# Patient Record
Sex: FEMALE | Race: WHITE | Employment: FULL TIME | URBAN - METROPOLITAN AREA
[De-identification: names, ages, dates, MRNs, and addresses within clinical notes are randomized per-mention and may not be internally consistent; named-entity substitution may affect disease eponyms.]

---

## 2018-10-03 ENCOUNTER — OP HISTORICAL/CONVERTED ENCOUNTER (OUTPATIENT)
Dept: OTHER | Age: 73
End: 2018-10-03

## 2019-06-29 ENCOUNTER — IP HISTORICAL/CONVERTED ENCOUNTER (OUTPATIENT)
Dept: OTHER | Age: 74
End: 2019-06-29

## 2019-10-29 ENCOUNTER — OP HISTORICAL/CONVERTED ENCOUNTER (OUTPATIENT)
Dept: OTHER | Age: 74
End: 2019-10-29

## 2019-10-30 ENCOUNTER — OP HISTORICAL/CONVERTED ENCOUNTER (OUTPATIENT)
Dept: OTHER | Age: 74
End: 2019-10-30

## 2020-07-21 ENCOUNTER — OP HISTORICAL/CONVERTED ENCOUNTER (OUTPATIENT)
Dept: OTHER | Age: 75
End: 2020-07-21

## 2020-10-19 ENCOUNTER — TRANSCRIBE ORDER (OUTPATIENT)
Dept: SCHEDULING | Age: 75
End: 2020-10-19

## 2020-10-19 DIAGNOSIS — E04.9 ENLARGEMENT OF THYROID: Primary | ICD-10-CM

## 2020-11-10 ENCOUNTER — HOSPITAL ENCOUNTER (OUTPATIENT)
Dept: ULTRASOUND IMAGING | Age: 75
Discharge: HOME | End: 2020-11-10
Payer: COMMERCIAL

## 2020-11-10 ENCOUNTER — HOSPITAL ENCOUNTER (EMERGENCY)
Age: 75
Discharge: STILL ADMITTED OR ADMINISTRATIVE | End: 2020-11-10

## 2020-11-10 DIAGNOSIS — E04.9 ENLARGEMENT OF THYROID: ICD-10-CM

## 2020-11-10 PROCEDURE — 76536 US EXAM OF HEAD AND NECK: CPT

## 2020-11-13 ENCOUNTER — APPOINTMENT (OUTPATIENT)
Dept: GENERAL RADIOLOGY | Age: 75
DRG: 813 | End: 2020-11-13
Attending: EMERGENCY MEDICINE
Payer: COMMERCIAL

## 2020-11-13 ENCOUNTER — APPOINTMENT (OUTPATIENT)
Dept: CT IMAGING | Age: 75
DRG: 813 | End: 2020-11-13
Attending: NURSE PRACTITIONER
Payer: COMMERCIAL

## 2020-11-13 ENCOUNTER — HOSPITAL ENCOUNTER (INPATIENT)
Age: 75
LOS: 7 days | Discharge: HOME | DRG: 813 | End: 2020-11-20
Attending: FAMILY MEDICINE | Admitting: HOSPITALIST
Payer: COMMERCIAL

## 2020-11-13 DIAGNOSIS — R06.02 SOB (SHORTNESS OF BREATH): Primary | ICD-10-CM

## 2020-11-13 DIAGNOSIS — R09.02 HYPOXIA: ICD-10-CM

## 2020-11-13 DIAGNOSIS — R91.8 RIGHT LOWER LOBE LUNG MASS: ICD-10-CM

## 2020-11-13 PROBLEM — J96.91 RESPIRATORY FAILURE WITH HYPOXIA (HCC): Status: ACTIVE | Noted: 2020-11-13

## 2020-11-13 LAB
ALBUMIN SERPL-MCNC: 3.3 G/DL (ref 3.5–5)
ALBUMIN/GLOB SERPL: 1 {RATIO} (ref 1.1–2.2)
ALP SERPL-CCNC: 106 U/L (ref 45–117)
ALT SERPL-CCNC: 19 U/L (ref 12–78)
ANION GAP SERPL CALC-SCNC: 2 MMOL/L (ref 5–15)
AST SERPL W P-5'-P-CCNC: 11 U/L (ref 15–37)
ATRIAL RATE: 65 BPM
BASOPHILS # BLD: 0.1 K/UL (ref 0–0.1)
BASOPHILS NFR BLD: 1 % (ref 0–1)
BILIRUB SERPL-MCNC: 0.3 MG/DL (ref 0.2–1)
BNP SERPL-MCNC: 444 PG/ML
BUN SERPL-MCNC: 21 MG/DL (ref 6–20)
BUN/CREAT SERPL: 15 (ref 12–20)
CA-I BLD-MCNC: 9 MG/DL (ref 8.5–10.1)
CALCULATED R AXIS, ECG10: 60 DEGREES
CALCULATED T AXIS, ECG11: 54 DEGREES
CHLORIDE SERPL-SCNC: 108 MMOL/L (ref 97–108)
CO2 SERPL-SCNC: 33 MMOL/L (ref 21–32)
CREAT SERPL-MCNC: 1.42 MG/DL (ref 0.55–1.02)
DIAGNOSIS, 93000: NORMAL
DIFFERENTIAL METHOD BLD: ABNORMAL
EOSINOPHIL # BLD: 0.1 K/UL (ref 0–0.4)
EOSINOPHIL NFR BLD: 1 % (ref 0–7)
ERYTHROCYTE [DISTWIDTH] IN BLOOD BY AUTOMATED COUNT: 18.5 % (ref 11.5–14.5)
GLOBULIN SER CALC-MCNC: 3.4 G/DL (ref 2–4)
GLUCOSE SERPL-MCNC: 120 MG/DL (ref 65–100)
HCT VFR BLD AUTO: 28.7 % (ref 35–47)
HGB BLD-MCNC: 7.6 G/DL (ref 11.5–16)
IMM GRANULOCYTES # BLD AUTO: 0 K/UL (ref 0–0.04)
IMM GRANULOCYTES NFR BLD AUTO: 0 % (ref 0–0.5)
LACTATE SERPL-SCNC: 0.5 MMOL/L (ref 0.4–2)
LYMPHOCYTES # BLD: 1.4 K/UL (ref 0.8–3.5)
LYMPHOCYTES NFR BLD: 17 % (ref 12–49)
MCH RBC QN AUTO: 20.1 PG (ref 26–34)
MCHC RBC AUTO-ENTMCNC: 26.5 G/DL (ref 30–36.5)
MCV RBC AUTO: 75.7 FL (ref 80–99)
MONOCYTES # BLD: 0.6 K/UL (ref 0–1)
MONOCYTES NFR BLD: 7 % (ref 5–13)
NEUTS SEG # BLD: 6.1 K/UL (ref 1.8–8)
NEUTS SEG NFR BLD: 74 % (ref 32–75)
PLATELET # BLD AUTO: 222 K/UL (ref 150–400)
PMV BLD AUTO: 10.5 FL (ref 8.9–12.9)
POTASSIUM SERPL-SCNC: 3.7 MMOL/L (ref 3.5–5.1)
PROCALCITONIN SERPL-MCNC: <0.05 NG/ML
PROT SERPL-MCNC: 6.7 G/DL (ref 6.4–8.2)
Q-T INTERVAL, ECG07: 414 MS
QRS DURATION, ECG06: 78 MS
QTC CALCULATION (BEZET), ECG08: 449 MS
RBC # BLD AUTO: 3.79 M/UL (ref 3.8–5.2)
SODIUM SERPL-SCNC: 143 MMOL/L (ref 136–145)
TROPONIN I SERPL-MCNC: <0.05 NG/ML
VENTRICULAR RATE, ECG03: 71 BPM
WBC # BLD AUTO: 8.1 K/UL (ref 3.6–11)

## 2020-11-13 PROCEDURE — 71045 X-RAY EXAM CHEST 1 VIEW: CPT

## 2020-11-13 PROCEDURE — 85025 COMPLETE CBC W/AUTO DIFF WBC: CPT

## 2020-11-13 PROCEDURE — 80053 COMPREHEN METABOLIC PANEL: CPT

## 2020-11-13 PROCEDURE — 93005 ELECTROCARDIOGRAM TRACING: CPT

## 2020-11-13 PROCEDURE — 36415 COLL VENOUS BLD VENIPUNCTURE: CPT

## 2020-11-13 PROCEDURE — 83605 ASSAY OF LACTIC ACID: CPT

## 2020-11-13 PROCEDURE — 96374 THER/PROPH/DIAG INJ IV PUSH: CPT

## 2020-11-13 PROCEDURE — 99285 EMERGENCY DEPT VISIT HI MDM: CPT

## 2020-11-13 PROCEDURE — 65270000029 HC RM PRIVATE

## 2020-11-13 PROCEDURE — 74011250636 HC RX REV CODE- 250/636: Performed by: NURSE PRACTITIONER

## 2020-11-13 PROCEDURE — 87040 BLOOD CULTURE FOR BACTERIA: CPT

## 2020-11-13 PROCEDURE — 87635 SARS-COV-2 COVID-19 AMP PRB: CPT

## 2020-11-13 PROCEDURE — 83880 ASSAY OF NATRIURETIC PEPTIDE: CPT

## 2020-11-13 PROCEDURE — 94761 N-INVAS EAR/PLS OXIMETRY MLT: CPT

## 2020-11-13 PROCEDURE — 84484 ASSAY OF TROPONIN QUANT: CPT

## 2020-11-13 PROCEDURE — 84145 PROCALCITONIN (PCT): CPT

## 2020-11-13 RX ORDER — PROMETHAZINE HYDROCHLORIDE 25 MG/1
12.5 TABLET ORAL
Status: DISCONTINUED | OUTPATIENT
Start: 2020-11-13 | End: 2020-11-20 | Stop reason: HOSPADM

## 2020-11-13 RX ORDER — DILTIAZEM HYDROCHLORIDE 30 MG/1
60 TABLET, FILM COATED ORAL 2 TIMES DAILY
Status: DISCONTINUED | OUTPATIENT
Start: 2020-11-13 | End: 2020-11-20 | Stop reason: HOSPADM

## 2020-11-13 RX ORDER — ONDANSETRON 2 MG/ML
4 INJECTION INTRAMUSCULAR; INTRAVENOUS
Status: DISCONTINUED | OUTPATIENT
Start: 2020-11-13 | End: 2020-11-14

## 2020-11-13 RX ORDER — FUROSEMIDE 10 MG/ML
20 INJECTION INTRAMUSCULAR; INTRAVENOUS
Status: COMPLETED | OUTPATIENT
Start: 2020-11-13 | End: 2020-11-13

## 2020-11-13 RX ORDER — FUROSEMIDE 10 MG/ML
40 INJECTION INTRAMUSCULAR; INTRAVENOUS ONCE
Status: COMPLETED | OUTPATIENT
Start: 2020-11-14 | End: 2020-11-14

## 2020-11-13 RX ORDER — ACETAMINOPHEN 650 MG/1
650 SUPPOSITORY RECTAL
Status: DISCONTINUED | OUTPATIENT
Start: 2020-11-13 | End: 2020-11-14

## 2020-11-13 RX ORDER — IBUPROFEN 200 MG
1 TABLET ORAL DAILY PRN
Status: DISCONTINUED | OUTPATIENT
Start: 2020-11-13 | End: 2020-11-20 | Stop reason: HOSPADM

## 2020-11-13 RX ORDER — ACETAMINOPHEN 325 MG/1
650 TABLET ORAL
Status: DISCONTINUED | OUTPATIENT
Start: 2020-11-13 | End: 2020-11-20 | Stop reason: HOSPADM

## 2020-11-13 RX ORDER — ALBUTEROL SULFATE 90 UG/1
2 AEROSOL, METERED RESPIRATORY (INHALATION)
Status: DISCONTINUED | OUTPATIENT
Start: 2020-11-13 | End: 2020-11-20 | Stop reason: HOSPADM

## 2020-11-13 RX ORDER — SODIUM CHLORIDE 0.9 % (FLUSH) 0.9 %
5-40 SYRINGE (ML) INJECTION EVERY 8 HOURS
Status: DISCONTINUED | OUTPATIENT
Start: 2020-11-13 | End: 2020-11-20 | Stop reason: HOSPADM

## 2020-11-13 RX ORDER — SODIUM CHLORIDE 0.9 % (FLUSH) 0.9 %
5-40 SYRINGE (ML) INJECTION AS NEEDED
Status: DISCONTINUED | OUTPATIENT
Start: 2020-11-13 | End: 2020-11-20 | Stop reason: HOSPADM

## 2020-11-13 RX ORDER — POLYETHYLENE GLYCOL 3350 17 G/17G
17 POWDER, FOR SOLUTION ORAL DAILY PRN
Status: DISCONTINUED | OUTPATIENT
Start: 2020-11-13 | End: 2020-11-20 | Stop reason: HOSPADM

## 2020-11-13 RX ADMIN — Medication 10 ML: at 22:00

## 2020-11-13 RX ADMIN — FUROSEMIDE 20 MG: 10 INJECTION, SOLUTION INTRAMUSCULAR; INTRAVENOUS at 17:40

## 2020-11-13 RX ADMIN — Medication 10 ML: at 17:48

## 2020-11-13 NOTE — H&P
History and Physical    Patient: Jose Fong MRN: [id number]  SSN: [id number]    YOB: 1945  Age: 70 y.o. Sex: female      Subjective:      Chief Complaint: Shortness of breath    HPI: Jose Fong is a 70 y.o. female with past medical history of COPD (continued tobacco abuse), CHF (unknown ejection fraction), atrial fibrillation and nephrectomy (clear-cell renal cell carcinoma) presenting to the ER with complaints of shortness of breath. Of note Ms. Sarah Nick is a poor historian. Ms. Sarah Nick offered little information concerning recent illness and could not elaborate on details. Patient tells me she is usually followed by [facility] and her cardiologist is Dr. Lorena Webb. Ms. Sarah Nick tells me she has been short of breath for several days. Shortness of breath is worse with dyspnea on exertion. She denies chest pain, palpitations, dizziness, fever, chills, nasal congestion, rhinorrhea, nausea, vomiting, abdominal pain, melena, hematochezia. She has had lower extremity edema, orthopnea, nonproductive cough. Tells me she is on Eliquis and diltiazem for atrial fibrillation cannot give me a full medication list at the time of admission. Patient presented to the ER this evening for further treatment and evaluation of shortness of breath. Ms. Sarah Nick denies any recent sick contacts. On arrival to the ER, temperature was 99.2 °F, pulse 75, respirations 16, blood pressure 156/49 and oxygen saturation 95% on oxygen supplementation via nasal cannula. Initial EKG has atrial fibrillation with a rate of 71 bpm.  White blood cell count is 8.1, hemoglobin 7.6, creatinine 1.42. Chest x-ray has patchy basilar reticular markings in each lung base with a small right pleural effusion. Blood cultures were ordered and obtained in the ER. CT of the chest is currently pending. Patient has required oxygen supplementation via nasal cannula to maintain oxygen saturations greater than 93%.   Hospital service has been asked to admit Ms. Matheus Ramirez for further treatment and evaluation of acute hypoxic respiratory failure. I reviewed medical history, past surgical history, social history, family history at the time of admission. I attempted to reconcile medications however Ms. Matheus Ramirez is unsure of medicines or home dosing. Ms. Matheus Ramirez is a full code. She smokes 1/2 to 1 pack of cigarettes per day. She lives with her son Yany Falk but cannot recall his telephone number at the time of admission. Past Medical History:   Diagnosis Date    A-fib [facility])     CHF (congestive heart failure) (HCC)     Clear cell renal cell carcinoma (HCC)     COPD (chronic obstructive pulmonary disease) (HCC)     Fibromyalgia     Lymphedema     Sjogren's syndrome ([facility] [address]     Thyroid nodule      Past Surgical History:   Procedure Laterality Date    HX NEPHRECTOMY        Family History   Problem Relation Age of Onset    Hypertension Mother      Social History     Tobacco Use    Smoking status: Current Every Day Smoker     Packs/day: 0.50     Types: Cigarettes    Smokeless tobacco: Never Used   Substance Use Topics    Alcohol use: Not Currently      Prior to Admission medications    Not on File        No Known Allergies    Review of Systems:  Constitutional: Positive for generalized weakness. Denies fevers, chills, fatigue, unexplained weight loss, night sweats. Head, Eyes, Ears, Nose, Mouth, Throat: Denies nasal congestion, sore throat, rhinorrhea, earache, ringing of the ears, difficulty hearing, facial pain, facial swelling. Respiratory: Positive for shortness of breath, dyspnea on exertion, nonproductive cough. Denies wheezing,sputum production, hemoptysis. Denies use of oxygen at home. Cardiovascular: Positive for lower extremity edema, dyspnea on exertion, orthopnea. No chest pain, irregular heart beat, racing pulse, dizziness.   Gastrointestinal: Denies nausea, vomiting, diarrhea, constipation, abdominal pain, loss of appetite, acid reflux, melena, hematochezia, change in bowel habits. Endocrine: Denies intolerance to heat or cold. Denies polyuria, polydipsia, polyphagia. Denies recent weigh changes. Genitourinary: No increased urinary frequency, dysuria, hematuria, urinary incontinence, increased urinary frequency. Integument/Breast: No rash, itching or new skin lesions. Musculoskeletal: No joint swelling, joint pain, myalgias, neck pain, back pain. Neurological: No headaches, dizziness, confusion, tremors, numbness/tingling, paresthesias, weakness, problems with balance, loss of consciousness. Hematologic: Denies easy bleeding, easy bruising, lymphadenopathy. Behavioral/Psychiatric: Denies anxiety, depression, increased irritability, mood swings, delusions, hallucination, SI/HI. Objective:     Vitals:    11/13/20 1340 11/13/20 1607 11/13/20 1700   BP: (!) 156/49 (!) 152/112 (!) 163/62   Pulse: 75 75 80   Resp: 16 16 16   Temp: 99.2 °F (37.3 °C)     SpO2: 95% 96% 97%   Weight: 101.2 kg (223 lb)     Height: 5' 6\" (1.676 m)          Physical Exam:  General: Appears chronically ill. Alert and Oriented x 3. No acute distress. Nourished and well developed. Poor historian. On oxygen via nasal cannula. Obese. HEAD, EYES: Normocephalic, atraumatic, EOMI, PERRLA. Nose: Nodes are within normal limits without drainage. .   Throat and Neck: Posterior pharynx without erythema or exudate. No masses, JVD or lymphadenopathy appreciated. Cervical spine has good range of motion without pain. Lungs: Decreased breath sounds in lower lung fields with crackles and faint wheezing. Symmetric chest rise with respirations. Heart: Irregular irregular rhythm. Normal S1/S2. No appreciated murmurs, rubs or gallops. +2 lower extremity edema   Abdomen: Soft, non-tender, non-distended. Bowel sounds present in all four quadrants. No masses appreciated. Extremities:  Atraumatic. Able to move all extremities symmetrically.  No abnormal bony protuberances appreciated. Back: No pain with palpation over spinous processes or paraspinal musculature. No CVA tenderness. Skin: Clean, dry and intact without appreciated lesions. Neurologic: A&Ox3. Cranial nerves 2-12 are grossly intact. No focal deficits. Psychiatric: Normal affect, normal thought process, good eye contact. Recent Results (from the past 24 hour(s))   CBC WITH AUTOMATED DIFF    Collection Time: 11/13/20  1:45 PM   Result Value Ref Range    WBC 8.1 3.6 - 11.0 K/uL    RBC 3.79 (L) 3.80 - 5.20 M/uL    HGB 7.6 (L) 11.5 - 16.0 g/dL    HCT 28.7 (L) 35.0 - 47.0 %    MCV 75.7 (L) 80.0 - 99.0 FL    MCH 20.1 (L) 26.0 - 34.0 PG    MCHC 26.5 (L) 30.0 - 36.5 g/dL    RDW 18.5 (H) 11.5 - 14.5 %    PLATELET 327 043 - 246 K/uL    MPV 10.5 8.9 - 12.9 FL    NEUTROPHILS 74 32 - 75 %    LYMPHOCYTES 17 12 - 49 %    MONOCYTES 7 5 - 13 %    EOSINOPHILS 1 0 - 7 %    BASOPHILS 1 0 - 1 %    IMMATURE GRANULOCYTES 0 0.0 - 0.5 %    ABS. NEUTROPHILS 6.1 1.8 - 8.0 K/UL    ABS. LYMPHOCYTES 1.4 0.8 - 3.5 K/UL    ABS. MONOCYTES 0.6 0.0 - 1.0 K/UL    ABS. EOSINOPHILS 0.1 0.0 - 0.4 K/UL    ABS. BASOPHILS 0.1 0.0 - 0.1 K/UL    ABS. IMM. GRANS. 0.0 0.00 - 0.04 K/UL    DF AUTOMATED     METABOLIC PANEL, COMPREHENSIVE    Collection Time: 11/13/20  1:45 PM   Result Value Ref Range    Sodium 143 136 - 145 mmol/L    Potassium 3.7 3.5 - 5.1 mmol/L    Chloride 108 97 - 108 mmol/L    CO2 33 (H) 21 - 32 mmol/L    Anion gap 2 (L) 5 - 15 mmol/L    Glucose 120 (H) 65 - 100 mg/dL    BUN 21 (H) 6 - 20 mg/dL    Creatinine 1.42 (H) 0.55 - 1.02 mg/dL    BUN/Creatinine ratio 15 12 - 20      GFR est AA 44 (L) >60 ml/min/1.73m2    GFR est non-AA 36 (L) >60 ml/min/1.73m2    Calcium 9.0 8.5 - 10.1 mg/dL    Bilirubin, total 0.3 0.2 - 1.0 mg/dL    AST (SGOT) 11 (L) 15 - 37 U/L    ALT (SGPT) 19 12 - 78 U/L    Alk.  phosphatase 106 45 - 117 U/L    Protein, total 6.7 6.4 - 8.2 g/dL    Albumin 3.3 (L) 3.5 - 5.0 g/dL    Globulin 3.4 2.0 - 4.0 g/dL    A-G Ratio 1.0 (L) 1.1 - 2.2     BNP    Collection Time: 11/13/20  1:45 PM   Result Value Ref Range    NT pro- (H) <125 pg/mL   TROPONIN I    Collection Time: 11/13/20  1:45 PM   Result Value Ref Range    Troponin-I, Qt. <0.05 <0.05 ng/mL   EKG, 12 LEAD, INITIAL    Collection Time: 11/13/20  1:47 PM   Result Value Ref Range    Ventricular Rate 71 BPM    Atrial Rate 65 BPM    QRS Duration 78 ms    Q-T Interval 414 ms    QTC Calculation (Bezet) 449 ms    Calculated R Axis 60 degrees    Calculated T Axis 54 degrees    Diagnosis       Atrial fibrillation  Low voltage QRS  Abnormal ECG  When compared with ECG of 13-NOV-2020 13:46, (Unconfirmed)  Previous ECG has undetermined rhythm, needs review  Questionable change in QRS duration  Borderline criteria for Anterior infarct are no longer Present  Borderline criteria for Anterolateral infarct are no longer Present  Confirmed by Lily Dougherty ([id number]) on 11/13/2020 2:46:27 PM         XR Results (maximum last 3): Results from [place] encounter on 11/13/20   XR CHEST PORT    Narrative Chest single view. Comparison single view chest June 20, 2019. New RLL opacity. Patchy basilar reticular markings each lung base. Suspect small  dependent right pleural effusion. Cardiac and mediastinal structures unchanged  noting thoracic aorta atherosclerosis. No pneumothorax. Continued follow-up recommended. Obstructive cause for the above not excluded. Assessment:     Ab James is a 70 y.o. female who presents to the ER for evaluation of shortness of breath. Chest x-ray has patchy reticular findings in each base with a small right pleural effusion. I suspect acute on chronic systolic heart failure exacerbation. BNP is elevated and troponin is within normal limits. Ms. Matheus Ramirez has a hemoglobin of 7.6 and is currently on Eliquis for atrial fibrillation. Acute anemia may be causing shortness of breath. Patient denies denies hematochezia or melena.   Additionally creatinine is 1.4 which may be secondary to acute on chronic systolic heart failure. There are no recent medical records to review. Admit Ms. Chacha Thompson for acute hypoxic respiratory failure. Plan:     1. Admit to telemetry bed. 2. Covid-19 and Influenza results are pending (has complaints of SOB, rule out viral etiology). Order isolation precautions. Monitor blood cultures. Order sputum culture. CT of the chest without contrast has been ordered with pending results. 3. Provide oxygen via nasal cannula PRN oxygen saturations <93%. Order Q4H Albuterol PRN SOB/Wheezing. 4. Cardiology consult. Check TSH level. Monitor ins and outs and daily weights. IV Lasix was given in the emergency room. Order additional dose of Lasix for the morning. 5. Elevated creatinine level without previous values to compare. Monitor creatinine closely with administration of IV diuretics. 6. Patient's hemoglobin is 7.6 and she is on Eliquis for atrial fibrillation. She denies melena or hematochezia. Check iron panel and stools for blood. Hold home dose of Eliquis this evening. 7. History of atrial fibrillation, I have ordered diltiazem, 60 mg twice daily. Ms. Chacha Thompson could not recall her home dose of diltiazem. Hold for systolic blood pressure less than 120 or heart rate less than 60.  8. Request records from [facility] including past medical history, past surgical history and home medication list.  9. Fall precautions. 10. Order nicotine patch for nicotine dependence. Order smoking cessation counseling per hospital protocol to be administered by nursing staff. GI PPX: Diet ordered fluid restriction. DVT PPX: SCDs.     Signed By: Michael Hou MD     November 13, 2020

## 2020-11-13 NOTE — ED PROVIDER NOTES
EMERGENCY DEPARTMENT HISTORY AND PHYSICAL EXAM      Date: 11/13/2020  Patient Name: John Geronimo      History of Presenting Illness     Chief Complaint   Patient presents with    Shortness of Breath       History Provided By: Patient    HPI: John Geronimo, 70 y.o. female with a past medical history significant COPD and Of heart failure, atrial fibrillation, arrhythmia, kidney cancer a year and a half ago, Sjogren's syndrome nephrectomy presents to the ED with cc of increased shortness of breath, bilateral leg swelling, weakness, and productive cough. She reports symptoms been present for the past 3 months however been worsening. She reports having difficulty ambulating from her room to the bathroom. Was advised by her primary care doctor that she needed use of continuous oxygen. She reports being managed by cardiologist Dr. Rhea Garcia, pulmonary Dr. Obed Moritz and PCP .  She reports speaking with Dr. Edwardo Yo yesterday advised her to come to emergency room for further evaluation. Denies any fever, chills, nausea, vomiting, abdominal pain, chest pain for. Denies ever being tested for Covid. She is on 3 inhalers and taking diuretics with no improvement. Current smoker of half a pack per day. Pt reports use of eliquis    There are no other complaints, changes, or physical findings at this time. PCP: Veto Granados MD         Past History     Past Medical History:  Past Medical History:   Diagnosis Date    A-fib ([facility] [address]     Cancer ([facility] [address]     CHF (congestive heart failure) ([facility] [address]     COPD (chronic obstructive pulmonary disease) ([facility] [address]     Fibromyalgia     Lymphedema     Sjogren's syndrome ([facility] [address]        Past Surgical History:  Past Surgical History:   Procedure Laterality Date    HX NEPHRECTOMY         Family History:  No family history on file.     Social History:  Social History     Tobacco Use    Smoking status: Current Every Day Smoker     Packs/day: 0.50   Substance Use Topics    Alcohol use: Not Currently    Drug use: Never       Allergies:  No Known Allergies      Review of Systems     Review of Systems   Constitutional: Positive for fatigue. Negative for chills and fever. Respiratory: Positive for cough and shortness of breath. Cardiovascular: Positive for leg swelling. Negative for chest pain. Gastrointestinal: Negative. Genitourinary: Negative. Skin: Negative. Neurological: Positive for weakness. Psychiatric/Behavioral: Negative. Physical Exam     Physical Exam  Constitutional:       General: She is not in acute distress. Appearance: She is well-developed. She is obese. She is not ill-appearing. HENT:      Head: Normocephalic and atraumatic. Mouth/Throat:      Mouth: Mucous membranes are moist.   Eyes:      Extraocular Movements: Extraocular movements intact. Pupils: Pupils are equal, round, and reactive to light. Neck:      Musculoskeletal: Normal range of motion and neck supple. Cardiovascular:      Rate and Rhythm: Normal rate. Rhythm irregular. Pulses:           Radial pulses are 2+ on the right side and 2+ on the left side. Posterior tibial pulses are 2+ on the right side and 2+ on the left side. Heart sounds: Normal heart sounds. Pulmonary:      Effort: Pulmonary effort is normal.      Breath sounds: Normal breath sounds. Abdominal:      General: Bowel sounds are normal.   Musculoskeletal:      Right lower le+ Pitting Edema present. Left lower le+ Pitting Edema present. Skin:     General: Skin is warm and dry. Capillary Refill: Capillary refill takes less than 2 seconds. Neurological:      Mental Status: She is alert and oriented to person, place, and time.          Lab and Diagnostic Study Results     Labs -     Recent Results (from the past 12 hour(s))   CBC WITH AUTOMATED DIFF    Collection Time: 20  1:45 PM   Result Value Ref Range    WBC 8.1 3.6 - 11.0 K/uL    RBC 3.79 (L) 3.80 - 5.20 M/uL    HGB 7.6 (L) 11.5 - 16.0 g/dL    HCT 28.7 (L) 35.0 - 47.0 %    MCV 75.7 (L) 80.0 - 99.0 FL    MCH 20.1 (L) 26.0 - 34.0 PG    MCHC 26.5 (L) 30.0 - 36.5 g/dL    RDW 18.5 (H) 11.5 - 14.5 %    PLATELET 596 964 - 946 K/uL    MPV 10.5 8.9 - 12.9 FL    NEUTROPHILS 74 32 - 75 %    LYMPHOCYTES 17 12 - 49 %    MONOCYTES 7 5 - 13 %    EOSINOPHILS 1 0 - 7 %    BASOPHILS 1 0 - 1 %    IMMATURE GRANULOCYTES 0 0.0 - 0.5 %    ABS. NEUTROPHILS 6.1 1.8 - 8.0 K/UL    ABS. LYMPHOCYTES 1.4 0.8 - 3.5 K/UL    ABS. MONOCYTES 0.6 0.0 - 1.0 K/UL    ABS. EOSINOPHILS 0.1 0.0 - 0.4 K/UL    ABS. BASOPHILS 0.1 0.0 - 0.1 K/UL    ABS. IMM. GRANS. 0.0 0.00 - 0.04 K/UL    DF AUTOMATED     METABOLIC PANEL, COMPREHENSIVE    Collection Time: 11/13/20  1:45 PM   Result Value Ref Range    Sodium 143 136 - 145 mmol/L    Potassium 3.7 3.5 - 5.1 mmol/L    Chloride 108 97 - 108 mmol/L    CO2 33 (H) 21 - 32 mmol/L    Anion gap 2 (L) 5 - 15 mmol/L    Glucose 120 (H) 65 - 100 mg/dL    BUN 21 (H) 6 - 20 mg/dL    Creatinine 1.42 (H) 0.55 - 1.02 mg/dL    BUN/Creatinine ratio 15 12 - 20      GFR est AA 44 (L) >60 ml/min/1.73m2    GFR est non-AA 36 (L) >60 ml/min/1.73m2    Calcium 9.0 8.5 - 10.1 mg/dL    Bilirubin, total 0.3 0.2 - 1.0 mg/dL    AST (SGOT) 11 (L) 15 - 37 U/L    ALT (SGPT) 19 12 - 78 U/L    Alk.  phosphatase 106 45 - 117 U/L    Protein, total 6.7 6.4 - 8.2 g/dL    Albumin 3.3 (L) 3.5 - 5.0 g/dL    Globulin 3.4 2.0 - 4.0 g/dL    A-G Ratio 1.0 (L) 1.1 - 2.2     BNP    Collection Time: 11/13/20  1:45 PM   Result Value Ref Range    NT pro- (H) <125 pg/mL   TROPONIN I    Collection Time: 11/13/20  1:45 PM   Result Value Ref Range    Troponin-I, Qt. <0.05 <0.05 ng/mL   EKG, 12 LEAD, INITIAL    Collection Time: 11/13/20  1:47 PM   Result Value Ref Range    Ventricular Rate 71 BPM    Atrial Rate 65 BPM    QRS Duration 78 ms    Q-T Interval 414 ms    QTC Calculation (Bezet) 449 ms    Calculated R Axis 60 degrees    Calculated T Axis 54 degrees    Diagnosis Atrial fibrillation  Low voltage QRS  Abnormal ECG  When compared with ECG of 13-NOV-2020 13:46, (Unconfirmed)  Previous ECG has undetermined rhythm, needs review  Questionable change in QRS duration  Borderline criteria for Anterior infarct are no longer Present  Borderline criteria for Anterolateral infarct are no longer Present  Confirmed by Lily Dougherty ([id number]) on 11/13/2020 2:46:27 PM         Radiologic Studies -   [unfilled]  CT Results  (Last 48 hours)    None        CXR Results  (Last 48 hours)               11/13/20 1422  XR CHEST PORT Final result    Narrative:  Chest single view. Comparison single view chest June 20, 2019. New RLL opacity. Patchy basilar reticular markings each lung base. Suspect small   dependent right pleural effusion. Cardiac and mediastinal structures unchanged   noting thoracic aorta atherosclerosis. No pneumothorax. Continued follow-up recommended. Obstructive cause for the above not excluded. Medical Decision Making and ED Course   - I am the first and primary provider for this patient. - I reviewed the vital signs, available nursing notes, past medical history, past surgical history, family history and social history. - Initial assessment performed. The patients presenting problems have been discussed, and the staff are in agreement with the care plan formulated and outlined with them. I have encouraged them to ask questions as they arise throughout their visit. Vital Signs-Reviewed the patient's vital signs. Patient Vitals for the past 12 hrs:   Temp Pulse Resp BP SpO2   11/13/20 1607 -- (!) 0 16 (!) 152/112 96 %   11/13/20 1340 99.2 °F (37.3 °C) 75 16 (!) 156/49 95 %       EKG interpretation:   Rhythm: atrial fib; and irregular. Rate (approx.): 71; Axis: normal; NH interval: normal; QRS interval: normal ; ST/T wave: normal; Other findings: abnormal ekg.       The patient presents with shortness of breath with a differential diagnosis of COVID-19, dyspnea, COPD, pneumonia, bronchitis, congestive heart failure. ED Course:              Provider Notes (Medical Decision Making):     Satting in the mid 80s on room air with ambulation and at rest.  Placed on 3 L of O2. BNP slightly elevated with edema noted. Possible early  CHF exacerbation vs COPD exacerpation. Pt is current smoker with hx of COPD. Pt does not have fever and denies temp at home. however unable to r/o covid at this time. Chest xray with opacity noted. Pt updated on findings. She reports already being aware and has had a CT for further evaluation about 2 weeks ago at [facility]. Pt admitted to hospitalist service for hypoxia. Pt updated on plan of care stable at this time        Consultations:       Consultations: Hospitalist        Procedures and Critical Care       Performed by: CONRADO Beltran NP        Disposition     Disposition: admitted      DISCHARGE PLAN:  1. There are no discharge medications for this patient. 2.   Follow-up Information    None       3. Return to ED if worse   4. There are no discharge medications for this patient. Diagnosis     Clinical Impression:   1. SOB (shortness of breath)    2. Right lower lobe lung mass    3. Hypoxia        Attestations:    Genesis Concepcion NP    Please note that this dictation was completed with [employer], the computer voice recognition software. Quite often unanticipated grammatical, syntax, homophones, and other interpretive errors are inadvertently transcribed by the computer software. Please disregard these errors. Please excuse any errors that have escaped final proofreading. Thank you.

## 2020-11-14 LAB
ALBUMIN SERPL-MCNC: 3.2 G/DL (ref 3.5–5)
ALBUMIN/GLOB SERPL: 0.9 {RATIO} (ref 1.1–2.2)
ALP SERPL-CCNC: 104 U/L (ref 45–117)
ALT SERPL-CCNC: 19 U/L (ref 12–78)
ANION GAP SERPL CALC-SCNC: 0 MMOL/L (ref 5–15)
AST SERPL W P-5'-P-CCNC: 9 U/L (ref 15–37)
ATRIAL RATE: 66 BPM
BILIRUB SERPL-MCNC: 0.4 MG/DL (ref 0.2–1)
BUN SERPL-MCNC: 21 MG/DL (ref 6–20)
BUN/CREAT SERPL: 15 (ref 12–20)
CA-I BLD-MCNC: 8.5 MG/DL (ref 8.5–10.1)
CALCULATED R AXIS, ECG10: 74 DEGREES
CALCULATED T AXIS, ECG11: 88 DEGREES
CHLORIDE SERPL-SCNC: 107 MMOL/L (ref 97–108)
CO2 SERPL-SCNC: 35 MMOL/L (ref 21–32)
CREAT SERPL-MCNC: 1.36 MG/DL (ref 0.55–1.02)
DIAGNOSIS, 93000: NORMAL
ERYTHROCYTE [DISTWIDTH] IN BLOOD BY AUTOMATED COUNT: 18.6 % (ref 11.5–14.5)
GLOBULIN SER CALC-MCNC: 3.4 G/DL (ref 2–4)
GLUCOSE BLD STRIP.AUTO-MCNC: 159 MG/DL (ref 65–100)
GLUCOSE SERPL-MCNC: 93 MG/DL (ref 65–100)
HCT VFR BLD AUTO: 28.5 % (ref 35–47)
HGB BLD-MCNC: 7.3 G/DL (ref 11.5–16)
MAGNESIUM SERPL-MCNC: 2.6 MG/DL (ref 1.6–2.4)
MCH RBC QN AUTO: 20.1 PG (ref 26–34)
MCHC RBC AUTO-ENTMCNC: 25.6 G/DL (ref 30–36.5)
MCV RBC AUTO: 78.3 FL (ref 80–99)
P-R INTERVAL, ECG05: 160 MS
PERFORMED BY, TECHID: ABNORMAL
PLATELET # BLD AUTO: 222 K/UL (ref 150–400)
PMV BLD AUTO: 10.7 FL (ref 8.9–12.9)
POTASSIUM SERPL-SCNC: 4 MMOL/L (ref 3.5–5.1)
PROT SERPL-MCNC: 6.6 G/DL (ref 6.4–8.2)
Q-T INTERVAL, ECG07: 430 MS
QRS DURATION, ECG06: 58 MS
QTC CALCULATION (BEZET), ECG08: 450 MS
RBC # BLD AUTO: 3.64 M/UL (ref 3.8–5.2)
SARS-COV-2, COV2: NORMAL
SODIUM SERPL-SCNC: 142 MMOL/L (ref 136–145)
TSH SERPL DL<=0.05 MIU/L-ACNC: 1.01 UIU/ML (ref 0.36–3.74)
VENTRICULAR RATE, ECG03: 66 BPM
WBC # BLD AUTO: 7.7 K/UL (ref 3.6–11)

## 2020-11-14 PROCEDURE — 80053 COMPREHEN METABOLIC PANEL: CPT

## 2020-11-14 PROCEDURE — 74011250636 HC RX REV CODE- 250/636: Performed by: FAMILY MEDICINE

## 2020-11-14 PROCEDURE — 77010033678 HC OXYGEN DAILY

## 2020-11-14 PROCEDURE — 85027 COMPLETE CBC AUTOMATED: CPT

## 2020-11-14 PROCEDURE — 36415 COLL VENOUS BLD VENIPUNCTURE: CPT

## 2020-11-14 PROCEDURE — 82962 GLUCOSE BLOOD TEST: CPT

## 2020-11-14 PROCEDURE — 84443 ASSAY THYROID STIM HORMONE: CPT

## 2020-11-14 PROCEDURE — 74011250637 HC RX REV CODE- 250/637: Performed by: FAMILY MEDICINE

## 2020-11-14 PROCEDURE — 83735 ASSAY OF MAGNESIUM: CPT

## 2020-11-14 PROCEDURE — 94762 N-INVAS EAR/PLS OXIMTRY CONT: CPT

## 2020-11-14 PROCEDURE — 93005 ELECTROCARDIOGRAM TRACING: CPT

## 2020-11-14 PROCEDURE — 65270000029 HC RM PRIVATE

## 2020-11-14 RX ORDER — DOCUSATE SODIUM 100 MG/1
100 CAPSULE, LIQUID FILLED ORAL DAILY
Status: DISCONTINUED | OUTPATIENT
Start: 2020-11-14 | End: 2020-11-20 | Stop reason: HOSPADM

## 2020-11-14 RX ORDER — ATORVASTATIN CALCIUM 40 MG/1
40 TABLET, FILM COATED ORAL DAILY
COMMUNITY

## 2020-11-14 RX ORDER — DIAZEPAM 5 MG/1
5 TABLET ORAL
COMMUNITY

## 2020-11-14 RX ORDER — SODIUM BICARBONATE 650 MG/1
650 TABLET ORAL 2 TIMES DAILY
Status: DISCONTINUED | OUTPATIENT
Start: 2020-11-14 | End: 2020-11-16

## 2020-11-14 RX ORDER — BUMETANIDE 1 MG/1
1 TABLET ORAL DAILY
COMMUNITY

## 2020-11-14 RX ORDER — DILTIAZEM HYDROCHLORIDE 120 MG/1
120 CAPSULE, EXTENDED RELEASE ORAL DAILY
COMMUNITY

## 2020-11-14 RX ORDER — FUROSEMIDE 10 MG/ML
40 INJECTION INTRAMUSCULAR; INTRAVENOUS DAILY
Status: DISCONTINUED | OUTPATIENT
Start: 2020-11-15 | End: 2020-11-15

## 2020-11-14 RX ORDER — DILTIAZEM HCL/D5W 100MG/0.1L
PLASTIC BAG, INJECTION (ML) INTRAVENOUS
COMMUNITY
End: 2020-11-19

## 2020-11-14 RX ORDER — MAGNESIUM CHLORIDE 70 MG
64 TABLET, DELAYED RELEASE (ENTERIC COATED) ORAL DAILY
Status: DISCONTINUED | OUTPATIENT
Start: 2020-11-14 | End: 2020-11-20 | Stop reason: HOSPADM

## 2020-11-14 RX ORDER — ASCORBIC ACID 500 MG
500 TABLET ORAL DAILY
COMMUNITY

## 2020-11-14 RX ORDER — FAMOTIDINE 40 MG/1
40 TABLET, FILM COATED ORAL DAILY
COMMUNITY

## 2020-11-14 RX ORDER — FUROSEMIDE 10 MG/ML
40 INJECTION INTRAMUSCULAR; INTRAVENOUS 2 TIMES DAILY
Status: DISCONTINUED | OUTPATIENT
Start: 2020-11-14 | End: 2020-11-14

## 2020-11-14 RX ORDER — POTASSIUM CHLORIDE 750 MG/1
20 TABLET, FILM COATED, EXTENDED RELEASE ORAL DAILY
COMMUNITY

## 2020-11-14 RX ORDER — LANOLIN ALCOHOL/MO/W.PET/CERES
1 CREAM (GRAM) TOPICAL
Status: DISCONTINUED | OUTPATIENT
Start: 2020-11-15 | End: 2020-11-20 | Stop reason: HOSPADM

## 2020-11-14 RX ORDER — SERTRALINE HYDROCHLORIDE 25 MG/1
25 TABLET, FILM COATED ORAL DAILY
COMMUNITY
End: 2021-01-01 | Stop reason: ALTCHOICE

## 2020-11-14 RX ORDER — FAMOTIDINE 20 MG/1
40 TABLET, FILM COATED ORAL DAILY
Status: DISCONTINUED | OUTPATIENT
Start: 2020-11-14 | End: 2020-11-15

## 2020-11-14 RX ORDER — MELATONIN
1000 DAILY
COMMUNITY

## 2020-11-14 RX ADMIN — Medication 10 ML: at 15:37

## 2020-11-14 RX ADMIN — Medication 10 ML: at 05:29

## 2020-11-14 RX ADMIN — FAMOTIDINE 40 MG: 20 TABLET ORAL at 18:12

## 2020-11-14 RX ADMIN — SODIUM BICARBONATE 650 MG TABLET 650 MG: at 21:35

## 2020-11-14 RX ADMIN — FUROSEMIDE 40 MG: 10 INJECTION, SOLUTION INTRAMUSCULAR; INTRAVENOUS at 09:06

## 2020-11-14 RX ADMIN — DOCUSATE SODIUM 100 MG: 100 CAPSULE, LIQUID FILLED ORAL at 18:12

## 2020-11-14 RX ADMIN — MAGNESIUM 64 MG (MAGNESIUM CHLORIDE) TABLET,DELAYED RELEASE 64 MG: at 16:00

## 2020-11-14 RX ADMIN — DILTIAZEM HYDROCHLORIDE 60 MG: 30 TABLET, FILM COATED ORAL at 09:06

## 2020-11-14 NOTE — ROUTINE PROCESS
TRANSFER - OUT REPORT:    Verbal report given to lindsay stephens(name) on Itzel Kirby  being transferred to [address] [place] [address](unit) for routine progression of care       Report consisted of patients Situation, Background, Assessment and   Recommendations(SBAR). Information from the following report(s) SBAR, ED Summary, [facility] and Recent Results was reviewed with the receiving nurse. Lines:   Peripheral IV 11/13/20 Left Antecubital (Active)   Site Assessment Clean, dry, & intact 11/13/20 2207   Phlebitis Assessment 0 11/13/20 2207   Infiltration Assessment 0 11/13/20 2207   Dressing Status Clean, dry, & intact 11/13/20 2207   Dressing Type Tape;Transparent 11/13/20 2207   Hub Color/Line Status Pink;Flushed 11/13/20 2207        Opportunity for questions and clarification was provided.       Patient transported with:   Monitor  Tech   O2 at 4L NC

## 2020-11-14 NOTE — PROGRESS NOTES
Hospitalist Progress Note           Daily Progress Note: 11/14/2020      Subjective: The patient is seen for follow  Up for COPD exacerbation. Patient on Eliquis and hemoglobin 7.3, she also complains of shortness of breath now but denies chest pain    Problem List:  Problem List as of 11/14/2020 Date Reviewed: 11/13/2020          Codes Class Noted - Resolved    Respiratory failure with hypoxia [facility]) ICD-10-CM: J96.91  ICD-9-CM: 518.81  11/13/2020 - Present              Medications reviewed  Current Facility-Administered Medications   Medication Dose Route Frequency    influenza vaccine 2020-21 (4 yrs+)(PF) (FLUCELVAX QUAD) injection 0.5 mL  0.5 mL IntraMUSCular PRIOR TO DISCHARGE    furosemide (LASIX) injection 40 mg  40 mg IntraVENous BID    sodium chloride (NS) flush 5-40 mL  5-40 mL IntraVENous Q8H    sodium chloride (NS) flush 5-40 mL  5-40 mL IntraVENous PRN    acetaminophen (TYLENOL) tablet 650 mg  650 mg Oral Q6H PRN    Or    acetaminophen (TYLENOL) suppository 650 mg  650 mg Rectal Q6H PRN    polyethylene glycol (MIRALAX) packet 17 g  17 g Oral DAILY PRN    promethazine (PHENERGAN) tablet 12.5 mg  12.5 mg Oral Q6H PRN    Or    ondansetron (ZOFRAN) injection 4 mg  4 mg IntraVENous Q6H PRN    dilTIAZem IR (CARDIZEM) tablet 60 mg  60 mg Oral BID    nicotine (NICODERM CQ) 14 mg/24 hr patch 1 Patch  1 Patch TransDERmal DAILY PRN    albuterol (PROVENTIL HFA, VENTOLIN HFA, PROAIR HFA) inhaler 2 Puff  2 Puff Inhalation Q4H PRN       Review of Systems:   Constitutional: Positive for generalized weakness. Denies fevers, chills, +fatigue,   Head, Eyes, Ears, Nose, Mouth, Throat: Denies nasal congestion, sore throat, rhinorrhea, earache, ringing of the ears, difficulty hearing, facial pain, facial swelling. Respiratory: Positive for shortness of breath, dyspnea on exertion, nonproductive cough. Denies wheezing,sputum production, hemoptysis.  Denies use of oxygen at home.  Cardiovascular: Positive for lower extremity edema, dyspnea on exertion, orthopnea. No chest pain, irregular heart beat, racing pulse, dizziness. Gastrointestinal: Denies nausea, vomiting, diarrhea, constipation, abdominal pain  Genitourinary: No increased urinary frequency, dysuria, hematuria, urinary incontinence, increased urinary frequency. Integument/Breast: No rash, itching or new skin lesions. Musculoskeletal: No joint swelling, joint pain, myalgias, neck pain, back pain. Neurological: No headaches, dizziness, confusion, tremors, numbness/tingling, paresthesias, weakness, problems with balance, loss of consciousness. Hematologic: Denies easy bleeding, easy bruising, lymphadenopathy. Behavioral/Psychiatric: Denies anxiety, depression, increased irritability, mood swings, delusions, hallucination, SI/HI. Objective:   Physical Exam:     Visit Vitals  BP (!) 126/43 (BP 1 Location: Right arm, BP Patient Position: At rest;Supine)   Pulse 69   Temp 98 °F (36.7 °C)   Resp 15   Ht 5' 6\" (1.676 m)   Wt 117.4 kg (258 lb 12.8 oz)   SpO2 96%   BMI 41.77 kg/m²    O2 Flow Rate (L/min): 4 l/min O2 Device: Nasal cannula    Temp (24hrs), Av °F (36.7 °C), Min:97.6 °F (36.4 °C), Max:98.5 °F (36.9 °C)    No intake/output data recorded. No intake/output data recorded. General:  Alert, cooperative, no distress, appears stated age. Nasal cannula obesity oxygen in place morbid obesity   Lungs:   Clear to auscultation bilaterally. Chest wall:  No tenderness or deformity. Heart:  Irregular rate and rhythm, S1, S2 normal, no murmur, click, rub or gallop. Abdomen:   Soft, non-tender. Bowel sounds normal. No masses,  No organomegaly. Extremities: Extremities normal, atraumatic, no cyanosis or edema. Pulses: 2+ and symmetric all extremities. Skin: Skin color, texture, turgor normal. No rashes or lesions   Neurologic: CNII-XII intact.  No gross sensory or motor deficits     Data Review:       Recent Days:  Recent Labs     11/14/20  0700 11/13/20  1345   WBC 7.7 8.1   HGB 7.3* 7.6*   HCT 28.5* 28.7*    222     Recent Labs     11/14/20  0700 11/13/20  1345    143   K 4.0 3.7    108   CO2 35* 33*   GLU 93 120*   BUN 21* 21*   CREA 1.36* 1.42*   CA 8.5 9.0   MG 2.6*  --    ALB 3.2* 3.3*   TBILI 0.4 0.3   ALT 19 19     No results for input(s): PH, PCO2, PO2, HCO3, FIO2 in the last 72 hours. 24 Hour Results:  Recent Results (from the past 24 hour(s))   LACTIC ACID    Collection Time: 11/13/20  5:00 PM   Result Value Ref Range    Lactic acid 0.5 0.4 - 2.0 mmol/L   PROCALCITONIN    Collection Time: 11/13/20  5:00 PM   Result Value Ref Range    Procalcitonin <0.05 (H) 0 ng/mL   SARS-COV-2    Collection Time: 11/13/20  6:00 PM   Result Value Ref Range    SARS-CoV-2 Please find results under separate order     METABOLIC PANEL, COMPREHENSIVE    Collection Time: 11/14/20  7:00 AM   Result Value Ref Range    Sodium 142 136 - 145 mmol/L    Potassium 4.0 3.5 - 5.1 mmol/L    Chloride 107 97 - 108 mmol/L    CO2 35 (H) 21 - 32 mmol/L    Anion gap 0 (L) 5 - 15 mmol/L    Glucose 93 65 - 100 mg/dL    BUN 21 (H) 6 - 20 mg/dL    Creatinine 1.36 (H) 0.55 - 1.02 mg/dL    BUN/Creatinine ratio 15 12 - 20      GFR est AA 46 (L) >60 ml/min/1.73m2    GFR est non-AA 38 (L) >60 ml/min/1.73m2    Calcium 8.5 8.5 - 10.1 mg/dL    Bilirubin, total 0.4 0.2 - 1.0 mg/dL    AST (SGOT) 9 (L) 15 - 37 U/L    ALT (SGPT) 19 12 - 78 U/L    Alk.  phosphatase 104 45 - 117 U/L    Protein, total 6.6 6.4 - 8.2 g/dL    Albumin 3.2 (L) 3.5 - 5.0 g/dL    Globulin 3.4 2.0 - 4.0 g/dL    A-G Ratio 0.9 (L) 1.1 - 2.2     MAGNESIUM    Collection Time: 11/14/20  7:00 AM   Result Value Ref Range    Magnesium 2.6 (H) 1.6 - 2.4 mg/dL   CBC W/O DIFF    Collection Time: 11/14/20  7:00 AM   Result Value Ref Range    WBC 7.7 3.6 - 11.0 K/uL    RBC 3.64 (L) 3.80 - 5.20 M/uL    HGB 7.3 (L) 11.5 - 16.0 g/dL    HCT 28.5 (L) 35.0 - 47.0 %    MCV 78.3 (L) 80.0 - 99.0 FL    MCH 20.1 (L) 26.0 - 34.0 PG    MCHC 25.6 (L) 30.0 - 36.5 g/dL    RDW 18.6 (H) 11.5 - 14.5 %    PLATELET 721 173 - 081 K/uL    MPV 10.7 8.9 - 12.9 FL   TSH 3RD GENERATION    Collection Time: 11/14/20  7:00 AM   Result Value Ref Range    TSH 1.01 0.36 - 3.74 uIU/mL           Assessment/     COPD exacerbation  A. fib low Eliquis rate control  Anemia  Hypertension with hypotension  episode  Systolic dysfunction 2D echo pending, previous EF normal  Generalized weakness  Electrolytes Imbalance       Plan:  Added iron for anemia 325 daily  Continue hold Eliquis SCD for DVT prophylaxis  Adjust  BP meds sodium of bicarb for acute renal injure  Monitor CBC transfusion as needed continue droplet isolation    Care Plan discussed with: Patient/Family and Nurse    Total time spent with patient: 35 minutes.     Manisha Michelle MD

## 2020-11-14 NOTE — CONSULTS
Cardiology Consult    NAME: Gale Stanley   :  1945   MRN:  [id number]     Date/Time:  2020 2:07 PM    Patient PCP: Nakul Bazan MD  ________________________________________________________________________     Assessment/Plan:   Shortness of breath, progressive, multifactorial etiology including anemia, COPD, continued tobacco use, Obesity, and failure with preserved LV function versus systolic dysfunction, will continue diuresis, await repeat echo. Heart failure with preserved LV function versus left ventricular dysfunction,  echo with ejection fraction of 71% and left atrial enlargement. Edema, will diurese. Has elevated BNP. Atrial fibrillation, anticoagulated with Eliquis, on hold with anemia, controlled ventricular response    Anemia, on Eliquis for atrial fibrillation, on hold, will check stool guaiac    Pneumonia/patchy reticular findings bases with small right pleural effusion? Would overload    Hypertension well controlled    Chest pain and shortness of breath, relieved with oxygen,   Normal coronaries by cardiac catheterization 10/19 with elevated LVEDP, will continue to repeat cardiac enzymes and EKG       COPD, continued tobacco use, symptoms improve on oxygen    Renal cell cancer          []        High complexity decision making was performed        Subjective:   CHIEF COMPLAINT:   Shortness of breath and chest tightness    REASON FOR CONSULT:    Chest tightness, A. fib, shortness of breath  HISTORY OF PRESENT ILLNESS:     Gale Stanley is a 70 y.o. WHITE OR  female who with increasing shortness of breath. This has been progressive. Patient also does get a tightness in the chest.  Symptoms are relieved on wearing oxygen. Pain is persistent. She does suffer from anxiety. Says this is like an anxiety spell. Feels like a tightness. No radiation. No sweating. Compliant with medications. Recently was told she was anemic. Denies any bleeding. 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  Since she retired she has been working for [employer]. Has continued to smoke. Has increasing leg edema. Atrial fibrillation, was diagnosed in 10/19, has been anticoagulated with Eliquis till admission and it has been controlled with Cardizem. Past Medical History:   Diagnosis Date    A-fib [facility])     CHF (congestive heart failure) (HCC)     Clear cell renal cell carcinoma (HCC)     COPD (chronic obstructive pulmonary disease) (HCC)     Fibromyalgia     Lymphedema     Sjogren's syndrome ([facility] [address]     Thyroid nodule       Past Surgical History:   Procedure Laterality Date    HX NEPHRECTOMY       No Known Allergies   Meds:  See below  Social History     Tobacco Use    Smoking status: Current Every Day Smoker     Packs/day: 0.50     Types: Cigarettes    Smokeless tobacco: Never Used   Substance Use Topics    Alcohol use: Not Currently      Family History   Problem Relation Age of Onset    Hypertension Mother        REVIEW OF SYSTEMS:     []         Unable to obtain  ROS due to ---   [x]         Total of 12 systems reviewed as follows:    Constitutional: negative fever, negative chills, negative weight loss  Eyes:   negative visual changes  ENT:   negative sore throat, tongue or lip swelling  Respiratory:  negative cough, negative dyspnea  Cards:  See HPI  GI:   negative for nausea, vomiting, diarrhea, and abdominal pain  Genitourinary: negative for frequency, dysuria  Integument:  negative for rash   Hematologic:  negative for easy bruising and gum/nose bleeding  Musculoskel: negative for myalgias,  back pain  Neurological:  negative for headaches, dizziness, vertigo, weakness  Behavl/Psych: Positive for feelings of anxiety, depression     Pertinent Positives include :    Objective:      Physical Exam:    Last 24hrs VS reviewed since prior progress note.  Most recent are:    Visit Vitals  BP (!) 126/43 (BP 1 Location: Right arm, BP Patient Position: At rest;Supine)   Pulse 69   Temp 98 °F (36.7 °C)   Resp 15   Ht 5' 6\" (1.676 m)   Wt 117.4 kg (258 lb 12.8 oz)   SpO2 96%   BMI 41.77 kg/m²     No intake or output data in the 24 hours ending 11/14/20 1407     General Appearance: Well developed, alert, no acute distress. Ears/Nose/Mouth/Throat: Pupils equal and round, Hearing grossly normal.  Neck: Supple. JVP within normal limits. Carotids good upstrokes, with no bruit. Chest: Lungs clear to auscultation bilaterally. Cardiovascular: irregular rate and rhythm, S1S2 normal, no murmur, rubs, gallops. Abdomen: Soft, non-tender, bowel sounds are active. No organomegaly. Extremities:  edema bilaterally. Skin: Warm and dry. Neuro: Alert and oriented x3, normal speech; follows simple commands  Psychiatric: Cooperative, normal affect and judgment    []         Post-cath site without hematoma, bruit, tenderness, or thrill. Distal pulses intact. Data:      Telemetry:    EKG:  []  No new EKG for review. With atrial fibrillation  Chest x-ray with patchy reticular findings bases with small right pleural effusion    Prior to Admission medications    Medication Sig Start Date End Date Taking? Authorizing Provider   potassium chloride SR (KLOR-CON 10) 10 mEq tablet Take 20 mEq by mouth daily. Yes Provider, Historical   bumetanide (BUMEX) 1 mg tablet Take 1 mg by mouth daily. Yes Provider, Historical   apixaban (ELIQUIS) 5 mg tablet Take 5 mg by mouth two (2) times a day. Yes Provider, Historical   diltiazem HCl/D5W (diltiazem in d5w) 100 mg/100 mL (1 mg/mL) soln by IntraVENous route. Yes Provider, Historical   dilTIAZem ER (DILACOR XR) 120 mg capsule Take 120 mg by mouth daily. Yes Provider, Historical   atorvastatin (LIPITOR) 40 mg tablet Take 40 mg by mouth daily. Yes Provider, Historical   diazePAM (VALIUM) 5 mg tablet Take 5 mg by mouth every six (6) hours as needed for Anxiety.    Yes Provider, Historical famotidine (PEPCID) 40 mg tablet Take 40 mg by mouth daily. Yes Provider, Historical   ascorbic acid, vitamin C, (Vitamin C) 500 mg tablet Take 500 mg by mouth daily. Yes Provider, Historical   cholecalciferol (Vitamin D3) (1000 Units /25 mcg) tablet Take 1,000 Units by mouth daily. Yes Provider, Historical   sertraline (Zoloft) 25 mg tablet Take 25 mg by mouth daily. Yes Provider, Historical       Recent Results (from the past 24 hour(s))   LACTIC ACID    Collection Time: 11/13/20  5:00 PM   Result Value Ref Range    Lactic acid 0.5 0.4 - 2.0 mmol/L   PROCALCITONIN    Collection Time: 11/13/20  5:00 PM   Result Value Ref Range    Procalcitonin <0.05 (H) 0 ng/mL   SARS-COV-2    Collection Time: 11/13/20  6:00 PM   Result Value Ref Range    SARS-CoV-2 Please find results under separate order     METABOLIC PANEL, COMPREHENSIVE    Collection Time: 11/14/20  7:00 AM   Result Value Ref Range    Sodium 142 136 - 145 mmol/L    Potassium 4.0 3.5 - 5.1 mmol/L    Chloride 107 97 - 108 mmol/L    CO2 35 (H) 21 - 32 mmol/L    Anion gap 0 (L) 5 - 15 mmol/L    Glucose 93 65 - 100 mg/dL    BUN 21 (H) 6 - 20 mg/dL    Creatinine 1.36 (H) 0.55 - 1.02 mg/dL    BUN/Creatinine ratio 15 12 - 20      GFR est AA 46 (L) >60 ml/min/1.73m2    GFR est non-AA 38 (L) >60 ml/min/1.73m2    Calcium 8.5 8.5 - 10.1 mg/dL    Bilirubin, total 0.4 0.2 - 1.0 mg/dL    AST (SGOT) 9 (L) 15 - 37 U/L    ALT (SGPT) 19 12 - 78 U/L    Alk.  phosphatase 104 45 - 117 U/L    Protein, total 6.6 6.4 - 8.2 g/dL    Albumin 3.2 (L) 3.5 - 5.0 g/dL    Globulin 3.4 2.0 - 4.0 g/dL    A-G Ratio 0.9 (L) 1.1 - 2.2     MAGNESIUM    Collection Time: 11/14/20  7:00 AM   Result Value Ref Range    Magnesium 2.6 (H) 1.6 - 2.4 mg/dL   CBC W/O DIFF    Collection Time: 11/14/20  7:00 AM   Result Value Ref Range    WBC 7.7 3.6 - 11.0 K/uL    RBC 3.64 (L) 3.80 - 5.20 M/uL    HGB 7.3 (L) 11.5 - 16.0 g/dL    HCT 28.5 (L) 35.0 - 47.0 %    MCV 78.3 (L) 80.0 - 99.0 FL    MCH 20.1 (L) 26.0 - 34.0 PG    MCHC 25.6 (L) 30.0 - 36.5 g/dL    RDW 18.6 (H) 11.5 - 14.5 %    PLATELET 723 823 - 007 K/uL    MPV 10.7 8.9 - 12.9 FL   TSH 3RD GENERATION    Collection Time: 11/14/20  7:00 AM   Result Value Ref Range    TSH 1.01 0.36 - 3.74 uIU/mL        Lennie Rubio MD

## 2020-11-14 NOTE — PROGRESS NOTES
Skin Assessment    Dual skin assessment completed by Joselyn Felty, MEDARDO and Jose Hahn RN. Patient skin intact with no open areas noted.

## 2020-11-14 NOTE — PROGRESS NOTES
Problem: Patient Education: Go to Patient Education Activity  Goal: Patient/Family Education  Outcome: Progressing Towards Goal     Problem: Heart Failure: Day 1  Goal: Off Pathway (Use only if patient is Off Pathway)  Outcome: Progressing Towards Goal  Goal: Activity/Safety  Outcome: Progressing Towards Goal  Goal: Consults, if ordered  Outcome: Progressing Towards Goal  Goal: Diagnostic Test/Procedures  Outcome: Progressing Towards Goal  Goal: Nutrition/Diet  Outcome: Progressing Towards Goal  Goal: Discharge Planning  Outcome: Progressing Towards Goal  Goal: Medications  Outcome: Progressing Towards Goal  Goal: Respiratory  Outcome: Progressing Towards Goal  Goal: Treatments/Interventions/Procedures  Outcome: Progressing Towards Goal  Goal: Psychosocial  Outcome: Progressing Towards Goal  Goal: *Oxygen saturation within defined limits  Outcome: Progressing Towards Goal  Goal: *Hemodynamically stable  Outcome: Progressing Towards Goal  Goal: *Optimal pain control at patient's stated goal  Outcome: Progressing Towards Goal  Goal: *Anxiety reduced or absent  Outcome: Progressing Towards Goal     Problem: Heart Failure: Day 2  Goal: Off Pathway (Use only if patient is Off Pathway)  Outcome: Progressing Towards Goal  Goal: Activity/Safety  Outcome: Progressing Towards Goal  Goal: Consults, if ordered  Outcome: Progressing Towards Goal  Goal: Diagnostic Test/Procedures  Outcome: Progressing Towards Goal  Goal: Nutrition/Diet  Outcome: Progressing Towards Goal  Goal: Discharge Planning  Outcome: Progressing Towards Goal  Goal: Medications  Outcome: Progressing Towards Goal  Goal: Respiratory  Outcome: Progressing Towards Goal  Goal: Treatments/Interventions/Procedures  Outcome: Progressing Towards Goal  Goal: Psychosocial  Outcome: Progressing Towards Goal  Goal: *Oxygen saturation within defined limits  Outcome: Progressing Towards Goal  Goal: *Hemodynamically stable  Outcome: Progressing Towards Goal  Goal: *Optimal pain control at patient's stated goal  Outcome: Progressing Towards Goal  Goal: *Anxiety reduced or absent  Outcome: Progressing Towards Goal  Goal: *Demonstrates progressive activity  Outcome: Progressing Towards Goal     Problem: Heart Failure: Day 3  Goal: Off Pathway (Use only if patient is Off Pathway)  Outcome: Progressing Towards Goal  Goal: Activity/Safety  Outcome: Progressing Towards Goal  Goal: Diagnostic Test/Procedures  Outcome: Progressing Towards Goal  Goal: Nutrition/Diet  Outcome: Progressing Towards Goal  Goal: Discharge Planning  Outcome: Progressing Towards Goal  Goal: Medications  Outcome: Progressing Towards Goal  Goal: Respiratory  Outcome: Progressing Towards Goal  Goal: Treatments/Interventions/Procedures  Outcome: Progressing Towards Goal  Goal: Psychosocial  Outcome: Progressing Towards Goal  Goal: *Oxygen saturation within defined limits  Outcome: Progressing Towards Goal  Goal: *Hemodynamically stable  Outcome: Progressing Towards Goal  Goal: *Optimal pain control at patient's stated goal  Outcome: Progressing Towards Goal  Goal: *Anxiety reduced or absent  Outcome: Progressing Towards Goal  Goal: *Demonstrates progressive activity  Outcome: Progressing Towards Goal     Problem: Heart Failure: Day 4  Goal: Off Pathway (Use only if patient is Off Pathway)  Outcome: Progressing Towards Goal  Goal: Activity/Safety  Outcome: Progressing Towards Goal  Goal: Diagnostic Test/Procedures  Outcome: Progressing Towards Goal  Goal: Nutrition/Diet  Outcome: Progressing Towards Goal  Goal: Discharge Planning  Outcome: Progressing Towards Goal  Goal: Medications  Outcome: Progressing Towards Goal  Goal: Respiratory  Outcome: Progressing Towards Goal  Goal: Treatments/Interventions/Procedures  Outcome: Progressing Towards Goal  Goal: Psychosocial  Outcome: Progressing Towards Goal  Goal: *Oxygen saturation within defined limits  Outcome: Progressing Towards Goal  Goal: *Hemodynamically stable  Outcome: Progressing Towards Goal  Goal: *Optimal pain control at patient's stated goal  Outcome: Progressing Towards Goal  Goal: *Anxiety reduced or absent  Outcome: Progressing Towards Goal  Goal: *Demonstrates progressive activity  Outcome: Progressing Towards Goal     Problem: Heart Failure: Day 5  Goal: Off Pathway (Use only if patient is Off Pathway)  Outcome: Progressing Towards Goal  Goal: Activity/Safety  Outcome: Progressing Towards Goal  Goal: Diagnostic Test/Procedures  Outcome: Progressing Towards Goal  Goal: Nutrition/Diet  Outcome: Progressing Towards Goal  Goal: Discharge Planning  Outcome: Progressing Towards Goal  Goal: Medications  Outcome: Progressing Towards Goal  Goal: Respiratory  Outcome: Progressing Towards Goal  Goal: Treatments/Interventions/Procedures  Outcome: Progressing Towards Goal  Goal: Psychosocial  Outcome: Progressing Towards Goal     Problem: Heart Failure: Discharge Outcomes  Goal: *Demonstrates ability to perform prescribed activity without shortness of breath or discomfort  Outcome: Progressing Towards Goal  Goal: *Left ventricular function assessment completed prior to or during stay, or planned for post-discharge  Outcome: Progressing Towards Goal  Goal: *ACEI prescribed if LVEF less than 40% and no contraindications or ARB prescribed  Outcome: Progressing Towards Goal  Goal: *Verbalizes understanding and describes prescribed diet  Outcome: Progressing Towards Goal  Goal: *Verbalizes understanding/describes prescribed medications  Outcome: Progressing Towards Goal  Goal: *Describes available resources and support systems  Description: (eg: Home Health, Palliative Care, Advanced Medical Directive)  Outcome: Progressing Towards Goal  Goal: *Describes smoking cessation resources  Outcome: Progressing Towards Goal  Goal: *Understands and describes signs and symptoms to report to providers(Stroke Metric)  Outcome: Progressing Towards Goal  Goal: *Describes/verbalizes understanding of follow-up/return appt  Description: (eg: to physicians, diabetes treatment coordinator, and other resources  Outcome: Progressing Towards Goal  Goal: *Describes importance of continuing daily weights and changes to report to physician  Outcome: Progressing Towards Goal

## 2020-11-15 LAB
ALBUMIN SERPL-MCNC: 3.2 G/DL (ref 3.5–5)
ALBUMIN/GLOB SERPL: 1 {RATIO} (ref 1.1–2.2)
ALP SERPL-CCNC: 103 U/L (ref 45–117)
ALT SERPL-CCNC: 19 U/L (ref 12–78)
ANION GAP SERPL CALC-SCNC: 1 MMOL/L (ref 5–15)
AST SERPL W P-5'-P-CCNC: 13 U/L (ref 15–37)
ATRIAL RATE: 67 BPM
BASOPHILS # BLD: 0 K/UL (ref 0–0.1)
BASOPHILS NFR BLD: 0 % (ref 0–1)
BILIRUB SERPL-MCNC: 0.4 MG/DL (ref 0.2–1)
BNP SERPL-MCNC: 363 PG/ML
BUN SERPL-MCNC: 24 MG/DL (ref 6–20)
BUN/CREAT SERPL: 17 (ref 12–20)
CA-I BLD-MCNC: 8.6 MG/DL (ref 8.5–10.1)
CALCULATED P AXIS, ECG09: -98 DEGREES
CALCULATED R AXIS, ECG10: 79 DEGREES
CALCULATED T AXIS, ECG11: 80 DEGREES
CHLORIDE SERPL-SCNC: 105 MMOL/L (ref 97–108)
CO2 SERPL-SCNC: 35 MMOL/L (ref 21–32)
CREAT SERPL-MCNC: 1.45 MG/DL (ref 0.55–1.02)
DIAGNOSIS, 93000: NORMAL
DIFFERENTIAL METHOD BLD: ABNORMAL
EOSINOPHIL # BLD: 0 K/UL (ref 0–0.4)
EOSINOPHIL NFR BLD: 1 % (ref 0–7)
ERYTHROCYTE [DISTWIDTH] IN BLOOD BY AUTOMATED COUNT: 18.7 % (ref 11.5–14.5)
GLOBULIN SER CALC-MCNC: 3.3 G/DL (ref 2–4)
GLUCOSE BLD STRIP.AUTO-MCNC: 107 MG/DL (ref 65–100)
GLUCOSE BLD STRIP.AUTO-MCNC: 145 MG/DL (ref 65–100)
GLUCOSE BLD STRIP.AUTO-MCNC: 90 MG/DL (ref 65–100)
GLUCOSE BLD STRIP.AUTO-MCNC: 96 MG/DL (ref 65–100)
GLUCOSE SERPL-MCNC: 85 MG/DL (ref 65–100)
HCT VFR BLD AUTO: 28.1 % (ref 35–47)
HGB BLD-MCNC: 7.2 G/DL (ref 11.5–16)
IMM GRANULOCYTES # BLD AUTO: 0 K/UL (ref 0–0.04)
IMM GRANULOCYTES NFR BLD AUTO: 0 % (ref 0–0.5)
LYMPHOCYTES # BLD: 0.8 K/UL (ref 0.8–3.5)
LYMPHOCYTES NFR BLD: 10 % (ref 12–49)
MCH RBC QN AUTO: 20.1 PG (ref 26–34)
MCHC RBC AUTO-ENTMCNC: 25.6 G/DL (ref 30–36.5)
MCV RBC AUTO: 78.3 FL (ref 80–99)
MONOCYTES # BLD: 0.4 K/UL (ref 0–1)
MONOCYTES NFR BLD: 5 % (ref 5–13)
NEUTS SEG # BLD: 6.1 K/UL (ref 1.8–8)
NEUTS SEG NFR BLD: 84 % (ref 32–75)
P-R INTERVAL, ECG05: 156 MS
PERFORMED BY, TECHID: ABNORMAL
PERFORMED BY, TECHID: ABNORMAL
PERFORMED BY, TECHID: NORMAL
PERFORMED BY, TECHID: NORMAL
PLATELET # BLD AUTO: 205 K/UL (ref 150–400)
PMV BLD AUTO: 11 FL (ref 8.9–12.9)
POTASSIUM SERPL-SCNC: 4.1 MMOL/L (ref 3.5–5.1)
PROT SERPL-MCNC: 6.5 G/DL (ref 6.4–8.2)
Q-T INTERVAL, ECG07: 396 MS
QRS DURATION, ECG06: 76 MS
QTC CALCULATION (BEZET), ECG08: 418 MS
RBC # BLD AUTO: 3.59 M/UL (ref 3.8–5.2)
SODIUM SERPL-SCNC: 141 MMOL/L (ref 136–145)
VENTRICULAR RATE, ECG03: 67 BPM
WBC # BLD AUTO: 7.3 K/UL (ref 3.6–11)

## 2020-11-15 PROCEDURE — 83880 ASSAY OF NATRIURETIC PEPTIDE: CPT

## 2020-11-15 PROCEDURE — 93005 ELECTROCARDIOGRAM TRACING: CPT

## 2020-11-15 PROCEDURE — 74011250637 HC RX REV CODE- 250/637: Performed by: FAMILY MEDICINE

## 2020-11-15 PROCEDURE — 65270000029 HC RM PRIVATE

## 2020-11-15 PROCEDURE — 36415 COLL VENOUS BLD VENIPUNCTURE: CPT

## 2020-11-15 PROCEDURE — 82962 GLUCOSE BLOOD TEST: CPT

## 2020-11-15 PROCEDURE — 80053 COMPREHEN METABOLIC PANEL: CPT

## 2020-11-15 PROCEDURE — 86923 COMPATIBILITY TEST ELECTRIC: CPT

## 2020-11-15 PROCEDURE — 94762 N-INVAS EAR/PLS OXIMTRY CONT: CPT

## 2020-11-15 PROCEDURE — 97530 THERAPEUTIC ACTIVITIES: CPT

## 2020-11-15 PROCEDURE — 85025 COMPLETE CBC W/AUTO DIFF WBC: CPT

## 2020-11-15 PROCEDURE — 97161 PT EVAL LOW COMPLEX 20 MIN: CPT

## 2020-11-15 PROCEDURE — 86900 BLOOD TYPING SEROLOGIC ABO: CPT

## 2020-11-15 PROCEDURE — 77010033678 HC OXYGEN DAILY

## 2020-11-15 PROCEDURE — 74011250636 HC RX REV CODE- 250/636: Performed by: FAMILY MEDICINE

## 2020-11-15 RX ORDER — SODIUM CHLORIDE 9 MG/ML
250 INJECTION, SOLUTION INTRAVENOUS AS NEEDED
Status: DISCONTINUED | OUTPATIENT
Start: 2020-11-15 | End: 2020-11-20 | Stop reason: HOSPADM

## 2020-11-15 RX ORDER — CHOLECALCIFEROL TAB 125 MCG (5000 UNIT) 125 MCG
5000 TAB ORAL DAILY
Status: DISCONTINUED | OUTPATIENT
Start: 2020-11-16 | End: 2020-11-20 | Stop reason: HOSPADM

## 2020-11-15 RX ORDER — FUROSEMIDE 40 MG/1
40 TABLET ORAL DAILY
Status: DISCONTINUED | OUTPATIENT
Start: 2020-11-16 | End: 2020-11-16

## 2020-11-15 RX ORDER — FAMOTIDINE 20 MG/1
40 TABLET, FILM COATED ORAL 2 TIMES DAILY
Status: DISCONTINUED | OUTPATIENT
Start: 2020-11-15 | End: 2020-11-20

## 2020-11-15 RX ORDER — DIAZEPAM 5 MG/1
5 TABLET ORAL 2 TIMES DAILY
Status: DISCONTINUED | OUTPATIENT
Start: 2020-11-15 | End: 2020-11-20 | Stop reason: HOSPADM

## 2020-11-15 RX ADMIN — FERROUS SULFATE TAB 325 MG (65 MG ELEMENTAL FE) 325 MG: 325 (65 FE) TAB at 08:55

## 2020-11-15 RX ADMIN — Medication 10 ML: at 14:41

## 2020-11-15 RX ADMIN — FAMOTIDINE 40 MG: 20 TABLET, FILM COATED ORAL at 22:00

## 2020-11-15 RX ADMIN — SODIUM BICARBONATE 650 MG TABLET 650 MG: at 08:55

## 2020-11-15 RX ADMIN — ACETAMINOPHEN 650 MG: 325 TABLET, FILM COATED ORAL at 22:00

## 2020-11-15 RX ADMIN — DILTIAZEM HYDROCHLORIDE 60 MG: 30 TABLET, FILM COATED ORAL at 08:56

## 2020-11-15 RX ADMIN — MAGNESIUM 64 MG (MAGNESIUM CHLORIDE) TABLET,DELAYED RELEASE 64 MG: at 09:00

## 2020-11-15 RX ADMIN — FUROSEMIDE 40 MG: 10 INJECTION, SOLUTION INTRAMUSCULAR; INTRAVENOUS at 08:55

## 2020-11-15 RX ADMIN — DOCUSATE SODIUM 100 MG: 100 CAPSULE, LIQUID FILLED ORAL at 08:56

## 2020-11-15 RX ADMIN — DIAZEPAM 5 MG: 5 TABLET ORAL at 22:00

## 2020-11-15 RX ADMIN — FAMOTIDINE 40 MG: 20 TABLET ORAL at 08:56

## 2020-11-15 RX ADMIN — DILTIAZEM HYDROCHLORIDE 60 MG: 30 TABLET, FILM COATED ORAL at 22:00

## 2020-11-15 RX ADMIN — SODIUM BICARBONATE 650 MG TABLET 650 MG: at 22:00

## 2020-11-15 NOTE — ROUTINE PROCESS
Bedside and Verbal shift change report given to Jeanette Moore LPN (oncoming nurse) by Domonique Almaguer RN  (offgoing nurse). Report included the following information SBAR, MAR and Recent Results.

## 2020-11-15 NOTE — PROGRESS NOTES
Hospitalist Progress Note           Daily Progress Note: 11/15/2020      Subjective: The patient is seen for follow  Up for COPD exacerbation.   Patient on Eliquis and hemoglobin 7.2 ,eliquis is  on hold,  she also complains of shortness of breath now but denies chest pain, patient is she is followed by endocrinology for thyroid nodule had ultrasound done recently, she is scheduled for outpatient sleep study not done yet    Problem List:  Problem List as of 11/15/2020 Date Reviewed: 11/13/2020          Codes Class Noted - Resolved    Respiratory failure with hypoxia [facility]) ICD-10-CM: J96.91  ICD-9-CM: 518.81  11/13/2020 - Present              Medications reviewed  Current Facility-Administered Medications   Medication Dose Route Frequency    0.9% sodium chloride infusion 250 mL  250 mL IntraVENous PRN    peg 3350-electrolytes (COLYTE) 4000 mL  2,000 mL Oral ONCE    influenza vaccine 2020-21 (4 yrs+)(PF) (FLUCELVAX QUAD) injection 0.5 mL  0.5 mL IntraMUSCular PRIOR TO DISCHARGE    furosemide (LASIX) injection 40 mg  40 mg IntraVENous DAILY    ferrous sulfate tablet 325 mg  1 Tab Oral DAILY WITH BREAKFAST    sodium bicarbonate tablet 650 mg  650 mg Oral BID    famotidine (PEPCID) tablet 40 mg  40 mg Oral DAILY    docusate sodium (COLACE) capsule 100 mg  100 mg Oral DAILY    magnesium chloride (MAG DELAY) delayed release tablet 64 mg  64 mg Oral DAILY    sodium chloride (NS) flush 5-40 mL  5-40 mL IntraVENous Q8H    sodium chloride (NS) flush 5-40 mL  5-40 mL IntraVENous PRN    acetaminophen (TYLENOL) tablet 650 mg  650 mg Oral Q6H PRN    polyethylene glycol (MIRALAX) packet 17 g  17 g Oral DAILY PRN    promethazine (PHENERGAN) tablet 12.5 mg  12.5 mg Oral Q6H PRN    dilTIAZem IR (CARDIZEM) tablet 60 mg  60 mg Oral BID    nicotine (NICODERM CQ) 14 mg/24 hr patch 1 Patch  1 Patch TransDERmal DAILY PRN    albuterol (PROVENTIL HFA, VENTOLIN HFA, PROAIR HFA) inhaler 2 Puff  2 Puff Inhalation Q4H PRN       Review of Systems:   Constitutional: Positive for generalized weakness. Denies fevers, chills, +fatigue,   Head, Eyes, Ears, Nose, Mouth, Throat: Denies nasal congestion, sore throat, rhinorrhea, earache, ringing of the ears, difficulty hearing, facial pain, facial swelling. Respiratory: Positive for shortness of breath, dyspnea on exertion, nonproductive cough. Denies wheezing,sputum production, hemoptysis. Denies use of oxygen at home. Cardiovascular: Positive for lower extremity edema, dyspnea on exertion, orthopnea. No chest pain, irregular heart beat, racing pulse, dizziness. Gastrointestinal: Denies nausea, vomiting, diarrhea, constipation, abdominal pain  Genitourinary: No increased urinary frequency, dysuria, hematuria, urinary incontinence, increased urinary frequency. Integument/Breast: No rash, itching or new skin lesions. Musculoskeletal: No joint swelling, joint pain, myalgias, neck pain, back pain. Neurological: No headaches, dizziness, confusion, tremors, numbness/tingling, paresthesias, weakness, problems with balance, loss of consciousness. Hematologic: Denies easy bleeding, easy bruising, lymphadenopathy. Behavioral/Psychiatric: Denies anxiety, depression, increased irritability, mood swings, delusions, hallucination, SI/HI. Objective:   Physical Exam:     Visit Vitals  BP (!) 108/53   Pulse 85   Temp 98.3 °F (36.8 °C)   Resp 20   Ht 5' 6\" (1.676 m)   Wt 117.4 kg (258 lb 12.8 oz)   SpO2 100%   BMI 41.77 kg/m²    O2 Flow Rate (L/min): 4 l/min O2 Device: Nasal cannula    Temp (24hrs), Av.1 °F (36.7 °C), Min:98 °F (36.7 °C), Max:98.3 °F (36.8 °C)    No intake/output data recorded.  1901 - 11/15 0700  In: -   Out: 800 [Urine:800]    General:  Alert, cooperative, no distress, appears stated age. Nasal cannula obesity oxygen in place morbid obesity   Lungs:   Clear to auscultation bilaterally. Chest wall:  No tenderness or deformity.    Heart: Irregular rate and rhythm, S1, S2 normal, no murmur, click, rub or gallop. Abdomen:   Soft, non-tender. Bowel sounds normal. No masses,  No organomegaly. Extremities: Extremities normal, atraumatic, no cyanosis or edema. Pulses: 2+ and symmetric all extremities. Skin: Skin color, texture, turgor normal. No rashes or lesions   Neurologic: CNII-XII intact. No gross sensory or motor deficits     Data Review:       Recent Days:  Recent Labs     11/15/20  0739 11/14/20  0700 11/13/20  1345   WBC 7.3 7.7 8.1   HGB 7.2* 7.3* 7.6*   HCT 28.1* 28.5* 28.7*    222 222     Recent Labs     11/15/20  0739 11/14/20  0700 11/13/20  1345    142 143   K 4.1 4.0 3.7    107 108   CO2 35* 35* 33*   GLU 85 93 120*   BUN 24* 21* 21*   CREA 1.45* 1.36* 1.42*   CA 8.6 8.5 9.0   MG  --  2.6*  --    ALB 3.2* 3.2* 3.3*   TBILI 0.4 0.4 0.3   ALT 19 19 19     No results for input(s): PH, PCO2, PO2, HCO3, FIO2 in the last 72 hours. 24 Hour Results:  Recent Results (from the past 24 hour(s))   METABOLIC PANEL, COMPREHENSIVE    Collection Time: 11/15/20  7:39 AM   Result Value Ref Range    Sodium 141 136 - 145 mmol/L    Potassium 4.1 3.5 - 5.1 mmol/L    Chloride 105 97 - 108 mmol/L    CO2 35 (H) 21 - 32 mmol/L    Anion gap 1 (L) 5 - 15 mmol/L    Glucose 85 65 - 100 mg/dL    BUN 24 (H) 6 - 20 mg/dL    Creatinine 1.45 (H) 0.55 - 1.02 mg/dL    BUN/Creatinine ratio 17 12 - 20      GFR est AA 43 (L) >60 ml/min/1.73m2    GFR est non-AA 35 (L) >60 ml/min/1.73m2    Calcium 8.6 8.5 - 10.1 mg/dL    Bilirubin, total 0.4 0.2 - 1.0 mg/dL    AST (SGOT) 13 (L) 15 - 37 U/L    ALT (SGPT) 19 12 - 78 U/L    Alk.  phosphatase 103 45 - 117 U/L    Protein, total 6.5 6.4 - 8.2 g/dL    Albumin 3.2 (L) 3.5 - 5.0 g/dL    Globulin 3.3 2.0 - 4.0 g/dL    A-G Ratio 1.0 (L) 1.1 - 2.2     CBC WITH AUTOMATED DIFF    Collection Time: 11/15/20  7:39 AM   Result Value Ref Range    WBC 7.3 3.6 - 11.0 K/uL    RBC 3.59 (L) 3.80 - 5.20 M/uL    HGB 7.2 (L) 11.5 - 16.0 g/dL    HCT 28.1 (L) 35.0 - 47.0 %    MCV 78.3 (L) 80.0 - 99.0 FL    MCH 20.1 (L) 26.0 - 34.0 PG    MCHC 25.6 (L) 30.0 - 36.5 g/dL    RDW 18.7 (H) 11.5 - 14.5 %    PLATELET 166 624 - 451 K/uL    MPV 11.0 8.9 - 12.9 FL    NEUTROPHILS 84 (H) 32 - 75 %    LYMPHOCYTES 10 (L) 12 - 49 %    MONOCYTES 5 5 - 13 %    EOSINOPHILS 1 0 - 7 %    BASOPHILS 0 0 - 1 %    IMMATURE GRANULOCYTES 0 0.0 - 0.5 %    ABS. NEUTROPHILS 6.1 1.8 - 8.0 K/UL    ABS. LYMPHOCYTES 0.8 0.8 - 3.5 K/UL    ABS. MONOCYTES 0.4 0.0 - 1.0 K/UL    ABS. EOSINOPHILS 0.0 0.0 - 0.4 K/UL    ABS. BASOPHILS 0.0 0.0 - 0.1 K/UL    ABS. IMM.  GRANS. 0.0 0.00 - 0.04 K/UL    DF AUTOMATED     BNP    Collection Time: 11/15/20  7:39 AM   Result Value Ref Range    NT pro- (H) <125 pg/mL   GLUCOSE, POC    Collection Time: 11/15/20  8:33 AM   Result Value Ref Range    Glucose (POC) 96 65 - 100 mg/dL    Performed by Jona Ordaz    EKG, 12 LEAD, SUBSEQUENT    Collection Time: 11/15/20  9:35 AM   Result Value Ref Range    Ventricular Rate 67 BPM    Atrial Rate 67 BPM    P-R Interval 156 ms    QRS Duration 76 ms    Q-T Interval 396 ms    QTC Calculation (Bezet) 418 ms    Calculated P Axis -98 degrees    Calculated R Axis 79 degrees    Calculated T Axis 80 degrees    Diagnosis       Unusual P axis, possible ectopic atrial rhythm with undetermined rhythm   irregularity  Low voltage QRS  Abnormal ECG  When compared with ECG of 14-NOV-2020 14:40,  Ectopic atrial rhythm has replaced Sinus rhythm  ST no longer depressed in Anterolateral leads  Confirmed by Polly Brewer MD, Lul Suárez ([id number]) on 11/15/2020 12:49:33 PM     GLUCOSE, POC    Collection Time: 11/15/20 12:44 PM   Result Value Ref Range    Glucose (POC) 145 (H) 65 - 100 mg/dL    Performed by Jona Ordaz    GLUCOSE, POC    Collection Time: 11/15/20  4:16 PM   Result Value Ref Range    Glucose (POC) 90 65 - 100 mg/dL    Performed by Nahtan Kraft            Assessment/     COPD exacerbation  A. fib low was on Eliquis rate control, on hold due to Anemia  Anemia: Hemoglobin dropped, we will transfuse her 1 unit of blood follow-up occluded blood test  Hypertension with hypotension  Episode, BP meds adjusted  Systolic dysfunction 2D echo pending, previous EF normal  Generalized weakness  Electrolytes Imbalance       Plan:  Added iron for anemia 325 daily  Continue hold Eliquis, SCD for DVT prophylaxis  change Lasix to p.o. consult GI and nephrology due to renal insufficiency  Adjust  BP meds sodium of bicarb for acute renal injure  Monitor CBC transfusion 1 unit of blood , follow-up Covid testing    Care Plan discussed with: Patient/Family and Nurse    Total time spent with patient: 35 minutes.     Jessy Ayala MD

## 2020-11-15 NOTE — PROGRESS NOTES
PHYSICAL THERAPY EVALUATION  Patient: John Alicia (32 y.o. female)  Date: 11/15/2020  Primary Diagnosis: Respiratory failure with hypoxia ([facility] [J96.91]        Precautions: cardiac monitoring, O2 Sat       ASSESSMENT  Based on the objective data described below, the patient presents with Decr ROM, strength, bed mobility, transfers, balance, gait, activity tolerance, safety awareness, and functional mobility. Pt agreeable to PT evaluation, A&O x 4 and received sitting on bedside commode eating breakfast, on 0.5 L O2 via NC. Pt admitted 11/13/20 following continued chest tightness onset 3 mo ago, and SOB. . Pmx: COPD, CHF, afib, fibromyalgia, clear cell renal cell carcinoma. Pt denies falls. Pt states they live alone in son's 3 lvl house, rents out the bottom lvl with 12 JUAN and B railing in the back, the steps are wide and Pt tends to side step when negotiating steps. Pt reports Ind PLOF with dressing/bathing at sink, if tub transfer she asks son for assist and Ind amb with rollator, also has a cane. Pt does not wear O2 at home. Pt completed transfers with CGA, and amb approx 15 ft with RW and CGA. Pt has fair activity tolerance, requesting to return to sitting after short amb distance. Pt O2 Sat 79-94% throughout session. Pt would benefit from acute skilled PT services to address above deficits and progress towards goals. PT d/c recc IRF vs HHPT when medically appropriate pending medical status. Other factors to consider for discharge: acute medical state, decr activity tolerance     Patient will benefit from skilled therapy intervention to address the above noted impairments.        PLAN :  Recommendations and Planned Interventions: bed mobility training, transfer training, gait training, therapeutic exercises, neuromuscular re-education, patient and family training/education, and therapeutic activities      Frequency/Duration: Patient will be followed by physical therapy:  5 times a week to address goals. Recommendation for discharge: (in order for the patient to meet his/her long term goals)  PT d/c recc IRF vs HHPT when medically appropriate pending medical status. This discharge recommendation:  Has been made in collaboration with the attending provider and/or case management    IF patient discharges home will need the following DME: shower chair         SUBJECTIVE:   Patient stated I told my son I think I had a stroke, my pinky and ring finger on right were completely numb.     OBJECTIVE DATA SUMMARY:   HISTORY:    Past Medical History:   Diagnosis Date    A-fib ([facility] [address]     CHF (congestive heart failure) (HCC)     Clear cell renal cell carcinoma (HCC)     COPD (chronic obstructive pulmonary disease) (HCC)     Fibromyalgia     Lymphedema     Sjogren's syndrome (HCC)     Thyroid nodule      Past Surgical History:   Procedure Laterality Date    HX NEPHRECTOMY         Personal factors and/or comorbidities impacting plan of care: complicated medical history    Home Situation  Home Environment: Private residence  # Steps to Enter: 12  Rails to Enter: Yes  Hand Rails : Bilateral  Wheelchair Ramp: No  One/Two Story Residence: One story  Living Alone: No  Support Systems: Child(tevin), Family member(s)  Patient Expects to be Discharged to[de-identified] Private residence  Current DME Used/Available at Home: Oneil Delfina, rollator, Cane, straight  Tub or Shower Type: Tub/Shower combination    PLOF: Pt Ind for ADLS/IADLS, Ind with rollator with mobility prior to admission. EXAMINATION/PRESENTATION/DECISION MAKING:   Critical Behavior:  Neurologic State: Appropriate for age, Alert  Orientation Level: Oriented X4        Hearing:   Auditory  Auditory Impairment: None  Skin:  intact  Edema: unremarkable  Range Of Motion:  AROM: Generally decreased, functional(decrR knee ext)      Strength:    Strength: Generally decreased, functional(Gross RLE 3/5, LLE 4/5)      Tone & Sensation:     Sensation: Impaired(decr on R  distally )      Functional Mobility:  Bed Mobility:         Transfers:  Sit to Stand: Contact guard assistance  Stand to Sit: Contact guard assistance  Stand Pivot Transfers: Contact guard assistance            Pt received sitting in bedside commode. Pt completed transfers with CGA, req min cues for hand placement, sequencing, and safety during dynamic activities to prevent LOB. Pt reports constant dizziness that does not change with position. Pt completed self pericare following toileting. Balance:   Sitting: Intact  Standing: Impaired; With support  Standing - Static: Fair  Standing - Dynamic : Fair  Ambulation/Gait Training:  Distance (ft): 15 Feet (ft)  Assistive Device: Gait belt;Walker, rolling  Ambulation - Level of Assistance: Contact guard assistance   Gait Description (WDL): Exceptions to WDL  Gait Abnormalities: Decreased step clearance; Step to gait   Base of Support: Narrowed; Shift to right  Stance: Right decreased  Speed/Nichole: Pace decreased (<100 feet/min); Shuffled; Slow  Step Length: Left shortened  Swing Pattern: Right asymmetrical;Left asymmetrical     Pt amb approx 15 ft with RW and CGA, req cues for line management, gait sequencing, walker management, and safety to prevent falls during turns. Pt presents with unsteady, slow gait with WS twisted to the Right and difficulty with step through on R. Pt has fair activity tolerance, requesting to return to sitting after short amb distance. Pt O2 Sat 79-94% throughout session. Pt reported SOB and mod fatigue at end of session, recovered quickly in sitting. Stairs: Therapeutic Exercises:   Pt educated on LE mobility throughout the day to prevent decline. Functional Measure:    [facility] [address] [id number] AM-PAC 6 Clicks         Basic Mobility Inpatient Short Form  How much difficulty does the patient currently have. .. Unable A Lot A Little None   1. Turning over in bed (including adjusting bedclothes, sheets and blankets)?    [] 1   [] 2   [] 3 [x] 4   2. Sitting down on and standing up from a chair with arms ( e.g., wheelchair, bedside commode, etc.)   [] 1   [] 2   [x] 3   [] 4   3. Moving from lying on back to sitting on the side of the bed? [] 1   [] 2   [x] 3   [] 4          How much help from another person does the patient currently need. .. Total A Lot A Little None   4. Moving to and from a bed to a chair (including a wheelchair)? [] 1   [] 2   [x] 3   [] 4   5. Need to walk in hospital room? [] 1   [] 2   [x] 3   [] 4   6. Climbing 3-5 steps with a railing? [] 1   [x] 2   [] 3   [] 4   © , Trustees of [address] [place] [address] [address] [address], under license to [employer]. All rights reserved     Score:  Initial: 18 Most Recent: X (Date: -- )   Interpretation of Tool:  Represents activities that are increasingly more difficult (i.e. Bed mobility, Transfers, Gait). Score 24 23 22-20 19-15 14-10 9-7 6   Modifier CH CI CJ CK CL CM CN          Physical Therapy Evaluation Charge Determination   History Examination Presentation Decision-Making   HIGH Complexity :3+ comorbidities / personal factors will impact the outcome/ POC  MEDIUM Complexity : 3 Standardized tests and measures addressing body structure, function, activity limitation and / or participation in recreation  LOW Complexity : Stable, uncomplicated  Other Functional Measure [facility] 6 18      Based on the above components, the patient evaluation is determined to be of the following complexity level: LOW     Pain Ratin/10 pain all over \"from fibromyalgia\"    Activity Tolerance:   Fair, desaturates with exertion and requires oxygen, requires frequent rest breaks, and observed SOB with activity  Please refer to the flowsheet for vital signs taken during this treatment. After treatment patient left in no apparent distress:   Sitting in chair and Call bell within reach    COMMUNICATION/EDUCATION:   The patients plan of care was discussed with: Registered nurse.      Fall prevention education was provided and the patient/caregiver indicated understanding. and Patient/family have participated as able in goal setting and plan of care.     Thank you for this referral.  Josefina Crystal, PT   Time Calculation: 35 mins

## 2020-11-16 ENCOUNTER — APPOINTMENT (OUTPATIENT)
Dept: ULTRASOUND IMAGING | Age: 75
DRG: 813 | End: 2020-11-16
Attending: INTERNAL MEDICINE
Payer: COMMERCIAL

## 2020-11-16 PROBLEM — Z12.11 SCREENING FOR COLON CANCER: Status: ACTIVE | Noted: 2020-11-16

## 2020-11-16 PROBLEM — J44.1 COPD EXACERBATION (HCC): Status: ACTIVE | Noted: 2020-11-16

## 2020-11-16 PROBLEM — I50.9 CHF (CONGESTIVE HEART FAILURE) (HCC): Status: ACTIVE | Noted: 2020-11-16

## 2020-11-16 PROBLEM — I48.91 A-FIB (HCC): Status: ACTIVE | Noted: 2020-11-16

## 2020-11-16 PROBLEM — D64.9 ANEMIA: Status: ACTIVE | Noted: 2020-11-16

## 2020-11-16 PROBLEM — E66.9 OBESITY: Status: ACTIVE | Noted: 2020-11-16

## 2020-11-16 LAB
ANION GAP SERPL CALC-SCNC: 3 MMOL/L (ref 5–15)
BASOPHILS # BLD: 0 K/UL (ref 0–0.1)
BASOPHILS NFR BLD: 1 % (ref 0–1)
BUN SERPL-MCNC: 30 MG/DL (ref 6–20)
BUN/CREAT SERPL: 20 (ref 12–20)
CA-I BLD-MCNC: 8.6 MG/DL (ref 8.5–10.1)
CHLORIDE SERPL-SCNC: 106 MMOL/L (ref 97–108)
CO2 SERPL-SCNC: 32 MMOL/L (ref 21–32)
CREAT SERPL-MCNC: 1.48 MG/DL (ref 0.55–1.02)
DIFFERENTIAL METHOD BLD: ABNORMAL
EOSINOPHIL # BLD: 0.1 K/UL (ref 0–0.4)
EOSINOPHIL NFR BLD: 1 % (ref 0–7)
ERYTHROCYTE [DISTWIDTH] IN BLOOD BY AUTOMATED COUNT: 18.8 % (ref 11.5–14.5)
GLUCOSE BLD STRIP.AUTO-MCNC: 124 MG/DL (ref 65–100)
GLUCOSE BLD STRIP.AUTO-MCNC: 158 MG/DL (ref 65–100)
GLUCOSE BLD STRIP.AUTO-MCNC: 90 MG/DL (ref 65–100)
GLUCOSE BLD STRIP.AUTO-MCNC: 93 MG/DL (ref 65–100)
GLUCOSE SERPL-MCNC: 88 MG/DL (ref 65–100)
HCT VFR BLD AUTO: 30.9 % (ref 35–47)
HGB BLD-MCNC: 8.3 G/DL (ref 11.5–16)
IMM GRANULOCYTES # BLD AUTO: 0 K/UL (ref 0–0.04)
IMM GRANULOCYTES NFR BLD AUTO: 0 % (ref 0–0.5)
LYMPHOCYTES # BLD: 0.8 K/UL (ref 0.8–3.5)
LYMPHOCYTES NFR BLD: 11 % (ref 12–49)
MCH RBC QN AUTO: 21.1 PG (ref 26–34)
MCHC RBC AUTO-ENTMCNC: 26.9 G/DL (ref 30–36.5)
MCV RBC AUTO: 78.4 FL (ref 80–99)
MONOCYTES # BLD: 0.5 K/UL (ref 0–1)
MONOCYTES NFR BLD: 7 % (ref 5–13)
NEUTS SEG # BLD: 5.7 K/UL (ref 1.8–8)
NEUTS SEG NFR BLD: 80 % (ref 32–75)
PERFORMED BY, TECHID: ABNORMAL
PERFORMED BY, TECHID: ABNORMAL
PERFORMED BY, TECHID: NORMAL
PERFORMED BY, TECHID: NORMAL
PHOSPHATE SERPL-MCNC: 3.6 MG/DL (ref 2.6–4.7)
PLATELET # BLD AUTO: 175 K/UL (ref 150–400)
PMV BLD AUTO: 11.1 FL (ref 8.9–12.9)
POTASSIUM SERPL-SCNC: 3.9 MMOL/L (ref 3.5–5.1)
RBC # BLD AUTO: 3.94 M/UL (ref 3.8–5.2)
SARS-COV-2, COV2NT: NOT DETECTED
SODIUM SERPL-SCNC: 141 MMOL/L (ref 136–145)
WBC # BLD AUTO: 7.1 K/UL (ref 3.6–11)

## 2020-11-16 PROCEDURE — 74011250637 HC RX REV CODE- 250/637: Performed by: FAMILY MEDICINE

## 2020-11-16 PROCEDURE — 36415 COLL VENOUS BLD VENIPUNCTURE: CPT

## 2020-11-16 PROCEDURE — 36430 TRANSFUSION BLD/BLD COMPNT: CPT

## 2020-11-16 PROCEDURE — 83540 ASSAY OF IRON: CPT

## 2020-11-16 PROCEDURE — 85025 COMPLETE CBC W/AUTO DIFF WBC: CPT

## 2020-11-16 PROCEDURE — 97530 THERAPEUTIC ACTIVITIES: CPT

## 2020-11-16 PROCEDURE — 77010033678 HC OXYGEN DAILY

## 2020-11-16 PROCEDURE — 84100 ASSAY OF PHOSPHORUS: CPT

## 2020-11-16 PROCEDURE — 65270000029 HC RM PRIVATE

## 2020-11-16 PROCEDURE — 74011250636 HC RX REV CODE- 250/636: Performed by: FAMILY MEDICINE

## 2020-11-16 PROCEDURE — 76770 US EXAM ABDO BACK WALL COMP: CPT

## 2020-11-16 PROCEDURE — 97165 OT EVAL LOW COMPLEX 30 MIN: CPT

## 2020-11-16 PROCEDURE — 82652 VIT D 1 25-DIHYDROXY: CPT

## 2020-11-16 PROCEDURE — P9016 RBC LEUKOCYTES REDUCED: HCPCS

## 2020-11-16 PROCEDURE — 80048 BASIC METABOLIC PNL TOTAL CA: CPT

## 2020-11-16 PROCEDURE — 94762 N-INVAS EAR/PLS OXIMTRY CONT: CPT

## 2020-11-16 PROCEDURE — 82962 GLUCOSE BLOOD TEST: CPT

## 2020-11-16 RX ORDER — SODIUM CHLORIDE 0.9 % (FLUSH) 0.9 %
5-40 SYRINGE (ML) INJECTION AS NEEDED
Status: DISCONTINUED | OUTPATIENT
Start: 2020-11-16 | End: 2020-11-17 | Stop reason: HOSPADM

## 2020-11-16 RX ORDER — SODIUM BICARBONATE 650 MG/1
650 TABLET ORAL 3 TIMES DAILY
Status: DISCONTINUED | OUTPATIENT
Start: 2020-11-16 | End: 2020-11-16

## 2020-11-16 RX ORDER — FUROSEMIDE 40 MG/1
60 TABLET ORAL DAILY
Status: DISCONTINUED | OUTPATIENT
Start: 2020-11-17 | End: 2020-11-19

## 2020-11-16 RX ORDER — SODIUM CHLORIDE 0.9 % (FLUSH) 0.9 %
5-40 SYRINGE (ML) INJECTION EVERY 8 HOURS
Status: DISCONTINUED | OUTPATIENT
Start: 2020-11-16 | End: 2020-11-16 | Stop reason: SDUPTHER

## 2020-11-16 RX ADMIN — FAMOTIDINE 40 MG: 20 TABLET, FILM COATED ORAL at 23:00

## 2020-11-16 RX ADMIN — DILTIAZEM HYDROCHLORIDE 60 MG: 30 TABLET, FILM COATED ORAL at 09:59

## 2020-11-16 RX ADMIN — DIAZEPAM 5 MG: 5 TABLET ORAL at 09:59

## 2020-11-16 RX ADMIN — DILTIAZEM HYDROCHLORIDE 60 MG: 30 TABLET, FILM COATED ORAL at 23:00

## 2020-11-16 RX ADMIN — DIAZEPAM 5 MG: 5 TABLET ORAL at 23:00

## 2020-11-16 RX ADMIN — CHOLECALCIFEROL TAB 125 MCG (5000 UNIT) 5000 UNITS: 125 TAB at 10:00

## 2020-11-16 RX ADMIN — FAMOTIDINE 40 MG: 20 TABLET, FILM COATED ORAL at 09:58

## 2020-11-16 RX ADMIN — EZETIMIBE: 10 TABLET ORAL at 10:00

## 2020-11-16 RX ADMIN — DOCUSATE SODIUM 100 MG: 100 CAPSULE, LIQUID FILLED ORAL at 10:00

## 2020-11-16 RX ADMIN — Medication 10 ML: at 23:00

## 2020-11-16 RX ADMIN — SODIUM BICARBONATE 650 MG TABLET 650 MG: at 09:58

## 2020-11-16 RX ADMIN — FERROUS SULFATE TAB 325 MG (65 MG ELEMENTAL FE) 325 MG: 325 (65 FE) TAB at 10:00

## 2020-11-16 RX ADMIN — SODIUM CHLORIDE 250 ML: 9 INJECTION, SOLUTION INTRAVENOUS at 01:00

## 2020-11-16 RX ADMIN — Medication 10 ML: at 15:33

## 2020-11-16 NOTE — PROGRESS NOTES
Progress Note      11/16/2020 4:22 PM  NAME: Karl Hester   MRN:  [id number]   Admit Diagnosis: [facility] [J96.91]      Problem List:     1. Paroxysmal atrial fibrillation  2. Essential hypertension  3. Acute upper GI bleeding, anemia  4. CA kidney chronic renal failure  5. Obesity  6. COPD     Assessment/Plan:     1. She is in sinus rhythm now. Agree with continuing diltiazem. He has a stable cardiac status. Agree with holding Eliquis due to acute GI bleeding. I discussed the option of watchman procedure. if she is a high risk for anticoagulation. []       High complexity decision making was performed in this patient at high risk for decompensation with multiple organ involvement. Subjective:     Karl Hester denies chest pain, dyspnea. Discussed with RN events overnight. Has dark stools    Review of Systems:    Symptom Y/N Comments  Symptom Y/N Comments   Fever/Chills N   Chest Pain N    Poor Appetite N   Edema N    Cough N   Abdominal Pain N    Sputum N   Joint Pain N    SOB/ROBISON N   Pruritis/Rash N    Nausea/vomit N   Tolerating PT/OT Y    Diarrhea N   Tolerating Diet Y    Constipation N   Other       Could NOT obtain due to:      Objective:      Physical Exam:    Last 24hrs VS reviewed since prior progress note. Most recent are:    Visit Vitals  /72   Pulse 62   Temp 98.3 °F (36.8 °C)   Resp 18   Ht 5' 6\" (1.676 m)   Wt 117.6 kg (259 lb 3.2 oz)   SpO2 100%   BMI 41.84 kg/m²       Intake/Output Summary (Last 24 hours) at 11/16/2020 1622  Last data filed at 11/16/2020 0404  Gross per 24 hour   Intake 310 ml   Output --   Net 310 ml        General Appearance: Well developed, well nourished, alert & oriented x 3,    no acute distress. Ears/Nose/Mouth/Throat: Hearing grossly normal.  Neck: Supple. Chest: Lungs clear to auscultation bilaterally. Cardiovascular: Regular rate and rhythm, S1S2 normal, no murmur.   Abdomen: Soft, non-tender, bowel sounds are active. Extremities: No edema bilaterally. Skin: Warm and dry. []         Post-cath site without hematoma, bruit, tenderness, or thrill. Distal pulses intact. PMH/SH reviewed - no change compared to H&P    Data Review    Telemetry: normal sinus rhythm     EKG:   []  No new EKG for review    Lab Data Personally Reviewed:    Recent Labs     11/16/20  0540 11/15/20  0739   WBC 7.1 7.3   HGB 8.3* 7.2*   HCT 30.9* 28.1*    205     No results for input(s): INR, PTP, APTT, INREXT in the last 72 hours. Recent Labs     11/16/20 0540 11/15/20  0739 11/14/20  0700    141 142   K 3.9 4.1 4.0    105 107   CO2 32 35* 35*   BUN 30* 24* 21*   CREA 1.48* 1.45* 1.36*   GLU 88 85 93   CA 8.6 8.6 8.5   MG  --   --  2.6*     No results for input(s): CPK, CKNDX, TROIQ in the last 72 hours. No lab exists for component: CPKMB  No results found for: CHOL, CHOLX, CHLST, CHOLV, HDL, HDLP, LDL, LDLC, DLDLP, TGLX, TRIGL, TRIGP, CHHD, CHHDX    Recent Labs     11/15/20  0739 11/14/20  0700    104   TP 6.5 6.6   ALB 3.2* 3.2*   GLOB 3.3 3.4     No results for input(s): PH, PCO2, PO2 in the last 72 hours.     Medications Personally Reviewed:    Current Facility-Administered Medications   Medication Dose Route Frequency    [START ON 11/17/2020] furosemide (LASIX) tablet 60 mg  60 mg Oral DAILY    0.9% sodium chloride infusion 250 mL  250 mL IntraVENous PRN    diazePAM (VALIUM) tablet 5 mg  5 mg Oral BID    famotidine (PEPCID) tablet 40 mg  40 mg Oral BID    cholecalciferol (VITAMIN D3) tablet 5,000 Units  5,000 Units Oral DAILY    ezetimibe/atorvastatin 10/40 mg   Oral DAILY    influenza vaccine 2020-21 (4 yrs+)(PF) (FLUCELVAX QUAD) injection 0.5 mL  0.5 mL IntraMUSCular PRIOR TO DISCHARGE    ferrous sulfate tablet 325 mg  1 Tab Oral DAILY WITH BREAKFAST    docusate sodium (COLACE) capsule 100 mg  100 mg Oral DAILY    magnesium chloride (MAG DELAY) delayed release tablet 64 mg  64 mg Oral DAILY    sodium chloride (NS) flush 5-40 mL  5-40 mL IntraVENous Q8H    sodium chloride (NS) flush 5-40 mL  5-40 mL IntraVENous PRN    acetaminophen (TYLENOL) tablet 650 mg  650 mg Oral Q6H PRN    polyethylene glycol (MIRALAX) packet 17 g  17 g Oral DAILY PRN    promethazine (PHENERGAN) tablet 12.5 mg  12.5 mg Oral Q6H PRN    dilTIAZem IR (CARDIZEM) tablet 60 mg  60 mg Oral BID    nicotine (NICODERM CQ) 14 mg/24 hr patch 1 Patch  1 Patch TransDERmal DAILY PRN    albuterol (PROVENTIL HFA, VENTOLIN HFA, PROAIR HFA) inhaler 2 Puff  2 Puff Inhalation Q4H PRN   Is discussed with gastroenterology Ciro Aguilar MD

## 2020-11-16 NOTE — ROUTINE PROCESS
Dr. Danuta Gillespie called informed patient refused to the Colyte prep , he stated to have  the patient be  NPO.

## 2020-11-16 NOTE — PROGRESS NOTES
Hospitalist Progress Note           Daily Progress Note: 11/16/2020      Subjective:   68-year-old female with past medical history of COPD, tobacco abuse, congestive heart failure, atrial fibrillation, s/p nephrectomy for renal cell carcinoma was admitted to the hospital with a complaint of shortness of breath. Patient was noticed to have hemoglobin of 7.6 on admission. Her creatinine was also 1.42. Patient has chronic edema in both her lower extremities. Patient's Covid test is negative  Patient did not prepare for colonoscopy as she states she cannot drink GoLYTELY. Patient has a thyroid nodule for which she follows with endocrinologist    Patient is anxious, she is still on oxygen by nasal cannula. She denies any chest pain nausea or vomiting.     Problem List:  Problem List as of 11/16/2020 Date Reviewed: 11/13/2020          Codes Class Noted - Resolved    Respiratory failure with hypoxia [facility]) ICD-10-CM: J96.91  ICD-9-CM: 518.81  11/13/2020 - Present              Medications reviewed  Current Facility-Administered Medications   Medication Dose Route Frequency    [START ON 11/17/2020] furosemide (LASIX) tablet 60 mg  60 mg Oral DAILY    0.9% sodium chloride infusion 250 mL  250 mL IntraVENous PRN    diazePAM (VALIUM) tablet 5 mg  5 mg Oral BID    famotidine (PEPCID) tablet 40 mg  40 mg Oral BID    cholecalciferol (VITAMIN D3) tablet 5,000 Units  5,000 Units Oral DAILY    ezetimibe/atorvastatin 10/40 mg   Oral DAILY    influenza vaccine 2020-21 (4 yrs+)(PF) (FLUCELVAX QUAD) injection 0.5 mL  0.5 mL IntraMUSCular PRIOR TO DISCHARGE    ferrous sulfate tablet 325 mg  1 Tab Oral DAILY WITH BREAKFAST    docusate sodium (COLACE) capsule 100 mg  100 mg Oral DAILY    magnesium chloride (MAG DELAY) delayed release tablet 64 mg  64 mg Oral DAILY    sodium chloride (NS) flush 5-40 mL  5-40 mL IntraVENous Q8H    sodium chloride (NS) flush 5-40 mL  5-40 mL IntraVENous PRN    acetaminophen (TYLENOL) tablet 650 mg  650 mg Oral Q6H PRN    polyethylene glycol (MIRALAX) packet 17 g  17 g Oral DAILY PRN    promethazine (PHENERGAN) tablet 12.5 mg  12.5 mg Oral Q6H PRN    dilTIAZem IR (CARDIZEM) tablet 60 mg  60 mg Oral BID    nicotine (NICODERM CQ) 14 mg/24 hr patch 1 Patch  1 Patch TransDERmal DAILY PRN    albuterol (PROVENTIL HFA, VENTOLIN HFA, PROAIR HFA) inhaler 2 Puff  2 Puff Inhalation Q4H PRN       Review of Systems:   Review of Systems   Constitutional: Negative for chills, fever and weight loss. HENT: Negative for ear pain, hearing loss and nosebleeds. Eyes: Negative for blurred vision and photophobia. Respiratory: Positive for shortness of breath. Negative for cough, hemoptysis and sputum production. Cardiovascular: Positive for leg swelling. Negative for chest pain and palpitations. Gastrointestinal: Negative for abdominal pain, diarrhea, heartburn, nausea and vomiting. Genitourinary: Negative for dysuria and urgency. Musculoskeletal: Negative for myalgias. Skin: Negative for itching and rash. Neurological: Negative for dizziness and headaches. Psychiatric/Behavioral: Positive for substance abuse. Negative for depression. Objective:   Physical Exam:     Visit Vitals  /72   Pulse 62   Temp 98.3 °F (36.8 °C)   Resp 18   Ht 5' 6\" (1.676 m)   Wt 117.6 kg (259 lb 3.2 oz)   SpO2 100%   BMI 41.84 kg/m²    O2 Flow Rate (L/min): 3 l/min O2 Device: Nasal cannula    Temp (24hrs), Av.9 °F (36.6 °C), Min:97.6 °F (36.4 °C), Max:98.4 °F (36.9 °C)    No intake/output data recorded.  1901 -  0700  In: 310   Out: 800 [Urine:800]    General:  Alert, cooperative, no distress, appears stated age. Nasal cannula obesity oxygen in place morbid obesity   Lungs:   Clear to auscultation bilaterally. Chest wall:  No tenderness or deformity. Heart:  Irregular rate and rhythm, S1, S2 normal, no murmur, click, rub or gallop.    Abdomen:   Soft, non-tender. Bowel sounds normal. No masses,  No organomegaly. Extremities: Extremities normal, atraumatic, no cyanosis or edema. Pulses: 2+ and symmetric all extremities. Skin: Skin color, texture, turgor normal. No rashes or lesions   Neurologic: CNII-XII intact. No gross sensory or motor deficits     Data Review:       Recent Days:  Recent Labs     11/16/20  0540 11/15/20  0739 11/14/20  0700   WBC 7.1 7.3 7.7   HGB 8.3* 7.2* 7.3*   HCT 30.9* 28.1* 28.5*    205 222     Recent Labs     11/16/20  0540 11/15/20  0739 11/14/20  0700 11/13/20  1345    141 142 143   K 3.9 4.1 4.0 3.7    105 107 108   CO2 32 35* 35* 33*   GLU 88 85 93 120*   BUN 30* 24* 21* 21*   CREA 1.48* 1.45* 1.36* 1.42*   CA 8.6 8.6 8.5 9.0   MG  --   --  2.6*  --    ALB  --  3.2* 3.2* 3.3*   TBILI  --  0.4 0.4 0.3   ALT  --  19 19 19     No results for input(s): PH, PCO2, PO2, HCO3, FIO2 in the last 72 hours.     24 Hour Results:  Recent Results (from the past 24 hour(s))   GLUCOSE, POC    Collection Time: 11/15/20  4:16 PM   Result Value Ref Range    Glucose (POC) 90 65 - 100 mg/dL    Performed by Clara Mcintosh    TYPE & SCREEN    Collection Time: 11/15/20  6:57 PM   Result Value Ref Range    Crossmatch Expiration 11/18/2020,2359     ABO/Rh(D) B Positive     Antibody screen Negative     Unit number [id number]     Blood component type [facility]     Unit division 00     Status of unit [address] to transfuse     Crossmatch result Compatible    GLUCOSE, POC    Collection Time: 11/15/20  9:38 PM   Result Value Ref Range    Glucose (POC) 107 (H) 65 - 100 mg/dL    Performed by Clara Mcintosh    CBC WITH AUTOMATED DIFF    Collection Time: 11/16/20  5:40 AM   Result Value Ref Range    WBC 7.1 3.6 - 11.0 K/uL    RBC 3.94 3.80 - 5.20 M/uL    HGB 8.3 (L) 11.5 - 16.0 g/dL    HCT 30.9 (L) 35.0 - 47.0 %    MCV 78.4 (L) 80.0 - 99.0 FL    MCH 21.1 (L) 26.0 - 34.0 PG    MCHC 26.9 (L) 30.0 - 36.5 g/dL    RDW 18.8 (H) 11.5 - 14.5 %    PLATELET 600 003 - 945 K/uL    MPV 11.1 8.9 - 12.9 FL    NEUTROPHILS 80 (H) 32 - 75 %    LYMPHOCYTES 11 (L) 12 - 49 %    MONOCYTES 7 5 - 13 %    EOSINOPHILS 1 0 - 7 %    BASOPHILS 1 0 - 1 %    IMMATURE GRANULOCYTES 0 0.0 - 0.5 %    ABS. NEUTROPHILS 5.7 1.8 - 8.0 K/UL    ABS. LYMPHOCYTES 0.8 0.8 - 3.5 K/UL    ABS. MONOCYTES 0.5 0.0 - 1.0 K/UL    ABS. EOSINOPHILS 0.1 0.0 - 0.4 K/UL    ABS. BASOPHILS 0.0 0.0 - 0.1 K/UL    ABS. IMM.  GRANS. 0.0 0.00 - 0.04 K/UL    DF AUTOMATED     METABOLIC PANEL, BASIC    Collection Time: 11/16/20  5:40 AM   Result Value Ref Range    Sodium 141 136 - 145 mmol/L    Potassium 3.9 3.5 - 5.1 mmol/L    Chloride 106 97 - 108 mmol/L    CO2 32 21 - 32 mmol/L    Anion gap 3 (L) 5 - 15 mmol/L    Glucose 88 65 - 100 mg/dL    BUN 30 (H) 6 - 20 mg/dL    Creatinine 1.48 (H) 0.55 - 1.02 mg/dL    BUN/Creatinine ratio 20 12 - 20      GFR est AA 42 (L) >60 ml/min/1.73m2    GFR est non-AA 35 (L) >60 ml/min/1.73m2    Calcium 8.6 8.5 - 10.1 mg/dL   GLUCOSE, POC    Collection Time: 11/16/20  8:10 AM   Result Value Ref Range    Glucose (POC) 90 65 - 100 mg/dL    Performed by [address] [place] [address], POC    Collection Time: 11/16/20 11:24 AM   Result Value Ref Range    Glucose (POC) 93 65 - 100 mg/dL    Performed by [facility]        Echocardiogram: awaitied        Assessment/     Active Problems:    Respiratory failure with hypoxia (HCC) (11/13/2020)      COPD exacerbation (HCC) (11/16/2020)      Anemia (11/16/2020)      Obesity (11/16/2020)      A-fib ([address] (11/16/2020)      CHF (congestive heart failure) (HCC) (11/16/2020)        Plan:  Added iron for anemia 325 daily  Continue hold Eliquis,   SCD for DVT prophylaxis  change Lasix to p.o.   consult GI for anemia  nephrology due to renal insufficiency  Adjust  BP meds sodium of bicarb for acute renal injure  S/p transfusion 1 unit of blood ,  COVID -ve    Care Plan discussed with: Patient/Family and Nurse    Total time spent with patient: 35 minutes.     Peter Sparks MD

## 2020-11-16 NOTE — PROGRESS NOTES
OCCUPATIONAL THERAPY EVALUATION  Patient: Wayne Kirk (27 y.o. female)  Date: 11/16/2020  Primary Diagnosis: Respiratory failure with hypoxia [facility]) [J96.91]        Precautions: Fall       ASSESSMENT  Pt is a 69 y/o F admitted to [facility] on 11/13/20 following continued chest tightness onset 3 mo ago, and SOB. Pmx: COPD, CHF, afib, fibromyalgia, clear cell renal cell carcinoma. Pt received seated in recliner chair upon arrival, on 0.5 L O2 via NC, AXO x4, and agreeable to OT evaluation at this time. Per pt, she lives with her son in a one-story home (alone most of the time) with 10 JUAN B HR. Pt reports she was Mod I with ADLs at sink, required assist from son to transfer in/out of the shower, and Mod I using a rollator for mobility at PLOF. Pt states she did not wear O2 at PLOF. Based on current observations, pt currently presents with deficits in generalized strength/AROM, dynamic sitting balance, static/dynamic standing balance, functional activity tolerance, sensation and increased pain impacting overall performance of ADLs and functional transfers/mobility. Pt currently requires setup assist for basic grooming (face washing) while seated in chair and min A to don clean hospital gown in sitting. Pt with limited anterior reach requiring total A from OT to don/readjust socks at feet in sitting. Sit><stand completed to/from chair with CGA today, pt reports feeling SOB and dizzy upon standing that resolves during seated rest break. (O2 100% at rest, 87-94% with activity, >95% recovery.) Pt would benefit from continued skilled OT services during hospital stay and at next level of care to address current impairments and improve overall IND and safety with self cares and functional mobility upon d/c. OT recommending IRF vs HHOT at d/c pending progress once medically appropriate.     Other factors to consider for discharge: Available family support, DME, time since onset, not on O2 at home     Patient will benefit from skilled therapy intervention to address the above noted impairments. PLAN :  Recommendations and Planned Interventions: self care training, functional mobility training, therapeutic exercise, balance training, therapeutic activities, endurance activities, patient education, and home safety training    Frequency/Duration: Patient will be followed by occupational therapy 5 times a week to address goals. Recommendation for discharge: (in order for the patient to meet his/her long term goals)  IRF vs HHOT pending progress    This discharge recommendation:  Has been made in collaboration with the attending provider and/or case management       SUBJECTIVE:   Patient stated \"I feel uncoordinated and I get dizzy and short of breath when I stand    OBJECTIVE DATA SUMMARY:   HISTORY:   Past Medical History:   Diagnosis Date    [facility]     CHF (congestive heart failure) ([facility]     Clear cell renal cell carcinoma ([facility]     COPD (chronic obstructive pulmonary disease) ([facility]     Fibromyalgia     Lymphedema     Sjogren's syndrome ([facility]     Thyroid nodule      Past Surgical History:   Procedure Laterality Date    HX NEPHRECTOMY         Expanded or extensive additional review of patient history:     Home Situation  Home Environment: Private residence  # Steps to Enter: 10  Rails to Enter: Yes  Hand Rails : Bilateral  Wheelchair Ramp: No  One/Two Story Residence: One story  Living Alone: No  Support Systems: Family member(s)(Son)  Patient Expects to be Discharged to[de-identified] Unknown  Current DME Used/Available at Home: [place], straight, Walker, rollator, Walker, rolling  Tub or Shower Type: Tub/Shower combination    Hand dominance: Right    EXAMINATION OF PERFORMANCE DEFICITS:  Cognitive/Behavioral Status:  Neurologic State: Alert  Orientation Level: Oriented X4  Cognition: Follows commands; Appropriate decision making;Decreased attention/concentration    Hearing:   Auditory  Auditory Impairment: None    Vision/Perceptual: WFL       Range of Motion:  AROM: Generally decreased, functional    Strength:  Strength: Generally decreased, functional   B  strength grossly 3+/5    Coordination:  Coordination: Generally decreased, functional  Fine Motor Skills-Upper: Left Intact; Right Intact(Thumb opposition intact)    Gross Motor Skills-Upper: Left Intact; Right Intact    Tone & Sensation:  Sensation: Impaired(Per pt, numbness/tingling R 4th and 5 fingers/forearm) RN aware     Balance:  Sitting: Intact  Standing: Impaired; With support  Standing - Static: Fair  Standing - Dynamic : Fair    Functional Mobility and Transfers for ADLs:    Transfers:  Sit to Stand: Contact guard assistance  Stand to Sit: Contact guard assistance    ADL Intervention and task modifications:  Grooming  Grooming Assistance: Set-up; Stand-by assistance  Position Performed: Seated in chair  Washing Face: Stand-by assistance    Upper Body [address]: Minimum  assistance    Lower Body Dressing Assistance  Socks: Total assistance (dependent)  Position Performed: Seated in chair    Therapeutic Exercise:  Pt would benefit from UE HEP to improve overall UE AROM/strength and can be further educated in next treatment session. Functional Measure:    [address] [facility] [address] [address] AM-PACTM \"6 Clicks\"                                                       Daily Activity Inpatient Short Form  How much help from another person does the patient currently need. .. Total; A Lot A Little None   1. Putting on and taking off regular lower body clothing? []  1 [x]  2 []  3 []  4   2. Bathing (including washing, rinsing, drying)? []  1 [x]  2 []  3 []  4   3. Toileting, which includes using toilet, bedpan or urinal? [] 1 [x]  2 []  3 []  4   4. Putting on and taking off regular upper body clothing? []  1 []  2 [x]  3 []  4   5. Taking care of personal grooming such as brushing teeth? []  1 []  2 [x]  3 []  4   6. Eating meals?  []  1 []  2 [x]  3 []  4   © 2007, Trustees of [facility], under license to [employer]. All rights reserved     Score: 15/24     Interpretation of Tool:  Represents clinically-significant functional categories (i.e. Activities of daily living). Percentage of Impairment CH    0%   CI    1-19% CJ    20-39% CK    40-59% CL    60-79% CM    80-99% CN     100%   AMPA  Score 6-24 24 23 20-22 15-19 10-14 7-9 6        Occupational Therapy Evaluation Charge Determination   History Examination Decision-Making   LOW Complexity : Brief history review  LOW Complexity : 1-3 performance deficits relating to physical, cognitive , or psychosocial skils that result in activity limitations and / or participation restrictions  MEDIUM Complexity : Patient may present with comorbidities that affect occupational performnce. Miniml to moderate modification of tasks or assistance (eg, physical or verbal ) with assesment(s) is necessary to enable patient to complete evaluation       Based on the above components, the patient evaluation is determined to be of the following complexity level: LOW   Pain Ratin/10 generalized pain reported; pt reports hx of fibromyalgia     Activity Tolerance:   Fair, requires rest breaks, and observed SOB with activity    After treatment patient left in no apparent distress:    Sitting in chair and Call bell within reach    COMMUNICATION/EDUCATION:   The patients plan of care was discussed with: Registered nurse. Patient/family have participated as able in goal setting and plan of care. and Patient/family agree to work toward stated goals and plan of care. This patients plan of care is appropriate for delegation to [facility].     Thank you for this referral.  Cecelia Wallace  Time Calculation: 50 mins    Problem: Self Care Deficits Care Plan (Adult)  Goal: *Acute Goals and Plan of Care (Insert Text)  Description: Pt will be IND sup <> sit in prep for EOB ADLs  Pt will be Mod I grooming standing sinkside LRAD  Pt will be Mod I LE dressing sitting EOB/long sit  Pt will be Mod I sit <>  prep for toileting LRAD  Pt will be Mod I toileting/toilet transfer/cloth mgmt LRAD  Pt will be IND following UE HEP in prep for self care tasks      Outcome: Not Met

## 2020-11-16 NOTE — CONSULTS
Consult Date: 11/16/2020    Consults  78y F w/ pmhx remarkable for hx of CHF, COPD, A-fib and hx of Clear cell RCC s/p nephrectomy who was BIBEMS due to progressive SOB w/o additional symptoms such as CP, N/V/D, dizziness, lightheadiness, dysuria, hematuria, fever, chills, hematuria or LOC. Hospital course uneventful. Limited information obtained regarding hx of RCC or baseline CKD. At the time of evaluation VSS w/o evidence of NAD, alert, and awake w/o gross evidence of volume overload. Routine labs showed Scr at 1.42 w/ unknown baseline.  Nephrology consulted for CANDELARIO        Subjective     Past Medical History:   Diagnosis Date    A-fib [facility])     CHF (congestive heart failure) ([facility] [place] [address]     Clear cell renal cell carcinoma ([facility] [place] [address]     COPD (chronic obstructive pulmonary disease) ([facility] [place] [address]     Fibromyalgia     Lymphedema     [facility]     Thyroid nodule       Past Surgical History:   Procedure Laterality Date    HX NEPHRECTOMY       Family History   Problem Relation Age of Onset    Hypertension Mother       Social History     Tobacco Use    Smoking status: Current Every Day Smoker     Packs/day: 0.50     Types: Cigarettes    Smokeless tobacco: Never Used   Substance Use Topics    Alcohol use: Not Currently       Current Facility-Administered Medications   Medication Dose Route Frequency Provider Last Rate Last Dose    0.9% sodium chloride infusion 250 mL  250 mL IntraVENous PRN Carlotta Do MD 15 mL/hr at 11/16/20 0100 250 mL at 11/16/20 0100    furosemide (LASIX) tablet 40 mg  40 mg Oral DAILY Carlotta Do MD        diazePAM (VALIUM) tablet 5 mg  5 mg Oral BID Carlotta Do MD   5 mg at 11/16/20 0959    famotidine (PEPCID) tablet 40 mg  40 mg Oral BID Carlotta Do MD   40 mg at 11/16/20 6156    cholecalciferol (VITAMIN D3) tablet 5,000 Units  5,000 Units Oral DAILY Carlotta Do MD   5,000 Units at 11/16/20 1000    ezetimibe/atorvastatin 10/40 mg   Oral DAILY Carlotta Do MD        influenza vaccine 2020-21 (4 yrs+)(PF) (FLUCELVAX QUAD) injection 0.5 mL  0.5 mL IntraMUSCular PRIOR TO DISCHARGE Adalgisa Olson MD        ferrous sulfate tablet 325 mg  1 Tab Oral DAILY WITH BREAKFAST Jack Masters MD   325 mg at 11/16/20 1000    sodium bicarbonate tablet 650 mg  650 mg Oral BID Jack Masters MD   650 mg at 11/16/20 9983    docusate sodium (COLACE) capsule 100 mg  100 mg Oral DAILY Jack Masters MD   100 mg at 11/16/20 1000    magnesium chloride (MAG DELAY) delayed release tablet 64 mg  64 mg Oral DAILY Jack Masters MD   64 mg at 11/15/20 0900    sodium chloride (NS) flush 5-40 mL  5-40 mL IntraVENous Q8H Deni SOLIS MD   10 mL at 11/15/20 1441    sodium chloride (NS) flush 5-40 mL  5-40 mL IntraVENous PRN Adalgisa Olson MD   10 mL at 11/15/20 1441    acetaminophen (TYLENOL) tablet 650 mg  650 mg Oral Q6H PRN Adalgisa Olson MD   650 mg at 11/15/20 2200    polyethylene glycol (MIRALAX) packet 17 g  17 g Oral DAILY PRN Adalgisa Olson MD        promethazine (PHENERGAN) tablet 12.5 mg  12.5 mg Oral Q6H PRN Adalgisa Olson MD        dilTIAZem IR (CARDIZEM) tablet 60 mg  60 mg Oral BID Adalgisa Olson MD   60 mg at 11/16/20 0959    nicotine (NICODERM CQ) 14 mg/24 hr patch 1 Patch  1 Patch TransDERmal DAILY PRN Adalgisa Olson MD        albuterol (PROVENTIL HFA, VENTOLIN HFA, PROAIR HFA) inhaler 2 Puff  2 Puff Inhalation Q4H PRN Adalgisa Olson MD            Review of Systems   Constitutional: Negative. HENT: Negative. Respiratory: Positive for shortness of breath. Cardiovascular: Negative. Gastrointestinal: Negative. Genitourinary: Negative. Skin: Negative. Neurological: Negative.         Objective     Vital signs for last 24 hours:  Visit Vitals  /72   Pulse 62   Temp 98.3 °F (36.8 °C)   Resp 18   Ht 5' 6\" (1.676 m)   Wt 117.6 kg (259 lb 3.2 oz)   SpO2 99%   BMI 41.84 kg/m²       Intake/Output this shift:  Current Shift: No intake/output data recorded. Last 3 Shifts: 11/14 1901 - 11/16 0700  In: 310   Out: 800 [Urine:800]    Data Review:   Recent Results (from the past 24 hour(s))   GLUCOSE, POC    Collection Time: 11/15/20 12:44 PM   Result Value Ref Range    Glucose (POC) 145 (H) 65 - 100 mg/dL    Performed by Dru Huang    GLUCOSE, POC    Collection Time: 11/15/20  4:16 PM   Result Value Ref Range    Glucose (POC) 90 65 - 100 mg/dL    Performed by Jackelin Arechiga    TYPE & SCREEN    Collection Time: 11/15/20  6:57 PM   Result Value Ref Range    Crossmatch Expiration 11/18/2020,2359     ABO/Rh(D) B Positive     Antibody screen Negative     Unit number [id number]     Blood component type RC LR     Unit division 00     Status of unit [address] to transfuse     Crossmatch result Compatible    GLUCOSE, POC    Collection Time: 11/15/20  9:38 PM   Result Value Ref Range    Glucose (POC) 107 (H) 65 - 100 mg/dL    Performed by Jackelin Arechiga    CBC WITH AUTOMATED DIFF    Collection Time: 11/16/20  5:40 AM   Result Value Ref Range    WBC 7.1 3.6 - 11.0 K/uL    RBC 3.94 3.80 - 5.20 M/uL    HGB 8.3 (L) 11.5 - 16.0 g/dL    HCT 30.9 (L) 35.0 - 47.0 %    MCV 78.4 (L) 80.0 - 99.0 FL    MCH 21.1 (L) 26.0 - 34.0 PG    MCHC 26.9 (L) 30.0 - 36.5 g/dL    RDW 18.8 (H) 11.5 - 14.5 %    PLATELET 050 408 - 435 K/uL    MPV 11.1 8.9 - 12.9 FL    NEUTROPHILS 80 (H) 32 - 75 %    LYMPHOCYTES 11 (L) 12 - 49 %    MONOCYTES 7 5 - 13 %    EOSINOPHILS 1 0 - 7 %    BASOPHILS 1 0 - 1 %    IMMATURE GRANULOCYTES 0 0.0 - 0.5 %    ABS. NEUTROPHILS 5.7 1.8 - 8.0 K/UL    ABS. LYMPHOCYTES 0.8 0.8 - 3.5 K/UL    ABS. MONOCYTES 0.5 0.0 - 1.0 K/UL    ABS. EOSINOPHILS 0.1 0.0 - 0.4 K/UL    ABS. BASOPHILS 0.0 0.0 - 0.1 K/UL    ABS. IMM.  GRANS. 0.0 0.00 - 0.04 K/UL    DF AUTOMATED     METABOLIC PANEL, BASIC    Collection Time: 11/16/20  5:40 AM   Result Value Ref Range    Sodium 141 136 - 145 mmol/L    Potassium 3.9 3.5 - 5.1 mmol/L    Chloride 106 97 - 108 mmol/L CO2 32 21 - 32 mmol/L    Anion gap 3 (L) 5 - 15 mmol/L    Glucose 88 65 - 100 mg/dL    BUN 30 (H) 6 - 20 mg/dL    Creatinine 1.48 (H) 0.55 - 1.02 mg/dL    BUN/Creatinine ratio 20 12 - 20      GFR est AA 42 (L) >60 ml/min/1.73m2    GFR est non-AA 35 (L) >60 ml/min/1.73m2    Calcium 8.6 8.5 - 10.1 mg/dL   GLUCOSE, POC    Collection Time: 11/16/20  8:10 AM   Result Value Ref Range    Glucose (POC) 90 65 - 100 mg/dL    Performed by Marco Toscano        Physical Exam  Vitals signs reviewed. HENT:      Head: Normocephalic. Eyes:      Pupils: Pupils are equal, round, and reactive to light. Neck:      Musculoskeletal: Normal range of motion. Cardiovascular:      Rate and Rhythm: Normal rate. Heart sounds: No murmur. No gallop. Pulmonary:      Breath sounds: No stridor. No wheezing. Abdominal:      General: Abdomen is flat. Skin:     General: Skin is warm. Neurological:      Mental Status: She is alert. Mental status is at baseline.          Recent Results (from the past 24 hour(s))   GLUCOSE, POC    Collection Time: 11/15/20 12:44 PM   Result Value Ref Range    Glucose (POC) 145 (H) 65 - 100 mg/dL    Performed by Severiano Endo    GLUCOSE, POC    Collection Time: 11/15/20  4:16 PM   Result Value Ref Range    Glucose (POC) 90 65 - 100 mg/dL    Performed by Mimi Dandy    TYPE & SCREEN    Collection Time: 11/15/20  6:57 PM   Result Value Ref Range    Crossmatch Expiration 11/18/2020,2359     ABO/Rh(D) B Positive     Antibody screen Negative     Unit number [id number]     Blood component type RC LR     Unit division 00     Status of unit [address] to transfuse     Crossmatch result Compatible    GLUCOSE, POC    Collection Time: 11/15/20  9:38 PM   Result Value Ref Range    Glucose (POC) 107 (H) 65 - 100 mg/dL    Performed by Mimi Dandy    CBC WITH AUTOMATED DIFF    Collection Time: 11/16/20  5:40 AM   Result Value Ref Range    WBC 7.1 3.6 - 11.0 K/uL    RBC 3.94 3.80 - 5.20 M/uL    HGB 8.3 (L) 11.5 - 16.0 g/dL    HCT 30.9 (L) 35.0 - 47.0 %    MCV 78.4 (L) 80.0 - 99.0 FL    MCH 21.1 (L) 26.0 - 34.0 PG    MCHC 26.9 (L) 30.0 - 36.5 g/dL    RDW 18.8 (H) 11.5 - 14.5 %    PLATELET 240 516 - 189 K/uL    MPV 11.1 8.9 - 12.9 FL    NEUTROPHILS 80 (H) 32 - 75 %    LYMPHOCYTES 11 (L) 12 - 49 %    MONOCYTES 7 5 - 13 %    EOSINOPHILS 1 0 - 7 %    BASOPHILS 1 0 - 1 %    IMMATURE GRANULOCYTES 0 0.0 - 0.5 %    ABS. NEUTROPHILS 5.7 1.8 - 8.0 K/UL    ABS. LYMPHOCYTES 0.8 0.8 - 3.5 K/UL    ABS. MONOCYTES 0.5 0.0 - 1.0 K/UL    ABS. EOSINOPHILS 0.1 0.0 - 0.4 K/UL    ABS. BASOPHILS 0.0 0.0 - 0.1 K/UL    ABS. IMM.  GRANS. 0.0 0.00 - 0.04 K/UL    DF AUTOMATED     METABOLIC PANEL, BASIC    Collection Time: 11/16/20  5:40 AM   Result Value Ref Range    Sodium 141 136 - 145 mmol/L    Potassium 3.9 3.5 - 5.1 mmol/L    Chloride 106 97 - 108 mmol/L    CO2 32 21 - 32 mmol/L    Anion gap 3 (L) 5 - 15 mmol/L    Glucose 88 65 - 100 mg/dL    BUN 30 (H) 6 - 20 mg/dL    Creatinine 1.48 (H) 0.55 - 1.02 mg/dL    BUN/Creatinine ratio 20 12 - 20      GFR est AA 42 (L) >60 ml/min/1.73m2    GFR est non-AA 35 (L) >60 ml/min/1.73m2    Calcium 8.6 8.5 - 10.1 mg/dL   GLUCOSE, POC    Collection Time: 11/16/20  8:10 AM   Result Value Ref Range    Glucose (POC) 90 65 - 100 mg/dL    Performed by Saurabh Walters          Current Facility-Administered Medications:     0.9% sodium chloride infusion 250 mL, 250 mL, IntraVENous, PRN, Maria L Nagy MD, Last Rate: 15 mL/hr at 11/16/20 0100, 250 mL at 11/16/20 0100    furosemide (LASIX) tablet 40 mg, 40 mg, Oral, DAILY, Maria L Nagy MD    diazePAM (VALIUM) tablet 5 mg, 5 mg, Oral, BID, Maria L Nagy MD, 5 mg at 11/16/20 0959    famotidine (PEPCID) tablet 40 mg, 40 mg, Oral, BID, Maria L Nagy MD, 40 mg at 11/16/20 8070    cholecalciferol (VITAMIN D3) tablet 5,000 Units, 5,000 Units, Oral, DAILY, Maria L Nagy MD, 5,000 Units at 11/16/20 1000    ezetimibe/atorvastatin 10/40 mg, , Oral, DAILY, Joanne Gomez MD    influenza vaccine 2020-21 (4 yrs+)(PF) (FLUCELVAX QUAD) injection 0.5 mL, 0.5 mL, IntraMUSCular, PRIOR TO DISCHARGE, Daljit Tariq MD    ferrous sulfate tablet 325 mg, 1 Tab, Oral, DAILY WITH BREAKFAST, Joanne Gomez MD, 325 mg at 11/16/20 1000    sodium bicarbonate tablet 650 mg, 650 mg, Oral, BID, Joanne Gomez MD, 650 mg at 11/16/20 6027    docusate sodium (COLACE) capsule 100 mg, 100 mg, Oral, DAILY, Joanne Gomez MD, 100 mg at 11/16/20 1000    magnesium chloride (MAG DELAY) delayed release tablet 64 mg, 64 mg, Oral, DAILY, Joanne Gomez MD, 64 mg at 11/15/20 0900    sodium chloride (NS) flush 5-40 mL, 5-40 mL, IntraVENous, Q8H, Daljit Tariq MD, 10 mL at 11/15/20 1441    sodium chloride (NS) flush 5-40 mL, 5-40 mL, IntraVENous, PRN, Daljit Tariq MD, 10 mL at 11/15/20 1441    acetaminophen (TYLENOL) tablet 650 mg, 650 mg, Oral, Q6H PRN, 650 mg at 11/15/20 2200 **OR** [DISCONTINUED] acetaminophen (TYLENOL) suppository 650 mg, 650 mg, Rectal, Q6H PRN, Daljit Tariq MD    polyethylene glycol (MIRALAX) packet 17 g, 17 g, Oral, DAILY PRN, Daljit Tariq MD    promethazine (PHENERGAN) tablet 12.5 mg, 12.5 mg, Oral, Q6H PRN **OR** [DISCONTINUED] ondansetron (ZOFRAN) injection 4 mg, 4 mg, IntraVENous, Q6H PRN, Daljit Tariq MD    dilTIAZem IR (CARDIZEM) tablet 60 mg, 60 mg, Oral, BID, Daljit Tariq MD, 60 mg at 11/16/20 0959    nicotine (NICODERM CQ) 14 mg/24 hr patch 1 Patch, 1 Patch, TransDERmal, DAILY PRN, Daljit Tariq MD    albuterol (PROVENTIL HFA, VENTOLIN HFA, PROAIR HFA) inhaler 2 Puff, 2 Puff, Inhalation, Q4H PRN, Daljit Tariq MD    Assessment       Plan          1. Non oliguric CANDELARIO multifactorial sec to CRS -unchanged   Scr at present ~  1.48  C/w strict I/O  New Renal US and Urine studies requested   Daily BMP for renal function and electrolyte monitoring  Renal and cardiac diet as tolerated      2. HTN - stable hemodynamics.  C/w current management  3. Hx of CHF  - as per cardiology recommendations. C/w fluid restriction <1L/d    4. COVID-1 PUI - as per hospital protocol   5. Possible baseline CKD - recommend to avoid nephrotoxins as much as possible until renal function stabilized  6. Anemia fo chronic disease- new iron studies requested. Pending GI evaluation   7.  Hx of A-fib as per cardiology and primary team

## 2020-11-16 NOTE — PROGRESS NOTES
CM attempted to meet with pt at the bedside to complete discharge assessment and to discuss her plan of care. PT is rec. IRF vs HH. Pt was being seen at the bedside by a care provider. Cm to f/up at a later time.

## 2020-11-16 NOTE — CONSULTS
Gastroenterology Consult     Referring Physician: Jayy Kirk MD     Consult Date: 11/16/2020     Subjective:     Chief Complaint: Shortness of breath    History of Present Illness: Kelsi Davis is a 70 y.o. female who is seen in consultation for anemia. Patient was admitted to the hospital with shortness of breath for several days. Shortness of breath is worse with dyspnea on exertion. She is on Eliquis for afib. She had a BM when I saw her, black in color. She never had a colonoscopy. EGD was done may years ago.     A narrative of his history is as follows     Past Medical History:   Diagnosis Date    A-fib ([facility] [address]     CHF (congestive heart failure) (HCC)     Clear cell renal cell carcinoma (HCC)     COPD (chronic obstructive pulmonary disease) (HCC)     Fibromyalgia     Lymphedema     [facility]ogren's [facility] ([facility]     Thyroid nodule      Past Surgical History:   Procedure Laterality Date    HX NEPHRECTOMY        Family History   Problem Relation Age of Onset    Hypertension Mother      Social History     Tobacco Use    Smoking status: Current Every Day Smoker     Packs/day: 0.50     Types: Cigarettes    Smokeless tobacco: Never Used   Substance Use Topics    Alcohol use: Not Currently      No Known Allergies  Current Facility-Administered Medications   Medication Dose Route Frequency    [START ON 11/17/2020] furosemide (LASIX) tablet 60 mg  60 mg Oral DAILY    0.9% sodium chloride infusion 250 mL  250 mL IntraVENous PRN    diazePAM (VALIUM) tablet 5 mg  5 mg Oral BID    famotidine (PEPCID) tablet 40 mg  40 mg Oral BID    cholecalciferol (VITAMIN D3) tablet 5,000 Units  5,000 Units Oral DAILY    ezetimibe/atorvastatin 10/40 mg   Oral DAILY    influenza vaccine 2020-21 (4 yrs+)(PF) (FLUCELVAX QUAD) injection 0.5 mL  0.5 mL IntraMUSCular PRIOR TO DISCHARGE    ferrous sulfate tablet 325 mg  1 Tab Oral DAILY WITH BREAKFAST    docusate sodium (COLACE) capsule 100 mg  100 mg Oral DAILY    magnesium chloride (MAG DELAY) delayed release tablet 64 mg  64 mg Oral DAILY    sodium chloride (NS) flush 5-40 mL  5-40 mL IntraVENous Q8H    sodium chloride (NS) flush 5-40 mL  5-40 mL IntraVENous PRN    acetaminophen (TYLENOL) tablet 650 mg  650 mg Oral Q6H PRN    polyethylene glycol (MIRALAX) packet 17 g  17 g Oral DAILY PRN    promethazine (PHENERGAN) tablet 12.5 mg  12.5 mg Oral Q6H PRN    dilTIAZem IR (CARDIZEM) tablet 60 mg  60 mg Oral BID    nicotine (NICODERM CQ) 14 mg/24 hr patch 1 Patch  1 Patch TransDERmal DAILY PRN    albuterol (PROVENTIL HFA, VENTOLIN HFA, PROAIR HFA) inhaler 2 Puff  2 Puff Inhalation Q4H PRN        Review of Systems:  A detailed 10 organ review of systems is obtained with pertinent positives as listed in the History of Present Illness and Past Medical History. All others are negative. Objective:     Physical Exam:  Visit Vitals  /72   Pulse 62   Temp 98.3 °F (36.8 °C)   Resp 18   Ht 5' 6\" (1.676 m)   Wt 117.6 kg (259 lb 3.2 oz)   SpO2 100%   BMI 41.84 kg/m²        Skin:  Extremities and face reveal no rashes. No chapin erythema. No telangiectasias on the chest wall. HEENT: Sclerae anicteric. Extra-occular muscles are intact. No oral ulcers. No abnormal pigmentation of the lips. The neck is supple. Cardiovascular: Regular rate and rhythm. No murmurs, gallops, or rubs. PMI nondisplaced. Carotids without bruits. Respiratory:  Comfortable breathing with no accessory muscle use. Clear breath sounds with no wheezes, rales, or rhonchi. GI:  Abdomen nondistended, soft, and nontender. Normal active bowel sounds. No enlargement of the liver or spleen. No masses palpable. Rectal:  Deferred  Musculoskeletal:  No pitting edema of the lower legs. Extremities have good range of motion. No costovertebral tenderness. Neurological:  Gross memory appears intact. Patient is alert and oriented. Psychiatric:  Mood appears appropriate with judgement intact.   Lymphatic:  No cervical or supraclavicular adenopathy. Lab/Data Review:  Recent Results (from the past 24 hour(s))   TYPE & SCREEN    Collection Time: 11/15/20  6:57 PM   Result Value Ref Range    Crossmatch Expiration 11/18/2020,2359     ABO/Rh(D) B Positive     Antibody screen Negative     Unit number [id number]     Blood component type RC LR     Unit division 00     Status of unit [address] to transfuse     Crossmatch result Compatible    GLUCOSE, POC    Collection Time: 11/15/20  9:38 PM   Result Value Ref Range    Glucose (POC) 107 (H) 65 - 100 mg/dL    Performed by Mihai Paul    CBC WITH AUTOMATED DIFF    Collection Time: 11/16/20  5:40 AM   Result Value Ref Range    WBC 7.1 3.6 - 11.0 K/uL    RBC 3.94 3.80 - 5.20 M/uL    HGB 8.3 (L) 11.5 - 16.0 g/dL    HCT 30.9 (L) 35.0 - 47.0 %    MCV 78.4 (L) 80.0 - 99.0 FL    MCH 21.1 (L) 26.0 - 34.0 PG    MCHC 26.9 (L) 30.0 - 36.5 g/dL    RDW 18.8 (H) 11.5 - 14.5 %    PLATELET 409 165 - 027 K/uL    MPV 11.1 8.9 - 12.9 FL    NEUTROPHILS 80 (H) 32 - 75 %    LYMPHOCYTES 11 (L) 12 - 49 %    MONOCYTES 7 5 - 13 %    EOSINOPHILS 1 0 - 7 %    BASOPHILS 1 0 - 1 %    IMMATURE GRANULOCYTES 0 0.0 - 0.5 %    ABS. NEUTROPHILS 5.7 1.8 - 8.0 K/UL    ABS. LYMPHOCYTES 0.8 0.8 - 3.5 K/UL    ABS. MONOCYTES 0.5 0.0 - 1.0 K/UL    ABS. EOSINOPHILS 0.1 0.0 - 0.4 K/UL    ABS. BASOPHILS 0.0 0.0 - 0.1 K/UL    ABS. IMM.  GRANS. 0.0 0.00 - 0.04 K/UL    DF AUTOMATED     METABOLIC PANEL, BASIC    Collection Time: 11/16/20  5:40 AM   Result Value Ref Range    Sodium 141 136 - 145 mmol/L    Potassium 3.9 3.5 - 5.1 mmol/L    Chloride 106 97 - 108 mmol/L    CO2 32 21 - 32 mmol/L    Anion gap 3 (L) 5 - 15 mmol/L    Glucose 88 65 - 100 mg/dL    BUN 30 (H) 6 - 20 mg/dL    Creatinine 1.48 (H) 0.55 - 1.02 mg/dL    BUN/Creatinine ratio 20 12 - 20      GFR est AA 42 (L) >60 ml/min/1.73m2    GFR est non-AA 35 (L) >60 ml/min/1.73m2    Calcium 8.6 8.5 - 10.1 mg/dL   GLUCOSE, POC    Collection Time: 11/16/20  8:10 AM   Result Value Ref Range    Glucose (POC) 90 65 - 100 mg/dL    Performed by [address] [place] [address], POC    Collection Time: 11/16/20 11:24 AM   Result Value Ref Range    Glucose (POC) 93 65 - 100 mg/dL    Performed by [facility] FARIHA         XR CHEST PORT   Final Result      US RETROPERITONEUM COMP    (Results Pending)          Assessment/Plan:     Active Problems:    Respiratory failure with hypoxia ([address] (11/13/2020)      COPD exacerbation ([address] (11/16/2020)      Anemia (11/16/2020)      Obesity (11/16/2020)      A-fib ([address] (11/16/2020)      CHF (congestive heart failure) ([address] (11/16/2020)         IP CONSULT TO HOSPITALIST  IP CONSULT TO CARDIOLOGY  IP CONSULT TO NEPHROLOGY  IP CONSULT TO GASTROENTEROLOGY     Schedule for colonoscopy and upper EGD to evaluate anemia. Monitor hgb and transfuse as necessary. Patient discussed with Dr Eudora Primrose and agrees to above impression and plan    Thank you for allowing me to participate in this patients care  Patient seen and examined agree with impression and plan.   Cheryl Arias FNP-C  Cc Referring Physician   Jair Renner MD

## 2020-11-17 ENCOUNTER — APPOINTMENT (OUTPATIENT)
Dept: ENDOSCOPY | Age: 75
DRG: 813 | End: 2020-11-17
Attending: INTERNAL MEDICINE
Payer: COMMERCIAL

## 2020-11-17 ENCOUNTER — ANESTHESIA EVENT (OUTPATIENT)
Dept: ENDOSCOPY | Age: 75
DRG: 813 | End: 2020-11-17
Payer: COMMERCIAL

## 2020-11-17 ENCOUNTER — ANESTHESIA (OUTPATIENT)
Dept: ENDOSCOPY | Age: 75
DRG: 813 | End: 2020-11-17
Payer: COMMERCIAL

## 2020-11-17 ENCOUNTER — APPOINTMENT (OUTPATIENT)
Dept: NON INVASIVE DIAGNOSTICS | Age: 75
DRG: 813 | End: 2020-11-17
Attending: INTERNAL MEDICINE
Payer: COMMERCIAL

## 2020-11-17 PROBLEM — J96.91 RESPIRATORY FAILURE, UNSPECIFIED WITH HYPOXIA (HCC): Status: ACTIVE | Noted: 2020-11-17

## 2020-11-17 LAB
1,25(OH)2D3 SERPL-MCNC: 44.5 PG/ML
ABO + RH BLD: NORMAL
BLD PROD TYP BPU: NORMAL
BLOOD GROUP ANTIBODIES SERPL: NEGATIVE
BPU ID: NORMAL
CROSSMATCH RESULT,%XM: NORMAL
ECHO AO ROOT DIAM: 3.2 CM
ECHO AV PEAK GRADIENT: 14 MMHG
ECHO EST RA PRESSURE: 15 MMHG
ECHO LA TO AORTIC ROOT RATIO: 1.31
ECHO LV E' SEPTAL VELOCITY: 15.4 CM/S
ECHO LV EDV A2C: 159 CM3
ECHO LV EJECTION FRACTION BIPLANE: 74.1 % (ref 55–100)
ECHO LV ESV A2C: 28.9 CM3
ECHO LV INTERNAL DIMENSION DIASTOLIC: 5.42 CM (ref 3.9–5.3)
ECHO LV INTERNAL DIMENSION SYSTOLIC: 3.07 CM
ECHO LV IVSD: 1.64 CM (ref 0.6–0.9)
ECHO LV MASS 2D: 370.1 G (ref 67–162)
ECHO LV MASS INDEX 2D: 165.5 G/M2 (ref 43–95)
ECHO LV POSTERIOR WALL DIASTOLIC: 1.39 CM (ref 0.6–0.9)
ECHO LVOT PEAK GRADIENT: 8 MMHG
ECHO MV A VELOCITY: 108 CM/S
ECHO MV AREA PHT: 2.97 CM2
ECHO MV E DECELERATION TIME (DT): 187 MS
ECHO MV E VELOCITY: 159 CM/S
ECHO MV E/A RATIO: 1.47
ECHO MV E/E' SEPTAL: 10.32
ECHO MV MAX VELOCITY: 214 CM/S
ECHO MV MEAN GRADIENT: 5 MMHG
ECHO MV PEAK GRADIENT: 18 MMHG
ECHO MV PRESSURE HALF TIME (PHT): 74 MS
ECHO MV VTI: 67.3 CM
ECHO PV PEAK INSTANTANEOUS GRADIENT SYSTOLIC: 8 MMHG
ECHO PV REGURGITANT MAX VELOCITY: 138 CM/S
ECHO PV REGURGITANT MAX VELOCITY: 145 CM/S
ECHO PV REGURGITANT MAX VELOCITY: 188 CM/S
ECHO RIGHT VENTRICULAR SYSTOLIC PRESSURE (RVSP): 62 MMHG
ECHO RV INTERNAL DIMENSION: 3.03 CM
ECHO TV MAX VELOCITY: 343 CM/S
ECHO TV REGURGITANT PEAK GRADIENT: 47 MMHG
GLUCOSE BLD STRIP.AUTO-MCNC: 89 MG/DL (ref 65–100)
GLUCOSE BLD STRIP.AUTO-MCNC: 90 MG/DL (ref 65–100)
GLUCOSE BLD STRIP.AUTO-MCNC: 92 MG/DL (ref 65–100)
GLUCOSE BLD STRIP.AUTO-MCNC: 95 MG/DL (ref 65–100)
IRON SATN MFR SERPL: 5 % (ref 20–50)
IRON SERPL-MCNC: 23 UG/DL (ref 35–150)
MV DEC SLOPE: 7620 MM/S2
MV DEC SLOPE: 7620 MM/S2
PERFORMED BY, TECHID: NORMAL
SPECIMEN EXP DATE BLD: NORMAL
STATUS OF UNIT,%ST: NORMAL
TIBC SERPL-MCNC: 484 UG/DL (ref 250–450)
TRANSFUSION STATUS PATIENT QL: NORMAL
UNIT DIVISION, %UDIV: 0

## 2020-11-17 PROCEDURE — 65270000029 HC RM PRIVATE

## 2020-11-17 PROCEDURE — 74011250637 HC RX REV CODE- 250/637: Performed by: FAMILY MEDICINE

## 2020-11-17 PROCEDURE — 94762 N-INVAS EAR/PLS OXIMTRY CONT: CPT

## 2020-11-17 PROCEDURE — 0DJ08ZZ INSPECTION OF UPPER INTESTINAL TRACT, VIA NATURAL OR ARTIFICIAL OPENING ENDOSCOPIC: ICD-10-PCS | Performed by: INTERNAL MEDICINE

## 2020-11-17 PROCEDURE — 74011250636 HC RX REV CODE- 250/636: Performed by: NURSE ANESTHETIST, CERTIFIED REGISTERED

## 2020-11-17 PROCEDURE — 93306 TTE W/DOPPLER COMPLETE: CPT

## 2020-11-17 PROCEDURE — 77010033678 HC OXYGEN DAILY

## 2020-11-17 PROCEDURE — 76040000008: Performed by: INTERNAL MEDICINE

## 2020-11-17 PROCEDURE — 76060000033 HC ANESTHESIA 1 TO 1.5 HR: Performed by: INTERNAL MEDICINE

## 2020-11-17 PROCEDURE — 82962 GLUCOSE BLOOD TEST: CPT

## 2020-11-17 PROCEDURE — 74011250637 HC RX REV CODE- 250/637: Performed by: INTERNAL MEDICINE

## 2020-11-17 PROCEDURE — 74011000250 HC RX REV CODE- 250: Performed by: NURSE ANESTHETIST, CERTIFIED REGISTERED

## 2020-11-17 PROCEDURE — 94760 N-INVAS EAR/PLS OXIMETRY 1: CPT

## 2020-11-17 RX ORDER — LIDOCAINE HYDROCHLORIDE 20 MG/ML
INJECTION, SOLUTION EPIDURAL; INFILTRATION; INTRACAUDAL; PERINEURAL AS NEEDED
Status: DISCONTINUED | OUTPATIENT
Start: 2020-11-17 | End: 2020-11-17 | Stop reason: HOSPADM

## 2020-11-17 RX ORDER — MAGNESIUM CITRATE
296 SOLUTION, ORAL ORAL ONCE
Status: COMPLETED | OUTPATIENT
Start: 2020-11-17 | End: 2020-11-17

## 2020-11-17 RX ORDER — SODIUM CHLORIDE, SODIUM LACTATE, POTASSIUM CHLORIDE, CALCIUM CHLORIDE 600; 310; 30; 20 MG/100ML; MG/100ML; MG/100ML; MG/100ML
INJECTION, SOLUTION INTRAVENOUS
Status: DISCONTINUED | OUTPATIENT
Start: 2020-11-17 | End: 2020-11-17 | Stop reason: HOSPADM

## 2020-11-17 RX ORDER — MAGNESIUM CITRATE
296 SOLUTION, ORAL ORAL
Status: COMPLETED | OUTPATIENT
Start: 2020-11-17 | End: 2020-11-17

## 2020-11-17 RX ORDER — SODIUM CHLORIDE 0.9 % (FLUSH) 0.9 %
5-40 SYRINGE (ML) INJECTION EVERY 8 HOURS
Status: CANCELLED | OUTPATIENT
Start: 2020-11-17

## 2020-11-17 RX ORDER — SODIUM CHLORIDE 0.9 % (FLUSH) 0.9 %
5-40 SYRINGE (ML) INJECTION AS NEEDED
Status: CANCELLED | OUTPATIENT
Start: 2020-11-17

## 2020-11-17 RX ORDER — PROPOFOL 10 MG/ML
INJECTION, EMULSION INTRAVENOUS AS NEEDED
Status: DISCONTINUED | OUTPATIENT
Start: 2020-11-17 | End: 2020-11-17 | Stop reason: HOSPADM

## 2020-11-17 RX ADMIN — FAMOTIDINE 40 MG: 20 TABLET, FILM COATED ORAL at 21:44

## 2020-11-17 RX ADMIN — CHOLECALCIFEROL TAB 125 MCG (5000 UNIT) 5000 UNITS: 125 TAB at 09:56

## 2020-11-17 RX ADMIN — Medication 10 ML: at 21:45

## 2020-11-17 RX ADMIN — MAGNESIUM CITRATE 296 ML: 1.75 LIQUID ORAL at 21:44

## 2020-11-17 RX ADMIN — DIAZEPAM 5 MG: 5 TABLET ORAL at 09:56

## 2020-11-17 RX ADMIN — DILTIAZEM HYDROCHLORIDE 60 MG: 30 TABLET, FILM COATED ORAL at 21:44

## 2020-11-17 RX ADMIN — DIAZEPAM 5 MG: 5 TABLET ORAL at 21:44

## 2020-11-17 RX ADMIN — PROPOFOL 40 MG: 10 INJECTION, EMULSION INTRAVENOUS at 13:43

## 2020-11-17 RX ADMIN — EZETIMIBE: 10 TABLET ORAL at 09:54

## 2020-11-17 RX ADMIN — FAMOTIDINE 40 MG: 20 TABLET, FILM COATED ORAL at 09:58

## 2020-11-17 RX ADMIN — SODIUM CHLORIDE, POTASSIUM CHLORIDE, SODIUM LACTATE AND CALCIUM CHLORIDE: 600; 310; 30; 20 INJECTION, SOLUTION INTRAVENOUS at 13:30

## 2020-11-17 RX ADMIN — MAGNESIUM CITRATE 296 ML: 1.75 LIQUID ORAL at 19:42

## 2020-11-17 RX ADMIN — PROPOFOL 10 MG: 10 INJECTION, EMULSION INTRAVENOUS at 13:45

## 2020-11-17 RX ADMIN — ACETAMINOPHEN 650 MG: 325 TABLET, FILM COATED ORAL at 01:59

## 2020-11-17 RX ADMIN — LIDOCAINE HYDROCHLORIDE 100 MG: 20 INJECTION, SOLUTION EPIDURAL; INFILTRATION; INTRACAUDAL; PERINEURAL at 13:43

## 2020-11-17 RX ADMIN — Medication 10 ML: at 05:44

## 2020-11-17 NOTE — PROGRESS NOTES
Hospitalist Progress Note           Daily Progress Note: 11/17/2020      Subjective:   72-year-old female with past medical history of COPD, tobacco abuse, congestive heart failure, atrial fibrillation, s/p nephrectomy for renal cell carcinoma was admitted to the hospital with a complaint of shortness of breath. Patient was noticed to have hemoglobin of 7.6 on admission. Her creatinine was also 1.42. Patient has chronic edema in both her lower extremities. Patient's Covid test is negative  Patient did not prepare for colonoscopy as she states she cannot drink GoLYTELY. Patient has a thyroid nodule for which she follows with endocrinologist    Patient is anxious, she is still on oxygen by nasal cannula. She denies any chest pain nausea or vomiting.  Called [facility] for EGD today    Problem List:  Problem List as of 11/17/2020 Date Reviewed: 11/13/2020          Codes Class Noted - Resolved    Respiratory failure with hypoxia [facility]) ICD-10-CM: J96.91  ICD-9-CM: 518.81  11/13/2020 - Present        Respiratory failure, unspecified with hypoxia ([facility] ICD-10-CM: J96.91  ICD-9-CM: 518.81  11/17/2020 - Present        COPD exacerbation ([facility] ICD-10-CM: J44.1  ICD-9-CM: 491.21  11/16/2020 - Present        Anemia ICD-10-CM: D64.9  ICD-9-CM: 285.9  11/16/2020 - Present        Obesity ICD-10-CM: E66.9  ICD-9-CM: 278.00  11/16/2020 - Present        A-fib ([facility] ICD-10-CM: I48.91  ICD-9-CM: 427.31  11/16/2020 - Present        CHF (congestive heart failure) ([facility] ICD-10-CM: I50.9  ICD-9-CM: 428.0  11/16/2020 - Present        Screening for colon cancer ICD-10-CM: Z12.11  ICD-9-CM: V76.51  11/16/2020 - Present              Medications reviewed  Current Facility-Administered Medications   Medication Dose Route Frequency    furosemide (LASIX) tablet 60 mg  60 mg Oral DAILY    sodium chloride (NS) flush 5-40 mL  5-40 mL IntraVENous PRN    sodium chloride (NS) flush 5-40 mL  5-40 mL IntraVENous PRN    0.9% sodium chloride infusion 250 mL  250 mL IntraVENous PRN    diazePAM (VALIUM) tablet 5 mg  5 mg Oral BID    famotidine (PEPCID) tablet 40 mg  40 mg Oral BID    cholecalciferol (VITAMIN D3) tablet 5,000 Units  5,000 Units Oral DAILY    ezetimibe/atorvastatin 10/40 mg   Oral DAILY    influenza vaccine 2020-21 (4 yrs+)(PF) (FLUCELVAX QUAD) injection 0.5 mL  0.5 mL IntraMUSCular PRIOR TO DISCHARGE    ferrous sulfate tablet 325 mg  1 Tab Oral DAILY WITH BREAKFAST    docusate sodium (COLACE) capsule 100 mg  100 mg Oral DAILY    magnesium chloride (MAG DELAY) delayed release tablet 64 mg  64 mg Oral DAILY    sodium chloride (NS) flush 5-40 mL  5-40 mL IntraVENous Q8H    sodium chloride (NS) flush 5-40 mL  5-40 mL IntraVENous PRN    acetaminophen (TYLENOL) tablet 650 mg  650 mg Oral Q6H PRN    polyethylene glycol (MIRALAX) packet 17 g  17 g Oral DAILY PRN    promethazine (PHENERGAN) tablet 12.5 mg  12.5 mg Oral Q6H PRN    dilTIAZem IR (CARDIZEM) tablet 60 mg  60 mg Oral BID    nicotine (NICODERM CQ) 14 mg/24 hr patch 1 Patch  1 Patch TransDERmal DAILY PRN    albuterol (PROVENTIL HFA, VENTOLIN HFA, PROAIR HFA) inhaler 2 Puff  2 Puff Inhalation Q4H PRN       Review of Systems:   Review of Systems   Constitutional: Negative for chills, fever and weight loss. HENT: Negative for ear pain, hearing loss and nosebleeds. Eyes: Negative for photophobia and pain. Respiratory: Positive for shortness of breath. Negative for cough, hemoptysis and sputum production. Cardiovascular: Positive for leg swelling. Negative for chest pain and palpitations. Gastrointestinal: Negative for abdominal pain, diarrhea, heartburn, nausea and vomiting. Genitourinary: Negative for dysuria and urgency. Musculoskeletal: Negative for myalgias. Skin: Negative for itching and rash. Neurological: Negative for dizziness and headaches. Psychiatric/Behavioral: Positive for substance abuse. Negative for depression. Objective:   Physical Exam:     Visit Vitals  BP (!) 168/58 (BP 1 Location: Right arm, BP Patient Position: At rest)   Pulse (!) 56   Temp 97.6 °F (36.4 °C)   Resp 16   Ht 5' 6\" (1.676 m)   Wt 117.6 kg (259 lb 3.2 oz)   SpO2 98%   BMI 41.84 kg/m²    O2 Flow Rate (L/min): 3 l/min O2 Device: Nasal cannula    Temp (24hrs), Av.9 °F (36.6 °C), Min:97.6 °F (36.4 °C), Max:98.2 °F (36.8 °C)    No intake/output data recorded. 11/15 1901 -  0700  In: 310   Out: -     General:  Alert, cooperative, no distress, appears stated age. Nasal cannula obesity oxygen in place morbid obesity   Lungs:   Clear to auscultation bilaterally. Chest wall:  No tenderness or deformity. Heart:  Irregular rate and rhythm, S1, S2 normal, no murmur, click, rub or gallop. Abdomen:   Soft, non-tender. Bowel sounds normal. No masses,  No organomegaly. Extremities: Extremities normal, atraumatic, no cyanosis or edema. Pulses: 2+ and symmetric all extremities. Skin: Skin color, texture, turgor normal. No rashes or lesions   Neurologic: CNII-XII intact. No gross sensory or motor deficits     Data Review:       Recent Days:  Recent Labs     20  0540 11/15/20  0739   WBC 7.1 7.3   HGB 8.3* 7.2*   HCT 30.9* 28.1*    205     Recent Labs     20  1720 20  0540 11/15/20  0739   NA  --  141 141   K  --  3.9 4.1   CL  --  106 105   CO2  --  32 35*   GLU  --  88 85   BUN  --  30* 24*   CREA  --  1.48* 1.45*   CA  --  8.6 8.6   PHOS 3.6  --   --    ALB  --   --  3.2*   TBILI  --   --  0.4   ALT  --   --  19     No results for input(s): PH, PCO2, PO2, HCO3, FIO2 in the last 72 hours.     24 Hour Results:  Recent Results (from the past 24 hour(s))   GLUCOSE, POC    Collection Time: 20  3:23 PM   Result Value Ref Range    Glucose (POC) 158 (H) 65 - 100 mg/dL    Performed by Michelle Aguilar    PHOSPHORUS    Collection Time: 20  5:20 PM   Result Value Ref Range    Phosphorus 3.6 2.6 - 4.7 mg/dL   GLUCOSE, POC    Collection Time: 11/16/20  8:37 PM   Result Value Ref Range    Glucose (POC) 124 (H) 65 - 100 mg/dL    Performed by [id number], POC    Collection Time: 11/17/20  9:35 AM   Result Value Ref Range    Glucose (POC) 90 65 - 100 mg/dL    Performed by Contreras Barragan    GLUCOSE, POC    Collection Time: 11/17/20 11:11 AM   Result Value Ref Range    Glucose (POC) 92 65 - 100 mg/dL    Performed by Contreras Barragan        Echocardiogram: awaitied        Assessment/     Active Problems:    Respiratory failure with hypoxia ([address] (11/13/2020)      COPD exacerbation ([address] (11/16/2020)      Anemia (11/16/2020)      Obesity (11/16/2020)      A-fib ([address] (11/16/2020)      CHF (congestive heart failure) ([address] (11/16/2020)      Screening for colon cancer (11/16/2020)      Respiratory failure, unspecified with hypoxia ([address] (11/17/2020)        Plan:  Added iron for anemia 325 daily  Continue hold Eliquis,   SCD for DVT prophylaxis  change Lasix to p.o.   consult GI for anemia- Plan for EGD today, will likely need colonoscopy outpatient as she has refused prep.  If she agrees to drink golyedly for prep   nephrology due to renal insufficiency  Adjust  BP meds sodium of bicarb for acute renal injure  S/p transfusion 1 unit of blood ,  COVID -ve    Care Plan discussed with: Patient/Family, Nurse and Consultant Gastroenterology    Ramya Reid MD

## 2020-11-17 NOTE — PROGRESS NOTES
Called Dr Naomi Quintanilla about pt refusing to do echo. Explained to md that pt wanted food before echo. Dr Naomi Quintanilla requested to notify Dr Leandro Doan. Called Dr Leandro Doan but was unable to connect.   Called office and left message to return call

## 2020-11-17 NOTE — PROGRESS NOTES
Dr Puma Kerr called Dr Michelle Boone to discuss plan of care. Dr Puma Kerr in pt room to dicuss plan of care with pt. Pt continue to be npo. Plan to have egd done this evening per Dr Puma Kerr.  Dr Michelle Boone to do egd

## 2020-11-17 NOTE — PROGRESS NOTES
Spoke with Dr Merary Alfaro about pt plan of care. Give magnesium citrate now and repeat at 2200.   Npo after midnight

## 2020-11-17 NOTE — PROGRESS NOTES
Progress Note    Patient: Naomi Arboleda MRN: [id number]  SSN: [id number]    YOB: 1945  Age: 70 y.o. Sex: female      Admit Date: 11/13/2020    LOS: 3 days     Subjective:     Patient was not available when I made rounds. She is getting her echo done according to the nurse. She also had and EGD done today. Colonoscopy was not done because she did not drink Golytly. No report of bleeding per nurse. Objective:     Vitals:    11/17/20 1349 11/17/20 1400 11/17/20 1408 11/17/20 1415   BP: (!) 138/42 (!) 152/50 (!) 149/52 (!) 161/52   Pulse: (!) 55 (!) 58 (!) 56 (!) 58   Resp: 21 14 19 14   Temp:  97 °F (36.1 °C)     SpO2: 93% 93% 93% 91%   Weight:       Height:            Intake and Output:  Current Shift: No intake/output data recorded. Last three shifts: 11/15 1901 - 11/17 0700  In: 310   Out: -     Physical Exam:   Patient was not available for the physical exam.    Lan Hammond Review:  Recent Results (from the past 24 hour(s))   GLUCOSE, POC    Collection Time: 11/16/20  3:23 PM   Result Value Ref Range    Glucose (POC) 158 (H) 65 - 100 mg/dL    Performed by Gena Katz    PHOSPHORUS    Collection Time: 11/16/20  5:20 PM   Result Value Ref Range    Phosphorus 3.6 2.6 - 4.7 mg/dL   GLUCOSE, POC    Collection Time: 11/16/20  8:37 PM   Result Value Ref Range    Glucose (POC) 124 (H) 65 - 100 mg/dL    Performed by [id number], POC    Collection Time: 11/17/20  9:35 AM   Result Value Ref Range    Glucose (POC) 90 65 - 100 mg/dL    Performed by Georgianna Hammans    GLUCOSE, POC    Collection Time: 11/17/20 11:11 AM   Result Value Ref Range    Glucose (POC) 92 65 - 100 mg/dL    Performed by Georgianna Hammans                RETROPERITONEUM COMP   Final Result   IMPRESSION:   1. The patient status post previous right nephrectomy. 2.  There is a small simple cyst projecting from the lateral cortex of the   midportion to the left kidney.  This examination is negative for left-sided hydronephrosis. The left kidney is otherwise normal.   3.  There is milder splenomegaly. XR CHEST PORT   Final Result          Assessment:     Active Problems:    Respiratory failure with hypoxia ([address] (11/13/2020)      COPD exacerbation (HCC) (11/16/2020)      Anemia (11/16/2020)      Obesity (11/16/2020)      A-fib ([address] (11/16/2020)      CHF (congestive heart failure) ([address] (11/16/2020)      Screening for colon cancer (11/16/2020)      Respiratory failure, unspecified with hypoxia ([address] (11/17/2020)        Plan:     EGD showed esophagitis and gastritis with no active bleeding. Plan to do colonoscopy on this Thursday. Keep patient on clear liquid diet. Continue PPI. Patient discussed with Dr Neftali Hall and agrees to above impression and plan    Thank you for allowing me to participate in this patients care    Signed By: Gee Arechiga.  CONRADO Hillman     November 17, 2020

## 2020-11-17 NOTE — PROGRESS NOTES
Pt refused to go get echo done. Asked pt why she refused echo. Pt stated \"because I haven't had breakfast or my coffee\".

## 2020-11-17 NOTE — ANESTHESIA PREPROCEDURE EVALUATION
Relevant Problems   No relevant active problems       Anesthetic History          Comments: \"HARD TO WAKE UP\"     Review of Systems / Medical History  Patient summary reviewed, nursing notes reviewed and pertinent labs reviewed    Pulmonary    COPD      Smoker      Comments: ON O2 AT 3 L/M NC   Neuro/Psych             Comments: FIBROMYALGIA  RIGHT FINGER NUMBNESS Cardiovascular          CHF  Dysrhythmias : atrial fibrillation           GI/Hepatic/Renal     GERD           Endo/Other      Hypothyroidism  Morbid obesity     Other Findings   Comments: S/p nephrectomy  Hb/Hct 8.3/30.9  ELIQUIS:T DOSE ON YESTERDAY.    CH. BACK PAIN           Physical Exam    Airway  Mallampati: III  TM Distance: 4 - 6 cm  Neck ROM: normal range of motion   Mouth opening: Normal     Cardiovascular    Rhythm: regular  Rate: normal         Dental    Dentition: Poor dentition     Pulmonary      Decreased breath sounds           Abdominal         Other Findings            Anesthetic Plan    ASA: 3, emergent  Anesthesia type: total IV anesthesia and general - backup          Induction: Intravenous  Anesthetic plan and risks discussed with: Patient

## 2020-11-17 NOTE — PROGRESS NOTES
Spoke with Dr Annamarie Herbert about  Pt diet. Clear liquid diet for 2 more days and colonoscopy on Thursday.

## 2020-11-17 NOTE — PROGRESS NOTES
Spoke with pt about echo and edg and she agree to do echo. Spoke with Maisha in CV department.   Pt will be schedule for echo at 1pm.  Pt notified

## 2020-11-17 NOTE — PROGRESS NOTES
PT treatment attempted at [id number] however, nursing requesting to hold therapy at this time due to patient being agitated. Will continue to follow pt and will attempt treatment at a later time.  Thank you

## 2020-11-17 NOTE — ANESTHESIA POSTPROCEDURE EVALUATION
Procedure(s):  ESOPHAGOGASTRODUODENOSCOPY (EGD).     total IV anesthesia, general    Anesthesia Post Evaluation      Multimodal analgesia: multimodal analgesia not used between 6 hours prior to anesthesia start to PACU discharge  Patient location during evaluation: bedside  Patient participation: complete - patient participated  Level of consciousness: sleepy but conscious  Airway patency: patent  Anesthetic complications: no  Cardiovascular status: acceptable  Respiratory status: acceptable  Hydration status: acceptable        INITIAL Post-op Vital signs:   Vitals Value Taken Time   /42 11/17/2020  1:49 PM   Temp     Pulse 55 11/17/2020  1:49 PM   Resp 21 11/17/2020  1:49 PM   SpO2 93 % 11/17/2020  1:49 PM

## 2020-11-18 ENCOUNTER — ANESTHESIA (OUTPATIENT)
Dept: ENDOSCOPY | Age: 75
DRG: 813 | End: 2020-11-18
Payer: COMMERCIAL

## 2020-11-18 ENCOUNTER — ANESTHESIA EVENT (OUTPATIENT)
Dept: ENDOSCOPY | Age: 75
DRG: 813 | End: 2020-11-18
Payer: COMMERCIAL

## 2020-11-18 ENCOUNTER — APPOINTMENT (OUTPATIENT)
Dept: ENDOSCOPY | Age: 75
DRG: 813 | End: 2020-11-18
Attending: INTERNAL MEDICINE
Payer: COMMERCIAL

## 2020-11-18 LAB
ANION GAP SERPL CALC-SCNC: 2 MMOL/L (ref 5–15)
BUN SERPL-MCNC: 21 MG/DL (ref 6–20)
BUN/CREAT SERPL: 18 (ref 12–20)
CA-I BLD-MCNC: 9 MG/DL (ref 8.5–10.1)
CHLORIDE SERPL-SCNC: 104 MMOL/L (ref 97–108)
CO2 SERPL-SCNC: 35 MMOL/L (ref 21–32)
CREAT SERPL-MCNC: 1.2 MG/DL (ref 0.55–1.02)
GLUCOSE SERPL-MCNC: 93 MG/DL (ref 65–100)
HCT VFR BLD AUTO: 33.6 % (ref 35–47)
HGB BLD-MCNC: 11.5 G/DL (ref 11.5–16)
POTASSIUM SERPL-SCNC: 3.8 MMOL/L (ref 3.5–5.1)
SODIUM SERPL-SCNC: 141 MMOL/L (ref 136–145)

## 2020-11-18 PROCEDURE — 36415 COLL VENOUS BLD VENIPUNCTURE: CPT

## 2020-11-18 PROCEDURE — 74011250637 HC RX REV CODE- 250/637: Performed by: FAMILY MEDICINE

## 2020-11-18 PROCEDURE — 80048 BASIC METABOLIC PNL TOTAL CA: CPT

## 2020-11-18 PROCEDURE — 94640 AIRWAY INHALATION TREATMENT: CPT

## 2020-11-18 PROCEDURE — 76060000032 HC ANESTHESIA 0.5 TO 1 HR: Performed by: INTERNAL MEDICINE

## 2020-11-18 PROCEDURE — 74011250637 HC RX REV CODE- 250/637: Performed by: INTERNAL MEDICINE

## 2020-11-18 PROCEDURE — 74011000258 HC RX REV CODE- 258: Performed by: NURSE ANESTHETIST, CERTIFIED REGISTERED

## 2020-11-18 PROCEDURE — 65270000029 HC RM PRIVATE

## 2020-11-18 PROCEDURE — 2709999900 HC NON-CHARGEABLE SUPPLY: Performed by: INTERNAL MEDICINE

## 2020-11-18 PROCEDURE — 85018 HEMOGLOBIN: CPT

## 2020-11-18 PROCEDURE — 74011250636 HC RX REV CODE- 250/636: Performed by: NURSE ANESTHETIST, CERTIFIED REGISTERED

## 2020-11-18 PROCEDURE — 76040000007: Performed by: INTERNAL MEDICINE

## 2020-11-18 PROCEDURE — 88305 TISSUE EXAM BY PATHOLOGIST: CPT

## 2020-11-18 PROCEDURE — 0DBL8ZX EXCISION OF TRANSVERSE COLON, VIA NATURAL OR ARTIFICIAL OPENING ENDOSCOPIC, DIAGNOSTIC: ICD-10-PCS | Performed by: INTERNAL MEDICINE

## 2020-11-18 RX ORDER — ALBUTEROL SULFATE 90 UG/1
AEROSOL, METERED RESPIRATORY (INHALATION)
COMMUNITY
End: 2021-01-01 | Stop reason: ALTCHOICE

## 2020-11-18 RX ORDER — PROPOFOL 10 MG/ML
INJECTION, EMULSION INTRAVENOUS AS NEEDED
Status: DISCONTINUED | OUTPATIENT
Start: 2020-11-18 | End: 2020-11-18 | Stop reason: HOSPADM

## 2020-11-18 RX ORDER — SODIUM CHLORIDE 9 MG/ML
INJECTION, SOLUTION INTRAVENOUS
Status: DISCONTINUED | OUTPATIENT
Start: 2020-11-18 | End: 2020-11-18 | Stop reason: HOSPADM

## 2020-11-18 RX ORDER — BUDESONIDE AND FORMOTEROL FUMARATE DIHYDRATE 80; 4.5 UG/1; UG/1
2 AEROSOL RESPIRATORY (INHALATION)
COMMUNITY

## 2020-11-18 RX ADMIN — EZETIMIBE: 10 TABLET ORAL at 08:57

## 2020-11-18 RX ADMIN — FAMOTIDINE 40 MG: 20 TABLET, FILM COATED ORAL at 20:55

## 2020-11-18 RX ADMIN — PROPOFOL 30 MG: 10 INJECTION, EMULSION INTRAVENOUS at 14:41

## 2020-11-18 RX ADMIN — ALBUTEROL SULFATE 2 PUFF: 108 AEROSOL, METERED RESPIRATORY (INHALATION) at 21:19

## 2020-11-18 RX ADMIN — DILTIAZEM HYDROCHLORIDE 60 MG: 30 TABLET, FILM COATED ORAL at 12:12

## 2020-11-18 RX ADMIN — FUROSEMIDE 60 MG: 40 TABLET ORAL at 08:56

## 2020-11-18 RX ADMIN — DIAZEPAM 5 MG: 5 TABLET ORAL at 20:55

## 2020-11-18 RX ADMIN — FERROUS SULFATE TAB 325 MG (65 MG ELEMENTAL FE) 325 MG: 325 (65 FE) TAB at 08:57

## 2020-11-18 RX ADMIN — MAGNESIUM 64 MG (MAGNESIUM CHLORIDE) TABLET,DELAYED RELEASE 64 MG: at 09:00

## 2020-11-18 RX ADMIN — DOCUSATE SODIUM 100 MG: 100 CAPSULE, LIQUID FILLED ORAL at 08:56

## 2020-11-18 RX ADMIN — DIAZEPAM 5 MG: 5 TABLET ORAL at 08:57

## 2020-11-18 RX ADMIN — FAMOTIDINE 40 MG: 20 TABLET, FILM COATED ORAL at 09:00

## 2020-11-18 RX ADMIN — Medication 10 ML: at 06:50

## 2020-11-18 RX ADMIN — ALBUTEROL SULFATE 2 PUFF: 108 AEROSOL, METERED RESPIRATORY (INHALATION) at 02:42

## 2020-11-18 RX ADMIN — PROPOFOL 30 MG: 10 INJECTION, EMULSION INTRAVENOUS at 14:45

## 2020-11-18 RX ADMIN — CHOLECALCIFEROL TAB 125 MCG (5000 UNIT) 5000 UNITS: 125 TAB at 08:56

## 2020-11-18 RX ADMIN — Medication 10 ML: at 16:47

## 2020-11-18 RX ADMIN — PROPOFOL 10 MG: 10 INJECTION, EMULSION INTRAVENOUS at 14:48

## 2020-11-18 RX ADMIN — Medication 10 ML: at 20:57

## 2020-11-18 RX ADMIN — SODIUM CHLORIDE: 9 INJECTION, SOLUTION INTRAVENOUS at 14:29

## 2020-11-18 RX ADMIN — DILTIAZEM HYDROCHLORIDE 60 MG: 30 TABLET, FILM COATED ORAL at 20:55

## 2020-11-18 NOTE — PROGRESS NOTES
Progress Note    Patient: Bernice Morin MRN: [id number]  SSN: [id number]    YOB: 1945  Age: 70 y.o. Sex: female      Admit Date: 11/13/2020    LOS: 4 days     Subjective:     Patient seen and examined. No new complaints. Denies bleeding. EGD done yesterday that showed esophagitis and gastritis with no active bleeding. Colonoscopy done today. Objective:     Vitals:    11/18/20 1538 11/18/20 1553 11/18/20 1600 11/18/20 1649   BP: (!) 149/60 (!) 149/60 103/60 (!) 144/54   Pulse: (!) 54 (!) 58 60 62   Resp: 19 19 18 18   Temp:    98.2 °F (36.8 °C)   SpO2: 98% 98% 96% 96%   Weight:       Height:            Intake and Output:  Current Shift: 11/18 0701 - 11/18 1900  In: 100 [I.V.:100]  Out: -   Last three shifts: No intake/output data recorded. Physical Exam:   Skin:  Extremities and face reveal no rashes. No chapin erythema. HEENT: Sclerae anicteric. Extra-occular muscles are intact. No abnormal pigmentation of the lips. The neck is supple. Cardiovascular: Regular rate and rhythm. Respiratory:  Comfortable breathing with no accessory muscle use. GI:  Abdomen nondistended, soft, and nontender. No enlargement of the liver or spleen. No masses palpable. Rectal:  Deferred  Musculoskeletal: Extremities have good range of motion. Neurological:  Gross memory appears intact. Patient is alert and oriented. Psychiatric:  Mood appears appropriate with judgement intact. Lymphatic:  No visible adenopathy      Lab/Data Review:  Recent Results (from the past 24 hour(s))   GLUCOSE, POC    Collection Time: 11/17/20 10:08 PM   Result Value Ref Range    Glucose (POC) 95 65 - 100 mg/dL    Performed by Carol Lopez    HGB & HCT    Collection Time: 11/18/20 12:45 PM   Result Value Ref Range    HGB 11.5 11.5 - 16.0 g/dL    HCT 33.6 (L) 35.0 - 47.0 %              US RETROPERITONEUM COMP   Final Result   IMPRESSION:   1. The patient status post previous right nephrectomy.    2.  There is a small simple cyst projecting from the lateral cortex of the   midportion to the left kidney. This examination is negative for left-sided   hydronephrosis. The left kidney is otherwise normal.   3.  There is milder splenomegaly. XR CHEST PORT   Final Result          Assessment:     Active Problems:    Respiratory failure with hypoxia ([address] (11/13/2020)      COPD exacerbation (HCC) (11/16/2020)      Anemia (11/16/2020)      Obesity (11/16/2020)      A-fib ([address] (11/16/2020)      CHF (congestive heart failure) ([address] (11/16/2020)      Screening for colon cancer (11/16/2020)      Respiratory failure, unspecified with hypoxia ([address] (11/17/2020)        Plan:     EGD - Esophagitis and gastritis, no bleeding. Colonoscopy - Benign neoplasm of transverse colon. No active bleeding noted. Multiple polyps were  Removed. No blood thinners for 5 days  Colonoscopy in 1 year  Capsule study as outpatient. Patient discussed with Dr Norman Carrizales and agrees to above impression and plan. Thank you for allowing me to participate in this patients care    Signed By: Blaine Huff.  CONRADO Hillman     November 18, 2020

## 2020-11-18 NOTE — ROUTINE PROCESS
Telephone report called to [facility] and given report about procedure. Also told nurse to notify primary care physician about ordering a diet and to have humidified oxygen for the patient. She is complaining of a dry nose.

## 2020-11-18 NOTE — PROGRESS NOTES
Nephrology Progress Note  Date:2020       Room:[facility]  Patient Name:Nevin Zuniga     YOB: 1945     Age:75 y.o. Subjective    Subjective   Seen and evaluated at bedside, no overnight events reported by primary team. No new complaints reported by the pt. UOP:  Not documented    Review of Systems   Constitutional: Negative. HENT: Negative. Eyes: Negative. Respiratory: Negative. Cardiovascular: Negative. Gastrointestinal: Negative. Genitourinary: Negative. Skin: Negative. Neurological: Negative. All other systems reviewed and are negative. Objective         Vitals Last 24 Hours:  TEMPERATURE:  Temp  Av.6 °F (36.4 °C)  Min: 97 °F (36.1 °C)  Max: 98 °F (36.7 °C)  RESPIRATIONS RANGE: Resp  Av.6  Min: 14  Max: 21  PULSE OXIMETRY RANGE: SpO2  Av.4 %  Min: 91 %  Max: 100 %  PULSE RANGE: Pulse  Av.4  Min: 55  Max: 67  BLOOD PRESSURE RANGE: Systolic (74PII), LXK:390 , Min:124 , EJL:208   ; Diastolic (97CCT), XAQ:05, Min:42, Max:58    I/O (24Hr): No intake or output data in the 24 hours ending 20 1051  Objective:  General Appearance:  Well-appearing. Vital signs: (most recent): Blood pressure (!) 124/51, pulse 67, temperature 98 °F (36.7 °C), resp. rate 18, height 5' 6.14\" (1.68 m), weight 117.6 kg (259 lb 4.2 oz), SpO2 99 %. Vital signs are normal.    HEENT: Normal HEENT exam.    Lungs:  Normal effort. Heart: Normal rate. Abdomen: Abdomen is soft. Bowel sounds are normal.   There is no abdominal tenderness. Neurological: Patient is alert and oriented to person, place and time. Pupils:  Pupils are equal, round, and reactive to light. Skin:  Warm.       Labs/Imaging/Diagnostics    Labs:  CBC:  Recent Labs     20  0540   WBC 7.1   RBC 3.94   HGB 8.3*   HCT 30.9*   MCV 78.4*   RDW 18.8*        CHEMISTRIES:  Recent Labs     20  1720 20  0540   NA  --  141   K  --  3.9 CL  --  106   CO2  --  32   BUN  --  30*   CA  --  8.6   PHOS 3.6  --    PT/INR:No results for input(s): INR, INREXT in the last 72 hours. No lab exists for component: PROTIME  APTT:No results for input(s): APTT in the last 72 hours. LIVER PROFILE:No results for input(s): AST, ALT in the last 72 hours. No lab exists for component: Marck Mcneil ALKPHOS  Lab Results   Component Value Date/Time    ALT (SGPT) 19 11/15/2020 07:39 AM    AST (SGOT) 13 (L) 11/15/2020 07:39 AM    Alk. phosphatase 103 11/15/2020 07:39 AM    Bilirubin, total 0.4 11/15/2020 07:39 AM       Imaging Last 24 Hours:  No results found.      Recent Results (from the past 24 hour(s))   GLUCOSE, POC    Collection Time: 11/17/20 11:11 AM   Result Value Ref Range    Glucose (POC) 92 65 - 100 mg/dL    Performed by Neal Amaya    ECHO ADULT COMPLETE    Collection Time: 11/17/20  4:08 PM   Result Value Ref Range    Pulmonic Regurgitant End Max Velocity 188.00 cm/s    AoV PG 14.00 mmHg    Ao Root D 3.20 cm    IVSd 1.64 (A) 0.6 - 0.9 cm    LVIDd 5.42 (A) 3.9 - 5.3 cm    LVIDs 3.07 cm    Pulmonic Regurgitant End Max Velocity 138.00 cm/s    LVOT Peak Gradient 8.00 mmHg    LVPWd 1.39 (A) 0.6 - 0.9 cm    LV E' Septal Velocity 15.40 cm/s    LV ED Vol A2C 159.00 cm3    BP EF 74.1 55 - 100 %    LV ES Vol A2C 28.90 cm3    E/E' septal 10.32     Left Atrium to Aortic Root Ratio 1.31     Mitral Valve Deceleration [place] 7,620.00 mm/s2    Mitral Valve Deceleration [place] 7,620.00 mm/s2    Mitral Valve E Wave Deceleration Time 187.00 ms    Mitral Valve Pressure Half-time 74.00 ms    MV A Gi[place]any 108.00 cm/s    MV E Gi[place]any 159.00 cm/s    MV Mean Gradient 5.00 mmHg    Mitral Valve Annulus Velocity Time Integral 67.30 cm    Mitral Valve Max Velocity 214.00 cm/s    MV Peak Gradient 18.00 mmHg    MVA (PHT) 2.97 cm2    MV E/A 1.47     Pulmonic Regurgitant End Max Velocity 145.00 cm/s    Pulmonic Valve Systolic Peak Instantaneous Gradient 8.00 mmHg    Est. RA Pressure 15.00 mmHg    RVIDd 3.03 cm    RVSP 62.00 mmHg    Tricuspid Valve Max Velocity 343.00 cm/s    Triscuspid Valve Regurgitation Peak Gradient 47.00 mmHg    LV Mass .1 67 - 162 g    LV Mass AL Index 165.5 43 - 95 g/m2   GLUCOSE, POC    Collection Time: 11/17/20  4:19 PM   Result Value Ref Range    Glucose (POC) 89 65 - 100 mg/dL    Performed by Fabiana Jones    GLUCOSE, POC    Collection Time: 11/17/20 10:08 PM   Result Value Ref Range    Glucose (POC) 95 65 - 100 mg/dL    Performed by Sadie Stanton          Current Facility-Administered Medications:     furosemide (LASIX) tablet 60 mg, 60 mg, Oral, DAILY, Nathaniel KAM MD, 60 mg at 11/18/20 0856    0.9% sodium chloride infusion 250 mL, 250 mL, IntraVENous, PRN, Alfreda Verdin MD, Last Rate: 15 mL/hr at 11/16/20 0100, 250 mL at 11/16/20 0100    diazePAM (VALIUM) tablet 5 mg, 5 mg, Oral, BID, Alfreda Verdin MD, 5 mg at 11/18/20 0857    famotidine (PEPCID) tablet 40 mg, 40 mg, Oral, BID, Alfreda Verdin MD, 40 mg at 11/17/20 2144    cholecalciferol (VITAMIN D3) tablet 5,000 Units, 5,000 Units, Oral, DAILY, Alfreda Verdin MD, 5,000 Units at 11/18/20 0856    ezetimibe/atorvastatin 10/40 mg, , Oral, DAILY, Alfreda Verdin MD    influenza vaccine 2020-21 (4 yrs+)(PF) (FLUCELVAX QUAD) injection 0.5 mL, 0.5 mL, IntraMUSCular, PRIOR TO DISCHARGE, Pete Long MD    ferrous sulfate tablet 325 mg, 1 Tab, Oral, DAILY WITH BREAKFAST, Alfreda Verdin MD, 325 mg at 11/18/20 0857    docusate sodium (COLACE) capsule 100 mg, 100 mg, Oral, DAILY, Alfreda Verdin MD, 100 mg at 11/18/20 0856    magnesium chloride (MAG DELAY) delayed release tablet 64 mg, 64 mg, Oral, DAILY, Alfreda Verdin MD, 64 mg at 11/15/20 0900    sodium chloride (NS) flush 5-40 mL, 5-40 mL, IntraVENous, Q8H, Pete Long MD, 10 mL at 11/18/20 0650    sodium chloride (NS) flush 5-40 mL, 5-40 mL, IntraVENous, PRN, Pete Long MD, 10 mL at 11/15/20 1441    acetaminophen (TYLENOL) tablet 650 mg, 650 mg, Oral, Q6H PRN, 650 mg at 11/17/20 0159 **OR** [DISCONTINUED] acetaminophen (TYLENOL) suppository 650 mg, 650 mg, Rectal, Q6H PRN, Adalgisa Olson MD    polyethylene glycol (MIRALAX) packet 17 g, 17 g, Oral, DAILY PRN, Adalgisa Olson MD    promethazine (PHENERGAN) tablet 12.5 mg, 12.5 mg, Oral, Q6H PRN **OR** [DISCONTINUED] ondansetron (ZOFRAN) injection 4 mg, 4 mg, IntraVENous, Q6H PRN, Adalgisa Olson MD    dilTIAZem IR (CARDIZEM) tablet 60 mg, 60 mg, Oral, BID, Adalgisa Olson MD, 60 mg at 11/17/20 5137    nicotine (NICODERM CQ) 14 mg/24 hr patch 1 Patch, 1 Patch, TransDERmal, DAILY PRN, Adalgisa Olson MD    albuterol (PROVENTIL HFA, VENTOLIN HFA, PROAIR HFA) inhaler 2 Puff, 2 Puff, Inhalation, Q4H PRN, Adalgisa Olson MD, 2 Puff at 11/18/20 0242    Assessment//Plan   Active Problems:    Respiratory failure with hypoxia ([address] (11/13/2020)      COPD exacerbation ([address] (11/16/2020)      Anemia (11/16/2020)      Obesity (11/16/2020)      A-fib ([address] (11/16/2020)      CHF (congestive heart failure) ([address] (11/16/2020)      Screening for colon cancer (11/16/2020)      Respiratory failure, unspecified with hypoxia ([address] (11/17/2020)      Renal US 2020: 1. The patient status post previous right nephrectomy. 2.  There is a small simple cyst projecting from the lateral cortex of the  midportion to the left kidney. This examination is negative for left-sided  hydronephrosis. The left kidney is otherwise normal.  3.  There is milder splenomegaly. Assessment & Plan     1. Non oliguric CANDELARIO multifactorial sec to CRS -unchanged   Scr on 11/16 at 1.48- new labs pending  C/w strict I/O  Continue to avoid nephrotoxins as much as possible during CANDELARIO   Daily BMP for renal function and electrolyte monitoring  Renal and cardiac diet as tolerated      2. HTN - stable hemodynamics. C/w current management  3. Hx of CHF  - as per cardiology recommendations.  C/w fluid restriction <1L/d    4. Baseline CKD III s/p R nephrectomy - Renal US done during admission showed no evidence of L renal hydronephrosis  Continue w/ Renal and cardiac diet as tolerated   5. Anemia fo chronic disease- h/h stable. S/p EGD on 11/17 pending colonoscopy on 11/19  6.  Hx of A-fib as per cardiology and primary team     Electronically signed by Chacha Tolbert MD on 11/18/2020 at 10:51 AM

## 2020-11-18 NOTE — PROGRESS NOTES
Pt. Not wanting to do PT today. Has been getting enemas and is agitated. Demonstrated HEP to patient AP, LAQ, MARCH, HIP ABD/ADD.  Will follow up as time allows

## 2020-11-18 NOTE — PROGRESS NOTES
Skin assessment completed this shift; no skin issues noted at this time. Witnessed by CHACORTA Sánchez RN.

## 2020-11-18 NOTE — PROGRESS NOTES
Hospitalist Progress Note           Daily Progress Note: 11/18/2020      Subjective:       75F, H/o right RCC s/p nephrectomy, pAFIB on eliquis, COPD not on oxygen, CHFpEF with shortness of breath and Hb of 7.4    During the course of her stay: eliquis was stopped. S/p EGD results pending, colonoscopy tomorrow. Therein decided whether to resume eliquis or go for watchman procedure. Reports GI disturbances s/t golytely use. Will monitor her electrolytes and makes sure she remains euvolemic.       Problem List:  Problem List as of 11/18/2020 Date Reviewed: 11/13/2020          Codes Class Noted - Resolved    Respiratory failure, unspecified with hypoxia [facility]) ICD-10-CM: J96.91  ICD-9-CM: 518.81  11/17/2020 - Present        COPD exacerbation ([facility] ICD-10-CM: J44.1  ICD-9-CM: 491.21  11/16/2020 - Present        Anemia ICD-10-CM: D64.9  ICD-9-CM: 285.9  11/16/2020 - Present        Obesity ICD-10-CM: E66.9  ICD-9-CM: 278.00  11/16/2020 - Present        A-fib ([facility] ICD-10-CM: I48.91  ICD-9-CM: 427.31  11/16/2020 - Present        CHF (congestive heart failure) ([facility] ICD-10-CM: I50.9  ICD-9-CM: 428.0  11/16/2020 - Present        Screening for colon cancer ICD-10-CM: Z12.11  ICD-9-CM: V76.51  11/16/2020 - Present        Respiratory failure with hypoxia [facility]) ICD-10-CM: J96.91  ICD-9-CM: 518.81  11/13/2020 - Present              Medications reviewed  Current Facility-Administered Medications   Medication Dose Route Frequency    furosemide (LASIX) tablet 60 mg  60 mg Oral DAILY    0.9% sodium chloride infusion 250 mL  250 mL IntraVENous PRN    diazePAM (VALIUM) tablet 5 mg  5 mg Oral BID    famotidine (PEPCID) tablet 40 mg  40 mg Oral BID    cholecalciferol (VITAMIN D3) tablet 5,000 Units  5,000 Units Oral DAILY    ezetimibe/atorvastatin 10/40 mg   Oral DAILY    influenza vaccine 2020-21 (4 yrs+)(PF) (FLUCELVAX QUAD) injection 0.5 mL  0.5 mL IntraMUSCular PRIOR TO DISCHARGE    ferrous sulfate tablet 325 mg  1 Tab Oral DAILY WITH BREAKFAST    docusate sodium (COLACE) capsule 100 mg  100 mg Oral DAILY    magnesium chloride (MAG DELAY) delayed release tablet 64 mg  64 mg Oral DAILY    sodium chloride (NS) flush 5-40 mL  5-40 mL IntraVENous Q8H    sodium chloride (NS) flush 5-40 mL  5-40 mL IntraVENous PRN    acetaminophen (TYLENOL) tablet 650 mg  650 mg Oral Q6H PRN    polyethylene glycol (MIRALAX) packet 17 g  17 g Oral DAILY PRN    promethazine (PHENERGAN) tablet 12.5 mg  12.5 mg Oral Q6H PRN    dilTIAZem IR (CARDIZEM) tablet 60 mg  60 mg Oral BID    nicotine (NICODERM CQ) 14 mg/24 hr patch 1 Patch  1 Patch TransDERmal DAILY PRN    albuterol (PROVENTIL HFA, VENTOLIN HFA, PROAIR HFA) inhaler 2 Puff  2 Puff Inhalation Q4H PRN       Review of Systems:   Review of Systems   Constitutional: Negative for chills, fever and weight loss. HENT: Negative for ear pain, hearing loss and nosebleeds. Eyes: Negative for photophobia and pain. Respiratory: Positive for shortness of breath. Negative for cough, hemoptysis and sputum production. Cardiovascular: Positive for leg swelling. Negative for chest pain and palpitations. Gastrointestinal: Negative for abdominal pain, diarrhea, heartburn, nausea and vomiting. Genitourinary: Negative for dysuria and urgency. Musculoskeletal: Negative for myalgias. Skin: Negative for itching and rash. Neurological: Negative for dizziness and headaches. Psychiatric/Behavioral: Positive for substance abuse. Negative for depression. Objective:   Physical Exam:     Visit Vitals  BP (!) 130/59   Pulse (!) 56   Temp 98.4 °F (36.9 °C)   Resp 16   Ht 5' 6.14\" (1.68 m)   Wt 117.6 kg (259 lb 4.2 oz)   SpO2 100%   BMI 41.67 kg/m²    O2 Flow Rate (L/min): 1 l/min O2 Device: Nasal cannula    Temp (24hrs), Av.8 °F (36.6 °C), Min:97 °F (36.1 °C), Max:98.4 °F (36.9 °C)    No intake/output data recorded. No intake/output data recorded.     General: Alert, cooperative, no distress, appears stated age. Nasal cannula obesity oxygen in place morbid obesity   Lungs:   Clear to auscultation bilaterally. Chest wall:  No tenderness or deformity. Heart:  Irregular rate and rhythm, S1, S2 normal, no murmur, click, rub or gallop. Abdomen:   Soft, non-tender. Bowel sounds normal. No masses,  No organomegaly. Extremities: Extremities normal, atraumatic, no cyanosis or edema. Pulses: 2+ and symmetric all extremities. Skin: Skin color, texture, turgor normal. No rashes or lesions   Neurologic: CNII-XII intact. No gross sensory or motor deficits     Data Review:       Recent Days:  Recent Labs     11/16/20  0540   WBC 7.1   HGB 8.3*   HCT 30.9*        Recent Labs     11/16/20  1720 11/16/20  0540   NA  --  141   K  --  3.9   CL  --  106   CO2  --  32   GLU  --  88   BUN  --  30*   CREA  --  1.48*   CA  --  8.6   PHOS 3.6  --      No results for input(s): PH, PCO2, PO2, HCO3, FIO2 in the last 72 hours.     24 Hour Results:  Recent Results (from the past 24 hour(s))   ECHO ADULT COMPLETE    Collection Time: 11/17/20  4:08 PM   Result Value Ref Range    Pulmonic Regurgitant End Max Velocity 188.00 cm/s    AoV PG 14.00 mmHg    Ao Root D 3.20 cm    IVSd 1.64 (A) 0.6 - 0.9 cm    LVIDd 5.42 (A) 3.9 - 5.3 cm    LVIDs 3.07 cm    Pulmonic Regurgitant End Max Velocity 138.00 cm/s    LVOT Peak Gradient 8.00 mmHg    LVPWd 1.39 (A) 0.6 - 0.9 cm    LV E' Septal Velocity 15.40 cm/s    LV ED Vol A2C 159.00 cm3    BP EF 74.1 55 - 100 %    LV ES Vol A2C 28.90 cm3    E/E' septal 10.32     Left Atrium to Aortic Root Ratio 1.31     Mitral Valve Deceleration [place] 7,620.00 mm/s2    Mitral Valve Deceleration [place] 7,620.00 mm/s2    Mitral Valve E Wave Deceleration Time 187.00 ms    Mitral Valve Pressure Half-time 74.00 ms    MV A Gi[place] 108.00 cm/s    MV E [place] 159.00 cm/s    MV Mean Gradient 5.00 mmHg    Mitral Valve Annulus Velocity Time Integral 67.30 cm    Mitral Valve Max Velocity 214.00 cm/s    MV Peak Gradient 18.00 mmHg    MVA (PHT) 2.97 cm2    MV E/A 1.47     Pulmonic Regurgitant End Max Velocity 145.00 cm/s    Pulmonic Valve Systolic Peak Instantaneous Gradient 8.00 mmHg    Est. RA Pressure 15.00 mmHg    RVIDd 3.03 cm    RVSP 62.00 mmHg    Tricuspid Valve Max Velocity 343.00 cm/s    Triscuspid Valve Regurgitation Peak Gradient 47.00 mmHg    LV Mass .1 67 - 162 g    LV Mass AL Index 165.5 43 - 95 g/m2   GLUCOSE, POC    Collection Time: 11/17/20  4:19 PM   Result Value Ref Range    Glucose (POC) 89 65 - 100 mg/dL    Performed by Jaspal Knowles    GLUCOSE, POC    Collection Time: 11/17/20 10:08 PM   Result Value Ref Range    Glucose (POC) 95 65 - 100 mg/dL    Performed by Halima Shirley        Echocardiogram: awaitied        Assessment/     Active Problems:    Respiratory failure with hypoxia ([address] (11/13/2020)      COPD exacerbation ([address] (11/16/2020)      Anemia (11/16/2020)      Obesity (11/16/2020)      A-fib ([address] (11/16/2020)      CHF (congestive heart failure) ([address] (11/16/2020)      Screening for colon cancer (11/16/2020)      Respiratory failure, unspecified with hypoxia ([address] (11/17/2020)        (1) possible GI bleed with ABLA: s/p 1 PRBC. plan for colonoscopy tomorrow- will decide if need to resume AC or watchman before DC/     (2) anemia with ABLA/ and AOCI: s.t #1. (3) pAFIB:  holding eliquis. Continue diltiazem    (4) COPD: stable    (5) CHFpEF: lasix 60/day    (6) HTN : cardizem    (7) CANDELARIO on CKDIII: s/t CRS. Continue lasix but hold nephrotoxins.      Plan for dc on 11/20,  Likely HH, but will decide after colonoscopy      Care Plan discussed with: Patient/Family, Nurse and Consultant Gastroenterology    Mercedes Calvin MD

## 2020-11-18 NOTE — PROGRESS NOTES
Patient transferred to room 575. Report called to Odilon Hernandez RN. Patient transferred in stable condition and with all belongings.

## 2020-11-18 NOTE — ANESTHESIA PREPROCEDURE EVALUATION
Relevant Problems   No relevant active problems       Anesthetic History   No history of anesthetic complications            Review of Systems / Medical History  Patient summary reviewed, nursing notes reviewed and pertinent labs reviewed    Pulmonary    COPD      Smoker      Comments: ON O2 AT 3 L/M VIA NC   Neuro/Psych             Comments: RIGHT FINGER NUMBNESS  FIBROMYALGIA Cardiovascular            Dysrhythmias : atrial fibrillation           GI/Hepatic/Renal     GERD           Endo/Other      Hypothyroidism  Morbid obesity     Other Findings   Comments: CH BACK PAIN   ELIQUIS: LAST DOSE ON 11-16-20.    Hb/Hct 11.5/33.6  BUN/Cr 30/1.48         Physical Exam    Airway  Mallampati: III  TM Distance: 4 - 6 cm         Cardiovascular      Rate: normal         Dental    Dentition: Poor dentition     Pulmonary      Decreased breath sounds           Abdominal         Other Findings            Anesthetic Plan    ASA: 3, emergent  Anesthesia type: total IV anesthesia and general - backup          Induction: Intravenous  Anesthetic plan and risks discussed with: Patient

## 2020-11-18 NOTE — ANESTHESIA POSTPROCEDURE EVALUATION
Procedure(s):  COLONOSCOPY.    total IV anesthesia, general - backup    Anesthesia Post Evaluation      Multimodal analgesia: multimodal analgesia not used between 6 hours prior to anesthesia start to PACU discharge  Patient location during evaluation: bedside  Patient participation: complete - patient participated  Level of consciousness: awake  Pain management: adequate  Airway patency: patent  Anesthetic complications: no  Cardiovascular status: stable  Respiratory status: spontaneous ventilation  Hydration status: stable  Post anesthesia nausea and vomiting:  none  Final Post Anesthesia Temperature Assessment:  Normothermia (36.0-37.5 degrees C)      INITIAL Post-op Vital signs:   Vitals Value Taken Time   /44 11/18/2020  2:55 PM   Temp 37 °C (98.6 °F) 11/18/2020  2:55 PM   Pulse 60 11/18/2020  2:55 PM   Resp 20 11/18/2020  2:55 PM   SpO2 98 % 11/18/2020  2:55 PM

## 2020-11-18 NOTE — PROGRESS NOTES
Progress Note      11/18/2020 2:39 PM  NAME: Itzel Kirby   MRN:  [id number]   Admit Diagnosis: Respiratory failure with hypoxia [facility]) [J96.91]  Screening for colon cancer [Z12.11]  Respiratory failure, unspecified with hypoxia ([facility]) [J96.91]      Problem List:     1. Assessment atrial fibrillation. 2. Acute GI bleeding  3. Hypertension, essential  4. Obesity  5. Renal cell carcinoma status post nephrectomy     Assessment/Plan:     1. It was reluctant to take diltiazem today. In view of atrial fibrillation she needs a diltiazem to control the heart rate. She is waiting for endoscopy. Depending upon the findings will decide about restarting anticoagulation. []       High complexity decision making was performed in this patient at high risk for decompensation with multiple organ involvement. Subjective:     Itzel Kirby denies chest pain, dyspnea. Discussed with RN events overnight. Review of Systems:    Symptom Y/N Comments  Symptom Y/N Comments   Fever/Chills N   Chest Pain N    Poor Appetite N   Edema N    Cough N   Abdominal Pain N    Sputum N   Joint Pain N    SOB/ROBISON N   Pruritis/Rash N    Nausea/vomit N   Tolerating PT/OT Y    Diarrhea N   Tolerating Diet Y    Constipation N   Other       Could NOT obtain due to:      Objective:      Physical Exam:    Last 24hrs VS reviewed since prior progress note. Most recent are:    Visit Vitals  BP (!) 151/48   Pulse (!) 55   Temp 98.4 °F (36.9 °C)   Resp 16   Ht 5' 6.14\" (1.68 m)   Wt 117.6 kg (259 lb 4.2 oz)   SpO2 100%   BMI 41.67 kg/m²     No intake or output data in the 24 hours ending 11/18/20 1439     General Appearance: Well developed, well nourished, alert & oriented x 3,    no acute distress. Ears/Nose/Mouth/Throat: Hearing grossly normal.  Neck: Supple. Chest: Lungs clear to auscultation bilaterally. Cardiovascular: Regular rate and rhythm, S1S2 normal, no murmur. Abdomen: Soft, non-tender, bowel sounds are active.   Extremities: No edema bilaterally. Skin: Warm and dry. []         Post-cath site without hematoma, bruit, tenderness, or thrill. Distal pulses intact. PMH/ reviewed - no change compared to H&P    Data Review    Telemetry: normal sinus rhythm     EKG:   []  No new EKG for review    Lab Data Personally Reviewed:    Recent Labs     11/18/20  1245 11/16/20  0540   WBC  --  7.1   HGB 11.5 8.3*   HCT 33.6* 30.9*   PLT  --  175     No results for input(s): INR, PTP, APTT, INREXT in the last 72 hours. Recent Labs     11/16/20  0540      K 3.9      CO2 32   BUN 30*   CREA 1.48*   GLU 88   CA 8.6     No results for input(s): CPK, CKNDX, TROIQ in the last 72 hours. No lab exists for component: CPKMB  No results found for: CHOL, CHOLX, CHLST, CHOLV, HDL, HDLP, LDL, LDLC, DLDLP, TGLX, TRIGL, TRIGP, CHHD, CHHDX    No results for input(s): AP, TBIL, TP, ALB, GLOB, GGT, AML, LPSE in the last 72 hours. No lab exists for component: SGOT, GPT, AMYP, HLPSE  No results for input(s): PH, PCO2, PO2 in the last 72 hours.     Medications Personally Reviewed:    Current Facility-Administered Medications   Medication Dose Route Frequency    furosemide (LASIX) tablet 60 mg  60 mg Oral DAILY    0.9% sodium chloride infusion 250 mL  250 mL IntraVENous PRN    diazePAM (VALIUM) tablet 5 mg  5 mg Oral BID    famotidine (PEPCID) tablet 40 mg  40 mg Oral BID    cholecalciferol (VITAMIN D3) tablet 5,000 Units  5,000 Units Oral DAILY    ezetimibe/atorvastatin 10/40 mg   Oral DAILY    influenza vaccine 2020-21 (4 yrs+)(PF) (FLUCELVAX QUAD) injection 0.5 mL  0.5 mL IntraMUSCular PRIOR TO DISCHARGE    ferrous sulfate tablet 325 mg  1 Tab Oral DAILY WITH BREAKFAST    docusate sodium (COLACE) capsule 100 mg  100 mg Oral DAILY    magnesium chloride (MAG DELAY) delayed release tablet 64 mg  64 mg Oral DAILY    sodium chloride (NS) flush 5-40 mL  5-40 mL IntraVENous Q8H    sodium chloride (NS) flush 5-40 mL  5-40 mL IntraVENous PRN  acetaminophen (TYLENOL) tablet 650 mg  650 mg Oral Q6H PRN    polyethylene glycol (MIRALAX) packet 17 g  17 g Oral DAILY PRN    promethazine (PHENERGAN) tablet 12.5 mg  12.5 mg Oral Q6H PRN    dilTIAZem IR (CARDIZEM) tablet 60 mg  60 mg Oral BID    nicotine (NICODERM CQ) 14 mg/24 hr patch 1 Patch  1 Patch TransDERmal DAILY PRN    albuterol (PROVENTIL HFA, VENTOLIN HFA, PROAIR HFA) inhaler 2 Puff  2 Puff Inhalation Q4H PRN         Ottis Saint, MD

## 2020-11-19 ENCOUNTER — APPOINTMENT (OUTPATIENT)
Dept: ULTRASOUND IMAGING | Age: 75
DRG: 813 | End: 2020-11-19
Attending: HOSPITALIST
Payer: COMMERCIAL

## 2020-11-19 LAB
ANION GAP SERPL CALC-SCNC: 3 MMOL/L (ref 5–15)
BASOPHILS # BLD: 0.1 K/UL (ref 0–0.1)
BASOPHILS NFR BLD: 1 % (ref 0–1)
BUN SERPL-MCNC: 24 MG/DL (ref 6–20)
BUN/CREAT SERPL: 18 (ref 12–20)
CA-I BLD-MCNC: 8.7 MG/DL (ref 8.5–10.1)
CHLORIDE SERPL-SCNC: 103 MMOL/L (ref 97–108)
CO2 SERPL-SCNC: 36 MMOL/L (ref 21–32)
CREAT SERPL-MCNC: 1.34 MG/DL (ref 0.55–1.02)
DIFFERENTIAL METHOD BLD: ABNORMAL
EOSINOPHIL # BLD: 0.1 K/UL (ref 0–0.4)
EOSINOPHIL NFR BLD: 1 % (ref 0–7)
ERYTHROCYTE [DISTWIDTH] IN BLOOD BY AUTOMATED COUNT: 19.6 % (ref 11.5–14.5)
GLUCOSE SERPL-MCNC: 117 MG/DL (ref 65–100)
HCT VFR BLD AUTO: 27.9 % (ref 35–47)
HCT VFR BLD AUTO: 29.7 % (ref 35–47)
HGB BLD-MCNC: 7.5 G/DL (ref 11.5–16)
HGB BLD-MCNC: 7.8 G/DL (ref 11.5–16)
IMM GRANULOCYTES # BLD AUTO: 0 K/UL (ref 0–0.04)
IMM GRANULOCYTES NFR BLD AUTO: 0 % (ref 0–0.5)
LYMPHOCYTES # BLD: 1 K/UL (ref 0.8–3.5)
LYMPHOCYTES NFR BLD: 14 % (ref 12–49)
MAGNESIUM SERPL-MCNC: 3.1 MG/DL (ref 1.6–2.4)
MCH RBC QN AUTO: 20.8 PG (ref 26–34)
MCHC RBC AUTO-ENTMCNC: 26.3 G/DL (ref 30–36.5)
MCV RBC AUTO: 79.2 FL (ref 80–99)
MONOCYTES # BLD: 0.5 K/UL (ref 0–1)
MONOCYTES NFR BLD: 8 % (ref 5–13)
NEUTS SEG # BLD: 5.3 K/UL (ref 1.8–8)
NEUTS SEG NFR BLD: 76 % (ref 32–75)
PLATELET # BLD AUTO: 172 K/UL (ref 150–400)
PMV BLD AUTO: 11.3 FL (ref 8.9–12.9)
POTASSIUM SERPL-SCNC: 3.8 MMOL/L (ref 3.5–5.1)
RBC # BLD AUTO: 3.75 M/UL (ref 3.8–5.2)
SODIUM SERPL-SCNC: 142 MMOL/L (ref 136–145)
WBC # BLD AUTO: 7 K/UL (ref 3.6–11)

## 2020-11-19 PROCEDURE — 74011250637 HC RX REV CODE- 250/637: Performed by: FAMILY MEDICINE

## 2020-11-19 PROCEDURE — 85018 HEMOGLOBIN: CPT

## 2020-11-19 PROCEDURE — 94761 N-INVAS EAR/PLS OXIMETRY MLT: CPT

## 2020-11-19 PROCEDURE — 83735 ASSAY OF MAGNESIUM: CPT

## 2020-11-19 PROCEDURE — 36415 COLL VENOUS BLD VENIPUNCTURE: CPT

## 2020-11-19 PROCEDURE — 65270000029 HC RM PRIVATE

## 2020-11-19 PROCEDURE — 74011250637 HC RX REV CODE- 250/637: Performed by: INTERNAL MEDICINE

## 2020-11-19 PROCEDURE — 94640 AIRWAY INHALATION TREATMENT: CPT

## 2020-11-19 PROCEDURE — 85025 COMPLETE CBC W/AUTO DIFF WBC: CPT

## 2020-11-19 PROCEDURE — 97530 THERAPEUTIC ACTIVITIES: CPT

## 2020-11-19 PROCEDURE — 77010033678 HC OXYGEN DAILY

## 2020-11-19 PROCEDURE — 94760 N-INVAS EAR/PLS OXIMETRY 1: CPT

## 2020-11-19 PROCEDURE — 80048 BASIC METABOLIC PNL TOTAL CA: CPT

## 2020-11-19 PROCEDURE — 76705 ECHO EXAM OF ABDOMEN: CPT

## 2020-11-19 RX ORDER — BUMETANIDE 1 MG/1
1 TABLET ORAL DAILY
Status: DISCONTINUED | OUTPATIENT
Start: 2020-11-20 | End: 2020-11-20 | Stop reason: HOSPADM

## 2020-11-19 RX ORDER — NYSTATIN 100000 U/G
OINTMENT TOPICAL 2 TIMES DAILY
Status: DISCONTINUED | OUTPATIENT
Start: 2020-11-19 | End: 2020-11-20 | Stop reason: HOSPADM

## 2020-11-19 RX ADMIN — CHOLECALCIFEROL TAB 125 MCG (5000 UNIT) 5000 UNITS: 125 TAB at 08:56

## 2020-11-19 RX ADMIN — EZETIMIBE: 10 TABLET ORAL at 08:54

## 2020-11-19 RX ADMIN — Medication 10 ML: at 05:45

## 2020-11-19 RX ADMIN — ALBUTEROL SULFATE 2 PUFF: 108 AEROSOL, METERED RESPIRATORY (INHALATION) at 21:23

## 2020-11-19 RX ADMIN — DILTIAZEM HYDROCHLORIDE 60 MG: 30 TABLET, FILM COATED ORAL at 21:09

## 2020-11-19 RX ADMIN — FERROUS SULFATE TAB 325 MG (65 MG ELEMENTAL FE) 325 MG: 325 (65 FE) TAB at 08:56

## 2020-11-19 RX ADMIN — FUROSEMIDE 60 MG: 40 TABLET ORAL at 08:55

## 2020-11-19 RX ADMIN — FAMOTIDINE 40 MG: 20 TABLET, FILM COATED ORAL at 08:54

## 2020-11-19 RX ADMIN — DIAZEPAM 5 MG: 5 TABLET ORAL at 21:09

## 2020-11-19 RX ADMIN — FAMOTIDINE 40 MG: 20 TABLET, FILM COATED ORAL at 21:08

## 2020-11-19 RX ADMIN — Medication 10 ML: at 14:13

## 2020-11-19 RX ADMIN — ACETAMINOPHEN 650 MG: 325 TABLET, FILM COATED ORAL at 21:21

## 2020-11-19 RX ADMIN — PROMETHAZINE HYDROCHLORIDE 12.5 MG: 25 TABLET ORAL at 21:21

## 2020-11-19 RX ADMIN — DIAZEPAM 5 MG: 5 TABLET ORAL at 08:56

## 2020-11-19 RX ADMIN — DILTIAZEM HYDROCHLORIDE 60 MG: 30 TABLET, FILM COATED ORAL at 08:54

## 2020-11-19 RX ADMIN — DOCUSATE SODIUM 100 MG: 100 CAPSULE, LIQUID FILLED ORAL at 08:54

## 2020-11-19 NOTE — DISCHARGE INSTRUCTIONS
INSTRUCTIONS ON DISCHARGE     (1) please hold ELIQUIS for 5 days, start on 11/24. (2) oxygen has been set up, please f.u with pulmonology in 1 week for sleep study and COPD workup. (3) continue to take diltiazem 120mg daily.

## 2020-11-19 NOTE — DISCHARGE SUMMARY
Physician Discharge Summary     Patient ID:    Karissa Layton  [id number]  9 y.o.  1945    Admit date: 11/13/2020    Discharge date : 11/19/2020    Chronic Diagnoses:    Problem List as of 11/19/2020 Date Reviewed: 11/13/2020          Codes Class Noted - Resolved    Respiratory failure, unspecified with hypoxia ([facility] ICD-10-CM: J96.91  ICD-9-CM: 518.81  11/17/2020 - Present        COPD exacerbation ([facility] ICD-10-CM: J44.1  ICD-9-CM: 491.21  11/16/2020 - Present        Anemia ICD-10-CM: D64.9  ICD-9-CM: 285.9  11/16/2020 - Present        Obesity ICD-10-CM: E66.9  ICD-9-CM: 278.00  11/16/2020 - Present        A-fib ([facility] ICD-10-CM: I48.91  ICD-9-CM: 427.31  11/16/2020 - Present        CHF (congestive heart failure) ([facility] ICD-10-CM: I50.9  ICD-9-CM: 428.0  11/16/2020 - Present        Screening for colon cancer ICD-10-CM: Z12.11  ICD-9-CM: V76.51  11/16/2020 - Present        Respiratory failure with hypoxia [facility]) ICD-10-CM: J96.91  ICD-9-CM: 518.81  11/13/2020 - Present          22    Final Diagnoses:   Respiratory failure with hypoxia ([facility] [J96.91]  Screening for colon cancer [Z12.11]  Respiratory failure, unspecified with hypoxia ([facility] [J96.91]    Reason for Hospitalization:    Shortness of breath   Anemia with ABLA, GI bleed    Hospital Course:       75F, H/o right RCC s/p nephrectomy, pAFIB on eliquis, COPD not on oxygen, CHFpEF with shortness of breath and Hb of 7.4     During the course of her stay: eliquis was stopped. S/p EGD no acitve bleeding,  Colonoscopy showed 3 polyps that were removed. Plan for DC to Home with [address]      (1) please hold ELIQUIS for 5 days, start on 11/24.      (2) oxygen has been set up, please f.u with pulmonology in 1 week for sleep study and COPD workup.     (3) continue to take diltiazem 120mg daily.       Discharge Medications:   Current Discharge Medication List      START taking these medications    Details   tiotropium bromide (Spiriva Respimat) 1.25 mcg/actuation inhaler Take 2 Puffs by inhalation daily. Qty: 1 Inhaler, Refills: 0         CONTINUE these medications which have NOT CHANGED    Details   tiotropium (Spiriva with HandiHaler) 18 mcg inhalation capsule Take 1 Cap by inhalation two (2) times a day. budesonide-formoteroL (Symbicort) 80-4.5 mcg/actuation HFAA Take 2 Puffs by inhalation. albuterol (PROVENTIL HFA, VENTOLIN HFA, PROAIR HFA) 90 mcg/actuation inhaler Take  by inhalation. potassium chloride SR (KLOR-CON 10) 10 mEq tablet Take 20 mEq by mouth daily. bumetanide (BUMEX) 1 mg tablet Take 1 mg by mouth daily. dilTIAZem ER (DILACOR XR) 120 mg capsule Take 120 mg by mouth daily. atorvastatin (LIPITOR) 40 mg tablet Take 40 mg by mouth daily. diazePAM (VALIUM) 5 mg tablet Take 5 mg by mouth every six (6) hours as needed for Anxiety. famotidine (PEPCID) 40 mg tablet Take 40 mg by mouth daily. ascorbic acid, vitamin C, (Vitamin C) 500 mg tablet Take 500 mg by mouth daily. cholecalciferol (Vitamin D3) (1000 Units /25 mcg) tablet Take 1,000 Units by mouth daily. sertraline (Zoloft) 25 mg tablet Take 25 mg by mouth daily. STOP taking these medications       apixaban (ELIQUIS) 5 mg tablet Comments:   Reason for Stopping:         diltiazem HCl/D5W (diltiazem in d5w) 100 mg/100 mL (1 mg/mL) soln Comments:   Reason for Stopping: Follow up Care:    1. Zaheer Iniguez MD in 1-2 weeks. Please call to set up an appointment shortly after discharge. Diet:  cardiac    Disposition:  Home with [facility]    Advanced Directive:   FULL x   DNR      Discharge Exam:  See today's note. CONSULTATIONS:cardiology     Significant Diagnostic Studies:     Radiologic Studies -   Results from Hospital Encounter encounter on 11/13/20   XR CHEST PORT    Narrative Chest single view. Comparison single view chest June 20, 2019. New RLL opacity.  Patchy basilar reticular markings each lung base. Suspect small  dependent right pleural effusion. Cardiac and mediastinal structures unchanged  noting thoracic aorta atherosclerosis. No pneumothorax. Continued follow-up recommended. Obstructive cause for the above not excluded.       CT Results  (Last 48 hours)    None              Discharge time spent 35 minutes    Signed:  Sara Almazan MD  11/19/2020  10:49 AM

## 2020-11-19 NOTE — PROGRESS NOTES
Reason for Admission:  Respiratory  failure                   RUR Score: 15%                    Plan for utilizing home health: Agreeable to home health        PCP: First and Last name: Dr. Alfredo Mendoza    Name of Practice:    Are you a current patient: Yes/No: yes   Approximate date of last visit: unk   Can you participate in a virtual visit with your PCP: yes                    Current Advanced Directive/Advance Care Plan: Full code. Son secondary decision maker: Piotr Light [phone]                         Transition of Care Plan:   CM met with the patient at the bedside to complete discharge planning assessment. Patient reported she rents a floor in a tri-level house with her son. She has a walker, rollator (primarily uses), cane, rolling walker, and a wheelchair. She reported her son helps her get meals at home but she still works for [employer] remotely at home. Cm explained concerns for her safety at home as she stated she will be home alone sometimes. She declined rehab and feels she will be okay with home health. Patient does qualify for home oxygen. CM working to order this. Patient indicated no preference for DME supplier or home health agency. Referrals have been sent. Also, contacted patient's son and reviewed the above information with him and he had no concerns or questions.

## 2020-11-19 NOTE — PROGRESS NOTES
CM unable to locate home health agency at this time in network with patient's insurance, [employer]. Also, per patient's nurse there is stat order for a repeat hemoglobin. Patient will remain in the hospital one more night.

## 2020-11-19 NOTE — PROGRESS NOTES
Placed patient on Room Air at rest and Spo2 down to 85%  Placed 2 lpm nc back on patient and Spo2 came up to 94%

## 2020-11-19 NOTE — PROGRESS NOTES
PHYSICAL THERAPY TREATMENT  Patient: César Granda (18 y.o. female)  Date: 11/19/2020  Diagnosis: Respiratory failure with hypoxia ([facility] [J96.91]  Screening for colon cancer [Z12.11]  Respiratory failure, unspecified with hypoxia ([facility] [J96.91]   <principal problem not specified>  Procedure(s) (LRB):  COLONOSCOPY (N/A) 1 Day Post-Op  Precautions:    Chart, physical therapy assessment, plan of care and goals were reviewed. ASSESSMENT  Patient continues with skilled PT services and is progressing towards goals. Pt. Sitting EOB upon arrival and frustrated about dietary service. Also complains of general leg weakness. Has difficulty maintaining focus and redirection to therapy session. Able to perform seated LE TE, Standing LE TE. Pt. Has poor activity tolerance with endurance, has good strength in legs. Not really motivated to be here. Recommend DC to IRF VS. HHPT. Left patient sitting EOB with callbell and all needs met with PCT coming in. .     Current Level of Function Impacting Discharge (mobility/balance): endurance    Other factors to consider for discharge: lack of motivation         PLAN :  Patient continues to benefit from skilled intervention to address the above impairments. Continue treatment per established plan of care. to address goals. Recommendation for discharge: (in order for the patient to meet his/her long term goals)  Therapy 3 hours per day 5-7 days per week    This discharge recommendation:  Has been made in collaboration with the attending provider and/or case management    IF patient discharges home will need the following DME: rollator       SUBJECTIVE:   Patient stated im okay .     OBJECTIVE DATA SUMMARY:   Critical Behavior:  Neurologic State: Alert  Orientation Level: Oriented X4  Cognition: Appropriate decision making     Functional Mobility Training:  Bed Mobility:  Rolling: Independent  Supine to Sit: Independent  Sit to Supine: Independent  Scooting: Independent Transfers:  Sit to Stand: Supervision  Stand to Sit: Supervision                             Balance:  Sitting: Intact  Standing: Impaired; With support;Pull to stand  Standing - Static: Fair  Standing - Dynamic : Fair  Ambulation/Gait Training:                                                        Stairs: Therapeutic Exercises:   Therapeutic Exercises:       EXERCISE   Sets   Reps   Active Active Assist   Passive Self ROM   Comments   Ankle Pumps  30 [x] [] [] []    Quad Sets/Glut Sets   [] [] [] []    Hamstring Sets   [] [] [] []    Short Arc Quads   [] [] [] []    Heel Slides   [] [] [] []    Straight Leg Raises   [] [] [] []    Hip abd/add  30 [x] [] [] []    Long Arc Quads  30 [x] [] [] []    Marching  30 [x] [] [] []       [] [] [] []    Standing heel raise, march    Pain Ratin    Activity Tolerance:   Fair  Please refer to the flowsheet for vital signs taken during this treatment. After treatment patient left in no apparent distress:   Supine in bed    COMMUNICATION/COLLABORATION:   The patients plan of care was discussed with: Physical therapy assistantMel Torres   Time Calculation: 37 mins

## 2020-11-19 NOTE — PROGRESS NOTES
Progress Note    Patient: John Alicia MRN: [id number]  SSN: [id number]    YOB: 1945  Age: 70 y.o. Sex: female      Admit Date: 11/13/2020    LOS: 5 days     Subjective:     Patient in bed, appears comfortable. She is on 2 liters 02. Chart noted patient's 02 dropped to 85 in room air. She's complaining of pain and swelling on the right side of abdomen. Skin fold appears erythematous and swollen (cellulitis?). Skin is intact. GI consulted for anemia. She had EGD and colonoscopy. Objective:     Vitals:    11/19/20 1121 11/19/20 1159 11/19/20 1215 11/19/20 1550   BP:  (!) 130/52 (!) 124/55 (!) 143/54   Pulse:  (!) 50 (!) 50 (!) 58   Resp:  21 22 20   Temp:  98.6 °F (37 °C) 98.6 °F (37 °C) 99.9 °F (37.7 °C)   SpO2: 97%   97%   Weight:       Height:            Intake and Output:  Current Shift: No intake/output data recorded. Last three shifts: 11/17 1901 - 11/19 0700  In: 100 [I.V.:100]  Out: -     Physical Exam:   Skin:  Right side of abdomen erythematous, swollen, tender. HEENT: Sclerae anicteric. Extra-occular muscles are intact. No abnormal pigmentation of the lips. The neck is supple. Cardiovascular: Regular rate and rhythm. Respiratory:  Comfortable breathing with no accessory muscle use. GI:  Abdomen nondistended, soft, and nontender. No enlargement of the liver or spleen. No masses palpable. Rectal:  Deferred  Musculoskeletal: Extremities have good range of motion. Neurological:  Gross memory appears intact. Patient is alert and oriented. Psychiatric:  Mood appears appropriate with judgement intact.   Lymphatic:  No visible adenopathy      Lab/Data Review:  Recent Results (from the past 24 hour(s))   METABOLIC PANEL, BASIC    Collection Time: 11/18/20  5:00 PM   Result Value Ref Range    Sodium 141 136 - 145 mmol/L    Potassium 3.8 3.5 - 5.1 mmol/L    Chloride 104 97 - 108 mmol/L    CO2 35 (H) 21 - 32 mmol/L    Anion gap 2 (L) 5 - 15 mmol/L    Glucose 93 65 - 100 mg/dL BUN 21 (H) 6 - 20 mg/dL    Creatinine 1.20 (H) 0.55 - 1.02 mg/dL    BUN/Creatinine ratio 18 12 - 20      GFR est AA 53 (L) >60 ml/min/1.73m2    GFR est non-AA 44 (L) >60 ml/min/1.73m2    Calcium 9.0 8.5 - 10.1 mg/dL   CBC WITH AUTOMATED DIFF    Collection Time: 11/19/20  1:00 PM   Result Value Ref Range    WBC 7.0 3.6 - 11.0 K/uL    RBC 3.75 (L) 3.80 - 5.20 M/uL    HGB 7.8 (L) 11.5 - 16.0 g/dL    HCT 29.7 (L) 35.0 - 47.0 %    MCV 79.2 (L) 80.0 - 99.0 FL    MCH 20.8 (L) 26.0 - 34.0 PG    MCHC 26.3 (L) 30.0 - 36.5 g/dL    RDW 19.6 (H) 11.5 - 14.5 %    PLATELET 272 598 - 835 K/uL    MPV 11.3 8.9 - 12.9 FL    NEUTROPHILS 76 (H) 32 - 75 %    LYMPHOCYTES 14 12 - 49 %    MONOCYTES 8 5 - 13 %    EOSINOPHILS 1 0 - 7 %    BASOPHILS 1 0 - 1 %    IMMATURE GRANULOCYTES 0 0.0 - 0.5 %    ABS. NEUTROPHILS 5.3 1.8 - 8.0 K/UL    ABS. LYMPHOCYTES 1.0 0.8 - 3.5 K/UL    ABS. MONOCYTES 0.5 0.0 - 1.0 K/UL    ABS. EOSINOPHILS 0.1 0.0 - 0.4 K/UL    ABS. BASOPHILS 0.1 0.0 - 0.1 K/UL    ABS. IMM. GRANS. 0.0 0.00 - 0.04 K/UL    DF AUTOMATED     METABOLIC PANEL, BASIC    Collection Time: 11/19/20  1:00 PM   Result Value Ref Range    Sodium 142 136 - 145 mmol/L    Potassium 3.8 3.5 - 5.1 mmol/L    Chloride 103 97 - 108 mmol/L    CO2 36 (H) 21 - 32 mmol/L    Anion gap 3 (L) 5 - 15 mmol/L    Glucose 117 (H) 65 - 100 mg/dL    BUN 24 (H) 6 - 20 mg/dL    Creatinine 1.34 (H) 0.55 - 1.02 mg/dL    BUN/Creatinine ratio 18 12 - 20      GFR est AA 47 (L) >60 ml/min/1.73m2    GFR est non-AA 39 (L) >60 ml/min/1.73m2    Calcium 8.7 8.5 - 10.1 mg/dL   MAGNESIUM    Collection Time: 11/19/20  1:00 PM   Result Value Ref Range    Magnesium 3.1 (H) 1.6 - 2.4 mg/dL              US RETROPERITONEUM COMP   Final Result   IMPRESSION:   1. The patient status post previous right nephrectomy. 2.  There is a small simple cyst projecting from the lateral cortex of the   midportion to the left kidney. This examination is negative for left-sided   hydronephrosis.  The left kidney is otherwise normal.   3.  There is milder splenomegaly. XR CHEST PORT   Final Result          Assessment:     Active Problems:    Respiratory failure with hypoxia ([address] (11/13/2020)      COPD exacerbation ([facility]) (11/16/2020)      Anemia (11/16/2020)      Obesity (11/16/2020)      A-fib ([address] (11/16/2020)      CHF (congestive heart failure) ([address] (11/16/2020)      Screening for colon cancer (11/16/2020)      Respiratory failure, unspecified with hypoxia ([address] (11/17/2020)        Plan:     EGD - Esophagitis and gastritis, no bleeding. Colonoscopy - Benign neoplasm of transverse colon. No active bleeding noted. Multiple polyps were removed. No blood thinners for 5 days.     No report of bleeding. Follow up as outpatient 2 weeks after discharge. Patient discussed with Dr Annamarie Herbert and agrees to above impression and plan. Thank you for allowing me to participate in this patients care  Patient seen and examined agree with impression and plan. Ashley Khan MD      Signed By: Abraham Hillman NP     November 19, 2020

## 2020-11-20 VITALS
DIASTOLIC BLOOD PRESSURE: 58 MMHG | OXYGEN SATURATION: 96 % | BODY MASS INDEX: 41.67 KG/M2 | TEMPERATURE: 98.9 F | RESPIRATION RATE: 20 BRPM | SYSTOLIC BLOOD PRESSURE: 172 MMHG | WEIGHT: 259.26 LBS | HEART RATE: 62 BPM | HEIGHT: 66 IN

## 2020-11-20 PROCEDURE — 74011250637 HC RX REV CODE- 250/637: Performed by: INTERNAL MEDICINE

## 2020-11-20 PROCEDURE — 97110 THERAPEUTIC EXERCISES: CPT

## 2020-11-20 PROCEDURE — 97116 GAIT TRAINING THERAPY: CPT

## 2020-11-20 PROCEDURE — 74011250637 HC RX REV CODE- 250/637: Performed by: FAMILY MEDICINE

## 2020-11-20 PROCEDURE — 74011250637 HC RX REV CODE- 250/637: Performed by: HOSPITALIST

## 2020-11-20 RX ORDER — PANTOPRAZOLE SODIUM 40 MG/1
40 TABLET, DELAYED RELEASE ORAL
Status: DISCONTINUED | OUTPATIENT
Start: 2020-11-21 | End: 2020-11-20 | Stop reason: HOSPADM

## 2020-11-20 RX ORDER — NYSTATIN 100000 U/G
OINTMENT TOPICAL 2 TIMES DAILY
Status: DISCONTINUED | OUTPATIENT
Start: 2020-11-20 | End: 2020-11-20 | Stop reason: HOSPADM

## 2020-11-20 RX ORDER — CEPHALEXIN 500 MG/1
500 CAPSULE ORAL 2 TIMES DAILY
Status: DISCONTINUED | OUTPATIENT
Start: 2020-11-20 | End: 2020-11-20 | Stop reason: HOSPADM

## 2020-11-20 RX ADMIN — FAMOTIDINE 40 MG: 20 TABLET, FILM COATED ORAL at 08:59

## 2020-11-20 RX ADMIN — Medication 10 ML: at 01:32

## 2020-11-20 RX ADMIN — DOCUSATE SODIUM 100 MG: 100 CAPSULE, LIQUID FILLED ORAL at 08:59

## 2020-11-20 RX ADMIN — DIAZEPAM 5 MG: 5 TABLET ORAL at 08:59

## 2020-11-20 RX ADMIN — CEPHALEXIN 500 MG: 500 CAPSULE ORAL at 14:10

## 2020-11-20 RX ADMIN — CHOLECALCIFEROL TAB 125 MCG (5000 UNIT) 5000 UNITS: 125 TAB at 08:59

## 2020-11-20 RX ADMIN — MAGNESIUM 64 MG (MAGNESIUM CHLORIDE) TABLET,DELAYED RELEASE 64 MG: at 09:01

## 2020-11-20 RX ADMIN — NYSTATIN OINTMENT: 100000 OINTMENT TOPICAL at 05:37

## 2020-11-20 RX ADMIN — FERROUS SULFATE TAB 325 MG (65 MG ELEMENTAL FE) 325 MG: 325 (65 FE) TAB at 08:59

## 2020-11-20 RX ADMIN — EZETIMIBE: 10 TABLET ORAL at 08:59

## 2020-11-20 RX ADMIN — NYSTATIN OINTMENT: 100000 OINTMENT TOPICAL at 09:01

## 2020-11-20 RX ADMIN — BUMETANIDE 1 MG: 1 TABLET ORAL at 08:59

## 2020-11-20 RX ADMIN — Medication 10 ML: at 05:37

## 2020-11-20 NOTE — PROGRESS NOTES
Physician Progress Note Ann-Marie Gonzalez 
CSN #:                  [id number] :                       1945 ADMIT DATE:       2020 1:46 PM 
[address] [place] DATE: 
RESPONDING 
PROVIDER #:        Sylvan Prader MD 
 
 
 
 
QUERY TEXT: 
 
Dr. Tatiana Almendarez, Pt admitted with anemia. Pt noted to have GI bleed on Eliquis. If possible, please document in progress notes and discharge summary the relationship, if any, between GI bleeding and Eliquis. The medical record reflects the following: 
Risk Factors: long term RX Eliquis Clinical Indicators: EGD showed no active bleeding. Colonoscopy showed 3 polyps that were removed Treatment: Hold Eliquis for 5 days, restart on  Thank you, [employer] Man RN Options provided: 
-- GI bleed due to Eliquis -- GI bleed unrelated to Eliquis -- Other - I will add my own diagnosis -- Disagree - Not applicable / Not valid -- Disagree - Clinically unable to determine / Unknown 
-- Refer to Clinical Documentation Reviewer PROVIDER RESPONSE TEXT: 
 
This patient has GI bleed due to Eliquis. Query created by:  Pablito Ojeda on 2020 2:38 PM 
 
 
Electronically signed by:  Sylvan Prader MD 2020 4:07 PM

## 2020-11-20 NOTE — PROGRESS NOTES
CM reviewed chart this morning. Currently there is not an accepting [address] [facility]. CM sent more referrals this morning. Current discharge plan: Pending acceptance with [address] [facility].

## 2020-11-20 NOTE — PROGRESS NOTES
Comprehensive Nutrition Assessment    Type and Reason for Visit: RD nutrition re-screen/LOS    Nutrition Recommendations/Plan:     Continue Cardiac diet  Encourage adequate intake of meal trays    RD to f/u for diet edu as pt agreeable    Nursing to document %meal intakes  in I/Os      Nutrition Assessment:  Admitted for afib, acute GIB 2/2 colon polyps. S/p EGD w/ colonoscopy for - MD noted findings of benign neoplasm of colon, esophagitis, gastritis, negative for bleed, multiple polyps removed. Ordered Cardiac diet, appetite and intakes not documented in EMR. Spoke to pt briefly at bedside, limited interview d/t difficulty staying awake. Pt reports eating 50% of breakfast. Dislikes Cardiac diet and foods offered inpatient. Attempted to obtain preferences and provide alternative suggestions for seasoning foods without salt however pt stated \"if it's still the Cardiac diet you can forget it. \" Pt only verbalized desire for reg coke and foods with salt. RN reported pt eats depending on mood, typically 50% of trays at tis time with intake of snacks and sodas. Pt likely to benefit from diet edu as medical condition improves. Labs and meds reviewed. Malnutrition Assessment:  Malnutrition Status:  No malnutrition      Nutrition Related Findings:  Pt appears nourished, obese. Denied c/s difficulty, n/v or c/d. BM documented today, soft. Bilat LE 1+pitting edema recorded. Wounds:    None       Current Nutrition Therapies:  DIET CARDIAC Regular    Anthropometric Measures:  · Height:  5' 6.14\" (168 cm)  · Current Body Wt:  117.6 kg (259 lb 4.2 oz)   · Ideal Body Wt:  131 lbs:  197.9 %   · BMI Category:  Obese class 3 (BMI 40.0 or greater)     No wt hx per EMR.     Nutrition Diagnosis:   No nutrition diagnosis at this time     Nutrition Interventions:   Food and/or Nutrient Delivery: Continue current diet  Nutrition Education and Counseling: Education needed, Counseling initiated  Coordination of Nutrition Care: Continue to monitor while inpatient    Nutrition Monitoring and Evaluation:   Behavioral-Environmental Outcomes: Readiness for change  Food/Nutrient Intake Outcomes: Food and nutrient intake  Physical Signs/Symptoms Outcomes: Weight    Discharge Planning:    Recommend pursue outpatient nutrition counseling     Electronically signed by Umm Balderrama on 11/20/2020 at 3:49 PM    Contact: [phone]

## 2020-11-20 NOTE — PROGRESS NOTES
Progress Note      11/19/2020 10:11 PM  NAME: Marcelo Saba   MRN:  [id number]   Admit Diagnosis: Respiratory failure with hypoxia [facility]) [J96.91]  Screening for colon cancer [Z12.11]  Respiratory failure, unspecified with hypoxia ([facility] [J96.91]      Problem List:     1. Paroxysmal atrial fibrillation  2. Acute GI bleeding secondary to colonic polyp  3. Essential hypertension  4. States status post nephrectomy for a renal cell carcinoma     Assessment/Plan:     1.  patient its heart rate is getting controlled on diltiazem. She is off Eliquis due to acute GI bleeding. There is erythematous edema it was changes in the a right lower abdomen. And is to restart Eliquis if there is no further bleeding in 5-7 days. She has been on Bumex as outpatient and will repeat started. Case was discussed with primary attending Dr. Thomsa Corona         []       High complexity decision making was performed in this patient at high risk for decompensation with multiple organ involvement. Subjective:     Marcelo Saba denies chest pain, dyspnea. Discussed with RN events overnight. Review of Systems:    Symptom Y/N Comments  Symptom Y/N Comments   Fever/Chills N   Chest Pain N    Poor Appetite N   Edema N    Cough N   Abdominal Pain N    Sputum N   Joint Pain N    SOB/ROBISON N   Pruritis/Rash N    Nausea/vomit N   Tolerating PT/OT Y    Diarrhea N   Tolerating Diet Y    Constipation N   Other       Could NOT obtain due to:      Objective:      Physical Exam:    Last 24hrs VS reviewed since prior progress note. Most recent are:    Visit Vitals  BP (!) 151/59 (BP 1 Location: Left arm, BP Patient Position: At rest)   Pulse 60   Temp 98.7 °F (37.1 °C)   Resp 20   Ht 5' 6.14\" (1.68 m)   Wt 117.6 kg (259 lb 4.2 oz)   SpO2 95%   BMI 41.67 kg/m²     No intake or output data in the 24 hours ending 11/19/20 2211     General Appearance: Well developed, well nourished, alert & oriented x 3,    no acute distress.   Ears/Nose/Mouth/Throat: Hearing grossly normal.  Neck: Supple. Chest: Lungs clear to auscultation bilaterally. Cardiovascular: Regular rate and rhythm, S1S2 normal, no murmur. Abdomen: Soft, non-tender, bowel sounds are active. Extremities: No edema bilaterally. Skin: Warm and dry. []         Post-cath site without hematoma, bruit, tenderness, or thrill. Distal pulses intact. PMH/ reviewed - no change compared to H&P    Data Review    Telemetry: normal sinus rhythm     EKG:   []  No new EKG for review    Lab Data Personally Reviewed:    Recent Labs     11/19/20  1615 11/19/20  1300   WBC  --  7.0   HGB 7.5* 7.8*   HCT 27.9* 29.7*   PLT  --  172     No results for input(s): INR, PTP, APTT, INREXT in the last 72 hours. Recent Labs     11/19/20  1300 11/18/20  1700    141   K 3.8 3.8    104   CO2 36* 35*   BUN 24* 21*   CREA 1.34* 1.20*   * 93   CA 8.7 9.0   MG 3.1*  --      No results for input(s): CPK, CKNDX, TROIQ in the last 72 hours. No lab exists for component: CPKMB  No results found for: CHOL, CHOLX, CHLST, CHOLV, HDL, HDLP, LDL, LDLC, DLDLP, TGLX, TRIGL, TRIGP, CHHD, CHHDX    No results for input(s): AP, TBIL, TP, ALB, GLOB, GGT, AML, LPSE in the last 72 hours. No lab exists for component: SGOT, GPT, AMYP, HLPSE  No results for input(s): PH, PCO2, PO2 in the last 72 hours.     Medications Personally Reviewed:    Current Facility-Administered Medications   Medication Dose Route Frequency    nystatin (MYCOSTATIN) 100,000 unit/gram ointment   Topical BID    [START ON 11/20/2020] bumetanide (BUMEX) tablet 1 mg  1 mg Oral DAILY    0.9% sodium chloride infusion 250 mL  250 mL IntraVENous PRN    diazePAM (VALIUM) tablet 5 mg  5 mg Oral BID    famotidine (PEPCID) tablet 40 mg  40 mg Oral BID    cholecalciferol (VITAMIN D3) tablet 5,000 Units  5,000 Units Oral DAILY    ezetimibe/atorvastatin 10/40 mg   Oral DAILY    influenza vaccine 2020-21 (4 yrs+)(PF) (FLUCELVAX QUAD) injection 0.5 mL  0.5 mL IntraMUSCular PRIOR TO DISCHARGE    ferrous sulfate tablet 325 mg  1 Tab Oral DAILY WITH BREAKFAST    docusate sodium (COLACE) capsule 100 mg  100 mg Oral DAILY    magnesium chloride (MAG DELAY) delayed release tablet 64 mg  64 mg Oral DAILY    sodium chloride (NS) flush 5-40 mL  5-40 mL IntraVENous Q8H    sodium chloride (NS) flush 5-40 mL  5-40 mL IntraVENous PRN    acetaminophen (TYLENOL) tablet 650 mg  650 mg Oral Q6H PRN    polyethylene glycol (MIRALAX) packet 17 g  17 g Oral DAILY PRN    promethazine (PHENERGAN) tablet 12.5 mg  12.5 mg Oral Q6H PRN    dilTIAZem IR (CARDIZEM) tablet 60 mg  60 mg Oral BID    nicotine (NICODERM CQ) 14 mg/24 hr patch 1 Patch  1 Patch TransDERmal DAILY PRN    albuterol (PROVENTIL HFA, VENTOLIN HFA, PROAIR HFA) inhaler 2 Puff  2 Puff Inhalation Q4H PRN         Ottis Saint, MD

## 2020-11-20 NOTE — PROGRESS NOTES
Discharge plan of care/case management plan validated with provider discharge order. Patient, showing no signs of acute distress, was wheeled off unit to private vehicle. Patient refused home health. Patient was refusing to wear her home O2, but I educated patient that she had to wear it out of the hospital.  Also educated her on the importance of wearing her O2 full time, even at home. Patient verbalized that I needed to \"stop picking on her and leave her alone\".

## 2020-11-20 NOTE — PROGRESS NOTES
Hospitalist Progress Note           Daily Progress Note: 11/20/2020      Subjective:       75F, H/o right RCC s/p nephrectomy, pAFIB on eliquis, COPD not on oxygen, CHFpEF with shortness of breath and Hb of 7.4    During the course of her stay: eliquis was stopped. S/p EGD results pending, colonoscopy tomorrow. Therein decided whether to resume eliquis or go for watchman procedure. Currently:   Developed possible cellulitis. PO keflex. Continue to monitor HH. D/C HH once arranged.      Problem List:  Problem List as of 11/20/2020 Date Reviewed: 11/13/2020          Codes Class Noted - Resolved    Respiratory failure, unspecified with hypoxia [facility]) ICD-10-CM: J96.91  ICD-9-CM: 518.81  11/17/2020 - Present        COPD exacerbation ([facility] ICD-10-CM: J44.1  ICD-9-CM: 491.21  11/16/2020 - Present        Anemia ICD-10-CM: D64.9  ICD-9-CM: 285.9  11/16/2020 - Present        Obesity ICD-10-CM: E66.9  ICD-9-CM: 278.00  11/16/2020 - Present        A-fib ([facility] ICD-10-CM: I48.91  ICD-9-CM: 427.31  11/16/2020 - Present        CHF (congestive heart failure) ([facility] ICD-10-CM: I50.9  ICD-9-CM: 428.0  11/16/2020 - Present        Screening for colon cancer ICD-10-CM: Z12.11  ICD-9-CM: V76.51  11/16/2020 - Present        Respiratory failure with hypoxia [facility]) ICD-10-CM: J96.91  ICD-9-CM: 518.81  11/13/2020 - Present              Medications reviewed  Current Facility-Administered Medications   Medication Dose Route Frequency    cephALEXin (KEFLEX) capsule 500 mg  500 mg Oral BID    [START ON 11/21/2020] pantoprazole (PROTONIX) tablet 40 mg  40 mg Oral ACB    nystatin (MYCOSTATIN) 100,000 unit/gram ointment   Topical BID    bumetanide (BUMEX) tablet 1 mg  1 mg Oral DAILY    0.9% sodium chloride infusion 250 mL  250 mL IntraVENous PRN    diazePAM (VALIUM) tablet 5 mg  5 mg Oral BID    cholecalciferol (VITAMIN D3) tablet 5,000 Units  5,000 Units Oral DAILY    ezetimibe/atorvastatin 10/40 mg   Oral DAILY    influenza vaccine - (4 yrs+)(PF) (FLUCELVAX QUAD) injection 0.5 mL  0.5 mL IntraMUSCular PRIOR TO DISCHARGE    ferrous sulfate tablet 325 mg  1 Tab Oral DAILY WITH BREAKFAST    docusate sodium (COLACE) capsule 100 mg  100 mg Oral DAILY    magnesium chloride (MAG DELAY) delayed release tablet 64 mg  64 mg Oral DAILY    sodium chloride (NS) flush 5-40 mL  5-40 mL IntraVENous Q8H    sodium chloride (NS) flush 5-40 mL  5-40 mL IntraVENous PRN    acetaminophen (TYLENOL) tablet 650 mg  650 mg Oral Q6H PRN    polyethylene glycol (MIRALAX) packet 17 g  17 g Oral DAILY PRN    promethazine (PHENERGAN) tablet 12.5 mg  12.5 mg Oral Q6H PRN    dilTIAZem IR (CARDIZEM) tablet 60 mg  60 mg Oral BID    nicotine (NICODERM CQ) 14 mg/24 hr patch 1 Patch  1 Patch TransDERmal DAILY PRN    albuterol (PROVENTIL HFA, VENTOLIN HFA, PROAIR HFA) inhaler 2 Puff  2 Puff Inhalation Q4H PRN       Review of Systems:   Review of Systems   Constitutional: Negative for chills, fever and weight loss. HENT: Negative for ear pain, hearing loss and nosebleeds. Eyes: Negative for photophobia and pain. Respiratory: Positive for shortness of breath. Negative for cough, hemoptysis and sputum production. Cardiovascular: Positive for leg swelling. Negative for chest pain and palpitations. Gastrointestinal: Negative for abdominal pain, diarrhea, heartburn, nausea and vomiting. Genitourinary: Negative for dysuria and urgency. Musculoskeletal: Negative for myalgias. Skin: Negative for itching and rash. Neurological: Negative for dizziness and headaches. Psychiatric/Behavioral: Positive for substance abuse. Negative for depression.        Objective:   Physical Exam:     Visit Vitals  BP (!) 172/58   Pulse 62   Temp 98.9 °F (37.2 °C)   Resp 20   Ht 5' 6.14\" (1.68 m)   Wt 117.6 kg (259 lb 4.2 oz)   SpO2 96%   BMI 41.67 kg/m²    O2 Flow Rate (L/min): 2 l/min O2 Device: Nasal cannula    Temp (24hrs), Av.3 °F (37.4 °C), Min:98.7 °F (37.1 °C), Max:99.9 °F (37.7 °C)    No intake/output data recorded. No intake/output data recorded. General:  Alert, cooperative, no distress, appears stated age. Nasal cannula obesity oxygen in place morbid obesity   Lungs:   Clear to auscultation bilaterally. Chest wall:  No tenderness or deformity. Heart:  Irregular rate and rhythm, S1, S2 normal, no murmur, click, rub or gallop. Abdomen:   Soft, non-tender. Bowel sounds normal. No masses,  No organomegaly. Extremities: Extremities normal, atraumatic, no cyanosis or edema. Pulses: 2+ and symmetric all extremities. Skin: Skin color, texture, turgor normal. No rashes or lesions   Neurologic: CNII-XII intact. No gross sensory or motor deficits     Data Review:       Recent Days:  Recent Labs     11/19/20  1615 11/19/20  1300 11/18/20  1245   WBC  --  7.0  --    HGB 7.5* 7.8* 11.5   HCT 27.9* 29.7* 33.6*   PLT  --  172  --      Recent Labs     11/19/20  1300 11/18/20  1700    141   K 3.8 3.8    104   CO2 36* 35*   * 93   BUN 24* 21*   CREA 1.34* 1.20*   CA 8.7 9.0   MG 3.1*  --      No results for input(s): PH, PCO2, PO2, HCO3, FIO2 in the last 72 hours. 24 Hour Results:  Recent Results (from the past 24 hour(s))   HGB & HCT    Collection Time: 11/19/20  4:15 PM   Result Value Ref Range    HGB 7.5 (L) 11.5 - 16.0 g/dL    HCT 27.9 (L) 35.0 - 47.0 %       Echocardiogram: awaitied        Assessment/     Active Problems:    Respiratory failure with hypoxia ([address] (11/13/2020)      COPD exacerbation ([address] (11/16/2020)      Anemia (11/16/2020)      Obesity (11/16/2020)      A-fib ([address] (11/16/2020)      CHF (congestive heart failure) ([address] (11/16/2020)      Screening for colon cancer (11/16/2020)      Respiratory failure, unspecified with hypoxia ([facility] (11/17/2020)        (1) possible GI bleed with ABLA: resume eliquis in  days    (2) anemia with ABLA/ and AOCI: s.t #1. (3) pAFIB:  holding eliquis.  Continue diltiazem    (4) COPD: stable    (5) CHFpEF: lasix 60/day    (6) HTN : cardizem    (7) CANDELARIO on CKDIII: s/t CRS. Continue lasix but hold nephrotoxins. (8) cellulitis on abdomen: keflex, topical nystatin    So, after discussion with patient, she is refusing HH as she doesn't want to be dependent on anyone. She had grndson, and 2 sons at ay time of the day and will take care of her.      Care Plan discussed with: Patient/Family, Nurse and Consultant Gastroenterology    Kaleigh Nolan MD

## 2020-11-20 NOTE — PROGRESS NOTES
CM met with patient at bedside. Patient states she wanted to talk to the doctor before she was discharged, CM notified Dr. Gisel Santiago. CM also informed patient we are still awaiting an accepting [address], the patient kindly refused and stated that she will follow up with her PCP if she thinks she needs it. CM cancelled search for [facility] at this time.

## 2020-11-20 NOTE — PROGRESS NOTES
PHYSICAL THERAPY TREATMENT  Patient: Vinh Simpson (17 y.o. female)  Date: 11/20/2020  Diagnosis: Respiratory failure with hypoxia ([facility] [J96.91]  Screening for colon cancer [Z12.11]  Respiratory failure, unspecified with hypoxia ([facility] [J96.91]   <principal problem not specified>  Procedure(s) (LRB):  COLONOSCOPY (N/A) 2 Days Post-Op  Precautions:    Chart, physical therapy assessment, plan of care and goals were reviewed. ASSESSMENT  Patient continues with skilled PT services and is progressing towards goals. Pt semi-supine in bed upon arrival and agreeable to PT. Pt transferred to EOB independently TE preformed on EOB see flow sheet below for exercises. Pt amb ~10 ft x2 with RW, gt belt, and CGA. Pt with SOB during amb requiring rest break and instructed on PLB. Pt amb back to bed and transferred semi-supine independently. Current Level of Function Impacting Discharge (mobility/balance): Decreased mobility and endurance     Other factors to consider for discharge: new O2. PLAN :  Patient continues to benefit from skilled intervention to address the above impairments. Continue treatment per established plan of care. to address goals. Recommendation for discharge: (in order for the patient to meet his/her long term goals)  HHPT vs IRF    This discharge recommendation:  Has been made in collaboration with the attending provider and/or case management    IF patient discharges home will need the following DME: to be determined (TBD)       SUBJECTIVE:   Patient stated Danni Cancino will get up for a little bit.     OBJECTIVE DATA SUMMARY:   Critical Behavior:  Neurologic State: Alert  Orientation Level: Oriented X4  Cognition: Appropriate decision making, Appropriate for age attention/concentration, Appropriate safety awareness, Follows commands     Functional Mobility Training:  Bed Mobility:  Rolling: Independent  Supine to Sit: Independent  Sit to Supine: Independent  Scooting: Independent Transfers:  Sit to Stand: Contact guard assistance  Stand to Sit: Contact guard assistance                             Balance:  Sitting: Intact  Standing: Impaired; With support;Pull to stand  Standing - Static: Fair  Standing - Dynamic : Fair  Ambulation/Gait Training:  Distance (ft): (10x2)  Assistive Device: Walker, rolling;Gait belt  Ambulation - Level of Assistance: Contact guard assistance                 Base of Support: Narrowed     Speed/Nichole: Slow  Step Length: Left shortened;Right shortened                    Stairs: Therapeutic Exercises:   Therapeutic Exercises:       EXERCISE   Sets   Reps   Active Active Assist   Passive Self ROM   Comments   Ankle Pumps   [x] [] [] [] x10 B LE   Quad Sets/Glut Sets   [] [] [] []    Hamstring Sets   [] [] [] []    Short Arc Quads   [] [] [] []    Heel Slides   [] [] [] []    Straight Leg Raises   [] [] [] []    Hip abd/add   [x] [] [] [] x10 B LE   Long Arc Quads   [x] [] [] [] x10 B LE   Marching   [x] [] [] [] x10 B LE      [] [] [] []      Pain Ratin/10    Activity Tolerance:   Fair and requires rest breaks  Please refer to the flowsheet for vital signs taken during this treatment. After treatment patient left in no apparent distress:   Supine in bed, Call bell within reach and Bed / chair alarm activated    COMMUNICATION/COLLABORATION:   The patients plan of care was discussed with: Registered nurse.      Dariusz Yoon PTA   Time Calculation: 35 mins

## 2020-11-20 NOTE — PROGRESS NOTES
Problem: Patient Education: Go to Patient Education Activity  Goal: Patient/Family Education  Outcome: Progressing Towards Goal     Problem: Heart Failure: Day 1  Goal: Off Pathway (Use only if patient is Off Pathway)  Outcome: Progressing Towards Goal  Goal: Activity/Safety  Outcome: Progressing Towards Goal  Goal: Consults, if ordered  Outcome: Progressing Towards Goal  Goal: Diagnostic Test/Procedures  Outcome: Progressing Towards Goal  Goal: Nutrition/Diet  Outcome: Progressing Towards Goal  Goal: Discharge Planning  Outcome: Progressing Towards Goal  Goal: Medications  Outcome: Progressing Towards Goal  Goal: Respiratory  Outcome: Progressing Towards Goal  Goal: Treatments/Interventions/Procedures  Outcome: Progressing Towards Goal  Goal: Psychosocial  Outcome: Progressing Towards Goal  Goal: *Oxygen saturation within defined limits  Outcome: Progressing Towards Goal  Goal: *Hemodynamically stable  Outcome: Progressing Towards Goal  Goal: *Optimal pain control at patient's stated goal  Outcome: Progressing Towards Goal  Goal: *Anxiety reduced or absent  Outcome: Progressing Towards Goal     Problem: Heart Failure: Day 2  Goal: Off Pathway (Use only if patient is Off Pathway)  Outcome: Progressing Towards Goal  Goal: Activity/Safety  Outcome: Progressing Towards Goal  Goal: Consults, if ordered  Outcome: Progressing Towards Goal  Goal: Diagnostic Test/Procedures  Outcome: Progressing Towards Goal  Goal: Nutrition/Diet  Outcome: Progressing Towards Goal  Goal: Discharge Planning  Outcome: Progressing Towards Goal  Goal: Medications  Outcome: Progressing Towards Goal  Goal: Respiratory  Outcome: Progressing Towards Goal  Goal: Treatments/Interventions/Procedures  Outcome: Progressing Towards Goal  Goal: Psychosocial  Outcome: Progressing Towards Goal  Goal: *Oxygen saturation within defined limits  Outcome: Progressing Towards Goal  Goal: *Hemodynamically stable  Outcome: Progressing Towards Goal  Goal: *Optimal pain control at patient's stated goal  Outcome: Progressing Towards Goal  Goal: *Anxiety reduced or absent  Outcome: Progressing Towards Goal  Goal: *Demonstrates progressive activity  Outcome: Progressing Towards Goal     Problem: Heart Failure: Day 3  Goal: Off Pathway (Use only if patient is Off Pathway)  Outcome: Progressing Towards Goal  Goal: Activity/Safety  Outcome: Progressing Towards Goal  Goal: Diagnostic Test/Procedures  Outcome: Progressing Towards Goal  Goal: Nutrition/Diet  Outcome: Progressing Towards Goal  Goal: Discharge Planning  Outcome: Progressing Towards Goal  Goal: Medications  Outcome: Progressing Towards Goal  Goal: Respiratory  Outcome: Progressing Towards Goal  Goal: Treatments/Interventions/Procedures  Outcome: Progressing Towards Goal  Goal: Psychosocial  Outcome: Progressing Towards Goal  Goal: *Oxygen saturation within defined limits  Outcome: Progressing Towards Goal  Goal: *Hemodynamically stable  Outcome: Progressing Towards Goal  Goal: *Optimal pain control at patient's stated goal  Outcome: Progressing Towards Goal  Goal: *Anxiety reduced or absent  Outcome: Progressing Towards Goal  Goal: *Demonstrates progressive activity  Outcome: Progressing Towards Goal     Problem: Heart Failure: Day 4  Goal: Off Pathway (Use only if patient is Off Pathway)  Outcome: Progressing Towards Goal  Goal: Activity/Safety  Outcome: Progressing Towards Goal  Goal: Diagnostic Test/Procedures  Outcome: Progressing Towards Goal  Goal: Nutrition/Diet  Outcome: Progressing Towards Goal  Goal: Discharge Planning  Outcome: Progressing Towards Goal  Goal: Medications  Outcome: Progressing Towards Goal  Goal: Respiratory  Outcome: Progressing Towards Goal  Goal: Treatments/Interventions/Procedures  Outcome: Progressing Towards Goal  Goal: Psychosocial  Outcome: Progressing Towards Goal  Goal: *Oxygen saturation within defined limits  Outcome: Progressing Towards Goal  Goal: *Hemodynamically stable  Outcome: Progressing Towards Goal  Goal: *Optimal pain control at patient's stated goal  Outcome: Progressing Towards Goal  Goal: *Anxiety reduced or absent  Outcome: Progressing Towards Goal  Goal: *Demonstrates progressive activity  Outcome: Progressing Towards Goal     Problem: Heart Failure: Day 5  Goal: Off Pathway (Use only if patient is Off Pathway)  Outcome: Progressing Towards Goal  Goal: Activity/Safety  Outcome: Progressing Towards Goal  Goal: Diagnostic Test/Procedures  Outcome: Progressing Towards Goal  Goal: Nutrition/Diet  Outcome: Progressing Towards Goal  Goal: Discharge Planning  Outcome: Progressing Towards Goal  Goal: Medications  Outcome: Progressing Towards Goal  Goal: Respiratory  Outcome: Progressing Towards Goal  Goal: Treatments/Interventions/Procedures  Outcome: Progressing Towards Goal  Goal: Psychosocial  Outcome: Progressing Towards Goal     Problem: Heart Failure: Discharge Outcomes  Goal: *Demonstrates ability to perform prescribed activity without shortness of breath or discomfort  Outcome: Progressing Towards Goal  Goal: *Left ventricular function assessment completed prior to or during stay, or planned for post-discharge  Outcome: Progressing Towards Goal  Goal: *ACEI prescribed if LVEF less than 40% and no contraindications or ARB prescribed  Outcome: Progressing Towards Goal  Goal: *Verbalizes understanding and describes prescribed diet  Outcome: Progressing Towards Goal  Goal: *Verbalizes understanding/describes prescribed medications  Outcome: Progressing Towards Goal  Goal: *Describes available resources and support systems  Description: (eg: Home Health, Palliative Care, Advanced Medical Directive)  Outcome: Progressing Towards Goal  Goal: *Describes smoking cessation resources  Outcome: Progressing Towards Goal  Goal: *Understands and describes signs and symptoms to report to providers(Stroke Metric)  Outcome: Progressing Towards Goal  Goal: *Describes/verbalizes understanding of follow-up/return appt  Description: (eg: to physicians, diabetes treatment coordinator, and other resources  Outcome: Progressing Towards Goal  Goal: *Describes importance of continuing daily weights and changes to report to physician  Outcome: Progressing Towards Goal     Problem: Falls - Risk of  Goal: *Absence of Falls  Description: Document Cyndie Riveraccasin Fall Risk and appropriate interventions in the flowsheet. Outcome: Progressing Towards Goal  Note: Fall Risk Interventions:  Mobility Interventions: Bed/chair exit alarm    Mentation Interventions: Bed/chair exit alarm    Medication Interventions: Bed/chair exit alarm                   Problem: Pressure Injury - Risk of  Goal: *Prevention of pressure injury  Description: Document Asim Scale and appropriate interventions in the flowsheet.   Outcome: Progressing Towards Goal  Note: Pressure Injury Interventions:  Sensory Interventions: Assess changes in LOC    Moisture Interventions: Maintain skin hydration (lotion/cream)    Activity Interventions: PT/OT evaluation    Mobility Interventions: PT/OT evaluation    Nutrition Interventions: Document food/fluid/supplement intake    Friction and Shear Interventions: Apply protective barrier, creams and emollients                Problem: Patient Education: Go to Patient Education Activity  Goal: Patient/Family Education  Outcome: Progressing Towards Goal     Problem: Patient Education: Go to Patient Education Activity  Goal: Patient/Family Education  Outcome: Progressing Towards Goal     Problem: Patient Education: Go to Patient Education Activity  Goal: Patient/Family Education  Outcome: Progressing Towards Goal

## 2020-11-21 LAB
BACTERIA SPEC CULT: NORMAL
SPECIAL REQUESTS,SREQ: NORMAL

## 2020-12-16 PROBLEM — Z12.11 SCREENING FOR COLON CANCER: Status: RESOLVED | Noted: 2020-11-16 | Resolved: 2020-12-16

## 2021-01-01 ENCOUNTER — HOSPITAL ENCOUNTER (EMERGENCY)
Age: 76
Discharge: HOME | End: 2021-07-04
Attending: STUDENT IN AN ORGANIZED HEALTH CARE EDUCATION/TRAINING PROGRAM
Payer: MEDICARE

## 2021-01-01 ENCOUNTER — APPOINTMENT (OUTPATIENT)
Dept: CT IMAGING | Age: 76
DRG: 438 | End: 2021-01-01
Attending: PHYSICIAN ASSISTANT
Payer: COMMERCIAL

## 2021-01-01 ENCOUNTER — HOSPITAL ENCOUNTER (OUTPATIENT)
Dept: ULTRASOUND IMAGING | Age: 76
Discharge: HOME | End: 2021-11-22
Attending: INTERNAL MEDICINE
Payer: COMMERCIAL

## 2021-01-01 ENCOUNTER — APPOINTMENT (OUTPATIENT)
Dept: NON INVASIVE DIAGNOSTICS | Age: 76
DRG: 438 | End: 2021-01-01
Attending: STUDENT IN AN ORGANIZED HEALTH CARE EDUCATION/TRAINING PROGRAM
Payer: COMMERCIAL

## 2021-01-01 ENCOUNTER — APPOINTMENT (OUTPATIENT)
Dept: GENERAL RADIOLOGY | Age: 76
DRG: 445 | End: 2021-01-01
Attending: INTERNAL MEDICINE
Payer: COMMERCIAL

## 2021-01-01 ENCOUNTER — ANESTHESIA (OUTPATIENT)
Dept: ENDOSCOPY | Age: 76
DRG: 445 | End: 2021-01-01
Payer: COMMERCIAL

## 2021-01-01 ENCOUNTER — APPOINTMENT (OUTPATIENT)
Dept: NON INVASIVE DIAGNOSTICS | Age: 76
DRG: 438 | End: 2021-01-01
Attending: NURSE PRACTITIONER
Payer: COMMERCIAL

## 2021-01-01 ENCOUNTER — OFFICE VISIT (OUTPATIENT)
Dept: INFECTIOUS DISEASES | Age: 76
End: 2021-01-01
Payer: COMMERCIAL

## 2021-01-01 ENCOUNTER — APPOINTMENT (OUTPATIENT)
Dept: INTERVENTIONAL RADIOLOGY/VASCULAR | Age: 76
DRG: 438 | End: 2021-01-01
Attending: INTERNAL MEDICINE
Payer: COMMERCIAL

## 2021-01-01 ENCOUNTER — HOSPITAL ENCOUNTER (INPATIENT)
Age: 76
LOS: 3 days | Discharge: HOME HEALTH | DRG: 445 | End: 2021-11-05
Attending: EMERGENCY MEDICINE | Admitting: HOSPITALIST
Payer: COMMERCIAL

## 2021-01-01 ENCOUNTER — HOSPITAL ENCOUNTER (OUTPATIENT)
Dept: PET IMAGING | Age: 76
Discharge: HOME | End: 2021-11-22
Attending: INTERNAL MEDICINE
Payer: COMMERCIAL

## 2021-01-01 ENCOUNTER — ANESTHESIA EVENT (OUTPATIENT)
Dept: ENDOSCOPY | Age: 76
DRG: 438 | End: 2021-01-01
Payer: COMMERCIAL

## 2021-01-01 ENCOUNTER — APPOINTMENT (OUTPATIENT)
Dept: GENERAL RADIOLOGY | Age: 76
DRG: 438 | End: 2021-01-01
Attending: STUDENT IN AN ORGANIZED HEALTH CARE EDUCATION/TRAINING PROGRAM
Payer: COMMERCIAL

## 2021-01-01 ENCOUNTER — APPOINTMENT (OUTPATIENT)
Dept: CT IMAGING | Age: 76
DRG: 438 | End: 2021-01-01
Attending: NURSE PRACTITIONER
Payer: COMMERCIAL

## 2021-01-01 ENCOUNTER — HOSPITAL ENCOUNTER (OUTPATIENT)
Age: 76
Setting detail: OUTPATIENT SURGERY
Discharge: HOME | DRG: 438 | End: 2021-12-06
Attending: INTERNAL MEDICINE | Admitting: INTERNAL MEDICINE
Payer: COMMERCIAL

## 2021-01-01 ENCOUNTER — APPOINTMENT (OUTPATIENT)
Dept: ULTRASOUND IMAGING | Age: 76
DRG: 438 | End: 2021-01-01
Attending: STUDENT IN AN ORGANIZED HEALTH CARE EDUCATION/TRAINING PROGRAM
Payer: COMMERCIAL

## 2021-01-01 ENCOUNTER — APPOINTMENT (OUTPATIENT)
Dept: GENERAL RADIOLOGY | Age: 76
DRG: 438 | End: 2021-01-01
Attending: EMERGENCY MEDICINE
Payer: COMMERCIAL

## 2021-01-01 ENCOUNTER — ANESTHESIA EVENT (OUTPATIENT)
Dept: ENDOSCOPY | Age: 76
DRG: 445 | End: 2021-01-01
Payer: COMMERCIAL

## 2021-01-01 ENCOUNTER — TRANSCRIBE ORDER (OUTPATIENT)
Dept: SCHEDULING | Age: 76
End: 2021-01-01

## 2021-01-01 ENCOUNTER — APPOINTMENT (OUTPATIENT)
Dept: GENERAL RADIOLOGY | Age: 76
End: 2021-01-01
Attending: EMERGENCY MEDICINE
Payer: MEDICARE

## 2021-01-01 ENCOUNTER — APPOINTMENT (OUTPATIENT)
Dept: ENDOSCOPY | Age: 76
DRG: 438 | End: 2021-01-01
Attending: INTERNAL MEDICINE
Payer: COMMERCIAL

## 2021-01-01 ENCOUNTER — APPOINTMENT (OUTPATIENT)
Dept: ULTRASOUND IMAGING | Age: 76
DRG: 445 | End: 2021-01-01
Attending: INTERNAL MEDICINE
Payer: COMMERCIAL

## 2021-01-01 ENCOUNTER — APPOINTMENT (OUTPATIENT)
Dept: ENDOSCOPY | Age: 76
DRG: 445 | End: 2021-01-01
Attending: INTERNAL MEDICINE
Payer: COMMERCIAL

## 2021-01-01 ENCOUNTER — HOSPITAL ENCOUNTER (INPATIENT)
Age: 76
LOS: 46 days | Discharge: HOSPICE | DRG: 438 | End: 2022-01-24
Attending: EMERGENCY MEDICINE | Admitting: INTERNAL MEDICINE
Payer: COMMERCIAL

## 2021-01-01 ENCOUNTER — APPOINTMENT (OUTPATIENT)
Dept: GENERAL RADIOLOGY | Age: 76
DRG: 445 | End: 2021-01-01
Attending: EMERGENCY MEDICINE
Payer: COMMERCIAL

## 2021-01-01 ENCOUNTER — ANESTHESIA (OUTPATIENT)
Dept: ENDOSCOPY | Age: 76
DRG: 438 | End: 2021-01-01
Payer: COMMERCIAL

## 2021-01-01 ENCOUNTER — APPOINTMENT (OUTPATIENT)
Dept: CT IMAGING | Age: 76
DRG: 445 | End: 2021-01-01
Attending: EMERGENCY MEDICINE
Payer: COMMERCIAL

## 2021-01-01 ENCOUNTER — APPOINTMENT (OUTPATIENT)
Dept: GENERAL RADIOLOGY | Age: 76
DRG: 438 | End: 2021-01-01
Attending: NURSE PRACTITIONER
Payer: COMMERCIAL

## 2021-01-01 ENCOUNTER — TELEPHONE (OUTPATIENT)
Dept: INFECTIOUS DISEASES | Age: 76
End: 2021-01-01

## 2021-01-01 ENCOUNTER — APPOINTMENT (OUTPATIENT)
Dept: CT IMAGING | Age: 76
DRG: 438 | End: 2021-01-01
Attending: EMERGENCY MEDICINE
Payer: COMMERCIAL

## 2021-01-01 VITALS
RESPIRATION RATE: 15 BRPM | OXYGEN SATURATION: 98 % | HEART RATE: 89 BPM | SYSTOLIC BLOOD PRESSURE: 142 MMHG | HEIGHT: 67 IN | TEMPERATURE: 98.9 F | DIASTOLIC BLOOD PRESSURE: 86 MMHG | WEIGHT: 211 LBS | BODY MASS INDEX: 33.12 KG/M2

## 2021-01-01 VITALS
WEIGHT: 186 LBS | HEIGHT: 67 IN | BODY MASS INDEX: 29.19 KG/M2 | TEMPERATURE: 98.2 F | RESPIRATION RATE: 16 BRPM | HEART RATE: 65 BPM | SYSTOLIC BLOOD PRESSURE: 120 MMHG | DIASTOLIC BLOOD PRESSURE: 50 MMHG | OXYGEN SATURATION: 95 %

## 2021-01-01 VITALS
OXYGEN SATURATION: 97 % | BODY MASS INDEX: 29.98 KG/M2 | DIASTOLIC BLOOD PRESSURE: 54 MMHG | SYSTOLIC BLOOD PRESSURE: 152 MMHG | TEMPERATURE: 98.4 F | HEIGHT: 67 IN | WEIGHT: 191 LBS | HEART RATE: 50 BPM | RESPIRATION RATE: 18 BRPM

## 2021-01-01 VITALS
BODY MASS INDEX: 32.96 KG/M2 | HEIGHT: 67 IN | SYSTOLIC BLOOD PRESSURE: 128 MMHG | HEART RATE: 81 BPM | WEIGHT: 210 LBS | DIASTOLIC BLOOD PRESSURE: 97 MMHG | TEMPERATURE: 98.9 F | OXYGEN SATURATION: 95 % | RESPIRATION RATE: 18 BRPM

## 2021-01-01 DIAGNOSIS — C64.1 MALIGNANT NEOPLASM OF RIGHT KIDNEY, EXCEPT RENAL PELVIS (HCC): ICD-10-CM

## 2021-01-01 DIAGNOSIS — R91.8 LUNG MASS: ICD-10-CM

## 2021-01-01 DIAGNOSIS — F32.A DEPRESSION, UNSPECIFIED DEPRESSION TYPE: ICD-10-CM

## 2021-01-01 DIAGNOSIS — E04.1 THYROID NODULE: ICD-10-CM

## 2021-01-01 DIAGNOSIS — I48.91 ATRIAL FIBRILLATION WITH RAPID VENTRICULAR RESPONSE (HCC): ICD-10-CM

## 2021-01-01 DIAGNOSIS — R19.7 DIARRHEA, UNSPECIFIED TYPE: ICD-10-CM

## 2021-01-01 DIAGNOSIS — E86.0 DEHYDRATION: ICD-10-CM

## 2021-01-01 DIAGNOSIS — A41.9 SEPSIS WITHOUT ACUTE ORGAN DYSFUNCTION, DUE TO UNSPECIFIED ORGANISM (HCC): ICD-10-CM

## 2021-01-01 DIAGNOSIS — E04.1 THYROID NODULE: Primary | ICD-10-CM

## 2021-01-01 DIAGNOSIS — Z12.89 ENCOUNTER FOR PELVIC SCREENING FOR MALIGNANT NEOPLASM: ICD-10-CM

## 2021-01-01 DIAGNOSIS — R91.1 COIN LESION: ICD-10-CM

## 2021-01-01 DIAGNOSIS — R21 RASH: ICD-10-CM

## 2021-01-01 DIAGNOSIS — K86.89 PANCREATIC MASS: ICD-10-CM

## 2021-01-01 DIAGNOSIS — K85.80 OTHER ACUTE PANCREATITIS, UNSPECIFIED COMPLICATION STATUS: Primary | ICD-10-CM

## 2021-01-01 DIAGNOSIS — R19.7 DIARRHEA, UNSPECIFIED TYPE: Primary | ICD-10-CM

## 2021-01-01 DIAGNOSIS — B85.1 BODY LICE: Primary | ICD-10-CM

## 2021-01-01 DIAGNOSIS — C64.1 RENAL CELL CANCER, RIGHT (HCC): ICD-10-CM

## 2021-01-01 DIAGNOSIS — E80.6 HYPERBILIRUBINEMIA: Primary | ICD-10-CM

## 2021-01-01 DIAGNOSIS — B88.9: Primary | ICD-10-CM

## 2021-01-01 DIAGNOSIS — K85.90 ACUTE PANCREATITIS, UNSPECIFIED COMPLICATION STATUS, UNSPECIFIED PANCREATITIS TYPE: ICD-10-CM

## 2021-01-01 DIAGNOSIS — C64.1 MALIGNANT NEOPLASM OF RIGHT KIDNEY, EXCEPT RENAL PELVIS (HCC): Primary | ICD-10-CM

## 2021-01-01 DIAGNOSIS — E87.6 HYPOKALEMIA: ICD-10-CM

## 2021-01-01 LAB
1,3 BETA GLUCAN SER-MCNC: <31 PG/ML
ABO + RH BLD: NORMAL
ALBUMIN SERPL-MCNC: 1.2 G/DL (ref 3.5–5)
ALBUMIN SERPL-MCNC: 1.2 G/DL (ref 3.5–5)
ALBUMIN SERPL-MCNC: 1.3 G/DL (ref 3.5–5)
ALBUMIN SERPL-MCNC: 1.4 G/DL (ref 3.5–5)
ALBUMIN SERPL-MCNC: 1.4 G/DL (ref 3.5–5)
ALBUMIN SERPL-MCNC: 1.5 G/DL (ref 3.5–5)
ALBUMIN SERPL-MCNC: 1.6 G/DL (ref 3.5–5)
ALBUMIN SERPL-MCNC: 1.6 G/DL (ref 3.5–5)
ALBUMIN SERPL-MCNC: 1.7 G/DL (ref 3.5–5)
ALBUMIN SERPL-MCNC: 1.8 G/DL (ref 3.5–5)
ALBUMIN SERPL-MCNC: 1.9 G/DL (ref 3.5–5)
ALBUMIN SERPL-MCNC: 2.2 G/DL (ref 3.5–5)
ALBUMIN SERPL-MCNC: 2.2 G/DL (ref 3.5–5)
ALBUMIN SERPL-MCNC: 2.4 G/DL (ref 3.5–5)
ALBUMIN SERPL-MCNC: 2.5 G/DL (ref 3.5–5)
ALBUMIN SERPL-MCNC: 2.9 G/DL (ref 3.5–5)
ALBUMIN SERPL-MCNC: 2.9 G/DL (ref 3.5–5)
ALBUMIN SERPL-MCNC: 3.2 G/DL (ref 3.5–5)
ALBUMIN/GLOB SERPL: 0.3 {RATIO} (ref 1.1–2.2)
ALBUMIN/GLOB SERPL: 0.4 {RATIO} (ref 1.1–2.2)
ALBUMIN/GLOB SERPL: 0.5 {RATIO} (ref 1.1–2.2)
ALBUMIN/GLOB SERPL: 0.6 {RATIO} (ref 1.1–2.2)
ALBUMIN/GLOB SERPL: 0.7 {RATIO} (ref 1.1–2.2)
ALBUMIN/GLOB SERPL: 0.7 {RATIO} (ref 1.1–2.2)
ALBUMIN/GLOB SERPL: 0.8 {RATIO} (ref 1.1–2.2)
ALBUMIN/GLOB SERPL: 0.9 {RATIO} (ref 1.1–2.2)
ALBUMIN/GLOB SERPL: 0.9 {RATIO} (ref 1.1–2.2)
ALP SERPL-CCNC: 101 U/L (ref 45–117)
ALP SERPL-CCNC: 172 U/L (ref 45–117)
ALP SERPL-CCNC: 186 U/L (ref 45–117)
ALP SERPL-CCNC: 191 U/L (ref 45–117)
ALP SERPL-CCNC: 193 U/L (ref 45–117)
ALP SERPL-CCNC: 213 U/L (ref 45–117)
ALP SERPL-CCNC: 214 U/L (ref 45–117)
ALP SERPL-CCNC: 217 U/L (ref 45–117)
ALP SERPL-CCNC: 218 U/L (ref 45–117)
ALP SERPL-CCNC: 235 U/L (ref 45–117)
ALP SERPL-CCNC: 253 U/L (ref 45–117)
ALP SERPL-CCNC: 282 U/L (ref 45–117)
ALP SERPL-CCNC: 294 U/L (ref 45–117)
ALP SERPL-CCNC: 328 U/L (ref 45–117)
ALP SERPL-CCNC: 331 U/L (ref 45–117)
ALP SERPL-CCNC: 332 U/L (ref 45–117)
ALP SERPL-CCNC: 342 U/L (ref 45–117)
ALP SERPL-CCNC: 343 U/L (ref 45–117)
ALP SERPL-CCNC: 344 U/L (ref 45–117)
ALP SERPL-CCNC: 345 U/L (ref 45–117)
ALP SERPL-CCNC: 353 U/L (ref 45–117)
ALP SERPL-CCNC: 358 U/L (ref 45–117)
ALP SERPL-CCNC: 366 U/L (ref 45–117)
ALP SERPL-CCNC: 381 U/L (ref 45–117)
ALP SERPL-CCNC: 749 U/L (ref 45–117)
ALP SERPL-CCNC: 782 U/L (ref 45–117)
ALP SERPL-CCNC: 793 U/L (ref 45–117)
ALP SERPL-CCNC: 868 U/L (ref 45–117)
ALP SERPL-CCNC: 903 U/L (ref 45–117)
ALP SERPL-CCNC: 955 U/L (ref 45–117)
ALT SERPL-CCNC: 18 U/L (ref 12–78)
ALT SERPL-CCNC: 19 U/L (ref 12–78)
ALT SERPL-CCNC: 20 U/L (ref 12–78)
ALT SERPL-CCNC: 21 U/L (ref 12–78)
ALT SERPL-CCNC: 22 U/L (ref 12–78)
ALT SERPL-CCNC: 23 U/L (ref 12–78)
ALT SERPL-CCNC: 24 U/L (ref 12–78)
ALT SERPL-CCNC: 24 U/L (ref 12–78)
ALT SERPL-CCNC: 242 U/L (ref 12–78)
ALT SERPL-CCNC: 245 U/L (ref 12–78)
ALT SERPL-CCNC: 25 U/L (ref 12–78)
ALT SERPL-CCNC: 256 U/L (ref 12–78)
ALT SERPL-CCNC: 26 U/L (ref 12–78)
ALT SERPL-CCNC: 26 U/L (ref 12–78)
ALT SERPL-CCNC: 262 U/L (ref 12–78)
ALT SERPL-CCNC: 263 U/L (ref 12–78)
ALT SERPL-CCNC: 28 U/L (ref 12–78)
ALT SERPL-CCNC: 29 U/L (ref 12–78)
ALT SERPL-CCNC: 30 U/L (ref 12–78)
ALT SERPL-CCNC: 312 U/L (ref 12–78)
ALT SERPL-CCNC: 36 U/L (ref 12–78)
ANION GAP SERPL CALC-SCNC: 10 MMOL/L (ref 5–15)
ANION GAP SERPL CALC-SCNC: 2 MMOL/L (ref 5–15)
ANION GAP SERPL CALC-SCNC: 3 MMOL/L (ref 5–15)
ANION GAP SERPL CALC-SCNC: 4 MMOL/L (ref 5–15)
ANION GAP SERPL CALC-SCNC: 5 MMOL/L (ref 5–15)
ANION GAP SERPL CALC-SCNC: 6 MMOL/L (ref 5–15)
ANION GAP SERPL CALC-SCNC: 7 MMOL/L (ref 5–15)
ANION GAP SERPL CALC-SCNC: 8 MMOL/L (ref 5–15)
APPEARANCE FLD: ABNORMAL
APPEARANCE UR: ABNORMAL
APPEARANCE UR: CLEAR
APPEARANCE UR: CLEAR
AST SERPL W P-5'-P-CCNC: 115 U/L (ref 15–37)
AST SERPL W P-5'-P-CCNC: 12 U/L (ref 15–37)
AST SERPL W P-5'-P-CCNC: 122 U/L (ref 15–37)
AST SERPL W P-5'-P-CCNC: 125 U/L (ref 15–37)
AST SERPL W P-5'-P-CCNC: 126 U/L (ref 15–37)
AST SERPL W P-5'-P-CCNC: 126 U/L (ref 15–37)
AST SERPL W P-5'-P-CCNC: 13 U/L (ref 15–37)
AST SERPL W P-5'-P-CCNC: 154 U/L (ref 15–37)
AST SERPL W P-5'-P-CCNC: 16 U/L (ref 15–37)
AST SERPL W P-5'-P-CCNC: 17 U/L (ref 15–37)
AST SERPL W P-5'-P-CCNC: 17 U/L (ref 15–37)
AST SERPL W P-5'-P-CCNC: 19 U/L (ref 15–37)
AST SERPL W P-5'-P-CCNC: 19 U/L (ref 15–37)
AST SERPL W P-5'-P-CCNC: 21 U/L (ref 15–37)
AST SERPL W P-5'-P-CCNC: 22 U/L (ref 15–37)
AST SERPL W P-5'-P-CCNC: 23 U/L (ref 15–37)
AST SERPL W P-5'-P-CCNC: 26 U/L (ref 15–37)
AST SERPL W P-5'-P-CCNC: 27 U/L (ref 15–37)
AST SERPL W P-5'-P-CCNC: 28 U/L (ref 15–37)
AST SERPL W P-5'-P-CCNC: 29 U/L (ref 15–37)
AST SERPL W P-5'-P-CCNC: 30 U/L (ref 15–37)
AST SERPL W P-5'-P-CCNC: 30 U/L (ref 15–37)
AST SERPL W P-5'-P-CCNC: 31 U/L (ref 15–37)
AST SERPL W P-5'-P-CCNC: 33 U/L (ref 15–37)
AST SERPL W P-5'-P-CCNC: 34 U/L (ref 15–37)
AST SERPL W P-5'-P-CCNC: 35 U/L (ref 15–37)
ATRIAL RATE: 250 BPM
ATRIAL RATE: 71 BPM
ATRIAL RATE: 77 BPM
ATRIAL RATE: 95 BPM
BACTERIA SPEC CULT: NORMAL
BACTERIA URNS QL MICRO: NEGATIVE /HPF
BASOPHILS # BLD: 0 K/UL (ref 0–0.1)
BASOPHILS # BLD: 0.1 K/UL (ref 0–0.1)
BASOPHILS NFR BLD: 0 % (ref 0–1)
BASOPHILS NFR BLD: 1 % (ref 0–1)
BILIRUB DIRECT SERPL-MCNC: 13.1 MG/DL (ref 0–0.2)
BILIRUB DIRECT SERPL-MCNC: 6 MG/DL (ref 0–0.2)
BILIRUB SERPL-MCNC: 0.3 MG/DL (ref 0.2–1)
BILIRUB SERPL-MCNC: 0.5 MG/DL (ref 0.2–1)
BILIRUB SERPL-MCNC: 0.6 MG/DL (ref 0.2–1)
BILIRUB SERPL-MCNC: 0.7 MG/DL (ref 0.2–1)
BILIRUB SERPL-MCNC: 0.8 MG/DL (ref 0.2–1)
BILIRUB SERPL-MCNC: 0.9 MG/DL (ref 0.2–1)
BILIRUB SERPL-MCNC: 0.9 MG/DL (ref 0.2–1)
BILIRUB SERPL-MCNC: 1 MG/DL (ref 0.2–1)
BILIRUB SERPL-MCNC: 1.1 MG/DL (ref 0.2–1)
BILIRUB SERPL-MCNC: 1.4 MG/DL (ref 0.2–1)
BILIRUB SERPL-MCNC: 14.8 MG/DL (ref 0.2–1)
BILIRUB SERPL-MCNC: 15.2 MG/DL (ref 0.2–1)
BILIRUB SERPL-MCNC: 15.2 MG/DL (ref 0.2–1)
BILIRUB SERPL-MCNC: 18.8 MG/DL (ref 0.2–1)
BILIRUB SERPL-MCNC: 6.7 MG/DL (ref 0.2–1)
BILIRUB SERPL-MCNC: 6.8 MG/DL (ref 0.2–1)
BILIRUB UR QL: ABNORMAL
BILIRUB UR QL: NEGATIVE
BILIRUB UR QL: NEGATIVE
BLOOD GROUP ANTIBODIES SERPL: NEGATIVE
BNP SERPL-MCNC: 1513 PG/ML
BNP SERPL-MCNC: 433 PG/ML
BUN SERPL-MCNC: 10 MG/DL (ref 6–20)
BUN SERPL-MCNC: 12 MG/DL (ref 6–20)
BUN SERPL-MCNC: 13 MG/DL (ref 6–20)
BUN SERPL-MCNC: 13 MG/DL (ref 6–20)
BUN SERPL-MCNC: 14 MG/DL (ref 6–20)
BUN SERPL-MCNC: 14 MG/DL (ref 6–20)
BUN SERPL-MCNC: 15 MG/DL (ref 6–20)
BUN SERPL-MCNC: 15 MG/DL (ref 6–20)
BUN SERPL-MCNC: 16 MG/DL (ref 6–20)
BUN SERPL-MCNC: 17 MG/DL (ref 6–20)
BUN SERPL-MCNC: 18 MG/DL (ref 6–20)
BUN SERPL-MCNC: 18 MG/DL (ref 6–20)
BUN SERPL-MCNC: 19 MG/DL (ref 6–20)
BUN SERPL-MCNC: 20 MG/DL (ref 6–20)
BUN SERPL-MCNC: 20 MG/DL (ref 6–20)
BUN SERPL-MCNC: 22 MG/DL (ref 6–20)
BUN SERPL-MCNC: 23 MG/DL (ref 6–20)
BUN SERPL-MCNC: 24 MG/DL (ref 6–20)
BUN SERPL-MCNC: 27 MG/DL (ref 6–20)
BUN SERPL-MCNC: 29 MG/DL (ref 6–20)
BUN SERPL-MCNC: 32 MG/DL (ref 6–20)
BUN/CREAT SERPL: 11 (ref 12–20)
BUN/CREAT SERPL: 12 (ref 12–20)
BUN/CREAT SERPL: 14 (ref 12–20)
BUN/CREAT SERPL: 16 (ref 12–20)
BUN/CREAT SERPL: 17 (ref 12–20)
BUN/CREAT SERPL: 18 (ref 12–20)
BUN/CREAT SERPL: 19 (ref 12–20)
BUN/CREAT SERPL: 20 (ref 12–20)
BUN/CREAT SERPL: 21 (ref 12–20)
BUN/CREAT SERPL: 22 (ref 12–20)
BUN/CREAT SERPL: 22 (ref 12–20)
BUN/CREAT SERPL: 23 (ref 12–20)
BUN/CREAT SERPL: 24 (ref 12–20)
BUN/CREAT SERPL: 25 (ref 12–20)
BUN/CREAT SERPL: 26 (ref 12–20)
BUN/CREAT SERPL: 26 (ref 12–20)
BUN/CREAT SERPL: 27 (ref 12–20)
BUN/CREAT SERPL: 27 (ref 12–20)
BUN/CREAT SERPL: 28 (ref 12–20)
BUN/CREAT SERPL: 29 (ref 12–20)
BUN/CREAT SERPL: 30 (ref 12–20)
BUN/CREAT SERPL: 33 (ref 12–20)
BUN/CREAT SERPL: 33 (ref 12–20)
BUN/CREAT SERPL: 34 (ref 12–20)
CA-I BLD-MCNC: 7.8 MG/DL (ref 8.5–10.1)
CA-I BLD-MCNC: 8.2 MG/DL (ref 8.5–10.1)
CA-I BLD-MCNC: 8.2 MG/DL (ref 8.5–10.1)
CA-I BLD-MCNC: 8.4 MG/DL (ref 8.5–10.1)
CA-I BLD-MCNC: 8.5 MG/DL (ref 8.5–10.1)
CA-I BLD-MCNC: 8.5 MG/DL (ref 8.5–10.1)
CA-I BLD-MCNC: 8.6 MG/DL (ref 8.5–10.1)
CA-I BLD-MCNC: 8.7 MG/DL (ref 8.5–10.1)
CA-I BLD-MCNC: 8.8 MG/DL (ref 8.5–10.1)
CA-I BLD-MCNC: 8.9 MG/DL (ref 8.5–10.1)
CA-I BLD-MCNC: 9 MG/DL (ref 8.5–10.1)
CA-I BLD-MCNC: 9.1 MG/DL (ref 8.5–10.1)
CA-I BLD-MCNC: 9.1 MG/DL (ref 8.5–10.1)
CA-I BLD-MCNC: 9.2 MG/DL (ref 8.5–10.1)
CA-I BLD-MCNC: 9.3 MG/DL (ref 8.5–10.1)
CA-I BLD-MCNC: 9.3 MG/DL (ref 8.5–10.1)
CA-I BLD-MCNC: 9.4 MG/DL (ref 8.5–10.1)
CA-I BLD-MCNC: 9.6 MG/DL (ref 8.5–10.1)
CA-I BLD-MCNC: 9.6 MG/DL (ref 8.5–10.1)
CALCULATED P AXIS, ECG09: 82 DEGREES
CALCULATED R AXIS, ECG10: 27 DEGREES
CALCULATED R AXIS, ECG10: 49 DEGREES
CALCULATED R AXIS, ECG10: 63 DEGREES
CALCULATED R AXIS, ECG10: 64 DEGREES
CALCULATED T AXIS, ECG11: 21 DEGREES
CALCULATED T AXIS, ECG11: 46 DEGREES
CALCULATED T AXIS, ECG11: 48 DEGREES
CALCULATED T AXIS, ECG11: 49 DEGREES
CAMPYLOBACTER STL CULT: NORMAL
CANCER AG19-9 SERPL-ACNC: ABNORMAL U/ML (ref 0–35)
CHLORIDE SERPL-SCNC: 100 MMOL/L (ref 97–108)
CHLORIDE SERPL-SCNC: 101 MMOL/L (ref 97–108)
CHLORIDE SERPL-SCNC: 102 MMOL/L (ref 97–108)
CHLORIDE SERPL-SCNC: 103 MMOL/L (ref 97–108)
CHLORIDE SERPL-SCNC: 104 MMOL/L (ref 97–108)
CHLORIDE SERPL-SCNC: 105 MMOL/L (ref 97–108)
CHLORIDE SERPL-SCNC: 105 MMOL/L (ref 97–108)
CHLORIDE SERPL-SCNC: 106 MMOL/L (ref 97–108)
CHLORIDE SERPL-SCNC: 106 MMOL/L (ref 97–108)
CHLORIDE SERPL-SCNC: 107 MMOL/L (ref 97–108)
CHLORIDE SERPL-SCNC: 108 MMOL/L (ref 97–108)
CHLORIDE SERPL-SCNC: 109 MMOL/L (ref 97–108)
CHLORIDE SERPL-SCNC: 110 MMOL/L (ref 97–108)
CHLORIDE SERPL-SCNC: 111 MMOL/L (ref 97–108)
CHLORIDE SERPL-SCNC: 111 MMOL/L (ref 97–108)
CHLORIDE SERPL-SCNC: 113 MMOL/L (ref 97–108)
CHLORIDE SERPL-SCNC: 89 MMOL/L (ref 97–108)
CHLORIDE SERPL-SCNC: 89 MMOL/L (ref 97–108)
CHLORIDE SERPL-SCNC: 90 MMOL/L (ref 97–108)
CHLORIDE SERPL-SCNC: 91 MMOL/L (ref 97–108)
CHLORIDE SERPL-SCNC: 91 MMOL/L (ref 97–108)
CHLORIDE SERPL-SCNC: 93 MMOL/L (ref 97–108)
CHLORIDE SERPL-SCNC: 96 MMOL/L (ref 97–108)
CHLORIDE SERPL-SCNC: 96 MMOL/L (ref 97–108)
CK SERPL-CCNC: 66 NG/ML (ref 26–192)
CO2 SERPL-SCNC: 23 MMOL/L (ref 21–32)
CO2 SERPL-SCNC: 24 MMOL/L (ref 21–32)
CO2 SERPL-SCNC: 25 MMOL/L (ref 21–32)
CO2 SERPL-SCNC: 26 MMOL/L (ref 21–32)
CO2 SERPL-SCNC: 27 MMOL/L (ref 21–32)
CO2 SERPL-SCNC: 27 MMOL/L (ref 21–32)
CO2 SERPL-SCNC: 28 MMOL/L (ref 21–32)
CO2 SERPL-SCNC: 29 MMOL/L (ref 21–32)
CO2 SERPL-SCNC: 29 MMOL/L (ref 21–32)
CO2 SERPL-SCNC: 30 MMOL/L (ref 21–32)
CO2 SERPL-SCNC: 31 MMOL/L (ref 21–32)
CO2 SERPL-SCNC: 31 MMOL/L (ref 21–32)
CO2 SERPL-SCNC: 33 MMOL/L (ref 21–32)
CO2 SERPL-SCNC: 33 MMOL/L (ref 21–32)
CO2 SERPL-SCNC: 34 MMOL/L (ref 21–32)
CO2 SERPL-SCNC: 35 MMOL/L (ref 21–32)
CO2 SERPL-SCNC: 38 MMOL/L (ref 21–32)
CO2 SERPL-SCNC: 40 MMOL/L (ref 21–32)
CO2 SERPL-SCNC: 41 MMOL/L (ref 21–32)
COLONY COUNT,CNT: NORMAL
COLONY COUNT,CNT: NORMAL
COLOR FLD: YELLOW
COLOR UR: ABNORMAL
CREAT SERPL-MCNC: 0.53 MG/DL (ref 0.55–1.02)
CREAT SERPL-MCNC: 0.56 MG/DL (ref 0.55–1.02)
CREAT SERPL-MCNC: 0.57 MG/DL (ref 0.55–1.02)
CREAT SERPL-MCNC: 0.58 MG/DL (ref 0.55–1.02)
CREAT SERPL-MCNC: 0.59 MG/DL (ref 0.55–1.02)
CREAT SERPL-MCNC: 0.62 MG/DL (ref 0.55–1.02)
CREAT SERPL-MCNC: 0.63 MG/DL (ref 0.55–1.02)
CREAT SERPL-MCNC: 0.67 MG/DL (ref 0.55–1.02)
CREAT SERPL-MCNC: 0.68 MG/DL (ref 0.55–1.02)
CREAT SERPL-MCNC: 0.72 MG/DL (ref 0.55–1.02)
CREAT SERPL-MCNC: 0.77 MG/DL (ref 0.55–1.02)
CREAT SERPL-MCNC: 0.78 MG/DL (ref 0.55–1.02)
CREAT SERPL-MCNC: 0.78 MG/DL (ref 0.55–1.02)
CREAT SERPL-MCNC: 0.8 MG/DL (ref 0.55–1.02)
CREAT SERPL-MCNC: 0.82 MG/DL (ref 0.55–1.02)
CREAT SERPL-MCNC: 0.83 MG/DL (ref 0.55–1.02)
CREAT SERPL-MCNC: 0.87 MG/DL (ref 0.55–1.02)
CREAT SERPL-MCNC: 0.87 MG/DL (ref 0.55–1.02)
CREAT SERPL-MCNC: 0.88 MG/DL (ref 0.55–1.02)
CREAT SERPL-MCNC: 0.88 MG/DL (ref 0.55–1.02)
CREAT SERPL-MCNC: 0.89 MG/DL (ref 0.55–1.02)
CREAT SERPL-MCNC: 0.93 MG/DL (ref 0.55–1.02)
CREAT SERPL-MCNC: 1.01 MG/DL (ref 0.55–1.02)
CREAT SERPL-MCNC: 1.05 MG/DL (ref 0.55–1.02)
CREAT SERPL-MCNC: 1.07 MG/DL (ref 0.55–1.02)
CREAT SERPL-MCNC: 1.23 MG/DL (ref 0.55–1.02)
CREAT SERPL-MCNC: 1.25 MG/DL (ref 0.55–1.02)
CREAT SERPL-MCNC: 1.3 MG/DL (ref 0.55–1.02)
CREAT SERPL-MCNC: 1.34 MG/DL (ref 0.55–1.02)
CREAT SERPL-MCNC: 1.47 MG/DL (ref 0.55–1.02)
CRP SERPL HS-MCNC: 5.23 MG/L (ref 0–3)
CRP SERPL-MCNC: 11.1 MG/DL (ref 0–0.6)
CRP SERPL-MCNC: 11.1 MG/DL (ref 0–0.6)
CRP SERPL-MCNC: 12.1 MG/DL (ref 0–0.6)
CRP SERPL-MCNC: 12.7 MG/DL (ref 0–0.6)
CRP SERPL-MCNC: 14.2 MG/DL (ref 0–0.6)
CRP SERPL-MCNC: 14.8 MG/DL (ref 0–0.6)
CRP SERPL-MCNC: 15 MG/DL (ref 0–0.6)
CRP SERPL-MCNC: 18.3 MG/DL (ref 0–0.6)
CRP SERPL-MCNC: 23.4 MG/DL (ref 0–0.6)
CRP SERPL-MCNC: 23.8 MG/DL (ref 0–0.6)
CRP SERPL-MCNC: 8.98 MG/DL (ref 0–0.6)
D DIMER PPP FEU-MCNC: 11.52 UG/ML(FEU)
D DIMER PPP FEU-MCNC: 14.24 UG/ML(FEU)
D DIMER PPP FEU-MCNC: 6.72 UG/ML(FEU)
D DIMER PPP FEU-MCNC: 7.22 UG/ML(FEU)
D DIMER PPP FEU-MCNC: 8.18 UG/ML(FEU)
D DIMER PPP FEU-MCNC: 8.67 UG/ML(FEU)
DIAGNOSIS, 93000: NORMAL
DIFFERENTIAL METHOD BLD: ABNORMAL
E COLI SXT STL QL IA: NEGATIVE
EOSINOPHIL # BLD: 0 K/UL (ref 0–0.4)
EOSINOPHIL # BLD: 0.1 K/UL (ref 0–0.4)
EOSINOPHIL # BLD: 0.3 K/UL (ref 0–0.4)
EOSINOPHIL NFR BLD: 0 % (ref 0–7)
EOSINOPHIL NFR BLD: 1 % (ref 0–7)
EOSINOPHIL NFR BLD: 3 % (ref 0–7)
ERYTHROCYTE [DISTWIDTH] IN BLOOD BY AUTOMATED COUNT: 13.2 % (ref 11.5–14.5)
ERYTHROCYTE [DISTWIDTH] IN BLOOD BY AUTOMATED COUNT: 13.2 % (ref 11.5–14.5)
ERYTHROCYTE [DISTWIDTH] IN BLOOD BY AUTOMATED COUNT: 13.3 % (ref 11.5–14.5)
ERYTHROCYTE [DISTWIDTH] IN BLOOD BY AUTOMATED COUNT: 13.4 % (ref 11.5–14.5)
ERYTHROCYTE [DISTWIDTH] IN BLOOD BY AUTOMATED COUNT: 13.5 % (ref 11.5–14.5)
ERYTHROCYTE [DISTWIDTH] IN BLOOD BY AUTOMATED COUNT: 13.6 % (ref 11.5–14.5)
ERYTHROCYTE [DISTWIDTH] IN BLOOD BY AUTOMATED COUNT: 13.7 % (ref 11.5–14.5)
ERYTHROCYTE [DISTWIDTH] IN BLOOD BY AUTOMATED COUNT: 13.7 % (ref 11.5–14.5)
ERYTHROCYTE [DISTWIDTH] IN BLOOD BY AUTOMATED COUNT: 14 % (ref 11.5–14.5)
ERYTHROCYTE [DISTWIDTH] IN BLOOD BY AUTOMATED COUNT: 14.4 % (ref 11.5–14.5)
ERYTHROCYTE [DISTWIDTH] IN BLOOD BY AUTOMATED COUNT: 15 % (ref 11.5–14.5)
ERYTHROCYTE [DISTWIDTH] IN BLOOD BY AUTOMATED COUNT: 15.2 % (ref 11.5–14.5)
ERYTHROCYTE [DISTWIDTH] IN BLOOD BY AUTOMATED COUNT: 15.9 % (ref 11.5–14.5)
ERYTHROCYTE [DISTWIDTH] IN BLOOD BY AUTOMATED COUNT: 15.9 % (ref 11.5–14.5)
ERYTHROCYTE [SEDIMENTATION RATE] IN BLOOD BY WESTERGREN METHOD: 32 MM/HR (ref 0–40)
GLOBULIN SER CALC-MCNC: 2.7 G/DL (ref 2–4)
GLOBULIN SER CALC-MCNC: 2.8 G/DL (ref 2–4)
GLOBULIN SER CALC-MCNC: 2.9 G/DL (ref 2–4)
GLOBULIN SER CALC-MCNC: 3 G/DL (ref 2–4)
GLOBULIN SER CALC-MCNC: 3.1 G/DL (ref 2–4)
GLOBULIN SER CALC-MCNC: 3.2 G/DL (ref 2–4)
GLOBULIN SER CALC-MCNC: 3.3 G/DL (ref 2–4)
GLOBULIN SER CALC-MCNC: 3.4 G/DL (ref 2–4)
GLOBULIN SER CALC-MCNC: 3.5 G/DL (ref 2–4)
GLOBULIN SER CALC-MCNC: 3.6 G/DL (ref 2–4)
GLOBULIN SER CALC-MCNC: 3.9 G/DL (ref 2–4)
GLOBULIN SER CALC-MCNC: 4.1 G/DL (ref 2–4)
GLOBULIN SER CALC-MCNC: 4.6 G/DL (ref 2–4)
GLOBULIN SER CALC-MCNC: 4.6 G/DL (ref 2–4)
GLUCOSE BLD STRIP.AUTO-MCNC: 125 MG/DL (ref 65–117)
GLUCOSE BLD STRIP.AUTO-MCNC: 133 MG/DL (ref 65–117)
GLUCOSE BLD STRIP.AUTO-MCNC: 149 MG/DL (ref 65–117)
GLUCOSE BLD STRIP.AUTO-MCNC: 82 MG/DL (ref 65–117)
GLUCOSE BLD STRIP.AUTO-MCNC: 82 MG/DL (ref 65–117)
GLUCOSE BLD STRIP.AUTO-MCNC: 83 MG/DL (ref 65–117)
GLUCOSE BLD STRIP.AUTO-MCNC: 94 MG/DL (ref 65–117)
GLUCOSE BLD STRIP.AUTO-MCNC: 98 MG/DL (ref 65–117)
GLUCOSE SERPL-MCNC: 100 MG/DL (ref 65–100)
GLUCOSE SERPL-MCNC: 101 MG/DL (ref 65–100)
GLUCOSE SERPL-MCNC: 107 MG/DL (ref 65–100)
GLUCOSE SERPL-MCNC: 112 MG/DL (ref 65–100)
GLUCOSE SERPL-MCNC: 115 MG/DL (ref 65–100)
GLUCOSE SERPL-MCNC: 119 MG/DL (ref 65–100)
GLUCOSE SERPL-MCNC: 126 MG/DL (ref 65–100)
GLUCOSE SERPL-MCNC: 136 MG/DL (ref 65–100)
GLUCOSE SERPL-MCNC: 140 MG/DL (ref 65–100)
GLUCOSE SERPL-MCNC: 147 MG/DL (ref 65–100)
GLUCOSE SERPL-MCNC: 162 MG/DL (ref 65–100)
GLUCOSE SERPL-MCNC: 166 MG/DL (ref 65–100)
GLUCOSE SERPL-MCNC: 183 MG/DL (ref 65–100)
GLUCOSE SERPL-MCNC: 72 MG/DL (ref 65–100)
GLUCOSE SERPL-MCNC: 72 MG/DL (ref 65–100)
GLUCOSE SERPL-MCNC: 76 MG/DL (ref 65–100)
GLUCOSE SERPL-MCNC: 76 MG/DL (ref 65–100)
GLUCOSE SERPL-MCNC: 78 MG/DL (ref 65–100)
GLUCOSE SERPL-MCNC: 79 MG/DL (ref 65–100)
GLUCOSE SERPL-MCNC: 82 MG/DL (ref 65–100)
GLUCOSE SERPL-MCNC: 85 MG/DL (ref 65–100)
GLUCOSE SERPL-MCNC: 87 MG/DL (ref 65–100)
GLUCOSE SERPL-MCNC: 88 MG/DL (ref 65–100)
GLUCOSE SERPL-MCNC: 90 MG/DL (ref 65–100)
GLUCOSE SERPL-MCNC: 92 MG/DL (ref 65–100)
GLUCOSE SERPL-MCNC: 92 MG/DL (ref 65–100)
GLUCOSE SERPL-MCNC: 93 MG/DL (ref 65–100)
GLUCOSE SERPL-MCNC: 95 MG/DL (ref 65–100)
GLUCOSE UR STRIP.AUTO-MCNC: NEGATIVE MG/DL
HCT VFR BLD AUTO: 25 % (ref 35–47)
HCT VFR BLD AUTO: 28.3 % (ref 35–47)
HCT VFR BLD AUTO: 28.7 % (ref 35–47)
HCT VFR BLD AUTO: 32.6 % (ref 35–47)
HCT VFR BLD AUTO: 32.8 % (ref 35–47)
HCT VFR BLD AUTO: 33.4 % (ref 35–47)
HCT VFR BLD AUTO: 33.8 % (ref 35–47)
HCT VFR BLD AUTO: 34.4 % (ref 35–47)
HCT VFR BLD AUTO: 34.7 % (ref 35–47)
HCT VFR BLD AUTO: 34.8 % (ref 35–47)
HCT VFR BLD AUTO: 37.5 % (ref 35–47)
HCT VFR BLD AUTO: 37.6 % (ref 35–47)
HCT VFR BLD AUTO: 38.5 % (ref 35–47)
HCT VFR BLD AUTO: 38.9 % (ref 35–47)
HCT VFR BLD AUTO: 39 % (ref 35–47)
HCT VFR BLD AUTO: 39 % (ref 35–47)
HCT VFR BLD AUTO: 39.2 % (ref 35–47)
HCT VFR BLD AUTO: 39.7 % (ref 35–47)
HCT VFR BLD AUTO: 40 % (ref 35–47)
HCT VFR BLD AUTO: 40.2 % (ref 35–47)
HCT VFR BLD AUTO: 41.5 % (ref 35–47)
HCT VFR BLD AUTO: 44.3 % (ref 35–47)
HCT VFR BLD AUTO: 44.6 % (ref 35–47)
HCT VFR BLD AUTO: 45.5 % (ref 35–47)
HGB BLD-MCNC: 10.2 G/DL (ref 11.5–16)
HGB BLD-MCNC: 10.5 G/DL (ref 11.5–16)
HGB BLD-MCNC: 10.7 G/DL (ref 11.5–16)
HGB BLD-MCNC: 10.8 G/DL (ref 11.5–16)
HGB BLD-MCNC: 10.8 G/DL (ref 11.5–16)
HGB BLD-MCNC: 10.9 G/DL (ref 11.5–16)
HGB BLD-MCNC: 11.2 G/DL (ref 11.5–16)
HGB BLD-MCNC: 12 G/DL (ref 11.5–16)
HGB BLD-MCNC: 12.2 G/DL (ref 11.5–16)
HGB BLD-MCNC: 12.3 G/DL (ref 11.5–16)
HGB BLD-MCNC: 12.5 G/DL (ref 11.5–16)
HGB BLD-MCNC: 12.6 G/DL (ref 11.5–16)
HGB BLD-MCNC: 13.1 G/DL (ref 11.5–16)
HGB BLD-MCNC: 14.4 G/DL (ref 11.5–16)
HGB BLD-MCNC: 15.1 G/DL (ref 11.5–16)
HGB BLD-MCNC: 15.2 G/DL (ref 11.5–16)
HGB BLD-MCNC: 7.8 G/DL (ref 11.5–16)
HGB BLD-MCNC: 9.1 G/DL (ref 11.5–16)
HGB BLD-MCNC: 9.3 G/DL (ref 11.5–16)
HGB UR QL STRIP: ABNORMAL
HGB UR QL STRIP: NEGATIVE
HGB UR QL STRIP: NEGATIVE
IGE SERPL-ACNC: 189 IU/ML (ref 6–495)
IMM GRANULOCYTES # BLD AUTO: 0 K/UL (ref 0–0.04)
IMM GRANULOCYTES # BLD AUTO: 0.1 K/UL (ref 0–0.04)
IMM GRANULOCYTES # BLD AUTO: 0.2 K/UL (ref 0–0.04)
IMM GRANULOCYTES # BLD AUTO: 0.3 K/UL (ref 0–0.04)
IMM GRANULOCYTES # BLD AUTO: 0.4 K/UL (ref 0–0.04)
IMM GRANULOCYTES NFR BLD AUTO: 0 % (ref 0–0.5)
IMM GRANULOCYTES NFR BLD AUTO: 1 % (ref 0–0.5)
IMM GRANULOCYTES NFR BLD AUTO: 2 % (ref 0–0.5)
INR PPP: 1 (ref 0.9–1.1)
INR PPP: 1.1 (ref 0.9–1.1)
INR PPP: 1.2 (ref 0.9–1.1)
INR PPP: 1.6 (ref 0.9–1.1)
INR PPP: 1.6 (ref 0.9–1.1)
INR PPP: 1.7 (ref 0.9–1.1)
INR PPP: 6.9 (ref 0.9–1.1)
INR PPP: 8.5 (ref 0.9–1.1)
KETONES UR QL STRIP.AUTO: NEGATIVE MG/DL
LEUKOCYTE ESTERASE UR QL STRIP.AUTO: ABNORMAL
LEUKOCYTE ESTERASE UR QL STRIP.AUTO: ABNORMAL
LEUKOCYTE ESTERASE UR QL STRIP.AUTO: NEGATIVE
LIPASE SERPL-CCNC: 1397 U/L (ref 73–393)
LIPASE SERPL-CCNC: 1499 U/L (ref 73–393)
LIPASE SERPL-CCNC: 1719 U/L (ref 73–393)
LIPASE SERPL-CCNC: 1810 U/L (ref 73–393)
LIPASE SERPL-CCNC: 1941 U/L (ref 73–393)
LIPASE SERPL-CCNC: 2066 U/L (ref 73–393)
LIPASE SERPL-CCNC: 2484 U/L (ref 73–393)
LIPASE SERPL-CCNC: 2511 U/L (ref 73–393)
LIPASE SERPL-CCNC: 2559 U/L (ref 73–393)
LIPASE SERPL-CCNC: 2578 U/L (ref 73–393)
LIPASE SERPL-CCNC: 2684 U/L (ref 73–393)
LIPASE SERPL-CCNC: 2730 U/L (ref 73–393)
LIPASE SERPL-CCNC: 2802 U/L (ref 73–393)
LIPASE SERPL-CCNC: 2810 U/L (ref 73–393)
LIPASE SERPL-CCNC: 2813 U/L (ref 73–393)
LIPASE SERPL-CCNC: 765 U/L (ref 73–393)
LIPASE SERPL-CCNC: 782 U/L (ref 73–393)
LIPASE SERPL-CCNC: >3000 U/L (ref 73–393)
LYMPHOCYTES # BLD: 0.4 K/UL (ref 0.8–3.5)
LYMPHOCYTES # BLD: 0.5 K/UL (ref 0.8–3.5)
LYMPHOCYTES # BLD: 0.6 K/UL (ref 0.8–3.5)
LYMPHOCYTES # BLD: 0.7 K/UL (ref 0.8–3.5)
LYMPHOCYTES # BLD: 0.8 K/UL (ref 0.8–3.5)
LYMPHOCYTES # BLD: 0.8 K/UL (ref 0.8–3.5)
LYMPHOCYTES # BLD: 1 K/UL (ref 0.8–3.5)
LYMPHOCYTES # BLD: 1.5 K/UL (ref 0.8–3.5)
LYMPHOCYTES # BLD: 1.7 K/UL (ref 0.8–3.5)
LYMPHOCYTES NFR BLD: 10 % (ref 12–49)
LYMPHOCYTES NFR BLD: 13 % (ref 12–49)
LYMPHOCYTES NFR BLD: 20 % (ref 12–49)
LYMPHOCYTES NFR BLD: 3 % (ref 12–49)
LYMPHOCYTES NFR BLD: 4 % (ref 12–49)
LYMPHOCYTES NFR BLD: 4 % (ref 12–49)
LYMPHOCYTES NFR BLD: 5 % (ref 12–49)
LYMPHOCYTES NFR BLD: 6 % (ref 12–49)
LYMPHOCYTES NFR BLD: 7 % (ref 12–49)
LYMPHOCYTES NFR BLD: 7 % (ref 12–49)
LYMPHOCYTES NFR BLD: 8 % (ref 12–49)
LYMPHOCYTES NFR BLD: 8 % (ref 12–49)
LYMPHOCYTES NFR BLD: 9 % (ref 12–49)
LYMPHOCYTES NFR FLD: 38 %
MCH RBC QN AUTO: 27.6 PG (ref 26–34)
MCH RBC QN AUTO: 29.8 PG (ref 26–34)
MCH RBC QN AUTO: 30 PG (ref 26–34)
MCH RBC QN AUTO: 30 PG (ref 26–34)
MCH RBC QN AUTO: 30.1 PG (ref 26–34)
MCH RBC QN AUTO: 30.3 PG (ref 26–34)
MCH RBC QN AUTO: 30.3 PG (ref 26–34)
MCH RBC QN AUTO: 30.4 PG (ref 26–34)
MCH RBC QN AUTO: 30.5 PG (ref 26–34)
MCH RBC QN AUTO: 30.6 PG (ref 26–34)
MCH RBC QN AUTO: 30.7 PG (ref 26–34)
MCH RBC QN AUTO: 30.9 PG (ref 26–34)
MCH RBC QN AUTO: 31 PG (ref 26–34)
MCH RBC QN AUTO: 31.1 PG (ref 26–34)
MCH RBC QN AUTO: 31.2 PG (ref 26–34)
MCH RBC QN AUTO: 31.3 PG (ref 26–34)
MCH RBC QN AUTO: 31.5 PG (ref 26–34)
MCHC RBC AUTO-ENTMCNC: 30.7 G/DL (ref 30–36.5)
MCHC RBC AUTO-ENTMCNC: 31.1 G/DL (ref 30–36.5)
MCHC RBC AUTO-ENTMCNC: 31.2 G/DL (ref 30–36.5)
MCHC RBC AUTO-ENTMCNC: 31.3 G/DL (ref 30–36.5)
MCHC RBC AUTO-ENTMCNC: 31.4 G/DL (ref 30–36.5)
MCHC RBC AUTO-ENTMCNC: 31.4 G/DL (ref 30–36.5)
MCHC RBC AUTO-ENTMCNC: 31.5 G/DL (ref 30–36.5)
MCHC RBC AUTO-ENTMCNC: 31.6 G/DL (ref 30–36.5)
MCHC RBC AUTO-ENTMCNC: 31.9 G/DL (ref 30–36.5)
MCHC RBC AUTO-ENTMCNC: 31.9 G/DL (ref 30–36.5)
MCHC RBC AUTO-ENTMCNC: 32.1 G/DL (ref 30–36.5)
MCHC RBC AUTO-ENTMCNC: 32.2 G/DL (ref 30–36.5)
MCHC RBC AUTO-ENTMCNC: 32.2 G/DL (ref 30–36.5)
MCHC RBC AUTO-ENTMCNC: 32.3 G/DL (ref 30–36.5)
MCHC RBC AUTO-ENTMCNC: 32.4 G/DL (ref 30–36.5)
MCHC RBC AUTO-ENTMCNC: 32.4 G/DL (ref 30–36.5)
MCHC RBC AUTO-ENTMCNC: 32.5 G/DL (ref 30–36.5)
MCHC RBC AUTO-ENTMCNC: 32.5 G/DL (ref 30–36.5)
MCHC RBC AUTO-ENTMCNC: 32.7 G/DL (ref 30–36.5)
MCHC RBC AUTO-ENTMCNC: 32.9 G/DL (ref 30–36.5)
MCHC RBC AUTO-ENTMCNC: 33.2 G/DL (ref 30–36.5)
MCHC RBC AUTO-ENTMCNC: 34.1 G/DL (ref 30–36.5)
MCV RBC AUTO: 84.9 FL (ref 80–99)
MCV RBC AUTO: 91.2 FL (ref 80–99)
MCV RBC AUTO: 91.6 FL (ref 80–99)
MCV RBC AUTO: 93.7 FL (ref 80–99)
MCV RBC AUTO: 93.8 FL (ref 80–99)
MCV RBC AUTO: 94.6 FL (ref 80–99)
MCV RBC AUTO: 94.6 FL (ref 80–99)
MCV RBC AUTO: 94.8 FL (ref 80–99)
MCV RBC AUTO: 95.4 FL (ref 80–99)
MCV RBC AUTO: 95.4 FL (ref 80–99)
MCV RBC AUTO: 95.6 FL (ref 80–99)
MCV RBC AUTO: 95.7 FL (ref 80–99)
MCV RBC AUTO: 95.8 FL (ref 80–99)
MCV RBC AUTO: 95.9 FL (ref 80–99)
MCV RBC AUTO: 95.9 FL (ref 80–99)
MCV RBC AUTO: 96.2 FL (ref 80–99)
MCV RBC AUTO: 96.2 FL (ref 80–99)
MCV RBC AUTO: 96.6 FL (ref 80–99)
MCV RBC AUTO: 96.9 FL (ref 80–99)
MCV RBC AUTO: 97.3 FL (ref 80–99)
MCV RBC AUTO: 97.8 FL (ref 80–99)
MCV RBC AUTO: 97.9 FL (ref 80–99)
MESOTHL CELL NFR FLD: 20 %
MONOCYTES # BLD: 0.3 K/UL (ref 0–1)
MONOCYTES # BLD: 0.4 K/UL (ref 0–1)
MONOCYTES # BLD: 0.5 K/UL (ref 0–1)
MONOCYTES # BLD: 0.6 K/UL (ref 0–1)
MONOCYTES # BLD: 0.7 K/UL (ref 0–1)
MONOCYTES # BLD: 0.8 K/UL (ref 0–1)
MONOCYTES # BLD: 0.8 K/UL (ref 0–1)
MONOCYTES # BLD: 0.9 K/UL (ref 0–1)
MONOCYTES NFR BLD: 3 % (ref 5–13)
MONOCYTES NFR BLD: 4 % (ref 5–13)
MONOCYTES NFR BLD: 5 % (ref 5–13)
MONOCYTES NFR BLD: 6 % (ref 5–13)
MONOCYTES NFR BLD: 6 % (ref 5–13)
MONOCYTES NFR BLD: 8 % (ref 5–13)
MONOCYTES NFR FLD: 1 %
MUCOUS THREADS URNS QL MICRO: ABNORMAL /LPF
NEUTROPHILS NFR FLD: 41 %
NEUTS SEG # BLD: 10 K/UL (ref 1.8–8)
NEUTS SEG # BLD: 10 K/UL (ref 1.8–8)
NEUTS SEG # BLD: 10.2 K/UL (ref 1.8–8)
NEUTS SEG # BLD: 10.3 K/UL (ref 1.8–8)
NEUTS SEG # BLD: 10.8 K/UL (ref 1.8–8)
NEUTS SEG # BLD: 11.1 K/UL (ref 1.8–8)
NEUTS SEG # BLD: 11.4 K/UL (ref 1.8–8)
NEUTS SEG # BLD: 12.7 K/UL (ref 1.8–8)
NEUTS SEG # BLD: 12.8 K/UL (ref 1.8–8)
NEUTS SEG # BLD: 15.5 K/UL (ref 1.8–8)
NEUTS SEG # BLD: 16.2 K/UL (ref 1.8–8)
NEUTS SEG # BLD: 16.5 K/UL (ref 1.8–8)
NEUTS SEG # BLD: 19.8 K/UL (ref 1.8–8)
NEUTS SEG # BLD: 5.7 K/UL (ref 1.8–8)
NEUTS SEG # BLD: 5.8 K/UL (ref 1.8–8)
NEUTS SEG # BLD: 6.2 K/UL (ref 1.8–8)
NEUTS SEG # BLD: 6.5 K/UL (ref 1.8–8)
NEUTS SEG # BLD: 7 K/UL (ref 1.8–8)
NEUTS SEG # BLD: 7 K/UL (ref 1.8–8)
NEUTS SEG # BLD: 8.1 K/UL (ref 1.8–8)
NEUTS SEG # BLD: 8.8 K/UL (ref 1.8–8)
NEUTS SEG # BLD: 8.9 K/UL (ref 1.8–8)
NEUTS SEG # BLD: 9.8 K/UL (ref 1.8–8)
NEUTS SEG NFR BLD: 73 % (ref 32–75)
NEUTS SEG NFR BLD: 77 % (ref 32–75)
NEUTS SEG NFR BLD: 80 % (ref 32–75)
NEUTS SEG NFR BLD: 84 % (ref 32–75)
NEUTS SEG NFR BLD: 85 % (ref 32–75)
NEUTS SEG NFR BLD: 86 % (ref 32–75)
NEUTS SEG NFR BLD: 87 % (ref 32–75)
NEUTS SEG NFR BLD: 87 % (ref 32–75)
NEUTS SEG NFR BLD: 88 % (ref 32–75)
NEUTS SEG NFR BLD: 88 % (ref 32–75)
NEUTS SEG NFR BLD: 89 % (ref 32–75)
NEUTS SEG NFR BLD: 90 % (ref 32–75)
NEUTS SEG NFR BLD: 91 % (ref 32–75)
NEUTS SEG NFR BLD: 92 % (ref 32–75)
NEUTS SEG NFR BLD: 92 % (ref 32–75)
NITRITE UR QL STRIP.AUTO: NEGATIVE
NRBC # BLD: 0 K/UL (ref 0–0.01)
NRBC BLD-RTO: 0 PER 100 WBC
NUC CELL # FLD: 795 /CU MM
NUC CELL # FLD: 795 /CU MM (ref 0–5)
O+P SPEC MICRO: NORMAL
P-R INTERVAL, ECG05: 154 MS
P-R INTERVAL, ECG05: 210 MS
P-R INTERVAL, ECG05: 280 MS
PERFORMED BY, TECHID: ABNORMAL
PERFORMED BY, TECHID: NORMAL
PH UR STRIP: 5 [PH] (ref 5–8)
PLATELET # BLD AUTO: 179 K/UL (ref 150–400)
PLATELET # BLD AUTO: 184 K/UL (ref 150–400)
PLATELET # BLD AUTO: 192 K/UL (ref 150–400)
PLATELET # BLD AUTO: 196 K/UL (ref 150–400)
PLATELET # BLD AUTO: 212 K/UL (ref 150–400)
PLATELET # BLD AUTO: 220 K/UL (ref 150–400)
PLATELET # BLD AUTO: 220 K/UL (ref 150–400)
PLATELET # BLD AUTO: 223 K/UL (ref 150–400)
PLATELET # BLD AUTO: 230 K/UL (ref 150–400)
PLATELET # BLD AUTO: 245 K/UL (ref 150–400)
PLATELET # BLD AUTO: 249 K/UL (ref 150–400)
PLATELET # BLD AUTO: 257 K/UL (ref 150–400)
PLATELET # BLD AUTO: 285 K/UL (ref 150–400)
PLATELET # BLD AUTO: 288 K/UL (ref 150–400)
PLATELET # BLD AUTO: 292 K/UL (ref 150–400)
PLATELET # BLD AUTO: 298 K/UL (ref 150–400)
PLATELET # BLD AUTO: 305 K/UL (ref 150–400)
PLATELET # BLD AUTO: 307 K/UL (ref 150–400)
PLATELET # BLD AUTO: 309 K/UL (ref 150–400)
PLATELET # BLD AUTO: 311 K/UL (ref 150–400)
PLATELET # BLD AUTO: 313 K/UL (ref 150–400)
PLATELET # BLD AUTO: 319 K/UL (ref 150–400)
PLATELET # BLD AUTO: 374 K/UL (ref 150–400)
PLATELET # BLD AUTO: 404 K/UL (ref 150–400)
PMV BLD AUTO: 10 FL (ref 8.9–12.9)
PMV BLD AUTO: 10.2 FL (ref 8.9–12.9)
PMV BLD AUTO: 10.4 FL (ref 8.9–12.9)
PMV BLD AUTO: 10.5 FL (ref 8.9–12.9)
PMV BLD AUTO: 10.6 FL (ref 8.9–12.9)
PMV BLD AUTO: 10.7 FL (ref 8.9–12.9)
PMV BLD AUTO: 10.8 FL (ref 8.9–12.9)
PMV BLD AUTO: 10.9 FL (ref 8.9–12.9)
PMV BLD AUTO: 11.2 FL (ref 8.9–12.9)
PMV BLD AUTO: 11.6 FL (ref 8.9–12.9)
PMV BLD AUTO: 12.3 FL (ref 8.9–12.9)
PMV BLD AUTO: 12.5 FL (ref 8.9–12.9)
POTASSIUM SERPL-SCNC: 2.7 MMOL/L (ref 3.5–5.1)
POTASSIUM SERPL-SCNC: 2.9 MMOL/L (ref 3.5–5.1)
POTASSIUM SERPL-SCNC: 3 MMOL/L (ref 3.5–5.1)
POTASSIUM SERPL-SCNC: 3.3 MMOL/L (ref 3.5–5.1)
POTASSIUM SERPL-SCNC: 3.4 MMOL/L (ref 3.5–5.1)
POTASSIUM SERPL-SCNC: 3.5 MMOL/L (ref 3.5–5.1)
POTASSIUM SERPL-SCNC: 3.6 MMOL/L (ref 3.5–5.1)
POTASSIUM SERPL-SCNC: 3.7 MMOL/L (ref 3.5–5.1)
POTASSIUM SERPL-SCNC: 3.8 MMOL/L (ref 3.5–5.1)
POTASSIUM SERPL-SCNC: 3.8 MMOL/L (ref 3.5–5.1)
POTASSIUM SERPL-SCNC: 3.9 MMOL/L (ref 3.5–5.1)
POTASSIUM SERPL-SCNC: 4 MMOL/L (ref 3.5–5.1)
PROCALCITONIN SERPL-MCNC: 0.14 NG/ML
PROCALCITONIN SERPL-MCNC: 0.19 NG/ML
PROCALCITONIN SERPL-MCNC: 0.21 NG/ML
PROCALCITONIN SERPL-MCNC: 0.25 NG/ML
PROCALCITONIN SERPL-MCNC: 0.3 NG/ML
PROCALCITONIN SERPL-MCNC: 0.32 NG/ML
PROCALCITONIN SERPL-MCNC: 0.32 NG/ML
PROCALCITONIN SERPL-MCNC: 0.33 NG/ML
PROCALCITONIN SERPL-MCNC: 0.34 NG/ML
PROCALCITONIN SERPL-MCNC: 0.34 NG/ML
PROCALCITONIN SERPL-MCNC: 0.35 NG/ML
PROT SERPL-MCNC: 4 G/DL (ref 6.4–8.2)
PROT SERPL-MCNC: 4.3 G/DL (ref 6.4–8.2)
PROT SERPL-MCNC: 4.5 G/DL (ref 6.4–8.2)
PROT SERPL-MCNC: 4.5 G/DL (ref 6.4–8.2)
PROT SERPL-MCNC: 4.6 G/DL (ref 6.4–8.2)
PROT SERPL-MCNC: 4.6 G/DL (ref 6.4–8.2)
PROT SERPL-MCNC: 4.7 G/DL (ref 6.4–8.2)
PROT SERPL-MCNC: 4.8 G/DL (ref 6.4–8.2)
PROT SERPL-MCNC: 5 G/DL (ref 6.4–8.2)
PROT SERPL-MCNC: 5.1 G/DL (ref 6.4–8.2)
PROT SERPL-MCNC: 5.2 G/DL (ref 6.4–8.2)
PROT SERPL-MCNC: 5.2 G/DL (ref 6.4–8.2)
PROT SERPL-MCNC: 5.3 G/DL (ref 6.4–8.2)
PROT SERPL-MCNC: 5.3 G/DL (ref 6.4–8.2)
PROT SERPL-MCNC: 5.4 G/DL (ref 6.4–8.2)
PROT SERPL-MCNC: 5.4 G/DL (ref 6.4–8.2)
PROT SERPL-MCNC: 5.5 G/DL (ref 6.4–8.2)
PROT SERPL-MCNC: 5.6 G/DL (ref 6.4–8.2)
PROT SERPL-MCNC: 5.7 G/DL (ref 6.4–8.2)
PROT SERPL-MCNC: 5.9 G/DL (ref 6.4–8.2)
PROT SERPL-MCNC: 6.1 G/DL (ref 6.4–8.2)
PROT SERPL-MCNC: 6.1 G/DL (ref 6.4–8.2)
PROT SERPL-MCNC: 6.3 G/DL (ref 6.4–8.2)
PROT SERPL-MCNC: 6.3 G/DL (ref 6.4–8.2)
PROT SERPL-MCNC: 6.4 G/DL (ref 6.4–8.2)
PROT SERPL-MCNC: 6.4 G/DL (ref 6.4–8.2)
PROT SERPL-MCNC: 6.7 G/DL (ref 6.4–8.2)
PROT UR STRIP-MCNC: 100 MG/DL
PROT UR STRIP-MCNC: 100 MG/DL
PROT UR STRIP-MCNC: NEGATIVE MG/DL
PROTHROMBIN TIME: 12.9 SEC (ref 11.9–14.7)
PROTHROMBIN TIME: 13.7 SEC (ref 11.9–14.7)
PROTHROMBIN TIME: 14.2 SEC (ref 11.9–14.7)
PROTHROMBIN TIME: 14.4 SEC (ref 11.9–14.7)
PROTHROMBIN TIME: 15.3 SEC (ref 11.9–14.7)
PROTHROMBIN TIME: 18.5 SEC (ref 11.9–14.7)
PROTHROMBIN TIME: 18.9 SEC (ref 11.9–14.7)
PROTHROMBIN TIME: 20.2 SEC (ref 11.9–14.7)
PROTHROMBIN TIME: 58.2 SEC (ref 11.9–14.7)
PROTHROMBIN TIME: 68.7 SEC (ref 11.9–14.7)
Q-T INTERVAL, ECG07: 342 MS
Q-T INTERVAL, ECG07: 380 MS
Q-T INTERVAL, ECG07: 404 MS
Q-T INTERVAL, ECG07: 428 MS
QRS DURATION, ECG06: 102 MS
QRS DURATION, ECG06: 80 MS
QRS DURATION, ECG06: 90 MS
QRS DURATION, ECG06: 92 MS
QTC CALCULATION (BEZET), ECG08: 429 MS
QTC CALCULATION (BEZET), ECG08: 457 MS
QTC CALCULATION (BEZET), ECG08: 465 MS
QTC CALCULATION (BEZET), ECG08: 477 MS
RBC # BLD AUTO: 2.6 M/UL (ref 3.8–5.2)
RBC # BLD AUTO: 3.02 M/UL (ref 3.8–5.2)
RBC # BLD AUTO: 3.06 M/UL (ref 3.8–5.2)
RBC # BLD AUTO: 3.4 M/UL (ref 3.8–5.2)
RBC # BLD AUTO: 3.43 M/UL (ref 3.8–5.2)
RBC # BLD AUTO: 3.49 M/UL (ref 3.8–5.2)
RBC # BLD AUTO: 3.5 M/UL (ref 3.8–5.2)
RBC # BLD AUTO: 3.56 M/UL (ref 3.8–5.2)
RBC # BLD AUTO: 3.59 M/UL (ref 3.8–5.2)
RBC # BLD AUTO: 3.67 M/UL (ref 3.8–5.2)
RBC # BLD AUTO: 3.9 M/UL (ref 3.8–5.2)
RBC # BLD AUTO: 3.92 M/UL (ref 3.8–5.2)
RBC # BLD AUTO: 4.06 M/UL (ref 3.8–5.2)
RBC # BLD AUTO: 4.08 M/UL (ref 3.8–5.2)
RBC # BLD AUTO: 4.09 M/UL (ref 3.8–5.2)
RBC # BLD AUTO: 4.11 M/UL (ref 3.8–5.2)
RBC # BLD AUTO: 4.11 M/UL (ref 3.8–5.2)
RBC # BLD AUTO: 4.14 M/UL (ref 3.8–5.2)
RBC # BLD AUTO: 4.24 M/UL (ref 3.8–5.2)
RBC # BLD AUTO: 4.87 M/UL (ref 3.8–5.2)
RBC # BLD AUTO: 4.99 M/UL (ref 3.8–5.2)
RBC # BLD AUTO: 5.22 M/UL (ref 3.8–5.2)
RBC # FLD: >100 /CU MM
RBC #/AREA URNS HPF: 3 /HPF (ref 0–5)
RBC #/AREA URNS HPF: ABNORMAL /HPF (ref 0–5)
SALM + SHIG STL CULT: NORMAL
SODIUM SERPL-SCNC: 132 MMOL/L (ref 136–145)
SODIUM SERPL-SCNC: 133 MMOL/L (ref 136–145)
SODIUM SERPL-SCNC: 134 MMOL/L (ref 136–145)
SODIUM SERPL-SCNC: 135 MMOL/L (ref 136–145)
SODIUM SERPL-SCNC: 136 MMOL/L (ref 136–145)
SODIUM SERPL-SCNC: 137 MMOL/L (ref 136–145)
SODIUM SERPL-SCNC: 137 MMOL/L (ref 136–145)
SODIUM SERPL-SCNC: 138 MMOL/L (ref 136–145)
SODIUM SERPL-SCNC: 139 MMOL/L (ref 136–145)
SODIUM SERPL-SCNC: 140 MMOL/L (ref 136–145)
SODIUM SERPL-SCNC: 141 MMOL/L (ref 136–145)
SODIUM SERPL-SCNC: 142 MMOL/L (ref 136–145)
SODIUM SERPL-SCNC: 143 MMOL/L (ref 136–145)
SODIUM SERPL-SCNC: 144 MMOL/L (ref 136–145)
SODIUM SERPL-SCNC: 145 MMOL/L (ref 136–145)
SODIUM SERPL-SCNC: 145 MMOL/L (ref 136–145)
SP GR UR REFRACTOMETRY: 1.02 (ref 1–1.03)
SP GR UR REFRACTOMETRY: 1.02 (ref 1–1.03)
SP GR UR REFRACTOMETRY: >1.03 (ref 1–1.03)
SPECIAL REQUESTS,SREQ: NORMAL
SPECIMEN EXP DATE BLD: NORMAL
SPECIMEN SOURCE FLD: ABNORMAL
TROPONIN I SERPL-MCNC: <0.05 NG/ML
TROPONIN-HIGH SENSITIVITY: 12 NG/L (ref 0–51)
TROPONIN-HIGH SENSITIVITY: 17 NG/L (ref 0–51)
UA: UC IF INDICATED,UAUC: ABNORMAL
UA: UC IF INDICATED,UAUC: ABNORMAL
UROBILINOGEN UR QL STRIP.AUTO: 0.1 EU/DL (ref 0.1–1)
UROBILINOGEN UR QL STRIP.AUTO: 4 EU/DL (ref 0.1–1)
UROBILINOGEN UR QL STRIP.AUTO: 4 EU/DL (ref 0.1–1)
VENTRICULAR RATE, ECG03: 71 BPM
VENTRICULAR RATE, ECG03: 77 BPM
VENTRICULAR RATE, ECG03: 95 BPM
VENTRICULAR RATE, ECG03: 95 BPM
WBC # BLD AUTO: 11.1 K/UL (ref 3.6–11)
WBC # BLD AUTO: 11.2 K/UL (ref 3.6–11)
WBC # BLD AUTO: 11.2 K/UL (ref 3.6–11)
WBC # BLD AUTO: 11.5 K/UL (ref 3.6–11)
WBC # BLD AUTO: 11.6 K/UL (ref 3.6–11)
WBC # BLD AUTO: 11.7 K/UL (ref 3.6–11)
WBC # BLD AUTO: 12.2 K/UL (ref 3.6–11)
WBC # BLD AUTO: 12.8 K/UL (ref 3.6–11)
WBC # BLD AUTO: 13.1 K/UL (ref 3.6–11)
WBC # BLD AUTO: 14.1 K/UL (ref 3.6–11)
WBC # BLD AUTO: 14.3 K/UL (ref 3.6–11)
WBC # BLD AUTO: 16.9 K/UL (ref 3.6–11)
WBC # BLD AUTO: 17.9 K/UL (ref 3.6–11)
WBC # BLD AUTO: 18 K/UL (ref 3.6–11)
WBC # BLD AUTO: 19.6 K/UL (ref 3.6–11)
WBC # BLD AUTO: 21.8 K/UL (ref 3.6–11)
WBC # BLD AUTO: 6.8 K/UL (ref 3.6–11)
WBC # BLD AUTO: 7.4 K/UL (ref 3.6–11)
WBC # BLD AUTO: 7.4 K/UL (ref 3.6–11)
WBC # BLD AUTO: 7.9 K/UL (ref 3.6–11)
WBC # BLD AUTO: 8.1 K/UL (ref 3.6–11)
WBC # BLD AUTO: 8.6 K/UL (ref 3.6–11)
WBC # BLD AUTO: 8.9 K/UL (ref 3.6–11)
WBC # BLD AUTO: 9.8 K/UL (ref 3.6–11)
WBC URNS QL MICRO: 3 /HPF (ref 0–4)
WBC URNS QL MICRO: ABNORMAL /HPF (ref 0–4)

## 2021-01-01 PROCEDURE — 74011250636 HC RX REV CODE- 250/636: Performed by: STUDENT IN AN ORGANIZED HEALTH CARE EDUCATION/TRAINING PROGRAM

## 2021-01-01 PROCEDURE — 87449 NOS EACH ORGANISM AG IA: CPT

## 2021-01-01 PROCEDURE — 74011250636 HC RX REV CODE- 250/636: Performed by: SURGERY

## 2021-01-01 PROCEDURE — 77010033678 HC OXYGEN DAILY

## 2021-01-01 PROCEDURE — 74011250636 HC RX REV CODE- 250/636: Performed by: INTERNAL MEDICINE

## 2021-01-01 PROCEDURE — 74011250637 HC RX REV CODE- 250/637: Performed by: NURSE PRACTITIONER

## 2021-01-01 PROCEDURE — 76060000034 HC ANESTHESIA 1.5 TO 2 HR: Performed by: INTERNAL MEDICINE

## 2021-01-01 PROCEDURE — 94760 N-INVAS EAR/PLS OXIMETRY 1: CPT

## 2021-01-01 PROCEDURE — G8536 NO DOC ELDER MAL SCRN: HCPCS | Performed by: INTERNAL MEDICINE

## 2021-01-01 PROCEDURE — 96374 THER/PROPH/DIAG INJ IV PUSH: CPT

## 2021-01-01 PROCEDURE — 74011250636 HC RX REV CODE- 250/636: Performed by: NURSE PRACTITIONER

## 2021-01-01 PROCEDURE — 65270000029 HC RM PRIVATE

## 2021-01-01 PROCEDURE — 74011250637 HC RX REV CODE- 250/637: Performed by: STUDENT IN AN ORGANIZED HEALTH CARE EDUCATION/TRAINING PROGRAM

## 2021-01-01 PROCEDURE — 76060000033 HC ANESTHESIA 1 TO 1.5 HR: Performed by: INTERNAL MEDICINE

## 2021-01-01 PROCEDURE — 99232 SBSQ HOSP IP/OBS MODERATE 35: CPT | Performed by: INTERNAL MEDICINE

## 2021-01-01 PROCEDURE — P9016 RBC LEUKOCYTES REDUCED: HCPCS

## 2021-01-01 PROCEDURE — 93005 ELECTROCARDIOGRAM TRACING: CPT

## 2021-01-01 PROCEDURE — 74011000258 HC RX REV CODE- 258: Performed by: INTERNAL MEDICINE

## 2021-01-01 PROCEDURE — 74011000250 HC RX REV CODE- 250: Performed by: INTERNAL MEDICINE

## 2021-01-01 PROCEDURE — 83690 ASSAY OF LIPASE: CPT

## 2021-01-01 PROCEDURE — 85025 COMPLETE CBC W/AUTO DIFF WBC: CPT

## 2021-01-01 PROCEDURE — 36415 COLL VENOUS BLD VENIPUNCTURE: CPT

## 2021-01-01 PROCEDURE — 94640 AIRWAY INHALATION TREATMENT: CPT

## 2021-01-01 PROCEDURE — 99232 SBSQ HOSP IP/OBS MODERATE 35: CPT | Performed by: COLON & RECTAL SURGERY

## 2021-01-01 PROCEDURE — 82962 GLUCOSE BLOOD TEST: CPT

## 2021-01-01 PROCEDURE — 74011000250 HC RX REV CODE- 250: Performed by: STUDENT IN AN ORGANIZED HEALTH CARE EDUCATION/TRAINING PROGRAM

## 2021-01-01 PROCEDURE — 86923 COMPATIBILITY TEST ELECTRIC: CPT

## 2021-01-01 PROCEDURE — 84484 ASSAY OF TROPONIN QUANT: CPT

## 2021-01-01 PROCEDURE — 36430 TRANSFUSION BLD/BLD COMPNT: CPT

## 2021-01-01 PROCEDURE — 86140 C-REACTIVE PROTEIN: CPT

## 2021-01-01 PROCEDURE — 85610 PROTHROMBIN TIME: CPT

## 2021-01-01 PROCEDURE — C9113 INJ PANTOPRAZOLE SODIUM, VIA: HCPCS | Performed by: STUDENT IN AN ORGANIZED HEALTH CARE EDUCATION/TRAINING PROGRAM

## 2021-01-01 PROCEDURE — 82550 ASSAY OF CK (CPK): CPT

## 2021-01-01 PROCEDURE — 85379 FIBRIN DEGRADATION QUANT: CPT

## 2021-01-01 PROCEDURE — 77030031670 HC DEV INFL ENDOTEK BIG60 MRTM -B: Performed by: INTERNAL MEDICINE

## 2021-01-01 PROCEDURE — 88172 CYTP DX EVAL FNA 1ST EA SITE: CPT

## 2021-01-01 PROCEDURE — 74011250637 HC RX REV CODE- 250/637: Performed by: COLON & RECTAL SURGERY

## 2021-01-01 PROCEDURE — 74011250637 HC RX REV CODE- 250/637: Performed by: HOSPITALIST

## 2021-01-01 PROCEDURE — 76040000009: Performed by: INTERNAL MEDICINE

## 2021-01-01 PROCEDURE — 74011000258 HC RX REV CODE- 258: Performed by: SURGERY

## 2021-01-01 PROCEDURE — 97530 THERAPEUTIC ACTIVITIES: CPT

## 2021-01-01 PROCEDURE — 74011250636 HC RX REV CODE- 250/636: Performed by: EMERGENCY MEDICINE

## 2021-01-01 PROCEDURE — 74011000250 HC RX REV CODE- 250: Performed by: NURSE ANESTHETIST, CERTIFIED REGISTERED

## 2021-01-01 PROCEDURE — 97110 THERAPEUTIC EXERCISES: CPT

## 2021-01-01 PROCEDURE — 84145 PROCALCITONIN (PCT): CPT

## 2021-01-01 PROCEDURE — 76705 ECHO EXAM OF ABDOMEN: CPT

## 2021-01-01 PROCEDURE — 74011250636 HC RX REV CODE- 250/636: Performed by: ANESTHESIOLOGY

## 2021-01-01 PROCEDURE — 71045 X-RAY EXAM CHEST 1 VIEW: CPT

## 2021-01-01 PROCEDURE — 97116 GAIT TRAINING THERAPY: CPT

## 2021-01-01 PROCEDURE — 76536 US EXAM OF HEAD AND NECK: CPT

## 2021-01-01 PROCEDURE — 80053 COMPREHEN METABOLIC PANEL: CPT

## 2021-01-01 PROCEDURE — 86900 BLOOD TYPING SEROLOGIC ABO: CPT

## 2021-01-01 PROCEDURE — 96375 TX/PRO/DX INJ NEW DRUG ADDON: CPT

## 2021-01-01 PROCEDURE — 74011000258 HC RX REV CODE- 258: Performed by: NURSE PRACTITIONER

## 2021-01-01 PROCEDURE — 74011000636 HC RX REV CODE- 636: Performed by: HOSPITALIST

## 2021-01-01 PROCEDURE — 99232 SBSQ HOSP IP/OBS MODERATE 35: CPT | Performed by: SURGERY

## 2021-01-01 PROCEDURE — G8427 DOCREV CUR MEDS BY ELIG CLIN: HCPCS | Performed by: INTERNAL MEDICINE

## 2021-01-01 PROCEDURE — 74011250636 HC RX REV CODE- 250/636: Performed by: NURSE ANESTHETIST, CERTIFIED REGISTERED

## 2021-01-01 PROCEDURE — 74176 CT ABD & PELVIS W/O CONTRAST: CPT

## 2021-01-01 PROCEDURE — 74011000250 HC RX REV CODE- 250: Performed by: HOSPITALIST

## 2021-01-01 PROCEDURE — 87086 URINE CULTURE/COLONY COUNT: CPT

## 2021-01-01 PROCEDURE — 78815 PET IMAGE W/CT SKULL-THIGH: CPT

## 2021-01-01 PROCEDURE — 74011000636 HC RX REV CODE- 636: Performed by: EMERGENCY MEDICINE

## 2021-01-01 PROCEDURE — 74011000636 HC RX REV CODE- 636: Performed by: INTERNAL MEDICINE

## 2021-01-01 PROCEDURE — 74011250637 HC RX REV CODE- 250/637: Performed by: INTERNAL MEDICINE

## 2021-01-01 PROCEDURE — 0F798DZ DILATION OF COMMON BILE DUCT WITH INTRALUMINAL DEVICE, VIA NATURAL OR ARTIFICIAL OPENING ENDOSCOPIC: ICD-10-PCS | Performed by: INTERNAL MEDICINE

## 2021-01-01 PROCEDURE — 87205 SMEAR GRAM STAIN: CPT

## 2021-01-01 PROCEDURE — 99223 1ST HOSP IP/OBS HIGH 75: CPT | Performed by: INTERNAL MEDICINE

## 2021-01-01 PROCEDURE — 3017F COLORECTAL CA SCREEN DOC REV: CPT | Performed by: INTERNAL MEDICINE

## 2021-01-01 PROCEDURE — 99221 1ST HOSP IP/OBS SF/LOW 40: CPT | Performed by: SURGERY

## 2021-01-01 PROCEDURE — 86301 IMMUNOASSAY TUMOR CA 19-9: CPT

## 2021-01-01 PROCEDURE — 77030003420 HC NDL ASPIR MUCSL COOK -C: Performed by: INTERNAL MEDICINE

## 2021-01-01 PROCEDURE — 93970 EXTREMITY STUDY: CPT

## 2021-01-01 PROCEDURE — 76040000008: Performed by: INTERNAL MEDICINE

## 2021-01-01 PROCEDURE — 85027 COMPLETE CBC AUTOMATED: CPT

## 2021-01-01 PROCEDURE — 0DH68UZ INSERTION OF FEEDING DEVICE INTO STOMACH, VIA NATURAL OR ARTIFICIAL OPENING ENDOSCOPIC: ICD-10-PCS | Performed by: INTERNAL MEDICINE

## 2021-01-01 PROCEDURE — 36569 INSJ PICC 5 YR+ W/O IMAGING: CPT

## 2021-01-01 PROCEDURE — 74011250637 HC RX REV CODE- 250/637: Performed by: PHYSICIAN ASSISTANT

## 2021-01-01 PROCEDURE — 81001 URINALYSIS AUTO W/SCOPE: CPT

## 2021-01-01 PROCEDURE — 36573 INSJ PICC RS&I 5 YR+: CPT | Performed by: NURSE PRACTITIONER

## 2021-01-01 PROCEDURE — G8431 POS CLIN DEPRES SCRN F/U DOC: HCPCS | Performed by: INTERNAL MEDICINE

## 2021-01-01 PROCEDURE — 1101F PT FALLS ASSESS-DOCD LE1/YR: CPT | Performed by: INTERNAL MEDICINE

## 2021-01-01 PROCEDURE — G8400 PT W/DXA NO RESULTS DOC: HCPCS | Performed by: INTERNAL MEDICINE

## 2021-01-01 PROCEDURE — 87040 BLOOD CULTURE FOR BACTERIA: CPT

## 2021-01-01 PROCEDURE — 74011250636 HC RX REV CODE- 250/636: Performed by: HOSPITALIST

## 2021-01-01 PROCEDURE — 74011000250 HC RX REV CODE- 250: Performed by: NURSE PRACTITIONER

## 2021-01-01 PROCEDURE — 88173 CYTOPATH EVAL FNA REPORT: CPT

## 2021-01-01 PROCEDURE — 77030012596 HC SPHNTOM BILI BSC -E: Performed by: INTERNAL MEDICINE

## 2021-01-01 PROCEDURE — 74177 CT ABD & PELVIS W/CONTRAST: CPT

## 2021-01-01 PROCEDURE — 99203 OFFICE O/P NEW LOW 30 MIN: CPT | Performed by: INTERNAL MEDICINE

## 2021-01-01 PROCEDURE — 83880 ASSAY OF NATRIURETIC PEPTIDE: CPT

## 2021-01-01 PROCEDURE — 88108 CYTOPATH CONCENTRATE TECH: CPT

## 2021-01-01 PROCEDURE — 71275 CT ANGIOGRAPHY CHEST: CPT

## 2021-01-01 PROCEDURE — 2709999900 HC NON-CHARGEABLE SUPPLY: Performed by: INTERNAL MEDICINE

## 2021-01-01 PROCEDURE — 74011250637 HC RX REV CODE- 250/637: Performed by: ANESTHESIOLOGY

## 2021-01-01 PROCEDURE — 99285 EMERGENCY DEPT VISIT HI MDM: CPT

## 2021-01-01 PROCEDURE — 97165 OT EVAL LOW COMPLEX 30 MIN: CPT

## 2021-01-01 PROCEDURE — 99284 EMERGENCY DEPT VISIT MOD MDM: CPT

## 2021-01-01 PROCEDURE — 89050 BODY FLUID CELL COUNT: CPT

## 2021-01-01 PROCEDURE — G8417 CALC BMI ABV UP PARAM F/U: HCPCS | Performed by: INTERNAL MEDICINE

## 2021-01-01 PROCEDURE — 74174 CTA ABD&PLVS W/CONTRAST: CPT

## 2021-01-01 PROCEDURE — 88305 TISSUE EXAM BY PATHOLOGIST: CPT

## 2021-01-01 PROCEDURE — 0FDG3ZX EXTRACTION OF PANCREAS, PERCUTANEOUS APPROACH, DIAGNOSTIC: ICD-10-PCS | Performed by: INTERNAL MEDICINE

## 2021-01-01 PROCEDURE — 80048 BASIC METABOLIC PNL TOTAL CA: CPT

## 2021-01-01 PROCEDURE — 1090F PRES/ABSN URINE INCON ASSESS: CPT | Performed by: INTERNAL MEDICINE

## 2021-01-01 PROCEDURE — 97161 PT EVAL LOW COMPLEX 20 MIN: CPT

## 2021-01-01 PROCEDURE — 80076 HEPATIC FUNCTION PANEL: CPT

## 2021-01-01 PROCEDURE — 76000 FLUOROSCOPY <1 HR PHYS/QHP: CPT

## 2021-01-01 PROCEDURE — C2625 STENT, NON-COR, TEM W/DEL SY: HCPCS | Performed by: INTERNAL MEDICINE

## 2021-01-01 PROCEDURE — C1726 CATH, BAL DIL, NON-VASCULAR: HCPCS | Performed by: INTERNAL MEDICINE

## 2021-01-01 DEVICE — BILIARY STENT WITH NAVIFLEXTM RX DELIVERY SYSTEM
Type: IMPLANTABLE DEVICE | Site: BILE DUCT | Status: FUNCTIONAL
Brand: ADVANIX™ BILIARY

## 2021-01-01 RX ORDER — HYDRALAZINE HYDROCHLORIDE 20 MG/ML
20 INJECTION INTRAMUSCULAR; INTRAVENOUS
Status: DISCONTINUED | OUTPATIENT
Start: 2021-01-01 | End: 2022-01-01 | Stop reason: HOSPADM

## 2021-01-01 RX ORDER — FAMOTIDINE 20 MG/1
40 TABLET, FILM COATED ORAL DAILY
Status: DISCONTINUED | OUTPATIENT
Start: 2021-01-01 | End: 2021-01-01

## 2021-01-01 RX ORDER — FACIAL-BODY WIPES
10 EACH TOPICAL DAILY PRN
Status: DISCONTINUED | OUTPATIENT
Start: 2021-01-01 | End: 2022-01-01 | Stop reason: HOSPADM

## 2021-01-01 RX ORDER — SODIUM CHLORIDE 450 MG/100ML
50 INJECTION, SOLUTION INTRAVENOUS CONTINUOUS
Status: DISCONTINUED | OUTPATIENT
Start: 2021-01-01 | End: 2021-01-01

## 2021-01-01 RX ORDER — SODIUM CHLORIDE 0.9 % (FLUSH) 0.9 %
5-40 SYRINGE (ML) INJECTION AS NEEDED
Status: DISCONTINUED | OUTPATIENT
Start: 2021-01-01 | End: 2021-01-01 | Stop reason: HOSPADM

## 2021-01-01 RX ORDER — ASCORBIC ACID 500 MG
500 TABLET ORAL DAILY
Status: DISCONTINUED | OUTPATIENT
Start: 2021-01-01 | End: 2021-01-01 | Stop reason: HOSPADM

## 2021-01-01 RX ORDER — SODIUM CHLORIDE 0.9 % (FLUSH) 0.9 %
5-40 SYRINGE (ML) INJECTION AS NEEDED
Status: DISCONTINUED | OUTPATIENT
Start: 2021-01-01 | End: 2022-01-01 | Stop reason: HOSPADM

## 2021-01-01 RX ORDER — FENTANYL CITRATE 50 UG/ML
INJECTION, SOLUTION INTRAMUSCULAR; INTRAVENOUS
Status: COMPLETED
Start: 2021-01-01 | End: 2021-01-01

## 2021-01-01 RX ORDER — SODIUM CHLORIDE 0.9 % (FLUSH) 0.9 %
5-40 SYRINGE (ML) INJECTION EVERY 8 HOURS
Status: CANCELLED | OUTPATIENT
Start: 2021-01-01

## 2021-01-01 RX ORDER — LEVOFLOXACIN 5 MG/ML
INJECTION, SOLUTION INTRAVENOUS AS NEEDED
Status: DISCONTINUED | OUTPATIENT
Start: 2021-01-01 | End: 2021-01-01 | Stop reason: HOSPADM

## 2021-01-01 RX ORDER — POTASSIUM CHLORIDE 1.5 G/1.77G
40 POWDER, FOR SOLUTION ORAL
Status: DISCONTINUED | OUTPATIENT
Start: 2021-01-01 | End: 2021-01-01

## 2021-01-01 RX ORDER — ATORVASTATIN CALCIUM 40 MG/1
40 TABLET, FILM COATED ORAL DAILY
Status: DISCONTINUED | OUTPATIENT
Start: 2021-01-01 | End: 2021-01-01 | Stop reason: HOSPADM

## 2021-01-01 RX ORDER — HYDROMORPHONE HYDROCHLORIDE 1 MG/ML
1 INJECTION, SOLUTION INTRAMUSCULAR; INTRAVENOUS; SUBCUTANEOUS ONCE
Status: COMPLETED | OUTPATIENT
Start: 2021-01-01 | End: 2021-01-01

## 2021-01-01 RX ORDER — POTASSIUM CHLORIDE 7.45 MG/ML
10 INJECTION INTRAVENOUS
Status: DISCONTINUED | OUTPATIENT
Start: 2021-01-01 | End: 2021-01-01 | Stop reason: HOSPADM

## 2021-01-01 RX ORDER — IPRATROPIUM BROMIDE AND ALBUTEROL SULFATE 2.5; .5 MG/3ML; MG/3ML
3 SOLUTION RESPIRATORY (INHALATION)
Status: DISCONTINUED | OUTPATIENT
Start: 2021-01-01 | End: 2022-01-01 | Stop reason: SDUPTHER

## 2021-01-01 RX ORDER — HYDROMORPHONE HYDROCHLORIDE 2 MG/ML
2 INJECTION, SOLUTION INTRAMUSCULAR; INTRAVENOUS; SUBCUTANEOUS ONCE
Status: DISCONTINUED | OUTPATIENT
Start: 2021-01-01 | End: 2021-01-01 | Stop reason: DRUGHIGH

## 2021-01-01 RX ORDER — DEXAMETHASONE SODIUM PHOSPHATE 4 MG/ML
INJECTION, SOLUTION INTRA-ARTICULAR; INTRALESIONAL; INTRAMUSCULAR; INTRAVENOUS; SOFT TISSUE AS NEEDED
Status: DISCONTINUED | OUTPATIENT
Start: 2021-01-01 | End: 2021-01-01 | Stop reason: HOSPADM

## 2021-01-01 RX ORDER — LIDOCAINE HYDROCHLORIDE 20 MG/ML
INJECTION, SOLUTION EPIDURAL; INFILTRATION; INTRACAUDAL; PERINEURAL AS NEEDED
Status: DISCONTINUED | OUTPATIENT
Start: 2021-01-01 | End: 2021-01-01 | Stop reason: HOSPADM

## 2021-01-01 RX ORDER — PROPOFOL 10 MG/ML
INJECTION, EMULSION INTRAVENOUS
Status: COMPLETED
Start: 2021-01-01 | End: 2021-01-01

## 2021-01-01 RX ORDER — SODIUM CHLORIDE, SODIUM LACTATE, POTASSIUM CHLORIDE, CALCIUM CHLORIDE 600; 310; 30; 20 MG/100ML; MG/100ML; MG/100ML; MG/100ML
50 INJECTION, SOLUTION INTRAVENOUS CONTINUOUS
Status: DISCONTINUED | OUTPATIENT
Start: 2021-01-01 | End: 2021-01-01 | Stop reason: HOSPADM

## 2021-01-01 RX ORDER — ONDANSETRON 2 MG/ML
INJECTION INTRAMUSCULAR; INTRAVENOUS AS NEEDED
Status: DISCONTINUED | OUTPATIENT
Start: 2021-01-01 | End: 2021-01-01 | Stop reason: HOSPADM

## 2021-01-01 RX ORDER — ACETAMINOPHEN 325 MG/1
650 TABLET ORAL
Status: DISCONTINUED | OUTPATIENT
Start: 2021-01-01 | End: 2022-01-01 | Stop reason: HOSPADM

## 2021-01-01 RX ORDER — HYDROMORPHONE HYDROCHLORIDE 1 MG/ML
1 INJECTION, SOLUTION INTRAMUSCULAR; INTRAVENOUS; SUBCUTANEOUS
Status: DISCONTINUED | OUTPATIENT
Start: 2021-01-01 | End: 2022-01-01

## 2021-01-01 RX ORDER — ASCORBIC ACID 250 MG
500 TABLET ORAL DAILY
Status: DISCONTINUED | OUTPATIENT
Start: 2021-01-01 | End: 2022-01-01

## 2021-01-01 RX ORDER — LIDOCAINE HYDROCHLORIDE 10 MG/ML
0.1 INJECTION, SOLUTION EPIDURAL; INFILTRATION; INTRACAUDAL; PERINEURAL AS NEEDED
Status: CANCELLED | OUTPATIENT
Start: 2021-01-01

## 2021-01-01 RX ORDER — POTASSIUM CHLORIDE 750 MG/1
40 TABLET, FILM COATED, EXTENDED RELEASE ORAL
Status: COMPLETED | OUTPATIENT
Start: 2021-01-01 | End: 2021-01-01

## 2021-01-01 RX ORDER — SORBITOL SOLUTION 70 %
30 SOLUTION, ORAL MISCELLANEOUS DAILY PRN
Status: DISCONTINUED | OUTPATIENT
Start: 2021-01-01 | End: 2022-01-01 | Stop reason: HOSPADM

## 2021-01-01 RX ORDER — HYDROCODONE BITARTRATE AND ACETAMINOPHEN 5; 325 MG/1; MG/1
1 TABLET ORAL AS NEEDED
Status: DISCONTINUED | OUTPATIENT
Start: 2021-01-01 | End: 2021-01-01 | Stop reason: HOSPADM

## 2021-01-01 RX ORDER — MORPHINE SULFATE 4 MG/ML
4 INJECTION INTRAVENOUS ONCE
Status: COMPLETED | OUTPATIENT
Start: 2021-01-01 | End: 2021-01-01

## 2021-01-01 RX ORDER — POTASSIUM CHLORIDE 1.5 G/1.77G
40 POWDER, FOR SOLUTION ORAL
Status: ACTIVE | OUTPATIENT
Start: 2021-01-01 | End: 2021-01-01

## 2021-01-01 RX ORDER — SODIUM CHLORIDE 9 MG/ML
125 INJECTION, SOLUTION INTRAVENOUS CONTINUOUS
Status: DISCONTINUED | OUTPATIENT
Start: 2021-01-01 | End: 2021-01-01

## 2021-01-01 RX ORDER — FLUCONAZOLE 100 MG/1
200 TABLET ORAL ONCE
Status: COMPLETED | OUTPATIENT
Start: 2021-01-01 | End: 2021-01-01

## 2021-01-01 RX ORDER — SODIUM CHLORIDE, SODIUM LACTATE, POTASSIUM CHLORIDE, CALCIUM CHLORIDE 600; 310; 30; 20 MG/100ML; MG/100ML; MG/100ML; MG/100ML
INJECTION, SOLUTION INTRAVENOUS
Status: DISCONTINUED | OUTPATIENT
Start: 2021-01-01 | End: 2021-01-01 | Stop reason: HOSPADM

## 2021-01-01 RX ORDER — BUDESONIDE AND FORMOTEROL FUMARATE DIHYDRATE 80; 4.5 UG/1; UG/1
2 AEROSOL RESPIRATORY (INHALATION)
Status: DISCONTINUED | OUTPATIENT
Start: 2021-01-01 | End: 2021-01-01

## 2021-01-01 RX ORDER — GUAIFENESIN 600 MG/1
600 TABLET, EXTENDED RELEASE ORAL EVERY 12 HOURS
Status: DISCONTINUED | OUTPATIENT
Start: 2021-01-01 | End: 2022-01-01

## 2021-01-01 RX ORDER — POTASSIUM CHLORIDE 750 MG/1
20 TABLET, FILM COATED, EXTENDED RELEASE ORAL DAILY
Status: DISCONTINUED | OUTPATIENT
Start: 2021-01-01 | End: 2022-01-01 | Stop reason: HOSPADM

## 2021-01-01 RX ORDER — ALBUTEROL SULFATE 90 UG/1
2 AEROSOL, METERED RESPIRATORY (INHALATION)
Status: DISCONTINUED | OUTPATIENT
Start: 2021-01-01 | End: 2021-01-01 | Stop reason: HOSPADM

## 2021-01-01 RX ORDER — PROPOFOL 10 MG/ML
INJECTION, EMULSION INTRAVENOUS AS NEEDED
Status: DISCONTINUED | OUTPATIENT
Start: 2021-01-01 | End: 2021-01-01 | Stop reason: HOSPADM

## 2021-01-01 RX ORDER — BUMETANIDE 1 MG/1
1 TABLET ORAL DAILY
Status: DISCONTINUED | OUTPATIENT
Start: 2021-01-01 | End: 2021-01-01 | Stop reason: HOSPADM

## 2021-01-01 RX ORDER — HYDROMORPHONE HYDROCHLORIDE 1 MG/ML
1 INJECTION, SOLUTION INTRAMUSCULAR; INTRAVENOUS; SUBCUTANEOUS
Status: DISPENSED | OUTPATIENT
Start: 2021-01-01 | End: 2021-01-01

## 2021-01-01 RX ORDER — POTASSIUM CHLORIDE 750 MG/1
20 TABLET, FILM COATED, EXTENDED RELEASE ORAL DAILY
Status: DISCONTINUED | OUTPATIENT
Start: 2021-01-01 | End: 2021-01-01 | Stop reason: HOSPADM

## 2021-01-01 RX ORDER — DILTIAZEM HYDROCHLORIDE 120 MG/1
120 CAPSULE, COATED, EXTENDED RELEASE ORAL DAILY
Status: DISCONTINUED | OUTPATIENT
Start: 2021-01-01 | End: 2022-01-01 | Stop reason: HOSPADM

## 2021-01-01 RX ORDER — TRAZODONE HYDROCHLORIDE 50 MG/1
50 TABLET ORAL
Status: DISCONTINUED | OUTPATIENT
Start: 2021-01-01 | End: 2021-01-01 | Stop reason: HOSPADM

## 2021-01-01 RX ORDER — MIDODRINE HYDROCHLORIDE 5 MG/1
10 TABLET ORAL
Status: DISCONTINUED | OUTPATIENT
Start: 2022-01-01 | End: 2022-01-01 | Stop reason: HOSPADM

## 2021-01-01 RX ORDER — HYDRALAZINE HYDROCHLORIDE 20 MG/ML
10 INJECTION INTRAMUSCULAR; INTRAVENOUS
Status: DISCONTINUED | OUTPATIENT
Start: 2021-01-01 | End: 2021-01-01 | Stop reason: HOSPADM

## 2021-01-01 RX ORDER — SODIUM CHLORIDE 9 MG/ML
50 INJECTION, SOLUTION INTRAVENOUS CONTINUOUS
Status: DISCONTINUED | OUTPATIENT
Start: 2021-01-01 | End: 2021-01-01

## 2021-01-01 RX ORDER — DIAZEPAM 5 MG/1
5 TABLET ORAL
Status: DISCONTINUED | OUTPATIENT
Start: 2021-01-01 | End: 2022-01-01 | Stop reason: HOSPADM

## 2021-01-01 RX ORDER — LABETALOL HCL 20 MG/4 ML
10 SYRINGE (ML) INTRAVENOUS
Status: DISCONTINUED | OUTPATIENT
Start: 2021-01-01 | End: 2021-01-01 | Stop reason: HOSPADM

## 2021-01-01 RX ORDER — DEXAMETHASONE SODIUM PHOSPHATE 4 MG/ML
INJECTION, SOLUTION INTRA-ARTICULAR; INTRALESIONAL; INTRAMUSCULAR; INTRAVENOUS; SOFT TISSUE
Status: COMPLETED
Start: 2021-01-01 | End: 2021-01-01

## 2021-01-01 RX ORDER — SODIUM CHLORIDE 0.9 % (FLUSH) 0.9 %
5-40 SYRINGE (ML) INJECTION EVERY 8 HOURS
Status: DISCONTINUED | OUTPATIENT
Start: 2021-01-01 | End: 2021-01-01 | Stop reason: HOSPADM

## 2021-01-01 RX ORDER — FENTANYL CITRATE 50 UG/ML
50 INJECTION, SOLUTION INTRAMUSCULAR; INTRAVENOUS ONCE
Status: COMPLETED | OUTPATIENT
Start: 2021-01-01 | End: 2021-01-01

## 2021-01-01 RX ORDER — PERMETHRIN 50 MG/G
CREAM TOPICAL
Qty: 60 G | Refills: 0 | Status: SHIPPED | OUTPATIENT
Start: 2021-01-01 | End: 2021-01-01

## 2021-01-01 RX ORDER — SERTRALINE HYDROCHLORIDE 25 MG/1
25 TABLET, FILM COATED ORAL DAILY
COMMUNITY

## 2021-01-01 RX ORDER — CHLORTHALIDONE 25 MG/1
25 TABLET ORAL DAILY
Status: DISCONTINUED | OUTPATIENT
Start: 2021-01-01 | End: 2022-01-01 | Stop reason: HOSPADM

## 2021-01-01 RX ORDER — ATORVASTATIN CALCIUM 40 MG/1
40 TABLET, FILM COATED ORAL DAILY
Status: DISCONTINUED | OUTPATIENT
Start: 2021-01-01 | End: 2022-01-01

## 2021-01-01 RX ORDER — ONDANSETRON 2 MG/ML
INJECTION INTRAMUSCULAR; INTRAVENOUS
Status: COMPLETED
Start: 2021-01-01 | End: 2021-01-01

## 2021-01-01 RX ORDER — DEXTROSE MONOHYDRATE AND SODIUM CHLORIDE 5; .45 G/100ML; G/100ML
50 INJECTION, SOLUTION INTRAVENOUS CONTINUOUS
Status: DISCONTINUED | OUTPATIENT
Start: 2021-01-01 | End: 2021-01-01 | Stop reason: HOSPADM

## 2021-01-01 RX ORDER — BUDESONIDE AND FORMOTEROL FUMARATE DIHYDRATE 80; 4.5 UG/1; UG/1
2 AEROSOL RESPIRATORY (INHALATION)
Status: DISCONTINUED | OUTPATIENT
Start: 2021-01-01 | End: 2021-01-01 | Stop reason: HOSPADM

## 2021-01-01 RX ORDER — DIPHENHYDRAMINE HYDROCHLORIDE 50 MG/ML
12.5 INJECTION, SOLUTION INTRAMUSCULAR; INTRAVENOUS AS NEEDED
Status: ACTIVE | OUTPATIENT
Start: 2021-01-01 | End: 2021-01-01

## 2021-01-01 RX ORDER — MULTIVITAMIN/MINERAL 162; 115.2; 200; 10; 6; 5; 15; 1; 30; 10; 18.2; 1.3; .8 MG/1; MG/1; MG/1; MG/1; MG/1; MG/1; UG/1; MG/1; UG/1; MG/1; MG/1; MG/1; MG/1
1 CAPSULE, GELATIN COATED ORAL 2 TIMES DAILY
COMMUNITY
Start: 2021-01-01

## 2021-01-01 RX ORDER — METOPROLOL TARTRATE 5 MG/5ML
2.5 INJECTION INTRAVENOUS
Status: DISCONTINUED | OUTPATIENT
Start: 2021-01-01 | End: 2021-01-01 | Stop reason: HOSPADM

## 2021-01-01 RX ORDER — DEXAMETHASONE SODIUM PHOSPHATE 4 MG/ML
INJECTION, SOLUTION INTRA-ARTICULAR; INTRALESIONAL; INTRAMUSCULAR; INTRAVENOUS; SOFT TISSUE
Status: DISCONTINUED
Start: 2021-01-01 | End: 2021-01-01 | Stop reason: HOSPADM

## 2021-01-01 RX ORDER — FENTANYL CITRATE 50 UG/ML
INJECTION, SOLUTION INTRAMUSCULAR; INTRAVENOUS
Status: DISPENSED
Start: 2021-01-01 | End: 2021-01-01

## 2021-01-01 RX ORDER — SODIUM CHLORIDE 0.9 % (FLUSH) 0.9 %
5-40 SYRINGE (ML) INJECTION AS NEEDED
Status: CANCELLED | OUTPATIENT
Start: 2021-01-01

## 2021-01-01 RX ORDER — DIAZEPAM 5 MG/1
5 TABLET ORAL
Status: DISCONTINUED | OUTPATIENT
Start: 2021-01-01 | End: 2021-01-01 | Stop reason: HOSPADM

## 2021-01-01 RX ORDER — HYDROMORPHONE HYDROCHLORIDE 1 MG/ML
1 INJECTION, SOLUTION INTRAMUSCULAR; INTRAVENOUS; SUBCUTANEOUS
Status: DISCONTINUED | OUTPATIENT
Start: 2021-01-01 | End: 2021-01-01

## 2021-01-01 RX ORDER — ACETAMINOPHEN 325 MG/1
650 TABLET ORAL
Status: DISCONTINUED | OUTPATIENT
Start: 2021-01-01 | End: 2021-01-01 | Stop reason: HOSPADM

## 2021-01-01 RX ORDER — CHLORTHALIDONE 25 MG/1
25 TABLET ORAL DAILY
Status: DISCONTINUED | OUTPATIENT
Start: 2021-01-01 | End: 2021-01-01 | Stop reason: HOSPADM

## 2021-01-01 RX ORDER — SODIUM CHLORIDE 9 MG/ML
75 INJECTION, SOLUTION INTRAVENOUS CONTINUOUS
Status: CANCELLED | OUTPATIENT
Start: 2021-01-01

## 2021-01-01 RX ORDER — MELATONIN
1000 DAILY
Status: DISCONTINUED | OUTPATIENT
Start: 2021-01-01 | End: 2021-01-01 | Stop reason: HOSPADM

## 2021-01-01 RX ORDER — EPHEDRINE SULFATE/0.9% NACL/PF 50 MG/5 ML
5 SYRINGE (ML) INTRAVENOUS AS NEEDED
Status: DISCONTINUED | OUTPATIENT
Start: 2021-01-01 | End: 2021-01-01 | Stop reason: HOSPADM

## 2021-01-01 RX ORDER — FENTANYL CITRATE 50 UG/ML
INJECTION, SOLUTION INTRAMUSCULAR; INTRAVENOUS AS NEEDED
Status: DISCONTINUED | OUTPATIENT
Start: 2021-01-01 | End: 2021-01-01 | Stop reason: HOSPADM

## 2021-01-01 RX ORDER — SODIUM CHLORIDE 0.9 % (FLUSH) 0.9 %
5-40 SYRINGE (ML) INJECTION EVERY 8 HOURS
Status: DISCONTINUED | OUTPATIENT
Start: 2021-01-01 | End: 2021-01-01

## 2021-01-01 RX ORDER — OXYCODONE HYDROCHLORIDE 5 MG/1
5 TABLET ORAL
Status: DISCONTINUED | OUTPATIENT
Start: 2021-01-01 | End: 2022-01-01 | Stop reason: HOSPADM

## 2021-01-01 RX ORDER — NYSTATIN 100000 U/G
CREAM TOPICAL 2 TIMES DAILY
Status: DISCONTINUED | OUTPATIENT
Start: 2021-01-01 | End: 2022-01-01

## 2021-01-01 RX ORDER — SUCCINYLCHOLINE CHLORIDE 20 MG/ML INJECTION SOLUTION
SOLUTION
Status: COMPLETED
Start: 2021-01-01 | End: 2021-01-01

## 2021-01-01 RX ORDER — POLYETHYLENE GLYCOL 3350 17 G/17G
17 POWDER, FOR SOLUTION ORAL DAILY
Status: DISCONTINUED | OUTPATIENT
Start: 2021-01-01 | End: 2022-01-01

## 2021-01-01 RX ORDER — BUMETANIDE 1 MG/1
1 TABLET ORAL DAILY
Status: DISCONTINUED | OUTPATIENT
Start: 2021-01-01 | End: 2021-01-01

## 2021-01-01 RX ORDER — SODIUM CHLORIDE 9 MG/ML
75 INJECTION, SOLUTION INTRAVENOUS CONTINUOUS
Status: DISCONTINUED | OUTPATIENT
Start: 2021-01-01 | End: 2022-01-01

## 2021-01-01 RX ORDER — TRAZODONE HYDROCHLORIDE 50 MG/1
TABLET ORAL
COMMUNITY
Start: 2021-01-01

## 2021-01-01 RX ORDER — MIDAZOLAM HYDROCHLORIDE 1 MG/ML
1 INJECTION, SOLUTION INTRAMUSCULAR; INTRAVENOUS AS NEEDED
Status: CANCELLED | OUTPATIENT
Start: 2021-01-01

## 2021-01-01 RX ORDER — ONDANSETRON 2 MG/ML
4 INJECTION INTRAMUSCULAR; INTRAVENOUS ONCE
Status: COMPLETED | OUTPATIENT
Start: 2021-01-01 | End: 2021-01-01

## 2021-01-01 RX ORDER — DILTIAZEM HYDROCHLORIDE 120 MG/1
120 CAPSULE, COATED, EXTENDED RELEASE ORAL DAILY
Status: DISCONTINUED | OUTPATIENT
Start: 2021-01-01 | End: 2021-01-01 | Stop reason: HOSPADM

## 2021-01-01 RX ORDER — SODIUM CHLORIDE 9 MG/ML
20 INJECTION, SOLUTION INTRAVENOUS CONTINUOUS
Status: DISCONTINUED | OUTPATIENT
Start: 2021-01-01 | End: 2021-01-01 | Stop reason: HOSPADM

## 2021-01-01 RX ORDER — ONDANSETRON 2 MG/ML
4 INJECTION INTRAMUSCULAR; INTRAVENOUS
Status: COMPLETED | OUTPATIENT
Start: 2021-01-01 | End: 2021-01-01

## 2021-01-01 RX ORDER — ELECTROLYTES/DEXTROSE
SOLUTION, ORAL ORAL 2 TIMES DAILY
Status: DISCONTINUED | OUTPATIENT
Start: 2021-01-01 | End: 2021-01-01 | Stop reason: HOSPADM

## 2021-01-01 RX ORDER — DOCUSATE SODIUM 100 MG/1
100 CAPSULE, LIQUID FILLED ORAL 2 TIMES DAILY
Status: DISCONTINUED | OUTPATIENT
Start: 2021-01-01 | End: 2022-01-01

## 2021-01-01 RX ORDER — SODIUM CHLORIDE 9 MG/ML
250 INJECTION, SOLUTION INTRAVENOUS AS NEEDED
Status: DISCONTINUED | OUTPATIENT
Start: 2021-01-01 | End: 2022-01-01

## 2021-01-01 RX ORDER — PANTOPRAZOLE SODIUM 40 MG/1
40 TABLET, DELAYED RELEASE ORAL
Status: DISCONTINUED | OUTPATIENT
Start: 2021-01-01 | End: 2021-01-01

## 2021-01-01 RX ORDER — FENTANYL CITRATE 50 UG/ML
50 INJECTION, SOLUTION INTRAMUSCULAR; INTRAVENOUS
Status: DISCONTINUED | OUTPATIENT
Start: 2021-01-01 | End: 2021-01-01 | Stop reason: HOSPADM

## 2021-01-01 RX ORDER — FENTANYL CITRATE 50 UG/ML
50 INJECTION, SOLUTION INTRAMUSCULAR; INTRAVENOUS AS NEEDED
Status: CANCELLED | OUTPATIENT
Start: 2021-01-01

## 2021-01-01 RX ORDER — FLUDEOXYGLUCOSE F-18 200 MCI/ML
10.52 INJECTION INTRAVENOUS ONCE
Status: COMPLETED | OUTPATIENT
Start: 2021-01-01 | End: 2021-01-01

## 2021-01-01 RX ORDER — SODIUM CHLORIDE 9 MG/ML
INJECTION, SOLUTION INTRAVENOUS
Status: DISCONTINUED | OUTPATIENT
Start: 2021-01-01 | End: 2021-01-01 | Stop reason: HOSPADM

## 2021-01-01 RX ORDER — SERTRALINE HYDROCHLORIDE 25 MG/1
25 TABLET, FILM COATED ORAL DAILY
Status: DISCONTINUED | OUTPATIENT
Start: 2021-01-01 | End: 2022-01-01

## 2021-01-01 RX ORDER — ONDANSETRON 2 MG/ML
4 INJECTION INTRAMUSCULAR; INTRAVENOUS AS NEEDED
Status: DISCONTINUED | OUTPATIENT
Start: 2021-01-01 | End: 2021-01-01 | Stop reason: HOSPADM

## 2021-01-01 RX ORDER — POTASSIUM CHLORIDE 7.45 MG/ML
10 INJECTION INTRAVENOUS
Status: COMPLETED | OUTPATIENT
Start: 2021-01-01 | End: 2021-01-01

## 2021-01-01 RX ORDER — CYCLOBENZAPRINE HCL 10 MG
10 TABLET ORAL
Status: DISCONTINUED | OUTPATIENT
Start: 2021-01-01 | End: 2022-01-01 | Stop reason: HOSPADM

## 2021-01-01 RX ORDER — POTASSIUM CHLORIDE 750 MG/1
40 TABLET, FILM COATED, EXTENDED RELEASE ORAL
Status: DISCONTINUED | OUTPATIENT
Start: 2021-01-01 | End: 2021-01-01 | Stop reason: HOSPADM

## 2021-01-01 RX ORDER — DEXAMETHASONE SODIUM PHOSPHATE 4 MG/ML
4 INJECTION, SOLUTION INTRA-ARTICULAR; INTRALESIONAL; INTRAMUSCULAR; INTRAVENOUS; SOFT TISSUE ONCE
Status: COMPLETED | OUTPATIENT
Start: 2021-01-01 | End: 2021-01-01

## 2021-01-01 RX ORDER — TRAZODONE HYDROCHLORIDE 50 MG/1
50 TABLET ORAL
Status: DISCONTINUED | OUTPATIENT
Start: 2021-01-01 | End: 2022-01-01 | Stop reason: HOSPADM

## 2021-01-01 RX ORDER — ACETAMINOPHEN 650 MG/1
650 SUPPOSITORY RECTAL
Status: DISCONTINUED | OUTPATIENT
Start: 2021-01-01 | End: 2021-01-01 | Stop reason: HOSPADM

## 2021-01-01 RX ORDER — POLYETHYLENE GLYCOL 3350 17 G/17G
17 POWDER, FOR SOLUTION ORAL DAILY PRN
Status: DISCONTINUED | OUTPATIENT
Start: 2021-01-01 | End: 2021-01-01 | Stop reason: HOSPADM

## 2021-01-01 RX ORDER — ALBUTEROL SULFATE 90 UG/1
2 AEROSOL, METERED RESPIRATORY (INHALATION)
COMMUNITY

## 2021-01-01 RX ORDER — PANTOPRAZOLE SODIUM 40 MG/1
40 TABLET, DELAYED RELEASE ORAL DAILY
COMMUNITY

## 2021-01-01 RX ORDER — MAG HYDROX/ALUMINUM HYD/SIMETH 200-200-20
SUSPENSION, ORAL (FINAL DOSE FORM) ORAL 2 TIMES DAILY
Status: DISCONTINUED | OUTPATIENT
Start: 2021-01-01 | End: 2021-01-01

## 2021-01-01 RX ORDER — APIXABAN 5 MG/1
5 TABLET, FILM COATED ORAL 2 TIMES DAILY
COMMUNITY
Start: 2021-01-01 | End: 2022-01-01

## 2021-01-01 RX ORDER — ONDANSETRON 2 MG/ML
4 INJECTION INTRAMUSCULAR; INTRAVENOUS
Status: DISCONTINUED | OUTPATIENT
Start: 2021-01-01 | End: 2022-01-01

## 2021-01-01 RX ORDER — LIDOCAINE HYDROCHLORIDE 10 MG/ML
2 INJECTION INFILTRATION; PERINEURAL ONCE
Status: CANCELLED | OUTPATIENT
Start: 2021-01-01 | End: 2021-01-01

## 2021-01-01 RX ORDER — MIDAZOLAM HYDROCHLORIDE 1 MG/ML
0.5 INJECTION, SOLUTION INTRAMUSCULAR; INTRAVENOUS
Status: DISCONTINUED | OUTPATIENT
Start: 2021-01-01 | End: 2021-01-01 | Stop reason: HOSPADM

## 2021-01-01 RX ORDER — ONDANSETRON 2 MG/ML
INJECTION INTRAMUSCULAR; INTRAVENOUS
Status: DISCONTINUED
Start: 2021-01-01 | End: 2021-01-01 | Stop reason: HOSPADM

## 2021-01-01 RX ORDER — OXYCODONE HYDROCHLORIDE 5 MG/1
5 TABLET ORAL
Status: DISCONTINUED | OUTPATIENT
Start: 2021-01-01 | End: 2021-01-01

## 2021-01-01 RX ORDER — LIDOCAINE HYDROCHLORIDE 20 MG/ML
INJECTION, SOLUTION EPIDURAL; INFILTRATION; INTRACAUDAL; PERINEURAL
Status: COMPLETED
Start: 2021-01-01 | End: 2021-01-01

## 2021-01-01 RX ORDER — PANTOPRAZOLE SODIUM 40 MG/1
40 TABLET, DELAYED RELEASE ORAL
Status: DISCONTINUED | OUTPATIENT
Start: 2021-01-01 | End: 2021-01-01 | Stop reason: HOSPADM

## 2021-01-01 RX ORDER — ONDANSETRON 4 MG/1
4 TABLET, ORALLY DISINTEGRATING ORAL
Status: DISCONTINUED | OUTPATIENT
Start: 2021-01-01 | End: 2021-01-01 | Stop reason: HOSPADM

## 2021-01-01 RX ORDER — SUCCINYLCHOLINE CHLORIDE 20 MG/ML
INJECTION INTRAMUSCULAR; INTRAVENOUS AS NEEDED
Status: DISCONTINUED | OUTPATIENT
Start: 2021-01-01 | End: 2021-01-01 | Stop reason: HOSPADM

## 2021-01-01 RX ORDER — HYDROMORPHONE HYDROCHLORIDE 1 MG/ML
0.4 INJECTION, SOLUTION INTRAMUSCULAR; INTRAVENOUS; SUBCUTANEOUS
Status: DISCONTINUED | OUTPATIENT
Start: 2021-01-01 | End: 2021-01-01 | Stop reason: HOSPADM

## 2021-01-01 RX ORDER — MELATONIN
1000 DAILY
Status: DISCONTINUED | OUTPATIENT
Start: 2021-01-01 | End: 2022-01-01

## 2021-01-01 RX ORDER — LABETALOL HCL 20 MG/4 ML
5 SYRINGE (ML) INTRAVENOUS
Status: DISCONTINUED | OUTPATIENT
Start: 2021-01-01 | End: 2021-01-01 | Stop reason: HOSPADM

## 2021-01-01 RX ORDER — FUROSEMIDE 10 MG/ML
40 INJECTION INTRAMUSCULAR; INTRAVENOUS DAILY
Status: DISCONTINUED | OUTPATIENT
Start: 2021-01-01 | End: 2022-01-01

## 2021-01-01 RX ORDER — CHLORTHALIDONE 25 MG/1
25 TABLET ORAL DAILY
COMMUNITY
Start: 2021-01-01 | End: 2022-01-01

## 2021-01-01 RX ORDER — MIDODRINE HYDROCHLORIDE 5 MG/1
10 TABLET ORAL
Status: DISCONTINUED | OUTPATIENT
Start: 2021-01-01 | End: 2021-01-01

## 2021-01-01 RX ORDER — ONDANSETRON 2 MG/ML
4 INJECTION INTRAMUSCULAR; INTRAVENOUS
Status: DISCONTINUED | OUTPATIENT
Start: 2021-01-01 | End: 2021-01-01 | Stop reason: HOSPADM

## 2021-01-01 RX ORDER — SODIUM CHLORIDE 9 MG/ML
100 INJECTION, SOLUTION INTRAVENOUS CONTINUOUS
Status: DISCONTINUED | OUTPATIENT
Start: 2021-01-01 | End: 2021-01-01

## 2021-01-01 RX ORDER — ALBUTEROL SULFATE 90 UG/1
2 AEROSOL, METERED RESPIRATORY (INHALATION)
Status: DISCONTINUED | OUTPATIENT
Start: 2021-01-01 | End: 2022-01-01 | Stop reason: HOSPADM

## 2021-01-01 RX ADMIN — HYDROMORPHONE HYDROCHLORIDE 1 MG: 1 INJECTION, SOLUTION INTRAMUSCULAR; INTRAVENOUS; SUBCUTANEOUS at 10:09

## 2021-01-01 RX ADMIN — HYDROMORPHONE HYDROCHLORIDE 1 MG: 1 INJECTION, SOLUTION INTRAMUSCULAR; INTRAVENOUS; SUBCUTANEOUS at 21:52

## 2021-01-01 RX ADMIN — ONDANSETRON 4 MG: 2 INJECTION INTRAMUSCULAR; INTRAVENOUS at 21:31

## 2021-01-01 RX ADMIN — NYSTATIN: 100000 CREAM TOPICAL at 21:23

## 2021-01-01 RX ADMIN — SERTRALINE HYDROCHLORIDE 25 MG: 25 TABLET ORAL at 09:45

## 2021-01-01 RX ADMIN — SODIUM CHLORIDE 125 ML/HR: 9 INJECTION, SOLUTION INTRAVENOUS at 02:25

## 2021-01-01 RX ADMIN — ONDANSETRON 4 MG: 2 INJECTION INTRAMUSCULAR; INTRAVENOUS at 01:16

## 2021-01-01 RX ADMIN — APIXABAN 5 MG: 5 TABLET, FILM COATED ORAL at 09:39

## 2021-01-01 RX ADMIN — SODIUM CHLORIDE 50 ML/HR: 4.5 INJECTION, SOLUTION INTRAVENOUS at 05:16

## 2021-01-01 RX ADMIN — WHITE PETROLATUM,ZINC OXIDE: 57; 17 PASTE TOPICAL at 16:47

## 2021-01-01 RX ADMIN — ALBUTEROL SULFATE 2 PUFF: 108 INHALANT RESPIRATORY (INHALATION) at 21:33

## 2021-01-01 RX ADMIN — PROPOFOL 120 MG: 10 INJECTION, EMULSION INTRAVENOUS at 08:39

## 2021-01-01 RX ADMIN — DILTIAZEM HYDROCHLORIDE 120 MG: 120 CAPSULE, COATED, EXTENDED RELEASE ORAL at 09:26

## 2021-01-01 RX ADMIN — CHLORTHALIDONE 25 MG: 25 TABLET ORAL at 09:01

## 2021-01-01 RX ADMIN — WHITE PETROLATUM,ZINC OXIDE: 57; 17 PASTE TOPICAL at 09:00

## 2021-01-01 RX ADMIN — CYCLOBENZAPRINE 10 MG: 10 TABLET, FILM COATED ORAL at 23:20

## 2021-01-01 RX ADMIN — TRAZODONE HYDROCHLORIDE 50 MG: 50 TABLET ORAL at 22:00

## 2021-01-01 RX ADMIN — GUAIFENESIN 600 MG: 600 TABLET, EXTENDED RELEASE ORAL at 09:00

## 2021-01-01 RX ADMIN — TRAZODONE HYDROCHLORIDE 50 MG: 50 TABLET ORAL at 22:51

## 2021-01-01 RX ADMIN — HYDROMORPHONE HYDROCHLORIDE 1 MG: 1 INJECTION, SOLUTION INTRAMUSCULAR; INTRAVENOUS; SUBCUTANEOUS at 10:12

## 2021-01-01 RX ADMIN — SODIUM CHLORIDE, PRESERVATIVE FREE 1 G: 5 INJECTION INTRAVENOUS at 01:30

## 2021-01-01 RX ADMIN — PIPERACILLIN AND TAZOBACTAM 3.38 G: 3; .375 INJECTION, POWDER, LYOPHILIZED, FOR SOLUTION INTRAVENOUS at 13:48

## 2021-01-01 RX ADMIN — HYDROMORPHONE HYDROCHLORIDE 1 MG: 1 INJECTION, SOLUTION INTRAMUSCULAR; INTRAVENOUS; SUBCUTANEOUS at 21:19

## 2021-01-01 RX ADMIN — TRAZODONE HYDROCHLORIDE 50 MG: 50 TABLET ORAL at 21:16

## 2021-01-01 RX ADMIN — HYDROMORPHONE HYDROCHLORIDE 1 MG: 1 INJECTION, SOLUTION INTRAMUSCULAR; INTRAVENOUS; SUBCUTANEOUS at 07:58

## 2021-01-01 RX ADMIN — TRAZODONE HYDROCHLORIDE 50 MG: 50 TABLET ORAL at 22:15

## 2021-01-01 RX ADMIN — PROPOFOL 50 MG: 10 INJECTION, EMULSION INTRAVENOUS at 09:06

## 2021-01-01 RX ADMIN — HYDROMORPHONE HYDROCHLORIDE 1 MG: 1 INJECTION, SOLUTION INTRAMUSCULAR; INTRAVENOUS; SUBCUTANEOUS at 08:29

## 2021-01-01 RX ADMIN — CYCLOBENZAPRINE 10 MG: 10 TABLET, FILM COATED ORAL at 00:55

## 2021-01-01 RX ADMIN — WHITE PETROLATUM,ZINC OXIDE: 57; 17 PASTE TOPICAL at 16:02

## 2021-01-01 RX ADMIN — MORPHINE SULFATE 4 MG: 4 INJECTION INTRAVENOUS at 11:33

## 2021-01-01 RX ADMIN — CHLORTHALIDONE 25 MG: 25 TABLET ORAL at 09:22

## 2021-01-01 RX ADMIN — HYDROMORPHONE HYDROCHLORIDE 1 MG: 1 INJECTION, SOLUTION INTRAMUSCULAR; INTRAVENOUS; SUBCUTANEOUS at 03:58

## 2021-01-01 RX ADMIN — PROCHLORPERAZINE EDISYLATE 10 MG: 5 INJECTION INTRAMUSCULAR; INTRAVENOUS at 21:39

## 2021-01-01 RX ADMIN — CHLORTHALIDONE 25 MG: 25 TABLET ORAL at 10:15

## 2021-01-01 RX ADMIN — POTASSIUM CHLORIDE 10 MEQ: 7.46 INJECTION, SOLUTION INTRAVENOUS at 11:17

## 2021-01-01 RX ADMIN — HYDROMORPHONE HYDROCHLORIDE 1 MG: 1 INJECTION, SOLUTION INTRAMUSCULAR; INTRAVENOUS; SUBCUTANEOUS at 06:03

## 2021-01-01 RX ADMIN — TRAZODONE HYDROCHLORIDE 50 MG: 50 TABLET ORAL at 21:21

## 2021-01-01 RX ADMIN — TRAZODONE HYDROCHLORIDE 50 MG: 50 TABLET ORAL at 21:15

## 2021-01-01 RX ADMIN — SERTRALINE HYDROCHLORIDE 25 MG: 25 TABLET ORAL at 09:51

## 2021-01-01 RX ADMIN — NYSTATIN: 100000 CREAM TOPICAL at 21:25

## 2021-01-01 RX ADMIN — SODIUM CHLORIDE, PRESERVATIVE FREE 40 MG: 5 INJECTION INTRAVENOUS at 09:23

## 2021-01-01 RX ADMIN — Medication 500 MG: at 09:45

## 2021-01-01 RX ADMIN — SODIUM CHLORIDE 100 ML/HR: 9 INJECTION, SOLUTION INTRAVENOUS at 10:42

## 2021-01-01 RX ADMIN — ATORVASTATIN CALCIUM 40 MG: 40 TABLET, FILM COATED ORAL at 11:30

## 2021-01-01 RX ADMIN — DILTIAZEM HYDROCHLORIDE 120 MG: 120 CAPSULE, COATED, EXTENDED RELEASE ORAL at 10:05

## 2021-01-01 RX ADMIN — OXYCODONE HYDROCHLORIDE AND ACETAMINOPHEN 500 MG: 500 TABLET ORAL at 09:04

## 2021-01-01 RX ADMIN — HYDROMORPHONE HYDROCHLORIDE 1 MG: 1 INJECTION, SOLUTION INTRAMUSCULAR; INTRAVENOUS; SUBCUTANEOUS at 05:02

## 2021-01-01 RX ADMIN — WHITE PETROLATUM,ZINC OXIDE: 57; 17 PASTE TOPICAL at 09:23

## 2021-01-01 RX ADMIN — SODIUM CHLORIDE 100 ML/HR: 9 INJECTION, SOLUTION INTRAVENOUS at 19:36

## 2021-01-01 RX ADMIN — ONDANSETRON 4 MG: 2 INJECTION INTRAMUSCULAR; INTRAVENOUS at 04:04

## 2021-01-01 RX ADMIN — DILTIAZEM HYDROCHLORIDE 120 MG: 120 CAPSULE, COATED, EXTENDED RELEASE ORAL at 10:35

## 2021-01-01 RX ADMIN — SODIUM CHLORIDE, PRESERVATIVE FREE 1 G: 5 INJECTION INTRAVENOUS at 16:46

## 2021-01-01 RX ADMIN — NYSTATIN: 100000 CREAM TOPICAL at 21:00

## 2021-01-01 RX ADMIN — TRAZODONE HYDROCHLORIDE 50 MG: 50 TABLET ORAL at 22:30

## 2021-01-01 RX ADMIN — DILTIAZEM HYDROCHLORIDE 120 MG: 120 CAPSULE, COATED, EXTENDED RELEASE ORAL at 09:20

## 2021-01-01 RX ADMIN — SODIUM CHLORIDE, PRESERVATIVE FREE 40 MG: 5 INJECTION INTRAVENOUS at 09:51

## 2021-01-01 RX ADMIN — PHYTONADIONE 5 MG: 10 INJECTION, EMULSION INTRAMUSCULAR; INTRAVENOUS; SUBCUTANEOUS at 09:29

## 2021-01-01 RX ADMIN — DEXAMETHASONE SODIUM PHOSPHATE 4 MG: 4 INJECTION, SOLUTION INTRAMUSCULAR; INTRAVENOUS at 10:42

## 2021-01-01 RX ADMIN — NYSTATIN: 100000 CREAM TOPICAL at 21:01

## 2021-01-01 RX ADMIN — ALBUTEROL SULFATE 2 PUFF: 108 AEROSOL, METERED RESPIRATORY (INHALATION) at 07:31

## 2021-01-01 RX ADMIN — DILTIAZEM HYDROCHLORIDE 120 MG: 120 CAPSULE, COATED, EXTENDED RELEASE ORAL at 10:20

## 2021-01-01 RX ADMIN — MIDODRINE HYDROCHLORIDE 10 MG: 5 TABLET ORAL at 15:05

## 2021-01-01 RX ADMIN — SODIUM CHLORIDE 500 ML: 9 INJECTION, SOLUTION INTRAVENOUS at 11:28

## 2021-01-01 RX ADMIN — OXYCODONE 5 MG: 5 TABLET ORAL at 01:16

## 2021-01-01 RX ADMIN — HYDROMORPHONE HYDROCHLORIDE 1 MG: 1 INJECTION, SOLUTION INTRAMUSCULAR; INTRAVENOUS; SUBCUTANEOUS at 15:19

## 2021-01-01 RX ADMIN — ONDANSETRON 4 MG: 2 INJECTION INTRAMUSCULAR; INTRAVENOUS at 09:25

## 2021-01-01 RX ADMIN — Medication 1000 UNITS: at 09:04

## 2021-01-01 RX ADMIN — GUAIFENESIN 600 MG: 600 TABLET, EXTENDED RELEASE ORAL at 21:32

## 2021-01-01 RX ADMIN — FUROSEMIDE 40 MG: 10 INJECTION, SOLUTION INTRAMUSCULAR; INTRAVENOUS at 09:45

## 2021-01-01 RX ADMIN — NYSTATIN: 100000 CREAM TOPICAL at 10:16

## 2021-01-01 RX ADMIN — APIXABAN 5 MG: 5 TABLET, FILM COATED ORAL at 21:53

## 2021-01-01 RX ADMIN — Medication 1000 UNITS: at 10:32

## 2021-01-01 RX ADMIN — HYDROMORPHONE HYDROCHLORIDE 1 MG: 1 INJECTION, SOLUTION INTRAMUSCULAR; INTRAVENOUS; SUBCUTANEOUS at 20:58

## 2021-01-01 RX ADMIN — PIPERACILLIN AND TAZOBACTAM 3.38 G: 3; .375 INJECTION, POWDER, LYOPHILIZED, FOR SOLUTION INTRAVENOUS at 21:15

## 2021-01-01 RX ADMIN — HYDROMORPHONE HYDROCHLORIDE 1 MG: 1 INJECTION, SOLUTION INTRAMUSCULAR; INTRAVENOUS; SUBCUTANEOUS at 16:03

## 2021-01-01 RX ADMIN — Medication 1000 UNITS: at 09:20

## 2021-01-01 RX ADMIN — ONDANSETRON 4 MG: 2 INJECTION INTRAMUSCULAR; INTRAVENOUS at 22:25

## 2021-01-01 RX ADMIN — HYDROMORPHONE HYDROCHLORIDE 1 MG: 1 INJECTION, SOLUTION INTRAMUSCULAR; INTRAVENOUS; SUBCUTANEOUS at 02:31

## 2021-01-01 RX ADMIN — HYDROMORPHONE HYDROCHLORIDE 1 MG: 1 INJECTION, SOLUTION INTRAMUSCULAR; INTRAVENOUS; SUBCUTANEOUS at 01:53

## 2021-01-01 RX ADMIN — ONDANSETRON 4 MG: 2 INJECTION INTRAMUSCULAR; INTRAVENOUS at 12:50

## 2021-01-01 RX ADMIN — GUAIFENESIN 600 MG: 600 TABLET, EXTENDED RELEASE ORAL at 20:40

## 2021-01-01 RX ADMIN — SODIUM CHLORIDE, PRESERVATIVE FREE 40 MG: 5 INJECTION INTRAVENOUS at 10:13

## 2021-01-01 RX ADMIN — ATORVASTATIN CALCIUM 40 MG: 40 TABLET, FILM COATED ORAL at 09:50

## 2021-01-01 RX ADMIN — SODIUM CHLORIDE, PRESERVATIVE FREE 1 G: 5 INJECTION INTRAVENOUS at 00:05

## 2021-01-01 RX ADMIN — TRAZODONE HYDROCHLORIDE 50 MG: 50 TABLET ORAL at 21:24

## 2021-01-01 RX ADMIN — PIPERACILLIN AND TAZOBACTAM 3.38 G: 3; .375 INJECTION, POWDER, LYOPHILIZED, FOR SOLUTION INTRAVENOUS at 14:29

## 2021-01-01 RX ADMIN — PROPOFOL 50 MG: 10 INJECTION, EMULSION INTRAVENOUS at 09:21

## 2021-01-01 RX ADMIN — HYDROMORPHONE HYDROCHLORIDE 1 MG: 1 INJECTION, SOLUTION INTRAMUSCULAR; INTRAVENOUS; SUBCUTANEOUS at 17:16

## 2021-01-01 RX ADMIN — ALBUTEROL SULFATE 2 PUFF: 108 AEROSOL, METERED RESPIRATORY (INHALATION) at 23:35

## 2021-01-01 RX ADMIN — DILTIAZEM HYDROCHLORIDE 120 MG: 120 CAPSULE, COATED, EXTENDED RELEASE ORAL at 09:00

## 2021-01-01 RX ADMIN — HYDROMORPHONE HYDROCHLORIDE 1 MG: 1 INJECTION, SOLUTION INTRAMUSCULAR; INTRAVENOUS; SUBCUTANEOUS at 14:27

## 2021-01-01 RX ADMIN — OXYCODONE 5 MG: 5 TABLET ORAL at 11:21

## 2021-01-01 RX ADMIN — APIXABAN 5 MG: 5 TABLET, FILM COATED ORAL at 21:27

## 2021-01-01 RX ADMIN — HYDROMORPHONE HYDROCHLORIDE 1 MG: 1 INJECTION, SOLUTION INTRAMUSCULAR; INTRAVENOUS; SUBCUTANEOUS at 22:25

## 2021-01-01 RX ADMIN — SODIUM CHLORIDE, PRESERVATIVE FREE 40 MG: 5 INJECTION INTRAVENOUS at 09:28

## 2021-01-01 RX ADMIN — ALBUTEROL SULFATE 2 PUFF: 108 INHALANT RESPIRATORY (INHALATION) at 14:04

## 2021-01-01 RX ADMIN — SODIUM CHLORIDE, PRESERVATIVE FREE 1 G: 5 INJECTION INTRAVENOUS at 10:54

## 2021-01-01 RX ADMIN — Medication 1000 UNITS: at 09:00

## 2021-01-01 RX ADMIN — TRAZODONE HYDROCHLORIDE 50 MG: 50 TABLET ORAL at 20:50

## 2021-01-01 RX ADMIN — SODIUM CHLORIDE, PRESERVATIVE FREE 40 MG: 5 INJECTION INTRAVENOUS at 10:34

## 2021-01-01 RX ADMIN — LIDOCAINE HYDROCHLORIDE 100 MG: 20 INJECTION, SOLUTION EPIDURAL; INFILTRATION; INTRACAUDAL; PERINEURAL at 08:39

## 2021-01-01 RX ADMIN — OXYCODONE 5 MG: 5 TABLET ORAL at 14:41

## 2021-01-01 RX ADMIN — DIAZEPAM 5 MG: 5 TABLET ORAL at 00:20

## 2021-01-01 RX ADMIN — POTASSIUM CHLORIDE 20 MEQ: 750 TABLET, FILM COATED, EXTENDED RELEASE ORAL at 09:38

## 2021-01-01 RX ADMIN — SODIUM CHLORIDE, PRESERVATIVE FREE 1 G: 5 INJECTION INTRAVENOUS at 16:03

## 2021-01-01 RX ADMIN — CHLORTHALIDONE 25 MG: 25 TABLET ORAL at 09:00

## 2021-01-01 RX ADMIN — DILTIAZEM HYDROCHLORIDE 120 MG: 120 CAPSULE, COATED, EXTENDED RELEASE ORAL at 09:28

## 2021-01-01 RX ADMIN — DILTIAZEM HYDROCHLORIDE 120 MG: 120 CAPSULE, COATED, EXTENDED RELEASE ORAL at 10:12

## 2021-01-01 RX ADMIN — POTASSIUM CHLORIDE 40 MEQ: 750 TABLET, FILM COATED, EXTENDED RELEASE ORAL at 22:51

## 2021-01-01 RX ADMIN — SERTRALINE HYDROCHLORIDE 25 MG: 25 TABLET ORAL at 09:34

## 2021-01-01 RX ADMIN — DIAZEPAM 5 MG: 5 TABLET ORAL at 20:17

## 2021-01-01 RX ADMIN — POTASSIUM CHLORIDE 20 MEQ: 750 TABLET, FILM COATED, EXTENDED RELEASE ORAL at 09:21

## 2021-01-01 RX ADMIN — HYDROMORPHONE HYDROCHLORIDE 1 MG: 1 INJECTION, SOLUTION INTRAMUSCULAR; INTRAVENOUS; SUBCUTANEOUS at 15:10

## 2021-01-01 RX ADMIN — BUDESONIDE AND FORMOTEROL FUMARATE DIHYDRATE 2 PUFF: 80; 4.5 AEROSOL RESPIRATORY (INHALATION) at 09:42

## 2021-01-01 RX ADMIN — TRAZODONE HYDROCHLORIDE 50 MG: 50 TABLET ORAL at 20:18

## 2021-01-01 RX ADMIN — NYSTATIN: 100000 CREAM TOPICAL at 21:32

## 2021-01-01 RX ADMIN — OXYCODONE 5 MG: 5 TABLET ORAL at 01:44

## 2021-01-01 RX ADMIN — TRAZODONE HYDROCHLORIDE 50 MG: 50 TABLET ORAL at 21:00

## 2021-01-01 RX ADMIN — APIXABAN 5 MG: 5 TABLET, FILM COATED ORAL at 21:45

## 2021-01-01 RX ADMIN — OXYCODONE HYDROCHLORIDE AND ACETAMINOPHEN 500 MG: 500 TABLET ORAL at 11:30

## 2021-01-01 RX ADMIN — PROPOFOL 40 MG: 10 INJECTION, EMULSION INTRAVENOUS at 10:05

## 2021-01-01 RX ADMIN — SODIUM CHLORIDE 125 ML/HR: 9 INJECTION, SOLUTION INTRAVENOUS at 02:27

## 2021-01-01 RX ADMIN — GUAIFENESIN 600 MG: 600 TABLET, EXTENDED RELEASE ORAL at 21:08

## 2021-01-01 RX ADMIN — PIPERACILLIN AND TAZOBACTAM 3.38 G: 3; .375 INJECTION, POWDER, LYOPHILIZED, FOR SOLUTION INTRAVENOUS at 05:29

## 2021-01-01 RX ADMIN — SODIUM CHLORIDE, POTASSIUM CHLORIDE, SODIUM LACTATE AND CALCIUM CHLORIDE: 600; 310; 30; 20 INJECTION, SOLUTION INTRAVENOUS at 10:01

## 2021-01-01 RX ADMIN — GUAIFENESIN 600 MG: 600 TABLET, EXTENDED RELEASE ORAL at 21:24

## 2021-01-01 RX ADMIN — DEXTROSE AND SODIUM CHLORIDE 50 ML/HR: 5; 450 INJECTION, SOLUTION INTRAVENOUS at 21:19

## 2021-01-01 RX ADMIN — DILTIAZEM HYDROCHLORIDE 120 MG: 120 CAPSULE, COATED, EXTENDED RELEASE ORAL at 09:50

## 2021-01-01 RX ADMIN — ONDANSETRON 4 MG: 2 INJECTION INTRAMUSCULAR; INTRAVENOUS at 15:56

## 2021-01-01 RX ADMIN — HYDROMORPHONE HYDROCHLORIDE 1 MG: 1 INJECTION, SOLUTION INTRAMUSCULAR; INTRAVENOUS; SUBCUTANEOUS at 20:41

## 2021-01-01 RX ADMIN — CHLORTHALIDONE 25 MG: 25 TABLET ORAL at 09:38

## 2021-01-01 RX ADMIN — HYDROMORPHONE HYDROCHLORIDE 1 MG: 1 INJECTION, SOLUTION INTRAMUSCULAR; INTRAVENOUS; SUBCUTANEOUS at 12:24

## 2021-01-01 RX ADMIN — Medication 500 MG: at 10:05

## 2021-01-01 RX ADMIN — HYDROMORPHONE HYDROCHLORIDE 1 MG: 1 INJECTION, SOLUTION INTRAMUSCULAR; INTRAVENOUS; SUBCUTANEOUS at 12:05

## 2021-01-01 RX ADMIN — HYDROMORPHONE HYDROCHLORIDE 1 MG: 1 INJECTION, SOLUTION INTRAMUSCULAR; INTRAVENOUS; SUBCUTANEOUS at 11:17

## 2021-01-01 RX ADMIN — HYDROMORPHONE HYDROCHLORIDE 1 MG: 1 INJECTION, SOLUTION INTRAMUSCULAR; INTRAVENOUS; SUBCUTANEOUS at 20:49

## 2021-01-01 RX ADMIN — NYSTATIN: 100000 CREAM TOPICAL at 21:47

## 2021-01-01 RX ADMIN — ATORVASTATIN CALCIUM 40 MG: 40 TABLET, FILM COATED ORAL at 09:26

## 2021-01-01 RX ADMIN — HYDROMORPHONE HYDROCHLORIDE 1 MG: 1 INJECTION, SOLUTION INTRAMUSCULAR; INTRAVENOUS; SUBCUTANEOUS at 13:51

## 2021-01-01 RX ADMIN — SODIUM CHLORIDE, PRESERVATIVE FREE 1 G: 5 INJECTION INTRAVENOUS at 09:51

## 2021-01-01 RX ADMIN — HYDROMORPHONE HYDROCHLORIDE 1 MG: 1 INJECTION, SOLUTION INTRAMUSCULAR; INTRAVENOUS; SUBCUTANEOUS at 15:59

## 2021-01-01 RX ADMIN — GUAIFENESIN 600 MG: 600 TABLET, EXTENDED RELEASE ORAL at 10:20

## 2021-01-01 RX ADMIN — SODIUM CHLORIDE, PRESERVATIVE FREE 1 G: 5 INJECTION INTRAVENOUS at 22:50

## 2021-01-01 RX ADMIN — SODIUM CHLORIDE, PRESERVATIVE FREE 40 MG: 5 INJECTION INTRAVENOUS at 10:41

## 2021-01-01 RX ADMIN — POTASSIUM CHLORIDE 20 MEQ: 750 TABLET, FILM COATED, EXTENDED RELEASE ORAL at 09:22

## 2021-01-01 RX ADMIN — SODIUM CHLORIDE, PRESERVATIVE FREE 40 MG: 5 INJECTION INTRAVENOUS at 09:00

## 2021-01-01 RX ADMIN — ONDANSETRON 4 MG: 2 INJECTION INTRAMUSCULAR; INTRAVENOUS at 20:58

## 2021-01-01 RX ADMIN — SODIUM CHLORIDE, PRESERVATIVE FREE 1 G: 5 INJECTION INTRAVENOUS at 09:31

## 2021-01-01 RX ADMIN — SERTRALINE HYDROCHLORIDE 25 MG: 25 TABLET ORAL at 09:11

## 2021-01-01 RX ADMIN — Medication 100 MG: at 21:27

## 2021-01-01 RX ADMIN — PIPERACILLIN AND TAZOBACTAM 3.38 G: 3; .375 INJECTION, POWDER, LYOPHILIZED, FOR SOLUTION INTRAVENOUS at 05:53

## 2021-01-01 RX ADMIN — ONDANSETRON 4 MG: 2 INJECTION INTRAMUSCULAR; INTRAVENOUS at 07:31

## 2021-01-01 RX ADMIN — PROPOFOL 20 MG: 10 INJECTION, EMULSION INTRAVENOUS at 10:23

## 2021-01-01 RX ADMIN — PROCHLORPERAZINE EDISYLATE 10 MG: 5 INJECTION INTRAMUSCULAR; INTRAVENOUS at 05:12

## 2021-01-01 RX ADMIN — IOPAMIDOL 100 ML: 755 INJECTION, SOLUTION INTRAVENOUS at 09:27

## 2021-01-01 RX ADMIN — PANTOPRAZOLE SODIUM 40 MG: 40 TABLET, DELAYED RELEASE ORAL at 20:10

## 2021-01-01 RX ADMIN — PIPERACILLIN AND TAZOBACTAM 3.38 G: 3; .375 INJECTION, POWDER, LYOPHILIZED, FOR SOLUTION INTRAVENOUS at 22:28

## 2021-01-01 RX ADMIN — GUAIFENESIN 600 MG: 600 TABLET, EXTENDED RELEASE ORAL at 21:00

## 2021-01-01 RX ADMIN — Medication 1000 UNITS: at 10:13

## 2021-01-01 RX ADMIN — Medication 100 MG: at 21:20

## 2021-01-01 RX ADMIN — ATORVASTATIN CALCIUM 40 MG: 40 TABLET, FILM COATED ORAL at 10:05

## 2021-01-01 RX ADMIN — OXYCODONE 5 MG: 5 TABLET ORAL at 13:08

## 2021-01-01 RX ADMIN — HYDROMORPHONE HYDROCHLORIDE 1 MG: 1 INJECTION, SOLUTION INTRAMUSCULAR; INTRAVENOUS; SUBCUTANEOUS at 05:47

## 2021-01-01 RX ADMIN — DIAZEPAM 5 MG: 5 TABLET ORAL at 17:58

## 2021-01-01 RX ADMIN — DILTIAZEM HYDROCHLORIDE 120 MG: 120 CAPSULE, COATED, EXTENDED RELEASE ORAL at 10:15

## 2021-01-01 RX ADMIN — NYSTATIN: 100000 CREAM TOPICAL at 20:41

## 2021-01-01 RX ADMIN — OXYCODONE 5 MG: 5 TABLET ORAL at 21:37

## 2021-01-01 RX ADMIN — Medication 1000 UNITS: at 09:45

## 2021-01-01 RX ADMIN — TRAZODONE HYDROCHLORIDE 50 MG: 50 TABLET ORAL at 21:27

## 2021-01-01 RX ADMIN — SODIUM CHLORIDE, PRESERVATIVE FREE 1 G: 5 INJECTION INTRAVENOUS at 05:18

## 2021-01-01 RX ADMIN — Medication 100 MG: at 09:45

## 2021-01-01 RX ADMIN — SODIUM CHLORIDE, PRESERVATIVE FREE 40 MG: 5 INJECTION INTRAVENOUS at 10:36

## 2021-01-01 RX ADMIN — SODIUM CHLORIDE: 9 INJECTION, SOLUTION INTRAVENOUS at 08:34

## 2021-01-01 RX ADMIN — OXYCODONE 5 MG: 5 TABLET ORAL at 18:59

## 2021-01-01 RX ADMIN — ONDANSETRON 4 MG: 2 INJECTION INTRAMUSCULAR; INTRAVENOUS at 10:13

## 2021-01-01 RX ADMIN — GUAIFENESIN 600 MG: 600 TABLET, EXTENDED RELEASE ORAL at 09:21

## 2021-01-01 RX ADMIN — PROCHLORPERAZINE EDISYLATE 10 MG: 5 INJECTION INTRAMUSCULAR; INTRAVENOUS at 09:28

## 2021-01-01 RX ADMIN — HYDROMORPHONE HYDROCHLORIDE 1 MG: 1 INJECTION, SOLUTION INTRAMUSCULAR; INTRAVENOUS; SUBCUTANEOUS at 16:17

## 2021-01-01 RX ADMIN — PROPOFOL 50 MG: 10 INJECTION, EMULSION INTRAVENOUS at 09:25

## 2021-01-01 RX ADMIN — SODIUM CHLORIDE, PRESERVATIVE FREE 1 G: 5 INJECTION INTRAVENOUS at 16:37

## 2021-01-01 RX ADMIN — HYDROMORPHONE HYDROCHLORIDE 1 MG: 1 INJECTION, SOLUTION INTRAMUSCULAR; INTRAVENOUS; SUBCUTANEOUS at 15:34

## 2021-01-01 RX ADMIN — Medication 100 MG: at 20:40

## 2021-01-01 RX ADMIN — PROCHLORPERAZINE EDISYLATE 10 MG: 5 INJECTION INTRAMUSCULAR; INTRAVENOUS at 02:59

## 2021-01-01 RX ADMIN — DIAZEPAM 5 MG: 5 TABLET ORAL at 22:30

## 2021-01-01 RX ADMIN — ONDANSETRON 4 MG: 2 INJECTION INTRAMUSCULAR; INTRAVENOUS at 16:17

## 2021-01-01 RX ADMIN — ONDANSETRON 4 MG: 4 TABLET, ORALLY DISINTEGRATING ORAL at 15:28

## 2021-01-01 RX ADMIN — HYDROMORPHONE HYDROCHLORIDE 1 MG: 1 INJECTION, SOLUTION INTRAMUSCULAR; INTRAVENOUS; SUBCUTANEOUS at 18:50

## 2021-01-01 RX ADMIN — SERTRALINE HYDROCHLORIDE 25 MG: 25 TABLET ORAL at 09:21

## 2021-01-01 RX ADMIN — PROPOFOL 50 MG: 10 INJECTION, EMULSION INTRAVENOUS at 09:01

## 2021-01-01 RX ADMIN — SODIUM CHLORIDE, PRESERVATIVE FREE 1 G: 5 INJECTION INTRAVENOUS at 16:19

## 2021-01-01 RX ADMIN — Medication 10 ML: at 20:20

## 2021-01-01 RX ADMIN — SODIUM CHLORIDE 75 ML/HR: 9 INJECTION, SOLUTION INTRAVENOUS at 21:43

## 2021-01-01 RX ADMIN — POTASSIUM CHLORIDE 20 MEQ: 750 TABLET, FILM COATED, EXTENDED RELEASE ORAL at 10:05

## 2021-01-01 RX ADMIN — HYDROMORPHONE HYDROCHLORIDE 1 MG: 1 INJECTION, SOLUTION INTRAMUSCULAR; INTRAVENOUS; SUBCUTANEOUS at 02:25

## 2021-01-01 RX ADMIN — HYDROMORPHONE HYDROCHLORIDE 1 MG: 1 INJECTION, SOLUTION INTRAMUSCULAR; INTRAVENOUS; SUBCUTANEOUS at 09:25

## 2021-01-01 RX ADMIN — ONDANSETRON 4 MG: 2 INJECTION INTRAMUSCULAR; INTRAVENOUS at 14:42

## 2021-01-01 RX ADMIN — SODIUM CHLORIDE, PRESERVATIVE FREE 1 G: 5 INJECTION INTRAVENOUS at 08:15

## 2021-01-01 RX ADMIN — DILTIAZEM HYDROCHLORIDE 120 MG: 120 CAPSULE, COATED, EXTENDED RELEASE ORAL at 09:11

## 2021-01-01 RX ADMIN — APIXABAN 5 MG: 5 TABLET, FILM COATED ORAL at 21:15

## 2021-01-01 RX ADMIN — GUAIFENESIN 600 MG: 600 TABLET, EXTENDED RELEASE ORAL at 09:45

## 2021-01-01 RX ADMIN — DIAZEPAM 5 MG: 5 TABLET ORAL at 18:10

## 2021-01-01 RX ADMIN — HYDROMORPHONE HYDROCHLORIDE 1 MG: 1 INJECTION, SOLUTION INTRAMUSCULAR; INTRAVENOUS; SUBCUTANEOUS at 05:46

## 2021-01-01 RX ADMIN — PROMETHAZINE HYDROCHLORIDE 25 MG: 25 INJECTION INTRAMUSCULAR; INTRAVENOUS at 12:43

## 2021-01-01 RX ADMIN — APIXABAN 5 MG: 5 TABLET, FILM COATED ORAL at 21:49

## 2021-01-01 RX ADMIN — TRAZODONE HYDROCHLORIDE 50 MG: 50 TABLET ORAL at 21:20

## 2021-01-01 RX ADMIN — SODIUM CHLORIDE, PRESERVATIVE FREE 1 G: 5 INJECTION INTRAVENOUS at 17:23

## 2021-01-01 RX ADMIN — WHITE PETROLATUM,ZINC OXIDE: 57; 17 PASTE TOPICAL at 10:00

## 2021-01-01 RX ADMIN — APIXABAN 5 MG: 5 TABLET, FILM COATED ORAL at 10:12

## 2021-01-01 RX ADMIN — GUAIFENESIN 600 MG: 600 TABLET, EXTENDED RELEASE ORAL at 21:21

## 2021-01-01 RX ADMIN — FENTANYL CITRATE 50 MCG: 50 INJECTION, SOLUTION INTRAMUSCULAR; INTRAVENOUS at 08:38

## 2021-01-01 RX ADMIN — OXYCODONE 5 MG: 5 TABLET ORAL at 17:53

## 2021-01-01 RX ADMIN — MIDODRINE HYDROCHLORIDE 10 MG: 5 TABLET ORAL at 11:17

## 2021-01-01 RX ADMIN — LACTULOSE 15 ML: 20 SOLUTION ORAL at 09:00

## 2021-01-01 RX ADMIN — GUAIFENESIN 600 MG: 600 TABLET, EXTENDED RELEASE ORAL at 21:27

## 2021-01-01 RX ADMIN — SODIUM CHLORIDE, PRESERVATIVE FREE 1 G: 5 INJECTION INTRAVENOUS at 09:22

## 2021-01-01 RX ADMIN — WHITE PETROLATUM,ZINC OXIDE: 57; 17 PASTE TOPICAL at 21:25

## 2021-01-01 RX ADMIN — SERTRALINE HYDROCHLORIDE 25 MG: 25 TABLET ORAL at 11:16

## 2021-01-01 RX ADMIN — SODIUM CHLORIDE 100 MG: 0.9 INJECTION INTRAVENOUS at 16:46

## 2021-01-01 RX ADMIN — Medication 10 ML: at 21:00

## 2021-01-01 RX ADMIN — PIPERACILLIN AND TAZOBACTAM 3.38 G: 3; .375 INJECTION, POWDER, LYOPHILIZED, FOR SOLUTION INTRAVENOUS at 13:32

## 2021-01-01 RX ADMIN — SERTRALINE HYDROCHLORIDE 25 MG: 25 TABLET ORAL at 10:24

## 2021-01-01 RX ADMIN — HYDROMORPHONE HYDROCHLORIDE 1 MG: 1 INJECTION, SOLUTION INTRAMUSCULAR; INTRAVENOUS; SUBCUTANEOUS at 15:42

## 2021-01-01 RX ADMIN — DIAZEPAM 5 MG: 5 TABLET ORAL at 09:28

## 2021-01-01 RX ADMIN — OXYCODONE 5 MG: 5 TABLET ORAL at 15:42

## 2021-01-01 RX ADMIN — PANTOPRAZOLE SODIUM 40 MG: 40 TABLET, DELAYED RELEASE ORAL at 09:00

## 2021-01-01 RX ADMIN — GUAIFENESIN 600 MG: 600 TABLET, EXTENDED RELEASE ORAL at 20:47

## 2021-01-01 RX ADMIN — Medication 10 ML: at 06:01

## 2021-01-01 RX ADMIN — SERTRALINE HYDROCHLORIDE 25 MG: 25 TABLET ORAL at 09:00

## 2021-01-01 RX ADMIN — HYDROMORPHONE HYDROCHLORIDE 1 MG: 1 INJECTION, SOLUTION INTRAMUSCULAR; INTRAVENOUS; SUBCUTANEOUS at 06:57

## 2021-01-01 RX ADMIN — GUAIFENESIN 600 MG: 600 TABLET, EXTENDED RELEASE ORAL at 10:12

## 2021-01-01 RX ADMIN — HYDROMORPHONE HYDROCHLORIDE 1 MG: 1 INJECTION, SOLUTION INTRAMUSCULAR; INTRAVENOUS; SUBCUTANEOUS at 18:37

## 2021-01-01 RX ADMIN — GUAIFENESIN 600 MG: 600 TABLET, EXTENDED RELEASE ORAL at 09:20

## 2021-01-01 RX ADMIN — APIXABAN 5 MG: 5 TABLET, FILM COATED ORAL at 21:20

## 2021-01-01 RX ADMIN — CHLORTHALIDONE 25 MG: 25 TABLET ORAL at 09:11

## 2021-01-01 RX ADMIN — SODIUM CHLORIDE 100 MG: 0.9 INJECTION INTRAVENOUS at 16:40

## 2021-01-01 RX ADMIN — TRAZODONE HYDROCHLORIDE 50 MG: 50 TABLET ORAL at 21:17

## 2021-01-01 RX ADMIN — HYDROMORPHONE HYDROCHLORIDE 1 MG: 1 INJECTION, SOLUTION INTRAMUSCULAR; INTRAVENOUS; SUBCUTANEOUS at 21:45

## 2021-01-01 RX ADMIN — SODIUM CHLORIDE, PRESERVATIVE FREE 1 G: 5 INJECTION INTRAVENOUS at 09:45

## 2021-01-01 RX ADMIN — NYSTATIN: 100000 CREAM TOPICAL at 21:24

## 2021-01-01 RX ADMIN — DIAZEPAM 5 MG: 5 TABLET ORAL at 03:32

## 2021-01-01 RX ADMIN — OXYCODONE 5 MG: 5 TABLET ORAL at 12:32

## 2021-01-01 RX ADMIN — DILTIAZEM HYDROCHLORIDE 120 MG: 120 CAPSULE, COATED, EXTENDED RELEASE ORAL at 10:55

## 2021-01-01 RX ADMIN — PIPERACILLIN AND TAZOBACTAM 3.38 G: 3; .375 INJECTION, POWDER, LYOPHILIZED, FOR SOLUTION INTRAVENOUS at 21:09

## 2021-01-01 RX ADMIN — HYDROCORTISONE: 1 CREAM TOPICAL at 10:32

## 2021-01-01 RX ADMIN — BUMETANIDE 1 MG: 1 TABLET ORAL at 10:31

## 2021-01-01 RX ADMIN — APIXABAN 5 MG: 5 TABLET, FILM COATED ORAL at 10:35

## 2021-01-01 RX ADMIN — APIXABAN 5 MG: 5 TABLET, FILM COATED ORAL at 21:17

## 2021-01-01 RX ADMIN — HYDROMORPHONE HYDROCHLORIDE 1 MG: 1 INJECTION, SOLUTION INTRAMUSCULAR; INTRAVENOUS; SUBCUTANEOUS at 09:49

## 2021-01-01 RX ADMIN — HYDROMORPHONE HYDROCHLORIDE 1 MG: 1 INJECTION, SOLUTION INTRAMUSCULAR; INTRAVENOUS; SUBCUTANEOUS at 19:33

## 2021-01-01 RX ADMIN — HYDROMORPHONE HYDROCHLORIDE 1 MG: 1 INJECTION, SOLUTION INTRAMUSCULAR; INTRAVENOUS; SUBCUTANEOUS at 02:27

## 2021-01-01 RX ADMIN — TRACE ELEMENTS INJECTION 4: 7.4; .75; 98; 151 INJECTION, SOLUTION INTRAVENOUS at 21:28

## 2021-01-01 RX ADMIN — HYDROMORPHONE HYDROCHLORIDE 1 MG: 1 INJECTION, SOLUTION INTRAMUSCULAR; INTRAVENOUS; SUBCUTANEOUS at 19:36

## 2021-01-01 RX ADMIN — SODIUM CHLORIDE 50 ML/HR: 4.5 INJECTION, SOLUTION INTRAVENOUS at 07:29

## 2021-01-01 RX ADMIN — APIXABAN 5 MG: 5 TABLET, FILM COATED ORAL at 09:27

## 2021-01-01 RX ADMIN — POTASSIUM CHLORIDE 20 MEQ: 750 TABLET, FILM COATED, EXTENDED RELEASE ORAL at 09:11

## 2021-01-01 RX ADMIN — WHITE PETROLATUM,ZINC OXIDE: 57; 17 PASTE TOPICAL at 17:12

## 2021-01-01 RX ADMIN — SODIUM CHLORIDE, PRESERVATIVE FREE 40 MG: 5 INJECTION INTRAVENOUS at 11:16

## 2021-01-01 RX ADMIN — HYDROMORPHONE HYDROCHLORIDE 1 MG: 1 INJECTION, SOLUTION INTRAMUSCULAR; INTRAVENOUS; SUBCUTANEOUS at 21:21

## 2021-01-01 RX ADMIN — SERTRALINE HYDROCHLORIDE 25 MG: 25 TABLET ORAL at 10:18

## 2021-01-01 RX ADMIN — ONDANSETRON 4 MG: 2 INJECTION INTRAMUSCULAR; INTRAVENOUS at 10:08

## 2021-01-01 RX ADMIN — NYSTATIN: 100000 CREAM TOPICAL at 09:00

## 2021-01-01 RX ADMIN — PIPERACILLIN AND TAZOBACTAM 3.38 G: 3; .375 INJECTION, POWDER, LYOPHILIZED, FOR SOLUTION INTRAVENOUS at 06:03

## 2021-01-01 RX ADMIN — WHITE PETROLATUM,ZINC OXIDE: 57; 17 PASTE TOPICAL at 21:24

## 2021-01-01 RX ADMIN — FUROSEMIDE 40 MG: 10 INJECTION, SOLUTION INTRAMUSCULAR; INTRAVENOUS at 09:54

## 2021-01-01 RX ADMIN — Medication 10 ML: at 21:32

## 2021-01-01 RX ADMIN — HYDROMORPHONE HYDROCHLORIDE 1 MG: 1 INJECTION, SOLUTION INTRAMUSCULAR; INTRAVENOUS; SUBCUTANEOUS at 04:02

## 2021-01-01 RX ADMIN — ALBUTEROL SULFATE 2 PUFF: 108 AEROSOL, METERED RESPIRATORY (INHALATION) at 21:03

## 2021-01-01 RX ADMIN — CHLORTHALIDONE 25 MG: 25 TABLET ORAL at 09:21

## 2021-01-01 RX ADMIN — SODIUM CHLORIDE, PRESERVATIVE FREE 1 G: 5 INJECTION INTRAVENOUS at 15:21

## 2021-01-01 RX ADMIN — DIAZEPAM 5 MG: 5 TABLET ORAL at 17:59

## 2021-01-01 RX ADMIN — HYDROMORPHONE HYDROCHLORIDE 1 MG: 1 INJECTION, SOLUTION INTRAMUSCULAR; INTRAVENOUS; SUBCUTANEOUS at 04:05

## 2021-01-01 RX ADMIN — HYDROMORPHONE HYDROCHLORIDE 1 MG: 1 INJECTION, SOLUTION INTRAMUSCULAR; INTRAVENOUS; SUBCUTANEOUS at 23:51

## 2021-01-01 RX ADMIN — HYDROMORPHONE HYDROCHLORIDE 1 MG: 1 INJECTION, SOLUTION INTRAMUSCULAR; INTRAVENOUS; SUBCUTANEOUS at 09:37

## 2021-01-01 RX ADMIN — FENTANYL CITRATE 50 MCG: 50 INJECTION, SOLUTION INTRAMUSCULAR; INTRAVENOUS at 10:03

## 2021-01-01 RX ADMIN — GUAIFENESIN 600 MG: 600 TABLET, EXTENDED RELEASE ORAL at 09:39

## 2021-01-01 RX ADMIN — ALBUTEROL SULFATE 2 PUFF: 108 INHALANT RESPIRATORY (INHALATION) at 09:26

## 2021-01-01 RX ADMIN — FENTANYL CITRATE 50 MCG: 50 INJECTION, SOLUTION INTRAMUSCULAR; INTRAVENOUS at 11:38

## 2021-01-01 RX ADMIN — TRACE ELEMENTS INJECTION 4: 7.4; .75; 98; 151 INJECTION, SOLUTION INTRAVENOUS at 00:33

## 2021-01-01 RX ADMIN — TRAZODONE HYDROCHLORIDE 50 MG: 50 TABLET ORAL at 22:25

## 2021-01-01 RX ADMIN — Medication 100 MG: at 09:19

## 2021-01-01 RX ADMIN — APIXABAN 5 MG: 5 TABLET, FILM COATED ORAL at 21:42

## 2021-01-01 RX ADMIN — SODIUM CHLORIDE, PRESERVATIVE FREE 1 G: 5 INJECTION INTRAVENOUS at 01:37

## 2021-01-01 RX ADMIN — Medication 10 ML: at 21:25

## 2021-01-01 RX ADMIN — CHLORTHALIDONE 25 MG: 25 TABLET ORAL at 09:34

## 2021-01-01 RX ADMIN — APIXABAN 5 MG: 5 TABLET, FILM COATED ORAL at 20:34

## 2021-01-01 RX ADMIN — POTASSIUM CHLORIDE 10 MEQ: 7.46 INJECTION, SOLUTION INTRAVENOUS at 12:55

## 2021-01-01 RX ADMIN — SODIUM CHLORIDE, PRESERVATIVE FREE 1 G: 5 INJECTION INTRAVENOUS at 16:40

## 2021-01-01 RX ADMIN — Medication 500 MG: at 09:25

## 2021-01-01 RX ADMIN — APIXABAN 5 MG: 5 TABLET, FILM COATED ORAL at 09:26

## 2021-01-01 RX ADMIN — WHITE PETROLATUM,ZINC OXIDE: 57; 17 PASTE TOPICAL at 20:19

## 2021-01-01 RX ADMIN — FENTANYL CITRATE 50 MCG: 50 INJECTION, SOLUTION INTRAMUSCULAR; INTRAVENOUS at 10:08

## 2021-01-01 RX ADMIN — GUAIFENESIN 600 MG: 600 TABLET, EXTENDED RELEASE ORAL at 22:25

## 2021-01-01 RX ADMIN — DIAZEPAM 5 MG: 5 TABLET ORAL at 22:02

## 2021-01-01 RX ADMIN — SODIUM CHLORIDE, PRESERVATIVE FREE 1 G: 5 INJECTION INTRAVENOUS at 01:49

## 2021-01-01 RX ADMIN — OXYCODONE 5 MG: 5 TABLET ORAL at 09:54

## 2021-01-01 RX ADMIN — HYDROMORPHONE HYDROCHLORIDE 1 MG: 1 INJECTION, SOLUTION INTRAMUSCULAR; INTRAVENOUS; SUBCUTANEOUS at 00:39

## 2021-01-01 RX ADMIN — OXYCODONE 5 MG: 5 TABLET ORAL at 07:31

## 2021-01-01 RX ADMIN — ALBUTEROL SULFATE 2 PUFF: 108 INHALANT RESPIRATORY (INHALATION) at 07:38

## 2021-01-01 RX ADMIN — HYDROMORPHONE HYDROCHLORIDE 1 MG: 1 INJECTION, SOLUTION INTRAMUSCULAR; INTRAVENOUS; SUBCUTANEOUS at 00:05

## 2021-01-01 RX ADMIN — PIPERACILLIN AND TAZOBACTAM 3.38 G: 3; .375 INJECTION, POWDER, LYOPHILIZED, FOR SOLUTION INTRAVENOUS at 21:50

## 2021-01-01 RX ADMIN — HYDROMORPHONE HYDROCHLORIDE 1 MG: 1 INJECTION, SOLUTION INTRAMUSCULAR; INTRAVENOUS; SUBCUTANEOUS at 22:50

## 2021-01-01 RX ADMIN — SODIUM CHLORIDE, PRESERVATIVE FREE 40 MG: 5 INJECTION INTRAVENOUS at 09:11

## 2021-01-01 RX ADMIN — DIAZEPAM 5 MG: 5 TABLET ORAL at 01:44

## 2021-01-01 RX ADMIN — HYDROMORPHONE HYDROCHLORIDE 1 MG: 1 INJECTION, SOLUTION INTRAMUSCULAR; INTRAVENOUS; SUBCUTANEOUS at 02:18

## 2021-01-01 RX ADMIN — DEXTROSE AND SODIUM CHLORIDE 50 ML/HR: 5; 450 INJECTION, SOLUTION INTRAVENOUS at 23:36

## 2021-01-01 RX ADMIN — WHITE PETROLATUM,ZINC OXIDE: 57; 17 PASTE TOPICAL at 08:30

## 2021-01-01 RX ADMIN — PIPERACILLIN AND TAZOBACTAM 3.38 G: 3; .375 INJECTION, POWDER, LYOPHILIZED, FOR SOLUTION INTRAVENOUS at 20:50

## 2021-01-01 RX ADMIN — DEXTROSE AND SODIUM CHLORIDE 50 ML/HR: 5; 450 INJECTION, SOLUTION INTRAVENOUS at 23:49

## 2021-01-01 RX ADMIN — CHLORTHALIDONE 25 MG: 25 TABLET ORAL at 10:12

## 2021-01-01 RX ADMIN — POTASSIUM CHLORIDE 20 MEQ: 750 TABLET, FILM COATED, EXTENDED RELEASE ORAL at 09:26

## 2021-01-01 RX ADMIN — ALBUTEROL SULFATE 2 PUFF: 108 INHALANT RESPIRATORY (INHALATION) at 22:19

## 2021-01-01 RX ADMIN — DIATRIZOATE MEGLUMINE AND DIATRIZOATE SODIUM 30 ML: 660; 100 LIQUID ORAL; RECTAL at 06:57

## 2021-01-01 RX ADMIN — MORPHINE SULFATE 4 MG: 4 INJECTION INTRAVENOUS at 14:43

## 2021-01-01 RX ADMIN — PANTOPRAZOLE SODIUM 40 MG: 40 TABLET, DELAYED RELEASE ORAL at 06:44

## 2021-01-01 RX ADMIN — LIDOCAINE HYDROCHLORIDE 40 MG: 20 INJECTION, SOLUTION EPIDURAL; INFILTRATION; INTRACAUDAL; PERINEURAL at 08:49

## 2021-01-01 RX ADMIN — SODIUM CHLORIDE, PRESERVATIVE FREE 1 G: 5 INJECTION INTRAVENOUS at 00:55

## 2021-01-01 RX ADMIN — POTASSIUM CHLORIDE 20 MEQ: 750 TABLET, FILM COATED, EXTENDED RELEASE ORAL at 09:00

## 2021-01-01 RX ADMIN — OXYCODONE HYDROCHLORIDE AND ACETAMINOPHEN 500 MG: 500 TABLET ORAL at 10:32

## 2021-01-01 RX ADMIN — SODIUM CHLORIDE 100 ML/HR: 9 INJECTION, SOLUTION INTRAVENOUS at 23:00

## 2021-01-01 RX ADMIN — SODIUM CHLORIDE 100 ML/HR: 9 INJECTION, SOLUTION INTRAVENOUS at 20:57

## 2021-01-01 RX ADMIN — IOPAMIDOL 100 ML: 755 INJECTION, SOLUTION INTRAVENOUS at 15:21

## 2021-01-01 RX ADMIN — SODIUM CHLORIDE 250 ML: 9 INJECTION, SOLUTION INTRAVENOUS at 11:19

## 2021-01-01 RX ADMIN — PIPERACILLIN AND TAZOBACTAM 3.38 G: 3; .375 INJECTION, POWDER, LYOPHILIZED, FOR SOLUTION INTRAVENOUS at 13:18

## 2021-01-01 RX ADMIN — GUAIFENESIN 600 MG: 600 TABLET, EXTENDED RELEASE ORAL at 12:28

## 2021-01-01 RX ADMIN — SERTRALINE HYDROCHLORIDE 25 MG: 25 TABLET ORAL at 09:48

## 2021-01-01 RX ADMIN — Medication 10 ML: at 02:32

## 2021-01-01 RX ADMIN — BUDESONIDE AND FORMOTEROL FUMARATE DIHYDRATE 2 PUFF: 80; 4.5 AEROSOL RESPIRATORY (INHALATION) at 20:00

## 2021-01-01 RX ADMIN — HYDROMORPHONE HYDROCHLORIDE 1 MG: 1 INJECTION, SOLUTION INTRAMUSCULAR; INTRAVENOUS; SUBCUTANEOUS at 20:34

## 2021-01-01 RX ADMIN — CHLORTHALIDONE 25 MG: 25 TABLET ORAL at 09:26

## 2021-01-01 RX ADMIN — WHITE PETROLATUM,ZINC OXIDE: 57; 17 PASTE TOPICAL at 16:21

## 2021-01-01 RX ADMIN — GUAIFENESIN 600 MG: 600 TABLET, EXTENDED RELEASE ORAL at 21:42

## 2021-01-01 RX ADMIN — POTASSIUM CHLORIDE 20 MEQ: 750 TABLET, FILM COATED, EXTENDED RELEASE ORAL at 10:32

## 2021-01-01 RX ADMIN — DIAZEPAM 5 MG: 5 TABLET ORAL at 21:48

## 2021-01-01 RX ADMIN — HYDROMORPHONE HYDROCHLORIDE 1 MG: 1 INJECTION, SOLUTION INTRAMUSCULAR; INTRAVENOUS; SUBCUTANEOUS at 10:57

## 2021-01-01 RX ADMIN — DILTIAZEM HYDROCHLORIDE 120 MG: 120 CAPSULE, COATED, EXTENDED RELEASE ORAL at 09:45

## 2021-01-01 RX ADMIN — ONDANSETRON 4 MG: 2 INJECTION INTRAMUSCULAR; INTRAVENOUS at 07:34

## 2021-01-01 RX ADMIN — DILTIAZEM HYDROCHLORIDE 120 MG: 120 CAPSULE, COATED, EXTENDED RELEASE ORAL at 09:34

## 2021-01-01 RX ADMIN — OXYCODONE 5 MG: 5 TABLET ORAL at 04:38

## 2021-01-01 RX ADMIN — HYDROMORPHONE HYDROCHLORIDE 1 MG: 1 INJECTION, SOLUTION INTRAMUSCULAR; INTRAVENOUS; SUBCUTANEOUS at 05:06

## 2021-01-01 RX ADMIN — NYSTATIN: 100000 CREAM TOPICAL at 20:17

## 2021-01-01 RX ADMIN — HYDROMORPHONE HYDROCHLORIDE 1 MG: 1 INJECTION, SOLUTION INTRAMUSCULAR; INTRAVENOUS; SUBCUTANEOUS at 13:59

## 2021-01-01 RX ADMIN — PANTOPRAZOLE SODIUM 40 MG: 40 TABLET, DELAYED RELEASE ORAL at 06:02

## 2021-01-01 RX ADMIN — ONDANSETRON 4 MG: 2 INJECTION INTRAMUSCULAR; INTRAVENOUS at 00:47

## 2021-01-01 RX ADMIN — SODIUM CHLORIDE 100 MG: 0.9 INJECTION INTRAVENOUS at 17:10

## 2021-01-01 RX ADMIN — POTASSIUM CHLORIDE 20 MEQ: 750 TABLET, FILM COATED, EXTENDED RELEASE ORAL at 09:27

## 2021-01-01 RX ADMIN — SODIUM CHLORIDE, PRESERVATIVE FREE 1 G: 5 INJECTION INTRAVENOUS at 09:12

## 2021-01-01 RX ADMIN — OXYCODONE 5 MG: 5 TABLET ORAL at 13:48

## 2021-01-01 RX ADMIN — TRAZODONE HYDROCHLORIDE 50 MG: 50 TABLET ORAL at 21:49

## 2021-01-01 RX ADMIN — LACTULOSE 15 ML: 20 SOLUTION ORAL at 21:16

## 2021-01-01 RX ADMIN — OXYCODONE 5 MG: 5 TABLET ORAL at 05:24

## 2021-01-01 RX ADMIN — DIAZEPAM 5 MG: 5 TABLET ORAL at 09:21

## 2021-01-01 RX ADMIN — HYDROMORPHONE HYDROCHLORIDE 1 MG: 1 INJECTION, SOLUTION INTRAMUSCULAR; INTRAVENOUS; SUBCUTANEOUS at 07:13

## 2021-01-01 RX ADMIN — PIPERACILLIN AND TAZOBACTAM 3.38 G: 3; .375 INJECTION, POWDER, LYOPHILIZED, FOR SOLUTION INTRAVENOUS at 13:41

## 2021-01-01 RX ADMIN — GUAIFENESIN 600 MG: 600 TABLET, EXTENDED RELEASE ORAL at 10:24

## 2021-01-01 RX ADMIN — GUAIFENESIN 600 MG: 600 TABLET, EXTENDED RELEASE ORAL at 20:46

## 2021-01-01 RX ADMIN — WHITE PETROLATUM,ZINC OXIDE: 57; 17 PASTE TOPICAL at 16:51

## 2021-01-01 RX ADMIN — SODIUM CHLORIDE, PRESERVATIVE FREE 1 G: 5 INJECTION INTRAVENOUS at 01:00

## 2021-01-01 RX ADMIN — Medication 100 MG: at 09:21

## 2021-01-01 RX ADMIN — HYDROMORPHONE HYDROCHLORIDE 1 MG: 1 INJECTION, SOLUTION INTRAMUSCULAR; INTRAVENOUS; SUBCUTANEOUS at 09:40

## 2021-01-01 RX ADMIN — HYDROMORPHONE HYDROCHLORIDE 1 MG: 1 INJECTION, SOLUTION INTRAMUSCULAR; INTRAVENOUS; SUBCUTANEOUS at 19:07

## 2021-01-01 RX ADMIN — ACETAMINOPHEN 650 MG: 325 TABLET ORAL at 23:42

## 2021-01-01 RX ADMIN — ATORVASTATIN CALCIUM 40 MG: 40 TABLET, FILM COATED ORAL at 09:48

## 2021-01-01 RX ADMIN — LACTULOSE 15 ML: 20 SOLUTION ORAL at 11:36

## 2021-01-01 RX ADMIN — HYDROMORPHONE HYDROCHLORIDE 1 MG: 1 INJECTION, SOLUTION INTRAMUSCULAR; INTRAVENOUS; SUBCUTANEOUS at 03:31

## 2021-01-01 RX ADMIN — TRAZODONE HYDROCHLORIDE 50 MG: 50 TABLET ORAL at 20:17

## 2021-01-01 RX ADMIN — PIPERACILLIN AND TAZOBACTAM 3.38 G: 3; .375 INJECTION, POWDER, LYOPHILIZED, FOR SOLUTION INTRAVENOUS at 05:54

## 2021-01-01 RX ADMIN — SODIUM CHLORIDE 200 MG: 9 INJECTION, SOLUTION INTRAVENOUS at 17:06

## 2021-01-01 RX ADMIN — PIPERACILLIN AND TAZOBACTAM 3.38 G: 3; .375 INJECTION, POWDER, LYOPHILIZED, FOR SOLUTION INTRAVENOUS at 21:00

## 2021-01-01 RX ADMIN — SODIUM CHLORIDE 50 ML/HR: 4.5 INJECTION, SOLUTION INTRAVENOUS at 09:54

## 2021-01-01 RX ADMIN — CHLORTHALIDONE 25 MG: 25 TABLET ORAL at 09:48

## 2021-01-01 RX ADMIN — SERTRALINE HYDROCHLORIDE 25 MG: 25 TABLET ORAL at 09:20

## 2021-01-01 RX ADMIN — GUAIFENESIN 600 MG: 600 TABLET, EXTENDED RELEASE ORAL at 09:50

## 2021-01-01 RX ADMIN — Medication 1000 UNITS: at 09:48

## 2021-01-01 RX ADMIN — LIDOCAINE HYDROCHLORIDE 30 ML: 20 SOLUTION OROPHARYNGEAL at 20:11

## 2021-01-01 RX ADMIN — ONDANSETRON 4 MG: 2 INJECTION INTRAMUSCULAR; INTRAVENOUS at 12:32

## 2021-01-01 RX ADMIN — POTASSIUM CHLORIDE 20 MEQ: 750 TABLET, FILM COATED, EXTENDED RELEASE ORAL at 10:18

## 2021-01-01 RX ADMIN — GUAIFENESIN 600 MG: 600 TABLET, EXTENDED RELEASE ORAL at 21:15

## 2021-01-01 RX ADMIN — POLYETHYLENE GLYCOL 3350 17 G: 17 POWDER, FOR SOLUTION ORAL at 09:24

## 2021-01-01 RX ADMIN — Medication 500 MG: at 09:28

## 2021-01-01 RX ADMIN — HYDROMORPHONE HYDROCHLORIDE 1 MG: 1 INJECTION, SOLUTION INTRAMUSCULAR; INTRAVENOUS; SUBCUTANEOUS at 01:25

## 2021-01-01 RX ADMIN — PROPOFOL 50 MG: 10 INJECTION, EMULSION INTRAVENOUS at 09:13

## 2021-01-01 RX ADMIN — ONDANSETRON 4 MG: 2 INJECTION INTRAMUSCULAR; INTRAVENOUS at 21:33

## 2021-01-01 RX ADMIN — Medication 10 ML: at 06:58

## 2021-01-01 RX ADMIN — Medication 1000 UNITS: at 09:11

## 2021-01-01 RX ADMIN — ONDANSETRON 4 MG: 2 INJECTION INTRAMUSCULAR; INTRAVENOUS at 11:32

## 2021-01-01 RX ADMIN — Medication 1000 UNITS: at 11:30

## 2021-01-01 RX ADMIN — HYDROMORPHONE HYDROCHLORIDE 1 MG: 1 INJECTION, SOLUTION INTRAMUSCULAR; INTRAVENOUS; SUBCUTANEOUS at 20:00

## 2021-01-01 RX ADMIN — SODIUM CHLORIDE 50 ML/HR: 4.5 INJECTION, SOLUTION INTRAVENOUS at 03:58

## 2021-01-01 RX ADMIN — OXYCODONE 5 MG: 5 TABLET ORAL at 03:37

## 2021-01-01 RX ADMIN — WHITE PETROLATUM,ZINC OXIDE: 57; 17 PASTE TOPICAL at 16:06

## 2021-01-01 RX ADMIN — SODIUM CHLORIDE, PRESERVATIVE FREE 1 G: 5 INJECTION INTRAVENOUS at 00:46

## 2021-01-01 RX ADMIN — CHLORTHALIDONE 25 MG: 25 TABLET ORAL at 10:18

## 2021-01-01 RX ADMIN — NYSTATIN: 100000 CREAM TOPICAL at 09:23

## 2021-01-01 RX ADMIN — ATORVASTATIN CALCIUM 40 MG: 40 TABLET, FILM COATED ORAL at 09:01

## 2021-01-01 RX ADMIN — ALBUTEROL SULFATE 2 PUFF: 108 INHALANT RESPIRATORY (INHALATION) at 08:28

## 2021-01-01 RX ADMIN — LACTULOSE 15 ML: 20 SOLUTION ORAL at 20:18

## 2021-01-01 RX ADMIN — DEXAMETHASONE SODIUM PHOSPHATE 4 MG: 4 INJECTION, SOLUTION INTRA-ARTICULAR; INTRALESIONAL; INTRAMUSCULAR; INTRAVENOUS; SOFT TISSUE at 08:58

## 2021-01-01 RX ADMIN — ONDANSETRON 4 MG: 2 INJECTION INTRAMUSCULAR; INTRAVENOUS at 16:50

## 2021-01-01 RX ADMIN — IOPAMIDOL 100 ML: 755 INJECTION, SOLUTION INTRAVENOUS at 12:26

## 2021-01-01 RX ADMIN — ONDANSETRON 4 MG: 2 INJECTION INTRAMUSCULAR; INTRAVENOUS at 02:15

## 2021-01-01 RX ADMIN — APIXABAN 5 MG: 5 TABLET, FILM COATED ORAL at 09:45

## 2021-01-01 RX ADMIN — SERTRALINE HYDROCHLORIDE 25 MG: 25 TABLET ORAL at 10:35

## 2021-01-01 RX ADMIN — SODIUM CHLORIDE, PRESERVATIVE FREE 1 G: 5 INJECTION INTRAVENOUS at 18:06

## 2021-01-01 RX ADMIN — HYDROMORPHONE HYDROCHLORIDE 1 MG: 1 INJECTION, SOLUTION INTRAMUSCULAR; INTRAVENOUS; SUBCUTANEOUS at 15:54

## 2021-01-01 RX ADMIN — ONDANSETRON 4 MG: 2 INJECTION INTRAMUSCULAR; INTRAVENOUS at 08:58

## 2021-01-01 RX ADMIN — HYDROMORPHONE HYDROCHLORIDE 1 MG: 1 INJECTION, SOLUTION INTRAMUSCULAR; INTRAVENOUS; SUBCUTANEOUS at 05:59

## 2021-01-01 RX ADMIN — SERTRALINE HYDROCHLORIDE 25 MG: 25 TABLET ORAL at 10:33

## 2021-01-01 RX ADMIN — HYDROMORPHONE HYDROCHLORIDE 1 MG: 1 INJECTION, SOLUTION INTRAMUSCULAR; INTRAVENOUS; SUBCUTANEOUS at 05:37

## 2021-01-01 RX ADMIN — TRAZODONE HYDROCHLORIDE 50 MG: 50 TABLET ORAL at 21:53

## 2021-01-01 RX ADMIN — SODIUM CHLORIDE 50 ML/HR: 9 INJECTION, SOLUTION INTRAVENOUS at 13:35

## 2021-01-01 RX ADMIN — SODIUM CHLORIDE, PRESERVATIVE FREE 40 MG: 5 INJECTION INTRAVENOUS at 08:14

## 2021-01-01 RX ADMIN — SODIUM CHLORIDE, PRESERVATIVE FREE 1 G: 5 INJECTION INTRAVENOUS at 16:11

## 2021-01-01 RX ADMIN — APIXABAN 5 MG: 5 TABLET, FILM COATED ORAL at 10:06

## 2021-01-01 RX ADMIN — LEVOFLOXACIN 500 MG: 5 INJECTION, SOLUTION INTRAVENOUS at 09:06

## 2021-01-01 RX ADMIN — WHITE PETROLATUM,ZINC OXIDE: 57; 17 PASTE TOPICAL at 16:11

## 2021-01-01 RX ADMIN — Medication 100 MG: at 21:25

## 2021-01-01 RX ADMIN — SODIUM CHLORIDE, PRESERVATIVE FREE 40 MG: 5 INJECTION INTRAVENOUS at 10:25

## 2021-01-01 RX ADMIN — CHLORTHALIDONE 25 MG: 25 TABLET ORAL at 09:20

## 2021-01-01 RX ADMIN — HYDROMORPHONE HYDROCHLORIDE 1 MG: 1 INJECTION, SOLUTION INTRAMUSCULAR; INTRAVENOUS; SUBCUTANEOUS at 19:09

## 2021-01-01 RX ADMIN — NYSTATIN: 100000 CREAM TOPICAL at 15:36

## 2021-01-01 RX ADMIN — WHITE PETROLATUM,ZINC OXIDE: 57; 17 PASTE TOPICAL at 22:00

## 2021-01-01 RX ADMIN — LEVOFLOXACIN 500 MG: 5 INJECTION, SOLUTION INTRAVENOUS at 09:23

## 2021-01-01 RX ADMIN — ONDANSETRON 4 MG: 2 INJECTION INTRAMUSCULAR; INTRAVENOUS at 10:39

## 2021-01-01 RX ADMIN — PROCHLORPERAZINE EDISYLATE 10 MG: 5 INJECTION INTRAMUSCULAR; INTRAVENOUS at 18:24

## 2021-01-01 RX ADMIN — FLUCONAZOLE 200 MG: 100 TABLET ORAL at 12:32

## 2021-01-01 RX ADMIN — LACTULOSE 15 ML: 20 SOLUTION ORAL at 20:09

## 2021-01-01 RX ADMIN — HYDROMORPHONE HYDROCHLORIDE 1 MG: 1 INJECTION, SOLUTION INTRAMUSCULAR; INTRAVENOUS; SUBCUTANEOUS at 10:20

## 2021-01-01 RX ADMIN — IOPAMIDOL 100 ML: 755 INJECTION, SOLUTION INTRAVENOUS at 17:42

## 2021-01-01 RX ADMIN — PROPOFOL 20 MG: 10 INJECTION, EMULSION INTRAVENOUS at 10:20

## 2021-01-01 RX ADMIN — PANTOPRAZOLE SODIUM 40 MG: 40 TABLET, DELAYED RELEASE ORAL at 11:30

## 2021-01-01 RX ADMIN — POTASSIUM CHLORIDE 20 MEQ: 750 TABLET, FILM COATED, EXTENDED RELEASE ORAL at 09:04

## 2021-01-01 RX ADMIN — HYDROMORPHONE HYDROCHLORIDE 1 MG: 1 INJECTION, SOLUTION INTRAMUSCULAR; INTRAVENOUS; SUBCUTANEOUS at 21:26

## 2021-01-01 RX ADMIN — WHITE PETROLATUM,ZINC OXIDE: 57; 17 PASTE TOPICAL at 20:45

## 2021-01-01 RX ADMIN — OXYCODONE 5 MG: 5 TABLET ORAL at 23:16

## 2021-01-01 RX ADMIN — CHLORTHALIDONE 25 MG: 25 TABLET ORAL at 10:20

## 2021-01-01 RX ADMIN — APIXABAN 5 MG: 5 TABLET, FILM COATED ORAL at 20:40

## 2021-01-01 RX ADMIN — POTASSIUM CHLORIDE 20 MEQ: 750 TABLET, FILM COATED, EXTENDED RELEASE ORAL at 11:16

## 2021-01-01 RX ADMIN — POTASSIUM CHLORIDE 20 MEQ: 750 TABLET, FILM COATED, EXTENDED RELEASE ORAL at 09:50

## 2021-01-01 RX ADMIN — Medication 100 MG: at 21:21

## 2021-01-01 RX ADMIN — SODIUM CHLORIDE 125 ML/HR: 9 INJECTION, SOLUTION INTRAVENOUS at 23:54

## 2021-01-01 RX ADMIN — HYDROMORPHONE HYDROCHLORIDE 1 MG: 1 INJECTION, SOLUTION INTRAMUSCULAR; INTRAVENOUS; SUBCUTANEOUS at 02:48

## 2021-01-01 RX ADMIN — PIPERACILLIN AND TAZOBACTAM 3.38 G: 3; .375 INJECTION, POWDER, LYOPHILIZED, FOR SOLUTION INTRAVENOUS at 13:50

## 2021-01-01 RX ADMIN — HYDROMORPHONE HYDROCHLORIDE 1 MG: 1 INJECTION, SOLUTION INTRAMUSCULAR; INTRAVENOUS; SUBCUTANEOUS at 09:16

## 2021-01-01 RX ADMIN — PHYTONADIONE 5 MG: 10 INJECTION, EMULSION INTRAMUSCULAR; INTRAVENOUS; SUBCUTANEOUS at 10:25

## 2021-01-01 RX ADMIN — FUROSEMIDE 40 MG: 10 INJECTION, SOLUTION INTRAMUSCULAR; INTRAVENOUS at 09:26

## 2021-01-01 RX ADMIN — BUDESONIDE AND FORMOTEROL FUMARATE DIHYDRATE 2 PUFF: 80; 4.5 AEROSOL RESPIRATORY (INHALATION) at 07:25

## 2021-01-01 RX ADMIN — Medication 10 ML: at 14:29

## 2021-01-01 RX ADMIN — SODIUM CHLORIDE, PRESERVATIVE FREE 40 MG: 5 INJECTION INTRAVENOUS at 09:45

## 2021-01-01 RX ADMIN — PROPOFOL 50 MG: 10 INJECTION, EMULSION INTRAVENOUS at 09:11

## 2021-01-01 RX ADMIN — Medication 100 MG: at 09:22

## 2021-01-01 RX ADMIN — CYCLOBENZAPRINE 10 MG: 10 TABLET, FILM COATED ORAL at 12:28

## 2021-01-01 RX ADMIN — SERTRALINE HYDROCHLORIDE 25 MG: 25 TABLET ORAL at 09:38

## 2021-01-01 RX ADMIN — WHITE PETROLATUM,ZINC OXIDE: 57; 17 PASTE TOPICAL at 11:08

## 2021-01-01 RX ADMIN — HYDROMORPHONE HYDROCHLORIDE 1 MG: 1 INJECTION, SOLUTION INTRAMUSCULAR; INTRAVENOUS; SUBCUTANEOUS at 06:47

## 2021-01-01 RX ADMIN — TRAZODONE HYDROCHLORIDE 50 MG: 50 TABLET ORAL at 21:08

## 2021-01-01 RX ADMIN — Medication 100 MG: at 21:00

## 2021-01-01 RX ADMIN — HYDROMORPHONE HYDROCHLORIDE 1 MG: 1 INJECTION, SOLUTION INTRAMUSCULAR; INTRAVENOUS; SUBCUTANEOUS at 16:19

## 2021-01-01 RX ADMIN — SODIUM CHLORIDE, PRESERVATIVE FREE 1 G: 5 INJECTION INTRAVENOUS at 01:17

## 2021-01-01 RX ADMIN — PROCHLORPERAZINE EDISYLATE 10 MG: 5 INJECTION INTRAMUSCULAR; INTRAVENOUS at 04:00

## 2021-01-01 RX ADMIN — HYDROMORPHONE HYDROCHLORIDE 1 MG: 1 INJECTION, SOLUTION INTRAMUSCULAR; INTRAVENOUS; SUBCUTANEOUS at 18:24

## 2021-01-01 RX ADMIN — OXYCODONE 5 MG: 5 TABLET ORAL at 05:17

## 2021-01-01 RX ADMIN — ONDANSETRON 4 MG: 2 INJECTION INTRAMUSCULAR; INTRAVENOUS at 13:33

## 2021-01-01 RX ADMIN — Medication 1000 UNITS: at 09:26

## 2021-01-01 RX ADMIN — NYSTATIN: 100000 CREAM TOPICAL at 09:33

## 2021-01-01 RX ADMIN — HYDROMORPHONE HYDROCHLORIDE 1 MG: 1 INJECTION, SOLUTION INTRAMUSCULAR; INTRAVENOUS; SUBCUTANEOUS at 15:20

## 2021-01-01 RX ADMIN — TRAZODONE HYDROCHLORIDE 50 MG: 50 TABLET ORAL at 20:45

## 2021-01-01 RX ADMIN — HYDROMORPHONE HYDROCHLORIDE 1 MG: 1 INJECTION, SOLUTION INTRAMUSCULAR; INTRAVENOUS; SUBCUTANEOUS at 23:05

## 2021-01-01 RX ADMIN — SODIUM CHLORIDE 100 MG: 0.9 INJECTION INTRAVENOUS at 17:34

## 2021-01-01 RX ADMIN — SERTRALINE HYDROCHLORIDE 25 MG: 25 TABLET ORAL at 10:06

## 2021-01-01 RX ADMIN — SODIUM CHLORIDE 100 MG: 0.9 INJECTION INTRAVENOUS at 15:59

## 2021-01-01 RX ADMIN — SODIUM CHLORIDE 100 ML/HR: 9 INJECTION, SOLUTION INTRAVENOUS at 09:30

## 2021-01-01 RX ADMIN — POTASSIUM CHLORIDE 20 MEQ: 750 TABLET, FILM COATED, EXTENDED RELEASE ORAL at 10:35

## 2021-01-01 RX ADMIN — HYDROMORPHONE HYDROCHLORIDE 1 MG: 1 INJECTION, SOLUTION INTRAMUSCULAR; INTRAVENOUS; SUBCUTANEOUS at 11:24

## 2021-01-01 RX ADMIN — SODIUM CHLORIDE 100 ML/HR: 9 INJECTION, SOLUTION INTRAVENOUS at 21:41

## 2021-01-01 RX ADMIN — HYDROMORPHONE HYDROCHLORIDE 1 MG: 1 INJECTION, SOLUTION INTRAMUSCULAR; INTRAVENOUS; SUBCUTANEOUS at 06:26

## 2021-01-01 RX ADMIN — GUAIFENESIN 600 MG: 600 TABLET, EXTENDED RELEASE ORAL at 22:51

## 2021-01-01 RX ADMIN — SODIUM CHLORIDE 20 ML/HR: 9 INJECTION, SOLUTION INTRAVENOUS at 07:59

## 2021-01-01 RX ADMIN — DIAZEPAM 5 MG: 5 TABLET ORAL at 07:59

## 2021-01-01 RX ADMIN — HYDROMORPHONE HYDROCHLORIDE 1 MG: 1 INJECTION, SOLUTION INTRAMUSCULAR; INTRAVENOUS; SUBCUTANEOUS at 10:13

## 2021-01-01 RX ADMIN — APIXABAN 5 MG: 5 TABLET, FILM COATED ORAL at 09:01

## 2021-01-01 RX ADMIN — SUCCINYLCHOLINE CHLORIDE 120 MG: 20 INJECTION, SOLUTION INTRAMUSCULAR; INTRAVENOUS at 08:41

## 2021-01-01 RX ADMIN — SODIUM CHLORIDE, PRESERVATIVE FREE 1 G: 5 INJECTION INTRAVENOUS at 17:06

## 2021-01-01 RX ADMIN — GUAIFENESIN 600 MG: 600 TABLET, EXTENDED RELEASE ORAL at 21:17

## 2021-01-01 RX ADMIN — DILTIAZEM HYDROCHLORIDE 120 MG: 120 CAPSULE, COATED, EXTENDED RELEASE ORAL at 09:48

## 2021-01-01 RX ADMIN — PIPERACILLIN AND TAZOBACTAM 3.38 G: 3; .375 INJECTION, POWDER, LYOPHILIZED, FOR SOLUTION INTRAVENOUS at 13:59

## 2021-01-01 RX ADMIN — LIDOCAINE HYDROCHLORIDE 80 MG: 20 INJECTION, SOLUTION EPIDURAL; INFILTRATION; INTRACAUDAL; PERINEURAL at 10:05

## 2021-01-01 RX ADMIN — HYDROMORPHONE HYDROCHLORIDE 1 MG: 1 INJECTION, SOLUTION INTRAMUSCULAR; INTRAVENOUS; SUBCUTANEOUS at 23:26

## 2021-01-01 RX ADMIN — GUAIFENESIN 600 MG: 600 TABLET, EXTENDED RELEASE ORAL at 20:16

## 2021-01-01 RX ADMIN — CHLORTHALIDONE 25 MG: 25 TABLET ORAL at 10:35

## 2021-01-01 RX ADMIN — HYDROMORPHONE HYDROCHLORIDE 1 MG: 1 INJECTION, SOLUTION INTRAMUSCULAR; INTRAVENOUS; SUBCUTANEOUS at 12:11

## 2021-01-01 RX ADMIN — POTASSIUM CHLORIDE 20 MEQ: 750 TABLET, FILM COATED, EXTENDED RELEASE ORAL at 09:45

## 2021-01-01 RX ADMIN — HYDROMORPHONE HYDROCHLORIDE 1 MG: 1 INJECTION, SOLUTION INTRAMUSCULAR; INTRAVENOUS; SUBCUTANEOUS at 21:35

## 2021-01-01 RX ADMIN — ALBUTEROL SULFATE 2 PUFF: 108 INHALANT RESPIRATORY (INHALATION) at 18:26

## 2021-01-01 RX ADMIN — HYDROMORPHONE HYDROCHLORIDE 1 MG: 1 INJECTION, SOLUTION INTRAMUSCULAR; INTRAVENOUS; SUBCUTANEOUS at 06:41

## 2021-01-01 RX ADMIN — OXYCODONE 5 MG: 5 TABLET ORAL at 21:55

## 2021-01-01 RX ADMIN — OXYCODONE 5 MG: 5 TABLET ORAL at 21:21

## 2021-01-01 RX ADMIN — APIXABAN 5 MG: 5 TABLET, FILM COATED ORAL at 21:24

## 2021-01-01 RX ADMIN — SODIUM CHLORIDE 125 ML/HR: 9 INJECTION, SOLUTION INTRAVENOUS at 13:33

## 2021-01-01 RX ADMIN — SODIUM CHLORIDE, PRESERVATIVE FREE 1 G: 5 INJECTION INTRAVENOUS at 17:34

## 2021-01-01 RX ADMIN — SODIUM CHLORIDE, PRESERVATIVE FREE 40 MG: 5 INJECTION INTRAVENOUS at 09:26

## 2021-01-01 RX ADMIN — POTASSIUM CHLORIDE 20 MEQ: 750 TABLET, FILM COATED, EXTENDED RELEASE ORAL at 10:19

## 2021-01-01 RX ADMIN — SODIUM CHLORIDE, PRESERVATIVE FREE 40 MG: 5 INJECTION INTRAVENOUS at 10:37

## 2021-01-01 RX ADMIN — LACTULOSE 15 ML: 20 SOLUTION ORAL at 09:04

## 2021-01-01 RX ADMIN — Medication 500 MG: at 09:50

## 2021-01-01 RX ADMIN — POTASSIUM CHLORIDE 20 MEQ: 750 TABLET, FILM COATED, EXTENDED RELEASE ORAL at 10:11

## 2021-01-01 RX ADMIN — SERTRALINE HYDROCHLORIDE 25 MG: 25 TABLET ORAL at 09:01

## 2021-01-01 RX ADMIN — HYDROMORPHONE HYDROCHLORIDE 1 MG: 1 INJECTION, SOLUTION INTRAMUSCULAR; INTRAVENOUS; SUBCUTANEOUS at 17:23

## 2021-01-01 RX ADMIN — NYSTATIN: 100000 CREAM TOPICAL at 11:21

## 2021-01-01 RX ADMIN — PIPERACILLIN AND TAZOBACTAM 3.38 G: 3; .375 INJECTION, POWDER, LYOPHILIZED, FOR SOLUTION INTRAVENOUS at 05:58

## 2021-01-01 RX ADMIN — HYDROMORPHONE HYDROCHLORIDE 1 MG: 1 INJECTION, SOLUTION INTRAMUSCULAR; INTRAVENOUS; SUBCUTANEOUS at 17:28

## 2021-01-01 RX ADMIN — HYDROMORPHONE HYDROCHLORIDE 1 MG: 1 INJECTION, SOLUTION INTRAMUSCULAR; INTRAVENOUS; SUBCUTANEOUS at 22:20

## 2021-01-01 RX ADMIN — GUAIFENESIN 600 MG: 600 TABLET, EXTENDED RELEASE ORAL at 09:11

## 2021-01-01 RX ADMIN — SODIUM CHLORIDE, PRESERVATIVE FREE 1 G: 5 INJECTION INTRAVENOUS at 17:00

## 2021-01-01 RX ADMIN — PROPOFOL 100 MG: 10 INJECTION, EMULSION INTRAVENOUS at 08:49

## 2021-01-01 RX ADMIN — GUAIFENESIN 600 MG: 600 TABLET, EXTENDED RELEASE ORAL at 20:34

## 2021-01-01 RX ADMIN — OXYCODONE 5 MG: 5 TABLET ORAL at 11:25

## 2021-01-01 RX ADMIN — PROPOFOL 20 MG: 10 INJECTION, EMULSION INTRAVENOUS at 10:10

## 2021-01-01 RX ADMIN — Medication 100 MG: at 20:16

## 2021-01-01 RX ADMIN — POTASSIUM CHLORIDE 20 MEQ: 750 TABLET, FILM COATED, EXTENDED RELEASE ORAL at 10:14

## 2021-01-01 RX ADMIN — DILTIAZEM HYDROCHLORIDE 120 MG: 120 CAPSULE, COATED, EXTENDED RELEASE ORAL at 09:21

## 2021-01-01 RX ADMIN — SODIUM CHLORIDE 100 ML/HR: 9 INJECTION, SOLUTION INTRAVENOUS at 21:28

## 2021-01-01 RX ADMIN — HYDROMORPHONE HYDROCHLORIDE 1 MG: 1 INJECTION, SOLUTION INTRAMUSCULAR; INTRAVENOUS; SUBCUTANEOUS at 13:32

## 2021-01-01 RX ADMIN — PIPERACILLIN AND TAZOBACTAM 3.38 G: 3; .375 INJECTION, POWDER, LYOPHILIZED, FOR SOLUTION INTRAVENOUS at 05:08

## 2021-01-01 RX ADMIN — Medication 1000 UNITS: at 09:39

## 2021-01-01 RX ADMIN — PANTOPRAZOLE SODIUM 40 MG: 40 TABLET, DELAYED RELEASE ORAL at 09:06

## 2021-01-01 RX ADMIN — HYDROMORPHONE HYDROCHLORIDE 1 MG: 1 INJECTION, SOLUTION INTRAMUSCULAR; INTRAVENOUS; SUBCUTANEOUS at 09:09

## 2021-01-01 RX ADMIN — SODIUM CHLORIDE 100 ML/HR: 9 INJECTION, SOLUTION INTRAVENOUS at 22:03

## 2021-01-01 RX ADMIN — POTASSIUM CHLORIDE 20 MEQ: 750 TABLET, FILM COATED, EXTENDED RELEASE ORAL at 11:30

## 2021-01-01 RX ADMIN — ALBUTEROL SULFATE 2 PUFF: 108 INHALANT RESPIRATORY (INHALATION) at 13:20

## 2021-01-01 RX ADMIN — Medication 100 MG: at 22:51

## 2021-01-01 RX ADMIN — PROPOFOL 50 MG: 10 INJECTION, EMULSION INTRAVENOUS at 09:17

## 2021-01-01 RX ADMIN — HYDROMORPHONE HYDROCHLORIDE 1 MG: 1 INJECTION, SOLUTION INTRAMUSCULAR; INTRAVENOUS; SUBCUTANEOUS at 01:12

## 2021-01-01 RX ADMIN — HYDROMORPHONE HYDROCHLORIDE 1 MG: 1 INJECTION, SOLUTION INTRAMUSCULAR; INTRAVENOUS; SUBCUTANEOUS at 00:42

## 2021-01-01 RX ADMIN — PROPOFOL 100 MG: 10 INJECTION, EMULSION INTRAVENOUS at 08:56

## 2021-01-01 RX ADMIN — ATORVASTATIN CALCIUM 40 MG: 40 TABLET, FILM COATED ORAL at 09:04

## 2021-01-01 RX ADMIN — FLUDEOXYGLUCOSE F-18 10.52 MILLICURIE: 200 INJECTION INTRAVENOUS at 12:26

## 2021-01-01 RX ADMIN — SERTRALINE HYDROCHLORIDE 25 MG: 25 TABLET ORAL at 09:26

## 2021-01-01 RX ADMIN — SODIUM CHLORIDE 100 ML/HR: 9 INJECTION, SOLUTION INTRAVENOUS at 01:12

## 2021-01-01 RX ADMIN — GUAIFENESIN 600 MG: 600 TABLET, EXTENDED RELEASE ORAL at 21:20

## 2021-01-01 RX ADMIN — TRAZODONE HYDROCHLORIDE 50 MG: 50 TABLET ORAL at 21:45

## 2021-01-01 RX ADMIN — CHLORTHALIDONE 25 MG: 25 TABLET ORAL at 10:55

## 2021-01-01 RX ADMIN — OXYCODONE 5 MG: 5 TABLET ORAL at 08:10

## 2021-01-01 RX ADMIN — Medication 1000 UNITS: at 09:51

## 2021-01-01 RX ADMIN — ALBUTEROL SULFATE 2 PUFF: 108 INHALANT RESPIRATORY (INHALATION) at 00:21

## 2021-01-01 RX ADMIN — HYDROMORPHONE HYDROCHLORIDE 1 MG: 1 INJECTION, SOLUTION INTRAMUSCULAR; INTRAVENOUS; SUBCUTANEOUS at 01:38

## 2021-01-01 RX ADMIN — SODIUM CHLORIDE, PRESERVATIVE FREE 1 G: 5 INJECTION INTRAVENOUS at 09:25

## 2021-01-01 RX ADMIN — FUROSEMIDE 40 MG: 10 INJECTION, SOLUTION INTRAMUSCULAR; INTRAVENOUS at 10:54

## 2021-01-01 RX ADMIN — NYSTATIN: 100000 CREAM TOPICAL at 20:49

## 2021-01-01 RX ADMIN — CHLORTHALIDONE 25 MG: 25 TABLET ORAL at 09:51

## 2021-01-01 RX ADMIN — SODIUM CHLORIDE 100 ML/HR: 9 INJECTION, SOLUTION INTRAVENOUS at 14:41

## 2021-01-01 RX ADMIN — ONDANSETRON 4 MG: 2 INJECTION INTRAMUSCULAR; INTRAVENOUS at 15:59

## 2021-01-01 RX ADMIN — Medication 500 MG: at 09:00

## 2021-01-01 RX ADMIN — TRAZODONE HYDROCHLORIDE 50 MG: 50 TABLET ORAL at 21:42

## 2021-01-01 RX ADMIN — OXYCODONE 5 MG: 5 TABLET ORAL at 00:45

## 2021-01-01 RX ADMIN — SERTRALINE HYDROCHLORIDE 25 MG: 25 TABLET ORAL at 10:12

## 2021-01-01 RX ADMIN — GUAIFENESIN 600 MG: 600 TABLET, EXTENDED RELEASE ORAL at 09:01

## 2021-01-01 RX ADMIN — Medication 1000 UNITS: at 10:12

## 2021-01-01 RX ADMIN — TRAZODONE HYDROCHLORIDE 50 MG: 50 TABLET ORAL at 20:47

## 2021-01-01 RX ADMIN — PROCHLORPERAZINE EDISYLATE 10 MG: 5 INJECTION INTRAMUSCULAR; INTRAVENOUS at 03:20

## 2021-01-01 RX ADMIN — WHITE PETROLATUM,ZINC OXIDE: 57; 17 PASTE TOPICAL at 16:00

## 2021-01-01 RX ADMIN — ATORVASTATIN CALCIUM 40 MG: 40 TABLET, FILM COATED ORAL at 10:31

## 2021-01-01 RX ADMIN — HYDROMORPHONE HYDROCHLORIDE 1 MG: 1 INJECTION, SOLUTION INTRAMUSCULAR; INTRAVENOUS; SUBCUTANEOUS at 20:47

## 2021-01-01 RX ADMIN — WHITE PETROLATUM,ZINC OXIDE: 57; 17 PASTE TOPICAL at 09:33

## 2021-01-01 RX ADMIN — POTASSIUM CHLORIDE 10 MEQ: 7.46 INJECTION, SOLUTION INTRAVENOUS at 09:40

## 2021-01-01 RX ADMIN — SODIUM PHOSPHATE 1 ENEMA: 7; 19 ENEMA RECTAL at 11:10

## 2021-01-01 RX ADMIN — HYDROMORPHONE HYDROCHLORIDE 1 MG: 1 INJECTION, SOLUTION INTRAMUSCULAR; INTRAVENOUS; SUBCUTANEOUS at 15:56

## 2021-01-01 RX ADMIN — SODIUM CHLORIDE, PRESERVATIVE FREE 1 G: 5 INJECTION INTRAVENOUS at 10:25

## 2021-01-01 RX ADMIN — SODIUM CHLORIDE, PRESERVATIVE FREE 40 MG: 5 INJECTION INTRAVENOUS at 10:19

## 2021-01-01 RX ADMIN — DILTIAZEM HYDROCHLORIDE 120 MG: 120 CAPSULE, COATED, EXTENDED RELEASE ORAL at 11:30

## 2021-01-01 RX ADMIN — ATORVASTATIN CALCIUM 40 MG: 40 TABLET, FILM COATED ORAL at 09:45

## 2021-01-01 RX ADMIN — OXYCODONE 5 MG: 5 TABLET ORAL at 10:35

## 2021-01-01 RX ADMIN — SODIUM CHLORIDE 125 ML/HR: 9 INJECTION, SOLUTION INTRAVENOUS at 06:01

## 2021-01-01 RX ADMIN — WHITE PETROLATUM,ZINC OXIDE: 57; 17 PASTE TOPICAL at 21:34

## 2021-01-01 RX ADMIN — TRAZODONE HYDROCHLORIDE 50 MG: 50 TABLET ORAL at 21:32

## 2021-01-01 RX ADMIN — APIXABAN 5 MG: 5 TABLET, FILM COATED ORAL at 12:00

## 2021-01-01 RX ADMIN — POTASSIUM CHLORIDE 20 MEQ: 750 TABLET, FILM COATED, EXTENDED RELEASE ORAL at 09:48

## 2021-01-01 RX ADMIN — PIPERACILLIN AND TAZOBACTAM 3.38 G: 3; .375 INJECTION, POWDER, LYOPHILIZED, FOR SOLUTION INTRAVENOUS at 14:15

## 2021-01-01 RX ADMIN — DILTIAZEM HYDROCHLORIDE 120 MG: 120 CAPSULE, COATED, EXTENDED RELEASE ORAL at 09:38

## 2021-01-01 RX ADMIN — SODIUM CHLORIDE, PRESERVATIVE FREE 1 G: 5 INJECTION INTRAVENOUS at 09:27

## 2021-01-01 RX ADMIN — SERTRALINE HYDROCHLORIDE 25 MG: 25 TABLET ORAL at 09:27

## 2021-01-01 RX ADMIN — SODIUM CHLORIDE 100 ML/HR: 9 INJECTION, SOLUTION INTRAVENOUS at 01:50

## 2021-01-01 RX ADMIN — CYCLOBENZAPRINE 10 MG: 10 TABLET, FILM COATED ORAL at 09:21

## 2021-01-01 RX ADMIN — SODIUM CHLORIDE, PRESERVATIVE FREE 1 G: 5 INJECTION INTRAVENOUS at 23:17

## 2021-01-01 RX ADMIN — DIATRIZOATE MEGLUMINE AND DIATRIZOATE SODIUM 30 ML: 660; 100 LIQUID ORAL; RECTAL at 15:41

## 2021-01-01 RX ADMIN — Medication 1000 UNITS: at 09:27

## 2021-01-01 RX ADMIN — DIAZEPAM 5 MG: 5 TABLET ORAL at 14:33

## 2021-01-01 RX ADMIN — HYDROCODONE BITARTRATE AND ACETAMINOPHEN 1 TABLET: 5; 325 TABLET ORAL at 06:01

## 2021-01-01 RX ADMIN — ONDANSETRON 4 MG: 2 INJECTION INTRAMUSCULAR; INTRAVENOUS at 15:50

## 2021-01-01 RX ADMIN — DIAZEPAM 5 MG: 5 TABLET ORAL at 22:51

## 2021-01-01 RX ADMIN — WHITE PETROLATUM,ZINC OXIDE: 57; 17 PASTE TOPICAL at 21:42

## 2021-01-01 RX ADMIN — HYDROMORPHONE HYDROCHLORIDE 1 MG: 1 INJECTION, SOLUTION INTRAMUSCULAR; INTRAVENOUS; SUBCUTANEOUS at 14:24

## 2021-01-01 RX ADMIN — HYDROMORPHONE HYDROCHLORIDE 1 MG: 1 INJECTION, SOLUTION INTRAMUSCULAR; INTRAVENOUS; SUBCUTANEOUS at 03:07

## 2021-01-01 RX ADMIN — SODIUM CHLORIDE 100 ML/HR: 9 INJECTION, SOLUTION INTRAVENOUS at 10:14

## 2021-01-01 RX ADMIN — ONDANSETRON 4 MG: 2 INJECTION INTRAMUSCULAR; INTRAVENOUS at 21:17

## 2021-01-01 RX ADMIN — PIPERACILLIN AND TAZOBACTAM 3.38 G: 3; .375 INJECTION, POWDER, LYOPHILIZED, FOR SOLUTION INTRAVENOUS at 05:43

## 2021-01-01 RX ADMIN — Medication 1000 UNITS: at 10:55

## 2021-01-01 RX ADMIN — NYSTATIN: 100000 CREAM TOPICAL at 09:27

## 2021-01-01 RX ADMIN — Medication 1000 UNITS: at 09:19

## 2021-01-01 RX ADMIN — PIPERACILLIN AND TAZOBACTAM 3.38 G: 3; .375 INJECTION, POWDER, LYOPHILIZED, FOR SOLUTION INTRAVENOUS at 22:16

## 2021-01-01 RX ADMIN — GUAIFENESIN 600 MG: 600 TABLET, EXTENDED RELEASE ORAL at 21:49

## 2021-01-01 RX ADMIN — OXYCODONE 5 MG: 5 TABLET ORAL at 05:43

## 2021-01-01 RX ADMIN — DILTIAZEM HYDROCHLORIDE 120 MG: 120 CAPSULE, COATED, EXTENDED RELEASE ORAL at 10:17

## 2021-07-04 NOTE — ED TRIAGE NOTES
Pt complains of HTN that has been an ongoing problem, also complains of racing heartrate, also complains of head lice/mites for 2 months

## 2021-07-04 NOTE — ED PROVIDER NOTES
EMERGENCY DEPARTMENT HISTORY AND PHYSICAL EXAM      Date: 7/4/2021  Patient Name: Nasir Rivers    History of Presenting Illness     Chief Complaint   Patient presents with    Hypertension    Head Lice       History Provided By: Patient    HPI: Nasir Rivers, 70 y.o. female with a past medical history significant diabetes and hypertension presents to the ED with cc of concern for head lice, scabies. Patient states that she has taken multiple medications over-the-counter with occasional relief however symptoms continue to resume. Complains of itching to base of scalp hands and feet. Additionally patient states that her blood pressure has been going up and down the last several days and has noticed her pulse rate has been fluctuating. Patient denies any chest pain, denies any shortness of breath. There are no other complaints, changes, or physical findings at this time. PCP: Irene Giron MD    No current facility-administered medications on file prior to encounter. Current Outpatient Medications on File Prior to Encounter   Medication Sig Dispense Refill    tiotropium bromide (Spiriva Respimat) 1.25 mcg/actuation inhaler Take 2 Puffs by inhalation daily. 1 Inhaler 0    tiotropium (Spiriva with HandiHaler) 18 mcg inhalation capsule Take 1 Cap by inhalation two (2) times a day.  budesonide-formoteroL (Symbicort) 80-4.5 mcg/actuation HFAA Take 2 Puffs by inhalation.  albuterol (PROVENTIL HFA, VENTOLIN HFA, PROAIR HFA) 90 mcg/actuation inhaler Take  by inhalation.  potassium chloride SR (KLOR-CON 10) 10 mEq tablet Take 20 mEq by mouth daily.  bumetanide (BUMEX) 1 mg tablet Take 1 mg by mouth daily.  dilTIAZem ER (DILACOR XR) 120 mg capsule Take 120 mg by mouth daily.  atorvastatin (LIPITOR) 40 mg tablet Take 40 mg by mouth daily.  diazePAM (VALIUM) 5 mg tablet Take 5 mg by mouth every six (6) hours as needed for Anxiety.       famotidine (PEPCID) 40 mg tablet Take 40 mg by mouth daily.  ascorbic acid, vitamin C, (Vitamin C) 500 mg tablet Take 500 mg by mouth daily.  cholecalciferol (Vitamin D3) (1000 Units /25 mcg) tablet Take 1,000 Units by mouth daily.  sertraline (Zoloft) 25 mg tablet Take 25 mg by mouth daily. Past History     Past Medical History:  Past Medical History:   Diagnosis Date    A-fib [facility])     CHF (congestive heart failure) ([facility] [address]     Clear cell renal cell carcinoma (HCC)     COPD (chronic obstructive pulmonary disease) (HCC)     Fibromyalgia     GERD (gastroesophageal reflux disease)     Lymphedema     Sjogren's syndrome ([facility] [address]     Thyroid nodule        Past Surgical History:  Past Surgical History:   Procedure Laterality Date    COLONOSCOPY N/A 11/18/2020    COLONOSCOPY performed by Mei Palomo MD at [address] [facility] HX GI      EGD    HX HYSTERECTOMY      HX NEPHRECTOMY Right 2019    HX ORTHOPAEDIC      ankle       Family History:  Family History   Problem Relation Age of Onset    Hypertension Mother        Social History:  Social History     Tobacco Use    Smoking status: Current Every Day Smoker     Packs/day: 0.50     Types: Cigarettes    Smokeless tobacco: Never Used   Substance Use Topics    Alcohol use: Not Currently    Drug use: Never       Allergies:  No Known Allergies      Review of Systems     Review of Systems   Constitutional: Negative for activity change, chills, fatigue and fever. HENT: Negative for congestion and trouble swallowing. Eyes: Negative for photophobia and visual disturbance. Respiratory: Negative for cough, chest tightness and shortness of breath. Cardiovascular: Negative for chest pain and palpitations. Gastrointestinal: Negative for abdominal distention, abdominal pain, diarrhea, nausea and vomiting. Genitourinary: Negative for dysuria, flank pain and frequency. Musculoskeletal: Negative for arthralgias, back pain and myalgias.    Skin: Negative for color change, pallor and rash. Neurological: Negative for dizziness, tremors, weakness and headaches. Psychiatric/Behavioral: Negative for confusion. Physical Exam     Physical Exam  Vitals and nursing note reviewed. Constitutional:       General: She is not in acute distress. Appearance: Normal appearance. She is normal weight. She is not ill-appearing. HENT:      Head: Normocephalic and atraumatic. Nose: Nose normal.      Mouth/Throat:      Mouth: Mucous membranes are moist.   Eyes:      Extraocular Movements: Extraocular movements intact. Pupils: Pupils are equal, round, and reactive to light. Cardiovascular:      Rate and Rhythm: Normal rate and regular rhythm. Pulses: Normal pulses. Pulmonary:      Effort: Pulmonary effort is normal.   Abdominal:      General: Abdomen is flat. Bowel sounds are normal.      Palpations: Abdomen is soft. Tenderness: There is no abdominal tenderness. There is no guarding. Musculoskeletal:         General: No tenderness. Normal range of motion. Cervical back: Normal range of motion and neck supple. No muscular tenderness. Skin:     General: Skin is warm and dry. Neurological:      General: No focal deficit present. Mental Status: She is alert and oriented to person, place, and time. Sensory: No sensory deficit. Motor: No weakness.          Diagnostic Study Results     Labs -     Recent Results (from the past 12 hour(s))   CBC WITH AUTOMATED DIFF    Collection Time: 07/04/21  1:25 PM   Result Value Ref Range    WBC 8.6 3.6 - 11.0 K/uL    RBC 5.22 (H) 3.80 - 5.20 M/uL    HGB 14.4 11.5 - 16.0 g/dL    HCT 44.3 35.0 - 47.0 %    MCV 84.9 80.0 - 99.0 FL    MCH 27.6 26.0 - 34.0 PG    MCHC 32.5 30.0 - 36.5 g/dL    RDW 15.0 (H) 11.5 - 14.5 %    PLATELET 573 142 - 449 K/uL    MPV 10.8 8.9 - 12.9 FL    NRBC 0.0 0.0  WBC    ABSOLUTE NRBC 0.00 0.00 - 0.01 K/uL    NEUTROPHILS 73 32 - 75 %    LYMPHOCYTES 20 12 - 49 %    MONOCYTES 5 5 - 13 %    EOSINOPHILS 1 0 - 7 %    BASOPHILS 1 0 - 1 %    IMMATURE GRANULOCYTES 0 0 - 0.5 %    ABS. NEUTROPHILS 6.2 1.8 - 8.0 K/UL    ABS. LYMPHOCYTES 1.7 0.8 - 3.5 K/UL    ABS. MONOCYTES 0.4 0.0 - 1.0 K/UL    ABS. EOSINOPHILS 0.1 0.0 - 0.4 K/UL    ABS. BASOPHILS 0.1 0.0 - 0.1 K/UL    ABS. IMM. GRANS. 0.0 0.00 - 0.04 K/UL    DF AUTOMATED     METABOLIC PANEL, COMPREHENSIVE    Collection Time: 07/04/21  1:25 PM   Result Value Ref Range    Sodium 137 136 - 145 mmol/L    Potassium 2.7 (LL) 3.5 - 5.1 mmol/L    Chloride 102 97 - 108 mmol/L    CO2 30 21 - 32 mmol/L    Anion gap 5 5 - 15 mmol/L    Glucose 147 (H) 65 - 100 mg/dL    BUN 15 6 - 20 mg/dL    Creatinine 1.34 (H) 0.55 - 1.02 mg/dL    BUN/Creatinine ratio 11 (L) 12 - 20      GFR est AA 47 (L) >60 ml/min/1.73m2    GFR est non-AA 39 (L) >60 ml/min/1.73m2    Calcium 8.9 8.5 - 10.1 mg/dL    Bilirubin, total 0.3 0.2 - 1.0 mg/dL    AST (SGOT) 12 (L) 15 - 37 U/L    ALT (SGPT) 20 12 - 78 U/L    Alk. phosphatase 101 45 - 117 U/L    Protein, total 6.7 6.4 - 8.2 g/dL    Albumin 3.2 (L) 3.5 - 5.0 g/dL    Globulin 3.5 2.0 - 4.0 g/dL    A-G Ratio 0.9 (L) 1.1 - 2.2     TROPONIN I    Collection Time: 07/04/21  1:25 PM   Result Value Ref Range    Troponin-I, Qt. <0.05 <0.05 ng/mL   BNP    Collection Time: 07/04/21  1:25 PM   Result Value Ref Range    NT pro- (H) <125 pg/mL       Radiologic Studies -   @lastxrresult@  CT Results  (Last 48 hours)    None        CXR Results  (Last 48 hours)               07/04/21 1339  XR CHEST PORT Final result    Impression:  No acute findings. Narrative:  Portable upright radiograph chest 1:27 p.m. compared to November 13, 2020. INDICATION: Hypertension. Patient has taken a better depth of inspiration. Heart size is enlarged with an   atherosclerotic aorta. No gross infiltrate or effusion. No pneumothorax. Bones   appear intact. Medical Decision Making   I am the first provider for this patient.     I reviewed the vital signs, available nursing notes, past medical history, past surgical history, family history and social history. Vital Signs-Reviewed the patient's vital signs. Patient Vitals for the past 12 hrs:   Temp Pulse Resp BP SpO2   07/04/21 1527 -- 81 18 (!) 128/97 95 %   07/04/21 1405 -- 86 19 132/69 95 %   07/04/21 1328 -- 78 18 121/71 96 %   07/04/21 1259 98.9 °F (37.2 °C) 94 18 (!) 162/86 94 %       Records Reviewed: Nursing Notes    The patient presents with lice, scabies with a differential diagnosis of insect infestation, atopic dermatitis allergic reaction      Provider Notes (Medical Decision Making):     MDM     53-year-old female, past medical history of hypertension, diabetes, presents to emergency department for reported lice infestation, patient also concerned about body lice, states she thinks she has mites in her hands and feet. Physical exam shows well-appearing female, no distress, on my scalp assessment I did not note any life lice, however patient does have excoriated erythematous lesions to posterior neck at hairline, consistent with possible mite infestation. Patient additionally has occasional erythematous spot to hands and feet bilateral.    Basic lab works drawn in triage, blood pressures have been stable since arrival, lab work reviewed. Only abnormality in metabolic panel shows hypokalemia 2.7, patient is on diuretic states she is supposed to take potassium has not been taking recently. Will order potassium IV and p.o. supplements. Discussed with pharmacist regarding body lice and head lice, per reason can use for both, will discharge patient home with prescription for permethrin cream instructed to apply at night to go to sleep and wash the next morning. ED Course:   Initial assessment performed. The patients presenting problems have been discussed, and they are in agreement with the care plan formulated and outlined with them.   I have encouraged them to ask questions as they arise throughout their visit. ED Course as of Jul 04 1532   Ronna Bridges Jul 04, 2021   6616 Patient refusing IV or p.o. potassium, states she has potassium at home, is noncompliant, states she will just take it when she gets home and cannot need to take as prescribed. Would like to go home with prescription for lice. [PZ]      ED Course User Index  [PZ] Shai Paul MD         PROCEDURES  Procedures         PLAN:  1. Discharge Medication List as of 7/4/2021  3:01 PM      START taking these medications    Details   permethrin (ACTICIN) 5 % topical cream Apply to body for 8-14 hours, then wash off, Normal, Disp-60 g, R-0         CONTINUE these medications which have NOT CHANGED    Details   tiotropium bromide (Spiriva Respimat) 1.25 mcg/actuation inhaler Take 2 Puffs by inhalation daily. , Normal, Disp-1 Inhaler,R-0      tiotropium (Spiriva with HandiHaler) 18 mcg inhalation capsule Take 1 Cap by inhalation two (2) times a day., Historical Med      budesonide-formoteroL (Symbicort) 80-4.5 mcg/actuation HFAA Take 2 Puffs by inhalation. , Historical Med      albuterol (PROVENTIL HFA, VENTOLIN HFA, PROAIR HFA) 90 mcg/actuation inhaler Take  by inhalation. , Historical Med      potassium chloride SR (KLOR-CON 10) 10 mEq tablet Take 20 mEq by mouth daily. , Historical Med      bumetanide (BUMEX) 1 mg tablet Take 1 mg by mouth daily. , Historical Med      dilTIAZem ER (DILACOR XR) 120 mg capsule Take 120 mg by mouth daily. , Historical Med      atorvastatin (LIPITOR) 40 mg tablet Take 40 mg by mouth daily. , Historical Med      diazePAM (VALIUM) 5 mg tablet Take 5 mg by mouth every six (6) hours as needed for Anxiety. , Historical Med      famotidine (PEPCID) 40 mg tablet Take 40 mg by mouth daily. , Historical Med      ascorbic acid, vitamin C, (Vitamin C) 500 mg tablet Take 500 mg by mouth daily. , Historical Med      cholecalciferol (Vitamin D3) (1000 Units /25 mcg) tablet Take 1,000 Units by mouth daily., Historical Med      sertraline (Zoloft) 25 mg tablet Take 25 mg by mouth daily. , Historical Med           2. Follow-up Information     Follow up With Specialties Details Why Contact Info    Brittney Hendricks MD Internal Medicine In 3 days  [address]  [phone]          Return to ED if worse     Diagnosis     Clinical Impression:   1. Body lice    2.  Hypokalemia

## 2021-07-04 NOTE — ED NOTES
2:51 PM    This nurse went in to administer patient's IV and PO potassium. Patient states, \"I take PO potassium at home - not faithfully as I should, but I do take it. I would just rather go home and take my own potassium at home versus taking the IV and PO potassium here. \"  Dr. Tamir Fowler made aware. 3:26 PM    Reviewed discharge instructions with patient. Patient verbalized understanding of d/c instruction, follow up, and RX. No complaints verbalized. No acute distress noted. Self ambulated to waiting room with ride to be d/c home.

## 2021-08-03 NOTE — PROGRESS NOTES
Subjective  Veryl Nikolas    HPI  70 y.o WF presenting as a referral by his PCP, Dr Sarthak Monge, for eval of skin rash/infection. Pt reports noticing a pruritic rash on the extensor surface of her right forearm a couple of months ago, subsequently noted similar lesions on other areas of her body primarily forearms and nape of her neck. She denies acute events preceding the onset of her symptoms including changes in detergent, lotion, animal/insect bites, new meds or foods. She reports a sensation of bites and organisms crawling underneath her skin, scalp, eyes, nostrils, ears and mouth. She mentions seeing these organisms/particles and some of them have \"wings\". She has been told her symptoms were related to lice or scabies infestration but has been prescribed Nix and Permethrin w/o noticeable improvement. Her PCP prescribed a dose of Ivermectin which didn't help. She has been seen by dermatology but was told nothing was wrong w her, she was \"delutional\". She resides w her son at home who does not have similar symptoms. She has been very depressed since losing her son unexpectedly a couple of years ago, but hasnt been been evaluated by a psychiatrist. She appears very frustrated by her ongoing symptoms. Pt also reports loose stools for a couple of months. She denies acute complaints on assessment today. Review of Systems   Constitutional: Negative. Respiratory: Negative. Cardiovascular: Negative. Gastrointestinal: Negative. Genitourinary: Negative. Musculoskeletal: Negative. Skin: Positive for itching and rash. Neurological: Negative. Psychiatric/Behavioral: Positive for depression.        Past Medical History:   Diagnosis Date    A-fib [facility])     CHF (congestive heart failure) (HCC)     Clear cell renal cell carcinoma (HCC)     COPD (chronic obstructive pulmonary disease) (HCC)     Fibromyalgia     GERD (gastroesophageal reflux disease)     Lymphedema     Sjogren's syndrome ([facility] [address]  Thyroid nodule         Past Surgical History:   Procedure Laterality Date    COLONOSCOPY N/A 11/18/2020    COLONOSCOPY performed by Nakita Saunders MD at [address] [facility] HX GI      EGD    HX HYSTERECTOMY      HX NEPHRECTOMY Right 2019    HX ORTHOPAEDIC      ankle        Social History     Tobacco Use    Smoking status: Current Every Day Smoker     Packs/day: 0.50     Types: Cigarettes    Smokeless tobacco: Never Used   Vaping Use    Vaping Use: Never used   Substance Use Topics    Alcohol use: Not Currently    Drug use: Never        Family History   Problem Relation Age of Onset    Hypertension Mother     Stroke Mother     Stroke Father     Hypertension Father         No Known Allergies     Objective  Physical Exam  Constitutional:       Appearance: Normal appearance. Cardiovascular:      Rate and Rhythm: Normal rate and regular rhythm. Pulmonary:      Effort: Pulmonary effort is normal.      Breath sounds: Normal breath sounds. Abdominal:      General: Abdomen is flat. Palpations: Abdomen is soft. Musculoskeletal:      Comments: Trace edema    Skin:     Comments: Scars on b/l forearm and legs    Neurological:      General: No focal deficit present. Mental Status: She is alert and oriented to person, place, and time. Psychiatric:         Mood and Affect: Mood normal.       Assessment & Plan  Diagnoses and all orders for this visit:    1.  Parasitic infestation of skin  -     IMMUNOGLOBULIN E, QT  -     CRP, HIGH SENSITIVITY  -     SED RATE (ESR)  -     OVA & PARASITES, STOOL  - Pt reports a sensations of organisms biting her and crawling under her skin  - She does not have risk factors for parasitic infection including travel to endemic areas, sick contacts or animal or insect bites   - I am not convinced her symptoms are related to a parasitic infection or an infectious disease process  - Will check stool for O&P given reports of loose stools   - Biopsy of skin lesion a reasonable option but will defer to her PCP   - I currently dont see an indication for antiparasitic medication     2. Rash: Hyperpigmented rash on nape of neck and b/l forearms, do not appear infected       No evidence of acute infection       H/o Sjogrens unclear if related # 1, advised to follow up w dermatology, was not satisfied w last visit , trying to schedule a f/u w [facility]     3. Depression, unspecified depression type: May need to be started on an anti-depressant         4.  Renal cell cancer, right ([facility] [address]

## 2021-11-02 PROBLEM — K80.50 CHOLEDOCHOLITHIASIS: Status: ACTIVE | Noted: 2021-01-01

## 2021-11-02 NOTE — ED PROVIDER NOTES
EMERGENCY DEPARTMENT HISTORY AND PHYSICAL EXAM      Date: 11/2/2021  Patient Name: Kiran Garcia    History of Presenting Illness     Chief Complaint   Patient presents with    Shortness of Breath    Jaundice    Constipation       History Provided By: Patient    HPI: Kiran Garcia, 78 y.o. female with a past medical history significant malignancy presents to the ED with cc of 3 days history of increasing yellow skin. Patient reports intermittent left upper quadrant abdominal pain, without radiation, without noted aggravating alleviating factors. Patient denies fevers or chills, denies nausea or vomiting. Patient does report some shortness of breath, but states she has renal cell carcinoma with lung metastases and is still a smoker. There are no other complaints, changes, or physical findings at this time. PCP: Evangelista Muñiz MD    No current facility-administered medications on file prior to encounter. Current Outpatient Medications on File Prior to Encounter   Medication Sig Dispense Refill    chlorthalidone (HYGROTON) 25 mg tablet       Eliquis 5 mg tablet       Se-Tan Plus 162-115. 2-1 mg cap       traZODone (DESYREL) 50 mg tablet TAKE ONE TABLET BY MOUTH AT BEDTIME      budesonide-formoteroL (Symbicort) 80-4.5 mcg/actuation HFAA Take 2 Puffs by inhalation.  potassium chloride SR (KLOR-CON 10) 10 mEq tablet Take 20 mEq by mouth daily.  bumetanide (BUMEX) 1 mg tablet Take 1 mg by mouth daily.  dilTIAZem ER (DILACOR XR) 120 mg capsule Take 120 mg by mouth daily.  atorvastatin (LIPITOR) 40 mg tablet Take 40 mg by mouth daily.  diazePAM (VALIUM) 5 mg tablet Take 5 mg by mouth every six (6) hours as needed for Anxiety.  famotidine (PEPCID) 40 mg tablet Take 40 mg by mouth daily.  ascorbic acid, vitamin C, (Vitamin C) 500 mg tablet Take 500 mg by mouth daily.  cholecalciferol (Vitamin D3) (1000 Units /25 mcg) tablet Take 1,000 Units by mouth daily. Past History     Past Medical History:  Past Medical History:   Diagnosis Date    A-fib [facility])     [facility] [address]     Clear cell renal cell carcinoma (HCC)     COPD (chronic obstructive pulmonary disease) (HCC)     Fibromyalgia     GERD (gastroesophageal reflux disease)     Lymphedema     Sjogren's syndrome ([facility] [address]     Thyroid nodule        Past Surgical History:  Past Surgical History:   Procedure Laterality Date    COLONOSCOPY N/A 11/18/2020    COLONOSCOPY performed by Sara Alanis MD at [address] HX GI      EGD    HX HYSTERECTOMY      HX NEPHRECTOMY Right 2019    HX ORTHOPAEDIC      ankle       Family History:  Family History   Problem Relation Age of Onset    Hypertension Mother     Stroke Mother     Stroke Father     Hypertension Father        Social History:  Social History     Tobacco Use    Smoking status: Current Every Day Smoker     Packs/day: 0.50     Types: Cigarettes    Smokeless tobacco: Never Used   Vaping Use    Vaping Use: Never used   Substance Use Topics    Alcohol use: Not Currently    Drug use: Never       Allergies: Allergies   Allergen Reactions    Adhesive Tape-Silicones Atopic Dermatitis         Review of Systems   Review of Systems   Constitutional: Negative for chills and fever. HENT: Negative for sinus pressure and sinus pain. Eyes: Negative for photophobia and redness. Respiratory: Negative for wheezing. Cardiovascular: Negative for chest pain and palpitations. Gastrointestinal: Positive for abdominal pain. Negative for nausea. Genitourinary: Negative for flank pain and hematuria. Musculoskeletal: Negative for arthralgias and gait problem. Skin: Negative for color change and pallor. Neurological: Negative for dizziness and weakness. Review of Systems    Physical Exam   Physical Exam  Constitutional:       General: She is not in acute distress. Appearance: Normal appearance. She is not toxic-appearing. HENT:      Head: Normocephalic and atraumatic. Nose: Nose normal.      Mouth/Throat:      Mouth: Mucous membranes are moist.   Eyes:      Extraocular Movements: Extraocular movements intact. Pupils: Pupils are equal, round, and reactive to light. Cardiovascular:      Rate and Rhythm: Normal rate. Pulses: Normal pulses. Pulmonary:      Effort: Pulmonary effort is normal.      Breath sounds: No stridor, clear to auscultation bilaterally  Abdominal:      General: Abdomen is flat. There is no distension. Musculoskeletal:         General: Normal range of motion. Cervical back: Normal range of motion and neck supple. Skin:     General: Skin is warm and dry. Capillary Refill: Capillary refill takes less than 2 seconds. Neurological:      General: No focal deficit present. Mental Status: She is alert and oriented to person, place, and time. Psychiatric:         Mood and Affect: Mood normal.         Behavior: Behavior normal.     Physical Exam    Lab and Diagnostic Study Results     Labs -     Recent Results (from the past 12 hour(s))   CBC WITH AUTOMATED DIFF    Collection Time: 11/02/21 11:15 AM   Result Value Ref Range    WBC 13.1 (H) 3.6 - 11.0 K/uL    RBC 4.99 3.80 - 5.20 M/uL    HGB 15.1 11.5 - 16.0 g/dL    HCT 45.5 35.0 - 47.0 %    MCV 91.2 80.0 - 99.0 FL    MCH 30.3 26.0 - 34.0 PG    MCHC 33.2 30.0 - 36.5 g/dL    RDW 15.2 (H) 11.5 - 14.5 %    PLATELET 894 155 - 484 K/uL    MPV 11.6 8.9 - 12.9 FL    NRBC 0.0 0.0  WBC    ABSOLUTE NRBC 0.00 0.00 - 0.01 K/uL    NEUTROPHILS 84 (H) 32 - 75 %    LYMPHOCYTES 8 (L) 12 - 49 %    MONOCYTES 5 5 - 13 %    EOSINOPHILS 1 0 - 7 %    BASOPHILS 1 0 - 1 %    IMMATURE GRANULOCYTES 1 (H) 0 - 0.5 %    ABS. NEUTROPHILS 11.1 (H) 1.8 - 8.0 K/UL    ABS. LYMPHOCYTES 1.0 0.8 - 3.5 K/UL    ABS. MONOCYTES 0.6 0.0 - 1.0 K/UL    ABS. EOSINOPHILS 0.1 0.0 - 0.4 K/UL    ABS. BASOPHILS 0.1 0.0 - 0.1 K/UL    ABS. IMM.  GRANS. 0.1 (H) 0.00 - 0.04 K/UL DF AUTOMATED     METABOLIC PANEL, COMPREHENSIVE    Collection Time: 11/02/21 11:15 AM   Result Value Ref Range    Sodium 132 (L) 136 - 145 mmol/L    Potassium 3.7 3.5 - 5.1 mmol/L    Chloride 96 (L) 97 - 108 mmol/L    CO2 29 21 - 32 mmol/L    Anion gap 7 5 - 15 mmol/L    Glucose 166 (H) 65 - 100 mg/dL    BUN 17 6 - 20 mg/dL    Creatinine 1.47 (H) 0.55 - 1.02 mg/dL    BUN/Creatinine ratio 12 12 - 20      GFR est AA 42 (L) >60 ml/min/1.73m2    GFR est non-AA 35 (L) >60 ml/min/1.73m2    Calcium 9.3 8.5 - 10.1 mg/dL    Bilirubin, total 18.8 (H) 0.2 - 1.0 mg/dL    AST (SGOT) 154 (H) 15 - 37 U/L    ALT (SGPT) 312 (H) 12 - 78 U/L    Alk. phosphatase 955 (H) 45 - 117 U/L    Protein, total 6.4 6.4 - 8.2 g/dL    Albumin 2.9 (L) 3.5 - 5.0 g/dL    Globulin 3.5 2.0 - 4.0 g/dL    A-G Ratio 0.8 (L) 1.1 - 2.2     URINALYSIS W/ REFLEX CULTURE    Collection Time: 11/02/21 12:45 PM    Specimen: Urine   Result Value Ref Range    Color Dark Yellow      Appearance Turbid (A) Clear      Specific gravity 1.025 1.003 - 1.030      pH (UA) 5.0 5.0 - 8.0      Protein 100 (A) Negative mg/dL    Glucose Negative Negative mg/dL    Ketone Negative Negative mg/dL    Bilirubin Moderate (A) Negative      Blood Negative Negative      Urobilinogen 4.0 (H) 0.1 - 1.0 EU/dL    Nitrites Negative Negative      Leukocyte Esterase Trace (A) Negative      UA:UC IF INDICATED Urine Culture Ordered (A) Culture not indicated by UA result      WBC 20-50 0 - 4 /hpf    RBC 0-5 0 - 5 /hpf    Bacteria Negative Negative /hpf    Mucus Trace /lpf       Radiologic Studies -   @lastxrresult@  CT Results  (Last 48 hours)               11/02/21 1409  CT ABD PELV WO CONT Final result    Impression:  Findings suggesting distal biliary obstruction, although cause not   visible. Consider gallbladder ultrasound and MRCP.  Nodules at the lung bases       CT dose reduction was achieved through use of a standardized protocol tailored   for this examination and automatic exposure control for dose modulation. Narrative:  Noncontrast study shows small nodules in the lung bases       Liver, spleen, pancreas are normal. Gallbladder is distended. Mild intrahepatic   and extrahepatic biliary dilatation. Dilated common bile duct is followed to the   pancreatic head where there is no suggestion of mass or calcified stone. 15 mm left adrenal adenoma with fat content. Right adrenal is normal. Right   kidney is out. Left kidney has a tiny isodense cyst along the surface. Advanced   arterial calcification. No small bowel abnormality, lymphadenopathy or free   fluid       Uterus is out. No mass or free fluid. Bladder is empty. Normal colon with mild   constipation. Normal appendix. No mesenteric fat edema       Advanced degenerative changes in the spine               CXR Results  (Last 48 hours)               11/02/21 1143  XR CHEST PORT Final result    Impression:  The cardiomediastinal silhouette is appropriate for age, technique,   and lung expansion. Pulmonary vasculature is not congested. The lungs are   essentially clear. No effusion or pneumothorax is seen. Narrative:  1 view                   Medical Decision Making   - I am the first provider for this patient. - I reviewed the vital signs, available nursing notes, past medical history, past surgical history, family history and social history. - Initial assessment performed. The patients presenting problems have been discussed, and they are in agreement with the care plan formulated and outlined with them. I have encouraged them to ask questions as they arise throughout their visit. Vital Signs-Reviewed the patient's vital signs.   Patient Vitals for the past 12 hrs:   Temp Pulse Resp BP SpO2   11/02/21 1515 98.2 °F (36.8 °C) 64 20 134/63 100 %   11/02/21 1425 -- 68 20 (!) 136/115 96 %   11/02/21 1311 -- 71 16 (!) 122/51 96 %   11/02/21 1254 -- 71 15 (!) 152/66 99 %   11/02/21 1158 -- -- -- -- 98 %   11/02/21 1153 -- 73 20 134/87 100 %   11/02/21 1042 98.2 °F (36.8 °C) 75 18 131/62 98 %       Records Reviewed: The patient presents with       ED Course:     ED Course as of Nov 02 1611 Tue Nov 02, 2021   1551 Discussed findings with patient including elevated bilirubin and concern for obstruction due to dilated biliary tract. Discussed the plan for admission for continued work-up and treatment. Patient states understanding and is in agreement of the plan. [CS]   [phone] with the hospitalist at this time, plan for admission.    [CS]      ED Course User Index  [CS] Paradise Atkinson MD       Provider Notes (Medical Decision Making):   Given findings concerning for obstructive process, will admit for continued treatment and care. MDM       Procedures   Medical Decision Makingedical Decision Making  Performed by: Buzz Young MD  PROCEDURES:  Procedures       Disposition   Disposition: Admitted to Floor Medical Floor the case was discussed with the admitting physician     Admitted        Clinical Impression:   1. Hyperbilirubinemia        Attestations:    Buzz Young MD    Please note that this dictation was completed with [employer], the [employer] voice recognition software. Quite often unanticipated grammatical, syntax, homophones, and other interpretive errors are inadvertently transcribed by the computer software. Please disregard these errors. Please excuse any errors that have escaped final proofreading. Thank you.

## 2021-11-02 NOTE — ED TRIAGE NOTES
GCS 15 pt stated that she has been having light colored stool and dark urine; pt stated that she has kidney CA that has METS to lung and she feels it may possibly be affecting her liver; pt has been having increasing SOB, yellowing of skin, constipation, left lower ABD pain

## 2021-11-02 NOTE — ROUTINE PROCESS
TRANSFER - OUT REPORT:    Verbal report given to [facility] nurse on [address]  being transferred to [facility] for routine progression of care       Report consisted of patients Situation, Background, Assessment and   Recommendations(SBAR). Information from the following report(s) SBAR was reviewed with the receiving nurse. Lines:   Peripheral IV 11/02/21 Right Antecubital (Active)        Opportunity for questions and clarification was provided.       Patient transported with:   [employer]

## 2021-11-02 NOTE — H&P
History and Physical    Subjective:   Chief Complaint : yellow discoloration since 3 days  Source of information : patient     History of present illness:     74F, h/o RCC s/p nephrectomy, COPD not on oxygen, CHFpEF , pAFIB on eliquis with yellow discoloration since 3 days    She went to her cataract surgery today and was found to have yellow discoloration and presented here. Surgery was cancelled however. Her condition is associated with mild nausea and constipation. ER: bilirubin 18, alp 995. CT abdomen showed dilated distal CBD concerning to stone, gall bladdar is distended too. She denies any fever, chills, vomiting, weight loss    Past Medical History:   Diagnosis Date    [facility] [address]     CHF (congestive heart failure) (HCC)     Clear cell renal cell carcinoma (HCC)     COPD (chronic obstructive pulmonary disease) (HCC)     Fibromyalgia     GERD (gastroesophageal reflux disease)     Lymphedema     Sjogren's syndrome ([facility] [address]     Thyroid nodule      Past Surgical History:   Procedure Laterality Date    COLONOSCOPY N/A 11/18/2020    [facility]ly [facility]ancino [facility] HX GI      EGD    HX HYSTERECTOMY      HX NEPHRECTOMY Right 2019    HX ORTHOPAEDIC      ankle     Family History   Problem Relation Age of Onset    Hypertension Mother     Stroke Mother     Stroke Father     Hypertension Father       Social History     Tobacco Use    Smoking status: Current Every Day Smoker     Packs/day: 0.50     Types: Cigarettes    Smokeless tobacco: Never Used   Substance Use Topics    Alcohol use: Not Currently       Prior to Admission medications    Medication Sig Start Date End Date Taking? Authorizing Provider   chlorthalidone (HYGROTON) 25 mg tablet  6/2/21   Provider, Historical   Eliquis 5 mg tablet  7/8/21   Provider, Historical   Se-Tan Plus 162-115. 2-1 mg cap  6/27/21   Provider, Historical   traZODone (DESYREL) 50 mg tablet TAKE ONE TABLET BY MOUTH AT BEDTIME 7/31/21   Provider, Historical   budesonide-formoteroL (Symbicort) 80-4.5 mcg/actuation HFAA Take 2 Puffs by inhalation. Provider, Historical   potassium chloride SR (KLOR-CON 10) 10 mEq tablet Take 20 mEq by mouth daily. Provider, Historical   bumetanide (BUMEX) 1 mg tablet Take 1 mg by mouth daily. Provider, Historical   dilTIAZem ER (DILACOR XR) 120 mg capsule Take 120 mg by mouth daily. Provider, Historical   atorvastatin (LIPITOR) 40 mg tablet Take 40 mg by mouth daily. Provider, Historical   diazePAM (VALIUM) 5 mg tablet Take 5 mg by mouth every six (6) hours as needed for Anxiety. Provider, Historical   famotidine (PEPCID) 40 mg tablet Take 40 mg by mouth daily. Provider, Historical   ascorbic acid, vitamin C, (Vitamin C) 500 mg tablet Take 500 mg by mouth daily. Provider, Historical   cholecalciferol (Vitamin D3) (1000 Units /25 mcg) tablet Take 1,000 Units by mouth daily. Provider, Historical     Allergies   Allergen Reactions    Adhesive Tape-Silicones Atopic Dermatitis             Review of Systems:  Review of Systems   Constitutional: Negative for chills and fever. HENT: Negative for sinus pressure and sinus pain. Eyes: Negative for photophobia and redness. Respiratory: Negative for wheezing. Cardiovascular: Negative for chest pain and palpitations. Gastrointestinal: Positive for abdominal pain. Negative for nausea. Genitourinary: Negative for flank pain and hematuria. Musculoskeletal: Negative for arthralgias and gait problem. Skin: Negative for color change and pallor. Neurological: Negative for dizziness and weakness. Review of Systems      Vitals:     Visit Vitals  BP (!) 160/121   Pulse 68   Temp 98.3 °F (36.8 °C)   Resp 20   Ht 5' 7\" (1.702 m)   Wt 86.6 kg (191 lb)   SpO2 100%   BMI 29.91 kg/m²       Physical Exam:   Physical Exam  Constitutional:       General: She is not in acute distress. Appearance: Normal appearance.  She is not toxic-appearing. HENT:      Head: Normocephalic and atraumatic. Nose: Nose normal.      Mouth/Throat:      Mouth: Mucous membranes are moist.   Eyes:      Extraocular Movements: Extraocular movements intact. Pupils: Pupils are equal, round, and reactive to light. Cardiovascular:      Rate and Rhythm: Normal rate. Pulses: Normal pulses. Pulmonary:      Effort: Pulmonary effort is normal.      Breath sounds: No stridor, clear to auscultation bilaterally  Abdominal:      General: Abdomen is flat. There is no distension. Musculoskeletal:         General: Normal range of motion. Cervical back: Normal range of motion and neck supple. Skin:     General: Skin is warm and dry. Capillary Refill: Capillary refill takes less than 2 seconds. Neurological:      General: No focal deficit present. Mental Status: She is alert and oriented to person, place, and time. Psychiatric:         Mood and Affect: Mood normal.         Behavior: Behavior normal.         Data Review:   Recent Results (from the past 24 hour(s))   CBC WITH AUTOMATED DIFF    Collection Time: 11/02/21 11:15 AM   Result Value Ref Range    WBC 13.1 (H) 3.6 - 11.0 K/uL    RBC 4.99 3.80 - 5.20 M/uL    HGB 15.1 11.5 - 16.0 g/dL    HCT 45.5 35.0 - 47.0 %    MCV 91.2 80.0 - 99.0 FL    MCH 30.3 26.0 - 34.0 PG    MCHC 33.2 30.0 - 36.5 g/dL    RDW 15.2 (H) 11.5 - 14.5 %    PLATELET 012 798 - 024 K/uL    MPV 11.6 8.9 - 12.9 FL    NRBC 0.0 0.0  WBC    ABSOLUTE NRBC 0.00 0.00 - 0.01 K/uL    NEUTROPHILS 84 (H) 32 - 75 %    LYMPHOCYTES 8 (L) 12 - 49 %    MONOCYTES 5 5 - 13 %    EOSINOPHILS 1 0 - 7 %    BASOPHILS 1 0 - 1 %    IMMATURE GRANULOCYTES 1 (H) 0 - 0.5 %    ABS. NEUTROPHILS 11.1 (H) 1.8 - 8.0 K/UL    ABS. LYMPHOCYTES 1.0 0.8 - 3.5 K/UL    ABS. MONOCYTES 0.6 0.0 - 1.0 K/UL    ABS. EOSINOPHILS 0.1 0.0 - 0.4 K/UL    ABS. BASOPHILS 0.1 0.0 - 0.1 K/UL    ABS. IMM.  GRANS. 0.1 (H) 0.00 - 0.04 K/UL    DF AUTOMATED     METABOLIC PANEL, COMPREHENSIVE    Collection Time: 11/02/21 11:15 AM   Result Value Ref Range    Sodium 132 (L) 136 - 145 mmol/L    Potassium 3.7 3.5 - 5.1 mmol/L    Chloride 96 (L) 97 - 108 mmol/L    CO2 29 21 - 32 mmol/L    Anion gap 7 5 - 15 mmol/L    Glucose 166 (H) 65 - 100 mg/dL    BUN 17 6 - 20 mg/dL    Creatinine 1.47 (H) 0.55 - 1.02 mg/dL    BUN/Creatinine ratio 12 12 - 20      GFR est AA 42 (L) >60 ml/min/1.73m2    GFR est non-AA 35 (L) >60 ml/min/1.73m2    Calcium 9.3 8.5 - 10.1 mg/dL    Bilirubin, total 18.8 (H) 0.2 - 1.0 mg/dL    AST (SGOT) 154 (H) 15 - 37 U/L    ALT (SGPT) 312 (H) 12 - 78 U/L    Alk. phosphatase 955 (H) 45 - 117 U/L    Protein, total 6.4 6.4 - 8.2 g/dL    Albumin 2.9 (L) 3.5 - 5.0 g/dL    Globulin 3.5 2.0 - 4.0 g/dL    A-G Ratio 0.8 (L) 1.1 - 2.2     URINALYSIS W/ REFLEX CULTURE    Collection Time: 11/02/21 12:45 PM    Specimen: Urine   Result Value Ref Range    Color Dark Yellow      Appearance Turbid (A) Clear      Specific gravity 1.025 1.003 - 1.030      pH (UA) 5.0 5.0 - 8.0      Protein 100 (A) Negative mg/dL    Glucose Negative Negative mg/dL    Ketone Negative Negative mg/dL    Bilirubin Moderate (A) Negative      Blood Negative Negative      Urobilinogen 4.0 (H) 0.1 - 1.0 EU/dL    Nitrites Negative Negative      Leukocyte Esterase Trace (A) Negative      UA:UC IF INDICATED Urine Culture Ordered (A) Culture not indicated by UA result      WBC 20-50 0 - 4 /hpf    RBC 0-5 0 - 5 /hpf    Bacteria Negative Negative /hpf    Mucus Trace /lpf             Assessment and Plan :     (1) Choledocholithiasis: hold eliquis, bumex, and chlorthalidone. IVF at 50cc/hr past midnight, stop if getting fluid overloaded. NPO mid night, GI to see her in AM, and decide to ERCP. If not then we will allow diet. Patient has been told about the plan, she does not want MRCP    (2) mixed transaminates : s/t #1    (3) CANDELARIO: hold bumex and chlorthalidone    (4) CHFpEF: euvolemic.  Hold IVF past midnight    (5) pAFIB: hold eliquis. Continue cardizem     (6) COPD: stable.  Duo nebs    (7) HTN: PRN labetalol and HCTZ, cardizem     (8) depression: trazodone    DVT pppx: SCD    DISPO: home after procedure, in 2-3 days    Active Problems:    Choledocholithiasis (11/2/2021)          Signed By: Ronny Sánchez MD     November 2, 2021

## 2021-11-03 NOTE — PROGRESS NOTES
Hospitalist Progress Note               Daily Progress Note: 11/3/2021      Subjective:   Patient is a 68yo female with history of renal cell carcinoma s/p nephrectomy, COPD not on O2, chronic CHFpEF, Afib treated with eliquis, and fibromylagia complaining of yellow discoloration that started 3 days ago after arrival to ED on 11/2/21. Yellow discoloration of sclera noted when arriving for elective cataract surgery. Patient complains of mild nausea and constipation. She denies fever, chills, vomiting, and weight loss. In the ER bilirubin was 18 and alkaline phophatase was 995. Non-contrast CT of the abdomen showed gallbladder and common bile duct distention along with mild intrahepatic and extrahepatic biliary dilatation suggesting obstruction. No visible obstruction was noted on CT. Urinalysis showed turbid dark yellow urine with a urobilinogen of 4.0.  ----    On follow-up, 11/3/21, patient reports no nausea, vomiting, diarrhea or fever. Patient did mention that she normally has loose stools and since her jaundice began her stools have been hard and she needs to strain to have bowel movement. Patient reports tingling sensation at the level of umbilicus on right side. Patient wanted to stop NPO after being NPO all night and requested food. Therefore, ERCP is going to be pushed back to tomorrow, per GI. Patient reports continued straining to have bowel movement even after lactulose prep. Pearson's test negative. Total bilirubin 14.8, , , alk phosphatase 749. Told patient that we are going to order U/S of the upper GI to look for possible obstruction and that she needs to be NPO 6 hours prior to the procedure. Patient does report a 50 pound weight loss over the past year. She attributes this to her depression.   She also indicates she has had stage IV renal cancer with lung metastases which have been followed since 2019    Problem List:  Problem List as of 11/3/2021 Date Reviewed: 11/13/2020 Codes Class Noted - Resolved    Choledocholithiasis ICD-10-CM: K80.50  ICD-9-CM: 574.50  11/2/2021 - Present        Respiratory failure, unspecified with hypoxia [facility]) ICD-10-CM: J96.91  ICD-9-CM: 518.81  11/17/2020 - Present        COPD exacerbation ([facility] ICD-10-CM: J44.1  ICD-9-CM: 491.21  11/16/2020 - Present        Anemia ICD-10-CM: D64.9  ICD-9-CM: 285.9  11/16/2020 - Present        Obesity ICD-10-CM: E66.9  ICD-9-CM: 278.00  11/16/2020 - Present        A-fib ([place] [id number].) ICD-10-CM: I48.91  ICD-9-CM: 427.31  11/16/2020 - Present        CHF (congestive heart failure) ([place] [address].) ICD-10-CM: I50.9  ICD-9-CM: 428.0  11/16/2020 - Present        Respiratory failure with hypoxia (HCC) ICD-10-CM: J96.91  ICD-9-CM: 518.81  11/13/2020 - Present        RESOLVED: Screening for colon cancer ICD-10-CM: Z12.11  ICD-9-CM: V76.51  11/16/2020 - 12/16/2020              Medications reviewed  Current Facility-Administered Medications   Medication Dose Route Frequency    ascorbic acid (vitamin C) (VITAMIN C) tablet 500 mg  500 mg Oral DAILY    atorvastatin (LIPITOR) tablet 40 mg  40 mg Oral DAILY    budesonide-formoterol (SYMBICORT) 80-4.5 mcg inhaler  2 Puff Inhalation BID RT    [Held by provider] bumetanide (BUMEX) tablet 1 mg  1 mg Oral DAILY    [Held by provider] chlorthalidone (HYGROTON) tablet 25 mg  25 mg Oral DAILY    cholecalciferol (VITAMIN D3) (1000 Units /25 mcg) tablet 1,000 Units  1,000 Units Oral DAILY    diazePAM (VALIUM) tablet 5 mg  5 mg Oral Q6H PRN    dilTIAZem ER (CARDIZEM CD) capsule 120 mg  120 mg Oral DAILY    potassium chloride SR (KLOR-CON 10) tablet 20 mEq  20 mEq Oral DAILY    traZODone (DESYREL) tablet 50 mg  50 mg Oral QHS    sodium chloride (NS) flush 5-40 mL  5-40 mL IntraVENous Q8H    sodium chloride (NS) flush 5-40 mL  5-40 mL IntraVENous PRN    acetaminophen (TYLENOL) tablet 650 mg  650 mg Oral Q6H PRN    Or    acetaminophen (TYLENOL) suppository 650 mg  650 mg Rectal Q6H PRN    polyethylene glycol (MIRALAX) packet 17 g  17 g Oral DAILY PRN    ondansetron (ZOFRAN ODT) tablet 4 mg  4 mg Oral Q8H PRN    Or    ondansetron (ZOFRAN) injection 4 mg  4 mg IntraVENous Q6H PRN    dextrose 5 % - 0.45% NaCl infusion  50 mL/hr IntraVENous CONTINUOUS    pantoprazole (PROTONIX) tablet 40 mg  40 mg Oral ACB    lactulose (CHRONULAC) 10 gram/15 mL solution 15 mL  10 g Oral BID    labetaloL (NORMODYNE;TRANDATE) 20 mg/4 mL (5 mg/mL) injection 10 mg  10 mg IntraVENous Q4H PRN    hydrALAZINE (APRESOLINE) 20 mg/mL injection 10 mg  10 mg IntraVENous Q6H PRN    albuterol (PROVENTIL HFA, VENTOLIN HFA, PROAIR HFA) inhaler 2 Puff  2 Puff Inhalation Q6H PRN       Review of Systems:   A comprehensive review of systems was negative except for that written in the HPI. Objective:   Physical Exam:     Visit Vitals  BP (!) 108/50 (BP 1 Location: Left lower arm, BP Patient Position: At rest)   Pulse 68   Temp 98 °F (36.7 °C)   Resp 18   Ht 5' 7\" (1.702 m)   Wt 86.6 kg (191 lb)   SpO2 99%   BMI 29.91 kg/m²    O2 Flow Rate (L/min): 3 l/min O2 Device: Nasal cannula    Temp (24hrs), Av.2 °F (36.8 °C), Min:98 °F (36.7 °C), Max:98.5 °F (36.9 °C)    No intake/output data recorded. No intake/output data recorded. General:   Awake and alert. Eyes had scleral icterus. Lungs:   Clear to auscultation bilaterally. Chest wall:  No tenderness or deformity. Heart:  Regular rate and rhythm, S1, S2 normal, no murmur, click, rub or gallop. Abdomen:   Soft, mildly tender to palpation in RUQ. Bowel sounds normal. No masses,  No organomegaly. Extremities: Extremities normal, atraumatic, no cyanosis or edema. Pulses: 2+ and symmetric all extremities. Skin: Skin is jaundice. No rashes or lesions   Neurologic: CNII-XII intact.   No gross focal deficits         Data Review:       Recent Days:  Recent Labs     21  1115   WBC 13.1*   HGB 15.1   HCT 45.5        Recent Labs     216 21 11/02/21  1115   *  --  132*   K 2.9*  --  3.7     --  96*   CO2 26  --  29   *  --  166*   BUN 20  --  17   CREA 1.23*  --  1.47*   CA 8.5  --  9.3   ALB 2.4* 2.4* 2.9*   TBILI 14.8* 15.2* 18.8*   * 256* 312*   INR  --  1.1  --      No results for input(s): PH, PCO2, PO2, HCO3, FIO2 in the last 72 hours. 24 Hour Results:  Recent Results (from the past 24 hour(s))   CBC WITH AUTOMATED DIFF    Collection Time: 11/02/21 11:15 AM   Result Value Ref Range    WBC 13.1 (H) 3.6 - 11.0 K/uL    RBC 4.99 3.80 - 5.20 M/uL    HGB 15.1 11.5 - 16.0 g/dL    HCT 45.5 35.0 - 47.0 %    MCV 91.2 80.0 - 99.0 FL    MCH 30.3 26.0 - 34.0 PG    MCHC 33.2 30.0 - 36.5 g/dL    RDW 15.2 (H) 11.5 - 14.5 %    PLATELET 905 101 - 924 K/uL    MPV 11.6 8.9 - 12.9 FL    NRBC 0.0 0.0  WBC    ABSOLUTE NRBC 0.00 0.00 - 0.01 K/uL    NEUTROPHILS 84 (H) 32 - 75 %    LYMPHOCYTES 8 (L) 12 - 49 %    MONOCYTES 5 5 - 13 %    EOSINOPHILS 1 0 - 7 %    BASOPHILS 1 0 - 1 %    IMMATURE GRANULOCYTES 1 (H) 0 - 0.5 %    ABS. NEUTROPHILS 11.1 (H) 1.8 - 8.0 K/UL    ABS. LYMPHOCYTES 1.0 0.8 - 3.5 K/UL    ABS. MONOCYTES 0.6 0.0 - 1.0 K/UL    ABS. EOSINOPHILS 0.1 0.0 - 0.4 K/UL    ABS. BASOPHILS 0.1 0.0 - 0.1 K/UL    ABS. IMM. GRANS. 0.1 (H) 0.00 - 0.04 K/UL    DF AUTOMATED     METABOLIC PANEL, COMPREHENSIVE    Collection Time: 11/02/21 11:15 AM   Result Value Ref Range    Sodium 132 (L) 136 - 145 mmol/L    Potassium 3.7 3.5 - 5.1 mmol/L    Chloride 96 (L) 97 - 108 mmol/L    CO2 29 21 - 32 mmol/L    Anion gap 7 5 - 15 mmol/L    Glucose 166 (H) 65 - 100 mg/dL    BUN 17 6 - 20 mg/dL    Creatinine 1.47 (H) 0.55 - 1.02 mg/dL    BUN/Creatinine ratio 12 12 - 20      GFR est AA 42 (L) >60 ml/min/1.73m2    GFR est non-AA 35 (L) >60 ml/min/1.73m2    Calcium 9.3 8.5 - 10.1 mg/dL    Bilirubin, total 18.8 (H) 0.2 - 1.0 mg/dL    AST (SGOT) 154 (H) 15 - 37 U/L    ALT (SGPT) 312 (H) 12 - 78 U/L    Alk.  phosphatase 955 (H) 45 - 117 U/L    Protein, total 6.4 6.4 - 8.2 g/dL    Albumin 2.9 (L) 3.5 - 5.0 g/dL    Globulin 3.5 2.0 - 4.0 g/dL    A-G Ratio 0.8 (L) 1.1 - 2.2     URINALYSIS W/ REFLEX CULTURE    Collection Time: 11/02/21 12:45 PM    Specimen: Urine   Result Value Ref Range    Color Dark Yellow      Appearance Turbid (A) Clear      Specific gravity 1.025 1.003 - 1.030      pH (UA) 5.0 5.0 - 8.0      Protein 100 (A) Negative mg/dL    Glucose Negative Negative mg/dL    Ketone Negative Negative mg/dL    Bilirubin Moderate (A) Negative      Blood Negative Negative      Urobilinogen 4.0 (H) 0.1 - 1.0 EU/dL    Nitrites Negative Negative      Leukocyte Esterase Trace (A) Negative      UA:UC IF INDICATED Urine Culture Ordered (A) Culture not indicated by UA result      WBC 20-50 0 - 4 /hpf    RBC 0-5 0 - 5 /hpf    Bacteria Negative Negative /hpf    Mucus Trace /lpf   HEPATIC FUNCTION PANEL    Collection Time: 11/02/21  8:50 PM   Result Value Ref Range    Protein, total 5.1 (L) 6.4 - 8.2 g/dL    Albumin 2.4 (L) 3.5 - 5.0 g/dL    Globulin 2.7 2.0 - 4.0 g/dL    A-G Ratio 0.9 (L) 1.1 - 2.2      Bilirubin, total 15.2 (H) 0.2 - 1.0 mg/dL    Bilirubin, direct 13.1 (H) 0.0 - 0.2 mg/dL    Alk.  phosphatase 782 (H) 45 - 117 U/L    AST (SGOT) 115 (H) 15 - 37 U/L    ALT (SGPT) 256 (H) 12 - 78 U/L   PROTHROMBIN TIME + INR    Collection Time: 11/02/21  8:50 PM   Result Value Ref Range    Prothrombin time 14.4 11.9 - 14.7 sec    INR 1.1 0.9 - 1.1     METABOLIC PANEL, COMPREHENSIVE    Collection Time: 11/03/21  2:16 AM   Result Value Ref Range    Sodium 135 (L) 136 - 145 mmol/L    Potassium 2.9 (L) 3.5 - 5.1 mmol/L    Chloride 101 97 - 108 mmol/L    CO2 26 21 - 32 mmol/L    Anion gap 8 5 - 15 mmol/L    Glucose 107 (H) 65 - 100 mg/dL    BUN 20 6 - 20 mg/dL    Creatinine 1.23 (H) 0.55 - 1.02 mg/dL    BUN/Creatinine ratio 16 12 - 20      GFR est AA 52 (L) >60 ml/min/1.73m2    GFR est non-AA 43 (L) >60 ml/min/1.73m2    Calcium 8.5 8.5 - 10.1 mg/dL    Bilirubin, total 14.8 (H) 0.2 - 1.0 mg/dL    AST (SGOT) 126 (H) 15 - 37 U/L    ALT (SGPT) 245 (H) 12 - 78 U/L    Alk. phosphatase 749 (H) 45 - 117 U/L    Protein, total 5.9 (L) 6.4 - 8.2 g/dL    Albumin 2.4 (L) 3.5 - 5.0 g/dL    Globulin 3.5 2.0 - 4.0 g/dL    A-G Ratio 0.7 (L) 1.1 - 2.2         CT ABD PELV WO CONT   Final Result   Findings suggesting distal biliary obstruction, although cause not   visible. Consider gallbladder ultrasound and MRCP. Nodules at the lung bases      CT dose reduction was achieved through use of a standardized protocol tailored   for this examination and automatic exposure control for dose modulation. XR CHEST PORT   Final Result   The cardiomediastinal silhouette is appropriate for age, technique,   and lung expansion. Pulmonary vasculature is not congested. The lungs are   essentially clear. No effusion or pneumothorax is seen. US ABD LTD    (Results Pending)        Assessment:  Obstructive jaundice, etiology unclear at this point. Concerning for malignancy with her weight loss    Mixed transaminitis    CANDELARIO- suspect prerenal    Stage IV renal carcinoma with lung metastases since 2019    Chronic CHFpEF    Paroxysmal Atrial fibrillation- on eliquis    Chronic COPD- without exacerbation    Hypotension, transient    History of hypertension    Chronic depression      Plan:  Obtain U/S of liver and gallbladder  Await ERCP which is scheduled for tomorrow  Hold eliquis, bumex, and chlorthalidone  Continue O2 treatment  PRN labetalol and HCTZ, cardizem  Continue trazodone for depression  Continue IV fluids      Care Plan discussed with: Patient/Family    Disposition: Continued inpatient care pending ERCP. Total time spent with patient: 30 minutes.     Luna Collado MD

## 2021-11-03 NOTE — PROGRESS NOTES
Pt reports feeling SOB, o2 sat 96% on RA, pt reports wearing o2 at 3l NC at home.  Pt placed on o2 per request.

## 2021-11-03 NOTE — PROGRESS NOTES
Hospitalist Progress Note               Daily Progress Note: 11/3/2021      Subjective:   Patient is a 70yo female with history of renal cell carcinoma s/p nephrectomy, COPD not on O2, chronic CHFpEF, Afib treated with eliquis, and fibromylagia complaining of yellow discoloration that started 3 days ago after arrival to ED on 11/2/21. Yellow discoloration of sclera noted when arriving for elective cataract surgery. Patient complains of mild nausea and constipation. She denies fever, chills, vomiting, and weight loss. In the ER bilirubin was 18 and alkaline phophatase was 995. Non-contrast CT of the abdomen showed gallbladder and common bile duct distention along with mild intrahepatic and extrahepatic biliary dilatation suggesting obstruction. No visible obstruction was noted on CT. Urinalysis showed turbid dark yellow urine with a urobilinogen of 4.0.  ----    On follow-up, 11/3/21, patient reports no nausea, vomiting, diarrhea or fever. Patient did mention that she normally has loose stools and since her jaundice began her stools have been hard and she needs to strain to have bowel movement. Patient reports tingling sensation at the level of umbilicus on right side. Patient wanted to stop NPO after being NPO all night and requested food. Therefore, ERCP is going to be pushed back to tomorrow, per GI. Patient reports continued straining to have bowel movement even after lactulose prep. Pearson's test negative. Total bilirubin 14.8, , , alk phosphatase 749. Told patient that we are going to order U/S of the upper GI to look for possible obstruction and that she needs to be NPO 6 hours prior to the procedure.     Problem List:  Problem List as of 11/3/2021 Date Reviewed: 11/13/2020          Codes Class Noted - Resolved    Choledocholithiasis ICD-10-CM: K80.50  ICD-9-CM: 574.50  11/2/2021 - Present        Respiratory failure, unspecified with hypoxia ([facility] ICD-10-CM: J96.91  ICD-9-CM: 965.34 11/17/2020 - Present        COPD exacerbation ([facility] ICD-10-CM: J44.1  ICD-9-CM: 491.21  11/16/2020 - Present        Anemia ICD-10-CM: D64.9  ICD-9-CM: 285.9  11/16/2020 - Present        Obesity ICD-10-CM: E66.9  ICD-9-CM: 278.00  11/16/2020 - Present        A-fib ([facility] ICD-10-CM: I48.91  ICD-9-CM: 427.31  11/16/2020 - Present        CHF (congestive heart failure) (HCC) ICD-10-CM: I50.9  ICD-9-CM: 428.0  11/16/2020 - Present        Respiratory failure with hypoxia (HCC) ICD-10-CM: J96.91  ICD-9-CM: 518.81  11/13/2020 - Present        RESOLVED: Screening for colon cancer ICD-10-CM: Z12.11  ICD-9-CM: V76.51  11/16/2020 - 12/16/2020              Medications reviewed  Current Facility-Administered Medications   Medication Dose Route Frequency    ascorbic acid (vitamin C) (VITAMIN C) tablet 500 mg  500 mg Oral DAILY    atorvastatin (LIPITOR) tablet 40 mg  40 mg Oral DAILY    budesonide-formoterol (SYMBICORT) 80-4.5 mcg inhaler  2 Puff Inhalation BID RT    [Held by provider] bumetanide (BUMEX) tablet 1 mg  1 mg Oral DAILY    [Held by provider] chlorthalidone (HYGROTON) tablet 25 mg  25 mg Oral DAILY    cholecalciferol (VITAMIN D3) (1000 Units /25 mcg) tablet 1,000 Units  1,000 Units Oral DAILY    diazePAM (VALIUM) tablet 5 mg  5 mg Oral Q6H PRN    dilTIAZem ER (CARDIZEM CD) capsule 120 mg  120 mg Oral DAILY    potassium chloride SR (KLOR-CON 10) tablet 20 mEq  20 mEq Oral DAILY    traZODone (DESYREL) tablet 50 mg  50 mg Oral QHS    sodium chloride (NS) flush 5-40 mL  5-40 mL IntraVENous Q8H    sodium chloride (NS) flush 5-40 mL  5-40 mL IntraVENous PRN    acetaminophen (TYLENOL) tablet 650 mg  650 mg Oral Q6H PRN    Or    acetaminophen (TYLENOL) suppository 650 mg  650 mg Rectal Q6H PRN    polyethylene glycol (MIRALAX) packet 17 g  17 g Oral DAILY PRN    ondansetron (ZOFRAN ODT) tablet 4 mg  4 mg Oral Q8H PRN    Or    ondansetron (ZOFRAN) injection 4 mg  4 mg IntraVENous Q6H PRN    dextrose 5 % - 0.45% NaCl infusion  50 mL/hr IntraVENous CONTINUOUS    pantoprazole (PROTONIX) tablet 40 mg  40 mg Oral ACB    lactulose (CHRONULAC) 10 gram/15 mL solution 15 mL  10 g Oral BID    labetaloL (NORMODYNE;TRANDATE) 20 mg/4 mL (5 mg/mL) injection 10 mg  10 mg IntraVENous Q4H PRN    hydrALAZINE (APRESOLINE) 20 mg/mL injection 10 mg  10 mg IntraVENous Q6H PRN    albuterol (PROVENTIL HFA, VENTOLIN HFA, PROAIR HFA) inhaler 2 Puff  2 Puff Inhalation Q6H PRN       Review of Systems:   A comprehensive review of systems was negative except for that written in the HPI. Objective:   Physical Exam:     Visit Vitals  BP (!) 108/50 (BP 1 Location: Left lower arm, BP Patient Position: At rest)   Pulse 68   Temp 98 °F (36.7 °C)   Resp 18   Ht 5' 7\" (1.702 m)   Wt 191 lb (86.6 kg)   SpO2 99%   BMI 29.91 kg/m²    O2 Flow Rate (L/min): 3 l/min O2 Device: Nasal cannula    Temp (24hrs), Av.2 °F (36.8 °C), Min:98 °F (36.7 °C), Max:98.5 °F (36.9 °C)    No intake/output data recorded. No intake/output data recorded. General:   Awake and alert. Eyes had scleral icterus. Lungs:   Clear to auscultation bilaterally. Chest wall:  No tenderness or deformity. Heart:  Regular rate and rhythm, S1, S2 normal, no murmur, click, rub or gallop. Abdomen:   Soft, mildly tender to palpation in RUQ. Bowel sounds normal. No masses,  No organomegaly. Extremities: Extremities normal, atraumatic, no cyanosis or edema. Pulses: 2+ and symmetric all extremities. Skin: Skin is jaundice. No rashes or lesions   Neurologic: CNII-XII intact.   No gross focal deficits         Data Review:       Recent Days:  Recent Labs     21  1115   WBC 13.1*   HGB 15.1   HCT 45.5        Recent Labs     21  0216 21  1115   *  --  132*   K 2.9*  --  3.7     --  96*   CO2 26  --  29   *  --  166*   BUN 20  --  17   CREA 1.23*  --  1.47*   CA 8.5  --  9.3   ALB 2.4* 2.4* 2.9*   TBILI 14.8* 15.2* 18.8*   ALT 245* 256* 312*   INR  --  1.1  --      No results for input(s): PH, PCO2, PO2, HCO3, FIO2 in the last 72 hours. 24 Hour Results:  Recent Results (from the past 24 hour(s))   CBC WITH AUTOMATED DIFF    Collection Time: 11/02/21 11:15 AM   Result Value Ref Range    WBC 13.1 (H) 3.6 - 11.0 K/uL    RBC 4.99 3.80 - 5.20 M/uL    HGB 15.1 11.5 - 16.0 g/dL    HCT 45.5 35.0 - 47.0 %    MCV 91.2 80.0 - 99.0 FL    MCH 30.3 26.0 - 34.0 PG    MCHC 33.2 30.0 - 36.5 g/dL    RDW 15.2 (H) 11.5 - 14.5 %    PLATELET 048 206 - 107 K/uL    MPV 11.6 8.9 - 12.9 FL    NRBC 0.0 0.0  WBC    ABSOLUTE NRBC 0.00 0.00 - 0.01 K/uL    NEUTROPHILS 84 (H) 32 - 75 %    LYMPHOCYTES 8 (L) 12 - 49 %    MONOCYTES 5 5 - 13 %    EOSINOPHILS 1 0 - 7 %    BASOPHILS 1 0 - 1 %    IMMATURE GRANULOCYTES 1 (H) 0 - 0.5 %    ABS. NEUTROPHILS 11.1 (H) 1.8 - 8.0 K/UL    ABS. LYMPHOCYTES 1.0 0.8 - 3.5 K/UL    ABS. MONOCYTES 0.6 0.0 - 1.0 K/UL    ABS. EOSINOPHILS 0.1 0.0 - 0.4 K/UL    ABS. BASOPHILS 0.1 0.0 - 0.1 K/UL    ABS. IMM. GRANS. 0.1 (H) 0.00 - 0.04 K/UL    DF AUTOMATED     METABOLIC PANEL, COMPREHENSIVE    Collection Time: 11/02/21 11:15 AM   Result Value Ref Range    Sodium 132 (L) 136 - 145 mmol/L    Potassium 3.7 3.5 - 5.1 mmol/L    Chloride 96 (L) 97 - 108 mmol/L    CO2 29 21 - 32 mmol/L    Anion gap 7 5 - 15 mmol/L    Glucose 166 (H) 65 - 100 mg/dL    BUN 17 6 - 20 mg/dL    Creatinine 1.47 (H) 0.55 - 1.02 mg/dL    BUN/Creatinine ratio 12 12 - 20      GFR est AA 42 (L) >60 ml/min/1.73m2    GFR est non-AA 35 (L) >60 ml/min/1.73m2    Calcium 9.3 8.5 - 10.1 mg/dL    Bilirubin, total 18.8 (H) 0.2 - 1.0 mg/dL    AST (SGOT) 154 (H) 15 - 37 U/L    ALT (SGPT) 312 (H) 12 - 78 U/L    Alk.  phosphatase 955 (H) 45 - 117 U/L    Protein, total 6.4 6.4 - 8.2 g/dL    Albumin 2.9 (L) 3.5 - 5.0 g/dL    Globulin 3.5 2.0 - 4.0 g/dL    A-G Ratio 0.8 (L) 1.1 - 2.2     URINALYSIS W/ REFLEX CULTURE    Collection Time: 11/02/21 12:45 PM    Specimen: Urine   Result Value Ref Range    Color Dark Yellow      Appearance Turbid (A) Clear      Specific gravity 1.025 1.003 - 1.030      pH (UA) 5.0 5.0 - 8.0      Protein 100 (A) Negative mg/dL    Glucose Negative Negative mg/dL    Ketone Negative Negative mg/dL    Bilirubin Moderate (A) Negative      Blood Negative Negative      Urobilinogen 4.0 (H) 0.1 - 1.0 EU/dL    Nitrites Negative Negative      Leukocyte Esterase Trace (A) Negative      UA:UC IF INDICATED Urine Culture Ordered (A) Culture not indicated by UA result      WBC 20-50 0 - 4 /hpf    RBC 0-5 0 - 5 /hpf    Bacteria Negative Negative /hpf    Mucus Trace /lpf   HEPATIC FUNCTION PANEL    Collection Time: 11/02/21  8:50 PM   Result Value Ref Range    Protein, total 5.1 (L) 6.4 - 8.2 g/dL    Albumin 2.4 (L) 3.5 - 5.0 g/dL    Globulin 2.7 2.0 - 4.0 g/dL    A-G Ratio 0.9 (L) 1.1 - 2.2      Bilirubin, total 15.2 (H) 0.2 - 1.0 mg/dL    Bilirubin, direct 13.1 (H) 0.0 - 0.2 mg/dL    Alk. phosphatase 782 (H) 45 - 117 U/L    AST (SGOT) 115 (H) 15 - 37 U/L    ALT (SGPT) 256 (H) 12 - 78 U/L   PROTHROMBIN TIME + INR    Collection Time: 11/02/21  8:50 PM   Result Value Ref Range    Prothrombin time 14.4 11.9 - 14.7 sec    INR 1.1 0.9 - 1.1     METABOLIC PANEL, COMPREHENSIVE    Collection Time: 11/03/21  2:16 AM   Result Value Ref Range    Sodium 135 (L) 136 - 145 mmol/L    Potassium 2.9 (L) 3.5 - 5.1 mmol/L    Chloride 101 97 - 108 mmol/L    CO2 26 21 - 32 mmol/L    Anion gap 8 5 - 15 mmol/L    Glucose 107 (H) 65 - 100 mg/dL    BUN 20 6 - 20 mg/dL    Creatinine 1.23 (H) 0.55 - 1.02 mg/dL    BUN/Creatinine ratio 16 12 - 20      GFR est AA 52 (L) >60 ml/min/1.73m2    GFR est non-AA 43 (L) >60 ml/min/1.73m2    Calcium 8.5 8.5 - 10.1 mg/dL    Bilirubin, total 14.8 (H) 0.2 - 1.0 mg/dL    AST (SGOT) 126 (H) 15 - 37 U/L    ALT (SGPT) 245 (H) 12 - 78 U/L    Alk.  phosphatase 749 (H) 45 - 117 U/L    Protein, total 5.9 (L) 6.4 - 8.2 g/dL    Albumin 2.4 (L) 3.5 - 5.0 g/dL    Globulin 3.5 2.0 - 4.0 g/dL A-G Ratio 0.7 (L) 1.1 - 2.2         CT ABD PELV WO CONT   Final Result   Findings suggesting distal biliary obstruction, although cause not   visible. Consider gallbladder ultrasound and MRCP. Nodules at the lung bases      CT dose reduction was achieved through use of a standardized protocol tailored   for this examination and automatic exposure control for dose modulation. XR CHEST PORT   Final Result   The cardiomediastinal silhouette is appropriate for age, technique,   and lung expansion. Pulmonary vasculature is not congested. The lungs are   essentially clear. No effusion or pneumothorax is seen. Assessment:  Obstructive jaundice, etiology unclear at this point    Mixed transaminates    CANDELARIO- suspect prerenal    Chronic CHFpEF    Paroxysmal Atrial fibrillation- on eliquis    Chronic COPD- without exacerbation    Hypotension    History of hypertension    Chronic depression      Plan:  Order U/S of upper GI  Follow-up with GI and results of ERCP  Hold eliquis, bumex, and chlorthalidone  Continue O2 treatment  PRN labetalol and HCTZ, cardizem  Continue trazodone for depression  Continue DVT pppx      Care Plan discussed with: Patient/Family    Disposition: Continued inpatient care pending ERCP. Total time spent with patient: 30 minutes. Maurilio Ip  *ATTENTION:  This note has been created by a medical student for educational purposes only. Please do not refer to the content of this note for clinical decision-making, billing, or other purposes. Please see attending physicians note to obtain clinical information on this patient. *

## 2021-11-03 NOTE — PROGRESS NOTES
Reason for Admission:  Choledocholithiasis                     RUR Score:    11%                Plan for utilizing home health:    Requesting [facility]      PCP: First and Last name:  Laureen Aquino MD     Name of Practice:    Are you a current patient: Yes/No:    Approximate date of last visit: 2-3 months ago   Can you participate in a virtual visit with your PCP:                     Current Advanced Directive/Advance Care Plan: Full Code      Healthcare Decision Maker:   Click here to complete [address] including selection of the Healthcare Decision Maker Relationship (ie \"Primary\")           Carson Guerrero, [phone]                  Transition of Care Plan:                    Patient currently lives at home with her son. The patient has 13 steps to enter from of home and 10 to enter back of home. Patient has a rollator and 3L [place] oxygen at home. Patient's son is her transport at discharge and to follow-up appointments. Patient has had [facility] in the past and requesting [facility] again at discharge. Choice letter signed and placed on chart. Referral sent. Patient has her medications filled at [facility] in [place], [place]. Current Dispo: Home with .

## 2021-11-03 NOTE — PROGRESS NOTES
Patient has been accepted with [address] with an anticipated start of care date of 11/9/21. CM made patient aware.

## 2021-11-03 NOTE — CONSULTS
Gastroenterology Consult     Referring Physician: Samuel Noland MD     Consult Date: 11/3/2021     Subjective:     Chief Complaint: Shortness of Breath, Jaundice, Constipation    History of Present Illness: Joseph Danielson is a 78 y.o. female who is seen in consultation for choledocholithiasis. Ms. Marcus Wolfe has a past medical history significant for Renal Cell Carcinoma, s/p nephrectomy, she denies chemo or radiation, COPD, Sjogrens syndrome, chronic CHFpEF, Afib treated with Eliquis, fibromyalgia. Patient reports she was to have a cataract removed when the nurse noticed her sclera were yellow. She was sent to [facility] for further evaluation and treatment. She does complain of nausea and constipation. She denies recent fevers, vomiting, or diarrhea. She does complain of left upper abdominal discomfort more like \"gas\". In the ED her bilirubin was 18.8, this morning it is 14.8. , , Alk Phosphatase 749. She had and EGD and colonoscopy in 2020. She reports the EGD shows esophagitis and gastritis. Colonoscopy shows 3 sessile  Polyps removed. She was to repeat her colonoscopy in a year. She is scheduled for ultrasound of the gallbladder today. Plan for ERCP in the am.    CT abdomen/pelvis 11/2/2021:   IMPRESSION  Findings suggesting distal biliary obstruction, although cause not  visible. Consider gallbladder ultrasound and MRCP.  Nodules at the lung bases    Past Medical History:   Diagnosis Date    A-fib [facility])     CHF (congestive heart failure) (HCC)     Clear cell renal cell carcinoma (HCC)     COPD (chronic obstructive pulmonary disease) (HCC)     Fibromyalgia     GERD (gastroesophageal reflux disease)     Lymphedema     Sjogren's syndrome ([facility] [address]     Thyroid nodule      Past Surgical History:   Procedure Laterality Date    COLONOSCOPY N/A 11/18/2020    COLONOSCOPY performed by Bay Anaya MD at [facility]      EGD    HX HYSTERECTOMY      HX NEPHRECTOMY Right 2019    HX ORTHOPAEDIC ankle      Family History   Problem Relation Age of Onset    Hypertension Mother     Stroke Mother     Stroke Father     Hypertension Father      Social History     Tobacco Use    Smoking status: Current Every Day Smoker     Packs/day: 0.50     Types: Cigarettes    Smokeless tobacco: Never Used   Substance Use Topics    Alcohol use: Not Currently      Allergies   Allergen Reactions    Adhesive Tape-Silicones Atopic Dermatitis     Current Facility-Administered Medications   Medication Dose Route Frequency    ascorbic acid (vitamin C) (VITAMIN C) tablet 500 mg  500 mg Oral DAILY    atorvastatin (LIPITOR) tablet 40 mg  40 mg Oral DAILY    budesonide-formoterol (SYMBICORT) 80-4.5 mcg inhaler  2 Puff Inhalation BID RT    [Held by provider] bumetanide (BUMEX) tablet 1 mg  1 mg Oral DAILY    [Held by provider] chlorthalidone (HYGROTON) tablet 25 mg  25 mg Oral DAILY    cholecalciferol (VITAMIN D3) (1000 Units /25 mcg) tablet 1,000 Units  1,000 Units Oral DAILY    diazePAM (VALIUM) tablet 5 mg  5 mg Oral Q6H PRN    dilTIAZem ER (CARDIZEM CD) capsule 120 mg  120 mg Oral DAILY    potassium chloride SR (KLOR-CON 10) tablet 20 mEq  20 mEq Oral DAILY    traZODone (DESYREL) tablet 50 mg  50 mg Oral QHS    sodium chloride (NS) flush 5-40 mL  5-40 mL IntraVENous Q8H    sodium chloride (NS) flush 5-40 mL  5-40 mL IntraVENous PRN    acetaminophen (TYLENOL) tablet 650 mg  650 mg Oral Q6H PRN    Or    acetaminophen (TYLENOL) suppository 650 mg  650 mg Rectal Q6H PRN    polyethylene glycol (MIRALAX) packet 17 g  17 g Oral DAILY PRN    ondansetron (ZOFRAN ODT) tablet 4 mg  4 mg Oral Q8H PRN    Or    ondansetron (ZOFRAN) injection 4 mg  4 mg IntraVENous Q6H PRN    dextrose 5 % - 0.45% NaCl infusion  50 mL/hr IntraVENous CONTINUOUS    pantoprazole (PROTONIX) tablet 40 mg  40 mg Oral ACB    lactulose (CHRONULAC) 10 gram/15 mL solution 15 mL  10 g Oral BID    labetaloL (NORMODYNE;TRANDATE) 20 mg/4 mL (5 mg/mL) injection 10 mg  10 mg IntraVENous Q4H PRN    hydrALAZINE (APRESOLINE) 20 mg/mL injection 10 mg  10 mg IntraVENous Q6H PRN    albuterol (PROVENTIL HFA, VENTOLIN HFA, PROAIR HFA) inhaler 2 Puff  2 Puff Inhalation Q6H PRN        Review of Systems:  A detailed 10 organ review of systems is obtained with pertinent positives as listed in the History of Present Illness and Past Medical History. All others are negative. Objective:     Physical Exam:  Visit Vitals  /61 (BP 1 Location: Left lower arm, BP Patient Position: At rest)   Pulse 61   Temp 98.4 °F (36.9 °C)   Resp 18   Ht 5' 7\" (1.702 m)   Wt 86.6 kg (191 lb)   SpO2 100%   BMI 29.91 kg/m²        Skin:  Extremities and face reveal no rashes. No chapin erythema. No telangiectasias on the chest wall. Jaundice  HEENT: Sclerae + icteric. Extra-occular muscles are intact. No oral ulcers. No abnormal pigmentation of the lips. The neck is supple. Cardiovascular: Regular rate and rhythm. Respiratory:  Comfortable breathing with no accessory muscle use. GI:  Abdomen nondistended, soft, and nontender. Normal active bowel sounds. No enlargement of the liver or spleen. No masses palpable. Rectal:  Deferred  Musculoskeletal: Extremities have good range of motion. Neurological:  Gross memory appears intact. Patient is alert and oriented. Psychiatric:  Mood appears appropriate with judgement intact. Lymphatic:  No cervical or supraclavicular adenopathy.     Lab/Data Review:  CMP:   Lab Results   Component Value Date/Time     (L) 11/03/2021 02:16 AM    K 2.9 (L) 11/03/2021 02:16 AM     11/03/2021 02:16 AM    CO2 26 11/03/2021 02:16 AM    AGAP 8 11/03/2021 02:16 AM     (H) 11/03/2021 02:16 AM    BUN 20 11/03/2021 02:16 AM    CREA 1.23 (H) 11/03/2021 02:16 AM    GFRAA 52 (L) 11/03/2021 02:16 AM    GFRNA 43 (L) 11/03/2021 02:16 AM    CA 8.5 11/03/2021 02:16 AM    ALB 2.4 (L) 11/03/2021 02:16 AM    TP 5.9 (L) 11/03/2021 02:16 AM    GLOB 3.5 11/03/2021 02:16 AM    AGRAT 0.7 (L) 11/03/2021 02:16 AM     (H) 11/03/2021 02:16 AM     CBC: No results found for: WBC, HGB, HGBEXT, HCT, HCTEXT, PLT, PLTEXT, HGBEXT, HCTEXT, PLTEXT      Assessment/Plan:     1. Choledocholithiasis     ERCP in the am     NPO after midnight     Gall bladder ultrasound pending     S/P CT abdomen: Findings suggesting distal biliary obstruction  2. GERD     Continue Protonix 40 mg Daily  3. Hypokalemia     Potassium chloride 20 meq daily  4. Hypertension      Continue Cardizem daily      Monitor  5. COPD     Continue symbicort BID    Thank you for allowing me to participate in this patients care   Plan discussed with Dr. Wes Naranjo and he approves.

## 2021-11-03 NOTE — PROGRESS NOTES
Problem: Falls - Risk of  Goal: *Absence of Falls  Description: Document Cory Ballard Fall Risk and appropriate interventions in the flowsheet.   11/3/2021 1050 by Terrie Cowden, RN  Outcome: Progressing Towards Goal  Note: Fall Risk Interventions:  Mobility Interventions: Assess mobility with egress test, OT consult for ADLs, PT Consult for mobility concerns         Medication Interventions: Bed/chair exit alarm         History of Falls Interventions: Bed/chair exit alarm      11/3/2021 1050 by Terrie Cowden, RN  Outcome: Progressing Towards Goal  Note: Fall Risk Interventions:  Mobility Interventions: Assess mobility with egress test, OT consult for ADLs, PT Consult for mobility concerns         Medication Interventions: Bed/chair exit alarm         History of Falls Interventions: Bed/chair exit alarm         Problem: Patient Education: Go to Patient Education Activity  Goal: Patient/Family Education  11/3/2021 1050 by Terrie Cowden, RN  Outcome: Progressing Towards Goal  11/3/2021 1050 by Terrie Cowden, RN  Outcome: Progressing Towards Goal

## 2021-11-04 NOTE — PROGRESS NOTES
Pt complaining from the time this nurse got on shift. Walked into patients room and patient stated her arm is itching where the IV is placed and fluid was leaking around the site. This nurse told her I would get the dressing cleaned up and changed. Went back into room and redressed the transparent tape and IV flushed fine. Pt then asked for something to eat and her medicines. This nurse then went and got the patient a lunch box and her medications. When going back into the room the patient immediately started yelling at this nurse again about how nobody knows what is happening around this hospital and no one answers her light when she presses it. This nurse asked for further explaination on the situation. The patient stated that the doctors and nurses told her that she had to go downstairs to have an U/S done but the U/S tech came to her room and Jose Hernandez does nobody ever know what is going on in this place\". This nurse explained her that I was unsure of how things were done on day shift but I know on night shift we have to take patients downstairs for tests. The patient then started saying how just nobody knows what is going on in this hospital.  This nurse apologized for the inconvenience. The patient then hit the call bell again and this nurse entered the room and the patient immediately started yelling at this nurse that \"You cannot give a patient a laxative and not answer their call light. \" and the patient had pulled her IV out. This nurse again apologized and stated that I had just left a patients room but stated I would help her get to the bathroom and get her IV cleaned up when she was finished. The patient then got back into bed and started complaining about the blood on her arm. This nurse told her \"yes ma'am, it is dried blood, I'll clean it up\" then walked out the room to get some wipes to clean the blood.   The patient then started yelling out the door \"no one listens to me, they don't answer my call lights, and have an attitude. \"  This nurse then turned around and walked back into the room and stated Bobbe Files is wrong? I am going to get something to clean you up. I have done everything I can to help you. We can have a good night or a bad night, but you have had an attitude since the first time I walked into your door. \"  The patient then started waving her hand in my face with her finger pointed at me yelling \"you have a problem, you have an attitude, you don't answer my call light, you aren't coming to me fast enough. \"  At this point this nurse walked out the room and told the patient \"I need to leave for a minute because this is not a safe environment and we are not getting anywhere. \"  This nurse then walked out the room and the patient hit the call light again. Another nurse entered the room and the patient started yelling at her and saying no one was doing anything for her and that she's had issues with her nurses all day. Yris Adkins, Nursing Supervisor notified and patient being given to another nurse to diffuse the situation. Nursing supervisor stated she would come speak to the patient.

## 2021-11-04 NOTE — PROGRESS NOTES
Problem: Falls - Risk of  Goal: *Absence of Falls  Description: Document Starleen Freeze Fall Risk and appropriate interventions in the flowsheet. Outcome: Progressing Towards Goal  Note: Fall Risk Interventions:  Mobility Interventions: Bed/chair exit alarm, Utilize walker, cane, or other assistive device         Medication Interventions: Bed/chair exit alarm         History of Falls Interventions: Bed/chair exit alarm         Problem: Patient Education: Go to Patient Education Activity  Goal: Patient/Family Education  Outcome: Progressing Towards Goal     Problem: Pressure Injury - Risk of  Goal: *Prevention of pressure injury  Description: Document Asim Scale and appropriate interventions in the flowsheet. Outcome: Progressing Towards Goal  Note: Pressure Injury Interventions:  Sensory Interventions: Monitor skin under medical devices    Moisture Interventions: Absorbent underpads, Minimize layers, Maintain skin hydration (lotion/cream), Apply protective barrier, creams and emollients    Activity Interventions: Increase time out of bed    Mobility Interventions: Assess need for specialty bed, HOB 30 degrees or less, Turn and reposition approx.  every two hours(pillow and wedges)    Nutrition Interventions: Document food/fluid/supplement intake                     Problem: Patient Education: Go to Patient Education Activity  Goal: Patient/Family Education  Outcome: Progressing Towards Goal

## 2021-11-04 NOTE — PROGRESS NOTES
Hospitalist Progress Note               Daily Progress Note: 11/4/2021      Subjective:   Patient is a 68yo female with history of renal cell carcinoma s/p nephrectomy, COPD not on O2, chronic CHFpEF, Afib treated with eliquis, and fibromylagia complaining of yellow discoloration that started 3 days ago after arrival to ED on 11/2/21. Yellow discoloration of sclera noted when arriving for elective cataract surgery. Patient complains of mild nausea and constipation. She denies fever, chills, vomiting, and weight loss. In the ER bilirubin was 18 and alkaline phophatase was 995. Non-contrast CT of the abdomen showed gallbladder and common bile duct distention along with mild intrahepatic and extrahepatic biliary dilatation suggesting obstruction. No visible obstruction was noted on CT. Urinalysis showed turbid dark yellow urine with a urobilinogen of 4.0.    11/3/21: patient reports no nausea, vomiting, diarrhea or fever. Patient did mention that she normally has loose stools and since her jaundice began her stools have been hard and she needs to strain to have bowel movement. Patient reports tingling sensation at the level of umbilicus on right side. Patient wanted to stop NPO after being NPO all night and requested food. Therefore, ERCP is going to be pushed back to tomorrow, per GI. Patient reports continued straining to have bowel movement even after lactulose prep. Pearson's test negative. Total bilirubin 14.8, , , alk phosphatase 749. Told patient that we are going to order U/S of the upper GI to look for possible obstruction and that she needs to be NPO 6 hours prior to the procedure. Patient does report a 50 pound weight loss over the past year. She attributes this to her depression. She also indicates she has had stage IV renal cancer with lung metastases which have been followed since 2019    -----    On follow-up, patient had just come from ERCP.  Patient seen yesterday, 11/3/21, by GI consult, ultrasound of gallbladder conducted last night, ERCP completed today. Ultrasound showed gallbladder sludge and suspected gallbladder obstruction with no notable cause. Intrahepatic and extra hepatic biliary dilatation. No change in labs from yesterday. ERCP done on 11/4 showed stricture of bile duct. Patient's bile duct was dilated and stented. Patient feels ok and reports no further complaints. If bilirubin and other associated labs begin to decrease overnight, patient can most likely be discharged.       Problem List:  Problem List as of 11/4/2021 Date Reviewed: 11/13/2020          Codes Class Noted - Resolved    Choledocholithiasis ICD-10-CM: K80.50  ICD-9-CM: 574.50  11/2/2021 - Present        Respiratory failure, unspecified with hypoxia [facility]) ICD-10-CM: J96.91  ICD-9-CM: 518.81  11/17/2020 - Present        COPD exacerbation ([facility] ICD-10-CM: J44.1  ICD-9-CM: 491.21  11/16/2020 - Present        Anemia ICD-10-CM: D64.9  ICD-9-CM: 285.9  11/16/2020 - Present        Obesity ICD-10-CM: E66.9  ICD-9-CM: 278.00  11/16/2020 - Present        A-fib ([facility] ICD-10-CM: I48.91  ICD-9-CM: 427.31  11/16/2020 - Present        CHF (congestive heart failure) ([facility] ICD-10-CM: I50.9  ICD-9-CM: 428.0  11/16/2020 - Present        Respiratory failure with hypoxia (HCC) ICD-10-CM: J96.91  ICD-9-CM: 518.81  11/13/2020 - Present        RESOLVED: Screening for colon cancer ICD-10-CM: Z12.11  ICD-9-CM: V76.51  11/16/2020 - 12/16/2020              Medications reviewed  Current Facility-Administered Medications   Medication Dose Route Frequency    iopamidoL (ISOVUE-370) 76 % injection        labetaloL (NORMODYNE;TRANDATE) 20 mg/4 mL (5 mg/mL) injection 5 mg  5 mg IntraVENous Q5MIN PRN    metoprolol (LOPRESSOR) injection 2.5 mg  2.5 mg IntraVENous Q5MIN PRN    hydrALAZINE (APRESOLINE) 20 mg/mL injection 10 mg  10 mg IntraVENous Q10MIN PRN    sodium chloride (NS) flush 5-40 mL  5-40 mL IntraVENous Q8H    sodium chloride (NS) flush 5-40 mL  5-40 mL IntraVENous PRN    HYDROcodone-acetaminophen (NORCO) 5-325 mg per tablet 1 Tablet  1 Tablet Oral PRN    fentaNYL citrate (PF) injection 50 mcg  50 mcg IntraVENous Multiple    HYDROmorphone (DILAUDID) injection 0.4 mg  0.4 mg IntraVENous Multiple    ondansetron (ZOFRAN) injection 4 mg  4 mg IntraVENous PRN    midazolam (VERSED) injection 0.5 mg  0.5 mg IntraVENous Q5MIN PRN    diphenhydrAMINE (BENADRYL) injection 12.5 mg  12.5 mg IntraVENous PRN    meperidine (DEMEROL) injection 12.5 mg  12.5 mg IntraVENous ONCE    ePHEDrine in NS (PF) (MISTOLE) 10 mg/mL in NS syringe 5 mg  5 mg IntraVENous PRN    iopamidoL (ISOVUE-370) 76 % injection    PRN    ascorbic acid (vitamin C) (VITAMIN C) tablet 500 mg  500 mg Oral DAILY    atorvastatin (LIPITOR) tablet 40 mg  40 mg Oral DAILY    budesonide-formoterol (SYMBICORT) 80-4.5 mcg inhaler  2 Puff Inhalation BID RT    [Held by provider] bumetanide (BUMEX) tablet 1 mg  1 mg Oral DAILY    [Held by provider] chlorthalidone (HYGROTON) tablet 25 mg  25 mg Oral DAILY    cholecalciferol (VITAMIN D3) (1000 Units /25 mcg) tablet 1,000 Units  1,000 Units Oral DAILY    diazePAM (VALIUM) tablet 5 mg  5 mg Oral Q6H PRN    dilTIAZem ER (CARDIZEM CD) capsule 120 mg  120 mg Oral DAILY    potassium chloride SR (KLOR-CON 10) tablet 20 mEq  20 mEq Oral DAILY    traZODone (DESYREL) tablet 50 mg  50 mg Oral QHS    sodium chloride (NS) flush 5-40 mL  5-40 mL IntraVENous Q8H    sodium chloride (NS) flush 5-40 mL  5-40 mL IntraVENous PRN    acetaminophen (TYLENOL) tablet 650 mg  650 mg Oral Q6H PRN    Or    acetaminophen (TYLENOL) suppository 650 mg  650 mg Rectal Q6H PRN    polyethylene glycol (MIRALAX) packet 17 g  17 g Oral DAILY PRN    ondansetron (ZOFRAN ODT) tablet 4 mg  4 mg Oral Q8H PRN    Or    ondansetron (ZOFRAN) injection 4 mg  4 mg IntraVENous Q6H PRN    dextrose 5 % - 0.45% NaCl infusion  50 mL/hr IntraVENous CONTINUOUS    pantoprazole (PROTONIX) tablet 40 mg  40 mg Oral ACB    lactulose (CHRONULAC) 10 gram/15 mL solution 15 mL  10 g Oral BID    labetaloL (NORMODYNE;TRANDATE) 20 mg/4 mL (5 mg/mL) injection 10 mg  10 mg IntraVENous Q4H PRN    hydrALAZINE (APRESOLINE) 20 mg/mL injection 10 mg  10 mg IntraVENous Q6H PRN    albuterol (PROVENTIL HFA, VENTOLIN HFA, PROAIR HFA) inhaler 2 Puff  2 Puff Inhalation Q6H PRN       Review of Systems:   A comprehensive review of systems was negative except for that written in the HPI. Objective:   Physical Exam:     Visit Vitals  /61 (BP Patient Position: At rest)   Pulse 70   Temp 98.3 °F (36.8 °C)   Resp 18   Ht 5' 7\" (1.702 m)   Wt 86.6 kg (191 lb)   SpO2 95%   BMI 29.91 kg/m²    O2 Flow Rate (L/min): 3 l/min O2 Device: None (Room air)    Temp (24hrs), Av.3 °F (36.8 °C), Min:97.5 °F (36.4 °C), Max:98.6 °F (37 °C)    701 -  1900  In: 200 [I.V.:200]  Out: -    No intake/output data recorded. General:   Awake and alert. Eyes had scleral icterus. Lungs:   Clear to auscultation bilaterally. Chest wall:  No tenderness or deformity. Heart:  Regular rate and rhythm, S1, S2 normal, no murmur, click, rub or gallop. Abdomen:   Soft, mildly tender to palpation in RUQ. Bowel sounds normal. No masses,  No organomegaly. Extremities: Extremities normal, atraumatic, no cyanosis or edema. Pulses: 2+ and symmetric all extremities. Skin: Skin is jaundice. No rashes or lesions   Neurologic: CNII-XII intact.   No gross focal deficits         Data Review:       Recent Days:  Recent Labs     21  0459 21  1115   WBC 7.4 13.1*   HGB 12.5 15.1   HCT 39.2 45.5    292     Recent Labs     21  0459 21  0216 21   * 135*  --   --  132*   K 3.6 2.9*  --   --  3.7    101  --   --  96*   CO2 26 26  --   --  29   * 107*  --   --  166*   BUN 18 20  --   --  17   CREA 1.30* 1.23*  --   --  1.47*   CA 8.6 8.5  -- --  9.3   ALB 2.2* 2.4* 2.4*   < > 2.9*   TBILI 15.2* 14.8* 15.2*   < > 18.8*   * 245* 256*   < > 312*   INR  --   --  1.1  --   --     < > = values in this interval not displayed. No results for input(s): PH, PCO2, PO2, HCO3, FIO2 in the last 72 hours. 24 Hour Results:  Recent Results (from the past 24 hour(s))   METABOLIC PANEL, COMPREHENSIVE    Collection Time: 11/04/21  4:59 AM   Result Value Ref Range    Sodium 135 (L) 136 - 145 mmol/L    Potassium 3.6 3.5 - 5.1 mmol/L    Chloride 105 97 - 108 mmol/L    CO2 26 21 - 32 mmol/L    Anion gap 4 (L) 5 - 15 mmol/L    Glucose 112 (H) 65 - 100 mg/dL    BUN 18 6 - 20 mg/dL    Creatinine 1.30 (H) 0.55 - 1.02 mg/dL    BUN/Creatinine ratio 14 12 - 20      GFR est AA 49 (L) >60 ml/min/1.73m2    GFR est non-AA 40 (L) >60 ml/min/1.73m2    Calcium 8.6 8.5 - 10.1 mg/dL    Bilirubin, total 15.2 (H) 0.2 - 1.0 mg/dL    AST (SGOT) 125 (H) 15 - 37 U/L    ALT (SGPT) 242 (H) 12 - 78 U/L    Alk. phosphatase 793 (H) 45 - 117 U/L    Protein, total 5.6 (L) 6.4 - 8.2 g/dL    Albumin 2.2 (L) 3.5 - 5.0 g/dL    Globulin 3.4 2.0 - 4.0 g/dL    A-G Ratio 0.6 (L) 1.1 - 2.2     CBC WITH AUTOMATED DIFF    Collection Time: 11/04/21  4:59 AM   Result Value Ref Range    WBC 7.4 3.6 - 11.0 K/uL    RBC 4.09 3.80 - 5.20 M/uL    HGB 12.5 11.5 - 16.0 g/dL    HCT 39.2 35.0 - 47.0 %    MCV 95.8 80.0 - 99.0 FL    MCH 30.6 26.0 - 34.0 PG    MCHC 31.9 30.0 - 36.5 g/dL    RDW 15.9 (H) 11.5 - 14.5 %    PLATELET 467 896 - 573 K/uL    MPV 12.3 8.9 - 12.9 FL    NRBC 0.0 0.0  WBC    ABSOLUTE NRBC 0.00 0.00 - 0.01 K/uL    NEUTROPHILS 77 (H) 32 - 75 %    LYMPHOCYTES 10 (L) 12 - 49 %    MONOCYTES 8 5 - 13 %    EOSINOPHILS 3 0 - 7 %    BASOPHILS 1 0 - 1 %    IMMATURE GRANULOCYTES 1 (H) 0 - 0.5 %    ABS. NEUTROPHILS 5.7 1.8 - 8.0 K/UL    ABS. LYMPHOCYTES 0.7 (L) 0.8 - 3.5 K/UL    ABS. MONOCYTES 0.6 0.0 - 1.0 K/UL    ABS. EOSINOPHILS 0.3 0.0 - 0.4 K/UL    ABS. BASOPHILS 0.1 0.0 - 0.1 K/UL    ABS. IMM. GRANS. 0.1 (H) 0.00 - 0.04 K/UL    DF AUTOMATED         US ABD LTD   Final Result   Sludge-filled gallbladder. Biliary obstruction without demonstrated   cause      CT ABD PELV WO CONT   Final Result   Findings suggesting distal biliary obstruction, although cause not   visible. Consider gallbladder ultrasound and MRCP. Nodules at the lung bases      CT dose reduction was achieved through use of a standardized protocol tailored   for this examination and automatic exposure control for dose modulation. XR CHEST PORT   Final Result   The cardiomediastinal silhouette is appropriate for age, technique,   and lung expansion. Pulmonary vasculature is not congested. The lungs are   essentially clear. No effusion or pneumothorax is seen. XR FLUOROSCOPY UNDER 60 MINUTES    (Results Pending)        Assessment:  Obstructive jaundice, etiology unclear at this point. Concerning for malignancy with her weight loss    Biliary distention due to biliary sludge    Mixed transaminates    CANDELARIO- suspect prerenal    Stage IV renal carcinoma with lung metastases since 2019    Chronic CHFpEF    Paroxysmal Atrial fibrillation- on eliquis    Chronic COPD- without exacerbation    Hypotension, transient    History of hypertension    Chronic depression      Plan:  Follow-up labs s/p ERCP/stent of biliary ducts- if labs improve patient can be set for discharge  Hold eliquis, bumex, and chlorthalidone  Continue O2 treatment  PRN labetalol and HCTZ, cardizem  Continue trazodone for depression  Continue IV fluids      Care Plan discussed with: Patient/Family    Disposition: Discharge pending labs    Total time spent with patient: 30 minutes.     Robert Pérez MD

## 2021-11-04 NOTE — PROGRESS NOTES
Two person admission skin assessment performed by Dalia Romero RN and Cherylene Baron RN. Patient's skin is jaundiced. No skin breakdown noted.

## 2021-11-04 NOTE — PROGRESS NOTES
Hospitalist Progress Note               Daily Progress Note: 11/4/2021      Subjective:   Patient is a 70yo female with history of renal cell carcinoma s/p nephrectomy, COPD not on O2, chronic CHFpEF, Afib treated with eliquis, and fibromylagia complaining of yellow discoloration that started 3 days ago after arrival to ED on 11/2/21. Yellow discoloration of sclera noted when arriving for elective cataract surgery. Patient complains of mild nausea and constipation. She denies fever, chills, vomiting, and weight loss. In the ER bilirubin was 18 and alkaline phophatase was 995. Non-contrast CT of the abdomen showed gallbladder and common bile duct distention along with mild intrahepatic and extrahepatic biliary dilatation suggesting obstruction. No visible obstruction was noted on CT. Urinalysis showed turbid dark yellow urine with a urobilinogen of 4.0.    11/3/21: patient reports no nausea, vomiting, diarrhea or fever. Patient did mention that she normally has loose stools and since her jaundice began her stools have been hard and she needs to strain to have bowel movement. Patient reports tingling sensation at the level of umbilicus on right side. Patient wanted to stop NPO after being NPO all night and requested food. Therefore, ERCP is going to be pushed back to tomorrow, per GI. Patient reports continued straining to have bowel movement even after lactulose prep. Pearson's test negative. Total bilirubin 14.8, , , alk phosphatase 749. Told patient that we are going to order U/S of the upper GI to look for possible obstruction and that she needs to be NPO 6 hours prior to the procedure. Patient does report a 50 pound weight loss over the past year. She attributes this to her depression. She also indicates she has had stage IV renal cancer with lung metastases which have been followed since 2019    -----    On follow-up, patient had just come from ERCP.  Patient seen yesterday, 11/3/21, by GI consult, ultrasound of gallbladder conducted last night, ERCP completed today. Ultrasound showed gallbladder sludge and suspected gallbladder obstruction with no notable cause. Intrahepatic and extra hepatic biliary dilatation. No change in labs from yesterday. ERCP done on 11/4 showed stricture of bile duct. Patient's bile duct was dilated and stented. Patient feels ok and reports no further complaints. If bilirubin and other associated labs begin to decrease overnight, patient can most likely be discharged.       Problem List:  Problem List as of 11/4/2021 Date Reviewed: 11/13/2020          Codes Class Noted - Resolved    Choledocholithiasis ICD-10-CM: K80.50  ICD-9-CM: 574.50  11/2/2021 - Present        Respiratory failure, unspecified with hypoxia [facility]) ICD-10-CM: J96.91  ICD-9-CM: 518.81  11/17/2020 - Present        COPD exacerbation ([facility] ICD-10-CM: J44.1  ICD-9-CM: 491.21  11/16/2020 - Present        Anemia ICD-10-CM: D64.9  ICD-9-CM: 285.9  11/16/2020 - Present        Obesity ICD-10-CM: E66.9  ICD-9-CM: 278.00  11/16/2020 - Present        A-fib ([facility] ICD-10-CM: I48.91  ICD-9-CM: 427.31  11/16/2020 - Present        CHF (congestive heart failure) ([facility] ICD-10-CM: I50.9  ICD-9-CM: 428.0  11/16/2020 - Present        Respiratory failure with hypoxia (H[facility]) ICD-10-CM: J96.91  ICD-9-CM: 518.81  11/13/2020 - Present        RESOLVED: Screening for colon cancer ICD-10-CM: Z12.11  ICD-9-CM: V76.51  11/16/2020 - 12/16/2020              Medications reviewed  Current Facility-Administered Medications   Medication Dose Route Frequency    ascorbic acid (vitamin C) (VITAMIN C) tablet 500 mg  500 mg Oral DAILY    atorvastatin (LIPITOR) tablet 40 mg  40 mg Oral DAILY    budesonide-formoterol (SYMBICORT) 80-4.5 mcg inhaler  2 Puff Inhalation BID RT    [Held by provider] bumetanide (BUMEX) tablet 1 mg  1 mg Oral DAILY    [Held by provider] chlorthalidone (HYGROTON) tablet 25 mg  25 mg Oral DAILY    cholecalciferol (VITAMIN D3) (1000 Units /25 mcg) tablet 1,000 Units  1,000 Units Oral DAILY    diazePAM (VALIUM) tablet 5 mg  5 mg Oral Q6H PRN    dilTIAZem ER (CARDIZEM CD) capsule 120 mg  120 mg Oral DAILY    potassium chloride SR (KLOR-CON 10) tablet 20 mEq  20 mEq Oral DAILY    traZODone (DESYREL) tablet 50 mg  50 mg Oral QHS    sodium chloride (NS) flush 5-40 mL  5-40 mL IntraVENous Q8H    sodium chloride (NS) flush 5-40 mL  5-40 mL IntraVENous PRN    acetaminophen (TYLENOL) tablet 650 mg  650 mg Oral Q6H PRN    Or    acetaminophen (TYLENOL) suppository 650 mg  650 mg Rectal Q6H PRN    polyethylene glycol (MIRALAX) packet 17 g  17 g Oral DAILY PRN    ondansetron (ZOFRAN ODT) tablet 4 mg  4 mg Oral Q8H PRN    Or    ondansetron (ZOFRAN) injection 4 mg  4 mg IntraVENous Q6H PRN    dextrose 5 % - 0.45% NaCl infusion  50 mL/hr IntraVENous CONTINUOUS    pantoprazole (PROTONIX) tablet 40 mg  40 mg Oral ACB    lactulose (CHRONULAC) 10 gram/15 mL solution 15 mL  10 g Oral BID    labetaloL (NORMODYNE;TRANDATE) 20 mg/4 mL (5 mg/mL) injection 10 mg  10 mg IntraVENous Q4H PRN    hydrALAZINE (APRESOLINE) 20 mg/mL injection 10 mg  10 mg IntraVENous Q6H PRN    albuterol (PROVENTIL HFA, VENTOLIN HFA, PROAIR HFA) inhaler 2 Puff  2 Puff Inhalation Q6H PRN       Review of Systems:   A comprehensive review of systems was negative except for that written in the HPI. Objective:   Physical Exam:     Visit Vitals  BP (!) 141/59   Pulse 67 Comment: per tele   Temp 98.3 °F (36.8 °C)   Resp 18   Ht 5' 7\" (1.702 m)   Wt 191 lb (86.6 kg)   SpO2 97%   BMI 29.91 kg/m²    O2 Flow Rate (L/min): 3 l/min O2 Device: Nasal cannula    Temp (24hrs), Av.3 °F (36.8 °C), Min:98 °F (36.7 °C), Max:98.4 °F (36.9 °C)    No intake/output data recorded. No intake/output data recorded. General:   Awake and alert. Eyes had scleral icterus. Lungs:   Clear to auscultation bilaterally. Chest wall:  No tenderness or deformity.    Heart:  Regular rate and rhythm, S1, S2 normal, no murmur, click, rub or gallop. Abdomen:   Soft, mildly tender to palpation in RUQ. Bowel sounds normal. No masses,  No organomegaly. Extremities: Extremities normal, atraumatic, no cyanosis or edema. Pulses: 2+ and symmetric all extremities. Skin: Skin is jaundice. No rashes or lesions   Neurologic: CNII-XII intact. No gross focal deficits         Data Review:       Recent Days:  Recent Labs     11/04/21 0459 11/02/21  1115   WBC 7.4 13.1*   HGB 12.5 15.1   HCT 39.2 45.5    292     Recent Labs     11/04/21 0459 11/03/21  0216 11/02/21 2050 11/02/21  1115 11/02/21  1115   * 135*  --   --  132*   K 3.6 2.9*  --   --  3.7    101  --   --  96*   CO2 26 26  --   --  29   * 107*  --   --  166*   BUN 18 20  --   --  17   CREA 1.30* 1.23*  --   --  1.47*   CA 8.6 8.5  --   --  9.3   ALB 2.2* 2.4* 2.4*   < > 2.9*   TBILI 15.2* 14.8* 15.2*   < > 18.8*   * 245* 256*   < > 312*   INR  --   --  1.1  --   --     < > = values in this interval not displayed. No results for input(s): PH, PCO2, PO2, HCO3, FIO2 in the last 72 hours. 24 Hour Results:  Recent Results (from the past 24 hour(s))   METABOLIC PANEL, COMPREHENSIVE    Collection Time: 11/04/21  4:59 AM   Result Value Ref Range    Sodium 135 (L) 136 - 145 mmol/L    Potassium 3.6 3.5 - 5.1 mmol/L    Chloride 105 97 - 108 mmol/L    CO2 26 21 - 32 mmol/L    Anion gap 4 (L) 5 - 15 mmol/L    Glucose 112 (H) 65 - 100 mg/dL    BUN 18 6 - 20 mg/dL    Creatinine 1.30 (H) 0.55 - 1.02 mg/dL    BUN/Creatinine ratio 14 12 - 20      GFR est AA 49 (L) >60 ml/min/1.73m2    GFR est non-AA 40 (L) >60 ml/min/1.73m2    Calcium 8.6 8.5 - 10.1 mg/dL    Bilirubin, total 15.2 (H) 0.2 - 1.0 mg/dL    AST (SGOT) 125 (H) 15 - 37 U/L    ALT (SGPT) 242 (H) 12 - 78 U/L    Alk.  phosphatase 793 (H) 45 - 117 U/L    Protein, total 5.6 (L) 6.4 - 8.2 g/dL    Albumin 2.2 (L) 3.5 - 5.0 g/dL    Globulin 3.4 2.0 - 4.0 g/dL    A-G Ratio 0.6 (L) 1.1 - 2.2     CBC WITH AUTOMATED DIFF    Collection Time: 11/04/21  4:59 AM   Result Value Ref Range    WBC 7.4 3.6 - 11.0 K/uL    RBC 4.09 3.80 - 5.20 M/uL    HGB 12.5 11.5 - 16.0 g/dL    HCT 39.2 35.0 - 47.0 %    MCV 95.8 80.0 - 99.0 FL    MCH 30.6 26.0 - 34.0 PG    MCHC 31.9 30.0 - 36.5 g/dL    RDW 15.9 (H) 11.5 - 14.5 %    PLATELET 048 941 - 265 K/uL    MPV 12.3 8.9 - 12.9 FL    NRBC 0.0 0.0  WBC    ABSOLUTE NRBC 0.00 0.00 - 0.01 K/uL    NEUTROPHILS 77 (H) 32 - 75 %    LYMPHOCYTES 10 (L) 12 - 49 %    MONOCYTES 8 5 - 13 %    EOSINOPHILS 3 0 - 7 %    BASOPHILS 1 0 - 1 %    IMMATURE GRANULOCYTES 1 (H) 0 - 0.5 %    ABS. NEUTROPHILS 5.7 1.8 - 8.0 K/UL    ABS. LYMPHOCYTES 0.7 (L) 0.8 - 3.5 K/UL    ABS. MONOCYTES 0.6 0.0 - 1.0 K/UL    ABS. EOSINOPHILS 0.3 0.0 - 0.4 K/UL    ABS. BASOPHILS 0.1 0.0 - 0.1 K/UL    ABS. IMM. GRANS. 0.1 (H) 0.00 - 0.04 K/UL    DF AUTOMATED         US ABD LTD   Final Result   Sludge-filled gallbladder. Biliary obstruction without demonstrated   cause      CT ABD PELV WO CONT   Final Result   Findings suggesting distal biliary obstruction, although cause not   visible. Consider gallbladder ultrasound and MRCP. Nodules at the lung bases      CT dose reduction was achieved through use of a standardized protocol tailored   for this examination and automatic exposure control for dose modulation. XR CHEST PORT   Final Result   The cardiomediastinal silhouette is appropriate for age, technique,   and lung expansion. Pulmonary vasculature is not congested. The lungs are   essentially clear. No effusion or pneumothorax is seen. Assessment:  Obstructive jaundice, etiology unclear at this point.   Concerning for malignancy with her weight loss    Biliary distention due to biliary sludge    Mixed transaminates    CANDELARIO- suspect prerenal    Stage IV renal carcinoma with lung metastases since 2019    Chronic CHFpEF    Paroxysmal Atrial fibrillation- on eliquis    Chronic COPD- without exacerbation    Hypotension, transient    History of hypertension    Chronic depression      Plan:  Follow-up labs s/p ERCP/stent of biliary ducts- if labs improve patient can be set for discharge  Hold eliquis, bumex, and chlorthalidone  Continue O2 treatment  PRN labetalol and HCTZ, cardizem  Continue trazodone for depression  Continue IV fluids      Care Plan discussed with: Patient/Family    Disposition: Discharge pending labs    Total time spent with patient: 30 minutes.     Delmar Benavidez

## 2021-11-04 NOTE — ANESTHESIA PREPROCEDURE EVALUATION
Relevant Problems   RESPIRATORY SYSTEM   (+) COPD exacerbation (HCC)      CARDIOVASCULAR   (+) A-fib (HCC)   (+) CHF (congestive heart failure) (HCC)      ENDOCRINE   (+) Obesity      HEMATOLOGY   (+) Anemia       Anesthetic History   No history of anesthetic complications            Review of Systems / Medical History  Patient summary reviewed, nursing notes reviewed and pertinent labs reviewed    Pulmonary    COPD      Smoker      Comments: ON O2 AT 3 L/M VIA NC   Neuro/Psych             Comments: RIGHT FINGER NUMBNESS  FIBROMYALGIA Cardiovascular            Dysrhythmias : atrial fibrillation  Hyperlipidemia    Exercise tolerance: <4 METS  Comments: Echo:  · LV: Calculated LVEF is 74%. Normal cavity size, systolic function (ejection fraction normal) and diastolic function. Moderate concentric hypertrophy. Wall motion: normal.  · LA: Mildly dilated left atrium. · RA: Mildly dilated right atrium. · MV: Moderate mitral annular calcification. Mild mitral valve regurgitation is present. · TV: Mild tricuspid valve regurgitation is present. · PA: Pulmonary arterial systolic pressure is 62 mmHg. · IVC: Severely elevated central venous pressure (15+ mmHg); IVC diameter is larger than 21 mm and collapses less than 50% with respiration. · Pericardium: Small pericardial effusion.        GI/Hepatic/Renal     GERD    Renal disease: CRI       Endo/Other      Hypothyroidism  Morbid obesity     Other Findings   Comments: jaundice         Past Medical History:   Diagnosis Date    A-fib ([address]     CHF (congestive heart failure) ([address]     Clear cell renal cell carcinoma (HCC)     COPD (chronic obstructive pulmonary disease) (HCC)     Fibromyalgia     GERD (gastroesophageal reflux disease)     Lymphedema     Sjogren's syndrome ([address]     Thyroid nodule        Past Surgical History:   Procedure Laterality Date    COLONOSCOPY N/A 11/18/2020    COLONOSCOPY performed by La Nena Clifton MD at [address] [place] [address] GI      EGD  HX HYSTERECTOMY      HX NEPHRECTOMY Right 2019    HX ORTHOPAEDIC      ankle       Current Outpatient Medications   Medication Instructions    ascorbic acid (vitamin C) (VITAMIN C) 500 mg, Oral, DAILY    atorvastatin (LIPITOR) 40 mg, Oral, DAILY    budesonide-formoteroL (Symbicort) 80-4.5 mcg/actuation HFAA 2 Puffs, Inhalation    bumetanide (BUMEX) 1 mg, Oral, DAILY    chlorthalidone (HYGROTON) 25 mg, Oral, DAILY    cholecalciferol (VITAMIN D3) 1,000 Units, Oral, DAILY    diazePAM (VALIUM) 5 mg, Oral, EVERY 6 HOURS AS NEEDED    dilTIAZem ER (DILACOR XR) 120 mg, Oral, DAILY    Eliquis 5 mg, Oral, 2 TIMES DAILY    famotidine (PEPCID) 40 mg, Oral, DAILY    potassium chloride SR (KLOR-CON 10) 10 mEq tablet 20 mEq, Oral, DAILY    Se-Tan Plus 162-115. 2-1 mg cap 1 Capsule, Oral, DAILY WITH BREAKFAST    traZODone (DESYREL) 50 mg tablet TAKE ONE TABLET BY MOUTH AT BEDTIME       Current Facility-Administered Medications   Medication Dose Route Frequency    iopamidoL (ISOVUE-370) 76 % injection        ascorbic acid (vitamin C) (VITAMIN C) tablet 500 mg  500 mg Oral DAILY    atorvastatin (LIPITOR) tablet 40 mg  40 mg Oral DAILY    budesonide-formoterol (SYMBICORT) 80-4.5 mcg inhaler  2 Puff Inhalation BID RT    [Held by provider] bumetanide (BUMEX) tablet 1 mg  1 mg Oral DAILY    [Held by provider] chlorthalidone (HYGROTON) tablet 25 mg  25 mg Oral DAILY    cholecalciferol (VITAMIN D3) (1000 Units /25 mcg) tablet 1,000 Units  1,000 Units Oral DAILY    diazePAM (VALIUM) tablet 5 mg  5 mg Oral Q6H PRN    dilTIAZem ER (CARDIZEM CD) capsule 120 mg  120 mg Oral DAILY    potassium chloride SR (KLOR-CON 10) tablet 20 mEq  20 mEq Oral DAILY    traZODone (DESYREL) tablet 50 mg  50 mg Oral QHS    sodium chloride (NS) flush 5-40 mL  5-40 mL IntraVENous Q8H    sodium chloride (NS) flush 5-40 mL  5-40 mL IntraVENous PRN    acetaminophen (TYLENOL) tablet 650 mg  650 mg Oral Q6H PRN    Or    acetaminophen (TYLENOL) suppository 650 mg  650 mg Rectal Q6H PRN    polyethylene glycol (MIRALAX) packet 17 g  17 g Oral DAILY PRN    ondansetron (ZOFRAN ODT) tablet 4 mg  4 mg Oral Q8H PRN    Or    ondansetron (ZOFRAN) injection 4 mg  4 mg IntraVENous Q6H PRN    dextrose 5 % - 0.45% NaCl infusion  50 mL/hr IntraVENous CONTINUOUS    pantoprazole (PROTONIX) tablet 40 mg  40 mg Oral ACB    lactulose (CHRONULAC) 10 gram/15 mL solution 15 mL  10 g Oral BID    labetaloL (NORMODYNE;TRANDATE) 20 mg/4 mL (5 mg/mL) injection 10 mg  10 mg IntraVENous Q4H PRN    hydrALAZINE (APRESOLINE) 20 mg/mL injection 10 mg  10 mg IntraVENous Q6H PRN    albuterol (PROVENTIL HFA, VENTOLIN HFA, PROAIR HFA) inhaler 2 Puff  2 Puff Inhalation Q6H PRN       Patient Vitals for the past 24 hrs:   Temp Pulse Resp BP SpO2   11/04/21 0737 37 °C (98.6 °F) 66 16 (!) 140/61 95 %   11/04/21 0730 -- -- -- -- 99 %   11/04/21 0307 -- 67 -- -- --   11/04/21 0034 36.8 °C (98.3 °F) 75 18 (!) 141/59 97 %   11/03/21 1859 36.9 °C (98.4 °F) 65 18 129/61 99 %   11/03/21 1413 36.9 °C (98.4 °F) 61 18 130/61 100 %       Lab Results   Component Value Date/Time    WBC 7.4 11/04/2021 04:59 AM    HGB 12.5 11/04/2021 04:59 AM    HCT 39.2 11/04/2021 04:59 AM    PLATELET 982 30/98/3892 04:59 AM    MCV 95.8 11/04/2021 04:59 AM     Lab Results   Component Value Date/Time    Sodium 135 (L) 11/04/2021 04:59 AM    Potassium 3.6 11/04/2021 04:59 AM    Chloride 105 11/04/2021 04:59 AM    CO2 26 11/04/2021 04:59 AM    Anion gap 4 (L) 11/04/2021 04:59 AM    Glucose 112 (H) 11/04/2021 04:59 AM    BUN 18 11/04/2021 04:59 AM    Creatinine 1.30 (H) 11/04/2021 04:59 AM    BUN/Creatinine ratio 14 11/04/2021 04:59 AM    GFR est AA 49 (L) 11/04/2021 04:59 AM    GFR est non-AA 40 (L) 11/04/2021 04:59 AM    Calcium 8.6 11/04/2021 04:59 AM     No results found for: APTT, PTP, INR, INREXT  Lab Results   Component Value Date/Time    Glucose 112 (H) 11/04/2021 04:59 AM    Glucose (POC) 95 11/17/2020 10:08 PM     Physical Exam    Airway  Mallampati: III  TM Distance: 4 - 6 cm         Cardiovascular      Rate: normal         Dental    Dentition: Poor dentition     Pulmonary      Decreased breath sounds           Abdominal         Other Findings            Anesthetic Plan    ASA: 3, emergent  Anesthesia type: general          Induction: Intravenous  Anesthetic plan and risks discussed with: Patient and Family

## 2021-11-04 NOTE — PROGRESS NOTES
CM has updated [address] with updated clinical notes. CM will continue to follow for when patient is medically ready for discharge.

## 2021-11-04 NOTE — PROGRESS NOTES
Writer received a call from telemetry that patient's HR dropped to the 30s, going down as low as 31. Writer went into the patient's room. Patient was sleeping. Writer asked patient if she was symptomatic, patient denied any symptoms. Patient states that she \"had to wear an even monitor but because of Covid\" she could not get assessed. Will pass along to oncoming nurse and provider.      Jim Peterson RN

## 2021-11-04 NOTE — PROGRESS NOTES
Progress Note    Patient: Fran Isabel MRN: [id number]  SSN: [id number]    YOB: 1945  Age: 78 y.o. Sex: female      Admit Date: 11/2/2021    LOS: 2 days     Subjective:   Gi in consultation for choledocholithiasis. Patient seen s/p ERCP this morning. Stricture of the lower third of the bile duct this was cut dilated, brushed and stented with 10Fr and 7 cm plastic stent stricture was 2 cm in length. Cytology pending. Recommend  Hold blood thinners for 5 days. Follow up with GI in 2 weeks post discharge. Skin remains jaundice. LFT's elevated Total Biliruibin 15.2, , , Alk Phosphatase 793. Abdominal Ultrasound 11/3/21: Liver is of normal echotexture, size and shape. Gallbladder is borderline  distended, filled with sludge but no stones. Intrahepatic and extra hepatic  biliary dilatation. No stone or sludge seen within the duct. Pancreas right  kidney normals visualized  IMPRESSION  Sludge-filled gallbladder. Biliary obstruction without demonstrated  cause    ERCP 11/4/21: Stricture of the lower third of the bile duct this was cut dilated, brushed and stented with 10Fr and 7 cm plastic stent stricture was 2 cm in length    History of Present Illness: Fran Isabel is a 78 y.o. female who is seen in consultation for choledocholithiasis. Ms. Dakota Santizo has a past medical history significant for Renal Cell Carcinoma, s/p nephrectomy, she denies chemo or radiation, COPD, Sjogrens syndrome, chronic CHFpEF, Afib treated with Eliquis, fibromyalgia. Patient reports she was to have a cataract removed when the nurse noticed her sclera were yellow. She was sent to [facility] for further evaluation and treatment. She does complain of nausea and constipation. She denies recent fevers, vomiting, or diarrhea. She does complain of left upper abdominal discomfort more like \"gas\". In the ED her bilirubin was 18.8, this morning it is 14.8. , , Alk Phosphatase 749.  She had and EGD and colonoscopy in 2020. She reports the EGD shows esophagitis and gastritis. Colonoscopy shows 3 sessile  Polyps removed. She was to repeat her colonoscopy in a year. She is scheduled for ultrasound of the gallbladder today. Plan for ERCP in the am.     CT abdomen/pelvis 11/2/2021:   IMPRESSION  Findings suggesting distal biliary obstruction, although cause not  visible. Consider gallbladder ultrasound and MRCP. Nodules at the lung bases         Objective:     Vitals:    11/04/21 0935 11/04/21 0940 11/04/21 1025 11/04/21 1437   BP: 96/62 (!) 104/92 129/61 (!) 130/51   Pulse: 76 73 70 (!) 54   Resp: 17 17 18 16   Temp:   98.3 °F (36.8 °C) 98.6 °F (37 °C)   SpO2: 93% 94% 95% 97%   Weight:       Height:            Intake and Output:  Current Shift: 11/04 0701 - 11/04 1900  In: 200 [I.V.:200]  Out: -   Last three shifts: No intake/output data recorded. Physical Exam:   Skin:  Extremities and face reveal no rashes. No chapin erythema. HEENT: Sclerae anicteric. Extra-occular muscles are intact. No abnormal pigmentation of the lips. The neck is supple. Cardiovascular: Regular rate and rhythm. Respiratory:  Comfortable breathing with no accessory muscle use. GI:  Abdomen nondistended, soft, and nontender. No enlargement of the liver or spleen. No masses palpable. Rectal:  Deferred  Musculoskeletal: Extremities have good range of motion. Neurological:  Gross memory appears intact. Patient is alert and oriented. Psychiatric:  Mood appears appropriate with judgement intact.   Lymphatic:  No visible adenopathy      Lab/Data Review:  Recent Results (from the past 24 hour(s))   METABOLIC PANEL, COMPREHENSIVE    Collection Time: 11/04/21  4:59 AM   Result Value Ref Range    Sodium 135 (L) 136 - 145 mmol/L    Potassium 3.6 3.5 - 5.1 mmol/L    Chloride 105 97 - 108 mmol/L    CO2 26 21 - 32 mmol/L    Anion gap 4 (L) 5 - 15 mmol/L    Glucose 112 (H) 65 - 100 mg/dL    BUN 18 6 - 20 mg/dL    Creatinine 1.30 (H) 0.55 - 1.02 mg/dL    BUN/Creatinine ratio 14 12 - 20      GFR est AA 49 (L) >60 ml/min/1.73m2    GFR est non-AA 40 (L) >60 ml/min/1.73m2    Calcium 8.6 8.5 - 10.1 mg/dL    Bilirubin, total 15.2 (H) 0.2 - 1.0 mg/dL    AST (SGOT) 125 (H) 15 - 37 U/L    ALT (SGPT) 242 (H) 12 - 78 U/L    Alk. phosphatase 793 (H) 45 - 117 U/L    Protein, total 5.6 (L) 6.4 - 8.2 g/dL    Albumin 2.2 (L) 3.5 - 5.0 g/dL    Globulin 3.4 2.0 - 4.0 g/dL    A-G Ratio 0.6 (L) 1.1 - 2.2     CBC WITH AUTOMATED DIFF    Collection Time: 11/04/21  4:59 AM   Result Value Ref Range    WBC 7.4 3.6 - 11.0 K/uL    RBC 4.09 3.80 - 5.20 M/uL    HGB 12.5 11.5 - 16.0 g/dL    HCT 39.2 35.0 - 47.0 %    MCV 95.8 80.0 - 99.0 FL    MCH 30.6 26.0 - 34.0 PG    MCHC 31.9 30.0 - 36.5 g/dL    RDW 15.9 (H) 11.5 - 14.5 %    PLATELET 449 661 - 567 K/uL    MPV 12.3 8.9 - 12.9 FL    NRBC 0.0 0.0  WBC    ABSOLUTE NRBC 0.00 0.00 - 0.01 K/uL    NEUTROPHILS 77 (H) 32 - 75 %    LYMPHOCYTES 10 (L) 12 - 49 %    MONOCYTES 8 5 - 13 %    EOSINOPHILS 3 0 - 7 %    BASOPHILS 1 0 - 1 %    IMMATURE GRANULOCYTES 1 (H) 0 - 0.5 %    ABS. NEUTROPHILS 5.7 1.8 - 8.0 K/UL    ABS. LYMPHOCYTES 0.7 (L) 0.8 - 3.5 K/UL    ABS. MONOCYTES 0.6 0.0 - 1.0 K/UL    ABS. EOSINOPHILS 0.3 0.0 - 0.4 K/UL    ABS. BASOPHILS 0.1 0.0 - 0.1 K/UL    ABS. IMM. GRANS. 0.1 (H) 0.00 - 0.04 K/UL    DF AUTOMATED                XR FLUOROSCOPY UNDER 60 MINUTES   Final Result      US ABD LTD   Final Result   Sludge-filled gallbladder. Biliary obstruction without demonstrated   cause      CT ABD PELV WO CONT   Final Result   Findings suggesting distal biliary obstruction, although cause not   visible. Consider gallbladder ultrasound and MRCP. Nodules at the lung bases      CT dose reduction was achieved through use of a standardized protocol tailored   for this examination and automatic exposure control for dose modulation.       XR CHEST PORT   Final Result   The cardiomediastinal silhouette is appropriate for age, technique,   and lung expansion. Pulmonary vasculature is not congested. The lungs are   essentially clear. No effusion or pneumothorax is seen. Assessment:     Active Problems:    Choledocholithiasis (11/2/2021)        Plan:   1. Choledocholithiasis      S/P ERCP  11/4/21     Gall bladder ultrasound 11/3/21: Sludge-filled gallbladder. Biliary obstruction without demonstrated cause  S/P CT abdomen: Findings suggesting distal biliary obstruction  Oncology consult  2. GERD     Continue Protonix 40 mg Daily  3. Hypokalemia     Potassium chloride 20 meq daily  4. Hypertension      Continue Cardizem daily      Monitor  5. COPD     Continue symbicort BID    Thank you for allowing me to participate in this patients care. Plan discussed with Dr. Magdalena Soni and he approves.     Signed By: Austen De Los Santos NP     November 4, 2021

## 2021-11-04 NOTE — ROUTINE PROCESS
TRANSFER - OUT REPORT:    Verbal report given to Corinne(name) on Sherice Sour  being transferred to [facility](unit) for routine post - op       Report consisted of patients Situation, Background, Assessment and   Recommendations(SBAR). Information from the following report(s) SBAR was reviewed with the receiving nurse. Opportunity for questions and clarification was provided.       Patient transported with:   Monitor  Registered Nurse

## 2021-11-05 NOTE — PROGRESS NOTES
CM acknowledged discharge order. Patient has home health set up with [address] [place] [address] date 11/9/21. [facility] orders sent to [facility] for initiation of services. Patient is pending ONC consult prior to discharge at this time. Discharge plan of care/case management plan validated with provider discharge order.

## 2021-11-05 NOTE — CONSULTS
Consult    Subjective:     Kiran Garcia is a 78 y. o.female who is being seen for renal cell carcinoma with possible lung metastasis. Patient admitted with jaundice. Patient seen by GI for biliary obstruction. Status post ERCP. pathology pending. Patient diagnosed with pT3 NX MX renal cell carcinoma in 2019 . status post nephrectomy. Patient found to have lung nodules which is slowly increasing in size. Patient follows with Dr. Jerry Dunne at [facility]. He is monitoring lung nodules. Patient wants to follow with [facility] here. Patient denies any shortness of breath or chest pain. Nausea better. Patient constipation getting better.     Past Medical History:   Diagnosis Date    A-fib [facility])     CHF (congestive heart failure) (HCC)     Clear cell renal cell carcinoma (HCC)     COPD (chronic obstructive pulmonary disease) (HCC)     Fibromyalgia     GERD (gastroesophageal reflux disease)     Lymphedema     Sjogren's syndrome (HCC)     Thyroid nodule       Past Surgical History:   Procedure Laterality Date    COLONOSCOPY N/A 11/18/2020    COLONOSCOPY performed by Catarina Kilgore MD at [facility] 2    HX GI      EGD    HX HYSTERECTOMY      HX NEPHRECTOMY Right 2019    HX ORTHOPAEDIC      ankle     Family History   Problem Relation Age of Onset    Hypertension Mother     Stroke Mother     Stroke Father     Hypertension Father       Social History     Tobacco Use    Smoking status: Current Every Day Smoker     Packs/day: 0.50     Types: Cigarettes    Smokeless tobacco: Never Used   Substance Use Topics    Alcohol use: Not Currently       Current Facility-Administered Medications   Medication Dose Route Frequency Provider Last Rate Last Admin    labetaloL (NORMODYNE;TRANDATE) 20 mg/4 mL (5 mg/mL) injection 5 mg  5 mg IntraVENous Q5MIN PRN Camilla Jordan MD        metoprolol (LOPRESSOR) injection 2.5 mg  2.5 mg IntraVENous Q5MIN PRN Camilla Jordan MD        hydrALAZINE (APRESOLINE) 20 mg/mL injection 10 mg  10 mg IntraVENous Q10MIN PRN Guillermo Jara MD        sodium chloride (NS) flush 5-40 mL  5-40 mL IntraVENous Q8H Guillermo Jara MD   10 mL at 11/05/21 0601    sodium chloride (NS) flush 5-40 mL  5-40 mL IntraVENous PRN Guillermo Jara MD        HYDROcodone-acetaminophen (NORCO) 5-325 mg per tablet 1 Tablet  1 Tablet Oral PRN Guillermo Jara MD   1 Tablet at 11/05/21 0601    fentaNYL citrate (PF) injection 50 mcg  50 mcg IntraVENous Multiple Guillermo Jara MD        HYDROmorphone (DILAUDID) injection 0.4 mg  0.4 mg IntraVENous Multiple Guillermo Jara MD        ondansetron [facility]) injection 4 mg  4 mg IntraVENous PRN Guillermo Jara MD        midazolam (VERSED) injection 0.5 mg  0.5 mg IntraVENous Q5MIN PRN Guillermo Jara MD        ePHEDrine in NS (PF) (MISTOLE) 10 mg/mL in NS syringe 5 mg  5 mg IntraVENous PRN Guillermo Jara MD        iopamidoL (ISOVUE-370) 76 % injection    PRN Della Bolivar MD   40 mL at 11/04/21 0910    hydrocortisone-aloe vera 1 % topical cream   Topical BID Morgan Vargas MD   Given at 11/05/21 1032    ascorbic acid (vitamin C) (VITAMIN C) tablet 500 mg  500 mg Oral DAILY Rosi Peacock MD   500 mg at 11/05/21 1032    atorvastatin (LIPITOR) tablet 40 mg  40 mg Oral DAILY Rosi Peacock MD   40 mg at 11/05/21 1031    budesonide-formoterol (SYMBICORT) 80-4.5 mcg inhaler  2 Puff Inhalation BID RT Rosi Peacock MD   2 Puff at 11/05/21 0942    bumetanide (BUMEX) tablet 1 mg  1 mg Oral DAILY Rosi Peacock MD   1 mg at 11/05/21 1031    chlorthalidone (HYGROTON) tablet 25 mg  25 mg Oral DAILY Rosi Peacock MD        cholecalciferol (VITAMIN D3) (1000 Units /25 mcg) tablet 1,000 Units  1,000 Units Oral DAILY Rosi Peacock MD   1,000 Units at 11/05/21 1032    diazePAM (VALIUM) tablet 5 mg  5 mg Oral Q6H PRN Rosi Peacock MD   5 mg at 11/03/21 2017    [Held by provider] dilTIAZem ER (CARDIZEM CD) capsule 120 mg  120 mg Oral DAILY Rosi Peacock MD   120 mg at 11/04/21 1132 potassium chloride SR (KLOR-CON 10) tablet 20 mEq  20 mEq Oral DAILY Keiko Salas MD   20 mEq at 11/05/21 1032    traZODone (DESYREL) tablet 50 mg  50 mg Oral QHS Keiko Salas MD   50 mg at 11/04/21 2116    sodium chloride (NS) flush 5-40 mL  5-40 mL IntraVENous Q8H Keiko Salas MD   10 mL at 11/05/21 0601    sodium chloride (NS) flush 5-40 mL  5-40 mL IntraVENous PRN Keiko Salas MD        acetaminophen (TYLENOL) tablet 650 mg  650 mg Oral Q6H PRN Keiko Salas MD   650 mg at 11/03/21 2342    Or    acetaminophen (TYLENOL) suppository 650 mg  650 mg Rectal Q6H PRN Keiko Salas MD        polyethylene glycol (MIRALAX) packet 17 g  17 g Oral DAILY PRN Keiko Salas MD        ondansetron (ZOFRAN ODT) tablet 4 mg  4 mg Oral Q8H PRN Keiko Salas MD   4 mg at 11/03/21 1528    Or    ondansetron (ZOFRAN) injection 4 mg  4 mg IntraVENous Q6H PRN Keiko Salas MD        dextrose 5 % - 0.45% NaCl infusion  50 mL/hr IntraVENous CONTINUOUS Keiko Salas MD 50 mL/hr at 11/04/21 2119 50 mL/hr at 11/04/21 2119    pantoprazole (PROTONIX) tablet 40 mg  40 mg Oral ACB Keiko Salas MD   40 mg at 11/05/21 0602    lactulose (CHRONULAC) 10 gram/15 mL solution 15 mL  10 g Oral BID Keiko Salas MD   15 mL at 11/05/21 0900    labetaloL (NORMODYNE;TRANDATE) 20 mg/4 mL (5 mg/mL) injection 10 mg  10 mg IntraVENous Q4H PRN Keiko Salas MD        hydrALAZINE (APRESOLINE) 20 mg/mL injection 10 mg  10 mg IntraVENous Q6H PRN Al Gordon MD        albuterol (PROVENTIL HFA, VENTOLIN HFA, PROAIR HFA) inhaler 2 Puff  2 Puff Inhalation Q6H PRN Keiko Salas MD   2 Puff at 11/03/21 2335        Allergies   Allergen Reactions    Adhesive Tape-Silicones Atopic Dermatitis        Review of Systems:    Other than mentioned in HPI 12 point review of systems negative. Objective: Intake and Output:    No intake/output data recorded.   11/03 1901 - 11/05 0700  In: 672 [P.O.:120; I.V.:750]  Out: -   Blood pressure (!) 152/54, pulse (!) 50, temperature 98.4 °F (36.9 °C), resp. rate 18, height 5' 7\" (1.702 m), weight 86.6 kg (191 lb), SpO2 97 %. Physical Exam:   General:  Alert, cooperative, no distress, appears stated age. Lungs:   Clear to auscultation bilaterally. Chest wall:  No tenderness or deformity. Heart:  Regular rate and rhythm, S1, S2 normal, no murmur, click, rub or gallop. Abdomen:   Soft, non-tender. Bowel sounds normal. No masses,  No organomegaly. Extremities: Extremities normal, atraumatic, no cyanosis or edema. Pulses: 2+ and symmetric all extremities. Skin: Skin color, texture, turgor normal. No rashes or lesions   Neurologic: CNII-XII intact. No gross sensory or motor deficits       Images:   XR FLUOROSCOPY UNDER 60 MINUTES   Final Result      US ABD LTD   Final Result   Sludge-filled gallbladder. Biliary obstruction without demonstrated   cause      CT ABD PELV WO CONT   Final Result   Findings suggesting distal biliary obstruction, although cause not   visible. Consider gallbladder ultrasound and MRCP. Nodules at the lung bases      CT dose reduction was achieved through use of a standardized protocol tailored   for this examination and automatic exposure control for dose modulation. XR CHEST PORT   Final Result   The cardiomediastinal silhouette is appropriate for age, technique,   and lung expansion. Pulmonary vasculature is not congested. The lungs are   essentially clear. No effusion or pneumothorax is seen. Data Review:   Recent Results (from the past 24 hour(s))   HEPATIC FUNCTION PANEL    Collection Time: 11/05/21  4:44 AM   Result Value Ref Range    Protein, total 6.3 (L) 6.4 - 8.2 g/dL    Albumin 2.4 (L) 3.5 - 5.0 g/dL    Globulin 3.9 2.0 - 4.0 g/dL    A-G Ratio 0.6 (L) 1.1 - 2.2      Bilirubin, total 6.8 (H) 0.2 - 1.0 mg/dL    Bilirubin, direct 6.0 (H) 0.0 - 0.2 mg/dL    Alk.  phosphatase 903 (H) 45 - 117 U/L AST (SGOT) 122 (H) 15 - 37 U/L    ALT (SGPT) 262 (H) 12 - 78 U/L   CBC WITH AUTOMATED DIFF    Collection Time: 11/05/21  4:44 AM   Result Value Ref Range    WBC 11.1 (H) 3.6 - 11.0 K/uL    RBC 4.08 3.80 - 5.20 M/uL    HGB 12.5 11.5 - 16.0 g/dL    HCT 39.7 35.0 - 47.0 %    MCV 97.3 80.0 - 99.0 FL    MCH 30.6 26.0 - 34.0 PG    MCHC 31.5 30.0 - 36.5 g/dL    RDW 15.9 (H) 11.5 - 14.5 %    PLATELET 115 559 - 765 K/uL    MPV 12.5 8.9 - 12.9 FL    NRBC 0.0 0.0  WBC    ABSOLUTE NRBC 0.00 0.00 - 0.01 K/uL    NEUTROPHILS 80 (H) 32 - 75 %    LYMPHOCYTES 13 12 - 49 %    MONOCYTES 6 5 - 13 %    EOSINOPHILS 0 0 - 7 %    BASOPHILS 0 0 - 1 %    IMMATURE GRANULOCYTES 1 (H) 0 - 0.5 %    ABS. NEUTROPHILS 8.9 (H) 1.8 - 8.0 K/UL    ABS. LYMPHOCYTES 1.5 0.8 - 3.5 K/UL    ABS. MONOCYTES 0.7 0.0 - 1.0 K/UL    ABS. EOSINOPHILS 0.0 0.0 - 0.4 K/UL    ABS. BASOPHILS 0.0 0.0 - 0.1 K/UL    ABS. IMM. GRANS. 0.1 (H) 0.00 - 0.04 K/UL    DF AUTOMATED     METABOLIC PANEL, COMPREHENSIVE    Collection Time: 11/05/21  4:44 AM   Result Value Ref Range    Sodium 135 (L) 136 - 145 mmol/L    Potassium 3.9 3.5 - 5.1 mmol/L    Chloride 104 97 - 108 mmol/L    CO2 23 21 - 32 mmol/L    Anion gap 8 5 - 15 mmol/L    Glucose 183 (H) 65 - 100 mg/dL    BUN 22 (H) 6 - 20 mg/dL    Creatinine 1.25 (H) 0.55 - 1.02 mg/dL    BUN/Creatinine ratio 18 12 - 20      GFR est AA 51 (L) >60 ml/min/1.73m2    GFR est non-AA 42 (L) >60 ml/min/1.73m2    Calcium 9.0 8.5 - 10.1 mg/dL    Bilirubin, total 6.7 (H) 0.2 - 1.0 mg/dL    AST (SGOT) 126 (H) 15 - 37 U/L    ALT (SGPT) 263 (H) 12 - 78 U/L    Alk. phosphatase 868 (H) 45 - 117 U/L    Protein, total 6.3 (L) 6.4 - 8.2 g/dL    Albumin 2.4 (L) 3.5 - 5.0 g/dL    Globulin 3.9 2.0 - 4.0 g/dL    A-G Ratio 0.6 (L) 1.1 - 2.2          Assessment:   1. Biliary obstruction and obstructive jaundice. Status post ERCP. patient found to have biliary stricture. Path pending. 2.  Renal cell carcinoma with possible lung metastasis.     3. Leukocytosis: Possible reactive. Mild. 5.  Atrial fibrillation. 6.  Sjogren's syndrome        Plan:   Follow-up with me next week in office. Consider MRI of abdomen as outpatient and CT chest.  Other option is a PET scan. Follow-up with GI. Recent advances in treatment of renal cell carcinoma including oral TKI, immunotherapy discussed with the patient if any progression. Patient sjogrens syndrome may limit use of  immunotherapy. Thank you for the consult.

## 2021-11-05 NOTE — PROGRESS NOTES
Progress Note    Patient: Fran Isabel MRN: [id number]  SSN: [id number]    YOB: 1945  Age: 78 y.o. Sex: female      Admit Date: 11/2/2021    LOS: 3 days     Subjective:     GI in consultation for choledocholithiasis    Patient states she is scheduled for discharge today. Waiting for discharge instruction. She is less jaundice appearing today. She under went ERCP on 11/4/21 showing stricture of the lower third of the bile ducts which was cut dilated, brushed and stented with 10 Fr and 7 cm plastic stent stricture was 2 cm in length. Cytology is pending. Recommend holding blood thinners for 5 days. Follow up with GI in 2 weeks after discharge. She does have a follow-up appointment with Oncology already scheduled. Her LFT's are trending down. Total Bilirubin 6.7, , , Alk Phosphatase 868.  pending. Abdominal Ultrasound 11/3/21: Liver is of normal echotexture, size and shape. Gallbladder is borderline distended, filled with sludge but no stones. Intrahepatic and extra hepatic biliary dilatation. No stone or sludge seen within the duct. Pancreas right kidney normals visualized  IMPRESSION:  Sludge-filled gallbladder. Biliary obstruction without demonstrated  cause     ERCP 11/4/21: Stricture of the lower third of the bile duct this was cut dilated, brushed and stented with 10Fr and 7 cm plastic stent stricture was 2 cm in length     History of Present Illness: Nevin Cole is a 78 y. o. female who is seen in consultation for choledocholithiasis. Ms. Dakota Santizo has a past medical history significant for Renal Cell Carcinoma, s/p nephrectomy, she denies chemo or radiation, COPD, Sjogrens syndrome, chronic CHFpEF, Afib treated with Eliquis, fibromyalgia. Patient reports she was to have a cataract removed when the nurse noticed her sclera were yellow. She was sent to [facility] for further evaluation and treatment.  She does complain of nausea and constipation.  She denies recent fevers, vomiting, or diarrhea. She does complain of left upper abdominal discomfort more like \"gas\". In the ED her bilirubin was 18.8, this morning it is 14.8. , , Alk Phosphatase 749. She had and EGD and colonoscopy in 2020. She reports the EGD shows esophagitis and gastritis. Colonoscopy shows 3 sessile  Polyps removed. She was to repeat her colonoscopy in a year. She is scheduled for ultrasound of the gallbladder today. Plan for ERCP in the am.     CT abdomen/pelvis 11/2/2021:   IMPRESSION: Findings suggesting distal biliary obstruction, although cause notvisible. Consider gallbladder ultrasound and MRCP. Nodules at the lung bases          Objective:     Vitals:    11/05/21 0756 11/05/21 0945 11/05/21 1030 11/05/21 1109   BP: (!) 143/57  (!) 152/57 (!) 152/54   Pulse: (!) 51   (!) 50   Resp:    18   Temp: 98.8 °F (37.1 °C)   98.4 °F (36.9 °C)   SpO2: 100% 99% 95% 97%   Weight:       Height:            Intake and Output:  Current Shift: No intake/output data recorded. Last three shifts: 11/03 1901 - 11/05 0700  In: 107 [P.O.:120; I.V.:750]  Out: -     Physical Exam:   Skin:  Extremities and face reveal no rashes. No chapin erythema. Jaundice. HEENT: Sclerae +icteric. Extra-occular muscles are intact. No abnormal pigmentation of the lips. The neck is supple. Cardiovascular: Regular rate and rhythm. Respiratory:  Comfortable breathing with no accessory muscle use. GI:  Abdomen nondistended, soft, and nontender. No enlargement of the liver or spleen. No masses palpable. Rectal:  Deferred  Musculoskeletal: Weak  Neurological:  Gross memory appears intact. Patient is alert and oriented. Psychiatric:  Mood appears appropriate with judgement intact.   Lymphatic:  No visible adenopathy      Lab/Data Review:  Recent Results (from the past 24 hour(s))   HEPATIC FUNCTION PANEL    Collection Time: 11/05/21  4:44 AM   Result Value Ref Range    Protein, total 6.3 (L) 6.4 - 8.2 g/dL    Albumin 2.4 (L) 3.5 - 5.0 g/dL    Globulin 3.9 2.0 - 4.0 g/dL    A-G Ratio 0.6 (L) 1.1 - 2.2      Bilirubin, total 6.8 (H) 0.2 - 1.0 mg/dL    Bilirubin, direct 6.0 (H) 0.0 - 0.2 mg/dL    Alk. phosphatase 903 (H) 45 - 117 U/L    AST (SGOT) 122 (H) 15 - 37 U/L    ALT (SGPT) 262 (H) 12 - 78 U/L   CBC WITH AUTOMATED DIFF    Collection Time: 11/05/21  4:44 AM   Result Value Ref Range    WBC 11.1 (H) 3.6 - 11.0 K/uL    RBC 4.08 3.80 - 5.20 M/uL    HGB 12.5 11.5 - 16.0 g/dL    HCT 39.7 35.0 - 47.0 %    MCV 97.3 80.0 - 99.0 FL    MCH 30.6 26.0 - 34.0 PG    MCHC 31.5 30.0 - 36.5 g/dL    RDW 15.9 (H) 11.5 - 14.5 %    PLATELET 910 757 - 116 K/uL    MPV 12.5 8.9 - 12.9 FL    NRBC 0.0 0.0  WBC    ABSOLUTE NRBC 0.00 0.00 - 0.01 K/uL    NEUTROPHILS 80 (H) 32 - 75 %    LYMPHOCYTES 13 12 - 49 %    MONOCYTES 6 5 - 13 %    EOSINOPHILS 0 0 - 7 %    BASOPHILS 0 0 - 1 %    IMMATURE GRANULOCYTES 1 (H) 0 - 0.5 %    ABS. NEUTROPHILS 8.9 (H) 1.8 - 8.0 K/UL    ABS. LYMPHOCYTES 1.5 0.8 - 3.5 K/UL    ABS. MONOCYTES 0.7 0.0 - 1.0 K/UL    ABS. EOSINOPHILS 0.0 0.0 - 0.4 K/UL    ABS. BASOPHILS 0.0 0.0 - 0.1 K/UL    ABS. IMM. GRANS. 0.1 (H) 0.00 - 0.04 K/UL    DF AUTOMATED     METABOLIC PANEL, COMPREHENSIVE    Collection Time: 11/05/21  4:44 AM   Result Value Ref Range    Sodium 135 (L) 136 - 145 mmol/L    Potassium 3.9 3.5 - 5.1 mmol/L    Chloride 104 97 - 108 mmol/L    CO2 23 21 - 32 mmol/L    Anion gap 8 5 - 15 mmol/L    Glucose 183 (H) 65 - 100 mg/dL    BUN 22 (H) 6 - 20 mg/dL    Creatinine 1.25 (H) 0.55 - 1.02 mg/dL    BUN/Creatinine ratio 18 12 - 20      GFR est AA 51 (L) >60 ml/min/1.73m2    GFR est non-AA 42 (L) >60 ml/min/1.73m2    Calcium 9.0 8.5 - 10.1 mg/dL    Bilirubin, total 6.7 (H) 0.2 - 1.0 mg/dL    AST (SGOT) 126 (H) 15 - 37 U/L    ALT (SGPT) 263 (H) 12 - 78 U/L    Alk.  phosphatase 868 (H) 45 - 117 U/L    Protein, total 6.3 (L) 6.4 - 8.2 g/dL    Albumin 2.4 (L) 3.5 - 5.0 g/dL    Globulin 3.9 2.0 - 4.0 g/dL    A-G Ratio 0.6 (L) 1.1 - 2.2 XR FLUOROSCOPY UNDER 60 MINUTES   Final Result      US ABD LTD   Final Result   Sludge-filled gallbladder. Biliary obstruction without demonstrated   cause      CT ABD PELV WO CONT   Final Result   Findings suggesting distal biliary obstruction, although cause not   visible. Consider gallbladder ultrasound and MRCP. Nodules at the lung bases      CT dose reduction was achieved through use of a standardized protocol tailored   for this examination and automatic exposure control for dose modulation. XR CHEST PORT   Final Result   The cardiomediastinal silhouette is appropriate for age, technique,   and lung expansion. Pulmonary vasculature is not congested. The lungs are   essentially clear. No effusion or pneumothorax is seen. Assessment:     Active Problems:    Choledocholithiasis (11/2/2021)        Plan:   1. Choledocholithiasis      S/P ERCP  11/4/21     Gall bladder ultrasound 11/3/21: Sludge-filled gallbladder. Biliary obstruction without demonstrated cause  S/P CT abdomen: Findings suggesting distal biliary obstruction  Follow up with Oncology as out patient  Follow up with GI in 2 weeks post discharge   Cytology pending   Ca19-9 pending  2. GERD     Continue Protonix 40 mg Daily  3. Hypokalemia     Potassium chloride 20 meq daily  4. Hypertension      Continue Cardizem daily      Monitor  5. COPD     Continue symbicort BID    Thank you for allowing me to participate in this patients care. Plan discussed with Dr. Layo Forbes and he approves.     Signed By: Estefanía Laurent NP     November 5, 2021

## 2021-11-05 NOTE — PROGRESS NOTES
Patient seen by Oncology she has an appointment and will follow up with them. Education on medications and follow up appointments given patient verbalized understanding. Discharge plan of care/case management plan validated with provider discharge order.

## 2021-11-05 NOTE — DISCHARGE SUMMARY
Physician Discharge Summary     Patient ID:    Mt Sawyer  [id number]  61 y.o.  1945    Admit date: 11/2/2021    Discharge date : 11/5/2021    Chronic Diagnoses:    Problem List as of 11/5/2021 Date Reviewed: 11/13/2020          Codes Class Noted - Resolved    Choledocholithiasis ICD-10-CM: K80.50  ICD-9-CM: 574.50  11/2/2021 - Present        Respiratory failure, unspecified with hypoxia ([facility] ICD-10-CM: J96.91  ICD-9-CM: 518.81  11/17/2020 - Present        COPD exacerbation ([facility] ICD-10-CM: J44.1  ICD-9-CM: 491.21  11/16/2020 - Present        Anemia ICD-10-CM: D64.9  ICD-9-CM: 285.9  11/16/2020 - Present        Obesity ICD-10-CM: E66.9  ICD-9-CM: 278.00  11/16/2020 - Present        A-fib ([facility] ICD-10-CM: I48.91  ICD-9-CM: 427.31  11/16/2020 - Present        CHF (congestive heart failure) ([facility] ICD-10-CM: I50.9  ICD-9-CM: 428.0  11/16/2020 - Present        Respiratory failure with hypoxia (HCC) ICD-10-CM: J96.91  ICD-9-CM: 518.81  11/13/2020 - Present        RESOLVED: Screening for colon cancer ICD-10-CM: Z12.11  ICD-9-CM: V76.51  11/16/2020 - 12/16/2020          22    Final Diagnoses:   Obstructive jaundice due to biliary stricture, possibly malignant     CANDELARIO-   prerenal     Stage IV renal carcinoma with lung metastases since 2019     Chronic CHFpEF     Paroxysmal Atrial fibrillation- on eliquis     Chronic COPD- without exacerbation     Hypotension, transient     History of hypertension     Chronic depression       Reason for Hospitalization:  Patient is a 70yo female with history of renal cell carcinoma s/p nephrectomy, COPD not on O2, chronic CHFpEF, Afib treated with eliquis, and fibromylagia complaining of yellow discoloration that started 3 days ago after arrival to ED on 11/2/21. Yellow discoloration of sclera noted when arriving for elective cataract surgery. Patient complains of mild nausea and constipation. She denies fever, chills, vomiting, and weight loss.  In the ER bilirubin was 18 and alkaline phophatase was 995. Non-contrast CT of the abdomen showed gallbladder and common bile duct distention along with mild intrahepatic and extrahepatic biliary dilatation suggesting obstruction. No visible obstruction was noted on CT. Urinalysis showed turbid dark yellow urine with a urobilinogen of 4.0.     11/3/21: patient reports no nausea, vomiting, diarrhea or fever. Patient did mention that she normally has loose stools and since her jaundice began her stools have been hard and she needs to strain to have bowel movement. Patient reports tingling sensation at the level of umbilicus on right side. Patient wanted to stop NPO after being NPO all night and requested food. Therefore, ERCP is going to be pushed back to tomorrow, per GI. Patient reports continued straining to have bowel movement even after lactulose prep. Pearson's test negative. Total bilirubin 14.8, , , alk phosphatase 749. Told patient that we are going to order U/S of the upper GI to look for possible obstruction and that she needs to be NPO 6 hours prior to the procedure.     Patient does report a 50 pound weight loss over the past year. She attributes this to her depression. She also indicates she has had stage IV renal cancer with lung metastases which have been followed since 2019    Hospital Course:   Patient was admitted to the medical floor. She was seen in consultation by GI.  ERCP was recommended. Ultrasound of the abdomen showed no evidence for gallstones or gallbladder disease    ERCP showed a distal biliary stricture which was dilated. May represent a malignant stricture.   Common bile duct stent was placed biopsies were taken and are pending    After 24 hours her bilirubin had dropped significantly down to 6.8    Patient was felt stable for discharge home on 11/5    We are awaiting oncology evaluation prior to discharge          Discharge Medications:   Current Discharge Medication List CONTINUE these medications which have NOT CHANGED    Details   chlorthalidone (HYGROTON) 25 mg tablet Take 25 mg by mouth daily. Eliquis 5 mg tablet Take 5 mg by mouth two (2) times a day. Se-Tan Plus 162-115. 2-1 mg cap Take 1 Capsule by mouth daily (with breakfast). traZODone (DESYREL) 50 mg tablet TAKE ONE TABLET BY MOUTH AT BEDTIME      budesonide-formoteroL (Symbicort) 80-4.5 mcg/actuation HFAA Take 2 Puffs by inhalation. potassium chloride SR (KLOR-CON 10) 10 mEq tablet Take 20 mEq by mouth daily. bumetanide (BUMEX) 1 mg tablet Take 1 mg by mouth daily. dilTIAZem ER (DILACOR XR) 120 mg capsule Take 120 mg by mouth daily. atorvastatin (LIPITOR) 40 mg tablet Take 40 mg by mouth daily. diazePAM (VALIUM) 5 mg tablet Take 5 mg by mouth every six (6) hours as needed for Anxiety. famotidine (PEPCID) 40 mg tablet Take 40 mg by mouth daily. ascorbic acid, vitamin C, (Vitamin C) 500 mg tablet Take 500 mg by mouth daily. cholecalciferol (Vitamin D3) (1000 Units /25 mcg) tablet Take 1,000 Units by mouth daily. Follow up Care:    1. Francisco Joe MD in 1-2 weeks. Please call to set up an appointment shortly after discharge. Diet:  Cardiac Diet    Disposition:  Home. Advanced Directive:   FULL    DNR      Discharge Exam:  General:  Alert, cooperative, no distress, appears stated age. Lungs:   Clear to auscultation bilaterally. Chest wall:  No tenderness or deformity. Heart:  Regular rate and rhythm, S1, S2 normal, no murmur, click, rub or gallop. Abdomen:   Soft, non-tender. Bowel sounds normal. No masses,  No organomegaly. Extremities: Extremities normal, atraumatic, no cyanosis or edema. Pulses: 2+ and symmetric all extremities. Skin: Skin color, texture, turgor normal. No rashes or lesions   Neurologic: CNII-XII intact.  No gross sensory or motor deficits        CONSULTATIONS: GI and oncology    Significant Diagnostic Studies:   11/2/2021: BUN 17 mg/dL (Ref range: 6 - 20 mg/dL); Calcium 9.3 mg/dL (Ref range: 8.5 - 10.1 mg/dL); CO2 29 mmol/L (Ref range: 21 - 32 mmol/L); Creatinine 1.47 mg/dL (H; Ref range: 0.55 - 1.02 mg/dL); Glucose 166 mg/dL (H; Ref range: 65 - 100 mg/dL); HCT 45.5 % (Ref range: 35.0 - 47.0 %); HGB 15.1 g/dL (Ref range: 11.5 - 16.0 g/dL); Potassium 3.7 mmol/L (Ref range: 3.5 - 5.1 mmol/L); Sodium 132 mmol/L (L; Ref range: 136 - 145 mmol/L)  11/3/2021: BUN 20 mg/dL (Ref range: 6 - 20 mg/dL); Calcium 8.5 mg/dL (Ref range: 8.5 - 10.1 mg/dL); CO2 26 mmol/L (Ref range: 21 - 32 mmol/L); Creatinine 1.23 mg/dL (H; Ref range: 0.55 - 1.02 mg/dL); Glucose 107 mg/dL (H; Ref range: 65 - 100 mg/dL); Potassium 2.9 mmol/L (L; Ref range: 3.5 - 5.1 mmol/L); Sodium 135 mmol/L (L; Ref range: 136 - 145 mmol/L)  Recent Labs     11/05/21 0444 11/04/21 0459   WBC 11.1* 7.4   HGB 12.5 12.5   HCT 39.7 39.2    220     Recent Labs     11/05/21 0444 11/04/21 0459 11/03/21 0216   * 135* 135*   K 3.9 3.6 2.9*    105 101   CO2 23 26 26   BUN 22* 18 20   CREA 1.25* 1.30* 1.23*   * 112* 107*   CA 9.0 8.6 8.5     Recent Labs     11/05/21 0444 11/04/21 0459 11/03/21 0216   *  262* 242* 245*   *  903* 793* 749*   TBILI 6.7*  6.8* 15.2* 14.8*   TP 6.3*  6.3* 5.6* 5.9*   ALB 2.4*  2.4* 2.2* 2.4*   GLOB 3.9  3.9 3.4 3.5     Recent Labs     11/02/21 2050   INR 1.1   PTP 14.4      No results for input(s): FE, TIBC, PSAT, FERR in the last 72 hours. No results for input(s): PH, PCO2, PO2 in the last 72 hours. No results for input(s): CPK, CKMB in the last 72 hours.     No lab exists for component: TROPONINI  Lab Results   Component Value Date/Time    Glucose (POC) 95 11/17/2020 10:08 PM    Glucose (POC) 89 11/17/2020 04:19 PM    Glucose (POC) 92 11/17/2020 11:11 AM    Glucose (POC) 90 11/17/2020 09:35 AM    Glucose (POC) 124 (H) 11/16/2020 08:37 PM       Discharge time spent 35 minutes    Signed:  Francisco Adrian MD  11/5/2021  9:37 AM

## 2021-12-06 NOTE — PROGRESS NOTES
Patient alert and oriented x4. Vital signs stable on room air. Patient discharged per physicians order. Patient verbalizes understanding on discharge instructions. Patient transported via wheelchair to front entrance of hospital with patient's son present to transport patient home via private vehicle.

## 2021-12-06 NOTE — ANESTHESIA POSTPROCEDURE EVALUATION
Procedure(s):  ENDOSCOPIC ULTRASOUND (EUS) WITH FNA (URGENT).     total IV anesthesia, MAC, general - backup    Anesthesia Post Evaluation      Multimodal analgesia: multimodal analgesia not used between 6 hours prior to anesthesia start to PACU discharge  Patient location during evaluation: bedside  Patient participation: complete - patient participated  Level of consciousness: awake and alert  Pain score: 0  Pain management: adequate  Airway patency: patent  Anesthetic complications: no  Cardiovascular status: acceptable, blood pressure returned to baseline and hemodynamically stable  Respiratory status: acceptable, nonlabored ventilation, spontaneous ventilation, unassisted and nasal cannula  Hydration status: acceptable  Post anesthesia nausea and vomiting:  none  Final Post Anesthesia Temperature Assessment:  Normothermia (36.0-37.5 degrees C)      INITIAL Post-op Vital signs:   Vitals Value Taken Time   /84 12/06/21 1006   Temp 36.8 °C (98.2 °F) 12/06/21 0945   Pulse 94 12/06/21 1006   Resp 18 12/06/21 1006   SpO2 98 % 12/06/21 1006

## 2021-12-06 NOTE — ANESTHESIA PREPROCEDURE EVALUATION
Relevant Problems   RESPIRATORY SYSTEM   (+) COPD exacerbation (HCC)      CARDIOVASCULAR   (+) A-fib (HCC)   (+) CHF (congestive heart failure) (HCC)      ENDOCRINE   (+) Obesity      HEMATOLOGY   (+) Anemia       Anesthetic History   No history of anesthetic complications            Review of Systems / Medical History  Patient summary reviewed, nursing notes reviewed and pertinent labs reviewed    Pulmonary    COPD      Smoker         Neuro/Psych             Comments: RIGHT FINGER NUMBNESS  FIBROMYALGIA Cardiovascular            Dysrhythmias : atrial fibrillation  Hyperlipidemia    Exercise tolerance: <4 METS  Comments: Echo:  · LV: Calculated LVEF is 74%. Normal cavity size, systolic function (ejection fraction normal) and diastolic function. Moderate concentric hypertrophy. Wall motion: normal.  · LA: Mildly dilated left atrium. · RA: Mildly dilated right atrium. · MV: Moderate mitral annular calcification. Mild mitral valve regurgitation is present. · TV: Mild tricuspid valve regurgitation is present. · PA: Pulmonary arterial systolic pressure is 62 mmHg. · IVC: Severely elevated central venous pressure (15+ mmHg); IVC diameter is larger than 21 mm and collapses less than 50% with respiration. · Pericardium: Small pericardial effusion.        GI/Hepatic/Renal     GERD    Renal disease (Last Creatinine 1.25): CRI      Comments: Pancreatic Mass Endo/Other      Hypothyroidism  Morbid obesity and cancer     Other Findings   Comments: Medical History  CHF (congestive heart failure) (HCC)  A-fib (HCC)  COPD (chronic obstructive pulmonary disease) (HCC)  Fibromyalgia  Sjogren's syndrome (HCC)  Lymphedema  Thyroid nodule  Clear cell renal cell carcinoma (HCC)  GERD (gastroesophageal reflux disease)       Past Medical History:   Diagnosis Date    [facility]     CHF (congestive heart failure) (HCC)     Clear cell renal cell carcinoma (HCC)     COPD (chronic obstructive pulmonary disease) (HCC)     Fibromyalgia  GERD (gastroesophageal reflux disease)     Lymphedema     Sjogren's syndrome (HCC)     Thyroid nodule        Past Surgical History:   Procedure Laterality Date    COLONOSCOPY N/A 11/18/2020    COLONOSCOPY performed by Yuli Nava MD at [facility] HX GI      EGD    HX HYSTERECTOMY      HX NEPHRECTOMY Right 2019    HX ORTHOPAEDIC      ankle       Current Outpatient Medications   Medication Instructions    albuterol (PROVENTIL HFA, VENTOLIN HFA, PROAIR HFA) 90 mcg/actuation inhaler 2 Puffs, Inhalation, EVERY 6 HOURS AS NEEDED    ascorbic acid (vitamin C) (VITAMIN C) 500 mg, DAILY    atorvastatin (LIPITOR) 40 mg, Oral, DAILY    budesonide-formoteroL (Symbicort) 80-4.5 mcg/actuation HFAA 2 Puffs    bumetanide (BUMEX) 1 mg, Oral, DAILY    chlorthalidone (HYGROTON) 25 mg, Oral, DAILY    cholecalciferol (VITAMIN D3) 1,000 Units, Oral, DAILY    diazePAM (VALIUM) 5 mg, Oral, EVERY 6 HOURS AS NEEDED    dilTIAZem ER (DILACOR XR) 120 mg, Oral, DAILY    Eliquis 5 mg, Oral, 2 TIMES DAILY    famotidine (PEPCID) 40 mg, DAILY    pantoprazole (PROTONIX) 40 mg, Oral, DAILY    potassium chloride SR (KLOR-CON 10) 10 mEq tablet 20 mEq, Oral, DAILY    Se-Tan Plus 162-115. 2-1 mg cap 1 Capsule, Oral, 2 TIMES DAILY    sertraline (ZOLOFT) 25 mg, Oral, DAILY    traZODone (DESYREL) 50 mg tablet TAKE ONE TABLET BY MOUTH AT BEDTIME       No current facility-administered medications for this visit. No current outpatient medications on file. Facility-Administered Medications Ordered in Other Visits   Medication Dose Route Frequency    lactated Ringers infusion  50 mL/hr IntraVENous CONTINUOUS    0.9% sodium chloride infusion  20 mL/hr IntraVENous CONTINUOUS    dexamethasone (DECADRON) 4 mg/mL injection        ondansetron (ZOFRAN) 4 mg/2 mL injection        propofoL (DIPRIVAN) 10 mg/mL injection           No data found.     Lab Results   Component Value Date/Time    WBC 11.1 (H) 11/05/2021 04:44 AM HGB 12.5 11/05/2021 04:44 AM    HCT 39.7 11/05/2021 04:44 AM    PLATELET 985 23/29/2513 04:44 AM    MCV 97.3 11/05/2021 04:44 AM     Lab Results   Component Value Date/Time    Sodium 135 (L) 11/05/2021 04:44 AM    Potassium 3.9 11/05/2021 04:44 AM    Chloride 104 11/05/2021 04:44 AM    CO2 23 11/05/2021 04:44 AM    Anion gap 8 11/05/2021 04:44 AM    Glucose 183 (H) 11/05/2021 04:44 AM    BUN 22 (H) 11/05/2021 04:44 AM    Creatinine 1.25 (H) 11/05/2021 04:44 AM    BUN/Creatinine ratio 18 11/05/2021 04:44 AM    GFR est AA 51 (L) 11/05/2021 04:44 AM    GFR est non-AA 42 (L) 11/05/2021 04:44 AM    Calcium 9.0 11/05/2021 04:44 AM     No results found for: APTT, PTP, INR, INREXT, INREXT  Lab Results   Component Value Date/Time    Glucose 183 (H) 11/05/2021 04:44 AM    Glucose (POC) 95 11/17/2020 10:08 PM     Physical Exam    Airway  Mallampati: III  TM Distance: 4 - 6 cm         Cardiovascular      Rate: normal         Dental    Dentition: Poor dentition     Pulmonary      Decreased breath sounds           Abdominal  GI exam deferred       Other Findings            Anesthetic Plan    ASA: 4  Anesthesia type: total IV anesthesia, MAC and general - backup          Induction: Intravenous  Anesthetic plan and risks discussed with: Patient

## 2021-12-07 NOTE — BRIEF OP NOTE
Brief Postoperative Note    Patient: Gabi Hebert  YOB: 1945  MRN: [id number]    Date of Procedure: 12/6/2021     Pre-Op Diagnosis: PACREATIC MASS    Post-Op Diagnosis: Pancreatic mass      Procedure(s):  ENDOSCOPIC ULTRASOUND (EUS) WITH FNA (URGENT)    Surgeon(s):  [place] MD    Surgical Assistant: None    Anesthesia: MAC     Estimated Blood Loss (mL): Minimal    Complications: None    Specimens:   ID Type Source Tests Collected by Time Destination   1 : head of pancreas mass pass 1  Pancreas  [place] MD 12/6/2021 0932 Cytology   2 : head of pancreas mass pass 2  Pancreas  [place] MD 12/6/2021 0933 Cytology   3 : head of pancreas mass pass 3  Pancreas  [place] MD 12/6/2021 5236 Cytology        Implants: None     Drains: none     Findings: Pancreatic mass was found three passes were made with 22G needle to obtain specimens for cytology   Pathology was on site.       Electronically Signed by Marylen Robinsons, MD on 12/7/2021 at 2:05 PM

## 2021-12-09 PROBLEM — K85.90 PANCREATITIS: Status: ACTIVE | Noted: 2021-01-01

## 2021-12-09 NOTE — ED TRIAGE NOTES
Patient is a alert and oriented female, started with abdominal pain in left upper quadrant that started Monday after pancreatic biopsy. Hx of pancreatic caner. States the pain now she is having abdominal pain all over. Nausea, vomiting and poor intake.

## 2021-12-09 NOTE — H&P
History and Physical    Patient: Olvin Jean Baptiste MRN: [id number]  SSN: [id number]    YOB: 1945  Age: 78 y.o. Sex: female      Subjective:      Olvin Jean Baptiste is a 78 y.o. female who came to the hospital with complaints of abdominal pain, associated nausea/vomiting, poor PO intake. Pt reported having a EUS on 12/6 as outpatient by Dr. Mayito Chang, biopsy was taken, rare cells present suspicious for malignancy. Presents with elevated lipase at 3,000. CT abdomen/pelvis showing ill defined hypodense mass in the pancreas with fluid adjacent to the pancreas suggestive of focal pancreatitis. Adrenal adenomas are seen. No pseudocyst formation or active bleeding seen. PMH includes atrial fibrillation, CHF, COPD, renal cell carcinoma stage IV, s/p nephrectomy,  GERD, COPD, sjoren's syndrome, hypertension, and depression. Pt will be admitted for further evaluation and treatment. Past Medical History:   Diagnosis Date    A-fib [facility])     CHF (congestive heart failure) (HCC)     Clear cell renal cell carcinoma (HCC)     COPD (chronic obstructive pulmonary disease) (HCC)     Fibromyalgia     GERD (gastroesophageal reflux disease)     Lymphedema     Sjogren's syndrome ([facility] [address]     Thyroid nodule      Past Surgical History:   Procedure Laterality Date    COLONOSCOPY N/A 11/18/2020    COLONOSCOPY performed by Hans Etienne MD at [address] [facility]      EGD    HX HYSTERECTOMY      HX NEPHRECTOMY Right 2019    HX ORTHOPAEDIC      ankle      Family History   Problem Relation Age of Onset    Hypertension Mother     Stroke Mother     Stroke Father     Hypertension Father      Social History     Tobacco Use    Smoking status: Current Every Day Smoker     Packs/day: 0.25     Types: Cigarettes    Smokeless tobacco: Never Used   Substance Use Topics    Alcohol use: Not Currently      Prior to Admission medications    Medication Sig Start Date End Date Taking?  Authorizing Provider   albuterol (PROVENTIL HFA, VENTOLIN HFA, PROAIR HFA) 90 mcg/actuation inhaler Take 2 Puffs by inhalation every six (6) hours as needed for Wheezing. Yes Provider, Historical   pantoprazole (PROTONIX) 40 mg tablet Take 40 mg by mouth daily. Yes Provider, Historical   sertraline (Zoloft) 25 mg tablet Take 25 mg by mouth daily. Yes Provider, Historical   Eliquis 5 mg tablet Take 5 mg by mouth two (2) times a day. 7/8/21  Yes Provider, Historical   traZODone (DESYREL) 50 mg tablet TAKE ONE TABLET BY MOUTH AT BEDTIME 7/31/21  Yes Provider, Historical   budesonide-formoteroL (Symbicort) 80-4.5 mcg/actuation HFAA Take 2 Puffs by inhalation. Yes Provider, Historical   potassium chloride SR (KLOR-CON 10) 10 mEq tablet Take 20 mEq by mouth daily. Yes Provider, Historical   bumetanide (BUMEX) 1 mg tablet Take 1 mg by mouth daily. Yes Provider, Historical   dilTIAZem ER (DILACOR XR) 120 mg capsule Take 120 mg by mouth daily. Yes Provider, Historical   atorvastatin (LIPITOR) 40 mg tablet Take 40 mg by mouth daily. Yes Provider, Historical   ascorbic acid, vitamin C, (Vitamin C) 500 mg tablet Take 500 mg by mouth daily. Yes Provider, Historical   cholecalciferol (Vitamin D3) (1000 Units /25 mcg) tablet Take 1,000 Units by mouth daily. Yes Provider, Historical   chlorthalidone (HYGROTON) 25 mg tablet Take 25 mg by mouth daily. 6/2/21   Provider, Historical   Se-Tan Plus 162-115. 2-1 mg cap Take 1 Capsule by mouth two (2) times a day. 6/27/21   Provider, Historical   diazePAM (VALIUM) 5 mg tablet Take 5 mg by mouth every six (6) hours as needed for Anxiety. Provider, Historical   famotidine (PEPCID) 40 mg tablet Take 40 mg by mouth daily. Patient not taking: Reported on 12/6/2021    Provider, Historical        Allergies   Allergen Reactions    Adhesive Tape-Silicones Atopic Dermatitis       Review of Systems:  Review of Systems   Constitutional: Positive for fever. HENT: Negative for congestion and sore throat. Respiratory: Negative for cough and shortness of breath. Cardiovascular: Negative for chest pain and palpitations. Gastrointestinal: Positive for abdominal pain, heartburn, nausea and vomiting. Genitourinary: Negative for dysuria and urgency. Neurological: Negative for dizziness and headaches. Objective:     Vitals:    12/09/21 1041 12/09/21 1044 12/09/21 1515   BP:  125/77 123/65   Pulse:  (!) 107 91   Resp:  20 20   Temp:  98 °F (36.7 °C)    SpO2:  96% 95%   Weight: 86.2 kg (190 lb)     Height: 5' 7\" (1.702 m)          Physical Exam:  Physical Exam  Constitutional:       General: She is not in acute distress. Cardiovascular:      Rate and Rhythm: Tachycardia present. Rhythm irregular. Pulses: Normal pulses. Heart sounds: Normal heart sounds. Pulmonary:      Effort: Pulmonary effort is normal.      Breath sounds: Normal breath sounds. Abdominal:      General: There is distension. Tenderness: There is abdominal tenderness. There is guarding. Comments: Firm, hypobowel sounds   Musculoskeletal:         General: Normal range of motion. Skin:     General: Skin is warm and dry. Neurological:      Mental Status: She is oriented to person, place, and time.    Psychiatric:         Mood and Affect: Mood normal.         Behavior: Behavior normal.          Recent Results (from the past 24 hour(s))   CBC W/O DIFF    Collection Time: 12/09/21 11:00 AM   Result Value Ref Range    WBC 19.6 (H) 3.6 - 11.0 K/uL    RBC 4.87 3.80 - 5.20 M/uL    HGB 15.2 11.5 - 16.0 g/dL    HCT 44.6 35.0 - 47.0 %    MCV 91.6 80.0 - 99.0 FL    MCH 31.2 26.0 - 34.0 PG    MCHC 34.1 30.0 - 36.5 g/dL    RDW 14.0 11.5 - 14.5 %    PLATELET 481 768 - 837 K/uL    MPV 10.6 8.9 - 12.9 FL    NRBC 0.0 0.0  WBC    ABSOLUTE NRBC 0.00 0.00 - 0.05 K/uL   METABOLIC PANEL, COMPREHENSIVE    Collection Time: 12/09/21 11:00 AM   Result Value Ref Range    Sodium 136 136 - 145 mmol/L    Potassium 3.7 3.5 - 5.1 mmol/L Chloride 100 97 - 108 mmol/L    CO2 30 21 - 32 mmol/L    Anion gap 6 5 - 15 mmol/L    Glucose 162 (H) 65 - 100 mg/dL    BUN 17 6 - 20 mg/dL    Creatinine 1.01 0.55 - 1.02 mg/dL    BUN/Creatinine ratio 17 12 - 20      GFR est AA >60 >60 ml/min/1.73m2    GFR est non-AA 54 (L) >60 ml/min/1.73m2    Calcium 9.6 8.5 - 10.1 mg/dL    Bilirubin, total 1.4 (H) 0.2 - 1.0 mg/dL    AST (SGOT) 31 15 - 37 U/L    ALT (SGPT) 36 12 - 78 U/L    Alk. phosphatase 217 (H) 45 - 117 U/L    Protein, total 6.4 6.4 - 8.2 g/dL    Albumin 2.9 (L) 3.5 - 5.0 g/dL    Globulin 3.5 2.0 - 4.0 g/dL    A-G Ratio 0.8 (L) 1.1 - 2.2     TROPONIN-HIGH SENSITIVITY    Collection Time: 12/09/21 11:00 AM   Result Value Ref Range    Troponin-High Sensitivity 12 0 - 51 ng/L   CK W/ REFLX CKMB    Collection Time: 12/09/21 11:00 AM   Result Value Ref Range    CK 66.0 26 - 192 ng/mL   LIPASE    Collection Time: 12/09/21 11:00 AM   Result Value Ref Range    Lipase >3,000 (H) 73 - 393 U/L       XR Results (maximum last 3): Results from [place] encounter on 12/09/21    XR CHEST PORT    Narrative  Portable upright radiograph chest 10:57 a.m. compared to November 2, 2021. INDICATION: Abdominal pain. Heart size is stable with an atherosclerotic aorta. No acute infiltrate or  effusion. Bones appear intact. No pneumothorax. Impression  No acute findings. Results from [place] encounter on 11/02/21    XR FLUOROSCOPY UNDER 60 MINUTES    Narrative  Intraoperative fluoroscopy. Intraoperative fluoroscopy provided. This report is for fluoroscopy time only. Fluoroscopy time- 2:38 min      XR CHEST PORT    Narrative  1 view    Impression  The cardiomediastinal silhouette is appropriate for age, technique,  and lung expansion. Pulmonary vasculature is not congested. The lungs are  essentially clear. No effusion or pneumothorax is seen. CT Results (maximum last 3):   Results from [place] encounter on 12/09/21    CTA ABDOMEN PELV W CONT    Narrative  Axial images from the lung bases to the pubic symphysis were obtained prior to  and following intravenous administration of 100 mL Isovue-370. Sagittal and  coronal reformatted images were reviewed. Maximum intensity projected images  were generated at the workstation All CT scans at this facility are performed  using dose reduction optimization techniques as appropriate to a performed exam  including the following:   automated exposure control, adjustments of the mA  and/or kV according to patient size, or use of iterative reconstruction  technique. Comparison with PET/CT November 22, 2021. INDICATION: Pain following pancreatic biopsy. Noncontrasted images show small pericardial effusion. There is a stable 6 mm  nodule in the right lower lobe laterally. There is a stable 3 mm nodule left  lower lobe laterally. Stable 4 mm nodule in the medial aspect of the right lower  lobe. 14 mm lymph node in the right epicardial fat. Bilateral adrenal  enlargement, left larger than right with densities less than 10 Hounsfield units  consistent with benign adrenal adenomas. Subtle probable cystic lesion in the  right hepatic lobe. There is a biliary stent with the proximal portion anterior  to the main portal vein and the distal tip in the duodenum. No gross biliary  dilatation. . No calcified gallstones. Several lymph nodes are seen in the upper  abdomen. Spleen is unremarkable. There are inflammatory changes adjacent to the  pancreatic head. The hypermetabolic pancreatic head mass seen on PET/CT is  poorly evaluated. Right kidney is absent. Following contrast administration, the  area of low density in the right hepatic lobe remains low density but is  nonspecific. The gastric antrum is thick-walled posteriorly. There is fluid  between the stomach and the pancreas along with inflammatory change.  There is a  hypodense mass in the pancreatic head medial to the biliary stent measuring  approximately 2.5 x 2.1 cm. There are no enhancing blood products adjacent to  the pancreas to suggest active bleeding. No enhanced blood products are seen in  the GI tract. Small amount of free fluid is seen in the posterior pelvis. Uterus  is absent. Left kidney enhances normally except for a few small cortical cysts. No hydronephrosis. No retroperitoneal adenopathy. No GI tract obstruction. Aorta  is markedly atherosclerotic with no focal aneurysm or dissection. Bone windows  and reconstructed images show degenerative changes of the spine. Impression  1. Ill-defined hypodense mass in the pancreas. There are inflammatory changes  and fluid adjacent to the pancreas or duodenum and stomach most likely related  to biopsy or focal pancreatitis. No pseudocyst formation, active bleeding or  other acute findings. 2. Enlarged adrenal glands left greater than right with noncontrast densities  consistent with adrenal adenomas. 3. Right nephrectomy. 4. Other findings as described. Results from [place] encounter on 11/02/21    CT ABD PELV WO CONT    Narrative  Noncontrast study shows small nodules in the lung bases    Liver, spleen, pancreas are normal. Gallbladder is distended. Mild intrahepatic  and extrahepatic biliary dilatation. Dilated common bile duct is followed to the  pancreatic head where there is no suggestion of mass or calcified stone. 15 mm left adrenal adenoma with fat content. Right adrenal is normal. Right  kidney is out. Left kidney has a tiny isodense cyst along the surface. Advanced  arterial calcification. No small bowel abnormality, lymphadenopathy or free  fluid    Uterus is out. No mass or free fluid. Bladder is empty. Normal colon with mild  constipation. Normal appendix. No mesenteric fat edema    Advanced degenerative changes in the spine    Impression  Findings suggesting distal biliary obstruction, although cause not  visible. Consider gallbladder ultrasound and MRCP.  Nodules at the lung bases    CT dose reduction was achieved through use of a standardized protocol tailored  for this examination and automatic exposure control for dose modulation. MRI Results (maximum last 3): No results found for this or any previous visit. Nuclear Medicine Results (maximum last 3): No results found for this or any previous visit. US Results (maximum last 3): Results from [place] encounter on 11/22/21    US THYROID/PARATHYROID/SOFT TISS    Narrative  Findings: Grayscale and color Doppler thyroid sonography performed, compared  with 11/10/2020. Thyroid isthmus measures 0.4 cm. Right thyroid lobe 5.1 x 2.4 x  1.8 cm, 11.4 mL. Left thyroid lobe 5.1 x 1.9 x 1.8 cm, 9.0 mL. Multiple bilateral thyroid nodules. Principally cystic nodule of right thyroid  lobe measures 1.5 x 0.7 x 1.2 cm. Solid to spongiform right thyroid nodule  measures 2.8 x 2.3 x 2.3 cm, previously 2.7 x 1.8 x 1.7 cm. Left thyroid solid  nodule measures 0.7 x 0.9 x 0.9 cm, previously 0.8 x 0.7 x 0.8 cm. Additional  hypoechoic nodule of inferior left thyroid lobe measures 1.5 x 1.2 x 1.2 cm, not  seen/measured previously. Impression  Bilateral thyroid nodules. Hypoechoic nodule of left thyroid lobe  may be new or newly imaged from previous. PET scan report from same day  describes no focal abnormal thyroid uptake of FDG. Results from [place] encounter on 11/02/21    US ABD LTD    Narrative  Liver is of normal echotexture, size and shape. Gallbladder is borderline  distended, filled with sludge but no stones. Intrahepatic and extra hepatic  biliary dilatation. No stone or sludge seen within the duct. Pancreas right  kidney normals visualized    Impression  Sludge-filled gallbladder.  Biliary obstruction without demonstrated  cause      Results from Hospital Encounter encounter on 11/13/20    US ABD LTD    Narrative  The study is a limited sonographic evaluation of the abdomen dated 11/19/2020. HISTORY: Swelling. TECHNIQUE: This examination was interpreted from the static hard copy images  obtained by the sonographer and correlated with the sonographers notes. COMPARISON: Sonographic evaluation of the retroperitoneum dated 11/16/2020. FINDINGS: There is moderate edema within the subcutaneous fat along the right  abdominal wall. This examination is negative for discrete abscess collection. This examination is negative for ascites. Impression  IMPRESSION: There is edema within the right abdominal wall. These findings could  be related to cellulitis versus the third spacing of fluids. This edema is  accounting for the patient's swelling. A discrete abscess collection is not  identified at this site. Active Problems:    Pancreatitis (12/9/2021)        Assessment/Plan:   1. Pancreatitis- ? Secondary to EUS procedure, lipase at 3,000, continue aggressive hydration, NPO , prn pain and N/V medications,  Consult to GI, oncology, repeat labs in am.     2.  Atrial fibrillation- on cardizem, eliquis    3. COPD- symbicort, prn albuterol    4. Depression- on zoloft.       DVT Prophylaxis eliquis  Code Status  Full  POA son Mary Valadez   Total Time 35  minutes      Signed By: Lucero Rodriguez NP     December 9, 2021

## 2021-12-09 NOTE — PROGRESS NOTES
Advance Care Planning     Advance Care Planning (ACP) Physician/NP/PA Conversation      Date of Conversation: 12/9/2021  Conducted with: Patient with Decision Making Capacity    Healthcare Decision Maker:     Primary Decision Maker: Shelly Apodaca - [phone]  Click here to complete [address] including selection of the Healthcare Decision Maker Relationship (ie \"Primary\")      Today we documented Decision Maker(s) consistent with Legal Next of Kin hierarchy. Care Preferences:    Hospitalization: \"If your health worsens and it becomes clear that your chance of recovery is unlikely, what would be your preference regarding hospitalization? \"  The patient would prefer hospitalization. Ventilation: \"If you were unable to breathe on your own and your chance of recovery was unlikely, what would be your preference about the use of a ventilator (breathing machine) if it was available to you? \"   The patient would desire the use of a ventilator. Resuscitation: \"In the event your heart stopped as a result of an underlying serious health condition, would you want attempts to be made to restart your heart, or would you prefer a natural death? \"   Yes, attempt to resuscitate.     Additional topics discussed: treatment goals    Conversation Outcomes / Follow-Up Plan:   ACP complete - no further action today  Reviewed DNR/DNI and patient elects Full Code (Attempt Resuscitation)     Length of Voluntary ACP Conversation in minutes:  <16 minutes (Non-Billable)    Magdiel Urban NP

## 2021-12-09 NOTE — ED PROVIDER NOTES
EMERGENCY DEPARTMENT HISTORY AND PHYSICAL EXAM      Date: 12/9/2021  Patient Name: Catarina Robledo    History of Presenting Illness     Chief Complaint   Patient presents with    Abdominal Pain       History Provided By: Patient    HPI: Catarina Robledo, 78 y.o. female with a past medical history significant pancreatitis presents to the ED with chief complaint of Abdominal Pain  . 79-year-old female recently underwent a pancreas biopsy with /cindy. Unclear why she was getting the biopsy per the patient but she has been having a lot of pain. After having the biopsy she now has increased pain in the left upper quadrant epigastric area. Severe 10 out of 10 pain. Any movement causes her to feel more nauseated with vomiting and has not kept anything down in the last couple days feels dehydrated now. There are no other complaints, changes, or physical findings at this time. PCP: Ariel Dan MD    Current Facility-Administered Medications   Medication Dose Route Frequency Provider Last Rate Last Admin    0.9% sodium chloride infusion  100 mL/hr IntraVENous CONTINUOUS Chacha Salomon MD        morphine injection 4 mg  4 mg IntraVENous ONCE Chacha Salomon MD        ondansetron [facility]) injection 4 mg  4 mg IntraVENous NOW Hiwot ALVAREZ MD         Current Outpatient Medications   Medication Sig Dispense Refill    albuterol (PROVENTIL HFA, VENTOLIN HFA, PROAIR HFA) 90 mcg/actuation inhaler Take 2 Puffs by inhalation every six (6) hours as needed for Wheezing.  pantoprazole (PROTONIX) 40 mg tablet Take 40 mg by mouth daily.  sertraline (Zoloft) 25 mg tablet Take 25 mg by mouth daily.  chlorthalidone (HYGROTON) 25 mg tablet Take 25 mg by mouth daily.  Eliquis 5 mg tablet Take 5 mg by mouth two (2) times a day.  Se-Tan Plus 162-115. 2-1 mg cap Take 1 Capsule by mouth two (2) times a day.       traZODone (DESYREL) 50 mg tablet TAKE ONE TABLET BY MOUTH AT BEDTIME      budesonide-formoteroL (Symbicort) 80-4.5 mcg/actuation HFAA Take 2 Puffs by inhalation. (Patient not taking: Reported on 12/6/2021)      potassium chloride SR (KLOR-CON 10) 10 mEq tablet Take 20 mEq by mouth daily.  bumetanide (BUMEX) 1 mg tablet Take 1 mg by mouth daily.  dilTIAZem ER (DILACOR XR) 120 mg capsule Take 120 mg by mouth daily.  atorvastatin (LIPITOR) 40 mg tablet Take 40 mg by mouth daily.  diazePAM (VALIUM) 5 mg tablet Take 5 mg by mouth every six (6) hours as needed for Anxiety.  famotidine (PEPCID) 40 mg tablet Take 40 mg by mouth daily. (Patient not taking: Reported on 12/6/2021)      ascorbic acid, vitamin C, (Vitamin C) 500 mg tablet Take 500 mg by mouth daily. (Patient not taking: Reported on 12/6/2021)      cholecalciferol (Vitamin D3) (1000 Units /25 mcg) tablet Take 1,000 Units by mouth daily.          Past History     Past Medical History:  Past Medical History:   Diagnosis Date    A-fib [facility])     CHF [facility]congestive heart failure) ([facility] [address]     Clear cell renal cell carcinoma (HCC)     COPD (chronic obstructive pulmonary disease) (HCC)     Fibromyalgia     GERD (gastroesophageal reflux disease)     Lymphedema     Sjogren's syndrome ([facility] [address]     Thyroid nodule        Past Surgical History:  Past Surgical History:   Procedure Laterality Date    COLONOSCOPY N/A 11/18/2020    COLONOSCOPY performed by Katia Garay MD at [address] [place] [address] HX GI      EGD    HX HYSTERECTOMY      HX NEPHRECTOMY Right 2019    HX ORTHOPAEDIC      ankle       Family History:  Family History   Problem Relation Age of Onset    Hypertension Mother     Stroke Mother     Stroke Father     Hypertension Father        Social History:  Social History     Tobacco Use    Smoking status: Current Every Day Smoker     Packs/day: 0.25     Types: Cigarettes    Smokeless tobacco: Never Used   Vaping Use    Vaping Use: Never used   Substance Use Topics    Alcohol use: Not Currently  Drug use: Never       Allergies: Allergies   Allergen Reactions    Adhesive Tape-Silicones Atopic Dermatitis         Review of Systems   Review of Systems   Constitutional: Negative. Negative for chills, fatigue and fever. HENT: Negative. Negative for congestion, nosebleeds and sore throat. Eyes: Negative. Negative for pain, discharge and visual disturbance. Respiratory: Negative. Negative for cough, chest tightness and shortness of breath. Cardiovascular: Negative for chest pain, palpitations and leg swelling. Gastrointestinal: Positive for abdominal pain. Negative for blood in stool, constipation, diarrhea, nausea and vomiting. Endocrine: Negative. Genitourinary: Negative. Negative for difficulty urinating, dysuria, pelvic pain and vaginal bleeding. Musculoskeletal: Negative. Negative for arthralgias, back pain and myalgias. Skin: Negative. Negative for rash and wound. Allergic/Immunologic: Negative. Neurological: Negative. Negative for dizziness, syncope, weakness, numbness and headaches. Hematological: Negative. Psychiatric/Behavioral: Negative. Negative for agitation, confusion and suicidal ideas. All other systems reviewed and are negative. Physical Exam   Physical Exam  Vitals and nursing note reviewed. Exam conducted with a chaperone present. Constitutional:       Appearance: Normal appearance. She is normal weight. HENT:      Head: Normocephalic and atraumatic. Nose: Nose normal.      Mouth/Throat:      Mouth: Mucous membranes are moist.      Pharynx: Oropharynx is clear. Eyes:      Extraocular Movements: Extraocular movements intact. Conjunctiva/sclera: Conjunctivae normal.      Pupils: Pupils are equal, round, and reactive to light. Cardiovascular:      Rate and Rhythm: Normal rate and regular rhythm. Pulses: Normal pulses. Heart sounds: Normal heart sounds.    Pulmonary:      Effort: Pulmonary effort is normal. No respiratory distress. Breath sounds: Normal breath sounds. Abdominal:      General: Abdomen is flat. Bowel sounds are normal. There is no distension. Palpations: Abdomen is soft. Tenderness: There is abdominal tenderness in the epigastric area and left upper quadrant. There is no guarding. Musculoskeletal:         General: No swelling, tenderness, deformity or signs of injury. Normal range of motion. Cervical back: Normal range of motion and neck supple. Right lower leg: No edema. Left lower leg: No edema. Skin:     General: Skin is warm and dry. Capillary Refill: Capillary refill takes less than 2 seconds. Findings: No lesion or rash. Neurological:      General: No focal deficit present. Mental Status: She is alert and oriented to person, place, and time. Mental status is at baseline. Cranial Nerves: No cranial nerve deficit. Psychiatric:         Mood and Affect: Mood normal.         Behavior: Behavior normal.         Thought Content:  Thought content normal.         Judgment: Judgment normal.         Diagnostic Study Results     Labs -     Recent Results (from the past 12 hour(s))   CBC W/O DIFF    Collection Time: 12/09/21 11:00 AM   Result Value Ref Range    WBC 19.6 (H) 3.6 - 11.0 K/uL    RBC 4.87 3.80 - 5.20 M/uL    HGB 15.2 11.5 - 16.0 g/dL    HCT 44.6 35.0 - 47.0 %    MCV 91.6 80.0 - 99.0 FL    MCH 31.2 26.0 - 34.0 PG    MCHC 34.1 30.0 - 36.5 g/dL    RDW 14.0 11.5 - 14.5 %    PLATELET 611 404 - 782 K/uL    MPV 10.6 8.9 - 12.9 FL    NRBC 0.0 0.0  WBC    ABSOLUTE NRBC 0.00 0.00 - 7.96 K/uL   METABOLIC PANEL, COMPREHENSIVE    Collection Time: 12/09/21 11:00 AM   Result Value Ref Range    Sodium 136 136 - 145 mmol/L    Potassium 3.7 3.5 - 5.1 mmol/L    Chloride 100 97 - 108 mmol/L    CO2 30 21 - 32 mmol/L    Anion gap 6 5 - 15 mmol/L    Glucose 162 (H) 65 - 100 mg/dL    BUN 17 6 - 20 mg/dL    Creatinine 1.01 0.55 - 1.02 mg/dL    BUN/Creatinine ratio 17 12 - 20      GFR est AA >60 >60 ml/min/1.73m2    GFR est non-AA 54 (L) >60 ml/min/1.73m2    Calcium 9.6 8.5 - 10.1 mg/dL    Bilirubin, total 1.4 (H) 0.2 - 1.0 mg/dL    AST (SGOT) 31 15 - 37 U/L    ALT (SGPT) 36 12 - 78 U/L    Alk. phosphatase 217 (H) 45 - 117 U/L    Protein, total 6.4 6.4 - 8.2 g/dL    Albumin 2.9 (L) 3.5 - 5.0 g/dL    Globulin 3.5 2.0 - 4.0 g/dL    A-G Ratio 0.8 (L) 1.1 - 2.2     TROPONIN-HIGH SENSITIVITY    Collection Time: 12/09/21 11:00 AM   Result Value Ref Range    Troponin-High Sensitivity 12 0 - 51 ng/L   CK W/ REFLX CKMB    Collection Time: 12/09/21 11:00 AM   Result Value Ref Range    CK 66.0 26 - 192 ng/mL   LIPASE    Collection Time: 12/09/21 11:00 AM   Result Value Ref Range    Lipase >3,000 (H) 73 - 393 U/L         Radiologic Studies -   CTA ABDOMEN PELV W CONT   Final Result   1. Ill-defined hypodense mass in the pancreas. There are inflammatory changes   and fluid adjacent to the pancreas or duodenum and stomach most likely related   to biopsy or focal pancreatitis. No pseudocyst formation, active bleeding or   other acute findings. 2. Enlarged adrenal glands left greater than right with noncontrast densities   consistent with adrenal adenomas. 3. Right nephrectomy. 4. Other findings as described. XR CHEST PORT   Final Result   No acute findings. CT Results  (Last 48 hours)               12/09/21 1225  CTA ABDOMEN PELV W CONT Final result    Impression:  1. Ill-defined hypodense mass in the pancreas. There are inflammatory changes   and fluid adjacent to the pancreas or duodenum and stomach most likely related   to biopsy or focal pancreatitis. No pseudocyst formation, active bleeding or   other acute findings. 2. Enlarged adrenal glands left greater than right with noncontrast densities   consistent with adrenal adenomas. 3. Right nephrectomy. 4. Other findings as described.        Narrative:  Axial images from the lung bases to the pubic symphysis were obtained prior to   and following intravenous administration of 100 mL Isovue-370. Sagittal and   coronal reformatted images were reviewed. Maximum intensity projected images   were generated at the workstation All CT scans at this facility are performed   using dose reduction optimization techniques as appropriate to a performed exam   including the following:   automated exposure control, adjustments of the mA   and/or kV according to patient size, or use of iterative reconstruction   technique. Comparison with PET/CT November 22, 2021. INDICATION: Pain following pancreatic biopsy. Noncontrasted images show small pericardial effusion. There is a stable 6 mm   nodule in the right lower lobe laterally. There is a stable 3 mm nodule left   lower lobe laterally. Stable 4 mm nodule in the medial aspect of the right lower   lobe. 14 mm lymph node in the right epicardial fat. Bilateral adrenal   enlargement, left larger than right with densities less than 10 Hounsfield units   consistent with benign adrenal adenomas. Subtle probable cystic lesion in the   right hepatic lobe. There is a biliary stent with the proximal portion anterior   to the main portal vein and the distal tip in the duodenum. No gross biliary   dilatation. . No calcified gallstones. Several lymph nodes are seen in the upper   abdomen. Spleen is unremarkable. There are inflammatory changes adjacent to the   pancreatic head. The hypermetabolic pancreatic head mass seen on PET/CT is   poorly evaluated. Right kidney is absent. Following contrast administration, the   area of low density in the right hepatic lobe remains low density but is   nonspecific. The gastric antrum is thick-walled posteriorly. There is fluid   between the stomach and the pancreas along with inflammatory change. There is a   hypodense mass in the pancreatic head medial to the biliary stent measuring   approximately 2.5 x 2.1 cm.  There are no enhancing blood products adjacent to the pancreas to suggest active bleeding. No enhanced blood products are seen in   the GI tract. Small amount of free fluid is seen in the posterior pelvis. Uterus   is absent. Left kidney enhances normally except for a few small cortical cysts. No hydronephrosis. No retroperitoneal adenopathy. No GI tract obstruction. Aorta   is markedly atherosclerotic with no focal aneurysm or dissection. Bone windows   and reconstructed images show degenerative changes of the spine. CXR Results  (Last 48 hours)               12/09/21 1057  XR CHEST PORT Final result    Impression:  No acute findings. Narrative:  Portable upright radiograph chest 10:57 a.m. compared to November 2, 2021. INDICATION: Abdominal pain. Heart size is stable with an atherosclerotic aorta. No acute infiltrate or   effusion. Bones appear intact. No pneumothorax. Medical Decision Making and ED Course   I am the first provider for this patient. I reviewed the vital signs, available nursing notes, past medical history, past surgical history, family history and social history. Vital Signs-Reviewed the patient's vital signs. Patient Vitals for the past 12 hrs:   Temp Pulse Resp BP SpO2   12/09/21 1044 98 °F (36.7 °C) (!) 107 20 125/77 96 %       EKG interpretation:   EKG at 1118. Irregular irregular intervals. Tachycardia. A. fib with RVR rate of 108. No ST changes. No T wave inversions. Reason rule out dysrhythmia. Interpreted by ER physician. Records Reviewed: Previous Hospital chart. EMS run report      ED Course:   Initial assessment performed. The patients presenting problems have been discussed, and they are in agreement with the care plan formulated and outlined with them. I have encouraged them to ask questions as they arise throughout their visit.     Orders Placed This Encounter    XR CHEST PORT     Standing Status:   Standing     Number of Occurrences:   1     Order Specific Question:   Reason for Exam     Answer:   chest    CTA ABDOMEN PELV W CONT     Standing Status:   Standing     Number of Occurrences:   1     Order Specific Question:   Transport     Answer:   BED [2]     Order Specific Question:   Reason for Exam     Answer:   pain s/p pancreat BX     Order Specific Question:   Type of Contrast     Answer:   IV     Order Specific Question:   Does patient have history of Renal Disease? Answer:   No     Order Specific Question:   Oral Contrast     Answer:   Per Radiologist Protocol     Order Specific Question:   Decision Support Exception     Answer:   Emergency Medical Condition (MA) [1]    CBC W/O DIFF     Standing Status:   Standing     Number of Occurrences:   1    METABOLIC PANEL, COMPREHENSIVE     Standing Status:   Standing     Number of Occurrences:   1    URINALYSIS W/ RFLX MICROSCOPIC     Standing Status:   Standing     Number of Occurrences:   1    TROPONIN-HIGH SENSITIVITY     Standing Status:   Standing     Number of Occurrences:   1    CK W/REFLEX CKMB     Standing Status:   Standing     Number of Occurrences:   1    LIPASE     Standing Status:   Standing     Number of Occurrences:   1    DIET NPO     Standing Status:   Standing     Number of Occurrences:   1    TYPE & SCREEN     ENTER SURGERY DATE IF FOR PRE-OP TESTING. Standing Status:   Standing     Number of Occurrences:   1     Order Specific Question:   Has patient been transfused or pregnant in the last 3 mos. ? Answer:   Unknown    morphine injection 4 mg    ondansetron (ZOFRAN) injection 4 mg    iopamidoL (ISOVUE-370) 76 % injection 100 mL    0.9% sodium chloride infusion    morphine injection 4 mg    ondansetron (ZOFRAN) injection 4 mg                 Provider Notes (Medical Decision Making):   72-year-old female recent pancreatic biopsy with increased abdominal pain nausea vomiting unable to tolerate liquids.   Lab work CAT scan does show acute pancreatitis unclear if this was what was ongoing before versus related to the recent biopsy. She not able to tolerate p.o. however. Admission to the hospitalist service. Consults  krzysztof admit    Consult pranav             Admitted    Procedures                       Disposition       Emergency Department Disposition:  Admitted      Diagnosis     Clinical Impression:   1. Other acute pancreatitis, unspecified complication status    2. Atrial fibrillation with rapid ventricular response (HCC)    3. Dehydration        Attestations:    Gurwinder Monson MD    Please note that this dictation was completed with [employer], the computer voice recognition software. Quite often unanticipated grammatical, syntax, homophones, and other interpretive errors are inadvertently transcribed by the computer software. Please disregard these errors. Please excuse any errors that have escaped final proofreading. Thank you.

## 2021-12-09 NOTE — CONSULTS
Gastroenterology Consult     Referring Physician: Pepito Peralta MD     Consult Date: 12/9/2021     Subjective:     Chief Complaint: Abdominal Pain    History of Present Illness: Nikki Ley is a 78 y.o. female who is seen in consultation for pancreatitis. Patient has a past medical history of  Paroxysmal Atrial Fibrillation, CHF, COPD, renal cell carcinoma Stage IV s/p nephrectomy, GERD sjoren's syndrome, hypertension, and depression. Patient under went EUS on 12/6/2021 showing 2.7 X3 cm lesion in the area of head of pancreas with dilation of the Pancreatic duct abutting the portal vein but not involving the SMA or AMV ; Tumor T2 by endosonographic criteria ; one small lymph node in the area of head of pancreas. Pathology shows rare cells present suspicious for malignancy. She had an ERCP on 11/4 2021 ERCP; with sphincterotomy/papillotomy ERCP; with placement of endoscopic stent into biliary or pancreatic duct, including pre and postdilation and guide wire passage, when performed, including sphincterotomy, when performed, each stent. . Patient had a PET scan on 11/22/21 which shows a lesion in the pancreatic head consistent with malignancy. CTA of abdomen shows ill defined hypodense mass in the pancreas. There are inflammatory changes. Patient states she experienced nausea, vomiting and diarrhea since Monday. Her abdominal pain has progressively gotten worse. She reports a poor appetite. She does complain of increased \"burping\". Total bilirubin 1.4, AsT 36, ALT 31, Alk Phos. 217, Lipase > 3,000. Plan nothing by mouth except ice chips and sips of water with medication. IV hydration. Pain management    EUS on 12/6/2021 showing 2.7 X3 cm lesion in the area of head of pancreas with dilation of the Pancreatic duct abutting the portal vein but not involving the SMA or AMV ; Tumor T2 by endosonographic criteria ; one small lymph node in the area of head of pancreas. PET Scan 11/22/2021: IMPRESSION  1.   FDG avid lesion in the pancreatic head consistent with malignancy. 2.  Subcentimeter focus of FDG uptake in the liver, indeterminate. 3.  FDG uptake associated with density expanding the right facet at C5-C6,  possibly due to an ectatic artery. CTA abdomen 12/9/2021: IMPRESSION  1. Ill-defined hypodense mass in the pancreas. There are inflammatory changes and fluid adjacent to the pancreas or duodenum and stomach most likely related to biopsy or focal pancreatitis. No pseudocyst formation, active bleeding or other acute findings. 2. Enlarged adrenal glands left greater than right with noncontrast densities  consistent with adrenal adenomas. 3. Right nephrectomy. 4. Other findings as described.     Past Medical History:   Diagnosis Date    A-fib [facility])     CHF (congestive heart failure) (HCC)     Clear cell renal cell carcinoma (HCC)     COPD (chronic obstructive pulmonary disease) (HCC)     Fibromyalgia     GERD (gastroesophageal reflux disease)     Lymphedema     Sjogren's syndrome ([facility] [address]     Thyroid nodule      Past Surgical History:   Procedure Laterality Date    COLONOSCOPY N/A 11/18/2020    COLONOSCOPY performed by Eryn Fowler MD at [facility] HX GI      EGD    HX HYSTERECTOMY      HX NEPHRECTOMY Right 2019    HX ORTHOPAEDIC      ankle      Family History   Problem Relation Age of Onset    Hypertension Mother     Stroke Mother     Stroke Father     Hypertension Father      Social History     Tobacco Use    Smoking status: Current Every Day Smoker     Packs/day: 0.25     Types: Cigarettes    Smokeless tobacco: Never Used   Substance Use Topics    Alcohol use: Not Currently      Allergies   Allergen Reactions    Adhesive Tape-Silicones Atopic Dermatitis     Current Facility-Administered Medications   Medication Dose Route Frequency    0.9% sodium chloride infusion  100 mL/hr IntraVENous CONTINUOUS    albuterol (PROVENTIL HFA, VENTOLIN HFA, PROAIR HFA) inhaler 2 Puff  2 Puff Inhalation Q6H PRN    [START ON 12/10/2021] ascorbic acid (vitamin C) (VITAMIN C) tablet 500 mg  500 mg Oral DAILY    [START ON 12/10/2021] atorvastatin (LIPITOR) tablet 40 mg  40 mg Oral DAILY    budesonide-formoterol (SYMBICORT) 80-4.5 mcg inhaler  2 Puff Inhalation BID RT    [START ON 12/10/2021] bumetanide (BUMEX) tablet 1 mg  1 mg Oral DAILY    [START ON 12/10/2021] cholecalciferol (VITAMIN D3) (1000 Units /25 mcg) tablet 1,000 Units  1,000 Units Oral DAILY    diazePAM (VALIUM) tablet 5 mg  5 mg Oral Q6H PRN    [START ON 12/10/2021] dilTIAZem ER (DILACOR XR) capsule 120 mg  120 mg Oral DAILY    apixaban (ELIQUIS) tablet 5 mg  5 mg Oral BID    [START ON 12/10/2021] pantoprazole (PROTONIX) tablet 40 mg  40 mg Oral ACB    [START ON 12/10/2021] potassium chloride SR (KLOR-CON 10) tablet 20 mEq  20 mEq Oral DAILY    . PHARMACY TO SUBSTITUTE PER PROTOCOL ([facility] 2-1 mg cap)    Per Protocol    [START ON 12/10/2021] sertraline (ZOLOFT) tablet 25 mg  25 mg Oral DAILY    traZODone (DESYREL) tablet 50 mg  50 mg Oral QHS    promethazine (PHENERGAN) 25 mg in 0.9% sodium chloride 50 mL IVPB  25 mg IntraVENous Q6H PRN    HYDROmorphone (DILAUDID) injection 1 mg  1 mg IntraVENous Q4H PRN    ondansetron (ZOFRAN) injection 4 mg  4 mg IntraVENous Q6H PRN    acetaminophen (TYLENOL) tablet 650 mg  650 mg Oral Q4H PRN    0.9% sodium chloride infusion  125 mL/hr IntraVENous CONTINUOUS     Current Outpatient Medications   Medication Sig    albuterol (PROVENTIL HFA, VENTOLIN HFA, PROAIR HFA) 90 mcg/actuation inhaler Take 2 Puffs by inhalation every six (6) hours as needed for Wheezing.  pantoprazole (PROTONIX) 40 mg tablet Take 40 mg by mouth daily.  sertraline (Zoloft) 25 mg tablet Take 25 mg by mouth daily.  Eliquis 5 mg tablet Take 5 mg by mouth two (2) times a day.     traZODone (DESYREL) 50 mg tablet TAKE ONE TABLET BY MOUTH AT BEDTIME    budesonide-formoteroL (Symbicort) 80-4.5 mcg/actuation HFAA Take 2 Puffs by inhalation.  potassium chloride SR (KLOR-CON 10) 10 mEq tablet Take 20 mEq by mouth daily.  bumetanide (BUMEX) 1 mg tablet Take 1 mg by mouth daily.  dilTIAZem ER (DILACOR XR) 120 mg capsule Take 120 mg by mouth daily.  atorvastatin (LIPITOR) 40 mg tablet Take 40 mg by mouth daily.  ascorbic acid, vitamin C, (Vitamin C) 500 mg tablet Take 500 mg by mouth daily.  cholecalciferol (Vitamin D3) (1000 Units /25 mcg) tablet Take 1,000 Units by mouth daily.  chlorthalidone (HYGROTON) 25 mg tablet Take 25 mg by mouth daily.  Se-Tan Plus 162-115. 2-1 mg cap Take 1 Capsule by mouth two (2) times a day.  diazePAM (VALIUM) 5 mg tablet Take 5 mg by mouth every six (6) hours as needed for Anxiety.  famotidine (PEPCID) 40 mg tablet Take 40 mg by mouth daily. (Patient not taking: Reported on 12/6/2021)        Review of Systems:  A detailed 10 organ review of systems is obtained with pertinent positives as listed in the History of Present Illness and Past Medical History. All others are negative. Objective:     Physical Exam:  Visit Vitals  /63   Pulse 100   Temp 98 °F (36.7 °C)   Resp 16   Ht 5' 7\" (1.702 m)   Wt 86.2 kg (190 lb)   SpO2 95%   BMI 29.76 kg/m²        Skin:  Extremities and face reveal no rashes. No chapin erythema. No telangiectasias on the chest wall. HEENT: Sclerae anicteric. Extra-occular muscles are intact. No oral ulcers. No abnormal pigmentation of the lips. The neck is supple. Cardiovascular: Regular rate and rhythm. Respiratory:  Comfortable breathing with no accessory muscle use. GI:  Abdomen nondistended, soft, and  + tender. Normal active bowel sounds. No enlargement of the liver or spleen. No masses palpable. Rectal:  Deferred  Musculoskeletal: Extremities have good range of motion. Neurological:  Gross memory appears intact. Patient is alert and oriented.   Psychiatric:  Mood appears appropriate with judgement intact. Lymphatic:  No cervical or supraclavicular adenopathy. Lab/Data Review:  BMP:   Lab Results   Component Value Date/Time     12/09/2021 11:00 AM    K 3.7 12/09/2021 11:00 AM     12/09/2021 11:00 AM    CO2 30 12/09/2021 11:00 AM    AGAP 6 12/09/2021 11:00 AM     (H) 12/09/2021 11:00 AM    BUN 17 12/09/2021 11:00 AM    CREA 1.01 12/09/2021 11:00 AM    GFRAA >60 12/09/2021 11:00 AM    GFRNA 54 (L) 12/09/2021 11:00 AM     CMP:   Lab Results   Component Value Date/Time     12/09/2021 11:00 AM    K 3.7 12/09/2021 11:00 AM     12/09/2021 11:00 AM    CO2 30 12/09/2021 11:00 AM    AGAP 6 12/09/2021 11:00 AM     (H) 12/09/2021 11:00 AM    BUN 17 12/09/2021 11:00 AM    CREA 1.01 12/09/2021 11:00 AM    GFRAA >60 12/09/2021 11:00 AM    GFRNA 54 (L) 12/09/2021 11:00 AM    CA 9.6 12/09/2021 11:00 AM    ALB 2.9 (L) 12/09/2021 11:00 AM    TP 6.4 12/09/2021 11:00 AM    GLOB 3.5 12/09/2021 11:00 AM    AGRAT 0.8 (L) 12/09/2021 11:00 AM    ALT 36 12/09/2021 11:00 AM     Pancreatic Markers:   Lab Results   Component Value Date/Time    LPSE >3,000 (H) 12/09/2021 11:00 AM         Assessment/Plan:     1. Pancreatitis      NPO except ice chips and sips of water with medications      IV hydration @ 125 ml/hr      Lipase > 3,000      Lipase in the am      Pain management  2. CHF     Continue bumex    3. COPD       Continue symbicort       Albuterol as needed       POA abdias Rincon [phone]    Thank you for allowing me to participate in this patients care   Plan discussed with Dr. Arelis Gamboa and he approves.

## 2021-12-10 NOTE — PROGRESS NOTES
Reason for Admission:   Pancreatitis                    RUR Score:   19%               PCP: First and Last name:   Dania Coyne MD     Name of Practice:    Are you a current patient: Yes/No:  yes   Approximate date of last visit:  2 to 3 weeks ago   Can you participate in a virtual visit if needed:     Do you (patient/family) have any concerns for transition/discharge? No issues              Plan for utilizing home health:  Yes would like home health     Current Advanced Directive/Advance Care Plan:  Full Code      Healthcare Decision Maker:   Click here to complete [address] including selection of the Healthcare Decision Maker Relationship (ie \"Primary\")            Primary Decision Maker: Dolores Villar - Son - [phone]    Transition of Care Plan:        Writer met with patient at bedside to discuss discharge planning and perform an assessment. Patient currently lives in the 1st floor apartment with her son. There are 13 steps to enter the home. Patient fell within the last 3 months. Prior to admission, patient was independent with ADL's and her care. She was transported to all of her appt and errands by her son. Her son will be her transportation at discharge. Patient currently has a rollator, walker and a cane available to use when needed for ambulation. [facility] for [facility]. Patient is currently open to [facility], but she  does not wish to use them any longer. Patient given the phone number to call [facility] to cancel services. The patient does not have preferences. Choice letter signed and placed on the patient's record.      Discharge dispo: home with Georgette Gallardo RN

## 2021-12-10 NOTE — PROGRESS NOTES
Problem: Falls - Risk of  Goal: *Absence of Falls  Description: Document Cherylle Cockayne Fall Risk and appropriate interventions in the flowsheet.   Outcome: Progressing Towards Goal  Note: Fall Risk Interventions:            Medication Interventions: Bed/chair exit alarm    Elimination Interventions: Bed/chair exit alarm    History of Falls Interventions: Bed/chair exit alarm         Problem: Patient Education: Go to Patient Education Activity  Goal: Patient/Family Education  Outcome: Progressing Towards Goal

## 2021-12-10 NOTE — PROGRESS NOTES
Progress Note    Patient: Olvin Jean Baptiste MRN: [id number]  SSN: [id number]    YOB: 1945  Age: 78 y.o. Sex: female      Admit Date: 12/9/2021    LOS: 1 day     Subjective:   GI is following for consult for pancreatitis    Patient seen in room awake and alert. She still complain of diffuse abdominal pain. Nausea is much better. She has a pancreatic head lesion seen on PET scan. CTA abdomen also confirmed mass. EUS was done confirming the lesion on the head of pancreas. Pathology showed few atypical epithelial cells and abnormal epithelial cells are present.  pending. History of renal cell carcinoma Stage IV s/p nephrectomy.   -Labs today: lipase improved 782, creatinine 1.05, normal LFTs, total bilirubin, and kimani phos. WBC decreased to 11.2    12/9 GI consult note:  History of Present Illness: Olvin Jean Baptiste is a 78 y.o. female who is seen in consultation for pancreatitis. Patient has a past medical history of  Paroxysmal Atrial Fibrillation, CHF, COPD, renal cell carcinoma Stage IV s/p nephrectomy, GERD sjoren's syndrome, hypertension, and depression. Patient under went EUS on 12/6/2021 showing 2.7 X3 cm lesion in the area of head of pancreas with dilation of the Pancreatic duct abutting the portal vein but not involving the SMA or AMV ; Tumor T2 by endosonographic criteria ; one small lymph node in the area of head of pancreas. Pathology shows rare cells present suspicious for malignancy. She had an ERCP on 11/4 2021 ERCP; with sphincterotomy/papillotomy ERCP; with placement of endoscopic stent into biliary or pancreatic duct, including pre and postdilation and guide wire passage, when performed, including sphincterotomy, when performed, each stent. . Patient had a PET scan on 11/22/21 which shows a lesion in the pancreatic head consistent with malignancy. CTA of abdomen shows ill defined hypodense mass in the pancreas. There are inflammatory changes.  Patient states she experienced nausea, vomiting and diarrhea since Monday. Her abdominal pain has progressively gotten worse. She reports a poor appetite. She does complain of increased \"burping\". Total bilirubin 1.4, AsT 36, ALT 31, Alk Phos. 217, Lipase > 3,000. Plan nothing by mouth except ice chips and sips of water with medication. IV hydration. Pain management    PET Scan 11/22/2021: IMPRESSION  1.  FDG avid lesion in the pancreatic head consistent with malignancy. 2.  Subcentimeter focus of FDG uptake in the liver, indeterminate. 3.  FDG uptake associated with density expanding the right facet at C5-C6,  possibly due to an ectatic artery. EUS on 12/6/2021 showing 2.7 X3 cm lesion in the area of head of pancreas with dilation of the Pancreatic duct abutting the portal vein but not involving the SMA or AMV ; Tumor T2 by endosonographic criteria ; one small lymph node in the area of head of pancreas. CTA abdomen 12/9/2021: IMPRESSION  1. Ill-defined hypodense mass in the pancreas. There are inflammatory changes and fluid adjacent to the pancreas or duodenum and stomach most likely related to biopsy or focal pancreatitis. No pseudocyst formation, active bleeding or other acute findings. 2. Enlarged adrenal glands left greater than right with noncontrast densities  consistent with adrenal adenomas. 3. Right nephrectomy. 4. Other findings as described. Objective:     Vitals:    12/10/21 0022 12/10/21 0459 12/10/21 0725 12/10/21 0755   BP:  112/63  (!) 110/53   Pulse:  93  74   Resp:  18  16   Temp:    98.1 °F (36.7 °C)   SpO2: 94% 99% 99% 99%   Weight:       Height:            Intake and Output:  Current Shift: No intake/output data recorded. Last three shifts: No intake/output data recorded. Physical Exam:   Skin:  Extremities and face reveal no rashes. No chapin erythema. HEENT: Sclerae anicteric. Extra-occular muscles are intact. No abnormal pigmentation of the lips. The neck is supple.   Cardiovascular: Regular rate and rhythm. Respiratory:  Comfortable breathing with no accessory muscle use. GI:  Abdomen nondistended, soft, and tender. No enlargement of the liver or spleen. No masses palpable. Rectal:  Deferred  Musculoskeletal: Extremities have good range of motion. Neurological:  Gross memory appears intact. Patient is alert and oriented. Psychiatric:  Mood appears appropriate with judgement intact.   Lymphatic:  No visible adenopathy      Lab/Data Review:  Recent Results (from the past 24 hour(s))   URINALYSIS W/ RFLX MICROSCOPIC    Collection Time: 12/09/21 10:45 PM   Result Value Ref Range    Color Pratibha      Appearance Clear Clear      Specific gravity >1.030 (H) 1.003 - 1.030    pH (UA) 5.0 5.0 - 8.0      Protein 100 (A) Negative mg/dL    Glucose Negative Negative mg/dL    Ketone Negative Negative mg/dL    Bilirubin Negative Negative      Blood Negative Negative      Urobilinogen 4.0 (H) 0.1 - 1.0 EU/dL    Nitrites Negative Negative      Leukocyte Esterase Negative Negative      WBC 0-4 0 - 4 /hpf    RBC 0-5 0 - 5 /hpf    Bacteria Negative Negative /hpf    Mucus Trace /lpf   URINE MICROSCOPIC    Collection Time: 12/09/21 10:45 PM   Result Value Ref Range    WBC 3 0 - 4 /hpf    RBC 3 0 - 5 /hpf    Bacteria Negative Negative /hpf   LIPASE    Collection Time: 12/10/21  8:50 AM   Result Value Ref Range    Lipase 765 (H) 73 - 480 U/L   METABOLIC PANEL, COMPREHENSIVE    Collection Time: 12/10/21  8:50 AM   Result Value Ref Range    Sodium 141 136 - 145 mmol/L    Potassium 3.5 3.5 - 5.1 mmol/L    Chloride 107 97 - 108 mmol/L    CO2 28 21 - 32 mmol/L    Anion gap 6 5 - 15 mmol/L    Glucose 101 (H) 65 - 100 mg/dL    BUN 23 (H) 6 - 20 mg/dL    Creatinine 1.07 (H) 0.55 - 1.02 mg/dL    BUN/Creatinine ratio 21 (H) 12 - 20      GFR est AA >60 >60 ml/min/1.73m2    GFR est non-AA 50 (L) >60 ml/min/1.73m2    Calcium 9.0 8.5 - 10.1 mg/dL    Bilirubin, total 1.0 0.2 - 1.0 mg/dL    AST (SGOT) 16 15 - 37 U/L    ALT (SGPT) 28 12 - 78 U/L Alk. phosphatase 172 (H) 45 - 117 U/L    Protein, total 5.3 (L) 6.4 - 8.2 g/dL    Albumin 2.2 (L) 3.5 - 5.0 g/dL    Globulin 3.1 2.0 - 4.0 g/dL    A-G Ratio 0.7 (L) 1.1 - 2.2     CBC WITH AUTOMATED DIFF    Collection Time: 12/10/21  8:50 AM   Result Value Ref Range    WBC 11.2 (H) 3.6 - 11.0 K/uL    RBC 3.90 3.80 - 5.20 M/uL    HGB 12.2 11.5 - 16.0 g/dL    HCT 37.5 35.0 - 47.0 %    MCV 96.2 80.0 - 99.0 FL    MCH 31.3 26.0 - 34.0 PG    MCHC 32.5 30.0 - 36.5 g/dL    RDW 14.4 11.5 - 14.5 %    PLATELET 372 145 - 562 K/uL    MPV 10.6 8.9 - 12.9 FL    NRBC 0.0 0.0  WBC    ABSOLUTE NRBC 0.00 0.00 - 0.01 K/uL    NEUTROPHILS 89 (H) 32 - 75 %    LYMPHOCYTES 7 (L) 12 - 49 %    MONOCYTES 4 (L) 5 - 13 %    EOSINOPHILS 0 0 - 7 %    BASOPHILS 0 0 - 1 %    IMMATURE GRANULOCYTES 0 0 - 0.5 %    ABS. NEUTROPHILS 9.8 (H) 1.8 - 8.0 K/UL    ABS. LYMPHOCYTES 0.8 0.8 - 3.5 K/UL    ABS. MONOCYTES 0.5 0.0 - 1.0 K/UL    ABS. EOSINOPHILS 0.0 0.0 - 0.4 K/UL    ABS. BASOPHILS 0.0 0.0 - 0.1 K/UL    ABS. IMM. GRANS. 0.1 (H) 0.00 - 0.04 K/UL    DF AUTOMATED     METABOLIC PANEL, BASIC    Collection Time: 12/10/21  8:52 AM   Result Value Ref Range    Sodium 141 136 - 145 mmol/L    Potassium 3.5 3.5 - 5.1 mmol/L    Chloride 107 97 - 108 mmol/L    CO2 28 21 - 32 mmol/L    Anion gap 6 5 - 15 mmol/L    Glucose 100 65 - 100 mg/dL    BUN 23 (H) 6 - 20 mg/dL    Creatinine 1.05 (H) 0.55 - 1.02 mg/dL    BUN/Creatinine ratio 22 (H) 12 - 20      GFR est AA >60 >60 ml/min/1.73m2    GFR est non-AA 51 (L) >60 ml/min/1.73m2    Calcium 8.7 8.5 - 10.1 mg/dL   LIPASE    Collection Time: 12/10/21  8:52 AM   Result Value Ref Range    Lipase 782 (H) 73 - 393 U/L              CTA ABDOMEN PELV W CONT   Final Result   1. Ill-defined hypodense mass in the pancreas. There are inflammatory changes   and fluid adjacent to the pancreas or duodenum and stomach most likely related   to biopsy or focal pancreatitis.  No pseudocyst formation, active bleeding or   other acute findings. 2. Enlarged adrenal glands left greater than right with noncontrast densities   consistent with adrenal adenomas. 3. Right nephrectomy. 4. Other findings as described. XR CHEST PORT   Final Result   No acute findings. Assessment:     Active Problems:    Pancreatitis (12/9/2021)        Plan:   1. Pancreatitis      -NPO except ice chips and sips of water with medications      -IV hydration @ 125 ml/hr      -Lipase improved 782. Repeat in am      -Lipase in the am      -Pain management  2. CHF      -Started on Chlorthalidone  3. COPD       -Continue symbicort       -Albuterol as needed       Wood Villatoro [phone]    Patient discussed with Dr Mayito Chang and agrees to above impression and plan. Thank you for allowing me to participate in this patients care    Signed By: Marybeth Goltz.  CONRADO Hillman     December 10, 2021

## 2021-12-10 NOTE — PROGRESS NOTES
Assisted patient to [facility] x2 throughout shift. Patient tolerated well. No sign of distress. Patient gait mostly steady but would recommend standby assist due to generalized weakness. Patient voided x2.

## 2021-12-10 NOTE — ROUTINE PROCESS
TRANSFER - OUT REPORT:    Verbal report given to Alex Mejia RN  on Rip Demarco  being transferred to [address] (unit) for routine progression of care       Report consisted of patients Situation, Background, Assessment and   Recommendations(SBAR). Information from the following report(s) SBAR, Intake/Output, MAR and Recent Results was reviewed with the receiving nurse. Opportunity for questions and clarification was provided.       Patient transported with:   Monitor  O2 @ 2 liters  Tech

## 2021-12-10 NOTE — PROGRESS NOTES
Continuous remote cardiac monitoring applied to pt at this time. Called telemetry to obtain rhythm and monitor tech states unable to give rhythm at this time.

## 2021-12-10 NOTE — PROGRESS NOTES
Hospitalist Progress Note    Subjective:   Daily Progress Note: 12/10/2021 9:45 AM    This provider was informed that at [facility] general surgery does not perform pancreatic mass surgery and will have to follow-up outpatient with surgical-oncology. Patient is having diffuse abdominal pain that is a 5 out of 10. Patient has nausea but it is better with the nausea medications. Patient is also noticing that she has more phlegm without increased SOB. Patient was discontinued on the D3 and started on calcitrate by Dr. Josephine Donis and Dr. Dawit Mendoza discontinued her bumex and started her on chlorthalidone. All done prior to admission.      Current Facility-Administered Medications   Medication Dose Route Frequency    0.9% sodium chloride infusion  100 mL/hr IntraVENous CONTINUOUS    albuterol (PROVENTIL HFA, VENTOLIN HFA, PROAIR HFA) inhaler 2 Puff  2 Puff Inhalation Q6H PRN    ascorbic acid (vitamin C) (VITAMIN C) tablet 500 mg  500 mg Oral DAILY    atorvastatin (LIPITOR) tablet 40 mg  40 mg Oral DAILY    budesonide-formoterol (SYMBICORT) 80-4.5 mcg inhaler  2 Puff Inhalation BID RT    bumetanide (BUMEX) tablet 1 mg  1 mg Oral DAILY    cholecalciferol (VITAMIN D3) (1000 Units /25 mcg) tablet 1,000 Units  1,000 Units Oral DAILY    diazePAM (VALIUM) tablet 5 mg  5 mg Oral Q6H PRN    dilTIAZem ER (CARDIZEM CD) capsule 120 mg  120 mg Oral DAILY    apixaban (ELIQUIS) tablet 5 mg  5 mg Oral BID    pantoprazole (PROTONIX) tablet 40 mg  40 mg Oral ACB    potassium chloride SR (KLOR-CON 10) tablet 20 mEq  20 mEq Oral DAILY    sertraline (ZOLOFT) tablet 25 mg  25 mg Oral DAILY    traZODone (DESYREL) tablet 50 mg  50 mg Oral QHS    HYDROmorphone (DILAUDID) injection 1 mg  1 mg IntraVENous Q4H PRN    ondansetron (ZOFRAN) injection 4 mg  4 mg IntraVENous Q6H PRN    acetaminophen (TYLENOL) tablet 650 mg  650 mg Oral Q4H PRN    0.9% sodium chloride infusion  125 mL/hr IntraVENous CONTINUOUS    prochlorperazine (COMPAZINE) with saline injection 10 mg  10 mg IntraVENous Q6H PRN        Review of Systems  Review of Systems   Constitutional: Negative. Respiratory: Positive for cough. Negative for shortness of breath. (+) congestion   Cardiovascular: Negative. Gastrointestinal: Positive for abdominal pain. Negative for diarrhea, nausea and vomiting. Genitourinary: Negative. All other systems reviewed and are negative. Objective:     Visit Vitals  BP (!) 110/53 (BP 1 Location: Left arm, BP Patient Position: At rest)   Pulse 74   Temp 98.1 °F (36.7 °C)   Resp 16   Ht 5' 7\" (1.702 m)   Wt 86.2 kg (190 lb)   SpO2 99%   BMI 29.76 kg/m²    O2 Flow Rate (L/min): 3 l/min O2 Device: Nasal cannula    Temp (24hrs), Av.9 °F (36.6 °C), Min:97.6 °F (36.4 °C), Max:98.1 °F (36.7 °C)      No intake/output data recorded. No intake/output data recorded. Recent Results (from the past 24 hour(s))   CBC W/O DIFF    Collection Time: 21 11:00 AM   Result Value Ref Range    WBC 19.6 (H) 3.6 - 11.0 K/uL    RBC 4.87 3.80 - 5.20 M/uL    HGB 15.2 11.5 - 16.0 g/dL    HCT 44.6 35.0 - 47.0 %    MCV 91.6 80.0 - 99.0 FL    MCH 31.2 26.0 - 34.0 PG    MCHC 34.1 30.0 - 36.5 g/dL    RDW 14.0 11.5 - 14.5 %    PLATELET 409 024 - 109 K/uL    MPV 10.6 8.9 - 12.9 FL    NRBC 0.0 0.0  WBC    ABSOLUTE NRBC 0.00 0.00 - 0.99 K/uL   METABOLIC PANEL, COMPREHENSIVE    Collection Time: 21 11:00 AM   Result Value Ref Range    Sodium 136 136 - 145 mmol/L    Potassium 3.7 3.5 - 5.1 mmol/L    Chloride 100 97 - 108 mmol/L    CO2 30 21 - 32 mmol/L    Anion gap 6 5 - 15 mmol/L    Glucose 162 (H) 65 - 100 mg/dL    BUN 17 6 - 20 mg/dL    Creatinine 1.01 0.55 - 1.02 mg/dL    BUN/Creatinine ratio 17 12 - 20      GFR est AA >60 >60 ml/min/1.73m2    GFR est non-AA 54 (L) >60 ml/min/1.73m2    Calcium 9.6 8.5 - 10.1 mg/dL    Bilirubin, total 1.4 (H) 0.2 - 1.0 mg/dL    AST (SGOT) 31 15 - 37 U/L    ALT (SGPT) 36 12 - 78 U/L    Alk.  phosphatase 217 (H) 45 - 117 U/L    Protein, total 6.4 6.4 - 8.2 g/dL    Albumin 2.9 (L) 3.5 - 5.0 g/dL    Globulin 3.5 2.0 - 4.0 g/dL    A-G Ratio 0.8 (L) 1.1 - 2.2     TROPONIN-HIGH SENSITIVITY    Collection Time: 12/09/21 11:00 AM   Result Value Ref Range    Troponin-High Sensitivity 12 0 - 51 ng/L   CK W/ REFLX CKMB    Collection Time: 12/09/21 11:00 AM   Result Value Ref Range    CK 66.0 26 - 192 ng/mL   LIPASE    Collection Time: 12/09/21 11:00 AM   Result Value Ref Range    Lipase >3,000 (H) 73 - 393 U/L   URINALYSIS W/ RFLX MICROSCOPIC    Collection Time: 12/09/21 10:45 PM   Result Value Ref Range    Color Pratibha      Appearance Clear Clear      Specific gravity >1.030 (H) 1.003 - 1.030    pH (UA) 5.0 5.0 - 8.0      Protein 100 (A) Negative mg/dL    Glucose Negative Negative mg/dL    Ketone Negative Negative mg/dL    Bilirubin Negative Negative      Blood Negative Negative      Urobilinogen 4.0 (H) 0.1 - 1.0 EU/dL    Nitrites Negative Negative      Leukocyte Esterase Negative Negative      WBC 0-4 0 - 4 /hpf    RBC 0-5 0 - 5 /hpf    Bacteria Negative Negative /hpf    Mucus Trace /lpf   URINE MICROSCOPIC    Collection Time: 12/09/21 10:45 PM   Result Value Ref Range    WBC 3 0 - 4 /hpf    RBC 3 0 - 5 /hpf    Bacteria Negative Negative /hpf   LIPASE    Collection Time: 12/10/21  8:50 AM   Result Value Ref Range    Lipase 765 (H) 73 - 212 U/L   METABOLIC PANEL, COMPREHENSIVE    Collection Time: 12/10/21  8:50 AM   Result Value Ref Range    Sodium 141 136 - 145 mmol/L    Potassium 3.5 3.5 - 5.1 mmol/L    Chloride 107 97 - 108 mmol/L    CO2 28 21 - 32 mmol/L    Anion gap 6 5 - 15 mmol/L    Glucose 101 (H) 65 - 100 mg/dL    BUN 23 (H) 6 - 20 mg/dL    Creatinine 1.07 (H) 0.55 - 1.02 mg/dL    BUN/Creatinine ratio 21 (H) 12 - 20      GFR est AA >60 >60 ml/min/1.73m2    GFR est non-AA 50 (L) >60 ml/min/1.73m2    Calcium 9.0 8.5 - 10.1 mg/dL    Bilirubin, total 1.0 0.2 - 1.0 mg/dL    AST (SGOT) 16 15 - 37 U/L    ALT (SGPT) 28 12 - 78 U/L Alk. phosphatase 172 (H) 45 - 117 U/L    Protein, total 5.3 (L) 6.4 - 8.2 g/dL    Albumin 2.2 (L) 3.5 - 5.0 g/dL    Globulin 3.1 2.0 - 4.0 g/dL    A-G Ratio 0.7 (L) 1.1 - 2.2     CBC WITH AUTOMATED DIFF    Collection Time: 12/10/21  8:50 AM   Result Value Ref Range    WBC 11.2 (H) 3.6 - 11.0 K/uL    RBC 3.90 3.80 - 5.20 M/uL    HGB 12.2 11.5 - 16.0 g/dL    HCT 37.5 35.0 - 47.0 %    MCV 96.2 80.0 - 99.0 FL    MCH 31.3 26.0 - 34.0 PG    MCHC 32.5 30.0 - 36.5 g/dL    RDW 14.4 11.5 - 14.5 %    PLATELET 232 716 - 553 K/uL    MPV 10.6 8.9 - 12.9 FL    NRBC 0.0 0.0  WBC    ABSOLUTE NRBC 0.00 0.00 - 0.01 K/uL    NEUTROPHILS 89 (H) 32 - 75 %    LYMPHOCYTES 7 (L) 12 - 49 %    MONOCYTES 4 (L) 5 - 13 %    EOSINOPHILS 0 0 - 7 %    BASOPHILS 0 0 - 1 %    IMMATURE GRANULOCYTES 0 0 - 0.5 %    ABS. NEUTROPHILS 9.8 (H) 1.8 - 8.0 K/UL    ABS. LYMPHOCYTES 0.8 0.8 - 3.5 K/UL    ABS. MONOCYTES 0.5 0.0 - 1.0 K/UL    ABS. EOSINOPHILS 0.0 0.0 - 0.4 K/UL    ABS. BASOPHILS 0.0 0.0 - 0.1 K/UL    ABS. IMM. GRANS. 0.1 (H) 0.00 - 0.04 K/UL    DF AUTOMATED     METABOLIC PANEL, BASIC    Collection Time: 12/10/21  8:52 AM   Result Value Ref Range    Sodium 141 136 - 145 mmol/L    Potassium 3.5 3.5 - 5.1 mmol/L    Chloride 107 97 - 108 mmol/L    CO2 28 21 - 32 mmol/L    Anion gap 6 5 - 15 mmol/L    Glucose 100 65 - 100 mg/dL    BUN 23 (H) 6 - 20 mg/dL    Creatinine 1.05 (H) 0.55 - 1.02 mg/dL    BUN/Creatinine ratio 22 (H) 12 - 20      GFR est AA >60 >60 ml/min/1.73m2    GFR est non-AA 51 (L) >60 ml/min/1.73m2    Calcium 8.7 8.5 - 10.1 mg/dL   LIPASE    Collection Time: 12/10/21  8:52 AM   Result Value Ref Range    Lipase 782 (H) 73 - 393 U/L        CTA ABDOMEN PELV W CONT   Final Result   1. Ill-defined hypodense mass in the pancreas. There are inflammatory changes   and fluid adjacent to the pancreas or duodenum and stomach most likely related   to biopsy or focal pancreatitis.  No pseudocyst formation, active bleeding or   other acute findings. 2. Enlarged adrenal glands left greater than right with noncontrast densities   consistent with adrenal adenomas. 3. Right nephrectomy. 4. Other findings as described. XR CHEST PORT   Final Result   No acute findings. PHYSICAL EXAM:    Physical Exam  Vitals and nursing note reviewed. Constitutional:       Appearance: She is obese. She is ill-appearing. HENT:      Head: Normocephalic and atraumatic. Cardiovascular:      Rate and Rhythm: Normal rate and regular rhythm. Pulmonary:      Effort: No respiratory distress. Breath sounds: No wheezing. Comments: Diminished breath sounds throughout  Abdominal:      General: Bowel sounds are normal. There is no distension. Palpations: Abdomen is soft. Tenderness: There is abdominal tenderness. Comments: Tenderness to palpation of left lower quadrant   Skin:     Capillary Refill: Capillary refill takes less than 2 seconds. Coloration: Skin is not jaundiced. Neurological:      Mental Status: She is alert and oriented to person, place, and time.           Data Review    Recent Results (from the past 24 hour(s))   CBC W/O DIFF    Collection Time: 12/09/21 11:00 AM   Result Value Ref Range    WBC 19.6 (H) 3.6 - 11.0 K/uL    RBC 4.87 3.80 - 5.20 M/uL    HGB 15.2 11.5 - 16.0 g/dL    HCT 44.6 35.0 - 47.0 %    MCV 91.6 80.0 - 99.0 FL    MCH 31.2 26.0 - 34.0 PG    MCHC 34.1 30.0 - 36.5 g/dL    RDW 14.0 11.5 - 14.5 %    PLATELET 507 731 - 467 K/uL    MPV 10.6 8.9 - 12.9 FL    NRBC 0.0 0.0  WBC    ABSOLUTE NRBC 0.00 0.00 - 5.44 K/uL   METABOLIC PANEL, COMPREHENSIVE    Collection Time: 12/09/21 11:00 AM   Result Value Ref Range    Sodium 136 136 - 145 mmol/L    Potassium 3.7 3.5 - 5.1 mmol/L    Chloride 100 97 - 108 mmol/L    CO2 30 21 - 32 mmol/L    Anion gap 6 5 - 15 mmol/L    Glucose 162 (H) 65 - 100 mg/dL    BUN 17 6 - 20 mg/dL    Creatinine 1.01 0.55 - 1.02 mg/dL    BUN/Creatinine ratio 17 12 - 20      GFR est AA >60 >60 ml/min/1.73m2    GFR est non-AA 54 (L) >60 ml/min/1.73m2    Calcium 9.6 8.5 - 10.1 mg/dL    Bilirubin, total 1.4 (H) 0.2 - 1.0 mg/dL    AST (SGOT) 31 15 - 37 U/L    ALT (SGPT) 36 12 - 78 U/L    Alk.  phosphatase 217 (H) 45 - 117 U/L    Protein, total 6.4 6.4 - 8.2 g/dL    Albumin 2.9 (L) 3.5 - 5.0 g/dL    Globulin 3.5 2.0 - 4.0 g/dL    A-G Ratio 0.8 (L) 1.1 - 2.2     TROPONIN-HIGH SENSITIVITY    Collection Time: 12/09/21 11:00 AM   Result Value Ref Range    Troponin-High Sensitivity 12 0 - 51 ng/L   CK W/ REFLX CKMB    Collection Time: 12/09/21 11:00 AM   Result Value Ref Range    CK 66.0 26 - 192 ng/mL   LIPASE    Collection Time: 12/09/21 11:00 AM   Result Value Ref Range    Lipase >3,000 (H) 73 - 393 U/L   URINALYSIS W/ RFLX MICROSCOPIC    Collection Time: 12/09/21 10:45 PM   Result Value Ref Range    Color Pratibha      Appearance Clear Clear      Specific gravity >1.030 (H) 1.003 - 1.030    pH (UA) 5.0 5.0 - 8.0      Protein 100 (A) Negative mg/dL    Glucose Negative Negative mg/dL    Ketone Negative Negative mg/dL    Bilirubin Negative Negative      Blood Negative Negative      Urobilinogen 4.0 (H) 0.1 - 1.0 EU/dL    Nitrites Negative Negative      Leukocyte Esterase Negative Negative      WBC 0-4 0 - 4 /hpf    RBC 0-5 0 - 5 /hpf    Bacteria Negative Negative /hpf    Mucus Trace /lpf   URINE MICROSCOPIC    Collection Time: 12/09/21 10:45 PM   Result Value Ref Range    WBC 3 0 - 4 /hpf    RBC 3 0 - 5 /hpf    Bacteria Negative Negative /hpf   LIPASE    Collection Time: 12/10/21  8:50 AM   Result Value Ref Range    Lipase 765 (H) 73 - 292 U/L   METABOLIC PANEL, COMPREHENSIVE    Collection Time: 12/10/21  8:50 AM   Result Value Ref Range    Sodium 141 136 - 145 mmol/L    Potassium 3.5 3.5 - 5.1 mmol/L    Chloride 107 97 - 108 mmol/L    CO2 28 21 - 32 mmol/L    Anion gap 6 5 - 15 mmol/L    Glucose 101 (H) 65 - 100 mg/dL    BUN 23 (H) 6 - 20 mg/dL    Creatinine 1.07 (H) 0.55 - 1.02 mg/dL    BUN/Creatinine ratio 21 (H) 12 - 20      GFR est AA >60 >60 ml/min/1.73m2    GFR est non-AA 50 (L) >60 ml/min/1.73m2    Calcium 9.0 8.5 - 10.1 mg/dL    Bilirubin, total 1.0 0.2 - 1.0 mg/dL    AST (SGOT) 16 15 - 37 U/L    ALT (SGPT) 28 12 - 78 U/L    Alk. phosphatase 172 (H) 45 - 117 U/L    Protein, total 5.3 (L) 6.4 - 8.2 g/dL    Albumin 2.2 (L) 3.5 - 5.0 g/dL    Globulin 3.1 2.0 - 4.0 g/dL    A-G Ratio 0.7 (L) 1.1 - 2.2     CBC WITH AUTOMATED DIFF    Collection Time: 12/10/21  8:50 AM   Result Value Ref Range    WBC 11.2 (H) 3.6 - 11.0 K/uL    RBC 3.90 3.80 - 5.20 M/uL    HGB 12.2 11.5 - 16.0 g/dL    HCT 37.5 35.0 - 47.0 %    MCV 96.2 80.0 - 99.0 FL    MCH 31.3 26.0 - 34.0 PG    MCHC 32.5 30.0 - 36.5 g/dL    RDW 14.4 11.5 - 14.5 %    PLATELET 345 151 - 634 K/uL    MPV 10.6 8.9 - 12.9 FL    NRBC 0.0 0.0  WBC    ABSOLUTE NRBC 0.00 0.00 - 0.01 K/uL    NEUTROPHILS 89 (H) 32 - 75 %    LYMPHOCYTES 7 (L) 12 - 49 %    MONOCYTES 4 (L) 5 - 13 %    EOSINOPHILS 0 0 - 7 %    BASOPHILS 0 0 - 1 %    IMMATURE GRANULOCYTES 0 0 - 0.5 %    ABS. NEUTROPHILS 9.8 (H) 1.8 - 8.0 K/UL    ABS. LYMPHOCYTES 0.8 0.8 - 3.5 K/UL    ABS. MONOCYTES 0.5 0.0 - 1.0 K/UL    ABS. EOSINOPHILS 0.0 0.0 - 0.4 K/UL    ABS. BASOPHILS 0.0 0.0 - 0.1 K/UL    ABS. IMM.  GRANS. 0.1 (H) 0.00 - 0.04 K/UL    DF AUTOMATED     METABOLIC PANEL, BASIC    Collection Time: 12/10/21  8:52 AM   Result Value Ref Range    Sodium 141 136 - 145 mmol/L    Potassium 3.5 3.5 - 5.1 mmol/L    Chloride 107 97 - 108 mmol/L    CO2 28 21 - 32 mmol/L    Anion gap 6 5 - 15 mmol/L    Glucose 100 65 - 100 mg/dL    BUN 23 (H) 6 - 20 mg/dL    Creatinine 1.05 (H) 0.55 - 1.02 mg/dL    BUN/Creatinine ratio 22 (H) 12 - 20      GFR est AA >60 >60 ml/min/1.73m2    GFR est non-AA 51 (L) >60 ml/min/1.73m2    Calcium 8.7 8.5 - 10.1 mg/dL   LIPASE    Collection Time: 12/10/21  8:52 AM   Result Value Ref Range    Lipase 782 (H) 73 - 393 U/L        Assessment/Plan:     Active Problems:    Pancreatitis (12/9/2021)        Hospital Course:    Augie Gaytan is a 78 y.o. female with a PMH of  atrial fibrillation, CHF, COPD, renal cell carcinoma stage IV, s/p nephrectomy, GERD, COPD, sjoren's syndrome, hypertension, and depression who came to the hospital with complaints of abdominal pain, associated nausea/vomiting, poor PO intake. Pt reported having a EUS on 12/6 as outpatient by Dr. Na Medrano, biopsy was taken, rare cells present suspicious for malignancy. Initial lab work significant for elevated lipase at 3,000, WBC, urine culture pending. CT abdomen/pelvis showing ill defined hypodense mass in the pancreas with fluid adjacent to the pancreas suggestive of focal pancreatitis. Adrenal adenomas are seen. No pseudocyst formation or active bleeding seen. Pt will be admitted for further evaluation and treatment. GI and oncology consulted. Patient started on fluids, NPO, pain and nausea medication PRN. Pancreatitis, secondary to EUS procedure  NPO continue aggressive hydration   PRN pain and N/V medications  GI, surgery, and oncology following     Renal cell carcinoma stage IV  Pancreatic biopsy showing malignant cells  Followed by Dr. Yaa Bishop    Atrial fibrillation  On cardizem and eliquis    CHF  LV EF 74% 11/2020  Previously on bumex  On atorvastatin and chlorthalidone      COPD  Symbicort   PRN albuterol, duonebs, mucinex     Depression  on Zoloft    Hyperlipidemia  On atorvastatin    DVT Prophylaxis: eliquis  GI Prophylaxis: protoninx  POA [facility]   Discharge and disposition barriers: symptom resolution, oncology consult    Care Plan discussed with: Patient/Family, Nurse and     Total time spent with patient: 35 minutes.

## 2021-12-10 NOTE — CONSULTS
Surgery Consult    Patient: Anne Macias MRN: [id number]  SSN: [id number]    YOB: 1945  Age: 78 y.o. Sex: female      Subjective:      Anne Macias is a 78 y.o. female who is being seen for evaluation of a pancreatic mass. Patient has a significant past medical history for renal cell carcinoma, atrial fibrillation currently on Eliquis, COPD on home oxygen, Sjogren's syndrome, fibromyalgia, GERD, CHF. Patient underwent right nephrectomy in 2019 for renal cell carcinoma and has been followed at [facility] since that time. Patient was found to have obstructive jaundice and biliary stricture in early November of this year. She underwent an ERCP with stent placement with brushings of the bile duct where there was noted to be a stricture. These brushings were negative. Patient then underwent a PET CT which demonstrated uptake in the head of the pancreas concerning for pancreatic mass. She subsequently underwent endoscopic ultrasound with ultrasound and FNA biopsy on 12/6/2021. The procedure demonstrated abutment of the tumor with the portal vein without involvement of the SMA or SMV. The total tumor was measured to be 2.7 cm by 3 cm. This biopsy resulted as suspicion for malignancy. The day after the procedure patient developed epigastric abdominal pain which was severe and radiating to her back. She also complained of nausea and vomiting. CT of the abdomen was performed on admission which demonstrated peripancreatic inflammation consistent with likely post biopsy pancreatitis. Patient was admitted for medical management and evaluation for this pancreatic mass and pancreatitis. Patient states that her nausea has improved as well as her pain. She has had no further vomiting since she is in the emergency department.     Past Medical History:   Diagnosis Date    A-fib [facility])     CHF (congestive heart failure) (HCC)     Clear cell renal cell carcinoma (HCC)     COPD [facility]chronic obstructive pulmonary disease) ([facility]     Fibromyalgia     GERD (gastroesophageal reflux disease)     Lymphedema     Sjogren's syndrome ([facility]     Thyroid nodule      Past Surgical History:   Procedure Laterality Date    COLONOSCOPY N/A 11/18/2020    COLONOSCOPY performed by Katia Garay MD at [address] [facility] HX GI      EGD    HX HYSTERECTOMY      HX NEPHRECTOMY Right 2019    HX ORTHOPAEDIC      ankle      Family History   Problem Relation Age of Onset    Hypertension Mother     Stroke Mother     Stroke Father     Hypertension Father      Social History     Tobacco Use    Smoking status: Current Every Day Smoker     Packs/day: 0.25     Types: Cigarettes    Smokeless tobacco: Never Used   Vaping Use    Vaping Use: Never used   Substance Use Topics    Alcohol use: Not Currently    Drug use: Never      Current Facility-Administered Medications   Medication Dose Route Frequency Provider Last Rate Last Admin    guaiFENesin ER (MUCINEX) tablet 600 mg  600 mg Oral Q12H Huyen Crane PA   600 mg at 12/10/21 1228    albuterol-ipratropium (DUO-NEB) 2.5 MG-0.5 MG/3 ML  3 mL Nebulization Q6H PRN Huyen Crane PA        [START ON 12/11/2021] chlorthalidone (HYGROTON) tablet 25 mg  25 mg Oral DAILY Haley Holler, [place]        0.9% sodium chloride infusion  100 mL/hr IntraVENous CONTINUOUS Nolberto Farah,  mL/hr at 12/09/21 1441 100 mL/hr at 12/09/21 1441    albuterol (PROVENTIL HFA, VENTOLIN HFA, PROAIR HFA) inhaler 2 Puff  2 Puff Inhalation Q6H PRN Nolberto Farah, NP   2 Puff at 12/10/21 0021    ascorbic acid (vitamin C) (VITAMIN C) tablet 500 mg  500 mg Oral DAILY Nolberto Farah A, NP   500 mg at 12/10/21 1005    atorvastatin (LIPITOR) tablet 40 mg  40 mg Oral DAILY Nolberto Farah A, NP   40 mg at 12/10/21 1005    budesonide-formoterol (SYMBICORT) 80-4.5 mcg inhaler  2 Puff Inhalation BID RT ZaNolberto staples, NP   2 Puff at 12/10/21 0725    cholecalciferol (VITAMIN D3) (1000 Units /25 mcg) tablet 1,000 Units  1,000 Units Oral DAILY [facility] A, [facility]        diazePAM (VALIUM) tablet 5 mg  5 mg Oral Q6H PRN [facility] Nolberto NEGIN, NP        dilTIAZem ER (CARDIZEM CD) capsule 120 mg  120 mg Oral DAILY [facility] A, NP   120 mg at 12/10/21 1005    apixaban (ELIQUIS) tablet 5 mg  5 mg Oral BID [facility] A, NP   5 mg at 12/10/21 1006    pantoprazole (PROTONIX) tablet 40 mg  40 mg Oral ACB [facility], [facility]   40 mg at 12/10/21 0602    potassium chloride SR (KLOR-CON 10) tablet 20 mEq  20 mEq Oral DAILY [facility] Jan A, NP   20 mEq at 12/10/21 1005    sertraline (ZOLOFT) tablet 25 mg  25 mg Oral DAILY [facility] Jan , NP   25 mg at 12/10/21 1006    traZODone (DESYREL) tablet 50 mg  50 mg Oral QHS [facility] A, NP   50 mg at 12/09/21 2145    HYDROmorphone (DILAUDID) injection 1 mg  1 mg IntraVENous Q4H PRN [facility] Nolberto , NP   1 mg at 12/10/21 1012    ondansetron (ZOFRAN) injection 4 mg  4 mg IntraVENous Q6H PRN [facility] Jan A, NP   4 mg at 12/09/21 1559    acetaminophen (TYLENOL) tablet 650 mg  650 mg Oral Q4H PRN [facility] Jan A, NP        0.9% sodium chloride infusion  125 mL/hr IntraVENous CONTINUOUS Sridevi Nielson  mL/hr at 12/10/21 0601 125 mL/hr at 12/10/21 0601    prochlorperazine (COMPAZINE) with saline injection 10 mg  10 mg IntraVENous Q6H PRN [facility] Nolberto NEGIN, NP            Allergies   Allergen Reactions    Adhesive Tape-Silicones Atopic Dermatitis       Review of Systems:  Review of Systems   Constitutional: Negative for chills, fever and weight loss. HENT: Negative for congestion, ear pain and sore throat. Eyes: Negative for blurred vision and redness. Respiratory: Negative for cough, shortness of breath and wheezing. Cardiovascular: Negative for chest pain, palpitations and leg swelling. Gastrointestinal: Positive for abdominal pain, nausea and vomiting. Genitourinary: Negative for dysuria, frequency and hematuria.    Musculoskeletal: Negative for joint pain and myalgias. Skin: Negative for itching and rash. Neurological: Negative for dizziness, seizures and headaches. Endo/Heme/Allergies: Does not bruise/bleed easily. Objective:     Vitals:    12/10/21 0022 12/10/21 0459 12/10/21 0725 12/10/21 0755   BP:  112/63  (!) 110/53   Pulse:  93  74   Resp:  18  16   Temp:    98.1 °F (36.7 °C)   SpO2: 94% 99% 99% 99%   Weight:       Height:            Physical Exam:  General:  Alert, cooperative, no apparent distress. Head:  Normocephalic, without obvious abnormality, atraumatic. Eyes:  No scleral injection. Pupils equal, round, reactive to light. Extraocular movements intact. Lungs:   Equal chest rise bilaterally, no wheezes, rales, or rhonchi   Chest wall:  No tenderness or deformity. Heart:  Regular rate and rhythm   Abdomen:   Soft, TTP epigastrum without rebound or guarding, well healed right nephrectomy scar   Extremities: Extremities normal, atraumatic, no cyanosis or edema. Pulses: 2+ and symmetric all extremities. Skin: Skin color, texture, turgor normal. No rashes or lesions. Neurologic: CNII-XII intact. Normal strength, sensation, and reflexes throughout.        Recent Results (from the past 24 hour(s))   URINALYSIS W/ RFLX MICROSCOPIC    Collection Time: 12/09/21 10:45 PM   Result Value Ref Range    Color Pratibha      Appearance Clear Clear      Specific gravity >1.030 (H) 1.003 - 1.030    pH (UA) 5.0 5.0 - 8.0      Protein 100 (A) Negative mg/dL    Glucose Negative Negative mg/dL    Ketone Negative Negative mg/dL    Bilirubin Negative Negative      Blood Negative Negative      Urobilinogen 4.0 (H) 0.1 - 1.0 EU/dL    Nitrites Negative Negative      Leukocyte Esterase Negative Negative      WBC 0-4 0 - 4 /hpf    RBC 0-5 0 - 5 /hpf    Bacteria Negative Negative /hpf    Mucus Trace /lpf   URINE MICROSCOPIC    Collection Time: 12/09/21 10:45 PM   Result Value Ref Range    WBC 3 0 - 4 /hpf    RBC 3 0 - 5 /hpf    Bacteria Negative Negative /hpf   LIPASE    Collection Time: 12/10/21  8:50 AM   Result Value Ref Range    Lipase 765 (H) 73 - 721 U/L   METABOLIC PANEL, COMPREHENSIVE    Collection Time: 12/10/21  8:50 AM   Result Value Ref Range    Sodium 141 136 - 145 mmol/L    Potassium 3.5 3.5 - 5.1 mmol/L    Chloride 107 97 - 108 mmol/L    CO2 28 21 - 32 mmol/L    Anion gap 6 5 - 15 mmol/L    Glucose 101 (H) 65 - 100 mg/dL    BUN 23 (H) 6 - 20 mg/dL    Creatinine 1.07 (H) 0.55 - 1.02 mg/dL    BUN/Creatinine ratio 21 (H) 12 - 20      GFR est AA >60 >60 ml/min/1.73m2    GFR est non-AA 50 (L) >60 ml/min/1.73m2    Calcium 9.0 8.5 - 10.1 mg/dL    Bilirubin, total 1.0 0.2 - 1.0 mg/dL    AST (SGOT) 16 15 - 37 U/L    ALT (SGPT) 28 12 - 78 U/L    Alk. phosphatase 172 (H) 45 - 117 U/L    Protein, total 5.3 (L) 6.4 - 8.2 g/dL    Albumin 2.2 (L) 3.5 - 5.0 g/dL    Globulin 3.1 2.0 - 4.0 g/dL    A-G Ratio 0.7 (L) 1.1 - 2.2     CBC WITH AUTOMATED DIFF    Collection Time: 12/10/21  8:50 AM   Result Value Ref Range    WBC 11.2 (H) 3.6 - 11.0 K/uL    RBC 3.90 3.80 - 5.20 M/uL    HGB 12.2 11.5 - 16.0 g/dL    HCT 37.5 35.0 - 47.0 %    MCV 96.2 80.0 - 99.0 FL    MCH 31.3 26.0 - 34.0 PG    MCHC 32.5 30.0 - 36.5 g/dL    RDW 14.4 11.5 - 14.5 %    PLATELET 610 300 - 163 K/uL    MPV 10.6 8.9 - 12.9 FL    NRBC 0.0 0.0  WBC    ABSOLUTE NRBC 0.00 0.00 - 0.01 K/uL    NEUTROPHILS 89 (H) 32 - 75 %    LYMPHOCYTES 7 (L) 12 - 49 %    MONOCYTES 4 (L) 5 - 13 %    EOSINOPHILS 0 0 - 7 %    BASOPHILS 0 0 - 1 %    IMMATURE GRANULOCYTES 0 0 - 0.5 %    ABS. NEUTROPHILS 9.8 (H) 1.8 - 8.0 K/UL    ABS. LYMPHOCYTES 0.8 0.8 - 3.5 K/UL    ABS. MONOCYTES 0.5 0.0 - 1.0 K/UL    ABS. EOSINOPHILS 0.0 0.0 - 0.4 K/UL    ABS. BASOPHILS 0.0 0.0 - 0.1 K/UL    ABS. IMM.  GRANS. 0.1 (H) 0.00 - 0.04 K/UL    DF AUTOMATED     METABOLIC PANEL, BASIC    Collection Time: 12/10/21  8:52 AM   Result Value Ref Range    Sodium 141 136 - 145 mmol/L    Potassium 3.5 3.5 - 5.1 mmol/L    Chloride 107 97 - 108 mmol/L    CO2 28 21 - 32 mmol/L    Anion gap 6 5 - 15 mmol/L    Glucose 100 65 - 100 mg/dL    BUN 23 (H) 6 - 20 mg/dL    Creatinine 1.05 (H) 0.55 - 1.02 mg/dL    BUN/Creatinine ratio 22 (H) 12 - 20      GFR est AA >60 >60 ml/min/1.73m2    GFR est non-AA 51 (L) >60 ml/min/1.73m2    Calcium 8.7 8.5 - 10.1 mg/dL   LIPASE    Collection Time: 12/10/21  8:52 AM   Result Value Ref Range    Lipase 782 (H) 73 - 393 U/L        CTA ABDOMEN PELV W CONT   Final Result   1. Ill-defined hypodense mass in the pancreas. There are inflammatory changes   and fluid adjacent to the pancreas or duodenum and stomach most likely related   to biopsy or focal pancreatitis. No pseudocyst formation, active bleeding or   other acute findings. 2. Enlarged adrenal glands left greater than right with noncontrast densities   consistent with adrenal adenomas. 3. Right nephrectomy. 4. Other findings as described. XR CHEST PORT   Final Result   No acute findings. Assessment:     Hospital Problems  Date Reviewed: 12/9/2021          Codes Class Noted POA    Pancreatitis ICD-10-CM: K85.90  ICD-9-CM: 761.0  12/9/2021 Unknown            Pancreatic mass causing biliary obstruction s/p ERCP with stent placement, EUS with FNA, biopsy demonstrating suspicion for malignancy. Plan:     1. Continue conservative management for pancreatitis  2. NPO, ice chips  3. IVF  4. Oncology has been following this patient previously, she will need to be referred to surgical oncology for evaluation for resection   5.  Discussed with patient, primary medical team, and Dr. Denise Basurto By: Shakila Oshea,      December 10, 2021         Thank you for allowing me to participate in this patients care

## 2021-12-10 NOTE — CONSULTS
Hematology Oncology Consultation    Reason for consult: Pancreas cancer    Admitting Diagnosis: Pancreatitis [K85.90]    Impression:     1. Pancreatic mass  -likely malignant; CA 19-9 >4000  -s/p FNA via EUS which was suspicious for neoplasia but not diagnostic  -once stable then will need OP followup with advanced oncology surgeon like Dr Hieu Thomas ([facility]) or Vianca Parekh ([facility]) for resectability considerations; this can be arranged as OP    2. Prior history of RCC; has slow growing pulmonary nodules which may limit planning for resection as these could represent stage IV RCC    3. Afib on eliquis    4. HTN    5. CKD III    OK to release once medically stable    Discussion: Rita Guerrero is a  78y.o. year old seen in consultation at the request of Dr. Francy Anaya for possible pancreatic cancer. She has been following with Dr Airam Celis of oncology and Dr Magalys Jennings of GI as part of her workup for a pancreatic mass. She just completed an EUS fna which showed suspicous cells, but not diagnostic for malignancy. She developed pancreatitis with n/v and intolerance to oral intake. She presented to the ER for evaluation and was admitted for stabilization.   She has been following for her RCC; clinical concern from this disease based on its aggressive subtype (sarcomatoid differentiation) is for pulmonary metastatic disease, during her followup for this she was found to have biliary obstruction and referred toGI for correction of this    Imaging:    Imaging and OP office notes from oncology reviewed    Labs:    Recent Results (from the past 24 hour(s))   URINALYSIS W/ RFLX MICROSCOPIC    Collection Time: 12/09/21 10:45 PM   Result Value Ref Range    Color Pratibha      Appearance Clear Clear      Specific gravity >1.030 (H) 1.003 - 1.030    pH (UA) 5.0 5.0 - 8.0      Protein 100 (A) Negative mg/dL    Glucose Negative Negative mg/dL    Ketone Negative Negative mg/dL    Bilirubin Negative Negative      Blood Negative Negative      Urobilinogen 4.0 (H) 0.1 - 1.0 EU/dL    Nitrites Negative Negative      Leukocyte Esterase Negative Negative      WBC 0-4 0 - 4 /hpf    RBC 0-5 0 - 5 /hpf    Bacteria Negative Negative /hpf    Mucus Trace /lpf   URINE MICROSCOPIC    Collection Time: 12/09/21 10:45 PM   Result Value Ref Range    WBC 3 0 - 4 /hpf    RBC 3 0 - 5 /hpf    Bacteria Negative Negative /hpf   LIPASE    Collection Time: 12/10/21  8:50 AM   Result Value Ref Range    Lipase 765 (H) 73 - 188 U/L   METABOLIC PANEL, COMPREHENSIVE    Collection Time: 12/10/21  8:50 AM   Result Value Ref Range    Sodium 141 136 - 145 mmol/L    Potassium 3.5 3.5 - 5.1 mmol/L    Chloride 107 97 - 108 mmol/L    CO2 28 21 - 32 mmol/L    Anion gap 6 5 - 15 mmol/L    Glucose 101 (H) 65 - 100 mg/dL    BUN 23 (H) 6 - 20 mg/dL    Creatinine 1.07 (H) 0.55 - 1.02 mg/dL    BUN/Creatinine ratio 21 (H) 12 - 20      GFR est AA >60 >60 ml/min/1.73m2    GFR est non-AA 50 (L) >60 ml/min/1.73m2    Calcium 9.0 8.5 - 10.1 mg/dL    Bilirubin, total 1.0 0.2 - 1.0 mg/dL    AST (SGOT) 16 15 - 37 U/L    ALT (SGPT) 28 12 - 78 U/L    Alk. phosphatase 172 (H) 45 - 117 U/L    Protein, total 5.3 (L) 6.4 - 8.2 g/dL    Albumin 2.2 (L) 3.5 - 5.0 g/dL    Globulin 3.1 2.0 - 4.0 g/dL    A-G Ratio 0.7 (L) 1.1 - 2.2     CBC WITH AUTOMATED DIFF    Collection Time: 12/10/21  8:50 AM   Result Value Ref Range    WBC 11.2 (H) 3.6 - 11.0 K/uL    RBC 3.90 3.80 - 5.20 M/uL    HGB 12.2 11.5 - 16.0 g/dL    HCT 37.5 35.0 - 47.0 %    MCV 96.2 80.0 - 99.0 FL    MCH 31.3 26.0 - 34.0 PG    MCHC 32.5 30.0 - 36.5 g/dL    RDW 14.4 11.5 - 14.5 %    PLATELET 436 557 - 692 K/uL    MPV 10.6 8.9 - 12.9 FL    NRBC 0.0 0.0  WBC    ABSOLUTE NRBC 0.00 0.00 - 0.01 K/uL    NEUTROPHILS 89 (H) 32 - 75 %    LYMPHOCYTES 7 (L) 12 - 49 %    MONOCYTES 4 (L) 5 - 13 %    EOSINOPHILS 0 0 - 7 %    BASOPHILS 0 0 - 1 %    IMMATURE GRANULOCYTES 0 0 - 0.5 %    ABS. NEUTROPHILS 9.8 (H) 1.8 - 8.0 K/UL    ABS. LYMPHOCYTES 0.8 0.8 - 3.5 K/UL    ABS. MONOCYTES 0.5 0.0 - 1.0 K/UL    ABS. EOSINOPHILS 0.0 0.0 - 0.4 K/UL    ABS. BASOPHILS 0.0 0.0 - 0.1 K/UL    ABS. IMM. GRANS. 0.1 (H) 0.00 - 0.04 K/UL    DF AUTOMATED     METABOLIC PANEL, BASIC    Collection Time: 12/10/21  8:52 AM   Result Value Ref Range    Sodium 141 136 - 145 mmol/L    Potassium 3.5 3.5 - 5.1 mmol/L    Chloride 107 97 - 108 mmol/L    CO2 28 21 - 32 mmol/L    Anion gap 6 5 - 15 mmol/L    Glucose 100 65 - 100 mg/dL    BUN 23 (H) 6 - 20 mg/dL    Creatinine 1.05 (H) 0.55 - 1.02 mg/dL    BUN/Creatinine ratio 22 (H) 12 - 20      GFR est AA >60 >60 ml/min/1.73m2    GFR est non-AA 51 (L) >60 ml/min/1.73m2    Calcium 8.7 8.5 - 10.1 mg/dL   LIPASE    Collection Time: 12/10/21  8:52 AM   Result Value Ref Range    Lipase 782 (H) 73 - 393 U/L       History:  Past Medical History:   Diagnosis Date    [facility]     CHF (congestive heart failure) (HCC)     Clear cell renal cell carcinoma (HCC)     COPD (chronic obstructive pulmonary disease) (HCC)     Fibromyalgia     GERD (gastroesophageal reflux disease)     Lymphedema     Sjogren's syndrome (HCC)     Thyroid nodule       Past Surgical History:   Procedure Laterality Date    COLONOSCOPY N/A 11/18/2020    COLONOSCOPY performed by Gloria Mcrae MD at [address] [place] [address] HX GI      EGD    HX HYSTERECTOMY      HX NEPHRECTOMY Right 2019    HX ORTHOPAEDIC      ankle      Prior to Admission medications    Medication Sig Start Date End Date Taking? Authorizing Provider   albuterol (PROVENTIL HFA, VENTOLIN HFA, PROAIR HFA) 90 mcg/actuation inhaler Take 2 Puffs by inhalation every six (6) hours as needed for Wheezing. Yes Provider, Historical   pantoprazole (PROTONIX) 40 mg tablet Take 40 mg by mouth daily. Yes Provider, Historical   sertraline (Zoloft) 25 mg tablet Take 25 mg by mouth daily. Yes Provider, Historical   Eliquis 5 mg tablet Take 5 mg by mouth two (2) times a day.  7/8/21  Yes Provider, Historical   traZODone (DESYREL) 50 mg tablet TAKE ONE TABLET BY MOUTH AT BEDTIME 7/31/21  Yes Provider, Historical   budesonide-formoteroL (Symbicort) 80-4.5 mcg/actuation HFAA Take 2 Puffs by inhalation. Yes Provider, Historical   potassium chloride SR (KLOR-CON 10) 10 mEq tablet Take 20 mEq by mouth daily. Yes Provider, Historical   bumetanide (BUMEX) 1 mg tablet Take 1 mg by mouth daily. Yes Provider, Historical   dilTIAZem ER (DILACOR XR) 120 mg capsule Take 120 mg by mouth daily. Yes Provider, Historical   atorvastatin (LIPITOR) 40 mg tablet Take 40 mg by mouth daily. Yes Provider, Historical   ascorbic acid, vitamin C, (Vitamin C) 500 mg tablet Take 500 mg by mouth daily. Yes Provider, Historical   cholecalciferol (Vitamin D3) (1000 Units /25 mcg) tablet Take 1,000 Units by mouth daily. Yes Provider, Historical   chlorthalidone (HYGROTON) 25 mg tablet Take 25 mg by mouth daily. 6/2/21   Provider, Historical   Se-Tan Plus 162-115. 2-1 mg cap Take 1 Capsule by mouth two (2) times a day. 6/27/21   Provider, Historical   diazePAM (VALIUM) 5 mg tablet Take 5 mg by mouth every six (6) hours as needed for Anxiety. Provider, Historical   famotidine (PEPCID) 40 mg tablet Take 40 mg by mouth daily.   Patient not taking: Reported on 12/6/2021    Provider, Historical     Allergies   Allergen Reactions    Adhesive Tape-Silicones Atopic Dermatitis      Social History     Tobacco Use    Smoking status: Current Every Day Smoker     Packs/day: 0.25     Types: Cigarettes    Smokeless tobacco: Never Used   Substance Use Topics    Alcohol use: Not Currently      Family History   Problem Relation Age of Onset    Hypertension Mother     Stroke Mother     Stroke Father     Hypertension Father         Current Medications:  Current Facility-Administered Medications   Medication Dose Route Frequency    guaiFENesin ER (MUCINEX) tablet 600 mg  600 mg Oral Q12H    albuterol-ipratropium (DUO-NEB) 2.5 MG-0.5 MG/3 ML  3 mL Nebulization Q6H PRN    [START ON 12/11/2021] chlorthalidone (HYGROTON) tablet 25 mg  25 mg Oral DAILY    0.9% sodium chloride infusion  100 mL/hr IntraVENous CONTINUOUS    albuterol (PROVENTIL HFA, VENTOLIN HFA, PROAIR HFA) inhaler 2 Puff  2 Puff Inhalation Q6H PRN    ascorbic acid (vitamin C) (VITAMIN C) tablet 500 mg  500 mg Oral DAILY    atorvastatin (LIPITOR) tablet 40 mg  40 mg Oral DAILY    budesonide-formoterol (SYMBICORT) 80-4.5 mcg inhaler  2 Puff Inhalation BID RT    cholecalciferol (VITAMIN D3) (1000 Units /25 mcg) tablet 1,000 Units  1,000 Units Oral DAILY    diazePAM (VALIUM) tablet 5 mg  5 mg Oral Q6H PRN    dilTIAZem ER (CARDIZEM CD) capsule 120 mg  120 mg Oral DAILY    apixaban (ELIQUIS) tablet 5 mg  5 mg Oral BID    pantoprazole (PROTONIX) tablet 40 mg  40 mg Oral ACB    potassium chloride SR (KLOR-CON 10) tablet 20 mEq  20 mEq Oral DAILY    sertraline (ZOLOFT) tablet 25 mg  25 mg Oral DAILY    traZODone (DESYREL) tablet 50 mg  50 mg Oral QHS    HYDROmorphone (DILAUDID) injection 1 mg  1 mg IntraVENous Q4H PRN    ondansetron (ZOFRAN) injection 4 mg  4 mg IntraVENous Q6H PRN    acetaminophen (TYLENOL) tablet 650 mg  650 mg Oral Q4H PRN    0.9% sodium chloride infusion  125 mL/hr IntraVENous CONTINUOUS    prochlorperazine (COMPAZINE) with saline injection 10 mg  10 mg IntraVENous Q6H PRN         Review of Systems:  Constitutional No fevers, chills, night sweats, excessive fatigue or weight loss. Allergic/Immunologic No recent allergic reactions   Eyes No significant visual difficulties. No diplopia. ENMT No problems with hearing, no sore throat, no sinus drainage. Endocrine No hot flashes or night sweats. No cold intolerance, polyuria, or polydipsia   Hematologic/Lymphatic No easy bruising or bleeding. The patient denies any tender or palpable lymph nodes   Breasts No abnormal masses of breast, nipple discharge or pain. Respiratory No dyspnea on exertion, orthopnea, chest pain, cough or hemoptysis. Cardiovascular No anginal chest pain, irregular heart beat, tachycardia, palpitations or orthopnea. Gastrointestinal No nausea, vomiting, diarrhea, constipation, cramping, dysphagia, reflux, heartburn, GI bleeding, or early satiety. No change in bowel habits. Genitourinary (M) No hematuria, dysuria, increased frequency, urgency, hesitancy or incontinence. Musculoskeletal No joint pain, swelling or redness. No decreased range of motion. Integumentary No chronic rashes, inflammation, ulcerations, pruritus, petechiae, purpura, ecchymoses, or skin changes. Neurologic No headache, blurred vision, and no areas of focal weakness or numbness. Normal gait. No sensory problems. Psychiatric No insomnia, depression, delia or mood swings. No psychotropic drugs. Physical Exam:  Constitutional Alert, cooperative, oriented. Mood and affect appropriate. Appears close to chronological age. Well nourished. Well developed. Head Normocephalic; no scars   Eyes Conjunctivae and sclerae are clear and without icterus. Pupils are reactive and equal.   ENMT Sinuses are nontender. No oral exudates, ulcers, masses, thrush or mucositis. Oropharynx clear. Tongue normal.   Neck Supple without masses or thyromegaly. No jugular venous distension. Hematologic/Lymphatic No petechiae or purpura. No tender or palpable lymph nodes in the cervical, supraclavicular, axillary or inguinal area. Respiratory Lungs are clear to auscultation without rhonchi or wheezing. Cardiovascular Regular rate and rhythm of heart without murmurs, gallops or rubs. Chest / Line Site Chest is symmetric with no chest wall deformities. Abdomen Non-tender, non-distended, no masses, ascites or hepatosplenomegaly. Good bowel sounds. No guarding or rebound tenderness. No pulsatile masses. Musculoskeletal No tenderness or swelling, normal range of motion without obvious weakness.    Extremities No visible deformities, no cyanosis, clubbing or edema. Skin No rashes, scars, or lesions suggestive of malignancy. No petechiae, purpura, or ecchymoses. No excoriations. Neurologic No sensory or motor deficits, normal cerebellar function, normal gait, cranial nerves intact. Psychiatric Alert and oriented times three. Coherent speech. Verbalizes understanding of our discussions today.

## 2021-12-11 NOTE — ROUTINE PROCESS
Pt states that she no longer takes the symbicort at home and would like to review taking this with doctor prior to taking it here in the hospital.

## 2021-12-11 NOTE — PROGRESS NOTES
Hospitalist Progress Note    Subjective:   Daily Progress Note: 12/11/2021 9:45 AM    Patient upright in bed when examined. Patient having better nausea. Patient advancing diet to clear liquids. Asking about lipase levels. Current Facility-Administered Medications   Medication Dose Route Frequency    guaiFENesin ER (MUCINEX) tablet 600 mg  600 mg Oral Q12H    albuterol-ipratropium (DUO-NEB) 2.5 MG-0.5 MG/3 ML  3 mL Nebulization Q6H PRN    chlorthalidone (HYGROTON) tablet 25 mg  25 mg Oral DAILY    0.9% sodium chloride infusion  100 mL/hr IntraVENous CONTINUOUS    albuterol (PROVENTIL HFA, VENTOLIN HFA, PROAIR HFA) inhaler 2 Puff  2 Puff Inhalation Q6H PRN    ascorbic acid (vitamin C) (VITAMIN C) tablet 500 mg  500 mg Oral DAILY    atorvastatin (LIPITOR) tablet 40 mg  40 mg Oral DAILY    budesonide-formoterol (SYMBICORT) 80-4.5 mcg inhaler  2 Puff Inhalation BID RT    cholecalciferol (VITAMIN D3) (1000 Units /25 mcg) tablet 1,000 Units  1,000 Units Oral DAILY    diazePAM (VALIUM) tablet 5 mg  5 mg Oral Q6H PRN    dilTIAZem ER (CARDIZEM CD) capsule 120 mg  120 mg Oral DAILY    apixaban (ELIQUIS) tablet 5 mg  5 mg Oral BID    pantoprazole (PROTONIX) tablet 40 mg  40 mg Oral ACB    potassium chloride SR (KLOR-CON 10) tablet 20 mEq  20 mEq Oral DAILY    sertraline (ZOLOFT) tablet 25 mg  25 mg Oral DAILY    traZODone (DESYREL) tablet 50 mg  50 mg Oral QHS    HYDROmorphone (DILAUDID) injection 1 mg  1 mg IntraVENous Q4H PRN    ondansetron (ZOFRAN) injection 4 mg  4 mg IntraVENous Q6H PRN    acetaminophen (TYLENOL) tablet 650 mg  650 mg Oral Q4H PRN    0.9% sodium chloride infusion  125 mL/hr IntraVENous CONTINUOUS    prochlorperazine (COMPAZINE) with saline injection 10 mg  10 mg IntraVENous Q6H PRN        Review of Systems  Review of Systems   Constitutional: Negative. Respiratory: Positive for cough. Negative for shortness of breath. (+) congestion   Cardiovascular: Negative. Gastrointestinal: Positive for abdominal pain. Negative for diarrhea, nausea and vomiting. Genitourinary: Negative. All other systems reviewed and are negative. Objective:     Visit Vitals  BP (!) 141/52 (BP 1 Location: Left arm, BP Patient Position: At rest)   Pulse 63   Temp 98.9 °F (37.2 °C)   Resp 16   Ht 5' 7\" (1.702 m)   Wt 86.2 kg (190 lb)   SpO2 100%   BMI 29.76 kg/m²    O2 Flow Rate (L/min): 3 l/min O2 Device: Nasal cannula    Temp (24hrs), Av.6 °F (37 °C), Min:97.8 °F (36.6 °C), Max:99.1 °F (37.3 °C)      No intake/output data recorded. No intake/output data recorded. No results found for this or any previous visit (from the past 24 hour(s)). CTA ABDOMEN PELV W CONT   Final Result   1. Ill-defined hypodense mass in the pancreas. There are inflammatory changes   and fluid adjacent to the pancreas or duodenum and stomach most likely related   to biopsy or focal pancreatitis. No pseudocyst formation, active bleeding or   other acute findings. 2. Enlarged adrenal glands left greater than right with noncontrast densities   consistent with adrenal adenomas. 3. Right nephrectomy. 4. Other findings as described. XR CHEST PORT   Final Result   No acute findings. PHYSICAL EXAM:    Physical Exam  Vitals and nursing note reviewed. Constitutional:       Appearance: She is obese. She is ill-appearing. HENT:      Head: Normocephalic and atraumatic. Cardiovascular:      Rate and Rhythm: Normal rate and regular rhythm. Pulmonary:      Effort: No respiratory distress. Breath sounds: No wheezing. Comments: Diminished breath sounds throughout  Abdominal:      General: Bowel sounds are normal. There is no distension. Palpations: Abdomen is soft. Tenderness: There is abdominal tenderness. Comments: Tenderness to palpation of left lower quadrant   Skin:     Capillary Refill: Capillary refill takes less than 2 seconds.       Coloration: Skin is not jaundiced. Neurological:      Mental Status: She is alert and oriented to person, place, and time. Data Review    No results found for this or any previous visit (from the past 24 hour(s)). Assessment/Plan:     Active Problems:    Pancreatitis (12/9/2021)        Hospital Course:    Olvin Jean Baptiste is a 78 y.o. female with a PMH of  atrial fibrillation, CHF, COPD, renal cell carcinoma stage IV, s/p nephrectomy, GERD, COPD, sjoren's syndrome, hypertension, and depression who came to the hospital with complaints of abdominal pain, associated nausea/vomiting, poor PO intake. Pt reported having a EUS on 12/6 as outpatient by Dr. Mayito Chang, biopsy was taken, rare cells present suspicious for malignancy. Initial lab work significant for elevated lipase at 3,000, WBC, urine culture pending. CT abdomen/pelvis showing ill defined hypodense mass in the pancreas with fluid adjacent to the pancreas suggestive of focal pancreatitis. Adrenal adenomas are seen. No pseudocyst formation or active bleeding seen. Pt will be admitted for further evaluation and treatment. GI and oncology consulted. Patient started on fluids, NPO, pain and nausea medication PRN. Advance diet as tolerated.      Pancreatitis, secondary to EUS procedure  Advancing diet  Continue aggressive hydration   PRN pain and N/V medications  GI, surgery, and oncology following     Renal cell carcinoma stage IV  Pancreatic biopsy showing malignant cells  Followed by Dr. Blanca Kim    Atrial fibrillation  On cardizem and eliquis    CHF  LV EF 74% 11/2020  Previously on bumex  On atorvastatin and chlorthalidone      COPD  Symbicort   PRN albuterol, duonebs, mucinex     Depression  on Zoloft    Hyperlipidemia  On atorvastatin    Labs pending today    DVT Prophylaxis: eliquis  GI Prophylaxis: protoninx  POA abdias Lenug   Discharge and disposition barriers: symptom resolution, oncology consult    Care Plan discussed with: Patient/Family, Nurse and Case Manager    Total time spent with patient: 35 minutes.

## 2021-12-11 NOTE — PROGRESS NOTES
Hematology Oncology Progress Note           Follow up for: pancreatic mass  Chart notes reviewed since last visit. Pain and nausea improved  No bleeding    Patient Vitals for the past 24 hrs:   BP Temp Pulse Resp SpO2   12/11/21 0737 (!) 141/52 98.9 °F (37.2 °C) 63 16 100 %   12/11/21 0355 (!) 141/56 99.1 °F (37.3 °C) 64 16 98 %   12/11/21 0000 -- -- 65 -- --   12/10/21 2000 -- -- 66 -- --   12/10/21 1931 (!) 134/52 98.6 °F (37 °C) 66 16 98 %   12/10/21 1828 -- -- -- -- 98 %   12/10/21 1607 (!) 120/54 97.8 °F (36.6 °C) 63 16 99 %       Review of Systems:    Constitutional No fevers, chills, night sweats, excessive fatigue or weight loss. Allergic/Immunologic No recent allergic reactions   Eyes No significant visual difficulties. No diplopia. ENMT No problems with hearing, no sore throat, no sinus drainage. Endocrine No hot flashes or night sweats. No cold intolerance, polyuria, or polydipsia   Hematologic/Lymphatic No easy bruising or bleeding. The patient denies any tender or palpable lymph nodes   Breasts No abnormal masses of breast, nipple discharge or pain. Respiratory No dyspnea on exertion, orthopnea, chest pain, cough or hemoptysis. Cardiovascular No anginal chest pain, irregular heart beat, tachycardia, palpitations or orthopnea. Gastrointestinal No nausea, vomiting, diarrhea, constipation, cramping, dysphagia, reflux, heartburn, GI bleeding, or early satiety. No change in bowel habits. Genitourinary (M) No hematuria, dysuria, increased frequency, urgency, hesitancy or incontinence. Musculoskeletal No joint pain, swelling or redness. No decreased range of motion. Integumentary No chronic rashes, inflammation, ulcerations, pruritus, petechiae, purpura, ecchymoses, or skin changes. Neurologic No headache, blurred vision, and no areas of focal weakness or numbness. Normal gait. No sensory problems. Psychiatric No insomnia, depression, delia or mood swings.   No psychotropic drugs Physical Examination:  Constitutional Sedation to maintain respiratory support   Head Normocephalic; no scars   Eyes Conjunctivae and sclerae are clear and without icterus. Pupils are reactive and equal.   ENMT Sinuses are nontender. No oral exudates, ulcers, masses, thrush or mucositis. Oropharynx clear. Tongue normal.   Neck Supple without masses or thyromegaly. No jugular venous distension. Hematologic/Lymphatic No petechiae or purpura. No tender or palpable lymph nodes in the cervical, supraclavicular, axillary or inguinal area. Respiratory Lungs are clear to auscultation without rhonchi or wheezing. Cardiovascular Regular rate and rhythm of heart without murmurs, gallops or rubs. Chest / Line Site Chest is symmetric with no chest wall deformities. Abdomen Non-tender, non-distended, no masses, ascites or hepatosplenomegaly. Good bowel sounds. No guarding or rebound tenderness. No pulsatile masses. Musculoskeletal No tenderness or swelling, normal range of motion without obvious weakness. Extremities No visible deformities, no cyanosis, clubbing or edema. Skin No rashes, scars, or lesions suggestive of malignancy. No petechiae, purpura, or ecchymoses. No excoriations. Neurologic No sensory or motor deficits, normal cerebellar function, normal gait, cranial nerves intact. Psychiatric Alert and oriented times three. Coherent speech. Verbalizes understanding of our discussions today. Labs:  No results found for this or any previous visit (from the past 24 hour(s)). Imaging:      Assessment and Plan:   1. Pancreatic mass  -likely malignant; CA 19-9 >4000  -s/p FNA via EUS which was suspicious for neoplasia but not diagnostic  -once stable then will need OP followup with advanced oncology surgeon like [facility] Tamir Todd ([facility] [place] [place] considerations; this can be arranged as OP     2.  Prior history of RCC; has slow growing pulmonary nodules which may limit planning for resection as these could represent stage IV RCC; however have been stable on recent imaging       3. Afib on eliquis     4. HTN     5. CKD III    6.  Pancreatitis  -resolving     OK to release once medically stable; followup with Dr Singleton Call per routine

## 2021-12-11 NOTE — PROGRESS NOTES
Progress Note    Patient: Alayna Geller MRN: [id number]  SSN: [id number]    YOB: 1945  Age: 78 y.o. Sex: female      Admit Date: 12/9/2021    LOS: 2 days     Subjective:     Patient seen and examined, nausea and pain have improved slightly. Denies new complaints. Objective:     Vitals:    12/10/21 2000 12/11/21 0000 12/11/21 0355 12/11/21 0737   BP:   (!) 141/56 (!) 141/52   Pulse: 66 65 64 63   Resp:   16 16   Temp:   99.1 °F (37.3 °C) 98.9 °F (37.2 °C)   SpO2:   98% 100%   Weight:       Height:            Intake and Output:  Current Shift: No intake/output data recorded. Last three shifts: No intake/output data recorded. Physical Exam:   General: alert, oriented, in no acute distress  Cardiovascular: regular rate and rhythm  Pulmonary: unlabored breathing, equal chest rise bilaterally, on supplemental O2  Abdomen: soft, mild TTP epigastrum  Extremities: no swelling, pulses equal and palpable in all extremities    Lab/Data Review:  No results found for this or any previous visit (from the past 24 hour(s)).        Assessment:     Active Problems:    Pancreatitis (12/9/2021)        Plan:     Diet: clear liquids  DVT/GI prophylaxis  Ambulate  Pain and nausea control  Heme/onc evaluation noted  Patient will need to have follow up with surgical oncology as an outpatient    Signed By: Wiley Oshea DO     December 11, 2021

## 2021-12-12 PROBLEM — K92.0 HEMATEMESIS: Status: ACTIVE | Noted: 2021-01-01

## 2021-12-12 PROBLEM — Z87.11 HISTORY OF PEPTIC ULCER DISEASE: Status: ACTIVE | Noted: 2021-01-01

## 2021-12-12 NOTE — PROGRESS NOTES
Notified physician on call, Rosalee Lagos of patient continuing to have nausea and vomiting. Vomit looks like bloody water. Will continue to give zofran and compazine prn.

## 2021-12-12 NOTE — PROGRESS NOTES
Problem: Falls - Risk of  Goal: *Absence of Falls  Description: Document Cherylle Cockayne Fall Risk and appropriate interventions in the flowsheet.   Outcome: Progressing Towards Goal  Note: Fall Risk Interventions:            Medication Interventions: Patient to call before getting OOB    Elimination Interventions: Bed/chair exit alarm, Call light in reach    History of Falls Interventions: Bed/chair exit alarm         Problem: Patient Education: Go to Patient Education Activity  Goal: Patient/Family Education  Outcome: Progressing Towards Goal     Problem: Pain  Goal: *Control of Pain  Outcome: Progressing Towards Goal     Problem: Patient Education: Go to Patient Education Activity  Goal: Patient/Family Education  Outcome: Progressing Towards Goal     Problem: Nausea/Vomiting (Adult)  Goal: *Absence of nausea/vomiting  Outcome: Progressing Towards Goal     Problem: Patient Education: Go to Patient Education Activity  Goal: Patient/Family Education  Outcome: Progressing Towards Goal

## 2021-12-12 NOTE — PROGRESS NOTES
Progress Note    Patient: Oanh Childs MRN: [id number]  SSN: [id number]    YOB: 1945  Age: 78 y.o. Sex: female      Admit Date: 12/9/2021    LOS: 3 days     Subjective:   GI in consultation for Pancreatitis    Patient reports increased abdominal pain. She was started on clear liquid diet yesterday with increased lipase >3000. She is now NPO with ice chips. She reports hematemesis this morning. Per RN it was dark in color. She does have a history of Peptic ulcer. Her Eliquis is on hold at this time. Abdominal Ultrasound is pending. Todays labs are pending at present time. 12/9 GI consult note:  History of Present Illness: Nevin Cole is a 78 y. o. female who is seen in consultation for pancreatitis. Tamiko Eason has a past medical history of  Paroxysmal Atrial Fibrillation, CHF, COPD, renal cell carcinoma Stage IV s/p nephrectomy, GERD sjoren's syndrome, hypertension, and depression. Patient under went EUS on 12/6/2021 showing 2.7 X3 cm lesion in the area of head of pancreas with dilation of the Pancreatic duct abutting the portal vein but not involving the SMA or AMV ; Tumor T2 by endosonographic criteria ; one small lymph node in the area of head of pancreas. Pathology shows rare cells present suspicious for malignancy.   She had an ERCP on 11/4 2021 ERCP; with sphincterotomy/papillotomy ERCP; with placement of endoscopic stent into biliary or pancreatic duct, including pre and postdilation and guide wire passage, when performed, including sphincterotomy, when performed, each stent. . Patient had a PET scan on 11/22/21 which shows a lesion in the pancreatic head consistent with malignancy. CTA of abdomen shows ill defined hypodense mass in the pancreas. There are inflammatory changes. Patient states she experienced nausea, vomiting and diarrhea since Monday. Her abdominal pain has progressively gotten worse. She reports a poor appetite. She does complain of increased \"burping\".  Total bilirubin 1.4, AsT 36, ALT 31, Alk Phos. 217, Lipase > 3,000. Plan nothing by mouth except ice chips and sips of water with medication. IV hydration. Pain management     PET Scan 11/22/2021: IMPRESSION  1.  FDG avid lesion in the pancreatic head consistent with malignancy. 2.  Subcentimeter focus of FDG uptake in the liver, indeterminate. 3.  FDG uptake associated with density expanding the right facet at C5-C6,  possibly due to an ectatic artery. EUS on 12/6/2021 showing 2.7 X3 cm lesion in the area of head of pancreas with dilation of the Pancreatic duct abutting the portal vein but not involving the SMA or AMV ; Tumor T2 by endosonographic criteria ; one small lymph node in the area of head of pancreas. CTA abdomen 12/9/2021: IMPRESSION  1. Ill-defined hypodense mass in the pancreas. There are inflammatory changes and fluid adjacent to the pancreas or duodenum and stomach most likely related to biopsy or focal pancreatitis. No pseudocyst formation, active bleeding or other acute findings. 2. Enlarged adrenal glands left greater than right with noncontrast densities  consistent with adrenal adenomas. 3. Right nephrectomy. 4. Other findings as described.         Objective:     Vitals:    12/12/21 0228 12/12/21 0539 12/12/21 0759 12/12/21 0854   BP: (!) 165/78  (!) 156/75    Pulse: 89 87 72    Resp: 16  16    Temp: 98.6 °F (37 °C)  98.3 °F (36.8 °C)    SpO2: 98%  99% 100%   Weight:       Height:            Intake and Output:  Current Shift: No intake/output data recorded. Last three shifts: 12/10 1901 - 12/12 0700  In: 1600 [P.O.:100; I.V.:1500]  Out: -     Physical Exam:   Skin:  Extremities and face reveal no rashes. No chapin erythema. HEENT: Sclerae anicteric. Extra-occular muscles are intact. No abnormal pigmentation of the lips. The neck is supple. Cardiovascular: Regular rate and rhythm. Respiratory:  Comfortable breathing with no accessory muscle use.    GI:  Abdomen nondistended, soft, and + tender. No enlargement of the liver or spleen. No masses palpable. Rectal:  Deferred  Musculoskeletal: Extremities have good range of motion. Neurological:  Gross memory appears intact. Patient is alert and oriented. Psychiatric:  Mood appears appropriate with judgement intact. Lymphatic:  No visible adenopathy      Lab/Data Review:  No results found for this or any previous visit (from the past 24 hour(s)). CTA ABDOMEN PELV W CONT   Final Result   1. Ill-defined hypodense mass in the pancreas. There are inflammatory changes   and fluid adjacent to the pancreas or duodenum and stomach most likely related   to biopsy or focal pancreatitis. No pseudocyst formation, active bleeding or   other acute findings. 2. Enlarged adrenal glands left greater than right with noncontrast densities   consistent with adrenal adenomas. 3. Right nephrectomy. 4. Other findings as described. XR CHEST PORT   Final Result   No acute findings. US ABD LTD    (Results Pending)       Assessment:     Active Problems:    A-fib ([address] (11/16/2020)      CHF (congestive heart failure) ([address] (11/16/2020)      Pancreatitis (12/9/2021)      Hematemesis (12/12/2021)      History of peptic ulcer disease (12/12/2021)        Plan:   1. Pancreatitis      -clear liquid diet      -IV hydration @ 100 ml/hr      -Lipase improved > 3,000on 12/11/21       - AM labs pending      -Lipase in the am      -Pain management  2. CHF      -Started on Chlorthalidone  3. COPD       -Continue home medications       -Albuterol as needed  4. Pancreatic Mass       > 4000      S/p fine needle aspiration suspicious for neoplasia but not diagnostic      When stable Oncology plan for out patient followup with advanced oncology surgeon  At [facility] or [facility] [place] [place] considerations  5. Hematemesis     Monitor H&H     Transfuse as needed     Continue Protonix 40 mg daily         Thank you for allowing me to participate in this patients care.    Plan discussed with Dr. Na Medrano and he approves.     Signed By: Belkys Bingham NP     December 12, 2021

## 2021-12-12 NOTE — PROGRESS NOTES
Hematology Oncology Progress Note           Follow up for: pancreatic mass  Chart notes reviewed since last visit. uptick in pain and lipase yesterday  Back to NPO to calm down symptoms      Patient Vitals for the past 24 hrs:   BP Temp Pulse Resp SpO2   12/12/21 0854 -- -- -- -- 100 %   12/12/21 0759 (!) 156/75 98.3 °F (36.8 °C) 72 16 99 %   12/12/21 0539 -- -- 87 -- --   12/12/21 0228 (!) 165/78 98.6 °F (37 °C) 89 16 98 %   12/11/21 2000 (!) 201/84 99.1 °F (37.3 °C) 88 16 99 %   12/11/21 1548 (!) 159/70 98.1 °F (36.7 °C) 92 16 98 %       Review of Systems:    Constitutional No fevers, chills, night sweats, excessive fatigue or weight loss. Allergic/Immunologic No recent allergic reactions   Eyes No significant visual difficulties. No diplopia. ENMT No problems with hearing, no sore throat, no sinus drainage. Endocrine No hot flashes or night sweats. No cold intolerance, polyuria, or polydipsia   Hematologic/Lymphatic No easy bruising or bleeding. The patient denies any tender or palpable lymph nodes   Breasts No abnormal masses of breast, nipple discharge or pain. Respiratory No dyspnea on exertion, orthopnea, chest pain, cough or hemoptysis. Cardiovascular No anginal chest pain, irregular heart beat, tachycardia, palpitations or orthopnea. Gastrointestinal No nausea, vomiting, diarrhea, constipation, cramping, dysphagia, reflux, heartburn, GI bleeding, or early satiety. No change in bowel habits. Genitourinary (M) No hematuria, dysuria, increased frequency, urgency, hesitancy or incontinence. Musculoskeletal No joint pain, swelling or redness. No decreased range of motion. Integumentary No chronic rashes, inflammation, ulcerations, pruritus, petechiae, purpura, ecchymoses, or skin changes. Neurologic No headache, blurred vision, and no areas of focal weakness or numbness. Normal gait. No sensory problems. Psychiatric No insomnia, depression, delia or mood swings.   No psychotropic drugs Physical Examination:  Constitutional Sedation to maintain respiratory support   Head Normocephalic; no scars   Eyes Conjunctivae and sclerae are clear and without icterus. Pupils are reactive and equal.   ENMT Sinuses are nontender. No oral exudates, ulcers, masses, thrush or mucositis. Oropharynx clear. Tongue normal.   Neck Supple without masses or thyromegaly. No jugular venous distension. Hematologic/Lymphatic No petechiae or purpura. No tender or palpable lymph nodes in the cervical, supraclavicular, axillary or inguinal area. Respiratory Lungs are clear to auscultation without rhonchi or wheezing. Cardiovascular Regular rate and rhythm of heart without murmurs, gallops or rubs. Chest / Line Site Chest is symmetric with no chest wall deformities. Abdomen Non-tender, non-distended, no masses, ascites or hepatosplenomegaly. Good bowel sounds. No guarding or rebound tenderness. No pulsatile masses. Musculoskeletal No tenderness or swelling, normal range of motion without obvious weakness. Extremities No visible deformities, no cyanosis, clubbing or edema. Skin No rashes, scars, or lesions suggestive of malignancy. No petechiae, purpura, or ecchymoses. No excoriations. Neurologic No sensory or motor deficits, normal cerebellar function, normal gait, cranial nerves intact. Psychiatric Alert and oriented times three. Coherent speech. Verbalizes understanding of our discussions today.        Labs:  Recent Results (from the past 24 hour(s))   CBC WITH AUTOMATED DIFF    Collection Time: 12/11/21 12:57 PM   Result Value Ref Range    WBC 11.2 (H) 3.6 - 11.0 K/uL    RBC 3.92 3.80 - 5.20 M/uL    HGB 12.0 11.5 - 16.0 g/dL    HCT 37.6 35.0 - 47.0 %    MCV 95.9 80.0 - 99.0 FL    MCH 30.6 26.0 - 34.0 PG    MCHC 31.9 30.0 - 36.5 g/dL    RDW 13.7 11.5 - 14.5 %    PLATELET 156 608 - 529 K/uL    MPV 10.7 8.9 - 12.9 FL    NRBC 0.0 0.0  WBC    ABSOLUTE NRBC 0.00 0.00 - 0.01 K/uL    NEUTROPHILS 90 (H) 32 - 75 %    LYMPHOCYTES 6 (L) 12 - 49 %    MONOCYTES 4 (L) 5 - 13 %    EOSINOPHILS 0 0 - 7 %    BASOPHILS 0 0 - 1 %    IMMATURE GRANULOCYTES 0 0 - 0.5 %    ABS. NEUTROPHILS 10.0 (H) 1.8 - 8.0 K/UL    ABS. LYMPHOCYTES 0.6 (L) 0.8 - 3.5 K/UL    ABS. MONOCYTES 0.4 0.0 - 1.0 K/UL    ABS. EOSINOPHILS 0.0 0.0 - 0.4 K/UL    ABS. BASOPHILS 0.1 0.0 - 0.1 K/UL    ABS. IMM. GRANS. 0.0 0.00 - 0.04 K/UL    DF AUTOMATED     LIPASE    Collection Time: 12/11/21 12:57 PM   Result Value Ref Range    Lipase >3,000 (H) 73 - 858 U/L   METABOLIC PANEL, COMPREHENSIVE    Collection Time: 12/11/21 12:57 PM   Result Value Ref Range    Sodium 141 136 - 145 mmol/L    Potassium 3.8 3.5 - 5.1 mmol/L    Chloride 109 (H) 97 - 108 mmol/L    CO2 26 21 - 32 mmol/L    Anion gap 6 5 - 15 mmol/L    Glucose 87 65 - 100 mg/dL    BUN 22 (H) 6 - 20 mg/dL    Creatinine 0.80 0.55 - 1.02 mg/dL    BUN/Creatinine ratio 28 (H) 12 - 20      GFR est AA >60 >60 ml/min/1.73m2    GFR est non-AA >60 >60 ml/min/1.73m2    Calcium 9.1 8.5 - 10.1 mg/dL    Bilirubin, total 1.1 (H) 0.2 - 1.0 mg/dL    AST (SGOT) 22 15 - 37 U/L    ALT (SGPT) 30 12 - 78 U/L    Alk. phosphatase 218 (H) 45 - 117 U/L    Protein, total 5.7 (L) 6.4 - 8.2 g/dL    Albumin 2.5 (L) 3.5 - 5.0 g/dL    Globulin 3.2 2.0 - 4.0 g/dL    A-G Ratio 0.8 (L) 1.1 - 2.2         Imaging:      Assessment and Plan:   1. Pancreatic mass  -likely malignant; CA 19-9 >4000  -s/p FNA via EUS which was suspicious for neoplasia but not diagnostic  -once stable then will need OP followup with advanced oncology surgeon like Dr Mami Hernández ([facility]) or Elsa Dill ([facility] [place] for resectability considerations; this can be arranged as OP     2. Prior history of RCC; has slow growing pulmonary nodules which may limit planning for resection as these could represent stage IV RCC; however have been stable on recent imaging        3. Afib on eliquis     4. HTN     5. CKD III     6. Pancreatitis  -resolving     OK to release once medically stable; followup with Dr Marnell Aase per routine  Will sign off

## 2021-12-12 NOTE — PROGRESS NOTES
Hospitalist Progress Note    Subjective:   Daily Progress Note: 12/12/2021 9:45 AM    Patient has been having hematemesis overnight. Patient is having some reproducible nonradiating chest pain that is centralized in nature. Patient denies palpitations, shortness of breath, lightheadedness, headache, melena, or bowel movement. Patient informed that has a history of ulcers and has needed blood transfusions and NG tube in the past. Patient's last bowel movement was on Sunday.     Current Facility-Administered Medications   Medication Dose Route Frequency    hydrALAZINE (APRESOLINE) 20 mg/mL injection 20 mg  20 mg IntraVENous Q6H PRN    pantoprazole (PROTONIX) 43.12 mg in 0.9% sodium chloride 10.78 mL IV syringe  0.5 mg/kg IntraVENous Q24H    guaiFENesin ER (MUCINEX) tablet 600 mg  600 mg Oral Q12H    albuterol-ipratropium (DUO-NEB) 2.5 MG-0.5 MG/3 ML  3 mL Nebulization Q6H PRN    chlorthalidone (HYGROTON) tablet 25 mg  25 mg Oral DAILY    0.9% sodium chloride infusion  100 mL/hr IntraVENous CONTINUOUS    albuterol (PROVENTIL HFA, VENTOLIN HFA, PROAIR HFA) inhaler 2 Puff  2 Puff Inhalation Q6H PRN    ascorbic acid (vitamin C) (VITAMIN C) tablet 500 mg  500 mg Oral DAILY    atorvastatin (LIPITOR) tablet 40 mg  40 mg Oral DAILY    budesonide-formoterol (SYMBICORT) 80-4.5 mcg inhaler  2 Puff Inhalation BID RT    cholecalciferol (VITAMIN D3) (1000 Units /25 mcg) tablet 1,000 Units  1,000 Units Oral DAILY    diazePAM (VALIUM) tablet 5 mg  5 mg Oral Q6H PRN    dilTIAZem ER (CARDIZEM CD) capsule 120 mg  120 mg Oral DAILY    [Held by provider] apixaban (ELIQUIS) tablet 5 mg  5 mg Oral BID    [Held by provider] pantoprazole (PROTONIX) tablet 40 mg  40 mg Oral ACB    potassium chloride SR (KLOR-CON 10) tablet 20 mEq  20 mEq Oral DAILY    sertraline (ZOLOFT) tablet 25 mg  25 mg Oral DAILY    traZODone (DESYREL) tablet 50 mg  50 mg Oral QHS    HYDROmorphone (DILAUDID) injection 1 mg  1 mg IntraVENous Q4H PRN    ondansetron (ZOFRAN) injection 4 mg  4 mg IntraVENous Q6H PRN    acetaminophen (TYLENOL) tablet 650 mg  650 mg Oral Q4H PRN    0.9% sodium chloride infusion  125 mL/hr IntraVENous CONTINUOUS    prochlorperazine (COMPAZINE) with saline injection 10 mg  10 mg IntraVENous Q6H PRN        Review of Systems  Review of Systems   Constitutional: Negative. Respiratory: Positive for cough. Negative for shortness of breath. (+) congestion   Cardiovascular: Negative for palpitations and leg swelling.        (+) reproducible chest pain   Gastrointestinal: Positive for abdominal pain. Negative for diarrhea, nausea and vomiting.        + hemataemesis   Genitourinary: Negative. Neurological: Negative for dizziness and headaches. Psychiatric/Behavioral: The patient is nervous/anxious. All other systems reviewed and are negative. Objective:     Visit Vitals  BP (!) 156/75 (BP 1 Location: Right arm, BP Patient Position: At rest)   Pulse 72   Temp 98.3 °F (36.8 °C)   Resp 16   Ht 5' 7\" (1.702 m)   Wt 86.2 kg (190 lb)   SpO2 99%   BMI 29.76 kg/m²    O2 Flow Rate (L/min): 3 l/min O2 Device: Nasal cannula    Temp (24hrs), Av.5 °F (36.9 °C), Min:98.1 °F (36.7 °C), Max:99.1 °F (37.3 °C)      No intake/output data recorded. 12/10 1901 -  0700  In: 1600 [P.O.:100;  I.V.:1500]  Out: -     Recent Results (from the past 24 hour(s))   CBC WITH AUTOMATED DIFF    Collection Time: 21 12:57 PM   Result Value Ref Range    WBC 11.2 (H) 3.6 - 11.0 K/uL    RBC 3.92 3.80 - 5.20 M/uL    HGB 12.0 11.5 - 16.0 g/dL    HCT 37.6 35.0 - 47.0 %    MCV 95.9 80.0 - 99.0 FL    MCH 30.6 26.0 - 34.0 PG    MCHC 31.9 30.0 - 36.5 g/dL    RDW 13.7 11.5 - 14.5 %    PLATELET 920 711 - 210 K/uL    MPV 10.7 8.9 - 12.9 FL    NRBC 0.0 0.0  WBC    ABSOLUTE NRBC 0.00 0.00 - 0.01 K/uL    NEUTROPHILS 90 (H) 32 - 75 %    LYMPHOCYTES 6 (L) 12 - 49 %    MONOCYTES 4 (L) 5 - 13 %    EOSINOPHILS 0 0 - 7 %    BASOPHILS 0 0 - 1 % IMMATURE GRANULOCYTES 0 0 - 0.5 %    ABS. NEUTROPHILS 10.0 (H) 1.8 - 8.0 K/UL    ABS. LYMPHOCYTES 0.6 (L) 0.8 - 3.5 K/UL    ABS. MONOCYTES 0.4 0.0 - 1.0 K/UL    ABS. EOSINOPHILS 0.0 0.0 - 0.4 K/UL    ABS. BASOPHILS 0.1 0.0 - 0.1 K/UL    ABS. IMM. GRANS. 0.0 0.00 - 0.04 K/UL    DF AUTOMATED     LIPASE    Collection Time: 12/11/21 12:57 PM   Result Value Ref Range    Lipase >3,000 (H) 73 - 314 U/L   METABOLIC PANEL, COMPREHENSIVE    Collection Time: 12/11/21 12:57 PM   Result Value Ref Range    Sodium 141 136 - 145 mmol/L    Potassium 3.8 3.5 - 5.1 mmol/L    Chloride 109 (H) 97 - 108 mmol/L    CO2 26 21 - 32 mmol/L    Anion gap 6 5 - 15 mmol/L    Glucose 87 65 - 100 mg/dL    BUN 22 (H) 6 - 20 mg/dL    Creatinine 0.80 0.55 - 1.02 mg/dL    BUN/Creatinine ratio 28 (H) 12 - 20      GFR est AA >60 >60 ml/min/1.73m2    GFR est non-AA >60 >60 ml/min/1.73m2    Calcium 9.1 8.5 - 10.1 mg/dL    Bilirubin, total 1.1 (H) 0.2 - 1.0 mg/dL    AST (SGOT) 22 15 - 37 U/L    ALT (SGPT) 30 12 - 78 U/L    Alk. phosphatase 218 (H) 45 - 117 U/L    Protein, total 5.7 (L) 6.4 - 8.2 g/dL    Albumin 2.5 (L) 3.5 - 5.0 g/dL    Globulin 3.2 2.0 - 4.0 g/dL    A-G Ratio 0.8 (L) 1.1 - 2.2          CTA ABDOMEN PELV W CONT   Final Result   1. Ill-defined hypodense mass in the pancreas. There are inflammatory changes   and fluid adjacent to the pancreas or duodenum and stomach most likely related   to biopsy or focal pancreatitis. No pseudocyst formation, active bleeding or   other acute findings. 2. Enlarged adrenal glands left greater than right with noncontrast densities   consistent with adrenal adenomas. 3. Right nephrectomy. 4. Other findings as described. XR CHEST PORT   Final Result   No acute findings. US ABD LTD    (Results Pending)        PHYSICAL EXAM:    Physical Exam  Vitals and nursing note reviewed. Constitutional:       Appearance: She is obese. She is ill-appearing. HENT:      Head: Normocephalic and atraumatic. Cardiovascular:      Rate and Rhythm: Normal rate and regular rhythm. Pulmonary:      Effort: No respiratory distress. Breath sounds: No wheezing. Comments: Diminished breath sounds throughout  Abdominal:      General: Bowel sounds are normal. There is no distension. Palpations: Abdomen is soft. Tenderness: There is abdominal tenderness. Comments: Tenderness to palpation of left lower quadrant   Skin:     Capillary Refill: Capillary refill takes less than 2 seconds. Coloration: Skin is not jaundiced. Neurological:      Mental Status: She is alert and oriented to person, place, and time. Data Review    Recent Results (from the past 24 hour(s))   CBC WITH AUTOMATED DIFF    Collection Time: 12/11/21 12:57 PM   Result Value Ref Range    WBC 11.2 (H) 3.6 - 11.0 K/uL    RBC 3.92 3.80 - 5.20 M/uL    HGB 12.0 11.5 - 16.0 g/dL    HCT 37.6 35.0 - 47.0 %    MCV 95.9 80.0 - 99.0 FL    MCH 30.6 26.0 - 34.0 PG    MCHC 31.9 30.0 - 36.5 g/dL    RDW 13.7 11.5 - 14.5 %    PLATELET 534 914 - 549 K/uL    MPV 10.7 8.9 - 12.9 FL    NRBC 0.0 0.0  WBC    ABSOLUTE NRBC 0.00 0.00 - 0.01 K/uL    NEUTROPHILS 90 (H) 32 - 75 %    LYMPHOCYTES 6 (L) 12 - 49 %    MONOCYTES 4 (L) 5 - 13 %    EOSINOPHILS 0 0 - 7 %    BASOPHILS 0 0 - 1 %    IMMATURE GRANULOCYTES 0 0 - 0.5 %    ABS. NEUTROPHILS 10.0 (H) 1.8 - 8.0 K/UL    ABS. LYMPHOCYTES 0.6 (L) 0.8 - 3.5 K/UL    ABS. MONOCYTES 0.4 0.0 - 1.0 K/UL    ABS. EOSINOPHILS 0.0 0.0 - 0.4 K/UL    ABS. BASOPHILS 0.1 0.0 - 0.1 K/UL    ABS. IMM.  GRANS. 0.0 0.00 - 0.04 K/UL    DF AUTOMATED     LIPASE    Collection Time: 12/11/21 12:57 PM   Result Value Ref Range    Lipase >3,000 (H) 73 - 601 U/L   METABOLIC PANEL, COMPREHENSIVE    Collection Time: 12/11/21 12:57 PM   Result Value Ref Range    Sodium 141 136 - 145 mmol/L    Potassium 3.8 3.5 - 5.1 mmol/L    Chloride 109 (H) 97 - 108 mmol/L    CO2 26 21 - 32 mmol/L    Anion gap 6 5 - 15 mmol/L    Glucose 87 65 - 100 mg/dL    BUN 22 (H) 6 - 20 mg/dL    Creatinine 0.80 0.55 - 1.02 mg/dL    BUN/Creatinine ratio 28 (H) 12 - 20      GFR est AA >60 >60 ml/min/1.73m2    GFR est non-AA >60 >60 ml/min/1.73m2    Calcium 9.1 8.5 - 10.1 mg/dL    Bilirubin, total 1.1 (H) 0.2 - 1.0 mg/dL    AST (SGOT) 22 15 - 37 U/L    ALT (SGPT) 30 12 - 78 U/L    Alk. phosphatase 218 (H) 45 - 117 U/L    Protein, total 5.7 (L) 6.4 - 8.2 g/dL    Albumin 2.5 (L) 3.5 - 5.0 g/dL    Globulin 3.2 2.0 - 4.0 g/dL    A-G Ratio 0.8 (L) 1.1 - 2.2          Assessment/Plan:     Active Problems:    A-fib ([facility] [address] (11/16/2020)      CHF (congestive heart failure) ([facility] [address] (11/16/2020)      Pancreatitis (12/9/2021)      Hematemesis (12/12/2021)      History of peptic ulcer disease (12/12/2021)        Hospital Course:    Nikki Ley is a 78 y.o. female with a PMH of  atrial fibrillation, PUD, CHF, COPD, renal cell carcinoma stage IV, s/p nephrectomy, GERD, COPD, sjoren's syndrome, hypertension, and depression who came to the hospital with complaints of abdominal pain, associated nausea/vomiting, poor PO intake. Pt reported having a EUS on 12/6 as outpatient by Dr. Ricky Kelley, biopsy was taken, rare cells present suspicious for malignancy. Initial lab work significant for elevated lipase at 3,000, WBC, urine culture pending. CT abdomen/pelvis showing ill defined hypodense mass in the pancreas with fluid adjacent to the pancreas suggestive of focal pancreatitis. Adrenal adenomas are seen. No pseudocyst formation or active bleeding seen. Pt will be admitted for further evaluation and treatment. GI and oncology consulted. Patient started on fluids, NPO, pain and nausea medication PRN. Advance diet as tolerated. Hold Eliquis due to hematemesis will perform complete ultrasound patient is refusing CT of abdomen pelvis. Patient having reproducible chest pain will obtain EKG and troponin. Will start IV Protonix will perform EKG.     Hematemesis  History of PUD  Will hold eliquis  Will perform US complete due to patient refusing of CT abd/pelvis  IV protonix    Reproducible chest pain  does not appear to be cardiac related most likely from increased vomiting overnight  We will perform EKG   Will obtain troponin if elevated will do consult to Dr. Bijan Barahona patient's cardiologist    Pancreatitis, secondary to EUS procedure  Lipase increased yesterday will de-escalate diet back to n.p.o. with ice chips  Continue aggressive hydration   PRN pain and N/V medications  GI, surgery, and oncology following     Renal cell carcinoma stage IV  Pancreatic biopsy showing malignant cells  Followed by Dr. Blanca Kim    Atrial fibrillation  On cardizem   Hold eliquis    CHF  LV EF 74% 11/2020  Previously on bumex  On atorvastatin and chlorthalidone      COPD  Symbicort   PRN albuterol, duonebs, mucinex     Depression  on Zoloft    Hyperlipidemia  On atorvastatin    DVT Prophylaxis: held eliquis due to hemaetemesis  GI Prophylaxis: IV protoninx  POA abdias Leung   Discharge and disposition barriers: symptom resolution    Care Plan discussed with: Patient/Family, Nurse and     Total time spent with patient: 35 minutes.

## 2021-12-12 NOTE — ANESTHESIA POSTPROCEDURE EVALUATION
Procedure(s):  ENDOSCOPIC RETROGRADE CHOLANGIOPANCREATOGRAPHY (ERCP)  ENDOSCOPIC SPHINCTEROTOMY  ENDOSCOPIC BRUSHING  BILIARY STENT PLACEMENT.     general    Anesthesia Post Evaluation      Multimodal analgesia: multimodal analgesia used between 6 hours prior to anesthesia start to PACU discharge  Patient location during evaluation: PACU  Patient participation: complete - patient participated  Level of consciousness: awake  Pain score: 0  Pain management: adequate  Airway patency: patent  Anesthetic complications: no  Cardiovascular status: acceptable  Respiratory status: acceptable  Hydration status: acceptable  Post anesthesia nausea and vomiting:  controlled  Final Post Anesthesia Temperature Assessment:  Normothermia (36.0-37.5 degrees C)

## 2021-12-13 NOTE — PROGRESS NOTES
Progress Note    Patient: Kris Woody MRN: [id number]  SSN: [id number]    YOB: 1945  Age: 78 y.o. Sex: female      Admit Date: 12/9/2021    LOS: 3 days     Subjective:     Patient seen and examined, increase in pain overnight with increase in nausea and vomiting. Made NPO. Lipase trended back up. Objective:     Vitals:    12/12/21 0539 12/12/21 0759 12/12/21 0854 12/12/21 1529   BP:  (!) 156/75  (!) 169/77   Pulse: 87 72  (!) 101   Resp:  16  16   Temp:  98.3 °F (36.8 °C)  99.4 °F (37.4 °C)   SpO2:  99% 100% 97%   Weight:       Height:            Intake and Output:  Current Shift: No intake/output data recorded. Last three shifts: 12/11 0701 - 12/12 1900  In: 1600 [P.O.:100; I.V.:1500]  Out: -     Physical Exam:   General: alert, oriented, in no acute distress  Cardiovascular: regular rate and rhythm  Pulmonary: unlabored breathing, equal chest rise bilaterally, on supplemental O2  Abdomen: soft, TTP epigastrum and lower abdomen  Extremities: no swelling, pulses equal and palpable in all extremities    Lab/Data Review:  Recent Results (from the past 24 hour(s))   CBC WITH AUTOMATED DIFF    Collection Time: 12/12/21  1:59 PM   Result Value Ref Range    WBC 16.9 (H) 3.6 - 11.0 K/uL    RBC 4.06 3.80 - 5.20 M/uL    HGB 12.6 11.5 - 16.0 g/dL    HCT 38.5 35.0 - 47.0 %    MCV 94.8 80.0 - 99.0 FL    MCH 31.0 26.0 - 34.0 PG    MCHC 32.7 30.0 - 36.5 g/dL    RDW 13.6 11.5 - 14.5 %    PLATELET 351 797 - 391 K/uL    MPV 10.6 8.9 - 12.9 FL    NRBC 0.0 0.0  WBC    ABSOLUTE NRBC 0.00 0.00 - 0.01 K/uL    NEUTROPHILS 91 (H) 32 - 75 %    LYMPHOCYTES 3 (L) 12 - 49 %    MONOCYTES 5 5 - 13 %    EOSINOPHILS 0 0 - 7 %    BASOPHILS 0 0 - 1 %    IMMATURE GRANULOCYTES 1 (H) 0 - 0.5 %    ABS. NEUTROPHILS 15.5 (H) 1.8 - 8.0 K/UL    ABS. LYMPHOCYTES 0.4 (L) 0.8 - 3.5 K/UL    ABS. MONOCYTES 0.8 0.0 - 1.0 K/UL    ABS. EOSINOPHILS 0.0 0.0 - 0.4 K/UL    ABS. BASOPHILS 0.0 0.0 - 0.1 K/UL    ABS. IMM.  GRANS. 0.1 (H) 0.00 - 0.04 K/UL    DF AUTOMATED     METABOLIC PANEL, COMPREHENSIVE    Collection Time: 12/12/21  1:59 PM   Result Value Ref Range    Sodium 140 136 - 145 mmol/L    Potassium 3.3 (L) 3.5 - 5.1 mmol/L    Chloride 106 97 - 108 mmol/L    CO2 29 21 - 32 mmol/L    Anion gap 5 5 - 15 mmol/L    Glucose 126 (H) 65 - 100 mg/dL    BUN 19 6 - 20 mg/dL    Creatinine 0.78 0.55 - 1.02 mg/dL    BUN/Creatinine ratio 24 (H) 12 - 20      GFR est AA >60 >60 ml/min/1.73m2    GFR est non-AA >60 >60 ml/min/1.73m2    Calcium 8.6 8.5 - 10.1 mg/dL    Bilirubin, total 0.9 0.2 - 1.0 mg/dL    AST (SGOT) 19 15 - 37 U/L    ALT (SGPT) 29 12 - 78 U/L    Alk. phosphatase 191 (H) 45 - 117 U/L    Protein, total 5.4 (L) 6.4 - 8.2 g/dL    Albumin 2.4 (L) 3.5 - 5.0 g/dL    Globulin 3.0 2.0 - 4.0 g/dL    A-G Ratio 0.8 (L) 1.1 - 2.2     LIPASE    Collection Time: 12/12/21  1:59 PM   Result Value Ref Range    Lipase >3,000 (H) 73 - 393 U/L   TROPONIN-HIGH SENSITIVITY    Collection Time: 12/12/21  2:36 PM   Result Value Ref Range    Troponin-High Sensitivity 17 0 - 51 ng/L          Assessment:     Active Problems:    A-fib ([facility] [address] (11/16/2020)      CHF (congestive heart failure) ([facility] [address] (11/16/2020)      Pancreatitis (12/9/2021)      Hematemesis (12/12/2021)      History of peptic ulcer disease (12/12/2021)        Plan:     Diet: NPO, ice chips only  Continue conservative management of pancreatitis, discussed with patient that repeat imaging may be needed if she continues to clinically worsen, patient states she understands.   DVT/GI prophylaxis  Ambulate  Pain and nausea control  Heme/onc evaluation noted  Patient will need to have follow up with surgical oncology as an outpatient    Signed By: Saleem Oshea,      December 12, 2021

## 2021-12-13 NOTE — PROGRESS NOTES
Progress Note    Patient: Rita Guerrero MRN: [id number]  SSN: [id number]    YOB: 1945  Age: 78 y.o. Sex: female      Admit Date: 12/9/2021    LOS: 4 days     Subjective:   GI in consultation for Pancreatitis    Patient continues with abdominal pain and tenderness. Her lipase remains elevated at >3,000 despite NPO and IV hydration. She denies any further hematemesis today. Her eliquis is on hold. She does report nausea. Abdominal ultrasound: Small amount of free fluid. Finding suggesting hemangioma in the  liver. Gallbladder wall thickening, adenomyomatosis, sludge and gallstones. Cholecystitis possible. Finding in the region of the pancreatic head as above. Abdominal Ultrasound 12/13/2021: IMPRESSION  Small amount of free fluid. Finding suggesting hemangioma in the  liver. Gallbladder wall thickening, adenomyomatosis, sludge and gallstones. Cholecystitis possible. Finding in the region of the pancreatic head as above. Other findings as above. 12/9 GI consult note:  History of Present Illness: Nevin Cole is a 78 y. o. female who is seen in consultation for pancreatitis. Melida Allen has a past medical history of  Paroxysmal Atrial Fibrillation, CHF, COPD, renal cell carcinoma Stage IV s/p nephrectomy, GERD sjoren's syndrome, hypertension, and depression. Patient under went EUS on 12/6/2021 showing 2.7 X3 cm lesion in the area of head of pancreas with dilation of the Pancreatic duct abutting the portal vein but not involving the SMA or AMV ; Tumor T2 by endosonographic criteria ; one small lymph node in the area of head of pancreas. Pathology shows rare cells present suspicious for malignancy.   She had an ERCP on 11/4 2021 ERCP; with sphincterotomy/papillotomy ERCP; with placement of endoscopic stent into biliary or pancreatic duct, including pre and postdilation and guide wire passage, when performed, including sphincterotomy, when performed, each stent. . Patient had a PET scan on 11/22/21 which shows a lesion in the pancreatic head consistent with malignancy. CTA of abdomen shows ill defined hypodense mass in the pancreas. There are inflammatory changes. Patient states she experienced nausea, vomiting and diarrhea since Monday. Her abdominal pain has progressively gotten worse. She reports a poor appetite. She does complain of increased \"burping\". Total bilirubin 1.4, AsT 36, ALT 31, Alk Phos. 217, Lipase > 3,000. Plan nothing by mouth except ice chips and sips of water with medication. IV hydration. Pain management     PET Scan 11/22/2021: IMPRESSION  1.  FDG avid lesion in the pancreatic head consistent with malignancy. 2.  Subcentimeter focus of FDG uptake in the liver, indeterminate. 3.  FDG uptake associated with density expanding the right facet at C5-C6,  possibly due to an ectatic artery. EUS on 12/6/2021 showing 2.7 X3 cm lesion in the area of head of pancreas with dilation of the Pancreatic duct abutting the portal vein but not involving the SMA or AMV ; Tumor T2 by endosonographic criteria ; one small lymph node in the area of head of pancreas. CTA abdomen 12/9/2021: IMPRESSION  1. Ill-defined hypodense mass in the pancreas. There are inflammatory changes and fluid adjacent to the pancreas or duodenum and stomach most likely related to biopsy or focal pancreatitis. No pseudocyst formation, active bleeding or other acute findings. 2. Enlarged adrenal glands left greater than right with noncontrast densities  consistent with adrenal adenomas. 3. Right nephrectomy. 4. Other findings as described. Objective:     Vitals:    12/13/21 0410 12/13/21 0426 12/13/21 0722 12/13/21 1549   BP: (!) 142/70  137/62 127/65   Pulse: 100 60 85 100   Resp: 16  16 16   Temp: 98.6 °F (37 °C)  99 °F (37.2 °C) 100.1 °F (37.8 °C)   SpO2: 97%  97% 97%   Weight:       Height:            Intake and Output:  Current Shift: No intake/output data recorded.   Last three shifts: 12/11 1901 - 12/13 0700  In: 2550 [P.O.:300; I.V.:2250]  Out: -     Physical Exam:   Skin:  Extremities and face reveal no rashes. No chapin erythema. HEENT: Sclerae anicteric. Extra-occular muscles are intact. No abnormal pigmentation of the lips. The neck is supple. Cardiovascular: Regular rate and rhythm. Respiratory:  Comfortable breathing with no accessory muscle use. GI:  Abdomen nondistended, soft, and nontender. No enlargement of the liver or spleen. No masses palpable. Rectal:  Deferred  Musculoskeletal: Extremities have good range of motion. Neurological:  Gross memory appears intact. Patient is alert and oriented. Psychiatric:  Mood appears appropriate with judgement intact. Lymphatic:  No visible adenopathy      Lab/Data Review:  Recent Results (from the past 24 hour(s))   CBC WITH AUTOMATED DIFF    Collection Time: 12/13/21 12:29 PM   Result Value Ref Range    WBC 14.3 (H) 3.6 - 11.0 K/uL    RBC 4.11 3.80 - 5.20 M/uL    HGB 12.6 11.5 - 16.0 g/dL    HCT 38.9 35.0 - 47.0 %    MCV 94.6 80.0 - 99.0 FL    MCH 30.7 26.0 - 34.0 PG    MCHC 32.4 30.0 - 36.5 g/dL    RDW 13.5 11.5 - 14.5 %    PLATELET 074 285 - 818 K/uL    MPV 10.6 8.9 - 12.9 FL    NRBC 0.0 0.0  WBC    ABSOLUTE NRBC 0.00 0.00 - 0.01 K/uL    NEUTROPHILS 90 (H) 32 - 75 %    LYMPHOCYTES 5 (L) 12 - 49 %    MONOCYTES 5 5 - 13 %    EOSINOPHILS 0 0 - 7 %    BASOPHILS 0 0 - 1 %    IMMATURE GRANULOCYTES 0 0 - 0.5 %    ABS. NEUTROPHILS 12.7 (H) 1.8 - 8.0 K/UL    ABS. LYMPHOCYTES 0.7 (L) 0.8 - 3.5 K/UL    ABS. MONOCYTES 0.8 0.0 - 1.0 K/UL    ABS. EOSINOPHILS 0.0 0.0 - 0.4 K/UL    ABS. BASOPHILS 0.0 0.0 - 0.1 K/UL    ABS. IMM.  GRANS. 0.1 (H) 0.00 - 0.04 K/UL    DF AUTOMATED     LIPASE    Collection Time: 12/13/21 12:29 PM   Result Value Ref Range    Lipase >3,000 (H) 73 - 429 U/L   METABOLIC PANEL, COMPREHENSIVE    Collection Time: 12/13/21 12:29 PM   Result Value Ref Range    Sodium 143 136 - 145 mmol/L    Potassium 3.4 (L) 3.5 - 5.1 mmol/L    Chloride 108 97 - 108 mmol/L    CO2 28 21 - 32 mmol/L    Anion gap 7 5 - 15 mmol/L    Glucose 78 65 - 100 mg/dL    BUN 19 6 - 20 mg/dL    Creatinine 0.68 0.55 - 1.02 mg/dL    BUN/Creatinine ratio 28 (H) 12 - 20      GFR est AA >60 >60 ml/min/1.73m2    GFR est non-AA >60 >60 ml/min/1.73m2    Calcium 9.1 8.5 - 10.1 mg/dL    Bilirubin, total 0.9 0.2 - 1.0 mg/dL    AST (SGOT) 13 (L) 15 - 37 U/L    ALT (SGPT) 22 12 - 78 U/L    Alk. phosphatase 193 (H) 45 - 117 U/L    Protein, total 5.0 (L) 6.4 - 8.2 g/dL    Albumin 1.9 (L) 3.5 - 5.0 g/dL    Globulin 3.1 2.0 - 4.0 g/dL    A-G Ratio 0.6 (L) 1.1 - 2.2     EKG, 12 LEAD, INITIAL    Collection Time: 12/13/21  3:03 PM   Result Value Ref Range    Ventricular Rate 95 BPM    Atrial Rate 250 BPM    QRS Duration 90 ms    Q-T Interval 342 ms    QTC Calculation (Bezet) 429 ms    Calculated R Axis 49 degrees    Calculated T Axis 21 degrees    Diagnosis       Atrial fibrillation  Abnormal ECG  When compared with ECG of 12-DEC-2021 08:29,  Atrial fibrillation has replaced Sinus rhythm  Confirmed by Natividad Sparks ([id number]) on 12/13/2021 3:25:17 PM                US ABD LTD   Final Result   Small amount of free fluid. Finding suggesting hemangioma in the   liver. Gallbladder wall thickening, adenomyomatosis, sludge and gallstones. Cholecystitis possible. Finding in the region of the pancreatic head as above. Other findings as above. CTA ABDOMEN PELV W CONT   Final Result   1. Ill-defined hypodense mass in the pancreas. There are inflammatory changes   and fluid adjacent to the pancreas or duodenum and stomach most likely related   to biopsy or focal pancreatitis. No pseudocyst formation, active bleeding or   other acute findings. 2. Enlarged adrenal glands left greater than right with noncontrast densities   consistent with adrenal adenomas. 3. Right nephrectomy. 4. Other findings as described. XR CHEST PORT   Final Result   No acute findings.           Assessment:     Active Problems:    A-fib ([facility] [address] (11/16/2020)      CHF (congestive heart failure) ([facility] [address] (11/16/2020)      Pancreatitis (12/9/2021)      Hematemesis (12/12/2021)      History of peptic ulcer disease (12/12/2021)        Plan:   1. Pancreatitis      -NPO      -IV hydration @ 125 ml/hr      -Lipase > 3,000on 12/13/21       - AM labs pending      -Lipase in the am      -Pain management  2. CHF      -Started on Chlorthalidone  3. COPD       -Continue home medications       -Albuterol as needed  4. Pancreatic Mass       > 4000      S/p fine needle aspiration suspicious for neoplasia but not diagnostic      When stable Oncology plan for out patient followup with advanced oncology surgeon  At [address] [place] [address] or [facility] for resection considerations  5. Hematemesis (resolved)     Monitor H&H     Transfuse as needed     Continue Protonix 40 mg daily    Thank you for allowing me to participate in this patients care. plan discussed with Dr. Armando Benjamin and he approves.     Signed By: Heriberto Gustafson NP     December 13, 2021

## 2021-12-13 NOTE — NURSE NAVIGATOR
Visited patient; introduced myself and my role. Provided emotional support and provided copy of patient's next appointment with medical oncology (12/15); encouraged her to change the appointment if she is not discharged prior to Wednesday. Patient denied any needs other than a rollater; advised she should request this from CM but that I would pass on the request. Advised CM Nidia Chandler of request. Will continue to follow as needed.

## 2021-12-13 NOTE — PROGRESS NOTES
Progress Note    Patient: Rita Guerrero MRN: [id number]  SSN: [id number]    YOB: 1945  Age: 78 y.o. Sex: female      Admit Date: 12/9/2021    LOS: 4 days     Subjective:     Patient seen and examined, pain remains stable but severe. Nausea/vomiting seem to have improved slightly. Patient requesting transfer to [address] [place] [address]. Objective:     Vitals:    12/12/21 1907 12/13/21 0410 12/13/21 0426 12/13/21 0722   BP: (!) 144/70 (!) 142/70  137/62   Pulse: 97 100 60 85   Resp: 16 16  16   Temp: 98.4 °F (36.9 °C) 98.6 °F (37 °C)  99 °F (37.2 °C)   SpO2: 97% 97%  97%   Weight:       Height:            Intake and Output:  Current Shift: No intake/output data recorded. Last three shifts: 12/11 1901 - 12/13 0700  In: 2550 [P.O.:300; I.V.:2250]  Out: -     Physical Exam:   General: alert, oriented, in no acute distress  Cardiovascular: regular rate and rhythm  Pulmonary: unlabored breathing, equal chest rise bilaterally, on supplemental O2  Abdomen: soft, severe TTP epigastrum and lower abdomen with rebound  Extremities: no swelling, pulses equal and palpable in all extremities    Lab/Data Review:  Recent Results (from the past 24 hour(s))   CBC WITH AUTOMATED DIFF    Collection Time: 12/12/21  1:59 PM   Result Value Ref Range    WBC 16.9 (H) 3.6 - 11.0 K/uL    RBC 4.06 3.80 - 5.20 M/uL    HGB 12.6 11.5 - 16.0 g/dL    HCT 38.5 35.0 - 47.0 %    MCV 94.8 80.0 - 99.0 FL    MCH 31.0 26.0 - 34.0 PG    MCHC 32.7 30.0 - 36.5 g/dL    RDW 13.6 11.5 - 14.5 %    PLATELET 845 371 - 666 K/uL    MPV 10.6 8.9 - 12.9 FL    NRBC 0.0 0.0  WBC    ABSOLUTE NRBC 0.00 0.00 - 0.01 K/uL    NEUTROPHILS 91 (H) 32 - 75 %    LYMPHOCYTES 3 (L) 12 - 49 %    MONOCYTES 5 5 - 13 %    EOSINOPHILS 0 0 - 7 %    BASOPHILS 0 0 - 1 %    IMMATURE GRANULOCYTES 1 (H) 0 - 0.5 %    ABS. NEUTROPHILS 15.5 (H) 1.8 - 8.0 K/UL    ABS. LYMPHOCYTES 0.4 (L) 0.8 - 3.5 K/UL    ABS. MONOCYTES 0.8 0.0 - 1.0 K/UL    ABS. EOSINOPHILS 0.0 0.0 - 0.4 K/UL    ABS. BASOPHILS 0.0 0.0 - 0.1 K/UL    ABS. IMM. GRANS. 0.1 (H) 0.00 - 0.04 K/UL    DF AUTOMATED     METABOLIC PANEL, COMPREHENSIVE    Collection Time: 12/12/21  1:59 PM   Result Value Ref Range    Sodium 140 136 - 145 mmol/L    Potassium 3.3 (L) 3.5 - 5.1 mmol/L    Chloride 106 97 - 108 mmol/L    CO2 29 21 - 32 mmol/L    Anion gap 5 5 - 15 mmol/L    Glucose 126 (H) 65 - 100 mg/dL    BUN 19 6 - 20 mg/dL    Creatinine 0.78 0.55 - 1.02 mg/dL    BUN/Creatinine ratio 24 (H) 12 - 20      GFR est AA >60 >60 ml/min/1.73m2    GFR est non-AA >60 >60 ml/min/1.73m2    Calcium 8.6 8.5 - 10.1 mg/dL    Bilirubin, total 0.9 0.2 - 1.0 mg/dL    AST (SGOT) 19 15 - 37 U/L    ALT (SGPT) 29 12 - 78 U/L    Alk. phosphatase 191 (H) 45 - 117 U/L    Protein, total 5.4 (L) 6.4 - 8.2 g/dL    Albumin 2.4 (L) 3.5 - 5.0 g/dL    Globulin 3.0 2.0 - 4.0 g/dL    A-G Ratio 0.8 (L) 1.1 - 2.2     LIPASE    Collection Time: 12/12/21  1:59 PM   Result Value Ref Range    Lipase >3,000 (H) 73 - 393 U/L   TROPONIN-HIGH SENSITIVITY    Collection Time: 12/12/21  2:36 PM   Result Value Ref Range    Troponin-High Sensitivity 17 0 - 51 ng/L   CBC WITH AUTOMATED DIFF    Collection Time: 12/13/21 12:29 PM   Result Value Ref Range    WBC 14.3 (H) 3.6 - 11.0 K/uL    RBC 4.11 3.80 - 5.20 M/uL    HGB 12.6 11.5 - 16.0 g/dL    HCT 38.9 35.0 - 47.0 %    MCV 94.6 80.0 - 99.0 FL    MCH 30.7 26.0 - 34.0 PG    MCHC 32.4 30.0 - 36.5 g/dL    RDW 13.5 11.5 - 14.5 %    PLATELET 144 580 - 376 K/uL    MPV 10.6 8.9 - 12.9 FL    NRBC 0.0 0.0  WBC    ABSOLUTE NRBC 0.00 0.00 - 0.01 K/uL    NEUTROPHILS 90 (H) 32 - 75 %    LYMPHOCYTES 5 (L) 12 - 49 %    MONOCYTES 5 5 - 13 %    EOSINOPHILS 0 0 - 7 %    BASOPHILS 0 0 - 1 %    IMMATURE GRANULOCYTES 0 0 - 0.5 %    ABS. NEUTROPHILS 12.7 (H) 1.8 - 8.0 K/UL    ABS. LYMPHOCYTES 0.7 (L) 0.8 - 3.5 K/UL    ABS. MONOCYTES 0.8 0.0 - 1.0 K/UL    ABS. EOSINOPHILS 0.0 0.0 - 0.4 K/UL    ABS. BASOPHILS 0.0 0.0 - 0.1 K/UL    ABS. IMM.  GRANS. 0.1 (H) 0.00 - 0.04 K/UL    DF AUTOMATED     LIPASE    Collection Time: 12/13/21 12:29 PM   Result Value Ref Range    Lipase >3,000 (H) 73 - 720 U/L   METABOLIC PANEL, COMPREHENSIVE    Collection Time: 12/13/21 12:29 PM   Result Value Ref Range    Sodium 143 136 - 145 mmol/L    Potassium 3.4 (L) 3.5 - 5.1 mmol/L    Chloride 108 97 - 108 mmol/L    CO2 28 21 - 32 mmol/L    Anion gap 7 5 - 15 mmol/L    Glucose 78 65 - 100 mg/dL    BUN 19 6 - 20 mg/dL    Creatinine 0.68 0.55 - 1.02 mg/dL    BUN/Creatinine ratio 28 (H) 12 - 20      GFR est AA >60 >60 ml/min/1.73m2    GFR est non-AA >60 >60 ml/min/1.73m2    Calcium 9.1 8.5 - 10.1 mg/dL    Bilirubin, total 0.9 0.2 - 1.0 mg/dL    AST (SGOT) 13 (L) 15 - 37 U/L    ALT (SGPT) 22 12 - 78 U/L    Alk. phosphatase 193 (H) 45 - 117 U/L    Protein, total 5.0 (L) 6.4 - 8.2 g/dL    Albumin 1.9 (L) 3.5 - 5.0 g/dL    Globulin 3.1 2.0 - 4.0 g/dL    A-G Ratio 0.6 (L) 1.1 - 2.2            Assessment:     Active Problems:    A-fib ([facility]) (11/16/2020)      CHF (congestive heart failure) ([facility] [address] (11/16/2020)      Pancreatitis (12/9/2021)      Hematemesis (12/12/2021)      History of peptic ulcer disease (12/12/2021)        Plan:     Diet: NPO, ice chips only   Continue conservative management of pancreatitis  US reviewed- possible acute cholecystitis in the setting of acute pancreatitis, cholecystitis is likely a sequelae of pancreatic inflammation, not an operative candidate for cholecystectomy at this point. Zosyn started.   DVT/GI prophylaxis  Ambulate  Pain and nausea control  Heme/onc evaluation noted  Patient will need to have follow up with surgical oncology    Signed By: Nichole Oshea,      December 13, 2021

## 2021-12-13 NOTE — PROGRESS NOTES
Hematology and Oncology Progress Note    Patient: Clare Corral MRN: [id number]  SSN: [id number]    YOB: 1945  Age: 78 y.o. Sex: female      Admit Date: 12/9/2021    LOS: 4 days     Chief Complaint: Patient is complaining of abdominal pain    Subjective: And has abdominal pain and distention. She is having both upper quadrant pain. She has nausea but no vomiting. She has some baseline dyspnea from COPD. She is only on ice chips. She is not passing gas today  Objective:     Vitals:    12/12/21 1907 12/13/21 0410 12/13/21 0426 12/13/21 0722   BP: (!) 144/70 (!) 142/70  137/62   Pulse: 97 100 60 85   Resp: 16 16  16   Temp: 98.4 °F (36.9 °C) 98.6 °F (37 °C)  99 °F (37.2 °C)   SpO2: 97% 97%  97%   Weight:       Height:              Physical Exam:   Constitutional: Elderly white female looks chronically ill. She is not in any acute distress. She is in mild to moderate pain. Eyes: Sclerae anicteric. Conjunctivae no pallor. ENMT: Oral mucosa is moist, no thrush, mucositis, or petechiae. Neck: No adenopathy. Respiratory: Not in distress. Cardiovascular: Normal sinus rhythm. Abdomen: Mild abdominal distention and upper abdominal tenderness present. Extremities: No edema. Skin: No petechiae; no skin rash. Neurologic: Alert/oriented x 3.     Lab/Data Review:    Recent Results (from the past 24 hour(s))   CBC WITH AUTOMATED DIFF    Collection Time: 12/12/21  1:59 PM   Result Value Ref Range    WBC 16.9 (H) 3.6 - 11.0 K/uL    RBC 4.06 3.80 - 5.20 M/uL    HGB 12.6 11.5 - 16.0 g/dL    HCT 38.5 35.0 - 47.0 %    MCV 94.8 80.0 - 99.0 FL    MCH 31.0 26.0 - 34.0 PG    MCHC 32.7 30.0 - 36.5 g/dL    RDW 13.6 11.5 - 14.5 %    PLATELET 064 577 - 407 K/uL    MPV 10.6 8.9 - 12.9 FL    NRBC 0.0 0.0  WBC    ABSOLUTE NRBC 0.00 0.00 - 0.01 K/uL    NEUTROPHILS 91 (H) 32 - 75 %    LYMPHOCYTES 3 (L) 12 - 49 %    MONOCYTES 5 5 - 13 %    EOSINOPHILS 0 0 - 7 %    BASOPHILS 0 0 - 1 %    IMMATURE GRANULOCYTES 1 (H) 0 - 0.5 %    ABS. NEUTROPHILS 15.5 (H) 1.8 - 8.0 K/UL    ABS. LYMPHOCYTES 0.4 (L) 0.8 - 3.5 K/UL    ABS. MONOCYTES 0.8 0.0 - 1.0 K/UL    ABS. EOSINOPHILS 0.0 0.0 - 0.4 K/UL    ABS. BASOPHILS 0.0 0.0 - 0.1 K/UL    ABS. IMM. GRANS. 0.1 (H) 0.00 - 0.04 K/UL    DF AUTOMATED     METABOLIC PANEL, COMPREHENSIVE    Collection Time: 12/12/21  1:59 PM   Result Value Ref Range    Sodium 140 136 - 145 mmol/L    Potassium 3.3 (L) 3.5 - 5.1 mmol/L    Chloride 106 97 - 108 mmol/L    CO2 29 21 - 32 mmol/L    Anion gap 5 5 - 15 mmol/L    Glucose 126 (H) 65 - 100 mg/dL    BUN 19 6 - 20 mg/dL    Creatinine 0.78 0.55 - 1.02 mg/dL    BUN/Creatinine ratio 24 (H) 12 - 20      GFR est AA >60 >60 ml/min/1.73m2    GFR est non-AA >60 >60 ml/min/1.73m2    Calcium 8.6 8.5 - 10.1 mg/dL    Bilirubin, total 0.9 0.2 - 1.0 mg/dL    AST (SGOT) 19 15 - 37 U/L    ALT (SGPT) 29 12 - 78 U/L    Alk. phosphatase 191 (H) 45 - 117 U/L    Protein, total 5.4 (L) 6.4 - 8.2 g/dL    Albumin 2.4 (L) 3.5 - 5.0 g/dL    Globulin 3.0 2.0 - 4.0 g/dL    A-G Ratio 0.8 (L) 1.1 - 2.2     LIPASE    Collection Time: 12/12/21  1:59 PM   Result Value Ref Range    Lipase >3,000 (H) 73 - 393 U/L   TROPONIN-HIGH SENSITIVITY    Collection Time: 12/12/21  2:36 PM   Result Value Ref Range    Troponin-High Sensitivity 17 0 - 51 ng/L           Assessment and plan:     1. Pancreatic mass  -likely malignant; CA 19-9 >4000  -s/p FNA via EUS which was suspicious for neoplasia but not diagnostic  -once stable then will need OP followup with advanced oncology surgeon like Dr Jo Ann Rivers ([facility]) for resectability considerations; this can be arranged as OP     2. Prior history of RCC; has slow growing pulmonary nodules which may limit planning for resection as these could represent stage IV RCC; however have been stable on recent imaging        3. Afib on eliquis     4. HTN     5. CKD III     6. Pancreatitis  -resolving     OK to release once medically stable; followup with  Cyndie Koyanagi per routine.  -I will sign off the case. This dictation was done by [employer], [employer] voice recognition software. Often unanticipated grammatical, syntax, phones and other interpretive errors are inadvertently transcribed. Please excuse errors that have escaped final proofreading.        Signed By: Tomas Rebollar MD     December 13, 2021

## 2021-12-13 NOTE — PROGRESS NOTES
Hospitalist Progress Note         CHANELLE Lerner, FNP-C    Daily Progress Note: 12/13/2021      Subjective:   Subjective   Patient examined alert and oriented lying in bed  Reports abdominal pain, n/v  No acute distress noted on examination    Review of Systems:   Review of Systems   Constitutional: Negative. HENT: Negative. Eyes: Negative. Respiratory: Negative. Cardiovascular: Negative. Gastrointestinal: Positive for abdominal pain, nausea and vomiting. Genitourinary: Negative. Musculoskeletal: Negative. Skin: Negative. Neurological: Negative. Endo/Heme/Allergies: Negative. Psychiatric/Behavioral: Negative. Objective:   Objective      Vitals:  Patient Vitals for the past 12 hrs:   Temp Pulse Resp BP SpO2   12/13/21 1549 100.1 °F (37.8 °C) 100 16 127/65 97 %   12/13/21 0722 99 °F (37.2 °C) 85 16 137/62 97 %        Physical Exam:  Physical Exam  Vitals and nursing note reviewed. HENT:      Mouth/Throat:      Mouth: Mucous membranes are moist.   Eyes:      Conjunctiva/sclera: Conjunctivae normal.   Cardiovascular:      Rate and Rhythm: Normal rate. Pulses: Normal pulses. Pulmonary:      Comments: 2L NC  Abdominal:      General: Bowel sounds are normal.      Tenderness: There is abdominal tenderness. Comments: Left quadrant tenderness   Musculoskeletal:         General: Normal range of motion. Skin:     General: Skin is warm and dry. Neurological:      Mental Status: She is alert and oriented to person, place, and time.    Psychiatric:         Mood and Affect: Mood normal.          Lab Results:  Recent Results (from the past 24 hour(s))   CBC WITH AUTOMATED DIFF    Collection Time: 12/13/21 12:29 PM   Result Value Ref Range    WBC 14.3 (H) 3.6 - 11.0 K/uL    RBC 4.11 3.80 - 5.20 M/uL    HGB 12.6 11.5 - 16.0 g/dL    HCT 38.9 35.0 - 47.0 %    MCV 94.6 80.0 - 99.0 FL    MCH 30.7 26.0 - 34.0 PG    MCHC 32.4 30.0 - 36.5 g/dL    RDW 13.5 11.5 - 14.5 % PLATELET 974 624 - 509 K/uL    MPV 10.6 8.9 - 12.9 FL    NRBC 0.0 0.0  WBC    ABSOLUTE NRBC 0.00 0.00 - 0.01 K/uL    NEUTROPHILS 90 (H) 32 - 75 %    LYMPHOCYTES 5 (L) 12 - 49 %    MONOCYTES 5 5 - 13 %    EOSINOPHILS 0 0 - 7 %    BASOPHILS 0 0 - 1 %    IMMATURE GRANULOCYTES 0 0 - 0.5 %    ABS. NEUTROPHILS 12.7 (H) 1.8 - 8.0 K/UL    ABS. LYMPHOCYTES 0.7 (L) 0.8 - 3.5 K/UL    ABS. MONOCYTES 0.8 0.0 - 1.0 K/UL    ABS. EOSINOPHILS 0.0 0.0 - 0.4 K/UL    ABS. BASOPHILS 0.0 0.0 - 0.1 K/UL    ABS. IMM. GRANS. 0.1 (H) 0.00 - 0.04 K/UL    DF AUTOMATED     LIPASE    Collection Time: 12/13/21 12:29 PM   Result Value Ref Range    Lipase >3,000 (H) 73 - 141 U/L   METABOLIC PANEL, COMPREHENSIVE    Collection Time: 12/13/21 12:29 PM   Result Value Ref Range    Sodium 143 136 - 145 mmol/L    Potassium 3.4 (L) 3.5 - 5.1 mmol/L    Chloride 108 97 - 108 mmol/L    CO2 28 21 - 32 mmol/L    Anion gap 7 5 - 15 mmol/L    Glucose 78 65 - 100 mg/dL    BUN 19 6 - 20 mg/dL    Creatinine 0.68 0.55 - 1.02 mg/dL    BUN/Creatinine ratio 28 (H) 12 - 20      GFR est AA >60 >60 ml/min/1.73m2    GFR est non-AA >60 >60 ml/min/1.73m2    Calcium 9.1 8.5 - 10.1 mg/dL    Bilirubin, total 0.9 0.2 - 1.0 mg/dL    AST (SGOT) 13 (L) 15 - 37 U/L    ALT (SGPT) 22 12 - 78 U/L    Alk.  phosphatase 193 (H) 45 - 117 U/L    Protein, total 5.0 (L) 6.4 - 8.2 g/dL    Albumin 1.9 (L) 3.5 - 5.0 g/dL    Globulin 3.1 2.0 - 4.0 g/dL    A-G Ratio 0.6 (L) 1.1 - 2.2     EKG, 12 LEAD, INITIAL    Collection Time: 12/13/21  3:03 PM   Result Value Ref Range    Ventricular Rate 95 BPM    Atrial Rate 250 BPM    QRS Duration 90 ms    Q-T Interval 342 ms    QTC Calculation (Bezet) 429 ms    Calculated R Axis 49 degrees    Calculated T Axis 21 degrees    Diagnosis       Atrial fibrillation  Abnormal ECG  When compared with ECG of 12-DEC-2021 08:29,  Atrial fibrillation has replaced Sinus rhythm  Confirmed by Jacqueline Zurita ([id number]) on 12/13/2021 3:25:17 PM            Diagnostic Images:  CTA ABDOMEN PELV W CONT   Final Result   1. Ill-defined hypodense mass in the pancreas. There are inflammatory changes   and fluid adjacent to the pancreas or duodenum and stomach most likely related   to biopsy or focal pancreatitis. No pseudocyst formation, active bleeding or   other acute findings. 2. Enlarged adrenal glands left greater than right with noncontrast densities   consistent with adrenal adenomas. 3. Right nephrectomy. 4. Other findings as described.       XR CHEST PORT   Final Result   No acute findings. ABD US 12/13/21 : Small amount of free fluid. Finding suggesting hemangioma in the  liver. Gallbladder wall thickening, adenomyomatosis, sludge and gallstones. Cholecystitis possible. Finding in the region of the pancreatic head as above. Other findings as above.     Current Medications:    Current Facility-Administered Medications:     piperacillin-tazobactam (ZOSYN) 3.375 g in 0.9% sodium chloride (MBP/ADV) 100 mL MBP, 3.375 g, IntraVENous, Q8H, Huyen Oshea V, DO, Last Rate: 25 mL/hr at 12/13/21 1429, 3.375 g at 12/13/21 1429    HYDROmorphone (DILAUDID) injection 1 mg, 1 mg, IntraVENous, Q4H PRN, Kyle Villar NP, 1 mg at 12/13/21 1534    hydrALAZINE (APRESOLINE) 20 mg/mL injection 20 mg, 20 mg, IntraVENous, Q6H PRN, Huyen Crane PA    pantoprazole (PROTONIX) 40 mg in 0.9% sodium chloride 10 mL injection, 40 mg, IntraVENous, DAILY, Huyen Crane PA, 40 mg at 12/13/21 1034    guaiFENesin ER (MUCINEX) tablet 600 mg, 600 mg, Oral, Q12H, Huyen Crane PA, 600 mg at 12/13/21 1020    albuterol-ipratropium (DUO-NEB) 2.5 MG-0.5 MG/3 ML, 3 mL, Nebulization, Q6H PRN, Huyen Crane PA    chlorthalidone (HYGROTON) tablet 25 mg, 25 mg, Oral, DAILY, Huyen Crane PA, 25 mg at 12/13/21 1020    0.9% sodium chloride infusion, 100 mL/hr, IntraVENous, CONTINUOUS, Nolberto Farah NP, Last Rate: 100 mL/hr at 12/09/21 1441, 100 mL/hr at 12/09/21 1441    albuterol (PROVENTIL HFA, VENTOLIN HFA, PROAIR HFA) inhaler 2 Puff, 2 Puff, Inhalation, Q6H PRN, Sofi Nolberto NEGIN, NP, 2 Puff at 12/10/21 1826    ascorbic acid (vitamin C) (VITAMIN C) tablet 500 mg, 500 mg, Oral, DAILY, Zarefrojas, Jan A, NP, 500 mg at 12/11/21 0900    atorvastatin (LIPITOR) tablet 40 mg, 40 mg, Oral, DAILY, Zarefoss, Jan A, NP, 40 mg at 12/11/21 0901    cholecalciferol (VITAMIN D3) (1000 Units /25 mcg) tablet 1,000 Units, 1,000 Units, Oral, DAILY, Zarefrojas Jan NEGIN, NP, 1,000 Units at 12/11/21 0900    diazePAM (VALIUM) tablet 5 mg, 5 mg, Oral, Q6H PRN, Sofi Nolberto A, NP    dilTIAZem ER (CARDIZEM CD) capsule 120 mg, 120 mg, Oral, DAILY, Zarefoss, Jan A, NP, 120 mg at 12/13/21 1020    [Held by provider] apixaban (ELIQUIS) tablet 5 mg, 5 mg, Oral, BID, Zarefrojas Jan NEGIN, NP, 5 mg at 12/11/21 2153    potassium chloride SR (KLOR-CON 10) tablet 20 mEq, 20 mEq, Oral, DAILY, Zarefrojas Jan NEGIN, NP, 20 mEq at 12/13/21 1019    sertraline (ZOLOFT) tablet 25 mg, 25 mg, Oral, DAILY, Zarefrojas Jan NEGIN, NP, 25 mg at 12/13/21 1033    traZODone (DESYREL) tablet 50 mg, 50 mg, Oral, QHS, Zarefrojas Jan A, NP, 50 mg at 12/12/21 2200    ondansetron (ZOFRAN) injection 4 mg, 4 mg, IntraVENous, Q6H PRN, Zarefrojas Jan A, NP, 4 mg at 12/12/21 0215    acetaminophen (TYLENOL) tablet 650 mg, 650 mg, Oral, Q4H PRN, Zabel Jan NEGIN, NP    0.9% sodium chloride infusion, 125 mL/hr, IntraVENous, CONTINUOUS, Armando Norlander F, NP, Last Rate: 125 mL/hr at 12/12/21 0227, 125 mL/hr at 12/12/21 0227    prochlorperazine (COMPAZINE) with saline injection 10 mg, 10 mg, IntraVENous, Q6H PRN, Nolberto Farah NP, 10 mg at 12/13/21 0400       ASSESSMENT:    Hospital Course:     Bertha Salinas a 78 y. o. female with a PMH of  atrial fibrillation, PUD, CHF, COPD, renal cell carcinoma stage IV, s/p nephrectomy, GERD, COPD, sjoren's syndrome, hypertension, and depression who came to the hospital with complaints of abdominal pain, associated nausea/vomiting, poor PO intake. Pt reported having a EUS on 12/6 as outpatient by Dr. Cristela Milligan, biopsy was taken, rare cells present suspicious for malignancy. Initial lab work significant for elevated lipase at 3,000, WBC, urine culture pending. CT abdomen/pelvis showing ill defined hypodense mass in the pancreas with fluid adjacent to the pancreas suggestive of focal pancreatitis. Adrenal adenomas are seen. No pseudocyst formation or active bleeding seen. Pt will be admitted for further evaluation and treatment. GI and oncology consulted. Patient started on fluids, NPO, pain and nausea medication PRN. Advance diet as tolerated. Hold Eliquis due to hematemesis will perform complete ultrasound patient is refusing CT of abdomen pelvis. Patient having reproducible chest pain will obtain EKG and troponin. Continue IV Protonix. Abd us shows hemangioma in the liver. Gallbladder wall thickening, adenomyomatosis, sludge and gallstones. Cholecystitis possible. Continue gentle IV hydration, start zosyn. Lipase levels remains elevated. 1. Pancreatitis:  2. Hematemesis:  -us shows hemangioma in the liver. Gallbladder wall thickening, adenomyomatosis, sludge and gallstones. Cholecystitis possible  -general surgeon following  -maintain npo status, ice chips only  -gentle IV hydration  -IV zosyn  -prn antiemetics and pain management  -hem/onc following  -lipase remains >3000    3. Pancreatic mass:  -s/p FNA via EUS which was suspicious for neoplasia but not diagnostic  -outpatient oncology fu warranted  -no further workup inpatient    4. Atrial fibrillation:  -managed w/ eliquis, cardizem, hygroton    5. CHF:  -managed w/ eliquis, cardizem, hygroton    6. COPD:  -appears stable  -continue home O2 2L NC @ baseline  -continue mucinex, prn albuterol    7. Depression  -continue trazodone and zoloft    8.  Hyperlipidemia  -continue lipitor 40mg        Full Code  Dvt Prophylaxis eliquis held, scd's  GI Prophylaxis protonix  Disposition:  -pending improvement in LFTs  -pain management  -gentle IV hydration      Above treatment plan reviewed and discussed with patient in detail at bedside, all questions answered. Care Plan discussed with: Interdisciplinary team    Total time spent with patient: 35 minutes.     Pedro Howard NP

## 2021-12-13 NOTE — PROGRESS NOTES
Patient requested to be transferred to [facility]. Notified hospitalist on call DR. Roldan Cope, he informed me to contact the in house hospitalist. I left a message for  to call back but have not heard back yet. Will follow up in the morning.

## 2021-12-13 NOTE — PROGRESS NOTES
Problem: Falls - Risk of  Goal: *Absence of Falls  Description: Document Nicki Light Fall Risk and appropriate interventions in the flowsheet.   Outcome: Progressing Towards Goal  Note: Fall Risk Interventions:  Mobility Interventions: Bed/chair exit alarm, Communicate number of staff needed for ambulation/transfer, Patient to call before getting OOB, Utilize walker, cane, or other assistive device         Medication Interventions: Bed/chair exit alarm, Patient to call before getting OOB, Teach patient to arise slowly    Elimination Interventions: Bed/chair exit alarm, Call light in reach, Patient to call for help with toileting needs, Toilet paper/wipes in reach, Toileting schedule/hourly rounds    History of Falls Interventions: Bed/chair exit alarm, Door open when patient unattended         Problem: Patient Education: Go to Patient Education Activity  Goal: Patient/Family Education  Outcome: Progressing Towards Goal     Problem: Pain  Goal: *Control of Pain  Outcome: Progressing Towards Goal     Problem: Patient Education: Go to Patient Education Activity  Goal: Patient/Family Education  Outcome: Progressing Towards Goal     Problem: Nausea/Vomiting (Adult)  Goal: *Absence of nausea/vomiting  Outcome: Progressing Towards Goal     Problem: Patient Education: Go to Patient Education Activity  Goal: Patient/Family Education  Outcome: Progressing Towards Goal

## 2021-12-13 NOTE — PROGRESS NOTES
Problem: Falls - Risk of  Goal: *Absence of Falls  Description: Document Melvina Nap Fall Risk and appropriate interventions in the flowsheet.   Outcome: Progressing Towards Goal  Note: Fall Risk Interventions:  Mobility Interventions: Bed/chair exit alarm, Communicate number of staff needed for ambulation/transfer, Patient to call before getting OOB, Utilize walker, cane, or other assistive device         Medication Interventions: Bed/chair exit alarm, Patient to call before getting OOB, Teach patient to arise slowly    Elimination Interventions: Bed/chair exit alarm, Call light in reach, Patient to call for help with toileting needs, Toilet paper/wipes in reach, Toileting schedule/hourly rounds    History of Falls Interventions: Bed/chair exit alarm, Door open when patient unattended         Problem: Patient Education: Go to Patient Education Activity  Goal: Patient/Family Education  Outcome: Progressing Towards Goal     Problem: Pain  Goal: *Control of Pain  Outcome: Progressing Towards Goal     Problem: Patient Education: Go to Patient Education Activity  Goal: Patient/Family Education  Outcome: Progressing Towards Goal     Problem: Nausea/Vomiting (Adult)  Goal: *Absence of nausea/vomiting  Outcome: Progressing Towards Goal     Problem: Patient Education: Go to Patient Education Activity  Goal: Patient/Family Education  Outcome: Progressing Towards Goal

## 2021-12-14 NOTE — PROGRESS NOTES
Problem: Falls - Risk of  Goal: *Absence of Falls  Description: Document Lettie Duverney Fall Risk and appropriate interventions in the flowsheet. Outcome: Progressing Towards Goal  Note: Fall Risk Interventions:  Mobility Interventions: Bed/chair exit alarm, Communicate number of staff needed for ambulation/transfer, Patient to call before getting OOB, Utilize walker, cane, or other assistive device         Medication Interventions: Bed/chair exit alarm, Patient to call before getting OOB, Teach patient to arise slowly    Elimination Interventions: Bed/chair exit alarm, Call light in reach, Patient to call for help with toileting needs, Toilet paper/wipes in reach, Toileting schedule/hourly rounds    History of Falls Interventions: Bed/chair exit alarm, Door open when patient unattended         Problem: Patient Education: Go to Patient Education Activity  Goal: Patient/Family Education  Outcome: Progressing Towards Goal     Problem: Pain  Goal: *Control of Pain  Outcome: Progressing Towards Goal     Problem: Patient Education: Go to Patient Education Activity  Goal: Patient/Family Education  Outcome: Progressing Towards Goal     Problem: Nausea/Vomiting (Adult)  Goal: *Absence of nausea/vomiting  Outcome: Progressing Towards Goal     Problem: Patient Education: Go to Patient Education Activity  Goal: Patient/Family Education  Outcome: Progressing Towards Goal     Problem: Pressure Injury - Risk of  Goal: *Prevention of pressure injury  Description: Document Asim Scale and appropriate interventions in the flowsheet. Outcome: Progressing Towards Goal  Note: Pressure Injury Interventions: Activity Interventions: Increase time out of bed    Mobility Interventions: Float heels, HOB 30 degrees or less, Turn and reposition approx.  every two hours(pillow and wedges)    Nutrition Interventions: Document food/fluid/supplement intake                     Problem: Patient Education: Go to Patient Education Activity  Goal: Patient/Family Education  Outcome: Progressing Towards Goal

## 2021-12-14 NOTE — PROGRESS NOTES
Progress Note    Patient: Sarbjit Rebollar MRN: [id number]  SSN: [id number]    YOB: 1945  Age: 78 y.o. Sex: female      Admit Date: 12/9/2021    LOS: 5 days     Subjective:   GI in consultation for Pancreatitis    Patient reports increased abdominal pain and pain in her left shoulder. She states she does feel short of breath with minimal exertion. Abdomen is soft, tender on the left upper quadrant. Bowel sounds active. Patient reports no BM since Sunday. Nikia Bee Spoke with pharmacist, dilaudid changed to every 3 hours IV. She does report abdominal tenderness increased on the left. Lipase slight decline to 2.066 this am. She has been NPO with ice chips. She did complain of chest pain yesterday. Troponin 17, EKG shows Atrial Fibrillation change from Normal sinus rhythm on admission she does have a history of Afib. Her Eliquis is on hold at this time. May consider restarting due to transition to Afib on EKG. No further hematemesis reported. Her hgB is stable at 12.6 today. Abdominal Ultrasound 12/13/2021: IMPRESSION  Small amount of free fluid.  Finding suggesting hemangioma in the  liver.  Gallbladder wall thickening, adenomyomatosis, sludge and gallstones. Cholecystitis possible.  Finding in the region of the pancreatic head as above. Other findings as above.     12/9 GI consult note:  History of Present Illness: Nevin Cole is a 78 y. o. female who is seen in consultation for pancreatitis. Javier Combs has a past medical history of  Paroxysmal Atrial Fibrillation, CHF, COPD, renal cell carcinoma Stage IV s/p nephrectomy, GERD sjoren's syndrome, hypertension, and depression. Patient under went EUS on 12/6/2021 showing 2.7 X3 cm lesion in the area of head of pancreas with dilation of the Pancreatic duct abutting the portal vein but not involving the SMA or AMV ; Tumor T2 by endosonographic criteria ; one small lymph node in the area of head of pancreas.  Pathology shows rare cells present suspicious for malignancy.   She had an ERCP on 11/4 2021 ERCP; with sphincterotomy/papillotomy ERCP; with placement of endoscopic stent into biliary or pancreatic duct, including pre and postdilation and guide wire passage, when performed, including sphincterotomy, when performed, each stent. . Patient had a PET scan on 11/22/21 which shows a lesion in the pancreatic head consistent with malignancy. CTA of abdomen shows ill defined hypodense mass in the pancreas. There are inflammatory changes. Patient states she experienced nausea, vomiting and diarrhea since Monday. Her abdominal pain has progressively gotten worse. She reports a poor appetite. She does complain of increased \"burping\". Total bilirubin 1.4, AsT 36, ALT 31, Alk Phos. 217, Lipase > 3,000. Plan nothing by mouth except ice chips and sips of water with medication. IV hydration. Pain management     PET Scan 11/22/2021: IMPRESSION  1.  FDG avid lesion in the pancreatic head consistent with malignancy. 2.  Subcentimeter focus of FDG uptake in the liver, indeterminate. 3.  FDG uptake associated with density expanding the right facet at C5-C6,  possibly due to an ectatic artery. EUS on 12/6/2021 showing 2.7 X3 cm lesion in the area of head of pancreas with dilation of the Pancreatic duct abutting the portal vein but not involving the SMA or AMV ; Tumor T2 by endosonographic criteria ; one small lymph node in the area of head of pancreas. CTA abdomen 12/9/2021: IMPRESSION  1. Ill-defined hypodense mass in the pancreas. There are inflammatory changes and fluid adjacent to the pancreas or duodenum and stomach most likely related to biopsy or focal pancreatitis. No pseudocyst formation, active bleeding or other acute findings. 2. Enlarged adrenal glands left greater than right with noncontrast densities  consistent with adrenal adenomas. 3. Right nephrectomy. 4. Other findings as described.     Objective:     Vitals:    12/14/21 0410 12/14/21 0720 12/14/21 9355 12/14/21 1017   BP:  (!) 105/54  (!) 141/72   Pulse: 82 71  77   Resp:  16     Temp:  98 °F (36.7 °C)     SpO2:  97% 98%    Weight:       Height:            Intake and Output:  Current Shift: No intake/output data recorded. Last three shifts: 12/12 1901 - 12/14 0700  In: 2650 [P.O.:300; I.V.:2350]  Out: 850 [Urine:850]    Physical Exam:   Skin:  Extremities and face reveal no rashes. No chapin erythema. HEENT: Sclerae anicteric. Extra-occular muscles are intact. No abnormal pigmentation of the lips. The neck is supple. Cardiovascular: Regular rate and rhythm. Respiratory:  Comfortable breathing with no accessory muscle use. GI:  Abdomen distended, soft, and rebecca. No enlargement of the liver or spleen. No masses palpable. Bowel sounds active  Rectal:  Deferred  Musculoskeletal: Extremities have good range of motion. Neurological:  Gross memory appears intact. Patient is alert and oriented. Psychiatric:  Mood appears appropriate with judgement intact.   Lymphatic:  No visible adenopathy      Lab/Data Review:  Recent Results (from the past 24 hour(s))   EKG, 12 LEAD, INITIAL    Collection Time: 12/13/21  3:03 PM   Result Value Ref Range    Ventricular Rate 95 BPM    Atrial Rate 250 BPM    QRS Duration 90 ms    Q-T Interval 342 ms    QTC Calculation (Bezet) 429 ms    Calculated R Axis 49 degrees    Calculated T Axis 21 degrees    Diagnosis       Atrial fibrillation  Abnormal ECG  When compared with ECG of 12-DEC-2021 08:29,  Atrial fibrillation has replaced Sinus rhythm  Confirmed by Homero Duval ([id number]) on 12/13/2021 3:25:17 PM     LIPASE    Collection Time: 12/14/21 12:26 PM   Result Value Ref Range    Lipase 2,066 (H) 73 - 393 U/L   CBC WITH AUTOMATED DIFF    Collection Time: 12/14/21 12:26 PM   Result Value Ref Range    WBC 12.2 (H) 3.6 - 11.0 K/uL    RBC 4.14 3.80 - 5.20 M/uL    HGB 12.6 11.5 - 16.0 g/dL    HCT 40.0 35.0 - 47.0 %    MCV 96.6 80.0 - 99.0 FL    MCH 30.4 26.0 - 34.0 PG    MCHC 31.5 30.0 - 36.5 g/dL    RDW 13.6 11.5 - 14.5 %    PLATELET 892 146 - 579 K/uL    MPV 10.8 8.9 - 12.9 FL    NRBC 0.0 0.0  WBC    ABSOLUTE NRBC 0.00 0.00 - 0.01 K/uL    NEUTROPHILS 89 (H) 32 - 75 %    LYMPHOCYTES 5 (L) 12 - 49 %    MONOCYTES 5 5 - 13 %    EOSINOPHILS 0 0 - 7 %    BASOPHILS 0 0 - 1 %    IMMATURE GRANULOCYTES 1 (H) 0 - 0.5 %    ABS. NEUTROPHILS 10.8 (H) 1.8 - 8.0 K/UL    ABS. LYMPHOCYTES 0.6 (L) 0.8 - 3.5 K/UL    ABS. MONOCYTES 0.7 0.0 - 1.0 K/UL    ABS. EOSINOPHILS 0.0 0.0 - 0.4 K/UL    ABS. BASOPHILS 0.0 0.0 - 0.1 K/UL    ABS. IMM. GRANS. 0.1 (H) 0.00 - 0.04 K/UL    DF AUTOMATED     METABOLIC PANEL, COMPREHENSIVE    Collection Time: 12/14/21 12:26 PM   Result Value Ref Range    Sodium 142 136 - 145 mmol/L    Potassium 3.6 3.5 - 5.1 mmol/L    Chloride 107 97 - 108 mmol/L    CO2 27 21 - 32 mmol/L    Anion gap 8 5 - 15 mmol/L    Glucose 76 65 - 100 mg/dL    BUN 24 (H) 6 - 20 mg/dL    Creatinine 0.89 0.55 - 1.02 mg/dL    BUN/Creatinine ratio 27 (H) 12 - 20      GFR est AA >60 >60 ml/min/1.73m2    GFR est non-AA >60 >60 ml/min/1.73m2    Calcium 8.6 8.5 - 10.1 mg/dL    Bilirubin, total 0.8 0.2 - 1.0 mg/dL    AST (SGOT) 17 15 - 37 U/L    ALT (SGPT) 21 12 - 78 U/L    Alk. phosphatase 186 (H) 45 - 117 U/L    Protein, total 4.7 (L) 6.4 - 8.2 g/dL    Albumin 1.8 (L) 3.5 - 5.0 g/dL    Globulin 2.9 2.0 - 4.0 g/dL    A-G Ratio 0.6 (L) 1.1 - 2.2                 ABD LTD   Final Result   Small amount of free fluid. Finding suggesting hemangioma in the   liver. Gallbladder wall thickening, adenomyomatosis, sludge and gallstones. Cholecystitis possible. Finding in the region of the pancreatic head as above. Other findings as above. CTA ABDOMEN PELV W CONT   Final Result   1. Ill-defined hypodense mass in the pancreas. There are inflammatory changes   and fluid adjacent to the pancreas or duodenum and stomach most likely related   to biopsy or focal pancreatitis.  No pseudocyst formation, active bleeding or other acute findings. 2. Enlarged adrenal glands left greater than right with noncontrast densities   consistent with adrenal adenomas. 3. Right nephrectomy. 4. Other findings as described. XR CHEST PORT   Final Result   No acute findings. Assessment:     Active Problems:    A-fib ([address] (11/16/2020)      CHF (congestive heart failure) ([address] (11/16/2020)      Pancreatitis (12/9/2021)      Hematemesis (12/12/2021)      History of peptic ulcer disease (12/12/2021)        Plan:   1. Pancreatitis      -NPO      -IV hydration @ 125 ml/hr      -Lipase 2,066      -Lipase in the am       -Dilaudid 1 mg every 3 hours as needed for pain  2. CHF      -Started on Chlorthalidone  3. COPD       -Continue home medications       -Albuterol as needed  4. Pancreatic Mass       > 4000      S/p fine needle aspiration suspicious for neoplasia but not diagnostic      When stable Oncology plan for out patient followup with advanced oncology surgeon  At  or [facility] for resection considerations  5. Hematemesis (resolved)     Monitor H&H     Transfuse as needed     hgB 12.6      Continue Protonix 40 mg daily  6. Afib     EKD on 12/13/21 shows Afib has replaced Sinus Rhythm     Rate is controlled     May consider restarting eliquis, no further hematemesis reported     HgB stable at 12.6       Thank you for allowing me to participate in this patients care. Plan discussed with Dr. Oksana Ocampo and he approves.     Signed By: Gabbie Jacobsen NP     December 14, 2021

## 2021-12-14 NOTE — PROGRESS NOTES
Chart reviewed. CM met with patient at bedside to discuss [facility] recs upon discharge. Patient verbalized understanding stating she did not want [facility] back due to scheduling issues. CM asked if she had canceled services and patient relayed the number she was provided was out of service. CM provided a local number to [employer] and dialed the number per patient request. Patient canceled services with this company. Choice letter signed and placed on chart with no other preference other than not wanting [facility]. Referrals sent via [facility].  CM will continue to follow patient and recs of medical team.

## 2021-12-14 NOTE — PROGRESS NOTES
Spiritual Care Assessment/Progress Note  [facility]      NAME: Augie Gaytan      MRN: [id number]  AGE: 78 y.o.  SEX: female  Zoroastrianism Affiliation: Episcopalian   Language: English     12/14/2021     Total Time (in minutes): 45     Spiritual Assessment begun in [facility] SURG[facility] through conversation with:         [x]Patient        [] Family    [] Friend(s)        Reason for Consult: Request by staff     Spiritual beliefs: (Please include comment if needed)     [x] Identifies with a maribell tradition: [facility]        [] Supported by a maribell community:            [] Claims no spiritual orientation:           [] Seeking spiritual identity:                [] Adheres to an individual form of spirituality:           [] Not able to assess:                           Identified resources for coping:      [x] Prayer                               [] Music                  [] Guided Imagery     [x] Family/friends                 [] Pet visits     [] Devotional reading                         [] Unknown     [] Other:                                               Interventions offered during this visit: (See comments for more details)    Patient Interventions: Affirmation of emotions/emotional suffering, Affirmation of maribell, Initial/Spiritual assessment, patient floor, Catharsis/review of pertinent events in supportive environment, Coping skills reviewed/reinforced, Zoroastrianism beliefs/image of God discussed, Prayer (actual)           Plan of Care:     [] Support spiritual and/or cultural needs    [] Support AMD and/or advance care planning process      [] Support grieving process   [] Coordinate Rites and/or Rituals    [] Coordination with community clergy   [] No spiritual needs identified at this time   [] Detailed Plan of Care below (See Comments)  [] Make referral to Music Therapy  [] Make referral to Pet Therapy     [] Make referral to Addiction services  [] Make referral to [facility]  [] Make referral to Spiritual Care Partner  [] No future visits requested        [x] Contact Spiritual Care for further referrals     Comments:  visited patient in [address] in response to RN referral due to patient's CA dx.  and pt engaged in reflective and empathic conversation including patient's previous and current cancer. Patient also shared story of death of one of her sons months following the first experience of cancer (2019) in 2020.  and patient reflected on her coping skills of family and her [address]  provided words of caring and support, explored patient's hope in miracles, and provided prayer as requested. Patient expressed appreciation for visit of listening and support.  advised nurse to contact [facility] for any further referrals.      Visited by Jessica Jensen, [address] [place] [address]    can be contacted by calling  and requesting  on call

## 2021-12-14 NOTE — PROGRESS NOTES
Hospitalist Progress Note         CHANELLE Perdomo, FNP-C    Daily Progress Note: 12/14/2021      Subjective:   Subjective   Patient examined alert and oriented lying in bed  Reports abdominal pain, n/v  No acute distress noted on examination    Review of Systems:   Review of Systems   Constitutional: Negative. HENT: Negative. Eyes: Negative. Respiratory: Negative. Cardiovascular: Negative. Gastrointestinal: Positive for abdominal pain, nausea and vomiting. Genitourinary: Negative. Musculoskeletal: Negative. Skin: Negative. Neurological: Negative. Endo/Heme/Allergies: Negative. Psychiatric/Behavioral: Negative. Objective:   Objective      Vitals:  Patient Vitals for the past 12 hrs:   Temp Pulse Resp BP SpO2   12/14/21 1017 -- 77 -- (!) 141/72 --   12/14/21 0756 -- -- -- -- 98 %   12/14/21 0720 98 °F (36.7 °C) 71 16 (!) 105/54 97 %   12/14/21 0410 -- 82 -- -- --        Physical Exam:  Physical Exam  Vitals and nursing note reviewed. HENT:      Mouth/Throat:      Mouth: Mucous membranes are moist.   Eyes:      Conjunctiva/sclera: Conjunctivae normal.   Cardiovascular:      Rate and Rhythm: Normal rate. Pulses: Normal pulses. Pulmonary:      Comments: 2L NC  Abdominal:      General: Bowel sounds are normal.      Tenderness: There is abdominal tenderness. Comments: Left quadrant tenderness   Musculoskeletal:         General: Normal range of motion. Skin:     General: Skin is warm and dry. Neurological:      Mental Status: She is alert and oriented to person, place, and time.    Psychiatric:         Mood and Affect: Mood normal.          Lab Results:  Recent Results (from the past 24 hour(s))   CBC WITH AUTOMATED DIFF    Collection Time: 12/13/21 12:29 PM   Result Value Ref Range    WBC 14.3 (H) 3.6 - 11.0 K/uL    RBC 4.11 3.80 - 5.20 M/uL    HGB 12.6 11.5 - 16.0 g/dL    HCT 38.9 35.0 - 47.0 %    MCV 94.6 80.0 - 99.0 FL    MCH 30.7 26.0 - 34.0 PG    MCHC 32.4 30.0 - 36.5 g/dL    RDW 13.5 11.5 - 14.5 %    PLATELET 174 603 - 669 K/uL    MPV 10.6 8.9 - 12.9 FL    NRBC 0.0 0.0  WBC    ABSOLUTE NRBC 0.00 0.00 - 0.01 K/uL    NEUTROPHILS 90 (H) 32 - 75 %    LYMPHOCYTES 5 (L) 12 - 49 %    MONOCYTES 5 5 - 13 %    EOSINOPHILS 0 0 - 7 %    BASOPHILS 0 0 - 1 %    IMMATURE GRANULOCYTES 0 0 - 0.5 %    ABS. NEUTROPHILS 12.7 (H) 1.8 - 8.0 K/UL    ABS. LYMPHOCYTES 0.7 (L) 0.8 - 3.5 K/UL    ABS. MONOCYTES 0.8 0.0 - 1.0 K/UL    ABS. EOSINOPHILS 0.0 0.0 - 0.4 K/UL    ABS. BASOPHILS 0.0 0.0 - 0.1 K/UL    ABS. IMM. GRANS. 0.1 (H) 0.00 - 0.04 K/UL    DF AUTOMATED     LIPASE    Collection Time: 12/13/21 12:29 PM   Result Value Ref Range    Lipase >3,000 (H) 73 - 131 U/L   METABOLIC PANEL, COMPREHENSIVE    Collection Time: 12/13/21 12:29 PM   Result Value Ref Range    Sodium 143 136 - 145 mmol/L    Potassium 3.4 (L) 3.5 - 5.1 mmol/L    Chloride 108 97 - 108 mmol/L    CO2 28 21 - 32 mmol/L    Anion gap 7 5 - 15 mmol/L    Glucose 78 65 - 100 mg/dL    BUN 19 6 - 20 mg/dL    Creatinine 0.68 0.55 - 1.02 mg/dL    BUN/Creatinine ratio 28 (H) 12 - 20      GFR est AA >60 >60 ml/min/1.73m2    GFR est non-AA >60 >60 ml/min/1.73m2    Calcium 9.1 8.5 - 10.1 mg/dL    Bilirubin, total 0.9 0.2 - 1.0 mg/dL    AST (SGOT) 13 (L) 15 - 37 U/L    ALT (SGPT) 22 12 - 78 U/L    Alk.  phosphatase 193 (H) 45 - 117 U/L    Protein, total 5.0 (L) 6.4 - 8.2 g/dL    Albumin 1.9 (L) 3.5 - 5.0 g/dL    Globulin 3.1 2.0 - 4.0 g/dL    A-G Ratio 0.6 (L) 1.1 - 2.2     EKG, 12 LEAD, INITIAL    Collection Time: 12/13/21  3:03 PM   Result Value Ref Range    Ventricular Rate 95 BPM    Atrial Rate 250 BPM    QRS Duration 90 ms    Q-T Interval 342 ms    QTC Calculation (Bezet) 429 ms    Calculated R Axis 49 degrees    Calculated T Axis 21 degrees    Diagnosis       Atrial fibrillation  Abnormal ECG  When compared with ECG of 12-DEC-2021 08:29,  Atrial fibrillation has replaced Sinus rhythm  Confirmed by Mile Cuellar ([id number]) on 12/13/2021 3:25:17 PM            Diagnostic Images:  CTA ABDOMEN PELV W CONT   Final Result   1. Ill-defined hypodense mass in the pancreas. There are inflammatory changes   and fluid adjacent to the pancreas or duodenum and stomach most likely related   to biopsy or focal pancreatitis. No pseudocyst formation, active bleeding or   other acute findings. 2. Enlarged adrenal glands left greater than right with noncontrast densities   consistent with adrenal adenomas. 3. Right nephrectomy. 4. Other findings as described.       XR CHEST PORT   Final Result   No acute findings. ABD US 12/13/21 : Small amount of free fluid. Finding suggesting hemangioma in the  liver. Gallbladder wall thickening, adenomyomatosis, sludge and gallstones. Cholecystitis possible. Finding in the region of the pancreatic head as above. Other findings as above.     Current Medications:    Current Facility-Administered Medications:     piperacillin-tazobactam (ZOSYN) 3.375 g in 0.9% sodium chloride (MBP/ADV) 100 mL MBP, 3.375 g, IntraVENous, Q8H, Huyen Oshea, , Last Rate: 25 mL/hr at 12/14/21 0558, 3.375 g at 12/14/21 0558    HYDROmorphone (DILAUDID) injection 1 mg, 1 mg, IntraVENous, Q4H PRN, Jonelle Griffin NP, 1 mg at 12/14/21 1124    hydrALAZINE (APRESOLINE) 20 mg/mL injection 20 mg, 20 mg, IntraVENous, Q6H PRN, Huyen Crane PA    pantoprazole (PROTONIX) 40 mg in 0.9% sodium chloride 10 mL injection, 40 mg, IntraVENous, DAILY, Huyen Crane PA, 40 mg at 12/14/21 1019    guaiFENesin ER (MUCINEX) tablet 600 mg, 600 mg, Oral, Q12H, Huyen Crane PA, 600 mg at 12/13/21 2108    albuterol-ipratropium (DUO-NEB) 2.5 MG-0.5 MG/3 ML, 3 mL, Nebulization, Q6H PRN, Huyen Crane PA    chlorthalidone (HYGROTON) tablet 25 mg, 25 mg, Oral, DAILY, Huyen Crane PA, 25 mg at 12/14/21 1018    0.9% sodium chloride infusion, 100 mL/hr, IntraVENous, CONTINUOUS, Nolberto Farah, NP, Last Rate: 100 mL/hr at 12/09/21 1441, 100 mL/hr at 12/09/21 1441    albuterol (PROVENTIL HFA, VENTOLIN HFA, PROAIR HFA) inhaler 2 Puff, 2 Puff, Inhalation, Q6H PRN, Zarefoss, Jan A, NP, 2 Puff at 12/13/21 2219    ascorbic acid (vitamin C) (VITAMIN C) tablet 500 mg, 500 mg, Oral, DAILY, Zarefoss, Jan A, NP, 500 mg at 12/11/21 0900    atorvastatin (LIPITOR) tablet 40 mg, 40 mg, Oral, DAILY, Zarefoss, Jan A, NP, 40 mg at 12/11/21 0901    cholecalciferol (VITAMIN D3) (1000 Units /25 mcg) tablet 1,000 Units, 1,000 Units, Oral, DAILY, Zarefoss, Jan A, NP, 1,000 Units at 12/11/21 0900    diazePAM (VALIUM) tablet 5 mg, 5 mg, Oral, Q6H PRN, Zarefoss Jan A, NP    dilTIAZem ER (CARDIZEM CD) capsule 120 mg, 120 mg, Oral, DAILY, Zarefoss, Jan A, NP, 120 mg at 12/14/21 1017    [Held by provider] apixaban (ELIQUIS) tablet 5 mg, 5 mg, Oral, BID, Zarefoss, Jan A, NP, 5 mg at 12/11/21 2153    potassium chloride SR (KLOR-CON 10) tablet 20 mEq, 20 mEq, Oral, DAILY, Zarefoss, Jan A, NP, 20 mEq at 12/14/21 1018    sertraline (ZOLOFT) tablet 25 mg, 25 mg, Oral, DAILY, Zarefoss, Jan A, NP, 25 mg at 12/14/21 1018    traZODone (DESYREL) tablet 50 mg, 50 mg, Oral, QHS, Zarefoss, Jan A, NP, 50 mg at 12/13/21 2108    ondansetron [facility]) injection 4 mg, 4 mg, IntraVENous, Q6H PRN, Zarefoss, Jan A, NP, 4 mg at 12/12/21 0215    acetaminophen (TYLENOL) tablet 650 mg, 650 mg, Oral, Q4H PRN, Nolberto Farah NP    0.9% sodium chloride infusion, 125 mL/hr, IntraVENous, CONTINUOUS, [place] Gunjan F, NP, Last Rate: 125 mL/hr at 12/12/21 0227, 125 mL/hr at 12/12/21 0227    prochlorperazine (COMPAZINE) with saline injection 10 mg, 10 mg, IntraVENous, Q6H PRN, Nolberto Farah NP, 10 mg at 12/13/21 0400       ASSESSMENT:    Hospital Course:     Rubensteddy Burgess a 78 y. o. female with a PMH of  atrial fibrillation, PUD, CHF, COPD, renal cell carcinoma stage IV, s/p nephrectomy, GERD, COPD, sjoren's syndrome, hypertension, and depression who came to the hospital with complaints of abdominal pain, associated nausea/vomiting, poor PO intake. Pt reported having a EUS on 12/6 as outpatient by Dr. Atilano Bumpers, biopsy was taken, rare cells present suspicious for malignancy. Initial lab work significant for elevated lipase at 3,000, WBC, urine culture pending. CT abdomen/pelvis showing ill defined hypodense mass in the pancreas with fluid adjacent to the pancreas suggestive of focal pancreatitis. Adrenal adenomas are seen. No pseudocyst formation or active bleeding seen. Pt will be admitted for further evaluation and treatment. GI and oncology consulted. Patient started on fluids, NPO, pain and nausea medication PRN. Advance diet as tolerated. Hold Eliquis due to hematemesis will perform complete ultrasound patient is refusing CT of abdomen pelvis. Patient having reproducible chest pain will obtain EKG and troponin. Continue IV Protonix. Abd us shows hemangioma in the liver. Gallbladder wall thickening, adenomyomatosis, sludge and gallstones. Cholecystitis possible. Continue gentle IV hydration, start zosyn. Lipase levels remains elevated. 1. Pancreatitis:  2. Hematemesis:  -us shows hemangioma in the liver. Gallbladder wall thickening, adenomyomatosis, sludge and gallstones. Cholecystitis possible  -general surgeon following  -maintain npo status, ice chips only  -gentle IV hydration  -IV zosyn  -prn antiemetics and pain management  -hem/onc following  -lipase remains >3000  -AM labs pending    3. Pancreatic mass:  -s/p FNA via EUS which was suspicious for neoplasia but not diagnostic  -outpatient oncology fu warranted  -no further workup inpatient    4. Atrial fibrillation:  -managed w/ eliquis, cardizem, hygroton    5. CHF:  -managed w/ eliquis, cardizem, hygroton    6. COPD:  -appears stable  -continue home O2 2L NC @ baseline  -continue mucinex, prn albuterol    7. Depression  -continue trazodone and zoloft    8.  Hyperlipidemia  -continue lipitor 40mg        Full Code  Dvt Prophylaxis eliquis held, scd's  GI Prophylaxis protonix  Disposition:  -pending improvement in LFTs and pain  -pain management  -gentle IV hydration  -AM labs pending  -pending general clearance      Above treatment plan reviewed and discussed with patient in detail at bedside, all questions answered. Care Plan discussed with: Interdisciplinary team    Total time spent with patient: 35 minutes.     Star Jessica NP

## 2021-12-14 NOTE — PROGRESS NOTES
Progress Note    Patient: Richy Lutz MRN: [id number]  SSN: [id number]    YOB: 1945  Age: 78 y.o. Sex: female      Admit Date: 12/9/2021    LOS: 5 days     Subjective:     Patient seen and examined, pain remains stable but severe. Nausea/vomiting resolved. Objective:     Vitals:    12/14/21 0410 12/14/21 0720 12/14/21 0756 12/14/21 1017   BP:  (!) 105/54  (!) 141/72   Pulse: 82 71  77   Resp:  16     Temp:  98 °F (36.7 °C)     SpO2:  97% 98%    Weight:       Height:            Intake and Output:  Current Shift: No intake/output data recorded. Last three shifts: 12/12 1901 - 12/14 0700  In: 2650 [P.O.:300; I.V.:2350]  Out: 850 [Urine:850]    Physical Exam:   General: alert, oriented, in no acute distress  Cardiovascular: regular rate and rhythm  Pulmonary: unlabored breathing, equal chest rise bilaterally, on supplemental O2  Abdomen: soft, severe TTP epigastrum and lower abdomen with rebound  Extremities: no swelling, pulses equal and palpable in all extremities    Lab/Data Review:  Recent Results (from the past 24 hour(s))   CBC WITH AUTOMATED DIFF    Collection Time: 12/13/21 12:29 PM   Result Value Ref Range    WBC 14.3 (H) 3.6 - 11.0 K/uL    RBC 4.11 3.80 - 5.20 M/uL    HGB 12.6 11.5 - 16.0 g/dL    HCT 38.9 35.0 - 47.0 %    MCV 94.6 80.0 - 99.0 FL    MCH 30.7 26.0 - 34.0 PG    MCHC 32.4 30.0 - 36.5 g/dL    RDW 13.5 11.5 - 14.5 %    PLATELET 885 423 - 356 K/uL    MPV 10.6 8.9 - 12.9 FL    NRBC 0.0 0.0  WBC    ABSOLUTE NRBC 0.00 0.00 - 0.01 K/uL    NEUTROPHILS 90 (H) 32 - 75 %    LYMPHOCYTES 5 (L) 12 - 49 %    MONOCYTES 5 5 - 13 %    EOSINOPHILS 0 0 - 7 %    BASOPHILS 0 0 - 1 %    IMMATURE GRANULOCYTES 0 0 - 0.5 %    ABS. NEUTROPHILS 12.7 (H) 1.8 - 8.0 K/UL    ABS. LYMPHOCYTES 0.7 (L) 0.8 - 3.5 K/UL    ABS. MONOCYTES 0.8 0.0 - 1.0 K/UL    ABS. EOSINOPHILS 0.0 0.0 - 0.4 K/UL    ABS. BASOPHILS 0.0 0.0 - 0.1 K/UL    ABS. IMM.  GRANS. 0.1 (H) 0.00 - 0.04 K/UL    DF AUTOMATED     LIPASE Collection Time: 12/13/21 12:29 PM   Result Value Ref Range    Lipase >3,000 (H) 73 - 386 U/L   METABOLIC PANEL, COMPREHENSIVE    Collection Time: 12/13/21 12:29 PM   Result Value Ref Range    Sodium 143 136 - 145 mmol/L    Potassium 3.4 (L) 3.5 - 5.1 mmol/L    Chloride 108 97 - 108 mmol/L    CO2 28 21 - 32 mmol/L    Anion gap 7 5 - 15 mmol/L    Glucose 78 65 - 100 mg/dL    BUN 19 6 - 20 mg/dL    Creatinine 0.68 0.55 - 1.02 mg/dL    BUN/Creatinine ratio 28 (H) 12 - 20      GFR est AA >60 >60 ml/min/1.73m2    GFR est non-AA >60 >60 ml/min/1.73m2    Calcium 9.1 8.5 - 10.1 mg/dL    Bilirubin, total 0.9 0.2 - 1.0 mg/dL    AST (SGOT) 13 (L) 15 - 37 U/L    ALT (SGPT) 22 12 - 78 U/L    Alk. phosphatase 193 (H) 45 - 117 U/L    Protein, total 5.0 (L) 6.4 - 8.2 g/dL    Albumin 1.9 (L) 3.5 - 5.0 g/dL    Globulin 3.1 2.0 - 4.0 g/dL    A-G Ratio 0.6 (L) 1.1 - 2.2     EKG, 12 LEAD, INITIAL    Collection Time: 12/13/21  3:03 PM   Result Value Ref Range    Ventricular Rate 95 BPM    Atrial Rate 250 BPM    QRS Duration 90 ms    Q-T Interval 342 ms    QTC Calculation (Bezet) 429 ms    Calculated R Axis 49 degrees    Calculated T Axis 21 degrees    Diagnosis       Atrial fibrillation  Abnormal ECG  When compared with ECG of 12-DEC-2021 08:29,  Atrial fibrillation has replaced Sinus rhythm  Confirmed by Marta Cochran [phone] on 12/13/2021 3:25:17 PM            Assessment:     Active Problems:    A-fib ([facility] (11/16/2020)      CHF (congestive heart failure) ([facility] (11/16/2020)      Pancreatitis (12/9/2021)      Hematemesis (12/12/2021)      History of peptic ulcer disease (12/12/2021)        Plan:     Diet: NPO, ice chips only   Continue conservative management of pancreatitis  US reviewed- possible acute cholecystitis in the setting of acute pancreatitis, cholecystitis is likely a sequelae of pancreatic inflammation, not an operative candidate for cholecystectomy at this point. Zosyn started.   DVT/GI prophylaxis  Ambulate  Pain and nausea control  Heme/onc evaluation noted  Patient will need to have follow up with surgical oncology    Signed By: Alfonso Oshea DO     December 14, 2021

## 2021-12-14 NOTE — PROGRESS NOTES
Problem: Falls - Risk of  Goal: *Absence of Falls  Description: Document Kailey Calderon Fall Risk and appropriate interventions in the flowsheet. Outcome: Progressing Towards Goal  Note: Fall Risk Interventions:  Mobility Interventions: Bed/chair exit alarm, Communicate number of staff needed for ambulation/transfer, Patient to call before getting OOB, Utilize walker, cane, or other assistive device         Medication Interventions: Bed/chair exit alarm, Patient to call before getting OOB, Teach patient to arise slowly    Elimination Interventions: Bed/chair exit alarm, Call light in reach, Patient to call for help with toileting needs, Toilet paper/wipes in reach, Toileting schedule/hourly rounds    History of Falls Interventions: Bed/chair exit alarm, Door open when patient unattended         Problem: Patient Education: Go to Patient Education Activity  Goal: Patient/Family Education  Outcome: Progressing Towards Goal     Problem: Pain  Goal: *Control of Pain  Outcome: Progressing Towards Goal     Problem: Patient Education: Go to Patient Education Activity  Goal: Patient/Family Education  Outcome: Progressing Towards Goal     Problem: Nausea/Vomiting (Adult)  Goal: *Absence of nausea/vomiting  Outcome: Progressing Towards Goal     Problem: Patient Education: Go to Patient Education Activity  Goal: Patient/Family Education  Outcome: Progressing Towards Goal     Problem: Pressure Injury - Risk of  Goal: *Prevention of pressure injury  Description: Document Asim Scale and appropriate interventions in the flowsheet. Outcome: Progressing Towards Goal  Note: Pressure Injury Interventions:             Activity Interventions: Increase time out of bed    Mobility Interventions: Float heels    Nutrition Interventions: Document food/fluid/supplement intake                     Problem: Patient Education: Go to Patient Education Activity  Goal: Patient/Family Education  Outcome: Progressing Towards Goal

## 2021-12-15 NOTE — PROGRESS NOTES
Hospitalist Progress Note         CHANELLE Schultz, [facility]    Daily Progress Note: 12/15/2021      Subjective:   Subjective   Patient examined alert and oriented lying in bed  Reports continued abdominal pain, NO n/v  No acute distress noted on examination    Review of Systems:   Review of Systems   Constitutional: Negative. HENT: Negative. Eyes: Negative. Respiratory: Negative. Cardiovascular: Negative. Gastrointestinal: Positive for abdominal pain. Genitourinary: Negative. Musculoskeletal: Negative. Skin: Negative. Neurological: Negative. Endo/Heme/Allergies: Negative. Psychiatric/Behavioral: Negative. Objective:   Objective      Vitals:  Patient Vitals for the past 12 hrs:   Temp Pulse Resp BP SpO2   12/15/21 1322 -- -- -- -- 95 %   12/15/21 0855 -- -- -- -- 95 %   12/15/21 0853 -- -- -- -- 95 %   12/15/21 0702 98.8 °F (37.1 °C) 100 18 (!) 140/70 98 %   12/15/21 0230 98 °F (36.7 °C) 100 18 (!) 140/79 97 %        Physical Exam:  Physical Exam  Vitals and nursing note reviewed. HENT:      Mouth/Throat:      Mouth: Mucous membranes are moist.   Eyes:      Conjunctiva/sclera: Conjunctivae normal.   Cardiovascular:      Rate and Rhythm: Normal rate. Pulses: Normal pulses. Pulmonary:      Comments: 2L NC  Abdominal:      General: Bowel sounds are normal.      Tenderness: There is abdominal tenderness. Comments: Left quadrant tenderness   Musculoskeletal:         General: Normal range of motion. Skin:     General: Skin is warm and dry. Neurological:      Mental Status: She is alert and oriented to person, place, and time.    Psychiatric:         Mood and Affect: Mood normal.          Lab Results:  Recent Results (from the past 24 hour(s))   LIPASE    Collection Time: 12/15/21  8:31 AM   Result Value Ref Range    Lipase 2,578 (H) 73 - 393 U/L   CBC WITH AUTOMATED DIFF    Collection Time: 12/15/21  8:31 AM   Result Value Ref Range    WBC 12.8 (H) 3.6 - 11.0 K/uL    RBC 4.24 3.80 - 5.20 M/uL    HGB 13.1 11.5 - 16.0 g/dL    HCT 41.5 35.0 - 47.0 %    MCV 97.9 80.0 - 99.0 FL    MCH 30.9 26.0 - 34.0 PG    MCHC 31.6 30.0 - 36.5 g/dL    RDW 13.6 11.5 - 14.5 %    PLATELET 312 156 - 715 K/uL    MPV 10.9 8.9 - 12.9 FL    NRBC 0.0 0.0  WBC    ABSOLUTE NRBC 0.00 0.00 - 0.01 K/uL    NEUTROPHILS 89 (H) 32 - 75 %    LYMPHOCYTES 5 (L) 12 - 49 %    MONOCYTES 5 5 - 13 %    EOSINOPHILS 0 0 - 7 %    BASOPHILS 0 0 - 1 %    IMMATURE GRANULOCYTES 1 (H) 0 - 0.5 %    ABS. NEUTROPHILS 11.4 (H) 1.8 - 8.0 K/UL    ABS. LYMPHOCYTES 0.6 (L) 0.8 - 3.5 K/UL    ABS. MONOCYTES 0.6 0.0 - 1.0 K/UL    ABS. EOSINOPHILS 0.0 0.0 - 0.4 K/UL    ABS. BASOPHILS 0.0 0.0 - 0.1 K/UL    ABS. IMM. GRANS. 0.2 (H) 0.00 - 0.04 K/UL    DF AUTOMATED     METABOLIC PANEL, COMPREHENSIVE    Collection Time: 12/15/21  8:31 AM   Result Value Ref Range    Sodium 145 136 - 145 mmol/L    Potassium 3.7 3.5 - 5.1 mmol/L    Chloride 111 (H) 97 - 108 mmol/L    CO2 24 21 - 32 mmol/L    Anion gap 10 5 - 15 mmol/L    Glucose 76 65 - 100 mg/dL    BUN 29 (H) 6 - 20 mg/dL    Creatinine 0.87 0.55 - 1.02 mg/dL    BUN/Creatinine ratio 33 (H) 12 - 20      GFR est AA >60 >60 ml/min/1.73m2    GFR est non-AA >60 >60 ml/min/1.73m2    Calcium 9.2 8.5 - 10.1 mg/dL    Bilirubin, total 0.8 0.2 - 1.0 mg/dL    AST (SGOT) 19 15 - 37 U/L    ALT (SGPT) 19 12 - 78 U/L    Alk. phosphatase 214 (H) 45 - 117 U/L    Protein, total 4.8 (L) 6.4 - 8.2 g/dL    Albumin 1.6 (L) 3.5 - 5.0 g/dL    Globulin 3.2 2.0 - 4.0 g/dL    A-G Ratio 0.5 (L) 1.1 - 2.2            Diagnostic Images:  CTA ABDOMEN PELV W CONT   Final Result   1. Ill-defined hypodense mass in the pancreas. There are inflammatory changes   and fluid adjacent to the pancreas or duodenum and stomach most likely related   to biopsy or focal pancreatitis. No pseudocyst formation, active bleeding or   other acute findings.    2. Enlarged adrenal glands left greater than right with noncontrast densities consistent with adrenal adenomas. 3. Right nephrectomy. 4. Other findings as described.       XR CHEST PORT   Final Result   No acute findings. ABD US 12/13/21 : Small amount of free fluid. Finding suggesting hemangioma in the  liver. Gallbladder wall thickening, adenomyomatosis, sludge and gallstones. Cholecystitis possible. Finding in the region of the pancreatic head as above. Other findings as above.     Current Medications:    Current Facility-Administered Medications:     HYDROmorphone (DILAUDID) injection 1 mg, 1 mg, IntraVENous, Q3H PRN, Nikos Hummel NP, 1 mg at 12/15/21 1224    bisacodyL (DULCOLAX) suppository 10 mg, 10 mg, Rectal, DAILY PRN, Nikos Hummel NP    piperacillin-tazobactam (ZOSYN) 3.375 g in 0.9% sodium chloride (MBP/ADV) 100 mL MBP, 3.375 g, IntraVENous, Q8H, Huyen Oshea DO, Last Rate: 25 mL/hr at 12/15/21 1332, 3.375 g at 12/15/21 1332    hydrALAZINE (APRESOLINE) 20 mg/mL injection 20 mg, 20 mg, IntraVENous, Q6H PRN, Huyen Crane PA    pantoprazole (PROTONIX) 40 mg in 0.9% sodium chloride 10 mL injection, 40 mg, IntraVENous, DAILY, Huyen Crane PA, 40 mg at 12/14/21 1019    guaiFENesin ER (MUCINEX) tablet 600 mg, 600 mg, Oral, Q12H, Huyen Crane PA, 600 mg at 12/14/21 2046    albuterol-ipratropium (DUO-NEB) 2.5 MG-0.5 MG/3 ML, 3 mL, Nebulization, Q6H PRN, Huyen Crane PA    chlorthalidone (HYGROTON) tablet 25 mg, 25 mg, Oral, DAILY, Huyen Crane PA, 25 mg at 12/14/21 1018    0.9% sodium chloride infusion, 100 mL/hr, IntraVENous, CONTINUOUS, Nolberto Farah NP, Last Rate: 100 mL/hr at 12/09/21 1441, 100 mL/hr at 12/09/21 1441    albuterol (PROVENTIL HFA, VENTOLIN HFA, PROAIR HFA) inhaler 2 Puff, 2 Puff, Inhalation, Q6H PRN, Nolberto Farah NP, 2 Puff at 12/15/21 1320    ascorbic acid (vitamin C) (VITAMIN C) tablet 500 mg, 500 mg, Oral, DAILY, Nolberto Farah NP, 500 mg at 12/11/21 0900    atorvastatin (LIPITOR) tablet 40 mg, 40 mg, Oral, DAILY, Zarefoss, Nolberto A, NP, 40 mg at 12/11/21 0901    cholecalciferol (VITAMIN D3) (1000 Units /25 mcg) tablet 1,000 Units, 1,000 Units, Oral, DAILY, Zarefoss, Nolberto A, NP, 1,000 Units at 12/11/21 0900    diazePAM (VALIUM) tablet 5 mg, 5 mg, Oral, Q6H PRN, Zarefoss, Nolberto A, NP    dilTIAZem ER (CARDIZEM CD) capsule 120 mg, 120 mg, Oral, DAILY, Zarefoss, Nolberto A, NP, 120 mg at 12/15/21 5638    [Held by provider] apixaban (ELIQUIS) tablet 5 mg, 5 mg, Oral, BID, Zarefoss, Nolberto A, NP, 5 mg at 12/11/21 2153    potassium chloride SR (KLOR-CON 10) tablet 20 mEq, 20 mEq, Oral, DAILY, Zarefoss, Nolberto A, NP, 20 mEq at 12/15/21 0900    sertraline (ZOLOFT) tablet 25 mg, 25 mg, Oral, DAILY, Zarefoss, Nolberto A, NP, 25 mg at 12/15/21 0900    traZODone (DESYREL) tablet 50 mg, 50 mg, Oral, QHS, Zarefoss, Nolberto A, NP, 50 mg at 12/14/21 2100    ondansetron (ZOFRAN) injection 4 mg, 4 mg, IntraVENous, Q6H PRN, Zarefoss, Nolberto A, NP, 4 mg at 12/12/21 0215    acetaminophen (TYLENOL) tablet 650 mg, 650 mg, Oral, Q4H PRN, Zarefoss, Nolberto A, NP    0.9% sodium chloride infusion, 125 mL/hr, IntraVENous, CONTINUOUS, John Margot F, NP, Last Rate: 125 mL/hr at 12/15/21 0225, 125 mL/hr at 12/15/21 0225    prochlorperazine (COMPAZINE) with saline injection 10 mg, 10 mg, IntraVENous, Q6H PRN, Zarefoss, Nolberto A, NP, 10 mg at 12/15/21 0259       ASSESSMENT:    Hospital Course:     Jerry Estrada a 78 y. o. female with a PMH of  atrial fibrillation, PUD, CHF, COPD, renal cell carcinoma stage IV, s/p nephrectomy, GERD, COPD, sjoren's syndrome, hypertension, and depression who came to the hospital with complaints of abdominal pain, associated nausea/vomiting, poor PO intake. Pt reported having a EUS on 12/6 as outpatient by Dr. Roland Horner, biopsy was taken, rare cells present suspicious for malignancy. Initial lab work significant for elevated lipase at 3,000, WBC, urine culture pending.  CT abdomen/pelvis showing ill defined hypodense mass in the pancreas with fluid adjacent to the pancreas suggestive of focal pancreatitis. Adrenal adenomas are seen. No pseudocyst formation or active bleeding seen. Pt will be admitted for further evaluation and treatment. GI and oncology consulted. Patient started on fluids, NPO, pain and nausea medication PRN. Advance diet as tolerated. Hold Eliquis due to hematemesis will perform complete ultrasound patient is refusing CT of abdomen pelvis. Patient having reproducible chest pain will obtain EKG and troponin. Continue IV Protonix. Abd us shows hemangioma in the liver. Gallbladder wall thickening, adenomyomatosis, sludge and gallstones. Cholecystitis possible. Continue gentle IV hydration, start zosyn. Lipase levels remains elevated. US possible acute cholecystitis in the setting of acute pancreatitis, cholecystitis is likely a sequelae of pancreatic inflammation, not an operative candidate for cholecystectomy at this point. 1. Pancreatitis:  2. Hematemesis:  -us shows hemangioma in the liver. Gallbladder wall thickening, adenomyomatosis, sludge and gallstones. Cholecystitis possible  -general surgeon following  -maintain npo status, ice chips only  -gentle IV hydration, IV zosyn  -prn antiemetics and pain management (conservative management)  -hem/onc following  -lipase  2578 <-2066 <-3000  -Alk Phos 214 <-186 <-193    3. Pancreatic mass:  -s/p FNA via EUS which was suspicious for neoplasia but not diagnostic  -outpatient oncology fu warranted  -no further workup inpatient    4. Atrial fibrillation:  -managed w/ eliquis, cardizem, hygroton    5. CHF:  -managed w/ eliquis, cardizem, hygroton    6. COPD:  -appears stable  -continue home O2 2L NC @ baseline  -continue mucinex, prn albuterol    7. Depression  -continue trazodone and zoloft    8.  Hyperlipidemia  -continue lipitor 40mg        Full Code  Dvt Prophylaxis eliquis held, scd's  GI Prophylaxis protonix  Disposition:  -pending improvement in LFTs and pain  -pain management, gentle IV hydration  -pending general clearance  -conservative management      Above treatment plan reviewed and discussed with patient in detail at bedside, all questions answered. Care Plan discussed with: Interdisciplinary team    Total time spent with patient: 35 minutes.     Gustavo Childs NP

## 2021-12-15 NOTE — PROGRESS NOTES
Progress Note    Patient: Hill Underwood MRN: [id number]  SSN: [id number]    YOB: 1945  Age: 78 y.o. Sex: female      Admit Date: 12/9/2021    LOS: 6 days     Subjective:     Patient seen and examined, pain remains stable but severe. Nausea/vomiting resolved. Medications helping with nausea and pain. In better spirits today. No flatus or bowel movements. Objective:     Vitals:    12/15/21 0230 12/15/21 0702 12/15/21 0853 12/15/21 0855   BP: (!) 140/79 (!) 140/70     Pulse: 100 100     Resp: 18 18     Temp: 98 °F (36.7 °C) 98.8 °F (37.1 °C)     SpO2: 97% 98% 95% 95%   Weight:       Height:            Intake and Output:  Current Shift: No intake/output data recorded. Last three shifts: 12/13 1901 - 12/15 0700  In: 1700 [P.O.:100; I.V.:1600]  Out: 550 [Urine:550]    Physical Exam:   General: alert, oriented, in no acute distress  Cardiovascular: regular rate and rhythm  Pulmonary: unlabored breathing, equal chest rise bilaterally, on supplemental O2  Abdomen: distended, severe TTP epigastrum and lower abdomen with rebound  Extremities: no swelling, pulses equal and palpable in all extremities    Lab/Data Review:  Recent Results (from the past 24 hour(s))   LIPASE    Collection Time: 12/14/21 12:26 PM   Result Value Ref Range    Lipase 2,066 (H) 73 - 393 U/L   CBC WITH AUTOMATED DIFF    Collection Time: 12/14/21 12:26 PM   Result Value Ref Range    WBC 12.2 (H) 3.6 - 11.0 K/uL    RBC 4.14 3.80 - 5.20 M/uL    HGB 12.6 11.5 - 16.0 g/dL    HCT 40.0 35.0 - 47.0 %    MCV 96.6 80.0 - 99.0 FL    MCH 30.4 26.0 - 34.0 PG    MCHC 31.5 30.0 - 36.5 g/dL    RDW 13.6 11.5 - 14.5 %    PLATELET 064 299 - 710 K/uL    MPV 10.8 8.9 - 12.9 FL    NRBC 0.0 0.0  WBC    ABSOLUTE NRBC 0.00 0.00 - 0.01 K/uL    NEUTROPHILS 89 (H) 32 - 75 %    LYMPHOCYTES 5 (L) 12 - 49 %    MONOCYTES 5 5 - 13 %    EOSINOPHILS 0 0 - 7 %    BASOPHILS 0 0 - 1 %    IMMATURE GRANULOCYTES 1 (H) 0 - 0.5 %    ABS.  NEUTROPHILS 10.8 (H) 1.8 - 8.0 K/UL    ABS. LYMPHOCYTES 0.6 (L) 0.8 - 3.5 K/UL    ABS. MONOCYTES 0.7 0.0 - 1.0 K/UL    ABS. EOSINOPHILS 0.0 0.0 - 0.4 K/UL    ABS. BASOPHILS 0.0 0.0 - 0.1 K/UL    ABS. IMM. GRANS. 0.1 (H) 0.00 - 0.04 K/UL    DF AUTOMATED     METABOLIC PANEL, COMPREHENSIVE    Collection Time: 12/14/21 12:26 PM   Result Value Ref Range    Sodium 142 136 - 145 mmol/L    Potassium 3.6 3.5 - 5.1 mmol/L    Chloride 107 97 - 108 mmol/L    CO2 27 21 - 32 mmol/L    Anion gap 8 5 - 15 mmol/L    Glucose 76 65 - 100 mg/dL    BUN 24 (H) 6 - 20 mg/dL    Creatinine 0.89 0.55 - 1.02 mg/dL    BUN/Creatinine ratio 27 (H) 12 - 20      GFR est AA >60 >60 ml/min/1.73m2    GFR est non-AA >60 >60 ml/min/1.73m2    Calcium 8.6 8.5 - 10.1 mg/dL    Bilirubin, total 0.8 0.2 - 1.0 mg/dL    AST (SGOT) 17 15 - 37 U/L    ALT (SGPT) 21 12 - 78 U/L    Alk. phosphatase 186 (H) 45 - 117 U/L    Protein, total 4.7 (L) 6.4 - 8.2 g/dL    Albumin 1.8 (L) 3.5 - 5.0 g/dL    Globulin 2.9 2.0 - 4.0 g/dL    A-G Ratio 0.6 (L) 1.1 - 2.2     LIPASE    Collection Time: 12/15/21  8:31 AM   Result Value Ref Range    Lipase 2,578 (H) 73 - 393 U/L   CBC WITH AUTOMATED DIFF    Collection Time: 12/15/21  8:31 AM   Result Value Ref Range    WBC 12.8 (H) 3.6 - 11.0 K/uL    RBC 4.24 3.80 - 5.20 M/uL    HGB 13.1 11.5 - 16.0 g/dL    HCT 41.5 35.0 - 47.0 %    MCV 97.9 80.0 - 99.0 FL    MCH 30.9 26.0 - 34.0 PG    MCHC 31.6 30.0 - 36.5 g/dL    RDW 13.6 11.5 - 14.5 %    PLATELET 304 042 - 818 K/uL    MPV 10.9 8.9 - 12.9 FL    NRBC 0.0 0.0  WBC    ABSOLUTE NRBC 0.00 0.00 - 0.01 K/uL    NEUTROPHILS 89 (H) 32 - 75 %    LYMPHOCYTES 5 (L) 12 - 49 %    MONOCYTES 5 5 - 13 %    EOSINOPHILS 0 0 - 7 %    BASOPHILS 0 0 - 1 %    IMMATURE GRANULOCYTES 1 (H) 0 - 0.5 %    ABS. NEUTROPHILS 11.4 (H) 1.8 - 8.0 K/UL    ABS. LYMPHOCYTES 0.6 (L) 0.8 - 3.5 K/UL    ABS. MONOCYTES 0.6 0.0 - 1.0 K/UL    ABS. EOSINOPHILS 0.0 0.0 - 0.4 K/UL    ABS. BASOPHILS 0.0 0.0 - 0.1 K/UL    ABS. IMM.  GRANS. 0.2 (H) 0.00 - 0.04 K/UL    DF AUTOMATED     METABOLIC PANEL, COMPREHENSIVE    Collection Time: 12/15/21  8:31 AM   Result Value Ref Range    Sodium 145 136 - 145 mmol/L    Potassium 3.7 3.5 - 5.1 mmol/L    Chloride 111 (H) 97 - 108 mmol/L    CO2 24 21 - 32 mmol/L    Anion gap 10 5 - 15 mmol/L    Glucose 76 65 - 100 mg/dL    BUN 29 (H) 6 - 20 mg/dL    Creatinine 0.87 0.55 - 1.02 mg/dL    BUN/Creatinine ratio 33 (H) 12 - 20      GFR est AA >60 >60 ml/min/1.73m2    GFR est non-AA >60 >60 ml/min/1.73m2    Calcium 9.2 8.5 - 10.1 mg/dL    Bilirubin, total 0.8 0.2 - 1.0 mg/dL    AST (SGOT) 19 15 - 37 U/L    ALT (SGPT) 19 12 - 78 U/L    Alk. phosphatase 214 (H) 45 - 117 U/L    Protein, total 4.8 (L) 6.4 - 8.2 g/dL    Albumin 1.6 (L) 3.5 - 5.0 g/dL    Globulin 3.2 2.0 - 4.0 g/dL    A-G Ratio 0.5 (L) 1.1 - 2.2            Assessment:     Active Problems:    A-fib ([facility]) (11/16/2020)      [facility] (11[facility]16[facility]2020)      Pancreatitis (12/9/2021)      Hematemesis (12/12/2021)      History of peptic ulcer disease (12/12/2021)      Possible ileus given no true bowel function for several days    Plan:     Diet: NPO, ice chips only   Continue conservative management of pancreatitis  US reviewed- possible acute cholecystitis in the setting of acute pancreatitis, cholecystitis is likely a sequelae of pancreatic inflammation, not an operative candidate for cholecystectomy at this point. Zosyn started.   DVT/GI prophylaxis  Ambulate  Pain and nausea control  Heme/onc evaluation noted  Patient will need to have follow up with surgical oncology    Signed By: Royer Oshea,      December 15, 2021

## 2021-12-15 NOTE — MED STUDENT NOTES
Progress Note    Patient: Alison Hassan MRN: [id number]  SSN: [id number]    YOB: 1945  Age: 78 y.o. Sex: female      Admit Date: 12/9/2021    LOS: 6 days     Subjective:    Patient was seen resting in bed. Her pain is across the epigastric region and in RUQ. It diminishes with pain medication but pt states it is severe. She reports some nausea but no vomiting. Objective:     Vitals:    12/15/21 0230 12/15/21 0702 12/15/21 0853 12/15/21 0855   BP: (!) 140/79 (!) 140/70     Pulse: 100 100     Resp: 18 18     Temp: 98 °F (36.7 °C) 98.8 °F (37.1 °C)     SpO2: 97% 98% 95% 95%   Weight:       Height:            Intake and Output:  Current Shift: No intake/output data recorded. Last three shifts: 12/13 1901 - 12/15 0700  In: 1700 [P.O.:100; I.V.:1600]  Out: 550 [Urine:550]    Physical Exam:   General: alert, oriented, in no acute distress  Neck: supple, no masses, no JVD  Cardiovascular: regular rate and rhythm  Pulmonary: unlabored breathing, equal chest rise bilaterally, no wheezing or rhonchi  Abdomen: soft, severe TTP of epigastrium and RUQ   Extremities: no swelling, pulses equal and palpable in all extremities    Lab/Data Review:  Recent Results (from the past 24 hour(s))   LIPASE    Collection Time: 12/14/21 12:26 PM   Result Value Ref Range    Lipase 2,066 (H) 73 - 393 U/L   CBC WITH AUTOMATED DIFF    Collection Time: 12/14/21 12:26 PM   Result Value Ref Range    WBC 12.2 (H) 3.6 - 11.0 K/uL    RBC 4.14 3.80 - 5.20 M/uL    HGB 12.6 11.5 - 16.0 g/dL    HCT 40.0 35.0 - 47.0 %    MCV 96.6 80.0 - 99.0 FL    MCH 30.4 26.0 - 34.0 PG    MCHC 31.5 30.0 - 36.5 g/dL    RDW 13.6 11.5 - 14.5 %    PLATELET 923 365 - 962 K/uL    MPV 10.8 8.9 - 12.9 FL    NRBC 0.0 0.0  WBC    ABSOLUTE NRBC 0.00 0.00 - 0.01 K/uL    NEUTROPHILS 89 (H) 32 - 75 %    LYMPHOCYTES 5 (L) 12 - 49 %    MONOCYTES 5 5 - 13 %    EOSINOPHILS 0 0 - 7 %    BASOPHILS 0 0 - 1 %    IMMATURE GRANULOCYTES 1 (H) 0 - 0.5 %    ABS. NEUTROPHILS 10.8 (H) 1.8 - 8.0 K/UL    ABS. LYMPHOCYTES 0.6 (L) 0.8 - 3.5 K/UL    ABS. MONOCYTES 0.7 0.0 - 1.0 K/UL    ABS. EOSINOPHILS 0.0 0.0 - 0.4 K/UL    ABS. BASOPHILS 0.0 0.0 - 0.1 K/UL    ABS. IMM. GRANS. 0.1 (H) 0.00 - 0.04 K/UL    DF AUTOMATED     METABOLIC PANEL, COMPREHENSIVE    Collection Time: 12/14/21 12:26 PM   Result Value Ref Range    Sodium 142 136 - 145 mmol/L    Potassium 3.6 3.5 - 5.1 mmol/L    Chloride 107 97 - 108 mmol/L    CO2 27 21 - 32 mmol/L    Anion gap 8 5 - 15 mmol/L    Glucose 76 65 - 100 mg/dL    BUN 24 (H) 6 - 20 mg/dL    Creatinine 0.89 0.55 - 1.02 mg/dL    BUN/Creatinine ratio 27 (H) 12 - 20      GFR est AA >60 >60 ml/min/1.73m2    GFR est non-AA >60 >60 ml/min/1.73m2    Calcium 8.6 8.5 - 10.1 mg/dL    Bilirubin, total 0.8 0.2 - 1.0 mg/dL    AST (SGOT) 17 15 - 37 U/L    ALT (SGPT) 21 12 - 78 U/L    Alk. phosphatase 186 (H) 45 - 117 U/L    Protein, total 4.7 (L) 6.4 - 8.2 g/dL    Albumin 1.8 (L) 3.5 - 5.0 g/dL    Globulin 2.9 2.0 - 4.0 g/dL    A-G Ratio 0.6 (L) 1.1 - 2.2            Assessment:     Active Problems:    A-fib (HCC) (11/16/2020)      CHF (congestive heart failure) ([facility] [address] (11/16/2020)      Pancreatitis (12/9/2021)      Hematemesis (12/12/2021)      History of peptic ulcer disease (12/12/2021)            Plan:     Diet: NPO, ice chips only   Continue conservative management of pancreatitis - today's labs pending, yesterday lipase trending down at 2,066  US reviewed- possible acute cholecystitis in the setting of acute pancreatitis, cholecystitis is likely a sequelae of pancreatic inflammation, not an operative candidate for cholecystectomy at this point. Zosyn started.   EKG on 12/13 showed Afib change from normal sinus rhythm - possibly restart eliquis   DVT/GI prophylaxis  Ambulate  Pain and nausea control  Heme/onc evaluation noted  Patient will need to have follow up with surgical oncology    Signed By: Vijaya Conrad     December 15, 2021      *ATTENTION:  This note has been created by a medical student for educational purposes only. Please do not refer to the content of this note for clinical decision-making, billing, or other purposes. Please see attending physicians note to obtain clinical information on this patient. *

## 2021-12-15 NOTE — PROGRESS NOTES
Progress Note    Patient: Nikki Ley MRN: [id number]  SSN: [id number]    YOB: 1945  Age: 78 y.o. Sex: female      Admit Date: 12/9/2021    LOS: 6 days     Subjective:   GI in consultation for Pancreatitis. Patient resting at time of exam. She does continue with abdominal pain but pain medication is working. Denies flatus or bowel movements. Abdomen is soft. Lipase remains elevated but slightly improved. She is on IV hydration. She remains NPO except for ice chips. No reports of hematemesis. HgB stable at 13.1 this am.    Abdominal Ultrasound 12/13/2021: IMPRESSION  Small amount of free fluid.  Finding suggesting hemangioma in the  liver.  Gallbladder wall thickening, adenomyomatosis, sludge and gallstones. Cholecystitis possible.  Finding in the region of the pancreatic head as above. Other findings as above.     12/9 GI consult note:  History of Present Illness: Nevin Cole is a 78 y. o. female who is seen in consultation for pancreatitis. Liz Lamar has a past medical history of  Paroxysmal Atrial Fibrillation, CHF, COPD, renal cell carcinoma Stage IV s/p nephrectomy, GERD sjoren's syndrome, hypertension, and depression. Patient under went EUS on 12/6/2021 showing 2.7 X3 cm lesion in the area of head of pancreas with dilation of the Pancreatic duct abutting the portal vein but not involving the SMA or AMV ; Tumor T2 by endosonographic criteria ; one small lymph node in the area of head of pancreas. Pathology shows rare cells present suspicious for malignancy.   She had an ERCP on 11/4 2021 ERCP; with sphincterotomy/papillotomy ERCP; with placement of endoscopic stent into biliary or pancreatic duct, including pre and postdilation and guide wire passage, when performed, including sphincterotomy, when performed, each stent. . Patient had a PET scan on 11/22/21 which shows a lesion in the pancreatic head consistent with malignancy.  CTA of abdomen shows ill defined hypodense mass in the pancreas. There are inflammatory changes. Patient states she experienced nausea, vomiting and diarrhea since Monday. Her abdominal pain has progressively gotten worse. She reports a poor appetite. She does complain of increased \"burping\". Total bilirubin 1.4, AsT 36, ALT 31, Alk Phos. 217, Lipase > 3,000. Plan nothing by mouth except ice chips and sips of water with medication. IV hydration. Pain management     PET Scan 11/22/2021: IMPRESSION  1.  FDG avid lesion in the pancreatic head consistent with malignancy. 2.  Subcentimeter focus of FDG uptake in the liver, indeterminate. 3.  FDG uptake associated with density expanding the right facet at C5-C6,  possibly due to an ectatic artery. EUS on 12/6/2021 showing 2.7 X3 cm lesion in the area of head of pancreas with dilation of the Pancreatic duct abutting the portal vein but not involving the SMA or AMV ; Tumor T2 by endosonographic criteria ; one small lymph node in the area of head of pancreas. CTA abdomen 12/9/2021: IMPRESSION  1. Ill-defined hypodense mass in the pancreas. There are inflammatory changes and fluid adjacent to the pancreas or duodenum and stomach most likely related to biopsy or focal pancreatitis. No pseudocyst formation, active bleeding or other acute findings. 2. Enlarged adrenal glands left greater than right with noncontrast densities  consistent with adrenal adenomas. 3. Right nephrectomy. 4. Other findings as described. Objective:     Vitals:    12/15/21 0853 12/15/21 0855 12/15/21 1322 12/15/21 1453   BP:    138/72   Pulse:    75   Resp:    18   Temp:    98.7 °F (37.1 °C)   SpO2: 95% 95% 95% 97%   Weight:       Height:            Intake and Output:  Current Shift: No intake/output data recorded. Last three shifts: 12/13 1901 - 12/15 0700  In: 1700 [P.O.:100; I.V.:1600]  Out: 550 [Urine:550]    Physical Exam:   Skin:  Extremities and face reveal no rashes. No chapin erythema. HEENT: Sclerae anicteric.  Extra-occular muscles are intact. No abnormal pigmentation of the lips. The neck is supple. Cardiovascular: Regular rate and rhythm. Respiratory:  Comfortable breathing with no accessory muscle use. GI:  Abdomen nondistended, soft, and nontender. No enlargement of the liver or spleen. No masses palpable. Rectal:  Deferred  Musculoskeletal: generalized weakness  Neurological:  Gross memory appears intact. Patient is alert and oriented. Psychiatric:  sleepy  Lymphatic:  No visible adenopathy      Lab/Data Review:  Recent Results (from the past 24 hour(s))   LIPASE    Collection Time: 12/15/21  8:31 AM   Result Value Ref Range    Lipase 2,578 (H) 73 - 393 U/L   CBC WITH AUTOMATED DIFF    Collection Time: 12/15/21  8:31 AM   Result Value Ref Range    WBC 12.8 (H) 3.6 - 11.0 K/uL    RBC 4.24 3.80 - 5.20 M/uL    HGB 13.1 11.5 - 16.0 g/dL    HCT 41.5 35.0 - 47.0 %    MCV 97.9 80.0 - 99.0 FL    MCH 30.9 26.0 - 34.0 PG    MCHC 31.6 30.0 - 36.5 g/dL    RDW 13.6 11.5 - 14.5 %    PLATELET 740 522 - 297 K/uL    MPV 10.9 8.9 - 12.9 FL    NRBC 0.0 0.0  WBC    ABSOLUTE NRBC 0.00 0.00 - 0.01 K/uL    NEUTROPHILS 89 (H) 32 - 75 %    LYMPHOCYTES 5 (L) 12 - 49 %    MONOCYTES 5 5 - 13 %    EOSINOPHILS 0 0 - 7 %    BASOPHILS 0 0 - 1 %    IMMATURE GRANULOCYTES 1 (H) 0 - 0.5 %    ABS. NEUTROPHILS 11.4 (H) 1.8 - 8.0 K/UL    ABS. LYMPHOCYTES 0.6 (L) 0.8 - 3.5 K/UL    ABS. MONOCYTES 0.6 0.0 - 1.0 K/UL    ABS. EOSINOPHILS 0.0 0.0 - 0.4 K/UL    ABS. BASOPHILS 0.0 0.0 - 0.1 K/UL    ABS. IMM.  GRANS. 0.2 (H) 0.00 - 0.04 K/UL    DF AUTOMATED     METABOLIC PANEL, COMPREHENSIVE    Collection Time: 12/15/21  8:31 AM   Result Value Ref Range    Sodium 145 136 - 145 mmol/L    Potassium 3.7 3.5 - 5.1 mmol/L    Chloride 111 (H) 97 - 108 mmol/L    CO2 24 21 - 32 mmol/L    Anion gap 10 5 - 15 mmol/L    Glucose 76 65 - 100 mg/dL    BUN 29 (H) 6 - 20 mg/dL    Creatinine 0.87 0.55 - 1.02 mg/dL    BUN/Creatinine ratio 33 (H) 12 - 20      GFR est AA >60 >60 ml/min/1.73m2 GFR est non-AA >60 >60 ml/min/1.73m2    Calcium 9.2 8.5 - 10.1 mg/dL    Bilirubin, total 0.8 0.2 - 1.0 mg/dL    AST (SGOT) 19 15 - 37 U/L    ALT (SGPT) 19 12 - 78 U/L    Alk. phosphatase 214 (H) 45 - 117 U/L    Protein, total 4.8 (L) 6.4 - 8.2 g/dL    Albumin 1.6 (L) 3.5 - 5.0 g/dL    Globulin 3.2 2.0 - 4.0 g/dL    A-G Ratio 0.5 (L) 1.1 - 2.2                US ABD LTD   Final Result   Small amount of free fluid. Finding suggesting hemangioma in the   liver. Gallbladder wall thickening, adenomyomatosis, sludge and gallstones. Cholecystitis possible. Finding in the region of the pancreatic head as above. Other findings as above. CTA ABDOMEN PELV W CONT   Final Result   1. Ill-defined hypodense mass in the pancreas. There are inflammatory changes   and fluid adjacent to the pancreas or duodenum and stomach most likely related   to biopsy or focal pancreatitis. No pseudocyst formation, active bleeding or   other acute findings. 2. Enlarged adrenal glands left greater than right with noncontrast densities   consistent with adrenal adenomas. 3. Right nephrectomy. 4. Other findings as described. XR CHEST PORT   Final Result   No acute findings. Assessment:     Principal Problem:    Pancreatitis (12/9/2021)    Active Problems:    A-fib ([address] (11/16/2020)      CHF (congestive heart failure) ([address] (11/16/2020)      Hematemesis (12/12/2021)      History of peptic ulcer disease (12/12/2021)        Plan:   1. Pancreatitis      -NPO with ice chips      -IV hydration @ 125 ml/hr      -Lipase 2,578      -Lipase in the am       -Dilaudid 1 mg every 3 hours as needed for pain  2. CHF      -Started on Chlorthalidone  3. COPD       -Continue home medications       -Albuterol as needed  4.  Pancreatic Mass       > 4000      S/p fine needle aspiration suspicious for neoplasia but not diagnostic      When stable Oncology plan for out patient followup with advanced oncology surgeon  At  or [facility] for resection considerations  5. Hematemesis (resolved)     Monitor H&H     Transfuse as needed     hgB 12.6      Continue Protonix 40 mg daily  6. Afib     EKG on 12/13/21 shows Afib has replaced Sinus Rhythm     Rate is controlled     May consider restarting eliquis, no further hematemesis reported     HgB stable at 13.1    Thank you for allowing me to participate in this patients care. Plan discussed with Dr. Ovidio Travis and he approves.     Signed By: Sarah Graham NP     December 15, 2021

## 2021-12-15 NOTE — PROGRESS NOTES
PT tamar attempted this AM, patient politely  adamantly declined therapy and asked PT to leave the room as I was trying to talk to her about the importance of mobility and the MD has put in orders for therapy for her to start moving a little bit. She reasoned that \"I just got purwick yesterday  So that I don't have to get up\"unable to convince her w/o getting upset to work with therapy.

## 2021-12-16 NOTE — PROGRESS NOTES
Hospitalist Progress Note         CHANELLE Pop, FNP-C    Daily Progress Note: 12/16/2021      Subjective:   Subjective   Patient examined alert and oriented lying in bed  Reports continued abdominal pain, NO n/v  She is upset because she wants a coke, explained to her the importance of no caffeinated beverages and allow the gut to rest. Patient states that she's going to die if she can't get a coke and if she dies she will michell the hospital  No acute distress noted on examination    Review of Systems:   Review of Systems   Constitutional: Negative. HENT: Negative. Eyes: Negative. Respiratory: Negative. Cardiovascular: Negative. Gastrointestinal: Positive for abdominal pain. Genitourinary: Negative. Musculoskeletal: Negative. Skin: Negative. Neurological: Negative. Endo/Heme/Allergies: Negative. Psychiatric/Behavioral: Negative. Objective:   Objective      Vitals:  Patient Vitals for the past 12 hrs:   Temp Pulse Resp BP SpO2   12/16/21 0847 98.6 °F (37 °C) 97 18 124/62 96 %   12/16/21 0736 -- -- -- -- 94 %   12/16/21 0126 98 °F (36.7 °C) 94 18 115/71 94 %        Physical Exam:  Physical Exam  Vitals and nursing note reviewed. HENT:      Mouth/Throat:      Mouth: Mucous membranes are moist.   Eyes:      Conjunctiva/sclera: Conjunctivae normal.   Cardiovascular:      Rate and Rhythm: Normal rate. Pulses: Normal pulses. Pulmonary:      Comments: 2L NC  Abdominal:      General: Bowel sounds are normal.      Tenderness: There is abdominal tenderness. Comments: Left quadrant tenderness   Musculoskeletal:         General: Normal range of motion. Skin:     General: Skin is warm and dry. Neurological:      Mental Status: She is alert and oriented to person, place, and time.    Psychiatric:         Mood and Affect: Mood normal.          Lab Results:  Recent Results (from the past 24 hour(s))   LIPASE    Collection Time: 12/16/21  7:35 AM   Result Value Ref Range    Lipase >3,000 (H) 73 - 393 U/L   CBC WITH AUTOMATED DIFF    Collection Time: 12/16/21  7:35 AM   Result Value Ref Range    WBC 11.7 (H) 3.6 - 11.0 K/uL    RBC 4.11 3.80 - 5.20 M/uL    HGB 12.5 11.5 - 16.0 g/dL    HCT 40.2 35.0 - 47.0 %    MCV 97.8 80.0 - 99.0 FL    MCH 30.4 26.0 - 34.0 PG    MCHC 31.1 30.0 - 36.5 g/dL    RDW 13.7 11.5 - 14.5 %    PLATELET 667 153 - 422 K/uL    MPV 10.8 8.9 - 12.9 FL    NRBC 0.0 0.0  WBC    ABSOLUTE NRBC 0.00 0.00 - 0.01 K/uL    NEUTROPHILS 89 (H) 32 - 75 %    LYMPHOCYTES 5 (L) 12 - 49 %    MONOCYTES 5 5 - 13 %    EOSINOPHILS 0 0 - 7 %    BASOPHILS 0 0 - 1 %    IMMATURE GRANULOCYTES 1 (H) 0 - 0.5 %    ABS. NEUTROPHILS 10.3 (H) 1.8 - 8.0 K/UL    ABS. LYMPHOCYTES 0.6 (L) 0.8 - 3.5 K/UL    ABS. MONOCYTES 0.6 0.0 - 1.0 K/UL    ABS. EOSINOPHILS 0.0 0.0 - 0.4 K/UL    ABS. BASOPHILS 0.0 0.0 - 0.1 K/UL    ABS. IMM. GRANS. 0.2 (H) 0.00 - 0.04 K/UL    DF AUTOMATED     METABOLIC PANEL, COMPREHENSIVE    Collection Time: 12/16/21  7:35 AM   Result Value Ref Range    Sodium 145 136 - 145 mmol/L    Potassium 3.6 3.5 - 5.1 mmol/L    Chloride 113 (H) 97 - 108 mmol/L    CO2 25 21 - 32 mmol/L    Anion gap 7 5 - 15 mmol/L    Glucose 93 65 - 100 mg/dL    BUN 32 (H) 6 - 20 mg/dL    Creatinine 0.93 0.55 - 1.02 mg/dL    BUN/Creatinine ratio 34 (H) 12 - 20      GFR est AA >60 >60 ml/min/1.73m2    GFR est non-AA 59 (L) >60 ml/min/1.73m2    Calcium 9.2 8.5 - 10.1 mg/dL    Bilirubin, total 0.6 0.2 - 1.0 mg/dL    AST (SGOT) 17 15 - 37 U/L    ALT (SGPT) 18 12 - 78 U/L    Alk. phosphatase 213 (H) 45 - 117 U/L    Protein, total 4.6 (L) 6.4 - 8.2 g/dL    Albumin 1.6 (L) 3.5 - 5.0 g/dL    Globulin 3.0 2.0 - 4.0 g/dL    A-G Ratio 0.5 (L) 1.1 - 2.2            Diagnostic Images:  CTA ABDOMEN PELV W CONT   Final Result   1. Ill-defined hypodense mass in the pancreas.  There are inflammatory changes   and fluid adjacent to the pancreas or duodenum and stomach most likely related   to biopsy or focal pancreatitis. No pseudocyst formation, active bleeding or   other acute findings. 2. Enlarged adrenal glands left greater than right with noncontrast densities   consistent with adrenal adenomas. 3. Right nephrectomy. 4. Other findings as described.       XR CHEST PORT   Final Result   No acute findings. ABD US 12/13/21 : Small amount of free fluid. Finding suggesting hemangioma in the  liver. Gallbladder wall thickening, adenomyomatosis, sludge and gallstones. Cholecystitis possible. Finding in the region of the pancreatic head as above. Other findings as above.     Current Medications:    Current Facility-Administered Medications:     HYDROmorphone (DILAUDID) injection 1 mg, 1 mg, IntraVENous, Q3H PRN, Verneice Pain, NP, 1 mg at 12/16/21 0916    bisacodyL (DULCOLAX) suppository 10 mg, 10 mg, Rectal, DAILY PRN, Verneice Pain, NP    piperacillin-tazobactam (ZOSYN) 3.375 g in 0.9% sodium chloride (MBP/ADV) 100 mL MBP, 3.375 g, IntraVENous, Q8H, Huyen Oshea, DO, Last Rate: 25 mL/hr at 12/16/21 0553, 3.375 g at 12/16/21 0553    hydrALAZINE (APRESOLINE) 20 mg/mL injection 20 mg, 20 mg, IntraVENous, Q6H PRN, Huyen Crane PA    pantoprazole (PROTONIX) 40 mg in 0.9% sodium chloride 10 mL injection, 40 mg, IntraVENous, DAILY, Huyen Crane PA, 40 mg at 12/16/21 0911    guaiFENesin ER (MUCINEX) tablet 600 mg, 600 mg, Oral, Q12H, Huyen Crane PA, 600 mg at 12/16/21 0901    albuterol-ipratropium (DUO-NEB) 2.5 MG-0.5 MG/3 ML, 3 mL, Nebulization, Q6H PRN, Huyen Crane PA    chlorthalidone (HYGROTON) tablet 25 mg, 25 mg, Oral, DAILY, Huyen Crane PA, 25 mg at 12/16/21 0901    0.9% sodium chloride infusion, 100 mL/hr, IntraVENous, CONTINUOUS, Zarefoss, Nolberto A, NP, Last Rate: 100 mL/hr at 12/16/21 0112, 100 mL/hr at 12/16/21 0112    albuterol (PROVENTIL HFA, VENTOLIN HFA, PROAIR HFA) inhaler 2 Puff, 2 Puff, Inhalation, Q6H PRN, Nolberto Farah NP, 2 Puff at 12/16/21 0738    ascorbic acid (vitamin C) (VITAMIN C) tablet 500 mg, 500 mg, Oral, DAILY, Zarefoss Jan A, NP, 500 mg at 12/11/21 0900    atorvastatin (LIPITOR) tablet 40 mg, 40 mg, Oral, DAILY, Zarefoss, Jan A, NP, 40 mg at 12/11/21 0901    cholecalciferol (VITAMIN D3) (1000 Units /25 mcg) tablet 1,000 Units, 1,000 Units, Oral, DAILY, Zarefoss Jan A, NP, 1,000 Units at 12/16/21 0900    diazePAM (VALIUM) tablet 5 mg, 5 mg, Oral, Q6H PRN, Zarefoss Jan A, NP, 5 mg at 12/15/21 1758    dilTIAZem ER (CARDIZEM CD) capsule 120 mg, 120 mg, Oral, DAILY, Zarefoss, Jan A, NP, 120 mg at 12/16/21 0900    [Held by provider] apixaban (ELIQUIS) tablet 5 mg, 5 mg, Oral, BID, Zarefoss Jan A, NP, 5 mg at 12/11/21 2153    potassium chloride SR (KLOR-CON 10) tablet 20 mEq, 20 mEq, Oral, DAILY, Zarefrojas Nolberto A, NP, 20 mEq at 12/16/21 0900    sertraline (ZOLOFT) tablet 25 mg, 25 mg, Oral, DAILY, Zarefoss Jan A, NP, 25 mg at 12/16/21 0901    traZODone (DESYREL) tablet 50 mg, 50 mg, Oral, QHS, Zarefoss Jan A, NP, 50 mg at 12/15/21 2050    [facility]) injection 4 mg, 4 mg, IntraVENous, Q6H PRN, Zarefrojas Nolberto A, NP, 4 mg at 12/16/21 0116    acetaminophen (TYLENOL) tablet 650 mg, 650 mg, Oral, Q4H PRN, Zabel Nolberto A, NP    [Held by provider] 0.9% sodium chloride infusion, 125 mL/hr, IntraVENous, CONTINUOUS, Uma ALMEIDA NP, Last Rate: 125 mL/hr at 12/15/21 0225, 125 mL/hr at 12/15/21 0225    prochlorperazine (COMPAZINE) with saline injection 10 mg, 10 mg, IntraVENous, Q6H PRN, Nolberto Farah NP, 10 mg at 12/15/21 0259       ASSESSMENT:    Hospital Course:     Isa montoya 78 y. o. female with a PMH of  atrial fibrillation, PUD, CHF, COPD, renal cell carcinoma stage IV, s/p nephrectomy, GERD, COPD, sjoren's syndrome, hypertension, and depression who came to the hospital with complaints of abdominal pain, associated nausea/vomiting, poor PO intake.  Pt reported having a EUS on 12/6 as outpatient by Dr. Mayito Chang, biopsy was taken, rare cells present suspicious for malignancy. Initial lab work significant for elevated lipase at 3,000, WBC, urine culture pending. CT abdomen/pelvis showing ill defined hypodense mass in the pancreas with fluid adjacent to the pancreas suggestive of focal pancreatitis. Adrenal adenomas are seen. No pseudocyst formation or active bleeding seen. Pt will be admitted for further evaluation and treatment. GI and oncology consulted. Patient started on fluids, NPO, pain and nausea medication PRN. Advance diet as tolerated. Hold Eliquis due to hematemesis will perform complete ultrasound patient is refusing CT of abdomen pelvis. Patient having reproducible chest pain will obtain EKG and troponin. Continue IV Protonix. Abd us shows hemangioma in the liver. Gallbladder wall thickening, adenomyomatosis, sludge and gallstones. Cholecystitis possible. Continue gentle IV hydration, start zosyn. Lipase levels remains elevated. US possible acute cholecystitis in the setting of acute pancreatitis, cholecystitis is likely a sequelae of pancreatic inflammation, not an operative candidate for cholecystectomy at this point. 1. Pancreatitis:  2. Hematemesis (resolved):  -us shows hemangioma in the liver. Gallbladder wall thickening, adenomyomatosis, sludge and gallstones. Cholecystitis possible  -general surgeon following  -maintain npo status, ice chips only  -gentle IV hydration, IV zosyn  -prn antiemetics and pain management (conservative management)  -hem/onc following  -currently on dilaudid 1mg q 3hrs   -lipase  >3000<- 2578 <-2066 <-3000  -Alk Phos 213<- 214 <-186 <-193  -Obtain repeat CT abdomen/pelvis with contrast    3. Pancreatic mass:  -CA 19-9-->4000  -s/p FNA via EUS which was suspicious for neoplasia but not diagnostic  -outpatient oncology fu warranted  -no further workup inpatient    4.  Atrial fibrillation:  -managed w/ eliquis, cardizem, hygroton  -rate controled  -eliquis currently held due to hematemesis    5. CHF:  -managed w/ eliquis, cardizem, hygroton    6. COPD:  -appears stable  -continue home O2 2L NC @ baseline  -continue mucinex, prn albuterol    7. Depression  -continue trazodone and zoloft    8. Hyperlipidemia  -continue lipitor 40mg        Full Code  Dvt Prophylaxis eliquis held, scd's  GI Prophylaxis protonix  Disposition:  -pending improvement in LFTs and pain  -pain management, gentle IV hydration  -pending general clearance  -conservative management  -Obtain repeat CT abdomen/pelvis with contrast      Above treatment plan reviewed and discussed with patient in detail at bedside, all questions answered. Care Plan discussed with: Interdisciplinary team    Total time spent with patient: 35 minutes.     Leonor Duncan NP

## 2021-12-16 NOTE — PROGRESS NOTES
Problem: Falls - Risk of  Goal: *Absence of Falls  Description: Document Sofia Fothergill Fall Risk and appropriate interventions in the flowsheet.   Outcome: Progressing Towards Goal  Note: Fall Risk Interventions:  Mobility Interventions: OT consult for ADLs         Medication Interventions: Patient to call before getting OOB    Elimination Interventions: Call light in reach    History of Falls Interventions: Door open when patient unattended

## 2021-12-16 NOTE — PROGRESS NOTES
OCCUPATIONAL THERAPY EVALUATION  Patient: Socrates Irby (57 y.o. female)  Date: 12/16/2021  Primary Diagnosis: Pancreatitis [K85.90]        Precautions: fall risk       ASSESSMENT  Pt is a 77 y/o F with PMH of atrial fibrillation, CHF, clear cell renal cell carcinoma, COPD, fibromyalgia, GERD, lymphedema, sjogren's syndrome, and thyroid nodule presenting to [facility] with c/o abdominal pain, admitted 12/9/21  and being treated for acute pancreatitis, atrial fibrillation with RVR, and dehydration. Pt is on 3L O2 via NC at night when at home. Pt received semi-supine in bed upon arrival, AXO x4, and agreeable to OT evaluation at this time. Per pt report, pt lives with son in apartment witht 13 JUAN and no HR. Pt reports apartment is on the first floor of an old [facility] style home. PTA pt was IND with ADLs and Mod I using an old rollator for mobility at [facility]. All DME/AD previously used by pt was intended for son so she does not have her own. Pt currently takes sponge baths as she is not able to get in and out of her tub. Other DME owned includes: 2 SPCs, RW. Based on current observations, pt presents with deficits in generalized strength/AROM, balance, functional activity tolerance, sensation, and increased pain impacting overall performance of ADLs and functional transfers/mobility. Per pt report, pt has numbness and tingling in hands and BLE. Pt reports these sensation changes do not impact ADLs on a daily basis, however, pt did report increased numbness in LLE upon standing with OT/PT. Pt is unable to reach full AROM in BUE due to bursitis in shoulders. Pt currently requires SBA with additional time for bed mobility. Pt required min A for scooting to EOB. Pt able to doff/don socks while seated EOB with mod I, needing additional time. Pt able to stand from seated EOB > standing with RW with min A x2. Pt completed pivot transfer from bed to recliner chair with min A.  Pt completed standing with RW > sit in recliner chair with min A x2. Pt sat in recliner chair and washed face and brushed teeth with set up to hand pt warm wash cloth and toothpaste and toothbrush. Overall, pt tolerates session fair. Pt would benefit from continued skilled OT services to address current impairments and improve IND and safety with self cares and functional transfers/mobility. Recommend discharge to SNF once medically appropriate. Other factors to consider for discharge: PLOF, severity of deficits     Patient will benefit from skilled therapy intervention to address the above noted impairments. PLAN :  Recommendations and Planned Interventions: self care training, functional mobility training, therapeutic exercise, therapeutic activities, endurance activities and patient education    Frequency/Duration: Patient will be followed by occupational therapy:  3-5x/week to address goals. Recommendation for discharge: (in order for the patient to meet his/her long term goals)  Joseph Valentine    This discharge recommendation:  Has been made in collaboration with the attending provider and/or case management    IF patient discharges home will need the following DME: Pt may benefit from bedside commode, shower chair and walker: rolling       SUBJECTIVE:   Patient stated I feel better now that I am up in the chair.     OBJECTIVE DATA SUMMARY:   HISTORY:   Past Medical History:   Diagnosis Date    [facility]     [facility]estive [facility] failure) [facility]     Clear cell renal cell carcinoma (HCC)     COPD (chronic obstructive pulmonary disease) (HCC)     Fibromyalgia     GERD (gastroesophageal reflux disease)     Lymphedema     Sjogren's syndrome ([facility]     Thyroid nodule      Past Surgical History:   Procedure Laterality Date    COLONOSCOPY N/A 11/18/2020    COLONOSCOPY performed by Lieutenant Ramon MD at [facility] HX GI      EGD    HX HYSTERECTOMY      HX NEPHRECTOMY Right 2019    HX ORTHOPAEDIC      ankle       Expanded or extensive additional review of patient history:     Home Situation  Home Environment: Apartment  # Steps to Enter: 13  Rails to Enter: No  One/Two Story Residence: One story  Living Alone: No (lives with son)  Support Systems: Child(tevin)  Patient Expects to be Discharged to[de-identified] [employer]  Current DME Used/Available at Home: Walker, rollator,Cane, straight    Hand dominance: Right    EXAMINATION OF PERFORMANCE DEFICITS:  Cognitive/Behavioral Status:  Neurologic State: Alert  Orientation Level: Oriented X4  Cognition: Follows commands            Hearing: Auditory  Auditory Impairment: Hard of hearing, bilateral    Range of Motion:  AROM: Generally decreased, functional                         Strength:  Strength: Generally decreased, functional (3/5 grossly for b/l LE)                  Tone & Sensation:  Tone: Normal  Sensation: Intact                      Balance:  Sitting: Impaired; Without support  Sitting - Static: Good (unsupported)  Sitting - Dynamic: Fair (occasional)  Standing: Impaired; With support  Standing - Static: Fair;Constant support  Standing - Dynamic : Poor;Constant support    Functional Mobility and Transfers for ADLs:  Bed Mobility:  Rolling: Stand-by assistance  Supine to Sit: Stand-by assistance; Additional time  Scooting: Minimum assistance    Transfers:  Sit to Stand: Minimum assistance;Assist x2  Stand to Sit: Minimum assistance;Assist x2  Bed to Chair: Minimum assistance         ADL Intervention and task modifications:       Grooming  Position Performed: Seated in chair  Washing Face: Set-up  Brushing Teeth: Set-up                   Lower Body Dressing Assistance  Socks: Modified independent (Additional time needed)  Leg Crossed Method Used: Yes  Position Performed: Seated edge of bed              Therapeutic Exercise:  Pt would benefit from UE HEP initiated at next session.      Functional Measure:    [facility] AM-PACTM \"6 Clicks\"                                                       Daily Activity Inpatient Short Form  How much help from another person does the patient currently need. .. Total; A Lot A Little None   1. Putting on and taking off regular lower body clothing? []  1 []  2 [x]  3 []  4   2. Bathing (including washing, rinsing, drying)? []  1 []  2 [x]  3 []  4   3. Toileting, which includes using toilet, bedpan or urinal? [] 1 []  2 [x]  3 []  4   4. Putting on and taking off regular upper body clothing? []  1 [x]  2 []  3 []  4   5. Taking care of personal grooming such as brushing teeth? []  1 []  2 [x]  3 []  4   6. Eating meals? []  1 []  2 [x]  3 []  4   © , Trustees of [address] [place] [address] [address] [address], under license to [employer]. All rights reserved     Score: 1724     Interpretation of Tool:  Represents clinically-significant functional categories (i.e. Activities of daily living). Percentage of Impairment CH    0%   CI    1-19% CJ    20-39% CK    40-59% CL    60-79% CM    80-99% CN     100%   [facility]  Score 6-24 24 23 20-22 15-19 10-14 7-9 6         Occupational Therapy Evaluation Charge Determination   History Examination Decision-Making   LOW Complexity : Brief history review  LOW Complexity : 1-3 performance deficits relating to physical, cognitive , or psychosocial skils that result in activity limitations and / or participation restrictions  LOW Complexity : No comorbidities that affect functional and no verbal or physical assistance needed to complete eval tasks       Based on the above components, the patient evaluation is determined to be of the following complexity level: LOW   Pain Ratin/10 in abdomen    Activity Tolerance:   Fair  Please refer to the flowsheet for vital signs taken during this treatment.     After treatment patient left in no apparent distress:    Sitting in chair and Call bell within reach    COMMUNICATION/EDUCATION:   The patients plan of care was discussed with: Physical therapist.     Patient/family agree to work toward stated goals and plan of care.    This patients plan of care is appropriate for delegation to DEMARCUS. PT/OT sessions occurred together for increased safety of pt and clinician. Thank you for this referral.  Harleen Hidalgo OT  Time Calculation: 35 mins    Problem: Self Care Deficits Care Plan (Adult)  Goal: *Acute Goals and Plan of Care (Insert Text)  Description: 1. Pt will be mod I sup <> sit in prep for EOB ADLs  2. Pt will be mod I grooming sitting EOB  3. Pt will be mod I LE dressing sitting EOB/long sit  4. Pt will be mod I sit <>  prep for toileting LRAD  5. Pt will be mod I toileting/toilet transfer/cloth mgmt LRAD  6.  Pt will be IND following UE HEP in prep for self care tasks      Outcome: Not Met

## 2021-12-16 NOTE — MED STUDENT NOTES
Progress Note    Patient: Sharon Pringle MRN: [id number]  SSN: [id number]    YOB: 1945  Age: 78 y.o. Sex: female      Admit Date: 12/9/2021    LOS: 7 days     Subjective:     Patient seen and examined, pain remains stable but severe. Nausea/vomiting resolved. Medications helping with nausea and pain. In better spirits today. No flatus or bowel movements. Abdomen feels less distended. Objective:     Vitals:    12/15/21 2045 12/16/21 0126 12/16/21 0736 12/16/21 0847   BP: 121/66 115/71  124/62   Pulse: 90 94  97   Resp: 18 18  18   Temp: 98.4 °F (36.9 °C) 98 °F (36.7 °C)  98.6 °F (37 °C)   SpO2: 96% 94% 94% 96%   Weight:       Height:            Intake and Output:  Current Shift: No intake/output data recorded. Last three shifts: 12/14 1901 - 12/16 0700  In: 1150 [P.O.:150;  I.V.:1000]  Out: 800 [Urine:800]    Physical Exam:   General: alert, oriented, in no acute distress  Cardiovascular: regular rate and rhythm  Pulmonary: unlabored breathing, equal chest rise bilaterally, on supplemental O2  Abdomen: distended, severe TTP epigastrum and lower abdomen with rebound  Extremities: no swelling, pulses equal and palpable in all extremities    Lab/Data Review:  Recent Results (from the past 24 hour(s))   LIPASE    Collection Time: 12/16/21  7:35 AM   Result Value Ref Range    Lipase >3,000 (H) 73 - 393 U/L   CBC WITH AUTOMATED DIFF    Collection Time: 12/16/21  7:35 AM   Result Value Ref Range    WBC 11.7 (H) 3.6 - 11.0 K/uL    RBC 4.11 3.80 - 5.20 M/uL    HGB 12.5 11.5 - 16.0 g/dL    HCT 40.2 35.0 - 47.0 %    MCV 97.8 80.0 - 99.0 FL    MCH 30.4 26.0 - 34.0 PG    MCHC 31.1 30.0 - 36.5 g/dL    RDW 13.7 11.5 - 14.5 %    PLATELET 598 455 - 214 K/uL    MPV 10.8 8.9 - 12.9 FL    NRBC 0.0 0.0  WBC    ABSOLUTE NRBC 0.00 0.00 - 0.01 K/uL    NEUTROPHILS 89 (H) 32 - 75 %    LYMPHOCYTES 5 (L) 12 - 49 %    MONOCYTES 5 5 - 13 %    EOSINOPHILS 0 0 - 7 %    BASOPHILS 0 0 - 1 %    IMMATURE GRANULOCYTES 1 (H) 0 - 0.5 %    ABS. NEUTROPHILS 10.3 (H) 1.8 - 8.0 K/UL    ABS. LYMPHOCYTES 0.6 (L) 0.8 - 3.5 K/UL    ABS. MONOCYTES 0.6 0.0 - 1.0 K/UL    ABS. EOSINOPHILS 0.0 0.0 - 0.4 K/UL    ABS. BASOPHILS 0.0 0.0 - 0.1 K/UL    ABS. IMM. GRANS. 0.2 (H) 0.00 - 0.04 K/UL    DF AUTOMATED     METABOLIC PANEL, COMPREHENSIVE    Collection Time: 12/16/21  7:35 AM   Result Value Ref Range    Sodium 145 136 - 145 mmol/L    Potassium 3.6 3.5 - 5.1 mmol/L    Chloride 113 (H) 97 - 108 mmol/L    CO2 25 21 - 32 mmol/L    Anion gap 7 5 - 15 mmol/L    Glucose 93 65 - 100 mg/dL    BUN 32 (H) 6 - 20 mg/dL    Creatinine 0.93 0.55 - 1.02 mg/dL    BUN/Creatinine ratio 34 (H) 12 - 20      GFR est AA >60 >60 ml/min/1.73m2    GFR est non-AA 59 (L) >60 ml/min/1.73m2    Calcium 9.2 8.5 - 10.1 mg/dL    Bilirubin, total 0.6 0.2 - 1.0 mg/dL    AST (SGOT) 17 15 - 37 U/L    ALT (SGPT) 18 12 - 78 U/L    Alk. phosphatase 213 (H) 45 - 117 U/L    Protein, total 4.6 (L) 6.4 - 8.2 g/dL    Albumin 1.6 (L) 3.5 - 5.0 g/dL    Globulin 3.0 2.0 - 4.0 g/dL    A-G Ratio 0.5 (L) 1.1 - 2.2            Assessment:     Principal Problem:    Pancreatitis (12/9/2021)    Active Problems:    A-fib ([facility] (11/16/2020)      CHF (congestive heart failure) ([facility] (11/16/2020)      Hematemesis (12/12/2021)      History of peptic ulcer disease (12/12/2021)      Possible ileus given no true bowel function for several days    Plan:     Diet: NPO, ice chips only   Continue conservative management of pancreatitis  US reviewed- possible acute cholecystitis in the setting of acute pancreatitis, cholecystitis is likely a sequelae of pancreatic inflammation, not an operative candidate for cholecystectomy at this point. Zosyn started.   DVT/GI prophylaxis  Ambulate  Pain and nausea control  Heme/onc evaluation noted  Patient will need to have follow up with surgical oncology    Signed By: Anahy Living     December 16, 2021      *ATTENTION:  This note has been created by a medical student for educational purposes only. Please do not refer to the content of this note for clinical decision-making, billing, or other purposes. Please see attending physicians note to obtain clinical information on this patient. *

## 2021-12-16 NOTE — PROGRESS NOTES
Progress Note    Patient: Guillermo Lemus MRN: [id number]  SSN: [id number]    YOB: 1945  Age: 78 y.o. Sex: female      Admit Date: 12/9/2021    LOS: 7 days     Subjective:   GI in consultation for Pancreatitis.     Patient is awake, sitting in chair. States her first time to be in the chair. She still complain of diffuse abdominal pain, but this time it is more on the right lower quadrant. Denies nausea or vomiting. She coughs a lot. She reported coke helps her cough out sputum so she wanted to get a sip. She is NPO. Discussed the importance of NPO in relation to pancreatitis/increasing lipase. Lipase is up again at >3000. No bowel movement. Surgery ordered another CT abdomen with contrast.      Abdominal Ultrasound 12/13/2021: IMPRESSION  Small amount of free fluid.  Finding suggesting hemangioma in the  liver.  Gallbladder wall thickening, adenomyomatosis, sludge and gallstones. Cholecystitis possible.  Finding in the region of the pancreatic head as above. Other findings as above.     12/9 GI consult note:  History of Present Illness: Nevin Cole is a 78 y. o. female who is seen in consultation for pancreatitis. Lynn Salas has a past medical history of  Paroxysmal Atrial Fibrillation, CHF, COPD, renal cell carcinoma Stage IV s/p nephrectomy, GERD sjoren's syndrome, hypertension, and depression. Patient under went EUS on 12/6/2021 showing 2.7 X3 cm lesion in the area of head of pancreas with dilation of the Pancreatic duct abutting the portal vein but not involving the SMA or AMV ; Tumor T2 by endosonographic criteria ; one small lymph node in the area of head of pancreas.  Pathology shows rare cells present suspicious for malignancy.   She had an ERCP on 11/4 2021 ERCP; with sphincterotomy/papillotomy ERCP; with placement of endoscopic stent into biliary or pancreatic duct, including pre and postdilation and guide wire passage, when performed, including sphincterotomy, when performed, each stent.. Patient had a PET scan on 11/22/21 which shows a lesion in the pancreatic head consistent with malignancy. CTA of abdomen shows ill defined hypodense mass in the pancreas. There are inflammatory changes. Patient states she experienced nausea, vomiting and diarrhea since Monday. Her abdominal pain has progressively gotten worse. She reports a poor appetite. She does complain of increased \"burping\". Total bilirubin 1.4, AsT 36, ALT 31, Alk Phos. 217, Lipase > 3,000. Plan nothing by mouth except ice chips and sips of water with medication. IV hydration. Pain management     PET Scan 11/22/2021: IMPRESSION  1.  FDG avid lesion in the pancreatic head consistent with malignancy. 2.  Subcentimeter focus of FDG uptake in the liver, indeterminate. 3.  FDG uptake associated with density expanding the right facet at C5-C6,  possibly due to an ectatic artery. EUS on 12/6/2021 showing 2.7 X3 cm lesion in the area of head of pancreas with dilation of the Pancreatic duct abutting the portal vein but not involving the SMA or AMV ; Tumor T2 by endosonographic criteria ; one small lymph node in the area of head of pancreas. CTA abdomen 12/9/2021: IMPRESSION  1. Ill-defined hypodense mass in the pancreas. There are inflammatory changes and fluid adjacent to the pancreas or duodenum and stomach most likely related to biopsy or focal pancreatitis. No pseudocyst formation, active bleeding or other acute findings. 2. Enlarged adrenal glands left greater than right with noncontrast densities  consistent with adrenal adenomas. 3. Right nephrectomy. 4. Other findings as described. Objective:     Vitals:    12/16/21 0126 12/16/21 0736 12/16/21 0847 12/16/21 1545   BP: 115/71  124/62    Pulse: 94  97 (!) 115   Resp: 18  18 18   Temp: 98 °F (36.7 °C)  98.6 °F (37 °C) 98.7 °F (37.1 °C)   SpO2: 94% 94% 96% 97%   Weight:       Height:            Intake and Output:  Current Shift: No intake/output data recorded.   Last three shifts: 12/14 1901 - 12/16 0700  In: 1150 [P.O.:150; I.V.:1000]  Out: 800 [Urine:800]    Physical Exam:   Skin:  Extremities and face reveal no rashes. No chapin erythema. HEENT: Sclerae anicteric. Extra-occular muscles are intact. No abnormal pigmentation of the lips. The neck is supple. Cardiovascular: Regular rate and rhythm. Respiratory:  Comfortable breathing with no accessory muscle use. GI:  Abdomen nondistended, soft, and tender. No enlargement of the liver or spleen. No masses palpable. Rectal:  Deferred  Musculoskeletal: Generalized weakness  Neurological:  Gross memory appears intact. Patient is alert and oriented. Psychiatric:  Mood appears appropriate with judgement intact. Lymphatic:  No visible adenopathy      Lab/Data Review:  Recent Results (from the past 24 hour(s))   LIPASE    Collection Time: 12/16/21  7:35 AM   Result Value Ref Range    Lipase >3,000 (H) 73 - 393 U/L   CBC WITH AUTOMATED DIFF    Collection Time: 12/16/21  7:35 AM   Result Value Ref Range    WBC 11.7 (H) 3.6 - 11.0 K/uL    RBC 4.11 3.80 - 5.20 M/uL    HGB 12.5 11.5 - 16.0 g/dL    HCT 40.2 35.0 - 47.0 %    MCV 97.8 80.0 - 99.0 FL    MCH 30.4 26.0 - 34.0 PG    MCHC 31.1 30.0 - 36.5 g/dL    RDW 13.7 11.5 - 14.5 %    PLATELET 646 821 - 189 K/uL    MPV 10.8 8.9 - 12.9 FL    NRBC 0.0 0.0  WBC    ABSOLUTE NRBC 0.00 0.00 - 0.01 K/uL    NEUTROPHILS 89 (H) 32 - 75 %    LYMPHOCYTES 5 (L) 12 - 49 %    MONOCYTES 5 5 - 13 %    EOSINOPHILS 0 0 - 7 %    BASOPHILS 0 0 - 1 %    IMMATURE GRANULOCYTES 1 (H) 0 - 0.5 %    ABS. NEUTROPHILS 10.3 (H) 1.8 - 8.0 K/UL    ABS. LYMPHOCYTES 0.6 (L) 0.8 - 3.5 K/UL    ABS. MONOCYTES 0.6 0.0 - 1.0 K/UL    ABS. EOSINOPHILS 0.0 0.0 - 0.4 K/UL    ABS. BASOPHILS 0.0 0.0 - 0.1 K/UL    ABS. IMM.  GRANS. 0.2 (H) 0.00 - 0.04 K/UL    DF AUTOMATED     METABOLIC PANEL, COMPREHENSIVE    Collection Time: 12/16/21  7:35 AM   Result Value Ref Range    Sodium 145 136 - 145 mmol/L    Potassium 3.6 3.5 - 5.1 mmol/L Chloride 113 (H) 97 - 108 mmol/L    CO2 25 21 - 32 mmol/L    Anion gap 7 5 - 15 mmol/L    Glucose 93 65 - 100 mg/dL    BUN 32 (H) 6 - 20 mg/dL    Creatinine 0.93 0.55 - 1.02 mg/dL    BUN/Creatinine ratio 34 (H) 12 - 20      GFR est AA >60 >60 ml/min/1.73m2    GFR est non-AA 59 (L) >60 ml/min/1.73m2    Calcium 9.2 8.5 - 10.1 mg/dL    Bilirubin, total 0.6 0.2 - 1.0 mg/dL    AST (SGOT) 17 15 - 37 U/L    ALT (SGPT) 18 12 - 78 U/L    Alk. phosphatase 213 (H) 45 - 117 U/L    Protein, total 4.6 (L) 6.4 - 8.2 g/dL    Albumin 1.6 (L) 3.5 - 5.0 g/dL    Globulin 3.0 2.0 - 4.0 g/dL    A-G Ratio 0.5 (L) 1.1 - 2.2                US ABD LTD   Final Result   Small amount of free fluid. Finding suggesting hemangioma in the   liver. Gallbladder wall thickening, adenomyomatosis, sludge and gallstones. Cholecystitis possible. Finding in the region of the pancreatic head as above. Other findings as above. CTA ABDOMEN PELV W CONT   Final Result   1. Ill-defined hypodense mass in the pancreas. There are inflammatory changes   and fluid adjacent to the pancreas or duodenum and stomach most likely related   to biopsy or focal pancreatitis. No pseudocyst formation, active bleeding or   other acute findings. 2. Enlarged adrenal glands left greater than right with noncontrast densities   consistent with adrenal adenomas. 3. Right nephrectomy. 4. Other findings as described. XR CHEST PORT   Final Result   No acute findings. CT ABD PELV W CONT    (Results Pending)       Assessment:     Principal Problem:    Pancreatitis (12/9/2021)    Active Problems:    A-fib ([facility] (11/16/2020)      CHF (congestive heart failure) ([facility] (11/16/2020)      Hematemesis (12/12/2021)      History of peptic ulcer disease (12/12/2021)        Plan:   1.  Pancreatitis      -NPO with ice chips      -IV hydration @ 125 ml/hr      -Lipase >3000      -Lipase in the am       -Dilaudid 1 mg every 3 hours as needed for pain       -Recommend starting TPN/PPN  2. CHF      -Started on Chlorthalidone  3. COPD       -Continue home medications       -Albuterol as needed   4. Pancreatic Mass      - > 4000      -S/p fine needle aspiration suspicious for neoplasia but not diagnostic      When stable Oncology plan for out patient followup with advanced oncology surgeon  At  or [facility] for [place] considerations  5. Hematemesis (resolved)      -Monitor H&H      -Transfuse as needed      -hgB 12.5       -Continue Protonix 40 mg daily  6. Afib      -EKG on 12/13/21 shows Afib has replaced Sinus Rhythm      -Rate is controlled      -May consider restarting eliquis, no further hematemesis reported           Patient discussed with Dr Pete Davies and agrees to above impression and plan. Thank you for allowing me to participate in this patients care    Signed By: Neelam Guerrero.  CONRADO Hillman     December 16, 2021

## 2021-12-16 NOTE — PROGRESS NOTES
CM reviewed patient record and discussed dc dispo with Kiara Patricio NP and the disposition will be SNf or rehab. CM met with patient at bedside for choice for SNF. Patient would like referrals sent to [facility] [place]. Referrals sent via [facility]. Awaiting therapy notes to upload.      Gabi Benoit

## 2021-12-16 NOTE — PROGRESS NOTES
PHYSICAL THERAPY EVALUATION  Patient: Katlyn Pinto (45 y.o. female)  Date: 2021  Primary Diagnosis: Pancreatitis [K85.90]        Precautions:falls       ASSESSMENT  This is a 77 y/o female who came to  [facility]  ED with c/o  abdominal pain, associated nausea/vomiting, poor PO intake, admitted on 21 for pancreatitis. Pt reported having a EUS on  as outpatient by Dr. Oksana Ocampo, biopsy was taken, rare cells present suspicious for malignancy. CT abdomen/pelvis showing ill defined hypodense mass in the pancreas with fluid adjacent to the pancreas suggestive of focal pancreatitis. Adrenal adenomas are seen. Pt with PMH of atrial fibrillation, CHF, COPD, renal cell carcinoma stage IV, s/p nephrectomy,  GERD, COPD, sjoren's syndrome, hypertension, depression, and on 3L O2  during night at baseline. Pt received semi-supine in bed , agreeable for PT/OT eval/tx . Pt is A& O x 4 ,  per pt report , she lives with son in a apartment on first floor story home with 13 steps to enter , no HR ,  was IND with ADL's and mod I for functional transfers/mobility with rollator prior to admission. Pt reports using rollator that belonged to her  son and is currently broken. Based on the objective data described below, currently pt on 2 L O2, presents with anxiety,  c/o pain at upper abdomen - 10 ,  decreased strength , 3/5 grossly in  b/l LE , balance deficits , generalized weakness ,  decreased activity tolerance and increased need for assist with functional mobility ( Pt is mod I for rolling from side to side  , SBA for supine >sit transfers with additional time, good static sitting balance , Zayra x 2  for sit <>stand , Zayra for bed<>chair transfers , fair static  standing balance with constant support, is able to take few steps towards the chair-3' with RW, Zayra with narrow SHERON , decreased step length and decreased foot clearance b/l Le ) . Pt would benefit from continued skilled PT services to address current impairments and improve overall IND and safety with  functional transfers/mobility. Recommend d/c to SNF when medically appropriate . Current Level of Function Impacting Discharge (mobility/balance): Requires Zayra for transfers/mobility. Functional Outcome Measure: The patient scored 16 on the AM PAC basic mobility outcome measure which is indicative of medium complexity. Other factors to consider for discharge:severity of deficits , time since onset         PLAN :  Recommendations and Planned Interventions: bed mobility training, transfer training, gait training, therapeutic exercises, neuromuscular re-education, patient and family training/education and therapeutic activities      Frequency/Duration: Patient will be followed by physical therapy:  3-5x/week to address goals.     Recommendation for discharge: (in order for the patient to meet his/her long term goals)  Joseph Hameed discharge recommendation:  Has been made in collaboration with the attending provider and/or case management    IF patient discharges home will need the following DME: rolling walker         SUBJECTIVE:   Patient stated My pancreas hurt     OBJECTIVE DATA SUMMARY:   HISTORY:    Past Medical History:   Diagnosis Date    [facility]     CHF [facility]congestive heart failure) [facility] [facility]     Clear cell renal cell carcinoma ([facility] [address] [facility]     COPD (chronic obstructive pulmonary disease) ([facility] [address] [facility]     Fibromyalgia     GERD (gastroesophageal reflux disease)     Lymphedema     Sjogren's syndrome ([facility] [address] [facility]     Thyroid nodule      Past Surgical History:   Procedure Laterality Date    COLONOSCOPY N/A 11/18/2020    COLONOSCOPY performed by Josue Almaraz MD at [address] [facility] HX GI      EGD    HX HYSTERECTOMY      HX NEPHRECTOMY Right 2019    HX ORTHOPAEDIC      ankle       Personal factors and/or comorbidities impacting plan of care:     Home Situation  Home Environment: Apartment  # Steps to Enter: 13  Rails to Enter: No  One/Two Story Residence: One story  Living Alone: No (lives with son)  Support Systems: Child(tevin)  Patient Expects to be Discharged to[de-identified] [employer]  Current DME Used/Available at Home: Walker, rollator,Cane, straight    PLOF: Pt IND for ADLS/IADLS, mod I for mobility with rollator prior to admission. EXAMINATION/PRESENTATION/DECISION MAKING:   Critical Behavior:  Neurologic State: Alert  Orientation Level: Oriented X4  Cognition: Follows commands     Hearing: Auditory  Auditory Impairment: Hard of hearing, bilateral  Skin:  Intact where exposed    Range Of Motion:  AROM: Generally decreased, functional  Strength:    Strength: Generally decreased, functional (3/5 grossly for b/l LE)  Tone & Sensation:   Tone: Normal  Sensation: Intact  Functional Mobility:  Bed Mobility:  Rolling: Stand-by assistance  Supine to Sit: Stand-by assistance; Additional time  Scooting: Minimum assistance  Transfers:  Sit to Stand: Minimum assistance;Assist x2  Stand to Sit: Minimum assistance;Assist x2  Bed to Chair: Minimum assistance  Balance:   Sitting: Impaired; Without support  Sitting - Static: Good (unsupported)  Sitting - Dynamic: Fair (occasional)  Standing: Impaired; With support  Standing - Static: Fair;Constant support  Standing - Dynamic : Poor;Constant support  Ambulation/Gait Training:  Distance (ft): 3 Feet (ft)  Assistive Device: Walker, rolling;Gait belt  Ambulation - Level of Assistance: Minimal assistance  Base of Support: Narrowed  Speed/Nichole: Slow;Shuffled  Step Length: Right shortened;Left shortened  Interventions: Verbal cues; Tactile cues    Functional Measure:    [employer] AM-PAC 6 Clicks         Basic Mobility Inpatient Short Form  How much difficulty does the patient currently have. .. Unable A Lot A Little None   1. Turning over in bed (including adjusting bedclothes, sheets and blankets)? [] 1   [] 2   [] 3   [x] 4   2.   Sitting down on and standing up from a chair with arms ( e.g., wheelchair, bedside commode, etc.)   [] 1   [] 2   [x] 3   [] 4   3. Moving from lying on back to sitting on the side of the bed? [] 1   [] 2   [x] 3   [] 4          How much help from another person does the patient currently need. .. Total A Lot A Little None   4. Moving to and from a bed to a chair (including a wheelchair)? [] 1   [] 2   [x] 3   [] 4   5. Need to walk in hospital room? [] 1   [x] 2   [] 3   [] 4   6. Climbing 3-5 steps with a railing? [x] 1   [] 2   [] 3   [] 4   © 2007, Trustees of [address] [place] [address] [address] [id number], under license to [employer]. All rights reserved     Score:  Initial:16 Most Recent: X (Date: -12/16/21- )   Interpretation of Tool:  Represents activities that are increasingly more difficult (i.e. Bed mobility, Transfers, Gait). Score 24 23 22-20 19-15 14-10 9-7 6   Modifier CH CI CJ CK CL CM CN          Physical Therapy Evaluation Charge Determination   History Examination Presentation Decision-Making   MEDIUM  Complexity : 1-2 comorbidities / personal factors will impact the outcome/ POC  MEDIUM Complexity : 3 Standardized tests and measures addressing body structure, function, activity limitation and / or participation in recreation  LOW Complexity : Stable, uncomplicated  Other Functional Measure [facility] 6 medium complexity      Based on the above components, the patient evaluation is determined to be of the following complexity level: LOW     Pain Rating:  Pain at upper abdomen- 7/10    Activity Tolerance:   Fair and signs and symptoms of orthostatic hypotension  Please refer to the flowsheet for vital signs taken during this treatment. After treatment patient left in no apparent distress:   Sitting in chair and Call bell within reach    COMMUNICATION/EDUCATION:   The patients plan of care was discussed with: Occupational therapist and Registered nurse. Fall prevention education was provided and the patient/caregiver indicated understanding.  and Patient/family agree to work toward stated goals and plan of care. Problem: Mobility Impaired (Adult and Pediatric)  Goal: *Acute Goals and Plan of Care (Insert Text)  Description: Pt will be I with LE HEP in 7 days. Pt will perform bed mobility with mod I in 7 days. Pt will perform transfers with mod I in 7 days. Pt will amb- 100 feet with LRAD safely with mod I in 7 days. Outcome: Not Met   PT/OT sessions occurred together for increased pt' s safety and maximum functional outcome.     Thank you for this referral.  Veronica Porras   Time Calculation: 39 mins

## 2021-12-16 NOTE — PROGRESS NOTES
Progress Note    Patient: Jo Ann Duran MRN: [id number]  SSN: [id number]    YOB: 1945  Age: 78 y.o. Sex: female      Admit Date: 12/9/2021    LOS: 7 days     Subjective:     Patient seen and examined, pain remains stable but severe. Nausea/vomiting resolved. Medications helping with nausea and pain. No flatus or bowel movements. Abdomen feels less distended. Objective:     Vitals:    12/15/21 2045 12/16/21 0126 12/16/21 0736 12/16/21 0847   BP: 121/66 115/71  124/62   Pulse: 90 94  97   Resp: 18 18  18   Temp: 98.4 °F (36.9 °C) 98 °F (36.7 °C)  98.6 °F (37 °C)   SpO2: 96% 94% 94% 96%   Weight:       Height:            Intake and Output:  Current Shift: No intake/output data recorded. Last three shifts: 12/14 1901 - 12/16 0700  In: 1150 [P.O.:150; I.V.:1000]  Out: 800 [Urine:800]    Physical Exam:   General: alert, oriented, in no acute distress  Cardiovascular: regular rate and rhythm  Pulmonary: unlabored breathing, equal chest rise bilaterally, on supplemental O2  Abdomen: distended, tenderness more in lower abdomen  Extremities: no swelling, pulses equal and palpable in all extremities    Lab/Data Review:  Recent Results (from the past 24 hour(s))   LIPASE    Collection Time: 12/16/21  7:35 AM   Result Value Ref Range    Lipase >3,000 (H) 73 - 393 U/L   CBC WITH AUTOMATED DIFF    Collection Time: 12/16/21  7:35 AM   Result Value Ref Range    WBC 11.7 (H) 3.6 - 11.0 K/uL    RBC 4.11 3.80 - 5.20 M/uL    HGB 12.5 11.5 - 16.0 g/dL    HCT 40.2 35.0 - 47.0 %    MCV 97.8 80.0 - 99.0 FL    MCH 30.4 26.0 - 34.0 PG    MCHC 31.1 30.0 - 36.5 g/dL    RDW 13.7 11.5 - 14.5 %    PLATELET 044 648 - 969 K/uL    MPV 10.8 8.9 - 12.9 FL    NRBC 0.0 0.0  WBC    ABSOLUTE NRBC 0.00 0.00 - 0.01 K/uL    NEUTROPHILS 89 (H) 32 - 75 %    LYMPHOCYTES 5 (L) 12 - 49 %    MONOCYTES 5 5 - 13 %    EOSINOPHILS 0 0 - 7 %    BASOPHILS 0 0 - 1 %    IMMATURE GRANULOCYTES 1 (H) 0 - 0.5 %    ABS.  NEUTROPHILS 10.3 (H) 1.8 - 8.0 K/UL    ABS. LYMPHOCYTES 0.6 (L) 0.8 - 3.5 K/UL    ABS. MONOCYTES 0.6 0.0 - 1.0 K/UL    ABS. EOSINOPHILS 0.0 0.0 - 0.4 K/UL    ABS. BASOPHILS 0.0 0.0 - 0.1 K/UL    ABS. IMM. GRANS. 0.2 (H) 0.00 - 0.04 K/UL    DF AUTOMATED     METABOLIC PANEL, COMPREHENSIVE    Collection Time: 12/16/21  7:35 AM   Result Value Ref Range    Sodium 145 136 - 145 mmol/L    Potassium 3.6 3.5 - 5.1 mmol/L    Chloride 113 (H) 97 - 108 mmol/L    CO2 25 21 - 32 mmol/L    Anion gap 7 5 - 15 mmol/L    Glucose 93 65 - 100 mg/dL    BUN 32 (H) 6 - 20 mg/dL    Creatinine 0.93 0.55 - 1.02 mg/dL    BUN/Creatinine ratio 34 (H) 12 - 20      GFR est AA >60 >60 ml/min/1.73m2    GFR est non-AA 59 (L) >60 ml/min/1.73m2    Calcium 9.2 8.5 - 10.1 mg/dL    Bilirubin, total 0.6 0.2 - 1.0 mg/dL    AST (SGOT) 17 15 - 37 U/L    ALT (SGPT) 18 12 - 78 U/L    Alk. phosphatase 213 (H) 45 - 117 U/L    Protein, total 4.6 (L) 6.4 - 8.2 g/dL    Albumin 1.6 (L) 3.5 - 5.0 g/dL    Globulin 3.0 2.0 - 4.0 g/dL    A-G Ratio 0.5 (L) 1.1 - 2.2            Assessment:     Principal Problem:    Pancreatitis (12/9/2021)    Active Problems:    A-fib ([facility] [address] (11/16/2020)      CHF (congestive heart failure) ([facility] [address] (11/16/2020)      Hematemesis (12/12/2021)      History of peptic ulcer disease (12/12/2021)      Possible ileus given no true bowel function for several days    Plan:     Diet: NPO, ice chips only   Continue conservative management of pancreatitis  US reviewed- possible acute cholecystitis in the setting of acute pancreatitis, cholecystitis is likely a sequelae of pancreatic inflammation, not an operative candidate for cholecystectomy at this point. Lipase continues to be highly elevated, recommend repeat CT abdomen pelvis with IV contrast  Zosyn started.   DVT/GI prophylaxis  Ambulate  Pain and nausea control  Heme/onc evaluation noted  Patient will need to have follow up with surgical oncology    Signed By: Surya Oshea,      December 16, 2021

## 2021-12-16 NOTE — PROGRESS NOTES
Bedside and Verbal shift change report given to Jori (oncoming nurse) by Claudeen Sites (offgoing nurse). Report included the following information SBAR, Kardex, MAR and Recent Results.

## 2021-12-17 NOTE — PROGRESS NOTES
PHYSICAL THERAPY TREATMENT  Patient: Juan David Snyder (89 y.o. female)  Date: 12/17/2021  Diagnosis: Pancreatitis [K85.90] Pancreatitis       Precautions:    Chart, physical therapy assessment, plan of care and goals were reviewed. ASSESSMENT  Patient continues with skilled PT services and is progressing towards goals. Patient required some encouragement initially for mobility but then patient was able to participate well. Patient performed bed mobility with SBA-min A with additional time then req min A x2 for sit to stand and bed>BSC. Pt amb approx 3 ft with RW to the [facility] and to the recliner. Patient demos no LOB but amb with fatigue, shuffled slow gait pattern. Patient was educated on importance of PT and educated to perform LE therex while sitting up in chair. Patient very appreciate of DEMARCUS for getting shower cap and performed grooming in the chair. Will follow up and progress towards goals, rec d/c to SNF. Current Level of Function Impacting Discharge (mobility/balance): weakness, unsteady gait    Other factors to consider for discharge: assistance at home         PLAN :  Patient continues to benefit from skilled intervention to address the above impairments. Continue treatment per established plan of care. to address goals. Recommendation for discharge: (in order for the patient to meet his/her long term goals)  Joseph Valentine    This discharge recommendation:  Has been made in collaboration with the attending provider and/or case management    IF patient discharges home will need the following DME: to be determined (TBD)       SUBJECTIVE:   Patient stated I would love to wash my hair    OBJECTIVE DATA SUMMARY:   Critical Behavior:  Neurologic State: Alert  Orientation Level: Oriented X4  Cognition: Follows commands     Functional Mobility Training:  Bed Mobility:  Rolling: Stand-by assistance  Supine to Sit: Stand-by assistance; Additional time  Scooting: Minimum assistance    Transfers:  Sit to Stand: Minimum assistance;Assist x2  Stand to Sit: Minimum assistance;Assist x2      Balance:  Sitting: Impaired; Without support  Sitting - Static: Good (unsupported)  Sitting - Dynamic: Fair (occasional)  Standing: Impaired; With support  Standing - Static: Fair;Constant support  Standing - Dynamic : Fair;Constant support    Ambulation/Gait Training:  Distance (ft): 6 Feet (ft) (3ft to BSC, 3ft to recliner)  Assistive Device: Gait belt;Walker, rolling  Ambulation - Level of Assistance: Minimal assistance  Base of Support: Narrowed  Speed/Nichole: Slow;Shuffled  Step Length: Left shortened;Right shortened      Pain Ratin-6 abdomen    Activity Tolerance:   Fair  Please refer to the flowsheet for vital signs taken during this treatment. After treatment patient left in no apparent distress:   Sitting in chair and Call bell within reach    COMMUNICATION/COLLABORATION:   The patients plan of care was discussed with: Occupational therapy assistant and Registered nurse. cotx with DEMARCUS for pt safety and activity tolerance. Akua Hassan   Time Calculation: 38 mins         Problem: Mobility Impaired (Adult and Pediatric)  Goal: *Acute Goals and Plan of Care (Insert Text)  Description: Pt will be I with LE HEP in 7 days. Pt will perform bed mobility with mod I in 7 days. Pt will perform transfers with mod I in 7 days. Pt will amb- 100 feet with LRAD safely with mod I in 7 days.    Outcome: Progressing Towards Goal

## 2021-12-17 NOTE — PROGRESS NOTES
Bedside shift change report given to Mir Lewis (oncoming nurse) by Lizz Corona (offgoing nurse). Report included the following information SBAR.

## 2021-12-17 NOTE — PROGRESS NOTES
Hematology and Oncology Progress Note    Patient: Remi Hull MRN: [id number]  SSN: [id number]    YOB: 1945  Age: 78 y.o. Sex: female      Admit Date: 12/9/2021    LOS: 8 days     Chief Complaint: Patient is complaining of abdominal pain    Subjective:   Signed out this case but was asked to see her again in light of new CT scan done today. Patient is somewhat upset about her condition. She is complaining of abdominal pain and not feeling well. She is wondering why she is not allowed to drink Coke. She had CT scan. Accompanied by her friend/family member. Patient has nausea. Objective:     Vitals:    12/16/21 2045 12/17/21 0600 12/17/21 0846 12/17/21 1250   BP: (!) 150/59   (!) 150/59   Pulse: 84   84   Resp: 18   18   Temp: 98.4 °F (36.9 °C)   98.4 °F (36.9 °C)   SpO2: 98%  99%    Weight:  94.7 kg (208 lb 12.4 oz)     Height:              Physical Exam:   Constitutional: Elderly white female looks chronically ill. She is not in any acute distress. She is in mild to moderate pain. Eyes: Sclerae anicteric. Conjunctivae no pallor. ENMT: Oral mucosa is moist, no thrush, mucositis, or petechiae. Neck: No adenopathy. Respiratory: Not in distress. Cardiovascular: Normal sinus rhythm. Abdomen: Mild abdominal distention and upper abdominal tenderness present. Extremities: No edema. Skin: No petechiae; no skin rash. Neurologic: Alert/oriented x 3.     Lab/Data Review:    Recent Results (from the past 24 hour(s))   METABOLIC PANEL, COMPREHENSIVE    Collection Time: 12/17/21  2:50 PM   Result Value Ref Range    Sodium 144 136 - 145 mmol/L    Potassium 3.3 (L) 3.5 - 5.1 mmol/L    Chloride 111 (H) 97 - 108 mmol/L    CO2 26 21 - 32 mmol/L    Anion gap 7 5 - 15 mmol/L    Glucose 85 65 - 100 mg/dL    BUN 27 (H) 6 - 20 mg/dL    Creatinine 0.83 0.55 - 1.02 mg/dL    BUN/Creatinine ratio 33 (H) 12 - 20      GFR est AA >60 >60 ml/min/1.73m2    GFR est non-AA >60 >60 ml/min/1.73m2    Calcium 9.3 8.5 - 10.1 mg/dL    Bilirubin, total 0.7 0.2 - 1.0 mg/dL    AST (SGOT) 35 15 - 37 U/L    ALT (SGPT) 24 12 - 78 U/L    Alk. phosphatase 294 (H) 45 - 117 U/L    Protein, total 4.8 (L) 6.4 - 8.2 g/dL    Albumin 1.7 (L) 3.5 - 5.0 g/dL    Globulin 3.1 2.0 - 4.0 g/dL    A-G Ratio 0.5 (L) 1.1 - 2.2     LIPASE    Collection Time: 12/17/21  2:50 PM   Result Value Ref Range    Lipase 2,810 (H) 73 - 393 U/L     CT abdomen and pelvis with IV contrast.     Comparison CT abdomen and pelvis December 9, 2021.     Axial images are reviewed along with reformatted sagittal/coronal images. 100 mL  Isovue 370 administered. Dose reduction: All CT scans at this facility are performed using dose reduction  optimization techniques as appropriate to a performed exam including the  following-  automated exposure control, adjustments of mA and/or Kv according to patient  size, or use of iterative reconstructive technique.     New moderate volume dependent pleural effusions. Compressive lower lobe lung  atelectasis. CAD.     Scattered hypodense sites some with peripheral enhancement throughout the liver. Largest sites approaching 1.0-1.5 cm. High suspicion metastatic disease. Gallbladder decompressed. Silastic stent distal common bile duct. Ill-defined hypodense appearance for the  pancreatic head, this may represent pancreatic mass. Mild pancreatic duct  ectasia. Suspect low-density enlarged periportal/peripancreatic lymph nodes. No drainable  peripancreatic fluid collection/pseudocyst formation.     Normal volume spleen. Unchanged appearance bilateral kidneys. Right adrenal gland adreniform enlargement. Left adrenal gland with 2.3 cm round  mixed density/mixed enhancing nodule; possible metastatic disease.      Stomach and small bowel loops are decompressed.  Oral contrast, stool, and air  through colon.      Increasing ascites, now estimated moderate approaching large-volume.     Atherosclerotic change normal caliber abdominal aorta.     IMPRESSION  New moderate volume dependent pleural effusions with associated  lower lobe lung compressive atelectasis.     Increasing ascites, moderate approaching large-volume  (fluid could be sampled if there is any clinical concern for bile content).     High suspicion hepatic metastases. Similar appearance for the pancreas; Silastic stent in place. No pseudocyst formation.     Unchanged appearance bilateral adrenal glands.     No bowel obstruction.                           Assessment and plan:     1. Pancreatic mass  -likely malignant; CA 19-9 >4000  -s/p FNA via EUS which was suspicious for neoplasia but not diagnostic  -once stable then will need OP followup with advanced oncology surgeon like Dr Vicki Escobar ([facility]) for resectability considerations; this can be arranged as OP.  -I have reviewed CT scan report as well as CT scan images. There is some concern about possible liver metastasis/lesions. I have discussed this CT scan findings with the patient in detail and explained her that one of the option for getting diagnosis is possible liver biopsy. I will have this CT scan reviewed by interventional radiologist and asked their opinion about possible biopsy. Of course patient is still recovering from her acute pancreatitis and I will hold off any invasive procedure until she is somewhat better from acute pancreatitis. Patient is very upset about her acute pancreatitis and wondering why her amylase and lipase is not getting better. I have explained her that Dr. Waylon Conley is following her from GI and he is managing the treatment for acute pancreatitis. -It is already Friday late afternoon, so we will have the CT scan reviewed by her interventional radiologist on Monday.  -Case was discussed again with primary team.  -  2.  Prior history of RCC; has slow growing pulmonary nodules which may limit planning for resection as these could represent stage IV RCC; however have been stable on recent imaging        3. Afib on eliquis     4. HTN     5. CKD III     6. Pancreatitis  -resolving     OK to release once medically stable; followup with Dr Sherrie Serrano per routine.  -I will sign off the case. This dictation was done by dragon, computer voice recognition software. Often unanticipated grammatical, syntax, phones and other interpretive errors are inadvertently transcribed. Please excuse errors that have escaped final proofreading.        Signed By: Tim Escalera MD     December 17, 2021

## 2021-12-17 NOTE — PROGRESS NOTES
CM spoke with Clara Yepez from [facility] who was at bedside to eval patient for IRF. Clara Yepez will work on getting patient accepted to [facility]. CM will continue to follow for discharge dispo and needs.      Brigitte Vargas

## 2021-12-17 NOTE — PROGRESS NOTES
Problem: Falls - Risk of  Goal: *Absence of Falls  Description: Document Marga Whitfield Fall Risk and appropriate interventions in the flowsheet.   Outcome: Progressing Towards Goal  Note: Fall Risk Interventions:  Mobility Interventions: Bed/chair exit alarm         Medication Interventions: Bed/chair exit alarm    Elimination Interventions: Call light in reach    History of Falls Interventions: Bed/chair exit alarm         Problem: Patient Education: Go to Patient Education Activity  Goal: Patient/Family Education  Outcome: Progressing Towards Goal     Problem: Pain  Goal: *Control of Pain  Outcome: Progressing Towards Goal     Problem: Patient Education: Go to Patient Education Activity  Goal: Patient/Family Education  Outcome: Progressing Towards Goal

## 2021-12-17 NOTE — PROGRESS NOTES
OCCUPATIONAL THERAPY TREATMENT  Patient: Gabi Hebert (25 y.o. female)  Date: 12/17/2021  Diagnosis: Pancreatitis [K85.90] Pancreatitis       Precautions:    Chart, occupational therapy assessment, plan of care, and goals were reviewed. ASSESSMENT  Patient continues with skilled OT services and is progressing towards goals. Pt. Received semi-supine in bed and agreeable to therapy session. Pt. Performed bed mobility with SBA with additional time to performed, scooting Min A, additional time while seated at EOB for seated RB. Pt. Completed sit<> stand Min A x2 and use of RW upon standing for balance, pt able to perform a few side steps to [facility] with Min A x2, BSC transfer Min A,  sit-> stand Min A x2 from [facility], total A for isidoro-care while standing, few steps to chair with Min A with use of RW. Pt. Performed washing hair with shampoo cap with set-up assistance and combing hair with set-up assistance. Pt. Educated on UE therex to perform throughout the day. Pt. Left seated in chair with needs met and call bell within reach. REC. SNF when medically appropriate for discharge. Other factors to consider for discharge: PLOF, time since on set         PLAN :  Patient continues to benefit from skilled intervention to address the above impairments. Continue treatment per established plan of care. to address goals. Recommendation for discharge: (in order for the patient to meet his/her long term goals)  Therapy up to 5 days/week in SNF setting    This discharge recommendation:  Has been made in collaboration with the attending provider and/or case management    IF patient discharges home will need the following DME: TBD       SUBJECTIVE:   Patient stated  I just want to wash my hair.     OBJECTIVE DATA SUMMARY:   Cognitive/Behavioral Status:  Neurologic State: Alert  Orientation Level: Oriented X4    Functional Mobility and Transfers for ADLs:  Bed Mobility:  Rolling: Stand-by assistance  Supine to Sit: Stand-by assistance; Additional time  Scooting: Minimum assistance    Transfers:  Sit to Stand: Minimum assistance;Assist x2   Bed > BSC: min A x2    Balance:  Sitting: Impaired; Without support  Sitting - Static: Good (unsupported)  Sitting - Dynamic: Fair (occasional)  Standing: Impaired; With support  Standing - Static: Fair;Constant support  Standing - Dynamic : Fair;Constant support    ADL Intervention:  Grooming: set-up assistance  Washing hair with shampoo cap    Therapeutic Exercises:  Pt. Educated on UE therex. Pain:  5-6 / 10 pain in abdominal     Activity Tolerance:   Fair and Poor  Please refer to the flowsheet for vital signs taken during this treatment. After treatment patient left in no apparent distress:   Sitting in chair, Heels elevated for pressure relief and Call bell within reach    COMMUNICATION/COLLABORATION:   The patients plan of care was discussed with: Physical therapy assistant. Co-tx with PTA for increased assistance with transfers and OOB mobility. Yemi Pastures  Time Calculation: 38 mins    Problem: Self Care Deficits Care Plan (Adult)  Goal: *Acute Goals and Plan of Care (Insert Text)  Description: 1. Pt will be mod I sup <> sit in prep for EOB ADLs  2. Pt will be mod I grooming sitting EOB  3. Pt will be mod I LE dressing sitting EOB/long sit  4. Pt will be mod I sit <>  prep for toileting LRAD  5. Pt will be mod I toileting/toilet transfer/cloth mgmt LRAD  6.  Pt will be IND following UE HEP in prep for self care tasks      Outcome: Progressing Towards Goal

## 2021-12-17 NOTE — PROGRESS NOTES
Problem: Falls - Risk of  Goal: *Absence of Falls  Description: Document [place] Fall Risk and appropriate interventions in the flowsheet.   Outcome: Progressing Towards Goal  Note: Fall Risk Interventions:  Mobility Interventions: Bed/chair exit alarm         Medication Interventions: Bed/chair exit alarm    Elimination Interventions: Call light in reach    History of Falls Interventions: Bed/chair exit alarm

## 2021-12-17 NOTE — PROGRESS NOTES
Hospitalist Progress Note         CHANELLE Ribeiro, FNP-C    Daily Progress Note: 12/17/2021      Subjective:   Subjective   Patient examined alert and oriented lying in bed  Reports continued abdominal pain  Continue to request something to eat and drink  No acute distress noted on examination    Review of Systems:   Review of Systems   Constitutional: Negative. HENT: Negative. Eyes: Negative. Respiratory: Negative. Cardiovascular: Negative. Gastrointestinal: Positive for abdominal pain. Genitourinary: Negative. Musculoskeletal: Negative. Skin: Negative. Neurological: Negative. Endo/Heme/Allergies: Negative. Psychiatric/Behavioral: Negative. Objective:   Objective      Vitals:  Patient Vitals for the past 12 hrs:   SpO2   12/17/21 0846 99 %        Physical Exam:  Physical Exam  Vitals and nursing note reviewed. HENT:      Mouth/Throat:      Mouth: Mucous membranes are moist.   Eyes:      Conjunctiva/sclera: Conjunctivae normal.   Cardiovascular:      Rate and Rhythm: Normal rate. Pulses: Normal pulses. Pulmonary:      Comments: 2L NC  Abdominal:      General: Bowel sounds are normal.      Tenderness: There is abdominal tenderness. Comments: Left quadrant tenderness   Musculoskeletal:         General: Normal range of motion. Skin:     General: Skin is warm and dry. Neurological:      Mental Status: She is alert and oriented to person, place, and time. Psychiatric:         Mood and Affect: Mood normal.          Lab Results:  No results found for this or any previous visit (from the past 24 hour(s)). Diagnostic Images:  CTA ABDOMEN PELV W CONT   Final Result   1. Ill-defined hypodense mass in the pancreas. There are inflammatory changes   and fluid adjacent to the pancreas or duodenum and stomach most likely related   to biopsy or focal pancreatitis. No pseudocyst formation, active bleeding or   other acute findings.    2. Enlarged adrenal glands left greater than right with noncontrast densities   consistent with adrenal adenomas. 3. Right nephrectomy. 4. Other findings as described.       XR CHEST PORT   Final Result   No acute findings. ABD US 12/13/21 : Small amount of free fluid. Finding suggesting hemangioma in the  liver. Gallbladder wall thickening, adenomyomatosis, sludge and gallstones. Cholecystitis possible. Finding in the region of the pancreatic head as above. Other findings as above.     Current Medications:    Current Facility-Administered Medications:     HYDROmorphone (DILAUDID) injection 1 mg, 1 mg, IntraVENous, Q3H PRN, Belinda Alert, NP, 1 mg at 12/17/21 1009    bisacodyL (DULCOLAX) suppository 10 mg, 10 mg, Rectal, DAILY PRN, Belinda Alert, NP    piperacillin-tazobactam (ZOSYN) 3.375 g in 0.9% sodium chloride (MBP/ADV) 100 mL MBP, 3.375 g, IntraVENous, Q8H, Huyen Oshea DO, Last Rate: 25 mL/hr at 12/17/21 0603, 3.375 g at 12/17/21 0603    hydrALAZINE (APRESOLINE) 20 mg/mL injection 20 mg, 20 mg, IntraVENous, Q6H PRN, Huyen Crane PA    pantoprazole (PROTONIX) 40 mg in 0.9% sodium chloride 10 mL injection, 40 mg, IntraVENous, DAILY, Huyen Crane PA, 40 mg at 12/17/21 0900    guaiFENesin ER (MUCINEX) tablet 600 mg, 600 mg, Oral, Q12H, Huyen Crane PA, 600 mg at 12/16/21 0901    albuterol-ipratropium (DUO-NEB) 2.5 MG-0.5 MG/3 ML, 3 mL, Nebulization, Q6H PRN, Huyen Crane PA    chlorthalidone (HYGROTON) tablet 25 mg, 25 mg, Oral, DAILY, Huyen Crane PA, 25 mg at 12/17/21 0948    0.9% sodium chloride infusion, 100 mL/hr, IntraVENous, CONTINUOUS, Nolberto Farah NP, Last Rate: 100 mL/hr at 12/16/21 1936, 100 mL/hr at 12/16/21 1936    albuterol (PROVENTIL HFA, VENTOLIN HFA, PROAIR HFA) inhaler 2 Puff, 2 Puff, Inhalation, Q6H PRN, Nolberto Farah NP, 2 Puff at 12/16/21 4059    ascorbic acid (vitamin C) (VITAMIN C) tablet 500 mg, 500 mg, Oral, DAILY, Nolberto Farah A, NP, 500 mg at 12/11/21 0900    atorvastatin (LIPITOR) tablet 40 mg, 40 mg, Oral, DAILY, Nolberto Farah, NP, 40 mg at 12/17/21 0948    cholecalciferol (VITAMIN D3) (1000 Units /25 mcg) tablet 1,000 Units, 1,000 Units, Oral, DAILY, Nolberto Farah, NP, 1,000 Units at 12/17/21 4961    diazePAM (VALIUM) tablet 5 mg, 5 mg, Oral, Q6H PRN, Nolberto Farah, NP, 5 mg at 12/17/21 2901    dilTIAZem ER (CARDIZEM CD) capsule 120 mg, 120 mg, Oral, DAILY, Nolberto Farah, NP, 120 mg at 12/17/21 0948    [Held by provider] apixaban (ELIQUIS) tablet 5 mg, 5 mg, Oral, BID, Nolberto Farah, NP, 5 mg at 12/11/21 2153    potassium chloride SR (KLOR-CON 10) tablet 20 mEq, 20 mEq, Oral, DAILY, Nolberto Farah, NP, 20 mEq at 12/17/21 5038    sertraline (ZOLOFT) tablet 25 mg, 25 mg, Oral, DAILY, Nolberto Farah, NP, 25 mg at 12/17/21 0948    traZODone (DESYREL) tablet 50 mg, 50 mg, Oral, QHS, Nolberto Farah, NP, 50 mg at 12/16/21 2215    ondansetron [facility]) injection 4 mg, 4 mg, IntraVENous, Q6H PRN, Nolberto Farah, NP, 4 mg at 12/17/21 1008    acetaminophen (TYLENOL) tablet 650 mg, 650 mg, Oral, Q4H PRN, Nolberto Farah NP    [Held by provider] 0.9% sodium chloride infusion, 125 mL/hr, IntraVENous, CONTINUOUS, Sreedhar ALMEIDA, NP, Last Rate: 125 mL/hr at 12/15/21 0225, 125 mL/hr at 12/15/21 0225    prochlorperazine (COMPAZINE) with saline injection 10 mg, 10 mg, IntraVENous, Q6H PRN, Nolberto Farah CONRADO, 10 mg at 12/15/21 0259       ASSESSMENT:    Hospital Course:     Raghu Barbosa a 78 y. o. female with a PMH of  atrial fibrillation, PUD, CHF, COPD, renal cell carcinoma stage IV, s/p nephrectomy, GERD, COPD, sjoren's syndrome, hypertension, and depression who came to the hospital with complaints of abdominal pain, associated nausea/vomiting, poor PO intake. Pt reported having a EUS on 12/6 as outpatient by Dr. Waylon Conley, biopsy was taken, rare cells present suspicious for malignancy.  Initial lab work significant for elevated lipase at 3,000, WBC, urine culture pending. CT abdomen/pelvis showing ill defined hypodense mass in the pancreas with fluid adjacent to the pancreas suggestive of focal pancreatitis. Adrenal adenomas are seen. No pseudocyst formation or active bleeding seen. Pt will be admitted for further evaluation and treatment. GI and oncology consulted. Patient started on fluids, NPO, pain and nausea medication PRN. Advance diet as tolerated. Hold Eliquis due to hematemesis will perform complete ultrasound patient is refusing CT of abdomen pelvis. Patient having reproducible chest pain will obtain EKG and troponin. Continue IV Protonix. Abd us shows hemangioma in the liver. Gallbladder wall thickening, adenomyomatosis, sludge and gallstones. Cholecystitis possible. Continue gentle IV hydration, start zosyn. Lipase levels remains elevated. US possible acute cholecystitis in the setting of acute pancreatitis, cholecystitis is likely a sequelae of pancreatic inflammation, not an operative candidate for cholecystectomy at this point. Repeat CT abd/pelv shows new pleural effusions, increasing ascites, high suspicion for hepatic metastasis. Diet advanced to clear liquids. 1. Pancreatitis w/ metastasis suspicion:  2. Hematemesis (resolved):  -us shows hemangioma in the liver. Gallbladder wall thickening, adenomyomatosis, sludge and gallstones. Cholecystitis possible  -general surgeon following  -gentle IV hydration, IV zosyn  -prn antiemetics and pain management (conservative management)  -hem/onc following  -currently on dilaudid 1mg q 3hrs   -lipase  >3000<- 2578 <-2066 <-3000  -Alk Phos 213<- 214 <-186 <-193  -CT abd/pelv shows new pleural effusions, increasing ascites, high suspicion for hepatic metastasis  -general surgeon advanced diet to clear  -consult oncology    3.  Pancreatic mass:  -CA 19-9-->4000  -s/p FNA via EUS which was suspicious for neoplasia but not diagnostic  -outpatient oncology fu warranted  -no further workup inpatient    4. Atrial fibrillation:  -managed w/ eliquis, cardizem, hygroton  -rate controled  -eliquis currently held due to hematemesis    5. CHF:  -managed w/ eliquis, cardizem, hygroton    6. COPD:  -appears stable  -continue home O2 2L NC @ baseline  -continue mucinex, prn albuterol    7. Depression  -continue trazodone and zoloft    8. Hyperlipidemia  -continue lipitor 40mg        Full Code  Dvt Prophylaxis eliquis held, scd's  GI Prophylaxis protonix  Disposition:  -pending improvement in LFTs and pain  -pain management, gentle IV hydration  -advance diet as tolerated  -conservative management  -AM labs pending  -CM for dispo planning  oncology consult    Above treatment plan reviewed and discussed with patient in detail at bedside, all questions answered. Care Plan discussed with: Interdisciplinary team    Total time spent with patient: 35 minutes.     Yazmin Barros NP

## 2021-12-17 NOTE — PROGRESS NOTES
Progress Note    Patient: Hill Underwood MRN: [id number]  SSN: [id number]    YOB: 1945  Age: 78 y.o. Sex: female      Admit Date: 12/9/2021    LOS: 8 days     Subjective:   GI in consultation for Pancreatitis    Patient is awake and sitting up in the chair. She reports continued abdominal pain and tenderness on light palpation. It is on the right and left side of abdomen. She is wanting to have some coke. General surgery advanced diet to clear liquids. Her lipase yesterday increased to over 3,000 again, with today's labs pending at this time. She had a CT of the abdomen this morning shows no bowel obstruction, High suspicion hepatic metastases, similar appearance for the pancreas, silastic stent in place, no pseudocyst formation. CT abdomen 12/17/21: IMPRESSION  New moderate volume dependent pleural effusions with associated  lower lobe lung compressive atelectasis. Increasing ascites, moderate approaching large-volume (fluid could be sampled if there is any clinical concern for bile content). High suspicion hepatic metastases. Similar appearance for the pancreas; Silastic stent in place. No pseudocyst formation. Unchanged appearance bilateral adrenal glands. No bowel obstruction. Abdominal Ultrasound 12/13/2021: IMPRESSION  Small amount of free fluid.  Finding suggesting hemangioma in the  liver.  Gallbladder wall thickening, adenomyomatosis, sludge and gallstones. Cholecystitis possible.  Finding in the region of the pancreatic head as above. Other findings as above.     12/9 GI consult note:  History of Present Illness: Nevin Cole is a 78 y. o. female who is seen in consultation for pancreatitis. Nikki Sheree has a past medical history of  Paroxysmal Atrial Fibrillation, CHF, COPD, renal cell carcinoma Stage IV s/p nephrectomy, GERD sjoren's syndrome, hypertension, and depression.  Patient under went EUS on 12/6/2021 showing 2.7 X3 cm lesion in the area of head of pancreas with dilation of the Pancreatic duct abutting the portal vein but not involving the SMA or AMV ; Tumor T2 by endosonographic criteria ; one small lymph node in the area of head of pancreas. Pathology shows rare cells present suspicious for malignancy.   She had an ERCP on 11/4 2021 ERCP; with sphincterotomy/papillotomy ERCP; with placement of endoscopic stent into biliary or pancreatic duct, including pre and postdilation and guide wire passage, when performed, including sphincterotomy, when performed, each stent. . Patient had a PET scan on 11/22/21 which shows a lesion in the pancreatic head consistent with malignancy. CTA of abdomen shows ill defined hypodense mass in the pancreas. There are inflammatory changes. Patient states she experienced nausea, vomiting and diarrhea since Monday. Her abdominal pain has progressively gotten worse. She reports a poor appetite. She does complain of increased \"burping\". Total bilirubin 1.4, AsT 36, ALT 31, Alk Phos. 217, Lipase > 3,000. Plan nothing by mouth except ice chips and sips of water with medication. IV hydration. Pain management     PET Scan 11/22/2021: IMPRESSION  1.  FDG avid lesion in the pancreatic head consistent with malignancy. 2.  Subcentimeter focus of FDG uptake in the liver, indeterminate. 3.  FDG uptake associated with density expanding the right facet at C5-C6,  possibly due to an ectatic artery. EUS on 12/6/2021 showing 2.7 X3 cm lesion in the area of head of pancreas with dilation of the Pancreatic duct abutting the portal vein but not involving the SMA or AMV ; Tumor T2 by endosonographic criteria ; one small lymph node in the area of head of pancreas. CTA abdomen 12/9/2021: IMPRESSION  1. Ill-defined hypodense mass in the pancreas. There are inflammatory changes and fluid adjacent to the pancreas or duodenum and stomach most likely related to biopsy or focal pancreatitis. No pseudocyst formation, active bleeding or other acute findings.   2. Enlarged adrenal glands left greater than right with noncontrast densities  consistent with adrenal adenomas. 3. Right nephrectomy. 4. Other findings as described.       Objective:     Vitals:    12/16/21 2045 12/17/21 0600 12/17/21 0846 12/17/21 1250   BP: (!) 150/59   (!) 150/59   Pulse: 84   84   Resp: 18   18   Temp: 98.4 °F (36.9 °C)   98.4 °F (36.9 °C)   SpO2: 98%  99%    Weight:  94.7 kg (208 lb 12.4 oz)     Height:            Intake and Output:  Current Shift: No intake/output data recorded. Last three shifts: 12/15 1901 - 12/17 0700  In: 2350 [P.O.:450; I.V.:1900]  Out: 1550 [Urine:1550]    Physical Exam:   Skin:  Extremities and face reveal no rashes. No chapin erythema. HEENT: Sclerae anicteric. Extra-occular muscles are intact. No abnormal pigmentation of the lips. The neck is supple  Respiratory:  Comfortable breathing with no accessory muscle use. GI:  Abdomen + distended, soft, and  + tender. Rectal:  Deferred  Musculoskeletal: Generalized weakness  Neurological:  Gross memory appears intact. Patient is alert and oriented. Psychiatric:  sleepy  Lymphatic:  No visible adenopathy      Lab/Data Review:  No results found for this or any previous visit (from the past 24 hour(s)). CT ABD PELV W CONT   Final Result   New moderate volume dependent pleural effusions with associated   lower lobe lung compressive atelectasis. Increasing ascites, moderate approaching large-volume   (fluid could be sampled if there is any clinical concern for bile content). High suspicion hepatic metastases. Similar appearance for the pancreas; Silastic stent in place. No pseudocyst formation. Unchanged appearance bilateral adrenal glands. No bowel obstruction. US ABD LTD   Final Result   Small amount of free fluid. Finding suggesting hemangioma in the   liver. Gallbladder wall thickening, adenomyomatosis, sludge and gallstones. Cholecystitis possible.   Finding in the region of the pancreatic head as above. Other findings as above. CTA ABDOMEN PELV W CONT   Final Result   1. Ill-defined hypodense mass in the pancreas. There are inflammatory changes   and fluid adjacent to the pancreas or duodenum and stomach most likely related   to biopsy or focal pancreatitis. No pseudocyst formation, active bleeding or   other acute findings. 2. Enlarged adrenal glands left greater than right with noncontrast densities   consistent with adrenal adenomas. 3. Right nephrectomy. 4. Other findings as described. XR CHEST PORT   Final Result   No acute findings. Assessment:     Principal Problem:    Pancreatitis (12/9/2021)    Active Problems:    A-fib ([facility] [address] (11/16/2020)      CHF (congestive heart failure) ([facility] [address] (11/16/2020)      Hematemesis (12/12/2021)      History of peptic ulcer disease (12/12/2021)        Plan:   1. Pancreatitis      -clear Liquid diet      -IV hydration @ 100 ml/hr      -Lipase >3000 12/16/2021      -Lipase in the am       -Dilaudid 1 mg every 3 hours as needed for pain       -Recommend starting TPN/PPN  2. CHF      -Started on Chlorthalidone  3. COPD       -Continue home medications       -Albuterol as needed   4. Pancreatic Mass      - > 4000      -S/p fine needle aspiration suspicious for neoplasia but not diagnostic      When stable Oncology plan for out patient followup with advanced oncology surgeon  [place] [facility] [place]  5. Hematemesis (resolved)      -Monitor H&H      -Transfuse as needed      -hgB 12.5       -Continue Protonix 40 mg daily  6. Afib      -EKG on 12/13/21 shows Afib has replaced Sinus Rhythm      -Rate is controlled      -eliquis  BID/ HgB stable at 12.5        no further hematemesis reported    Thank you for allowing me to participate in this patients care. Plan discussed with Dr. Sherley Weir and he approves.     Signed By: Earnest Iraheta NP     December 17, 2021

## 2021-12-18 NOTE — PROGRESS NOTES
Progress Note    Patient: Olvin Jean Baptiste MRN: [id number]  SSN: [id number]    YOB: 1945  Age: 78 y.o. Sex: female      Admit Date: 12/9/2021    LOS: 9 days     Subjective:   Patient seen sitting in chair  Reports diarrhea overnight  Reports continued abdominal pain  No nausea or vomiting    Objective:     Vitals:    12/17/21 2027 12/18/21 0758 12/18/21 0829 12/18/21 0926   BP: (!) 140/60 134/65     Pulse: 82 95     Resp: 18 18     Temp: 98 °F (36.7 °C) 98.7 °F (37.1 °C)     SpO2: 98% 100% 98% 99%   Weight:       Height:            Intake and Output:  Current Shift: No intake/output data recorded. Last three shifts: 12/16 1901 - 12/18 0700  In: 2053.3 [I.V.:2053.3]  Out: 1100 [Urine:1100]    Review of Systems:  ROS     Physical Exam:   Abdomen is soft, mildly distended, tender palpation epigastrium and right upper quadrant, no peritoneal findings    Lab/Data Review:  Recent Results (from the past 24 hour(s))   METABOLIC PANEL, COMPREHENSIVE    Collection Time: 12/17/21  2:50 PM   Result Value Ref Range    Sodium 144 136 - 145 mmol/L    Potassium 3.3 (L) 3.5 - 5.1 mmol/L    Chloride 111 (H) 97 - 108 mmol/L    CO2 26 21 - 32 mmol/L    Anion gap 7 5 - 15 mmol/L    Glucose 85 65 - 100 mg/dL    BUN 27 (H) 6 - 20 mg/dL    Creatinine 0.83 0.55 - 1.02 mg/dL    BUN/Creatinine ratio 33 (H) 12 - 20      GFR est AA >60 >60 ml/min/1.73m2    GFR est non-AA >60 >60 ml/min/1.73m2    Calcium 9.3 8.5 - 10.1 mg/dL    Bilirubin, total 0.7 0.2 - 1.0 mg/dL    AST (SGOT) 35 15 - 37 U/L    ALT (SGPT) 24 12 - 78 U/L    Alk.  phosphatase 294 (H) 45 - 117 U/L    Protein, total 4.8 (L) 6.4 - 8.2 g/dL    Albumin 1.7 (L) 3.5 - 5.0 g/dL    Globulin 3.1 2.0 - 4.0 g/dL    A-G Ratio 0.5 (L) 1.1 - 2.2     LIPASE    Collection Time: 12/17/21  2:50 PM   Result Value Ref Range    Lipase 2,810 (H) 73 - 576 U/L   METABOLIC PANEL, COMPREHENSIVE    Collection Time: 12/18/21  7:14 AM   Result Value Ref Range    Sodium 141 136 - 145 mmol/L Potassium 3.0 (L) 3.5 - 5.1 mmol/L    Chloride 110 (H) 97 - 108 mmol/L    CO2 27 21 - 32 mmol/L    Anion gap 4 (L) 5 - 15 mmol/L    Glucose 92 65 - 100 mg/dL    BUN 22 (H) 6 - 20 mg/dL    Creatinine 0.82 0.55 - 1.02 mg/dL    BUN/Creatinine ratio 27 (H) 12 - 20      GFR est AA >60 >60 ml/min/1.73m2    GFR est non-AA >60 >60 ml/min/1.73m2    Calcium 9.4 8.5 - 10.1 mg/dL    Bilirubin, total 0.7 0.2 - 1.0 mg/dL    AST (SGOT) 30 15 - 37 U/L    ALT (SGPT) 26 12 - 78 U/L    Alk. phosphatase 282 (H) 45 - 117 U/L    Protein, total 5.4 (L) 6.4 - 8.2 g/dL    Albumin 1.5 (L) 3.5 - 5.0 g/dL    Globulin 3.9 2.0 - 4.0 g/dL    A-G Ratio 0.4 (L) 1.1 - 2.2     LIPASE    Collection Time: 12/18/21  7:14 AM   Result Value Ref Range    Lipase 2,559 (H) 73 - 393 U/L          Assessment:     Principal Problem:    Pancreatitis (12/9/2021)    Active Problems:    A-fib ([facility] [address] (11/16/2020)      CHF (congestive heart failure) ([facility] [address] (11/16/2020)      Hematemesis (12/12/2021)      History of peptic ulcer disease (12/12/2021)        Plan:   Patient has been started on clear liquids, lipase still is elevated, on examination she is tender in the epigastrium. CT scan results noted.   Patient is nutritionally depleted recommend starting PPN  Recommend following up with interventional radiology to see if any of the liver lesions could be percutaneously biopsied, my suspicion is that she has metastatic renal cell cancer and not pancreatic primary however she will need to have a biopsy for tissue  Continue clear liquids    Signed By: Yaron Pina MD     December 18, 2021

## 2021-12-18 NOTE — PROGRESS NOTES
Hospitalist Progress Note         AlnicoletteCHANELLE Cardenas, FNP-C    Daily Progress Note: 12/18/2021      Subjective:   Subjective   Patient examined alert and oriented sitting in recliner  Reports continued abdominal pain  No acute distress noted on examination    Review of Systems:   Review of Systems   Constitutional: Negative. HENT: Negative. Eyes: Negative. Respiratory: Negative. Cardiovascular: Negative. Gastrointestinal: Positive for abdominal pain. Genitourinary: Negative. Musculoskeletal: Negative. Skin: Negative. Neurological: Negative. Endo/Heme/Allergies: Negative. Psychiatric/Behavioral: Negative. Objective:   Objective      Vitals:  Patient Vitals for the past 12 hrs:   Temp Pulse Resp BP SpO2   12/18/21 0926 -- -- -- -- 99 %   12/18/21 0829 -- -- -- -- 98 %   12/18/21 0758 98.7 °F (37.1 °C) 95 18 134/65 100 %        Physical Exam:  Physical Exam  Vitals and nursing note reviewed. HENT:      Mouth/Throat:      Mouth: Mucous membranes are moist.   Eyes:      Conjunctiva/sclera: Conjunctivae normal.   Cardiovascular:      Rate and Rhythm: Normal rate. Pulses: Normal pulses. Pulmonary:      Comments: 2L NC  Abdominal:      General: Bowel sounds are normal.      Tenderness: There is abdominal tenderness. Comments: Left quadrant tenderness   Musculoskeletal:         General: Normal range of motion. Skin:     General: Skin is warm and dry. Neurological:      Mental Status: She is alert and oriented to person, place, and time.    Psychiatric:         Mood and Affect: Mood normal.          Lab Results:  Recent Results (from the past 24 hour(s))   METABOLIC PANEL, COMPREHENSIVE    Collection Time: 12/17/21  2:50 PM   Result Value Ref Range    Sodium 144 136 - 145 mmol/L    Potassium 3.3 (L) 3.5 - 5.1 mmol/L    Chloride 111 (H) 97 - 108 mmol/L    CO2 26 21 - 32 mmol/L    Anion gap 7 5 - 15 mmol/L    Glucose 85 65 - 100 mg/dL    BUN 27 (H) 6 - 20 mg/dL Creatinine 0.83 0.55 - 1.02 mg/dL    BUN/Creatinine ratio 33 (H) 12 - 20      GFR est AA >60 >60 ml/min/1.73m2    GFR est non-AA >60 >60 ml/min/1.73m2    Calcium 9.3 8.5 - 10.1 mg/dL    Bilirubin, total 0.7 0.2 - 1.0 mg/dL    AST (SGOT) 35 15 - 37 U/L    ALT (SGPT) 24 12 - 78 U/L    Alk. phosphatase 294 (H) 45 - 117 U/L    Protein, total 4.8 (L) 6.4 - 8.2 g/dL    Albumin 1.7 (L) 3.5 - 5.0 g/dL    Globulin 3.1 2.0 - 4.0 g/dL    A-G Ratio 0.5 (L) 1.1 - 2.2     LIPASE    Collection Time: 12/17/21  2:50 PM   Result Value Ref Range    Lipase 2,810 (H) 73 - 634 U/L   METABOLIC PANEL, COMPREHENSIVE    Collection Time: 12/18/21  7:14 AM   Result Value Ref Range    Sodium 141 136 - 145 mmol/L    Potassium 3.0 (L) 3.5 - 5.1 mmol/L    Chloride 110 (H) 97 - 108 mmol/L    CO2 27 21 - 32 mmol/L    Anion gap 4 (L) 5 - 15 mmol/L    Glucose 92 65 - 100 mg/dL    BUN 22 (H) 6 - 20 mg/dL    Creatinine 0.82 0.55 - 1.02 mg/dL    BUN/Creatinine ratio 27 (H) 12 - 20      GFR est AA >60 >60 ml/min/1.73m2    GFR est non-AA >60 >60 ml/min/1.73m2    Calcium 9.4 8.5 - 10.1 mg/dL    Bilirubin, total 0.7 0.2 - 1.0 mg/dL    AST (SGOT) 30 15 - 37 U/L    ALT (SGPT) 26 12 - 78 U/L    Alk. phosphatase 282 (H) 45 - 117 U/L    Protein, total 5.4 (L) 6.4 - 8.2 g/dL    Albumin 1.5 (L) 3.5 - 5.0 g/dL    Globulin 3.9 2.0 - 4.0 g/dL    A-G Ratio 0.4 (L) 1.1 - 2.2     LIPASE    Collection Time: 12/18/21  7:14 AM   Result Value Ref Range    Lipase 2,559 (H) 73 - 393 U/L          Diagnostic Images:  CTA ABDOMEN PELV W CONT   Final Result   1. Ill-defined hypodense mass in the pancreas. There are inflammatory changes   and fluid adjacent to the pancreas or duodenum and stomach most likely related   to biopsy or focal pancreatitis. No pseudocyst formation, active bleeding or   other acute findings. 2. Enlarged adrenal glands left greater than right with noncontrast densities   consistent with adrenal adenomas. 3. Right nephrectomy.    4. Other findings as described.       XR CHEST PORT   Final Result   No acute findings. ABD US 12/13/21 : Small amount of free fluid. Finding suggesting hemangioma in the  liver. Gallbladder wall thickening, adenomyomatosis, sludge and gallstones. Cholecystitis possible. Finding in the region of the pancreatic head as above. Other findings as above.     Current Medications:    Current Facility-Administered Medications:     HYDROmorphone (DILAUDID) injection 1 mg, 1 mg, IntraVENous, Q3H PRN, Marisol Butts, NP, 1 mg at 12/18/21 1013    bisacodyL (DULCOLAX) suppository 10 mg, 10 mg, Rectal, DAILY PRN, Verneice Pain, NP    piperacillin-tazobactam (ZOSYN) 3.375 g in 0.9% sodium chloride (MBP/ADV) 100 mL MBP, 3.375 g, IntraVENous, Q8H, Dorie, Huyen SY, DO, Last Rate: 25 mL/hr at 12/18/21 0554, 3.375 g at 12/18/21 0554    hydrALAZINE (APRESOLINE) 20 mg/mL injection 20 mg, 20 mg, IntraVENous, Q6H PRN, Huyen Crane PA    pantoprazole (PROTONIX) 40 mg in 0.9% sodium chloride 10 mL injection, 40 mg, IntraVENous, DAILY, Huyen Crane PA, 40 mg at 12/18/21 1013    guaiFENesin ER (MUCINEX) tablet 600 mg, 600 mg, Oral, Q12H, Huyen Crane PA, 600 mg at 12/18/21 1012    albuterol-ipratropium (DUO-NEB) 2.5 MG-0.5 MG/3 ML, 3 mL, Nebulization, Q6H PRN, Huyen Crane PA    chlorthalidone (HYGROTON) tablet 25 mg, 25 mg, Oral, DAILY, Huyen Crane PA, 25 mg at 12/18/21 1012    0.9% sodium chloride infusion, 100 mL/hr, IntraVENous, CONTINUOUS, Nolberto Farah, NP, Last Rate: 100 mL/hr at 12/18/21 1014, 100 mL/hr at 12/18/21 1014    albuterol (PROVENTIL HFA, VENTOLIN HFA, PROAIR HFA) inhaler 2 Puff, 2 Puff, Inhalation, Q6H PRN, Nolberto Farah NP, 2 Puff at 12/18/21 4865    ascorbic acid (vitamin C) (VITAMIN C) tablet 500 mg, 500 mg, Oral, DAILY, Nolberto Farah NP, 500 mg at 12/11/21 0900    atorvastatin (LIPITOR) tablet 40 mg, 40 mg, Oral, DAILY, Nolberto Farah NP, 40 mg at 12/17/21 6749    cholecalciferol (VITAMIN D3) (1000 Units /25 mcg) tablet 1,000 Units, 1,000 Units, Oral, DAILY, Nolberto Farah, NP, 1,000 Units at 12/18/21 1012    diazePAM (VALIUM) tablet 5 mg, 5 mg, Oral, Q6H PRN, Nolberto Farah, NP, 5 mg at 12/17/21 2202    dilTIAZem ER (CARDIZEM CD) capsule 120 mg, 120 mg, Oral, DAILY, Zabel Nolberto NEGIN, NP, 120 mg at 12/18/21 1012    apixaban (ELIQUIS) tablet 5 mg, 5 mg, Oral, BID, Nolberto Farah, NP, 5 mg at 12/18/21 1012    potassium chloride SR (KLOR-CON 10) tablet 20 mEq, 20 mEq, Oral, DAILY, Sofi Nolberto NEGIN, NP, 20 mEq at 12/18/21 1011    sertraline (ZOLOFT) tablet 25 mg, 25 mg, Oral, DAILY, Nolberto Farah, NP, 25 mg at 12/18/21 1012    traZODone (DESYREL) tablet 50 mg, 50 mg, Oral, QHS, Sofi Nolberto NEGIN, NP, 50 mg at 12/17/21 2149    ondansetron (ZOFRAN) injection 4 mg, 4 mg, IntraVENous, Q6H PRN, Nolberto Farah, NP, 4 mg at 12/18/21 1013    acetaminophen (TYLENOL) tablet 650 mg, 650 mg, Oral, Q4H PRN, Nolberto Farah NP    [Held by provider] 0.9% sodium chloride infusion, 125 mL/hr, IntraVENous, CONTINUOUS, Sebastian Garcia NP, Last Rate: 125 mL/hr at 12/15/21 0225, 125 mL/hr at 12/15/21 0225    prochlorperazine (COMPAZINE) with saline injection 10 mg, 10 mg, IntraVENous, Q6H PRN, Sofi Nolberto NEGIN, NP, 10 mg at 12/15/21 0259       ASSESSMENT:    Hospital Course:     Lulu Aviles a 78 y. o. female with a PMH of  atrial fibrillation, PUD, CHF, COPD, renal cell carcinoma stage IV, s/p nephrectomy, GERD, COPD, sjoren's syndrome, hypertension, and depression who came to the hospital with complaints of abdominal pain, associated nausea/vomiting, poor PO intake. Pt reported having a EUS on 12/6 as outpatient by Dr. Jones Henry, biopsy was taken, rare cells present suspicious for malignancy. Initial lab work significant for elevated lipase at 3,000, WBC, urine culture pending.  CT abdomen/pelvis showing ill defined hypodense mass in the pancreas with fluid adjacent to the pancreas suggestive of focal pancreatitis. Adrenal adenomas are seen. No pseudocyst formation or active bleeding seen. Pt will be admitted for further evaluation and treatment. GI and oncology consulted. Patient started on fluids, NPO, pain and nausea medication PRN. Advance diet as tolerated. Hold Eliquis due to hematemesis will perform complete ultrasound patient is refusing CT of abdomen pelvis. Patient having reproducible chest pain will obtain EKG and troponin. Continue IV Protonix. Abd us shows hemangioma in the liver. Gallbladder wall thickening, adenomyomatosis, sludge and gallstones. Cholecystitis possible. Continue gentle IV hydration, start zosyn. Lipase levels remains elevated. US possible acute cholecystitis in the setting of acute pancreatitis, cholecystitis is likely a sequelae of pancreatic inflammation, not an operative candidate for cholecystectomy at this point. Repeat CT abd/pelv shows new pleural effusions, increasing ascites, high suspicion for hepatic metastasis. Diet advanced to clear liquids. Start PPN nutrition, replenish K. IR biopsy of liver lesions next week. Concern for liver, pancreatic and adrenal gland carcinoma. 1. Pancreatitis w/ metastasis suspicion:  2. Hematemesis (resolved):  -us shows hemangioma in the liver. Gallbladder wall thickening, adenomyomatosis, sludge and gallstones. Cholecystitis possible  -general surgeon following  -gentle IV hydration, IV zosyn  -prn antiemetics and pain management (conservative management)  -hem/onc following  -currently on dilaudid 1mg q 3hrs   -lipase  2559 <-2810  -Alk Phos 282<-294  -CT abd/pelv shows new pleural effusions, increasing ascites, high suspicion for hepatic metastasis  -IR biopsy of liver lesions next week  -general surgeon advanced diet to clear  -oncology following  -start PPN    3.  Pancreatic mass:  -CA 19-9-->4000  -s/p FNA via EUS which was suspicious for neoplasia but not diagnostic  -outpatient oncology fu warranted  -no further workup inpatient    4. Hypokalemia:  -replenish w/ oral and Iv K    5. Atrial fibrillation:  -managed w/ eliquis, cardizem, hygroton  -rate controled  -eliquis currently held due to hematemesis and plan for IR biopsy    6. CHF:  -managed w/ eliquis, cardizem, hygroton    7. COPD:  -appears stable  -continue home O2 2L NC @ baseline  -continue mucinex, prn albuterol    8. Depression  -continue trazodone and zoloft    9. Hyperlipidemia  -continue lipitor 40mg        Full Code  Dvt Prophylaxis eliquis held, scd's  GI Prophylaxis protonix  Disposition:  -pending improvement in LFTs and pain  -pain management, gentle IV hydration  -advance diet as tolerated  -conservative management  -start PPN  -CM for dispo planning  -IR biopsy of liver lesions next week    Above treatment plan reviewed and discussed with patient in detail at bedside, all questions answered. Care Plan discussed with: Interdisciplinary team    Total time spent with patient: 35 minutes.     Jana Lockett NP

## 2021-12-18 NOTE — PROGRESS NOTES
Problem: Falls - Risk of  Goal: *Absence of Falls  Description: Document Mei Macias Fall Risk and appropriate interventions in the flowsheet.   Outcome: Progressing Towards Goal  Note: Fall Risk Interventions:  Mobility Interventions: Bed/chair exit alarm,Patient to call before getting OOB         Medication Interventions: Bed/chair exit alarm,Patient to call before getting OOB    Elimination Interventions: Bed/chair exit alarm,Call light in reach,Patient to call for help with toileting needs    History of Falls Interventions: Bed/chair exit alarm         Problem: Patient Education: Go to Patient Education Activity  Goal: Patient/Family Education  Outcome: Progressing Towards Goal     Problem: Pain  Goal: *Control of Pain  Outcome: Progressing Towards Goal     Problem: Patient Education: Go to Patient Education Activity  Goal: Patient/Family Education  Outcome: Progressing Towards Goal

## 2021-12-18 NOTE — PROGRESS NOTES
PHYSICAL THERAPY TREATMENT  Patient: Dilia aGrcia (61 y.o. female)  Date: 12/18/2021  Diagnosis: Pancreatitis [K85.90] Pancreatitis       Precautions:    Chart, physical therapy assessment, plan of care and goals were reviewed. ASSESSMENT  Patient continues with skilled PT services and is progressing towards goals. Pt. Sitting in recliner upon arrival and agreeable to therapy session. Patient reports abdomen pain at this time7/10. Patient requires redirection to therapy session and reports complaints about hospital services. Long sitting HEP, and seated HEP Performed for time. Patient refusing sit to stand but requesting SN to get back into bed. Recommend DC to SNF. Current Level of Function Impacting Discharge (mobility/balance): PAIN    Other factors to consider for discharge: PMH         PLAN :  Patient continues to benefit from skilled intervention to address the above impairments. Continue treatment per established plan of care. to address goals. Recommendation for discharge: (in order for the patient to meet his/her long term goals)  Joseph Valentine    This discharge recommendation:  Has been made in collaboration with the attending provider and/or case management    IF patient discharges home will need the following DME: rolling walker       SUBJECTIVE:   Patient stated IM SO SO.    OBJECTIVE DATA SUMMARY:   Critical Behavior:  Neurologic State: Alert  Orientation Level: Oriented X4  Cognition: Follows commands     Functional Mobility Training:  Bed Mobility:                    Transfers:                                   Balance:  Sitting: Intact  Sitting - Static: Good (unsupported)  Sitting - Dynamic: Good (unsupported)  Ambulation/Gait Training:                                                        Stairs:               Therapeutic Exercises:   Therapeutic Exercises:       EXERCISE   Sets   Reps   Active Active Assist   Passive Self ROM   Comments   Ankle Pumps  2 MIN [] [] [] [] Quad Sets/Glut Sets   [] [] [] []    Hamstring Sets   [] [] [] []    Short Arc Quads   [] [] [] []    Heel Slides   [] [] [] []    Straight Leg Raises   [] [] [] []    Hip abd/add  2MIN [] [] [] []    Long Arc Quads  2 MIN [] [] [] []    Marching  1MIN [] [] [] []       [] [] [] []        Pain Ratin    Activity Tolerance:   Fair  Please refer to the flowsheet for vital signs taken during this treatment. After treatment patient left in no apparent distress:   Sitting in chair    COMMUNICATION/COLLABORATION:   The patients plan of care was discussed with: Physical therapy assistant.      Heriberto Rock PTA   Time Calculation: 38 mins

## 2021-12-18 NOTE — PROGRESS NOTES
Hematology and Oncology Progress Note    Patient: Charlene Lovett MRN: [id number]  SSN: [id number]    YOB: 1945  Age: 78 y.o. Sex: female      Admit Date: 12/9/2021    LOS: 9 days     Chief Complaint: Patient is complaining of abdominal pain    Subjective:   Patient has abdominal pain and distention. She is also having left shoulder pain    Objective:     Vitals:    12/18/21 0758 12/18/21 0829 12/18/21 0926 12/18/21 1226   BP: 134/65   118/71   Pulse: 95   78   Resp: 18   16   Temp: 98.7 °F (37.1 °C)   98.8 °F (37.1 °C)   SpO2: 100% 98% 99% 98%   Weight:       Height:              Physical Exam:   Constitutional: Elderly white female looks chronically ill. She is not in any acute distress. She is in mild to moderate pain. Eyes: Sclerae anicteric. Conjunctivae no pallor. ENMT: Oral mucosa is moist, no thrush, mucositis, or petechiae. Neck: No adenopathy. Respiratory: Not in distress. Cardiovascular: Normal sinus rhythm. Abdomen: Has abdominal distention. Patient has left-sided tenderness in upper abdomen. Also has right-sided tenderness in upper abdomen  Extremities: No edema. Skin: No petechiae; no skin rash. Neurologic: Alert/oriented x 3. Lab/Data Review:    Recent Results (from the past 24 hour(s))   METABOLIC PANEL, COMPREHENSIVE    Collection Time: 12/18/21  7:14 AM   Result Value Ref Range    Sodium 141 136 - 145 mmol/L    Potassium 3.0 (L) 3.5 - 5.1 mmol/L    Chloride 110 (H) 97 - 108 mmol/L    CO2 27 21 - 32 mmol/L    Anion gap 4 (L) 5 - 15 mmol/L    Glucose 92 65 - 100 mg/dL    BUN 22 (H) 6 - 20 mg/dL    Creatinine 0.82 0.55 - 1.02 mg/dL    BUN/Creatinine ratio 27 (H) 12 - 20      GFR est AA >60 >60 ml/min/1.73m2    GFR est non-AA >60 >60 ml/min/1.73m2    Calcium 9.4 8.5 - 10.1 mg/dL    Bilirubin, total 0.7 0.2 - 1.0 mg/dL    AST (SGOT) 30 15 - 37 U/L    ALT (SGPT) 26 12 - 78 U/L    Alk.  phosphatase 282 (H) 45 - 117 U/L    Protein, total 5.4 (L) 6.4 - 8.2 g/dL    Albumin 1.5 (L) 3.5 - 5.0 g/dL    Globulin 3.9 2.0 - 4.0 g/dL    A-G Ratio 0.4 (L) 1.1 - 2.2     LIPASE    Collection Time: 12/18/21  7:14 AM   Result Value Ref Range    Lipase 2,559 (H) 73 - 393 U/L     CT abdomen and pelvis with IV contrast.     Comparison CT abdomen and pelvis December 9, 2021.     Axial images are reviewed along with reformatted sagittal/coronal images. 100 mL  Isovue 370 administered. Dose reduction: All CT scans at this facility are performed using dose reduction  optimization techniques as appropriate to a performed exam including the  following-  automated exposure control, adjustments of mA and/or Kv according to patient  size, or use of iterative reconstructive technique.     New moderate volume dependent pleural effusions. Compressive lower lobe lung  atelectasis. CAD.     Scattered hypodense sites some with peripheral enhancement throughout the liver. Largest sites approaching 1.0-1.5 cm. High suspicion metastatic disease. Gallbladder decompressed. Silastic stent distal common bile duct. Ill-defined hypodense appearance for the  pancreatic head, this may represent pancreatic mass. Mild pancreatic duct  ectasia. Suspect low-density enlarged periportal/peripancreatic lymph nodes. No drainable  peripancreatic fluid collection/pseudocyst formation.     Normal volume spleen. Unchanged appearance bilateral kidneys. Right adrenal gland adreniform enlargement. Left adrenal gland with 2.3 cm round  mixed density/mixed enhancing nodule; possible metastatic disease.      Stomach and small bowel loops are decompressed.  Oral contrast, stool, and air  through colon.      Increasing ascites, now estimated moderate approaching large-volume.     Atherosclerotic change normal caliber abdominal aorta.     IMPRESSION  New moderate volume dependent pleural effusions with associated  lower lobe lung compressive atelectasis.     Increasing ascites, moderate approaching large-volume  (fluid could be sampled if there is any clinical concern for bile content).     High suspicion hepatic metastases. Similar appearance for the pancreas; Silastic stent in place. No pseudocyst formation.     Unchanged appearance bilateral adrenal glands.     No bowel obstruction.                           Assessment and plan:     1. Pancreatic mass  -likely malignant; CA 19-9 >4000  -s/p FNA via EUS which was suspicious for neoplasia but not diagnostic  -once stable then will need OP followup with advanced oncology surgeon like Dr Stuart Osler (Sharla Morales) for resectability considerations; this can be arranged as OP.  -I have reviewed CT scan report as well as CT scan images. There is some concern about possible liver metastasis/lesions. I have discussed this CT scan findings with the patient in detail and explained her that one of the option for getting diagnosis is possible liver biopsy.    -Discussed with gastroenterologist Dr. Naty Santos and he is trying to get liver biopsy once her pancreatitis is stable. -We will see her on a as needed basis. We will need tissue diagnosis before we can give any definite oncologic recommendations.  -  2. Prior history of RCC; has slow growing pulmonary nodules which may limit planning for resection as these could represent stage IV RCC; however have been stable on recent imaging        3. Afib on eliquis     4. HTN     5. CKD III     6. Pancreatitis  -Lipase is still about 2500         This dictation was done by dragon, computer voice recognition software. Often unanticipated grammatical, syntax, phones and other interpretive errors are inadvertently transcribed. Please excuse errors that have escaped final proofreading.        Signed By: Yue Bañuelos MD     December 18, 2021

## 2021-12-19 NOTE — PROGRESS NOTES
PT attempt. Pt in significant pain at this time. Requesting to hold PT at this time. Pt apparently going to start new pain meds soon.  Will follow up next tx (12/20/2021.)

## 2021-12-19 NOTE — PROGRESS NOTES
Hospitalist Progress Note         CHANELLE Leblanc, FNP-C    Daily Progress Note: 12/19/2021      Subjective:   Subjective   Patient examined alert and oriented lying in bed  Reports continued abdominal pain  No acute distress noted on examination    Review of Systems:   Review of Systems   Constitutional: Negative. HENT: Negative. Eyes: Negative. Respiratory: Negative. Cardiovascular: Negative. Gastrointestinal: Positive for abdominal pain. Genitourinary: Negative. Musculoskeletal: Negative. Skin: Negative. Neurological: Negative. Endo/Heme/Allergies: Negative. Psychiatric/Behavioral: Negative. Objective:   Objective      Vitals:  Patient Vitals for the past 12 hrs:   Temp Pulse Resp BP SpO2   12/19/21 0852 -- -- -- -- 96 %   12/19/21 0707 97.8 °F (36.6 °C) 87 18 (!) 144/62 97 %   12/19/21 0457 97.9 °F (36.6 °C) 90 18 (!) 135/59 --        Physical Exam:  Physical Exam  Vitals and nursing note reviewed. HENT:      Mouth/Throat:      Mouth: Mucous membranes are moist.   Eyes:      Conjunctiva/sclera: Conjunctivae normal.   Cardiovascular:      Rate and Rhythm: Normal rate. Pulses: Normal pulses. Pulmonary:      Comments: 2L NC  Abdominal:      General: Bowel sounds are normal.      Tenderness: There is abdominal tenderness. Comments: Left quadrant tenderness   Musculoskeletal:         General: Normal range of motion. Skin:     General: Skin is warm and dry. Neurological:      Mental Status: She is alert and oriented to person, place, and time.    Psychiatric:         Mood and Affect: Mood normal.          Lab Results:  Recent Results (from the past 24 hour(s))   GLUCOSE, POC    Collection Time: 12/19/21  6:59 AM   Result Value Ref Range    Glucose (POC) 125 (H) 65 - 117 mg/dL    Performed by [id number]    Collection Time: 12/19/21  7:29 AM   Result Value Ref Range    Sodium 139 136 - 145 mmol/L    Potassium 2.9 (L) 3.5 - 5.1 mmol/L    Chloride 106 97 - 108 mmol/L    CO2 30 21 - 32 mmol/L    Anion gap 3 (L) 5 - 15 mmol/L    Glucose 119 (H) 65 - 100 mg/dL    BUN 18 6 - 20 mg/dL    Creatinine 0.88 0.55 - 1.02 mg/dL    BUN/Creatinine ratio 20 12 - 20      GFR est AA >60 >60 ml/min/1.73m2    GFR est non-AA >60 >60 ml/min/1.73m2    Calcium 9.0 8.5 - 10.1 mg/dL    Bilirubin, total 0.7 0.2 - 1.0 mg/dL    AST (SGOT) 23 15 - 37 U/L    ALT (SGPT) 24 12 - 78 U/L    Alk. phosphatase 253 (H) 45 - 117 U/L    Protein, total 5.3 (L) 6.4 - 8.2 g/dL    Albumin 1.4 (L) 3.5 - 5.0 g/dL    Globulin 3.9 2.0 - 4.0 g/dL    A-G Ratio 0.4 (L) 1.1 - 2.2     LIPASE    Collection Time: 12/19/21  7:29 AM   Result Value Ref Range    Lipase 1,810 (H) 73 - 393 U/L          Diagnostic Images:  CTA ABDOMEN PELV W CONT   Final Result   1. Ill-defined hypodense mass in the pancreas. There are inflammatory changes   and fluid adjacent to the pancreas or duodenum and stomach most likely related   to biopsy or focal pancreatitis. No pseudocyst formation, active bleeding or   other acute findings. 2. Enlarged adrenal glands left greater than right with noncontrast densities   consistent with adrenal adenomas. 3. Right nephrectomy. 4. Other findings as described.       XR CHEST PORT   Final Result   No acute findings. ABD US 12/13/21 : Small amount of free fluid. Finding suggesting hemangioma in the  liver. Gallbladder wall thickening, adenomyomatosis, sludge and gallstones. Cholecystitis possible. Finding in the region of the pancreatic head as above. Other findings as above.     Current Medications:    Current Facility-Administered Medications:     potassium chloride 10 mEq in 100 ml IVPB, 10 mEq, IntraVENous, Q1H, Holly Veronique, NP    TPN ADULT-AA 4.25% D 5% PERIPHERAL, , IntraVENous, CONTINUOUS, Holly Veronique, NP, Last Rate: 42 mL/hr at 12/19/21 0033, New Bag at 12/19/21 0033    HYDROmorphone (DILAUDID) injection 1 mg, 1 mg, IntraVENous, Q3H PRN, Pura Betancourt NP, 1 mg at 12/19/21 7129    bisacodyL (DULCOLAX) suppository 10 mg, 10 mg, Rectal, DAILY PRN, Avni Spear NP    piperacillin-tazobactam (ZOSYN) 3.375 g in 0.9% sodium chloride (MBP/ADV) 100 mL MBP, 3.375 g, IntraVENous, Q8H, Huyen Oshea DO, Last Rate: 25 mL/hr at 12/19/21 0508, 3.375 g at 12/19/21 0508    hydrALAZINE (APRESOLINE) 20 mg/mL injection 20 mg, 20 mg, IntraVENous, Q6H PRN, Huyen Crane PA    pantoprazole (PROTONIX) 40 mg in 0.9% sodium chloride 10 mL injection, 40 mg, IntraVENous, DAILY, Huyen Crane PA, 40 mg at 12/18/21 1013    guaiFENesin ER (MUCINEX) tablet 600 mg, 600 mg, Oral, Q12H, Huyen Crane PA, 600 mg at 12/18/21 2034    albuterol-ipratropium (DUO-NEB) 2.5 MG-0.5 MG/3 ML, 3 mL, Nebulization, Q6H PRN, Huyen Crane PA    chlorthalidone (HYGROTON) tablet 25 mg, 25 mg, Oral, DAILY, Huyen Crane PA, 25 mg at 12/18/21 1012    0.9% sodium chloride infusion, 100 mL/hr, IntraVENous, CONTINUOUS, Nolberto Farah NP, Last Rate: 100 mL/hr at 12/18/21 1014, 100 mL/hr at 12/18/21 1014    albuterol (PROVENTIL HFA, VENTOLIN HFA, PROAIR HFA) inhaler 2 Puff, 2 Puff, Inhalation, Q6H PRN, Nolberto Farah NP, 2 Puff at 12/18/21 6736    ascorbic acid (vitamin C) (VITAMIN C) tablet 500 mg, 500 mg, Oral, DAILY, Nolberto Farah NP, 500 mg at 12/11/21 0900    atorvastatin (LIPITOR) tablet 40 mg, 40 mg, Oral, DAILY, Zabel Nolberto A, NP, 40 mg at 12/17/21 0948    cholecalciferol (VITAMIN D3) (1000 Units /25 mcg) tablet 1,000 Units, 1,000 Units, Oral, DAILY, Zabel, Nolberto A, NP, 1,000 Units at 12/18/21 1012    diazePAM (VALIUM) tablet 5 mg, 5 mg, Oral, Q6H PRN, Sofi Nolberto A, NP, 5 mg at 12/18/21 2230    dilTIAZem ER (CARDIZEM CD) capsule 120 mg, 120 mg, Oral, DAILY, Zabel, Nolberto A, NP, 120 mg at 12/18/21 1012    apixaban (ELIQUIS) tablet 5 mg, 5 mg, Oral, BID, Zabel Nolberto NEGIN, NP, 5 mg at 12/18/21 2034    potassium chloride SR (KLOR-CON 10) tablet 20 mEq, 20 mEq, Oral, DAILY, Zarefoss, Nolberto A, NP, 20 mEq at 12/18/21 1011    sertraline (ZOLOFT) tablet 25 mg, 25 mg, Oral, DAILY, Zarefoss, Nolberto A, NP, 25 mg at 12/18/21 1012    traZODone (DESYREL) tablet 50 mg, 50 mg, Oral, QHS, Zarefoss, Nolberto A, NP, 50 mg at 12/18/21 2230    ondansetron (ZOFRAN) injection 4 mg, 4 mg, IntraVENous, Q6H PRN, Zarefoss, Nolberto A, NP, 4 mg at 12/19/21 0734    acetaminophen (TYLENOL) tablet 650 mg, 650 mg, Oral, Q4H PRN, Leslyrefoss, Nolberto A, NP    [Held by provider] 0.9% sodium chloride infusion, 125 mL/hr, IntraVENous, CONTINUOUS, John [place] F, NP, Last Rate: 125 mL/hr at 12/15/21 0225, 125 mL/hr at 12/15/21 0225    prochlorperazine (COMPAZINE) with saline injection 10 mg, 10 mg, IntraVENous, Q6H PRN, Leslyrefoss, Nolberto A, NP, 10 mg at 12/19/21 8762       ASSESSMENT:    Hospital Course:     Jerry Estrada a 78 y. o. female with a PMH of  atrial fibrillation, PUD, CHF, COPD, renal cell carcinoma stage IV, s/p nephrectomy, GERD, COPD, sjoren's syndrome, hypertension, and depression who came to the hospital with complaints of abdominal pain, associated nausea/vomiting, poor PO intake. Pt reported having a EUS on 12/6 as outpatient by Dr. Roland Horner, biopsy was taken, rare cells present suspicious for malignancy. Initial lab work significant for elevated lipase at 3,000, WBC, urine culture pending. CT abdomen/pelvis showing ill defined hypodense mass in the pancreas with fluid adjacent to the pancreas suggestive of focal pancreatitis. Adrenal adenomas are seen. No pseudocyst formation or active bleeding seen. Pt will be admitted for further evaluation and treatment. GI and oncology consulted. Patient started on fluids, NPO, pain and nausea medication PRN. Advance diet as tolerated. Hold Eliquis due to hematemesis will perform complete ultrasound patient is refusing CT of abdomen pelvis. Patient having reproducible chest pain will obtain EKG and troponin.  Continue IV Protonix. Abd us shows hemangioma in the liver. Gallbladder wall thickening, adenomyomatosis, sludge and gallstones. Cholecystitis possible. Continue gentle IV hydration, start zosyn. Lipase levels remains elevated. US possible acute cholecystitis in the setting of acute pancreatitis, cholecystitis is likely a sequelae of pancreatic inflammation, not an operative candidate for cholecystectomy at this point. Repeat CT abd/pelv shows new pleural effusions, increasing ascites, high suspicion for hepatic metastasis. Diet advanced to clear liquids. Start TPN nutrition, replenish K. IR biopsy of liver lesions next week. Concern for liver, pancreatic and adrenal gland carcinoma. 1. Pancreatitis w/ metastasis suspicion:  2. Hematemesis (resolved):  -us shows hemangioma in the liver. Gallbladder wall thickening, adenomyomatosis, sludge and gallstones. Cholecystitis possible  -general surgeon following  -gentle IV hydration  -prn antiemetics and pain management (conservative management)  -hem/onc following  -currently on dilaudid 1mg q 4hrs, will transition to oral meds in am   -lipase 1810<-- 2559   -Alk Phos 253<--282   -CT abd/pelv shows new pleural effusions, increasing ascites, high suspicion for hepatic metastasis  -IR biopsy of liver lesions next week  -clear liquid diet  -oncology following  -Continue TPN    3. Pancreatic mass:  -CA 19-9-->4000  -s/p FNA via EUS which was suspicious for neoplasia but not diagnostic  -outpatient oncology fu warranted  -no further workup inpatient    4. Hypokalemia:  -replenish w/ oral and Iv K    5. Atrial fibrillation:  -managed w/ eliquis, cardizem, hygroton  -rate controled  -eliquis currently held due to hematemesis and plan for IR biopsy    6. CHF:  -managed w/ eliquis, cardizem, hygroton    7. COPD:  -appears stable  -continue home O2 2L NC @ baseline  -continue mucinex, prn albuterol    8. Depression  -continue trazodone and zoloft    9.  Hyperlipidemia  -continue lipitor 40mg        Full Code  Dvt Prophylaxis eliquis held, scd's  GI Prophylaxis protonix  Disposition:  -pending improvement in LFTs and pain  -pain management, gentle IV hydration  -advance diet as tolerated  -conservative management  -continue PPN  -CM for dispo planning  -IR biopsy of liver lesions next week    Above treatment plan reviewed and discussed with patient in detail at bedside, all questions answered. Care Plan discussed with: Interdisciplinary team    Total time spent with patient: 35 minutes.     Rashad Collado NP

## 2021-12-19 NOTE — PROGRESS NOTES
Problem: Falls - Risk of  Goal: *Absence of Falls  Description: Document Duke Villarreal Fall Risk and appropriate interventions in the flowsheet.   Outcome: Progressing Towards Goal  Note: Fall Risk Interventions:  Mobility Interventions: Bed/chair exit alarm         Medication Interventions: Bed/chair exit alarm,Patient to call before getting OOB    Elimination Interventions: Call light in reach    History of Falls Interventions: Bed/chair exit alarm

## 2021-12-19 NOTE — PROGRESS NOTES
Problem: Falls - Risk of  Goal: *Absence of Falls  Description: Document Dennie Oak Fall Risk and appropriate interventions in the flowsheet. Outcome: Progressing Towards Goal  Note: Fall Risk Interventions:  Mobility Interventions: Patient to call before getting OOB,PT Consult for mobility concerns,OT consult for ADLs         Medication Interventions: Bed/chair exit alarm,Patient to call before getting OOB    Elimination Interventions: Call light in reach    History of Falls Interventions: Bed/chair exit alarm         Problem: Patient Education: Go to Patient Education Activity  Goal: Patient/Family Education  Outcome: Progressing Towards Goal     Problem: Pain  Goal: *Control of Pain  Outcome: Progressing Towards Goal     Problem: Patient Education: Go to Patient Education Activity  Goal: Patient/Family Education  Outcome: Progressing Towards Goal     Problem: Nausea/Vomiting (Adult)  Goal: *Absence of nausea/vomiting  Outcome: Progressing Towards Goal     Problem: Patient Education: Go to Patient Education Activity  Goal: Patient/Family Education  Outcome: Progressing Towards Goal     Problem: Pressure Injury - Risk of  Goal: *Prevention of pressure injury  Description: Document Asim Scale and appropriate interventions in the flowsheet.   Outcome: Progressing Towards Goal  Note: Pressure Injury Interventions:       Moisture Interventions: Absorbent underpads,Minimize layers    Activity Interventions: Increase time out of bed,PT/OT evaluation    Mobility Interventions: HOB 30 degrees or less,PT/OT evaluation    Nutrition Interventions: Document food/fluid/supplement intake    Friction and Shear Interventions: HOB 30 degrees or less,Minimize layers                Problem: Patient Education: Go to Patient Education Activity  Goal: Patient/Family Education  Outcome: Progressing Towards Goal     Problem: Patient Education: Go to Patient Education Activity  Goal: Patient/Family Education  Outcome: Progressing Towards Goal     Problem: Patient Education: Go to Patient Education Activity  Goal: Patient/Family Education  Outcome: Progressing Towards Goal

## 2021-12-19 NOTE — PROGRESS NOTES
PICC Placement Note    PRE-PROCEDURE VERIFICATION  Correct Procedure: yes  Correct Site:  yes  Temperature: Temp: 98.8 °F (37.1 °C), Temperature Source: Temp Source: Oral  Recent Labs     12/19/21  0729   BUN 18   CREA 0.88     Allergies: Adhesive tape-silicones  Education materials for PICC Care given to family: yes. See Patient Education activity for further details. PICC Booklet placed on chart: yes    PROCEDURE DETAIL  A double lumen PICC line was started for TPN. The following documentation is in addition to the PICC properties in the lines/airways flowsheet :  Lot #: [id number]  xylocaine used: yes  Mid-Arm Circumference: 37 (cm)  Internal Catheter Length: 40 (cm)  Internal Catheter Total Length: 40 (cm)  Vein Selection for PICC:right basilic  Central Line Bundle followed yes  Complication Related to Insertion: none    The placement was verified by X-ray: yes. The x-ray results state the tip location is on the right side and the tip overlies the lower superior vena cava. Line is okay to use: yes after cleared by radiology.  Chest xray orderd    Xray PICC tip SVC per radiologist    Carlo Obrien RN

## 2021-12-19 NOTE — ADT AUTH CERT NOTES
Pancreatitis - Care Day 8 (12/16/2021) by Marek Wiggins       Review Entered Review Status   12/16/2021 11:23 Completed      Criteria Review      Care Day: 8 Care Date: 12/16/2021 Level of Care: Telemetry    Guideline Day 2    Level Of Care    (X) ICU or floor    Clinical Status    (X) * Hemodynamic stability    12/16/2021 11:23:54 EST by Marek Wiggins      T 98, /62, P 97, R 18, 02 SAT  94% 2L NC    ( ) * Pain absent or reduced    12/16/2021 11:23:54 EST by Marek Wiggins      Reports continued abdominal pain    Activity    ( ) Activity as tolerated    12/16/2021 11:23:54 EST by Marek Wiggins      BEDREST  TURN Q 2 HR IF PT UNABLE TO TURN SELF  12/15 PT NOTE: patient politely  adamantly declined therapy and asked PT to leave the room . Cynthia Gregorio She reasoned that \"I just got purwick yesterday  So that I don't have to get up\"    Routes    ( ) Possible oral hydration    12/16/2021 11:23:54 EST by Marek Wiggins      NS 100ML/HR    (X) Possible oral medications    12/16/2021 11:23:54 EST by Marek Wiggins      ZOFRAN 4 MG IVQ 6 HR PRN X1, [id number] IV QD, ZOSYN 3.375G IV Q8 HR,KLOR CON 20MEQ PO QD,ZOLOFT 25MG PO QD, ALBUTEROL INH Q 6 HR PRN X1,HYGROTON 25MG PO QD, VIT D3  1000U PO QD, CARDIZEM LI755ZE PO QD,MUCINEX 600MG PO Q  12 HR, DESYREL 50MG PO QHS    ( ) Oral diet as tolerated    12/16/2021 11:23:54 EST by Marek Wiggins      NPO EXCEPT ICE CHIPS    Interventions    (X) * NG tube absent    (X) Laboratory tests    12/16/2021 11:23:54 EST by Marek Wiggins      WBC 11.7, NEUTS 89, , BUN 32, GFRNAA 59, TP 4.6, ALB 1.6, ALK PHOS  213, LIPASE >3000    Medications    (X) Parenteral analgesics    12/16/2021 11:23:54 EST by Marek Wiggins      DILAUDID 1MG IV Q 3 HR  PRN X 3 (9X/24 HR),    * Milestone   Additional Notes   Hospitalist Progress Note          Patient examined alert and oriented lying in bed   Reports continued abdominal pain, NO n/v   She is upset because she wants a coke, explained to her the importance of no caffeinated beverages and allow the gut to rest. Patient states that she's going to die if she can't get a coke and if she dies she will michell the hospital   No acute distress noted on examination      EXAM:  HENT:       Mouth/Throat:       Mouth: Mucous membranes are moist.    Eyes:       Conjunctiva/sclera: Conjunctivae normal.    Cardiovascular:       Rate and Rhythm: Normal rate.       Pulses: Normal pulses. Pulmonary:       Comments: 2L NC   Abdominal:       General: Bowel sounds are normal.       Tenderness: There is abdominal tenderness.       Comments: Left quadrant tenderness    Musculoskeletal:          General: Normal range of motion. Skin:      General: Skin is warm and dry. Neurological:       Mental Status: She is alert and oriented to person, place, and time. Psychiatric:          Mood and Affect: Mood normal.      ASSESSMENT:      1. Pancreatitis:   2. Hematemesis (resolved):   -us shows hemangioma in the liver.  Gallbladder wall thickening, adenomyomatosis, sludge and gallstones.  Cholecystitis possible   -general surgeon following   -maintain npo status, ice chips only   -gentle IV hydration, IV zosyn   -prn antiemetics and pain management (conservative management)   -hem/onc following   -currently on dilaudid 1mg q 3hrs    -lipase  >3000<- 2578 <-2066 <-3000   -Alk Phos 213<- 214 <-186 <-193   -Obtain repeat CT abdomen/pelvis with contrast       3. Pancreatic mass:   -CA 19-9-->4000   -s/p FNA via EUS which was suspicious for neoplasia but not diagnostic   -outpatient oncology fu warranted   -no further workup inpatient       4. Atrial fibrillation:   -managed w/ eliquis, cardizem, hygroton   -rate controled   -eliquis currently held due to hematemesis       5. CHF:   -managed w/ eliquis, cardizem, hygroton       6. COPD:   -appears stable   -continue home O2 2L NC @ baseline   -continue mucinex, prn albuterol       7. Depression   -continue trazodone and zoloft       8. Hyperlipidemia   -continue lipitor 40mg               Full Code   Dvt Prophylaxis eliquis held, scd's   GI Prophylaxis protonix   Disposition:   -pending improvement in LFTs and pain   -pain management, gentle IV hydration   -pending general clearance   -conservative management   -Obtain repeat CT abdomen/pelvis with contrast       CT ABD PELVIS ORDERED,  OT CONSULT,            Pancreatitis - Care Day 5 (12/13/2021) by Mikhail Del Real       Review Entered Review Status   12/14/2021 11:02 Completed      Criteria Review      Care Day: 5 Care Date: 12/13/2021 Level of Care: Telemetry    Guideline Day 2    Level Of Care    (X) ICU or floor    Clinical Status    (X) * Hemodynamic stability    12/14/2021 11:02:39 EST by Mikhail Del Real      T 100.1, /62, P 85, R 16, 02 SAT 96% 2L NC    ( ) * Pain absent or reduced    12/14/2021 11:02:39 EST by Mikhail Del Real      Per Surgery Note: pain remains stable but severe    Activity    (X) Activity as tolerated    12/14/2021 11:02:39 EST by Mikhail Del Real      AMB TO BR    Routes    (X) Possible oral hydration    (X) Possible oral medications    12/14/2021 11:02:39 EST by Mikhail Del Real      ALBUTEROL INH Q 6 HR PRN X1, HYGROTON 25MG PO QD, CARDIZE,M CD 120MG PO QD, MUCINEX 600MG PO Q 12 HR, KCL 20 MEQ PO QD,    ( ) Oral diet as tolerated    12/14/2021 11:02:39 EST by Mikhail Del Real      NPO EXCEPT ICE CHIPS    Interventions    (X) * NG tube absent    (X) Laboratory tests    12/14/2021 11:02:39 EST by Mikhail Del Real      WBC 14.3, NEUTS 90, K 3.4, TP 5.0, ALB 1.9, AST 13, ALK PHOS 193, LIPASE >3000    Medications    (X) Parenteral analgesics    12/14/2021 11:02:39 EST by Mikhail Del Real      DILAUDID 1MG IV  Q 4 HR PRN X  5(9X/24 HR), ,    * Milestone   Additional Notes   SURGERY PROGRESS NOTE:      pain remains stable but severe. Nausea/vomiting seem to have improved slightly.  Patient requesting transfer to [address] [place] [address].       EXAM:  General: alert, oriented, in no acute distress Cardiovascular: regular rate and rhythm   Pulmonary: unlabored breathing, equal chest rise bilaterally, on supplemental O2   Abdomen: soft, severe TTP epigastrum and lower abdomen with rebound   Extremities: no swelling, pulses equal and palpable in all extremities          EKG: Atrial fibrillation      Assessment:       Active Problems:     A-fib ([address] (11/16/2020)         CHF (congestive heart failure) ([facility] [address] (11/16/2020)         Pancreatitis (12/9/2021)         Hematemesis (12/12/2021)         History of peptic ulcer disease (12/12/2021)               Plan:       Diet: NPO, ice chips only    Continue conservative management of pancreatitis   US reviewed- possible acute cholecystitis in the setting of acute pancreatitis, cholecystitis is likely a sequelae of pancreatic inflammation, not an operative candidate for cholecystectomy at this point. Zosyn started. DVT/GI prophylaxis   Ambulate   Pain and nausea control   Heme/onc evaluation noted   Patient will need to have follow up with surgical oncology            Hematology and Oncology Progress Note         . ..has abdominal pain and distention.  She is having both upper quadrant pain.  She has nausea but no vomiting.  She has some baseline dyspnea from COPD.  She is only on ice chips.  She is not passing gas today      Assessment and plan:       1. Pancreatic mass   -likely malignant; CA 19-9 >4000   -s/p FNA via EUS which was suspicious for neoplasia but not diagnostic   -once stable then will need OP followup with advanced oncology surgeon like Dr Angel Vargas ([facility]) for resectability considerations; this can be arranged as OP       2. Prior history of RCC; has slow growing pulmonary nodules which may limit planning for resection as these could represent stage IV RCC; however have been stable on recent imaging           3. Afib on eliquis       4. HTN       5. CKD III       6. Pancreatitis   -resolving       OK to release once medically stable; followup with  Jon Moore per routine.   -I will sign off the case.             ZOSYN 3.375G IV Q 8 HR, COMPAZINE 10MG IV Q 6 HR PRN X1 (3X/24 HR), ZOLOFT 25MG PO QD,  DESYREL 50MG PO Q HS,

## 2021-12-20 NOTE — CONSULTS
Consult Date: 12/20/2021    Consults Chronic pancreatitis, worsening leukocytosis      Subjective   This is a 78year old female who recently underwent EUS approximately 2 weeks ago. Three days later she presented to the ED with abdominal pain, nausea and vomiting. Lipase was markedly elevated (>3,000) consistent with pancreatitis. CT Abdomen showed a hypodense mass in the pancreas with inflammatory changes suggest focal pancreatitis (also with biliary stent). Images reviewed by me. It appears that she has been largely afebrile but on admission her WBC was elevated at 19,600. She was subsequently started on Zosyn (12/13) and WBC decreased, however, it has now tripled while still on Zosysn. No blood cultures and urine culture was contaminated. Repeat CT Abdomen showed no drainable peripancreatic fluid collection or pseudocyst but hypodense mass in pancreatic head again seen as well CBC sialastic stent. Images reviewed by me. ID has been consulted because of increasing WBC. She has PICC line and external urinary catheter. Patient lying in bed just returning from ? CT Chest.  States that she is still having abdominal pain RUQ, epigastric, LUQ and RLQ. Also complaining of chest pain. States that she has COPD with chronic productive cough. Reports new bedsore. Has external urinary device. CT Chest showed moderate to large bilateral pleural effusions with associated RML, RLL, and LLL compressive atelectasis and abdominal ascites.      Abdominal ascites.   Past Medical History:   Diagnosis Date    A-fib [facility])     CHF (congestive heart failure) (HCC)     Clear cell renal cell carcinoma (HCC)     COPD (chronic obstructive pulmonary disease) (HCC)     Fibromyalgia     GERD (gastroesophageal reflux disease)     Lymphedema     Sjogren's syndrome ([facility] [address]     Thyroid nodule       Past Surgical History:   Procedure Laterality Date    COLONOSCOPY N/A 11/18/2020    COLONOSCOPY performed by Victor M Nair MD at [address] [facility] [place], [place] HX GI      EGD    HX HYSTERECTOMY      HX NEPHRECTOMY Right 2019    HX ORTHOPAEDIC      ankle     Family History   Problem Relation Age of Onset    Hypertension Mother     Stroke Mother     Stroke Father     Hypertension Father       Social History     Tobacco Use    Smoking status: Current Every Day Smoker     Packs/day: 0.25     Types: Cigarettes    Smokeless tobacco: Never Used   Substance Use Topics    Alcohol use: Not Currently       Current Facility-Administered Medications   Medication Dose Route Frequency Provider Last Rate Last Admin    docusate sodium (COLACE) capsule 100 mg  100 mg Oral BID Henery Counts, NP        polyethylene glycol (MIRALAX) packet 17 g  17 g Oral DAILY Henery Counts, NP        sorbitoL 70 % solution 30 mL  30 mL Oral DAILY PRN Henery Counts, NP        HYDROmorphone (DILAUDID) injection 1 mg  1 mg IntraVENous Q6H PRN Henery Counts, NP        potassium chloride (KLOR-CON) packet for solution 40 mEq  40 mEq Oral NOW Henery Counts, NP        oxyCODONE IR (ROXICODONE) tablet 5 mg  5 mg Oral Q4H PRN Jack Valente MD   5 mg at 12/20/21 1035    bisacodyL (DULCOLAX) suppository 10 mg  10 mg Rectal DAILY PRN Ever Calle NP        piperacillin-tazobactam (ZOSYN) 3.375 g in 0.9% sodium chloride (MBP/ADV) 100 mL MBP  3.375 g IntraVENous Q8H Huyen Oshea DO 25 mL/hr at 12/20/21 1341 3.375 g at 12/20/21 1341    hydrALAZINE (APRESOLINE) 20 mg/mL injection 20 mg  20 mg IntraVENous Q6H PRN Huyen Crane PA        pantoprazole (PROTONIX) 40 mg in 0.9% sodium chloride 10 mL injection  40 mg IntraVENous DAILY Huyen Crane PA   40 mg at 12/20/21 1037    guaiFENesin ER (MUCINEX) tablet 600 mg  600 mg Oral Q12H Huyen Crane PA   600 mg at 12/19/21 2115    albuterol-ipratropium (DUO-NEB) 2.5 MG-0.5 MG/3 ML  3 mL Nebulization Q6H PRN Stew Crane PA        chlorthalidone (HYGROTON) tablet 25 mg  25 mg Oral DAILY [facility] [place] [place]   25 mg at 12/20/21 1035    0.9% sodium chloride infusion  100 mL/hr IntraVENous CONTINUOUS Zarefoss Jan A,  mL/hr at 12/18/21 1014 100 mL/hr at 12/18/21 1014    albuterol (PROVENTIL HFA, VENTOLIN HFA, PROAIR HFA) inhaler 2 Puff  2 Puff Inhalation Q6H PRN [facility] A, NP   2 Puff at 12/18/21 7238    ascorbic acid (vitamin C) (VITAMIN C) tablet 500 mg  500 mg Oral DAILY Zarefoss, Jan A, NP   500 mg at 12/11/21 0900    atorvastatin (LIPITOR) tablet 40 mg  40 mg Oral DAILY Zarefoss, Jan A, NP   40 mg at 12/17/21 3047    cholecalciferol (VITAMIN D3) (1000 Units /25 mcg) tablet 1,000 Units  1,000 Units Oral DAILY Zarefoss, Jan A, NP   1,000 Units at 12/19/21 4683    diazePAM (VALIUM) tablet 5 mg  5 mg Oral Q6H PRN [facility] A, NP   5 mg at 12/20/21 0759    dilTIAZem ER (CARDIZEM CD) capsule 120 mg  120 mg Oral DAILY Zarefoss, Jan A, NP   120 mg at 12/20/21 1035    apixaban (ELIQUIS) tablet 5 mg  5 mg Oral BID Zarefoss, Jan A, NP   5 mg at 12/20/21 1035    potassium chloride SR (KLOR-CON 10) tablet 20 mEq  20 mEq Oral DAILY Zarefoss, Jan A, NP   20 mEq at 12/20/21 1035    sertraline (ZOLOFT) tablet 25 mg  25 mg Oral DAILY Zarefoss, Jan A, NP   25 mg at 12/20/21 1035    traZODone (DESYREL) tablet 50 mg  50 mg Oral QHS Zarefoss, Jan A, NP   50 mg at 12/19/21 2115    ondansetron (ZOFRAN) injection 4 mg  4 mg IntraVENous Q6H PRN Zarefoss Jan A, NP   4 mg at 12/19/21 0734    acetaminophen (TYLENOL) tablet 650 mg  650 mg Oral Q4H PRN [facility] Jan A, NP        [Held by provider] 0.9% sodium chloride infusion  125 mL/hr IntraVENous CONTINUOUS Emanuel Zhao  mL/hr at 12/15/21 0225 125 mL/hr at 12/15/21 0225    prochlorperazine (COMPAZINE) with saline injection 10 mg  10 mg IntraVENous Q6H PRN Nolberto Farah NP   10 mg at 12/20/21 0320        Review of Systems   Constitutional: Negative for chills and fever. HENT: Negative. Eyes: Negative.     Respiratory: Negative. Cardiovascular: Negative. Gastrointestinal: Positive for abdominal pain, nausea and vomiting. Endocrine: Negative. Genitourinary: Negative. Musculoskeletal: Negative. Skin: Negative. Allergic/Immunologic: Negative. Neurological: Negative. Hematological: Negative. Psychiatric/Behavioral: Negative. Objective     Vital signs for last 24 hours:  Visit Vitals  /65 (BP 1 Location: Left lower arm, BP Patient Position: At rest)   Pulse (!) 101   Temp 98 °F (36.7 °C)   Resp 18   Ht 5' 7\" (1.702 m)   Wt 216 lb 0.8 oz (98 kg)   SpO2 96%   BMI 33.84 kg/m²       Intake/Output this shift:  Current Shift: No intake/output data recorded. Last 3 Shifts: 12/18 1901 - 12/20 0700  In: 3675.3 [P.O.:600;  I.V.:3075.3]  Out: 3000 [Urine:3000]    Data Review:   Recent Results (from the past 24 hour(s))   METABOLIC PANEL, BASIC    Collection Time: 12/20/21  7:04 AM   Result Value Ref Range    Sodium 139 136 - 145 mmol/L    Potassium 3.4 (L) 3.5 - 5.1 mmol/L    Chloride 105 97 - 108 mmol/L    CO2 28 21 - 32 mmol/L    Anion gap 6 5 - 15 mmol/L    Glucose 140 (H) 65 - 100 mg/dL    BUN 20 6 - 20 mg/dL    Creatinine 0.77 0.55 - 1.02 mg/dL    BUN/Creatinine ratio 26 (H) 12 - 20      GFR est AA >60 >60 ml/min/1.73m2    GFR est non-AA >60 >60 ml/min/1.73m2    Calcium 8.5 8.5 - 10.1 mg/dL   LIPASE    Collection Time: 12/20/21  7:04 AM   Result Value Ref Range    Lipase 1,941 (H) 73 - 393 U/L   GLUCOSE, POC    Collection Time: 12/20/21 12:04 PM   Result Value Ref Range    Glucose (POC) 133 (H) 65 - 117 mg/dL    Performed by [facility] + INR    Collection Time: 12/20/21 12:11 PM   Result Value Ref Range    Prothrombin time 58.2 (H) 11.9 - 14.7 sec    INR 6.9 (HH) 0.9 - 1.1     CBC WITH AUTOMATED DIFF    Collection Time: 12/20/21 12:11 PM   Result Value Ref Range    WBC 21.8 (H) 3.6 - 11.0 K/uL    RBC 4.09 3.80 - 5.20 M/uL    HGB 12.3 11.5 - 16.0 g/dL    HCT 39.0 35.0 - 47.0 % MCV 95.4 80.0 - 99.0 FL    MCH 30.1 26.0 - 34.0 PG    MCHC 31.5 30.0 - 36.5 g/dL    RDW 13.5 11.5 - 14.5 %    PLATELET 680 991 - 777 K/uL    MPV 10.6 8.9 - 12.9 FL    NRBC 0.0 0.0  WBC    ABSOLUTE NRBC 0.00 0.00 - 0.01 K/uL    NEUTROPHILS 91 (H) 32 - 75 %    LYMPHOCYTES 3 (L) 12 - 49 %    MONOCYTES 4 (L) 5 - 13 %    EOSINOPHILS 0 0 - 7 %    BASOPHILS 0 0 - 1 %    IMMATURE GRANULOCYTES 2 (H) 0 - 0.5 %    ABS. NEUTROPHILS 19.8 (H) 1.8 - 8.0 K/UL    ABS. LYMPHOCYTES 0.6 (L) 0.8 - 3.5 K/UL    ABS. MONOCYTES 0.9 0.0 - 1.0 K/UL    ABS. EOSINOPHILS 0.0 0.0 - 0.4 K/UL    ABS. BASOPHILS 0.1 0.0 - 0.1 K/UL    ABS. IMM. GRANS. 0.4 (H) 0.00 - 0.04 K/UL    DF AUTOMATED       CT Chest (12/20)          Physical Exam  Vitals and nursing note reviewed. Constitutional:       General: She is not in acute distress. Appearance: She is obese. She is ill-appearing. HENT:      Head: Normocephalic and atraumatic. Right Ear: External ear normal.      Left Ear: External ear normal.      Nose: Nose normal.      Mouth/Throat:      Mouth: Mucous membranes are moist.      Pharynx: Oropharynx is clear. Eyes:      Pupils: Pupils are equal, round, and reactive to light. Cardiovascular:      Rate and Rhythm: Regular rhythm. Tachycardia present. Pulmonary:      Effort: Pulmonary effort is normal.      Breath sounds: Normal breath sounds. Abdominal:      General: Abdomen is flat. Bowel sounds are normal. There is distension. Palpations: Abdomen is soft. Tenderness: There is abdominal tenderness (RUQ, epigastrium, RLQ, LUQ). There is no guarding or rebound. Genitourinary:     Comments: External urinary device  Musculoskeletal:      Cervical back: Neck supple. Right lower leg: No edema. Left lower leg: No edema. Comments: PICC line right upper arm   Skin:     Findings: No rash. Comments: ?Stage 2 sacral wound   Neurological:      General: No focal deficit present.       Mental Status: She is alert and oriented to person, place, and time. Psychiatric:         Mood and Affect: Mood normal.         Behavior: Behavior normal.         Thought Content: Thought content normal.         Judgment: Judgment normal.       ASSESSMENT:    1. Severe pancreatitis with markedly elevated lipase  2. Pancreatic mass, possible neoplasm  3. Biliary stent  4. Worsening leukocytosis  5. TPN (just started)    Comment:  We have no clear evidence for pancreatic necrosis, however, in this setting, Meropenem seems to perform better than other antibiotics. She is at risk for Candida superinfection, both intra-abdominal and urinary, in addition to Candidemia if she continues TPN. Sacral wound appears to be minor at this juncture. 1. Discontinue Zosyn  2. Start IV Meropenem  3. In am, repeat CBC, check procal and CRP, serum fungitell  4. Urinalysis with reflex culture    Hira Ram MD

## 2021-12-20 NOTE — PROGRESS NOTES
Problem: Mobility Impaired (Adult and Pediatric)  Goal: *Acute Goals and Plan of Care (Insert Text)  Description: Pt will be I with LE HEP in 7 days. Pt will perform bed mobility with mod I in 7 days. Pt will perform transfers with mod I in 7 days. Pt will amb- 100 feet with LRAD safely with mod I in 7 days. Outcome: Progressing Towards Goal   PHYSICAL THERAPY TREATMENT  Patient: Richy Lutz (60 y.o. female)  Date: 12/20/2021  Diagnosis: Pancreatitis [K85.90] Pancreatitis       Precautions:    Chart, physical therapy assessment, plan of care and goals were reviewed. ASSESSMENT  Patient continues with skilled PT services and is slowly  progressing towards goals. Pt. Required mod assist X 2 for bed mobility. Pt. Required min assist X 2 for sit to stand. Pt. Was able to take a few steps to chair with RW and CGA X 2 for safety. Pt. Declined further ambulation due to pain in abdomen and pt. Requested to sit in chair. Pt. Was able to perform some LE exercises in chair. Pt. Left in recliner and notified RN of pt. In chair. 13:20-13:35 RN calling for PT to get pt. Back to bed. Tf pt. Back to bed with min assist X 2 and CGA X 1 with RW. Current Level of Function Impacting Discharge (mobility/balance):decreased mobility/ambulation    Other factors to consider for discharge: pain /needs assistance         PLAN :  Patient continues to benefit from skilled intervention to address the above impairments. Continue treatment per established plan of care. to address goals. Recommendation for discharge: (in order for the patient to meet his/her long term goals)  Joseph Valentine    This discharge recommendation:  Has been made in collaboration with the attending provider and/or case management    IF patient discharges home will need the following DME: SNF       SUBJECTIVE:   Patient stated \"I'm in so much pain. \" \"I just can't do it. \" Pt. Required lots of encouragement to participate and sit in chair. [facility] for increased mobility and safety. OBJECTIVE DATA SUMMARY:   Critical Behavior:  Neurologic State: Alert,Drowsy  Orientation Level: Oriented X4  Cognition: Follows commands     Functional Mobility Training:  Bed Mobility:  Rolling: Maximum assistance;Assist x2 (d/t increased pain in abdominal )  Supine to Sit: Moderate assistance;Assist x2;Bed Modified     Scooting: Contact guard assistance        Transfers:  Sit to Stand: Minimum assistance;Assist x2  Stand to Sit: Minimum assistance;Assist x2        Bed to Chair: Contact guard assistance;Assist x2         Pt. Sat in chair approx 1 hour and a half. Pt. Stated her bottom was red and now has a sore on bottom. Assessed pt. s bottom and applied zinc cream to buttocks and Notified RN. RN aware of skin breakdown and stated pt. Has had sore for sometime. Educated pt. On ways to relieve pressure and preventing future sores. Balance:  Sitting: Intact  Standing: Impaired; With support  Standing - Static: Constant support; Fair  Standing - Dynamic : Constant support; Fair  Ambulation/Gait Training:  Distance (ft): 3 Feet (ft)  Assistive Device: Walker, rolling  Ambulation - Level of Assistance: Contact guard assistance;Assist x2                 Base of Support: Narrowed     Speed/Nichole: Slow;Shuffled  Step Length: Left shortened;Right shortened            p.m. session--Sit to stand with min assist X 2 /CGA X 2 for taking steps back to bed with RW. Sit to supine mod assist X 2. Scoot to [facility] with mod assist X 2.                  Therapeutic Exercises: Therapeutic Exercises:       EXERCISE   Sets   Reps   Active Active Assist   Passive Self ROM   Comments   Ankle Pumps  12 [x] [] [] []    Quad Sets/Glut Sets   [] [] [] []    Hamstring Sets   [] [] [] []    Short Arc Quads   [] [] [] []    Heel Slides   [] [] [] []    Straight Leg Raises   [] [] [] []    Hip abd/add  12 [x] [] [] []    Long Arc Quads  12 [x] [] [] []    Marching   [] [] [] []       [] [] [] []        Pain Ratin/10 abdomen    Activity Tolerance:   Fair  Please refer to the flowsheet for vital signs taken during this treatment. After treatment patient left in no apparent distress:   Sitting in chair    COMMUNICATION/COLLABORATION:   The patients plan of care was discussed with: Occupational therapy assistant and Registered nurse.      Rae Pantoja   Time Calculation: 15 mins  A.m session 11:25-11:53 TA X 2

## 2021-12-20 NOTE — PROGRESS NOTES
Hospitalist Progress Note         CHANELLE Chavez, FNP-C    Daily Progress Note: 12/20/2021      Subjective:   Subjective   Patient examined alert and oriented lying in bed  Reports continued abdominal pain w/ sob  She reports pain is causing her sob  No acute distress noted on examination    Review of Systems:   Review of Systems   Constitutional: Negative. HENT: Negative. Eyes: Negative. Respiratory: Positive for shortness of breath. Cardiovascular: Negative. Gastrointestinal: Positive for abdominal pain. Genitourinary: Negative. Musculoskeletal: Negative. Skin: Negative. Neurological: Negative. Endo/Heme/Allergies: Negative. Psychiatric/Behavioral: Negative. Objective:   Objective      Vitals:  Patient Vitals for the past 12 hrs:   Temp Pulse Resp BP SpO2   12/20/21 0823 98 °F (36.7 °C) (!) 101 18 127/65 96 %   12/20/21 0737 -- -- -- -- 96 %   12/20/21 0245 98.2 °F (36.8 °C) 75 15 (!) 167/69 96 %        Physical Exam:  Physical Exam  Vitals and nursing note reviewed. HENT:      Mouth/Throat:      Mouth: Mucous membranes are moist.   Eyes:      Conjunctiva/sclera: Conjunctivae normal.   Cardiovascular:      Rate and Rhythm: Tachycardia present. Pulses: Normal pulses. Pulmonary:      Comments: 2L NC  Abdominal:      General: Bowel sounds are normal.      Tenderness: There is abdominal tenderness. Comments: Generalized abdominal tenderness   Musculoskeletal:         General: Normal range of motion. Skin:     General: Skin is warm and dry. Neurological:      Mental Status: She is alert and oriented to person, place, and time.    Psychiatric:         Mood and Affect: Mood normal.          Lab Results:  Recent Results (from the past 24 hour(s))   METABOLIC PANEL, BASIC    Collection Time: 12/20/21  7:04 AM   Result Value Ref Range    Sodium 139 136 - 145 mmol/L    Potassium 3.4 (L) 3.5 - 5.1 mmol/L    Chloride 105 97 - 108 mmol/L    CO2 28 21 - 32 mmol/L Anion gap 6 5 - 15 mmol/L    Glucose 140 (H) 65 - 100 mg/dL    BUN 20 6 - 20 mg/dL    Creatinine 0.77 0.55 - 1.02 mg/dL    BUN/Creatinine ratio 26 (H) 12 - 20      GFR est AA >60 >60 ml/min/1.73m2    GFR est non-AA >60 >60 ml/min/1.73m2    Calcium 8.5 8.5 - 10.1 mg/dL   LIPASE    Collection Time: 12/20/21  7:04 AM   Result Value Ref Range    Lipase 1,941 (H) 73 - 393 U/L   GLUCOSE, POC    Collection Time: 12/20/21 12:04 PM   Result Value Ref Range    Glucose (POC) 133 (H) 65 - 117 mg/dL    Performed by [facility] + INR    Collection Time: 12/20/21 12:11 PM   Result Value Ref Range    Prothrombin time 58.2 (H) 11.9 - 14.7 sec    INR 6.9 (HH) 0.9 - 1.1     CBC WITH AUTOMATED DIFF    Collection Time: 12/20/21 12:11 PM   Result Value Ref Range    WBC 21.8 (H) 3.6 - 11.0 K/uL    RBC 4.09 3.80 - 5.20 M/uL    HGB 12.3 11.5 - 16.0 g/dL    HCT 39.0 35.0 - 47.0 %    MCV 95.4 80.0 - 99.0 FL    MCH 30.1 26.0 - 34.0 PG    MCHC 31.5 30.0 - 36.5 g/dL    RDW 13.5 11.5 - 14.5 %    PLATELET 292 178 - 541 K/uL    MPV 10.6 8.9 - 12.9 FL    NRBC 0.0 0.0  WBC    ABSOLUTE NRBC 0.00 0.00 - 0.01 K/uL    NEUTROPHILS 91 (H) 32 - 75 %    LYMPHOCYTES 3 (L) 12 - 49 %    MONOCYTES 4 (L) 5 - 13 %    EOSINOPHILS 0 0 - 7 %    BASOPHILS 0 0 - 1 %    IMMATURE GRANULOCYTES 2 (H) 0 - 0.5 %    ABS. NEUTROPHILS 19.8 (H) 1.8 - 8.0 K/UL    ABS. LYMPHOCYTES 0.6 (L) 0.8 - 3.5 K/UL    ABS. MONOCYTES 0.9 0.0 - 1.0 K/UL    ABS. EOSINOPHILS 0.0 0.0 - 0.4 K/UL    ABS. BASOPHILS 0.1 0.0 - 0.1 K/UL    ABS. IMM. GRANS. 0.4 (H) 0.00 - 0.04 K/UL    DF AUTOMATED            Diagnostic Images:  CTA ABDOMEN PELV W CONT   Final Result   1. Ill-defined hypodense mass in the pancreas. There are inflammatory changes   and fluid adjacent to the pancreas or duodenum and stomach most likely related   to biopsy or focal pancreatitis. No pseudocyst formation, active bleeding or   other acute findings.    2. Enlarged adrenal glands left greater than right with noncontrast densities   consistent with adrenal adenomas. 3. Right nephrectomy. 4. Other findings as described.       XR CHEST PORT   Final Result   No acute findings. ABD US 12/13/21 : Small amount of free fluid. Finding suggesting hemangioma in the  liver. Gallbladder wall thickening, adenomyomatosis, sludge and gallstones. Cholecystitis possible. Finding in the region of the pancreatic head as above. Other findings as above.     Current Medications:    Current Facility-Administered Medications:     docusate sodium (COLACE) capsule 100 mg, 100 mg, Oral, BID, Omelia Canal, NP    polyethylene glycol (MIRALAX) packet 17 g, 17 g, Oral, DAILY, Yessi King, NP    sorbitoL 70 % solution 30 mL, 30 mL, Oral, DAILY PRN, Omelia Canal, NP    HYDROmorphone (DILAUDID) injection 1 mg, 1 mg, IntraVENous, Q6H PRN, Omelia Canal, NP    oxyCODONE IR (ROXICODONE) tablet 5 mg, 5 mg, Oral, Q4H PRN, Bell Soto MD, 5 mg at 12/20/21 1035    bisacodyL (DULCOLAX) suppository 10 mg, 10 mg, Rectal, DAILY PRN, [facility] , NP    piperacillin-tazobactam (ZOSYN) 3.375 g in 0.9% sodium chloride (MBP/ADV) 100 mL MBP, 3.375 g, IntraVENous, Q8H, Huyen Oshea V, DO, Last Rate: 25 mL/hr at 12/20/21 1341, 3.375 g at 12/20/21 1341    hydrALAZINE (APRESOLINE) 20 mg/mL injection 20 mg, 20 mg, IntraVENous, Q6H PRN, Huyen Crane PA    pantoprazole (PROTONIX) 40 mg in 0.9% sodium chloride 10 mL injection, 40 mg, IntraVENous, DAILY, Huyen Crane PA, 40 mg at 12/20/21 1037    guaiFENesin ER (MUCINEX) tablet 600 mg, 600 mg, Oral, Q12H, Huyen Crane PA, 600 mg at 12/19/21 2115    albuterol-ipratropium (DUO-NEB) 2.5 MG-0.5 MG/3 ML, 3 mL, Nebulization, Q6H PRN, Huyen Crane PA    chlorthalidone (HYGROTON) tablet 25 mg, 25 mg, Oral, DAILY, Huyen Crane PA, 25 mg at 12/20/21 1035    0.9% sodium chloride infusion, 100 mL/hr, IntraVENous, CONTINUOUS, Nolberto Farah, NP, Last Rate: 100 mL/hr at 12/18/21 1014, 100 mL/hr at 12/18/21 1014    albuterol (PROVENTIL HFA, VENTOLIN HFA, PROAIR HFA) inhaler 2 Puff, 2 Puff, Inhalation, Q6H PRN, Zarefoss, Jan A, NP, 2 Puff at 12/18/21 7337    ascorbic acid (vitamin C) (VITAMIN C) tablet 500 mg, 500 mg, Oral, DAILY, Zarefoss, Jan A, NP, 500 mg at 12/11/21 0900    atorvastatin (LIPITOR) tablet 40 mg, 40 mg, Oral, DAILY, Zarefoss, Jan A, NP, 40 mg at 12/17/21 7377    cholecalciferol (VITAMIN D3) (1000 Units /25 mcg) tablet 1,000 Units, 1,000 Units, Oral, DAILY, Zarefoss, Jan A, NP, 1,000 Units at 12/19/21 0135    diazePAM (VALIUM) tablet 5 mg, 5 mg, Oral, Q6H PRN, Zarefoss, Jan A, NP, 5 mg at 12/20/21 0759    dilTIAZem ER (CARDIZEM CD) capsule 120 mg, 120 mg, Oral, DAILY, Zarefoss, Jan A, NP, 120 mg at 12/20/21 1035    apixaban (ELIQUIS) tablet 5 mg, 5 mg, Oral, BID, Zarefoss, Jan A, NP, 5 mg at 12/20/21 1035    potassium chloride SR (KLOR-CON 10) tablet 20 mEq, 20 mEq, Oral, DAILY, Zarefoss, Jan A, NP, 20 mEq at 12/20/21 1035    sertraline (ZOLOFT) tablet 25 mg, 25 mg, Oral, DAILY, Zarefoss, Jan A, NP, 25 mg at 12/20/21 1035    traZODone (DESYREL) tablet 50 mg, 50 mg, Oral, QHS, Zarefoss, Jan A, NP, 50 mg at 12/19/21 2115    ondansetron (ZOFRAN) injection 4 mg, 4 mg, IntraVENous, Q6H PRN, Zarefoss, Jan A, NP, 4 mg at 12/19/21 0734    acetaminophen (TYLENOL) tablet 650 mg, 650 mg, Oral, Q4H PRN, Nolberto Farah NP    [Held by provider] 0.9% sodium chloride infusion, 125 mL/hr, IntraVENous, CONTINUOUS, Karen ALMEIDA NP, Last Rate: 125 mL/hr at 12/15/21 0225, 125 mL/hr at 12/15/21 0225    prochlorperazine (COMPAZINE) with saline injection 10 mg, 10 mg, IntraVENous, Q6H PRN, Nolberto Farah NP, 10 mg at 12/20/21 0320       ASSESSMENT:    Hospital Course:     Leo Pan a 78 y. o. female with a PMH of  atrial fibrillation, PUD, CHF, COPD, renal cell carcinoma stage IV, s/p nephrectomy, GERD, COPD, sjoren's syndrome, hypertension, and depression who came to the hospital with complaints of abdominal pain, associated nausea/vomiting, poor PO intake. Pt reported having a EUS on 12/6 as outpatient by Dr. Arelis Gamboa, biopsy was taken, rare cells present suspicious for malignancy. Initial lab work significant for elevated lipase at 3,000, WBC, urine culture pending. CT abdomen/pelvis showing ill defined hypodense mass in the pancreas with fluid adjacent to the pancreas suggestive of focal pancreatitis. Adrenal adenomas are seen. No pseudocyst formation or active bleeding seen. Pt will be admitted for further evaluation and treatment. GI and oncology consulted. Patient started on fluids, NPO, pain and nausea medication PRN. Advance diet as tolerated. Hold Eliquis due to hematemesis. Continue IV Protonix. Abd us shows hemangioma in the liver. Gallbladder wall thickening, adenomyomatosis, sludge and gallstones. Cholecystitis possible. Continue gentle IV hydration, started on zosyn. Lipase levels remains elevated. US possible acute cholecystitis in the setting of acute pancreatitis, cholecystitis is likely a sequelae of pancreatic inflammation, not an operative candidate for cholecystectomy at this point. Repeat CT abd/pelv shows new pleural effusions, increasing ascites, high suspicion for hepatic metastasis. Diet advanced to clear liquids. S/p tx w/ TPN nutrition, replenish K. IR biopsy of liver lesions next week. Concern for liver, pancreatic and adrenal gland carcinoma. TPN discontinued, diet advanced to gi soft low fat diet. Transition to PO narcotic for pain management. Elevated INR-start vitamin K. WBC elevated, initiate complete workup. Obtain CBC, CMP, UA/UCX, Blood cultures. CTA chest obtain due to c/o sob. 1. Pancreatitis w/ metastasis suspicion:  2. Hematemesis (resolved):  -us shows hemangioma in the liver. Gallbladder wall thickening, adenomyomatosis, sludge and gallstones.   Cholecystitis possible  -general surgeon following  -gentle IV hydration  -prn antiemetics and pain management (conservative management)  -hem/onc following  -currently on dilaudid 1mg q 4hrs, will transition to oral meds in am   -lipase 1941<--1810<-- 2559   -Alk Phos 253<-253<--282   -CT abd/pelv shows new pleural effusions, increasing ascites, high suspicion for hepatic metastasis  -IR biopsy of liver lesions next week  -Full liquid diet  -oncology following  -Continue TPN  -IV zosyn dc'd, IV merrem started    3. Pancreatic mass:  -CA 19-9-->4000  -s/p FNA via EUS which was suspicious for neoplasia but not diagnostic  -outpatient oncology fu warranted  -no further workup inpatient    4. Hypokalemia:  -replenish w/ oral and Iv K    5. Atrial fibrillation:  -managed w/ eliquis, cardizem, hygroton  -rate controled  -eliquis currently held due to hematemesis and plan for IR biopsy    6. CHF:  -managed w/ eliquis, cardizem, hygroton  -Last echo shows EF 74%    7. COPD:  -appears stable  -continue home O2 2L NC @ baseline  -continue mucinex, prn albuterol    8. Depression  -continue trazodone and zoloft    9. Hyperlipidemia  -continue lipitor 40mg    10. Leukocytosis:  -unexplained bump in white count  -will obtain repeat cbc for verification  -consult ID for further evaluation  -start complete infection workup    11. Elevated INR  -discontinue eliquis  -start vitamin K    12. Shortness of breath:  -CTA chest ordered prior to lab results r/o PE  -prn supplemental O2      Full Code  Dvt Prophylaxis eliquis held, scd's  GI Prophylaxis protonix  Disposition:  -pending improvement in LFTs and pain  -pain management, gentle IV hydration  -advance diet as tolerated  -CM for dispo planning  -IR biopsy of liver lesions, thoracentesis and paracentesis  -Eliquis discontinued  -CTA chest, venous duplex legs  -Vitamin K  -CBC, BMP, BLOOD CX, UA/UCX    Above treatment plan reviewed and discussed with patient in detail at bedside, all questions answered.     Care Plan discussed with: Interdisciplinary team    Total time spent with patient: 35 minutes.     Pama Prom, NP

## 2021-12-20 NOTE — PROGRESS NOTES
CM reviewed clinical record. Patient has had TPN dc'd and dilaudid IV ordered for BTP only. Patient is getting a w/u  To R/O PE and other complications. CM will continue to monitor for discharge recommendations per provider. Current discharge dispo: [facility].     Corinne Kothari

## 2021-12-20 NOTE — PROGRESS NOTES
Progress Note    Patient: Clare Corral MRN: [id number]  SSN: [id number]    YOB: 1945  Age: 78 y.o. Sex: female      Admit Date: 12/9/2021    LOS: 11 days     Subjective:   GI in consultation for Pancreatitis    Patient seen resting in the chair. She states she did work with Physical therapy today. She does report abdominal pain with pain referred to left shoulder. She denies nausea or vomiting. She is on TPN. She has a right upper arm PICC. She does complain of shortness of breath with the pain. She is very weak. Lipase is elevated but slightly decreased. CT of the on  abdomen on 12/17/21 shows no bowel obstruction, High suspicion hepatic metastases, similar appearance for the pancreas, silastic stent in place, no pseudocyst formation. CTA of chest 12/20/2021: IMPRESSION  No CT evidence for PE. Thoracic aorta atherosclerosis. CAD. Moderate to large bilateral pleural effusions with associated RML, RLL, and LLL, compressive atelectasis. Abdominal ascites. CT abdomen 12/17/21: IMPRESSION  New moderate volume dependent pleural effusions with associated  lower lobe lung compressive atelectasis. Increasing ascites, moderate approaching large-volume (fluid could be sampled if there is any clinical concern for bile content). High suspicion hepatic metastases. Similar appearance for the pancreas; Silastic stent in place. No pseudocyst formation. Unchanged appearance bilateral adrenal glands. No bowel obstruction.     Abdominal Ultrasound 12/13/2021: IMPRESSION  Small amount of free fluid.  Finding suggesting hemangioma in the  liver.  Gallbladder wall thickening, adenomyomatosis, sludge and gallstones. Cholecystitis possible.  Finding in the region of the pancreatic head as above. Other findings as above.     12/9 GI consult note:  History of Present Illness: Nevin Cole is a 78 y. o. female who is seen in consultation for pancreatitis. Shary Schirmer has a past medical history of  Paroxysmal Atrial Fibrillation, CHF, COPD, renal cell carcinoma Stage IV s/p nephrectomy, GERD sjoren's syndrome, hypertension, and depression. Patient under went EUS on 12/6/2021 showing 2.7 X3 cm lesion in the area of head of pancreas with dilation of the Pancreatic duct abutting the portal vein but not involving the SMA or AMV ; Tumor T2 by endosonographic criteria ; one small lymph node in the area of head of pancreas. Pathology shows rare cells present suspicious for malignancy.   She had an ERCP on 11/4 2021 ERCP; with sphincterotomy/papillotomy ERCP; with placement of endoscopic stent into biliary or pancreatic duct, including pre and postdilation and guide wire passage, when performed, including sphincterotomy, when performed, each stent. . Patient had a PET scan on 11/22/21 which shows a lesion in the pancreatic head consistent with malignancy. CTA of abdomen shows ill defined hypodense mass in the pancreas. There are inflammatory changes. Patient states she experienced nausea, vomiting and diarrhea since Monday. Her abdominal pain has progressively gotten worse. She reports a poor appetite. She does complain of increased \"burping\". Total bilirubin 1.4, AsT 36, ALT 31, Alk Phos. 217, Lipase > 3,000. Plan nothing by mouth except ice chips and sips of water with medication. IV hydration. Pain management     PET Scan 11/22/2021: IMPRESSION  1.  FDG avid lesion in the pancreatic head consistent with malignancy. 2.  Subcentimeter focus of FDG uptake in the liver, indeterminate. 3.  FDG uptake associated with density expanding the right facet at C5-C6,  possibly due to an ectatic artery. EUS on 12/6/2021 showing 2.7 X3 cm lesion in the area of head of pancreas with dilation of the Pancreatic duct abutting the portal vein but not involving the SMA or AMV ; Tumor T2 by endosonographic criteria ; one small lymph node in the area of head of pancreas. CTA abdomen 12/9/2021: IMPRESSION  1.  Ill-defined hypodense mass in the pancreas. There are inflammatory changes and fluid adjacent to the pancreas or duodenum and stomach most likely related to biopsy or focal pancreatitis. No pseudocyst formation, active bleeding or other acute findings. 2. Enlarged adrenal glands left greater than right with noncontrast densities  consistent with adrenal adenomas. 3. Right nephrectomy. 4. Other findings as described.         Objective:     Vitals:    12/20/21 0737 12/20/21 0823 12/20/21 1041 12/20/21 1527   BP:  127/65  134/84   Pulse:  (!) 101  82   Resp:  18  18   Temp:  98 °F (36.7 °C)  98.6 °F (37 °C)   SpO2: 96% 96%  97%   Weight:       Height:   5' 7\" (1.702 m)         Intake and Output:  Current Shift: No intake/output data recorded. Last three shifts: 12/18 1901 - 12/20 0700  In: 3675.3 [P.O.:600; I.V.:3075.3]  Out: 3000 [Urine:3000]    Physical Exam:   Skin:  Extremities and face reveal no rashes. No chapin erythema. HEENT: Sclerae anicteric. Extra-occular muscles are intact. No abnormal pigmentation of the lips. The neck is supple. Cardiovascular: Regular rate and rhythm. Respiratory:  Comfortable breathing with no accessory muscle use. GI:  Abdomen nondistended, soft, and  + tender. No enlargement of the liver or spleen. No masses palpable. Rectal:  Deferred  Musculoskeletal: Generalized weakness  Neurological:  Gross memory appears intact. Patient is alert and oriented. Psychiatric:  Mood appears appropriate with judgement intact.   Lymphatic:  No visible adenopathy      Lab/Data Review:  Recent Results (from the past 24 hour(s))   METABOLIC PANEL, BASIC    Collection Time: 12/20/21  7:04 AM   Result Value Ref Range    Sodium 139 136 - 145 mmol/L    Potassium 3.4 (L) 3.5 - 5.1 mmol/L    Chloride 105 97 - 108 mmol/L    CO2 28 21 - 32 mmol/L    Anion gap 6 5 - 15 mmol/L    Glucose 140 (H) 65 - 100 mg/dL    BUN 20 6 - 20 mg/dL    Creatinine 0.77 0.55 - 1.02 mg/dL    BUN/Creatinine ratio 26 (H) 12 - 20      GFR est AA >60 >60 ml/min/1.73m2    GFR est non-AA >60 >60 ml/min/1.73m2    Calcium 8.5 8.5 - 10.1 mg/dL   LIPASE    Collection Time: 12/20/21  7:04 AM   Result Value Ref Range    Lipase 1,941 (H) 73 - 393 U/L   GLUCOSE, POC    Collection Time: 12/20/21 12:04 PM   Result Value Ref Range    Glucose (POC) 133 (H) 65 - 117 mg/dL    Performed by [facility] + INR    Collection Time: 12/20/21 12:11 PM   Result Value Ref Range    Prothrombin time 58.2 (H) 11.9 - 14.7 sec    INR 6.9 (HH) 0.9 - 1.1     CBC WITH AUTOMATED DIFF    Collection Time: 12/20/21 12:11 PM   Result Value Ref Range    WBC 21.8 (H) 3.6 - 11.0 K/uL    RBC 4.09 3.80 - 5.20 M/uL    HGB 12.3 11.5 - 16.0 g/dL    HCT 39.0 35.0 - 47.0 %    MCV 95.4 80.0 - 99.0 FL    MCH 30.1 26.0 - 34.0 PG    MCHC 31.5 30.0 - 36.5 g/dL    RDW 13.5 11.5 - 14.5 %    PLATELET 206 264 - 962 K/uL    MPV 10.6 8.9 - 12.9 FL    NRBC 0.0 0.0  WBC    ABSOLUTE NRBC 0.00 0.00 - 0.01 K/uL    NEUTROPHILS 91 (H) 32 - 75 %    LYMPHOCYTES 3 (L) 12 - 49 %    MONOCYTES 4 (L) 5 - 13 %    EOSINOPHILS 0 0 - 7 %    BASOPHILS 0 0 - 1 %    IMMATURE GRANULOCYTES 2 (H) 0 - 0.5 %    ABS. NEUTROPHILS 19.8 (H) 1.8 - 8.0 K/UL    ABS. LYMPHOCYTES 0.6 (L) 0.8 - 3.5 K/UL    ABS. MONOCYTES 0.9 0.0 - 1.0 K/UL    ABS. EOSINOPHILS 0.0 0.0 - 0.4 K/UL    ABS. BASOPHILS 0.1 0.0 - 0.1 K/UL    ABS. IMM. GRANS. 0.4 (H) 0.00 - 0.04 K/UL    DF AUTOMATED     GLUCOSE, POC    Collection Time: 12/20/21  3:44 PM   Result Value Ref Range    Glucose (POC) 149 (H) 65 - 117 mg/dL    Performed by [facility] Solo               CTA CHEST W OR W WO CONT   Final Result   No CT evidence for PE. Thoracic aorta atherosclerosis. CAD. Moderate to large bilateral pleural effusions with associated RML, RLL, and LLL   compressive atelectasis. Abdominal ascites.             DUPLEX LOWER EXT VENOUS BILAT   Final Result      XR CHEST PORT   Final Result   FINDINGS: IMPRESSION: 2 frontal views of the chest. Right PICC distal tip SVC   region. Enlarging cardiomegaly. Increasing vascular congestion. Interval development of   hypoinflation, pleural effusions, lower lung airspace edema and/or pneumonia. No   pneumothorax. No free air under the diaphragm. CT ABD PELV W CONT   Final Result   New moderate volume dependent pleural effusions with associated   lower lobe lung compressive atelectasis. Increasing ascites, moderate approaching large-volume   (fluid could be sampled if there is any clinical concern for bile content). High suspicion hepatic metastases. Similar appearance for the pancreas; Silastic stent in place. No pseudocyst formation. Unchanged appearance bilateral adrenal glands. No bowel obstruction. US ABD LTD   Final Result   Small amount of free fluid. Finding suggesting hemangioma in the   liver. Gallbladder wall thickening, adenomyomatosis, sludge and gallstones. Cholecystitis possible. Finding in the region of the pancreatic head as above. Other findings as above. CTA ABDOMEN PELV W CONT   Final Result   1. Ill-defined hypodense mass in the pancreas. There are inflammatory changes   and fluid adjacent to the pancreas or duodenum and stomach most likely related   to biopsy or focal pancreatitis. No pseudocyst formation, active bleeding or   other acute findings. 2. Enlarged adrenal glands left greater than right with noncontrast densities   consistent with adrenal adenomas. 3. Right nephrectomy. 4. Other findings as described. XR CHEST PORT   Final Result   No acute findings. Assessment:     Principal Problem:    Pancreatitis (12/9/2021)    Active Problems:    A-fib ([address] (11/16/2020)      CHF (congestive heart failure) ([address] (11/16/2020)      Hematemesis (12/12/2021)      History of peptic ulcer disease (12/12/2021)        Plan:   1.  Pancreatitis      -clear Liquid diet      -IV hydration @ 100 ml/hr      -Lipase 1,941 this am      -Lipase in the am       -Dilaudid 1 mg every 6 hours as needed for pain       -Oxycodone 5 mg every 4 hours for breakthrough pain       -Continue TPN  2. CHF      -Started on Chlorthalidone  3. COPD       -Continue home medications       -Albuterol as needed   4. Pancreatic Mass      - > 4000      -S/p fine needle aspiration suspicious for neoplasia but not diagnostic      When stable Oncology plan for out patient followup with advanced oncology surgeon Laura Bruno or [facility] for resection considerations       -CT concern for liver metastais/lesions        - IR for liver biopsy possibly  5. Hematemesis (resolved)      -Monitor H&H      -Transfuse as needed      -hgB 12.3      -Continue Protonix 40 mg daily  6. Afib      -Rate is controlled      -eliquis  BID/ HgB stable at 12.3        no further hematemesis reported         Thank you for allowing me to participate in this patients care. Plan discussed with Dr. Naty Santos and he approves.     Signed By: Garret Spears NP     December 20, 2021

## 2021-12-20 NOTE — PROGRESS NOTES
Comprehensive Nutrition Assessment    Type and Reason for Visit: Initial    Nutrition Recommendations/Plan:   PPN as ordered  CLIQ as ordered, advance as tolerated    Nutrition Assessment:  Admit with abd pain, associated n/v, diarrhea and poor intake. Underwent EUS 12/6 showing a lesion in the head of the pancreas. CT results showing mass in pancreas. US ABD sludge-filled gallbladder, biliary obstruction. Elevated lipase. NPO x 9d. PPN initiated 12/18. Clear liquid started today. Labs: 12/20 K 3.4, Gluc 140, alk phos 253, lipase 1941, 12/19 Alb 1.4Meds: Vitamin C, Lipitor, Vit D3, protonix, Zosyn IV, KCL, PPN, Miralax, docusate       Malnutrition Assessment:  Malnutrition Status: At risk for malnutrition (specify) (given weight changes x 1year, diagnosis and prolonged insufficient pro energy deficit)        Estimated Daily Nutrient Needs:  Energy (kcal): 1800kcal( 25kcl/kg); Weight Used for Energy Requirements: Current  Protein (g): 85g (1.2g/kg); Weight Used for Protein Requirements: Adjusted  Fluid (ml/day): 2100ml (30kcal/kg); Method Used for Fluid Requirements: ml/kg      Nutrition Related Findings:  Pt obese. appears well nourished. NFPE not preformed. BM documented 12/20 soft. no edema. No n/v. Wounds:    None       Current Nutrition Therapies:  TPN ADULT-PERIPHERAL AA 4.25% D5% + ELECTROLYTES  ADULT DIET Clear Liquid    Anthropometric Measures:  · Height:  5' 7\" (170.2 cm)  · Current Body Wt:  98 kg (216 lb 0.8 oz)   · Admission Body Wt:  216 lb 0.8 oz    · Usual Body Wt:  117.6 kg (259 lb 4.2 oz) (>1 year ago)     · Ideal Body Wt:  135 lbs:  160 %   · Adjusted Body Weight:   ; Weight Adjustment for: No adjustment    · BMI Category:  Obese class 1 (BMI 30.0-34. 9)       Nutrition Diagnosis:   · Inadequate protein-energy intake related to impaired nutrient utilization as evidenced by nutrition support-parenteral nutrition,lab values      Nutrition Interventions:   Food and/or Nutrient Delivery: Continue parenteral nutrition  Nutrition Education and Counseling: No recommendations at this time  Coordination of Nutrition Care: Continue to monitor while inpatient    Goals:  when appropriate discontinue PPN, advancement of diet to meet >75% EEN, weight maintenance       Nutrition Monitoring and Evaluation:   Behavioral-Environmental Outcomes: None identified  Food/Nutrient Intake Outcomes: Parenteral nutrition intake/tolerance  Physical Signs/Symptoms Outcomes: Weight,Biochemical data    Discharge Planning:    No discharge needs at this time     Electronically signed by Bakari Rivera, [address] [place] [address] on 12/20/2021 at 10:42 AM    Contact: [phone]

## 2021-12-20 NOTE — PROGRESS NOTES
Problem: Falls - Risk of  Goal: *Absence of Falls  Description: Document Duke Villarreal Fall Risk and appropriate interventions in the flowsheet.   Outcome: Progressing Towards Goal  Note: Fall Risk Interventions:  Mobility Interventions: Bed/chair exit alarm         Medication Interventions: Bed/chair exit alarm,Patient to call before getting OOB    Elimination Interventions: Call light in reach    History of Falls Interventions: Bed/chair exit alarm         Problem: Pain  Goal: *Control of Pain  Outcome: Progressing Towards Goal

## 2021-12-20 NOTE — PROGRESS NOTES
OCCUPATIONAL THERAPY TREATMENT  Patient: Neisha Lipscomb (87 y.o. female)  Date: 12/20/2021  Diagnosis: Pancreatitis [K85.90] Pancreatitis       Precautions:    Chart, occupational therapy assessment, plan of care, and goals were reviewed. ASSESSMENT  Patient continues with skilled OT services and is progressing towards goals. Pt. Received semi-supine in bed and agreeable to tx session with increased encouragement to participate. Pt. Stated increased pain in abdomen with rolling- requiring Max A x2 for slight roll to the left side and Mod A x2 supine to sit EOB. Pt. Demonstrated good sitting balance at EOB, additional time while seated at EOB d/t pt stated \"light headed\",  sit-> stand Min A x2 with increased v/c's for foot placement and hand placement for safety during transfer. Pt. Required additional time while standing at RW before transfer to the chair. Pt able to complete 10 side steps to chair with Min A x2 with use of RW for balance. Pt. Able to demonstrate 1 UE therex from prior tx session. Pt. Re-educated on UE therex to perform throughout the day to increased strength and endurance. Pt. Left reclined in chair with needs met and RN made aware. 1:20 PM- RN requested assistance from DEMARCUS and PTA to assist pt back into bed. Required Min A x2 for sit-> stand from chair and close CGA for side steps with use of RW from chair to bed transfer. Pt. Requires Mod A for Ezekiel LE's into bed. Pt. Required total A for scooting pt to [facility]. Pt. Left semi-supine in bed with needs met and family member in pt room. Current Level of Function Impacting Discharge (ADLs): assistance with all ADL's, abdominal pain limiting ability to perform functional transfers and mobility ind., decreased motivation     Other factors to consider for discharge: PLOF, time since on set         PLAN :  Patient continues to benefit from skilled intervention to address the above impairments.   Continue treatment per established plan of care.  to address goals. Recommendation for discharge: (in order for the patient to meet his/her long term goals)  Therapy up to 5 days/week in SNF setting    This discharge recommendation:  Has been made in collaboration with the attending provider and/or case management    IF patient discharges home will need the following DME: TBD       SUBJECTIVE:   Patient stated  I would be able to do it, if I could , no body understands how I feel .     OBJECTIVE DATA SUMMARY:   Cognitive/Behavioral Status:  Neurologic State: Alert;Drowsy     Cognition: Follows commands      Functional Mobility and Transfers for ADLs:  Bed Mobility:  Rolling: Maximum assistance;Assist x2 (d/t increased pain in abdominal )  Supine to Sit: Moderate assistance;Assist x2;Bed Modified  Scooting: Contact guard assistance    Transfers:  Sit to Stand: Minimum assistance;Assist x2     Bed to Chair: Contact guard assistance;Assist x2    Balance:  Sitting: Intact  Standing: Impaired; With support  Standing - Static: Constant support; Fair  Standing - Dynamic : Constant support; Fair    Therapeutic Exercises: garcia UE's  Exercise Sets Reps AROM AAROM PROM Self PROM Comments   Shoulder flex/ext 1 15 [x] [] [] []    Elbow flex/ext 1 15 [x] [] [] []    Wrist flex/ext 1 15 [x] [] [] []       [] [] [] []          Pain:  7/10 pain reported in abdominal     Activity Tolerance:   Fair and requires rest breaks  Please refer to the flowsheet for vital signs taken during this treatment. After treatment patient left in no apparent distress:   Sitting in chair, Heels elevated for pressure relief and Call bell within reach    COMMUNICATION/COLLABORATION:   The patients plan of care was discussed with: Physical therapy assistant and Registered nurse. Co-tx with PTA for increased assistance and safety with transfers and functional ambulation.      Candis Lozada  Time Calculation: 28 mins    Problem: Self Care Deficits Care Plan (Adult)  Goal: *Acute Goals and Plan of Care (Insert Text)  Description: 1. Pt will be mod I sup <> sit in prep for EOB ADLs  2. Pt will be mod I grooming sitting EOB  3. Pt will be mod I LE dressing sitting EOB/long sit  4. Pt will be mod I sit <>  prep for toileting LRAD  5. Pt will be mod I toileting/toilet transfer/cloth mgmt LRAD  6.  Pt will be IND following UE HEP in prep for self care tasks      Outcome: Progressing Towards Goal

## 2021-12-21 NOTE — PROGRESS NOTES
Progress Note    Patient: [facility] MRN: [id number]  SSN: [id number]    YOB: 1945  Age: 78 y.o. Sex: female      Admit Date: 12/9/2021    LOS: 12 days     Subjective:   Patient followed for severe pancreatitis with worsening leukocytosis on Zosyn. She was switched to Meropenem because of pancreatitis. She remains afebrile. WBC has decreased today. Procal and CRP also elevated. Patient states that she is still unable to eat and is now on clear liquid diet. Objective:     Vitals:    12/21/21 0556 12/21/21 0704 12/21/21 0810 12/21/21 0914   BP:  (!) 116/56  120/67   Pulse: 77 76  (!) 106   Resp:  16  16   Temp:  98.8 °F (37.1 °C)  98.5 °F (36.9 °C)   SpO2:  98% 95% 94%   Weight:       Height:            Intake and Output:  Current Shift: No intake/output data recorded. Last three shifts: 12/19 1901 - 12/21 0700  In: 7685.4 [P.O.:700; I.V.:6985.4]  Out: 2100 [Urine:2100]    Physical Exam:    Vitals and nursing note reviewed. Constitutional:       General: She is not in acute distress. Appearance: She is obese. She is ill-appearing. HENT: unremarkable      Cardiovascular:      Rate and Rhythm: Regular rhythm. Tachycardia present. Pulmonary:      Effort: Pulmonary effort is normal.      Breath sounds: Normal breath sounds. Abdominal:      General: Abdomen is flat. Bowel sounds are normal. There is distension. Palpations: Abdomen is soft. Tenderness: There is abdominal tenderness (RUQ, epigastrium, RLQ, LUQ). There is no guarding or rebound. Genitourinary:     Comments: External urinary device  Musculoskeletal:      Cervical back: Neck supple. Right lower leg: No edema. Left lower leg: No edema. Comments: PICC line right upper arm   Skin:     Findings: No rash. Comments: ?Stage 2 sacral wound   Neurological:      General: No focal deficit present. Mental Status: She is alert and oriented to person, place, and time.    Psychiatric:         Mood and Affect: Mood normal.         Behavior: Behavior normal.         Thought Content: Thought content normal.         Judgment: Judgment normal.     Lab/Data Review:     WBC 17,900  Urinalysis with WBC 10-20, Bacteria negative  Lipase 1,719    Procal 0.14  CRP 23.80    Blood cultures (12/20) Pending  Urine culture (12/20) Pending    CT Chest (12/20)  No CT evidence for PE. Moderate to large bilateral pleural effusions with associated RML, RLL, and LLL compressive atelectasis. Abdominal ascites. Assessment:     Principal Problem:    Pancreatitis (12/9/2021)    Active Problems:    A-fib ([facility] [address] (11/16/2020)      CHF (congestive heart failure) ([facility] [address] (11/16/2020)      Hematemesis (12/12/2021)      History of peptic ulcer disease (12/12/2021)    1. Severe pancreatitis with markedly elevated lipase, resolving  2. Pancreatic mass, possible neoplasm  3. Biliary stent  4. Worsening leukocytosis with elevated procal and CRP  5. TPN (just started)  6. Moderate pyuria, negative bacteria, urine culture pending    Comment:  We have no clear evidence for pancreatic necrosis, however, in this setting, Meropenem seems to perform better than other antibiotics. Her WBC decreased, but this may be just co-incidence. Will reassess trend tomorrow, along with procal and CRP. Plan:   1. Continue IV Meropenem  2. In am, repeat CBC, procal and CRP; follow-up serum fungitell  3.  Follow-up blood and urine culture     Signed By: Yisel Rushing MD     December 21, 2021

## 2021-12-21 NOTE — PROGRESS NOTES
Hospitalist Progress Note    Subjective:   Daily Progress Note: 12/21/2021 4:02 PM    Abdominal pain    Current Facility-Administered Medications   Medication Dose Route Frequency    docusate sodium (COLACE) capsule 100 mg  100 mg Oral BID    polyethylene glycol (MIRALAX) packet 17 g  17 g Oral DAILY    sorbitoL 70 % solution 30 mL  30 mL Oral DAILY PRN    HYDROmorphone (DILAUDID) injection 1 mg  1 mg IntraVENous Q6H PRN    phytonadione (vitamin K1) (AQUA-MEPHYTON) 5 mg in 0.9% sodium chloride 50 mL IVPB  5 mg IntraVENous DAILY    meropenem (MERREM) 1 g in 0.9% sodium chloride 20 mL IV syringe  1 g IntraVENous Q8H    oxyCODONE IR (ROXICODONE) tablet 5 mg  5 mg Oral Q4H PRN    bisacodyL (DULCOLAX) suppository 10 mg  10 mg Rectal DAILY PRN    hydrALAZINE (APRESOLINE) 20 mg/mL injection 20 mg  20 mg IntraVENous Q6H PRN    pantoprazole (PROTONIX) 40 mg in 0.9% sodium chloride 10 mL injection  40 mg IntraVENous DAILY    guaiFENesin ER (MUCINEX) tablet 600 mg  600 mg Oral Q12H    albuterol-ipratropium (DUO-NEB) 2.5 MG-0.5 MG/3 ML  3 mL Nebulization Q6H PRN    chlorthalidone (HYGROTON) tablet 25 mg  25 mg Oral DAILY    0.9% sodium chloride infusion  100 mL/hr IntraVENous CONTINUOUS    albuterol (PROVENTIL HFA, VENTOLIN HFA, PROAIR HFA) inhaler 2 Puff  2 Puff Inhalation Q6H PRN    ascorbic acid (vitamin C) (VITAMIN C) tablet 500 mg  500 mg Oral DAILY    atorvastatin (LIPITOR) tablet 40 mg  40 mg Oral DAILY    cholecalciferol (VITAMIN D3) (1000 Units /25 mcg) tablet 1,000 Units  1,000 Units Oral DAILY    diazePAM (VALIUM) tablet 5 mg  5 mg Oral Q6H PRN    dilTIAZem ER (CARDIZEM CD) capsule 120 mg  120 mg Oral DAILY    [Held by provider] apixaban (ELIQUIS) tablet 5 mg  5 mg Oral BID    potassium chloride SR (KLOR-CON 10) tablet 20 mEq  20 mEq Oral DAILY    sertraline (ZOLOFT) tablet 25 mg  25 mg Oral DAILY    traZODone (DESYREL) tablet 50 mg  50 mg Oral QHS    ondansetron (ZOFRAN) injection 4 mg  4 mg IntraVENous Q6H PRN    acetaminophen (TYLENOL) tablet 650 mg  650 mg Oral Q4H PRN    [Held by provider] 0.9% sodium chloride infusion  125 mL/hr IntraVENous CONTINUOUS    prochlorperazine (COMPAZINE) with saline injection 10 mg  10 mg IntraVENous Q6H PRN        Review of Systems  Review of Systems   Constitutional: Positive for malaise/fatigue. Negative for fever. HENT: Negative. Respiratory: Positive for shortness of breath. Negative for cough. Cardiovascular: Negative for chest pain and leg swelling. Gastrointestinal: Positive for abdominal pain. Negative for nausea and vomiting. Genitourinary: Negative. Musculoskeletal: Negative. Skin: Negative. Neurological: Negative. Psychiatric/Behavioral: Negative. Objective:     Visit Vitals  /70 (BP 1 Location: Left lower arm, BP Patient Position: At rest)   Pulse (!) 104   Temp 98.7 °F (37.1 °C)   Resp 16   Ht 5' 7\" (1.702 m)   Wt 98 kg (216 lb 0.8 oz)   SpO2 94%   BMI 33.84 kg/m²    O2 Flow Rate (L/min): 3 l/min (home o2 at night per patient) O2 Device: Nasal cannula    Temp (24hrs), Av.6 °F (37 °C), Min:98.4 °F (36.9 °C), Max:98.8 °F (37.1 °C)      No intake/output data recorded.  1901 -  0700  In: 7685.4 [P.O.:700;  I.V.:6985.4]  Out: 2100 [Urine:2100]    Recent Results (from the past 24 hour(s))   CBC WITH AUTOMATED DIFF    Collection Time: 21  5:42 PM   Result Value Ref Range    WBC 18.0 (H) 3.6 - 11.0 K/uL    RBC 3.67 (L) 3.80 - 5.20 M/uL    HGB 11.2 (L) 11.5 - 16.0 g/dL    HCT 34.7 (L) 35.0 - 47.0 %    MCV 94.6 80.0 - 99.0 FL    MCH 30.5 26.0 - 34.0 PG    MCHC 32.3 30.0 - 36.5 g/dL    RDW 13.4 11.5 - 14.5 %    PLATELET 538 587 - 089 K/uL    MPV 10.7 8.9 - 12.9 FL    NRBC 0.0 0.0  WBC    ABSOLUTE NRBC 0.00 0.00 - 0.01 K/uL    NEUTROPHILS 92 (H) 32 - 75 %    LYMPHOCYTES 3 (L) 12 - 49 %    MONOCYTES 4 (L) 5 - 13 %    EOSINOPHILS 0 0 - 7 %    BASOPHILS 0 0 - 1 %    IMMATURE GRANULOCYTES 1 (H) 0 - 0.5 %    ABS. NEUTROPHILS 16.5 (H) 1.8 - 8.0 K/UL    ABS. LYMPHOCYTES 0.5 (L) 0.8 - 3.5 K/UL    ABS. MONOCYTES 0.7 0.0 - 1.0 K/UL    ABS. EOSINOPHILS 0.0 0.0 - 0.4 K/UL    ABS. BASOPHILS 0.0 0.0 - 0.1 K/UL    ABS. IMM. GRANS. 0.3 (H) 0.00 - 0.04 K/UL    DF AUTOMATED     PROTHROMBIN TIME + INR    Collection Time: 12/20/21  5:42 PM   Result Value Ref Range    Prothrombin time 68.7 (H) 11.9 - 14.7 sec    INR 8.5 (HH) 0.9 - 1.1     METABOLIC PANEL, COMPREHENSIVE    Collection Time: 12/20/21  5:42 PM   Result Value Ref Range    Sodium 134 (L) 136 - 145 mmol/L    Potassium 3.4 (L) 3.5 - 5.1 mmol/L    Chloride 101 97 - 108 mmol/L    CO2 31 21 - 32 mmol/L    Anion gap 2 (L) 5 - 15 mmol/L    Glucose 136 (H) 65 - 100 mg/dL    BUN 23 (H) 6 - 20 mg/dL    Creatinine 0.88 0.55 - 1.02 mg/dL    BUN/Creatinine ratio 26 (H) 12 - 20      GFR est AA >60 >60 ml/min/1.73m2    GFR est non-AA >60 >60 ml/min/1.73m2    Calcium 8.6 8.5 - 10.1 mg/dL    Bilirubin, total 0.5 0.2 - 1.0 mg/dL    AST (SGOT) 21 15 - 37 U/L    ALT (SGPT) 25 12 - 78 U/L    Alk.  phosphatase 235 (H) 45 - 117 U/L    Protein, total 5.2 (L) 6.4 - 8.2 g/dL    Albumin 1.3 (L) 3.5 - 5.0 g/dL    Globulin 3.9 2.0 - 4.0 g/dL    A-G Ratio 0.3 (L) 1.1 - 2.2     URINALYSIS W/ REFLEX CULTURE    Collection Time: 12/20/21  7:10 PM    Specimen: Urine   Result Value Ref Range    Color Yellow/Straw      Appearance Clear Clear      Specific gravity 1.023 1.003 - 1.030      pH (UA) 5.0 5.0 - 8.0      Protein Negative Negative mg/dL    Glucose Negative Negative mg/dL    Ketone Negative Negative mg/dL    Bilirubin Negative Negative      Blood Small (A) Negative      Urobilinogen 0.1 0.1 - 1.0 EU/dL    Nitrites Negative Negative      Leukocyte Esterase Trace (A) Negative      UA:UC IF INDICATED Urine Culture Ordered (A) Culture not indicated by UA result      WBC 10-20 0 - 4 /hpf    RBC 0-5 0 - 5 /hpf    Bacteria Negative Negative /hpf    Mucus Trace /lpf   METABOLIC PANEL, COMPREHENSIVE Collection Time: 12/21/21  9:00 AM   Result Value Ref Range    Sodium 137 136 - 145 mmol/L    Potassium 3.5 3.5 - 5.1 mmol/L    Chloride 102 97 - 108 mmol/L    CO2 28 21 - 32 mmol/L    Anion gap 7 5 - 15 mmol/L    Glucose 88 65 - 100 mg/dL    BUN 19 6 - 20 mg/dL    Creatinine 0.87 0.55 - 1.02 mg/dL    BUN/Creatinine ratio 22 (H) 12 - 20      GFR est AA >60 >60 ml/min/1.73m2    GFR est non-AA >60 >60 ml/min/1.73m2    Calcium 9.6 8.5 - 10.1 mg/dL    Bilirubin, total 0.7 0.2 - 1.0 mg/dL    AST (SGOT) 34 15 - 37 U/L    ALT (SGPT) 29 12 - 78 U/L    Alk. phosphatase 332 (H) 45 - 117 U/L    Protein, total 6.1 (L) 6.4 - 8.2 g/dL    Albumin 1.5 (L) 3.5 - 5.0 g/dL    Globulin 4.6 (H) 2.0 - 4.0 g/dL    A-G Ratio 0.3 (L) 1.1 - 2.2     LIPASE    Collection Time: 12/21/21  9:00 AM   Result Value Ref Range    Lipase 1,719 (H) 73 - 393 U/L   CBC WITH AUTOMATED DIFF    Collection Time: 12/21/21  9:00 AM   Result Value Ref Range    WBC 17.9 (H) 3.6 - 11.0 K/uL    RBC 4.09 3.80 - 5.20 M/uL    HGB 12.5 11.5 - 16.0 g/dL    HCT 39.0 35.0 - 47.0 %    MCV 95.4 80.0 - 99.0 FL    MCH 30.6 26.0 - 34.0 PG    MCHC 32.1 30.0 - 36.5 g/dL    RDW 13.4 11.5 - 14.5 %    PLATELET 735 (H) 412 - 400 K/uL    MPV 10.5 8.9 - 12.9 FL    NRBC 0.0 0.0  WBC    ABSOLUTE NRBC 0.00 0.00 - 0.01 K/uL    NEUTROPHILS 90 (H) 32 - 75 %    LYMPHOCYTES 5 (L) 12 - 49 %    MONOCYTES 4 (L) 5 - 13 %    EOSINOPHILS 0 0 - 7 %    BASOPHILS 0 0 - 1 %    IMMATURE GRANULOCYTES 1 (H) 0 - 0.5 %    ABS. NEUTROPHILS 16.2 (H) 1.8 - 8.0 K/UL    ABS. LYMPHOCYTES 0.8 0.8 - 3.5 K/UL    ABS. MONOCYTES 0.6 0.0 - 1.0 K/UL    ABS. EOSINOPHILS 0.0 0.0 - 0.4 K/UL    ABS. BASOPHILS 0.0 0.0 - 0.1 K/UL    ABS. IMM.  GRANS. 0.2 (H) 0.00 - 0.04 K/UL    DF AUTOMATED     C REACTIVE PROTEIN, QT    Collection Time: 12/21/21  9:00 AM   Result Value Ref Range    C-Reactive protein 23.80 (H) 0.00 - 0.60 mg/dL   PROCALCITONIN    Collection Time: 12/21/21  9:00 AM   Result Value Ref Range Procalcitonin 0.14 (H) 0 ng/mL        CTA CHEST W OR W WO CONT   Final Result   No CT evidence for PE. Thoracic aorta atherosclerosis. CAD. Moderate to large bilateral pleural effusions with associated RML, RLL, and LLL   compressive atelectasis. Abdominal ascites. DUPLEX LOWER EXT VENOUS BILAT   Final Result      XR CHEST PORT   Final Result   FINDINGS: IMPRESSION: 2 frontal views of the chest. Right PICC distal tip SVC   region. Enlarging cardiomegaly. Increasing vascular congestion. Interval development of   hypoinflation, pleural effusions, lower lung airspace edema and/or pneumonia. No   pneumothorax. No free air under the diaphragm. CT ABD PELV W CONT   Final Result   New moderate volume dependent pleural effusions with associated   lower lobe lung compressive atelectasis. Increasing ascites, moderate approaching large-volume   (fluid could be sampled if there is any clinical concern for bile content). High suspicion hepatic metastases. Similar appearance for the pancreas; Silastic stent in place. No pseudocyst formation. Unchanged appearance bilateral adrenal glands. No bowel obstruction. US ABD LTD   Final Result   Small amount of free fluid. Finding suggesting hemangioma in the   liver. Gallbladder wall thickening, adenomyomatosis, sludge and gallstones. Cholecystitis possible. Finding in the region of the pancreatic head as above. Other findings as above. CTA ABDOMEN PELV W CONT   Final Result   1. Ill-defined hypodense mass in the pancreas. There are inflammatory changes   and fluid adjacent to the pancreas or duodenum and stomach most likely related   to biopsy or focal pancreatitis. No pseudocyst formation, active bleeding or   other acute findings. 2. Enlarged adrenal glands left greater than right with noncontrast densities   consistent with adrenal adenomas. 3. Right nephrectomy.    4. Other findings as described. XR CHEST PORT   Final Result   No acute findings. PHYSICAL EXAM:    Physical Exam  Vitals reviewed. Constitutional:       General: She is not in acute distress. Appearance: She is not ill-appearing. HENT:      Head: Normocephalic and atraumatic. Mouth/Throat:      Mouth: Mucous membranes are dry. Cardiovascular:      Rate and Rhythm: Normal rate and regular rhythm. Heart sounds: Normal heart sounds. Pulmonary:      Comments: Rhonchi noted bilateral without wheezes  Abdominal:      Comments: Firm with hypoactive bowel sounds and tenderness to palpation in all 4 quadrant   Musculoskeletal:         General: Normal range of motion. Cervical back: Normal range of motion and neck supple. Skin:     General: Skin is warm and dry. Neurological:      General: No focal deficit present. Mental Status: She is alert and oriented to person, place, and time. Mental status is at baseline. Psychiatric:         Mood and Affect: Mood normal.          Data Review    Recent Results (from the past 24 hour(s))   CBC WITH AUTOMATED DIFF    Collection Time: 12/20/21  5:42 PM   Result Value Ref Range    WBC 18.0 (H) 3.6 - 11.0 K/uL    RBC 3.67 (L) 3.80 - 5.20 M/uL    HGB 11.2 (L) 11.5 - 16.0 g/dL    HCT 34.7 (L) 35.0 - 47.0 %    MCV 94.6 80.0 - 99.0 FL    MCH 30.5 26.0 - 34.0 PG    MCHC 32.3 30.0 - 36.5 g/dL    RDW 13.4 11.5 - 14.5 %    PLATELET 311 943 - 379 K/uL    MPV 10.7 8.9 - 12.9 FL    NRBC 0.0 0.0  WBC    ABSOLUTE NRBC 0.00 0.00 - 0.01 K/uL    NEUTROPHILS 92 (H) 32 - 75 %    LYMPHOCYTES 3 (L) 12 - 49 %    MONOCYTES 4 (L) 5 - 13 %    EOSINOPHILS 0 0 - 7 %    BASOPHILS 0 0 - 1 %    IMMATURE GRANULOCYTES 1 (H) 0 - 0.5 %    ABS. NEUTROPHILS 16.5 (H) 1.8 - 8.0 K/UL    ABS. LYMPHOCYTES 0.5 (L) 0.8 - 3.5 K/UL    ABS. MONOCYTES 0.7 0.0 - 1.0 K/UL    ABS. EOSINOPHILS 0.0 0.0 - 0.4 K/UL    ABS. BASOPHILS 0.0 0.0 - 0.1 K/UL    ABS. IMM.  GRANS. 0.3 (H) 0.00 - 0.04 K/UL DF AUTOMATED     PROTHROMBIN TIME + INR    Collection Time: 12/20/21  5:42 PM   Result Value Ref Range    Prothrombin time 68.7 (H) 11.9 - 14.7 sec    INR 8.5 (HH) 0.9 - 1.1     METABOLIC PANEL, COMPREHENSIVE    Collection Time: 12/20/21  5:42 PM   Result Value Ref Range    Sodium 134 (L) 136 - 145 mmol/L    Potassium 3.4 (L) 3.5 - 5.1 mmol/L    Chloride 101 97 - 108 mmol/L    CO2 31 21 - 32 mmol/L    Anion gap 2 (L) 5 - 15 mmol/L    Glucose 136 (H) 65 - 100 mg/dL    BUN 23 (H) 6 - 20 mg/dL    Creatinine 0.88 0.55 - 1.02 mg/dL    BUN/Creatinine ratio 26 (H) 12 - 20      GFR est AA >60 >60 ml/min/1.73m2    GFR est non-AA >60 >60 ml/min/1.73m2    Calcium 8.6 8.5 - 10.1 mg/dL    Bilirubin, total 0.5 0.2 - 1.0 mg/dL    AST (SGOT) 21 15 - 37 U/L    ALT (SGPT) 25 12 - 78 U/L    Alk.  phosphatase 235 (H) 45 - 117 U/L    Protein, total 5.2 (L) 6.4 - 8.2 g/dL    Albumin 1.3 (L) 3.5 - 5.0 g/dL    Globulin 3.9 2.0 - 4.0 g/dL    A-G Ratio 0.3 (L) 1.1 - 2.2     URINALYSIS W/ REFLEX CULTURE    Collection Time: 12/20/21  7:10 PM    Specimen: Urine   Result Value Ref Range    Color Yellow/Straw      Appearance Clear Clear      Specific gravity 1.023 1.003 - 1.030      pH (UA) 5.0 5.0 - 8.0      Protein Negative Negative mg/dL    Glucose Negative Negative mg/dL    Ketone Negative Negative mg/dL    Bilirubin Negative Negative      Blood Small (A) Negative      Urobilinogen 0.1 0.1 - 1.0 EU/dL    Nitrites Negative Negative      Leukocyte Esterase Trace (A) Negative      UA:UC IF INDICATED Urine Culture Ordered (A) Culture not indicated by UA result      WBC 10-20 0 - 4 /hpf    RBC 0-5 0 - 5 /hpf    Bacteria Negative Negative /hpf    Mucus Trace /lpf   METABOLIC PANEL, COMPREHENSIVE    Collection Time: 12/21/21  9:00 AM   Result Value Ref Range    Sodium 137 136 - 145 mmol/L    Potassium 3.5 3.5 - 5.1 mmol/L    Chloride 102 97 - 108 mmol/L    CO2 28 21 - 32 mmol/L    Anion gap 7 5 - 15 mmol/L    Glucose 88 65 - 100 mg/dL    BUN 19 6 - 20 mg/dL    Creatinine 0.87 0.55 - 1.02 mg/dL    BUN/Creatinine ratio 22 (H) 12 - 20      GFR est AA >60 >60 ml/min/1.73m2    GFR est non-AA >60 >60 ml/min/1.73m2    Calcium 9.6 8.5 - 10.1 mg/dL    Bilirubin, total 0.7 0.2 - 1.0 mg/dL    AST (SGOT) 34 15 - 37 U/L    ALT (SGPT) 29 12 - 78 U/L    Alk. phosphatase 332 (H) 45 - 117 U/L    Protein, total 6.1 (L) 6.4 - 8.2 g/dL    Albumin 1.5 (L) 3.5 - 5.0 g/dL    Globulin 4.6 (H) 2.0 - 4.0 g/dL    A-G Ratio 0.3 (L) 1.1 - 2.2     LIPASE    Collection Time: 12/21/21  9:00 AM   Result Value Ref Range    Lipase 1,719 (H) 73 - 393 U/L   CBC WITH AUTOMATED DIFF    Collection Time: 12/21/21  9:00 AM   Result Value Ref Range    WBC 17.9 (H) 3.6 - 11.0 K/uL    RBC 4.09 3.80 - 5.20 M/uL    HGB 12.5 11.5 - 16.0 g/dL    HCT 39.0 35.0 - 47.0 %    MCV 95.4 80.0 - 99.0 FL    MCH 30.6 26.0 - 34.0 PG    MCHC 32.1 30.0 - 36.5 g/dL    RDW 13.4 11.5 - 14.5 %    PLATELET 132 (H) 394 - 400 K/uL    MPV 10.5 8.9 - 12.9 FL    NRBC 0.0 0.0  WBC    ABSOLUTE NRBC 0.00 0.00 - 0.01 K/uL    NEUTROPHILS 90 (H) 32 - 75 %    LYMPHOCYTES 5 (L) 12 - 49 %    MONOCYTES 4 (L) 5 - 13 %    EOSINOPHILS 0 0 - 7 %    BASOPHILS 0 0 - 1 %    IMMATURE GRANULOCYTES 1 (H) 0 - 0.5 %    ABS. NEUTROPHILS 16.2 (H) 1.8 - 8.0 K/UL    ABS. LYMPHOCYTES 0.8 0.8 - 3.5 K/UL    ABS. MONOCYTES 0.6 0.0 - 1.0 K/UL    ABS. EOSINOPHILS 0.0 0.0 - 0.4 K/UL    ABS. BASOPHILS 0.0 0.0 - 0.1 K/UL    ABS. IMM.  GRANS. 0.2 (H) 0.00 - 0.04 K/UL    DF AUTOMATED     C REACTIVE PROTEIN, QT    Collection Time: 12/21/21  9:00 AM   Result Value Ref Range    C-Reactive protein 23.80 (H) 0.00 - 0.60 mg/dL   PROCALCITONIN    Collection Time: 12/21/21  9:00 AM   Result Value Ref Range    Procalcitonin 0.14 (H) 0 ng/mL        Assessment/Plan:     Principal Problem:    Pancreatitis (12/9/2021)    Active Problems:    A-fib ([facility] [address] (11/16/2020)      [facility] ([facility]16/2020)      Hematemesis (12/12/2021)      History of peptic ulcer disease (12/12/2021)      Hospital course: This is a 30-year-old female with a history of atrial fibrillation, peptic ulcer disease, CHF, COPD, renal cell carcinoma stage IV status post nephrectomy, Gastrosoft reflux disease, COPD, Sojourn syndrome and essential hypertension who was presented to the emergency department for admission on 12/9/2021 after undergoing a EUS on 12 6 by Dr. Wayne Schwartz with biopsy and subsequently developed severe pancreatitis. Patient's admission course has been complicated by ongoing pancreatitis and severe abdominal pain. CT scan of the abdomen pelvis revealed an ill-defined hypodense mass in the pancreas with fluid adjacent to the pancreas suggestive of focal pancreatitis. GI and oncology consulted. Patient had an episode of hematemesis as well. Patient remains on IV Protonix. Abdominal ultrasound also showed no hemodynamic drop in the liver. Gallbladder wall has been thickened with sludge and stones although cholecystitis is less likely at this point. Although continue to monitor closely. Patient was started on Zosyn and subsequently changed to meropenem as the patient developed leukocytosis. Patient also has elevation in her INR although had receiving Eliquis. Patient started on p.o. diet although developed severe pain and have decreased her diet to clear liquid. General surgery following as well, Dr. Winter Robin:    1. Acute pancreatitis  Status post EUS by Dr. Eric Allen on 12 6  Continue pain medication  Lipase remains elevated  Continue hydration  Consider TPN if diet fails    2. CHF  Continue diuretics    3. Pancreatic mass  CA 19-9 greater than 4000    4. Hypokalemia    5 COPD  Pulmonary consultation  hronic respiratory failure with 2 L of oxygen via nasal cannula at home    6. Leukocytosis  Started meropenem  Consider repeating CT scan of the abdomen pelvis    7.   Supratherapeutic anticoagulation  INR 8  Vitamin K given  May be a side effect of using Eliquis recently and this may be an artificial INR. No obvious bleeding source  Hemoglobin stable    CODE STATUS: Full code    DVT prophylaxis: SCDs  Ulcer prophylaxis: Protonix    Discharge barriers: Improvement in overall pancreatitis and abdominal pain    Care Plan discussed with: Patient/Family    Total time spent with patient: >35 minutes.

## 2021-12-21 NOTE — PROGRESS NOTES
Progress Note    Patient: Jo Ann Duran MRN: [id number]  SSN: [id number]    YOB: 1945  Age: 78 y.o. Sex: female      Admit Date: 12/9/2021    LOS: 12 days     Subjective:   Patient with markedly elevated white blood cell count yesterday 21,800 as well as elevated INR 6  Patient continues to complain of upper abdominal pain  Is tolerating diet    Objective:     Vitals:    12/21/21 0556 12/21/21 0704 12/21/21 0810 12/21/21 0914   BP:  (!) 116/56  120/67   Pulse: 77 76  (!) 106   Resp:  16  16   Temp:  98.8 °F (37.1 °C)  98.5 °F (36.9 °C)   SpO2:  98% 95% 94%   Weight:       Height:            Intake and Output:  Current Shift: No intake/output data recorded. Last three shifts: 12/19 1901 - 12/21 0700  In: 7685.4 [P.O.:700; I.V.:6985.4]  Out: 2100 [Urine:2100]    Review of Systems:  ROS     Physical Exam:   Remains soft, nondistended, mildly tender palpation in the epigastrium right upper quadrant, no rebound or guarding    Lab/Data Review:  Recent Results (from the past 24 hour(s))   GLUCOSE, POC    Collection Time: 12/20/21  3:44 PM   Result Value Ref Range    Glucose (POC) 149 (H) 65 - 117 mg/dL    Performed by Chichi Cisse    CBC WITH AUTOMATED DIFF    Collection Time: 12/20/21  5:42 PM   Result Value Ref Range    WBC 18.0 (H) 3.6 - 11.0 K/uL    RBC 3.67 (L) 3.80 - 5.20 M/uL    HGB 11.2 (L) 11.5 - 16.0 g/dL    HCT 34.7 (L) 35.0 - 47.0 %    MCV 94.6 80.0 - 99.0 FL    MCH 30.5 26.0 - 34.0 PG    MCHC 32.3 30.0 - 36.5 g/dL    RDW 13.4 11.5 - 14.5 %    PLATELET 594 991 - 459 K/uL    MPV 10.7 8.9 - 12.9 FL    NRBC 0.0 0.0  WBC    ABSOLUTE NRBC 0.00 0.00 - 0.01 K/uL    NEUTROPHILS 92 (H) 32 - 75 %    LYMPHOCYTES 3 (L) 12 - 49 %    MONOCYTES 4 (L) 5 - 13 %    EOSINOPHILS 0 0 - 7 %    BASOPHILS 0 0 - 1 %    IMMATURE GRANULOCYTES 1 (H) 0 - 0.5 %    ABS. NEUTROPHILS 16.5 (H) 1.8 - 8.0 K/UL    ABS. LYMPHOCYTES 0.5 (L) 0.8 - 3.5 K/UL    ABS. MONOCYTES 0.7 0.0 - 1.0 K/UL    ABS.  EOSINOPHILS 0.0 0.0 - 0.4 K/UL    ABS. BASOPHILS 0.0 0.0 - 0.1 K/UL    ABS. IMM. GRANS. 0.3 (H) 0.00 - 0.04 K/UL    DF AUTOMATED     PROTHROMBIN TIME + INR    Collection Time: 12/20/21  5:42 PM   Result Value Ref Range    Prothrombin time 68.7 (H) 11.9 - 14.7 sec    INR 8.5 (HH) 0.9 - 1.1     METABOLIC PANEL, COMPREHENSIVE    Collection Time: 12/20/21  5:42 PM   Result Value Ref Range    Sodium 134 (L) 136 - 145 mmol/L    Potassium 3.4 (L) 3.5 - 5.1 mmol/L    Chloride 101 97 - 108 mmol/L    CO2 31 21 - 32 mmol/L    Anion gap 2 (L) 5 - 15 mmol/L    Glucose 136 (H) 65 - 100 mg/dL    BUN 23 (H) 6 - 20 mg/dL    Creatinine 0.88 0.55 - 1.02 mg/dL    BUN/Creatinine ratio 26 (H) 12 - 20      GFR est AA >60 >60 ml/min/1.73m2    GFR est non-AA >60 >60 ml/min/1.73m2    Calcium 8.6 8.5 - 10.1 mg/dL    Bilirubin, total 0.5 0.2 - 1.0 mg/dL    AST (SGOT) 21 15 - 37 U/L    ALT (SGPT) 25 12 - 78 U/L    Alk.  phosphatase 235 (H) 45 - 117 U/L    Protein, total 5.2 (L) 6.4 - 8.2 g/dL    Albumin 1.3 (L) 3.5 - 5.0 g/dL    Globulin 3.9 2.0 - 4.0 g/dL    A-G Ratio 0.3 (L) 1.1 - 2.2     URINALYSIS W/ REFLEX CULTURE    Collection Time: 12/20/21  7:10 PM    Specimen: Urine   Result Value Ref Range    Color Yellow/Straw      Appearance Clear Clear      Specific gravity 1.023 1.003 - 1.030      pH (UA) 5.0 5.0 - 8.0      Protein Negative Negative mg/dL    Glucose Negative Negative mg/dL    Ketone Negative Negative mg/dL    Bilirubin Negative Negative      Blood Small (A) Negative      Urobilinogen 0.1 0.1 - 1.0 EU/dL    Nitrites Negative Negative      Leukocyte Esterase Trace (A) Negative      UA:UC IF INDICATED Urine Culture Ordered (A) Culture not indicated by UA result      WBC 10-20 0 - 4 /hpf    RBC 0-5 0 - 5 /hpf    Bacteria Negative Negative /hpf    Mucus Trace /lpf   METABOLIC PANEL, COMPREHENSIVE    Collection Time: 12/21/21  9:00 AM   Result Value Ref Range    Sodium 137 136 - 145 mmol/L    Potassium 3.5 3.5 - 5.1 mmol/L    Chloride 102 97 - 108 mmol/L CO2 28 21 - 32 mmol/L    Anion gap 7 5 - 15 mmol/L    Glucose 88 65 - 100 mg/dL    BUN 19 6 - 20 mg/dL    Creatinine 0.87 0.55 - 1.02 mg/dL    BUN/Creatinine ratio 22 (H) 12 - 20      GFR est AA >60 >60 ml/min/1.73m2    GFR est non-AA >60 >60 ml/min/1.73m2    Calcium 9.6 8.5 - 10.1 mg/dL    Bilirubin, total 0.7 0.2 - 1.0 mg/dL    AST (SGOT) 34 15 - 37 U/L    ALT (SGPT) 29 12 - 78 U/L    Alk. phosphatase 332 (H) 45 - 117 U/L    Protein, total 6.1 (L) 6.4 - 8.2 g/dL    Albumin 1.5 (L) 3.5 - 5.0 g/dL    Globulin 4.6 (H) 2.0 - 4.0 g/dL    A-G Ratio 0.3 (L) 1.1 - 2.2     LIPASE    Collection Time: 12/21/21  9:00 AM   Result Value Ref Range    Lipase 1,719 (H) 73 - 393 U/L   CBC WITH AUTOMATED DIFF    Collection Time: 12/21/21  9:00 AM   Result Value Ref Range    WBC 17.9 (H) 3.6 - 11.0 K/uL    RBC 4.09 3.80 - 5.20 M/uL    HGB 12.5 11.5 - 16.0 g/dL    HCT 39.0 35.0 - 47.0 %    MCV 95.4 80.0 - 99.0 FL    MCH 30.6 26.0 - 34.0 PG    MCHC 32.1 30.0 - 36.5 g/dL    RDW 13.4 11.5 - 14.5 %    PLATELET 259 (H) 659 - 400 K/uL    MPV 10.5 8.9 - 12.9 FL    NRBC 0.0 0.0  WBC    ABSOLUTE NRBC 0.00 0.00 - 0.01 K/uL    NEUTROPHILS 90 (H) 32 - 75 %    LYMPHOCYTES 5 (L) 12 - 49 %    MONOCYTES 4 (L) 5 - 13 %    EOSINOPHILS 0 0 - 7 %    BASOPHILS 0 0 - 1 %    IMMATURE GRANULOCYTES 1 (H) 0 - 0.5 %    ABS. NEUTROPHILS 16.2 (H) 1.8 - 8.0 K/UL    ABS. LYMPHOCYTES 0.8 0.8 - 3.5 K/UL    ABS. MONOCYTES 0.6 0.0 - 1.0 K/UL    ABS. EOSINOPHILS 0.0 0.0 - 0.4 K/UL    ABS. BASOPHILS 0.0 0.0 - 0.1 K/UL    ABS. IMM.  GRANS. 0.2 (H) 0.00 - 0.04 K/UL    DF AUTOMATED     C REACTIVE PROTEIN, QT    Collection Time: 12/21/21  9:00 AM   Result Value Ref Range    C-Reactive protein 23.80 (H) 0.00 - 0.60 mg/dL   PROCALCITONIN    Collection Time: 12/21/21  9:00 AM   Result Value Ref Range    Procalcitonin 0.14 (H) 0 ng/mL            Assessment:     Principal Problem:    Pancreatitis (12/9/2021)    Active Problems:    [facility] (11/16/2020)      CHF (congestive heart failure) ([facility] (11/16/2020)      Hematemesis (12/12/2021)      History of peptic ulcer disease (12/12/2021)        Plan:   Patient seen by infectious disease started on meropenem; remains with significant leukocytosis  Patient received vitamin K for elevated INR actually higher yesterday evening no repeat today that I can see  Etiology of patient's leukocytosis not clear, certainly there is no suggestion of pancreatic necrosis or abscess on her last CT.   No acute surgical issues at this point, will continue to follow    Signed By: Lior Pineda MD     December 21, 2021

## 2021-12-21 NOTE — PROGRESS NOTES
Message received via swabr from [facility] IRF stating the insurance has denied her admission to [facility]. Writer informed  patient and Provider FARZANA Parekh of the denial by [facility]. Master Solo said he does not wish to do the peer to peer. Patient was also informed of her acceptance by [facility]. Patient requested to send referrals to other SNF in the area to see if she would be accepted by another facility other than [facility]. Clinicals uploaded via swabr and referrals sent. [id number]  Patient has been accepted by Jimmy Myers at [facility] and [facility]. CM met with patient and she is willing to go to [facility].      Gabi Benoit

## 2021-12-21 NOTE — PROGRESS NOTES
Spoke with IR at 56 regarding patient's scheduled procedure. IR is holding off on the procedure until patient's pancreatitis pain decreases. IR stated the patient could be upgraded back to a clear liquid diet and the Eliquis could be given. The Eliquis was administered 1035. Critical lab result was called at . Patient's INR was 6.9. Deyanira Young NP was notified and a STAT CTA and Bilateral Lower Venous Duplex was ordered. Will continue to monitor patient and update attending with changes in patient condition.

## 2021-12-21 NOTE — PROGRESS NOTES
Progress Note    Patient: Rita Guerrero MRN: [id number]  SSN: [id number]    YOB: 1945  Age: 78 y.o. Sex: female      Admit Date: 12/9/2021    LOS: 12 days     Subjective:   GI is following for pancreatitis    Patient seen in room  Still with abdominal pain. Lipase today still elevated but improved from yesterday - 1,719. She is tolerating full liquid diet. CTA of chest 12/20/2021: IMPRESSION  No CT evidence for PE. Thoracic aorta atherosclerosis. CAD. Moderate to large bilateral pleural effusions with associated RML, RLL, and LLL, compressive atelectasis. Abdominal ascites.     CT abdomen 12/17/21: IMPRESSION  New moderate volume dependent pleural effusions with associated  lower lobe lung compressive atelectasis. Increasing ascites, moderate approaching large-volume (fluid could be sampled if there is any clinical concern for bile content). High suspicion hepatic metastases. Similar appearance for the pancreas; Silastic stent in place.  No pseudocyst formation. Unchanged appearance bilateral adrenal glands. No bowel obstruction.     Abdominal Ultrasound 12/13/2021: IMPRESSION  Small amount of free fluid.  Finding suggesting hemangioma in the  liver.  Gallbladder wall thickening, adenomyomatosis, sludge and gallstones. Cholecystitis possible.  Finding in the region of the pancreatic head as above. Other findings as above.     12/9 GI consult note:  History of Present Illness: Nevin Cole is a 78 y. o. female who is seen in consultation for pancreatitis. Melida Allen has a past medical history of  Paroxysmal Atrial Fibrillation, CHF, COPD, renal cell carcinoma Stage IV s/p nephrectomy, GERD sjoren's syndrome, hypertension, and depression. Patient under went EUS on 12/6/2021 showing 2.7 X3 cm lesion in the area of head of pancreas with dilation of the Pancreatic duct abutting the portal vein but not involving the SMA or AMV ;  Tumor T2 by endosonographic criteria ; one small lymph node in the area of head of pancreas. Pathology shows rare cells present suspicious for malignancy.   She had an ERCP on 11/4 2021 ERCP; with sphincterotomy/papillotomy ERCP; with placement of endoscopic stent into biliary or pancreatic duct, including pre and postdilation and guide wire passage, when performed, including sphincterotomy, when performed, each stent. . Patient had a PET scan on 11/22/21 which shows a lesion in the pancreatic head consistent with malignancy. CTA of abdomen shows ill defined hypodense mass in the pancreas. There are inflammatory changes. Patient states she experienced nausea, vomiting and diarrhea since Monday. Her abdominal pain has progressively gotten worse. She reports a poor appetite. She does complain of increased \"burping\". Total bilirubin 1.4, AsT 36, ALT 31, Alk Phos. 217, Lipase > 3,000. Plan nothing by mouth except ice chips and sips of water with medication. IV hydration. Pain management     PET Scan 11/22/2021: IMPRESSION  1.  FDG avid lesion in the pancreatic head consistent with malignancy. 2.  Subcentimeter focus of FDG uptake in the liver, indeterminate. 3.  FDG uptake associated with density expanding the right facet at C5-C6,  possibly due to an ectatic artery.     EUS on 12/6/2021 showing 2.7 X3 cm lesion in the area of head of pancreas with dilation of the Pancreatic duct abutting the portal vein but not involving the SMA or AMV ; Tumor T2 by endosonographic criteria ; one small lymph node in the area of head of pancreas.     CTA abdomen 12/9/2021: IMPRESSION  1. Ill-defined hypodense mass in the pancreas. There are inflammatory changes and fluid adjacent to the pancreas or duodenum and stomach most likely related to biopsy or focal pancreatitis. No pseudocyst formation, active bleeding or other acute findings. 2. Enlarged adrenal glands left greater than right with noncontrast densities  consistent with adrenal adenomas. 3. Right nephrectomy.   4. Other findings as described.         Objective:     Vitals:    12/21/21 0556 12/21/21 0704 12/21/21 0810 12/21/21 0914   BP:  (!) 116/56  120/67   Pulse: 77 76  (!) 106   Resp:  16  16   Temp:  98.8 °F (37.1 °C)  98.5 °F (36.9 °C)   SpO2:  98% 95% 94%   Weight:       Height:            Intake and Output:  Current Shift: No intake/output data recorded. Last three shifts: 12/19 1901 - 12/21 0700  In: 7685.4 [P.O.:700; I.V.:6985.4]  Out: 2100 [Urine:2100]    Physical Exam:   Skin:  Extremities and face reveal no rashes. No chapin erythema. HEENT: Sclerae anicteric. Extra-occular muscles are intact. No abnormal pigmentation of the lips. The neck is supple. Cardiovascular: Regular rate and rhythm. Respiratory:  Comfortable breathing with no accessory muscle use. GI:  Abdomen nondistended, soft, and tender on palpation. No enlargement of the liver or spleen. No masses palpable. Rectal:  Deferred  Musculoskeletal: Extremities have good range of motion. Neurological:  Gross memory appears intact. Patient is alert and oriented. Psychiatric:  Mood appears appropriate with judgement intact.   Lymphatic:  No visible adenopathy      Lab/Data Review:  Recent Results (from the past 24 hour(s))   GLUCOSE, POC    Collection Time: 12/20/21 12:04 PM   Result Value Ref Range    Glucose (POC) 133 (H) 65 - 117 mg/dL    Performed by [facility] + INR    Collection Time: 12/20/21 12:11 PM   Result Value Ref Range    Prothrombin time 58.2 (H) 11.9 - 14.7 sec    INR 6.9 (HH) 0.9 - 1.1     CBC WITH AUTOMATED DIFF    Collection Time: 12/20/21 12:11 PM   Result Value Ref Range    WBC 21.8 (H) 3.6 - 11.0 K/uL    RBC 4.09 3.80 - 5.20 M/uL    HGB 12.3 11.5 - 16.0 g/dL    HCT 39.0 35.0 - 47.0 %    MCV 95.4 80.0 - 99.0 FL    MCH 30.1 26.0 - 34.0 PG    MCHC 31.5 30.0 - 36.5 g/dL    RDW 13.5 11.5 - 14.5 %    PLATELET 436 519 - 700 K/uL    MPV 10.6 8.9 - 12.9 FL    NRBC 0.0 0.0  WBC    ABSOLUTE NRBC 0.00 0.00 - 0.01 K/uL NEUTROPHILS 91 (H) 32 - 75 %    LYMPHOCYTES 3 (L) 12 - 49 %    MONOCYTES 4 (L) 5 - 13 %    EOSINOPHILS 0 0 - 7 %    BASOPHILS 0 0 - 1 %    IMMATURE GRANULOCYTES 2 (H) 0 - 0.5 %    ABS. NEUTROPHILS 19.8 (H) 1.8 - 8.0 K/UL    ABS. LYMPHOCYTES 0.6 (L) 0.8 - 3.5 K/UL    ABS. MONOCYTES 0.9 0.0 - 1.0 K/UL    ABS. EOSINOPHILS 0.0 0.0 - 0.4 K/UL    ABS. BASOPHILS 0.1 0.0 - 0.1 K/UL    ABS. IMM. GRANS. 0.4 (H) 0.00 - 0.04 K/UL    DF AUTOMATED     GLUCOSE, POC    Collection Time: 12/20/21  3:44 PM   Result Value Ref Range    Glucose (POC) 149 (H) 65 - 117 mg/dL    Performed by Estrellita Storey    CBC WITH AUTOMATED DIFF    Collection Time: 12/20/21  5:42 PM   Result Value Ref Range    WBC 18.0 (H) 3.6 - 11.0 K/uL    RBC 3.67 (L) 3.80 - 5.20 M/uL    HGB 11.2 (L) 11.5 - 16.0 g/dL    HCT 34.7 (L) 35.0 - 47.0 %    MCV 94.6 80.0 - 99.0 FL    MCH 30.5 26.0 - 34.0 PG    MCHC 32.3 30.0 - 36.5 g/dL    RDW 13.4 11.5 - 14.5 %    PLATELET 063 548 - 629 K/uL    MPV 10.7 8.9 - 12.9 FL    NRBC 0.0 0.0  WBC    ABSOLUTE NRBC 0.00 0.00 - 0.01 K/uL    NEUTROPHILS 92 (H) 32 - 75 %    LYMPHOCYTES 3 (L) 12 - 49 %    MONOCYTES 4 (L) 5 - 13 %    EOSINOPHILS 0 0 - 7 %    BASOPHILS 0 0 - 1 %    IMMATURE GRANULOCYTES 1 (H) 0 - 0.5 %    ABS. NEUTROPHILS 16.5 (H) 1.8 - 8.0 K/UL    ABS. LYMPHOCYTES 0.5 (L) 0.8 - 3.5 K/UL    ABS. MONOCYTES 0.7 0.0 - 1.0 K/UL    ABS. EOSINOPHILS 0.0 0.0 - 0.4 K/UL    ABS. BASOPHILS 0.0 0.0 - 0.1 K/UL    ABS. IMM.  GRANS. 0.3 (H) 0.00 - 0.04 K/UL    DF AUTOMATED     PROTHROMBIN TIME + INR    Collection Time: 12/20/21  5:42 PM   Result Value Ref Range    Prothrombin time 68.7 (H) 11.9 - 14.7 sec    INR 8.5 (HH) 0.9 - 1.1     METABOLIC PANEL, COMPREHENSIVE    Collection Time: 12/20/21  5:42 PM   Result Value Ref Range    Sodium 134 (L) 136 - 145 mmol/L    Potassium 3.4 (L) 3.5 - 5.1 mmol/L    Chloride 101 97 - 108 mmol/L    CO2 31 21 - 32 mmol/L    Anion gap 2 (L) 5 - 15 mmol/L    Glucose 136 (H) 65 - 100 mg/dL    BUN 23 (H) 6 - 20 mg/dL    Creatinine 0.88 0.55 - 1.02 mg/dL    BUN/Creatinine ratio 26 (H) 12 - 20      GFR est AA >60 >60 ml/min/1.73m2    GFR est non-AA >60 >60 ml/min/1.73m2    Calcium 8.6 8.5 - 10.1 mg/dL    Bilirubin, total 0.5 0.2 - 1.0 mg/dL    AST (SGOT) 21 15 - 37 U/L    ALT (SGPT) 25 12 - 78 U/L    Alk. phosphatase 235 (H) 45 - 117 U/L    Protein, total 5.2 (L) 6.4 - 8.2 g/dL    Albumin 1.3 (L) 3.5 - 5.0 g/dL    Globulin 3.9 2.0 - 4.0 g/dL    A-G Ratio 0.3 (L) 1.1 - 2.2     URINALYSIS W/ REFLEX CULTURE    Collection Time: 12/20/21  7:10 PM    Specimen: Urine   Result Value Ref Range    Color Yellow/Straw      Appearance Clear Clear      Specific gravity 1.023 1.003 - 1.030      pH (UA) 5.0 5.0 - 8.0      Protein Negative Negative mg/dL    Glucose Negative Negative mg/dL    Ketone Negative Negative mg/dL    Bilirubin Negative Negative      Blood Small (A) Negative      Urobilinogen 0.1 0.1 - 1.0 EU/dL    Nitrites Negative Negative      Leukocyte Esterase Trace (A) Negative      UA:UC IF INDICATED Urine Culture Ordered (A) Culture not indicated by UA result      WBC 10-20 0 - 4 /hpf    RBC 0-5 0 - 5 /hpf    Bacteria Negative Negative /hpf    Mucus Trace /lpf   METABOLIC PANEL, COMPREHENSIVE    Collection Time: 12/21/21  9:00 AM   Result Value Ref Range    Sodium 137 136 - 145 mmol/L    Potassium 3.5 3.5 - 5.1 mmol/L    Chloride 102 97 - 108 mmol/L    CO2 28 21 - 32 mmol/L    Anion gap 7 5 - 15 mmol/L    Glucose 88 65 - 100 mg/dL    BUN 19 6 - 20 mg/dL    Creatinine 0.87 0.55 - 1.02 mg/dL    BUN/Creatinine ratio 22 (H) 12 - 20      GFR est AA >60 >60 ml/min/1.73m2    GFR est non-AA >60 >60 ml/min/1.73m2    Calcium 9.6 8.5 - 10.1 mg/dL    Bilirubin, total 0.7 0.2 - 1.0 mg/dL    AST (SGOT) 34 15 - 37 U/L    ALT (SGPT) 29 12 - 78 U/L    Alk.  phosphatase 332 (H) 45 - 117 U/L    Protein, total 6.1 (L) 6.4 - 8.2 g/dL    Albumin 1.5 (L) 3.5 - 5.0 g/dL    Globulin 4.6 (H) 2.0 - 4.0 g/dL    A-G Ratio 0.3 (L) 1.1 - 2.2 LIPASE    Collection Time: 12/21/21  9:00 AM   Result Value Ref Range    Lipase 1,719 (H) 73 - 393 U/L   CBC WITH AUTOMATED DIFF    Collection Time: 12/21/21  9:00 AM   Result Value Ref Range    WBC 17.9 (H) 3.6 - 11.0 K/uL    RBC 4.09 3.80 - 5.20 M/uL    HGB 12.5 11.5 - 16.0 g/dL    HCT 39.0 35.0 - 47.0 %    MCV 95.4 80.0 - 99.0 FL    MCH 30.6 26.0 - 34.0 PG    MCHC 32.1 30.0 - 36.5 g/dL    RDW 13.4 11.5 - 14.5 %    PLATELET 149 (H) 431 - 400 K/uL    MPV 10.5 8.9 - 12.9 FL    NRBC 0.0 0.0  WBC    ABSOLUTE NRBC 0.00 0.00 - 0.01 K/uL    NEUTROPHILS 90 (H) 32 - 75 %    LYMPHOCYTES 5 (L) 12 - 49 %    MONOCYTES 4 (L) 5 - 13 %    EOSINOPHILS 0 0 - 7 %    BASOPHILS 0 0 - 1 %    IMMATURE GRANULOCYTES 1 (H) 0 - 0.5 %    ABS. NEUTROPHILS 16.2 (H) 1.8 - 8.0 K/UL    ABS. LYMPHOCYTES 0.8 0.8 - 3.5 K/UL    ABS. MONOCYTES 0.6 0.0 - 1.0 K/UL    ABS. EOSINOPHILS 0.0 0.0 - 0.4 K/UL    ABS. BASOPHILS 0.0 0.0 - 0.1 K/UL    ABS. IMM. GRANS. 0.2 (H) 0.00 - 0.04 K/UL    DF AUTOMATED     C REACTIVE PROTEIN, QT    Collection Time: 12/21/21  9:00 AM   Result Value Ref Range    C-Reactive protein 23.80 (H) 0.00 - 0.60 mg/dL              CTA CHEST W OR W WO CONT   Final Result   No CT evidence for PE. Thoracic aorta atherosclerosis. CAD. Moderate to large bilateral pleural effusions with associated RML, RLL, and LLL   compressive atelectasis. Abdominal ascites. DUPLEX LOWER EXT VENOUS BILAT   Final Result      XR CHEST PORT   Final Result   FINDINGS: IMPRESSION: 2 frontal views of the chest. Right PICC distal tip SVC   region. Enlarging cardiomegaly. Increasing vascular congestion. Interval development of   hypoinflation, pleural effusions, lower lung airspace edema and/or pneumonia. No   pneumothorax. No free air under the diaphragm. CT ABD PELV W CONT   Final Result   New moderate volume dependent pleural effusions with associated   lower lobe lung compressive atelectasis.       Increasing ascites, moderate approaching large-volume   (fluid could be sampled if there is any clinical concern for bile content). High suspicion hepatic metastases. Similar appearance for the pancreas; Silastic stent in place. No pseudocyst formation. Unchanged appearance bilateral adrenal glands. No bowel obstruction. US ABD LTD   Final Result   Small amount of free fluid. Finding suggesting hemangioma in the   liver. Gallbladder wall thickening, adenomyomatosis, sludge and gallstones. Cholecystitis possible. Finding in the region of the pancreatic head as above. Other findings as above. CTA ABDOMEN PELV W CONT   Final Result   1. Ill-defined hypodense mass in the pancreas. There are inflammatory changes   and fluid adjacent to the pancreas or duodenum and stomach most likely related   to biopsy or focal pancreatitis. No pseudocyst formation, active bleeding or   other acute findings. 2. Enlarged adrenal glands left greater than right with noncontrast densities   consistent with adrenal adenomas. 3. Right nephrectomy. 4. Other findings as described. XR CHEST PORT   Final Result   No acute findings. Assessment:     Principal Problem:    Pancreatitis (12/9/2021)    Active Problems:    A-fib ([facility] (11/16/2020)      CHF (congestive heart failure) ([facility] (11/16/2020)      Hematemesis (12/12/2021)      History of peptic ulcer disease (12/12/2021)        Plan:   1. Pancreatitis      -clear Liquid diet      -IV hydration @ 100 ml/hr      -Lipase 1,719 this am      -repeat Lipase in the am       -Dilaudid 1 mg every 6 hours as needed for pain       -Oxycodone 5 mg every 4 hours for breakthrough pain       -Continue TPN  2. CHF      -Started on Chlorthalidone  3. COPD       -Continue home medications       -Albuterol as needed   4.  Pancreatic Mass      - > 4000      -S/p fine needle aspiration suspicious for neoplasia but not diagnostic      -When stable Oncology plan for out patient followup with advanced oncology surgeon Vincent Redd or [facility] for resection considerations       -CT concern for liver metastais/lesions        - IR for liver biopsy possibly  5. Hematemesis (resolved)      -Monitor H&H      -Transfuse as needed      -hgB 12.3      -Continue Protonix 40 mg daily  6. Afib      -Rate is controlled      -eliquis  BID/ HgB stable at 12.5       -no further hematemesis reported    Patient discussed with Dr Pete Davies and agrees to above impression and plan. Thank you for allowing me to participate in this patients care    Signed By: Neelam Guerrero.  CONRADO Hillman     December 21, 2021

## 2021-12-21 NOTE — PROGRESS NOTES
Problem: Falls - Risk of  Goal: *Absence of Falls  Description: Document Israel Granados Fall Risk and appropriate interventions in the flowsheet. Outcome: Progressing Towards Goal  Note: Fall Risk Interventions:  Mobility Interventions: Bed/chair exit alarm         Medication Interventions: Bed/chair exit alarm    Elimination Interventions: Call light in reach    History of Falls Interventions: Bed/chair exit alarm         Problem: Patient Education: Go to Patient Education Activity  Goal: Patient/Family Education  Outcome: Progressing Towards Goal     Problem: Pain  Goal: *Control of Pain  Outcome: Progressing Towards Goal     Problem: Patient Education: Go to Patient Education Activity  Goal: Patient/Family Education  Outcome: Progressing Towards Goal     Problem: Nausea/Vomiting (Adult)  Goal: *Absence of nausea/vomiting  Outcome: Progressing Towards Goal     Problem: Patient Education: Go to Patient Education Activity  Goal: Patient/Family Education  Outcome: Progressing Towards Goal     Problem: Pressure Injury - Risk of  Goal: *Prevention of pressure injury  Description: Document Asim Scale and appropriate interventions in the flowsheet.   Outcome: Progressing Towards Goal  Note: Pressure Injury Interventions:  Sensory Interventions: Minimize linen layers    Moisture Interventions: Minimize layers    Activity Interventions: Increase time out of bed    Mobility Interventions: HOB 30 degrees or less    Nutrition Interventions: Document food/fluid/supplement intake    Friction and Shear Interventions: Minimize layers,HOB 30 degrees or less                Problem: Patient Education: Go to Patient Education Activity  Goal: Patient/Family Education  Outcome: Progressing Towards Goal     Problem: Patient Education: Go to Patient Education Activity  Goal: Patient/Family Education  Outcome: Progressing Towards Goal     Problem: Patient Education: Go to Patient Education Activity  Goal: Patient/Family Education  Outcome: Progressing Towards Goal

## 2021-12-21 NOTE — PROGRESS NOTES
Spoke with attending physician regarding Eliquis order. Physician stated to discontinue Eliquis due to pt elevated INR.

## 2021-12-21 NOTE — PROGRESS NOTES
Problem: Falls - Risk of  Goal: *Absence of Falls  Description: Document Melyssaylle Cockayne Fall Risk and appropriate interventions in the flowsheet. Outcome: Progressing Towards Goal  Note: Fall Risk Interventions:  Mobility Interventions: Bed/chair exit alarm         Medication Interventions: Bed/chair exit alarm    Elimination Interventions: Call light in reach    History of Falls Interventions: Bed/chair exit alarm         Problem: Pain  Goal: *Control of Pain  Outcome: Progressing Towards Goal     Problem: Pressure Injury - Risk of  Goal: *Prevention of pressure injury  Description: Document Asim Scale and appropriate interventions in the flowsheet.   Outcome: Progressing Towards Goal  Note: Pressure Injury Interventions:  Sensory Interventions: Minimize linen layers    Moisture Interventions: Absorbent underpads    Activity Interventions: Increase time out of bed    Mobility Interventions: HOB 30 degrees or less    Nutrition Interventions: Document food/fluid/supplement intake    Friction and Shear Interventions: Minimize layers

## 2021-12-21 NOTE — PROGRESS NOTES
Called Dr. Teresa Montoya about critical lab result. Pt INR is 8.5. Dr. Teresa Montoya ordered the Vitamin K1 to be given if it is available from pharmacy. Notified night shift nurse Samina Pak RN.

## 2021-12-22 NOTE — ADT AUTH CERT NOTES
Pancreatitis - Care Day 12 (12/20/2021) by Alysha Ross       Review Entered Review Status   12/20/2021 16:29 Completed      Criteria Review      Care Day: 12 Care Date: 12/20/2021 Level of Care: Telemetry    Guideline Day 2    Level Of Care    (X) ICU or floor    Clinical Status    (X) * Hemodynamic stability    12/20/2021 16:29:25 EST by Alysha Ross      T 98, /84, P 82, R  18, 02 SAT 96% 2L NC    ( ) * Pain absent or reduced    12/20/2021 16:29:25 EST by Alysha Ross      Reports continued abdominal pain w/ sob    Activity    (X) Activity as tolerated    12/20/2021 16:29:25 EST by Alysha Ross      PT NOTE:  Required mod assist X 2 for bed mobility. Pt. Required min assist X 2 for sit to stand. Pt. Was able to take a few steps to chair with RW and CGA X 2 for safety. Pt. Declined further ambulation due to pain in abdomen. .. Routes    (X) Possible oral hydration    ( ) Possible oral medications    12/20/2021 16:29:25 EST by Alysha Ross      SEE REVIEW    (X) Oral diet as tolerated    12/20/2021 16:29:25 EST by Alysha Ross      DIET Full Liquid; Low Fat (Less than or Equal to 50 gm/day)    TPN DC'D 12/19    Interventions    (X) * NG tube absent    (X) Laboratory tests    12/20/2021 16:29:25 EST by Alysha Ross      WBC 21.8, NEUTS 91, PT 58.2, INR 6.9,    Medications    (X) Parenteral analgesics    12/20/2021 16:29:25 EST by Alysha Ross      DILAUDID 1MG IV Q 4 HR PRN X 2-DC'D TODAY    * Milestone   Additional Notes   Hospitalist Progress Note                Reports continued abdominal pain w/ sob. She reports pain is causing her sob   No acute distress noted on examination      EXAM:  HENT:       Mouth/Throat:       Mouth: Mucous membranes are moist.    Eyes:       Conjunctiva/sclera: Conjunctivae normal.    Cardiovascular:       Rate and Rhythm: Tachycardia present.       Pulses: Normal pulses.     Pulmonary:       Comments: 2L NC   Abdominal:       General: Bowel sounds are normal.    Tenderness: There is abdominal tenderness.       Comments: Left quadrant tenderness    Musculoskeletal:          General: Normal range of motion. Skin:      General: Skin is warm and dry. Neurological:       Mental Status: She is alert and oriented to person, place, and time. Psychiatric:          Mood and Affect: Mood normal.         CT CHEST: No CT evidence for PE. Thoracic aorta atherosclerosis. CAD.  Moderate to large bilateral pleural effusions with associated RML, RLL, and LLL compressive atelectasis. Abdominal ascites.           HUSSEIN DUPL. EX BLE:  No evidence of deep vein thrombosis in the common femoral, profunda femoral, femoral, popliteal, posterior tibial, and peroneal veins.  The veins were imaged in the transverse and longitudinal planes. The vessels showed normal color filling and compressibility. Doppler interrogation showed phasic and spontaneous flow. UA W/CULTURE, PAIRED BLOOD CULTURES, ID CONSULT, AQUA MEPHYTON 5MG IV X1, KCL 40MEQ PO X1, COMPAZINE 10MG IV Q 6 HR PRN X1, ZOLOFT 25MG PO QD, MEROPENEM 1G IV Q 8 HR, ELIQUIS 5MG PO BID, HYGROTON 25MG PO QD, VALIUM 5MG PO Q 6 HR PRN X 2, CARDIZEM CD 120MG PO QD,        ASSESSMENT:          1. Pancreatitis w/ metastasis suspicion:   2. Hematemesis (resolved):   -us shows hemangioma in the liver.  Gallbladder wall thickening, adenomyomatosis, sludge and gallstones.  Cholecystitis possible   -general surgeon following   -gentle IV hydration   -prn antiemetics and pain management (conservative management)   -hem/onc following   -currently on dilaudid 1mg q 4hrs, will transition to oral meds in am    -lipase 1941<--1810<-- 2559    -Alk Phos 253<-253<--282    -CT abd/pelv shows new pleural effusions, increasing ascites, high suspicion for hepatic metastasis   -IR biopsy of liver lesions next week   -Full liquid diet   -oncology following   -Continue TPN   -IV zosyn dc'd, IV merrem started       3.  Pancreatic mass:   -CA 19-9-->4000 -s/p FNA via EUS which was suspicious for neoplasia but not diagnostic   -outpatient oncology fu warranted   -no further workup inpatient       4. Hypokalemia:   -replenish w/ oral and Iv K       5. Atrial fibrillation:   -managed w/ eliquis, cardizem, hygroton   -rate controled   -eliquis currently held due to hematemesis and plan for IR biopsy       6. CHF:   -managed w/ eliquis, cardizem, hygroton   -Last echo shows EF 74%       7. COPD:   -appears stable   -continue home O2 2L NC @ baseline   -continue mucinex, prn albuterol       8. Depression   -continue trazodone and zoloft       9. Hyperlipidemia   -continue lipitor 40mg       10. Leukocytosis:   -unexplained bump in white count   -will obtain repeat cbc for verification   -consult ID for further evaluation   -start complete infection workup       11. Elevated INR   -will obtain repeat for verification   -she's currently not on any anticoagulants   -suspect superimposed r/t pancreatitis   -start vitamin K       12. Shortness of breath:   -CTA chest ordered prior to lab results r/o PE   -prn supplemental O2           Full Code   Dvt Prophylaxis eliquis held, scd's   GI Prophylaxis protonix   Disposition:   -pending improvement in LFTs and pain   -pain management, gentle IV hydration   -advance diet as tolerated   -conservative management   -continue PPN   -CM for dispo planning   -IR biopsy of liver lesions    -CBC, BMP, BLOOD CX, UA/UCX          ID CONSULT:      Consults Chronic pancreatitis, worsening leukocytosis      . .. Patient lying in bed just returning from ? CT Chest.  States that she is still having abdominal pain RUQ, epigastric, LUQ and RLQ.  Also complaining of chest pain.  States that she has COPD with chronic productive cough.  Reports new bedsore. Has external urinary device. CT Chest showed moderate to large bilateral pleural effusions with associated RML, RLL, and LLL compressive atelectasis and abdominal ascites. ASSESSMENT:       1. Severe pancreatitis with markedly elevated lipase   2. Pancreatic mass, possible neoplasm   3. Biliary stent   4. Worsening leukocytosis   5. TPN (just started)       Comment:  We have no clear evidence for pancreatic necrosis, however, in this setting, Meropenem seems to perform better than other antibiotics. She is at risk for Candida superinfection, both intra-abdominal and urinary, in addition to Candidemia if she continues TPN. Sacral wound appears to be minor at this juncture.       1. Discontinue Zosyn   2. Start IV Meropenem   3. In am, repeat CBC, check procal and CRP, serum fungitell   4. Urinalysis with reflex culture         COLACE 100MG PO BID, MUCINEX 600MG PO Q 12 HR, ROXICODONE 5MG PO Q 4 HR PRN X 2, DESYREL 50MG PO Q HS           Pancreatitis - Care Day 11 (12/19/2021) by Estephania Gray       Review Entered Review Status   12/20/2021 13:59 Completed      Criteria Review      Care Day: 11 Care Date: 12/19/2021 Level of Care: Telemetry    Guideline Day 2    Level Of Care    (X) ICU or floor    Clinical Status    (X) * Hemodynamic stability    12/20/2021 13:59:14 EST by Estephania Gray      T 97.8, /62, P 87, R 18, 02 SAT 96% 3L NC    ( ) * Pain absent or reduced    Activity    (X) Activity as tolerated    12/20/2021 13:59:14 EST by Estephania Gray      PT NOTE: PT attempt. Pt in significant pain at this time. Requesting to hold PT at this time. Pt apparently going to start new pain meds soon.  Will follow up next tx (12/20/2021.)    Routes    ( ) Possible oral medications    12/20/2021 13:59:14 EST by Estephania Gray      ZOFRAN 4 MG IV Q 4 HR PRN X2, ROXICODONE 5MG PO Q 4 HR PRN X3,  COMPAZINE 10MG IV Q 6 HR PRN X1, KCL 10MEQ IV Q 1 HR X3, OTHER SCHED MEDS ARE SAME    (X) Oral diet as tolerated    12/20/2021 13:59:14 EST by Estephania Gray      TPN, CLEAR LQUIDS    Interventions    (X) * NG tube absent    (X) Laboratory tests    12/20/2021 13:59:14 EST by Estephania LY 2.9, ANION GAP 3, GLUC 119, TP 5.3, ALB 1.4, ALK PHOS 253, , LIPASE 1810    Medications    (X) Parenteral analgesics    12/20/2021 13:59:14 EST by Lashanda Vogel      DILAUDID 1MG IV CHANGED TO Q 4 HR PRN X 7    * Milestone   Additional Notes   Hospitalist Progress Note             Patient examined alert and oriented lying in bed   Reports continued abdominal pain   No acute distress noted on examination      EXAM:     HENT:       Mouth/Throat:       Mouth: Mucous membranes are moist.    Eyes:       Conjunctiva/sclera: Conjunctivae normal.    Cardiovascular:       Rate and Rhythm: Normal rate.       Pulses: Normal pulses. Pulmonary:       Comments: 2L NC   Abdominal:       General: Bowel sounds are normal.       Tenderness: There is abdominal tenderness.       Comments: Left quadrant tenderness    Musculoskeletal:          General: Normal range of motion. Skin:      General: Skin is warm and dry. Neurological:       Mental Status: She is alert and oriented to person, place, and time. Psychiatric:          Mood and Affect: Mood normal.           CXR:  Enlarging cardiomegaly. Increasing vascular congestion. Interval development of   hypoinflation, pleural effusions, lower lung airspace edema and/or pneumonia. No   pneumothorax. No free air under the diaphragm. ASSESSMENT:      1. Pancreatitis w/ metastasis suspicion:   2.  Hematemesis (resolved):   -us shows hemangioma in the liver.  Gallbladder wall thickening, adenomyomatosis, sludge and gallstones.  Cholecystitis possible   -general surgeon following   -gentle IV hydration   -prn antiemetics and pain management (conservative management)   -hem/onc following   -currently on dilaudid 1mg q 4hrs, will transition to oral meds in am    -lipase 1810<-- 2559    -Alk Phos 253<--282    -CT abd/pelv shows new pleural effusions, increasing ascites, high suspicion for hepatic metastasis   -IR biopsy of liver lesions next week   -clear liquid diet   -oncology following   -Continue TPN       3. Pancreatic mass:   -CA 19-9-->4000   -s/p FNA via EUS which was suspicious for neoplasia but not diagnostic   -outpatient oncology fu warranted   -no further workup inpatient       4. Hypokalemia:   -replenish w/ oral and Iv K       5. Atrial fibrillation:   -managed w/ eliquis, cardizem, hygroton   -rate controled   -eliquis currently held due to hematemesis and plan for IR biopsy       6. CHF:   -managed w/ eliquis, cardizem, hygroton       7. COPD:   -appears stable   -continue home O2 2L NC @ baseline   -continue mucinex, prn albuterol       8. Depression   -continue trazodone and zoloft       9. Hyperlipidemia   -continue lipitor 40mg               Full Code   Dvt Prophylaxis eliquis held, scd's   GI Prophylaxis protonix   Disposition:   -pending improvement in LFTs and pain   -pain management, gentle IV hydration   -advance diet as tolerated   -conservative management   -continue PPN   -CM for dispo planning   -IR biopsy of liver lesions next week                 Pancreatitis - Care Day 10 (12/18/2021) by Francoise Roy       Review Entered Review Status   12/20/2021 13:53 Completed      Criteria Review      Care Day: 10 Care Date: 12/18/2021 Level of Care: Telemetry    Guideline Day 2    Level Of Care    (X) ICU or floor    Clinical Status    (X) * Hemodynamic stability    12/20/2021 13:50:27 EST by Francoise Roy      T 98.8, /71, P 78,  R 16, 02 SAT 98% 3L NC    ( ) * Pain absent or reduced    12/20/2021 13:50:27 EST by Francoise Roy      Reports continued abdominal pain    Activity    (X) Activity as tolerated    12/20/2021 13:50:27 EST by Francoise Roy      PT NOTE:  progressing towards goals. Rashi Almonte Long sitting HEP, and seated HEP Performed for time. Patient refusing sit to stand but requesting SN to get back into bed. Recommend DC to SNF.     Routes    ( ) Possible oral hydration    12/20/2021 13:50:27 EST by Francoise Roy      IV NS 100ML/HR    ( ) Possible oral medications 12/20/2021 13:50:27 EST by Golden Santiago      SEE REVIEW    (X) Oral diet as tolerated    12/20/2021 13:53:23 EST by Golden Santiago      TPN, CLEAR LIQUIDS    Interventions    (X) * NG tube absent    (X) Laboratory tests    12/20/2021 13:50:27 EST by Golden Santiago      K 3.0, , ANION GAP 4, BUN 22, TP 5.4, ALB 1.5, ALK PHOS 282, LIPASE 2559    Medications    (X) Parenteral analgesics    12/20/2021 13:50:27 EST by Golden Santiago      DILAUDID 1MG IV Q 3 HR PRN X 5    * Milestone   Additional Notes   Hospitalist Progress Note             Patient examined alert and oriented sitting in recliner   Reports continued abdominal pain   No acute distress noted on examination       EXAM: Abdominal:       General: Bowel sounds are normal.       Tenderness: There is abdominal tenderness.       Comments: Left quadrant tenderness       Neurological:       Mental Status: She is alert and oriented to person, place, and time. ASSESSMENT:         1. Pancreatitis w/ metastasis suspicion:   2. Hematemesis (resolved):   -us shows hemangioma in the liver.  Gallbladder wall thickening, adenomyomatosis, sludge and gallstones.  Cholecystitis possible   -general surgeon following   -gentle IV hydration, IV zosyn   -prn antiemetics and pain management (conservative management)   -hem/onc following   -currently on dilaudid 1mg q 3hrs    -lipase  2559 <-2810   -Alk Phos 282<-294   -CT abd/pelv shows new pleural effusions, increasing ascites, high suspicion for hepatic metastasis   -IR biopsy of liver lesions next week   -general surgeon advanced diet to clear   -oncology following   -start PPN       3. Pancreatic mass:   -CA 19-9-->4000   -s/p FNA via EUS which was suspicious for neoplasia but not diagnostic   -outpatient oncology fu warranted   -no further workup inpatient       4. Hypokalemia:   -replenish w/ oral and Iv K       5.  Atrial fibrillation:   -managed w/ eliquis, cardizem, hygroton   -rate controled   -eliquis currently held due to hematemesis and plan for IR biopsy       6. CHF:   -managed w/ eliquis, cardizem, hygroton       7. COPD:   -appears stable   -continue home O2 2L NC @ baseline   -continue mucinex, prn albuterol       8. Depression   -continue trazodone and zoloft       9. Hyperlipidemia   -continue lipitor 40mg               Full Code   Dvt Prophylaxis eliquis held, scd's   GI Prophylaxis protonix   Disposition:   -pending improvement in LFTs and pain   -pain management, gentle IV hydration   -advance diet as tolerated   -conservative management   -start PPN   -CM for dispo planning   -IR biopsy of liver lesions next week         COLON & RECTAL SURGERY NOTE:         Plan:   Patient has been started on clear liquids, lipase still is elevated, on examination she is tender in the epigastrium.  CT scan results noted. Patient is nutritionally depleted recommend starting PPN   Recommend following up with interventional radiology to see if any of the liver lesions could be percutaneously biopsied, my suspicion is that she has metastatic renal cell cancer and not pancreatic primary however she will need to have a biopsy for tissue   Continue clear liquids         Hematology and Oncology Progress Note      Patient has abdominal pain and distention.  She is also having left shoulder pain    EXAM: Abdomen: Has abdominal distention.  Patient has left-sided tenderness in upper abdomen.  Also has right-sided tenderness in upper abdomen         Assessment and plan:       1.  Pancreatic mass   -likely malignant; CA 19-9 >4000   -s/p FNA via EUS which was suspicious for neoplasia but not diagnostic   -once stable then will need OP followup with advanced oncology surgeon like Dr Steffany Calderon ([address] [place] [address]) for resectability considerations; this can be arranged as OP.   -I have reviewed CT scan report as well as CT scan images. Edwin Sayjono is some concern about possible liver metastasis/lesions.  I have discussed this CT scan findings with the patient in detail and explained her that one of the option for getting diagnosis is possible liver biopsy.     -Discussed with gastroenterologist Dr. Stuart Osuna and he is trying to get liver biopsy once her pancreatitis is stable. -We will see her on a as needed basis. Celester Lennox will need tissue diagnosis before we can give any definite oncologic recommendations.   -   2. Prior history of RCC; has slow growing pulmonary nodules which may limit planning for resection as these could represent stage IV RCC; however have been stable on recent imaging           3. Afib on eliquis       4. HTN       5. CKD III       6. Pancreatitis   -Lipase is still about 2500             ALBUTEROL INH Q 6 HR PRN X1, ELIQUIS 5MG PO BID, HYGROTON 25MG PO QD, VIT D3 1000U PO QD, CARDIZEM CD 120MG PO QD, MUCINEX 600MG PO Q 12 HR,  ZOFRAN 4 MG IV Q 6 HR PRN X2, PROTONIX 40MG IV QD, ZOSYN 3.375G IV Q 8 HR, KCL 20MEQ PO QD, ZOLOFT 25MG PO QD, DESYREL 50MG PO Q HS,            Pancreatitis - Care Day 9 (12/17/2021) by Mikhail Del Real       Review Entered Review Status   12/20/2021 13:43 Completed      Criteria Review      Care Day: 9 Care Date: 12/17/2021 Level of Care: Telemetry    Guideline Day 2    Level Of Care    (X) ICU or floor    Clinical Status    (X) * Hemodynamic stability    ( ) * Pain absent or reduced    12/20/2021 13:40:02 EST by Mikhail Del Real      Reports continued abdominal pain    Activity    (X) Activity as tolerated    12/20/2021 13:40:02 EST by Mikhail Del Real      PT NOTE: bed mobility with SBA-min A with additional time then req min A x2 for sit to stand and bed>BSC. Pt amb approx 3 ft with RW to the [facility] and to the recliner.  Patient demos no LOB but amb with fatigue, shuffled slow gait pattern    Routes    ( ) Possible oral hydration    12/20/2021 13:40:02 EST by Mikhail Del Real      IV NS 100ML/HR    ( ) Possible oral medications    12/20/2021 13:40:02 EST by Mikhail Del Real      SEE REVIEW    (X) Oral diet as tolerated    12/20/2021 13:43:11 EST by Elvis Sue      ALSO ON CLEAR LIQUIDS    12/20/2021 13:40:02 EST by Elvis Sue      TPN    Interventions    (X) * NG tube absent    (X) Laboratory tests    12/20/2021 13:40:02 EST by Elvis Sue      K 3.3, , BUN 27, TP 4.8, ALB 1.7, ALK PHOS 294,. LIPASE 2810    Medications    (X) Parenteral analgesics    12/20/2021 13:40:02 EST by Elvis Sue      DILAUDID 1MG IV Q 3 HR PRN X 6 (12X/24 HR),    * Milestone   Additional Notes   Hospitalist Progress Note             Patient examined alert and oriented lying in bed   Reports continued abdominal pain   Continue to request something to eat and drink   No acute distress noted on examination          EXAM:  HENT:       Mouth/Throat:       Mouth: Mucous membranes are moist.    Eyes:       Conjunctiva/sclera: Conjunctivae normal.    Cardiovascular:       Rate and Rhythm: Normal rate.       Pulses: Normal pulses. Pulmonary:       Comments: 2L NC   Abdominal:       General: Bowel sounds are normal.       Tenderness: There is abdominal tenderness.       Comments: Left quadrant tenderness    Musculoskeletal:          General: Normal range of motion. Skin:      General: Skin is warm and dry. Neurological:       Mental Status: She is alert and oriented to person, place, and time. Psychiatric:          Mood and Affect: Mood normal.         CT ABD PELVIS:   New moderate volume dependent pleural effusions with associated   lower lobe lung compressive atelectasis. Increasing ascites, moderate approaching large-volume   (fluid could be sampled if there is any clinical concern for bile content). High suspicion hepatic metastases. Similar appearance for the pancreas; Silastic stent in place. No pseudocyst formation. Unchanged appearance bilateral adrenal glands. No bowel obstruction.          ASSESSMENT:      Repeat CT abd/pelv shows new pleural effusions, increasing ascites, high suspicion for hepatic metastasis.  Diet advanced to clear liquids.       1. Pancreatitis w/ metastasis suspicion:   2. Hematemesis (resolved):   -us shows hemangioma in the liver.  Gallbladder wall thickening, adenomyomatosis, sludge and gallstones.  Cholecystitis possible   -general surgeon following   -gentle IV hydration, IV zosyn   -prn antiemetics and pain management (conservative management)   -hem/onc following   -currently on dilaudid 1mg q 3hrs    -lipase  >3000<- 2578 <-2066 <-3000   -Alk Phos 213<- 214 <-186 <-193   -CT abd/pelv shows new pleural effusions, increasing ascites, high suspicion for hepatic metastasis   -general surgeon advanced diet to clear   -consult oncology       3. Pancreatic mass:   -CA 19-9-->4000   -s/p FNA via EUS which was suspicious for neoplasia but not diagnostic   -outpatient oncology fu warranted   -no further workup inpatient       4. Atrial fibrillation:   -managed w/ eliquis, cardizem, hygroton   -rate controled   -eliquis currently held due to hematemesis       5. CHF:   -managed w/ eliquis, cardizem, hygroton       6. COPD:   -appears stable   -continue home O2 2L NC @ baseline   -continue mucinex, prn albuterol       7. Depression   -continue trazodone and zoloft       8. Hyperlipidemia   -continue lipitor 40mg               Full Code   Dvt Prophylaxis eliquis held, scd's   GI Prophylaxis protonix   Disposition:   -pending improvement in LFTs and pain   -pain management, gentle IV hydration   -advance diet as tolerated   -conservative management   -AM labs pending   -CM for dispo planning   oncology consult      GI NOTE:   Plan:   1. Pancreatitis       -clear Liquid diet       -IV hydration @ 100 ml/hr       -Lipase >3000 12/16/2021       -Lipase in the am        -Dilaudid 1 mg every 3 hours as needed for pain        -Recommend starting TPN/PPN   2. CHF       -Started on Chlorthalidone   3. COPD        -Continue home medications        -Albuterol as needed    4.  Pancreatic Mass       - > 4000       -S/p fine needle aspiration suspicious for neoplasia but not diagnostic       When stable Oncology plan for out patient followup with advanced oncology surgeon  At  or [facility] for resection considerations   5. Hematemesis (resolved)       -Monitor H&H       -Transfuse as needed       -hgB 12.5        -Continue Protonix 40 mg daily   6.  Afib       -EKG on 12/13/21 shows Afib has replaced Sinus Rhythm       -Rate is controlled       -eliquis  BID/ HgB stable at 12.5         no further hematemesis reported            ZOFRAN 4MG IV Q 6 HR PRN X 1, PROTONIX 40MG IV QD,  ZOSYN 3.375G IV Q 8 HR,  ELIQUIS 5MG PO BID, LIPITOR 40MG PO QD, HYGROTON 25MG PO QD, VIT D3 1000U PO QD, VALIUM 5MG PO Q 6 HR PRN X2, CARDIZEM CD 120MG PO QD, MUCINEX 600MG PO Q 12 HR, KCL 20 MEQ PO QD, DESYREL 50MG PO Q HS

## 2021-12-22 NOTE — PROGRESS NOTES
PHYSICAL THERAPY TREATMENT  Patient: Yadiel Montgomery (28 y.o. female)  Date: 12/22/2021  Diagnosis: Pancreatitis [K85.90] Pancreatitis       Precautions:    Chart, physical therapy assessment, plan of care and goals were reviewed. ASSESSMENT  Patient continues with skilled PT services and is progressing towards goals. Patient leigh ann increased mobility this visit and was able to increase gait distance. Patient was min A supine>sit, CGA scooting, and performed sit to stand transfers from bed with min A>BSC. Pt then amb another 12 ft around th bed to recliner well with no LOB. Demos fatigue and req encouragement during session but slowly improving. Patient tolerated LE therex well and demonstrated IND with HEP. Motivated to continue therapy and sit up in recliner. Rec d/c to SNF. Current Level of Function Impacting Discharge (mobility/balance): weakness    Other factors to consider for discharge: fatigue, safety         PLAN :  Patient continues to benefit from skilled intervention to address the above impairments. Continue treatment per established plan of care. to address goals. Recommendation for discharge: (in order for the patient to meet his/her long term goals)  Joseph Valentine    This discharge recommendation:  Has been made in collaboration with the attending provider and/or case management    IF patient discharges home will need the following DME: to be determined (TBD)       SUBJECTIVE:   Patient stated you guys came at the right time    OBJECTIVE DATA SUMMARY:   Critical Behavior:  Neurologic State: Alert  Orientation Level: Oriented X4  Cognition: Follows commands     Functional Mobility Training:  Bed Mobility:  Supine to Sit: Minimum assistance  Scooting: Contact guard assistance    Transfers:  Sit to Stand: Minimum assistance  Stand to Sit: Minimum assistance    Balance:  Sitting: Intact  Standing: Impaired; With support  Standing - Static: Constant support;Good  Standing - Dynamic : Constant support; Fair  Ambulation/Gait Training:  Distance (ft): 12 Feet (ft)  Assistive Device: Gait belt;Walker, rolling  Ambulation - Level of Assistance: Minimal assistance    Speed/Nichole: Slow;Shuffled  Step Length: Right shortened;Left shortened       Therapeutic Exercises:   10x LAQ, marches, hip abd/add    Pain Rating:    Activity Tolerance:   Fair and requires rest breaks  Please refer to the flowsheet for vital signs taken during this treatment. After treatment patient left in no apparent distress:   Sitting in chair and Call bell within reach    COMMUNICATION/COLLABORATION:   The patients plan of care was discussed with: Occupational therapy assistant and Registered nurse. Cotx with DEMARCUS for increased safety    Gary Barthel Sonya   Time Calculation: 25 mins         Problem: Mobility Impaired (Adult and Pediatric)  Goal: *Acute Goals and Plan of Care (Insert Text)  Description: Pt will be I with LE HEP in 7 days. Pt will perform bed mobility with mod I in 7 days. Pt will perform transfers with mod I in 7 days. Pt will amb- 100 feet with LRAD safely with mod I in 7 days.    Outcome: Progressing Towards Goal

## 2021-12-22 NOTE — PROGRESS NOTES
Hospitalist Progress Note    Subjective:   Daily Progress Note: 12/22/2021 4:02 PM    Abdominal pain    Current Facility-Administered Medications   Medication Dose Route Frequency    nystatin (MYCOSTATIN) 100,000 unit/gram cream   Topical BID    docusate sodium (COLACE) capsule 100 mg  100 mg Oral BID    polyethylene glycol (MIRALAX) packet 17 g  17 g Oral DAILY    sorbitoL 70 % solution 30 mL  30 mL Oral DAILY PRN    meropenem (MERREM) 1 g in 0.9% sodium chloride 20 mL IV syringe  1 g IntraVENous Q8H    oxyCODONE IR (ROXICODONE) tablet 5 mg  5 mg Oral Q4H PRN    bisacodyL (DULCOLAX) suppository 10 mg  10 mg Rectal DAILY PRN    hydrALAZINE (APRESOLINE) 20 mg/mL injection 20 mg  20 mg IntraVENous Q6H PRN    pantoprazole (PROTONIX) 40 mg in 0.9% sodium chloride 10 mL injection  40 mg IntraVENous DAILY    guaiFENesin ER (MUCINEX) tablet 600 mg  600 mg Oral Q12H    albuterol-ipratropium (DUO-NEB) 2.5 MG-0.5 MG/3 ML  3 mL Nebulization Q6H PRN    chlorthalidone (HYGROTON) tablet 25 mg  25 mg Oral DAILY    0.9% sodium chloride infusion  100 mL/hr IntraVENous CONTINUOUS    albuterol (PROVENTIL HFA, VENTOLIN HFA, PROAIR HFA) inhaler 2 Puff  2 Puff Inhalation Q6H PRN    ascorbic acid (vitamin C) (VITAMIN C) tablet 500 mg  500 mg Oral DAILY    atorvastatin (LIPITOR) tablet 40 mg  40 mg Oral DAILY    cholecalciferol (VITAMIN D3) (1000 Units /25 mcg) tablet 1,000 Units  1,000 Units Oral DAILY    diazePAM (VALIUM) tablet 5 mg  5 mg Oral Q6H PRN    dilTIAZem ER (CARDIZEM CD) capsule 120 mg  120 mg Oral DAILY    [Held by provider] apixaban (ELIQUIS) tablet 5 mg  5 mg Oral BID    potassium chloride SR (KLOR-CON 10) tablet 20 mEq  20 mEq Oral DAILY    sertraline (ZOLOFT) tablet 25 mg  25 mg Oral DAILY    traZODone (DESYREL) tablet 50 mg  50 mg Oral QHS    ondansetron (ZOFRAN) injection 4 mg  4 mg IntraVENous Q6H PRN    acetaminophen (TYLENOL) tablet 650 mg  650 mg Oral Q4H PRN    [Held by provider] 0.9% sodium chloride infusion  125 mL/hr IntraVENous CONTINUOUS    prochlorperazine (COMPAZINE) with saline injection 10 mg  10 mg IntraVENous Q6H PRN        Review of Systems  Review of Systems   Constitutional: Positive for malaise/fatigue. Negative for fever. HENT: Negative. Respiratory: Positive for shortness of breath. Negative for cough. Cardiovascular: Negative for chest pain and leg swelling. Gastrointestinal: Positive for abdominal pain. Negative for nausea and vomiting. Genitourinary: Negative. Musculoskeletal: Negative. Skin: Negative. Neurological: Negative. Psychiatric/Behavioral: Negative. Objective:     Visit Vitals  BP (!) 129/57   Pulse 93   Temp 98.5 °F (36.9 °C)   Resp 16   Ht 5' 7\" (1.702 m)   Wt 102 kg (224 lb 13.9 oz)   SpO2 97%   BMI 35.22 kg/m²    O2 Flow Rate (L/min): 3 l/min O2 Device: Nasal cannula    Temp (24hrs), Av.6 °F (37 °C), Min:98.5 °F (36.9 °C), Max:98.9 °F (37.2 °C)      No intake/output data recorded.  1901 -  0700  In: 400 [P.O.:400]  Out: 400 [Urine:400]    Recent Results (from the past 24 hour(s))   METABOLIC PANEL, COMPREHENSIVE    Collection Time: 21  8:06 AM   Result Value Ref Range    Sodium 139 136 - 145 mmol/L    Potassium 3.3 (L) 3.5 - 5.1 mmol/L    Chloride 103 97 - 108 mmol/L    CO2 31 21 - 32 mmol/L    Anion gap 5 5 - 15 mmol/L    Glucose 90 65 - 100 mg/dL    BUN 16 6 - 20 mg/dL    Creatinine 0.78 0.55 - 1.02 mg/dL    BUN/Creatinine ratio 21 (H) 12 - 20      GFR est AA >60 >60 ml/min/1.73m2    GFR est non-AA >60 >60 ml/min/1.73m2    Calcium 8.7 8.5 - 10.1 mg/dL    Bilirubin, total 0.5 0.2 - 1.0 mg/dL    AST (SGOT) 34 15 - 37 U/L    ALT (SGPT) 29 12 - 78 U/L    Alk.  phosphatase 358 (H) 45 - 117 U/L    Protein, total 4.5 (L) 6.4 - 8.2 g/dL    Albumin 1.3 (L) 3.5 - 5.0 g/dL    Globulin 3.2 2.0 - 4.0 g/dL    A-G Ratio 0.4 (L) 1.1 - 2.2     LIPASE    Collection Time: 21  8:06 AM   Result Value Ref Range    Lipase 1,397 (H) 73 - 393 U/L   CBC WITH AUTOMATED DIFF    Collection Time: 12/22/21  8:06 AM   Result Value Ref Range    WBC 11.5 (H) 3.6 - 11.0 K/uL    RBC 3.40 (L) 3.80 - 5.20 M/uL    HGB 10.2 (L) 11.5 - 16.0 g/dL    HCT 32.6 (L) 35.0 - 47.0 %    MCV 95.9 80.0 - 99.0 FL    MCH 30.0 26.0 - 34.0 PG    MCHC 31.3 30.0 - 36.5 g/dL    RDW 13.2 11.5 - 14.5 %    PLATELET 794 044 - 390 K/uL    MPV 10.5 8.9 - 12.9 FL    NRBC 0.0 0.0  WBC    ABSOLUTE NRBC 0.00 0.00 - 0.01 K/uL    NEUTROPHILS 87 (H) 32 - 75 %    LYMPHOCYTES 6 (L) 12 - 49 %    MONOCYTES 5 5 - 13 %    EOSINOPHILS 1 0 - 7 %    BASOPHILS 0 0 - 1 %    IMMATURE GRANULOCYTES 1 (H) 0 - 0.5 %    ABS. NEUTROPHILS 10.0 (H) 1.8 - 8.0 K/UL    ABS. LYMPHOCYTES 0.7 (L) 0.8 - 3.5 K/UL    ABS. MONOCYTES 0.6 0.0 - 1.0 K/UL    ABS. EOSINOPHILS 0.1 0.0 - 0.4 K/UL    ABS. BASOPHILS 0.0 0.0 - 0.1 K/UL    ABS. IMM. GRANS. 0.1 (H) 0.00 - 0.04 K/UL    DF AUTOMATED     C REACTIVE PROTEIN, QT    Collection Time: 12/22/21  8:06 AM   Result Value Ref Range    C-Reactive protein 15.00 (H) 0.00 - 0.60 mg/dL   PROCALCITONIN    Collection Time: 12/22/21  8:06 AM   Result Value Ref Range    Procalcitonin 0.25 (H) 0 ng/mL        CTA CHEST W OR W WO CONT   Final Result   No CT evidence for PE. Thoracic aorta atherosclerosis. CAD. Moderate to large bilateral pleural effusions with associated RML, RLL, and LLL   compressive atelectasis. Abdominal ascites. DUPLEX LOWER EXT VENOUS BILAT   Final Result      XR CHEST PORT   Final Result   FINDINGS: IMPRESSION: 2 frontal views of the chest. Right PICC distal tip SVC   region. Enlarging cardiomegaly. Increasing vascular congestion. Interval development of   hypoinflation, pleural effusions, lower lung airspace edema and/or pneumonia. No   pneumothorax. No free air under the diaphragm.       CT ABD PELV W CONT   Final Result   New moderate volume dependent pleural effusions with associated   lower lobe lung compressive atelectasis. Increasing ascites, moderate approaching large-volume   (fluid could be sampled if there is any clinical concern for bile content). High suspicion hepatic metastases. Similar appearance for the pancreas; Silastic stent in place. No pseudocyst formation. Unchanged appearance bilateral adrenal glands. No bowel obstruction. US ABD LTD   Final Result   Small amount of free fluid. Finding suggesting hemangioma in the   liver. Gallbladder wall thickening, adenomyomatosis, sludge and gallstones. Cholecystitis possible. Finding in the region of the pancreatic head as above. Other findings as above. CTA ABDOMEN PELV W CONT   Final Result   1. Ill-defined hypodense mass in the pancreas. There are inflammatory changes   and fluid adjacent to the pancreas or duodenum and stomach most likely related   to biopsy or focal pancreatitis. No pseudocyst formation, active bleeding or   other acute findings. 2. Enlarged adrenal glands left greater than right with noncontrast densities   consistent with adrenal adenomas. 3. Right nephrectomy. 4. Other findings as described. XR CHEST PORT   Final Result   No acute findings. PHYSICAL EXAM:    Physical Exam  Vitals reviewed. Constitutional:       General: She is not in acute distress. Appearance: She is not ill-appearing. HENT:      Head: Normocephalic and atraumatic. Mouth/Throat:      Mouth: Mucous membranes are dry. Cardiovascular:      Rate and Rhythm: Normal rate and regular rhythm. Heart sounds: Normal heart sounds. Pulmonary:      Comments: Rhonchi noted bilateral without wheezes  Abdominal:      Comments: Firm with hypoactive bowel sounds and tenderness to palpation in all 4 quadrant   Musculoskeletal:         General: Normal range of motion. Cervical back: Normal range of motion and neck supple. Skin:     General: Skin is warm and dry.    Neurological: General: No focal deficit present. Mental Status: She is alert and oriented to person, place, and time. Mental status is at baseline. Psychiatric:         Mood and Affect: Mood normal.          Data Review    Recent Results (from the past 24 hour(s))   METABOLIC PANEL, COMPREHENSIVE    Collection Time: 12/22/21  8:06 AM   Result Value Ref Range    Sodium 139 136 - 145 mmol/L    Potassium 3.3 (L) 3.5 - 5.1 mmol/L    Chloride 103 97 - 108 mmol/L    CO2 31 21 - 32 mmol/L    Anion gap 5 5 - 15 mmol/L    Glucose 90 65 - 100 mg/dL    BUN 16 6 - 20 mg/dL    Creatinine 0.78 0.55 - 1.02 mg/dL    BUN/Creatinine ratio 21 (H) 12 - 20      GFR est AA >60 >60 ml/min/1.73m2    GFR est non-AA >60 >60 ml/min/1.73m2    Calcium 8.7 8.5 - 10.1 mg/dL    Bilirubin, total 0.5 0.2 - 1.0 mg/dL    AST (SGOT) 34 15 - 37 U/L    ALT (SGPT) 29 12 - 78 U/L    Alk. phosphatase 358 (H) 45 - 117 U/L    Protein, total 4.5 (L) 6.4 - 8.2 g/dL    Albumin 1.3 (L) 3.5 - 5.0 g/dL    Globulin 3.2 2.0 - 4.0 g/dL    A-G Ratio 0.4 (L) 1.1 - 2.2     LIPASE    Collection Time: 12/22/21  8:06 AM   Result Value Ref Range    Lipase 1,397 (H) 73 - 393 U/L   CBC WITH AUTOMATED DIFF    Collection Time: 12/22/21  8:06 AM   Result Value Ref Range    WBC 11.5 (H) 3.6 - 11.0 K/uL    RBC 3.40 (L) 3.80 - 5.20 M/uL    HGB 10.2 (L) 11.5 - 16.0 g/dL    HCT 32.6 (L) 35.0 - 47.0 %    MCV 95.9 80.0 - 99.0 FL    MCH 30.0 26.0 - 34.0 PG    MCHC 31.3 30.0 - 36.5 g/dL    RDW 13.2 11.5 - 14.5 %    PLATELET 001 665 - 982 K/uL    MPV 10.5 8.9 - 12.9 FL    NRBC 0.0 0.0  WBC    ABSOLUTE NRBC 0.00 0.00 - 0.01 K/uL    NEUTROPHILS 87 (H) 32 - 75 %    LYMPHOCYTES 6 (L) 12 - 49 %    MONOCYTES 5 5 - 13 %    EOSINOPHILS 1 0 - 7 %    BASOPHILS 0 0 - 1 %    IMMATURE GRANULOCYTES 1 (H) 0 - 0.5 %    ABS. NEUTROPHILS 10.0 (H) 1.8 - 8.0 K/UL    ABS. LYMPHOCYTES 0.7 (L) 0.8 - 3.5 K/UL    ABS. MONOCYTES 0.6 0.0 - 1.0 K/UL    ABS. EOSINOPHILS 0.1 0.0 - 0.4 K/UL    ABS.  BASOPHILS 0.0 0.0 - 0.1 K/UL    ABS. IMM. GRANS. 0.1 (H) 0.00 - 0.04 K/UL    DF AUTOMATED     C REACTIVE PROTEIN, QT    Collection Time: 12/22/21  8:06 AM   Result Value Ref Range    C-Reactive protein 15.00 (H) 0.00 - 0.60 mg/dL   PROCALCITONIN    Collection Time: 12/22/21  8:06 AM   Result Value Ref Range    Procalcitonin 0.25 (H) 0 ng/mL        Assessment/Plan:     Principal Problem:    Pancreatitis (12/9/2021)    Active Problems:    A-fib ([facility] [address] (11/16/2020)      CHF (congestive heart failure) ([facility] [address] (11/16/2020)      Hematemesis (12/12/2021)      History of peptic ulcer disease (12/12/2021)      Hospital course: This is a 63-year-old female with a history of atrial fibrillation, peptic ulcer disease, CHF, COPD, renal cell carcinoma stage IV status post nephrectomy, Gastrosoft reflux disease, COPD, Sojourn syndrome and essential hypertension who was presented to the emergency department for admission on 12/9/2021 after undergoing a EUS on 12 6 by Dr. Yamilet Salgado with biopsy and subsequently developed severe pancreatitis. Patient's admission course has been complicated by ongoing pancreatitis and severe abdominal pain. CT scan of the abdomen pelvis revealed an ill-defined hypodense mass in the pancreas with fluid adjacent to the pancreas suggestive of focal pancreatitis. GI and oncology consulted. Patient had an episode of hematemesis as well. Patient remains on IV Protonix. Abdominal ultrasound also showed no hemodynamic drop in the liver. Gallbladder wall has been thickened with sludge and stones although cholecystitis is less likely at this point. Although continue to monitor closely. Patient was started on Zosyn and subsequently changed to meropenem as the patient developed leukocytosis. Patient also has elevation in her INR although had receiving Eliquis. Patient started on p.o. diet although developed severe pain and have decreased her diet to clear liquid.   General surgery following as well,  Umair. Pression\plan:    1. Acute pancreatitis  Status post EUS by Dr. Sesar Monk 12 6  Continue pain medication  Lipase trending down  Continue hydration  Consider TPN if diet fails    2. CHF  Continue diuretics    3. Pancreatic mass  CA 19-9 greater than 4000    4. Hypokalemia    5 COPD  Pulmonary consultation  hronic respiratory failure with 2 L of oxygen via nasal cannula at home    6. Leukocytosis  Started meropenem  Consider repeating CT scan of the abdomen pelvis    7. Supratherapeutic anticoagulation  INR 8  Vitamin K given  May be a side effect of using Eliquis recently and this may be an artificial INR. No obvious bleeding source  Hemoglobin stable    CODE STATUS: Full code    DVT prophylaxis: SCDs  Ulcer prophylaxis: Protonix    Discharge barriers: Improvement in overall pancreatitis and abdominal pain    Care Plan discussed with: Patient/Family    Total time spent with patient: >35 minutes.

## 2021-12-22 NOTE — PROGRESS NOTES
Progress Note    Patient: Socrates Irby MRN: [id number]  SSN: [id number]    YOB: 1945  Age: 78 y.o. Sex: female      Admit Date: 12/9/2021    LOS: 13 days     Subjective:   GI in consultation for Pancreatitis    Patient seen resting comfortably at this time. She reports her abdominal pain is improved. Lipase is elevated but trending down this am it is 1,397. She reports she did have a bowel movement. CTA of chest 12/20/2021: IMPRESSION  No CT evidence for PE. Thoracic aorta atherosclerosis. CAD. Moderate to large bilateral pleural effusions with associated RML, RLL, and LLL, compressive atelectasis.  Abdominal ascites.     CT abdomen 12/17/21: IMPRESSION  New moderate volume dependent pleural effusions with associated  lower lobe lung compressive atelectasis. Increasing ascites, moderate approaching large-volume (fluid could be sampled if there is any clinical concern for bile content). High suspicion hepatic metastases. Similar appearance for the pancreas; Silastic stent in place.  No pseudocyst formation. Unchanged appearance bilateral adrenal glands. No bowel obstruction.     Abdominal Ultrasound 12/13/2021: IMPRESSION  Small amount of free fluid.  Finding suggesting hemangioma in the  liver.  Gallbladder wall thickening, adenomyomatosis, sludge and gallstones. Cholecystitis possible.  Finding in the region of the pancreatic head as above. Other findings as above.     12/9 GI consult note:  History of Present Illness: Nevin Cole is a 78 y. o. female who is seen in consultation for pancreatitis. Melvin Krishnamurthy has a past medical history of  Paroxysmal Atrial Fibrillation, CHF, COPD, renal cell carcinoma Stage IV s/p nephrectomy, GERD sjoren's syndrome, hypertension, and depression. Patient under went EUS on 12/6/2021 showing 2.7 X3 cm lesion in the area of head of pancreas with dilation of the Pancreatic duct abutting the portal vein but not involving the SMA or AMV ;  Tumor T2 by endosonographic criteria ; one small lymph node in the area of head of pancreas. Pathology shows rare cells present suspicious for malignancy.   She had an ERCP on 11/4 2021 ERCP; with sphincterotomy/papillotomy ERCP; with placement of endoscopic stent into biliary or pancreatic duct, including pre and postdilation and guide wire passage, when performed, including sphincterotomy, when performed, each stent. . Patient had a PET scan on 11/22/21 which shows a lesion in the pancreatic head consistent with malignancy. CTA of abdomen shows ill defined hypodense mass in the pancreas. There are inflammatory changes. Patient states she experienced nausea, vomiting and diarrhea since Monday. Her abdominal pain has progressively gotten worse. She reports a poor appetite. She does complain of increased \"burping\". Total bilirubin 1.4, AsT 36, ALT 31, Alk Phos. 217, Lipase > 3,000. Plan nothing by mouth except ice chips and sips of water with medication. IV hydration. Pain management     PET Scan 11/22/2021: IMPRESSION  1.  FDG avid lesion in the pancreatic head consistent with malignancy. 2.  Subcentimeter focus of FDG uptake in the liver, indeterminate. 3.  FDG uptake associated with density expanding the right facet at C5-C6,  possibly due to an ectatic artery.     EUS on 12/6/2021 showing 2.7 X3 cm lesion in the area of head of pancreas with dilation of the Pancreatic duct abutting the portal vein but not involving the SMA or AMV ; Tumor T2 by endosonographic criteria ; one small lymph node in the area of head of pancreas.     CTA abdomen 12/9/2021: IMPRESSION  1. Ill-defined hypodense mass in the pancreas. There are inflammatory changes and fluid adjacent to the pancreas or duodenum and stomach most likely related to biopsy or focal pancreatitis. No pseudocyst formation, active bleeding or other acute findings.   2. Enlarged adrenal glands left greater than right with noncontrast densities  consistent with adrenal adenomas. 3. Right nephrectomy. 4. Other findings as described.         Objective:     Vitals:    12/22/21 0458 12/22/21 0813 12/22/21 1400 12/22/21 1629   BP: 119/78  119/78 (!) 129/57   Pulse: 91  91 93   Resp: 16  16    Temp: 98.5 °F (36.9 °C)  98.5 °F (36.9 °C) 98.5 °F (36.9 °C)   SpO2: 96% 96%  97%   Weight:       Height:            Intake and Output:  Current Shift: No intake/output data recorded. Last three shifts: 12/20 1901 - 12/22 0700  In: 400 [P.O.:400]  Out: 400 [Urine:400]    Physical Exam:   Skin:  Extremities and face reveal no rashes. No chapin erythema. HEENT: Sclerae anicteric. Extra-occular muscles are intact. No abnormal pigmentation of the lips. The neck is supple. Cardiovascular: Regular rate and rhythm. Respiratory:  Comfortable breathing with no accessory muscle use. GI:  Abdomen nondistended, soft, and nontender. No enlargement of the liver or spleen. No masses palpable. Rectal:  Deferred  Musculoskeletal: weak  Neurological:  Gross memory appears intact. Patient is alert and oriented. Psychiatric:  Mood appears appropriate with judgement intact. Lymphatic:  No visible adenopathy      Lab/Data Review:  Recent Results (from the past 24 hour(s))   METABOLIC PANEL, COMPREHENSIVE    Collection Time: 12/22/21  8:06 AM   Result Value Ref Range    Sodium 139 136 - 145 mmol/L    Potassium 3.3 (L) 3.5 - 5.1 mmol/L    Chloride 103 97 - 108 mmol/L    CO2 31 21 - 32 mmol/L    Anion gap 5 5 - 15 mmol/L    Glucose 90 65 - 100 mg/dL    BUN 16 6 - 20 mg/dL    Creatinine 0.78 0.55 - 1.02 mg/dL    BUN/Creatinine ratio 21 (H) 12 - 20      GFR est AA >60 >60 ml/min/1.73m2    GFR est non-AA >60 >60 ml/min/1.73m2    Calcium 8.7 8.5 - 10.1 mg/dL    Bilirubin, total 0.5 0.2 - 1.0 mg/dL    AST (SGOT) 34 15 - 37 U/L    ALT (SGPT) 29 12 - 78 U/L    Alk.  phosphatase 358 (H) 45 - 117 U/L    Protein, total 4.5 (L) 6.4 - 8.2 g/dL    Albumin 1.3 (L) 3.5 - 5.0 g/dL    Globulin 3.2 2.0 - 4.0 g/dL    A-G Ratio 0.4 (L) 1.1 - 2.2     LIPASE    Collection Time: 12/22/21  8:06 AM   Result Value Ref Range    Lipase 1,397 (H) 73 - 393 U/L   CBC WITH AUTOMATED DIFF    Collection Time: 12/22/21  8:06 AM   Result Value Ref Range    WBC 11.5 (H) 3.6 - 11.0 K/uL    RBC 3.40 (L) 3.80 - 5.20 M/uL    HGB 10.2 (L) 11.5 - 16.0 g/dL    HCT 32.6 (L) 35.0 - 47.0 %    MCV 95.9 80.0 - 99.0 FL    MCH 30.0 26.0 - 34.0 PG    MCHC 31.3 30.0 - 36.5 g/dL    RDW 13.2 11.5 - 14.5 %    PLATELET 546 059 - 331 K/uL    MPV 10.5 8.9 - 12.9 FL    NRBC 0.0 0.0  WBC    ABSOLUTE NRBC 0.00 0.00 - 0.01 K/uL    NEUTROPHILS 87 (H) 32 - 75 %    LYMPHOCYTES 6 (L) 12 - 49 %    MONOCYTES 5 5 - 13 %    EOSINOPHILS 1 0 - 7 %    BASOPHILS 0 0 - 1 %    IMMATURE GRANULOCYTES 1 (H) 0 - 0.5 %    ABS. NEUTROPHILS 10.0 (H) 1.8 - 8.0 K/UL    ABS. LYMPHOCYTES 0.7 (L) 0.8 - 3.5 K/UL    ABS. MONOCYTES 0.6 0.0 - 1.0 K/UL    ABS. EOSINOPHILS 0.1 0.0 - 0.4 K/UL    ABS. BASOPHILS 0.0 0.0 - 0.1 K/UL    ABS. IMM. GRANS. 0.1 (H) 0.00 - 0.04 K/UL    DF AUTOMATED     C REACTIVE PROTEIN, QT    Collection Time: 12/22/21  8:06 AM   Result Value Ref Range    C-Reactive protein 15.00 (H) 0.00 - 0.60 mg/dL   PROCALCITONIN    Collection Time: 12/22/21  8:06 AM   Result Value Ref Range    Procalcitonin 0.25 (H) 0 ng/mL              CTA CHEST W OR W WO CONT   Final Result   No CT evidence for PE. Thoracic aorta atherosclerosis. CAD. Moderate to large bilateral pleural effusions with associated RML, RLL, and LLL   compressive atelectasis. Abdominal ascites. DUPLEX LOWER EXT VENOUS BILAT   Final Result      XR CHEST PORT   Final Result   FINDINGS: IMPRESSION: 2 frontal views of the chest. Right PICC distal tip SVC   region. Enlarging cardiomegaly. Increasing vascular congestion. Interval development of   hypoinflation, pleural effusions, lower lung airspace edema and/or pneumonia. No   pneumothorax. No free air under the diaphragm.       CT ABD PELV W CONT Final Result   New moderate volume dependent pleural effusions with associated   lower lobe lung compressive atelectasis. Increasing ascites, moderate approaching large-volume   (fluid could be sampled if there is any clinical concern for bile content). High suspicion hepatic metastases. Similar appearance for the pancreas; Silastic stent in place. No pseudocyst formation. Unchanged appearance bilateral adrenal glands. No bowel obstruction. US ABD LTD   Final Result   Small amount of free fluid. Finding suggesting hemangioma in the   liver. Gallbladder wall thickening, adenomyomatosis, sludge and gallstones. Cholecystitis possible. Finding in the region of the pancreatic head as above. Other findings as above. CTA ABDOMEN PELV W CONT   Final Result   1. Ill-defined hypodense mass in the pancreas. There are inflammatory changes   and fluid adjacent to the pancreas or duodenum and stomach most likely related   to biopsy or focal pancreatitis. No pseudocyst formation, active bleeding or   other acute findings. 2. Enlarged adrenal glands left greater than right with noncontrast densities   consistent with adrenal adenomas. 3. Right nephrectomy. 4. Other findings as described. XR CHEST PORT   Final Result   No acute findings. Assessment:     Principal Problem:    Pancreatitis (12/9/2021)    Active Problems:    A-fib ([facility] [address] (11/16/2020)      CHF (congestive heart failure) ([facility] [address] (11/16/2020)      Hematemesis (12/12/2021)      History of peptic ulcer disease (12/12/2021)        Plan:   1. Pancreatitis      -clear Liquid diet      -IV hydration @ 100 ml/hr      -Lipase 1,397 this am      -repeat Lipase in the am       -Dilaudid 1 mg every 6 hours as needed for pain       -Oxycodone 5 mg every 4 hours for breakthrough pain  2. CHF      -Started on Chlorthalidone  3. COPD       -Continue home medications       -Albuterol as needed   4.  Pancreatic Mass      - > 4000      -S/p fine needle aspiration suspicious for neoplasia but not diagnostic      -When stable Oncology plan for out patient followup with advanced oncology surgeon  At  or [facility] for resection considerations       -CT concern for liver metastais/lesions        - IR for liver biopsy possibly  5. Hematemesis (resolved)      -Monitor H&H      -Transfuse as needed      -hgB 12.3      -Continue Protonix 40 mg daily  6. Afib      -Rate is controlled      -eliquis  BID/ HgB stable at 12.5       -no further hematemesis reported    Thank you for allowing me to participate in this patients care. Plan discussed with Dr. Cyndi Hollins and he approves.     Signed By: Katelin Smith NP     December 22, 2021

## 2021-12-22 NOTE — PROGRESS NOTES
Progress Note    Patient: Nakita Schultz MRN: [id number]  SSN: [id number]    YOB: 1945  Age: 78 y.o. Sex: female      Admit Date: 12/9/2021    LOS: 13 days     Subjective:   Patient followed for severe pancreatitis with worsening leukocytosis on Zosyn. She was switched to Meropenem because of pancreatitis. She remains afebrile. WBC and CRP now decreasing. Urine culture negative. Patient sitting up in bedside chair, still with abdominal pain but also reporting vaginal itching. Objective:     Vitals:    12/21/21 2030 12/21/21 2223 12/22/21 0458 12/22/21 0813   BP: 121/74  119/78    Pulse: 89  91    Resp: 18  16    Temp: 98.9 °F (37.2 °C)  98.5 °F (36.9 °C)    SpO2: 95%  96% 96%   Weight:  224 lb 13.9 oz (102 kg)     Height:            Intake and Output:  Current Shift: No intake/output data recorded. Last three shifts: 12/20 1901 - 12/22 0700  In: 400 [P.O.:400]  Out: 400 [Urine:400]    Physical Exam:    Vitals and nursing note reviewed. Constitutional:       General: She is not in acute distress. Appearance: She is obese. She is ill-appearing. HENT: unremarkable      Cardiovascular:      Rate and Rhythm: Regular rhythm. Tachycardia present. Pulmonary:      Effort: Pulmonary effort is normal.      Breath sounds: Normal breath sounds. Abdominal:      General: Abdomen is flat. Bowel sounds are normal. There is distension. Palpations: Abdomen is soft. Tenderness: There is abdominal tenderness (RUQ, epigastrium, RLQ, LUQ). There is no guarding or rebound. Genitourinary:  Vaginal area not examined     Comments: External urinary device  Musculoskeletal:      Cervical back: Neck supple. Right lower leg: No edema. Left lower leg: No edema. Comments: PICC line right upper arm   Skin:     Findings: No rash. Comments: ?Stage 2 sacral wound   Neurological:      General: No focal deficit present.       Mental Status: She is alert and oriented to person, place, and time. Psychiatric:         Mood and Affect: Mood normal.         Behavior: Behavior normal.         Thought Content: Thought content normal.         Judgment: Judgment normal.     Lab/Data Review:     WBC 11,500  Urinalysis with WBC 10-20, Bacteria negative  Lipase 1,397 <1,719    Procal  < 0.14  CRP 15.00 < 23.80    Blood cultures (12/20) Pending  Urine culture (12/20) No growth FINAL    CT Chest (12/20)  No CT evidence for PE. Moderate to large bilateral pleural effusions with associated RML, RLL, and LLL compressive atelectasis. Abdominal ascites. Assessment:     Principal Problem:    Pancreatitis (12/9/2021)    Active Problems:    A-fib ([facility] [address] (11/16/2020)      CHF (congestive heart failure) ([facility] [address] (11/16/2020)      Hematemesis (12/12/2021)      History of peptic ulcer disease (12/12/2021)    1. Severe pancreatitis with markedly elevated lipase, resolving  2. Pancreatic mass, possible neoplasm  3. Biliary stent  4. Sepsis with worsening leukocytosis with elevated procal and CRP, resolving, Day #3 IV Meropenem  5. TPN (just started)  6. Moderate pyuria, negative bacteria, urine culture negative  7. Possible candida superinfection with vaginitis    Comment:  WBC and CRP decreasing after switching to Meropenem. No evidence of UTI. Plan:   1. Continue IV Meropenem  2. In am, repeat CBC, procal and CRP; follow-up serum fungitell  3. Follow-up blood culture   4.  Fluconazole 200 mg po and Mycostatin topical cream    Signed By: Issac Jade MD     December 22, 2021

## 2021-12-22 NOTE — PROGRESS NOTES
Problem: Falls - Risk of  Goal: *Absence of Falls  Description: Document Sri Martinez Fall Risk and appropriate interventions in the flowsheet.   Outcome: Progressing Towards Goal  Note: Fall Risk Interventions:  Mobility Interventions: Bed/chair exit alarm         Medication Interventions: Bed/chair exit alarm    Elimination Interventions: Call light in reach    History of Falls Interventions: Bed/chair exit alarm

## 2021-12-22 NOTE — PROGRESS NOTES
OCCUPATIONAL THERAPY TREATMENT  Patient: Mikle Libman (22 y.o. female)  Date: 12/22/2021  Diagnosis: Pancreatitis [K85.90] Pancreatitis       Precautions:    Chart, occupational therapy assessment, plan of care, and goals were reviewed. ASSESSMENT  Patient continues with skilled OT services and is progressing towards goals. Pt. Received semi-supine in bed and agreeable to tx session. Pt  demostrates increased functional mobility this session. Pt. Requesting to perform BSC transfer d/t need to have BM. Pt was min A supine>sit, CGA scooting, and performed sit to stand transfers from EOB Min A and functional ambulation with use of RW and Min A for safety with ambualtion from bed > BSC> chair. Pt. Required increased time while performing toileting. Total A for isidoro-care while standing at RW. Pt. Able to increased functional ambulation distance from [facility] to chair (10ft) with use of RW and Min A. Pt. Continues to demonstrate fatigue and require encouragement to perform tasks during session. Patient tolerated UE therex well and demonstrated IND with HEP. Pt. Left reclined in chair with needs met and call bell within reach. Rec SNF when medically approaprite for discharge.        Current Level of Function Impacting Discharge (ADLs): increased fatigue, requires encouragment to participate, Min A functional ambulation and transfers, Min A BSC transfer, total A isidoro-care. Other factors to consider for discharge: PLOF, time since on set         PLAN :  Patient continues to benefit from skilled intervention to address the above impairments. Continue treatment per established plan of care. to address goals.     Recommendation for discharge: (in order for the patient to meet his/her long term goals)  Therapy up to 5 days/week in SNF setting    This discharge recommendation:  Has been made in collaboration with the attending provider and/or case management    IF patient discharges home will need the following DME: FREDA SUBJECTIVE:   Patient stated  I need to use the bathroom.     OBJECTIVE DATA SUMMARY:   Cognitive/Behavioral Status:  Neurologic State: Alert  Orientation Level: Oriented X4  Cognition: Follows commands  Functional Mobility and Transfers for ADLs:  Bed Mobility:  Supine to Sit: Minimum assistance  Scooting: Contact guard assistance    Transfers:  Sit to Stand: Minimum assistance  Functional Transfers  Toilet Transfer : Minimum assistance (BSC)       Balance:  Sitting: Intact  Standing: Impaired; With support  Standing - Static: Constant support;Good  Standing - Dynamic : Constant support; Fair    ADL Intervention:  Toileting  Bladder Hygiene: Total assistance (dependent)  Bowel Hygiene: Total assistance (dependent)    Therapeutic Exercises: garcia UE's  Exercise Sets Reps AROM AAROM PROM Self PROM Comments   Shoulder flex/ext 1 10 [x] [] [] []    Elbow flex/ext 1 10 [x] [] [] []    Wrist flex/ext 1 10 [x] [] [] []       [] [] [] []      Pain:  Pt stated pain in abdomen    Activity Tolerance:   Fair  Please refer to the flowsheet for vital signs taken during this treatment. After treatment patient left in no apparent distress:   Sitting in chair, Heels elevated for pressure relief and Call bell within reach    COMMUNICATION/COLLABORATION:   The patients plan of care was discussed with: Physical therapy assistant. Co-tx with PTA for increased assistance with transfers and functional mobility. David Schwartz  Time Calculation: 25 mins    Problem: Self Care Deficits Care Plan (Adult)  Goal: *Acute Goals and Plan of Care (Insert Text)  Description: 1. Pt will be mod I sup <> sit in prep for EOB ADLs  2. Pt will be mod I grooming sitting EOB  3. Pt will be mod I LE dressing sitting EOB/long sit  4. Pt will be mod I sit <>  prep for toileting LRAD  5. Pt will be mod I toileting/toilet transfer/cloth mgmt LRAD  6.  Pt will be IND following UE HEP in prep for self care tasks      Outcome: Progressing Towards Goal

## 2021-12-23 NOTE — CONSULTS
Pulmonary and Critical Care Consult    Subjective:   Consult Note: 12/23/2021 @no control      Chief Complaint:   Chief Complaint   Patient presents with    Abdominal Pain        This patient has been seen and evaluated at the request of Manuel Boyd, [facility].     68-year-old morbidly obese  lady  I am asked to see for acute respiratory failure with hypoxia    Patient is an ex-smoker  A pack a day for almost 40 years  Still smokes occasional cigarette    Longstanding history of COPD for almost 15 years  Says she is on home oxygen therapy at night only    Patient has a past medical history of atrial fibrillation, peptic ulcer disease, CHF, COPD, renal cell carcinoma stage IV status post nephrectomy, Gastrosoft reflux disease, COPD, Sjogrens syndrome and essential hypertension    Presented to the emergency department for admission on 12/9/2021 after undergoing a EUS on 12/6 with biopsy and subsequently developed severe pancreatitis. Patient's admission course has been complicated by ongoing pancreatitis and severe abdominal pain. CT scan of the abdomen pelvis revealed an ill-defined hypodense mass in the pancreas with fluid adjacent to the pancreas suggestive of focal pancreatitis. GI and oncology consulted. Patient had an episode of hematemesis as well. Patient remains on IV Protonix. Abdominal ultrasound also showed no hemodynamic drop in the liver. Gallbladder wall has been thickened with sludge and stones although cholecystitis is less likely at this point. Although continue to monitor closely. Patient was started on Zosyn and subsequently changed to meropenem as the patient developed leukocytosis. Patient also has elevation in her INR although had receiving Eliquis. Patient started on p.o. diet although developed severe pain and have decreased her diet to clear liquid. General surgery following as well.     Chest x-rays and chest CT scan reviewed  Showing moderate sized bilateral pleural effusions  Along with significant ascites    Currently on 3 L nasal cannula oxygen with adequate saturations      Review of Systems:  A comprehensive review of systems was negative except for that written in the HPI.     Past Medical History:   Diagnosis Date    A-fib [facility])     CHF (congestive heart failure) (HCC)     Clear cell renal cell carcinoma (HCC)     COPD (chronic obstructive pulmonary disease) (HCC)     Fibromyalgia     GERD (gastroesophageal reflux disease)     Lymphedema     Sjogren's syndrome ([address]     Thyroid nodule      Past Surgical History:   Procedure Laterality Date    COLONOSCOPY N/A 11/18/2020    COLONOSCOPY performed by Wendi Colindres MD at [address] HX GI      EGD    HX HYSTERECTOMY      HX NEPHRECTOMY Right 2019    HX ORTHOPAEDIC      ankle      Family History   Problem Relation Age of Onset    Hypertension Mother     Stroke Mother     Stroke Father     Hypertension Father      Social History     Tobacco Use    Smoking status: Current Every Day Smoker     Packs/day: 0.25     Types: Cigarettes    Smokeless tobacco: Never Used   Substance Use Topics    Alcohol use: Not Currently      Current Facility-Administered Medications   Medication Dose Route Frequency Provider Last Rate Last Admin    nystatin (MYCOSTATIN) 100,000 unit/gram cream   Topical BID Duane Juan MD   Given at 12/22/21 2049    docusate sodium (COLACE) capsule 100 mg  100 mg Oral BID Melendez Sample, NP   100 mg at 12/23/21 0919    polyethylene glycol (MIRALAX) packet 17 g  17 g Oral DAILY Melendez Sample, NP   17 g at 12/23/21 0924    sorbitoL 70 % solution 30 mL  30 mL Oral DAILY PRN Melendez Sample, NP        meropenem (MERREM) 1 g in 0.9% sodium chloride 20 mL IV syringe  1 g IntraVENous Q8H Duane Juan MD   1 g at 12/23/21 9508    oxyCODONE IR (ROXICODONE) tablet 5 mg  5 mg Oral Q4H PRN Aniyah Montoya MD   5 mg at 12/23/21 0524    bisacodyL (DULCOLAX) suppository 10 mg  10 mg Rectal DAILY PRN Xiomy Enriquez, CONRADO        hydrALAZINE (APRESOLINE) 20 mg/mL injection 20 mg  20 mg IntraVENous Q6H PRN Huyen Crane PA        pantoprazole (PROTONIX) 40 mg in 0.9% sodium chloride 10 mL injection  40 mg IntraVENous DAILY FARZANA Polo   40 mg at 12/23/21 1781    guaiFENesin ER (MUCINEX) tablet 600 mg  600 mg Oral Q12H Huyen Crane PA   600 mg at 12/23/21 0921    albuterol-ipratropium (DUO-NEB) 2.5 MG-0.5 MG/3 ML  3 mL Nebulization Q6H PRN Huyen Crane PA        chlorthalidone (HYGROTON) tablet 25 mg  25 mg Oral DAILY FARZANA Polo   25 mg at 12/23/21 0920    0.9% sodium chloride infusion  100 mL/hr IntraVENous CONTINUOUS Nolberto Farah, CONRADO 100 mL/hr at 12/22/21 2057 100 mL/hr at 12/22/21 2057    albuterol (PROVENTIL HFA, VENTOLIN HFA, PROAIR HFA) inhaler 2 Puff  2 Puff Inhalation Q6H PRN Sofi Nolberto CONRADO KAM   2 Puff at 12/22/21 2133    ascorbic acid (vitamin C) (VITAMIN C) tablet 500 mg  500 mg Oral DAILY Zarefoss Jan A, NP   500 mg at 12/11/21 0900    atorvastatin (LIPITOR) tablet 40 mg  40 mg Oral DAILY Zarefoss Jan A, NP   40 mg at 12/17/21 0948    cholecalciferol (VITAMIN D3) (1000 Units /25 mcg) tablet 1,000 Units  1,000 Units Oral DAILY Zarefoss, Jan A, NP   1,000 Units at 12/23/21 0920    diazePAM (VALIUM) tablet 5 mg  5 mg Oral Q6H PRN Zabel Jan NEGIN, NP   5 mg at 12/23/21 3802    dilTIAZem ER (CARDIZEM CD) capsule 120 mg  120 mg Oral DAILY Zarefoss Jan A, NP   120 mg at 12/23/21 0920    [Held by provider] apixaban (ELIQUIS) tablet 5 mg  5 mg Oral BID Zarefrojas Jan A, NP   5 mg at 12/20/21 1035    potassium chloride SR (KLOR-CON 10) tablet 20 mEq  20 mEq Oral DAILY Nolberto Farah, NP   20 mEq at 12/23/21 5601    sertraline (ZOLOFT) tablet 25 mg  25 mg Oral DAILY Nolberto Farah, NP   25 mg at 12/23/21 0921    traZODone (DESYREL) tablet 50 mg  50 mg Oral QHS Nolberto Farah, NP   50 mg at 12/22/21 2047    ondansetron [facility] injection 4 mg  4 mg IntraVENous Q6H PRN Nolberto Farah NP   4 mg at 21 1232    acetaminophen (TYLENOL) tablet 650 mg  650 mg Oral Q4H PRN Nolberto Farah NP        [Held by provider] 0.9% sodium chloride infusion  125 mL/hr IntraVENous CONTINUOUS Ainsley Paget, NP   Stopped at 21 1038    prochlorperazine (COMPAZINE) with saline injection 10 mg  10 mg IntraVENous Q6H PRN Nolberto Farah NP   10 mg at 21 0320          Allergies   Allergen Reactions    Adhesive Tape-Silicones Atopic Dermatitis           Objective:     Blood pressure 113/62, pulse 91, temperature 98 °F (36.7 °C), resp. rate 18, height 5' 7\" (1.702 m), weight 102 kg (224 lb 13.9 oz), SpO2 98 %. Temp (24hrs), Av.5 °F (36.9 °C), Min:98 °F (36.7 °C), Max:98.9 °F (37.2 °C)      Intake and Output:  Current Shift: No intake/output data recorded. Last 3 Shifts:  1901 -  0700  In: 900 [I.V.:900]  Out: 600 [Urine:600]    Intake/Output Summary (Last 24 hours) at 2021 1032  Last data filed at 2021 0458  Gross per 24 hour   Intake 900 ml   Output 600 ml   Net 300 ml        Physical Exam:     General: Lying in bed comfortably, no acute distress, morbidly obese  Eye: Reactive, symmetric  Throat and Neck: Supple  Lung: Reduced air entry bilaterally with prolonged exhalation but no wheezing. Crackles in both lung bases  Heart: S1+S2. No murmurs  Abdomen: soft, non-tender. Bowel sounds normal. No masses; obese  Extremities:  2+ lower extremity edema  : Not done  Skin: No cyanosis  Neurologic: A & O x3.   Grossly nonfocal  Psychiatric: Appropriate affect; coherent      Lab/Data Review:    Recent Results (from the past 24 hour(s))   GLUCOSE, POC    Collection Time: 21  7:37 AM   Result Value Ref Range    Glucose (POC) 82 65 - 117 mg/dL    Performed by [address] [place] [address] Dr COMPREHENSIVE    Collection Time: 21  9:12 AM   Result Value Ref Range    Sodium 138 136 - 145 mmol/L    Potassium 2.9 (L) 3.5 - 5.1 mmol/L    Chloride 101 97 - 108 mmol/L    CO2 33 (H) 21 - 32 mmol/L    Anion gap 4 (L) 5 - 15 mmol/L    Glucose 92 65 - 100 mg/dL    BUN 12 6 - 20 mg/dL    Creatinine 0.72 0.55 - 1.02 mg/dL    BUN/Creatinine ratio 17 12 - 20      GFR est AA >60 >60 ml/min/1.73m2    GFR est non-AA >60 >60 ml/min/1.73m2    Calcium 9.2 8.5 - 10.1 mg/dL    Bilirubin, total 0.6 0.2 - 1.0 mg/dL    AST (SGOT) 26 15 - 37 U/L    ALT (SGPT) 29 12 - 78 U/L    Alk. phosphatase 342 (H) 45 - 117 U/L    Protein, total 5.5 (L) 6.4 - 8.2 g/dL    Albumin 1.4 (L) 3.5 - 5.0 g/dL    Globulin 4.1 (H) 2.0 - 4.0 g/dL    A-G Ratio 0.3 (L) 1.1 - 2.2     LIPASE    Collection Time: 12/23/21  9:12 AM   Result Value Ref Range    Lipase 2,684 (H) 73 - 393 U/L   CBC WITH AUTOMATED DIFF    Collection Time: 12/23/21  9:12 AM   Result Value Ref Range    WBC 9.8 3.6 - 11.0 K/uL    RBC 3.43 (L) 3.80 - 5.20 M/uL    HGB 10.8 (L) 11.5 - 16.0 g/dL    HCT 32.8 (L) 35.0 - 47.0 %    MCV 95.6 80.0 - 99.0 FL    MCH 31.5 26.0 - 34.0 PG    MCHC 32.9 30.0 - 36.5 g/dL    RDW 13.2 11.5 - 14.5 %    PLATELET 340 596 - 999 K/uL    MPV 10.4 8.9 - 12.9 FL    NRBC 0.0 0.0  WBC    ABSOLUTE NRBC 0.00 0.00 - 0.01 K/uL    NEUTROPHILS 90 (H) 32 - 75 %    LYMPHOCYTES 5 (L) 12 - 49 %    MONOCYTES 4 (L) 5 - 13 %    EOSINOPHILS 0 0 - 7 %    BASOPHILS 0 0 - 1 %    IMMATURE GRANULOCYTES 1 (H) 0 - 0.5 %    ABS. NEUTROPHILS 8.8 (H) 1.8 - 8.0 K/UL    ABS. LYMPHOCYTES 0.4 (L) 0.8 - 3.5 K/UL    ABS. MONOCYTES 0.4 0.0 - 1.0 K/UL    ABS. EOSINOPHILS 0.0 0.0 - 0.4 K/UL    ABS. BASOPHILS 0.0 0.0 - 0.1 K/UL    ABS. IMM.  GRANS. 0.1 (H) 0.00 - 0.04 K/UL    DF AUTOMATED     C REACTIVE PROTEIN, QT    Collection Time: 12/23/21  9:12 AM   Result Value Ref Range    C-Reactive protein 12.70 (H) 0.00 - 0.60 mg/dL   PROTHROMBIN TIME + INR    Collection Time: 12/23/21  9:12 AM   Result Value Ref Range    Prothrombin time 14.2 11.9 - 14.7 sec    INR 1.1 0.9 - 1.1         CTA CHEST W OR W WO CONT Final Result   No CT evidence for PE. Thoracic aorta atherosclerosis. CAD. Moderate to large bilateral pleural effusions with associated RML, RLL, and LLL   compressive atelectasis. Abdominal ascites. DUPLEX LOWER EXT VENOUS BILAT   Final Result      XR CHEST PORT   Final Result   FINDINGS: IMPRESSION: 2 frontal views of the chest. Right PICC distal tip SVC   region. Enlarging cardiomegaly. Increasing vascular congestion. Interval development of   hypoinflation, pleural effusions, lower lung airspace edema and/or pneumonia. No   pneumothorax. No free air under the diaphragm. CT ABD PELV W CONT   Final Result   New moderate volume dependent pleural effusions with associated   lower lobe lung compressive atelectasis. Increasing ascites, moderate approaching large-volume   (fluid could be sampled if there is any clinical concern for bile content). High suspicion hepatic metastases. Similar appearance for the pancreas; Silastic stent in place. No pseudocyst formation. Unchanged appearance bilateral adrenal glands. No bowel obstruction. US ABD LTD   Final Result   Small amount of free fluid. Finding suggesting hemangioma in the   liver. Gallbladder wall thickening, adenomyomatosis, sludge and gallstones. Cholecystitis possible. Finding in the region of the pancreatic head as above. Other findings as above. CTA ABDOMEN PELV W CONT   Final Result   1. Ill-defined hypodense mass in the pancreas. There are inflammatory changes   and fluid adjacent to the pancreas or duodenum and stomach most likely related   to biopsy or focal pancreatitis. No pseudocyst formation, active bleeding or   other acute findings. 2. Enlarged adrenal glands left greater than right with noncontrast densities   consistent with adrenal adenomas. 3. Right nephrectomy. 4. Other findings as described.       XR CHEST PORT   Final Result   No acute findings. CT Results  (Last 48 hours)    None            Assessment:     1. Acute respiratory failure with hypoxia  2. Bilateral pleural effusions  3. COPD  4. Sepsis  5. Acute appendicitis  6. Acute on chronic congestive heart failure  7. Ascites  8. Pancreatic mass  9. Leukocytosis  10.   Morbid obesity    Plan:     Patient admitted to the hospital  We will be watching her closely    Currently on 3 L nasal cannula oxygen  Continue oxygen supplementation as needed to keep saturation above 92%    Patient has bilateral pleural effusions with associated atelectasis  This is due to volume overload and ascites from the pancreatitis and pancreatic mass  Continue with diuresis  Likely to benefit from paracentesis if felt to be appropriate by GI  Intake and output charting  Check electrolytes and replace as needed    Possible sources of fever include pancreatitis and pyuria  Continue antibiotics  Culture sent  Appreciate ID input  Further changes in antibiotics based on clinical response and culture results    Lipase trending down  TPN if diet fails    DVT and GI prophylaxis    Questions of patient were answered at bedside in detail  Case discussed in detail with RN, RT, and care team  Thank you for involving me in the care of this patient  I will follow with you closely during hospitalization    Cedric Abbott MD  [facility]  12/23/2021  10:32 AM

## 2021-12-23 NOTE — PROGRESS NOTES
CM reviewed clinical record. Patient has been accepted to [facility]. Once patient is clinically stable with her pancreatitis, patient will dc to [facility]. CM will continue to follow patients progress.      Dallin Goodwin

## 2021-12-23 NOTE — PROGRESS NOTES
Hospitalist Progress Note    Subjective:   Daily Progress Note: 12/23/2021 4:02 PM    Abdominal pain    Current Facility-Administered Medications   Medication Dose Route Frequency    HYDROmorphone (DILAUDID) injection 1 mg  1 mg IntraVENous Q4H PRN    oxyCODONE IR (ROXICODONE) tablet 5 mg  5 mg Oral Q6H PRN    nystatin (MYCOSTATIN) 100,000 unit/gram cream   Topical BID    docusate sodium (COLACE) capsule 100 mg  100 mg Oral BID    polyethylene glycol (MIRALAX) packet 17 g  17 g Oral DAILY    sorbitoL 70 % solution 30 mL  30 mL Oral DAILY PRN    meropenem (MERREM) 1 g in 0.9% sodium chloride 20 mL IV syringe  1 g IntraVENous Q8H    bisacodyL (DULCOLAX) suppository 10 mg  10 mg Rectal DAILY PRN    hydrALAZINE (APRESOLINE) 20 mg/mL injection 20 mg  20 mg IntraVENous Q6H PRN    pantoprazole (PROTONIX) 40 mg in 0.9% sodium chloride 10 mL injection  40 mg IntraVENous DAILY    guaiFENesin ER (MUCINEX) tablet 600 mg  600 mg Oral Q12H    albuterol-ipratropium (DUO-NEB) 2.5 MG-0.5 MG/3 ML  3 mL Nebulization Q6H PRN    chlorthalidone (HYGROTON) tablet 25 mg  25 mg Oral DAILY    0.9% sodium chloride infusion  100 mL/hr IntraVENous CONTINUOUS    albuterol (PROVENTIL HFA, VENTOLIN HFA, PROAIR HFA) inhaler 2 Puff  2 Puff Inhalation Q6H PRN    ascorbic acid (vitamin C) (VITAMIN C) tablet 500 mg  500 mg Oral DAILY    atorvastatin (LIPITOR) tablet 40 mg  40 mg Oral DAILY    cholecalciferol (VITAMIN D3) (1000 Units /25 mcg) tablet 1,000 Units  1,000 Units Oral DAILY    diazePAM (VALIUM) tablet 5 mg  5 mg Oral Q6H PRN    dilTIAZem ER (CARDIZEM CD) capsule 120 mg  120 mg Oral DAILY    [Held by provider] apixaban (ELIQUIS) tablet 5 mg  5 mg Oral BID    potassium chloride SR (KLOR-CON 10) tablet 20 mEq  20 mEq Oral DAILY    sertraline (ZOLOFT) tablet 25 mg  25 mg Oral DAILY    traZODone (DESYREL) tablet 50 mg  50 mg Oral QHS    ondansetron (ZOFRAN) injection 4 mg  4 mg IntraVENous Q6H PRN    acetaminophen (TYLENOL) tablet 650 mg  650 mg Oral Q4H PRN    [Held by provider] 0.9% sodium chloride infusion  125 mL/hr IntraVENous CONTINUOUS    prochlorperazine (COMPAZINE) with saline injection 10 mg  10 mg IntraVENous Q6H PRN        Review of Systems  Review of Systems   Constitutional: Positive for malaise/fatigue. Negative for fever. HENT: Negative. Respiratory: Positive for shortness of breath. Negative for cough. Cardiovascular: Negative for chest pain and leg swelling. Gastrointestinal: Positive for abdominal pain. Negative for nausea and vomiting. Genitourinary: Negative. Musculoskeletal: Negative. Skin: Negative. Neurological: Negative. Psychiatric/Behavioral: Negative. Objective:     Visit Vitals  /85   Pulse 85   Temp 98.8 °F (37.1 °C)   Resp 17   Ht 5' 7\" (1.702 m)   Wt 102 kg (224 lb 13.9 oz)   SpO2 97%   BMI 35.22 kg/m²    O2 Flow Rate (L/min): 3 l/min O2 Device: Nasal cannula    Temp (24hrs), Av.6 °F (37 °C), Min:98 °F (36.7 °C), Max:98.9 °F (37.2 °C)      No intake/output data recorded.  1901 -  0700  In: 900 [I.V.:900]  Out: 600 [Urine:600]    Recent Results (from the past 24 hour(s))   GLUCOSE, POC    Collection Time: 21  7:37 AM   Result Value Ref Range    Glucose (POC) 82 65 - 117 mg/dL    Performed by NISH PETE    METABOLIC PANEL, COMPREHENSIVE    Collection Time: 21  9:12 AM   Result Value Ref Range    Sodium 138 136 - 145 mmol/L    Potassium 2.9 (L) 3.5 - 5.1 mmol/L    Chloride 101 97 - 108 mmol/L    CO2 33 (H) 21 - 32 mmol/L    Anion gap 4 (L) 5 - 15 mmol/L    Glucose 92 65 - 100 mg/dL    BUN 12 6 - 20 mg/dL    Creatinine 0.72 0.55 - 1.02 mg/dL    BUN/Creatinine ratio 17 12 - 20      GFR est AA >60 >60 ml/min/1.73m2    GFR est non-AA >60 >60 ml/min/1.73m2    Calcium 9.2 8.5 - 10.1 mg/dL    Bilirubin, total 0.6 0.2 - 1.0 mg/dL    AST (SGOT) 26 15 - 37 U/L    ALT (SGPT) 29 12 - 78 U/L    Alk.  phosphatase 342 (H) 45 - 117 U/L Protein, total 5.5 (L) 6.4 - 8.2 g/dL    Albumin 1.4 (L) 3.5 - 5.0 g/dL    Globulin 4.1 (H) 2.0 - 4.0 g/dL    A-G Ratio 0.3 (L) 1.1 - 2.2     LIPASE    Collection Time: 12/23/21  9:12 AM   Result Value Ref Range    Lipase 2,684 (H) 73 - 393 U/L   CBC WITH AUTOMATED DIFF    Collection Time: 12/23/21  9:12 AM   Result Value Ref Range    WBC 9.8 3.6 - 11.0 K/uL    RBC 3.43 (L) 3.80 - 5.20 M/uL    HGB 10.8 (L) 11.5 - 16.0 g/dL    HCT 32.8 (L) 35.0 - 47.0 %    MCV 95.6 80.0 - 99.0 FL    MCH 31.5 26.0 - 34.0 PG    MCHC 32.9 30.0 - 36.5 g/dL    RDW 13.2 11.5 - 14.5 %    PLATELET 036 163 - 842 K/uL    MPV 10.4 8.9 - 12.9 FL    NRBC 0.0 0.0  WBC    ABSOLUTE NRBC 0.00 0.00 - 0.01 K/uL    NEUTROPHILS 90 (H) 32 - 75 %    LYMPHOCYTES 5 (L) 12 - 49 %    MONOCYTES 4 (L) 5 - 13 %    EOSINOPHILS 0 0 - 7 %    BASOPHILS 0 0 - 1 %    IMMATURE GRANULOCYTES 1 (H) 0 - 0.5 %    ABS. NEUTROPHILS 8.8 (H) 1.8 - 8.0 K/UL    ABS. LYMPHOCYTES 0.4 (L) 0.8 - 3.5 K/UL    ABS. MONOCYTES 0.4 0.0 - 1.0 K/UL    ABS. EOSINOPHILS 0.0 0.0 - 0.4 K/UL    ABS. BASOPHILS 0.0 0.0 - 0.1 K/UL    ABS. IMM. GRANS. 0.1 (H) 0.00 - 0.04 K/UL    DF AUTOMATED     C REACTIVE PROTEIN, QT    Collection Time: 12/23/21  9:12 AM   Result Value Ref Range    C-Reactive protein 12.70 (H) 0.00 - 0.60 mg/dL   PROCALCITONIN    Collection Time: 12/23/21  9:12 AM   Result Value Ref Range    Procalcitonin 0.19 (H) 0 ng/mL   PROTHROMBIN TIME + INR    Collection Time: 12/23/21  9:12 AM   Result Value Ref Range    Prothrombin time 14.2 11.9 - 14.7 sec    INR 1.1 0.9 - 1.1     GLUCOSE, POC    Collection Time: 12/23/21 11:28 AM   Result Value Ref Range    Glucose (POC) 98 65 - 117 mg/dL    Performed by NISH PETE         CTA CHEST W OR W WO CONT   Final Result   No CT evidence for PE. Thoracic aorta atherosclerosis. CAD. Moderate to large bilateral pleural effusions with associated RML, RLL, and LLL   compressive atelectasis. Abdominal ascites. DUPLEX LOWER EXT VENOUS BILAT   Final Result      XR CHEST PORT   Final Result   FINDINGS: IMPRESSION: 2 frontal views of the chest. Right PICC distal tip SVC   region. Enlarging cardiomegaly. Increasing vascular congestion. Interval development of   hypoinflation, pleural effusions, lower lung airspace edema and/or pneumonia. No   pneumothorax. No free air under the diaphragm. CT ABD PELV W CONT   Final Result   New moderate volume dependent pleural effusions with associated   lower lobe lung compressive atelectasis. Increasing ascites, moderate approaching large-volume   (fluid could be sampled if there is any clinical concern for bile content). High suspicion hepatic metastases. Similar appearance for the pancreas; Silastic stent in place. No pseudocyst formation. Unchanged appearance bilateral adrenal glands. No bowel obstruction. US ABD LTD   Final Result   Small amount of free fluid. Finding suggesting hemangioma in the   liver. Gallbladder wall thickening, adenomyomatosis, sludge and gallstones. Cholecystitis possible. Finding in the region of the pancreatic head as above. Other findings as above. CTA ABDOMEN PELV W CONT   Final Result   1. Ill-defined hypodense mass in the pancreas. There are inflammatory changes   and fluid adjacent to the pancreas or duodenum and stomach most likely related   to biopsy or focal pancreatitis. No pseudocyst formation, active bleeding or   other acute findings. 2. Enlarged adrenal glands left greater than right with noncontrast densities   consistent with adrenal adenomas. 3. Right nephrectomy. 4. Other findings as described. XR CHEST PORT   Final Result   No acute findings. PHYSICAL EXAM:    Physical Exam  Vitals reviewed. Constitutional:       General: She is not in acute distress. Appearance: She is not ill-appearing. HENT:      Head: Normocephalic and atraumatic. Mouth/Throat:      Mouth: Mucous membranes are dry. Cardiovascular:      Rate and Rhythm: Normal rate and regular rhythm. Heart sounds: Normal heart sounds. Pulmonary:      Comments: Rhonchi noted bilateral without wheezes  Abdominal:      Comments: Firm with hypoactive bowel sounds and tenderness to palpation in all 4 quadrant   Musculoskeletal:         General: Normal range of motion. Cervical back: Normal range of motion and neck supple. Skin:     General: Skin is warm and dry. Neurological:      General: No focal deficit present. Mental Status: She is alert and oriented to person, place, and time. Mental status is at baseline. Psychiatric:         Mood and Affect: Mood normal.          Data Review    Recent Results (from the past 24 hour(s))   GLUCOSE, POC    Collection Time: 12/23/21  7:37 AM   Result Value Ref Range    Glucose (POC) 82 65 - 117 mg/dL    Performed by NISH PETE    METABOLIC PANEL, COMPREHENSIVE    Collection Time: 12/23/21  9:12 AM   Result Value Ref Range    Sodium 138 136 - 145 mmol/L    Potassium 2.9 (L) 3.5 - 5.1 mmol/L    Chloride 101 97 - 108 mmol/L    CO2 33 (H) 21 - 32 mmol/L    Anion gap 4 (L) 5 - 15 mmol/L    Glucose 92 65 - 100 mg/dL    BUN 12 6 - 20 mg/dL    Creatinine 0.72 0.55 - 1.02 mg/dL    BUN/Creatinine ratio 17 12 - 20      GFR est AA >60 >60 ml/min/1.73m2    GFR est non-AA >60 >60 ml/min/1.73m2    Calcium 9.2 8.5 - 10.1 mg/dL    Bilirubin, total 0.6 0.2 - 1.0 mg/dL    AST (SGOT) 26 15 - 37 U/L    ALT (SGPT) 29 12 - 78 U/L    Alk.  phosphatase 342 (H) 45 - 117 U/L    Protein, total 5.5 (L) 6.4 - 8.2 g/dL    Albumin 1.4 (L) 3.5 - 5.0 g/dL    Globulin 4.1 (H) 2.0 - 4.0 g/dL    A-G Ratio 0.3 (L) 1.1 - 2.2     LIPASE    Collection Time: 12/23/21  9:12 AM   Result Value Ref Range    Lipase 2,684 (H) 73 - 393 U/L   CBC WITH AUTOMATED DIFF    Collection Time: 12/23/21  9:12 AM   Result Value Ref Range    WBC 9.8 3.6 - 11.0 K/uL    RBC 3.43 (L) 3.80 - 5.20 M/uL    HGB 10.8 (L) 11.5 - 16.0 g/dL    HCT 32.8 (L) 35.0 - 47.0 %    MCV 95.6 80.0 - 99.0 FL    MCH 31.5 26.0 - 34.0 PG    MCHC 32.9 30.0 - 36.5 g/dL    RDW 13.2 11.5 - 14.5 %    PLATELET 946 180 - 584 K/uL    MPV 10.4 8.9 - 12.9 FL    NRBC 0.0 0.0  WBC    ABSOLUTE NRBC 0.00 0.00 - 0.01 K/uL    NEUTROPHILS 90 (H) 32 - 75 %    LYMPHOCYTES 5 (L) 12 - 49 %    MONOCYTES 4 (L) 5 - 13 %    EOSINOPHILS 0 0 - 7 %    BASOPHILS 0 0 - 1 %    IMMATURE GRANULOCYTES 1 (H) 0 - 0.5 %    ABS. NEUTROPHILS 8.8 (H) 1.8 - 8.0 K/UL    ABS. LYMPHOCYTES 0.4 (L) 0.8 - 3.5 K/UL    ABS. MONOCYTES 0.4 0.0 - 1.0 K/UL    ABS. EOSINOPHILS 0.0 0.0 - 0.4 K/UL    ABS. BASOPHILS 0.0 0.0 - 0.1 K/UL    ABS. IMM. GRANS. 0.1 (H) 0.00 - 0.04 K/UL    DF AUTOMATED     C REACTIVE PROTEIN, QT    Collection Time: 12/23/21  9:12 AM   Result Value Ref Range    C-Reactive protein 12.70 (H) 0.00 - 0.60 mg/dL   PROCALCITONIN    Collection Time: 12/23/21  9:12 AM   Result Value Ref Range    Procalcitonin 0.19 (H) 0 ng/mL   PROTHROMBIN TIME + INR    Collection Time: 12/23/21  9:12 AM   Result Value Ref Range    Prothrombin time 14.2 11.9 - 14.7 sec    INR 1.1 0.9 - 1.1     GLUCOSE, POC    Collection Time: 12/23/21 11:28 AM   Result Value Ref Range    Glucose (POC) 98 65 - 117 mg/dL    Performed by Abdulaziz Herrera         Assessment/Plan:     Principal Problem:    Pancreatitis (12/9/2021)    Active Problems:    A-fib ([facility] [address] (11/16/2020)      CHF (congestive heart failure) ([facility] [address] (11/16/2020)      Hematemesis (12/12/2021)      History of peptic ulcer disease (12/12/2021)      Hospital course:     This is a 61-year-old female with a history of atrial fibrillation, peptic ulcer disease, CHF, COPD, renal cell carcinoma stage IV status post nephrectomy, Gastrosoft reflux disease, COPD, Sojourn syndrome and essential hypertension who was presented to the emergency department for admission on 12/9/2021 after undergoing a EUS on 12 6 by Dr. Stevenson Molina with biopsy and subsequently developed severe pancreatitis. Patient's admission course has been complicated by ongoing pancreatitis and severe abdominal pain. CT scan of the abdomen pelvis revealed an ill-defined hypodense mass in the pancreas with fluid adjacent to the pancreas suggestive of focal pancreatitis. GI and oncology consulted. Patient had an episode of hematemesis as well. Patient remains on IV Protonix. Abdominal ultrasound also showed no hemodynamic drop in the liver. Gallbladder wall has been thickened with sludge and stones although cholecystitis is less likely at this point. Although continue to monitor closely. Patient was started on Zosyn and subsequently changed to meropenem as the patient developed leukocytosis. Patient also has elevation in her INR although had been receiving Eliquis. Patient started on p.o. diet although developed severe pain and have decreased her diet to clear liquid. General surgery following as well, Dr. Jacqueline Kruse. Lipase worsening CT abd/pelvis with PO and IV contrast      Pression\plan:    1. Acute pancreatitis  Status post EUS by Dr. Fred Saravia 12 6  Continue pain medication  Lipase trending up  Continue hydration  Consider TPN if diet fails  CT abd/pelvis    2. CHF  Continue diuretics    3. Pancreatic mass  CA 19-9 greater than 4000    4. Hypokalemia    5 COPD  Pulmonary consultation  hronic respiratory failure with 2 L of oxygen via nasal cannula at home    6. Leukocytosis  Started meropenem  Cultures negative    7. Supratherapeutic anticoagulation  INR normal   Vitamin K given  May be a side effect of using Eliquis recently and this may be an artificial INR. No obvious bleeding source  Hemoglobin stable    CODE STATUS: Full code    DVT prophylaxis: SCDs  Ulcer prophylaxis: Protonix    Discharge barriers: Improvement in overall pancreatitis and abdominal pain    Care Plan discussed with: Patient/Family    Total time spent with patient: >35 minutes.

## 2021-12-23 NOTE — PROGRESS NOTES
Problem: Falls - Risk of  Goal: *Absence of Falls  Description: Document Magui Godfrey Fall Risk and appropriate interventions in the flowsheet.   Outcome: Progressing Towards Goal  Note: Fall Risk Interventions:  Mobility Interventions: Bed/chair exit alarm         Medication Interventions: Bed/chair exit alarm    Elimination Interventions: Bed/chair exit alarm,Call light in reach    History of Falls Interventions: Bed/chair exit alarm

## 2021-12-23 NOTE — PROGRESS NOTES
Progress Note    Patient: Anne Macias MRN: [id number]  SSN: [id number]    YOB: 1945  Age: 78 y.o. Sex: female      Admit Date: 12/9/2021    LOS: 14 days     Subjective:   GI in consultation for Pancreatitis    Patient seen with complaints of increased abdominal pain on the left. She is tender to light palpation. Her abdomen is soft. Lipase increased today to 2,684 from 1,394 yesterday. She was on a full liquid diet but reports she really did not eat much. She was put back on clear liquid diet today. She did have a bowel movement yesterday. Recommend TPN if TPN not an option recommend post pyloric feeding tube on Monday. Lipase may remain elevated recommend control of symptoms. Recommend liver biopsy. CTA of chest 12/20/2021: IMPRESSION  No CT evidence for PE. Thoracic aorta atherosclerosis. CAD. Moderate to large bilateral pleural effusions with associated RML, RLL, and LLL, compressive atelectasis.  Abdominal ascites.     CT abdomen 12/17/21: IMPRESSION  New moderate volume dependent pleural effusions with associated  lower lobe lung compressive atelectasis. Increasing ascites, moderate approaching large-volume (fluid could be sampled if there is any clinical concern for bile content). High suspicion hepatic metastases. Similar appearance for the pancreas; Silastic stent in place.  No pseudocyst formation. Unchanged appearance bilateral adrenal glands. No bowel obstruction.     Abdominal Ultrasound 12/13/2021: IMPRESSION  Small amount of free fluid.  Finding suggesting hemangioma in the  liver.  Gallbladder wall thickening, adenomyomatosis, sludge and gallstones. Cholecystitis possible.  Finding in the region of the pancreatic head as above. Other findings as above.     12/9 GI consult note:  History of Present Illness: Nevin Cole is a 78 y. o. female who is seen in consultation for pancreatitis. Tiffanie Nay has a past medical history of  Paroxysmal Atrial Fibrillation, CHF, COPD, renal cell carcinoma Stage IV s/p nephrectomy, GERD sjoren's syndrome, hypertension, and depression. Patient under went EUS on 12/6/2021 showing 2.7 X3 cm lesion in the area of head of pancreas with dilation of the Pancreatic duct abutting the portal vein but not involving the SMA or AMV ; Tumor T2 by endosonographic criteria ; one small lymph node in the area of head of pancreas. Pathology shows rare cells present suspicious for malignancy.   She had an ERCP on 11/4 2021 ERCP; with sphincterotomy/papillotomy ERCP; with placement of endoscopic stent into biliary or pancreatic duct, including pre and postdilation and guide wire passage, when performed, including sphincterotomy, when performed, each stent. . Patient had a PET scan on 11/22/21 which shows a lesion in the pancreatic head consistent with malignancy. CTA of abdomen shows ill defined hypodense mass in the pancreas. There are inflammatory changes. Patient states she experienced nausea, vomiting and diarrhea since Monday. Her abdominal pain has progressively gotten worse. She reports a poor appetite. She does complain of increased \"burping\". Total bilirubin 1.4, AsT 36, ALT 31, Alk Phos. 217, Lipase > 3,000. Plan nothing by mouth except ice chips and sips of water with medication. IV hydration. Pain management     PET Scan 11/22/2021: IMPRESSION  1.  FDG avid lesion in the pancreatic head consistent with malignancy. 2.  Subcentimeter focus of FDG uptake in the liver, indeterminate. 3.  FDG uptake associated with density expanding the right facet at C5-C6,  possibly due to an ectatic artery.     EUS on 12/6/2021 showing 2.7 X3 cm lesion in the area of head of pancreas with dilation of the Pancreatic duct abutting the portal vein but not involving the SMA or AMV ; Tumor T2 by endosonographic criteria ; one small lymph node in the area of head of pancreas.     CTA abdomen 12/9/2021: IMPRESSION  1. Ill-defined hypodense mass in the pancreas.  There are inflammatory changes and fluid adjacent to the pancreas or duodenum and stomach most likely related to biopsy or focal pancreatitis. No pseudocyst formation, active bleeding or other acute findings. 2. Enlarged adrenal glands left greater than right with noncontrast densities  consistent with adrenal adenomas. 3. Right nephrectomy. 4. Other findings as described.            Objective:     Vitals:    12/23/21 0049 12/23/21 0752 12/23/21 0832 12/23/21 1438   BP: 124/60 113/62  134/85   Pulse: 85 91  85   Resp: 16 18  17   Temp: 98.9 °F (37.2 °C) 98 °F (36.7 °C)  98.8 °F (37.1 °C)   SpO2:   98% 97%   Weight:       Height:            Intake and Output:  Current Shift: No intake/output data recorded. Last three shifts: 12/21 1901 - 12/23 0700  In: 900 [I.V.:900]  Out: 600 [Urine:600]    Physical Exam:   Skin:  Extremities and face reveal no rashes. No chapin erythema. HEENT: Sclerae anicteric. Extra-occular muscles are intact. No abnormal pigmentation of the lips. The neck is supple. Cardiovascular: Regular rate and rhythm. Respiratory:  Comfortable breathing with no accessory muscle use. GI:  Abdomen nondistended, soft, and nontender. No enlargement of the liver or spleen. No masses palpable. Rectal:  Deferred  Musculoskeletal: Extremities have good range of motion. Neurological:  Gross memory appears intact. Patient is alert and oriented. Psychiatric:  Mood appears appropriate with judgement intact.   Lymphatic:  No visible adenopathy      Lab/Data Review:  Recent Results (from the past 24 hour(s))   GLUCOSE, POC    Collection Time: 12/23/21  7:37 AM   Result Value Ref Range    Glucose (POC) 82 65 - 117 mg/dL    Performed by [address] [place] [address] Dr [facility]    Collection Time: 12/23/21  9:12 AM   Result Value Ref Range    Sodium 138 136 - 145 mmol/L    Potassium 2.9 (L) 3.5 - 5.1 mmol/L    Chloride 101 97 - 108 mmol/L    CO2 33 (H) 21 - 32 mmol/L    Anion gap 4 (L) 5 - 15 mmol/L Glucose 92 65 - 100 mg/dL    BUN 12 6 - 20 mg/dL    Creatinine 0.72 0.55 - 1.02 mg/dL    BUN/Creatinine ratio 17 12 - 20      GFR est AA >60 >60 ml/min/1.73m2    GFR est non-AA >60 >60 ml/min/1.73m2    Calcium 9.2 8.5 - 10.1 mg/dL    Bilirubin, total 0.6 0.2 - 1.0 mg/dL    AST (SGOT) 26 15 - 37 U/L    ALT (SGPT) 29 12 - 78 U/L    Alk. phosphatase 342 (H) 45 - 117 U/L    Protein, total 5.5 (L) 6.4 - 8.2 g/dL    Albumin 1.4 (L) 3.5 - 5.0 g/dL    Globulin 4.1 (H) 2.0 - 4.0 g/dL    A-G Ratio 0.3 (L) 1.1 - 2.2     LIPASE    Collection Time: 12/23/21  9:12 AM   Result Value Ref Range    Lipase 2,684 (H) 73 - 393 U/L   CBC WITH AUTOMATED DIFF    Collection Time: 12/23/21  9:12 AM   Result Value Ref Range    WBC 9.8 3.6 - 11.0 K/uL    RBC 3.43 (L) 3.80 - 5.20 M/uL    HGB 10.8 (L) 11.5 - 16.0 g/dL    HCT 32.8 (L) 35.0 - 47.0 %    MCV 95.6 80.0 - 99.0 FL    MCH 31.5 26.0 - 34.0 PG    MCHC 32.9 30.0 - 36.5 g/dL    RDW 13.2 11.5 - 14.5 %    PLATELET 943 629 - 693 K/uL    MPV 10.4 8.9 - 12.9 FL    NRBC 0.0 0.0  WBC    ABSOLUTE NRBC 0.00 0.00 - 0.01 K/uL    NEUTROPHILS 90 (H) 32 - 75 %    LYMPHOCYTES 5 (L) 12 - 49 %    MONOCYTES 4 (L) 5 - 13 %    EOSINOPHILS 0 0 - 7 %    BASOPHILS 0 0 - 1 %    IMMATURE GRANULOCYTES 1 (H) 0 - 0.5 %    ABS. NEUTROPHILS 8.8 (H) 1.8 - 8.0 K/UL    ABS. LYMPHOCYTES 0.4 (L) 0.8 - 3.5 K/UL    ABS. MONOCYTES 0.4 0.0 - 1.0 K/UL    ABS. EOSINOPHILS 0.0 0.0 - 0.4 K/UL    ABS. BASOPHILS 0.0 0.0 - 0.1 K/UL    ABS. IMM.  GRANS. 0.1 (H) 0.00 - 0.04 K/UL    DF AUTOMATED     C REACTIVE PROTEIN, QT    Collection Time: 12/23/21  9:12 AM   Result Value Ref Range    C-Reactive protein 12.70 (H) 0.00 - 0.60 mg/dL   PROCALCITONIN    Collection Time: 12/23/21  9:12 AM   Result Value Ref Range    Procalcitonin 0.19 (H) 0 ng/mL   PROTHROMBIN TIME + INR    Collection Time: 12/23/21  9:12 AM   Result Value Ref Range    Prothrombin time 14.2 11.9 - 14.7 sec    INR 1.1 0.9 - 1.1     GLUCOSE, POC    Collection Time: 12/23/21 11:28 AM   Result Value Ref Range    Glucose (POC) 98 65 - 117 mg/dL    Performed by NISH PETE               CTA CHEST W OR W WO CONT   Final Result   No CT evidence for PE. Thoracic aorta atherosclerosis. CAD. Moderate to large bilateral pleural effusions with associated RML, RLL, and LLL   compressive atelectasis. Abdominal ascites. DUPLEX LOWER EXT VENOUS BILAT   Final Result      XR CHEST PORT   Final Result   FINDINGS: IMPRESSION: 2 frontal views of the chest. Right PICC distal tip SVC   region. Enlarging cardiomegaly. Increasing vascular congestion. Interval development of   hypoinflation, pleural effusions, lower lung airspace edema and/or pneumonia. No   pneumothorax. No free air under the diaphragm. CT ABD PELV W CONT   Final Result   New moderate volume dependent pleural effusions with associated   lower lobe lung compressive atelectasis. Increasing ascites, moderate approaching large-volume   (fluid could be sampled if there is any clinical concern for bile content). High suspicion hepatic metastases. Similar appearance for the pancreas; Silastic stent in place. No pseudocyst formation. Unchanged appearance bilateral adrenal glands. No bowel obstruction. US ABD LTD   Final Result   Small amount of free fluid. Finding suggesting hemangioma in the   liver. Gallbladder wall thickening, adenomyomatosis, sludge and gallstones. Cholecystitis possible. Finding in the region of the pancreatic head as above. Other findings as above. CTA ABDOMEN PELV W CONT   Final Result   1. Ill-defined hypodense mass in the pancreas. There are inflammatory changes   and fluid adjacent to the pancreas or duodenum and stomach most likely related   to biopsy or focal pancreatitis. No pseudocyst formation, active bleeding or   other acute findings.    2. Enlarged adrenal glands left greater than right with noncontrast densities   consistent with adrenal adenomas. 3. Right nephrectomy. 4. Other findings as described. XR CHEST PORT   Final Result   No acute findings. Assessment:     Principal Problem:    Pancreatitis (12/9/2021)    Active Problems:    A-fib ([facility] [address] (11/16/2020)      CHF (congestive heart failure) ([facility] [address] (11/16/2020)      Hematemesis (12/12/2021)      History of peptic ulcer disease (12/12/2021)        Plan:   1. Pancreatitis      -clear Liquid diet      -IV hydration @ 100 ml/hr      -Lipase 2,684 this am      -repeat Lipase in the am       -Dilaudid 1 mg every  4 hours as needed for pain       -Oxycodone 5 mg every 6 hours for breakthrough pain        -Recommend TPN if TPN not an option recommend post pyloric feeding tube that could be placed on Monday. 2.CHF      -Started on Chlorthalidone  3. COPD       -Continue home medications       -Albuterol as needed   4. Pancreatic Mass      - > 4000      -S/p fine needle aspiration suspicious for neoplasia but not diagnostic      -When stable Oncology plan for out patient followup with advanced oncology surgeon  At  or [facility] [place] for resection considerations       -CT concern for liver metastais/lesions        - IR for liver biopsy possibly  5. Hematemesis (resolved)      -Monitor H&H      -Transfuse as needed      -hgB 12.3      -Continue Protonix 40 mg daily  6. Afib      -Rate is controlled      -eliquis  BID/ HgB stable at 12.5       -no further hematemesis reported    Thank you for allowing me to participate in this patients care. Plan discussed with Dr. Preeti Sagastume and he approves.     Signed By: Rodolfo Campbell NP     December 23, 2021

## 2021-12-23 NOTE — PROGRESS NOTES
Progress Note    Patient: Ida Miller MRN: [id number]  SSN: [id number]    YOB: 1945  Age: 78 y.o. Sex: female      Admit Date: 12/9/2021    LOS: 14 days     Subjective:   Patient followed for severe pancreatitis with worsening leukocytosis on Zosyn. She was switched to Meropenem because of pancreatitis. She remains afebrile. WBC now normal and CRP decreasing. Patient still complaining of abdominal pain, mostly LUQ radiating to her left shoulder. She is preparing to get another CT Abdomen. Objective:     Vitals:    12/22/21 2136 12/23/21 0049 12/23/21 0752 12/23/21 0832   BP:  124/60 113/62    Pulse:  85 91    Resp:  16 18    Temp:  98.9 °F (37.2 °C) 98 °F (36.7 °C)    SpO2: 98%   98%   Weight:       Height:            Intake and Output:  Current Shift: No intake/output data recorded. Last three shifts: 12/21 1901 - 12/23 0700  In: 900 [I.V.:900]  Out: 600 [Urine:600]    Physical Exam:    Vitals and nursing note reviewed. Constitutional:       General: She is not in acute distress. Appearance: She is obese. She is ill-appearing. Abdominal:      General: Abdomen is flat. Bowel sounds are normal. There is distension. Palpations: Abdomen is soft. Tenderness: There is abdominal tenderness (LUQ). There is no guarding or rebound. Genitourinary:  Vaginal area not examined     Comments: External urinary device  Musculoskeletal:      Right lower leg: No edema. Left lower leg: No edema. Comments: PICC line right upper arm   Skin: multiple ecchymoses on the upper extremities     Findings: No rash. Comments: ?Stage 2 sacral wound   Neurological:      General: No focal deficit present. Mental Status: She is alert and oriented to person, place, and time. Psychiatric:         Mood and Affect: Mood normal.         Behavior: Behavior normal.         Thought Content:  Thought content normal.         Judgment: Judgment normal.     Lab/Data Review:     WBC 9,800 <11,500  Urinalysis with WBC 10-20, Bacteria negative  Lipase 2,684 < 1,397 <1,719    Procal  0.25 < 0.14  CRP 12.70 <15.00 < 23.80    Blood cultures (12/20) No growth 2 days  Urine culture (12/20) No growth FINAL    CT Chest (12/20)  No CT evidence for PE. Moderate to large bilateral pleural effusions with associated RML, RLL, and LLL compressive atelectasis. Abdominal ascites. Assessment:     Principal Problem:    Pancreatitis (12/9/2021)    Active Problems:    A-fib ([facility] [address] (11/16/2020)      CHF (congestive heart failure) ([facility] [address] (11/16/2020)      Hematemesis (12/12/2021)      History of peptic ulcer disease (12/12/2021)    1. Severe pancreatitis with markedly elevated lipase, resolving  2. Pancreatic mass, possible neoplasm  3. Biliary stent  4. Sepsis with worsening leukocytosis with elevated procal and CRP, resolving, Day #4 IV Meropenem  5. TPN (just started)  6. Moderate pyuria, negative bacteria, urine culture negative  7. Possible candida superinfection with vaginitis    Comment:  WBC now normal with decreasing CRP after switching to Meropenem. Lipase increasing today. Plan:   1. Continue IV Meropenem for 10 more days  2. In am, repeat CBC, procal and CRP; follow-up serum fungitell  3. Follow-up blood cultures   4.  She is scheduled for another CT Abdomen    Signed By: Carlene Hampton MD     December 23, 2021

## 2021-12-24 NOTE — PROGRESS NOTES
Problem: Falls - Risk of  Goal: *Absence of Falls  Description: Document Kathy Gonzalez Fall Risk and appropriate interventions in the flowsheet. Outcome: Progressing Towards Goal  Note: Fall Risk Interventions:  Mobility Interventions: Bed/chair exit alarm,OT consult for ADLs,Patient to call before getting OOB,PT Consult for mobility concerns    Mentation Interventions: Bed/chair exit alarm,Adequate sleep, hydration, pain control    Medication Interventions: Bed/chair exit alarm,Patient to call before getting OOB,Teach patient to arise slowly    Elimination Interventions: Bed/chair exit alarm,Call light in reach    History of Falls Interventions: Bed/chair exit alarm         Problem: Pain  Goal: *Control of Pain  Outcome: Progressing Towards Goal     Problem: Pressure Injury - Risk of  Goal: *Prevention of pressure injury  Description: Document Asim Scale and appropriate interventions in the flowsheet. Outcome: Progressing Towards Goal  Note: Pressure Injury Interventions:  Sensory Interventions: Assess changes in LOC,Keep linens dry and wrinkle-free,Minimize linen layers    Moisture Interventions: Minimize layers,Internal/External urinary devices    Activity Interventions: Increase time out of bed,PT/OT evaluation    Mobility Interventions: HOB 30 degrees or less,Pressure redistribution bed/mattress (bed type),Turn and reposition approx.  every two hours(pillow and wedges)    Nutrition Interventions: Document food/fluid/supplement intake    Friction and Shear Interventions: HOB 30 degrees or less,Minimize layers

## 2021-12-24 NOTE — PROGRESS NOTES
Progress Note    Patient: Rafi Kennedy MRN: [id number]  SSN: [id number]    YOB: 1945  Age: 78 y.o. Sex: female      Admit Date: 12/9/2021    LOS: 15 days     Subjective:   Patient followed for severe pancreatitis with worsening leukocytosis on Zosyn. She was switched to Meropenem because of pancreatitis. She remains afebrile. WBC increased today along with procal and CRP. Repeat CT Abdomen showed increasing ascites. Patient still complaining of abdominal pain, mostly LUQ radiating to her left shoulder. Objective:     Vitals:    12/23/21 1928 12/24/21 0329 12/24/21 0715 12/24/21 0923   BP: (!) 141/60 (!) 146/66 135/67    Pulse: 98 92 97    Resp: 18 18 17    Temp: 98.5 °F (36.9 °C) 98.4 °F (36.9 °C) 97.9 °F (36.6 °C)    SpO2: 97% 96% 96% 96%   Weight:       Height:            Intake and Output:  Current Shift: 12/24 0701 - 12/24 1900  In: -   Out: 300 [Urine:300]  Last three shifts: 12/22 1901 - 12/24 0700  In: 2610 [P.O.:490; I.V.:2120]  Out: 1400 [Urine:1400]    Physical Exam:     Vitals and nursing note reviewed. Constitutional:       General: She is not in acute distress. Appearance: She is obese. She is ill-appearing. Abdominal:      General: Abdomen is flat. Bowel sounds are normal. There is distension. Palpations: Abdomen is soft. Tenderness: There is abdominal tenderness (LUQ). There is no guarding or rebound. Genitourinary:  Vaginal area not examined     Comments: External urinary device  Musculoskeletal:      Right lower leg: No edema. Left lower leg: No edema. Comments: PICC line right upper arm   Skin: multiple ecchymoses on the upper extremities     Findings: No rash. Comments: ?Stage 2 sacral wound   Neurological:      General: No focal deficit present. Mental Status: She is alert and oriented to person, place, and time.    Psychiatric:         Mood and Affect: Mood normal.         Behavior: Behavior normal.         Thought Content: Thought content normal.         Judgment: Judgment normal.     Lab/Data Review:     WBC 11,600 <9,800 <11,500  Urinalysis with WBC 10-20, Bacteria negative  Lipase 2,684 < 1,397 <1,719    Procal  0.21 <0.19 <0.25 < 0.14  CRP 14.20 <12.70 <15.00 < 23.80    Blood cultures (12/20) No growth 3 days  Urine culture (12/20) No growth FINAL     CT Abdomen/Pelvis (12/23)  1. There are increasing bilateral pleural effusions, increasing body wall  edema, and increasing ascites. These findings could be secondary to the third  spacing of fluids. The differential diagnosis for the ascites would include  pancreatic ascites with acute pancreatitis or metastatic disease to the  peritoneum. 2.  There is a biliary stent which is unchanged in its position traversing  throughout area of obstruction within the pancreatic head. 3.  There are multiple low-density lesions of the liver without short-term  interval changes consistent with metastatic disease. 4.  The right kidney is not identified and apparently the patient is status post  a previous right nephrectomy. 5.  There are bilateral adrenal masses suspect for metastatic disease without  short-term interval changes. Assessment:     Principal Problem:    Pancreatitis (12/9/2021)    Active Problems:    A-fib ([address] (11/16/2020)      CHF (congestive heart failure) ([facility] [address] (11/16/2020)      Hematemesis (12/12/2021)      History of peptic ulcer disease (12/12/2021)    1. Severe pancreatitis with markedly elevated lipase, resolving  2. Pancreatic mass, possible neoplasm  3. Biliary stent  4. Sepsis with worsening leukocytosis with elevated procal and CRP, resolving, Day #5 IV Meropenem  5. TPN (just started)  6. Moderate pyuria, negative bacteria, urine culture negative  7. Possible candida superinfection with vaginitis    Comment:  Nothing on CT Abdomen to explain her worsening pain, except perhaps for increasing ascites. Unclear if therapeutic paracentesis would be in order.   WBC, CRP and procal all increasing today along with lipase. Plan:   1. Continue IV Meropenem for 9 more days  2. In am, repeat CBC, procal and CRP; follow-up serum fungitell  3. Follow-up blood cultures   4.  ?Paracentesis     Signed By: Darrius Castanon MD     December 24, 2021

## 2021-12-24 NOTE — PROGRESS NOTES
Progress Note    Patient: Olvin Jean Baptiste MRN: [id number]  SSN: [id number]    YOB: 1945  Age: 78 y.o. Sex: female      Admit Date: 12/9/2021    LOS: 15 days     Subjective:   GI in consultation for Pancreatitis    Patient seen resting at this time. She reports her abdominal pain is improved with the dilaudid. Her morning labs are pending at this time. CT of abdomen on 12/23/21 shows increasing bilateral pleural effusions, increasing body wall edema, and increasing ascites. Recommend TPN if TPN not an option recommend post pyloric feeding tube on Monday. Lipase may remain elevated recommend control of symptoms. Recommend liver biopsy. Recommend consult for possible paracentesis.       CT abdomen/pelvis 12/23/2021: IMPRESSION  1. There are increasing bilateral pleural effusions, increasing body wall  edema, and increasing ascites. These findings could be secondary to the third  spacing of fluids. The differential diagnosis for the ascites would include  pancreatic ascites with acute pancreatitis or metastatic disease to the  peritoneum. 2.  There is a biliary stent which is unchanged in its position traversing throughout area of obstruction within the pancreatic head. 3.  There are multiple low-density lesions of the liver without short-term  interval changes consistent with metastatic disease. 4.  The right kidney is not identified and apparently the patient is status post  a previous right nephrectomy. 5.  There are bilateral adrenal masses suspect for metastatic disease without  short-term interval changes.       Objective:     Vitals:    12/23/21 1928 12/24/21 0329 12/24/21 0715 12/24/21 0923   BP: (!) 141/60 (!) 146/66 135/67    Pulse: 98 92 97    Resp: 18 18 17    Temp: 98.5 °F (36.9 °C) 98.4 °F (36.9 °C) 97.9 °F (36.6 °C)    SpO2: 97% 96% 96% 96%   Weight:       Height:            Intake and Output:  Current Shift: 12/24 0701 - 12/24 1900  In: -   Out: 300 [Urine:300]  Last three shifts: 12/22 1901 - 12/24 0700  In: 2610 [P.O.:490; I.V.:2120]  Out: 1400 [Urine:1400]    Physical Exam:   Skin:  Extremities and face reveal no rashes. No chapin erythema. HEENT: Sclerae anicteric. Extra-occular muscles are intact. No abnormal pigmentation of the lips. The neck is supple. Cardiovascular: Regular rate and rhythm. Respiratory:  Comfortable breathing with no accessory muscle use. GI:  Abdomen nondistended, soft, and +tender. No enlargement of the liver or spleen. No masses palpable. Rectal:  Deferred  Musculoskeletal: generalized weakness  Neurological:  Gross memory appears intact. Patient is alert and oriented. Psychiatric:  Mood appears appropriate with judgement intact. Lymphatic:  No visible adenopathy      Lab/Data Review:  Recent Results (from the past 24 hour(s))   GLUCOSE, POC    Collection Time: 12/24/21  8:40 AM   Result Value Ref Range    Glucose (POC) 83 65 - 117 mg/dL    Performed by Belen Finn    GLUCOSE, POC    Collection Time: 12/24/21 11:26 AM   Result Value Ref Range    Glucose (POC) 82 65 - 117 mg/dL    Performed by [facility]               CT ABD PELV W CONT   Final Result   1. There are increasing bilateral pleural effusions, increasing body wall   edema, and increasing ascites. These findings could be secondary to the third   spacing of fluids. The differential diagnosis for the ascites would include   pancreatic ascites with acute pancreatitis or metastatic disease to the   peritoneum. 2.  There is a biliary stent which is unchanged in its position traversing   throughout area of obstruction within the pancreatic head. 3.  There are multiple low-density lesions of the liver without short-term   interval changes consistent with metastatic disease. 4.  The right kidney is not identified and apparently the patient is status post   a previous right nephrectomy.    5.  There are bilateral adrenal masses suspect for metastatic disease without   short-term interval changes. CTA CHEST W OR W WO CONT   Final Result   No CT evidence for PE. Thoracic aorta atherosclerosis. CAD. Moderate to large bilateral pleural effusions with associated RML, RLL, and LLL   compressive atelectasis. Abdominal ascites. DUPLEX LOWER EXT VENOUS BILAT   Final Result      XR CHEST PORT   Final Result   FINDINGS: IMPRESSION: 2 frontal views of the chest. Right PICC distal tip SVC   region. Enlarging cardiomegaly. Increasing vascular congestion. Interval development of   hypoinflation, pleural effusions, lower lung airspace edema and/or pneumonia. No   pneumothorax. No free air under the diaphragm. CT ABD PELV W CONT   Final Result   New moderate volume dependent pleural effusions with associated   lower lobe lung compressive atelectasis. Increasing ascites, moderate approaching large-volume   (fluid could be sampled if there is any clinical concern for bile content). High suspicion hepatic metastases. Similar appearance for the pancreas; Silastic stent in place. No pseudocyst formation. Unchanged appearance bilateral adrenal glands. No bowel obstruction. US ABD LTD   Final Result   Small amount of free fluid. Finding suggesting hemangioma in the   liver. Gallbladder wall thickening, adenomyomatosis, sludge and gallstones. Cholecystitis possible. Finding in the region of the pancreatic head as above. Other findings as above. CTA ABDOMEN PELV W CONT   Final Result   1. Ill-defined hypodense mass in the pancreas. There are inflammatory changes   and fluid adjacent to the pancreas or duodenum and stomach most likely related   to biopsy or focal pancreatitis. No pseudocyst formation, active bleeding or   other acute findings. 2. Enlarged adrenal glands left greater than right with noncontrast densities   consistent with adrenal adenomas. 3. Right nephrectomy.    4. Other findings as described. XR CHEST PORT   Final Result   No acute findings. Assessment:     Principal Problem:    Pancreatitis (12/9/2021)    Active Problems:    A-fib ([address] (11/16/2020)      CHF (congestive heart failure) ([facility] [address] (11/16/2020)      Hematemesis (12/12/2021)      History of peptic ulcer disease (12/12/2021)        Plan:   1. Pancreatitis      -clear Liquid diet      -IV hydration @ 100 ml/hr      -Lipase 2,684 on 12/23/21       -Am labs pending at time of exam      -repeat Lipase in the am       -Dilaudid 1 mg every  4 hours as needed for pain       -Oxycodone 5 mg every 6 hours for breakthrough pain        -Recommend TPN if TPN not an option recommend post pyloric feeding tube that could be placed on Monday. -Recommend consult IR for possible paracentesis  2. CHF      -Started on Chlorthalidone  3. COPD       -Continue home medications       -Albuterol as needed   4. Pancreatic Mass      - > 4000      -S/p fine needle aspiration suspicious for neoplasia but not diagnostic      -When stable Oncology plan for out patient followup with advanced [place]ology [place] [facility] [place]       -CT concern for liver metastais/lesions        - IR for liver biopsy possibly  5. Hematemesis (resolved)      -Monitor H&H      -Transfuse as needed      -hgB 10.8 on 12/23/21, am labs pending at this time.      -Continue Protonix 40 mg daily  6. Afib      -Rate is controlled      -eliquis  BID/ HgB stable at 12.5       -no further hematemesis reported    Thank you for allowing me to participate in this patients care. Plan discussed with Dr. Yi Kim and he approves.     Signed By: Mykel Maldonado NP     December 24, 2021

## 2021-12-24 NOTE — WOUND CARE
IP WOUND CONSULT    [address] [facility] RECORD NUMBER:  [id number]  AGE: 78 y.o. GENDER: female  : 1945  TODAY'S DATE:  2021    GENERAL     [] Follow-up   [x] New Consult    Jose Martin Hazel is a 78 y.o. female referred by:   [] Physician  [x] Nursing  [] Other:         PAST MEDICAL HISTORY    Past Medical History:   Diagnosis Date    A-fib [facility])     [facility] [address]     Clear cell renal cell carcinoma [facility] [address]     COPD (chronic obstructive pulmonary disease) ([facility])     Fibromyalgia     GERD (gastroesophageal reflux disease)     Lymphedema     Sjogren's syndrome ([facility] [address]     Thyroid nodule         PAST SURGICAL HISTORY    Past Surgical History:   Procedure Laterality Date    COLONOSCOPY N/A 2020    COLONOSCOPY performed by Julianna Pedroza MD at [address] [facility] HX GI      EGD    HX HYSTERECTOMY      HX NEPHRECTOMY Right 2019    HX ORTHOPAEDIC      ankle       FAMILY HISTORY    Family History   Problem Relation Age of Onset    Hypertension Mother     Stroke Mother     Stroke Father     Hypertension Father          ALLERGIES    Allergies   Allergen Reactions    Adhesive Tape-Silicones Atopic Dermatitis       MEDICATIONS    No current facility-administered medications on file prior to encounter. Current Outpatient Medications on File Prior to Encounter   Medication Sig Dispense Refill    albuterol (PROVENTIL HFA, VENTOLIN HFA, PROAIR HFA) 90 mcg/actuation inhaler Take 2 Puffs by inhalation every six (6) hours as needed for Wheezing.  pantoprazole (PROTONIX) 40 mg tablet Take 40 mg by mouth daily.  sertraline (Zoloft) 25 mg tablet Take 25 mg by mouth daily.  Eliquis 5 mg tablet Take 5 mg by mouth two (2) times a day.  traZODone (DESYREL) 50 mg tablet TAKE ONE TABLET BY MOUTH AT BEDTIME      budesonide-formoteroL (Symbicort) 80-4.5 mcg/actuation HFAA Take 2 Puffs by inhalation.       potassium chloride SR (KLOR-CON 10) 10 mEq tablet Take 20 mEq by mouth daily.  bumetanide (BUMEX) 1 mg tablet Take 1 mg by mouth daily.  dilTIAZem ER (DILACOR XR) 120 mg capsule Take 120 mg by mouth daily.  atorvastatin (LIPITOR) 40 mg tablet Take 40 mg by mouth daily.  ascorbic acid, vitamin C, (Vitamin C) 500 mg tablet Take 500 mg by mouth daily.  cholecalciferol (Vitamin D3) (1000 Units /25 mcg) tablet Take 1,000 Units by mouth daily.  chlorthalidone (HYGROTON) 25 mg tablet Take 25 mg by mouth daily.  Se-Tan Plus 162-115. 2-1 mg cap Take 1 Capsule by mouth two (2) times a day.  diazePAM (VALIUM) 5 mg tablet Take 5 mg by mouth every six (6) hours as needed for Anxiety.  famotidine (PEPCID) 40 mg tablet Take 40 mg by mouth daily.  (Patient not taking: Reported on 12/6/2021)           [unfilled]  Visit Vitals  /67   Pulse 97   Temp 97.9 °F (36.6 °C)   Resp 17   Ht 5' 7\" (1.702 m)   Wt 102 kg (224 lb 13.9 oz)   SpO2 96%   BMI 35.22 kg/m²       ASSESSMENT     Wound Identification & Type: skin tear to inner right buttock  Dressing change: zinc paste   Verbal consent for picture: yes     Contributing Factors: anticoagulation therapy, decreased mobility, shear force, incontinence of urine, obesity and smoking    Wound Sacrum (Active)   Number of days:        Wound Buttocks Right;Medial Partial Thickness 12/24/21 (Active)   Wound Image   12/24/21 1214   Wound Etiology Skin Tear 12/24/21 1214   Cleansed Other (Comment) 12/24/21 1214   Dressing/Treatment Zinc paste 12/24/21 1214   Wound Assessment Dry;[place]/red;Superficial 12/24/21 1214   Drainage Amount None 12/24/21 1214   Wound Odor None 12/24/21 1214   Rachel-Wound/Incision Assessment Intact 12/24/21 1214   Edges Flat/open edges 12/24/21 1214   Wound Thickness Description Partial thickness 12/24/21 1214   Number of days: 0          PLAN     Skin Care & Pressure Relief Recommendations  Speciality bed Waffle Overlay  Minimize layers of linen  Turn/reposition approximately every 2 hours  Pillow wedges  Manage incontinence   Promote continence; Skin Protective lotion/cream to buttocks and sacrum daily and as needed with incontinence care  Offload heels pillows    Asim 16  Blood Glucose: 98 on 12/23/21                              Albumin: 1.4 on 12/23/21  WBCs: 9.8 on 12/23/21    Support Surface: currently on a waffle overlay. Patient stated she \"loves it\" and is very comfortable. Physician/Provider notified:   Recommendations: Superficial skin tear noted to right inner buttock. Patient was unable to tolerate fully laying on right side for assessment of sacrum, but no erythema noted to sacral area that was visualized. Patient complaining of pain in left abdomen and inability to breath when laying down for a long period. She is able to reposition herself with minimal 1-person assistance. Maintain the PureWick to manage  incontinence. Apply zinc paste TID and prn for soiling to buttocks and coccyx/sacrum to protect skin from incontinence related breakdown. Use foam wedge to turn q2h at 30 degree angle or more to offload sacrum. Float heels with 2-3 pillows while in bed for offloading of the heels. Maintain HOB at 30 degrees or less, if not contraindicated, to reduce pressure to buttocks and sacrum. Raise foot of bed to help prevent friction and shear injury from sliding down in the bed.        Discharge Wound Care Needs: TBD    Teaching completed with:   [] Patient           [] Family member       [] Caregiver          [] Nursing  [] Other    Patient/Caregiver Teaching:  Level of patient/caregiver understanding able to:   [] Indicates understanding       [] Needs reinforcement  [] Unsuccessful      [] Verbal Understanding  [] Demonstrated understanding       [] No evidence of learning  [] Refused teaching         [] N/A       Electronically signed by Maya Stiles RN on 12/24/2021 at 12:19 PM

## 2021-12-24 NOTE — PROGRESS NOTES
Hospitalist Progress Note    Subjective:   Daily Progress Note: 12/24/2021 12:21 PM    Hospital Course:  37-year-old female with a history of atrial fibrillation, peptic ulcer disease, CHF, COPD, renal cell carcinoma stage IV status post nephrectomy, GERD, COPD, Sojourn syndrome and essential hypertension who was presented to the emergency department for admission on 12/9/2021 after undergoing a EUS on 12/06 by Dr. Scottie Thapa with biopsy and subsequently developed severe pancreatitis. Patient's admission course has been complicated by ongoing pancreatitis and severe abdominal pain. CT scan of the abdomen pelvis revealed an ill-defined hypodense mass in the pancreas with fluid adjacent to the pancreas suggestive of focal pancreatitis. GI and oncology consulted. Patient had an episode of hematemesis as well. Patient remains on IV Protonix. Abdominal ultrasound also showed no hemodynamic drop in the liver. Gallbladder wall has been thickened with sludge and stones although cholecystitis is less likely at this point. Although continue to monitor closely. Patient was started on Zosyn and subsequently changed to meropenem as the patient developed leukocytosis. Patient also has elevation in her INR although had been receiving Eliquis. Patient started on p.o. diet although developed severe pain and have decreased her diet to clear liquid. General surgery following as well, Dr. Dany Lopez. Lipase worsening. CT abd/pelvis with PO and IV contrast showing increasing bilateral pleural effusion and increasing ascites. Also notes multiple low-density lesions of liver consistent with metastatic disease and bilateral adrenal masses suspected for metastatic disease. IR consulted for liver biopsy. Subjective:    Patient seen and examined at bedside. She reports pain \"all over. \"   Reports intercostal muscle spasms.      Current Facility-Administered Medications   Medication Dose Route Frequency    potassium chloride (KLOR-CON) packet for solution 40 mEq  40 mEq Oral NOW    zinc oxide-white petrolatum 17-57 % topical paste   Topical TID    HYDROmorphone (DILAUDID) injection 1 mg  1 mg IntraVENous Q4H PRN    oxyCODONE IR (ROXICODONE) tablet 5 mg  5 mg Oral Q6H PRN    nystatin (MYCOSTATIN) 100,000 unit/gram cream   Topical BID    docusate sodium (COLACE) capsule 100 mg  100 mg Oral BID    polyethylene glycol (MIRALAX) packet 17 g  17 g Oral DAILY    sorbitoL 70 % solution 30 mL  30 mL Oral DAILY PRN    meropenem (MERREM) 1 g in 0.9% sodium chloride 20 mL IV syringe  1 g IntraVENous Q8H    bisacodyL (DULCOLAX) suppository 10 mg  10 mg Rectal DAILY PRN    hydrALAZINE (APRESOLINE) 20 mg/mL injection 20 mg  20 mg IntraVENous Q6H PRN    pantoprazole (PROTONIX) 40 mg in 0.9% sodium chloride 10 mL injection  40 mg IntraVENous DAILY    guaiFENesin ER (MUCINEX) tablet 600 mg  600 mg Oral Q12H    albuterol-ipratropium (DUO-NEB) 2.5 MG-0.5 MG/3 ML  3 mL Nebulization Q6H PRN    chlorthalidone (HYGROTON) tablet 25 mg  25 mg Oral DAILY    0.9% sodium chloride infusion  100 mL/hr IntraVENous CONTINUOUS    albuterol (PROVENTIL HFA, VENTOLIN HFA, PROAIR HFA) inhaler 2 Puff  2 Puff Inhalation Q6H PRN    ascorbic acid (vitamin C) (VITAMIN C) tablet 500 mg  500 mg Oral DAILY    atorvastatin (LIPITOR) tablet 40 mg  40 mg Oral DAILY    cholecalciferol (VITAMIN D3) (1000 Units /25 mcg) tablet 1,000 Units  1,000 Units Oral DAILY    diazePAM (VALIUM) tablet 5 mg  5 mg Oral Q6H PRN    dilTIAZem ER (CARDIZEM CD) capsule 120 mg  120 mg Oral DAILY    [Held by provider] apixaban (ELIQUIS) tablet 5 mg  5 mg Oral BID    potassium chloride SR (KLOR-CON 10) tablet 20 mEq  20 mEq Oral DAILY    sertraline (ZOLOFT) tablet 25 mg  25 mg Oral DAILY    traZODone (DESYREL) tablet 50 mg  50 mg Oral QHS    ondansetron (ZOFRAN) injection 4 mg  4 mg IntraVENous Q6H PRN    acetaminophen (TYLENOL) tablet 650 mg  650 mg Oral Q4H PRN    [Held by provider] 0.9% sodium chloride infusion  125 mL/hr IntraVENous CONTINUOUS    prochlorperazine (COMPAZINE) with saline injection 10 mg  10 mg IntraVENous Q6H PRN        Review of Systems  Constitutional: Positive for malaise/fatigue. Negative for fever. HENT: Negative. Respiratory: Positive for shortness of breath. Negative for cough. Cardiovascular: Negative for chest pain and leg swelling. Gastrointestinal: Positive for abdominal pain. Negative for nausea and vomiting. Genitourinary: No frequency, No dysuria, No hematuria  Integument/breast: No skin lesion(s)   Neurological: No Confusion, No headaches, No dizziness      Objective:     Visit Vitals  /67   Pulse 97   Temp 97.9 °F (36.6 °C)   Resp 17   Ht 5' 7\" (1.702 m)   Wt 102 kg (224 lb 13.9 oz)   SpO2 96%   BMI 35.22 kg/m²    O2 Flow Rate (L/min): 2 l/min O2 Device: Nasal cannula    Temp (24hrs), Av.4 °F (36.9 °C), Min:97.9 °F (36.6 °C), Max:98.8 °F (37.1 °C)       07 - 1900  In: -   Out: 474 [GOHOE:455]  1901 -  0700  In: 1653 [P.O.:490; I.V.:2120]  Out: 1400 [Urine:1400]    PHYSICAL EXAM:  Constitutional: No acute distress  Skin: Extremities and face reveal no rashes. HEENT: Sclerae anicteric. Extra-occular muscles are intact. No oral ulcers. The neck is supple and no masses. Cardiovascular: Regular rate and rhythm. Respiratory:  Rhonchi noted bilateral without wheezes. GI: Firm with hypoactive bowel sounds and tenderness to palpation in all 4 quadrant. Rectal: Deferred   Musculoskeletal: No pitting edema of the lower legs. Able to move all ext  Neurological:  Patient is alert and oriented.  Cranial nerves II-XII grossly intact  Psychiatric: Mood appears appropriate       Data Review    Recent Results (from the past 24 hour(s))   GLUCOSE, POC    Collection Time: 21  8:40 AM   Result Value Ref Range    Glucose (POC) 83 65 - 117 mg/dL    Performed by Belen Finn    GLUCOSE, POC    Collection Time: 12/24/21 11:26 AM   Result Value Ref Range    Glucose (POC) 82 65 - 117 mg/dL    Performed by Norma Boone        CT ABD PELV W CONT   Final Result   1. There are increasing bilateral pleural effusions, increasing body wall   edema, and increasing ascites. These findings could be secondary to the third   spacing of fluids. The differential diagnosis for the ascites would include   pancreatic ascites with acute pancreatitis or metastatic disease to the   peritoneum. 2.  There is a biliary stent which is unchanged in its position traversing   throughout area of obstruction within the pancreatic head. 3.  There are multiple low-density lesions of the liver without short-term   interval changes consistent with metastatic disease. 4.  The right kidney is not identified and apparently the patient is status post   a previous right nephrectomy. 5.  There are bilateral adrenal masses suspect for metastatic disease without   short-term interval changes. CTA CHEST W OR W WO CONT   Final Result   No CT evidence for PE. Thoracic aorta atherosclerosis. CAD. Moderate to large bilateral pleural effusions with associated RML, RLL, and LLL   compressive atelectasis. Abdominal ascites. DUPLEX LOWER EXT VENOUS BILAT   Final Result      XR CHEST PORT   Final Result   FINDINGS: IMPRESSION: 2 frontal views of the chest. Right PICC distal tip SVC   region. Enlarging cardiomegaly. Increasing vascular congestion. Interval development of   hypoinflation, pleural effusions, lower lung airspace edema and/or pneumonia. No   pneumothorax. No free air under the diaphragm. CT ABD PELV W CONT   Final Result   New moderate volume dependent pleural effusions with associated   lower lobe lung compressive atelectasis. Increasing ascites, moderate approaching large-volume   (fluid could be sampled if there is any clinical concern for bile content).       High suspicion hepatic metastases. Similar appearance for the pancreas; Silastic stent in place. No pseudocyst formation. Unchanged appearance bilateral adrenal glands. No bowel obstruction. US ABD LTD   Final Result   Small amount of free fluid. Finding suggesting hemangioma in the   liver. Gallbladder wall thickening, adenomyomatosis, sludge and gallstones. Cholecystitis possible. Finding in the region of the pancreatic head as above. Other findings as above. CTA ABDOMEN PELV W CONT   Final Result   1. Ill-defined hypodense mass in the pancreas. There are inflammatory changes   and fluid adjacent to the pancreas or duodenum and stomach most likely related   to biopsy or focal pancreatitis. No pseudocyst formation, active bleeding or   other acute findings. 2. Enlarged adrenal glands left greater than right with noncontrast densities   consistent with adrenal adenomas. 3. Right nephrectomy. 4. Other findings as described. XR CHEST PORT   Final Result   No acute findings. Principal Problem:    Pancreatitis (12/9/2021)    Active Problems:    A-fib ([address] (11/16/2020)      CHF (congestive heart failure) ([address] (11/16/2020)      Hematemesis (12/12/2021)      History of peptic ulcer disease (12/12/2021)        Assessment/Plan:     1. Acute pancreatitis  Status post EUS by Dr. Yi Kim 12 6  Continue pain medication  Lipase trending up  Continue hydration  Consider TPN if diet fails  CT abd/pelvis with PO and IV contrast showing increasing bilateral pleural effusion and increasing ascites. Also notes multiple low-density lesions of liver consistent with metastatic disease and bilateral adrenal masses suspected for metastatic disease. IR consulted for liver biopsy      2. CHF  Continue diuretics     3.  Pancreatic mass  CA 19-9 greater than 4000     4. Hypokalemia     5 . COPD  Pulmonary consultation  Chronic respiratory failure with 2 L of oxygen via nasal cannula at home     6. Leukocytosis  Started meropenem  Cultures negative     7. Supratherapeutic anticoagulation  INR normal   Vitamin K given  May be a side effect of using Eliquis recently and this may be an artificial INR. No obvious bleeding source  Hemoglobin stable      DVT Prophylaxis: SCDs  GI PPx: Protonix  Code Status: Full  POA:    Discharge Barriers: IR consult for possible paracentesis and to evaluate patient for liver Bx. Pending downward trend of lipase levels. Care Plan discussed with: patient and nursing    Total time spent with patient: >35 minutes.

## 2021-12-24 NOTE — PROGRESS NOTES
Pulmonary and Critical Care progress note    Subjective:   Consult Note: 12/24/2021 @no control      Chief Complaint:   Chief Complaint   Patient presents with    Abdominal Pain        This patient has been seen and evaluated at the request of Claudia Zazueta, [facility]. Patient seen and examined  Overnight events noted    Lying in bed comfortably  Awake and alert  On 2.5 L nasal cannula oxygen  Gradual improvement in respiratory status      Review of Systems:  A comprehensive review of systems was negative except for that written in the HPI.      Current Facility-Administered Medications   Medication Dose Route Frequency Provider Last Rate Last Admin    potassium chloride (KLOR-CON) packet for solution 40 mEq  40 mEq Oral NOW Benji Jordan PA-C        zinc oxide-white petrolatum 17-57 % topical paste   Topical TID Tay Brannon PA-C        cyclobenzaprine (FLEXERIL) tablet 10 mg  10 mg Oral TID PRN Benji Jordan PA-C        HYDROmorphone (DILAUDID) injection 1 mg  1 mg IntraVENous Q4H PRN Volodymyr Blas NP   1 mg at 12/24/21 1510    oxyCODONE IR (ROXICODONE) tablet 5 mg  5 mg Oral Q6H PRN Volodymyr Blas NP        nystatin (MYCOSTATIN) 100,000 unit/gram cream   Topical BID Selena Lyons MD   Given at 12/24/21 5760    docusate sodium (COLACE) capsule 100 mg  100 mg Oral BID Ayden Liu NP   100 mg at 12/24/21 3837    polyethylene glycol (MIRALAX) packet 17 g  17 g Oral DAILY Ayedn Liu NP   17 g at 12/23/21 0924    sorbitoL 70 % solution 30 mL  30 mL Oral DAILY PRN Ayden Liu NP        meropenem (MERREM) 1 g in 0.9% sodium chloride 20 mL IV syringe  1 g IntraVENous Q8H Selena Lyons MD   1 g at 12/24/21 0931    bisacodyL (DULCOLAX) suppository 10 mg  10 mg Rectal DAILY PRN Volodymyr Blas NP        hydrALAZINE (APRESOLINE) 20 mg/mL injection 20 mg  20 mg IntraVENous Q6H PRN Huyen Crane PA        pantoprazole (PROTONIX) 40 mg in 0.9% sodium chloride 10 mL injection  40 mg IntraVENous DAILY FARZANA Robin   40 mg at 12/24/21 1036    guaiFENesin ER (MUCINEX) tablet 600 mg  600 mg Oral Q12H Huyen Crane PA   600 mg at 12/24/21 0920    albuterol-ipratropium (DUO-NEB) 2.5 MG-0.5 MG/3 ML  3 mL Nebulization Q6H PRN Huyen Crane PA        chlorthalidone (HYGROTON) tablet 25 mg  25 mg Oral DAILY FARZANA Robin   25 mg at 12/24/21 0452    0.9% sodium chloride infusion  100 mL/hr IntraVENous CONTINUOUS Nolberto Farah,  mL/hr at 12/24/21 1042 100 mL/hr at 12/24/21 1042    albuterol (PROVENTIL HFA, VENTOLIN HFA, PROAIR HFA) inhaler 2 Puff  2 Puff Inhalation Q6H PRN Nolberto Farah, NP   2 Puff at 12/22/21 2133    ascorbic acid (vitamin C) (VITAMIN C) tablet 500 mg  500 mg Oral DAILY Zabel Nolberto NEGIN, NP   500 mg at 12/11/21 0900    atorvastatin (LIPITOR) tablet 40 mg  40 mg Oral DAILY Zabel Nolberto NEGIN, NP   40 mg at 12/17/21 0948    cholecalciferol (VITAMIN D3) (1000 Units /25 mcg) tablet 1,000 Units  1,000 Units Oral DAILY Zabel Nolberto NEGIN, NP   1,000 Units at 12/24/21 0919    diazePAM (VALIUM) tablet 5 mg  5 mg Oral Q6H PRN Nolberto Farah, NP   5 mg at 12/23/21 2251    dilTIAZem ER (CARDIZEM CD) capsule 120 mg  120 mg Oral DAILY Zabel Nolberto NEGIN, NP   120 mg at 12/24/21 0920    [Held by provider] apixaban (ELIQUIS) tablet 5 mg  5 mg Oral BID Zabel Nolberto NEGIN, NP   5 mg at 12/20/21 1035    potassium chloride SR (KLOR-CON 10) tablet 20 mEq  20 mEq Oral DAILY Zabel Nolberto NEGIN, NP   20 mEq at 12/24/21 5146    sertraline (ZOLOFT) tablet 25 mg  25 mg Oral DAILY Nolberto Farah NP   25 mg at 12/24/21 0920    traZODone (DESYREL) tablet 50 mg  50 mg Oral QHS Nolberto Farah NP   50 mg at 12/23/21 2251    ondansetron (ZOFRAN) injection 4 mg  4 mg IntraVENous Q6H PRN Nolberto Farah NP   4 mg at 12/24/21 1250    acetaminophen (TYLENOL) tablet 650 mg  650 mg Oral Q4H PRN Nolberto Farah NP        [Held by provider] 0.9% sodium chloride infusion  125 mL/hr IntraVENous CONTINUOUS Avni Killer, NP   Stopped at 21 1038    prochlorperazine (COMPAZINE) with saline injection 10 mg  10 mg IntraVENous Q6H PRN JamilarojasNolberto NP   10 mg at 21 0320          Allergies   Allergen Reactions    Adhesive Tape-Silicones Atopic Dermatitis           Objective:     Blood pressure 120/66, pulse 94, temperature 98.3 °F (36.8 °C), resp. rate 16, height 5' 7\" (1.702 m), weight 102 kg (224 lb 13.9 oz), SpO2 98 %. Temp (24hrs), Av.3 °F (36.8 °C), Min:97.9 °F (36.6 °C), Max:98.5 °F (36.9 °C)      Intake and Output:  Current Shift:  07 - 1900  In: 1273 [P.O.:360; I.V.:960]  Out: 850 [Urine:850]  Last 3 Shifts: 1901 -  0700  In: 7899 [P.O.:490; I.V.:2120]  Out: 1400 [Urine:1400]    Intake/Output Summary (Last 24 hours) at 2021 1549  Last data filed at 2021 1507  Gross per 24 hour   Intake 3030 ml   Output 1650 ml   Net 1380 ml        Physical Exam:     General: Lying in bed comfortably, no acute distress, morbidly obese  Eye: Reactive, symmetric  Throat and Neck: Supple  Lung: Reduced air entry bilaterally with prolonged exhalation but no wheezing. Crackles in both lung bases  Heart: S1+S2. No murmurs  Abdomen: soft, non-tender. Bowel sounds normal. No masses; obese  Extremities:  2+ lower extremity edema  : Not done  Skin: No cyanosis  Neurologic: A & O x3.   Grossly nonfocal  Psychiatric: Appropriate affect; coherent      Lab/Data Review:    Recent Results (from the past 24 hour(s))   GLUCOSE, POC    Collection Time: 21  8:40 AM   Result Value Ref Range    Glucose (POC) 83 65 - 117 mg/dL    Performed by Belen Finn    GLUCOSE, POC    Collection Time: 21 11:26 AM   Result Value Ref Range    Glucose (POC) 82 65 - 117 mg/dL    Performed by Delio Gatica    METABOLIC PANEL, COMPREHENSIVE    Collection Time: 21 12:09 PM   Result Value Ref Range    Sodium 139 136 - 145 mmol/L Potassium 4.0 3.5 - 5.1 mmol/L    Chloride 102 97 - 108 mmol/L    CO2 34 (H) 21 - 32 mmol/L    Anion gap 3 (L) 5 - 15 mmol/L    Glucose 82 65 - 100 mg/dL    BUN 10 6 - 20 mg/dL    Creatinine 0.59 0.55 - 1.02 mg/dL    BUN/Creatinine ratio 17 12 - 20      GFR est AA >60 >60 ml/min/1.73m2    GFR est non-AA >60 >60 ml/min/1.73m2    Calcium 8.6 8.5 - 10.1 mg/dL    Bilirubin, total 0.5 0.2 - 1.0 mg/dL    AST (SGOT) 27 15 - 37 U/L    ALT (SGPT) 26 12 - 78 U/L    Alk. phosphatase 343 (H) 45 - 117 U/L    Protein, total 4.6 (L) 6.4 - 8.2 g/dL    Albumin 1.3 (L) 3.5 - 5.0 g/dL    Globulin 3.3 2.0 - 4.0 g/dL    A-G Ratio 0.4 (L) 1.1 - 2.2     LIPASE    Collection Time: 12/24/21 12:09 PM   Result Value Ref Range    Lipase 2,730 (H) 73 - 393 U/L   CBC WITH AUTOMATED DIFF    Collection Time: 12/24/21 12:09 PM   Result Value Ref Range    WBC 11.6 (H) 3.6 - 11.0 K/uL    RBC 3.50 (L) 3.80 - 5.20 M/uL    HGB 10.9 (L) 11.5 - 16.0 g/dL    HCT 33.8 (L) 35.0 - 47.0 %    MCV 96.6 80.0 - 99.0 FL    MCH 31.1 26.0 - 34.0 PG    MCHC 32.2 30.0 - 36.5 g/dL    RDW 13.5 11.5 - 14.5 %    PLATELET 278 213 - 727 K/uL    MPV 10.0 8.9 - 12.9 FL    NRBC 0.0 0.0  WBC    ABSOLUTE NRBC 0.00 0.00 - 0.01 K/uL    NEUTROPHILS 88 (H) 32 - 75 %    LYMPHOCYTES 5 (L) 12 - 49 %    MONOCYTES 6 5 - 13 %    EOSINOPHILS 0 0 - 7 %    BASOPHILS 0 0 - 1 %    IMMATURE GRANULOCYTES 1 (H) 0 - 0.5 %    ABS. NEUTROPHILS 10.2 (H) 1.8 - 8.0 K/UL    ABS. LYMPHOCYTES 0.6 (L) 0.8 - 3.5 K/UL    ABS. MONOCYTES 0.7 0.0 - 1.0 K/UL    ABS. EOSINOPHILS 0.0 0.0 - 0.4 K/UL    ABS. BASOPHILS 0.0 0.0 - 0.1 K/UL    ABS. IMM. GRANS. 0.1 (H) 0.00 - 0.04 K/UL    DF AUTOMATED     C REACTIVE PROTEIN, QT    Collection Time: 12/24/21 12:09 PM   Result Value Ref Range    C-Reactive protein 14.20 (H) 0.00 - 0.60 mg/dL   PROCALCITONIN    Collection Time: 12/24/21 12:09 PM   Result Value Ref Range    Procalcitonin 0.21 (H) 0 ng/mL       CT ABD PELV W CONT   Final Result   1.   There are increasing bilateral pleural effusions, increasing body wall   edema, and increasing ascites. These findings could be secondary to the third   spacing of fluids. The differential diagnosis for the ascites would include   pancreatic ascites with acute pancreatitis or metastatic disease to the   peritoneum. 2.  There is a biliary stent which is unchanged in its position traversing   throughout area of obstruction within the pancreatic head. 3.  There are multiple low-density lesions of the liver without short-term   interval changes consistent with metastatic disease. 4.  The right kidney is not identified and apparently the patient is status post   a previous right nephrectomy. 5.  There are bilateral adrenal masses suspect for metastatic disease without   short-term interval changes. CTA CHEST W OR W WO CONT   Final Result   No CT evidence for PE. Thoracic aorta atherosclerosis. CAD. Moderate to large bilateral pleural effusions with associated RML, RLL, and LLL   compressive atelectasis. Abdominal ascites. DUPLEX LOWER EXT VENOUS BILAT   Final Result      XR CHEST PORT   Final Result   FINDINGS: IMPRESSION: 2 frontal views of the chest. Right PICC distal tip SVC   region. Enlarging cardiomegaly. Increasing vascular congestion. Interval development of   hypoinflation, pleural effusions, lower lung airspace edema and/or pneumonia. No   pneumothorax. No free air under the diaphragm. CT ABD PELV W CONT   Final Result   New moderate volume dependent pleural effusions with associated   lower lobe lung compressive atelectasis. Increasing ascites, moderate approaching large-volume   (fluid could be sampled if there is any clinical concern for bile content). High suspicion hepatic metastases. Similar appearance for the pancreas; Silastic stent in place. No pseudocyst formation. Unchanged appearance bilateral adrenal glands.       No bowel obstruction. US ABD LTD   Final Result   Small amount of free fluid. Finding suggesting hemangioma in the   liver. Gallbladder wall thickening, adenomyomatosis, sludge and gallstones. Cholecystitis possible. Finding in the region of the pancreatic head as above. Other findings as above. CTA ABDOMEN PELV W CONT   Final Result   1. Ill-defined hypodense mass in the pancreas. There are inflammatory changes   and fluid adjacent to the pancreas or duodenum and stomach most likely related   to biopsy or focal pancreatitis. No pseudocyst formation, active bleeding or   other acute findings. 2. Enlarged adrenal glands left greater than right with noncontrast densities   consistent with adrenal adenomas. 3. Right nephrectomy. 4. Other findings as described. XR CHEST PORT   Final Result   No acute findings. CT Results  (Last 48 hours)               12/23/21 1741  CT ABD PELV W CONT Final result    Impression:  1. There are increasing bilateral pleural effusions, increasing body wall   edema, and increasing ascites. These findings could be secondary to the third   spacing of fluids. The differential diagnosis for the ascites would include   pancreatic ascites with acute pancreatitis or metastatic disease to the   peritoneum. 2.  There is a biliary stent which is unchanged in its position traversing   throughout area of obstruction within the pancreatic head. 3.  There are multiple low-density lesions of the liver without short-term   interval changes consistent with metastatic disease. 4.  The right kidney is not identified and apparently the patient is status post   a previous right nephrectomy. 5.  There are bilateral adrenal masses suspect for metastatic disease without   short-term interval changes. Narrative: The study is a CT evaluation of the abdomen and pelvis dated 12/23/2021. HISTORY: Severe abdominal pain, worsening pancreatitis. Technique: 3 mm axial imaging was acquired through the abdomen and pelvis   following the administration of  100 mL of Isovue-370 contrast material..   Sagittal and coronal reconstructed imaging is also submitted for interpretation. Dose Reduction Technique was employed to reduce radiation exposure - This   includes reduction optimization techniques as appropriate to a performed exam   with automated exposure control adjustments of the mA and/or Kv according to   patient size, or use of iterative reconstruction technique. COMPARISON: CT evaluation of the abdomen and pelvis dated 12/17/2021. LIVER AND SPLEEN: There are multiple smaller low-density lesions of the liver   without significant short-term interval change. As previously suggested these   findings may reflect smaller metastases. There is a cirrhotic liver morphology. There is moderate splenomegaly. KIDNEYS:The patient is status post a previous right nephrectomy. There is small   low-density foci within the left kidney without change most compatible with   small cysts although somewhat difficult to fully characterize given their size. There are multiple calcifications within the left renal hilum and central echo   fat compatible with intrarenal vascular calcifications. PANCREAS AND ADRENAL GLANDS: There is a pancreatic stent in place traversing a   masslike lesion within the pancreatic head. There is milder dilatation of the   pancreatic duct. The common bile duct is normal in caliber. There are bilateral   adrenal masses with a larger left adrenal mass most compatible with metastatic   disease. GALLBLADDER AND BILIARY TREE: There was contraction of the gallbladder. LOWER CHEST: There are enlargement bilateral pleural effusions associated with   passive atelectasis. There are moderate calcifications of the patient's coronary   arteries.        VASCULARITY: There is a moderate to marked degree of calcified plaque formation   along the wall of the abdominal aorta and involving its branch vessels. BOWEL: There is thickening of the rugal folds. There also is a thickening of the   gastric antrum. The duodenal C-sweep images in a normal fashion. The proximal   jejunum is normal in caliber. The colon was not opacified with oral contrast material so an assessment of the   colon is limited. This examination is negative for obstruction of the colon. OTHER: There is increasing intra-abdominal fluid/ascites. This could be related   to pancreatitis versus metastatic disease to the peritoneum an additional   consideration is the third spacing of fluids secondary to hypoalbuminemia. CT EVALUATION OF THE PELVIS: There is increasing fluid within the pelvic region. The patient is status post a previous complete hysterectomy. The bladder was   distended but otherwise unremarkable. OSSEOUS STRUCTURES: There are degenerative changes of the hips and spine. This   examination is negative for focal lytic or blastic lesions. There is prominent   facet arthropathy along the lower lumbosacral spine. Assessment:     1. Acute respiratory failure with hypoxia  2. Bilateral pleural effusions  3. COPD  4. Sepsis  5. Acute appendicitis  6. Acute on chronic congestive heart failure  7. Ascites  8. Pancreatic mass  9. Leukocytosis  10.   Morbid obesity    Plan:     Patient admitted to the hospital  We will be watching her closely    Currently on 2.5 L nasal cannula oxygen  Continue oxygen supplementation as needed to keep saturation above 92%    Patient has bilateral pleural effusions with associated atelectasis  This is due to volume overload and ascites from the pancreatitis and pancreatic mass  Continue with diuresis  Likely to benefit from paracentesis if felt to be appropriate by GI  Intake and output charting  Check electrolytes and replace as needed    Possible sources of fever include pancreatitis and pyuria  Continue antibiotics  Culture sent  Appreciate ID input  Further changes in antibiotics based on clinical response and culture results    Lipase trending down  TPN if diet fails    DVT and GI prophylaxis    Questions of patient were answered at bedside in detail  Case discussed in detail with RN, RT, and care team  Thank you for involving me in the care of this patient  I will follow with you closely during hospitalization    Time spent more than 30 minutes in direct patient care with no overlap    Sherice Mac MD  [facility]  12/24/2021  10:32 AM

## 2021-12-25 NOTE — PROGRESS NOTES
Patient will not allow me to help turn her. She refuses to be turned or cleaned up. Patient informed me that the [facility] instructed her not to move because of the fluid in her lungs and belly despite anything notes saying that. I reassured her that she needs to be turned to avoid a worse pressure ulcer but she refused to turn.

## 2021-12-25 NOTE — PROGRESS NOTES
Progress Note    Patient: Avery Washburn MRN: [id number]  SSN: [id number]    YOB: 1945  Age: 78 y.o. Sex: female      Admit Date: 12/9/2021    LOS: 16 days     Subjective:   Patient followed for severe pancreatitis with worsening leukocytosis on Zosyn. She was switched to Meropenem because of pancreatitis. She remains afebrile. WBC increased today along with procal and CRP. Repeat CT Abdomen showed increasing ascites. Patient still complaining of abdominal pain. Also complaining of burning pain in her mouth when she drinks even water. No odynophagia. Objective:     Vitals:    12/24/21 2347 12/25/21 0450 12/25/21 0721 12/25/21 1026   BP: 111/63 (!) 134/58 (!) 151/60    Pulse: (!) 109 95 (!) 105    Resp: 18 18 18    Temp: 98 °F (36.7 °C) 98.2 °F (36.8 °C) 98.4 °F (36.9 °C)    SpO2: 97%  98% 98%   Weight:       Height:            Intake and Output:  Current Shift: No intake/output data recorded. Last three shifts: 12/23 1901 - 12/25 0700  In: 5486 [P.O.:1090; I.V.:3036]  Out: 2050 [Urine:2050]    Physical Exam:     Vitals and nursing note reviewed. Constitutional:       General: She is not in acute distress. Appearance: She is obese. She is ill-appearing. HENT:  Oropharynx unremarkable with no erythema or thrush  Abdominal:  Generalized abdominal tenderness with rebound   Genitourinary:  Vaginal area not examined     Comments: External urinary device  Musculoskeletal:      Right lower leg: No edema. Left lower leg: No edema. Comments: PICC line right upper arm   Skin: multiple ecchymoses on the upper extremities     Findings: No rash. Comments: ?Stage 2 sacral wound   Neurological:      General: No focal deficit present. Mental Status: She is alert and oriented to person, place, and time. Psychiatric:         Mood and Affect: Mood normal.         Behavior: Behavior normal.         Thought Content:  Thought content normal.         Judgment: Judgment normal. Lab/Data Review:     WBC 14,100 <11,600 <9,800 <11,500  Urinalysis with WBC 10-20, Bacteria negative  Lipase 2,484 <2,684 < 1,397 <1,719    Procal  0.21 <0.19 <0.25 < 0.14  CRP 18.30 <14.20 <12.70 <15.00 < 23.80    Serum fungitell <31 Negative    Blood cultures (12/20) No growth 4 days  Urine culture (12/20) No growth FINAL     CT Abdomen/Pelvis (12/23)  1. There are increasing bilateral pleural effusions, increasing body wall  edema, and increasing ascites. These findings could be secondary to the third  spacing of fluids. The differential diagnosis for the ascites would include  pancreatic ascites with acute pancreatitis or metastatic disease to the  peritoneum. 2.  There is a biliary stent which is unchanged in its position traversing  throughout area of obstruction within the pancreatic head. 3.  There are multiple low-density lesions of the liver without short-term  interval changes consistent with metastatic disease. 4.  The right kidney is not identified and apparently the patient is status post  a previous right nephrectomy. 5.  There are bilateral adrenal masses suspect for metastatic disease without  short-term interval changes. Assessment:     Principal Problem:    Pancreatitis (12/9/2021)    Active Problems:    A-fib ([address] (11/16/2020)      CHF (congestive heart failure) ([facility] [address] (11/16/2020)      Hematemesis (12/12/2021)      History of peptic ulcer disease (12/12/2021)    1. Severe pancreatitis with markedly elevated lipase, resolving  2. Pancreatic mass, possible neoplasm  3. Biliary stent  4. Sepsis with worsening leukocytosis with elevated procal and CRP, resolving, Day #6 IV Meropenem  5. TPN (just started)  6. Moderate pyuria, negative bacteria, urine culture negative  7. Possible candida superinfection with vaginitis    Comment:    WBC, CRP and procal all continue to increase, but she remains afebrile. Concerned for possible Candida superinfection though serum fungitell is negative. Plan:   1. Continue IV Meropenem for 8 more days  2. In am, repeat CBC, procal and CRP  3. Follow-up blood cultures   4. ?Diagnostic paracentesis   5.  If inflammatory markers increasing tomorrow, would start empiric antifungal therapy with Anidulafungin    Signed By: Ashtyn Shaw MD     December 25, 2021

## 2021-12-25 NOTE — PROGRESS NOTES
Hospitalist Progress Note    Subjective:   Daily Progress Note: 12/25/2021 12:21 PM    Hospital Course:  47-year-old female with a history of atrial fibrillation, peptic ulcer disease, CHF, COPD, renal cell carcinoma stage IV status post nephrectomy, GERD, COPD, Sojourn syndrome and essential hypertension who was presented to the emergency department for admission on 12/9/2021 after undergoing a EUS on 12/06 by Dr. Jay Jay Gu with biopsy and subsequently developed severe pancreatitis. Patient's admission course has been complicated by ongoing pancreatitis and severe abdominal pain. CT scan of the abdomen pelvis revealed an ill-defined hypodense mass in the pancreas with fluid adjacent to the pancreas suggestive of focal pancreatitis. GI and oncology consulted. Patient had an episode of hematemesis as well. Patient remains on IV Protonix. Abdominal ultrasound also showed no hemodynamic drop in the liver. Gallbladder wall has been thickened with sludge and stones although cholecystitis is less likely at this point. Although continue to monitor closely. Patient was started on Zosyn and subsequently changed to meropenem as the patient developed leukocytosis. Patient also has elevation in her INR although had been receiving Eliquis. Patient started on p.o. diet although developed severe pain and have decreased her diet to clear liquid. General surgery following as well, Dr. Mahnaz Reynoso. Lipase worsening. CT abd/pelvis with PO and IV contrast showing increasing bilateral pleural effusion and increasing ascites. Also notes multiple low-density lesions of liver consistent with metastatic disease and bilateral adrenal masses suspected for metastatic disease. IR consulted for liver biopsy. Subjective:    Patient seen and examined at bedside. Continue to have pain \"all over. \"     Current Facility-Administered Medications   Medication Dose Route Frequency    zinc oxide-white petrolatum 17-57 % topical paste Topical TID    cyclobenzaprine (FLEXERIL) tablet 10 mg  10 mg Oral TID PRN    HYDROmorphone (DILAUDID) injection 1 mg  1 mg IntraVENous Q4H PRN    oxyCODONE IR (ROXICODONE) tablet 5 mg  5 mg Oral Q6H PRN    nystatin (MYCOSTATIN) 100,000 unit/gram cream   Topical BID    docusate sodium (COLACE) capsule 100 mg  100 mg Oral BID    polyethylene glycol (MIRALAX) packet 17 g  17 g Oral DAILY    sorbitoL 70 % solution 30 mL  30 mL Oral DAILY PRN    meropenem (MERREM) 1 g in 0.9% sodium chloride 20 mL IV syringe  1 g IntraVENous Q8H    bisacodyL (DULCOLAX) suppository 10 mg  10 mg Rectal DAILY PRN    hydrALAZINE (APRESOLINE) 20 mg/mL injection 20 mg  20 mg IntraVENous Q6H PRN    pantoprazole (PROTONIX) 40 mg in 0.9% sodium chloride 10 mL injection  40 mg IntraVENous DAILY    guaiFENesin ER (MUCINEX) tablet 600 mg  600 mg Oral Q12H    albuterol-ipratropium (DUO-NEB) 2.5 MG-0.5 MG/3 ML  3 mL Nebulization Q6H PRN    chlorthalidone (HYGROTON) tablet 25 mg  25 mg Oral DAILY    0.9% sodium chloride infusion  100 mL/hr IntraVENous CONTINUOUS    albuterol (PROVENTIL HFA, VENTOLIN HFA, PROAIR HFA) inhaler 2 Puff  2 Puff Inhalation Q6H PRN    ascorbic acid (vitamin C) (VITAMIN C) tablet 500 mg  500 mg Oral DAILY    atorvastatin (LIPITOR) tablet 40 mg  40 mg Oral DAILY    cholecalciferol (VITAMIN D3) (1000 Units /25 mcg) tablet 1,000 Units  1,000 Units Oral DAILY    diazePAM (VALIUM) tablet 5 mg  5 mg Oral Q6H PRN    dilTIAZem ER (CARDIZEM CD) capsule 120 mg  120 mg Oral DAILY    [Held by provider] apixaban (ELIQUIS) tablet 5 mg  5 mg Oral BID    potassium chloride SR (KLOR-CON 10) tablet 20 mEq  20 mEq Oral DAILY    sertraline (ZOLOFT) tablet 25 mg  25 mg Oral DAILY    traZODone (DESYREL) tablet 50 mg  50 mg Oral QHS    ondansetron (ZOFRAN) injection 4 mg  4 mg IntraVENous Q6H PRN    acetaminophen (TYLENOL) tablet 650 mg  650 mg Oral Q4H PRN    [Held by provider] 0.9% sodium chloride infusion  125 mL/hr IntraVENous CONTINUOUS    prochlorperazine (COMPAZINE) with saline injection 10 mg  10 mg IntraVENous Q6H PRN        Review of Systems  Constitutional: Positive for malaise/fatigue. Negative for fever. HENT: Negative. Respiratory: Positive for shortness of breath. Negative for cough. Cardiovascular: Negative for chest pain and leg swelling. Gastrointestinal: Positive for abdominal pain. Negative for nausea and vomiting. Genitourinary: No frequency, No dysuria, No hematuria  Integument/breast: No skin lesion(s)   Neurological: No Confusion, No headaches, No dizziness      Objective:     Visit Vitals  BP (!) 151/60   Pulse (!) 105   Temp 98.4 °F (36.9 °C)   Resp 18   Ht 5' 7\" (1.702 m)   Wt 102 kg (224 lb 13.9 oz)   SpO2 98%   BMI 35.22 kg/m²    O2 Flow Rate (L/min): 2 l/min O2 Device: Nasal cannula    Temp (24hrs), Av.2 °F (36.8 °C), Min:98 °F (36.7 °C), Max:98.4 °F (36.9 °C)      No intake/output data recorded.  1901 -  0700  In: 4082 [P.O.:1090; I.V.:3036]  Out:  [Urine:]    PHYSICAL EXAM:  Constitutional: No acute distress  Skin: Extremities and face reveal no rashes. HEENT: Sclerae anicteric. Extra-occular muscles are intact. No oral ulcers. The neck is supple and no masses. Cardiovascular: Regular rate and rhythm. Respiratory:  Rhonchi noted bilateral without wheezes. GI: Firm with hypoactive bowel sounds and tenderness to palpation in all 4 quadrant. Rectal: Deferred   Musculoskeletal: No pitting edema of the lower legs. Able to move all ext  Neurological:  Patient is alert and oriented.  Cranial nerves II-XII grossly intact  Psychiatric: Mood appears appropriate       Data Review    Recent Results (from the past 24 hour(s))   GLUCOSE, POC    Collection Time: 21 11:26 AM   Result Value Ref Range    Glucose (POC) 82 65 - 117 mg/dL    Performed by [address] [place] [address]    Collection Time: 21 12:09 PM   Result Value Ref Range Sodium 139 136 - 145 mmol/L    Potassium 4.0 3.5 - 5.1 mmol/L    Chloride 102 97 - 108 mmol/L    CO2 34 (H) 21 - 32 mmol/L    Anion gap 3 (L) 5 - 15 mmol/L    Glucose 82 65 - 100 mg/dL    BUN 10 6 - 20 mg/dL    Creatinine 0.59 0.55 - 1.02 mg/dL    BUN/Creatinine ratio 17 12 - 20      GFR est AA >60 >60 ml/min/1.73m2    GFR est non-AA >60 >60 ml/min/1.73m2    Calcium 8.6 8.5 - 10.1 mg/dL    Bilirubin, total 0.5 0.2 - 1.0 mg/dL    AST (SGOT) 27 15 - 37 U/L    ALT (SGPT) 26 12 - 78 U/L    Alk. phosphatase 343 (H) 45 - 117 U/L    Protein, total 4.6 (L) 6.4 - 8.2 g/dL    Albumin 1.3 (L) 3.5 - 5.0 g/dL    Globulin 3.3 2.0 - 4.0 g/dL    A-G Ratio 0.4 (L) 1.1 - 2.2     LIPASE    Collection Time: 12/24/21 12:09 PM   Result Value Ref Range    Lipase 2,730 (H) 73 - 393 U/L   CBC WITH AUTOMATED DIFF    Collection Time: 12/24/21 12:09 PM   Result Value Ref Range    WBC 11.6 (H) 3.6 - 11.0 K/uL    RBC 3.50 (L) 3.80 - 5.20 M/uL    HGB 10.9 (L) 11.5 - 16.0 g/dL    HCT 33.8 (L) 35.0 - 47.0 %    MCV 96.6 80.0 - 99.0 FL    MCH 31.1 26.0 - 34.0 PG    MCHC 32.2 30.0 - 36.5 g/dL    RDW 13.5 11.5 - 14.5 %    PLATELET 202 782 - 080 K/uL    MPV 10.0 8.9 - 12.9 FL    NRBC 0.0 0.0  WBC    ABSOLUTE NRBC 0.00 0.00 - 0.01 K/uL    NEUTROPHILS 88 (H) 32 - 75 %    LYMPHOCYTES 5 (L) 12 - 49 %    MONOCYTES 6 5 - 13 %    EOSINOPHILS 0 0 - 7 %    BASOPHILS 0 0 - 1 %    IMMATURE GRANULOCYTES 1 (H) 0 - 0.5 %    ABS. NEUTROPHILS 10.2 (H) 1.8 - 8.0 K/UL    ABS. LYMPHOCYTES 0.6 (L) 0.8 - 3.5 K/UL    ABS. MONOCYTES 0.7 0.0 - 1.0 K/UL    ABS. EOSINOPHILS 0.0 0.0 - 0.4 K/UL    ABS. BASOPHILS 0.0 0.0 - 0.1 K/UL    ABS. IMM.  GRANS. 0.1 (H) 0.00 - 0.04 K/UL    DF AUTOMATED     C REACTIVE PROTEIN, QT    Collection Time: 12/24/21 12:09 PM   Result Value Ref Range    C-Reactive protein 14.20 (H) 0.00 - 0.60 mg/dL   PROCALCITONIN    Collection Time: 12/24/21 12:09 PM   Result Value Ref Range    Procalcitonin 0.21 (H) 0 ng/mL   GLUCOSE, POC    Collection Time: 12/24/21  4:53 PM   Result Value Ref Range    Glucose (POC) 94 65 - 117 mg/dL    Performed by Shayna Siddiqui    LIPASE    Collection Time: 12/25/21  9:34 AM   Result Value Ref Range    Lipase 2,484 (H) 73 - 393 U/L   CBC WITH AUTOMATED DIFF    Collection Time: 12/25/21  9:34 AM   Result Value Ref Range    WBC 14.1 (H) 3.6 - 11.0 K/uL    RBC 3.56 (L) 3.80 - 5.20 M/uL    HGB 10.8 (L) 11.5 - 16.0 g/dL    HCT 34.4 (L) 35.0 - 47.0 %    MCV 96.6 80.0 - 99.0 FL    MCH 30.3 26.0 - 34.0 PG    MCHC 31.4 30.0 - 36.5 g/dL    RDW 13.5 11.5 - 14.5 %    PLATELET 769 075 - 611 K/uL    MPV 10.5 8.9 - 12.9 FL    NRBC 0.0 0.0  WBC    ABSOLUTE NRBC 0.00 0.00 - 0.01 K/uL    NEUTROPHILS 91 (H) 32 - 75 %    LYMPHOCYTES 4 (L) 12 - 49 %    MONOCYTES 4 (L) 5 - 13 %    EOSINOPHILS 0 0 - 7 %    BASOPHILS 0 0 - 1 %    IMMATURE GRANULOCYTES 1 (H) 0 - 0.5 %    ABS. NEUTROPHILS 12.8 (H) 1.8 - 8.0 K/UL    ABS. LYMPHOCYTES 0.6 (L) 0.8 - 3.5 K/UL    ABS. MONOCYTES 0.6 0.0 - 1.0 K/UL    ABS. EOSINOPHILS 0.0 0.0 - 0.4 K/UL    ABS. BASOPHILS 0.0 0.0 - 0.1 K/UL    ABS. IMM. GRANS. 0.1 (H) 0.00 - 0.04 K/UL    DF AUTOMATED     C REACTIVE PROTEIN, QT    Collection Time: 12/25/21  9:34 AM   Result Value Ref Range    C-Reactive protein 18.30 (H) 0.00 - 0.60 mg/dL       CT ABD PELV W CONT   Final Result   1. There are increasing bilateral pleural effusions, increasing body wall   edema, and increasing ascites. These findings could be secondary to the third   spacing of fluids. The differential diagnosis for the ascites would include   pancreatic ascites with acute pancreatitis or metastatic disease to the   peritoneum. 2.  There is a biliary stent which is unchanged in its position traversing   throughout area of obstruction within the pancreatic head. 3.  There are multiple low-density lesions of the liver without short-term   interval changes consistent with metastatic disease.    4.  The right kidney is not identified and apparently the patient is status post   a previous right nephrectomy. 5.  There are bilateral adrenal masses suspect for metastatic disease without   short-term interval changes. CTA CHEST W OR W WO CONT   Final Result   No CT evidence for PE. Thoracic aorta atherosclerosis. CAD. Moderate to large bilateral pleural effusions with associated RML, RLL, and LLL   compressive atelectasis. Abdominal ascites. DUPLEX LOWER EXT VENOUS BILAT   Final Result      XR CHEST PORT   Final Result   FINDINGS: IMPRESSION: 2 frontal views of the chest. Right PICC distal tip SVC   region. Enlarging cardiomegaly. Increasing vascular congestion. Interval development of   hypoinflation, pleural effusions, lower lung airspace edema and/or pneumonia. No   pneumothorax. No free air under the diaphragm. CT ABD PELV W CONT   Final Result   New moderate volume dependent pleural effusions with associated   lower lobe lung compressive atelectasis. Increasing ascites, moderate approaching large-volume   (fluid could be sampled if there is any clinical concern for bile content). High suspicion hepatic metastases. Similar appearance for the pancreas; Silastic stent in place. No pseudocyst formation. Unchanged appearance bilateral adrenal glands. No bowel obstruction. US ABD LTD   Final Result   Small amount of free fluid. Finding suggesting hemangioma in the   liver. Gallbladder wall thickening, adenomyomatosis, sludge and gallstones. Cholecystitis possible. Finding in the region of the pancreatic head as above. Other findings as above. CTA ABDOMEN PELV W CONT   Final Result   1. Ill-defined hypodense mass in the pancreas. There are inflammatory changes   and fluid adjacent to the pancreas or duodenum and stomach most likely related   to biopsy or focal pancreatitis. No pseudocyst formation, active bleeding or   other acute findings.    2. Enlarged adrenal glands left greater than right with noncontrast densities   consistent with adrenal adenomas. 3. Right nephrectomy. 4. Other findings as described. XR CHEST PORT   Final Result   No acute findings. Principal Problem:    Pancreatitis (12/9/2021)    Active Problems:    A-fib ([address] (11/16/2020)      CHF (congestive heart failure) ([facility] [address] (11/16/2020)      Hematemesis (12/12/2021)      History of peptic ulcer disease (12/12/2021)        Assessment/Plan:     1. Acute pancreatitis  Status post EUS by Dr. Jones Henry 12 6  Continue pain medication  Lipase trending up  Continue hydration  Consider TPN if diet fails  CT abd/pelvis with PO and IV contrast showing increasing bilateral pleural effusion and increasing ascites. Also notes multiple low-density lesions of liver consistent with metastatic disease and bilateral adrenal masses suspected for metastatic disease. IR consulted for liver biopsy   Continue IV merrem day #6 of 14      2. CHF  Continue diuretics     3.  Pancreatic mass  CA 19-9 greater than 4000     4. Hypokalemia     5 . COPD  Pulmonary consultation  Chronic respiratory failure with 2 L of oxygen via nasal cannula at home     6.  Leukocytosis  Started meropenem  Cultures negative     7. Supratherapeutic anticoagulation  INR normal   Vitamin K given  May be a side effect of using Eliquis recently and this may be an artificial INR. No obvious bleeding source  Hemoglobin stable      DVT Prophylaxis: SCDs  GI PPx: Protonix  Code Status: Full  POA:    Discharge Barriers: IR and GI not available for paracentesis or to evaluate patient for liver Bx. Pending downward trend of lipase levels. Care Plan discussed with: patient and nursing    Total time spent with patient: >35 minutes.

## 2021-12-25 NOTE — PROGRESS NOTES
Pulmonary and Critical Care progress note    Subjective:   Consult Note: 12/25/2021 @no control      Chief Complaint:   Chief Complaint   Patient presents with    Abdominal Pain        This patient [facility] and [facility] Patient seen and examined  Overnight events noted    Lying in bed comfortably  Awake and alert  On 2 L nasal cannula oxygen  Gradual improvement in respiratory status      Review of Systems:  A comprehensive review of systems was negative except for that written in the HPI.      Current Facility-Administered Medications   Medication Dose Route Frequency Provider Last Rate Last Admin    zinc oxide-white petrolatum 17-57 % topical paste   Topical TID Adelfo Doshi PA-C   Given at 12/25/21 5041    cyclobenzaprine (FLEXERIL) tablet 10 mg  10 mg Oral TID PRN Marcelo Padron PA-C   10 mg at 12/25/21 2442    HYDROmorphone (DILAUDID) injection 1 mg  1 mg IntraVENous Q4H PRN Minor Stains, NP   1 mg at 12/25/21 0940    oxyCODONE IR (ROXICODONE) tablet 5 mg  5 mg Oral Q6H PRN Minor Stains, NP   5 mg at 12/25/21 4544    nystatin (MYCOSTATIN) 100,000 unit/gram cream   Topical BID Diana Castellanos MD   Given at 12/25/21 9241    docusate sodium (COLACE) capsule 100 mg  100 mg Oral BID Fabrice Jernigan NP   100 mg at 12/25/21 3642    polyethylene glycol (MIRALAX) packet 17 g  17 g Oral DAILY Fabrice Jernigan NP   17 g at 12/23/21 0924    sorbitoL 70 % solution 30 mL  30 mL Oral DAILY PRN Fabrice Jernigan NP        meropenem (MERREM) 1 g in 0.9% sodium chloride 20 mL IV syringe  1 g IntraVENous Q8H Diana Castellanos MD   1 g at 12/25/21 8162    bisacodyL (DULCOLAX) suppository 10 mg  10 mg Rectal DAILY PRN Minor Stains, NP        hydrALAZINE (APRESOLINE) 20 mg/mL injection 20 mg  20 mg IntraVENous Q6H PRN Huyen Crane, PA        pantoprazole (PROTONIX) 40 mg in 0.9% sodium chloride 10 mL injection  40 mg IntraVENous DAILY [place], [place]   40 mg at 12/25/21 9995    guaiFENesin ER (MUCINEX) tablet 600 mg  600 mg Oral Q12H Huyen Crane PA   600 mg at 12/25/21 4020    albuterol-ipratropium (DUO-NEB) 2.5 MG-0.5 MG/3 ML  3 mL Nebulization Q6H PRN Huyen Crane PA        chlorthalidone (HYGROTON) tablet 25 mg  25 mg Oral DAILY [place], [place]   25 mg at 12/25/21 9320    0.9% sodium chloride infusion  100 mL/hr IntraVENous CONTINUOUS Zarefrojas Jan A,  mL/hr at 12/25/21 0930 100 mL/hr at 12/25/21 0930    albuterol (PROVENTIL HFA, VENTOLIN HFA, PROAIR HFA) inhaler 2 Puff  2 Puff Inhalation Q6H PRN Zabel Jan A, NP   2 Puff at 12/22/21 2133    ascorbic acid (vitamin C) (VITAMIN C) tablet 500 mg  500 mg Oral DAILY Zarefoss, Jan A, NP   500 mg at 12/11/21 0900    atorvastatin (LIPITOR) tablet 40 mg  40 mg Oral DAILY Zarefoss, Jan A, NP   40 mg at 12/17/21 0948    cholecalciferol (VITAMIN D3) (1000 Units /25 mcg) tablet 1,000 Units  1,000 Units Oral DAILY Zarefoss, Jan A, NP   1,000 Units at 12/24/21 0919    diazePAM (VALIUM) tablet 5 mg  5 mg Oral Q6H PRN Zarefoss Jan A, NP   5 mg at 12/23/21 2251    dilTIAZem ER (CARDIZEM CD) capsule 120 mg  120 mg Oral DAILY Zarefoss, Jan A, NP   120 mg at 12/25/21 4407    [Held by provider] apixaban (ELIQUIS) tablet 5 mg  5 mg Oral BID Zarefoss, Jan A, NP   5 mg at 12/20/21 1035    potassium chloride SR (KLOR-CON 10) tablet 20 mEq  20 mEq Oral DAILY Zarefoss, Jan A, NP   20 mEq at 12/25/21 2124    sertraline (ZOLOFT) tablet 25 mg  25 mg Oral DAILY Zarefoss, Jan A, NP   25 mg at 12/25/21 7451    traZODone (DESYREL) tablet 50 mg  50 mg Oral QHS Nolberto Farah NP   50 mg at 12/24/21 2121    ondansetron (ZOFRAN) injection 4 mg  4 mg IntraVENous Q6H PRN Nolberto Farah NP   4 mg at 12/24/21 1250    acetaminophen (TYLENOL) tablet 650 mg  650 mg Oral Q4H PRN Nolberto Farah NP        [Held by provider] 0.9% sodium chloride infusion  125 mL/hr IntraVENous CONTINUOUS Cris Zarate NP   Stopped at 21 1038    prochlorperazine (COMPAZINE) with saline injection 10 mg  10 mg IntraVENous Q6H PRN Nolberto Farah NP   10 mg at 21 0320          Allergies   Allergen Reactions    Adhesive Tape-Silicones Atopic Dermatitis           Objective:     Blood pressure (!) 151/60, pulse (!) 105, temperature 98.4 °F (36.9 °C), resp. rate 18, height 5' 7\" (1.702 m), weight 102 kg (224 lb 13.9 oz), SpO2 98 %. Temp (24hrs), Av.2 °F (36.8 °C), Min:98 °F (36.7 °C), Max:98.4 °F (36.9 °C)      Intake and Output:  Current Shift: No intake/output data recorded. Last 3 Shifts:  1901 -  0700  In: 4170 [P.O.:1090; I.V.:3036]  Out: 2050 [Urine:0]    Intake/Output Summary (Last 24 hours) at 2021 1329  Last data filed at 2021 0550  Gross per 24 hour   Intake 2056 ml   Output 950 ml   Net 1106 ml        Physical Exam:     General: Lying in bed comfortably, no acute distress, morbidly obese  Eye: Reactive, symmetric  Throat and Neck: Supple  Lung: Reduced air entry bilaterally with prolonged exhalation but no wheezing. Crackles in both lung bases  Heart: S1+S2. No murmurs  Abdomen: soft, non-tender. Bowel sounds normal. No masses; obese  Extremities:  2+ lower extremity edema  : Not done  Skin: No cyanosis  Neurologic: A & O x3.   Grossly nonfocal  Psychiatric: Appropriate affect; coherent      Lab/Data Review:    Recent Results (from the past 24 hour(s))   GLUCOSE, POC    Collection Time: 21  4:53 PM   Result Value Ref Range    Glucose (POC) 94 65 - 117 mg/dL    Performed by Jeaneth Noonan    LIPASE    Collection Time: 21  9:34 AM   Result Value Ref Range    Lipase 2,484 (H) 73 - 393 U/L   CBC WITH AUTOMATED DIFF    Collection Time: 21  9:34 AM   Result Value Ref Range    WBC 14.1 (H) 3.6 - 11.0 K/uL    RBC 3.56 (L) 3.80 - 5.20 M/uL    HGB 10.8 (L) 11.5 - 16.0 g/dL    HCT 34.4 (L) 35.0 - 47.0 %    MCV 96.6 80.0 - 99.0 FL    MCH 30.3 26.0 - 34.0 PG    MCHC 31.4 30.0 - 36.5 g/dL    RDW 13.5 11.5 - 14.5 %    PLATELET 174 805 - 423 K/uL    MPV 10.5 8.9 - 12.9 FL    NRBC 0.0 0.0  WBC    ABSOLUTE NRBC 0.00 0.00 - 0.01 K/uL    NEUTROPHILS 91 (H) 32 - 75 %    LYMPHOCYTES 4 (L) 12 - 49 %    MONOCYTES 4 (L) 5 - 13 %    EOSINOPHILS 0 0 - 7 %    BASOPHILS 0 0 - 1 %    IMMATURE GRANULOCYTES 1 (H) 0 - 0.5 %    ABS. NEUTROPHILS 12.8 (H) 1.8 - 8.0 K/UL    ABS. LYMPHOCYTES 0.6 (L) 0.8 - 3.5 K/UL    ABS. MONOCYTES 0.6 0.0 - 1.0 K/UL    ABS. EOSINOPHILS 0.0 0.0 - 0.4 K/UL    ABS. BASOPHILS 0.0 0.0 - 0.1 K/UL    ABS. IMM. GRANS. 0.1 (H) 0.00 - 0.04 K/UL    DF AUTOMATED     C REACTIVE PROTEIN, QT    Collection Time: 12/25/21  9:34 AM   Result Value Ref Range    C-Reactive protein 18.30 (H) 0.00 - 0.60 mg/dL   PROCALCITONIN    Collection Time: 12/25/21  9:34 AM   Result Value Ref Range    Procalcitonin 0.32 (H) 0 ng/mL       CT ABD PELV W CONT   Final Result   1. There are increasing bilateral pleural effusions, increasing body wall   edema, and increasing ascites. These findings could be secondary to the third   spacing of fluids. The differential diagnosis for the ascites would include   pancreatic ascites with acute pancreatitis or metastatic disease to the   peritoneum. 2.  There is a biliary stent which is unchanged in its position traversing   throughout area of obstruction within the pancreatic head. 3.  There are multiple low-density lesions of the liver without short-term   interval changes consistent with metastatic disease. 4.  The right kidney is not identified and apparently the patient is status post   a previous right nephrectomy. 5.  There are bilateral adrenal masses suspect for metastatic disease without   short-term interval changes. CTA CHEST W OR W WO CONT   Final Result   No CT evidence for PE. Thoracic aorta atherosclerosis. CAD.        Moderate to large bilateral pleural effusions with associated RML, RLL, and LLL   compressive atelectasis. Abdominal ascites. DUPLEX LOWER EXT VENOUS BILAT   Final Result      XR CHEST PORT   Final Result   FINDINGS: IMPRESSION: 2 frontal views of the chest. Right PICC distal tip SVC   region. Enlarging cardiomegaly. Increasing vascular congestion. Interval development of   hypoinflation, pleural effusions, lower lung airspace edema and/or pneumonia. No   pneumothorax. No free air under the diaphragm. CT ABD PELV W CONT   Final Result   New moderate volume dependent pleural effusions with associated   lower lobe lung compressive atelectasis. Increasing ascites, moderate approaching large-volume   (fluid could be sampled if there is any clinical concern for bile content). High suspicion hepatic metastases. Similar appearance for the pancreas; Silastic stent in place. No pseudocyst formation. Unchanged appearance bilateral adrenal glands. No bowel obstruction. US ABD LTD   Final Result   Small amount of free fluid. Finding suggesting hemangioma in the   liver. Gallbladder wall thickening, adenomyomatosis, sludge and gallstones. Cholecystitis possible. Finding in the region of the pancreatic head as above. Other findings as above. CTA ABDOMEN PELV W CONT   Final Result   1. Ill-defined hypodense mass in the pancreas. There are inflammatory changes   and fluid adjacent to the pancreas or duodenum and stomach most likely related   to biopsy or focal pancreatitis. No pseudocyst formation, active bleeding or   other acute findings. 2. Enlarged adrenal glands left greater than right with noncontrast densities   consistent with adrenal adenomas. 3. Right nephrectomy. 4. Other findings as described. XR CHEST PORT   Final Result   No acute findings. CT Results  (Last 48 hours)               12/23/21 1741  CT ABD PELV W CONT Final result    Impression:  1.   There are increasing bilateral pleural effusions, increasing body wall   edema, and increasing ascites. These findings could be secondary to the third   spacing of fluids. The differential diagnosis for the ascites would include   pancreatic ascites with acute pancreatitis or metastatic disease to the   peritoneum. 2.  There is a biliary stent which is unchanged in its position traversing   throughout area of obstruction within the pancreatic head. 3.  There are multiple low-density lesions of the liver without short-term   interval changes consistent with metastatic disease. 4.  The right kidney is not identified and apparently the patient is status post   a previous right nephrectomy. 5.  There are bilateral adrenal masses suspect for metastatic disease without   short-term interval changes. Narrative: The study is a CT evaluation of the abdomen and pelvis dated 12/23/2021. HISTORY: Severe abdominal pain, worsening pancreatitis. Technique: 3 mm axial imaging was acquired through the abdomen and pelvis   following the administration of  100 mL of Isovue-370 contrast material..   Sagittal and coronal reconstructed imaging is also submitted for interpretation. Dose Reduction Technique was employed to reduce radiation exposure - This   includes reduction optimization techniques as appropriate to a performed exam   with automated exposure control adjustments of the mA and/or Kv according to   patient size, or use of iterative reconstruction technique. COMPARISON: CT evaluation of the abdomen and pelvis dated 12/17/2021. LIVER AND SPLEEN: There are multiple smaller low-density lesions of the liver   without significant short-term interval change. As previously suggested these   findings may reflect smaller metastases. There is a cirrhotic liver morphology. There is moderate splenomegaly. KIDNEYS:The patient is status post a previous right nephrectomy.  There is small   low-density foci within the left kidney without change most compatible with   small cysts although somewhat difficult to fully characterize given their size. There are multiple calcifications within the left renal hilum and central echo   fat compatible with intrarenal vascular calcifications. PANCREAS AND ADRENAL GLANDS: There is a pancreatic stent in place traversing a   masslike lesion within the pancreatic head. There is milder dilatation of the   pancreatic duct. The common bile duct is normal in caliber. There are bilateral   adrenal masses with a larger left adrenal mass most compatible with metastatic   disease. GALLBLADDER AND BILIARY TREE: There was contraction of the gallbladder. LOWER CHEST: There are enlargement bilateral pleural effusions associated with   passive atelectasis. There are moderate calcifications of the patient's coronary   arteries. VASCULARITY: There is a moderate to marked degree of calcified plaque formation   along the wall of the abdominal aorta and involving its branch vessels. BOWEL: There is thickening of the rugal folds. There also is a thickening of the   gastric antrum. The duodenal C-sweep images in a normal fashion. The proximal   jejunum is normal in caliber. The colon was not opacified with oral contrast material so an assessment of the   colon is limited. This examination is negative for obstruction of the colon. OTHER: There is increasing intra-abdominal fluid/ascites. This could be related   to pancreatitis versus metastatic disease to the peritoneum an additional   consideration is the third spacing of fluids secondary to hypoalbuminemia. CT EVALUATION OF THE PELVIS: There is increasing fluid within the pelvic region. The patient is status post a previous complete hysterectomy. The bladder was   distended but otherwise unremarkable. OSSEOUS STRUCTURES: There are degenerative changes of the hips and spine.  This   examination is negative for focal lytic or blastic lesions. There is prominent   facet arthropathy along the lower lumbosacral spine. Assessment:     1. Acute respiratory failure with hypoxia  2. Bilateral pleural effusions  3. COPD  4. Sepsis  5. Acute appendicitis  6. Acute on chronic congestive heart failure  7. Ascites  8. Pancreatic mass  9. Leukocytosis  10.   Morbid obesity    Plan:     Patient admitted to the hospital  We will be watching her closely    Currently on 2 L nasal cannula oxygen  Continue oxygen supplementation as needed to keep saturation above 92%    Patient has bilateral pleural effusions with associated atelectasis  This is due to volume overload and ascites from the pancreatitis and pancreatic mass  Continue with diuresis  Likely to benefit from paracentesis if felt to be appropriate by GI  Liver biopsy by IR pending  Intake and output charting  Check electrolytes and replace as needed    Possible sources of fever include pancreatitis and pyuria  Continue antibiotics  Culture sent  Appreciate ID input  Further changes in antibiotics based on clinical response and culture results    Lipase trending down  TPN if diet fails    DVT and GI prophylaxis    Questions of patient were answered at bedside in detail  Case discussed in detail with RN, RT, and care team  Thank you for involving me in the care of this patient  I will follow with you closely during hospitalization    Time spent more than 30 minutes in direct patient care with no overlap    Cedric Abbott MD  [facility]  12/25/2021  10:32 AM

## 2021-12-26 NOTE — PROGRESS NOTES
Ot treatment attempt at 14:40 however pt declined tx session at this time stating she has already done enough therapy and she has transferred <> [facility] twice today. Therapist educated pt on UE therex to perform while semi-supine in bed to increased overall strength. Educated pt on elevating garcia UE's to decreased edema noted in garcia UE's. Will continue to attempt tx session tomorrow.

## 2021-12-26 NOTE — PROGRESS NOTES
Curt Tomlinson NP on unit, informed of patient c/o bladder spasms and results of bladder scan; also informed patient up to [facility], voided 800ml of urine along with bowel movement; will continue to monitor; report of also given to Khushbu Cavanaugh RN

## 2021-12-26 NOTE — PROGRESS NOTES
Progress Note    Patient: Kris Woody MRN: [id number]  SSN: [id number]    YOB: 1945  Age: 78 y.o. Sex: female      Admit Date: 12/9/2021    LOS: 17 days     Subjective:   Patient followed for severe pancreatitis with worsening leukocytosis on Zosyn. She was switched to Meropenem because of pancreatitis. She remains afebrile. WBC increased today along with procal and CRP. Repeat CT Abdomen showed increasing ascites. Patient resting comfortably with no new complaints except for bruising and swellling in her left forearm. Objective:     Vitals:    12/25/21 1026 12/25/21 1523 12/25/21 2056 12/26/21 0157   BP:  110/61 134/63 139/72   Pulse:  97 89 (!) 101   Resp:  20 18 20   Temp:  97.9 °F (36.6 °C) 98.8 °F (37.1 °C) 99.1 °F (37.3 °C)   SpO2: 98% 97% 97% 97%   Weight:  229 lb 4.5 oz (104 kg)     Height:            Intake and Output:  Current Shift: 12/26 0701 - 12/26 1900  In: -   Out: 800 [Urine:800]  Last three shifts: 12/24 1901 - 12/26 0700  In: 4026 [P.O.:990; I.V.:3036]  Out: 1625 [Urine:1625]    Physical Exam:     Vitals and nursing note reviewed. Constitutional:       General: She is not in acute distress. Appearance: She is obese. She is ill-appearing. HENT:  Oropharynx unremarkable with no erythema or thrush  Abdominal:  Generalized abdominal tenderness with rebound   Genitourinary:  Vaginal area not examined     Comments: External urinary device  Musculoskeletal:      Right lower leg: No edema. Left lower leg: No edema. Comments: PICC line right upper arm   Skin: multiple ecchymoses on the upper extremities, edema left forearm     Findings: No rash. Comments: ?Stage 2 sacral wound   Neurological:      General: No focal deficit present. Mental Status: She is alert and oriented to person, place, and time. Psychiatric:         Mood and Affect: Mood normal.         Behavior: Behavior normal.         Thought Content:  Thought content normal. Judgment: Judgment normal.     Lab/Data Review:     WBC 14,100 <11,600 <9,800 <11,500  Urinalysis with WBC 10-20, Bacteria negative  Lipase 2,484 <2,684 < 1,397 <1,719    Procal 0.34 < 0.32 <0.21 <0.19 <0.25 < 0.14  CRP 23.40 <18.30 <14.20 <12.70 <15.00 < 23.80    Serum fungitell <31 Negative    Blood cultures (12/20) No growth 5 days  Urine culture (12/20) No growth FINAL     CT Abdomen/Pelvis (12/23)  1. There are increasing bilateral pleural effusions, increasing body wall  edema, and increasing ascites. These findings could be secondary to the third  spacing of fluids. The differential diagnosis for the ascites would include  pancreatic ascites with acute pancreatitis or metastatic disease to the  peritoneum. 2.  There is a biliary stent which is unchanged in its position traversing  throughout area of obstruction within the pancreatic head. 3.  There are multiple low-density lesions of the liver without short-term  interval changes consistent with metastatic disease. 4.  The right kidney is not identified and apparently the patient is status post  a previous right nephrectomy. 5.  There are bilateral adrenal masses suspect for metastatic disease without  short-term interval changes. Assessment:     Principal Problem:    Pancreatitis (12/9/2021)    Active Problems:    A-fib ([facility] [address] (11/16/2020)      CHF (congestive heart failure) ([facility] [address] (11/16/2020)      Hematemesis (12/12/2021)      History of peptic ulcer disease (12/12/2021)    1. Severe pancreatitis with markedly elevated lipase, resolving  2. Pancreatic mass, possible neoplasm  3. Biliary stent  4. Sepsis with worsening leukocytosis with elevated procal and CRP, resolving, Day #7 IV Meropenem  5. TPN (just started)  6. Moderate pyuria, negative bacteria, urine culture negative  7. Possible candida superinfection with vaginitis    Comment:    WBC, CRP and procal all continue to increase, but she remains afebrile.   Concerned for possible Candida superinfection though serum fungitell is negative. Plan:   1. Continue IV Meropenem for 7 more days  2. Start Anidulafungin empirically  3. Possible IR guided paracentesis tomorrow  4. In am, repeat CBC, procal and CRP  5.  Follow-up blood cultures     Signed By: Cyril Gross MD     December 26, 2021

## 2021-12-26 NOTE — PROGRESS NOTES
Pulmonary and Critical Care progress note    Subjective:   Consult Note: 12/26/2021 @no control      Chief Complaint:   Chief Complaint   Patient presents with    Abdominal Pain        This patient has been seen and evaluated at the request of Daria Costello, [facility]. Patient seen and examined  Overnight events noted    Lying in bed comfortably  Awake and alert  On 2 L nasal cannula oxygen  Gradual improvement in respiratory status      Review of Systems:  A comprehensive review of systems was negative except for that written in the HPI.      Current Facility-Administered Medications   Medication Dose Route Frequency Provider Last Rate Last Admin    0.45% sodium chloride infusion  50 mL/hr IntraVENous CONTINUOUS Te Greco PA-C 50 mL/hr at 12/26/21 0954 50 mL/hr at 12/26/21 0954    furosemide (LASIX) injection 40 mg  40 mg IntraVENous DAILY Te Greco PA-C   40 mg at 12/26/21 5237    zinc oxide-white petrolatum 17-57 % topical paste   Topical TID Renetta Koo PA-C   Given at 12/26/21 0830    cyclobenzaprine (FLEXERIL) tablet 10 mg  10 mg Oral TID PRN Te Greco PA-C   10 mg at 12/25/21 9936    HYDROmorphone (DILAUDID) injection 1 mg  1 mg IntraVENous Q4H PRN Franny Vale NP   1 mg at 12/26/21 1057    oxyCODONE IR (ROXICODONE) tablet 5 mg  5 mg Oral Q6H PRN Franny Vale NP   5 mg at 12/26/21 2897    nystatin (MYCOSTATIN) 100,000 unit/gram cream   Topical BID Beau Rojas MD   Given at 12/25/21 2101    docusate sodium (COLACE) capsule 100 mg  100 mg Oral BID Melina Mays NP   100 mg at 12/25/21 2100    polyethylene glycol (MIRALAX) packet 17 g  17 g Oral DAILY Melina Mays NP   17 g at 12/23/21 0924    sorbitoL 70 % solution 30 mL  30 mL Oral DAILY PRN Melina Mays NP        meropenem (MERREM) 1 g in 0.9% sodium chloride 20 mL IV syringe  1 g IntraVENous Q8H Beau Rojas MD   1 g at 12/26/21 0951    bisacodyL (DULCOLAX) suppository 10 mg  10 mg Rectal DAILY PRN Marcio Rein, NP        hydrALAZINE (APRESOLINE) 20 mg/mL injection 20 mg  20 mg IntraVENous Q6H PRN Huyen Crane PA        pantoprazole (PROTONIX) 40 mg in 0.9% sodium chloride 10 mL injection  40 mg IntraVENous DAILY FARZANA Mart   40 mg at 12/26/21 0951    guaiFENesin ER (MUCINEX) tablet 600 mg  600 mg Oral Q12H Huyen Crane PA   600 mg at 12/26/21 0950    albuterol-ipratropium (DUO-NEB) 2.5 MG-0.5 MG/3 ML  3 mL Nebulization Q6H PRN Huyen Crane PA        chlorthalidone (HYGROTON) tablet 25 mg  25 mg Oral DAILY FARZANA Mart   25 mg at 12/26/21 0951    albuterol (PROVENTIL HFA, VENTOLIN HFA, PROAIR HFA) inhaler 2 Puff  2 Puff Inhalation Q6H PRN Nolberto Farah, NP   2 Puff at 12/22/21 2133    ascorbic acid (vitamin C) (VITAMIN C) tablet 500 mg  500 mg Oral DAILY Zabel Nolberto NEGIN, NP   500 mg at 12/26/21 0950    atorvastatin (LIPITOR) tablet 40 mg  40 mg Oral DAILY Zabel Nolberto NEGIN, NP   40 mg at 12/26/21 0950    cholecalciferol (VITAMIN D3) (1000 Units /25 mcg) tablet 1,000 Units  1,000 Units Oral DAILY Zabel Nolberto NEGIN, NP   1,000 Units at 12/26/21 0951    diazePAM (VALIUM) tablet 5 mg  5 mg Oral Q6H PRN Nolberto Farah, NP   5 mg at 12/23/21 2251    dilTIAZem ER (CARDIZEM CD) capsule 120 mg  120 mg Oral DAILY Zabel Nolberto NEGIN, NP   120 mg at 12/26/21 0950    [Held by provider] apixaban (ELIQUIS) tablet 5 mg  5 mg Oral BID Nolberto Farah, NP   5 mg at 12/20/21 1035    potassium chloride SR (KLOR-CON 10) tablet 20 mEq  20 mEq Oral DAILY Zabel Nolberto NEGIN, NP   20 mEq at 12/26/21 0950    sertraline (ZOLOFT) tablet 25 mg  25 mg Oral DAILY Nolberto Farah NP   25 mg at 12/26/21 0951    traZODone (DESYREL) tablet 50 mg  50 mg Oral QHS Nolberto Farah NP   50 mg at 12/25/21 2100    ondansetron (ZOFRAN) injection 4 mg  4 mg IntraVENous Q6H PRN Nolberto Farah NP   4 mg at 12/25/21 2058    acetaminophen (TYLENOL) tablet 650 mg  650 mg Oral Q4H PRN Nolberto Farah, CONRADO        prochlorperazine (COMPAZINE) with saline injection 10 mg  10 mg IntraVENous Q6H PRN Nolberto Farah NP   10 mg at 21 0320          Allergies   Allergen Reactions    Adhesive Tape-Silicones Atopic Dermatitis           Objective:     Blood pressure 139/72, pulse (!) 101, temperature 99.1 °F (37.3 °C), resp. rate 20, height 5' 7\" (1.702 m), weight 104 kg (229 lb 4.5 oz), SpO2 97 %. Temp (24hrs), Av.6 °F (37 °C), Min:97.9 °F (36.6 °C), Max:99.1 °F (37.3 °C)      Intake and Output:  Current Shift: 701 - 1900  In: -   Out: 800 [Urine:800]  Last 3 Shifts: 1901 - 700  In: 8757 [P.O.:990; I.V.:3036]  Out: 1625 [Urine:1625]    Intake/Output Summary (Last 24 hours) at 2021 1307  Last data filed at 2021 1314  Gross per 24 hour   Intake 2630 ml   Output 2025 ml   Net 605 ml        Physical Exam:     General: Lying in bed comfortably, no acute distress, morbidly obese  Eye: Reactive, symmetric  Throat and Neck: Supple  Lung: Reduced air entry bilaterally with prolonged exhalation but no wheezing. Crackles in both lung bases  Heart: S1+S2. No murmurs  Abdomen: soft, non-tender. Bowel sounds normal. No masses; obese  Extremities:  2+ lower extremity edema  : Not done  Skin: No cyanosis  Neurologic: A & O x3. Grossly nonfocal  Psychiatric: Appropriate affect; coherent      Lab/Data Review:    No results found for this or any previous visit (from the past 24 hour(s)). CT ABD PELV W CONT   Final Result   1. There are increasing bilateral pleural effusions, increasing body wall   edema, and increasing ascites. These findings could be secondary to the third   spacing of fluids. The differential diagnosis for the ascites would include   pancreatic ascites with acute pancreatitis or metastatic disease to the   peritoneum.     2.  There is a biliary stent which is unchanged in its position traversing   throughout area of obstruction within the pancreatic head. 3.  There are multiple low-density lesions of the liver without short-term   interval changes consistent with metastatic disease. 4.  The right kidney is not identified and apparently the patient is status post   a previous right nephrectomy. 5.  There are bilateral adrenal masses suspect for metastatic disease without   short-term interval changes. CTA CHEST W OR W WO CONT   Final Result   No CT evidence for PE. Thoracic aorta atherosclerosis. CAD. Moderate to large bilateral pleural effusions with associated RML, RLL, and LLL   compressive atelectasis. Abdominal ascites. DUPLEX LOWER EXT VENOUS BILAT   Final Result      XR CHEST PORT   Final Result   FINDINGS: IMPRESSION: 2 frontal views of the chest. Right PICC distal tip SVC   region. Enlarging cardiomegaly. Increasing vascular congestion. Interval development of   hypoinflation, pleural effusions, lower lung airspace edema and/or pneumonia. No   pneumothorax. No free air under the diaphragm. CT ABD PELV W CONT   Final Result   New moderate volume dependent pleural effusions with associated   lower lobe lung compressive atelectasis. Increasing ascites, moderate approaching large-volume   (fluid could be sampled if there is any clinical concern for bile content). High suspicion hepatic metastases. Similar appearance for the pancreas; Silastic stent in place. No pseudocyst formation. Unchanged appearance bilateral adrenal glands. No bowel obstruction. US ABD LTD   Final Result   Small amount of free fluid. Finding suggesting hemangioma in the   liver. Gallbladder wall thickening, adenomyomatosis, sludge and gallstones. Cholecystitis possible. Finding in the region of the pancreatic head as above. Other findings as above. CTA ABDOMEN PELV W CONT   Final Result   1. Ill-defined hypodense mass in the pancreas.  There are inflammatory changes   and fluid adjacent to the pancreas or duodenum and stomach most likely related   to biopsy or focal pancreatitis. No pseudocyst formation, active bleeding or   other acute findings. 2. Enlarged adrenal glands left greater than right with noncontrast densities   consistent with adrenal adenomas. 3. Right nephrectomy. 4. Other findings as described. XR CHEST PORT   Final Result   No acute findings. XR CHEST PORT    (Results Pending)       CT Results  (Last 48 hours)    None            Assessment:     1. Acute respiratory failure with hypoxia  2. Bilateral pleural effusions  3. COPD  4. Sepsis  5. Acute appendicitis  6. Acute on chronic congestive heart failure  7. Ascites  8. Pancreatic mass  9. Leukocytosis  10.   Morbid obesity    Plan:     Patient admitted to the hospital  We will be watching her closely    Currently on 2 L nasal cannula oxygen  Continue oxygen supplementation as needed to keep saturation above 92%    Patient has bilateral pleural effusions with associated atelectasis  This is due to volume overload and ascites from the pancreatitis and pancreatic mass  Continue with diuresis  Likely to benefit from paracentesis if felt to be appropriate by GI  Liver biopsy by IR pending  Intake and output charting  Check electrolytes and replace as needed    Possible sources of fever include pancreatitis and pyuria  Continue antibiotics  Culture sent  Appreciate ID input  Further changes in antibiotics based on clinical response and culture results    Lipase trending down  TPN if diet fails    DVT and GI prophylaxis    Questions of patient were answered at bedside in detail  Case discussed in detail with RN, RT, and care team  Thank you for involving me in the care of this patient  I will follow with you closely during hospitalization    Time spent more than 30 minutes in direct patient care with no overlap    Sherice Mac MD  [facility]  12/26/2021  10:32 AM

## 2021-12-26 NOTE — PROGRESS NOTES
Hospitalist Progress Note    Subjective:   Daily Progress Note: 12/26/2021 12:21 PM    Hospital Course:  43-year-old female with a history of atrial fibrillation, peptic ulcer disease, CHF, COPD, renal cell carcinoma stage IV status post nephrectomy, GERD, COPD, Sojourn syndrome and essential hypertension who was presented to the emergency department for admission on 12/9/2021 after undergoing a EUS on 12/06 by Dr. Sharmin Kim with biopsy and subsequently developed severe pancreatitis. Patient's admission course has been complicated by ongoing pancreatitis and severe abdominal pain. CT scan of the abdomen pelvis revealed an ill-defined hypodense mass in the pancreas with fluid adjacent to the pancreas suggestive of focal pancreatitis. GI and oncology consulted. Patient had an episode of hematemesis as well. Patient remains on IV Protonix. Abdominal ultrasound also showed no hemodynamic drop in the liver. Gallbladder wall has been thickened with sludge and stones although cholecystitis is less likely at this point. Although continue to monitor closely. Patient was started on Zosyn and subsequently changed to meropenem as the patient developed leukocytosis. Patient also has elevation in her INR although had been receiving Eliquis. Patient started on p.o. diet although developed severe pain and have decreased her diet to clear liquid. General surgery following as well, Dr. Carey Chandler. Lipase worsening. CT abd/pelvis with PO and IV contrast showing increasing bilateral pleural effusion and increasing ascites. Also notes multiple low-density lesions of liver consistent with metastatic disease and bilateral adrenal masses suspected for metastatic disease. IR consulted for liver biopsy. Subjective:    Patient seen and examined sitting on bedside commode. Pain improved today. She does have upper and lower extremity edema.      Current Facility-Administered Medications   Medication Dose Route Frequency    0.45% sodium chloride infusion  50 mL/hr IntraVENous CONTINUOUS    furosemide (LASIX) injection 40 mg  40 mg IntraVENous DAILY    zinc oxide-white petrolatum 17-57 % topical paste   Topical TID    cyclobenzaprine (FLEXERIL) tablet 10 mg  10 mg Oral TID PRN    HYDROmorphone (DILAUDID) injection 1 mg  1 mg IntraVENous Q4H PRN    oxyCODONE IR (ROXICODONE) tablet 5 mg  5 mg Oral Q6H PRN    nystatin (MYCOSTATIN) 100,000 unit/gram cream   Topical BID    docusate sodium (COLACE) capsule 100 mg  100 mg Oral BID    polyethylene glycol (MIRALAX) packet 17 g  17 g Oral DAILY    sorbitoL 70 % solution 30 mL  30 mL Oral DAILY PRN    meropenem (MERREM) 1 g in 0.9% sodium chloride 20 mL IV syringe  1 g IntraVENous Q8H    bisacodyL (DULCOLAX) suppository 10 mg  10 mg Rectal DAILY PRN    hydrALAZINE (APRESOLINE) 20 mg/mL injection 20 mg  20 mg IntraVENous Q6H PRN    pantoprazole (PROTONIX) 40 mg in 0.9% sodium chloride 10 mL injection  40 mg IntraVENous DAILY    guaiFENesin ER (MUCINEX) tablet 600 mg  600 mg Oral Q12H    albuterol-ipratropium (DUO-NEB) 2.5 MG-0.5 MG/3 ML  3 mL Nebulization Q6H PRN    chlorthalidone (HYGROTON) tablet 25 mg  25 mg Oral DAILY    albuterol (PROVENTIL HFA, VENTOLIN HFA, PROAIR HFA) inhaler 2 Puff  2 Puff Inhalation Q6H PRN    ascorbic acid (vitamin C) (VITAMIN C) tablet 500 mg  500 mg Oral DAILY    atorvastatin (LIPITOR) tablet 40 mg  40 mg Oral DAILY    cholecalciferol (VITAMIN D3) (1000 Units /25 mcg) tablet 1,000 Units  1,000 Units Oral DAILY    diazePAM (VALIUM) tablet 5 mg  5 mg Oral Q6H PRN    dilTIAZem ER (CARDIZEM CD) capsule 120 mg  120 mg Oral DAILY    [Held by provider] apixaban (ELIQUIS) tablet 5 mg  5 mg Oral BID    potassium chloride SR (KLOR-CON 10) tablet 20 mEq  20 mEq Oral DAILY    sertraline (ZOLOFT) tablet 25 mg  25 mg Oral DAILY    traZODone (DESYREL) tablet 50 mg  50 mg Oral QHS    ondansetron (ZOFRAN) injection 4 mg  4 mg IntraVENous Q6H PRN    acetaminophen (TYLENOL) tablet 650 mg  650 mg Oral Q4H PRN    prochlorperazine (COMPAZINE) with saline injection 10 mg  10 mg IntraVENous Q6H PRN        Review of Systems  Constitutional: Positive for malaise/fatigue. Negative for fever. HENT: Negative. Respiratory: Positive for shortness of breath. Negative for cough. Cardiovascular: Negative for chest pain and leg swelling. Gastrointestinal: Positive for abdominal pain. Negative for nausea and vomiting. Genitourinary: No frequency, No dysuria, No hematuria  Integument/breast: No skin lesion(s)   Neurological: No Confusion, No headaches, No dizziness      Objective:     Visit Vitals  /72 (BP 1 Location: Left lower arm, BP Patient Position: At rest)   Pulse (!) 101   Temp 99.1 °F (37.3 °C)   Resp 20   Ht 5' 7\" (1.702 m)   Wt 104 kg (229 lb 4.5 oz)   SpO2 97%   BMI 35.91 kg/m²    O2 Flow Rate (L/min): 3 l/min O2 Device: Nasal cannula    Temp (24hrs), Av.6 °F (37 °C), Min:97.9 °F (36.6 °C), Max:99.1 °F (37.3 °C)       07 - 1900  In: -   Out: 800 [Urine:800]  1901 -  0700  In: 4026 [P.O.:990; I.V.:3036]  Out: 1625 [Urine:1625]    PHYSICAL EXAM:  Constitutional: No acute distress  Skin: Extremities and face reveal no rashes. Multiple areas of ecchymosis in upper extremities. HEENT: Sclerae anicteric. Extra-occular muscles are intact. No oral ulcers. The neck is supple and no masses. Cardiovascular: Regular rate and rhythm. +S1/S2. No murmur or gallop. Respiratory:  Rhonchi noted bilateral without wheezes. GI: Firm with hypoactive bowel sounds and tenderness to palpation in all 4 quadrant. Rectal: Deferred   Musculoskeletal: Upper and lower extremity peripheral edema present. Able to move all ext  Neurological:  Patient is alert and oriented x3. Cranial nerves II-XII grossly intact  Psychiatric: Mood appears appropriate       Data Review    No results found for this or any previous visit (from the past 24 hour(s)).     CT ABD PELV W CONT   Final Result   1. There are increasing bilateral pleural effusions, increasing body wall   edema, and increasing ascites. These findings could be secondary to the third   spacing of fluids. The differential diagnosis for the ascites would include   pancreatic ascites with acute pancreatitis or metastatic disease to the   peritoneum. 2.  There is a biliary stent which is unchanged in its position traversing   throughout area of obstruction within the pancreatic head. 3.  There are multiple low-density lesions of the liver without short-term   interval changes consistent with metastatic disease. 4.  The right kidney is not identified and apparently the patient is status post   a previous right nephrectomy. 5.  There are bilateral adrenal masses suspect for metastatic disease without   short-term interval changes. CTA CHEST W OR W WO CONT   Final Result   No CT evidence for PE. Thoracic aorta atherosclerosis. CAD. Moderate to large bilateral pleural effusions with associated RML, RLL, and LLL   compressive atelectasis. Abdominal ascites. DUPLEX LOWER EXT VENOUS BILAT   Final Result      XR CHEST PORT   Final Result   FINDINGS: IMPRESSION: 2 frontal views of the chest. Right PICC distal tip SVC   region. Enlarging cardiomegaly. Increasing vascular congestion. Interval development of   hypoinflation, pleural effusions, lower lung airspace edema and/or pneumonia. No   pneumothorax. No free air under the diaphragm. CT ABD PELV W CONT   Final Result   New moderate volume dependent pleural effusions with associated   lower lobe lung compressive atelectasis. Increasing ascites, moderate approaching large-volume   (fluid could be sampled if there is any clinical concern for bile content). High suspicion hepatic metastases. Similar appearance for the pancreas; Silastic stent in place. No pseudocyst formation.       Unchanged appearance bilateral adrenal glands. No bowel obstruction. US ABD LTD   Final Result   Small amount of free fluid. Finding suggesting hemangioma in the   liver. Gallbladder wall thickening, adenomyomatosis, sludge and gallstones. Cholecystitis possible. Finding in the region of the pancreatic head as above. Other findings as above. CTA ABDOMEN PELV W CONT   Final Result   1. Ill-defined hypodense mass in the pancreas. There are inflammatory changes   and fluid adjacent to the pancreas or duodenum and stomach most likely related   to biopsy or focal pancreatitis. No pseudocyst formation, active bleeding or   other acute findings. 2. Enlarged adrenal glands left greater than right with noncontrast densities   consistent with adrenal adenomas. 3. Right nephrectomy. 4. Other findings as described. XR CHEST PORT   Final Result   No acute findings. XR CHEST PORT    (Results Pending)       Principal Problem:    Pancreatitis (12/9/2021)    Active Problems:    A-fib ([address] (11/16/2020)      CHF (congestive heart failure) ([address] (11/16/2020)      Hematemesis (12/12/2021)      History of peptic ulcer disease (12/12/2021)        Assessment/Plan:     1. Acute pancreatitis  Status post EUS by Dr. Siva Bustillos 12 6  Continue pain medication  Lipase trending up  Consider TPN if diet fails  CT abd/pelvis with PO and IV contrast showing increasing bilateral pleural effusion and increasing ascites. Also notes multiple low-density lesions of liver consistent with metastatic disease and bilateral adrenal masses suspected for metastatic disease. IR consulted for liver biopsy   Continue IV merrem day #7 of 14   Will change IV fluids to 0.45% normal saline      2. CHF  Continue chlorthalidone 25 mg daily  Will add IV lasix 40 mg daily     3. Pancreatic mass  CA 19-9 greater than 4000     4. Hypokalemia - replete as needed     5.  COPD  Pulmonary consultation  Chronic respiratory failure with 2 L of oxygen via nasal cannula at home     6.  Leukocytosis  Started meropenem  Cultures negative     7. Supratherapeutic anticoagulation  INR normal   Vitamin K given  May be a side effect of using Eliquis recently and this may be an artificial INR. No obvious bleeding source  Hemoglobin stable      DVT Prophylaxis: SCDs  GI PPx: Protonix  Code Status: Full  POA:    Discharge Barriers: IR and GI not available for paracentesis or to evaluate patient for liver Bx. Pending downward trend of lipase levels. Care Plan discussed with: patient and nursing    Total time spent with patient: >35 minutes.

## 2021-12-27 NOTE — PROCEDURES
Interventional Radiology Brief Procedure Note    Patient: Juan David Snyder MRN: [id number]  SSN: [id number]    YOB: 1945  Age: 78 y.o.   Sex: female      Date of Procedure: 12/27/2021    Pre-Procedure Diagnosis: ascites, liver lesions    Post-Procedure Diagnosis: SAME    Procedure(s): Paracentesis    Brief Description of Procedure: US-guided paracentesis    Performed By: Huber Dave DO     Assistants: None    Anesthesia: Lidocaine    Estimated Blood Loss: Less than 10ml    Specimens: 60mL clear serous fluid submitted for c&s, cell count, and cytology    Implants: None    Findings: small volume ascites, total of 2300mL clear serous fluid drained    Complications: None

## 2021-12-27 NOTE — PROGRESS NOTES
Pulmonary and Critical Care progress note    Subjective:     Patient seen and examined  Overnight events noted    Lying in bed comfortably, she is awake and alert. On 3 L oxygen. She has oxygen at home which he uses as needed. History of tobacco abuse. Her breathing is about the same. Cough is nonproductive. She is asking for high-protein diet. According to the patient this is as per GI recommendations. Review of Systems:  A comprehensive review of systems was negative except for that written in the HPI.      Current Facility-Administered Medications   Medication Dose Route Frequency Provider Last Rate Last Admin    apixaban (ELIQUIS) tablet 5 mg  5 mg Oral BID Vera Allan PA-C   5 mg at 12/27/21 1200    0.45% sodium chloride infusion  50 mL/hr IntraVENous CONTINUOUS Vera Allan PA-C 50 mL/hr at 12/27/21 0516 50 mL/hr at 12/27/21 0516    furosemide (LASIX) injection 40 mg  40 mg IntraVENous DAILY Vera Allan PA-C   40 mg at 12/27/21 1054    anidulafungin (ERAXIS) 100 mg in 0.9% sodium chloride 130 mL IVPB  100 mg IntraVENous Q24H Vitor Aguirre MD        zinc oxide-white petrolatum 17-57 % topical paste   Topical TID Katie David PA-C   Given at 12/27/21 1108    cyclobenzaprine (FLEXERIL) tablet 10 mg  10 mg Oral TID PRN Vera Allan PA-C   10 mg at 12/25/21 1692    HYDROmorphone (DILAUDID) injection 1 mg  1 mg IntraVENous Q4H PRN Ainsley Paget, NP   1 mg at 12/27/21 0626    oxyCODONE IR (ROXICODONE) tablet 5 mg  5 mg Oral Q6H PRN Ainsley Paget, NP   5 mg at 12/27/21 1121    nystatin (MYCOSTATIN) 100,000 unit/gram cream   Topical BID Vitor Aguirre MD   Given at 12/27/21 1121    docusate sodium (COLACE) capsule 100 mg  100 mg Oral BID Salinas Grimaldo NP   100 mg at 12/26/21 2016    polyethylene glycol (MIRALAX) packet 17 g  17 g Oral DAILY Salinas Grimaldo NP   17 g at 12/23/21 0924    sorbitoL 70 % solution 30 mL  30 mL Oral DAILY PRN Fernando Adriana Bowels, NP        meropenem (MERREM) 1 g in 0.9% sodium chloride 20 mL IV syringe  1 g IntraVENous Q8H Osvaldo Lantigua MD   1 g at 12/27/21 1054    bisacodyL (DULCOLAX) suppository 10 mg  10 mg Rectal DAILY PRN Verneice Pain, NP        hydrALAZINE (APRESOLINE) 20 mg/mL injection 20 mg  20 mg IntraVENous Q6H PRN Huyen Crane PA        pantoprazole (PROTONIX) 40 mg in 0.9% sodium chloride 10 mL injection  40 mg IntraVENous DAILY Huyen Crane PA   40 mg at 12/27/21 1116    guaiFENesin ER (MUCINEX) tablet 600 mg  600 mg Oral Q12H Huyen Crane PA   600 mg at 12/26/21 2016    albuterol-ipratropium (DUO-NEB) 2.5 MG-0.5 MG/3 ML  3 mL Nebulization Q6H PRN Huyen Crane PA        chlorthalidone (HYGROTON) tablet 25 mg  25 mg Oral DAILY FARZANA Pineda   25 mg at 12/27/21 1055    albuterol (PROVENTIL HFA, VENTOLIN HFA, PROAIR HFA) inhaler 2 Puff  2 Puff Inhalation Q6H PRN Sofi Nolberto NEGIN, NP   2 Puff at 12/27/21 2333    ascorbic acid (vitamin C) (VITAMIN C) tablet 500 mg  500 mg Oral DAILY Zarefoss Jan A, NP   500 mg at 12/27/21 0900    atorvastatin (LIPITOR) tablet 40 mg  40 mg Oral DAILY Zarefoss Jan A, NP   40 mg at 12/26/21 0950    cholecalciferol (VITAMIN D3) (1000 Units /25 mcg) tablet 1,000 Units  1,000 Units Oral DAILY Zabel Jan A, NP   1,000 Units at 12/27/21 1055    diazePAM (VALIUM) tablet 5 mg  5 mg Oral Q6H PRN Sofi Jan A, NP   5 mg at 12/27/21 0144    dilTIAZem ER (CARDIZEM CD) capsule 120 mg  120 mg Oral DAILY Zabel Jan A, NP   120 mg at 12/27/21 1055    potassium chloride SR (KLOR-CON 10) tablet 20 mEq  20 mEq Oral DAILY Zabel Nolberto NEGIN, NP   20 mEq at 12/27/21 1116    sertraline (ZOLOFT) tablet 25 mg  25 mg Oral DAILY Zabel Jan NEGIN, NP   25 mg at 12/27/21 1116    traZODone (DESYREL) tablet 50 mg  50 mg Oral QHS Zabel Jan NEGIN, NP   50 mg at 12/26/21 2017    ondansetron (ZOFRAN) injection 4 mg  4 mg IntraVENous Q6H PRN Sofi, Nolberto KAM CONRADO   4 mg at 21    acetaminophen (TYLENOL) tablet 650 mg  650 mg Oral Q4H PRN Nolberto Farah NP        prochlorperazine (COMPAZINE) with saline injection 10 mg  10 mg IntraVENous Q6H PRN Nolberto Farah NP   10 mg at 21 0320          Allergies   Allergen Reactions    Adhesive Tape-Silicones Atopic Dermatitis           Objective:     Blood pressure (!) 99/59, pulse 93, temperature 99.1 °F (37.3 °C), resp. rate 18, height 5' 7\" (1.702 m), weight 102 kg (224 lb 13.9 oz), SpO2 98 %. Temp (24hrs), Av.4 °F (36.9 °C), Min:98.1 °F (36.7 °C), Max:99.1 °F (37.3 °C)      Intake and Output:  Current Shift: No intake/output data recorded. Last 3 Shifts:  1901 -  0700  In: 1857.5 [P.O.:150; I.V.:1707.5]  Out: 4025 [Urine:4025]    Intake/Output Summary (Last 24 hours) at 2021 1423  Last data filed at 2021 0518  Gross per 24 hour   Intake 687.5 ml   Output 2600 ml   Net -1912.5 ml        Physical Exam:     General: Lying in bed comfortably, no acute distress, morbidly obese  Eye: Reactive, symmetric  Throat and Neck: Supple  Lung: Reduced air entry bilaterally with prolonged exhalation but no wheezing. Crackles in both lung bases  Heart: S1+S2. No murmurs  Abdomen: soft, non-tender. Bowel sounds normal. No masses; obese  Extremities:  2+ lower extremity edema  : Not done  Skin: No cyanosis  Neurologic: A & O x3. Grossly nonfocal  Psychiatric: Appropriate affect; coherent      Lab/Data Review:    Recent Results (from the past 24 hour(s))   PROTHROMBIN TIME + INR    Collection Time: 21 11:52 AM   Result Value Ref Range    Prothrombin time 13.7 11.9 - 14.7 sec    INR 1.1 0.9 - 1.1         DUPLEX UPPER EXT VENOUS BILAT   Final Result      XR CHEST PORT   Final Result   Large effusions. Vascular congestion. CT ABD PELV W CONT   Final Result   1. There are increasing bilateral pleural effusions, increasing body wall   edema, and increasing ascites.  These findings could be secondary to the third   spacing of fluids. The differential diagnosis for the ascites would include   pancreatic ascites with acute pancreatitis or metastatic disease to the   peritoneum. 2.  There is a biliary stent which is unchanged in its position traversing   throughout area of obstruction within the pancreatic head. 3.  There are multiple low-density lesions of the liver without short-term   interval changes consistent with metastatic disease. 4.  The right kidney is not identified and apparently the patient is status post   a previous right nephrectomy. 5.  There are bilateral adrenal masses suspect for metastatic disease without   short-term interval changes. CTA CHEST W OR W WO CONT   Final Result   No CT evidence for PE. Thoracic aorta atherosclerosis. CAD. Moderate to large bilateral pleural effusions with associated RML, RLL, and LLL   compressive atelectasis. Abdominal ascites. DUPLEX LOWER EXT VENOUS BILAT   Final Result      XR CHEST PORT   Final Result   FINDINGS: IMPRESSION: 2 frontal views of the chest. Right PICC distal tip SVC   region. Enlarging cardiomegaly. Increasing vascular congestion. Interval development of   hypoinflation, pleural effusions, lower lung airspace edema and/or pneumonia. No   pneumothorax. No free air under the diaphragm. CT ABD PELV W CONT   Final Result   New moderate volume dependent pleural effusions with associated   lower lobe lung compressive atelectasis. Increasing ascites, moderate approaching large-volume   (fluid could be sampled if there is any clinical concern for bile content). High suspicion hepatic metastases. Similar appearance for the pancreas; Silastic stent in place. No pseudocyst formation. Unchanged appearance bilateral adrenal glands. No bowel obstruction. US ABD LTD   Final Result   Small amount of free fluid.   Finding suggesting hemangioma in the   liver. Gallbladder wall thickening, adenomyomatosis, sludge and gallstones. Cholecystitis possible. Finding in the region of the pancreatic head as above. Other findings as above. CTA ABDOMEN PELV W CONT   Final Result   1. Ill-defined hypodense mass in the pancreas. There are inflammatory changes   and fluid adjacent to the pancreas or duodenum and stomach most likely related   to biopsy or focal pancreatitis. No pseudocyst formation, active bleeding or   other acute findings. 2. Enlarged adrenal glands left greater than right with noncontrast densities   consistent with adrenal adenomas. 3. Right nephrectomy. 4. Other findings as described. XR CHEST PORT   Final Result   No acute findings. CT Results  (Last 48 hours)    None            Assessment:     1. Acute respiratory failure with hypoxia  2. Bilateral pleural effusions  3. COPD  4. Sepsis  5. Acute appendicitis  6. Acute on chronic congestive heart failure  7. Ascites  8. Pancreatic mass  9. Leukocytosis  10. Morbid obesity    Plan:     Patient admitted to the hospital  We will be watching her closely    Currently on 3 L nasal cannula oxygen  Continue oxygen supplementation as needed to keep saturation above 92%    Patient has bilateral pleural effusions with associated atelectasis  This is due to volume overload and ascites from the pancreatitis and pancreatic mass  Continue with diuresis  Likely to benefit from paracentesis if felt to be appropriate by GI  Liver biopsy by IR pending  Intake and output charting  Check electrolytes and replace as needed    Possible sources of fever include pancreatitis and pyuria  Continue antibiotics  Culture sent  Appreciate ID input  Further changes in antibiotics based on clinical response and culture results    Lipase trending down  TPN if diet fails  However the patient is still taking orally. She has been recommended a low-fat diet.     Dopplers of the upper extremities shows left superficial thrombophlebitis of the left median and antecubital veins. She is started on Eliquis.     Questions of the patient were answered at bedside in detail  Case discussed in detail with RN, and care team  Thank you for involving me in the care of this patient  I will follow with you closely during hospitalization    Time spent more than 30 minutes in direct patient care with no overlap    Bailee Maldonado MD  [facility]  12/27/2021

## 2021-12-27 NOTE — PROGRESS NOTES
CM reviewed the clinical record. Patient has been accepted by [facility] when medically stable. Patient needs her lipid levels to trend downward, her D Dimer is elevated and she is to get doppler studies and a liver bx. Patient will continue to be treated until she is clinically ready to discharge. CM will continue to follow patients progress.     Brigitte Vargas

## 2021-12-27 NOTE — CONSULTS
Patient had a focal pancreatitis her pain is composite of her pancreatic cancer and mild focal pancreatitis   Her pancreatic enzyme lipase on the sensitive assay are equivalant to 200 on the standard assay   No necrotizing or \"Severe Pancreatis\" note on CT scan. Try low fat diet or post pyloric feeding tube if patient unable to tolerate low fat diet.

## 2021-12-27 NOTE — PROGRESS NOTES
Problem: Falls - Risk of  Goal: *Absence of Falls  Description: Document Kailey Day Fall Risk and appropriate interventions in the flowsheet. Outcome: Progressing Towards Goal  Note: Fall Risk Interventions:  Mobility Interventions: Bed/chair exit alarm    Mentation Interventions: Bed/chair exit alarm    Medication Interventions: Patient to call before getting OOB    Elimination Interventions: Bed/chair exit alarm    History of Falls Interventions: Utilize gait belt for transfer/ambulation         Problem: Pain  Goal: *Control of Pain  Outcome: Progressing Towards Goal     Problem: Pressure Injury - Risk of  Goal: *Prevention of pressure injury  Description: Document Asim Scale and appropriate interventions in the flowsheet.   Outcome: Progressing Towards Goal  Note: Pressure Injury Interventions:  Sensory Interventions: Assess changes in LOC    Moisture Interventions: Absorbent underpads    Activity Interventions: Increase time out of bed    Mobility Interventions: HOB 30 degrees or less    Nutrition Interventions: Document food/fluid/supplement intake    Friction and Shear Interventions: Minimize layers

## 2021-12-27 NOTE — PROGRESS NOTES
Progress Note    Patient: Juan David Snyder MRN: [id number]  SSN: [id number]    YOB: 1945  Age: 78 y.o. Sex: female      Admit Date: 12/9/2021    LOS: 18 days     Subjective:   GI in consultation for Pancreatitis    Patient is resting at this time. She does report abdominal pain increased on the left side. No new lipase results at this time. She has been started on low fat diet. If unable to tolerate recommend post pyloric feeding tube. Discussed with patient but she states she does not really want that at this time. She was scheduled for liver biopsy but per patient IR did not want to biopsy the liver at this time. She did have a paracentesis today with removal of 2300 clear serous fluid from the right abdomen. Samples sent for cytology. No necrotizing or \"Severe Pancreatis\" note on CT scan.     paracentesis 12/27/2021:  with removal of 2300 clear serous fluid from the right abdomen. Duplex bilateral upper Extremities:   Right Upper Venous    No evidence of deep vein thrombosis. Upper extremity veins were visualized in the transverse and longitudinal views. The vessels showed normal color filling and compressibility. Doppler interrogation showed phasic and spontaneous flow. Left Upper Venous    Superficial thrombophlebitis noted within the left median/antecubital vein(s). CT abdomen/pelvis 12/23/2021: IMPRESSION  1.  There are increasing bilateral pleural effusions, increasing body wall  edema, and increasing ascites. These findings could be secondary to the third  spacing of fluids. The differential diagnosis for the ascites would include  pancreatic ascites with acute pancreatitis or metastatic disease to the  peritoneum. 2. Omelia Sovereign is a biliary stent which is unchanged in its position traversing throughout area of obstruction within the pancreatic head.   3. Omelia Sovereign are multiple low-density lesions of the liver without short-term  interval changes consistent with metastatic disease. 4.  The right kidney is not identified and apparently the patient is status post  a previous right nephrectomy. 5.  There are bilateral adrenal masses suspect for metastatic disease without  short-term interval changes. Objective:     Vitals:    12/27/21 1230 12/27/21 1445 12/27/21 1500 12/27/21 1516   BP: (!) 99/59 (!) 101/44  (!) 100/59   Pulse: 93 (!) 104  (!) 105   Resp: 18 18  18   Temp: 99.1 °F (37.3 °C)   99.2 °F (37.3 °C)   SpO2: 98% 100%  95%   Weight:       Height:   5' 7\" (1.702 m)         Intake and Output:  Current Shift: No intake/output data recorded. Last three shifts: 12/25 1901 - 12/27 0700  In: 1857.5 [P.O.:150; I.V.:1707.5]  Out: 4025 [Urine:4025]    Physical Exam:   Skin:  Extremities and face reveal no rashes. No chapin erythema. HEENT: Sclerae anicteric. Extra-occular muscles are intact. No abnormal pigmentation of the lips. The neck is supple. Cardiovascular: Regular rate and rhythm. Respiratory:  Comfortable breathing with no accessory muscle use. GI:  Abdomen nondistended, soft, and nontender. No enlargement of the liver or spleen. No masses palpable. Rectal:  Deferred  Musculoskeletal: Extremities have good range of motion. Neurological:  Gross memory appears intact. Patient is alert and oriented. Psychiatric:  Mood appears appropriate with judgement intact. Lymphatic:  No visible adenopathy      Lab/Data Review:  Recent Results (from the past 24 hour(s))   PROTHROMBIN TIME + INR    Collection Time: 12/27/21 11:52 AM   Result Value Ref Range    Prothrombin time 13.7 11.9 - 14.7 sec    INR 1.1 0.9 - 1.1                DUPLEX UPPER EXT VENOUS BILAT   Final Result      XR CHEST PORT   Final Result   Large effusions. Vascular congestion. CT ABD PELV W CONT   Final Result   1. There are increasing bilateral pleural effusions, increasing body wall   edema, and increasing ascites. These findings could be secondary to the third   spacing of fluids.  The differential diagnosis for the ascites would include   pancreatic ascites with acute pancreatitis or metastatic disease to the   peritoneum. 2.  There is a biliary stent which is unchanged in its position traversing   throughout area of obstruction within the pancreatic head. 3.  There are multiple low-density lesions of the liver without short-term   interval changes consistent with metastatic disease. 4.  The right kidney is not identified and apparently the patient is status post   a previous right nephrectomy. 5.  There are bilateral adrenal masses suspect for metastatic disease without   short-term interval changes. CTA CHEST W OR W WO CONT   Final Result   No CT evidence for PE. Thoracic aorta atherosclerosis. CAD. Moderate to large bilateral pleural effusions with associated RML, RLL, and LLL   compressive atelectasis. Abdominal ascites. DUPLEX LOWER EXT VENOUS BILAT   Final Result      XR CHEST PORT   Final Result   FINDINGS: IMPRESSION: 2 frontal views of the chest. Right PICC distal tip SVC   region. Enlarging cardiomegaly. Increasing vascular congestion. Interval development of   hypoinflation, pleural effusions, lower lung airspace edema and/or pneumonia. No   pneumothorax. No free air under the diaphragm. CT ABD PELV W CONT   Final Result   New moderate volume dependent pleural effusions with associated   lower lobe lung compressive atelectasis. Increasing ascites, moderate approaching large-volume   (fluid could be sampled if there is any clinical concern for bile content). High suspicion hepatic metastases. Similar appearance for the pancreas; Silastic stent in place. No pseudocyst formation. Unchanged appearance bilateral adrenal glands. No bowel obstruction. US ABD LTD   Final Result   Small amount of free fluid. Finding suggesting hemangioma in the   liver.   Gallbladder wall thickening, adenomyomatosis, sludge and gallstones. Cholecystitis possible. Finding in the region of the pancreatic head as above. Other findings as above. CTA ABDOMEN PELV W CONT   Final Result   1. Ill-defined hypodense mass in the pancreas. There are inflammatory changes   and fluid adjacent to the pancreas or duodenum and stomach most likely related   to biopsy or focal pancreatitis. No pseudocyst formation, active bleeding or   other acute findings. 2. Enlarged adrenal glands left greater than right with noncontrast densities   consistent with adrenal adenomas. 3. Right nephrectomy. 4. Other findings as described. XR CHEST PORT   Final Result   No acute findings. IR PARACENTESIS ABD W IMAGE    (Results Pending)       Assessment:     Principal Problem:    Pancreatitis (12/9/2021)    Active Problems:    A-fib ([address] (11/16/2020)      CHF (congestive heart failure) ([facility] [address] (11/16/2020)      Hematemesis (12/12/2021)      History of peptic ulcer disease (12/12/2021)        Plan:     Plan:   1. Pancreatitis      -low fat diet        -if unable to tolerate diet recommend post pyloric feeding tube      -IV hydration @ 100 ml/hr      -Lipase 2,484 on 12/25/21      -repeat Lipase in the am       -Dilaudid 1 mg every  4 hours as needed for pain       -Oxycodone 5 mg every 6 hours for breakthrough pain         - S/P paracentesis 12/27/21 with removal of 2300 ml clear serous  Fluid removed           -cytology pending  2. CHF      -Continue Chlorthalidone  3. COPD       -Continue home medications       -Albuterol as needed   4. Pancreatic Mass      - > 4000      -S/p fine needle aspiration suspicious for neoplasia but not diagnostic      -When stable Oncology plan for out patient followup with advanced oncology surgeon  At  or [facility] for resection considerations       -CT concern for liver metastais/lesions        - IR for liver biopsy possibly  5.  Hematemesis (resolved)      -Monitor H&H      -Transfuse as needed      -hgB 10.8 12/25/2021      -Continue Protonix 40 mg daily  6. Afib      -Rate is controlled      -eliquis  BID/ HgB stable at 10.8       -no further hematemesis reported    Thank you for allowing me to participate in this patients care. Plan discussed with Dr. Zeeshan Guardado and he approves.     Signed By: Tashi Poe NP     December 27, 2021

## 2021-12-27 NOTE — PROGRESS NOTES
Comprehensive Nutrition Assessment    Type and Reason for Visit: Reassess (interim)    Nutrition Recommendations/Plan:   Adjust diet to GI Soft, Low fat    Document all PO intakes in I/Os    Nutrition Assessment:  PMHx: COPD, CHF, obesity, anemia. Admit with abd pain, n/v, diarrhea and poor intake. Pt reported eating well pta however unable to elaborate further. Underwent EUS 12/6 showing a lesion in the head of the pancreas. US ABD sludge-filled gallbladder, biliary obstruction. Lipase continues to rise- no new pancreatic findings on CT abd; note increasing ascites. NPO/ CL x 9d. Received PPN 12/18, 12/19 at low rate- providing <25% est nutrient needs. Diet advanced to Regular (12/20), then reduced to Full Lq 'low fat.' Anglin Sinner of Full Lq diet is high fat. Further downgraded to Clear Lq (12/23) d/t increasing abd pain. Now on Regular, low fat diet. Labs: Na 135, . Meds: Vitamin C, Lipitor, Vit D3, protonix, Zosyn IV, PPN, Miralax, docusate, diuladid, merrem, PPI, KCl     Malnutrition Assessment:  Malnutrition Status:  Mild malnutrition    Context:  Acute illness     Findings of the 6 clinical characteristics of malnutrition:   Energy Intake:  7 - 50% or less of est energy requirements for 5 or more days  Weight Loss:  1 - 1% to 2% over 1 week     Body Fat Loss:  No significant body fat loss,     Muscle Mass Loss:  No significant muscle mass loss,    Fluid Accumulation:  No significant fluid accumulation,      Estimated Daily Nutrient Needs:  Energy (kcal): 2050kcal (20kcal/kg); Weight Used for Energy Requirements: Current  Protein (g): 105g (1g/kg); Weight Used for Protein Requirements: Current  Fluid (ml/day): 2050mL; Method Used for Fluid Requirements: 1 ml/kcal    Nutrition Related Findings:  Pt obese. appears well nourished. NFPE not performed. BM documented 12/26 soft. no edema. Intermittent nausea- no recent emesis.       Wounds:    None       Current Nutrition Therapies:  ADULT DIET Regular; Low Fat/Low Chol/High Fiber/MELYSSA    Anthropometric Measures:  · Height:  5' 7\" (170.2 cm)  · Current Body Wt:  102 kg (224 lb 13.9 oz) (12/27)   · Admission Body Wt:  216 lb 0.8 oz    · Usual Body Wt:  117.6 kg (259 lb 4.2 oz) (>1 year ago)     · Ideal Body Wt:  135 lbs:  166.6 %   · BMI Category:  Obese class 2 (BMI 35.0-39. 9)     Wt Readings from Last 3 Encounters:   12/27/21 102 kg (224 lb 13.9 oz)   12/06/21 84.4 kg (186 lb)   11/02/21 86.6 kg (191 lb)     Nutrition Diagnosis:   · Inadequate protein-energy intake related to impaired nutrient utilization as evidenced by nutrition support-enteral nutrition    Nutrition Interventions:   Food and/or Nutrient Delivery: Modify current diet  Nutrition Education and Counseling: No recommendations at this time  Coordination of Nutrition Care: Continue to monitor while inpatient    Goals:  Meet >50% EENs in 5-7 days, Lytes wnl, Maintain skin integrity       Nutrition Monitoring and Evaluation:   Behavioral-Environmental Outcomes: None identified  Food/Nutrient Intake Outcomes: Enteral nutrition intake/tolerance  Physical Signs/Symptoms Outcomes: Biochemical data,Weight    Discharge Planning:     Too soon to determine     Electronically signed by Brannon Hampton on 12/27/2021 at 2:53 PM    Contact:

## 2021-12-27 NOTE — PROGRESS NOTES
Progress Note    Patient: Mukesh Byrne MRN: [id number]  SSN: [id number]    YOB: 1945  Age: 78 y.o. Sex: female      Admit Date: 12/9/2021    LOS: 18 days     Subjective:   Patient followed for severe pancreatitis with worsening leukocytosis on Zosyn. She was switched to Meropenem because of pancreatitis. She remains afebrile. WBC increased today along with procal and CRP. Repeat CT Abdomen showed increasing ascites. Patient resting comfortably with no new complaints. She underwent paracentesis earlier today with results pending. Objective:     Vitals:    12/26/21 1945 12/27/21 0140 12/27/21 0824 12/27/21 1230   BP: (!) 106/59 (!) 118/59  (!) 99/59   Pulse: 68 84  93   Resp: 20 18  18   Temp: 98.2 °F (36.8 °C) 98.1 °F (36.7 °C)  99.1 °F (37.3 °C)   SpO2: 96% 99% 98% 98%   Weight:  224 lb 13.9 oz (102 kg)     Height:            Intake and Output:  Current Shift: No intake/output data recorded. Last three shifts: 12/25 1901 - 12/27 0700  In: 1857.5 [P.O.:150; I.V.:1707.5]  Out: 3247 [Urine:4025]    Physical Exam:     Vitals and nursing note reviewed. Constitutional:       General: She is not in acute distress. Appearance: She is obese. She is ill-appearing. HENT:  Oropharynx unremarkable with no erythema or thrush  Abdominal:  Generalized abdominal tenderness with rebound   Genitourinary:  Vaginal area not examined     Comments: External urinary device  Musculoskeletal:      Right lower leg: No edema. Left lower leg: No edema. Comments: PICC line right upper arm   Skin: multiple ecchymoses on the upper extremities, edema left forearm     Findings: No rash. Comments: ?Stage 2 sacral wound   Neurological:      General: No focal deficit present. Mental Status: She is alert and oriented to person, place, and time. Psychiatric:         Mood and Affect: Mood normal.         Behavior: Behavior normal.         Thought Content:  Thought content normal. Judgment: Judgment normal.     Lab/Data Review:     WBC 14,100 <11,600 <9,800 <11,500  Urinalysis with WBC 10-20, Bacteria negative  Lipase 2,484 <2,684 < 1,397 <1,719    Procal 0.34 < 0.32 <0.21 <0.19 <0.25 < 0.14  CRP 23.40 <18.30 <14.20 <12.70 <15.00 < 23.80    Serum fungitell <31 Negative    Blood cultures (12/20) No growth FINAL  Urine culture (12/20) No growth FINAL     CT Abdomen/Pelvis (12/23)  1. There are increasing bilateral pleural effusions, increasing body wall  edema, and increasing ascites. These findings could be secondary to the third  spacing of fluids. The differential diagnosis for the ascites would include  pancreatic ascites with acute pancreatitis or metastatic disease to the  peritoneum. 2.  There is a biliary stent which is unchanged in its position traversing  throughout area of obstruction within the pancreatic head. 3.  There are multiple low-density lesions of the liver without short-term  interval changes consistent with metastatic disease. 4.  The right kidney is not identified and apparently the patient is status post  a previous right nephrectomy. 5.  There are bilateral adrenal masses suspect for metastatic disease without  short-term interval changes. Assessment:     Principal Problem:    Pancreatitis (12/9/2021)    Active Problems:    A-fib ([facility] [address] (11/16/2020)      CHF (congestive heart failure) ([facility] [address] (11/16/2020)      Hematemesis (12/12/2021)      History of peptic ulcer disease (12/12/2021)    1. Severe pancreatitis with markedly elevated lipase, resolving  2. Pancreatic mass, possible neoplasm  3. Biliary stent  4. Sepsis with worsening leukocytosis with elevated procal and CRP, resolving, Day #8 IV Meropenem, Day #2 IV Anidulafungin (empiric)  5. TPN (just started)  6. Moderate pyuria, negative bacteria, urine culture negative  7. Possible candida superinfection with vaginitis  8.  Ascites, status post paracentesis    Comment:    WBC, CRP and procal all continue to increase, but she remains afebrile. Concerned for possible Candida superinfection though serum fungitell is negative. Plan:   1. Continue IV Meropenem for 6 more days  2. Continue Anidulafungin empirically  3.  Follow-up ascitic fluid studies and culture     Signed By: Lorenzo Gallegos MD     December 27, 2021

## 2021-12-27 NOTE — PROGRESS NOTES
Hospitalist Progress Note    Subjective:   Daily Progress Note: 12/27/2021 12:21 PM    Hospital Course:  68-year-old female with a history of atrial fibrillation, peptic ulcer disease, CHF, COPD, renal cell carcinoma stage IV status post nephrectomy, GERD, COPD, Sojourn syndrome and essential hypertension who was presented to the emergency department for admission on 12/9/2021 after undergoing a EUS on 12/06 by Dr. Jhon aPrdo with biopsy and subsequently developed severe pancreatitis. Patient's admission course has been complicated by ongoing pancreatitis and severe abdominal pain. CT scan of the abdomen pelvis revealed an ill-defined hypodense mass in the pancreas with fluid adjacent to the pancreas suggestive of focal pancreatitis. CTA chest showing no evidence of pulmonary embolism, but does note moderate to large bilateral pleural effusions in RML, RLL and LLL as well as ascites. GI and oncology consulted. Patient had an episode of hematemesis as well. Patient remains on IV Protonix. Abdominal ultrasound also showed no hemodynamic drop in the liver. Gallbladder wall has been thickened with sludge and stones although cholecystitis is less likely at this point. Although continue to monitor closely. Patient was started on Zosyn and subsequently changed to meropenem as the patient developed leukocytosis. Patient also has elevation in her INR although had been receiving Eliquis and therefore Eliquis was held. Patient started on p.o. diet although developed severe pain and have decreased her diet to clear liquid. General surgery following as well, Dr. Blue Morgan. Lipase worsening. CT abd/pelvis with PO and IV contrast showing increasing bilateral pleural effusion and increasing ascites. Also notes multiple low-density lesions of liver consistent with metastatic disease and bilateral adrenal masses suspected for metastatic disease. IR consulted for liver biopsy. D-dimer elevated >14.  Duplex US lower extremities negative for DVT. Duplex upper extremities showing superficial thrombophlebitis in left median/antecubital veins. Restarted on home Eliquis. Subjective:    Patient seen and examined sitting on bedside commode. Continues to have generalized pain with minimal movements.      Current Facility-Administered Medications   Medication Dose Route Frequency    0.45% sodium chloride infusion  50 mL/hr IntraVENous CONTINUOUS    furosemide (LASIX) injection 40 mg  40 mg IntraVENous DAILY    anidulafungin (ERAXIS) 100 mg in 0.9% sodium chloride 130 mL IVPB  100 mg IntraVENous Q24H    zinc oxide-white petrolatum 17-57 % topical paste   Topical TID    cyclobenzaprine (FLEXERIL) tablet 10 mg  10 mg Oral TID PRN    HYDROmorphone (DILAUDID) injection 1 mg  1 mg IntraVENous Q4H PRN    oxyCODONE IR (ROXICODONE) tablet 5 mg  5 mg Oral Q6H PRN    nystatin (MYCOSTATIN) 100,000 unit/gram cream   Topical BID    docusate sodium (COLACE) capsule 100 mg  100 mg Oral BID    polyethylene glycol (MIRALAX) packet 17 g  17 g Oral DAILY    sorbitoL 70 % solution 30 mL  30 mL Oral DAILY PRN    meropenem (MERREM) 1 g in 0.9% sodium chloride 20 mL IV syringe  1 g IntraVENous Q8H    bisacodyL (DULCOLAX) suppository 10 mg  10 mg Rectal DAILY PRN    hydrALAZINE (APRESOLINE) 20 mg/mL injection 20 mg  20 mg IntraVENous Q6H PRN    pantoprazole (PROTONIX) 40 mg in 0.9% sodium chloride 10 mL injection  40 mg IntraVENous DAILY    guaiFENesin ER (MUCINEX) tablet 600 mg  600 mg Oral Q12H    albuterol-ipratropium (DUO-NEB) 2.5 MG-0.5 MG/3 ML  3 mL Nebulization Q6H PRN    chlorthalidone (HYGROTON) tablet 25 mg  25 mg Oral DAILY    albuterol (PROVENTIL HFA, VENTOLIN HFA, PROAIR HFA) inhaler 2 Puff  2 Puff Inhalation Q6H PRN    ascorbic acid (vitamin C) (VITAMIN C) tablet 500 mg  500 mg Oral DAILY    atorvastatin (LIPITOR) tablet 40 mg  40 mg Oral DAILY    cholecalciferol (VITAMIN D3) (1000 Units /25 mcg) tablet 1,000 Units  1,000 Units Oral DAILY    diazePAM (VALIUM) tablet 5 mg  5 mg Oral Q6H PRN    dilTIAZem ER (CARDIZEM CD) capsule 120 mg  120 mg Oral DAILY    apixaban (ELIQUIS) tablet 5 mg  5 mg Oral BID    potassium chloride SR (KLOR-CON 10) tablet 20 mEq  20 mEq Oral DAILY    sertraline (ZOLOFT) tablet 25 mg  25 mg Oral DAILY    traZODone (DESYREL) tablet 50 mg  50 mg Oral QHS    ondansetron (ZOFRAN) injection 4 mg  4 mg IntraVENous Q6H PRN    acetaminophen (TYLENOL) tablet 650 mg  650 mg Oral Q4H PRN    prochlorperazine (COMPAZINE) with saline injection 10 mg  10 mg IntraVENous Q6H PRN        Review of Systems  Constitutional: Positive for malaise/fatigue. Negative for fever. HENT: Negative. Respiratory: Positive for shortness of breath. Negative for cough. Cardiovascular: Negative for chest pain and leg swelling. Gastrointestinal: Positive for abdominal pain. Negative for nausea and vomiting. Genitourinary: No frequency, No dysuria, No hematuria  Integument/breast: No skin lesion(s)   Neurological: No Confusion, No headaches, No dizziness      Objective:     Visit Vitals  BP (!) 118/59   Pulse 84   Temp 98.1 °F (36.7 °C)   Resp 18   Ht 5' 7\" (1.702 m)   Wt 102 kg (224 lb 13.9 oz)   SpO2 98%   BMI 35.22 kg/m²    O2 Flow Rate (L/min): 3 l/min O2 Device: Nasal cannula    Temp (24hrs), Av.3 °F (36.8 °C), Min:98.1 °F (36.7 °C), Max:98.6 °F (37 °C)      No intake/output data recorded.  1901 -  0700  In: 1857.5 [P.O.:150; I.V.:1707.5]  Out: 4025 [Urine:4025]    PHYSICAL EXAM:  Constitutional: No acute distress  Skin: Extremities and face reveal no rashes. Multiple areas of ecchymosis in upper extremities. HEENT: Sclerae anicteric. Extra-occular muscles are intact. No oral ulcers. The neck is supple and no masses. Cardiovascular: Regular rate and rhythm. +S1/S2. No murmur or gallop. Respiratory:  Rhonchi noted bilateral without wheezes.    GI: Firm with hypoactive bowel sounds and tenderness to palpation in all 4 quadrant. Rectal: Deferred   Musculoskeletal: Upper and lower extremity peripheral edema present. Able to move all ext  Neurological:  Patient is alert and oriented x3. Cranial nerves II-XII grossly intact  Psychiatric: Mood appears appropriate       Data Review    Recent Results (from the past 24 hour(s))   PROTHROMBIN TIME + INR    Collection Time: 12/26/21 11:15 AM   Result Value Ref Range    Prothrombin time 12.9 11.9 - 14.7 sec    INR 1.0 0.9 - 1.1     METABOLIC PANEL, COMPREHENSIVE    Collection Time: 12/26/21 11:22 AM   Result Value Ref Range    Sodium 135 (L) 136 - 145 mmol/L    Potassium 3.8 3.5 - 5.1 mmol/L    Chloride 96 (L) 97 - 108 mmol/L    CO2 33 (H) 21 - 32 mmol/L    Anion gap 6 5 - 15 mmol/L    Glucose 72 65 - 100 mg/dL    BUN 13 6 - 20 mg/dL    Creatinine 0.67 0.55 - 1.02 mg/dL    BUN/Creatinine ratio 19 12 - 20      GFR est AA >60 >60 ml/min/1.73m2    GFR est non-AA >60 >60 ml/min/1.73m2    Calcium 9.2 8.5 - 10.1 mg/dL    Bilirubin, total 0.6 0.2 - 1.0 mg/dL    AST (SGOT) 33 15 - 37 U/L    ALT (SGPT) 23 12 - 78 U/L    Alk. phosphatase 353 (H) 45 - 117 U/L    Protein, total 6.1 (L) 6.4 - 8.2 g/dL    Albumin 1.5 (L) 3.5 - 5.0 g/dL    Globulin 4.6 (H) 2.0 - 4.0 g/dL    A-G Ratio 0.3 (L) 1.1 - 2.2     C REACTIVE PROTEIN, QT    Collection Time: 12/26/21 11:22 AM   Result Value Ref Range    C-Reactive protein 23.40 (H) 0.00 - 0.60 mg/dL   PROCALCITONIN    Collection Time: 12/26/21 11:22 AM   Result Value Ref Range    Procalcitonin 0.34 (H) 0 ng/mL   D DIMER    Collection Time: 12/26/21 11:22 AM   Result Value Ref Range    D DIMER 14.24 (H) <0.50 ug/ml(FEU)   NT-PRO BNP    Collection Time: 12/26/21 11:22 AM   Result Value Ref Range    NT pro-BNP 1,513 (H) <125 pg/mL       DUPLEX UPPER EXT VENOUS BILAT   Final Result      XR CHEST PORT   Final Result   Large effusions. Vascular congestion. CT ABD PELV W CONT   Final Result   1.   There are increasing bilateral pleural effusions, increasing body wall edema, and increasing ascites. These findings could be secondary to the third   spacing of fluids. The differential diagnosis for the ascites would include   pancreatic ascites with acute pancreatitis or metastatic disease to the   peritoneum. 2.  There is a biliary stent which is unchanged in its position traversing   throughout area of obstruction within the pancreatic head. 3.  There are multiple low-density lesions of the liver without short-term   interval changes consistent with metastatic disease. 4.  The right kidney is not identified and apparently the patient is status post   a previous right nephrectomy. 5.  There are bilateral adrenal masses suspect for metastatic disease without   short-term interval changes. CTA CHEST W OR W WO CONT   Final Result   No CT evidence for PE. Thoracic aorta atherosclerosis. CAD. Moderate to large bilateral pleural effusions with associated RML, RLL, and LLL   compressive atelectasis. Abdominal ascites. DUPLEX LOWER EXT VENOUS BILAT   Final Result      XR CHEST PORT   Final Result   FINDINGS: IMPRESSION: 2 frontal views of the chest. Right PICC distal tip SVC   region. Enlarging cardiomegaly. Increasing vascular congestion. Interval development of   hypoinflation, pleural effusions, lower lung airspace edema and/or pneumonia. No   pneumothorax. No free air under the diaphragm. CT ABD PELV W CONT   Final Result   New moderate volume dependent pleural effusions with associated   lower lobe lung compressive atelectasis. Increasing ascites, moderate approaching large-volume   (fluid could be sampled if there is any clinical concern for bile content). High suspicion hepatic metastases. Similar appearance for the pancreas; Silastic stent in place. No pseudocyst formation. Unchanged appearance bilateral adrenal glands. No bowel obstruction.                   US ABD LTD   Final Result   Small amount of free fluid. Finding suggesting hemangioma in the   liver. Gallbladder wall thickening, adenomyomatosis, sludge and gallstones. Cholecystitis possible. Finding in the region of the pancreatic head as above. Other findings as above. CTA ABDOMEN PELV W CONT   Final Result   1. Ill-defined hypodense mass in the pancreas. There are inflammatory changes   and fluid adjacent to the pancreas or duodenum and stomach most likely related   to biopsy or focal pancreatitis. No pseudocyst formation, active bleeding or   other acute findings. 2. Enlarged adrenal glands left greater than right with noncontrast densities   consistent with adrenal adenomas. 3. Right nephrectomy. 4. Other findings as described. XR CHEST PORT   Final Result   No acute findings. Principal Problem:    Pancreatitis (12/9/2021)    Active Problems:    A-fib ([address] (11/16/2020)      CHF (congestive heart failure) ([facility] [address] (11/16/2020)      Hematemesis (12/12/2021)      History of peptic ulcer disease (12/12/2021)        Assessment/Plan:     1. Acute pancreatitis  Status post EUS by Dr. Stuart Osuna 12/06  Continue pain medication  Lipase variable, will likely be chronically elevated due to pancreatic mass  CT abd/pelvis with PO and IV contrast showing increasing bilateral pleural effusion and increasing ascites. Also notes multiple low-density lesions of liver consistent with metastatic disease and bilateral adrenal masses suspected for metastatic disease. IR consulted for liver biopsy   Continue IV merrem day #8 of 14   Will change IV fluids to 0.45% normal saline   May benefit from NG tube for nutrition     2. CHF  Continue chlorthalidone 25 mg daily  Will add IV lasix 40 mg daily     3. Pancreatic mass  CA 19-9 greater than 4000     4. Hypokalemia - replete as needed     5.  COPD  Pulmonary consultation  Chronic respiratory failure with 2 L of oxygen via nasal cannula at home     6.  Leukocytosis  Started meropenem  Cultures negative     7. Supratherapeutic anticoagulation  Resolved. INR normal.  Vitamin K given  May be a side effect of using Eliquis recently and this may be an artificial INR. No obvious bleeding source  Hemoglobin stable    8. Elevated d-dimer  - Likely multifactorial   - CTA chest negative for PE  - Duplex US lower extremities negative for DVT  - Duplex US upper extremities showing superficial thrombophlebitis in left median/antecubital veins  - Restarted on home Eliquis. DVT Prophylaxis: restarted on home Eliquis  GI PPx: Protonix  Code Status: Full  POA:    Discharge Barriers: IR and GI not available for paracentesis or to evaluate patient for liver Bx. Care Plan discussed with: patient and nursing    Total time spent with patient: >35 minutes.

## 2021-12-27 NOTE — PROGRESS NOTES
Problem: Falls - Risk of  Goal: *Absence of Falls  Description: Document Hyrum Fall Risk and appropriate interventions in the flowsheet.   Outcome: Progressing Towards Goal  Note: Fall Risk Interventions:  Mobility Interventions: Patient to call before getting OOB    Mentation Interventions: Increase mobility    Medication Interventions: Patient to call before getting OOB    Elimination Interventions: Call light in reach    History of Falls Interventions: Utilize gait belt for transfer/ambulation         Problem: Patient Education: Go to Patient Education Activity  Goal: Patient/Family Education  Outcome: Progressing Towards Goal

## 2021-12-28 NOTE — PROGRESS NOTES
Progress Note    Patient: Richy Lutz MRN: [id number]  SSN: [id number]    YOB: 1945  Age: 78 y.o. Sex: female      Admit Date: 12/9/2021    LOS: 19 days     Subjective:   GI in consultation for Pancreatitis. Patient seen resting at this time. No signs of distress noted. Her lipase is up and down. She was started on a low fat diet. Recommend  Post pyloric feeding tube if she does not tolerate low fat diet. Discussed with patient and at this time she does not really feel that she wants the feeding tube at this time. She underwent paracentesis yesterday in IR with removal of 2300 ml of clear serous fluid from the right abdomen. Samples sent to cytology with results pending. CT scan shows no necrotizing or \"Severe Pancreatitis\". She was scheduled for liver biopsy but IR does not feel that she would benefit from it at this time. Her blood pressure remains soft.    paracentesis 12/27/2021:  with removal of 2300 clear serous fluid from the right abdomen. CT abdomen/pelvis 12/23/2021: IMPRESSION  1.  There are increasing bilateral pleural effusions, increasing body wall  edema, and increasing ascites. These findings could be secondary to the third  spacing of fluids. The differential diagnosis for the ascites would include  pancreatic ascites with acute pancreatitis or metastatic disease to the  peritoneum.    2. Beckey Short is a biliary stent which is unchanged in its position traversing throughout area of obstruction within the pancreatic head. 3. Beckey Short are multiple low-density lesions of the liver without short-term  interval changes consistent with metastatic disease. 4.  The right kidney is not identified and apparently the patient is status post  a previous right nephrectomy. 5.  There are bilateral adrenal masses suspect for metastatic disease without  short-term interval changes.       Objective:     Vitals:    12/27/21 1516 12/27/21 2110 12/28/21 0105 12/28/21 0834   BP: (!) 100/59 102/63 (!) 101/53 (!) 93/57   Pulse: (!) 105 (!) 116 69 82   Resp: 18 20 18 18   Temp: 99.2 °F (37.3 °C) 98.2 °F (36.8 °C) 98.6 °F (37 °C) 98.5 °F (36.9 °C)   SpO2: 95% 93% 98% 98%   Weight:       Height:            Intake and Output:  Current Shift: 12/28 0701 - 12/28 1900  In: -   Out: 900 [Urine:900]  Last three shifts: 12/26 1901 - 12/28 0700  In: 8989 [P.O.:240; I.V.:1210]  Out: 3700 [Urine:3700]    Physical Exam:   Skin:  Extremities and face reveal no rashes. No chapin erythema. HEENT: Sclerae anicteric. Extra-occular muscles are intact. No abnormal pigmentation of the lips. The neck is supple. Cardiovascular: Regular rate and rhythm. Respiratory:  Comfortable breathing with no accessory muscle use. GI:  Abdomen nondistended, soft,   Rectal:  Deferred  Musculoskeletal: Generalized weakness  Neurological:  Gross memory appears intact. Patient is alert and oriented. Psychiatric:  Mood appears appropriate with judgement intact. Lymphatic:  No visible adenopathy      Lab/Data Review:  Recent Results (from the past 24 hour(s))   LIPASE    Collection Time: 12/27/21  5:17 PM   Result Value Ref Range    Lipase >3,000 (H) 73 - 393 U/L   D DIMER    Collection Time: 12/27/21  5:17 PM   Result Value Ref Range    D DIMER 11.52 (H) <0.50 ug/ml(FEU)   METABOLIC PANEL, COMPREHENSIVE    Collection Time: 12/27/21  5:17 PM   Result Value Ref Range    Sodium 135 (L) 136 - 145 mmol/L    Potassium 3.6 3.5 - 5.1 mmol/L    Chloride 93 (L) 97 - 108 mmol/L    CO2 35 (H) 21 - 32 mmol/L    Anion gap 7 5 - 15 mmol/L    Glucose 72 65 - 100 mg/dL    BUN 14 6 - 20 mg/dL    Creatinine 0.57 0.55 - 1.02 mg/dL    BUN/Creatinine ratio 25 (H) 12 - 20      GFR est AA >60 >60 ml/min/1.73m2    GFR est non-AA >60 >60 ml/min/1.73m2    Calcium 8.8 8.5 - 10.1 mg/dL    Bilirubin, total 0.6 0.2 - 1.0 mg/dL    AST (SGOT) 28 15 - 37 U/L    ALT (SGPT) 21 12 - 78 U/L    Alk.  phosphatase 328 (H) 45 - 117 U/L    Protein, total 4.8 (L) 6.4 - 8.2 g/dL Albumin 1.3 (L) 3.5 - 5.0 g/dL    Globulin 3.5 2.0 - 4.0 g/dL    A-G Ratio 0.4 (L) 1.1 - 2.2     C REACTIVE PROTEIN, QT    Collection Time: 12/27/21  5:17 PM   Result Value Ref Range    C-Reactive protein 14.80 (H) 0.00 - 0.60 mg/dL   CBC WITH AUTOMATED DIFF    Collection Time: 12/27/21  6:00 PM   Result Value Ref Range    WBC 8.9 3.6 - 11.0 K/uL    RBC 3.59 (L) 3.80 - 5.20 M/uL    HGB 10.7 (L) 11.5 - 16.0 g/dL    HCT 34.8 (L) 35.0 - 47.0 %    MCV 96.9 80.0 - 99.0 FL    MCH 29.8 26.0 - 34.0 PG    MCHC 30.7 30.0 - 36.5 g/dL    RDW 13.3 11.5 - 14.5 %    PLATELET 147 770 - 899 K/uL    MPV 10.8 8.9 - 12.9 FL    NRBC 0.0 0.0  WBC    ABSOLUTE NRBC 0.00 0.00 - 0.01 K/uL    NEUTROPHILS 92 (H) 32 - 75 %    LYMPHOCYTES 4 (L) 12 - 49 %    MONOCYTES 3 (L) 5 - 13 %    EOSINOPHILS 0 0 - 7 %    BASOPHILS 0 0 - 1 %    IMMATURE GRANULOCYTES 1 (H) 0 - 0.5 %    ABS. NEUTROPHILS 8.1 (H) 1.8 - 8.0 K/UL    ABS. LYMPHOCYTES 0.4 (L) 0.8 - 3.5 K/UL    ABS. MONOCYTES 0.3 0.0 - 1.0 K/UL    ABS. EOSINOPHILS 0.0 0.0 - 0.4 K/UL    ABS. BASOPHILS 0.0 0.0 - 0.1 K/UL    ABS. IMM. GRANS. 0.1 (H) 0.00 - 0.04 K/UL    DF AUTOMATED     PROCALCITONIN    Collection Time: 12/27/21  6:00 PM   Result Value Ref Range    Procalcitonin 0.33 (H) 0 ng/mL   METABOLIC PANEL, COMPREHENSIVE    Collection Time: 12/28/21  8:31 AM   Result Value Ref Range    Sodium 133 (L) 136 - 145 mmol/L    Potassium 3.4 (L) 3.5 - 5.1 mmol/L    Chloride 90 (L) 97 - 108 mmol/L    CO2 38 (H) 21 - 32 mmol/L    Anion gap 5 5 - 15 mmol/L    Glucose 93 65 - 100 mg/dL    BUN 13 6 - 20 mg/dL    Creatinine 0.63 0.55 - 1.02 mg/dL    BUN/Creatinine ratio 21 (H) 12 - 20      GFR est AA >60 >60 ml/min/1.73m2    GFR est non-AA >60 >60 ml/min/1.73m2    Calcium 8.2 (L) 8.5 - 10.1 mg/dL    Bilirubin, total 0.6 0.2 - 1.0 mg/dL    AST (SGOT) 30 15 - 37 U/L    ALT (SGPT) 20 12 - 78 U/L    Alk.  phosphatase 331 (H) 45 - 117 U/L    Protein, total 4.5 (L) 6.4 - 8.2 g/dL    Albumin 1.3 (L) 3.5 - 5.0 g/dL    Globulin 3.2 2.0 - 4.0 g/dL    A-G Ratio 0.4 (L) 1.1 - 2.2     CBC WITH AUTOMATED DIFF    Collection Time: 12/28/21  8:31 AM   Result Value Ref Range    WBC 8.1 3.6 - 11.0 K/uL    RBC 3.06 (L) 3.80 - 5.20 M/uL    HGB 9.3 (L) 11.5 - 16.0 g/dL    HCT 28.7 (L) 35.0 - 47.0 %    MCV 93.8 80.0 - 99.0 FL    MCH 30.4 26.0 - 34.0 PG    MCHC 32.4 30.0 - 36.5 g/dL    RDW 13.4 11.5 - 14.5 %    PLATELET 170 228 - 806 K/uL    MPV 10.7 8.9 - 12.9 FL    NRBC 0.0 0.0  WBC    ABSOLUTE NRBC 0.00 0.00 - 0.01 K/uL    NEUTROPHILS 86 (H) 32 - 75 %    LYMPHOCYTES 8 (L) 12 - 49 %    MONOCYTES 5 5 - 13 %    EOSINOPHILS 1 0 - 7 %    BASOPHILS 0 0 - 1 %    IMMATURE GRANULOCYTES 0 0 - 0.5 %    ABS. NEUTROPHILS 7.0 1.8 - 8.0 K/UL    ABS. LYMPHOCYTES 0.6 (L) 0.8 - 3.5 K/UL    ABS. MONOCYTES 0.4 0.0 - 1.0 K/UL    ABS. EOSINOPHILS 0.1 0.0 - 0.4 K/UL    ABS. BASOPHILS 0.0 0.0 - 0.1 K/UL    ABS. IMM. GRANS. 0.0 0.00 - 0.04 K/UL    DF AUTOMATED     D DIMER    Collection Time: 12/28/21  8:31 AM   Result Value Ref Range    D DIMER 8.18 (H) <0.50 ug/ml(FEU)   PROTHROMBIN TIME + INR    Collection Time: 12/28/21  8:31 AM   Result Value Ref Range    Prothrombin time 15.3 (H) 11.9 - 14.7 sec    INR 1.2 (H) 0.9 - 1.1     C REACTIVE PROTEIN, QT    Collection Time: 12/28/21  8:31 AM   Result Value Ref Range    C-Reactive protein 12.10 (H) 0.00 - 0.60 mg/dL   LIPASE    Collection Time: 12/28/21  8:31 AM   Result Value Ref Range    Lipase 2,802 (H) 73 - 393 U/L              DUPLEX UPPER EXT VENOUS BILAT   Final Result      XR CHEST PORT   Final Result   Large effusions. Vascular congestion. CT ABD PELV W CONT   Final Result   1. There are increasing bilateral pleural effusions, increasing body wall   edema, and increasing ascites. These findings could be secondary to the third   spacing of fluids. The differential diagnosis for the ascites would include   pancreatic ascites with acute pancreatitis or metastatic disease to the   peritoneum. 2.  There is a biliary stent which is unchanged in its position traversing   throughout area of obstruction within the pancreatic head. 3.  There are multiple low-density lesions of the liver without short-term   interval changes consistent with metastatic disease. 4.  The right kidney is not identified and apparently the patient is status post   a previous right nephrectomy. 5.  There are bilateral adrenal masses suspect for metastatic disease without   short-term interval changes. CTA CHEST W OR W WO CONT   Final Result   No CT evidence for PE. Thoracic aorta atherosclerosis. CAD. Moderate to large bilateral pleural effusions with associated RML, RLL, and LLL   compressive atelectasis. Abdominal ascites. DUPLEX LOWER EXT VENOUS BILAT   Final Result      XR CHEST PORT   Final Result   FINDINGS: IMPRESSION: 2 frontal views of the chest. Right PICC distal tip SVC   region. Enlarging cardiomegaly. Increasing vascular congestion. Interval development of   hypoinflation, pleural effusions, lower lung airspace edema and/or pneumonia. No   pneumothorax. No free air under the diaphragm. CT ABD PELV W CONT   Final Result   New moderate volume dependent pleural effusions with associated   lower lobe lung compressive atelectasis. Increasing ascites, moderate approaching large-volume   (fluid could be sampled if there is any clinical concern for bile content). High suspicion hepatic metastases. Similar appearance for the pancreas; Silastic stent in place. No pseudocyst formation. Unchanged appearance bilateral adrenal glands. No bowel obstruction. US ABD LTD   Final Result   Small amount of free fluid. Finding suggesting hemangioma in the   liver. Gallbladder wall thickening, adenomyomatosis, sludge and gallstones. Cholecystitis possible. Finding in the region of the pancreatic head as above.     Other findings as above.      CTA ABDOMEN PELV W CONT   Final Result   1. Ill-defined hypodense mass in the pancreas. There are inflammatory changes   and fluid adjacent to the pancreas or duodenum and stomach most likely related   to biopsy or focal pancreatitis. No pseudocyst formation, active bleeding or   other acute findings. 2. Enlarged adrenal glands left greater than right with noncontrast densities   consistent with adrenal adenomas. 3. Right nephrectomy. 4. Other findings as described. XR CHEST PORT   Final Result   No acute findings. IR PARACENTESIS ABD W IMAGE    (Results Pending)       Assessment:     Principal Problem:    Pancreatitis (12/9/2021)    Active Problems:    A-fib ([address] (11/16/2020)      CHF (congestive heart failure) ([facility] [address] (11/16/2020)      Hematemesis (12/12/2021)      History of peptic ulcer disease (12/12/2021)        Plan:   1. Pancreatitis      -low fat diet        -if unable to tolerate diet recommend post pyloric feeding tube      -IV hydration       -Lipase 2,802 12/28/21      -repeat Lipase in the am       -Dilaudid 1 mg every  4 hours as needed for pain       -Oxycodone 5 mg every 6 hours for breakthrough pain         - S/P paracentesis 12/27/21 with removal of 2300 ml clear serous  Fluid removed           -cytology pending  2. CHF      -Continue Chlorthalidone  3. COPD       -Continue home medications       -Albuterol as needed   4. Pancreatic Mass      - > 4000      -S/p fine needle aspiration suspicious for neoplasia but not diagnostic      -When stable Oncology plan for out patient followup with advanced oncology surgeon Manjit Braros or [facility] for resection considerations       -CT concern for liver metastais/lesions        - IR for liver biopsy on hold at this time  5. Hematemesis (resolved)      -Monitor H&H      -Transfuse as needed      -hgB 9.3 12/28/21      -Continue Protonix 40 mg daily  6.  Afib      -Rate is controlled      -eliquis  BID/ HgB stable at 9.3 this am       -no further hematemesis reported    Thank you for allowing me to participate in this patients care. Plan discussed with Dr. Brian Lim and he approves.     Signed By: Monica Fermin NP     December 28, 2021

## 2021-12-28 NOTE — PROGRESS NOTES
Chart reviewed. Patient has been accepted to [facility] pending insurance [address]. CM will need updated PT and OT notes in order for [employer] to initiate auth.

## 2021-12-28 NOTE — PROGRESS NOTES
Progress Note    Patient: Demetrice Juares MRN: [id number]  SSN: [id number]    YOB: 1945  Age: 78 y.o. Sex: female      Admit Date: 12/9/2021    LOS: 19 days     Subjective:   Patient followed for severe pancreatitis with worsening leukocytosis on Zosyn. She was switched to Meropenem because of pancreatitis. Now with suspected Candida intraabdominal infection and a clinical response to Anidulafungin. Ascitic fluid culture pending. WBC now normal with decreasing CRP. Serum lipase remains elevated. Patient is asleep at this time but appears to be resting comfortably. Objective:     Vitals:    12/27/21 1516 12/27/21 2110 12/28/21 0105 12/28/21 0834   BP: (!) 100/59 102/63 (!) 101/53 (!) 93/57   Pulse: (!) 105 (!) 116 69 82   Resp: 18 20 18 18   Temp: 99.2 °F (37.3 °C) 98.2 °F (36.8 °C) 98.6 °F (37 °C) 98.5 °F (36.9 °C)   SpO2: 95% 93% 98% 98%   Weight:       Height:            Intake and Output:  Current Shift: 12/28 0701 - 12/28 1900  In: -   Out: 900 [Urine:900]  Last three shifts: 12/26 1901 - 12/28 0700  In: 6429 [P.O.:240; I.V.:1210]  Out: 3700 [Urine:3700]    Physical Exam:     Vitals and nursing note reviewed. Patient not re-examined today  Constitutional:       General: She is not in acute distress. Appearance: She is obese. She is ill-appearing. HENT:  Oropharynx unremarkable with no erythema or thrush  Abdominal:  Generalized abdominal tenderness with rebound   Genitourinary:  Vaginal area not examined     Comments: External urinary device  Musculoskeletal:      Right lower leg: No edema. Left lower leg: No edema. Comments: PICC line right upper arm   Skin: multiple ecchymoses on the upper extremities, edema left forearm     Findings: No rash. Comments: ?Stage 2 sacral wound   Neurological:      General: No focal deficit present. Mental Status: She is alert and oriented to person, place, and time.    Psychiatric:         Mood and Affect: Mood normal. Behavior: Behavior normal.         Thought Content: Thought content normal.         Judgment: Judgment normal.     Lab/Data Review:     WBC 8,100 <14,100 <11,600 <9,800 <11,500  Urinalysis with WBC 10-20, Bacteria negative  Lipase 2,802 <3,000 <2,484 <2,684 < 1,397 <1,719    Procal 0.34 < 0.32 <0.21 <0.19 <0.25 < 0.14  CRP 12.10 <23.40 <18.30 <14.20 <12.70 <15.00 < 23.80    Serum fungitell <31 Negative    Ascitic fluid   with 41% PMNs    Blood cultures (12/20) No growth FINAL  Urine culture (12/20) No growth FINAL  Ascitic fluid culture(12/27) Pending    Assessment:     Principal Problem:    Pancreatitis (12/9/2021)    Active Problems:    A-fib ([facility] [address] (11/16/2020)      CHF (congestive heart failure) ([facility] [place] [address] (11/16/2020)      Hematemesis (12/12/2021)      History of peptic ulcer disease (12/12/2021)    1. Severe pancreatitis with markedly elevated lipase, resolving  2. Pancreatic mass, possible neoplasm  3. Biliary stent  4. Sepsis with worsening leukocytosis with elevated procal and CRP, resolving, Day #9 IV Meropenem, Day #3 IV Anidulafungin (empiric)  5. TPN (just started)  6. Moderate pyuria, negative bacteria, urine culture negative  7. Possible candida superinfection with vaginitis  8. Ascites, status post paracentesis    Comment:    WBC and CRP have now decreased since starting Anidulafungin for possible Candida superinfection. Ascitic fluid culture pending; it had a neutrophilic pleocytosis. Plan:   1. Continue IV Meropenem for 5 more days  2. Continue Anidulafungin empirically  3.  Follow-up ascitic fluid studies and culture     Signed By: Adelaida Rod MD     December 28, 2021

## 2021-12-28 NOTE — PROGRESS NOTES
Problem: Falls - Risk of  Goal: *Absence of Falls  Description: Document [place] Fall Risk and appropriate interventions in the flowsheet.   Outcome: Progressing Towards Goal  Note: Fall Risk Interventions:  Mobility Interventions: Patient to call before getting OOB    Mentation Interventions: Door open when patient unattended    Medication Interventions: Patient to call before getting OOB    Elimination Interventions: Call light in reach    History of Falls Interventions: Consult care management for discharge planning

## 2021-12-28 NOTE — PROGRESS NOTES
Hospitalist Progress Note    Subjective:   Daily Progress Note: 12/28/2021 12:21 PM    Hospital Course:  72-year-old female with a history of atrial fibrillation, peptic ulcer disease, CHF, COPD, renal cell carcinoma stage IV status post nephrectomy, GERD, COPD, Sojourn syndrome and essential hypertension who was presented to the emergency department for admission on 12/9/2021 after undergoing a EUS on 12/06 by Dr. Rasheeda Barajas with biopsy and subsequently developed severe pancreatitis. Patient's admission course has been complicated by ongoing pancreatitis and severe abdominal pain. CT scan of the abdomen pelvis revealed an ill-defined hypodense mass in the pancreas with fluid adjacent to the pancreas suggestive of focal pancreatitis. CTA chest showing no evidence of pulmonary embolism, but does note moderate to large bilateral pleural effusions in RML, RLL and LLL as well as ascites. GI and oncology consulted. Patient had an episode of hematemesis as well. Patient remains on IV Protonix. Abdominal ultrasound also showed no hemodynamic drop in the liver. Gallbladder wall has been thickened with sludge and stones although cholecystitis is less likely at this point. Although continue to monitor closely. Patient was started on Zosyn and subsequently changed to meropenem as the patient developed leukocytosis. Patient also has elevation in her INR although had been receiving Eliquis and therefore Eliquis was held. Patient started on p.o. diet although developed severe pain and have decreased her diet to clear liquid. General surgery following as well, Dr. Carrasco [place]. Lipase worsening. CT abd/pelvis with PO and IV contrast showing increasing bilateral pleural effusion and increasing ascites. Also notes multiple low-density lesions of liver consistent with metastatic disease and bilateral adrenal masses suspected for metastatic disease. IR consulted for liver biopsy. D-dimer elevated >14.  Duplex US lower extremities negative for DVT. Duplex upper extremities showing superficial thrombophlebitis in left median/antecubital veins. Restarted on home Eliquis. Went for paracentesis on 12/27 with 2300 mL clear serous fluid drained. She was scheduled for liver biopsy with IR however IR does not feel patient would benefit from biopsy at this time. Subjective:    Patient seen and examined sitting on bedside commode. Improvement in pain.      Current Facility-Administered Medications   Medication Dose Route Frequency    midodrine (PROAMATINE) tablet 10 mg  10 mg Oral BID PRN    apixaban (ELIQUIS) tablet 5 mg  5 mg Oral BID    0.45% sodium chloride infusion  50 mL/hr IntraVENous CONTINUOUS    furosemide (LASIX) injection 40 mg  40 mg IntraVENous DAILY    anidulafungin (ERAXIS) 100 mg in 0.9% sodium chloride 130 mL IVPB  100 mg IntraVENous Q24H    zinc oxide-white petrolatum 17-57 % topical paste   Topical TID    cyclobenzaprine (FLEXERIL) tablet 10 mg  10 mg Oral TID PRN    HYDROmorphone (DILAUDID) injection 1 mg  1 mg IntraVENous Q4H PRN    oxyCODONE IR (ROXICODONE) tablet 5 mg  5 mg Oral Q6H PRN    nystatin (MYCOSTATIN) 100,000 unit/gram cream   Topical BID    docusate sodium (COLACE) capsule 100 mg  100 mg Oral BID    polyethylene glycol (MIRALAX) packet 17 g  17 g Oral DAILY    sorbitoL 70 % solution 30 mL  30 mL Oral DAILY PRN    meropenem (MERREM) 1 g in 0.9% sodium chloride 20 mL IV syringe  1 g IntraVENous Q8H    bisacodyL (DULCOLAX) suppository 10 mg  10 mg Rectal DAILY PRN    hydrALAZINE (APRESOLINE) 20 mg/mL injection 20 mg  20 mg IntraVENous Q6H PRN    pantoprazole (PROTONIX) 40 mg in 0.9% sodium chloride 10 mL injection  40 mg IntraVENous DAILY    guaiFENesin ER (MUCINEX) tablet 600 mg  600 mg Oral Q12H    albuterol-ipratropium (DUO-NEB) 2.5 MG-0.5 MG/3 ML  3 mL Nebulization Q6H PRN    chlorthalidone (HYGROTON) tablet 25 mg  25 mg Oral DAILY    albuterol (PROVENTIL HFA, VENTOLIN HFA, PROAIR HFA) inhaler 2 Puff  2 Puff Inhalation Q6H PRN    ascorbic acid (vitamin C) (VITAMIN C) tablet 500 mg  500 mg Oral DAILY    atorvastatin (LIPITOR) tablet 40 mg  40 mg Oral DAILY    cholecalciferol (VITAMIN D3) (1000 Units /25 mcg) tablet 1,000 Units  1,000 Units Oral DAILY    diazePAM (VALIUM) tablet 5 mg  5 mg Oral Q6H PRN    dilTIAZem ER (CARDIZEM CD) capsule 120 mg  120 mg Oral DAILY    potassium chloride SR (KLOR-CON 10) tablet 20 mEq  20 mEq Oral DAILY    sertraline (ZOLOFT) tablet 25 mg  25 mg Oral DAILY    traZODone (DESYREL) tablet 50 mg  50 mg Oral QHS    ondansetron (ZOFRAN) injection 4 mg  4 mg IntraVENous Q6H PRN    acetaminophen (TYLENOL) tablet 650 mg  650 mg Oral Q4H PRN    prochlorperazine (COMPAZINE) with saline injection 10 mg  10 mg IntraVENous Q6H PRN        Review of Systems  Constitutional: Positive for malaise/fatigue. Negative for fever. HENT: Negative. Respiratory: Positive for shortness of breath. Negative for cough. Cardiovascular: Negative for chest pain and leg swelling. Gastrointestinal: Positive for abdominal pain. Negative for nausea and vomiting. Genitourinary: No frequency, No dysuria, No hematuria  Integument/breast: No skin lesion(s)   Neurological: No Confusion, No headaches, No dizziness      Objective:     Visit Vitals  BP (!) 93/57   Pulse 82   Temp 98.5 °F (36.9 °C)   Resp 18   Ht 5' 7\" (1.702 m)   Wt 102 kg (224 lb 13.9 oz)   SpO2 98%   BMI 35.22 kg/m²    O2 Flow Rate (L/min): 3 l/min O2 Device: Nasal cannula    Temp (24hrs), Av.7 °F (37.1 °C), Min:98.2 °F (36.8 °C), Max:99.2 °F (37.3 °C)      No intake/output data recorded.  1901 -  0700  In: 8202 [P.O.:240; I.V.:1210]  Out: 3700 [Urine:3700]    PHYSICAL EXAM:  Constitutional: No acute distress  Skin: Extremities and face reveal no rashes. Multiple areas of ecchymosis in upper extremities. HEENT: Sclerae anicteric. Extra-occular muscles are intact. No oral ulcers.  The neck is supple and no masses. Cardiovascular: Regular rate and rhythm. +S1/S2. No murmur or gallop. Respiratory:  Rhonchi noted bilateral without wheezes. GI: Firm with hypoactive bowel sounds and tenderness to palpation in all 4 quadrant. Rectal: Deferred   Musculoskeletal: Upper and lower extremity peripheral edema present. Able to move all ext  Neurological:  Patient is alert and oriented x3. Cranial nerves II-XII grossly intact  Psychiatric: Mood appears appropriate       Data Review    Recent Results (from the past 24 hour(s))   CELL COUNT, BODY FLUID    Collection Time: 12/27/21  2:40 PM   Result Value Ref Range    BODY FLUID TYPE Ascitic fluid      FLUID COLOR YELLOW     FLUID APPEARANCE HAZY     FLUID RBC CT. >100 (H) 0 /cu mm    FLUID WBC COUNT 795 0 - 5 /cu mm    FLUID NUCLEATED CELLS 795 /cu mm    FLD NEUTROPHILS 41 (A) No reference range has been established. %    FLD LYMPHS 38 (A) No reference range has been established. %    FLD MONOCYTES 1 %    FLUID MESOTHELIAL 20 (A) No reference range has been established. %   LIPASE    Collection Time: 12/27/21  5:17 PM   Result Value Ref Range    Lipase >3,000 (H) 73 - 393 U/L   D DIMER    Collection Time: 12/27/21  5:17 PM   Result Value Ref Range    D DIMER 11.52 (H) <0.50 ug/ml(FEU)   METABOLIC PANEL, COMPREHENSIVE    Collection Time: 12/27/21  5:17 PM   Result Value Ref Range    Sodium 135 (L) 136 - 145 mmol/L    Potassium 3.6 3.5 - 5.1 mmol/L    Chloride 93 (L) 97 - 108 mmol/L    CO2 35 (H) 21 - 32 mmol/L    Anion gap 7 5 - 15 mmol/L    Glucose 72 65 - 100 mg/dL    BUN 14 6 - 20 mg/dL    Creatinine 0.57 0.55 - 1.02 mg/dL    BUN/Creatinine ratio 25 (H) 12 - 20      GFR est AA >60 >60 ml/min/1.73m2    GFR est non-AA >60 >60 ml/min/1.73m2    Calcium 8.8 8.5 - 10.1 mg/dL    Bilirubin, total 0.6 0.2 - 1.0 mg/dL    AST (SGOT) 28 15 - 37 U/L    ALT (SGPT) 21 12 - 78 U/L    Alk.  phosphatase 328 (H) 45 - 117 U/L    Protein, total 4.8 (L) 6.4 - 8.2 g/dL    Albumin 1.3 (L) 3.5 - 5.0 g/dL    Globulin 3.5 2.0 - 4.0 g/dL    A-G Ratio 0.4 (L) 1.1 - 2.2     C REACTIVE PROTEIN, QT    Collection Time: 12/27/21  5:17 PM   Result Value Ref Range    C-Reactive protein 14.80 (H) 0.00 - 0.60 mg/dL   CBC WITH AUTOMATED DIFF    Collection Time: 12/27/21  6:00 PM   Result Value Ref Range    WBC 8.9 3.6 - 11.0 K/uL    RBC 3.59 (L) 3.80 - 5.20 M/uL    HGB 10.7 (L) 11.5 - 16.0 g/dL    HCT 34.8 (L) 35.0 - 47.0 %    MCV 96.9 80.0 - 99.0 FL    MCH 29.8 26.0 - 34.0 PG    MCHC 30.7 30.0 - 36.5 g/dL    RDW 13.3 11.5 - 14.5 %    PLATELET 296 751 - 242 K/uL    MPV 10.8 8.9 - 12.9 FL    NRBC 0.0 0.0  WBC    ABSOLUTE NRBC 0.00 0.00 - 0.01 K/uL    NEUTROPHILS 92 (H) 32 - 75 %    LYMPHOCYTES 4 (L) 12 - 49 %    MONOCYTES 3 (L) 5 - 13 %    EOSINOPHILS 0 0 - 7 %    BASOPHILS 0 0 - 1 %    IMMATURE GRANULOCYTES 1 (H) 0 - 0.5 %    ABS. NEUTROPHILS 8.1 (H) 1.8 - 8.0 K/UL    ABS. LYMPHOCYTES 0.4 (L) 0.8 - 3.5 K/UL    ABS. MONOCYTES 0.3 0.0 - 1.0 K/UL    ABS. EOSINOPHILS 0.0 0.0 - 0.4 K/UL    ABS. BASOPHILS 0.0 0.0 - 0.1 K/UL    ABS. IMM. GRANS. 0.1 (H) 0.00 - 0.04 K/UL    DF AUTOMATED     PROCALCITONIN    Collection Time: 12/27/21  6:00 PM   Result Value Ref Range    Procalcitonin 0.33 (H) 0 ng/mL   CBC WITH AUTOMATED DIFF    Collection Time: 12/28/21  8:31 AM   Result Value Ref Range    WBC 8.1 3.6 - 11.0 K/uL    RBC 3.06 (L) 3.80 - 5.20 M/uL    HGB 9.3 (L) 11.5 - 16.0 g/dL    HCT 28.7 (L) 35.0 - 47.0 %    MCV 93.8 80.0 - 99.0 FL    MCH 30.4 26.0 - 34.0 PG    MCHC 32.4 30.0 - 36.5 g/dL    RDW 13.4 11.5 - 14.5 %    PLATELET 115 985 - 191 K/uL    MPV 10.7 8.9 - 12.9 FL    NRBC 0.0 0.0  WBC    ABSOLUTE NRBC 0.00 0.00 - 0.01 K/uL    NEUTROPHILS 86 (H) 32 - 75 %    LYMPHOCYTES 8 (L) 12 - 49 %    MONOCYTES 5 5 - 13 %    EOSINOPHILS 1 0 - 7 %    BASOPHILS 0 0 - 1 %    IMMATURE GRANULOCYTES 0 0 - 0.5 %    ABS. NEUTROPHILS 7.0 1.8 - 8.0 K/UL    ABS. LYMPHOCYTES 0.6 (L) 0.8 - 3.5 K/UL    ABS.  MONOCYTES 0.4 0.0 - 1.0 K/UL ABS. EOSINOPHILS 0.1 0.0 - 0.4 K/UL    ABS. BASOPHILS 0.0 0.0 - 0.1 K/UL    ABS. IMM. GRANS. 0.0 0.00 - 0.04 K/UL    DF AUTOMATED     D DIMER    Collection Time: 12/28/21  8:31 AM   Result Value Ref Range    D DIMER 8.18 (H) <0.50 ug/ml(FEU)   PROTHROMBIN TIME + INR    Collection Time: 12/28/21  8:31 AM   Result Value Ref Range    Prothrombin time 15.3 (H) 11.9 - 14.7 sec    INR 1.2 (H) 0.9 - 1.1     C REACTIVE PROTEIN, QT    Collection Time: 12/28/21  8:31 AM   Result Value Ref Range    C-Reactive protein 12.10 (H) 0.00 - 0.60 mg/dL   LIPASE    Collection Time: 12/28/21  8:31 AM   Result Value Ref Range    Lipase 2,802 (H) 73 - 393 U/L       DUPLEX UPPER EXT VENOUS BILAT   Final Result      XR CHEST PORT   Final Result   Large effusions. Vascular congestion. CT ABD PELV W CONT   Final Result   1. There are increasing bilateral pleural effusions, increasing body wall   edema, and increasing ascites. These findings could be secondary to the third   spacing of fluids. The differential diagnosis for the ascites would include   pancreatic ascites with acute pancreatitis or metastatic disease to the   peritoneum. 2.  There is a biliary stent which is unchanged in its position traversing   throughout area of obstruction within the pancreatic head. 3.  There are multiple low-density lesions of the liver without short-term   interval changes consistent with metastatic disease. 4.  The right kidney is not identified and apparently the patient is status post   a previous right nephrectomy. 5.  There are bilateral adrenal masses suspect for metastatic disease without   short-term interval changes. CTA CHEST W OR W WO CONT   Final Result   No CT evidence for PE. Thoracic aorta atherosclerosis. CAD. Moderate to large bilateral pleural effusions with associated RML, RLL, and LLL   compressive atelectasis. Abdominal ascites.             DUPLEX LOWER EXT VENOUS BILAT   Final Result      XR CHEST PORT   Final Result   FINDINGS: IMPRESSION: 2 frontal views of the chest. Right PICC distal tip SVC   region. Enlarging cardiomegaly. Increasing vascular congestion. Interval development of   hypoinflation, pleural effusions, lower lung airspace edema and/or pneumonia. No   pneumothorax. No free air under the diaphragm. CT ABD PELV W CONT   Final Result   New moderate volume dependent pleural effusions with associated   lower lobe lung compressive atelectasis. Increasing ascites, moderate approaching large-volume   (fluid could be sampled if there is any clinical concern for bile content). High suspicion hepatic metastases. Similar appearance for the pancreas; Silastic stent in place. No pseudocyst formation. Unchanged appearance bilateral adrenal glands. No bowel obstruction. US ABD LTD   Final Result   Small amount of free fluid. Finding suggesting hemangioma in the   liver. Gallbladder wall thickening, adenomyomatosis, sludge and gallstones. Cholecystitis possible. Finding in the region of the pancreatic head as above. Other findings as above. CTA ABDOMEN PELV W CONT   Final Result   1. Ill-defined hypodense mass in the pancreas. There are inflammatory changes   and fluid adjacent to the pancreas or duodenum and stomach most likely related   to biopsy or focal pancreatitis. No pseudocyst formation, active bleeding or   other acute findings. 2. Enlarged adrenal glands left greater than right with noncontrast densities   consistent with adrenal adenomas. 3. Right nephrectomy. 4. Other findings as described. XR CHEST PORT   Final Result   No acute findings.       IR PARACENTESIS ABD W IMAGE    (Results Pending)       Principal Problem:    Pancreatitis (12/9/2021)    Active Problems:    A-fib ([address] (11/16/2020)      CHF (congestive heart failure) ([address] (11/16/2020)      Hematemesis (12/12/2021)      History of peptic ulcer disease (12/12/2021)        Assessment/Plan:     1. Acute pancreatitis  Status post EUS by Dr. Steven Villareal 12/06  Continue pain medication  Lipase variable, will likely be chronically elevated due to pancreatic mass  CT abd/pelvis with PO and IV contrast showing increasing bilateral pleural effusion and increasing ascites. Also notes multiple low-density lesions of liver consistent with metastatic disease and bilateral adrenal masses suspected for metastatic disease. Continue IV merrem day #9 of 14   Will change IV fluids to 0.45% normal saline   May benefit from NG tube for nutrition  Went for paracentesis on 12/27 with 2300 mL clear serous fluid drained  She was scheduled for liver biopsy with IR however IR does not feel patient would benefit from biopsy at this time.       2. CHF  Continue chlorthalidone 25 mg daily  Will add IV lasix 40 mg daily     3. Pancreatic mass  CA 19-9 greater than 4000     4. Hypokalemia - replete as needed     5. COPD  Pulmonary consultation  Chronic respiratory failure with 2 L of oxygen via nasal cannula at home     6.  Leukocytosis  Started meropenem  Cultures negative     7. Supratherapeutic anticoagulation  Resolved. INR normal.  Vitamin K given  May be a side effect of using Eliquis recently and this may be an artificial INR. No obvious bleeding source  Hemoglobin stable    8. Elevated d-dimer  - Likely multifactorial   - CTA chest negative for PE  - Duplex US lower extremities negative for DVT  - Duplex US upper extremities showing superficial thrombophlebitis in left median/antecubital veins  - Restarted on home Eliquis     DVT Prophylaxis: restarted on home Eliquis  GI PPx: Protonix  Code Status: Full  POA:    Discharge Barriers: Pending ID clearance. On IV antibiotics. Care Plan discussed with: patient and nursing    Total time spent with patient: >35 minutes.

## 2021-12-29 NOTE — PROGRESS NOTES
Problem: Falls - Risk of  Goal: *Absence of Falls  Description: Document Nicki Mejiaer Fall Risk and appropriate interventions in the flowsheet.   Outcome: Progressing Towards Goal  Note: Fall Risk Interventions:  Mobility Interventions: Patient to call before getting OOB    Mentation Interventions: Eyeglasses and hearing aids    Medication Interventions: Patient to call before getting OOB    Elimination Interventions: Call light in reach    History of Falls Interventions: Consult care management for discharge planning

## 2021-12-29 NOTE — PROGRESS NOTES
Problem: Falls - Risk of  Goal: *Absence of Falls  Description: Document Marga Whitfield Fall Risk and appropriate interventions in the flowsheet.   Outcome: Progressing Towards Goal  Note: Fall Risk Interventions:  Mobility Interventions: Patient to call before getting OOB    Mentation Interventions: Door open when patient unattended    Medication Interventions: Patient to call before getting OOB    Elimination Interventions: Call light in reach    History of Falls Interventions: Consult care management for discharge planning         Problem: Patient Education: Go to Patient Education Activity  Goal: Patient/Family Education  Outcome: Progressing Towards Goal

## 2021-12-29 NOTE — PROGRESS NOTES
CM met with patient to encourage her to participate in PT/OT. Patient will require insurance auth prior to d/c to [facility] (patient has been accepted with [facility]). CM explained insurance process and why participating in therapy is important. Patient reported that she would participate but that she was in a lot of pain. Nurse present, explained therapy would assist in healing, patient verbalized understanding. Therapy dept notified of need for notes, CM to send notes in order to start insurance auth this date. CM continues to follow.

## 2021-12-29 NOTE — PROGRESS NOTES
Progress Note    Patient: Rip Demarco MRN: [id number]  SSN: [id number]    YOB: 1945  Age: 78 y.o. Sex: female      Admit Date: 12/9/2021    LOS: 20 days     Subjective:   GI in consultation for Pancreatitis. Seen patient in room sleeping, easily arousable. She is feeling better today. Lipase has decreased to 2,511 today. Post pyloric feeding tube was discussed again, this time patient agreed to placement; Bilateral upper arm swollen. D-dimer elevated. Duplex scan showed no evidence of DVT in the right upper extremity, superficial thrombophlebitis noted within the left median/antecubital veins. She is on Eliquis 5mg BID.    12/27/21 Paracentesis - 2300ml ascites fluid removed. 12/23/21 CT abdomen  1.  There are increasing bilateral pleural effusions, increasing body wall  edema, and increasing ascites. These findings could be secondary to the third spacing of fluids. The differential diagnosis for the ascites would include pancreatic ascites with acute pancreatitis or metastatic disease to the peritoneum.    2. Candelario Mariscal is a biliary stent which is unchanged in its position traversing throughout area of obstruction within the pancreatic head. 3. Candelario Mariscal are multiple low-density lesions of the liver without short-term  interval changes consistent with metastatic disease. 4.  The right kidney is not identified and apparently the patient is status post a previous right nephrectomy. 5.  There are bilateral adrenal masses suspect for metastatic disease without short-term interval changes. 12/6/2021 EUS showing 2.7 X3 cm lesion in the area of head of pancreas with dilation of the Pancreatic duct abutting the portal vein but not involving the SMA or AMV ; Tumor T2 by endosonographic criteria ; one small lymph node in the area of head of pancreas. 11/22/2021 PET Scan    1.  FDG avid lesion in the pancreatic head consistent with malignancy.   2.  Subcentimeter focus of FDG uptake in the liver, indeterminate. 3.  FDG uptake associated with density expanding the right facet at C5-C6, possibly due to an ectatic artery.       Objective:     Vitals:    12/29/21 0747 12/29/21 0959 12/29/21 1406 12/29/21 1408   BP: 137/78      Pulse: 81      Resp: 16      Temp: 98.4 °F (36.9 °C)      SpO2: 98% 99% (!) 84% 94%   Weight:       Height:            Intake and Output:  Current Shift: No intake/output data recorded. Last three shifts: 12/27 1901 - 12/29 0700  In: 4542 [P.O.:240; I.V.:1570]  Out: 2600 [Urine:2600]    Physical Exam:   Skin:  Extremities and face reveal no rashes. No chapin erythema. Swollen bilateral arms. Bruised right hand. HEENT: Sclerae anicteric. Extra-occular muscles are intact. No abnormal pigmentation of the lips. The neck is supple. [place]  Cardiovascular: Regular rate and rhythm. Respiratory:  Comfortable breathing with no accessory muscle use. GI:  Abdomen nondistended, soft, and nontender. No enlargement of the liver or spleen. No masses palpable. Rectal:  Deferred  Musculoskeletal: Extremities have good range of motion. Neurological:  Gross memory appears intact. Patient is alert and oriented. Psychiatric:  Mood appears appropriate with judgement intact. Lymphatic:  No visible adenopathy      Lab/Data Review:  Recent Results (from the past 24 hour(s))   METABOLIC PANEL, COMPREHENSIVE    Collection Time: 12/29/21 10:11 AM   Result Value Ref Range    Sodium 134 (L) 136 - 145 mmol/L    Potassium 3.4 (L) 3.5 - 5.1 mmol/L    Chloride 91 (L) 97 - 108 mmol/L    CO2 41 (HH) 21 - 32 mmol/L    Anion gap 2 (L) 5 - 15 mmol/L    Glucose 115 (H) 65 - 100 mg/dL    BUN 14 6 - 20 mg/dL    Creatinine 0.62 0.55 - 1.02 mg/dL    BUN/Creatinine ratio 23 (H) 12 - 20      GFR est AA >60 >60 ml/min/1.73m2    GFR est non-AA >60 >60 ml/min/1.73m2    Calcium 8.7 8.5 - 10.1 mg/dL    Bilirubin, total 0.6 0.2 - 1.0 mg/dL    AST (SGOT) 29 15 - 37 U/L    ALT (SGPT) 21 12 - 78 U/L    Alk.  phosphatase 381 (H) 45 - 117 U/L    Protein, total 5.2 (L) 6.4 - 8.2 g/dL    Albumin 1.3 (L) 3.5 - 5.0 g/dL    Globulin 3.9 2.0 - 4.0 g/dL    A-G Ratio 0.3 (L) 1.1 - 2.2     CBC WITH AUTOMATED DIFF    Collection Time: 12/29/21 10:11 AM   Result Value Ref Range    WBC 7.4 3.6 - 11.0 K/uL    RBC 3.49 (L) 3.80 - 5.20 M/uL    HGB 10.5 (L) 11.5 - 16.0 g/dL    HCT 33.4 (L) 35.0 - 47.0 %    MCV 95.7 80.0 - 99.0 FL    MCH 30.1 26.0 - 34.0 PG    MCHC 31.4 30.0 - 36.5 g/dL    RDW 13.5 11.5 - 14.5 %    PLATELET 537 369 - 960 K/uL    MPV 10.2 8.9 - 12.9 FL    NRBC 0.0 0.0  WBC    ABSOLUTE NRBC 0.00 0.00 - 0.01 K/uL    NEUTROPHILS 88 (H) 32 - 75 %    LYMPHOCYTES 7 (L) 12 - 49 %    MONOCYTES 4 (L) 5 - 13 %    EOSINOPHILS 1 0 - 7 %    BASOPHILS 0 0 - 1 %    IMMATURE GRANULOCYTES 0 0 - 0.5 %    ABS. NEUTROPHILS 6.5 1.8 - 8.0 K/UL    ABS. LYMPHOCYTES 0.5 (L) 0.8 - 3.5 K/UL    ABS. MONOCYTES 0.3 0.0 - 1.0 K/UL    ABS. EOSINOPHILS 0.1 0.0 - 0.4 K/UL    ABS. BASOPHILS 0.0 0.0 - 0.1 K/UL    ABS. IMM. GRANS. 0.0 0.00 - 0.04 K/UL    DF AUTOMATED     D DIMER    Collection Time: 12/29/21 10:11 AM   Result Value Ref Range    D DIMER 8.67 (H) <0.50 ug/ml(FEU)   PROTHROMBIN TIME + INR    Collection Time: 12/29/21 10:11 AM   Result Value Ref Range    Prothrombin time 18.9 (H) 11.9 - 14.7 sec    INR 1.6 (H) 0.9 - 1.1     LIPASE    Collection Time: 12/29/21 10:11 AM   Result Value Ref Range    Lipase 2,511 (H) 73 - 393 U/L   C REACTIVE PROTEIN, QT    Collection Time: 12/29/21 10:11 AM   Result Value Ref Range    C-Reactive protein 11.10 (H) 0.00 - 0.60 mg/dL              DUPLEX UPPER EXT VENOUS BILAT   Final Result      XR CHEST PORT   Final Result   Large effusions. Vascular congestion. CT ABD PELV W CONT   Final Result   1. There are increasing bilateral pleural effusions, increasing body wall   edema, and increasing ascites. These findings could be secondary to the third   spacing of fluids.  The differential diagnosis for the ascites would include pancreatic ascites with acute pancreatitis or metastatic disease to the   peritoneum. 2.  There is a biliary stent which is unchanged in its position traversing   throughout area of obstruction within the pancreatic head. 3.  There are multiple low-density lesions of the liver without short-term   interval changes consistent with metastatic disease. 4.  The right kidney is not identified and apparently the patient is status post   a previous right nephrectomy. 5.  There are bilateral adrenal masses suspect for metastatic disease without   short-term interval changes. CTA CHEST W OR W WO CONT   Final Result   No CT evidence for PE. Thoracic aorta atherosclerosis. CAD. Moderate to large bilateral pleural effusions with associated RML, RLL, and LLL   compressive atelectasis. Abdominal ascites. DUPLEX LOWER EXT VENOUS BILAT   Final Result      XR CHEST PORT   Final Result   FINDINGS: IMPRESSION: 2 frontal views of the chest. Right PICC distal tip SVC   region. Enlarging cardiomegaly. Increasing vascular congestion. Interval development of   hypoinflation, pleural effusions, lower lung airspace edema and/or pneumonia. No   pneumothorax. No free air under the diaphragm. CT ABD PELV W CONT   Final Result   New moderate volume dependent pleural effusions with associated   lower lobe lung compressive atelectasis. Increasing ascites, moderate approaching large-volume   (fluid could be sampled if there is any clinical concern for bile content). High suspicion hepatic metastases. Similar appearance for the pancreas; Silastic stent in place. No pseudocyst formation. Unchanged appearance bilateral adrenal glands. No bowel obstruction. US ABD LTD   Final Result   Small amount of free fluid. Finding suggesting hemangioma in the   liver. Gallbladder wall thickening, adenomyomatosis, sludge and gallstones.     Cholecystitis possible. Finding in the region of the pancreatic head as above. Other findings as above. CTA ABDOMEN PELV W CONT   Final Result   1. Ill-defined hypodense mass in the pancreas. There are inflammatory changes   and fluid adjacent to the pancreas or duodenum and stomach most likely related   to biopsy or focal pancreatitis. No pseudocyst formation, active bleeding or   other acute findings. 2. Enlarged adrenal glands left greater than right with noncontrast densities   consistent with adrenal adenomas. 3. Right nephrectomy. 4. Other findings as described. XR CHEST PORT   Final Result   No acute findings. IR PARACENTESIS ABD W IMAGE    (Results Pending)       Assessment:     Principal Problem:    Pancreatitis (12/9/2021)    Active Problems:    A-fib ([facility] [address] (11/16/2020)      CHF (congestive heart failure) ([facility] [address] (11/16/2020)      Hematemesis (12/12/2021)      History of peptic ulcer disease (12/12/2021)        Plan:   1. Pancreatitis      -IV hydration       -Lipase 2,511      -repeat Lipase in the am      -Schedule for EGD in am for post pyloric feeding tube placememt. NPO post midnight. Pls get consent.       -Dilaudid 1 mg every  4 hours as needed for pain      -Oxycodone 5 mg every 6 hours for breakthrough pain      - S/P paracentesis 12/27/21 with removal of 2300 ml clear serous  Fluid removed           -cytology pending  2. CHF      -Continue Chlorthalidone  3. COPD       -Continue home medications       -Albuterol as needed   4. Pancreatic Mass      - > 4000      -S/p fine needle aspiration suspicious for neoplasia but not diagnostic      -When stable Oncology plan for out patient followup with advanced oncology surgeon Francy Vogel or TRA[facility] for resection considerations       -CT concern for liver metastais/lesions        - IR for liver biopsy on hold at this time  5.  Hematemesis (resolved)      -Monitor H&H      -Transfuse as needed      -hgB 10.5 12/29/21      -Continue Protonix 40 mg daily  6. Afib      -Rate is controlled      -eliquis  BID/ HgB stable at 10.5 this am       -no further hematemesis reported     Patient discussed with Dr Pete Davies and agrees to above impression and plan. Thank you for allowing me to participate in this patients care    Signed By: Neelam Guerrero.  CONRADO Hillman     December 29, 2021

## 2021-12-29 NOTE — PROGRESS NOTES
PHYSICAL THERAPY TREATMENT  Patient: Lena Patricia (67 y.o. female)  Date: 12/29/2021  Diagnosis: Pancreatitis [K85.90] Pancreatitis       Precautions:    Chart, physical therapy assessment, plan of care and goals were reviewed. ASSESSMENT  Patient continues with skilled PT services and is progressing towards goals. Patient req encouragement to participate with therapy today due to increased pain in the abdomen that worsens with mobility. Patient cont to be CGA-min A for bed mobility, transfers, and ambulation. Patient ambulated approx 3 ft to the [facility] and 3ft back to bed with RW and no LOB noted. Demos more flexed posture today during standing and req cueing for safety. Educated LE therex importance for improving strength. Will progress gait further next visit as pt was continuing to increased with OOB mobility. Patient seen today by PTA for treatment, discussed plan of care with supervising PT and goals reviewed and updated as appropriate. Plan of care reviewed and adjusted as necessary due to patient LOS, no change in patient functional status at this time, continue to progress towards goals as able. Current Level of Function Impacting Discharge (mobility/balance): weakness, unsteady mobility    Other factors to consider for discharge: pain          PLAN :  Patient continues to benefit from skilled intervention to address the above impairments. Continue treatment per established plan of care. to address goals.     Recommendation for discharge: (in order for the patient to meet his/her long term goals)  Joseph Valentine    This discharge recommendation:  Has been made in collaboration with the attending provider and/or case management    IF patient discharges home will need the following DME: to be determined (TBD)       SUBJECTIVE:   Patient stated it just hurts so bad when I move    OBJECTIVE DATA SUMMARY:   Critical Behavior:  Neurologic State: Alert  Orientation Level: Oriented X4  Cognition: Appropriate decision making     Functional Mobility Training:  Bed Mobility:  Rolling: Contact guard assistance  Supine to Sit: Minimum assistance  Scooting: Contact guard assistance   Sit to supine: minimum assistance    Transfers:  Sit to Stand: Minimum assistance  Stand to Sit: Minimum assistance    Balance:  Sitting: Intact  Sitting - Static: Good (unsupported)  Sitting - Dynamic: Fair (occasional)  Standing: Impaired; With support  Standing - Static: Fair;Constant support  Standing - Dynamic : Fair;Constant support    Ambulation/Gait Training:  Distance (ft): 6 Feet (ft) (3ft x2)  Assistive Device: Gait belt;Walker, rolling  Ambulation - Level of Assistance: Minimal assistance  Speed/Nichole: Shuffled; Slow  Step Length: Left shortened;Right shortened    Therapeutic Exercises:   10x LAQ, marches, AP    Pain Ratin/10 abdomen    Activity Tolerance:   Fair and requires rest breaks  Please refer to the flowsheet for vital signs taken during this treatment. After treatment patient left in no apparent distress:   Supine in bed, Call bell within reach, and Side rails x 3    COMMUNICATION/COLLABORATION:   The patients plan of care was discussed with: Occupational therapy assistant, Registered nurse, and Case management. Cotx with DEMARCUS due to activity tolerance. Megan Hassan   Time Calculation: 24 mins         Problem: Mobility Impaired (Adult and Pediatric)  Goal: *Acute Goals and Plan of Care (Insert Text)  Description: Pt will be I with LE HEP in 7 days. Pt will perform bed mobility with mod I in 7 days. Pt will perform transfers with mod I in 7 days. Pt will amb- 100 feet with LRAD safely with mod I in 7 days.    Outcome: Progressing Towards Goal

## 2021-12-29 NOTE — PROGRESS NOTES
OCCUPATIONAL THERAPY TREATMENT  Patient: Ida Miller (2 y.o. female)  Date: 12/29/2021  Diagnosis: Pancreatitis [K85.90] Pancreatitis       Precautions:    Chart, occupational therapy assessment, plan of care, and goals were reviewed. ASSESSMENT  Patient continues with skilled OT services and is progressing towards goals. Upon CARTWRIGHT/PTA arrival, pt semi supine in bed and agreeable to tx session. Pt completed bed mobility with CGA for rolling and scooting and William for supine>sit. Pt completed sit>stand from EOB with William and completed transfer with [facility] with William. Pt completed seated hair brushing with William for thoroughness. Pt completed seated/standing bladder hygiene with William. Therapist applied barrier cream to wound on pt buttocks. Pt completed transfer back to EOB with William. Pt returned to supine with William, requiring assistance for moving BLE into bed. Pt left semi supine in bed with call bell within reach. Will continue to follow pt throughout remainder of stay and progress towards OT goals. Recommending SNF at discharge when medically appropriate. Patient seen today by Jaya Montalvo for treatment, discussed plan of care with supervising OT and goals reviewed and updated as appropriate. Plan of care reviewed and adjusted as necessary due to patient LOS, no change in patient functional status at this time, continue to progress towards goals as able. Current Level of Function Impacting Discharge (ADLs): CGA-William bed mobility, William sit>stand, William toilet transfer    Other factors to consider for discharge: PLOF, time since on set         PLAN :  Patient continues to benefit from skilled intervention to address the above impairments. Continue treatment per established plan of care. to address goals.     Recommend next OT session: standing grooming, LB dressing    Recommendation for discharge: (in order for the patient to meet his/her long term goals)  Joseph Hameed discharge recommendation:  Has been made in collaboration with the attending provider and/or case management    IF patient discharges home will need the following DME: TBD       SUBJECTIVE:   Patient stated I know I should do it, you don't have to tell me.     OBJECTIVE DATA SUMMARY:   Cognitive/Behavioral Status:  Neurologic State: Alert  Orientation Level: Oriented X4  Cognition: Appropriate decision making    Functional Mobility and Transfers for ADLs:  Bed Mobility:  Rolling: Contact guard assistance  Supine to Sit: Minimum assistance  Sit to Supine: Minimum assistance  Scooting: Contact guard assistance    Transfers:  Sit to Stand: Minimum assistance  Functional Transfers  Toilet Transfer : Minimum assistance (BSC)    Balance:  Sitting: Intact  Sitting - Static: Good (unsupported)  Sitting - Dynamic: Fair (occasional)  Standing: Impaired; With support  Standing - Static: Fair;Constant support  Standing - Dynamic : Fair;Constant support    ADL Intervention:  Grooming  Position Performed: Other (comment) (seated on BSC)  Brushing/Combing Hair: Minimum assistance (2/2 thoroughness)    Lower Body Dressing Assistance  Socks: Total assistance (dependent)    Toileting  Bladder Hygiene: Minimum assistance  Bowel Hygiene: Minimum assistance    Pain:  7/10 stomach pain    Activity Tolerance:   Fair and requires rest breaks  Please refer to the flowsheet for vital signs taken during this treatment. After treatment patient left in no apparent distress:   Supine in bed, Call bell within reach, and Side rails x 3    COMMUNICATION/COLLABORATION:   The patients plan of care was discussed with: Physical therapy assistant and Case management. Cotreat with PT for increased safety for pt and clinician. GABBIE Bergeron  Time Calculation: 25 mins    Problem: Self Care Deficits Care Plan (Adult)  Goal: *Acute Goals and Plan of Care (Insert Text)  Description: 1. Pt will be mod I sup <> sit in prep for EOB ADLs  2.   Pt will be mod I grooming sitting EOB  3. Pt will be mod I LE dressing sitting EOB/long sit  4. Pt will be mod I sit <>  prep for toileting LRAD  5. Pt will be mod I toileting/toilet transfer/cloth mgmt LRAD  6.   Pt will be IND following UE HEP in prep for self care tasks      Outcome: Progressing Towards Goal

## 2021-12-29 NOTE — PROGRESS NOTES
Patient refuses to be turned at this time, states \"I dont need to be turned I am fine\" Patient education provided on pressure injuries.

## 2021-12-29 NOTE — PROGRESS NOTES
Hospitalist Progress Note    Subjective:   Daily Progress Note: 12/29/2021 12:21 PM    Hospital Course:  81-year-old female with a history of atrial fibrillation, peptic ulcer disease, CHF, COPD, renal cell carcinoma stage IV status post nephrectomy, GERD, COPD, Sojourn syndrome and essential hypertension who was presented to the emergency department for admission on 12/9/2021 after undergoing a EUS on 12/06 by Dr. Sam Cortez with biopsy and subsequently developed severe pancreatitis. Patient's admission course has been complicated by ongoing pancreatitis and severe abdominal pain. CT scan of the abdomen pelvis revealed an ill-defined hypodense mass in the pancreas with fluid adjacent to the pancreas suggestive of focal pancreatitis. CTA chest showing no evidence of pulmonary embolism, but does note moderate to large bilateral pleural effusions in RML, RLL and LLL as well as ascites. GI and oncology consulted. Patient had an episode of hematemesis as well. Patient remains on IV Protonix. Abdominal ultrasound also showed no hemodynamic drop in the liver. Gallbladder wall has been thickened with sludge and stones although cholecystitis is less likely at this point. Although continue to monitor closely. Patient was started on Zosyn and subsequently changed to meropenem as the patient developed leukocytosis. Patient also has elevation in her INR although had been receiving Eliquis and therefore Eliquis was held. Patient started on p.o. diet although developed severe pain and have decreased her diet to clear liquid. General surgery following as well, Dr. Ira Mcclelland. Lipase worsening. CT abd/pelvis with PO and IV contrast showing increasing bilateral pleural effusion and increasing ascites. Also notes multiple low-density lesions of liver consistent with metastatic disease and bilateral adrenal masses suspected for metastatic disease. IR consulted for liver biopsy. D-dimer elevated >14.  Duplex US lower extremities negative for DVT. Duplex upper extremities showing superficial thrombophlebitis in left median/antecubital veins. Restarted on home Eliquis. Went for paracentesis on 12/27 with 2300 mL clear serous fluid drained. Cytology results pending. She was scheduled for liver biopsy with IR however IR does not feel patient would benefit from biopsy at this time. Subjective:    Patient seen and examined at bedside. She is resting comfortably. No new complaints today.      Current Facility-Administered Medications   Medication Dose Route Frequency    midodrine (PROAMATINE) tablet 10 mg  10 mg Oral BID PRN    apixaban (ELIQUIS) tablet 5 mg  5 mg Oral BID    0.45% sodium chloride infusion  50 mL/hr IntraVENous CONTINUOUS    furosemide (LASIX) injection 40 mg  40 mg IntraVENous DAILY    anidulafungin (ERAXIS) 100 mg in 0.9% sodium chloride 130 mL IVPB  100 mg IntraVENous Q24H    zinc oxide-white petrolatum 17-57 % topical paste   Topical TID    cyclobenzaprine (FLEXERIL) tablet 10 mg  10 mg Oral TID PRN    HYDROmorphone (DILAUDID) injection 1 mg  1 mg IntraVENous Q4H PRN    oxyCODONE IR (ROXICODONE) tablet 5 mg  5 mg Oral Q6H PRN    nystatin (MYCOSTATIN) 100,000 unit/gram cream   Topical BID    docusate sodium (COLACE) capsule 100 mg  100 mg Oral BID    polyethylene glycol (MIRALAX) packet 17 g  17 g Oral DAILY    sorbitoL 70 % solution 30 mL  30 mL Oral DAILY PRN    meropenem (MERREM) 1 g in 0.9% sodium chloride 20 mL IV syringe  1 g IntraVENous Q8H    bisacodyL (DULCOLAX) suppository 10 mg  10 mg Rectal DAILY PRN    hydrALAZINE (APRESOLINE) 20 mg/mL injection 20 mg  20 mg IntraVENous Q6H PRN    pantoprazole (PROTONIX) 40 mg in 0.9% sodium chloride 10 mL injection  40 mg IntraVENous DAILY    guaiFENesin ER (MUCINEX) tablet 600 mg  600 mg Oral Q12H    albuterol-ipratropium (DUO-NEB) 2.5 MG-0.5 MG/3 ML  3 mL Nebulization Q6H PRN    chlorthalidone (HYGROTON) tablet 25 mg  25 mg Oral DAILY    albuterol (PROVENTIL HFA, VENTOLIN HFA, PROAIR HFA) inhaler 2 Puff  2 Puff Inhalation Q6H PRN    ascorbic acid (vitamin C) (VITAMIN C) tablet 500 mg  500 mg Oral DAILY    atorvastatin (LIPITOR) tablet 40 mg  40 mg Oral DAILY    cholecalciferol (VITAMIN D3) (1000 Units /25 mcg) tablet 1,000 Units  1,000 Units Oral DAILY    diazePAM (VALIUM) tablet 5 mg  5 mg Oral Q6H PRN    dilTIAZem ER (CARDIZEM CD) capsule 120 mg  120 mg Oral DAILY    potassium chloride SR (KLOR-CON 10) tablet 20 mEq  20 mEq Oral DAILY    sertraline (ZOLOFT) tablet 25 mg  25 mg Oral DAILY    traZODone (DESYREL) tablet 50 mg  50 mg Oral QHS    ondansetron (ZOFRAN) injection 4 mg  4 mg IntraVENous Q6H PRN    acetaminophen (TYLENOL) tablet 650 mg  650 mg Oral Q4H PRN    prochlorperazine (COMPAZINE) with saline injection 10 mg  10 mg IntraVENous Q6H PRN        Review of Systems  Constitutional: Positive for malaise/fatigue. Negative for fever. HENT: Negative. Respiratory: Positive for shortness of breath. Negative for cough. Cardiovascular: Negative for chest pain and leg swelling. Gastrointestinal: Positive for abdominal pain. Negative for nausea and vomiting. Genitourinary: No frequency, No dysuria, No hematuria  Integument/breast: No skin lesion(s)   Neurological: No Confusion, No headaches, No dizziness      Objective:     Visit Vitals  /78 (BP 1 Location: Left lower arm, BP Patient Position: At rest)   Pulse 81   Temp 98.4 °F (36.9 °C)   Resp 16   Ht 5' 7\" (1.702 m)   Wt 102 kg (224 lb 13.9 oz)   SpO2 98%   BMI 35.22 kg/m²    O2 Flow Rate (L/min): 2 l/min O2 Device: Nasal cannula    Temp (24hrs), Av.8 °F (37.1 °C), Min:98.4 °F (36.9 °C), Max:99.2 °F (37.3 °C)      No intake/output data recorded.  1901 -  0700  In: 6433 [P.O.:240; I.V.:1570]  Out: 2600 [Urine:2600]    PHYSICAL EXAM:  Constitutional: No acute distress  Skin: Extremities and face reveal no rashes.  Multiple areas of ecchymosis in upper extremities. HEENT: Sclerae anicteric. Extra-occular muscles are intact. No oral ulcers. The neck is supple and no masses. Cardiovascular: Regular rate and rhythm. +S1/S2. No murmur or gallop. Respiratory:  Rhonchi noted bilateral without wheezes. GI: Firm with hypoactive bowel sounds and tenderness to palpation in all 4 quadrant. Rectal: Deferred   Musculoskeletal: Upper and lower extremity peripheral edema present. Able to move all ext  Neurological:  Patient is alert and oriented x3. Cranial nerves II-XII grossly intact  Psychiatric: Mood appears appropriate       Data Review    No results found for this or any previous visit (from the past 24 hour(s)). DUPLEX UPPER EXT VENOUS BILAT   Final Result      XR CHEST PORT   Final Result   Large effusions. Vascular congestion. CT ABD PELV W CONT   Final Result   1. There are increasing bilateral pleural effusions, increasing body wall   edema, and increasing ascites. These findings could be secondary to the third   spacing of fluids. The differential diagnosis for the ascites would include   pancreatic ascites with acute pancreatitis or metastatic disease to the   peritoneum. 2.  There is a biliary stent which is unchanged in its position traversing   throughout area of obstruction within the pancreatic head. 3.  There are multiple low-density lesions of the liver without short-term   interval changes consistent with metastatic disease. 4.  The right kidney is not identified and apparently the patient is status post   a previous right nephrectomy. 5.  There are bilateral adrenal masses suspect for metastatic disease without   short-term interval changes. CTA CHEST W OR W WO CONT   Final Result   No CT evidence for PE. Thoracic aorta atherosclerosis. CAD. Moderate to large bilateral pleural effusions with associated RML, RLL, and LLL   compressive atelectasis. Abdominal ascites.             DUPLEX LOWER EXT VENOUS BILAT   Final Result      XR CHEST PORT   Final Result   FINDINGS: IMPRESSION: 2 frontal views of the chest. Right PICC distal tip SVC   region. Enlarging cardiomegaly. Increasing vascular congestion. Interval development of   hypoinflation, pleural effusions, lower lung airspace edema and/or pneumonia. No   pneumothorax. No free air under the diaphragm. CT ABD PELV W CONT   Final Result   New moderate volume dependent pleural effusions with associated   lower lobe lung compressive atelectasis. Increasing ascites, moderate approaching large-volume   (fluid could be sampled if there is any clinical concern for bile content). High suspicion hepatic metastases. Similar appearance for the pancreas; Silastic stent in place. No pseudocyst formation. Unchanged appearance bilateral adrenal glands. No bowel obstruction. US ABD LTD   Final Result   Small amount of free fluid. Finding suggesting hemangioma in the   liver. Gallbladder wall thickening, adenomyomatosis, sludge and gallstones. Cholecystitis possible. Finding in the region of the pancreatic head as above. Other findings as above. CTA ABDOMEN PELV W CONT   Final Result   1. Ill-defined hypodense mass in the pancreas. There are inflammatory changes   and fluid adjacent to the pancreas or duodenum and stomach most likely related   to biopsy or focal pancreatitis. No pseudocyst formation, active bleeding or   other acute findings. 2. Enlarged adrenal glands left greater than right with noncontrast densities   consistent with adrenal adenomas. 3. Right nephrectomy. 4. Other findings as described. XR CHEST PORT   Final Result   No acute findings.       IR PARACENTESIS ABD W IMAGE    (Results Pending)       Principal Problem:    Pancreatitis (12/9/2021)    Active Problems:    A-fib ([address] (11/16/2020)      CHF (congestive heart failure) ([address] (11/16/2020)      Hematemesis (12/12/2021) History of peptic ulcer disease (12/12/2021)        Assessment/Plan:     1. Acute pancreatitis  Status post EUS by Dr. Oksana Oacmpo 12/06  Continue pain medication  Lipase variable, will likely be chronically elevated due to pancreatic mass  CT abd/pelvis with PO and IV contrast showing increasing bilateral pleural effusion and increasing ascites. Also notes multiple low-density lesions of liver consistent with metastatic disease and bilateral adrenal masses suspected for metastatic disease. Continue IV merrem day #10 of 14   Continue empiric Anidulafungin day #4  Will change IV fluids to 0.45% normal saline   May benefit from NG tube for nutrition  Went for paracentesis on 12/27 with 2300 mL clear serous fluid drained, Cytology results pending. She was scheduled for liver biopsy with IR however IR does not feel patient would benefit from biopsy at this time.       2. CHF  Continue chlorthalidone 25 mg daily  Will add IV lasix 40 mg daily     3. Pancreatic mass  CA 19-9 greater than 4000     4. Hypokalemia - replete as needed     5. COPD  Pulmonary consultation  Chronic respiratory failure with 2 L of oxygen via nasal cannula at home     6.  Leukocytosis  Started meropenem  Cultures negative     7. Supratherapeutic anticoagulation  Resolved. INR normal.  Vitamin K given  May be a side effect of using Eliquis recently and this may be an artificial INR. No obvious bleeding source  Hemoglobin stable    8. Elevated d-dimer  - Likely multifactorial   - CTA chest negative for PE  - Duplex US lower extremities negative for DVT  - Duplex US upper extremities showing superficial thrombophlebitis in left median/antecubital veins  - Restarted on home Eliquis     DVT Prophylaxis: restarted on home Eliquis  GI PPx: Protonix  Code Status: Full  POA:    Discharge Barriers: Pending ID clearance. On IV antibiotics. Cytology results pending from paracentesis.   Care Plan discussed with: patient and nursing    Total time spent with patient: >35 minutes.

## 2021-12-29 NOTE — PROGRESS NOTES
Progress Note    Patient: Nikki Ley MRN: [id number]  SSN: [id number]    YOB: 1945  Age: 78 y.o. Sex: female      Admit Date: 12/9/2021    LOS: 20 days     Subjective:   Patient followed for severe pancreatitis with worsening leukocytosis on Zosyn. She was switched to Meropenem because of pancreatitis. Now with suspected Candida intraabdominal infection and a clinical response to Anidulafungin. Ascitic fluid culture pending. WBC now normal with decreasing CRP. Serum lipase remains elevated. Objective:     Vitals:    12/29/21 0959 12/29/21 1406 12/29/21 1408 12/29/21 1447   BP:    104/62   Pulse:    99   Resp:    16   Temp:    98.5 °F (36.9 °C)   SpO2: 99% (!) 84% 94% 98%   Weight:       Height:            Intake and Output:  Current Shift: No intake/output data recorded. Last three shifts: 12/27 1901 - 12/29 0700  In: 5946 [P.O.:240; I.V.:1570]  Out: 2600 [Urine:2600]    Physical Exam:     Vitals and nursing note reviewed. Constitutional:       General: She is not in acute distress. Appearance: She is obese. She is ill-appearing. HENT: unremarkable  Abdominal:  Mild tenderness   Genitourinary:  Vaginal area not examined     Comments: External urinary device  Musculoskeletal:      Right lower leg: No edema. Left lower leg: No edema. Comments: PICC line right upper arm   Skin: multiple ecchymoses on the upper extremities, edema left forearm     Findings: No rash. Comments: ?Stage 2 sacral wound   Neurological:      General: No focal deficit present. Mental Status: She is alert and oriented to person, place, and time. Psychiatric:         Mood and Affect: Mood normal.         Behavior: Behavior normal.         Thought Content:  Thought content normal.         Judgment: Judgment normal.     Lab/Data Review:     WBC 7,400  Urinalysis with WBC 10-20, Bacteria negative  Lipase 2,511 <2,802 <3,000 <2,484 <2,684 < 1,397 <1,719    Procal 0.35 <0.34 < 0.32 <0.21 <0.19 <0.25 < 0.14  CRP 11.10 < 12.10 <23.40 <18.30 <14.20 <12.70 <15.00 < 23.80    Serum fungitell <31 Negative    Ascitic fluid   with 41% PMNs    Blood cultures (12/20) No growth FINAL  Urine culture (12/20) No growth FINAL  Ascitic fluid culture(12/27) No growth thus  far    Assessment:     Principal Problem:    Pancreatitis (12/9/2021)    Active Problems:    A-fib ([facility] [address] (11/16/2020)      CHF (congestive heart failure) ([facility] [address] (11/16/2020)      Hematemesis (12/12/2021)      History of peptic ulcer disease (12/12/2021)    1. Severe pancreatitis with markedly elevated lipase, resolving  2. Pancreatic mass, possible neoplasm  3. Biliary stent  4. Sepsis with worsening leukocytosis with elevated procal and CRP, resolving, Day #10 IV Meropenem, Day #4 IV Anidulafungin (empiric)  5. TPN (just started)  6. Moderate pyuria, negative bacteria, urine culture negative  7. Possible candida superinfection with vaginitis, treated  8. Ascites, status post paracentesis    Comment:    WBC and CRP have now decreased since starting Anidulafungin for possible Candida superinfection. Ascitic fluid culture pending; it had a neutrophilic pleocytosis. Plan:   1. Continue IV Meropenem for 4 more days  2. Continue Anidulafungin empirically  3.  Follow-up ascites culture     Signed By: Issac Jade MD     December 29, 2021

## 2021-12-29 NOTE — PROGRESS NOTES
Pulmonary and Critical Care progress note    Subjective:     Patient seen and examined  Overnight events noted    Lying in bed comfortably, she is awake and alert. On 3 L oxygen. She has oxygen at home which he uses as needed. History of tobacco abuse. Her breathing is about the same. Cough is nonproductive. She is complaining of too much diuretics. However she admits that her edema is better. Review of Systems:  A comprehensive review of systems was negative except for that written in the HPI.      Current Facility-Administered Medications   Medication Dose Route Frequency Provider Last Rate Last Admin    0.9% sodium chloride infusion  50 mL/hr IntraVENous CONTINUOUS Marcelo Padron PA-C 50 mL/hr at 12/29/21 1335 50 mL/hr at 12/29/21 1335    midodrine (PROAMATINE) tablet 10 mg  10 mg Oral BID PRN Marcelo Padron PA-C        apixaban (ELIQUIS) tablet 5 mg  5 mg Oral BID Marcelo Padron PA-C   5 mg at 12/29/21 6165    furosemide (LASIX) injection 40 mg  40 mg IntraVENous DAILY Marcelo Padron PA-C   40 mg at 12/29/21 0926    anidulafungin (ERAXIS) 100 mg in 0.9% sodium chloride 130 mL IVPB  100 mg IntraVENous Q24H Diana Castellanos MD 83 mL/hr at 12/29/21 1559 100 mg at 12/29/21 1559    zinc oxide-white petrolatum 17-57 % topical paste   Topical TID Naldoquliana Doshi PA-C   Given at 12/29/21 1602    cyclobenzaprine (FLEXERIL) tablet 10 mg  10 mg Oral TID PRN Marcelo Padron PA-C   10 mg at 12/28/21 2320    HYDROmorphone (DILAUDID) injection 1 mg  1 mg IntraVENous Q4H PRN Minor Stains, NP   1 mg at 12/29/21 1332    oxyCODONE IR (ROXICODONE) tablet 5 mg  5 mg Oral Q6H PRN Minor Stains, NP   5 mg at 12/29/21 0810    nystatin (MYCOSTATIN) 100,000 unit/gram cream   Topical BID Diana Castellanos MD   Given at 12/29/21 0933    docusate sodium (COLACE) capsule 100 mg  100 mg Oral BID Fabrice Jernigan NP   100 mg at 12/28/21 2040    polyethylene glycol (MIRALAX) packet 17 g 17 g Oral DAILY Gladys Arevalo NP   17 g at 12/23/21 0924    sorbitoL 70 % solution 30 mL  30 mL Oral DAILY PRN Gladys Arevalo NP        meropenem (MERREM) 1 g in 0.9% sodium chloride 20 mL IV syringe  1 g IntraVENous Q8H Layla Rodriguez MD   1 g at 12/29/21 3111    bisacodyL (DULCOLAX) suppository 10 mg  10 mg Rectal DAILY PRN Pam Lewis NP        hydrALAZINE (APRESOLINE) 20 mg/mL injection 20 mg  20 mg IntraVENous Q6H PRN Huyen Crane PA        pantoprazole (PROTONIX) 40 mg in 0.9% sodium chloride 10 mL injection  40 mg IntraVENous DAILY Huyen Crane PA   40 mg at 12/29/21 0900    guaiFENesin ER (MUCINEX) tablet 600 mg  600 mg Oral Q12H Huyen Crane PA   600 mg at 12/28/21 2040    albuterol-ipratropium (DUO-NEB) 2.5 MG-0.5 MG/3 ML  3 mL Nebulization Q6H PRN Huyen Crane PA        chlorthalidone (HYGROTON) tablet 25 mg  25 mg Oral DAILY FARZANA Basurto   25 mg at 12/29/21 0926    albuterol (PROVENTIL HFA, VENTOLIN HFA, PROAIR HFA) inhaler 2 Puff  2 Puff Inhalation Q6H PRN Nolberto Farah NP   2 Puff at 12/29/21 1404    ascorbic acid (vitamin C) (VITAMIN C) tablet 500 mg  500 mg Oral DAILY Zarefoss Jan A, NP   500 mg at 12/29/21 2453    atorvastatin (LIPITOR) tablet 40 mg  40 mg Oral DAILY Zarefoss Jan A, NP   40 mg at 12/29/21 3330    cholecalciferol (VITAMIN D3) (1000 Units /25 mcg) tablet 1,000 Units  1,000 Units Oral DAILY Zabel Jan A, NP   1,000 Units at 12/29/21 0926    diazePAM (VALIUM) tablet 5 mg  5 mg Oral Q6H PRN Sofi Nolberto NEGIN, NP   5 mg at 12/28/21 1810    dilTIAZem ER (CARDIZEM CD) capsule 120 mg  120 mg Oral DAILY ZaNolberto staples A, NP   120 mg at 12/29/21 0926    potassium chloride SR (KLOR-CON 10) tablet 20 mEq  20 mEq Oral DAILY ZaNolberot staples A, NP   20 mEq at 12/29/21 0926    sertraline (ZOLOFT) tablet 25 mg  25 mg Oral DAILY ZaNolberto staples, NP   25 mg at 12/29/21 0926    traZODone (DESYREL) tablet 50 mg  50 mg Oral QHS Renee KAM NP   50 mg at 21    ondansetron [facility]) injection 4 mg  4 mg IntraVENous Q6H PRN Leslybel Nolberto NEGIN, NP   4 mg at 21 0404    acetaminophen (TYLENOL) tablet 650 mg  650 mg Oral Q4H PRN Nolberto Farah, NP        prochlorperazine (COMPAZINE) with saline injection 10 mg  10 mg IntraVENous Q6H PRN Nolberto Farah, NP   10 mg at 21 0320          Allergies   Allergen Reactions    Adhesive Tape-Silicones Atopic Dermatitis           Objective:     Blood pressure 104/62, pulse 99, temperature 98.5 °F (36.9 °C), resp. rate 16, height 5' 7\" (1.702 m), weight 102 kg (224 lb 13.9 oz), SpO2 98 %. Temp (24hrs), Av.6 °F (37 °C), Min:98.4 °F (36.9 °C), Max:98.9 °F (37.2 °C)      Intake and Output:  Current Shift:  07 - 1900  In: -   Out: 800 [Urine:800]  Last 3 Shifts: 1901 -  0700  In: 5405 [P.O.:240; I.V.:1570]  Out: 2600 [Urine:2600]    Intake/Output Summary (Last 24 hours) at 2021 1620  Last data filed at 2021 1535  Gross per 24 hour   Intake 550 ml   Output 1900 ml   Net -1350 ml        Physical Exam:     General: Lying in bed comfortably, no acute distress, morbidly obese  Eye: Reactive, symmetric  Throat and Neck: Supple  Lung: Reduced air entry bilaterally with occasional wheezing. Crackles in both lung bases  Heart: S1+S2. No murmurs  Abdomen: soft, non-tender. Bowel sounds normal. No masses; obese  Extremities:  2+ lower extremity edema, more significant upper extremity edema with some erythema of the left upper extremity. : Not done  Skin: No cyanosis  Neurologic: A & O x3.   Grossly nonfocal  Psychiatric: Appropriate affect; coherent      Lab/Data Review:    Recent Results (from the past 24 hour(s))   METABOLIC PANEL, COMPREHENSIVE    Collection Time: 21 10:11 AM   Result Value Ref Range    Sodium 134 (L) 136 - 145 mmol/L    Potassium 3.4 (L) 3.5 - 5.1 mmol/L    Chloride 91 (L) 97 - 108 mmol/L    CO2 41 (HH) 21 - 32 mmol/L Anion gap 2 (L) 5 - 15 mmol/L    Glucose 115 (H) 65 - 100 mg/dL    BUN 14 6 - 20 mg/dL    Creatinine 0.62 0.55 - 1.02 mg/dL    BUN/Creatinine ratio 23 (H) 12 - 20      GFR est AA >60 >60 ml/min/1.73m2    GFR est non-AA >60 >60 ml/min/1.73m2    Calcium 8.7 8.5 - 10.1 mg/dL    Bilirubin, total 0.6 0.2 - 1.0 mg/dL    AST (SGOT) 29 15 - 37 U/L    ALT (SGPT) 21 12 - 78 U/L    Alk. phosphatase 381 (H) 45 - 117 U/L    Protein, total 5.2 (L) 6.4 - 8.2 g/dL    Albumin 1.3 (L) 3.5 - 5.0 g/dL    Globulin 3.9 2.0 - 4.0 g/dL    A-G Ratio 0.3 (L) 1.1 - 2.2     CBC WITH AUTOMATED DIFF    Collection Time: 12/29/21 10:11 AM   Result Value Ref Range    WBC 7.4 3.6 - 11.0 K/uL    RBC 3.49 (L) 3.80 - 5.20 M/uL    HGB 10.5 (L) 11.5 - 16.0 g/dL    HCT 33.4 (L) 35.0 - 47.0 %    MCV 95.7 80.0 - 99.0 FL    MCH 30.1 26.0 - 34.0 PG    MCHC 31.4 30.0 - 36.5 g/dL    RDW 13.5 11.5 - 14.5 %    PLATELET 360 503 - 104 K/uL    MPV 10.2 8.9 - 12.9 FL    NRBC 0.0 0.0  WBC    ABSOLUTE NRBC 0.00 0.00 - 0.01 K/uL    NEUTROPHILS 88 (H) 32 - 75 %    LYMPHOCYTES 7 (L) 12 - 49 %    MONOCYTES 4 (L) 5 - 13 %    EOSINOPHILS 1 0 - 7 %    BASOPHILS 0 0 - 1 %    IMMATURE GRANULOCYTES 0 0 - 0.5 %    ABS. NEUTROPHILS 6.5 1.8 - 8.0 K/UL    ABS. LYMPHOCYTES 0.5 (L) 0.8 - 3.5 K/UL    ABS. MONOCYTES 0.3 0.0 - 1.0 K/UL    ABS. EOSINOPHILS 0.1 0.0 - 0.4 K/UL    ABS. BASOPHILS 0.0 0.0 - 0.1 K/UL    ABS. IMM.  GRANS. 0.0 0.00 - 0.04 K/UL    DF AUTOMATED     D DIMER    Collection Time: 12/29/21 10:11 AM   Result Value Ref Range    D DIMER 8.67 (H) <0.50 ug/ml(FEU)   PROTHROMBIN TIME + INR    Collection Time: 12/29/21 10:11 AM   Result Value Ref Range    Prothrombin time 18.9 (H) 11.9 - 14.7 sec    INR 1.6 (H) 0.9 - 1.1     LIPASE    Collection Time: 12/29/21 10:11 AM   Result Value Ref Range    Lipase 2,511 (H) 73 - 393 U/L   C REACTIVE PROTEIN, QT    Collection Time: 12/29/21 10:11 AM   Result Value Ref Range    C-Reactive protein 11.10 (H) 0.00 - 0.60 mg/dL PROCALCITONIN    Collection Time: 12/29/21 10:11 AM   Result Value Ref Range    Procalcitonin 0.35 (H) 0 ng/mL       DUPLEX UPPER EXT VENOUS BILAT   Final Result      XR CHEST PORT   Final Result   Large effusions. Vascular congestion. CT ABD PELV W CONT   Final Result   1. There are increasing bilateral pleural effusions, increasing body wall   edema, and increasing ascites. These findings could be secondary to the third   spacing of fluids. The differential diagnosis for the ascites would include   pancreatic ascites with acute pancreatitis or metastatic disease to the   peritoneum. 2.  There is a biliary stent which is unchanged in its position traversing   throughout area of obstruction within the pancreatic head. 3.  There are multiple low-density lesions of the liver without short-term   interval changes consistent with metastatic disease. 4.  The right kidney is not identified and apparently the patient is status post   a previous right nephrectomy. 5.  There are bilateral adrenal masses suspect for metastatic disease without   short-term interval changes. CTA CHEST W OR W WO CONT   Final Result   No CT evidence for PE. Thoracic aorta atherosclerosis. CAD. Moderate to large bilateral pleural effusions with associated RML, RLL, and LLL   compressive atelectasis. Abdominal ascites. DUPLEX LOWER EXT VENOUS BILAT   Final Result      XR CHEST PORT   Final Result   FINDINGS: IMPRESSION: 2 frontal views of the chest. Right PICC distal tip SVC   region. Enlarging cardiomegaly. Increasing vascular congestion. Interval development of   hypoinflation, pleural effusions, lower lung airspace edema and/or pneumonia. No   pneumothorax. No free air under the diaphragm. CT ABD PELV W CONT   Final Result   New moderate volume dependent pleural effusions with associated   lower lobe lung compressive atelectasis.       Increasing ascites, moderate approaching large-volume   (fluid could be sampled if there is any clinical concern for bile content). High suspicion hepatic metastases. Similar appearance for the pancreas; Silastic stent in place. No pseudocyst formation. Unchanged appearance bilateral adrenal glands. No bowel obstruction. US ABD LTD   Final Result   Small amount of free fluid. Finding suggesting hemangioma in the   liver. Gallbladder wall thickening, adenomyomatosis, sludge and gallstones. Cholecystitis possible. Finding in the region of the pancreatic head as above. Other findings as above. CTA ABDOMEN PELV W CONT   Final Result   1. Ill-defined hypodense mass in the pancreas. There are inflammatory changes   and fluid adjacent to the pancreas or duodenum and stomach most likely related   to biopsy or focal pancreatitis. No pseudocyst formation, active bleeding or   other acute findings. 2. Enlarged adrenal glands left greater than right with noncontrast densities   consistent with adrenal adenomas. 3. Right nephrectomy. 4. Other findings as described. XR CHEST PORT   Final Result   No acute findings. IR PARACENTESIS ABD W IMAGE    (Results Pending)       CT Results  (Last 48 hours)    None            Assessment:     1. Acute respiratory failure with hypoxia  2. Bilateral pleural effusions  3. COPD  4. Atrial fibrillation. 5.  Acute appendicitis  6. Acute on chronic congestive heart failure  7. Ascites  8. Pancreatic mass  9. Sepsis.   10.  Morbid obesity    Plan:     Patient admitted to the hospital  We will be watching her closely    Currently on 3 L nasal cannula oxygen  Continue oxygen supplementation as needed to keep saturation above 92%    Patient has bilateral pleural effusions with associated atelectasis  This is due to volume overload and ascites from the pancreatitis and pancreatic mass  Continue with diuresis    Intake and output charting  Check electrolytes and replace as needed  She had paracentesis done for 60 mL of clear fluid on 12/27. Cultures are pending. She has sepsis. Possible sources of fever include pancreatitis and pyuria  Continue antibiotics  Culture sent  Appreciate ID input    Lipase trending down  TPN if diet fails  However the patient is still taking orally. She has been recommended a low-fat diet. She is for EGD for possible post pyloric feeding tube placement. Dopplers of the upper extremities shows left superficial thrombophlebitis of the left median and antecubital veins. She is started on Eliquis.     Questions of the patient were answered at bedside in detail  Case discussed in detail with RN, and care team  Thank you for involving me in the care of this patient  I will follow the patient periodically during hospitalization    Time spent more than 30 minutes in direct patient care with no overlap    Marcel Montoya MD  [facility]  12/29/2021

## 2021-12-30 NOTE — PROGRESS NOTES
Chart reviewed as patient has discharge orders. [facility] has started Anisa Hock but it is still pending at this time. Clinical updates have been sent via [employer]. Per nursing patient is not going to be discharged today due to her blood pressure being low. Will follow-up tomorrow. DC plan: [facility] pending insurance auth and medical stability.

## 2021-12-30 NOTE — PROGRESS NOTES
Progress Note    Patient: Katlyn Pinto MRN: [id number]  SSN: [id number]    YOB: 1945  Age: 78 y.o. Sex: female      Admit Date: 12/9/2021    LOS: 21 days     Subjective:   GI in consultation for Pancreatitis.     Seen patient in room sleeping, easily arousable. She is feeling better today. Lipase has decreased to 2,511 today. Post pyloric feeding tube was discussed again, this time patient agreed to placement; Bilateral upper arm swollen. D-dimer elevated. Duplex scan showed no evidence of DVT in the right upper extremity, superficial thrombophlebitis noted within the left median/antecubital veins. She is on Eliquis 5mg BID.     12/30 EGD with Dobhoff placement for feeding  12/27/21 Paracentesis - 2300ml ascites fluid removed. 12/23/21 CT abdomen  1.  There are increasing bilateral pleural effusions, increasing body wall  edema, and increasing ascites. These findings could be secondary to the third spacing of fluids. The differential diagnosis for the ascites would include pancreatic ascites with acute pancreatitis or metastatic disease to the peritoneum.    2. Gisele Clement is a biliary stent which is unchanged in its position traversing throughout area of obstruction within the pancreatic head. 3. Gisele Clement are multiple low-density lesions of the liver without short-term  interval changes consistent with metastatic disease. 4.  The right kidney is not identified and apparently the patient is status post a previous right nephrectomy. 5.  There are bilateral adrenal masses suspect for metastatic disease without short-term interval changes. 12/6/2021 EUS showing 2.7 X3 cm lesion in the area of head of pancreas with dilation of the Pancreatic duct abutting the portal vein but not involving the SMA or AMV ; Tumor T2 by endosonographic criteria ; one small lymph node in the area of head of pancreas. 11/22/2021 PET Scan    1.  FDG avid lesion in the pancreatic head consistent with malignancy.   2.  Subcentimeter focus of FDG uptake in the liver, indeterminate. 3.  FDG uptake associated with density expanding the right facet at C5-C6, possibly due to an ectatic artery. Objective:     Vitals:    12/30/21 1147 12/30/21 1227 12/30/21 1229 12/30/21 1520   BP: (!) 78/58 (!) 72/52 (!) 97/52 (!) 80/50   Pulse:    97   Resp:    18   Temp:    98.8 °F (37.1 °C)   SpO2:    98%   Weight:       Height:            Intake and Output:  Current Shift: 12/30 0701 - 12/30 1900  In: 100 [I.V.:100]  Out: -   Last three shifts: 12/28 1901 - 12/30 0700  In: 1199.2 [P.O.:100; I.V.:1099.2]  Out: 3500 [Urine:3500]    Physical Exam:   Skin:  Extremities and face reveal no rashes. No chapin erythema. HEENT: Sclerae anicteric. Extra-occular muscles are intact. No abnormal pigmentation of the lips. The neck is supple. Dobhoff tube inplace. Cardiovascular: Regular rate and rhythm. Respiratory:  Comfortable breathing with no accessory muscle use. GI:  Abdomen nondistended, soft, and nontender. No enlargement of the liver or spleen. No masses palpable. Rectal:  Deferred  Musculoskeletal: Extremities have good range of motion. Neurological:  Gross memory appears intact. Patient is alert and oriented. Psychiatric:  Mood appears appropriate with judgement intact.   Lymphatic:  No visible adenopathy      Lab/Data Review:  Recent Results (from the past 24 hour(s))   LIPASE    Collection Time: 12/30/21 12:27 PM   Result Value Ref Range    Lipase 1,499 (H) 73 - 261 U/L   METABOLIC PANEL, COMPREHENSIVE    Collection Time: 12/30/21 12:27 PM   Result Value Ref Range    Sodium 134 (L) 136 - 145 mmol/L    Potassium 3.3 (L) 3.5 - 5.1 mmol/L    Chloride 91 (L) 97 - 108 mmol/L    CO2 41 (HH) 21 - 32 mmol/L    Anion gap 2 (L) 5 - 15 mmol/L    Glucose 79 65 - 100 mg/dL    BUN 15 6 - 20 mg/dL    Creatinine 0.53 (L) 0.55 - 1.02 mg/dL    BUN/Creatinine ratio 28 (H) 12 - 20      GFR est AA >60 >60 ml/min/1.73m2    GFR est non-AA >60 >60 ml/min/1.73m2    Calcium 7.8 (L) 8.5 - 10.1 mg/dL    Bilirubin, total 0.6 0.2 - 1.0 mg/dL    AST (SGOT) 33 15 - 37 U/L    ALT (SGPT) 19 12 - 78 U/L    Alk. phosphatase 344 (H) 45 - 117 U/L    Protein, total 4.0 (L) 6.4 - 8.2 g/dL    Albumin 1.2 (L) 3.5 - 5.0 g/dL    Globulin 2.8 2.0 - 4.0 g/dL    A-G Ratio 0.4 (L) 1.1 - 2.2     C REACTIVE PROTEIN, QT    Collection Time: 12/30/21 12:27 PM   Result Value Ref Range    C-Reactive protein 8.98 (H) 0.00 - 0.60 mg/dL   PROCALCITONIN    Collection Time: 12/30/21 12:27 PM   Result Value Ref Range    Procalcitonin 0.34 (H) 0 ng/mL   D DIMER    Collection Time: 12/30/21 12:28 PM   Result Value Ref Range    D DIMER 7.22 (H) <0.50 ug/ml(FEU)   PROTHROMBIN TIME + INR    Collection Time: 12/30/21 12:28 PM   Result Value Ref Range    Prothrombin time 20.2 (H) 11.9 - 14.7 sec    INR 1.7 (H) 0.9 - 1.1     CBC W/O DIFF    Collection Time: 12/30/21  1:30 PM   Result Value Ref Range    WBC 6.8 3.6 - 11.0 K/uL    RBC 2.60 (L) 3.80 - 5.20 M/uL    HGB 7.8 (L) 11.5 - 16.0 g/dL    HCT 25.0 (L) 35.0 - 47.0 %    MCV 96.2 80.0 - 99.0 FL    MCH 30.0 26.0 - 34.0 PG    MCHC 31.2 30.0 - 36.5 g/dL    RDW 13.6 11.5 - 14.5 %    PLATELET 988 018 - 035 K/uL    MPV 11.2 8.9 - 12.9 FL    NRBC 0.0 0.0  WBC    ABSOLUTE NRBC 0.00 0.00 - 0.01 K/uL   DIFFERENTIAL, AUTO    Collection Time: 12/30/21  1:30 PM   Result Value Ref Range    NEUTROPHILS 85 (H) 32 - 75 %    LYMPHOCYTES 9 (L) 12 - 49 %    MONOCYTES 4 (L) 5 - 13 %    EOSINOPHILS 1 0 - 7 %    BASOPHILS 0 0 - 1 %    IMMATURE GRANULOCYTES 1 (H) 0 - 0.5 %    ABS. NEUTROPHILS 5.8 1.8 - 8.0 K/UL    ABS. LYMPHOCYTES 0.6 (L) 0.8 - 3.5 K/UL    ABS. MONOCYTES 0.3 0.0 - 1.0 K/UL    ABS. EOSINOPHILS 0.1 0.0 - 0.4 K/UL    ABS. BASOPHILS 0.0 0.0 - 0.1 K/UL    ABS. IMM. GRANS. 0.0 0.00 - 0.04 K/UL    DF AUTOMATED                DUPLEX UPPER EXT VENOUS BILAT   Final Result      XR CHEST PORT   Final Result   Large effusions. Vascular congestion.       CT ABD PELV W CONT   Final Result   1. There are increasing bilateral pleural effusions, increasing body wall   edema, and increasing ascites. These findings could be secondary to the third   spacing of fluids. The differential diagnosis for the ascites would include   pancreatic ascites with acute pancreatitis or metastatic disease to the   peritoneum. 2.  There is a biliary stent which is unchanged in its position traversing   throughout area of obstruction within the pancreatic head. 3.  There are multiple low-density lesions of the liver without short-term   interval changes consistent with metastatic disease. 4.  The right kidney is not identified and apparently the patient is status post   a previous right nephrectomy. 5.  There are bilateral adrenal masses suspect for metastatic disease without   short-term interval changes. CTA CHEST W OR W WO CONT   Final Result   No CT evidence for PE. Thoracic aorta atherosclerosis. CAD. Moderate to large bilateral pleural effusions with associated RML, RLL, and LLL   compressive atelectasis. Abdominal ascites. DUPLEX LOWER EXT VENOUS BILAT   Final Result      XR CHEST PORT   Final Result   FINDINGS: IMPRESSION: 2 frontal views of the chest. Right PICC distal tip SVC   region. Enlarging cardiomegaly. Increasing vascular congestion. Interval development of   hypoinflation, pleural effusions, lower lung airspace edema and/or pneumonia. No   pneumothorax. No free air under the diaphragm. CT ABD PELV W CONT   Final Result   New moderate volume dependent pleural effusions with associated   lower lobe lung compressive atelectasis. Increasing ascites, moderate approaching large-volume   (fluid could be sampled if there is any clinical concern for bile content). High suspicion hepatic metastases. Similar appearance for the pancreas; Silastic stent in place. No pseudocyst formation.       Unchanged appearance bilateral adrenal glands. No bowel obstruction. US ABD LTD   Final Result   Small amount of free fluid. Finding suggesting hemangioma in the   liver. Gallbladder wall thickening, adenomyomatosis, sludge and gallstones. Cholecystitis possible. Finding in the region of the pancreatic head as above. Other findings as above. CTA ABDOMEN PELV W CONT   Final Result   1. Ill-defined hypodense mass in the pancreas. There are inflammatory changes   and fluid adjacent to the pancreas or duodenum and stomach most likely related   to biopsy or focal pancreatitis. No pseudocyst formation, active bleeding or   other acute findings. 2. Enlarged adrenal glands left greater than right with noncontrast densities   consistent with adrenal adenomas. 3. Right nephrectomy. 4. Other findings as described. XR CHEST PORT   Final Result   No acute findings. IR PARACENTESIS ABD W IMAGE    (Results Pending)       Assessment:     Principal Problem:    Pancreatitis (12/9/2021)    Active Problems:    A-fib ([address] (11/16/2020)      CHF (congestive heart failure) ([address] (11/16/2020)      Hematemesis (12/12/2021)      History of peptic ulcer disease (12/12/2021)        Plan:   1. Pancreatitis      -12/30 s/p post pyloric tube placement. Consult dietary for feeding formula.       -IV hydration       -Lipase 2,511      -repeat Lipase in the am       -Dilaudid 1 mg every  4 hours as needed for pain      -Oxycodone 5 mg every 6 hours for breakthrough pain      - S/P paracentesis 12/27/21 with removal of 2300 ml clear serous  Fluid removed           -cytology pending  2. CHF      -Continue Chlorthalidone  3. COPD       -Continue home medications       -Albuterol as needed   4.  Pancreatic Mass      - > 4000      -S/p fine needle aspiration suspicious for neoplasia but not diagnostic      -When stable Oncology plan for out patient followup with advanced oncology surgeon  At  or [facility] for resection considerations       -CT concern for liver metastais/lesions        - IR for liver biopsy on hold at this time  5. Hematemesis (resolved)      -Monitor H&H      -Transfuse as needed      -hgB 10.5 12/29/21      -Continue Protonix 40 mg daily  6. Afib      -Rate is controlled      -eliquis  BID/ HgB stable at 10.5 this am       -no further hematemesis reported      Patient discussed with Dr Niraj Sharp and agrees to above impression and plan. Thank you for allowing me to participate in this patients care    Signed By: Esdras Mcintosh.  CONRADO Hillman     December 30, 2021

## 2021-12-30 NOTE — ANESTHESIA POSTPROCEDURE EVALUATION
Procedure(s):  ESOPHAGOGASTRODUODENOSCOPY (EGD) with Dobhoff Placement. total IV anesthesia, MAC    Anesthesia Post Evaluation      Multimodal analgesia: multimodal analgesia not used between 6 hours prior to anesthesia start to PACU discharge  Patient location during evaluation: bedside (Endoscopy suite)  Patient participation: complete - patient cannot participate  Level of consciousness: sleepy but conscious  Pain score: 0  Pain management: adequate  Airway patency: patent  Anesthetic complications: no  Cardiovascular status: acceptable  Respiratory status: acceptable and nasal cannula  Hydration status: acceptable  Comments: This patient remained on the stretcher. The patient was handed off to the endoscopy nursing team.  All questions regarding pre-, intra-, and postoperative care were answered.   Post anesthesia nausea and vomiting:  none      INITIAL Post-op Vital signs:   Vitals Value Taken Time   /60 12/30/21 1017   Temp 36.3 °C (97.3 °F) 12/30/21 1017   Pulse 95 12/30/21 1017   Resp 22 12/30/21 1017   SpO2 98 % 12/30/21 1017

## 2021-12-30 NOTE — PROGRESS NOTES
Comprehensive Nutrition Assessment    Type and Reason for Visit: Reassess (interim)    Nutrition Recommendations/Plan:   Initiate TF via NG of Jevity 1.5 at 30mL/hr    Increase by 20mL q4hr to goal rate 60mL/hr, continuous  Flush with 150mL free water q4hr  Goal feeds provide 2160kcal (105%), 92g (87%), 1993mL (97%)    Document TF rate,water flushes, and GRVs in EMR    Nutrition Assessment:  PMHx: COPD, CHF, obesity, anemia. Admit with abd pain, n/v, diarrhea and poor intake. Pt reported eating well pta however unable to elaborate further. Underwent EUS 12/6 showing a lesion in the head of the pancreas. US ABD sludge-filled gallbladder, biliary obstruction. Lipase continues to rise- no new pancreatic findings on CT abd; note increasing ascites. NPO/ CL x 9d. Received PPN 12/18, 12/19 at low rate- providing <25% est nutrient needs. Previously on PO diet (Regular, Full Lq, Clear Lq) with continued abd pain. Dobhoff placed today for nutrition. RD reconsulted for TF. Labs: Na 134, , K 3.4, Lipase 2511. Meds: Vitamin C, Lipitor, Vit D3, protonix, Zosyn IV, PPN, Miralax, docusate, diuladid, merrem, PPI, KCl     Malnutrition Assessment:  Malnutrition Status:  Mild malnutrition    Context:  Acute illness       Estimated Daily Nutrient Needs:  Energy (kcal): 2050kcal (20kcal/kg); Weight Used for Energy Requirements: Current  Protein (g): 105g (1g/kg); Weight Used for Protein Requirements: Current  Fluid (ml/day): 2050mL; Method Used for Fluid Requirements: 1 ml/kcal    Nutrition Related Findings:  Pt obese. appears well nourished. NFPE not performed. BM documented 12/27 soft. no edema. Intermittent nausea- no recent emesis.       Wounds:    None       Current Nutrition Therapies:  ADULT ORAL NUTRITION SUPPLEMENT Breakfast, Lunch, Dinner; Clear Liquid  DIET NPO Ice Chips    Anthropometric Measures:  · Height:  5' 7\" (170.2 cm)  · Current Body Wt:  102 kg (224 lb 13.9 oz) (12/27)   · Admission Body Wt:  216 lb 0.8 oz · Usual Body Wt:  117.6 kg (259 lb 4.2 oz) (>1 year ago)     · Ideal Body Wt:  135 lbs:  166.6 %   · BMI Category:  Obese class 2 (BMI 35.0-39. 9)       Nutrition Diagnosis:   · Inadequate protein-energy intake related to impaired nutrient utilization as evidenced by nutrition support-enteral nutrition    Nutrition Interventions:   Food and/or Nutrient Delivery: Modify current diet  Nutrition Education and Counseling: No recommendations at this time  Coordination of Nutrition Care: Continue to monitor while inpatient    Goals:  Meet >50% EENs in 5-7 days, Lytes wnl, Maintain skin integrity       Nutrition Monitoring and Evaluation:   Behavioral-Environmental Outcomes: None identified  Food/Nutrient Intake Outcomes: Enteral nutrition intake/tolerance  Physical Signs/Symptoms Outcomes: Biochemical data,Weight    Discharge Planning:     Too soon to determine     Electronically signed by Quincy Frank on 12/30/2021 at 1:24 PM    Contact: HARJINDER

## 2021-12-30 NOTE — PROGRESS NOTES
Hospitalist Progress Note    Subjective:   Daily Progress Note: 12/30/2021 12:21 PM    Hospital Course:  72-year-old female with a history of atrial fibrillation, peptic ulcer disease, CHF, COPD, renal cell carcinoma stage IV status post nephrectomy, GERD, COPD, Sojourn syndrome and essential hypertension who was presented to the emergency department for admission on 12/9/2021 after undergoing a EUS on 12/06 by Dr. Sam Cortez with biopsy and subsequently developed severe pancreatitis. Patient's admission course has been complicated by ongoing pancreatitis and severe abdominal pain. CT scan of the abdomen pelvis revealed an ill-defined hypodense mass in the pancreas with fluid adjacent to the pancreas suggestive of focal pancreatitis. CTA chest showing no evidence of pulmonary embolism, but does note moderate to large bilateral pleural effusions in RML, RLL and LLL as well as ascites. GI and oncology consulted. Patient had an episode of hematemesis as well. Patient remains on IV Protonix. Abdominal ultrasound also showed no hemodynamic drop in the liver. Gallbladder wall has been thickened with sludge and stones although cholecystitis is less likely at this point. Although continue to monitor closely. Patient was started on Zosyn and subsequently changed to meropenem as the patient developed leukocytosis. Patient also has elevation in her INR although had been receiving Eliquis and therefore Eliquis was held. Patient started on p.o. diet although developed severe pain and have decreased her diet to clear liquid. General surgery following as well, Dr. Ira Mcclelland. Lipase worsening. CT abd/pelvis with PO and IV contrast showing increasing bilateral pleural effusion and increasing ascites. Also notes multiple low-density lesions of liver consistent with metastatic disease and bilateral adrenal masses suspected for metastatic disease. IR consulted for liver biopsy. D-dimer elevated >14.  Duplex US lower extremities negative for DVT. Duplex upper extremities showing superficial thrombophlebitis in left median/antecubital veins. Restarted on home Eliquis. Went for paracentesis on 12/27 with 2300 mL clear serous fluid drained. Cytology results showing now growth. She was scheduled for liver biopsy with IR however IR does not feel patient would benefit from biopsy at this time. Patient taken down for EGD with Dobhoff Placement on 12/30/21 for nutritional support. Subjective:    Patient seen and examined at bedside. She had Dobhoff placed this morning. She reports generalized weakness. Trying to get up to the bedside commode but was advised to remain in bed due to lower blood pressure.      Current Facility-Administered Medications   Medication Dose Route Frequency    fentaNYL citrate (PF) 50 mcg/mL injection        0.9% sodium chloride infusion  50 mL/hr IntraVENous CONTINUOUS    0.9% sodium chloride infusion  75 mL/hr IntraVENous CONTINUOUS    sodium chloride (NS) flush 5-40 mL  5-40 mL IntraVENous PRN    midodrine (PROAMATINE) tablet 10 mg  10 mg Oral BID PRN    apixaban (ELIQUIS) tablet 5 mg  5 mg Oral BID    furosemide (LASIX) injection 40 mg  40 mg IntraVENous DAILY    anidulafungin (ERAXIS) 100 mg in 0.9% sodium chloride 130 mL IVPB  100 mg IntraVENous Q24H    zinc oxide-white petrolatum 17-57 % topical paste   Topical TID    cyclobenzaprine (FLEXERIL) tablet 10 mg  10 mg Oral TID PRN    HYDROmorphone (DILAUDID) injection 1 mg  1 mg IntraVENous Q4H PRN    oxyCODONE IR (ROXICODONE) tablet 5 mg  5 mg Oral Q6H PRN    nystatin (MYCOSTATIN) 100,000 unit/gram cream   Topical BID    docusate sodium (COLACE) capsule 100 mg  100 mg Oral BID    polyethylene glycol (MIRALAX) packet 17 g  17 g Oral DAILY    sorbitoL 70 % solution 30 mL  30 mL Oral DAILY PRN    meropenem (MERREM) 1 g in 0.9% sodium chloride 20 mL IV syringe  1 g IntraVENous Q8H    bisacodyL (DULCOLAX) suppository 10 mg  10 mg Rectal DAILY PRN    hydrALAZINE (APRESOLINE) 20 mg/mL injection 20 mg  20 mg IntraVENous Q6H PRN    pantoprazole (PROTONIX) 40 mg in 0.9% sodium chloride 10 mL injection  40 mg IntraVENous DAILY    guaiFENesin ER (MUCINEX) tablet 600 mg  600 mg Oral Q12H    albuterol-ipratropium (DUO-NEB) 2.5 MG-0.5 MG/3 ML  3 mL Nebulization Q6H PRN    chlorthalidone (HYGROTON) tablet 25 mg  25 mg Oral DAILY    albuterol (PROVENTIL HFA, VENTOLIN HFA, PROAIR HFA) inhaler 2 Puff  2 Puff Inhalation Q6H PRN    ascorbic acid (vitamin C) (VITAMIN C) tablet 500 mg  500 mg Oral DAILY    atorvastatin (LIPITOR) tablet 40 mg  40 mg Oral DAILY    cholecalciferol (VITAMIN D3) (1000 Units /25 mcg) tablet 1,000 Units  1,000 Units Oral DAILY    diazePAM (VALIUM) tablet 5 mg  5 mg Oral Q6H PRN    dilTIAZem ER (CARDIZEM CD) capsule 120 mg  120 mg Oral DAILY    potassium chloride SR (KLOR-CON 10) tablet 20 mEq  20 mEq Oral DAILY    sertraline (ZOLOFT) tablet 25 mg  25 mg Oral DAILY    traZODone (DESYREL) tablet 50 mg  50 mg Oral QHS    ondansetron (ZOFRAN) injection 4 mg  4 mg IntraVENous Q6H PRN    acetaminophen (TYLENOL) tablet 650 mg  650 mg Oral Q4H PRN    prochlorperazine (COMPAZINE) with saline injection 10 mg  10 mg IntraVENous Q6H PRN        Review of Systems  Constitutional: Positive for malaise/fatigue. Negative for fever. HENT: Negative. Respiratory: Positive for shortness of breath. Negative for cough. Cardiovascular: Negative for chest pain and leg swelling. Gastrointestinal: Positive for abdominal pain. Negative for nausea and vomiting.    Genitourinary: No frequency, No dysuria, No hematuria  Integument/breast: No skin lesion(s)   Neurological: No Confusion, No headaches, No dizziness      Objective:     Visit Vitals  BP (!) 97/52 (BP 1 Location: Right lower arm, BP Patient Position: At rest)   Pulse (!) 104   Temp 98.5 °F (36.9 °C)   Resp 16   Ht 5' 7\" (1.702 m)   Wt 97.4 kg (214 lb 11.7 oz)   SpO2 98%   BMI 33.63 kg/m²    O2 Flow Rate (L/min): 3 l/min O2 Device: Nasal cannula    Temp (24hrs), Av.1 °F (36.7 °C), Min:97.2 °F (36.2 °C), Max:98.8 °F (37.1 °C)      701 - 1900  In: 100 [I.V.:100]  Out: -   1901 -  0700  In: 1199.2 [P.O.:100; I.V.:1099.2]  Out: 3500 [Urine:3500]    PHYSICAL EXAM:  Constitutional: No acute distress  Skin: Extremities and face reveal no rashes. Multiple areas of ecchymosis in upper extremities. HEENT: Dobhoff in left nare. Sclerae anicteric. Extra-occular muscles are intact. No oral ulcers. The neck is supple and no masses. Cardiovascular: Regular rate and rhythm. +S1/S2. No murmur or gallop. Respiratory:  Rhonchi noted bilateral without wheezes. GI: Firm with hypoactive bowel sounds and tenderness to palpation in all 4 quadrant. Rectal: Deferred   Musculoskeletal: Upper and lower extremity peripheral edema present, likely third spacing. Able to move all ext  Neurological:  Generalized weakness. Patient is alert and oriented x3. Cranial nerves II-XII grossly intact  Psychiatric: Mood appears appropriate       Data Review    No results found for this or any previous visit (from the past 24 hour(s)). DUPLEX UPPER EXT VENOUS BILAT   Final Result      XR CHEST PORT   Final Result   Large effusions. Vascular congestion. CT ABD PELV W CONT   Final Result   1. There are increasing bilateral pleural effusions, increasing body wall   edema, and increasing ascites. These findings could be secondary to the third   spacing of fluids. The differential diagnosis for the ascites would include   pancreatic ascites with acute pancreatitis or metastatic disease to the   peritoneum. 2.  There is a biliary stent which is unchanged in its position traversing   throughout area of obstruction within the pancreatic head. 3.  There are multiple low-density lesions of the liver without short-term   interval changes consistent with metastatic disease.    4.  The right kidney is not identified and apparently the patient is status post   a previous right nephrectomy. 5.  There are bilateral adrenal masses suspect for metastatic disease without   short-term interval changes. CTA CHEST W OR W WO CONT   Final Result   No CT evidence for PE. Thoracic aorta atherosclerosis. CAD. Moderate to large bilateral pleural effusions with associated RML, RLL, and LLL   compressive atelectasis. Abdominal ascites. DUPLEX LOWER EXT VENOUS BILAT   Final Result      XR CHEST PORT   Final Result   FINDINGS: IMPRESSION: 2 frontal views of the chest. Right PICC distal tip SVC   region. Enlarging cardiomegaly. Increasing vascular congestion. Interval development of   hypoinflation, pleural effusions, lower lung airspace edema and/or pneumonia. No   pneumothorax. No free air under the diaphragm. CT ABD PELV W CONT   Final Result   New moderate volume dependent pleural effusions with associated   lower lobe lung compressive atelectasis. Increasing ascites, moderate approaching large-volume   (fluid could be sampled if there is any clinical concern for bile content). High suspicion hepatic metastases. Similar appearance for the pancreas; Silastic stent in place. No pseudocyst formation. Unchanged appearance bilateral adrenal glands. No bowel obstruction. US ABD LTD   Final Result   Small amount of free fluid. Finding suggesting hemangioma in the   liver. Gallbladder wall thickening, adenomyomatosis, sludge and gallstones. Cholecystitis possible. Finding in the region of the pancreatic head as above. Other findings as above. CTA ABDOMEN PELV W CONT   Final Result   1. Ill-defined hypodense mass in the pancreas. There are inflammatory changes   and fluid adjacent to the pancreas or duodenum and stomach most likely related   to biopsy or focal pancreatitis.  No pseudocyst formation, active bleeding or   other acute findings. 2. Enlarged adrenal glands left greater than right with noncontrast densities   consistent with adrenal adenomas. 3. Right nephrectomy. 4. Other findings as described. XR CHEST PORT   Final Result   No acute findings. IR PARACENTESIS ABD W IMAGE    (Results Pending)       Principal Problem:    Pancreatitis (12/9/2021)    Active Problems:    A-fib ([address] (11/16/2020)      CHF (congestive heart failure) ([address] (11/16/2020)      Hematemesis (12/12/2021)      History of peptic ulcer disease (12/12/2021)        Assessment/Plan:     1. Acute pancreatitis  - Status post EUS by Dr. Armando Benjamin 12/06  - Continue pain medication  - Lipase variable, will likely be chronically elevated   - CT abd/pelvis with PO and IV contrast showing increasing bilateral pleural effusion and increasing ascites. Also notes multiple low-density lesions of liver consistent with metastatic disease and bilateral adrenal masses suspected for metastatic disease.  - Continue IV merrem day #11 of 14   - Continue empiric Anidulafungin  - Will change IV fluids to 0.45% normal saline   - Went for paracentesis on 12/27 with 2300 mL clear serous fluid drained, cytology showing now growth. - She was scheduled for liver biopsy with IR however IR does not feel patient would benefit from biopsy at this time. - Dobhoff placed 12/30 for nutritional support     2. CHF  - Continue chlorthalidone 25 mg daily  - IV lasix 40 mg daily     3. Pancreatic mass - CA 19-9 greater than 4000     4. Hypokalemia - replete as needed     5. COPD  - Pulmonary consultation  - Chronic respiratory failure with 2 L of oxygen via nasal cannula at home     6.  Leukocytosis  - Started meropenem  - Cultures negative     7. Supratherapeutic anticoagulation  - Resolved. INR normal.  - Vitamin K given  - May be a side effect of using Eliquis recently and this may be an artificial INR. No obvious bleeding source  - Hemoglobin stable    8.  Elevated d-dimer  - Likely multifactorial   - CTA chest negative for PE  - Duplex US lower extremities negative for DVT  - Duplex US upper extremities showing superficial thrombophlebitis in left median/antecubital veins  - Restarted on home Eliquis     DVT Prophylaxis: restarted on home Eliquis  GI PPx: Protonix  Code Status: Full  POA:    Discharge Barriers: Pending tolerance of post-pyloric feeds. May likely ready for  discharged tomorrow. Continue 2 more weeks of merrem and fluconazole 200 mg daily per Infectious Disease. Care Plan discussed with: patient and nursing    Total time spent with patient: >35 minutes.

## 2021-12-30 NOTE — PROGRESS NOTES
Progress Note    Patient: Demetrice Juares MRN: [id number]  SSN: [id number]    YOB: 1945  Age: 78 y.o. Sex: female      Admit Date: 12/9/2021    LOS: 21 days     Subjective:   Patient followed for severe pancreatitis with worsening leukocytosis on Zosyn. She was switched to Meropenem because of pancreatitis. Now with suspected Candida intraabdominal infection and a clinical response to Anidulafungin. Ascitic fluid culture negative so far. WBC now normal with decreasing CRP. Serum lipase also decreasing today. She is pending authorization to go to [facility]. Dobhoff tube was placed earlier today. Objective:     Vitals:    12/30/21 1147 12/30/21 1227 12/30/21 1229 12/30/21 1520   BP: (!) 78/58 (!) 72/52 (!) 97/52 (!) 80/50   Pulse:    97   Resp:    18   Temp:    98.8 °F (37.1 °C)   SpO2:    98%   Weight:       Height:            Intake and Output:  Current Shift: 12/30 0701 - 12/30 1900  In: 100 [I.V.:100]  Out: -   Last three shifts: 12/28 1901 - 12/30 0700  In: 1199.2 [P.O.:100; I.V.:1099.2]  Out: 3500 [Urine:3500]    Physical Exam:     Vitals and nursing note reviewed. Constitutional:       General: She is not in acute distress. Appearance: She is obese. She is ill-appearing. HENT: Dobhoff tube left nostril  Abdominal:  Mild tenderness   Genitourinary:  Vaginal area not examined     Comments: External urinary device  Musculoskeletal:      Right lower leg: No edema. Left lower leg: No edema. Comments: PICC line right upper arm   Skin: multiple ecchymoses on the upper extremities, edema left forearm     Findings: No rash. Comments: ?Stage 2 sacral wound   Neurological:      General: No focal deficit present. Mental Status: She is alert and oriented to person, place, and time. Psychiatric:         Mood and Affect: Mood normal.         Behavior: Behavior normal.         Thought Content:  Thought content normal.         Judgment: Judgment normal.     Lab/Data Review:     WBC 6,800  Urinalysis with WBC 10-20, Bacteria negative  Lipase 1,499 <2,511 <2,802 <3,000 <2,484 <2,684 < 1,397 <1,719    Procal 0.34 <0.35 <0.34 < 0.32 <0.21 <0.19 <0.25 < 0.14  CRP 8.98 <11.10 < 12.10 <23.40 <18.30 <14.20 <12.70 <15.00 < 23.80    Serum fungitell <31 Negative    Ascitic fluid   with 41% PMNs    Blood cultures (12/20) No growth FINAL  Urine culture (12/20) No growth FINAL  Ascitic fluid culture(12/27) No growth thus  far    Assessment:     Principal Problem:    Pancreatitis (12/9/2021)    Active Problems:    A-fib ([facility] (11/16/2020)      CHF (congestive heart failure) ([facility] [address] (11/16/2020)      Hematemesis (12/12/2021)      History of peptic ulcer disease (12/12/2021)    1. Severe pancreatitis with markedly elevated lipase, resolving  2. Pancreatic mass, possible neoplasm  3. Biliary stent  4. Sepsis with worsening leukocytosis with elevated procal and CRP, resolving, Day #11 IV Meropenem, Day #5 IV Anidulafungin (empiric)  5. TPN (just started)  6. Moderate pyuria, negative bacteria, urine culture negative  7. Possible candida superinfection with vaginitis, treated  8. Ascites, status post paracentesis    Comment:    WBC, procal and CRP all decreasing. Plan:   1. Continue IV Meropenem for 3 more days  2. Continue Anidulafungin empirically; at transfer, would switch to Fluconazole 200 mg po daily for 2 more weeks  3.  Follow-up ascites culture     Signed By: Zuhair Juarez MD     December 30, 2021

## 2021-12-30 NOTE — PROGRESS NOTES
Patient refusing to be turned at this time. Patient educated on risks of not turning and on pressure injury education.

## 2021-12-30 NOTE — ANESTHESIA PREPROCEDURE EVALUATION
Relevant Problems   RESPIRATORY SYSTEM   (+) COPD exacerbation (HCC)      CARDIOVASCULAR   (+) A-fib (HCC)   (+) CHF (congestive heart failure) (HCC)      ENDOCRINE   (+) Obesity      HEMATOLOGY   (+) Anemia       Anesthetic History     PONV          Review of Systems / Medical History  Patient summary reviewed, nursing notes reviewed and pertinent labs reviewed    Pulmonary    COPD      Smoker         Neuro/Psych             Comments: RIGHT FINGER NUMBNESS  FIBROMYALGIA Cardiovascular            Dysrhythmias : atrial fibrillation  Hyperlipidemia    Exercise tolerance: <4 METS  Comments: Echo:  · LV: Calculated LVEF is 74%. Normal cavity size, systolic function (ejection fraction normal) and diastolic function. Moderate concentric hypertrophy. Wall motion: normal.  · LA: Mildly dilated left atrium. · RA: Mildly dilated right atrium. · MV: Moderate mitral annular calcification. Mild mitral valve regurgitation is present. · TV: Mild tricuspid valve regurgitation is present. · PA: Pulmonary arterial systolic pressure is 62 mmHg. · IVC: Severely elevated central venous pressure (15+ mmHg); IVC diameter is larger than 21 mm and collapses less than 50% with respiration. · Pericardium: Small pericardial effusion. GI/Hepatic/Renal     GERD    Renal disease (Last Creatinine 1.25): CRI      Comments: Pancreatic Mass Endo/Other      Hypothyroidism  Morbid obesity and cancer     Other Findings   Comments: INR 1.        Past Medical History:   Diagnosis Date    A-fib [facility])     CHF (congestive heart failure) (HCC)     Clear cell renal cell carcinoma (HCC)     COPD (chronic obstructive pulmonary disease) (HCC)     Fibromyalgia     GERD (gastroesophageal reflux disease)     Lymphedema     Sjogren's syndrome ([facility] [address]     Thyroid nodule        Past Surgical History:   Procedure Laterality Date    COLONOSCOPY N/A 11/18/2020    COLONOSCOPY performed by Josue Almaraz MD at [facility] HX GI      EGD    HX HYSTERECTOMY      HX NEPHRECTOMY Right 2019    HX ORTHOPAEDIC      ankle       Current Outpatient Medications   Medication Instructions    albuterol (PROVENTIL HFA, VENTOLIN HFA, PROAIR HFA) 90 mcg/actuation inhaler 2 Puffs, Inhalation, EVERY 6 HOURS AS NEEDED    ascorbic acid (vitamin C) (VITAMIN C) 500 mg, Oral, DAILY    atorvastatin (LIPITOR) 40 mg, Oral, DAILY    budesonide-formoteroL (Symbicort) 80-4.5 mcg/actuation HFAA 2 Puffs, Inhalation    bumetanide (BUMEX) 1 mg, Oral, DAILY    chlorthalidone (HYGROTON) 25 mg, Oral, DAILY    cholecalciferol (VITAMIN D3) 1,000 Units, Oral, DAILY    diazePAM (VALIUM) 5 mg, Oral, EVERY 6 HOURS AS NEEDED    dilTIAZem ER (DILACOR XR) 120 mg, Oral, DAILY    Eliquis 5 mg, Oral, 2 TIMES DAILY    famotidine (PEPCID) 40 mg, DAILY    pantoprazole (PROTONIX) 40 mg, Oral, DAILY    potassium chloride SR (KLOR-CON 10) 10 mEq tablet 20 mEq, Oral, DAILY    Se-Tan Plus 162-115. 2-1 mg cap 1 Capsule, Oral, 2 TIMES DAILY    sertraline (ZOLOFT) 25 mg, Oral, DAILY    traZODone (DESYREL) 50 mg tablet TAKE ONE TABLET BY MOUTH AT BEDTIME       No current facility-administered medications for this visit. No current outpatient medications on file.      Facility-Administered Medications Ordered in Other Visits   Medication Dose Route Frequency    0.9% sodium chloride infusion  50 mL/hr IntraVENous CONTINUOUS    0.9% sodium chloride infusion  75 mL/hr IntraVENous CONTINUOUS    sodium chloride (NS) flush 5-40 mL  5-40 mL IntraVENous PRN    midodrine (PROAMATINE) tablet 10 mg  10 mg Oral BID PRN    apixaban (ELIQUIS) tablet 5 mg  5 mg Oral BID    furosemide (LASIX) injection 40 mg  40 mg IntraVENous DAILY    anidulafungin (ERAXIS) 100 mg in 0.9% sodium chloride 130 mL IVPB  100 mg IntraVENous Q24H    zinc oxide-white petrolatum 17-57 % topical paste   Topical TID    cyclobenzaprine (FLEXERIL) tablet 10 mg  10 mg Oral TID PRN    HYDROmorphone (DILAUDID) injection 1 mg  1 mg IntraVENous Q4H PRN    oxyCODONE IR (ROXICODONE) tablet 5 mg  5 mg Oral Q6H PRN    nystatin (MYCOSTATIN) 100,000 unit/gram cream   Topical BID    docusate sodium (COLACE) capsule 100 mg  100 mg Oral BID    polyethylene glycol (MIRALAX) packet 17 g  17 g Oral DAILY    sorbitoL 70 % solution 30 mL  30 mL Oral DAILY PRN    meropenem (MERREM) 1 g in 0.9% sodium chloride 20 mL IV syringe  1 g IntraVENous Q8H    bisacodyL (DULCOLAX) suppository 10 mg  10 mg Rectal DAILY PRN    hydrALAZINE (APRESOLINE) 20 mg/mL injection 20 mg  20 mg IntraVENous Q6H PRN    pantoprazole (PROTONIX) 40 mg in 0.9% sodium chloride 10 mL injection  40 mg IntraVENous DAILY    guaiFENesin ER (MUCINEX) tablet 600 mg  600 mg Oral Q12H    albuterol-ipratropium (DUO-NEB) 2.5 MG-0.5 MG/3 ML  3 mL Nebulization Q6H PRN    chlorthalidone (HYGROTON) tablet 25 mg  25 mg Oral DAILY    albuterol (PROVENTIL HFA, VENTOLIN HFA, PROAIR HFA) inhaler 2 Puff  2 Puff Inhalation Q6H PRN    ascorbic acid (vitamin C) (VITAMIN C) tablet 500 mg  500 mg Oral DAILY    atorvastatin (LIPITOR) tablet 40 mg  40 mg Oral DAILY    cholecalciferol (VITAMIN D3) (1000 Units /25 mcg) tablet 1,000 Units  1,000 Units Oral DAILY    diazePAM (VALIUM) tablet 5 mg  5 mg Oral Q6H PRN    dilTIAZem ER (CARDIZEM CD) capsule 120 mg  120 mg Oral DAILY    potassium chloride SR (KLOR-CON 10) tablet 20 mEq  20 mEq Oral DAILY    sertraline (ZOLOFT) tablet 25 mg  25 mg Oral DAILY    traZODone (DESYREL) tablet 50 mg  50 mg Oral QHS    ondansetron (ZOFRAN) injection 4 mg  4 mg IntraVENous Q6H PRN    acetaminophen (TYLENOL) tablet 650 mg  650 mg Oral Q4H PRN    prochlorperazine (COMPAZINE) with saline injection 10 mg  10 mg IntraVENous Q6H PRN       No data found.     Lab Results   Component Value Date/Time    WBC 7.4 12/29/2021 10:11 AM    HGB 10.5 (L) 12/29/2021 10:11 AM    HCT 33.4 (L) 12/29/2021 10:11 AM    PLATELET 407 32/76/3702 10:11 AM    MCV 95.7 12/29/2021 10:11 AM Lab Results   Component Value Date/Time    Sodium 134 (L) 12/29/2021 10:11 AM    Potassium 3.4 (L) 12/29/2021 10:11 AM    Chloride 91 (L) 12/29/2021 10:11 AM    CO2 41 (HH) 12/29/2021 10:11 AM    Anion gap 2 (L) 12/29/2021 10:11 AM    Glucose 115 (H) 12/29/2021 10:11 AM    BUN 14 12/29/2021 10:11 AM    Creatinine 0.62 12/29/2021 10:11 AM    BUN/Creatinine ratio 23 (H) 12/29/2021 10:11 AM    GFR est AA >60 12/29/2021 10:11 AM    GFR est non-AA >60 12/29/2021 10:11 AM    Calcium 8.7 12/29/2021 10:11 AM     No results found for: APTT, PTP, INR, INREXT, INREXT  Lab Results   Component Value Date/Time    Glucose 115 (H) 12/29/2021 10:11 AM    Glucose (POC) 94 12/24/2021 04:53 PM     Physical Exam    Airway  Mallampati: III  TM Distance: 4 - 6 cm         Cardiovascular      Rate: normal         Dental    Dentition: Poor dentition     Pulmonary      Decreased breath sounds           Abdominal  GI exam deferred       Other Findings            Anesthetic Plan    ASA: 4  Anesthesia type: total IV anesthesia and MAC          Induction: Intravenous  Anesthetic plan and risks discussed with: Patient and Family

## 2021-12-30 NOTE — PROGRESS NOTES
Problem: Falls - Risk of  Goal: *Absence of Falls  Description: Document [facility] Fall Risk and appropriate interventions in the flowsheet.   Outcome: Progressing Towards Goal  Note: Fall Risk Interventions:  Mobility Interventions: Patient to call before getting OOB    Mentation Interventions: Eyeglasses and hearing aids    Medication Interventions: Patient to call before getting OOB    Elimination Interventions: Call light in reach    History of Falls Interventions: Consult care management for discharge planning         Problem: Patient Education: Go to Patient Education Activity  Goal: Patient/Family Education  Outcome: Progressing Towards Goal

## 2021-12-30 NOTE — PROGRESS NOTES
Patient has generalized bruising with noted areas of bruising to the right hand and left AC. Patient exhibits with generalized edema +2-+3 pitting edema in all extremities and abdomen. Patient left elevated at this time. Redness noted to genitals. Patient is obese, currently on 3lpm o2 via nc. Alert and oriented.

## 2021-12-31 NOTE — PROGRESS NOTES
Patient alert and oriented x4, weak and lethargic. Patient appears pale in color. Responses appropriately. Weak in extremities. Possible  Third spacing noted to Right upper extremity. +2-+3 generalized pitting edema noted. BP currently 118/66 with HR of 93. O2 99% on 3lpm o2 via nc.   Patient currently has [address] operational.

## 2021-12-31 NOTE — PROGRESS NOTES
Progress Note    Patient: Kris Woody MRN: [id number]  SSN: [id number]    YOB: 1945  Age: 78 y.o. Sex: female      Admit Date: 12/9/2021    LOS: 22 days     Subjective:   Patient followed for severe pancreatitis with worsening leukocytosis on Zosyn. She was switched to Meropenem because of pancreatitis. Now with suspected Candida intraabdominal infection and a clinical response to Anidulafungin. Ascitic fluid culture negative so far. WBC now normal with decreasing procal but CRP increased today. Serum lipase also increased today. Apparently her Dobhoff tube was accidentally dislodged last night and has not yet been replaced. She is still having abdominal pain, primarily LUQ. She is pending authorization to go to SNF. Objective:     Vitals:    12/31/21 1101 12/31/21 1449 12/31/21 1633 12/31/21 1640   BP:  (!) 83/59  127/60   Pulse:  90     Resp:  18     Temp:  98.4 °F (36.9 °C)     SpO2: 99% 97% 97%    Weight:       Height:            Intake and Output:  Current Shift: 12/31 0701 - 12/31 1900  In: -   Out: 1100 [Urine:1100]  Last three shifts: 12/29 1901 - 12/31 0700  In: 1680.8 [P.O.:100; I.V.:1270.8]  Out: 1600 [Urine:1600]    Physical Exam:     Vitals and nursing note reviewed. Constitutional:       General: She is not in acute distress. Appearance: She is obese. She is ill-appearing. HENT: Dobhoff tube OUT  Abdominal:  Mild tenderness LUQ  Genitourinary:  Vaginal area not examined     Comments: External urinary device  Musculoskeletal:      Right lower leg: No edema. Left lower leg: No edema. Comments: PICC line right upper arm   Skin: multiple ecchymoses on the upper extremities, edema left forearm     Findings: No rash. Comments: ?Stage 2 sacral wound   Neurological:      General: No focal deficit present. Mental Status: She is alert and oriented to person, place, and time.    Psychiatric:         Mood and Affect: Mood normal.         Behavior: Behavior normal.         Thought Content: Thought content normal.         Judgment: Judgment normal.     Lab/Data Review:     WBC 7,900  Urinalysis with WBC 10-20, Bacteria negative  Lipase 2,813 <1,499 <2,511 <2,802 <3,000 <2,484 <2,684 < 1,397 <1,719    Procal 0.32 <0.34 <0.35 <0.34 < 0.32 <0.21 <0.19 <0.25 < 0.14  CRP 11.0 <8.98 <11.10 < 12.10 <23.40 <18.30 <14.20 <12.70 <15.00 < 23.80    Serum fungitell <31 Negative    Ascitic fluid   with 41% PMNs    Blood cultures (12/20) No growth FINAL  Urine culture (12/20) No growth FINAL  Ascitic fluid culture(12/27) No growth thus  far    Assessment:     Principal Problem:    Pancreatitis (12/9/2021)    Active Problems:    A-fib ([facility] [address] (11/16/2020)      CHF (congestive heart failure) ([facility] [address] (11/16/2020)      Hematemesis (12/12/2021)      History of peptic ulcer disease (12/12/2021)    1. Severe pancreatitis with markedly elevated lipase, resolving  2. Pancreatic mass, possible neoplasm  3. Biliary stent  4. Sepsis with worsening leukocytosis with elevated procal and CRP, resolving, Day #12 IV Meropenem, Day #6 IV Anidulafungin (empiric)  5. TPN (just started)  6. Moderate pyuria, negative bacteria, urine culture negative  7. Possible candida superinfection with vaginitis, treated  8. Ascites, status post paracentesis    Comment:    WBC and procal decreasing but CRP increased and Lipase increased. Plan:   1. Continue IV Meropenem for 2 more days  2. Continue Anidulafungin  3.  Follow-up ascites culture     Signed By: Viridiana Bangura MD     December 31, 2021

## 2021-12-31 NOTE — PROGRESS NOTES
Hospitalist Progress Note    Subjective:   Daily Progress Note: 12/31/2021 12:21 PM    Hospital Course:  82-year-old female with a history of atrial fibrillation, peptic ulcer disease, CHF, COPD, renal cell carcinoma stage IV status post nephrectomy, GERD, COPD, Sojourn syndrome and essential hypertension who was presented to the emergency department for admission on 12/9/2021 after undergoing a EUS on 12/06 by Dr. Rick Argueta with biopsy and subsequently developed severe pancreatitis. Patient's admission course has been complicated by ongoing pancreatitis and severe abdominal pain. CT scan of the abdomen pelvis revealed an ill-defined hypodense mass in the pancreas with fluid adjacent to the pancreas suggestive of focal pancreatitis. CTA chest showing no evidence of pulmonary embolism, but does note moderate to large bilateral pleural effusions in RML, RLL and LLL as well as ascites. GI and oncology consulted. Patient had an episode of hematemesis as well. Patient remains on IV Protonix. Abdominal ultrasound also showed no hemodynamic drop in the liver. Gallbladder wall has been thickened with sludge and stones although cholecystitis is less likely at this point. Although continue to monitor closely. Patient was started on Zosyn and subsequently changed to meropenem as the patient developed leukocytosis. Patient also has elevation in her INR although had been receiving Eliquis and therefore Eliquis was held. Patient started on p.o. diet although developed severe pain and have decreased her diet to clear liquid. General surgery following as well, Dr. Key Puente. Lipase continues to elevate. CT abd/pelvis with PO and IV contrast showing increasing bilateral pleural effusion and increasing ascites. Also notes multiple low-density lesions of liver consistent with metastatic disease and bilateral adrenal masses suspected for metastatic disease. IR consulted for liver biopsy. D-dimer elevated >14.  Duplex US lower extremities negative for DVT. Duplex upper extremities showing superficial thrombophlebitis in left median/antecubital veins. Restarted on home Eliquis. Paracentesis performed on 12/27 with 2300 mL clear serous fluid drained. Cytology results showing no growth. She was scheduled for liver biopsy with IR however IR does not feel patient would benefit from biopsy at this time. Patient taken down for EGD with Dobhoff Placement on 12/30/21 for nutritional support but the tube was pulled out overnight. Subjective:    Patient examined at the bedside. She remains hypotensive. Dobbhoff tube accidentally dislodged and removed overnight.     Current Facility-Administered Medications   Medication Dose Route Frequency    0.9% sodium chloride infusion 250 mL  250 mL IntraVENous PRN    0.9% sodium chloride infusion  75 mL/hr IntraVENous CONTINUOUS    sodium chloride (NS) flush 5-40 mL  5-40 mL IntraVENous PRN    midodrine (PROAMATINE) tablet 10 mg  10 mg Oral BID PRN    apixaban (ELIQUIS) tablet 5 mg  5 mg Oral BID    furosemide (LASIX) injection 40 mg  40 mg IntraVENous DAILY    anidulafungin (ERAXIS) 100 mg in 0.9% sodium chloride 130 mL IVPB  100 mg IntraVENous Q24H    zinc oxide-white petrolatum 17-57 % topical paste   Topical TID    cyclobenzaprine (FLEXERIL) tablet 10 mg  10 mg Oral TID PRN    HYDROmorphone (DILAUDID) injection 1 mg  1 mg IntraVENous Q4H PRN    oxyCODONE IR (ROXICODONE) tablet 5 mg  5 mg Oral Q6H PRN    nystatin (MYCOSTATIN) 100,000 unit/gram cream   Topical BID    docusate sodium (COLACE) capsule 100 mg  100 mg Oral BID    polyethylene glycol (MIRALAX) packet 17 g  17 g Oral DAILY    sorbitoL 70 % solution 30 mL  30 mL Oral DAILY PRN    meropenem (MERREM) 1 g in 0.9% sodium chloride 20 mL IV syringe  1 g IntraVENous Q8H    bisacodyL (DULCOLAX) suppository 10 mg  10 mg Rectal DAILY PRN    hydrALAZINE (APRESOLINE) 20 mg/mL injection 20 mg  20 mg IntraVENous Q6H PRN    pantoprazole (PROTONIX) 40 mg in 0.9% sodium chloride 10 mL injection  40 mg IntraVENous DAILY    guaiFENesin ER (MUCINEX) tablet 600 mg  600 mg Oral Q12H    albuterol-ipratropium (DUO-NEB) 2.5 MG-0.5 MG/3 ML  3 mL Nebulization Q6H PRN    chlorthalidone (HYGROTON) tablet 25 mg  25 mg Oral DAILY    albuterol (PROVENTIL HFA, VENTOLIN HFA, PROAIR HFA) inhaler 2 Puff  2 Puff Inhalation Q6H PRN    ascorbic acid (vitamin C) (VITAMIN C) tablet 500 mg  500 mg Oral DAILY    atorvastatin (LIPITOR) tablet 40 mg  40 mg Oral DAILY    cholecalciferol (VITAMIN D3) (1000 Units /25 mcg) tablet 1,000 Units  1,000 Units Oral DAILY    diazePAM (VALIUM) tablet 5 mg  5 mg Oral Q6H PRN    dilTIAZem ER (CARDIZEM CD) capsule 120 mg  120 mg Oral DAILY    potassium chloride SR (KLOR-CON 10) tablet 20 mEq  20 mEq Oral DAILY    sertraline (ZOLOFT) tablet 25 mg  25 mg Oral DAILY    traZODone (DESYREL) tablet 50 mg  50 mg Oral QHS    ondansetron (ZOFRAN) injection 4 mg  4 mg IntraVENous Q6H PRN    acetaminophen (TYLENOL) tablet 650 mg  650 mg Oral Q4H PRN    prochlorperazine (COMPAZINE) with saline injection 10 mg  10 mg IntraVENous Q6H PRN        Review of Systems  Constitutional: Positive for malaise/fatigue. Negative for fever. HENT: Negative. Respiratory: Positive for shortness of breath. Negative for cough. Cardiovascular: Negative for chest pain and leg swelling. Gastrointestinal: Positive for abdominal pain. Negative for nausea and vomiting.    Genitourinary: No frequency, No dysuria, No hematuria  Integument/breast: No skin lesion(s)   Neurological: No Confusion, No headaches, No dizziness      Objective:     Visit Vitals  BP (!) 83/59 (BP 1 Location: Left upper arm, BP Patient Position: At rest)   Pulse 90   Temp 98.4 °F (36.9 °C)   Resp 18   Ht 5' 7\" (1.702 m)   Wt 97.4 kg (214 lb 11.7 oz)   SpO2 97%   BMI 33.63 kg/m²    O2 Flow Rate (L/min): 3 l/min O2 Device: Nasal cannula    Temp (24hrs), Av.5 °F (36.9 °C), Min:98 °F (36.7 °C), Max:98.8 °F (37.1 °C)      12/31 0701 - 12/31 1900  In: -   Out: 9019 [[facility]:[id number]]  12/29 1901 - 12/31 0700  In: 1680.8 [P.O.:100; I.V.:1270.8]  Out: 1600 [Urine:1600]    PHYSICAL EXAM:  Constitutional: No acute distress  Skin: Extremities and face reveal no rashes. Multiple areas of ecchymosis in upper extremities. HEENT: Dobhoff in left nare. Sclerae anicteric. Extra-occular muscles are intact. No oral ulcers. The neck is supple and no masses. Cardiovascular: Regular rate and rhythm. +S1/S2. No murmur or gallop. Respiratory:  Rhonchi noted bilateral without wheezes. GI: Firm with hypoactive bowel sounds and tenderness to palpation in all 4 quadrant. Rectal: Deferred   Musculoskeletal: Upper and lower extremity peripheral edema present, likely third spacing. Able to move all ext  Neurological:  Generalized weakness. Patient is alert and oriented x3.  Cranial nerves II-XII grossly intact  Psychiatric: Mood appears appropriate       Data Review    Recent Results (from the past 24 hour(s))   TYPE & SCREEN    Collection Time: 12/30/21  6:48 PM   Result Value Ref Range    Crossmatch Expiration 01/02/2022,2359     ABO/Rh(D) B Positive     Antibody screen Negative     Unit number [id number]     Blood component type  LR     Unit division 00     Status of unit Issued,final     TRANSFUSION STATUS Ok to transfuse     Crossmatch result Compatible    METABOLIC PANEL, COMPREHENSIVE    Collection Time: 12/31/21 12:04 PM   Result Value Ref Range    Sodium 132 (L) 136 - 145 mmol/L    Potassium 3.3 (L) 3.5 - 5.1 mmol/L    Chloride 89 (L) 97 - 108 mmol/L    CO2 40 (H) 21 - 32 mmol/L    Anion gap 3 (L) 5 - 15 mmol/L    Glucose 95 65 - 100 mg/dL    BUN 17 6 - 20 mg/dL    Creatinine 0.56 0.55 - 1.02 mg/dL    BUN/Creatinine ratio 30 (H) 12 - 20      GFR est AA >60 >60 ml/min/1.73m2    GFR est non-AA >60 >60 ml/min/1.73m2    Calcium 8.4 (L) 8.5 - 10.1 mg/dL    Bilirubin, total 0.7 0.2 - 1.0 mg/dL    AST (SGOT) 34 15 - 37 U/L    ALT (SGPT) 20 12 - 78 U/L    Alk. phosphatase 345 (H) 45 - 117 U/L    Protein, total 4.8 (L) 6.4 - 8.2 g/dL    Albumin 1.2 (L) 3.5 - 5.0 g/dL    Globulin 3.6 2.0 - 4.0 g/dL    A-G Ratio 0.3 (L) 1.1 - 2.2     CBC WITH AUTOMATED DIFF    Collection Time: 12/31/21 12:04 PM   Result Value Ref Range    WBC 7.9 3.6 - 11.0 K/uL    RBC 3.02 (L) 3.80 - 5.20 M/uL    HGB 9.1 (L) 11.5 - 16.0 g/dL    HCT 28.3 (L) 35.0 - 47.0 %    MCV 93.7 80.0 - 99.0 FL    MCH 30.1 26.0 - 34.0 PG    MCHC 32.2 30.0 - 36.5 g/dL    RDW 13.5 11.5 - 14.5 %    PLATELET 974 320 - 305 K/uL    MPV 10.6 8.9 - 12.9 FL    NRBC 0.0 0.0  WBC    ABSOLUTE NRBC 0.00 0.00 - 0.01 K/uL    NEUTROPHILS 87 (H) 32 - 75 %    LYMPHOCYTES 6 (L) 12 - 49 %    MONOCYTES 5 5 - 13 %    EOSINOPHILS 1 0 - 7 %    BASOPHILS 0 0 - 1 %    IMMATURE GRANULOCYTES 1 (H) 0 - 0.5 %    ABS. NEUTROPHILS 7.0 1.8 - 8.0 K/UL    ABS. LYMPHOCYTES 0.5 (L) 0.8 - 3.5 K/UL    ABS. MONOCYTES 0.4 0.0 - 1.0 K/UL    ABS. EOSINOPHILS 0.1 0.0 - 0.4 K/UL    ABS. BASOPHILS 0.0 0.0 - 0.1 K/UL    ABS. IMM.  GRANS. 0.0 0.00 - 0.04 K/UL    DF AUTOMATED     D DIMER    Collection Time: 12/31/21 12:04 PM   Result Value Ref Range    D DIMER 6.72 (H) <0.50 ug/ml(FEU)   PROTHROMBIN TIME + INR    Collection Time: 12/31/21 12:04 PM   Result Value Ref Range    Prothrombin time 18.5 (H) 11.9 - 14.7 sec    INR 1.6 (H) 0.9 - 1.1     LIPASE    Collection Time: 12/31/21 12:04 PM   Result Value Ref Range    Lipase 2,813 (H) 73 - 535 U/L   METABOLIC PANEL, COMPREHENSIVE    Collection Time: 12/31/21 12:04 PM   Result Value Ref Range    Sodium 132 (L) 136 - 145 mmol/L    Potassium 3.4 (L) 3.5 - 5.1 mmol/L    Chloride 89 (L) 97 - 108 mmol/L    CO2 41 (HH) 21 - 32 mmol/L    Anion gap 2 (L) 5 - 15 mmol/L    Glucose 93 65 - 100 mg/dL    BUN 17 6 - 20 mg/dL    Creatinine 0.58 0.55 - 1.02 mg/dL    BUN/Creatinine ratio 29 (H) 12 - 20      GFR est AA >60 >60 ml/min/1.73m2    GFR est non-AA >60 >60 ml/min/1.73m2 Calcium 8.2 (L) 8.5 - 10.1 mg/dL    Bilirubin, total 0.8 0.2 - 1.0 mg/dL    AST (SGOT) 33 15 - 37 U/L    ALT (SGPT) 19 12 - 78 U/L    Alk. phosphatase 366 (H) 45 - 117 U/L    Protein, total 4.3 (L) 6.4 - 8.2 g/dL    Albumin 1.3 (L) 3.5 - 5.0 g/dL    Globulin 3.0 2.0 - 4.0 g/dL    A-G Ratio 0.4 (L) 1.1 - 2.2     C REACTIVE PROTEIN, QT    Collection Time: 12/31/21 12:04 PM   Result Value Ref Range    C-Reactive protein 11.10 (H) 0.00 - 0.60 mg/dL   PROCALCITONIN    Collection Time: 12/31/21 12:04 PM   Result Value Ref Range    Procalcitonin 0.32 (H) 0 ng/mL       DUPLEX UPPER EXT VENOUS BILAT   Final Result      XR CHEST PORT   Final Result   Large effusions. Vascular congestion. CT ABD PELV W CONT   Final Result   1. There are increasing bilateral pleural effusions, increasing body wall   edema, and increasing ascites. These findings could be secondary to the third   spacing of fluids. The differential diagnosis for the ascites would include   pancreatic ascites with acute pancreatitis or metastatic disease to the   peritoneum. 2.  There is a biliary stent which is unchanged in its position traversing   throughout area of obstruction within the pancreatic head. 3.  There are multiple low-density lesions of the liver without short-term   interval changes consistent with metastatic disease. 4.  The right kidney is not identified and apparently the patient is status post   a previous right nephrectomy. 5.  There are bilateral adrenal masses suspect for metastatic disease without   short-term interval changes. CTA CHEST W OR W WO CONT   Final Result   No CT evidence for PE. Thoracic aorta atherosclerosis. CAD. Moderate to large bilateral pleural effusions with associated RML, RLL, and LLL   compressive atelectasis. Abdominal ascites.             DUPLEX LOWER EXT VENOUS BILAT   Final Result      XR CHEST PORT   Final Result   FINDINGS: IMPRESSION: 2 frontal views of the chest. Right PICC distal tip SVC   region. Enlarging cardiomegaly. Increasing vascular congestion. Interval development of   hypoinflation, pleural effusions, lower lung airspace edema and/or pneumonia. No   pneumothorax. No free air under the diaphragm. CT ABD PELV W CONT   Final Result   New moderate volume dependent pleural effusions with associated   lower lobe lung compressive atelectasis. Increasing ascites, moderate approaching large-volume   (fluid could be sampled if there is any clinical concern for bile content). High suspicion hepatic metastases. Similar appearance for the pancreas; Silastic stent in place. No pseudocyst formation. Unchanged appearance bilateral adrenal glands. No bowel obstruction. US ABD LTD   Final Result   Small amount of free fluid. Finding suggesting hemangioma in the   liver. Gallbladder wall thickening, adenomyomatosis, sludge and gallstones. Cholecystitis possible. Finding in the region of the pancreatic head as above. Other findings as above. CTA ABDOMEN PELV W CONT   Final Result   1. Ill-defined hypodense mass in the pancreas. There are inflammatory changes   and fluid adjacent to the pancreas or duodenum and stomach most likely related   to biopsy or focal pancreatitis. No pseudocyst formation, active bleeding or   other acute findings. 2. Enlarged adrenal glands left greater than right with noncontrast densities   consistent with adrenal adenomas. 3. Right nephrectomy. 4. Other findings as described. XR CHEST PORT   Final Result   No acute findings.       IR PARACENTESIS ABD W IMAGE    (Results Pending)       Principal Problem:    Pancreatitis (12/9/2021)    Active Problems:    A-fib ([address] (11/16/2020)      CHF (congestive heart failure) ([address] (11/16/2020)      Hematemesis (12/12/2021)      History of peptic ulcer disease (12/12/2021)        Assessment/Plan:     Acute pancreatitis  - Status post EUS by Dr. Niraj Sharp 12/06  - Continue pain medication  - Lipase variable, will likely be chronically elevated   - CT abd/pelvis with PO and IV contrast showing increasing bilateral pleural effusion and increasing ascites. Also notes multiple low-density lesions of liver consistent with metastatic disease and bilateral adrenal masses suspected for metastatic disease.  - Continue IV merrem day #11 of 14   - Continue empiric Anidulafungin  - Will change IV fluids to 0.45% normal saline   - Went for paracentesis on 12/27 with 2300 mL clear serous fluid drained, cytology showing now growth. - She was scheduled for liver biopsy with IR however IR does not feel patient would benefit from biopsy at this time. - Dobhoff placed 12/30 for nutritional support but dislodged by patient accidentally     CHF  - Continue chlorthalidone 25 mg daily  - IV lasix 40 mg daily     Pancreatic mass  Liver masses  Adrenal masses  Multiple low-density lesions of the liver consistent with meta static disease and bilateral adrenal masses suspicious for metastatic disease  - CA 19-9 greater than 4000  --Oncology consulted-will need tissue biopsy before definitive recommendations can be made     Hypokalemia - replete as needed     COPD  - Pulmonary consultation  - Chronic respiratory failure with 2 L of oxygen via nasal cannula at home     Leukocytosis  - Started meropenem  - Cultures negative     Supratherapeutic anticoagulation  - Resolved. INR normal.  - Vitamin K given  - May be a side effect of using Eliquis recently and this may be an artificial INR. No obvious bleeding source  - Hemoglobin stable    8. Elevated d-dimer  - Likely multifactorial   - CTA chest negative for PE  - Duplex US lower extremities negative for DVT  - Duplex US upper extremities showing superficial thrombophlebitis in left median/antecubital veins  - Restarted on home Eliquis     9.   Hypotension  We will start midodrine    DVT Prophylaxis: restarted on home Eliquis  GI PPx: Protonix  Code Status: Full  POA:    Discharge Barriers:   · Will need to be reauthorized to return to SNF pending on Monday  · Loss Dobbhoff feeding tube consider PEG J versus TPN  · IV meropenem for 3 more days, transition to oral fluconazole 200 mg p.o. for 2 weeks for discharge per Infectious Disease. Care Plan discussed with: patient and nursing    Total time spent with patient: >35 minutes.

## 2021-12-31 NOTE — PROGRESS NOTES
Problem: Falls - Risk of  Goal: *Absence of Falls  Description: Document Evelin Don Fall Risk and appropriate interventions in the flowsheet.   Outcome: Progressing Towards Goal  Note: Fall Risk Interventions:  Mobility Interventions: Bed/chair exit alarm    Mentation Interventions: Bed/chair exit alarm    Medication Interventions: Bed/chair exit alarm    Elimination Interventions: Call light in reach    History of Falls Interventions: Bed/chair exit alarm         Problem: Nausea/Vomiting (Adult)  Goal: *Absence of nausea/vomiting  Outcome: Progressing Towards Goal     Problem: Patient Education: Go to Patient Education Activity  Goal: Patient/Family Education  Outcome: Progressing Towards Goal

## 2021-12-31 NOTE — PROGRESS NOTES
This nurse enters room. Patient yells to this nurse \"For the love of God why do you keep coming in here\"   Patient given fresh ice, fresh water, turned at this time, assisted in changing purewick, applied orange top cream to bottle, and emptied purewick canister, vital signs taken. This nurse explains reasoning for care, no further questions asked. Call light in reach. Bed alarm in place.

## 2021-12-31 NOTE — PROGRESS NOTES
CM spoke with liaison at [address] [place] [address] to arrange discharge to SNF. Unfortunately, patient's insurance terms today, and a new policy will take effect tomorrow. The SNF will need to initiate a new authorization on Monday. PT and OT needs to see patient this weekend so that an authorization can be done on Monday. CM will continue to follow.

## 2021-12-31 NOTE — PROGRESS NOTES
Dr. Siva Bustillos returned page at this time. Verbalizes understanding that patient removed tubing. Verbal orders placed to resume a low fat diet only.

## 2021-12-31 NOTE — PROGRESS NOTES
Dr. Ricky Kelley paged and voicemail left at this time in efforts to inform GI service that patient removed Dobbhoff tubing.

## 2021-12-31 NOTE — PROGRESS NOTES
This nurse to bedside after patient hit call light for assistance with bedpan. Patient seen to be holding tubing from nare in hand. Patient removed tubing. Patient states \"it was bothering me but can I use the bedpan\" Patient educated on reasoning for tube feeding with no evidence of learning at this time. Mica Celestin RN at bedside and witnessed patient with self removed tubing.

## 2022-01-01 ENCOUNTER — APPOINTMENT (OUTPATIENT)
Dept: NON INVASIVE DIAGNOSTICS | Age: 77
DRG: 438 | End: 2022-01-01
Attending: INTERNAL MEDICINE
Payer: COMMERCIAL

## 2022-01-01 ENCOUNTER — ANESTHESIA EVENT (OUTPATIENT)
Dept: ENDOSCOPY | Age: 77
DRG: 438 | End: 2022-01-01
Payer: COMMERCIAL

## 2022-01-01 ENCOUNTER — HOSPICE ADMISSION (OUTPATIENT)
Dept: HOSPICE | Facility: HOSPICE | Age: 77
End: 2022-01-01
Payer: COMMERCIAL

## 2022-01-01 ENCOUNTER — APPOINTMENT (OUTPATIENT)
Dept: INTERVENTIONAL RADIOLOGY/VASCULAR | Age: 77
DRG: 438 | End: 2022-01-01
Attending: SURGERY
Payer: COMMERCIAL

## 2022-01-01 ENCOUNTER — HOSPITAL ENCOUNTER (INPATIENT)
Age: 77
LOS: 2 days | DRG: 951 | End: 2022-01-26
Attending: INTERNAL MEDICINE | Admitting: INTERNAL MEDICINE
Payer: OTHER MISCELLANEOUS

## 2022-01-01 ENCOUNTER — APPOINTMENT (OUTPATIENT)
Dept: NUCLEAR MEDICINE | Age: 77
DRG: 438 | End: 2022-01-01
Attending: NURSE PRACTITIONER
Payer: COMMERCIAL

## 2022-01-01 ENCOUNTER — APPOINTMENT (OUTPATIENT)
Dept: GENERAL RADIOLOGY | Age: 77
DRG: 438 | End: 2022-01-01
Attending: NURSE PRACTITIONER
Payer: COMMERCIAL

## 2022-01-01 ENCOUNTER — APPOINTMENT (OUTPATIENT)
Dept: GENERAL RADIOLOGY | Age: 77
DRG: 438 | End: 2022-01-01
Attending: HOSPITALIST
Payer: COMMERCIAL

## 2022-01-01 ENCOUNTER — APPOINTMENT (OUTPATIENT)
Dept: GENERAL RADIOLOGY | Age: 77
DRG: 438 | End: 2022-01-01
Attending: INTERNAL MEDICINE
Payer: COMMERCIAL

## 2022-01-01 ENCOUNTER — ANESTHESIA (OUTPATIENT)
Dept: ENDOSCOPY | Age: 77
DRG: 438 | End: 2022-01-01
Payer: COMMERCIAL

## 2022-01-01 ENCOUNTER — HOSPICE ADMISSION (OUTPATIENT)
Dept: HOSPICE | Facility: HOSPICE | Age: 77
End: 2022-01-01

## 2022-01-01 ENCOUNTER — APPOINTMENT (OUTPATIENT)
Dept: ENDOSCOPY | Age: 77
DRG: 438 | End: 2022-01-01
Attending: INTERNAL MEDICINE
Payer: COMMERCIAL

## 2022-01-01 VITALS
TEMPERATURE: 96.5 F | RESPIRATION RATE: 22 BRPM | OXYGEN SATURATION: 95 % | WEIGHT: 229.94 LBS | HEART RATE: 65 BPM | DIASTOLIC BLOOD PRESSURE: 54 MMHG | SYSTOLIC BLOOD PRESSURE: 136 MMHG | BODY MASS INDEX: 36.09 KG/M2 | HEIGHT: 67 IN

## 2022-01-01 VITALS
DIASTOLIC BLOOD PRESSURE: 39 MMHG | BODY MASS INDEX: 35.99 KG/M2 | HEART RATE: 97 BPM | TEMPERATURE: 96.7 F | WEIGHT: 229.28 LBS | RESPIRATION RATE: 8 BRPM | SYSTOLIC BLOOD PRESSURE: 60 MMHG | OXYGEN SATURATION: 96 % | HEIGHT: 67 IN

## 2022-01-01 DIAGNOSIS — K85.90 ACUTE PANCREATITIS, UNSPECIFIED COMPLICATION STATUS, UNSPECIFIED PANCREATITIS TYPE: ICD-10-CM

## 2022-01-01 DIAGNOSIS — G89.3 NEOPLASM RELATED PAIN: ICD-10-CM

## 2022-01-01 DIAGNOSIS — R45.1 RESTLESSNESS AND AGITATION: ICD-10-CM

## 2022-01-01 DIAGNOSIS — Z51.5 HOSPICE CARE: ICD-10-CM

## 2022-01-01 DIAGNOSIS — R17 JAUNDICE: ICD-10-CM

## 2022-01-01 DIAGNOSIS — R52 NONVERBAL SIGNS OF PAIN: ICD-10-CM

## 2022-01-01 DIAGNOSIS — R60.1 ANASARCA: ICD-10-CM

## 2022-01-01 DIAGNOSIS — R53.0 NEOPLASTIC (MALIGNANT) RELATED FATIGUE: ICD-10-CM

## 2022-01-01 DIAGNOSIS — K11.7 INCREASED OROPHARYNGEAL SECRETIONS: ICD-10-CM

## 2022-01-01 DIAGNOSIS — R06.4 LABORED BREATHING: ICD-10-CM

## 2022-01-01 DIAGNOSIS — C79.9 METASTATIC CARCINOMA (HCC): ICD-10-CM

## 2022-01-01 LAB
ABO + RH BLD: NORMAL
ALBUMIN SERPL-MCNC: 0.7 G/DL (ref 3.5–5)
ALBUMIN SERPL-MCNC: 0.8 G/DL (ref 3.5–5)
ALBUMIN SERPL-MCNC: 0.9 G/DL (ref 3.5–5)
ALBUMIN SERPL-MCNC: 0.9 G/DL (ref 3.5–5)
ALBUMIN SERPL-MCNC: 1 G/DL (ref 3.5–5)
ALBUMIN SERPL-MCNC: 1.1 G/DL (ref 3.5–5)
ALBUMIN/GLOB SERPL: 0.2 {RATIO} (ref 1.1–2.2)
ALBUMIN/GLOB SERPL: 0.3 {RATIO} (ref 1.1–2.2)
ALP SERPL-CCNC: 398 U/L (ref 45–117)
ALP SERPL-CCNC: 586 U/L (ref 45–117)
ALP SERPL-CCNC: 698 U/L (ref 45–117)
ALP SERPL-CCNC: 702 U/L (ref 45–117)
ALP SERPL-CCNC: 705 U/L (ref 45–117)
ALP SERPL-CCNC: 719 U/L (ref 45–117)
ALP SERPL-CCNC: 787 U/L (ref 45–117)
ALP SERPL-CCNC: 882 U/L (ref 45–117)
ALT SERPL-CCNC: 73 U/L (ref 12–78)
ALT SERPL-CCNC: 73 U/L (ref 12–78)
ALT SERPL-CCNC: 74 U/L (ref 12–78)
ALT SERPL-CCNC: 76 U/L (ref 12–78)
ALT SERPL-CCNC: 80 U/L (ref 12–78)
ALT SERPL-CCNC: 87 U/L (ref 12–78)
ALT SERPL-CCNC: 88 U/L (ref 12–78)
ALT SERPL-CCNC: 89 U/L (ref 12–78)
AMMONIA PLAS-SCNC: <10 UMOL/L
ANION GAP SERPL CALC-SCNC: 2 MMOL/L (ref 5–15)
ANION GAP SERPL CALC-SCNC: 3 MMOL/L (ref 5–15)
ANION GAP SERPL CALC-SCNC: 4 MMOL/L (ref 5–15)
ANION GAP SERPL CALC-SCNC: 5 MMOL/L (ref 5–15)
ANION GAP SERPL CALC-SCNC: 6 MMOL/L (ref 5–15)
ANION GAP SERPL CALC-SCNC: 7 MMOL/L (ref 5–15)
ANION GAP SERPL CALC-SCNC: 8 MMOL/L (ref 5–15)
APPEARANCE UR: ABNORMAL
AST SERPL W P-5'-P-CCNC: 106 U/L (ref 15–37)
AST SERPL W P-5'-P-CCNC: 67 U/L (ref 15–37)
AST SERPL W P-5'-P-CCNC: 69 U/L (ref 15–37)
AST SERPL W P-5'-P-CCNC: 73 U/L (ref 15–37)
AST SERPL W P-5'-P-CCNC: 80 U/L (ref 15–37)
AST SERPL W P-5'-P-CCNC: 82 U/L (ref 15–37)
AST SERPL W P-5'-P-CCNC: 89 U/L (ref 15–37)
AST SERPL W P-5'-P-CCNC: 97 U/L (ref 15–37)
ATRIAL RATE: 117 BPM
ATRIAL RATE: 119 BPM
ATRIAL RATE: 133 BPM
ATRIAL RATE: 41 BPM
BACTERIA SPEC CULT: ABNORMAL
BACTERIA SPEC CULT: ABNORMAL
BACTERIA SPEC CULT: NORMAL
BACTERIA SPEC CULT: NORMAL
BACTERIA URNS QL MICRO: ABNORMAL /HPF
BASOPHILS # BLD: 0 K/UL (ref 0–0.1)
BASOPHILS NFR BLD: 0 % (ref 0–1)
BASOPHILS NFR BLD: 1 % (ref 0–1)
BILIRUB DIRECT SERPL-MCNC: 4.6 MG/DL (ref 0–0.2)
BILIRUB DIRECT SERPL-MCNC: 6.9 MG/DL (ref 0–0.2)
BILIRUB SERPL-MCNC: 4.5 MG/DL (ref 0.2–1)
BILIRUB SERPL-MCNC: 5.3 MG/DL (ref 0.2–1)
BILIRUB SERPL-MCNC: 6.8 MG/DL (ref 0.2–1)
BILIRUB SERPL-MCNC: 7.4 MG/DL (ref 0.2–1)
BILIRUB SERPL-MCNC: 7.5 MG/DL (ref 0.2–1)
BILIRUB SERPL-MCNC: 8 MG/DL (ref 0.2–1)
BILIRUB UR QL: NEGATIVE
BLD PROD TYP BPU: NORMAL
BLOOD GROUP ANTIBODIES SERPL: NEGATIVE
BPU ID: NORMAL
BUN SERPL-MCNC: 19 MG/DL (ref 6–20)
BUN SERPL-MCNC: 21 MG/DL (ref 6–20)
BUN SERPL-MCNC: 22 MG/DL (ref 6–20)
BUN SERPL-MCNC: 24 MG/DL (ref 6–20)
BUN SERPL-MCNC: 24 MG/DL (ref 6–20)
BUN SERPL-MCNC: 25 MG/DL (ref 6–20)
BUN SERPL-MCNC: 25 MG/DL (ref 6–20)
BUN SERPL-MCNC: 27 MG/DL (ref 6–20)
BUN SERPL-MCNC: 27 MG/DL (ref 6–20)
BUN SERPL-MCNC: 28 MG/DL (ref 6–20)
BUN SERPL-MCNC: 28 MG/DL (ref 6–20)
BUN SERPL-MCNC: 29 MG/DL (ref 6–20)
BUN SERPL-MCNC: 29 MG/DL (ref 6–20)
BUN SERPL-MCNC: 30 MG/DL (ref 6–20)
BUN SERPL-MCNC: 31 MG/DL (ref 6–20)
BUN SERPL-MCNC: 32 MG/DL (ref 6–20)
BUN SERPL-MCNC: 35 MG/DL (ref 6–20)
BUN SERPL-MCNC: 37 MG/DL (ref 6–20)
BUN/CREAT SERPL: 20 (ref 12–20)
BUN/CREAT SERPL: 26 (ref 12–20)
BUN/CREAT SERPL: 27 (ref 12–20)
BUN/CREAT SERPL: 27 (ref 12–20)
BUN/CREAT SERPL: 28 (ref 12–20)
BUN/CREAT SERPL: 29 (ref 12–20)
BUN/CREAT SERPL: 31 (ref 12–20)
BUN/CREAT SERPL: 35 (ref 12–20)
BUN/CREAT SERPL: 38 (ref 12–20)
BUN/CREAT SERPL: 41 (ref 12–20)
BUN/CREAT SERPL: 42 (ref 12–20)
BUN/CREAT SERPL: 44 (ref 12–20)
BUN/CREAT SERPL: 45 (ref 12–20)
BUN/CREAT SERPL: 45 (ref 12–20)
BUN/CREAT SERPL: 47 (ref 12–20)
BUN/CREAT SERPL: 51 (ref 12–20)
CA-I BLD-MCNC: 5.4 MG/DL (ref 8.5–10.1)
CA-I BLD-MCNC: 7.4 MG/DL (ref 8.5–10.1)
CA-I BLD-MCNC: 7.5 MG/DL (ref 8.5–10.1)
CA-I BLD-MCNC: 7.6 MG/DL (ref 8.5–10.1)
CA-I BLD-MCNC: 7.7 MG/DL (ref 8.5–10.1)
CA-I BLD-MCNC: 7.8 MG/DL (ref 8.5–10.1)
CA-I BLD-MCNC: 7.9 MG/DL (ref 8.5–10.1)
CA-I BLD-MCNC: 8 MG/DL (ref 8.5–10.1)
CA-I BLD-MCNC: 8.3 MG/DL (ref 8.5–10.1)
CA-I BLD-MCNC: 8.4 MG/DL (ref 8.5–10.1)
CA-I BLD-MCNC: 8.5 MG/DL (ref 8.5–10.1)
CALCULATED R AXIS, ECG10: 36 DEGREES
CALCULATED R AXIS, ECG10: 49 DEGREES
CALCULATED R AXIS, ECG10: 61 DEGREES
CALCULATED R AXIS, ECG10: 62 DEGREES
CALCULATED T AXIS, ECG11: -24 DEGREES
CALCULATED T AXIS, ECG11: -25 DEGREES
CALCULATED T AXIS, ECG11: -88 DEGREES
CALCULATED T AXIS, ECG11: 38 DEGREES
CHLORIDE SERPL-SCNC: 100 MMOL/L (ref 97–108)
CHLORIDE SERPL-SCNC: 101 MMOL/L (ref 97–108)
CHLORIDE SERPL-SCNC: 101 MMOL/L (ref 97–108)
CHLORIDE SERPL-SCNC: 102 MMOL/L (ref 97–108)
CHLORIDE SERPL-SCNC: 102 MMOL/L (ref 97–108)
CHLORIDE SERPL-SCNC: 103 MMOL/L (ref 97–108)
CHLORIDE SERPL-SCNC: 103 MMOL/L (ref 97–108)
CHLORIDE SERPL-SCNC: 104 MMOL/L (ref 97–108)
CHLORIDE SERPL-SCNC: 104 MMOL/L (ref 97–108)
CHLORIDE SERPL-SCNC: 106 MMOL/L (ref 97–108)
CHLORIDE SERPL-SCNC: 107 MMOL/L (ref 97–108)
CHLORIDE SERPL-SCNC: 108 MMOL/L (ref 97–108)
CHLORIDE SERPL-SCNC: 109 MMOL/L (ref 97–108)
CHLORIDE SERPL-SCNC: 90 MMOL/L (ref 97–108)
CHLORIDE SERPL-SCNC: 91 MMOL/L (ref 97–108)
CHLORIDE SERPL-SCNC: 93 MMOL/L (ref 97–108)
CHLORIDE SERPL-SCNC: 93 MMOL/L (ref 97–108)
CHLORIDE SERPL-SCNC: 94 MMOL/L (ref 97–108)
CHLORIDE SERPL-SCNC: 95 MMOL/L (ref 97–108)
CHLORIDE SERPL-SCNC: 98 MMOL/L (ref 97–108)
CHLORIDE SERPL-SCNC: 98 MMOL/L (ref 97–108)
CO2 SERPL-SCNC: 26 MMOL/L (ref 21–32)
CO2 SERPL-SCNC: 26 MMOL/L (ref 21–32)
CO2 SERPL-SCNC: 27 MMOL/L (ref 21–32)
CO2 SERPL-SCNC: 28 MMOL/L (ref 21–32)
CO2 SERPL-SCNC: 28 MMOL/L (ref 21–32)
CO2 SERPL-SCNC: 29 MMOL/L (ref 21–32)
CO2 SERPL-SCNC: 30 MMOL/L (ref 21–32)
CO2 SERPL-SCNC: 30 MMOL/L (ref 21–32)
CO2 SERPL-SCNC: 31 MMOL/L (ref 21–32)
CO2 SERPL-SCNC: 32 MMOL/L (ref 21–32)
CO2 SERPL-SCNC: 32 MMOL/L (ref 21–32)
CO2 SERPL-SCNC: 33 MMOL/L (ref 21–32)
CO2 SERPL-SCNC: 36 MMOL/L (ref 21–32)
CO2 SERPL-SCNC: 37 MMOL/L (ref 21–32)
CO2 SERPL-SCNC: 38 MMOL/L (ref 21–32)
CO2 SERPL-SCNC: 40 MMOL/L (ref 21–32)
CO2 SERPL-SCNC: 41 MMOL/L (ref 21–32)
CO2 SERPL-SCNC: 42 MMOL/L (ref 21–32)
CO2 SERPL-SCNC: 42 MMOL/L (ref 21–32)
CO2 SERPL-SCNC: 43 MMOL/L (ref 21–32)
COLLECT DATE STL: 2
COLONY COUNT,CNT: NORMAL
COLOR UR: ABNORMAL
COVID-19 RAPID TEST, COVR: NOT DETECTED
CREAT SERPL-MCNC: 0.57 MG/DL (ref 0.55–1.02)
CREAT SERPL-MCNC: 0.65 MG/DL (ref 0.55–1.02)
CREAT SERPL-MCNC: 0.66 MG/DL (ref 0.55–1.02)
CREAT SERPL-MCNC: 0.68 MG/DL (ref 0.55–1.02)
CREAT SERPL-MCNC: 0.7 MG/DL (ref 0.55–1.02)
CREAT SERPL-MCNC: 0.74 MG/DL (ref 0.55–1.02)
CREAT SERPL-MCNC: 0.74 MG/DL (ref 0.55–1.02)
CREAT SERPL-MCNC: 0.76 MG/DL (ref 0.55–1.02)
CREAT SERPL-MCNC: 0.76 MG/DL (ref 0.55–1.02)
CREAT SERPL-MCNC: 0.78 MG/DL (ref 0.55–1.02)
CREAT SERPL-MCNC: 0.78 MG/DL (ref 0.55–1.02)
CREAT SERPL-MCNC: 0.82 MG/DL (ref 0.55–1.02)
CREAT SERPL-MCNC: 0.94 MG/DL (ref 0.55–1.02)
CREAT SERPL-MCNC: 0.94 MG/DL (ref 0.55–1.02)
CREAT SERPL-MCNC: 0.95 MG/DL (ref 0.55–1.02)
CREAT SERPL-MCNC: 0.97 MG/DL (ref 0.55–1.02)
CREAT SERPL-MCNC: 1.03 MG/DL (ref 0.55–1.02)
CREAT SERPL-MCNC: 1.04 MG/DL (ref 0.55–1.02)
CREAT SERPL-MCNC: 1.07 MG/DL (ref 0.55–1.02)
CREAT SERPL-MCNC: 1.08 MG/DL (ref 0.55–1.02)
CREAT SERPL-MCNC: 1.09 MG/DL (ref 0.55–1.02)
CREAT SERPL-MCNC: 1.19 MG/DL (ref 0.55–1.02)
CREAT SERPL-MCNC: 1.35 MG/DL (ref 0.55–1.02)
CROSSMATCH RESULT,%XM: NORMAL
CRP SERPL-MCNC: 10 MG/DL (ref 0–0.6)
CRP SERPL-MCNC: 11.1 MG/DL (ref 0–0.6)
CRP SERPL-MCNC: 12.4 MG/DL (ref 0–0.6)
CRP SERPL-MCNC: 14.5 MG/DL (ref 0–0.6)
CRP SERPL-MCNC: 21.3 MG/DL (ref 0–0.6)
CRP SERPL-MCNC: 23.2 MG/DL (ref 0–0.6)
CRP SERPL-MCNC: 4.83 MG/DL (ref 0–0.6)
CRP SERPL-MCNC: 5.07 MG/DL (ref 0–0.6)
CRP SERPL-MCNC: 5.62 MG/DL (ref 0–0.6)
CRP SERPL-MCNC: 5.74 MG/DL (ref 0–0.6)
CRP SERPL-MCNC: 7.38 MG/DL (ref 0–0.6)
CRP SERPL-MCNC: 7.42 MG/DL (ref 0–0.6)
CRP SERPL-MCNC: 7.48 MG/DL (ref 0–0.6)
CRP SERPL-MCNC: 8.54 MG/DL (ref 0–0.6)
D DIMER PPP FEU-MCNC: 10.39 UG/ML(FEU)
D DIMER PPP FEU-MCNC: 3.86 UG/ML(FEU)
D DIMER PPP FEU-MCNC: 4.81 UG/ML(FEU)
D DIMER PPP FEU-MCNC: 5.74 UG/ML(FEU)
D DIMER PPP FEU-MCNC: 5.94 UG/ML(FEU)
D DIMER PPP FEU-MCNC: 6.73 UG/ML(FEU)
D DIMER PPP FEU-MCNC: 6.95 UG/ML(FEU)
D DIMER PPP FEU-MCNC: 6.97 UG/ML(FEU)
D DIMER PPP FEU-MCNC: 7.61 UG/ML(FEU)
D DIMER PPP FEU-MCNC: 8.4 UG/ML(FEU)
D DIMER PPP FEU-MCNC: 8.47 UG/ML(FEU)
DIAGNOSIS, 93000: NORMAL
DIFFERENTIAL METHOD BLD: ABNORMAL
ECHO AO ROOT DIAM: 3.5 CM
ECHO AO ROOT INDEX: 1.71 CM/M2
ECHO AV PEAK GRADIENT: 10 MMHG
ECHO AV PEAK VELOCITY: 1.6 M/S
ECHO AV VELOCITY RATIO: 0.88
ECHO EST RA PRESSURE: 3 MMHG
ECHO LA DIAMETER INDEX: 2.1 CM/M2
ECHO LA DIAMETER: 4.3 CM
ECHO LA TO AORTIC ROOT RATIO: 1.23
ECHO LV EJECTION FRACTION BIPLANE: 62 % (ref 55–100)
ECHO LV FRACTIONAL SHORTENING: 34 % (ref 28–44)
ECHO LV INTERNAL DIMENSION DIASTOLE INDEX: 2 CM/M2
ECHO LV INTERNAL DIMENSION DIASTOLIC: 4.1 CM (ref 3.9–5.3)
ECHO LV INTERNAL DIMENSION SYSTOLIC INDEX: 1.32 CM/M2
ECHO LV INTERNAL DIMENSION SYSTOLIC: 2.7 CM
ECHO LV IVSD: 1.3 CM (ref 0.6–0.9)
ECHO LV MASS 2D: 204.9 G (ref 67–162)
ECHO LV MASS INDEX 2D: 99.9 G/M2 (ref 43–95)
ECHO LV POSTERIOR WALL DIASTOLIC: 1.4 CM (ref 0.6–0.9)
ECHO LV RELATIVE WALL THICKNESS RATIO: 0.68
ECHO LVOT PEAK GRADIENT: 8 MMHG
ECHO LVOT PEAK VELOCITY: 1.4 M/S
ECHO MV REGURGITANT PEAK GRADIENT: 81 MMHG
ECHO MV REGURGITANT PEAK VELOCITY: 4.5 M/S
ECHO PULMONARY ARTERY END DIASTOLIC PRESSURE: 9 MMHG
ECHO PV MAX VELOCITY: 1.4 M/S
ECHO PV PEAK GRADIENT: 8 MMHG
ECHO PV REGURGITANT MAX VELOCITY: 1.5 M/S
ECHO RIGHT VENTRICULAR SYSTOLIC PRESSURE (RVSP): 44 MMHG
ECHO RV INTERNAL DIMENSION: 2.6 CM
ECHO TV REGURGITANT MAX VELOCITY: 3.22 M/S
ECHO TV REGURGITANT PEAK GRADIENT: 41 MMHG
EOSINOPHIL # BLD: 0 K/UL (ref 0–0.4)
EOSINOPHIL # BLD: 0.1 K/UL (ref 0–0.4)
EOSINOPHIL # BLD: 0.2 K/UL (ref 0–0.4)
EOSINOPHIL # BLD: 0.2 K/UL (ref 0–0.4)
EOSINOPHIL NFR BLD: 0 % (ref 0–7)
EOSINOPHIL NFR BLD: 1 % (ref 0–7)
EOSINOPHIL NFR BLD: 2 % (ref 0–7)
EOSINOPHIL NFR BLD: 3 % (ref 0–7)
EOSINOPHIL NFR BLD: 3 % (ref 0–7)
ERYTHROCYTE [DISTWIDTH] IN BLOOD BY AUTOMATED COUNT: 13.7 % (ref 11.5–14.5)
ERYTHROCYTE [DISTWIDTH] IN BLOOD BY AUTOMATED COUNT: 13.9 % (ref 11.5–14.5)
ERYTHROCYTE [DISTWIDTH] IN BLOOD BY AUTOMATED COUNT: 15.1 % (ref 11.5–14.5)
ERYTHROCYTE [DISTWIDTH] IN BLOOD BY AUTOMATED COUNT: 15.2 % (ref 11.5–14.5)
ERYTHROCYTE [DISTWIDTH] IN BLOOD BY AUTOMATED COUNT: 15.2 % (ref 11.5–14.5)
ERYTHROCYTE [DISTWIDTH] IN BLOOD BY AUTOMATED COUNT: 15.3 % (ref 11.5–14.5)
ERYTHROCYTE [DISTWIDTH] IN BLOOD BY AUTOMATED COUNT: 15.4 % (ref 11.5–14.5)
ERYTHROCYTE [DISTWIDTH] IN BLOOD BY AUTOMATED COUNT: 15.7 % (ref 11.5–14.5)
ERYTHROCYTE [DISTWIDTH] IN BLOOD BY AUTOMATED COUNT: 15.7 % (ref 11.5–14.5)
ERYTHROCYTE [DISTWIDTH] IN BLOOD BY AUTOMATED COUNT: 15.9 % (ref 11.5–14.5)
ERYTHROCYTE [DISTWIDTH] IN BLOOD BY AUTOMATED COUNT: 15.9 % (ref 11.5–14.5)
ERYTHROCYTE [DISTWIDTH] IN BLOOD BY AUTOMATED COUNT: 16.1 % (ref 11.5–14.5)
ERYTHROCYTE [DISTWIDTH] IN BLOOD BY AUTOMATED COUNT: 16.2 % (ref 11.5–14.5)
ERYTHROCYTE [DISTWIDTH] IN BLOOD BY AUTOMATED COUNT: 16.3 % (ref 11.5–14.5)
ERYTHROCYTE [DISTWIDTH] IN BLOOD BY AUTOMATED COUNT: 16.4 % (ref 11.5–14.5)
ERYTHROCYTE [DISTWIDTH] IN BLOOD BY AUTOMATED COUNT: 16.4 % (ref 11.5–14.5)
ERYTHROCYTE [DISTWIDTH] IN BLOOD BY AUTOMATED COUNT: 16.6 % (ref 11.5–14.5)
ERYTHROCYTE [DISTWIDTH] IN BLOOD BY AUTOMATED COUNT: 16.7 % (ref 11.5–14.5)
ERYTHROCYTE [DISTWIDTH] IN BLOOD BY AUTOMATED COUNT: 16.8 % (ref 11.5–14.5)
ERYTHROCYTE [DISTWIDTH] IN BLOOD BY AUTOMATED COUNT: 16.9 % (ref 11.5–14.5)
ERYTHROCYTE [DISTWIDTH] IN BLOOD BY AUTOMATED COUNT: 17 % (ref 11.5–14.5)
ERYTHROCYTE [DISTWIDTH] IN BLOOD BY AUTOMATED COUNT: 17.2 % (ref 11.5–14.5)
ERYTHROCYTE [DISTWIDTH] IN BLOOD BY AUTOMATED COUNT: 18.3 % (ref 11.5–14.5)
ERYTHROCYTE [DISTWIDTH] IN BLOOD BY AUTOMATED COUNT: 18.3 % (ref 11.5–14.5)
FERRITIN SERPL-MCNC: 625 NG/ML (ref 26–388)
FOLATE SERPL-MCNC: 19.2 NG/ML (ref 5–21)
GLOBULIN SER CALC-MCNC: 3.1 G/DL (ref 2–4)
GLOBULIN SER CALC-MCNC: 3.3 G/DL (ref 2–4)
GLOBULIN SER CALC-MCNC: 3.3 G/DL (ref 2–4)
GLOBULIN SER CALC-MCNC: 3.4 G/DL (ref 2–4)
GLOBULIN SER CALC-MCNC: 3.4 G/DL (ref 2–4)
GLOBULIN SER CALC-MCNC: 3.6 G/DL (ref 2–4)
GLUCOSE BLD STRIP.AUTO-MCNC: 106 MG/DL (ref 65–117)
GLUCOSE BLD STRIP.AUTO-MCNC: 117 MG/DL (ref 65–117)
GLUCOSE BLD STRIP.AUTO-MCNC: 199 MG/DL (ref 65–117)
GLUCOSE SERPL-MCNC: 100 MG/DL (ref 65–100)
GLUCOSE SERPL-MCNC: 101 MG/DL (ref 65–100)
GLUCOSE SERPL-MCNC: 102 MG/DL (ref 65–100)
GLUCOSE SERPL-MCNC: 102 MG/DL (ref 65–100)
GLUCOSE SERPL-MCNC: 106 MG/DL (ref 65–100)
GLUCOSE SERPL-MCNC: 108 MG/DL (ref 65–100)
GLUCOSE SERPL-MCNC: 112 MG/DL (ref 65–100)
GLUCOSE SERPL-MCNC: 116 MG/DL (ref 65–100)
GLUCOSE SERPL-MCNC: 54 MG/DL (ref 65–100)
GLUCOSE SERPL-MCNC: 56 MG/DL (ref 65–100)
GLUCOSE SERPL-MCNC: 71 MG/DL (ref 65–100)
GLUCOSE SERPL-MCNC: 71 MG/DL (ref 65–100)
GLUCOSE SERPL-MCNC: 81 MG/DL (ref 65–100)
GLUCOSE SERPL-MCNC: 83 MG/DL (ref 65–100)
GLUCOSE SERPL-MCNC: 85 MG/DL (ref 65–100)
GLUCOSE SERPL-MCNC: 87 MG/DL (ref 65–100)
GLUCOSE SERPL-MCNC: 87 MG/DL (ref 65–100)
GLUCOSE SERPL-MCNC: 88 MG/DL (ref 65–100)
GLUCOSE SERPL-MCNC: 88 MG/DL (ref 65–100)
GLUCOSE SERPL-MCNC: 90 MG/DL (ref 65–100)
GLUCOSE SERPL-MCNC: 92 MG/DL (ref 65–100)
GLUCOSE SERPL-MCNC: 99 MG/DL (ref 65–100)
GLUCOSE SERPL-MCNC: 99 MG/DL (ref 65–100)
GLUCOSE UR STRIP.AUTO-MCNC: NEGATIVE MG/DL
GRAM STN SPEC: NORMAL
GRAM STN SPEC: NORMAL
HCT VFR BLD AUTO: 18.5 % (ref 35–47)
HCT VFR BLD AUTO: 19.5 % (ref 35–47)
HCT VFR BLD AUTO: 19.5 % (ref 35–47)
HCT VFR BLD AUTO: 19.9 % (ref 35–47)
HCT VFR BLD AUTO: 20.3 % (ref 35–47)
HCT VFR BLD AUTO: 20.7 % (ref 35–47)
HCT VFR BLD AUTO: 21 % (ref 35–47)
HCT VFR BLD AUTO: 21.8 % (ref 35–47)
HCT VFR BLD AUTO: 22.3 % (ref 35–47)
HCT VFR BLD AUTO: 22.3 % (ref 35–47)
HCT VFR BLD AUTO: 22.4 % (ref 35–47)
HCT VFR BLD AUTO: 22.5 % (ref 35–47)
HCT VFR BLD AUTO: 23.5 % (ref 35–47)
HCT VFR BLD AUTO: 23.5 % (ref 35–47)
HCT VFR BLD AUTO: 24.1 % (ref 35–47)
HCT VFR BLD AUTO: 24.2 % (ref 35–47)
HCT VFR BLD AUTO: 24.6 % (ref 35–47)
HCT VFR BLD AUTO: 25 % (ref 35–47)
HCT VFR BLD AUTO: 25.3 % (ref 35–47)
HCT VFR BLD AUTO: 25.4 % (ref 35–47)
HCT VFR BLD AUTO: 25.4 % (ref 35–47)
HCT VFR BLD AUTO: 25.9 % (ref 35–47)
HCT VFR BLD AUTO: 26.2 % (ref 35–47)
HCT VFR BLD AUTO: 26.2 % (ref 35–47)
HCT VFR BLD AUTO: 27 % (ref 35–47)
HEMOCCULT SP1 STL QL: POSITIVE
HGB BLD-MCNC: 5.6 G/DL (ref 11.5–16)
HGB BLD-MCNC: 6.1 G/DL (ref 11.5–16)
HGB BLD-MCNC: 6.2 G/DL (ref 11.5–16)
HGB BLD-MCNC: 6.6 G/DL (ref 11.5–16)
HGB BLD-MCNC: 6.9 G/DL (ref 11.5–16)
HGB BLD-MCNC: 7.1 G/DL (ref 11.5–16)
HGB BLD-MCNC: 7.2 G/DL (ref 11.5–16)
HGB BLD-MCNC: 7.4 G/DL (ref 11.5–16)
HGB BLD-MCNC: 7.5 G/DL (ref 11.5–16)
HGB BLD-MCNC: 7.8 G/DL (ref 11.5–16)
HGB BLD-MCNC: 8 G/DL (ref 11.5–16)
HGB BLD-MCNC: 8 G/DL (ref 11.5–16)
HGB BLD-MCNC: 8.1 G/DL (ref 11.5–16)
HGB BLD-MCNC: 8.2 G/DL (ref 11.5–16)
HGB BLD-MCNC: 8.2 G/DL (ref 11.5–16)
HGB BLD-MCNC: 8.5 G/DL (ref 11.5–16)
HGB BLD-MCNC: 8.7 G/DL (ref 11.5–16)
HGB BLD-MCNC: 8.8 G/DL (ref 11.5–16)
HGB UR QL STRIP: ABNORMAL
IMM GRANULOCYTES # BLD AUTO: 0 K/UL
IMM GRANULOCYTES # BLD AUTO: 0 K/UL (ref 0–0.04)
IMM GRANULOCYTES # BLD AUTO: 0.1 K/UL (ref 0–0.04)
IMM GRANULOCYTES # BLD AUTO: 0.2 K/UL (ref 0–0.04)
IMM GRANULOCYTES # BLD AUTO: 0.3 K/UL (ref 0–0.04)
IMM GRANULOCYTES NFR BLD AUTO: 0 %
IMM GRANULOCYTES NFR BLD AUTO: 1 % (ref 0–0.5)
IMM GRANULOCYTES NFR BLD AUTO: 2 % (ref 0–0.5)
IMM GRANULOCYTES NFR BLD AUTO: 3 % (ref 0–0.5)
INR PPP: 1.2 (ref 0.9–1.1)
INR PPP: 1.2 (ref 0.9–1.1)
INR PPP: 1.3 (ref 0.9–1.1)
INR PPP: 1.4 (ref 0.9–1.1)
INR PPP: 1.4 (ref 0.9–1.1)
INR PPP: 1.6 (ref 0.9–1.1)
INR PPP: 1.7 (ref 0.9–1.1)
INR PPP: 1.8 (ref 0.9–1.1)
INR PPP: 1.9 (ref 0.9–1.1)
INR PPP: 2.3 (ref 0.9–1.1)
INR PPP: 2.3 (ref 0.9–1.1)
INR PPP: 2.4 (ref 0.9–1.1)
INR PPP: 2.8 (ref 0.9–1.1)
INR PPP: 3.1 (ref 0.9–1.1)
INR PPP: 3.4 (ref 0.9–1.1)
INR PPP: 3.8 (ref 0.9–1.1)
INR PPP: 4.7 (ref 0.9–1.1)
INR PPP: 8.4 (ref 0.9–1.1)
INR PPP: >9.3 (ref 0.9–1.1)
IRON SATN MFR SERPL: 26 % (ref 20–50)
IRON SERPL-MCNC: 32 UG/DL (ref 35–150)
KETONES UR QL STRIP.AUTO: NEGATIVE MG/DL
LEUKOCYTE ESTERASE UR QL STRIP.AUTO: ABNORMAL
LIPASE SERPL-CCNC: 1027 U/L (ref 73–393)
LIPASE SERPL-CCNC: 1334 U/L (ref 73–393)
LIPASE SERPL-CCNC: 1441 U/L (ref 73–393)
LIPASE SERPL-CCNC: 1472 U/L (ref 73–393)
LIPASE SERPL-CCNC: 1632 U/L (ref 73–393)
LIPASE SERPL-CCNC: 2144 U/L (ref 73–393)
LIPASE SERPL-CCNC: 2244 U/L (ref 73–393)
LIPASE SERPL-CCNC: 2350 U/L (ref 73–393)
LIPASE SERPL-CCNC: 2756 U/L (ref 73–393)
LIPASE SERPL-CCNC: 703 U/L (ref 73–393)
LIPASE SERPL-CCNC: 773 U/L (ref 73–393)
LIPASE SERPL-CCNC: 902 U/L (ref 73–393)
LYMPHOCYTES # BLD: 0.2 K/UL (ref 0.8–3.5)
LYMPHOCYTES # BLD: 0.2 K/UL (ref 0.8–3.5)
LYMPHOCYTES # BLD: 0.4 K/UL (ref 0.8–3.5)
LYMPHOCYTES # BLD: 0.5 K/UL (ref 0.8–3.5)
LYMPHOCYTES # BLD: 0.6 K/UL (ref 0.8–3.5)
LYMPHOCYTES # BLD: 0.8 K/UL (ref 0.8–3.5)
LYMPHOCYTES # BLD: 0.8 K/UL (ref 0.8–3.5)
LYMPHOCYTES # BLD: 0.9 K/UL (ref 0.8–3.5)
LYMPHOCYTES # BLD: 1 K/UL (ref 0.8–3.5)
LYMPHOCYTES # BLD: 1.1 K/UL (ref 0.8–3.5)
LYMPHOCYTES # BLD: 1.3 K/UL (ref 0.8–3.5)
LYMPHOCYTES # BLD: 1.4 K/UL (ref 0.8–3.5)
LYMPHOCYTES # BLD: 1.4 K/UL (ref 0.8–3.5)
LYMPHOCYTES NFR BLD: 1 % (ref 12–49)
LYMPHOCYTES NFR BLD: 10 % (ref 12–49)
LYMPHOCYTES NFR BLD: 11 % (ref 12–49)
LYMPHOCYTES NFR BLD: 14 % (ref 12–49)
LYMPHOCYTES NFR BLD: 14 % (ref 12–49)
LYMPHOCYTES NFR BLD: 15 % (ref 12–49)
LYMPHOCYTES NFR BLD: 16 % (ref 12–49)
LYMPHOCYTES NFR BLD: 16 % (ref 12–49)
LYMPHOCYTES NFR BLD: 17 % (ref 12–49)
LYMPHOCYTES NFR BLD: 19 % (ref 12–49)
LYMPHOCYTES NFR BLD: 19 % (ref 12–49)
LYMPHOCYTES NFR BLD: 2 % (ref 12–49)
LYMPHOCYTES NFR BLD: 20 % (ref 12–49)
LYMPHOCYTES NFR BLD: 21 % (ref 12–49)
LYMPHOCYTES NFR BLD: 4 % (ref 12–49)
LYMPHOCYTES NFR BLD: 4 % (ref 12–49)
LYMPHOCYTES NFR BLD: 6 % (ref 12–49)
LYMPHOCYTES NFR BLD: 7 % (ref 12–49)
LYMPHOCYTES NFR BLD: 8 % (ref 12–49)
LYMPHOCYTES NFR BLD: 9 % (ref 12–49)
LYMPHOCYTES NFR BLD: 9 % (ref 12–49)
MAGNESIUM SERPL-MCNC: 1.7 MG/DL (ref 1.6–2.4)
MAGNESIUM SERPL-MCNC: 2 MG/DL (ref 1.6–2.4)
MCH RBC QN AUTO: 29.1 PG (ref 26–34)
MCH RBC QN AUTO: 29.3 PG (ref 26–34)
MCH RBC QN AUTO: 29.4 PG (ref 26–34)
MCH RBC QN AUTO: 29.4 PG (ref 26–34)
MCH RBC QN AUTO: 29.5 PG (ref 26–34)
MCH RBC QN AUTO: 29.5 PG (ref 26–34)
MCH RBC QN AUTO: 29.7 PG (ref 26–34)
MCH RBC QN AUTO: 29.8 PG (ref 26–34)
MCH RBC QN AUTO: 29.9 PG (ref 26–34)
MCH RBC QN AUTO: 30 PG (ref 26–34)
MCH RBC QN AUTO: 30.1 PG (ref 26–34)
MCH RBC QN AUTO: 30.2 PG (ref 26–34)
MCH RBC QN AUTO: 30.2 PG (ref 26–34)
MCH RBC QN AUTO: 30.3 PG (ref 26–34)
MCH RBC QN AUTO: 30.4 PG (ref 26–34)
MCH RBC QN AUTO: 30.5 PG (ref 26–34)
MCH RBC QN AUTO: 30.5 PG (ref 26–34)
MCH RBC QN AUTO: 30.8 PG (ref 26–34)
MCH RBC QN AUTO: 30.8 PG (ref 26–34)
MCH RBC QN AUTO: 30.9 PG (ref 26–34)
MCH RBC QN AUTO: 31 PG (ref 26–34)
MCH RBC QN AUTO: 31.5 PG (ref 26–34)
MCHC RBC AUTO-ENTMCNC: 30 G/DL (ref 30–36.5)
MCHC RBC AUTO-ENTMCNC: 30.5 G/DL (ref 30–36.5)
MCHC RBC AUTO-ENTMCNC: 30.6 G/DL (ref 30–36.5)
MCHC RBC AUTO-ENTMCNC: 30.7 G/DL (ref 30–36.5)
MCHC RBC AUTO-ENTMCNC: 30.7 G/DL (ref 30–36.5)
MCHC RBC AUTO-ENTMCNC: 31 G/DL (ref 30–36.5)
MCHC RBC AUTO-ENTMCNC: 31.4 G/DL (ref 30–36.5)
MCHC RBC AUTO-ENTMCNC: 31.5 G/DL (ref 30–36.5)
MCHC RBC AUTO-ENTMCNC: 31.6 G/DL (ref 30–36.5)
MCHC RBC AUTO-ENTMCNC: 31.7 G/DL (ref 30–36.5)
MCHC RBC AUTO-ENTMCNC: 31.8 G/DL (ref 30–36.5)
MCHC RBC AUTO-ENTMCNC: 31.9 G/DL (ref 30–36.5)
MCHC RBC AUTO-ENTMCNC: 32 G/DL (ref 30–36.5)
MCHC RBC AUTO-ENTMCNC: 32.1 G/DL (ref 30–36.5)
MCHC RBC AUTO-ENTMCNC: 32.3 G/DL (ref 30–36.5)
MCHC RBC AUTO-ENTMCNC: 32.3 G/DL (ref 30–36.5)
MCHC RBC AUTO-ENTMCNC: 32.4 G/DL (ref 30–36.5)
MCHC RBC AUTO-ENTMCNC: 32.6 G/DL (ref 30–36.5)
MCHC RBC AUTO-ENTMCNC: 32.9 G/DL (ref 30–36.5)
MCHC RBC AUTO-ENTMCNC: 33.2 G/DL (ref 30–36.5)
MCV RBC AUTO: 91.5 FL (ref 80–99)
MCV RBC AUTO: 92.4 FL (ref 80–99)
MCV RBC AUTO: 92.7 FL (ref 80–99)
MCV RBC AUTO: 93.2 FL (ref 80–99)
MCV RBC AUTO: 93.3 FL (ref 80–99)
MCV RBC AUTO: 93.4 FL (ref 80–99)
MCV RBC AUTO: 93.6 FL (ref 80–99)
MCV RBC AUTO: 93.7 FL (ref 80–99)
MCV RBC AUTO: 93.8 FL (ref 80–99)
MCV RBC AUTO: 93.9 FL (ref 80–99)
MCV RBC AUTO: 94.2 FL (ref 80–99)
MCV RBC AUTO: 94.4 FL (ref 80–99)
MCV RBC AUTO: 94.5 FL (ref 80–99)
MCV RBC AUTO: 95.1 FL (ref 80–99)
MCV RBC AUTO: 95.1 FL (ref 80–99)
MCV RBC AUTO: 95.3 FL (ref 80–99)
MCV RBC AUTO: 95.5 FL (ref 80–99)
MCV RBC AUTO: 95.7 FL (ref 80–99)
MCV RBC AUTO: 95.7 FL (ref 80–99)
MCV RBC AUTO: 96 FL (ref 80–99)
MCV RBC AUTO: 96 FL (ref 80–99)
MCV RBC AUTO: 97.1 FL (ref 80–99)
MCV RBC AUTO: 97.2 FL (ref 80–99)
MCV RBC AUTO: 97.2 FL (ref 80–99)
MCV RBC AUTO: 97.3 FL (ref 80–99)
MCV RBC AUTO: 99.5 FL (ref 80–99)
METAMYELOCYTES NFR BLD MANUAL: 1 %
METAMYELOCYTES NFR BLD MANUAL: 1 %
MONOCYTES # BLD: 0.2 K/UL (ref 0–1)
MONOCYTES # BLD: 0.2 K/UL (ref 0–1)
MONOCYTES # BLD: 0.3 K/UL (ref 0–1)
MONOCYTES # BLD: 0.4 K/UL (ref 0–1)
MONOCYTES # BLD: 0.5 K/UL (ref 0–1)
MONOCYTES # BLD: 0.5 K/UL (ref 0–1)
MONOCYTES # BLD: 0.7 K/UL (ref 0–1)
MONOCYTES NFR BLD: 1 % (ref 5–13)
MONOCYTES NFR BLD: 2 % (ref 5–13)
MONOCYTES NFR BLD: 3 % (ref 5–13)
MONOCYTES NFR BLD: 4 % (ref 5–13)
MONOCYTES NFR BLD: 5 % (ref 5–13)
MONOCYTES NFR BLD: 6 % (ref 5–13)
MONOCYTES NFR BLD: 7 % (ref 5–13)
MYELOCYTES NFR BLD MANUAL: 1 %
NEUTS BAND NFR BLD MANUAL: 1 % (ref 0–6)
NEUTS BAND NFR BLD MANUAL: 2 % (ref 0–6)
NEUTS BAND NFR BLD MANUAL: 7 % (ref 0–6)
NEUTS SEG # BLD: 10.2 K/UL (ref 1.8–8)
NEUTS SEG # BLD: 10.4 K/UL (ref 1.8–8)
NEUTS SEG # BLD: 10.8 K/UL (ref 1.8–8)
NEUTS SEG # BLD: 11.4 K/UL (ref 1.8–8)
NEUTS SEG # BLD: 12.7 K/UL (ref 1.8–8)
NEUTS SEG # BLD: 14.8 K/UL (ref 1.8–8)
NEUTS SEG # BLD: 15.8 K/UL (ref 1.8–8)
NEUTS SEG # BLD: 15.8 K/UL (ref 1.8–8)
NEUTS SEG # BLD: 16.1 K/UL (ref 1.8–8)
NEUTS SEG # BLD: 3.9 K/UL (ref 1.8–8)
NEUTS SEG # BLD: 4.1 K/UL (ref 1.8–8)
NEUTS SEG # BLD: 4.2 K/UL (ref 1.8–8)
NEUTS SEG # BLD: 4.2 K/UL (ref 1.8–8)
NEUTS SEG # BLD: 4.4 K/UL (ref 1.8–8)
NEUTS SEG # BLD: 4.8 K/UL (ref 1.8–8)
NEUTS SEG # BLD: 4.9 K/UL (ref 1.8–8)
NEUTS SEG # BLD: 5 K/UL (ref 1.8–8)
NEUTS SEG # BLD: 5.4 K/UL (ref 1.8–8)
NEUTS SEG # BLD: 6.3 K/UL (ref 1.8–8)
NEUTS SEG # BLD: 6.7 K/UL (ref 1.8–8)
NEUTS SEG # BLD: 7.1 K/UL (ref 1.8–8)
NEUTS SEG # BLD: 7.5 K/UL (ref 1.8–8)
NEUTS SEG # BLD: 8.5 K/UL (ref 1.8–8)
NEUTS SEG # BLD: 8.7 K/UL (ref 1.8–8)
NEUTS SEG # BLD: 8.8 K/UL (ref 1.8–8)
NEUTS SEG # BLD: 9.2 K/UL (ref 1.8–8)
NEUTS SEG NFR BLD: 69 % (ref 32–75)
NEUTS SEG NFR BLD: 71 % (ref 32–75)
NEUTS SEG NFR BLD: 71 % (ref 32–75)
NEUTS SEG NFR BLD: 73 % (ref 32–75)
NEUTS SEG NFR BLD: 75 % (ref 32–75)
NEUTS SEG NFR BLD: 75 % (ref 32–75)
NEUTS SEG NFR BLD: 76 % (ref 32–75)
NEUTS SEG NFR BLD: 78 % (ref 32–75)
NEUTS SEG NFR BLD: 82 % (ref 32–75)
NEUTS SEG NFR BLD: 83 % (ref 32–75)
NEUTS SEG NFR BLD: 84 % (ref 32–75)
NEUTS SEG NFR BLD: 85 % (ref 32–75)
NEUTS SEG NFR BLD: 87 % (ref 32–75)
NEUTS SEG NFR BLD: 88 % (ref 32–75)
NEUTS SEG NFR BLD: 89 % (ref 32–75)
NEUTS SEG NFR BLD: 89 % (ref 32–75)
NEUTS SEG NFR BLD: 90 % (ref 32–75)
NEUTS SEG NFR BLD: 93 % (ref 32–75)
NEUTS SEG NFR BLD: 93 % (ref 32–75)
NITRITE UR QL STRIP.AUTO: NEGATIVE
NRBC # BLD: 0 K/UL (ref 0–0.01)
NRBC # BLD: 0.02 K/UL (ref 0–0.01)
NRBC # BLD: 0.02 K/UL (ref 0–0.01)
NRBC # BLD: 0.08 K/UL (ref 0–0.01)
NRBC BLD-RTO: 0 PER 100 WBC
NRBC BLD-RTO: 0.1 PER 100 WBC
NRBC BLD-RTO: 0.2 PER 100 WBC
NRBC BLD-RTO: 0.4 PER 100 WBC
OTHER CELLS NFR BLD MANUAL: 1 %
OTHER URINE MICRO,5051: 2
OTHER,OTHU: ABNORMAL
PERFORMED BY, TECHID: ABNORMAL
PERFORMED BY, TECHID: NORMAL
PERFORMED BY, TECHID: NORMAL
PH UR STRIP: 5 [PH] (ref 5–8)
PLATELET # BLD AUTO: 103 K/UL (ref 150–400)
PLATELET # BLD AUTO: 106 K/UL (ref 150–400)
PLATELET # BLD AUTO: 134 K/UL (ref 150–400)
PLATELET # BLD AUTO: 136 K/UL (ref 150–400)
PLATELET # BLD AUTO: 140 K/UL (ref 150–400)
PLATELET # BLD AUTO: 157 K/UL (ref 150–400)
PLATELET # BLD AUTO: 159 K/UL (ref 150–400)
PLATELET # BLD AUTO: 163 K/UL (ref 150–400)
PLATELET # BLD AUTO: 168 K/UL (ref 150–400)
PLATELET # BLD AUTO: 169 K/UL (ref 150–400)
PLATELET # BLD AUTO: 171 K/UL (ref 150–400)
PLATELET # BLD AUTO: 171 K/UL (ref 150–400)
PLATELET # BLD AUTO: 178 K/UL (ref 150–400)
PLATELET # BLD AUTO: 179 K/UL (ref 150–400)
PLATELET # BLD AUTO: 185 K/UL (ref 150–400)
PLATELET # BLD AUTO: 187 K/UL (ref 150–400)
PLATELET # BLD AUTO: 190 K/UL (ref 150–400)
PLATELET # BLD AUTO: 193 K/UL (ref 150–400)
PLATELET # BLD AUTO: 197 K/UL (ref 150–400)
PLATELET # BLD AUTO: 32 K/UL (ref 150–400)
PLATELET # BLD AUTO: 42 K/UL (ref 150–400)
PLATELET # BLD AUTO: 51 K/UL (ref 150–400)
PLATELET # BLD AUTO: 61 K/UL (ref 150–400)
PLATELET # BLD AUTO: 87 K/UL (ref 150–400)
PLATELET # BLD AUTO: 88 K/UL (ref 150–400)
PLATELET # BLD AUTO: 93 K/UL (ref 150–400)
PLATELET COMMENTS,PCOM: ABNORMAL
PMV BLD AUTO: 10.7 FL (ref 8.9–12.9)
PMV BLD AUTO: 10.8 FL (ref 8.9–12.9)
PMV BLD AUTO: 10.9 FL (ref 8.9–12.9)
PMV BLD AUTO: 11 FL (ref 8.9–12.9)
PMV BLD AUTO: 11.1 FL (ref 8.9–12.9)
PMV BLD AUTO: 11.2 FL (ref 8.9–12.9)
PMV BLD AUTO: 11.3 FL (ref 8.9–12.9)
PMV BLD AUTO: 11.5 FL (ref 8.9–12.9)
PMV BLD AUTO: 11.5 FL (ref 8.9–12.9)
PMV BLD AUTO: 11.6 FL (ref 8.9–12.9)
PMV BLD AUTO: 11.6 FL (ref 8.9–12.9)
PMV BLD AUTO: 11.7 FL (ref 8.9–12.9)
PMV BLD AUTO: 12 FL (ref 8.9–12.9)
PMV BLD AUTO: 12.3 FL (ref 8.9–12.9)
PMV BLD AUTO: 12.7 FL (ref 8.9–12.9)
PMV BLD AUTO: 12.7 FL (ref 8.9–12.9)
PMV BLD AUTO: 13.3 FL (ref 8.9–12.9)
PMV BLD AUTO: 13.5 FL (ref 8.9–12.9)
PMV BLD AUTO: 14 FL (ref 8.9–12.9)
POTASSIUM SERPL-SCNC: 2.4 MMOL/L (ref 3.5–5.1)
POTASSIUM SERPL-SCNC: 2.7 MMOL/L (ref 3.5–5.1)
POTASSIUM SERPL-SCNC: 3 MMOL/L (ref 3.5–5.1)
POTASSIUM SERPL-SCNC: 3.2 MMOL/L (ref 3.5–5.1)
POTASSIUM SERPL-SCNC: 3.4 MMOL/L (ref 3.5–5.1)
POTASSIUM SERPL-SCNC: 3.4 MMOL/L (ref 3.5–5.1)
POTASSIUM SERPL-SCNC: 3.5 MMOL/L (ref 3.5–5.1)
POTASSIUM SERPL-SCNC: 3.6 MMOL/L (ref 3.5–5.1)
POTASSIUM SERPL-SCNC: 3.6 MMOL/L (ref 3.5–5.1)
POTASSIUM SERPL-SCNC: 3.7 MMOL/L (ref 3.5–5.1)
POTASSIUM SERPL-SCNC: 3.8 MMOL/L (ref 3.5–5.1)
POTASSIUM SERPL-SCNC: 3.9 MMOL/L (ref 3.5–5.1)
POTASSIUM SERPL-SCNC: 4.1 MMOL/L (ref 3.5–5.1)
POTASSIUM SERPL-SCNC: 4.2 MMOL/L (ref 3.5–5.1)
POTASSIUM SERPL-SCNC: 4.4 MMOL/L (ref 3.5–5.1)
PROCALCITONIN SERPL-MCNC: 0.48 NG/ML
PROCALCITONIN SERPL-MCNC: 0.48 NG/ML
PROCALCITONIN SERPL-MCNC: 0.54 NG/ML
PROCALCITONIN SERPL-MCNC: 0.59 NG/ML
PROCALCITONIN SERPL-MCNC: 0.65 NG/ML
PROCALCITONIN SERPL-MCNC: 0.91 NG/ML
PROCALCITONIN SERPL-MCNC: 0.98 NG/ML
PROCALCITONIN SERPL-MCNC: 1.08 NG/ML
PROCALCITONIN SERPL-MCNC: 1.22 NG/ML
PROCALCITONIN SERPL-MCNC: 1.75 NG/ML
PROCALCITONIN SERPL-MCNC: 15.42 NG/ML
PROCALCITONIN SERPL-MCNC: 3.82 NG/ML
PROCALCITONIN SERPL-MCNC: 3.87 NG/ML
PROT SERPL-MCNC: 3.8 G/DL (ref 6.4–8.2)
PROT SERPL-MCNC: 4 G/DL (ref 6.4–8.2)
PROT SERPL-MCNC: 4.1 G/DL (ref 6.4–8.2)
PROT SERPL-MCNC: 4.1 G/DL (ref 6.4–8.2)
PROT SERPL-MCNC: 4.2 G/DL (ref 6.4–8.2)
PROT SERPL-MCNC: 4.4 G/DL (ref 6.4–8.2)
PROT UR STRIP-MCNC: 100 MG/DL
PROTHROMBIN TIME: 14.6 SEC (ref 11.9–14.7)
PROTHROMBIN TIME: 15.1 SEC (ref 11.9–14.7)
PROTHROMBIN TIME: 16.2 SEC (ref 11.9–14.7)
PROTHROMBIN TIME: 17.1 SEC (ref 11.9–14.7)
PROTHROMBIN TIME: 17.2 SEC (ref 11.9–14.7)
PROTHROMBIN TIME: 18.1 SEC (ref 11.9–14.7)
PROTHROMBIN TIME: 19.3 SEC (ref 11.9–14.7)
PROTHROMBIN TIME: 20.2 SEC (ref 11.9–14.7)
PROTHROMBIN TIME: 20.4 SEC (ref 11.9–14.7)
PROTHROMBIN TIME: 20.7 SEC (ref 11.9–14.7)
PROTHROMBIN TIME: 21.5 SEC (ref 11.9–14.7)
PROTHROMBIN TIME: 24.4 SEC (ref 11.9–14.7)
PROTHROMBIN TIME: 25 SEC (ref 11.9–14.7)
PROTHROMBIN TIME: 25.4 SEC (ref 11.9–14.7)
PROTHROMBIN TIME: 29.1 SEC (ref 11.9–14.7)
PROTHROMBIN TIME: 32 SEC (ref 11.9–14.7)
PROTHROMBIN TIME: 34 SEC (ref 11.9–14.7)
PROTHROMBIN TIME: 37 SEC (ref 11.9–14.7)
PROTHROMBIN TIME: 44.2 SEC (ref 11.9–14.7)
PROTHROMBIN TIME: 68.9 SEC (ref 11.9–14.7)
PROTHROMBIN TIME: >75 SEC (ref 11.9–14.7)
Q-T INTERVAL, ECG07: 294 MS
Q-T INTERVAL, ECG07: 330 MS
Q-T INTERVAL, ECG07: 334 MS
Q-T INTERVAL, ECG07: 366 MS
QRS DURATION, ECG06: 84 MS
QRS DURATION, ECG06: 96 MS
QTC CALCULATION (BEZET), ECG08: 388 MS
QTC CALCULATION (BEZET), ECG08: 462 MS
QTC CALCULATION (BEZET), ECG08: 466 MS
QTC CALCULATION (BEZET), ECG08: 474 MS
RBC # BLD AUTO: 2 M/UL (ref 3.8–5.2)
RBC # BLD AUTO: 2.05 M/UL (ref 3.8–5.2)
RBC # BLD AUTO: 2.09 M/UL (ref 3.8–5.2)
RBC # BLD AUTO: 2.11 M/UL (ref 3.8–5.2)
RBC # BLD AUTO: 2.13 M/UL (ref 3.8–5.2)
RBC # BLD AUTO: 2.24 M/UL (ref 3.8–5.2)
RBC # BLD AUTO: 2.24 M/UL (ref 3.8–5.2)
RBC # BLD AUTO: 2.33 M/UL (ref 3.8–5.2)
RBC # BLD AUTO: 2.34 M/UL (ref 3.8–5.2)
RBC # BLD AUTO: 2.35 M/UL (ref 3.8–5.2)
RBC # BLD AUTO: 2.37 M/UL (ref 3.8–5.2)
RBC # BLD AUTO: 2.46 M/UL (ref 3.8–5.2)
RBC # BLD AUTO: 2.51 M/UL (ref 3.8–5.2)
RBC # BLD AUTO: 2.52 M/UL (ref 3.8–5.2)
RBC # BLD AUTO: 2.52 M/UL (ref 3.8–5.2)
RBC # BLD AUTO: 2.53 M/UL (ref 3.8–5.2)
RBC # BLD AUTO: 2.66 M/UL (ref 3.8–5.2)
RBC # BLD AUTO: 2.67 M/UL (ref 3.8–5.2)
RBC # BLD AUTO: 2.68 M/UL (ref 3.8–5.2)
RBC # BLD AUTO: 2.71 M/UL (ref 3.8–5.2)
RBC # BLD AUTO: 2.71 M/UL (ref 3.8–5.2)
RBC # BLD AUTO: 2.74 M/UL (ref 3.8–5.2)
RBC # BLD AUTO: 2.75 M/UL (ref 3.8–5.2)
RBC # BLD AUTO: 2.79 M/UL (ref 3.8–5.2)
RBC # BLD AUTO: 2.81 M/UL (ref 3.8–5.2)
RBC # BLD AUTO: 2.95 M/UL (ref 3.8–5.2)
RBC #/AREA URNS HPF: ABNORMAL /HPF (ref 0–5)
RBC MORPH BLD: ABNORMAL
SODIUM SERPL-SCNC: 135 MMOL/L (ref 136–145)
SODIUM SERPL-SCNC: 136 MMOL/L (ref 136–145)
SODIUM SERPL-SCNC: 137 MMOL/L (ref 136–145)
SODIUM SERPL-SCNC: 138 MMOL/L (ref 136–145)
SODIUM SERPL-SCNC: 139 MMOL/L (ref 136–145)
SODIUM SERPL-SCNC: 140 MMOL/L (ref 136–145)
SODIUM SERPL-SCNC: 141 MMOL/L (ref 136–145)
SODIUM SERPL-SCNC: 142 MMOL/L (ref 136–145)
SODIUM SERPL-SCNC: 143 MMOL/L (ref 136–145)
SP GR UR REFRACTOMETRY: 1.03 (ref 1–1.03)
SPECIAL REQUESTS,SREQ: ABNORMAL
SPECIAL REQUESTS,SREQ: NORMAL
SPECIAL REQUESTS,SREQ: NORMAL
SPECIMEN EXP DATE BLD: NORMAL
SPECIMEN SOURCE: NORMAL
STATUS OF UNIT,%ST: NORMAL
TIBC SERPL-MCNC: 123 UG/DL (ref 250–450)
TRANSFUSION STATUS PATIENT QL: NORMAL
UNIT DIVISION, %UDIV: 0
UROBILINOGEN UR QL STRIP.AUTO: 2 EU/DL (ref 0.1–1)
VENTRICULAR RATE, ECG03: 105 BPM
VENTRICULAR RATE, ECG03: 120 BPM
VENTRICULAR RATE, ECG03: 121 BPM
VENTRICULAR RATE, ECG03: 96 BPM
VIT B12 SERPL-MCNC: 1332 PG/ML (ref 193–986)
WBC # BLD AUTO: 10.1 K/UL (ref 3.6–11)
WBC # BLD AUTO: 10.3 K/UL (ref 3.6–11)
WBC # BLD AUTO: 10.4 K/UL (ref 3.6–11)
WBC # BLD AUTO: 11.6 K/UL (ref 3.6–11)
WBC # BLD AUTO: 11.9 K/UL (ref 3.6–11)
WBC # BLD AUTO: 12.2 K/UL (ref 3.6–11)
WBC # BLD AUTO: 12.5 K/UL (ref 3.6–11)
WBC # BLD AUTO: 14.5 K/UL (ref 3.6–11)
WBC # BLD AUTO: 16.4 K/UL (ref 3.6–11)
WBC # BLD AUTO: 16.6 K/UL (ref 3.6–11)
WBC # BLD AUTO: 17.9 K/UL (ref 3.6–11)
WBC # BLD AUTO: 18.1 K/UL (ref 3.6–11)
WBC # BLD AUTO: 5.4 K/UL (ref 3.6–11)
WBC # BLD AUTO: 5.6 K/UL (ref 3.6–11)
WBC # BLD AUTO: 5.7 K/UL (ref 3.6–11)
WBC # BLD AUTO: 5.7 K/UL (ref 3.6–11)
WBC # BLD AUTO: 6.1 K/UL (ref 3.6–11)
WBC # BLD AUTO: 6.2 K/UL (ref 3.6–11)
WBC # BLD AUTO: 6.4 K/UL (ref 3.6–11)
WBC # BLD AUTO: 7 K/UL (ref 3.6–11)
WBC # BLD AUTO: 7.1 K/UL (ref 3.6–11)
WBC # BLD AUTO: 8.1 K/UL (ref 3.6–11)
WBC # BLD AUTO: 8.2 K/UL (ref 3.6–11)
WBC # BLD AUTO: 8.5 K/UL (ref 3.6–11)
WBC # BLD AUTO: 8.5 K/UL (ref 3.6–11)
WBC # BLD AUTO: 9 K/UL (ref 3.6–11)
WBC CASTS URNS QL MICRO: PRESENT
WBC URNS QL MICRO: >100 /HPF (ref 0–4)
YEAST URNS QL MICRO: PRESENT

## 2022-01-01 PROCEDURE — 74011250636 HC RX REV CODE- 250/636: Performed by: PHYSICIAN ASSISTANT

## 2022-01-01 PROCEDURE — 74011250636 HC RX REV CODE- 250/636: Performed by: NURSE PRACTITIONER

## 2022-01-01 PROCEDURE — 74011250636 HC RX REV CODE- 250/636: Performed by: INTERNAL MEDICINE

## 2022-01-01 PROCEDURE — 94640 AIRWAY INHALATION TREATMENT: CPT

## 2022-01-01 PROCEDURE — 36430 TRANSFUSION BLD/BLD COMPNT: CPT

## 2022-01-01 PROCEDURE — 65270000029 HC RM PRIVATE

## 2022-01-01 PROCEDURE — 80048 BASIC METABOLIC PNL TOTAL CA: CPT

## 2022-01-01 PROCEDURE — 36415 COLL VENOUS BLD VENIPUNCTURE: CPT

## 2022-01-01 PROCEDURE — 74011000250 HC RX REV CODE- 250: Performed by: NURSE PRACTITIONER

## 2022-01-01 PROCEDURE — 93005 ELECTROCARDIOGRAM TRACING: CPT

## 2022-01-01 PROCEDURE — 85025 COMPLETE CBC W/AUTO DIFF WBC: CPT

## 2022-01-01 PROCEDURE — 74011250637 HC RX REV CODE- 250/637: Performed by: NURSE PRACTITIONER

## 2022-01-01 PROCEDURE — 86140 C-REACTIVE PROTEIN: CPT

## 2022-01-01 PROCEDURE — 74011250636 HC RX REV CODE- 250/636: Performed by: STUDENT IN AN ORGANIZED HEALTH CARE EDUCATION/TRAINING PROGRAM

## 2022-01-01 PROCEDURE — 94760 N-INVAS EAR/PLS OXIMETRY 1: CPT

## 2022-01-01 PROCEDURE — 85610 PROTHROMBIN TIME: CPT

## 2022-01-01 PROCEDURE — C9113 INJ PANTOPRAZOLE SODIUM, VIA: HCPCS | Performed by: STUDENT IN AN ORGANIZED HEALTH CARE EDUCATION/TRAINING PROGRAM

## 2022-01-01 PROCEDURE — 85018 HEMOGLOBIN: CPT

## 2022-01-01 PROCEDURE — 82962 GLUCOSE BLOOD TEST: CPT

## 2022-01-01 PROCEDURE — A9560 TC99M LABELED RBC: HCPCS

## 2022-01-01 PROCEDURE — C9113 INJ PANTOPRAZOLE SODIUM, VIA: HCPCS | Performed by: NURSE PRACTITIONER

## 2022-01-01 PROCEDURE — P9016 RBC LEUKOCYTES REDUCED: HCPCS

## 2022-01-01 PROCEDURE — 84145 PROCALCITONIN (PCT): CPT

## 2022-01-01 PROCEDURE — 99232 SBSQ HOSP IP/OBS MODERATE 35: CPT | Performed by: INTERNAL MEDICINE

## 2022-01-01 PROCEDURE — 74011000250 HC RX REV CODE- 250: Performed by: STUDENT IN AN ORGANIZED HEALTH CARE EDUCATION/TRAINING PROGRAM

## 2022-01-01 PROCEDURE — 99232 SBSQ HOSP IP/OBS MODERATE 35: CPT | Performed by: SURGERY

## 2022-01-01 PROCEDURE — 97537 COMMUNITY/WORK REINTEGRATION: CPT

## 2022-01-01 PROCEDURE — 86923 COMPATIBILITY TEST ELECTRIC: CPT

## 2022-01-01 PROCEDURE — 3336500001 HSPC ELECTION

## 2022-01-01 PROCEDURE — 87077 CULTURE AEROBIC IDENTIFY: CPT

## 2022-01-01 PROCEDURE — 87635 SARS-COV-2 COVID-19 AMP PRB: CPT

## 2022-01-01 PROCEDURE — 74011250637 HC RX REV CODE- 250/637: Performed by: STUDENT IN AN ORGANIZED HEALTH CARE EDUCATION/TRAINING PROGRAM

## 2022-01-01 PROCEDURE — 76705 ECHO EXAM OF ABDOMEN: CPT

## 2022-01-01 PROCEDURE — 74011250637 HC RX REV CODE- 250/637: Performed by: INTERNAL MEDICINE

## 2022-01-01 PROCEDURE — 86900 BLOOD TYPING SEROLOGIC ABO: CPT

## 2022-01-01 PROCEDURE — 74011250637 HC RX REV CODE- 250/637: Performed by: PHYSICIAN ASSISTANT

## 2022-01-01 PROCEDURE — 74011250637 HC RX REV CODE- 250/637: Performed by: HOSPITALIST

## 2022-01-01 PROCEDURE — 85379 FIBRIN DEGRADATION QUANT: CPT

## 2022-01-01 PROCEDURE — 86901 BLOOD TYPING SEROLOGIC RH(D): CPT

## 2022-01-01 PROCEDURE — 97530 THERAPEUTIC ACTIVITIES: CPT

## 2022-01-01 PROCEDURE — 83690 ASSAY OF LIPASE: CPT

## 2022-01-01 PROCEDURE — 74011250636 HC RX REV CODE- 250/636: Performed by: NURSE ANESTHETIST, CERTIFIED REGISTERED

## 2022-01-01 PROCEDURE — 74011000258 HC RX REV CODE- 258: Performed by: PHYSICIAN ASSISTANT

## 2022-01-01 PROCEDURE — 82728 ASSAY OF FERRITIN: CPT

## 2022-01-01 PROCEDURE — 0656 HSPC GENERAL INPATIENT

## 2022-01-01 PROCEDURE — P9047 ALBUMIN (HUMAN), 25%, 50ML: HCPCS | Performed by: HOSPITALIST

## 2022-01-01 PROCEDURE — 77010033678 HC OXYGEN DAILY

## 2022-01-01 PROCEDURE — 74011250636 HC RX REV CODE- 250/636: Performed by: HOSPITALIST

## 2022-01-01 PROCEDURE — 74011000258 HC RX REV CODE- 258: Performed by: INTERNAL MEDICINE

## 2022-01-01 PROCEDURE — 87086 URINE CULTURE/COLONY COUNT: CPT

## 2022-01-01 PROCEDURE — 80053 COMPREHEN METABOLIC PANEL: CPT

## 2022-01-01 PROCEDURE — 83735 ASSAY OF MAGNESIUM: CPT

## 2022-01-01 PROCEDURE — C9113 INJ PANTOPRAZOLE SODIUM, VIA: HCPCS | Performed by: INTERNAL MEDICINE

## 2022-01-01 PROCEDURE — 71045 X-RAY EXAM CHEST 1 VIEW: CPT

## 2022-01-01 PROCEDURE — 74011000250 HC RX REV CODE- 250: Performed by: PHYSICIAN ASSISTANT

## 2022-01-01 PROCEDURE — 74011000250 HC RX REV CODE- 250: Performed by: INTERNAL MEDICINE

## 2022-01-01 PROCEDURE — 36600 WITHDRAWAL OF ARTERIAL BLOOD: CPT

## 2022-01-01 PROCEDURE — 82140 ASSAY OF AMMONIA: CPT

## 2022-01-01 PROCEDURE — 80076 HEPATIC FUNCTION PANEL: CPT

## 2022-01-01 PROCEDURE — 76060000031 HC ANESTHESIA FIRST 0.5 HR: Performed by: INTERNAL MEDICINE

## 2022-01-01 PROCEDURE — 97535 SELF CARE MNGMENT TRAINING: CPT

## 2022-01-01 PROCEDURE — 0W9B3ZX DRAINAGE OF LEFT PLEURAL CAVITY, PERCUTANEOUS APPROACH, DIAGNOSTIC: ICD-10-PCS | Performed by: RADIOLOGY

## 2022-01-01 PROCEDURE — 82607 VITAMIN B-12: CPT

## 2022-01-01 PROCEDURE — 76040000019: Performed by: INTERNAL MEDICINE

## 2022-01-01 PROCEDURE — 0DJ08ZZ INSPECTION OF UPPER INTESTINAL TRACT, VIA NATURAL OR ARTIFICIAL OPENING ENDOSCOPIC: ICD-10-PCS | Performed by: INTERNAL MEDICINE

## 2022-01-01 PROCEDURE — 83540 ASSAY OF IRON: CPT

## 2022-01-01 PROCEDURE — 82272 OCCULT BLD FECES 1-3 TESTS: CPT

## 2022-01-01 PROCEDURE — 93306 TTE W/DOPPLER COMPLETE: CPT

## 2022-01-01 PROCEDURE — 81001 URINALYSIS AUTO W/SCOPE: CPT

## 2022-01-01 PROCEDURE — 87040 BLOOD CULTURE FOR BACTERIA: CPT

## 2022-01-01 PROCEDURE — 82040 ASSAY OF SERUM ALBUMIN: CPT

## 2022-01-01 PROCEDURE — 74011000258 HC RX REV CODE- 258: Performed by: NURSE PRACTITIONER

## 2022-01-01 PROCEDURE — 88108 CYTOPATH CONCENTRATE TECH: CPT

## 2022-01-01 PROCEDURE — C1729 CATH, DRAINAGE: HCPCS

## 2022-01-01 RX ORDER — POTASSIUM CHLORIDE 750 MG/1
20 TABLET, FILM COATED, EXTENDED RELEASE ORAL ONCE
Status: COMPLETED | OUTPATIENT
Start: 2022-01-01 | End: 2022-01-01

## 2022-01-01 RX ORDER — LANOLIN ALCOHOL/MO/W.PET/CERES
1 CREAM (GRAM) TOPICAL
Status: DISCONTINUED | OUTPATIENT
Start: 2022-01-01 | End: 2022-01-01

## 2022-01-01 RX ORDER — DILTIAZEM HYDROCHLORIDE 5 MG/ML
5 INJECTION INTRAVENOUS ONCE
Status: DISPENSED | OUTPATIENT
Start: 2022-01-01 | End: 2022-01-01

## 2022-01-01 RX ORDER — SODIUM CHLORIDE 9 MG/ML
75 INJECTION, SOLUTION INTRAVENOUS CONTINUOUS
Status: CANCELLED | OUTPATIENT
Start: 2022-01-01

## 2022-01-01 RX ORDER — HYDROMORPHONE HYDROCHLORIDE 1 MG/ML
1 INJECTION, SOLUTION INTRAMUSCULAR; INTRAVENOUS; SUBCUTANEOUS
Status: DISCONTINUED | OUTPATIENT
Start: 2022-01-01 | End: 2022-01-27 | Stop reason: HOSPADM

## 2022-01-01 RX ORDER — MAGNESIUM SULFATE HEPTAHYDRATE 40 MG/ML
2 INJECTION, SOLUTION INTRAVENOUS ONCE
Status: COMPLETED | OUTPATIENT
Start: 2022-01-01 | End: 2022-01-01

## 2022-01-01 RX ORDER — KETOROLAC TROMETHAMINE 30 MG/ML
30 INJECTION, SOLUTION INTRAMUSCULAR; INTRAVENOUS
Status: ACTIVE | OUTPATIENT
Start: 2022-01-01 | End: 2022-01-01

## 2022-01-01 RX ORDER — CALCIUM CARBONATE 200(500)MG
200 TABLET,CHEWABLE ORAL
Status: DISCONTINUED | OUTPATIENT
Start: 2022-01-01 | End: 2022-01-01

## 2022-01-01 RX ORDER — LABETALOL HCL 20 MG/4 ML
10 SYRINGE (ML) INTRAVENOUS
Status: DISCONTINUED | OUTPATIENT
Start: 2022-01-01 | End: 2022-01-01 | Stop reason: HOSPADM

## 2022-01-01 RX ORDER — DIGOXIN 0.25 MG/ML
250 INJECTION INTRAMUSCULAR; INTRAVENOUS
Status: COMPLETED | OUTPATIENT
Start: 2022-01-01 | End: 2022-01-01

## 2022-01-01 RX ORDER — IPRATROPIUM BROMIDE AND ALBUTEROL SULFATE 2.5; .5 MG/3ML; MG/3ML
3 SOLUTION RESPIRATORY (INHALATION)
Status: DISCONTINUED | OUTPATIENT
Start: 2022-01-01 | End: 2022-01-01 | Stop reason: HOSPADM

## 2022-01-01 RX ORDER — SODIUM CHLORIDE 0.9 % (FLUSH) 0.9 %
5-40 SYRINGE (ML) INJECTION AS NEEDED
Status: CANCELLED | OUTPATIENT
Start: 2022-01-01

## 2022-01-01 RX ORDER — GLYCOPYRROLATE 0.2 MG/ML
0.2 INJECTION INTRAMUSCULAR; INTRAVENOUS
Status: DISCONTINUED | OUTPATIENT
Start: 2022-01-01 | End: 2022-01-27 | Stop reason: HOSPADM

## 2022-01-01 RX ORDER — LORAZEPAM 2 MG/ML
1 INJECTION INTRAMUSCULAR
Status: DISCONTINUED | OUTPATIENT
Start: 2022-01-01 | End: 2022-01-27 | Stop reason: HOSPADM

## 2022-01-01 RX ORDER — SODIUM CHLORIDE, SODIUM LACTATE, POTASSIUM CHLORIDE, CALCIUM CHLORIDE 600; 310; 30; 20 MG/100ML; MG/100ML; MG/100ML; MG/100ML
75 INJECTION, SOLUTION INTRAVENOUS CONTINUOUS
Status: DISCONTINUED | OUTPATIENT
Start: 2022-01-01 | End: 2022-01-01 | Stop reason: HOSPADM

## 2022-01-01 RX ORDER — HYDROMORPHONE HYDROCHLORIDE 1 MG/ML
1 INJECTION, SOLUTION INTRAMUSCULAR; INTRAVENOUS; SUBCUTANEOUS
Status: DISCONTINUED | OUTPATIENT
Start: 2022-01-01 | End: 2022-01-01 | Stop reason: HOSPADM

## 2022-01-01 RX ORDER — GUAIFENESIN 600 MG/1
1200 TABLET, EXTENDED RELEASE ORAL EVERY 12 HOURS
Status: DISCONTINUED | OUTPATIENT
Start: 2022-01-01 | End: 2022-01-01 | Stop reason: HOSPADM

## 2022-01-01 RX ORDER — CHOLECALCIFEROL TAB 125 MCG (5000 UNIT) 125 MCG
5000 TAB ORAL
Status: DISCONTINUED | OUTPATIENT
Start: 2022-01-01 | End: 2022-01-01

## 2022-01-01 RX ORDER — IPRATROPIUM BROMIDE AND ALBUTEROL SULFATE 2.5; .5 MG/3ML; MG/3ML
3 SOLUTION RESPIRATORY (INHALATION)
Status: DISCONTINUED | OUTPATIENT
Start: 2022-01-01 | End: 2022-01-01

## 2022-01-01 RX ORDER — METOPROLOL TARTRATE 25 MG/1
25 TABLET, FILM COATED ORAL EVERY 12 HOURS
Status: DISCONTINUED | OUTPATIENT
Start: 2022-01-01 | End: 2022-01-01

## 2022-01-01 RX ORDER — SODIUM CHLORIDE 9 MG/ML
250 INJECTION, SOLUTION INTRAVENOUS AS NEEDED
Status: DISCONTINUED | OUTPATIENT
Start: 2022-01-01 | End: 2022-01-01

## 2022-01-01 RX ORDER — SODIUM CHLORIDE 9 MG/ML
250 INJECTION, SOLUTION INTRAVENOUS AS NEEDED
Status: DISCONTINUED | OUTPATIENT
Start: 2022-01-01 | End: 2022-01-01 | Stop reason: HOSPADM

## 2022-01-01 RX ORDER — FLUCONAZOLE 2 MG/ML
400 INJECTION, SOLUTION INTRAVENOUS ONCE
Status: COMPLETED | OUTPATIENT
Start: 2022-01-01 | End: 2022-01-01

## 2022-01-01 RX ORDER — FUROSEMIDE 40 MG/1
40 TABLET ORAL DAILY
Status: DISCONTINUED | OUTPATIENT
Start: 2022-01-01 | End: 2022-01-01 | Stop reason: HOSPADM

## 2022-01-01 RX ORDER — SODIUM CHLORIDE 0.9 % (FLUSH) 0.9 %
5-40 SYRINGE (ML) INJECTION EVERY 8 HOURS
Status: CANCELLED | OUTPATIENT
Start: 2022-01-01

## 2022-01-01 RX ORDER — HYDROMORPHONE HYDROCHLORIDE 1 MG/ML
1 INJECTION, SOLUTION INTRAMUSCULAR; INTRAVENOUS; SUBCUTANEOUS
Status: DISCONTINUED | OUTPATIENT
Start: 2022-01-01 | End: 2022-01-01

## 2022-01-01 RX ORDER — BENZONATATE 100 MG/1
100 CAPSULE ORAL
Status: DISCONTINUED | OUTPATIENT
Start: 2022-01-01 | End: 2022-01-01 | Stop reason: HOSPADM

## 2022-01-01 RX ORDER — FLUCYTOSINE 250 MG/1
500 CAPSULE ORAL EVERY 6 HOURS
Status: DISCONTINUED | OUTPATIENT
Start: 2022-01-01 | End: 2022-01-01 | Stop reason: HOSPADM

## 2022-01-01 RX ORDER — LOPERAMIDE HYDROCHLORIDE 2 MG/1
2 CAPSULE ORAL
Status: DISCONTINUED | OUTPATIENT
Start: 2022-01-01 | End: 2022-01-01 | Stop reason: HOSPADM

## 2022-01-01 RX ORDER — FACIAL-BODY WIPES
10 EACH TOPICAL DAILY PRN
Status: DISCONTINUED | OUTPATIENT
Start: 2022-01-01 | End: 2022-01-27 | Stop reason: HOSPADM

## 2022-01-01 RX ORDER — POTASSIUM CHLORIDE 750 MG/1
40 TABLET, FILM COATED, EXTENDED RELEASE ORAL
Status: COMPLETED | OUTPATIENT
Start: 2022-01-01 | End: 2022-01-01

## 2022-01-01 RX ORDER — SERTRALINE HYDROCHLORIDE 25 MG/1
25 TABLET, FILM COATED ORAL EVERY EVENING
Status: DISCONTINUED | OUTPATIENT
Start: 2022-01-01 | End: 2022-01-01

## 2022-01-01 RX ORDER — POLYETHYLENE GLYCOL 3350 17 G/17G
17 POWDER, FOR SOLUTION ORAL
Status: DISCONTINUED | OUTPATIENT
Start: 2022-01-01 | End: 2022-01-01 | Stop reason: HOSPADM

## 2022-01-01 RX ORDER — OXYCODONE HYDROCHLORIDE 5 MG/1
5 TABLET ORAL
Qty: 12 TABLET | Refills: 0 | Status: SHIPPED | OUTPATIENT
Start: 2022-01-01 | End: 2022-01-01

## 2022-01-01 RX ORDER — FLUCONAZOLE 2 MG/ML
200 INJECTION, SOLUTION INTRAVENOUS EVERY 24 HOURS
Status: DISCONTINUED | OUTPATIENT
Start: 2022-01-01 | End: 2022-01-01

## 2022-01-01 RX ORDER — MIDODRINE HYDROCHLORIDE 5 MG/1
10 TABLET ORAL ONCE
Status: COMPLETED | OUTPATIENT
Start: 2022-01-01 | End: 2022-01-01

## 2022-01-01 RX ORDER — METOPROLOL TARTRATE 5 MG/5ML
5 INJECTION INTRAVENOUS
Status: DISCONTINUED | OUTPATIENT
Start: 2022-01-01 | End: 2022-01-01

## 2022-01-01 RX ORDER — HYDROMORPHONE HYDROCHLORIDE 1 MG/ML
1 INJECTION, SOLUTION INTRAMUSCULAR; INTRAVENOUS; SUBCUTANEOUS EVERY 4 HOURS
Status: DISCONTINUED | OUTPATIENT
Start: 2022-01-01 | End: 2022-01-01

## 2022-01-01 RX ORDER — SODIUM CHLORIDE 9 MG/ML
250 INJECTION, SOLUTION INTRAVENOUS ONCE
Status: COMPLETED | OUTPATIENT
Start: 2022-01-01 | End: 2022-01-01

## 2022-01-01 RX ORDER — METOPROLOL TARTRATE 25 MG/1
12.5 TABLET, FILM COATED ORAL EVERY 12 HOURS
Status: DISCONTINUED | OUTPATIENT
Start: 2022-01-01 | End: 2022-01-01

## 2022-01-01 RX ORDER — PROPOFOL 10 MG/ML
INJECTION, EMULSION INTRAVENOUS AS NEEDED
Status: DISCONTINUED | OUTPATIENT
Start: 2022-01-01 | End: 2022-01-01 | Stop reason: HOSPADM

## 2022-01-01 RX ORDER — ALBUMIN HUMAN 250 G/1000ML
50 SOLUTION INTRAVENOUS ONCE
Status: COMPLETED | OUTPATIENT
Start: 2022-01-01 | End: 2022-01-01

## 2022-01-01 RX ORDER — METOPROLOL TARTRATE 25 MG/1
12.5 TABLET, FILM COATED ORAL DAILY
Status: DISCONTINUED | OUTPATIENT
Start: 2022-01-01 | End: 2022-01-01 | Stop reason: HOSPADM

## 2022-01-01 RX ORDER — ATORVASTATIN CALCIUM 40 MG/1
40 TABLET, FILM COATED ORAL
Status: DISCONTINUED | OUTPATIENT
Start: 2022-01-01 | End: 2022-01-01

## 2022-01-01 RX ORDER — GUAIFENESIN 100 MG/5ML
100 SOLUTION ORAL
Status: DISCONTINUED | OUTPATIENT
Start: 2022-01-01 | End: 2022-01-01 | Stop reason: HOSPADM

## 2022-01-01 RX ORDER — POTASSIUM CHLORIDE 7.45 MG/ML
10 INJECTION INTRAVENOUS
Status: COMPLETED | OUTPATIENT
Start: 2022-01-01 | End: 2022-01-01

## 2022-01-01 RX ORDER — CALCIUM CARBONATE/VITAMIN D3 600MG-5MCG
2 TABLET ORAL DAILY
Status: DISCONTINUED | OUTPATIENT
Start: 2022-01-01 | End: 2022-01-01

## 2022-01-01 RX ORDER — FLUCONAZOLE 100 MG/1
200 TABLET ORAL DAILY
Status: DISCONTINUED | OUTPATIENT
Start: 2022-01-01 | End: 2022-01-01

## 2022-01-01 RX ORDER — POTASSIUM CHLORIDE 750 MG/1
40 TABLET, FILM COATED, EXTENDED RELEASE ORAL ONCE
Status: COMPLETED | OUTPATIENT
Start: 2022-01-01 | End: 2022-01-01

## 2022-01-01 RX ORDER — HYDROMORPHONE HYDROCHLORIDE 1 MG/ML
1 INJECTION, SOLUTION INTRAMUSCULAR; INTRAVENOUS; SUBCUTANEOUS
Status: DISCONTINUED | OUTPATIENT
Start: 2022-01-01 | End: 2022-01-01 | Stop reason: CLARIF

## 2022-01-01 RX ORDER — ONDANSETRON 2 MG/ML
4 INJECTION INTRAMUSCULAR; INTRAVENOUS
Status: DISCONTINUED | OUTPATIENT
Start: 2022-01-01 | End: 2022-01-27 | Stop reason: HOSPADM

## 2022-01-01 RX ORDER — SERTRALINE HYDROCHLORIDE 25 MG/1
25 TABLET, FILM COATED ORAL EVERY MORNING
Status: DISCONTINUED | OUTPATIENT
Start: 2022-01-01 | End: 2022-01-01 | Stop reason: HOSPADM

## 2022-01-01 RX ORDER — MIDODRINE HYDROCHLORIDE 10 MG/1
10 TABLET ORAL
Qty: 90 TABLET | Refills: 0 | Status: SHIPPED | OUTPATIENT
Start: 2022-01-01 | End: 2022-02-17

## 2022-01-01 RX ORDER — ALBUMIN HUMAN 250 G/1000ML
50 SOLUTION INTRAVENOUS
Status: COMPLETED | OUTPATIENT
Start: 2022-01-01 | End: 2022-01-01

## 2022-01-01 RX ADMIN — IPRATROPIUM BROMIDE AND ALBUTEROL SULFATE 3 ML: .5; 2.5 SOLUTION RESPIRATORY (INHALATION) at 11:27

## 2022-01-01 RX ADMIN — GUAIFENESIN 1200 MG: 600 TABLET, EXTENDED RELEASE ORAL at 21:43

## 2022-01-01 RX ADMIN — SODIUM CHLORIDE 100 MG: 0.9 INJECTION INTRAVENOUS at 17:49

## 2022-01-01 RX ADMIN — HYDROMORPHONE HYDROCHLORIDE 1 MG: 1 INJECTION, SOLUTION INTRAMUSCULAR; INTRAVENOUS; SUBCUTANEOUS at 09:49

## 2022-01-01 RX ADMIN — SODIUM CHLORIDE, PRESERVATIVE FREE 40 MG: 5 INJECTION INTRAVENOUS at 09:00

## 2022-01-01 RX ADMIN — PIPERACILLIN SODIUM AND TAZOBACTAM SODIUM 3.38 G: 3; .375 INJECTION, POWDER, LYOPHILIZED, FOR SOLUTION INTRAVENOUS at 02:40

## 2022-01-01 RX ADMIN — DILTIAZEM HYDROCHLORIDE 120 MG: 120 CAPSULE, COATED, EXTENDED RELEASE ORAL at 11:04

## 2022-01-01 RX ADMIN — ONDANSETRON 4 MG: 2 INJECTION INTRAMUSCULAR; INTRAVENOUS at 18:48

## 2022-01-01 RX ADMIN — WHITE PETROLATUM,ZINC OXIDE: 57; 17 PASTE TOPICAL at 16:00

## 2022-01-01 RX ADMIN — WHITE PETROLATUM,ZINC OXIDE: 57; 17 PASTE TOPICAL at 22:00

## 2022-01-01 RX ADMIN — SODIUM CHLORIDE, PRESERVATIVE FREE 40 MG: 5 INJECTION INTRAVENOUS at 09:26

## 2022-01-01 RX ADMIN — SODIUM CHLORIDE, PRESERVATIVE FREE 40 MG: 5 INJECTION INTRAVENOUS at 22:08

## 2022-01-01 RX ADMIN — SODIUM CHLORIDE, PRESERVATIVE FREE 40 MG: 5 INJECTION INTRAVENOUS at 10:08

## 2022-01-01 RX ADMIN — POTASSIUM CHLORIDE 20 MEQ: 750 TABLET, FILM COATED, EXTENDED RELEASE ORAL at 09:10

## 2022-01-01 RX ADMIN — POTASSIUM CHLORIDE 20 MEQ: 750 TABLET, FILM COATED, EXTENDED RELEASE ORAL at 09:41

## 2022-01-01 RX ADMIN — WHITE PETROLATUM,ZINC OXIDE: 57; 17 PASTE TOPICAL at 09:00

## 2022-01-01 RX ADMIN — POTASSIUM CHLORIDE 20 MEQ: 750 TABLET, FILM COATED, EXTENDED RELEASE ORAL at 09:11

## 2022-01-01 RX ADMIN — Medication 2 TABLET: at 10:06

## 2022-01-01 RX ADMIN — SODIUM CHLORIDE 100 MG: 0.9 INJECTION INTRAVENOUS at 16:14

## 2022-01-01 RX ADMIN — PIPERACILLIN SODIUM AND TAZOBACTAM SODIUM 3.38 G: 3; .375 INJECTION, POWDER, LYOPHILIZED, FOR SOLUTION INTRAVENOUS at 17:11

## 2022-01-01 RX ADMIN — TRAZODONE HYDROCHLORIDE 50 MG: 50 TABLET ORAL at 22:00

## 2022-01-01 RX ADMIN — MIDODRINE HYDROCHLORIDE 10 MG: 5 TABLET ORAL at 08:12

## 2022-01-01 RX ADMIN — ONDANSETRON 4 MG: 2 INJECTION INTRAMUSCULAR; INTRAVENOUS at 17:01

## 2022-01-01 RX ADMIN — Medication 500 MG: at 09:41

## 2022-01-01 RX ADMIN — Medication 100 MG: at 08:56

## 2022-01-01 RX ADMIN — WHITE PETROLATUM,ZINC OXIDE: 57; 17 PASTE TOPICAL at 18:18

## 2022-01-01 RX ADMIN — DILTIAZEM HYDROCHLORIDE 120 MG: 120 CAPSULE, COATED, EXTENDED RELEASE ORAL at 11:57

## 2022-01-01 RX ADMIN — SODIUM CHLORIDE, PRESERVATIVE FREE 40 MG: 5 INJECTION INTRAVENOUS at 09:07

## 2022-01-01 RX ADMIN — WHITE PETROLATUM,ZINC OXIDE: 57; 17 PASTE TOPICAL at 09:43

## 2022-01-01 RX ADMIN — GUAIFENESIN 1200 MG: 600 TABLET, EXTENDED RELEASE ORAL at 08:56

## 2022-01-01 RX ADMIN — HYDROMORPHONE HYDROCHLORIDE 1 MG: 1 INJECTION, SOLUTION INTRAMUSCULAR; INTRAVENOUS; SUBCUTANEOUS at 09:51

## 2022-01-01 RX ADMIN — FERROUS SULFATE TAB 325 MG (65 MG ELEMENTAL FE) 325 MG: 325 (65 FE) TAB at 10:32

## 2022-01-01 RX ADMIN — Medication 100 MG: at 21:39

## 2022-01-01 RX ADMIN — LORAZEPAM 1 MG: 2 INJECTION INTRAMUSCULAR at 16:09

## 2022-01-01 RX ADMIN — MIDODRINE HYDROCHLORIDE 10 MG: 5 TABLET ORAL at 12:00

## 2022-01-01 RX ADMIN — FLUCONAZOLE 200 MG: 100 TABLET ORAL at 09:26

## 2022-01-01 RX ADMIN — GUAIFENESIN 1200 MG: 600 TABLET, EXTENDED RELEASE ORAL at 20:27

## 2022-01-01 RX ADMIN — Medication 2 TABLET: at 09:15

## 2022-01-01 RX ADMIN — MIDODRINE HYDROCHLORIDE 10 MG: 5 TABLET ORAL at 12:42

## 2022-01-01 RX ADMIN — CHLORTHALIDONE 25 MG: 25 TABLET ORAL at 10:06

## 2022-01-01 RX ADMIN — SODIUM CHLORIDE, POTASSIUM CHLORIDE, SODIUM LACTATE AND CALCIUM CHLORIDE 75 ML/HR: 600; 310; 30; 20 INJECTION, SOLUTION INTRAVENOUS at 00:36

## 2022-01-01 RX ADMIN — Medication 500 MG: at 10:06

## 2022-01-01 RX ADMIN — SODIUM CHLORIDE, PRESERVATIVE FREE 40 MG: 5 INJECTION INTRAVENOUS at 09:42

## 2022-01-01 RX ADMIN — MIDODRINE HYDROCHLORIDE 10 MG: 5 TABLET ORAL at 08:00

## 2022-01-01 RX ADMIN — MIDODRINE HYDROCHLORIDE 10 MG: 5 TABLET ORAL at 16:11

## 2022-01-01 RX ADMIN — WHITE PETROLATUM,ZINC OXIDE: 57; 17 PASTE TOPICAL at 10:33

## 2022-01-01 RX ADMIN — HYDROMORPHONE HYDROCHLORIDE 1 MG: 1 INJECTION, SOLUTION INTRAMUSCULAR; INTRAVENOUS; SUBCUTANEOUS at 17:47

## 2022-01-01 RX ADMIN — POTASSIUM CHLORIDE 20 MEQ: 750 TABLET, FILM COATED, EXTENDED RELEASE ORAL at 09:22

## 2022-01-01 RX ADMIN — GUAIFENESIN 1200 MG: 600 TABLET, EXTENDED RELEASE ORAL at 22:47

## 2022-01-01 RX ADMIN — GUAIFENESIN 600 MG: 600 TABLET, EXTENDED RELEASE ORAL at 09:23

## 2022-01-01 RX ADMIN — LORAZEPAM 1 MG: 2 INJECTION INTRAMUSCULAR at 17:56

## 2022-01-01 RX ADMIN — MIDODRINE HYDROCHLORIDE 10 MG: 5 TABLET ORAL at 09:22

## 2022-01-01 RX ADMIN — POTASSIUM CHLORIDE 40 MEQ: 750 TABLET, FILM COATED, EXTENDED RELEASE ORAL at 17:49

## 2022-01-01 RX ADMIN — Medication 500 MG: at 08:55

## 2022-01-01 RX ADMIN — PIPERACILLIN SODIUM AND TAZOBACTAM SODIUM 3.38 G: 3; .375 INJECTION, POWDER, LYOPHILIZED, FOR SOLUTION INTRAVENOUS at 10:25

## 2022-01-01 RX ADMIN — TRAZODONE HYDROCHLORIDE 50 MG: 50 TABLET ORAL at 20:22

## 2022-01-01 RX ADMIN — SODIUM CHLORIDE, PRESERVATIVE FREE 40 MG: 5 INJECTION INTRAVENOUS at 20:29

## 2022-01-01 RX ADMIN — SERTRALINE 25 MG: 25 TABLET, FILM COATED ORAL at 09:26

## 2022-01-01 RX ADMIN — Medication 2 TABLET: at 13:00

## 2022-01-01 RX ADMIN — ONDANSETRON 4 MG: 2 INJECTION INTRAMUSCULAR; INTRAVENOUS at 04:39

## 2022-01-01 RX ADMIN — Medication 100 MG: at 21:12

## 2022-01-01 RX ADMIN — MIDODRINE HYDROCHLORIDE 10 MG: 5 TABLET ORAL at 14:02

## 2022-01-01 RX ADMIN — MIDODRINE HYDROCHLORIDE 10 MG: 5 TABLET ORAL at 14:42

## 2022-01-01 RX ADMIN — DILTIAZEM HYDROCHLORIDE 120 MG: 120 CAPSULE, COATED, EXTENDED RELEASE ORAL at 10:01

## 2022-01-01 RX ADMIN — HYDROMORPHONE HYDROCHLORIDE 1 MG: 1 INJECTION, SOLUTION INTRAMUSCULAR; INTRAVENOUS; SUBCUTANEOUS at 02:42

## 2022-01-01 RX ADMIN — LORAZEPAM 1 MG: 2 INJECTION INTRAMUSCULAR at 23:50

## 2022-01-01 RX ADMIN — HYDROMORPHONE HYDROCHLORIDE 1 MG: 1 INJECTION, SOLUTION INTRAMUSCULAR; INTRAVENOUS; SUBCUTANEOUS at 20:31

## 2022-01-01 RX ADMIN — HYDROMORPHONE HYDROCHLORIDE 1 MG: 1 INJECTION, SOLUTION INTRAMUSCULAR; INTRAVENOUS; SUBCUTANEOUS at 22:47

## 2022-01-01 RX ADMIN — POTASSIUM CHLORIDE 40 MEQ: 750 TABLET, FILM COATED, EXTENDED RELEASE ORAL at 06:44

## 2022-01-01 RX ADMIN — LORAZEPAM 1 MG: 2 INJECTION INTRAMUSCULAR at 03:47

## 2022-01-01 RX ADMIN — HYDROMORPHONE HYDROCHLORIDE 1 MG: 1 INJECTION, SOLUTION INTRAMUSCULAR; INTRAVENOUS; SUBCUTANEOUS at 21:48

## 2022-01-01 RX ADMIN — SODIUM CHLORIDE, PRESERVATIVE FREE 40 MG: 5 INJECTION INTRAVENOUS at 20:10

## 2022-01-01 RX ADMIN — CHLORTHALIDONE 25 MG: 25 TABLET ORAL at 09:23

## 2022-01-01 RX ADMIN — PIPERACILLIN SODIUM AND TAZOBACTAM SODIUM 3.38 G: 3; .375 INJECTION, POWDER, LYOPHILIZED, FOR SOLUTION INTRAVENOUS at 17:16

## 2022-01-01 RX ADMIN — WHITE PETROLATUM,ZINC OXIDE: 57; 17 PASTE TOPICAL at 23:08

## 2022-01-01 RX ADMIN — SODIUM CHLORIDE, PRESERVATIVE FREE 40 MG: 5 INJECTION INTRAVENOUS at 05:08

## 2022-01-01 RX ADMIN — FLUCONAZOLE 200 MG: 200 INJECTION, SOLUTION INTRAVENOUS at 17:39

## 2022-01-01 RX ADMIN — SODIUM CHLORIDE, PRESERVATIVE FREE 40 MG: 5 INJECTION INTRAVENOUS at 21:49

## 2022-01-01 RX ADMIN — LORAZEPAM 1 MG: 2 INJECTION INTRAMUSCULAR at 12:59

## 2022-01-01 RX ADMIN — METOPROLOL TARTRATE 25 MG: 25 TABLET, FILM COATED ORAL at 09:12

## 2022-01-01 RX ADMIN — HYDROMORPHONE HYDROCHLORIDE 1 MG: 1 INJECTION, SOLUTION INTRAMUSCULAR; INTRAVENOUS; SUBCUTANEOUS at 14:53

## 2022-01-01 RX ADMIN — POTASSIUM CHLORIDE 20 MEQ: 750 TABLET, FILM COATED, EXTENDED RELEASE ORAL at 07:34

## 2022-01-01 RX ADMIN — SODIUM CHLORIDE, POTASSIUM CHLORIDE, SODIUM LACTATE AND CALCIUM CHLORIDE 75 ML/HR: 600; 310; 30; 20 INJECTION, SOLUTION INTRAVENOUS at 21:50

## 2022-01-01 RX ADMIN — MIDODRINE HYDROCHLORIDE 10 MG: 5 TABLET ORAL at 09:14

## 2022-01-01 RX ADMIN — HYDROMORPHONE HYDROCHLORIDE 1 MG: 1 INJECTION, SOLUTION INTRAMUSCULAR; INTRAVENOUS; SUBCUTANEOUS at 14:26

## 2022-01-01 RX ADMIN — MIDODRINE HYDROCHLORIDE 10 MG: 5 TABLET ORAL at 17:26

## 2022-01-01 RX ADMIN — SODIUM CHLORIDE, PRESERVATIVE FREE 40 MG: 5 INJECTION INTRAVENOUS at 23:07

## 2022-01-01 RX ADMIN — SODIUM CHLORIDE, PRESERVATIVE FREE 1 G: 5 INJECTION INTRAVENOUS at 09:58

## 2022-01-01 RX ADMIN — MIDODRINE HYDROCHLORIDE 10 MG: 5 TABLET ORAL at 10:07

## 2022-01-01 RX ADMIN — Medication 500 MG: at 10:18

## 2022-01-01 RX ADMIN — IPRATROPIUM BROMIDE AND ALBUTEROL SULFATE 3 ML: .5; 2.5 SOLUTION RESPIRATORY (INHALATION) at 05:29

## 2022-01-01 RX ADMIN — HYDROMORPHONE HYDROCHLORIDE 1 MG: 1 INJECTION, SOLUTION INTRAMUSCULAR; INTRAVENOUS; SUBCUTANEOUS at 18:37

## 2022-01-01 RX ADMIN — DILTIAZEM HYDROCHLORIDE 120 MG: 120 CAPSULE, COATED, EXTENDED RELEASE ORAL at 10:25

## 2022-01-01 RX ADMIN — HYDROMORPHONE HYDROCHLORIDE 1 MG: 1 INJECTION, SOLUTION INTRAMUSCULAR; INTRAVENOUS; SUBCUTANEOUS at 04:49

## 2022-01-01 RX ADMIN — HYDROMORPHONE HYDROCHLORIDE 1 MG: 1 INJECTION, SOLUTION INTRAMUSCULAR; INTRAVENOUS; SUBCUTANEOUS at 13:00

## 2022-01-01 RX ADMIN — OXYCODONE 5 MG: 5 TABLET ORAL at 10:54

## 2022-01-01 RX ADMIN — METOPROLOL TARTRATE 25 MG: 25 TABLET, FILM COATED ORAL at 09:42

## 2022-01-01 RX ADMIN — HYDROMORPHONE HYDROCHLORIDE 1 MG: 1 INJECTION, SOLUTION INTRAMUSCULAR; INTRAVENOUS; SUBCUTANEOUS at 19:46

## 2022-01-01 RX ADMIN — WHITE PETROLATUM,ZINC OXIDE: 57; 17 PASTE TOPICAL at 09:12

## 2022-01-01 RX ADMIN — WHITE PETROLATUM,ZINC OXIDE: 57; 17 PASTE TOPICAL at 21:51

## 2022-01-01 RX ADMIN — APIXABAN 5 MG: 5 TABLET, FILM COATED ORAL at 09:57

## 2022-01-01 RX ADMIN — HYDROMORPHONE HYDROCHLORIDE 1 MG: 1 INJECTION, SOLUTION INTRAMUSCULAR; INTRAVENOUS; SUBCUTANEOUS at 08:06

## 2022-01-01 RX ADMIN — Medication 1000 UNITS: at 10:06

## 2022-01-01 RX ADMIN — TRAZODONE HYDROCHLORIDE 50 MG: 50 TABLET ORAL at 21:43

## 2022-01-01 RX ADMIN — GUAIFENESIN 600 MG: 600 TABLET, EXTENDED RELEASE ORAL at 09:56

## 2022-01-01 RX ADMIN — SERTRALINE HYDROCHLORIDE 25 MG: 25 TABLET ORAL at 10:31

## 2022-01-01 RX ADMIN — HYDROMORPHONE HYDROCHLORIDE 1 MG: 1 INJECTION, SOLUTION INTRAMUSCULAR; INTRAVENOUS; SUBCUTANEOUS at 15:16

## 2022-01-01 RX ADMIN — HYDROMORPHONE HYDROCHLORIDE 1 MG: 1 INJECTION, SOLUTION INTRAMUSCULAR; INTRAVENOUS; SUBCUTANEOUS at 10:06

## 2022-01-01 RX ADMIN — WHITE PETROLATUM,ZINC OXIDE: 57; 17 PASTE TOPICAL at 16:21

## 2022-01-01 RX ADMIN — HYDROMORPHONE HYDROCHLORIDE 1 MG: 1 INJECTION, SOLUTION INTRAMUSCULAR; INTRAVENOUS; SUBCUTANEOUS at 01:09

## 2022-01-01 RX ADMIN — ATORVASTATIN CALCIUM 40 MG: 40 TABLET, FILM COATED ORAL at 23:07

## 2022-01-01 RX ADMIN — DILTIAZEM HYDROCHLORIDE 120 MG: 120 CAPSULE, COATED, EXTENDED RELEASE ORAL at 09:26

## 2022-01-01 RX ADMIN — GUAIFENESIN 100 MG: 200 SOLUTION ORAL at 10:40

## 2022-01-01 RX ADMIN — SERTRALINE HYDROCHLORIDE 25 MG: 25 TABLET ORAL at 09:40

## 2022-01-01 RX ADMIN — PIPERACILLIN SODIUM AND TAZOBACTAM SODIUM 3.38 G: 3; .375 INJECTION, POWDER, LYOPHILIZED, FOR SOLUTION INTRAVENOUS at 01:44

## 2022-01-01 RX ADMIN — HYDROMORPHONE HYDROCHLORIDE 1 MG: 1 INJECTION, SOLUTION INTRAMUSCULAR; INTRAVENOUS; SUBCUTANEOUS at 06:34

## 2022-01-01 RX ADMIN — SODIUM CHLORIDE, POTASSIUM CHLORIDE, SODIUM LACTATE AND CALCIUM CHLORIDE 75 ML/HR: 600; 310; 30; 20 INJECTION, SOLUTION INTRAVENOUS at 21:52

## 2022-01-01 RX ADMIN — MIDODRINE HYDROCHLORIDE 10 MG: 5 TABLET ORAL at 16:21

## 2022-01-01 RX ADMIN — SODIUM CHLORIDE 500 ML: 9 INJECTION, SOLUTION INTRAVENOUS at 06:34

## 2022-01-01 RX ADMIN — GUAIFENESIN 600 MG: 600 TABLET, EXTENDED RELEASE ORAL at 11:13

## 2022-01-01 RX ADMIN — SODIUM CHLORIDE, POTASSIUM CHLORIDE, SODIUM LACTATE AND CALCIUM CHLORIDE 75 ML/HR: 600; 310; 30; 20 INJECTION, SOLUTION INTRAVENOUS at 10:06

## 2022-01-01 RX ADMIN — SODIUM CHLORIDE 100 MG: 0.9 INJECTION INTRAVENOUS at 17:15

## 2022-01-01 RX ADMIN — WHITE PETROLATUM,ZINC OXIDE: 57; 17 PASTE TOPICAL at 21:00

## 2022-01-01 RX ADMIN — SERTRALINE 25 MG: 25 TABLET, FILM COATED ORAL at 17:31

## 2022-01-01 RX ADMIN — SODIUM CHLORIDE, PRESERVATIVE FREE 40 MG: 5 INJECTION INTRAVENOUS at 09:34

## 2022-01-01 RX ADMIN — LORAZEPAM 1 MG: 2 INJECTION INTRAMUSCULAR at 22:48

## 2022-01-01 RX ADMIN — ONDANSETRON 4 MG: 2 INJECTION INTRAMUSCULAR; INTRAVENOUS at 18:15

## 2022-01-01 RX ADMIN — SODIUM CHLORIDE, PRESERVATIVE FREE 1 G: 5 INJECTION INTRAVENOUS at 16:14

## 2022-01-01 RX ADMIN — SODIUM CHLORIDE 250 ML: 9 INJECTION, SOLUTION INTRAVENOUS at 13:58

## 2022-01-01 RX ADMIN — OXYCODONE 5 MG: 5 TABLET ORAL at 00:25

## 2022-01-01 RX ADMIN — TRAZODONE HYDROCHLORIDE 50 MG: 50 TABLET ORAL at 00:34

## 2022-01-01 RX ADMIN — PIPERACILLIN SODIUM AND TAZOBACTAM SODIUM 3.38 G: 3; .375 INJECTION, POWDER, LYOPHILIZED, FOR SOLUTION INTRAVENOUS at 00:51

## 2022-01-01 RX ADMIN — LORAZEPAM 1 MG: 2 INJECTION INTRAMUSCULAR at 15:27

## 2022-01-01 RX ADMIN — MIDODRINE HYDROCHLORIDE 10 MG: 5 TABLET ORAL at 07:39

## 2022-01-01 RX ADMIN — ONDANSETRON 4 MG: 2 INJECTION INTRAMUSCULAR; INTRAVENOUS at 03:43

## 2022-01-01 RX ADMIN — HYDROMORPHONE HYDROCHLORIDE 1 MG: 1 INJECTION, SOLUTION INTRAMUSCULAR; INTRAVENOUS; SUBCUTANEOUS at 07:33

## 2022-01-01 RX ADMIN — SERTRALINE 25 MG: 25 TABLET, FILM COATED ORAL at 17:39

## 2022-01-01 RX ADMIN — ACETAMINOPHEN 650 MG: 325 TABLET ORAL at 14:43

## 2022-01-01 RX ADMIN — HYDROMORPHONE HYDROCHLORIDE 1 MG: 1 INJECTION, SOLUTION INTRAMUSCULAR; INTRAVENOUS; SUBCUTANEOUS at 16:09

## 2022-01-01 RX ADMIN — MIDODRINE HYDROCHLORIDE 10 MG: 5 TABLET ORAL at 17:36

## 2022-01-01 RX ADMIN — SODIUM CHLORIDE, PRESERVATIVE FREE 1 G: 5 INJECTION INTRAVENOUS at 00:39

## 2022-01-01 RX ADMIN — SODIUM CHLORIDE, PRESERVATIVE FREE 10 ML: 5 INJECTION INTRAVENOUS at 20:22

## 2022-01-01 RX ADMIN — MIDODRINE HYDROCHLORIDE 10 MG: 5 TABLET ORAL at 09:40

## 2022-01-01 RX ADMIN — HYDROMORPHONE HYDROCHLORIDE 1 MG: 1 INJECTION, SOLUTION INTRAMUSCULAR; INTRAVENOUS; SUBCUTANEOUS at 11:43

## 2022-01-01 RX ADMIN — TRAZODONE HYDROCHLORIDE 50 MG: 50 TABLET ORAL at 00:33

## 2022-01-01 RX ADMIN — Medication 100 MG: at 22:09

## 2022-01-01 RX ADMIN — MIDODRINE HYDROCHLORIDE 10 MG: 5 TABLET ORAL at 09:11

## 2022-01-01 RX ADMIN — DILTIAZEM HYDROCHLORIDE 120 MG: 120 CAPSULE, COATED, EXTENDED RELEASE ORAL at 09:57

## 2022-01-01 RX ADMIN — TRAZODONE HYDROCHLORIDE 50 MG: 50 TABLET ORAL at 22:21

## 2022-01-01 RX ADMIN — WHITE PETROLATUM,ZINC OXIDE: 57; 17 PASTE TOPICAL at 09:28

## 2022-01-01 RX ADMIN — MIDODRINE HYDROCHLORIDE 10 MG: 5 TABLET ORAL at 18:16

## 2022-01-01 RX ADMIN — FUROSEMIDE 40 MG: 40 TABLET ORAL at 09:11

## 2022-01-01 RX ADMIN — FLUCONAZOLE 200 MG: 100 TABLET ORAL at 10:25

## 2022-01-01 RX ADMIN — SODIUM CHLORIDE, PRESERVATIVE FREE 40 MG: 5 INJECTION INTRAVENOUS at 12:18

## 2022-01-01 RX ADMIN — MIDODRINE HYDROCHLORIDE 10 MG: 5 TABLET ORAL at 17:56

## 2022-01-01 RX ADMIN — IPRATROPIUM BROMIDE AND ALBUTEROL SULFATE 3 ML: .5; 2.5 SOLUTION RESPIRATORY (INHALATION) at 21:19

## 2022-01-01 RX ADMIN — MIDODRINE HYDROCHLORIDE 10 MG: 5 TABLET ORAL at 13:25

## 2022-01-01 RX ADMIN — SERTRALINE 25 MG: 25 TABLET, FILM COATED ORAL at 17:24

## 2022-01-01 RX ADMIN — SODIUM CHLORIDE 100 MG: 0.9 INJECTION INTRAVENOUS at 21:02

## 2022-01-01 RX ADMIN — SODIUM CHLORIDE, PRESERVATIVE FREE 40 MG: 5 INJECTION INTRAVENOUS at 12:03

## 2022-01-01 RX ADMIN — SODIUM CHLORIDE, POTASSIUM CHLORIDE, SODIUM LACTATE AND CALCIUM CHLORIDE 75 ML/HR: 600; 310; 30; 20 INJECTION, SOLUTION INTRAVENOUS at 20:43

## 2022-01-01 RX ADMIN — WHITE PETROLATUM,ZINC OXIDE: 57; 17 PASTE TOPICAL at 16:16

## 2022-01-01 RX ADMIN — CHLORTHALIDONE 25 MG: 25 TABLET ORAL at 09:41

## 2022-01-01 RX ADMIN — FUROSEMIDE 40 MG: 10 INJECTION, SOLUTION INTRAMUSCULAR; INTRAVENOUS at 09:56

## 2022-01-01 RX ADMIN — ONDANSETRON 4 MG: 2 INJECTION INTRAMUSCULAR; INTRAVENOUS at 20:09

## 2022-01-01 RX ADMIN — WHITE PETROLATUM,ZINC OXIDE: 57; 17 PASTE TOPICAL at 21:14

## 2022-01-01 RX ADMIN — Medication 100 MG: at 21:02

## 2022-01-01 RX ADMIN — HYDROMORPHONE HYDROCHLORIDE 1 MG: 1 INJECTION, SOLUTION INTRAMUSCULAR; INTRAVENOUS; SUBCUTANEOUS at 18:30

## 2022-01-01 RX ADMIN — HYDROMORPHONE HYDROCHLORIDE 1 MG: 1 INJECTION, SOLUTION INTRAMUSCULAR; INTRAVENOUS; SUBCUTANEOUS at 22:25

## 2022-01-01 RX ADMIN — Medication 2 TABLET: at 09:00

## 2022-01-01 RX ADMIN — SODIUM CHLORIDE, PRESERVATIVE FREE 40 MG: 5 INJECTION INTRAVENOUS at 21:44

## 2022-01-01 RX ADMIN — GUAIFENESIN 1200 MG: 600 TABLET, EXTENDED RELEASE ORAL at 10:39

## 2022-01-01 RX ADMIN — NYSTATIN: 100000 CREAM TOPICAL at 09:43

## 2022-01-01 RX ADMIN — SERTRALINE 25 MG: 25 TABLET, FILM COATED ORAL at 17:56

## 2022-01-01 RX ADMIN — PIPERACILLIN SODIUM AND TAZOBACTAM SODIUM 3.38 G: 3; .375 INJECTION, POWDER, LYOPHILIZED, FOR SOLUTION INTRAVENOUS at 09:32

## 2022-01-01 RX ADMIN — Medication 1000 UNITS: at 09:41

## 2022-01-01 RX ADMIN — MIDODRINE HYDROCHLORIDE 10 MG: 5 TABLET ORAL at 09:26

## 2022-01-01 RX ADMIN — NYSTATIN: 100000 CREAM TOPICAL at 00:55

## 2022-01-01 RX ADMIN — SODIUM CHLORIDE, PRESERVATIVE FREE 1 G: 5 INJECTION INTRAVENOUS at 00:26

## 2022-01-01 RX ADMIN — BENZONATATE 100 MG: 100 CAPSULE ORAL at 18:06

## 2022-01-01 RX ADMIN — HYDROMORPHONE HYDROCHLORIDE 1 MG: 1 INJECTION, SOLUTION INTRAMUSCULAR; INTRAVENOUS; SUBCUTANEOUS at 21:44

## 2022-01-01 RX ADMIN — Medication 500 MG: at 10:25

## 2022-01-01 RX ADMIN — Medication 500 MG: at 08:12

## 2022-01-01 RX ADMIN — ONDANSETRON 4 MG: 2 INJECTION INTRAMUSCULAR; INTRAVENOUS at 21:02

## 2022-01-01 RX ADMIN — POTASSIUM CHLORIDE 20 MEQ: 750 TABLET, FILM COATED, EXTENDED RELEASE ORAL at 13:12

## 2022-01-01 RX ADMIN — OXYCODONE 5 MG: 5 TABLET ORAL at 18:16

## 2022-01-01 RX ADMIN — GUAIFENESIN 600 MG: 600 TABLET, EXTENDED RELEASE ORAL at 20:44

## 2022-01-01 RX ADMIN — TRAZODONE HYDROCHLORIDE 50 MG: 50 TABLET ORAL at 22:47

## 2022-01-01 RX ADMIN — NYSTATIN: 100000 CREAM TOPICAL at 22:05

## 2022-01-01 RX ADMIN — Medication 100 MG: at 10:25

## 2022-01-01 RX ADMIN — POTASSIUM CHLORIDE 20 MEQ: 750 TABLET, FILM COATED, EXTENDED RELEASE ORAL at 11:54

## 2022-01-01 RX ADMIN — GUAIFENESIN 600 MG: 600 TABLET, EXTENDED RELEASE ORAL at 00:33

## 2022-01-01 RX ADMIN — LORAZEPAM 1 MG: 2 INJECTION INTRAMUSCULAR at 18:37

## 2022-01-01 RX ADMIN — MIDODRINE HYDROCHLORIDE 10 MG: 5 TABLET ORAL at 07:50

## 2022-01-01 RX ADMIN — MIDODRINE HYDROCHLORIDE 10 MG: 5 TABLET ORAL at 13:15

## 2022-01-01 RX ADMIN — ATORVASTATIN CALCIUM 40 MG: 40 TABLET, FILM COATED ORAL at 09:23

## 2022-01-01 RX ADMIN — MIDODRINE HYDROCHLORIDE 10 MG: 5 TABLET ORAL at 11:04

## 2022-01-01 RX ADMIN — WHITE PETROLATUM,ZINC OXIDE: 57; 17 PASTE TOPICAL at 07:50

## 2022-01-01 RX ADMIN — PIPERACILLIN SODIUM AND TAZOBACTAM SODIUM 3.38 G: 3; .375 INJECTION, POWDER, LYOPHILIZED, FOR SOLUTION INTRAVENOUS at 17:39

## 2022-01-01 RX ADMIN — DILTIAZEM HYDROCHLORIDE 120 MG: 120 CAPSULE, COATED, EXTENDED RELEASE ORAL at 09:04

## 2022-01-01 RX ADMIN — FERROUS SULFATE TAB 325 MG (65 MG ELEMENTAL FE) 325 MG: 325 (65 FE) TAB at 11:05

## 2022-01-01 RX ADMIN — SERTRALINE HYDROCHLORIDE 25 MG: 25 TABLET ORAL at 09:57

## 2022-01-01 RX ADMIN — PIPERACILLIN SODIUM AND TAZOBACTAM SODIUM 3.38 G: 3; .375 INJECTION, POWDER, LYOPHILIZED, FOR SOLUTION INTRAVENOUS at 17:56

## 2022-01-01 RX ADMIN — ONDANSETRON 4 MG: 2 INJECTION INTRAMUSCULAR; INTRAVENOUS at 06:25

## 2022-01-01 RX ADMIN — SODIUM CHLORIDE, PRESERVATIVE FREE 40 MG: 5 INJECTION INTRAVENOUS at 07:50

## 2022-01-01 RX ADMIN — HYDROMORPHONE HYDROCHLORIDE 1 MG: 1 INJECTION, SOLUTION INTRAMUSCULAR; INTRAVENOUS; SUBCUTANEOUS at 21:49

## 2022-01-01 RX ADMIN — WHITE PETROLATUM,ZINC OXIDE: 57; 17 PASTE TOPICAL at 17:27

## 2022-01-01 RX ADMIN — HYDROMORPHONE HYDROCHLORIDE 1 MG: 1 INJECTION, SOLUTION INTRAMUSCULAR; INTRAVENOUS; SUBCUTANEOUS at 03:29

## 2022-01-01 RX ADMIN — FERROUS SULFATE TAB 325 MG (65 MG ELEMENTAL FE) 325 MG: 325 (65 FE) TAB at 10:01

## 2022-01-01 RX ADMIN — HYDROMORPHONE HYDROCHLORIDE 1 MG: 1 INJECTION, SOLUTION INTRAMUSCULAR; INTRAVENOUS; SUBCUTANEOUS at 06:42

## 2022-01-01 RX ADMIN — LORAZEPAM 1 MG: 2 INJECTION INTRAMUSCULAR at 21:10

## 2022-01-01 RX ADMIN — POLYETHYLENE GLYCOL 3350 17 G: 17 POWDER, FOR SOLUTION ORAL at 10:07

## 2022-01-01 RX ADMIN — IPRATROPIUM BROMIDE AND ALBUTEROL SULFATE 3 ML: .5; 2.5 SOLUTION RESPIRATORY (INHALATION) at 04:12

## 2022-01-01 RX ADMIN — LIDOCAINE HYDROCHLORIDE 30 ML: 20 SOLUTION ORAL; TOPICAL at 19:27

## 2022-01-01 RX ADMIN — HYDROMORPHONE HYDROCHLORIDE 1 MG: 1 INJECTION, SOLUTION INTRAMUSCULAR; INTRAVENOUS; SUBCUTANEOUS at 11:48

## 2022-01-01 RX ADMIN — GUAIFENESIN 1200 MG: 600 TABLET, EXTENDED RELEASE ORAL at 07:49

## 2022-01-01 RX ADMIN — POTASSIUM CHLORIDE 20 MEQ: 750 TABLET, FILM COATED, EXTENDED RELEASE ORAL at 09:26

## 2022-01-01 RX ADMIN — MIDODRINE HYDROCHLORIDE 10 MG: 5 TABLET ORAL at 09:56

## 2022-01-01 RX ADMIN — ONDANSETRON 4 MG: 2 INJECTION INTRAMUSCULAR; INTRAVENOUS at 07:34

## 2022-01-01 RX ADMIN — IPRATROPIUM BROMIDE AND ALBUTEROL SULFATE 3 ML: .5; 2.5 SOLUTION RESPIRATORY (INHALATION) at 20:31

## 2022-01-01 RX ADMIN — SODIUM CHLORIDE, PRESERVATIVE FREE 40 MG: 5 INJECTION INTRAVENOUS at 10:00

## 2022-01-01 RX ADMIN — SODIUM CHLORIDE, POTASSIUM CHLORIDE, SODIUM LACTATE AND CALCIUM CHLORIDE 75 ML/HR: 600; 310; 30; 20 INJECTION, SOLUTION INTRAVENOUS at 03:48

## 2022-01-01 RX ADMIN — MIDODRINE HYDROCHLORIDE 10 MG: 5 TABLET ORAL at 17:57

## 2022-01-01 RX ADMIN — HYDROMORPHONE HYDROCHLORIDE 1 MG: 1 INJECTION, SOLUTION INTRAMUSCULAR; INTRAVENOUS; SUBCUTANEOUS at 00:02

## 2022-01-01 RX ADMIN — LORAZEPAM 1 MG: 2 INJECTION INTRAMUSCULAR at 09:49

## 2022-01-01 RX ADMIN — Medication 100 MG: at 09:56

## 2022-01-01 RX ADMIN — APIXABAN 5 MG: 5 TABLET, FILM COATED ORAL at 09:23

## 2022-01-01 RX ADMIN — SODIUM CHLORIDE, PRESERVATIVE FREE 40 MG: 5 INJECTION INTRAVENOUS at 10:19

## 2022-01-01 RX ADMIN — NYSTATIN: 100000 CREAM TOPICAL at 20:23

## 2022-01-01 RX ADMIN — SODIUM CHLORIDE, PRESERVATIVE FREE 40 MG: 5 INJECTION INTRAVENOUS at 21:47

## 2022-01-01 RX ADMIN — GUAIFENESIN 1200 MG: 600 TABLET, EXTENDED RELEASE ORAL at 00:34

## 2022-01-01 RX ADMIN — DILTIAZEM HYDROCHLORIDE 120 MG: 120 CAPSULE, COATED, EXTENDED RELEASE ORAL at 08:59

## 2022-01-01 RX ADMIN — SODIUM CHLORIDE, PRESERVATIVE FREE 40 MG: 5 INJECTION INTRAVENOUS at 17:40

## 2022-01-01 RX ADMIN — PIPERACILLIN SODIUM AND TAZOBACTAM SODIUM 3.38 G: 3; .375 INJECTION, POWDER, LYOPHILIZED, FOR SOLUTION INTRAVENOUS at 11:04

## 2022-01-01 RX ADMIN — FUROSEMIDE 40 MG: 10 INJECTION, SOLUTION INTRAMUSCULAR; INTRAVENOUS at 10:06

## 2022-01-01 RX ADMIN — Medication 100 MG: at 23:07

## 2022-01-01 RX ADMIN — Medication 2 TABLET: at 09:26

## 2022-01-01 RX ADMIN — POTASSIUM CHLORIDE 20 MEQ: 750 TABLET, FILM COATED, EXTENDED RELEASE ORAL at 09:05

## 2022-01-01 RX ADMIN — MIDODRINE HYDROCHLORIDE 10 MG: 5 TABLET ORAL at 17:24

## 2022-01-01 RX ADMIN — IRON SUCROSE 100 MG: 20 INJECTION, SOLUTION INTRAVENOUS at 09:39

## 2022-01-01 RX ADMIN — HYDROMORPHONE HYDROCHLORIDE 1 MG: 1 INJECTION, SOLUTION INTRAMUSCULAR; INTRAVENOUS; SUBCUTANEOUS at 02:07

## 2022-01-01 RX ADMIN — SODIUM CHLORIDE, PRESERVATIVE FREE 40 MG: 5 INJECTION INTRAVENOUS at 11:51

## 2022-01-01 RX ADMIN — PIPERACILLIN SODIUM AND TAZOBACTAM SODIUM 3.38 G: 3; .375 INJECTION, POWDER, LYOPHILIZED, FOR SOLUTION INTRAVENOUS at 17:23

## 2022-01-01 RX ADMIN — TRAZODONE HYDROCHLORIDE 50 MG: 50 TABLET ORAL at 21:16

## 2022-01-01 RX ADMIN — DILTIAZEM HYDROCHLORIDE 120 MG: 120 CAPSULE, COATED, EXTENDED RELEASE ORAL at 08:11

## 2022-01-01 RX ADMIN — ONDANSETRON 4 MG: 2 INJECTION INTRAMUSCULAR; INTRAVENOUS at 09:45

## 2022-01-01 RX ADMIN — TRAZODONE HYDROCHLORIDE 50 MG: 50 TABLET ORAL at 20:09

## 2022-01-01 RX ADMIN — DIAZEPAM 5 MG: 5 TABLET ORAL at 17:59

## 2022-01-01 RX ADMIN — DIAZEPAM 5 MG: 5 TABLET ORAL at 11:33

## 2022-01-01 RX ADMIN — PIPERACILLIN SODIUM AND TAZOBACTAM SODIUM 3.38 G: 3; .375 INJECTION, POWDER, LYOPHILIZED, FOR SOLUTION INTRAVENOUS at 09:03

## 2022-01-01 RX ADMIN — WHITE PETROLATUM,ZINC OXIDE: 57; 17 PASTE TOPICAL at 21:45

## 2022-01-01 RX ADMIN — HYDROMORPHONE HYDROCHLORIDE 1 MG: 1 INJECTION, SOLUTION INTRAMUSCULAR; INTRAVENOUS; SUBCUTANEOUS at 20:52

## 2022-01-01 RX ADMIN — HYDROMORPHONE HYDROCHLORIDE 1 MG: 1 INJECTION, SOLUTION INTRAMUSCULAR; INTRAVENOUS; SUBCUTANEOUS at 12:46

## 2022-01-01 RX ADMIN — METOPROLOL TARTRATE 25 MG: 25 TABLET, FILM COATED ORAL at 11:05

## 2022-01-01 RX ADMIN — ONDANSETRON 4 MG: 2 INJECTION INTRAMUSCULAR; INTRAVENOUS at 08:06

## 2022-01-01 RX ADMIN — GUAIFENESIN 1200 MG: 600 TABLET, EXTENDED RELEASE ORAL at 10:18

## 2022-01-01 RX ADMIN — MIDODRINE HYDROCHLORIDE 10 MG: 5 TABLET ORAL at 17:31

## 2022-01-01 RX ADMIN — POTASSIUM CHLORIDE 20 MEQ: 750 TABLET, FILM COATED, EXTENDED RELEASE ORAL at 10:01

## 2022-01-01 RX ADMIN — SODIUM CHLORIDE 100 MG: 0.9 INJECTION INTRAVENOUS at 17:41

## 2022-01-01 RX ADMIN — FUROSEMIDE 40 MG: 40 TABLET ORAL at 09:42

## 2022-01-01 RX ADMIN — SODIUM CHLORIDE, PRESERVATIVE FREE 40 MG: 5 INJECTION INTRAVENOUS at 09:39

## 2022-01-01 RX ADMIN — DILTIAZEM HYDROCHLORIDE 120 MG: 120 CAPSULE, COATED, EXTENDED RELEASE ORAL at 09:14

## 2022-01-01 RX ADMIN — APIXABAN 5 MG: 5 TABLET, FILM COATED ORAL at 21:13

## 2022-01-01 RX ADMIN — LEVOFLOXACIN 750 MG: 500 TABLET, FILM COATED ORAL at 17:40

## 2022-01-01 RX ADMIN — NYSTATIN: 100000 CREAM TOPICAL at 21:22

## 2022-01-01 RX ADMIN — NYSTATIN: 100000 CREAM TOPICAL at 21:09

## 2022-01-01 RX ADMIN — SODIUM CHLORIDE, PRESERVATIVE FREE 40 MG: 5 INJECTION INTRAVENOUS at 21:52

## 2022-01-01 RX ADMIN — HYDROMORPHONE HYDROCHLORIDE 1 MG: 1 INJECTION, SOLUTION INTRAMUSCULAR; INTRAVENOUS; SUBCUTANEOUS at 23:50

## 2022-01-01 RX ADMIN — ONDANSETRON 4 MG: 2 INJECTION INTRAMUSCULAR; INTRAVENOUS at 19:46

## 2022-01-01 RX ADMIN — TRAZODONE HYDROCHLORIDE 50 MG: 50 TABLET ORAL at 21:12

## 2022-01-01 RX ADMIN — HYDROMORPHONE HYDROCHLORIDE 1 MG: 1 INJECTION, SOLUTION INTRAMUSCULAR; INTRAVENOUS; SUBCUTANEOUS at 05:48

## 2022-01-01 RX ADMIN — DILTIAZEM HYDROCHLORIDE 120 MG: 120 CAPSULE, COATED, EXTENDED RELEASE ORAL at 10:32

## 2022-01-01 RX ADMIN — MIDODRINE HYDROCHLORIDE 10 MG: 5 TABLET ORAL at 16:14

## 2022-01-01 RX ADMIN — SODIUM CHLORIDE, PRESERVATIVE FREE 40 MG: 5 INJECTION INTRAVENOUS at 10:36

## 2022-01-01 RX ADMIN — SODIUM CHLORIDE, PRESERVATIVE FREE 1 G: 5 INJECTION INTRAVENOUS at 09:21

## 2022-01-01 RX ADMIN — POTASSIUM CHLORIDE 10 MEQ: 7.46 INJECTION, SOLUTION INTRAVENOUS at 10:00

## 2022-01-01 RX ADMIN — TRAZODONE HYDROCHLORIDE 50 MG: 50 TABLET ORAL at 23:17

## 2022-01-01 RX ADMIN — ATORVASTATIN CALCIUM 40 MG: 40 TABLET, FILM COATED ORAL at 21:43

## 2022-01-01 RX ADMIN — PHYTONADIONE 10 MG: 10 INJECTION, EMULSION INTRAMUSCULAR; INTRAVENOUS; SUBCUTANEOUS at 09:56

## 2022-01-01 RX ADMIN — MIDODRINE HYDROCHLORIDE 10 MG: 5 TABLET ORAL at 13:12

## 2022-01-01 RX ADMIN — FLUCONAZOLE 200 MG: 100 TABLET ORAL at 18:07

## 2022-01-01 RX ADMIN — ONDANSETRON 4 MG: 2 INJECTION INTRAMUSCULAR; INTRAVENOUS at 07:16

## 2022-01-01 RX ADMIN — MIDODRINE HYDROCHLORIDE 10 MG: 5 TABLET ORAL at 09:41

## 2022-01-01 RX ADMIN — MIDODRINE HYDROCHLORIDE 10 MG: 5 TABLET ORAL at 12:05

## 2022-01-01 RX ADMIN — MIDODRINE HYDROCHLORIDE 10 MG: 5 TABLET ORAL at 11:50

## 2022-01-01 RX ADMIN — DILTIAZEM HYDROCHLORIDE 120 MG: 120 CAPSULE, COATED, EXTENDED RELEASE ORAL at 10:04

## 2022-01-01 RX ADMIN — SODIUM CHLORIDE, PRESERVATIVE FREE 40 MG: 5 INJECTION INTRAVENOUS at 22:50

## 2022-01-01 RX ADMIN — SODIUM CHLORIDE 100 MG: 0.9 INJECTION INTRAVENOUS at 18:15

## 2022-01-01 RX ADMIN — MIDODRINE HYDROCHLORIDE 10 MG: 5 TABLET ORAL at 12:33

## 2022-01-01 RX ADMIN — HYDROMORPHONE HYDROCHLORIDE 1 MG: 1 INJECTION, SOLUTION INTRAMUSCULAR; INTRAVENOUS; SUBCUTANEOUS at 22:48

## 2022-01-01 RX ADMIN — HYDROMORPHONE HYDROCHLORIDE 1 MG: 1 INJECTION, SOLUTION INTRAMUSCULAR; INTRAVENOUS; SUBCUTANEOUS at 22:10

## 2022-01-01 RX ADMIN — HYDROMORPHONE HYDROCHLORIDE 1 MG: 1 INJECTION, SOLUTION INTRAMUSCULAR; INTRAVENOUS; SUBCUTANEOUS at 13:13

## 2022-01-01 RX ADMIN — MIDODRINE HYDROCHLORIDE 10 MG: 5 TABLET ORAL at 21:43

## 2022-01-01 RX ADMIN — HYDROMORPHONE HYDROCHLORIDE 1 MG: 1 INJECTION, SOLUTION INTRAMUSCULAR; INTRAVENOUS; SUBCUTANEOUS at 17:56

## 2022-01-01 RX ADMIN — GUAIFENESIN 600 MG: 600 TABLET, EXTENDED RELEASE ORAL at 21:39

## 2022-01-01 RX ADMIN — SODIUM CHLORIDE, PRESERVATIVE FREE 1 G: 5 INJECTION INTRAVENOUS at 16:11

## 2022-01-01 RX ADMIN — SODIUM CHLORIDE, PRESERVATIVE FREE 10 ML: 5 INJECTION INTRAVENOUS at 20:31

## 2022-01-01 RX ADMIN — METOPROLOL TARTRATE 25 MG: 25 TABLET, FILM COATED ORAL at 22:47

## 2022-01-01 RX ADMIN — FLUCONAZOLE, SODIUM CHLORIDE 400 MG: 2 INJECTION INTRAVENOUS at 17:54

## 2022-01-01 RX ADMIN — MIDODRINE HYDROCHLORIDE 10 MG: 5 TABLET ORAL at 16:58

## 2022-01-01 RX ADMIN — IPRATROPIUM BROMIDE AND ALBUTEROL SULFATE 3 ML: .5; 2.5 SOLUTION RESPIRATORY (INHALATION) at 18:37

## 2022-01-01 RX ADMIN — DIGOXIN 250 MCG: 0.25 INJECTION INTRAMUSCULAR; INTRAVENOUS at 14:40

## 2022-01-01 RX ADMIN — LORAZEPAM 1 MG: 2 INJECTION INTRAMUSCULAR at 18:30

## 2022-01-01 RX ADMIN — ONDANSETRON 4 MG: 2 INJECTION INTRAMUSCULAR; INTRAVENOUS at 18:16

## 2022-01-01 RX ADMIN — SERTRALINE HYDROCHLORIDE 25 MG: 25 TABLET ORAL at 09:00

## 2022-01-01 RX ADMIN — Medication 100 MG: at 10:06

## 2022-01-01 RX ADMIN — HYDROMORPHONE HYDROCHLORIDE 1 MG: 1 INJECTION, SOLUTION INTRAMUSCULAR; INTRAVENOUS; SUBCUTANEOUS at 22:00

## 2022-01-01 RX ADMIN — ONDANSETRON 4 MG: 2 INJECTION INTRAMUSCULAR; INTRAVENOUS at 18:37

## 2022-01-01 RX ADMIN — METOPROLOL TARTRATE 25 MG: 25 TABLET, FILM COATED ORAL at 14:47

## 2022-01-01 RX ADMIN — SODIUM CHLORIDE, POTASSIUM CHLORIDE, SODIUM LACTATE AND CALCIUM CHLORIDE 75 ML/HR: 600; 310; 30; 20 INJECTION, SOLUTION INTRAVENOUS at 17:39

## 2022-01-01 RX ADMIN — PIPERACILLIN SODIUM AND TAZOBACTAM SODIUM 3.38 G: 3; .375 INJECTION, POWDER, LYOPHILIZED, FOR SOLUTION INTRAVENOUS at 10:05

## 2022-01-01 RX ADMIN — TRAZODONE HYDROCHLORIDE 50 MG: 50 TABLET ORAL at 21:42

## 2022-01-01 RX ADMIN — MIDODRINE HYDROCHLORIDE 10 MG: 5 TABLET ORAL at 10:18

## 2022-01-01 RX ADMIN — SODIUM CHLORIDE, PRESERVATIVE FREE 40 MG: 5 INJECTION INTRAVENOUS at 00:41

## 2022-01-01 RX ADMIN — Medication 100 MG: at 20:09

## 2022-01-01 RX ADMIN — SERTRALINE 25 MG: 25 TABLET, FILM COATED ORAL at 16:18

## 2022-01-01 RX ADMIN — SODIUM CHLORIDE, PRESERVATIVE FREE 40 MG: 5 INJECTION INTRAVENOUS at 20:28

## 2022-01-01 RX ADMIN — DILTIAZEM HYDROCHLORIDE 120 MG: 120 CAPSULE, COATED, EXTENDED RELEASE ORAL at 07:39

## 2022-01-01 RX ADMIN — OXYCODONE 5 MG: 5 TABLET ORAL at 21:15

## 2022-01-01 RX ADMIN — LORAZEPAM 1 MG: 2 INJECTION INTRAMUSCULAR at 05:57

## 2022-01-01 RX ADMIN — DILTIAZEM HYDROCHLORIDE 120 MG: 120 CAPSULE, COATED, EXTENDED RELEASE ORAL at 09:11

## 2022-01-01 RX ADMIN — FLUCONAZOLE 200 MG: 100 TABLET ORAL at 08:56

## 2022-01-01 RX ADMIN — PIPERACILLIN SODIUM AND TAZOBACTAM SODIUM 3.38 G: 3; .375 INJECTION, POWDER, LYOPHILIZED, FOR SOLUTION INTRAVENOUS at 03:48

## 2022-01-01 RX ADMIN — Medication 100 MG: at 00:34

## 2022-01-01 RX ADMIN — IRON SUCROSE 100 MG: 20 INJECTION, SOLUTION INTRAVENOUS at 16:13

## 2022-01-01 RX ADMIN — IPRATROPIUM BROMIDE AND ALBUTEROL SULFATE 3 ML: .5; 2.5 SOLUTION RESPIRATORY (INHALATION) at 09:28

## 2022-01-01 RX ADMIN — POTASSIUM CHLORIDE 10 MEQ: 7.46 INJECTION, SOLUTION INTRAVENOUS at 09:30

## 2022-01-01 RX ADMIN — LORAZEPAM 1 MG: 2 INJECTION INTRAMUSCULAR at 01:10

## 2022-01-01 RX ADMIN — ONDANSETRON 4 MG: 2 INJECTION INTRAMUSCULAR; INTRAVENOUS at 21:41

## 2022-01-01 RX ADMIN — FUROSEMIDE 40 MG: 10 INJECTION, SOLUTION INTRAMUSCULAR; INTRAVENOUS at 09:46

## 2022-01-01 RX ADMIN — Medication 100 MG: at 09:11

## 2022-01-01 RX ADMIN — ATORVASTATIN CALCIUM 40 MG: 40 TABLET, FILM COATED ORAL at 22:47

## 2022-01-01 RX ADMIN — Medication 100 MG: at 20:22

## 2022-01-01 RX ADMIN — GUAIFENESIN 600 MG: 600 TABLET, EXTENDED RELEASE ORAL at 22:00

## 2022-01-01 RX ADMIN — TRAZODONE HYDROCHLORIDE 50 MG: 50 TABLET ORAL at 20:44

## 2022-01-01 RX ADMIN — ONDANSETRON 4 MG: 2 INJECTION INTRAMUSCULAR; INTRAVENOUS at 02:37

## 2022-01-01 RX ADMIN — MIDODRINE HYDROCHLORIDE 10 MG: 5 TABLET ORAL at 17:39

## 2022-01-01 RX ADMIN — IRON SUCROSE 100 MG: 20 INJECTION, SOLUTION INTRAVENOUS at 10:36

## 2022-01-01 RX ADMIN — ALBUMIN (HUMAN) 50 G: 0.25 INJECTION, SOLUTION INTRAVENOUS at 04:07

## 2022-01-01 RX ADMIN — MIDODRINE HYDROCHLORIDE 10 MG: 5 TABLET ORAL at 18:07

## 2022-01-01 RX ADMIN — ONDANSETRON 4 MG: 2 INJECTION INTRAMUSCULAR; INTRAVENOUS at 15:16

## 2022-01-01 RX ADMIN — Medication 500 MG: at 09:12

## 2022-01-01 RX ADMIN — Medication 500 MG: at 07:47

## 2022-01-01 RX ADMIN — SERTRALINE HYDROCHLORIDE 25 MG: 25 TABLET ORAL at 09:23

## 2022-01-01 RX ADMIN — WHITE PETROLATUM,ZINC OXIDE: 57; 17 PASTE TOPICAL at 21:23

## 2022-01-01 RX ADMIN — Medication 500 MG: at 09:26

## 2022-01-01 RX ADMIN — ONDANSETRON 4 MG: 2 INJECTION INTRAMUSCULAR; INTRAVENOUS at 22:08

## 2022-01-01 RX ADMIN — HYDROMORPHONE HYDROCHLORIDE 1 MG: 1 INJECTION, SOLUTION INTRAMUSCULAR; INTRAVENOUS; SUBCUTANEOUS at 06:53

## 2022-01-01 RX ADMIN — MIDODRINE HYDROCHLORIDE 10 MG: 5 TABLET ORAL at 12:10

## 2022-01-01 RX ADMIN — SODIUM CHLORIDE, PRESERVATIVE FREE 40 MG: 5 INJECTION INTRAVENOUS at 22:12

## 2022-01-01 RX ADMIN — GUAIFENESIN 600 MG: 600 TABLET, EXTENDED RELEASE ORAL at 09:41

## 2022-01-01 RX ADMIN — SODIUM CHLORIDE, POTASSIUM CHLORIDE, SODIUM LACTATE AND CALCIUM CHLORIDE 75 ML/HR: 600; 310; 30; 20 INJECTION, SOLUTION INTRAVENOUS at 08:09

## 2022-01-01 RX ADMIN — POTASSIUM CHLORIDE 20 MEQ: 750 TABLET, FILM COATED, EXTENDED RELEASE ORAL at 11:04

## 2022-01-01 RX ADMIN — HYDROMORPHONE HYDROCHLORIDE 1 MG: 1 INJECTION, SOLUTION INTRAMUSCULAR; INTRAVENOUS; SUBCUTANEOUS at 21:39

## 2022-01-01 RX ADMIN — GUAIFENESIN 600 MG: 600 TABLET, EXTENDED RELEASE ORAL at 21:13

## 2022-01-01 RX ADMIN — HYDROMORPHONE HYDROCHLORIDE 1 MG: 1 INJECTION, SOLUTION INTRAMUSCULAR; INTRAVENOUS; SUBCUTANEOUS at 09:14

## 2022-01-01 RX ADMIN — PIPERACILLIN SODIUM AND TAZOBACTAM SODIUM 3.38 G: 3; .375 INJECTION, POWDER, LYOPHILIZED, FOR SOLUTION INTRAVENOUS at 09:14

## 2022-01-01 RX ADMIN — POTASSIUM CHLORIDE 20 MEQ: 750 TABLET, FILM COATED, EXTENDED RELEASE ORAL at 08:56

## 2022-01-01 RX ADMIN — SODIUM CHLORIDE, PRESERVATIVE FREE 40 MG: 5 INJECTION INTRAVENOUS at 09:27

## 2022-01-01 RX ADMIN — LORAZEPAM 1 MG: 2 INJECTION INTRAMUSCULAR at 06:53

## 2022-01-01 RX ADMIN — TRAZODONE HYDROCHLORIDE 50 MG: 50 TABLET ORAL at 22:09

## 2022-01-01 RX ADMIN — GUAIFENESIN 100 MG: 200 SOLUTION ORAL at 23:17

## 2022-01-01 RX ADMIN — DILTIAZEM HYDROCHLORIDE 120 MG: 120 CAPSULE, COATED, EXTENDED RELEASE ORAL at 08:56

## 2022-01-01 RX ADMIN — POTASSIUM CHLORIDE 20 MEQ: 750 TABLET, FILM COATED, EXTENDED RELEASE ORAL at 10:04

## 2022-01-01 RX ADMIN — GUAIFENESIN 1200 MG: 600 TABLET, EXTENDED RELEASE ORAL at 23:07

## 2022-01-01 RX ADMIN — SERTRALINE 25 MG: 25 TABLET, FILM COATED ORAL at 10:29

## 2022-01-01 RX ADMIN — DILTIAZEM HYDROCHLORIDE 120 MG: 120 CAPSULE, COATED, EXTENDED RELEASE ORAL at 09:22

## 2022-01-01 RX ADMIN — FUROSEMIDE 40 MG: 10 INJECTION, SOLUTION INTRAMUSCULAR; INTRAVENOUS at 09:39

## 2022-01-01 RX ADMIN — POTASSIUM CHLORIDE 20 MEQ: 750 TABLET, FILM COATED, EXTENDED RELEASE ORAL at 10:18

## 2022-01-01 RX ADMIN — HYDROMORPHONE HYDROCHLORIDE 1 MG: 1 INJECTION, SOLUTION INTRAMUSCULAR; INTRAVENOUS; SUBCUTANEOUS at 03:43

## 2022-01-01 RX ADMIN — PIPERACILLIN SODIUM AND TAZOBACTAM SODIUM 3.38 G: 3; .375 INJECTION, POWDER, LYOPHILIZED, FOR SOLUTION INTRAVENOUS at 17:24

## 2022-01-01 RX ADMIN — MIDODRINE HYDROCHLORIDE 10 MG: 5 TABLET ORAL at 17:41

## 2022-01-01 RX ADMIN — Medication 500 MG: at 09:40

## 2022-01-01 RX ADMIN — GUAIFENESIN 100 MG: 200 SOLUTION ORAL at 18:07

## 2022-01-01 RX ADMIN — PROPOFOL 50 MG: 10 INJECTION, EMULSION INTRAVENOUS at 10:44

## 2022-01-01 RX ADMIN — POTASSIUM CHLORIDE 20 MEQ: 750 TABLET, FILM COATED, EXTENDED RELEASE ORAL at 10:06

## 2022-01-01 RX ADMIN — NYSTATIN: 100000 CREAM TOPICAL at 09:00

## 2022-01-01 RX ADMIN — WHITE PETROLATUM,ZINC OXIDE: 57; 17 PASTE TOPICAL at 09:03

## 2022-01-01 RX ADMIN — DILTIAZEM HYDROCHLORIDE 120 MG: 120 CAPSULE, COATED, EXTENDED RELEASE ORAL at 09:42

## 2022-01-01 RX ADMIN — GUAIFENESIN 1200 MG: 600 TABLET, EXTENDED RELEASE ORAL at 10:06

## 2022-01-01 RX ADMIN — SERTRALINE 25 MG: 25 TABLET, FILM COATED ORAL at 17:36

## 2022-01-01 RX ADMIN — HYDROMORPHONE HYDROCHLORIDE 1 MG: 1 INJECTION, SOLUTION INTRAMUSCULAR; INTRAVENOUS; SUBCUTANEOUS at 21:08

## 2022-01-01 RX ADMIN — PIPERACILLIN SODIUM AND TAZOBACTAM SODIUM 3.38 G: 3; .375 INJECTION, POWDER, LYOPHILIZED, FOR SOLUTION INTRAVENOUS at 18:07

## 2022-01-01 RX ADMIN — HYDROMORPHONE HYDROCHLORIDE 1 MG: 1 INJECTION, SOLUTION INTRAMUSCULAR; INTRAVENOUS; SUBCUTANEOUS at 20:09

## 2022-01-01 RX ADMIN — TRAZODONE HYDROCHLORIDE 50 MG: 50 TABLET ORAL at 22:12

## 2022-01-01 RX ADMIN — HYDROMORPHONE HYDROCHLORIDE 1 MG: 1 INJECTION, SOLUTION INTRAMUSCULAR; INTRAVENOUS; SUBCUTANEOUS at 11:06

## 2022-01-01 RX ADMIN — ONDANSETRON 4 MG: 2 INJECTION INTRAMUSCULAR; INTRAVENOUS at 06:02

## 2022-01-01 RX ADMIN — IPRATROPIUM BROMIDE AND ALBUTEROL SULFATE 3 ML: .5; 2.5 SOLUTION RESPIRATORY (INHALATION) at 14:58

## 2022-01-01 RX ADMIN — PIPERACILLIN SODIUM AND TAZOBACTAM SODIUM 3.38 G: 3; .375 INJECTION, POWDER, LYOPHILIZED, FOR SOLUTION INTRAVENOUS at 10:10

## 2022-01-01 RX ADMIN — CHLORTHALIDONE 25 MG: 25 TABLET ORAL at 09:40

## 2022-01-01 RX ADMIN — POTASSIUM CHLORIDE 10 MEQ: 7.46 INJECTION, SOLUTION INTRAVENOUS at 06:45

## 2022-01-01 RX ADMIN — SODIUM CHLORIDE, PRESERVATIVE FREE 40 MG: 5 INJECTION INTRAVENOUS at 11:14

## 2022-01-01 RX ADMIN — DIAZEPAM 5 MG: 5 TABLET ORAL at 14:43

## 2022-01-01 RX ADMIN — NYSTATIN: 100000 CREAM TOPICAL at 09:27

## 2022-01-01 RX ADMIN — SERTRALINE 25 MG: 25 TABLET, FILM COATED ORAL at 18:16

## 2022-01-01 RX ADMIN — ONDANSETRON 4 MG: 2 INJECTION INTRAMUSCULAR; INTRAVENOUS at 12:04

## 2022-01-01 RX ADMIN — SODIUM CHLORIDE, PRESERVATIVE FREE 40 MG: 5 INJECTION INTRAVENOUS at 21:43

## 2022-01-01 RX ADMIN — MIDODRINE HYDROCHLORIDE 10 MG: 5 TABLET ORAL at 21:17

## 2022-01-01 RX ADMIN — WHITE PETROLATUM,ZINC OXIDE: 57; 17 PASTE TOPICAL at 20:30

## 2022-01-01 RX ADMIN — HYDROMORPHONE HYDROCHLORIDE 1 MG: 1 INJECTION, SOLUTION INTRAMUSCULAR; INTRAVENOUS; SUBCUTANEOUS at 20:30

## 2022-01-01 RX ADMIN — TRAZODONE HYDROCHLORIDE 50 MG: 50 TABLET ORAL at 20:29

## 2022-01-01 RX ADMIN — HYDROMORPHONE HYDROCHLORIDE 1 MG: 1 INJECTION, SOLUTION INTRAMUSCULAR; INTRAVENOUS; SUBCUTANEOUS at 17:40

## 2022-01-01 RX ADMIN — POTASSIUM CHLORIDE 20 MEQ: 750 TABLET, FILM COATED, EXTENDED RELEASE ORAL at 08:12

## 2022-01-01 RX ADMIN — MIDODRINE HYDROCHLORIDE 10 MG: 5 TABLET ORAL at 12:40

## 2022-01-01 RX ADMIN — LORAZEPAM 1 MG: 2 INJECTION INTRAMUSCULAR at 03:29

## 2022-01-01 RX ADMIN — LORAZEPAM 1 MG: 2 INJECTION INTRAMUSCULAR at 09:51

## 2022-01-01 RX ADMIN — HYDROMORPHONE HYDROCHLORIDE 1 MG: 1 INJECTION, SOLUTION INTRAMUSCULAR; INTRAVENOUS; SUBCUTANEOUS at 18:27

## 2022-01-01 RX ADMIN — OXYCODONE 5 MG: 5 TABLET ORAL at 09:45

## 2022-01-01 RX ADMIN — HYDROMORPHONE HYDROCHLORIDE 1 MG: 1 INJECTION, SOLUTION INTRAMUSCULAR; INTRAVENOUS; SUBCUTANEOUS at 20:22

## 2022-01-01 RX ADMIN — MIDODRINE HYDROCHLORIDE 10 MG: 5 TABLET ORAL at 07:44

## 2022-01-01 RX ADMIN — WHITE PETROLATUM,ZINC OXIDE: 57; 17 PASTE TOPICAL at 10:03

## 2022-01-01 RX ADMIN — ACETAMINOPHEN 650 MG: 325 TABLET ORAL at 00:24

## 2022-01-01 RX ADMIN — HYDROMORPHONE HYDROCHLORIDE 1 MG: 1 INJECTION, SOLUTION INTRAMUSCULAR; INTRAVENOUS; SUBCUTANEOUS at 04:50

## 2022-01-01 RX ADMIN — FERROUS SULFATE TAB 325 MG (65 MG ELEMENTAL FE) 325 MG: 325 (65 FE) TAB at 09:57

## 2022-01-01 RX ADMIN — CALCIUM CARBONATE (ANTACID) CHEW TAB 500 MG 200 MG: 500 CHEW TAB at 11:32

## 2022-01-01 RX ADMIN — HYDROMORPHONE HYDROCHLORIDE 1 MG: 1 INJECTION, SOLUTION INTRAMUSCULAR; INTRAVENOUS; SUBCUTANEOUS at 09:23

## 2022-01-01 RX ADMIN — SODIUM CHLORIDE, PRESERVATIVE FREE 1 G: 5 INJECTION INTRAVENOUS at 10:33

## 2022-01-01 RX ADMIN — MIDODRINE HYDROCHLORIDE 10 MG: 5 TABLET ORAL at 17:00

## 2022-01-01 RX ADMIN — Medication 100 MG: at 20:44

## 2022-01-01 RX ADMIN — DILTIAZEM HYDROCHLORIDE 120 MG: 120 CAPSULE, COATED, EXTENDED RELEASE ORAL at 10:06

## 2022-01-01 RX ADMIN — SODIUM CHLORIDE, POTASSIUM CHLORIDE, SODIUM LACTATE AND CALCIUM CHLORIDE 75 ML/HR: 600; 310; 30; 20 INJECTION, SOLUTION INTRAVENOUS at 10:15

## 2022-01-01 RX ADMIN — SERTRALINE HYDROCHLORIDE 25 MG: 25 TABLET ORAL at 09:45

## 2022-01-01 RX ADMIN — MIDODRINE HYDROCHLORIDE 10 MG: 5 TABLET ORAL at 17:23

## 2022-01-01 RX ADMIN — FUROSEMIDE 40 MG: 40 TABLET ORAL at 10:02

## 2022-01-01 RX ADMIN — HYDROMORPHONE HYDROCHLORIDE 1 MG: 1 INJECTION, SOLUTION INTRAMUSCULAR; INTRAVENOUS; SUBCUTANEOUS at 12:04

## 2022-01-01 RX ADMIN — MIDODRINE HYDROCHLORIDE 10 MG: 5 TABLET ORAL at 04:11

## 2022-01-01 RX ADMIN — SERTRALINE HYDROCHLORIDE 25 MG: 25 TABLET ORAL at 10:06

## 2022-01-01 RX ADMIN — IPRATROPIUM BROMIDE AND ALBUTEROL SULFATE 3 ML: .5; 2.5 SOLUTION RESPIRATORY (INHALATION) at 02:18

## 2022-01-01 RX ADMIN — DIAZEPAM 5 MG: 5 TABLET ORAL at 11:55

## 2022-01-01 RX ADMIN — ONDANSETRON 4 MG: 2 INJECTION INTRAMUSCULAR; INTRAVENOUS at 20:52

## 2022-01-01 RX ADMIN — TRAZODONE HYDROCHLORIDE 50 MG: 50 TABLET ORAL at 23:07

## 2022-01-01 RX ADMIN — DILTIAZEM HYDROCHLORIDE 120 MG: 120 CAPSULE, COATED, EXTENDED RELEASE ORAL at 09:41

## 2022-01-01 RX ADMIN — Medication 100 MG: at 21:42

## 2022-01-01 RX ADMIN — SODIUM CHLORIDE 100 MG: 0.9 INJECTION INTRAVENOUS at 16:29

## 2022-01-01 RX ADMIN — GUAIFENESIN 1200 MG: 600 TABLET, EXTENDED RELEASE ORAL at 09:32

## 2022-01-01 RX ADMIN — DILTIAZEM HYDROCHLORIDE 120 MG: 120 CAPSULE, COATED, EXTENDED RELEASE ORAL at 09:45

## 2022-01-01 RX ADMIN — ONDANSETRON 4 MG: 2 INJECTION INTRAMUSCULAR; INTRAVENOUS at 11:48

## 2022-01-01 RX ADMIN — CHLORTHALIDONE 25 MG: 25 TABLET ORAL at 10:01

## 2022-01-01 RX ADMIN — HYDROMORPHONE HYDROCHLORIDE 1 MG: 1 INJECTION, SOLUTION INTRAMUSCULAR; INTRAVENOUS; SUBCUTANEOUS at 08:58

## 2022-01-01 RX ADMIN — SODIUM CHLORIDE, PRESERVATIVE FREE 40 MG: 5 INJECTION INTRAVENOUS at 08:13

## 2022-01-01 RX ADMIN — WHITE PETROLATUM,ZINC OXIDE: 57; 17 PASTE TOPICAL at 21:09

## 2022-01-01 RX ADMIN — SODIUM CHLORIDE, PRESERVATIVE FREE 40 MG: 5 INJECTION INTRAVENOUS at 09:18

## 2022-01-01 RX ADMIN — MIDODRINE HYDROCHLORIDE 10 MG: 5 TABLET ORAL at 16:18

## 2022-01-01 RX ADMIN — ATORVASTATIN CALCIUM 40 MG: 40 TABLET, FILM COATED ORAL at 09:40

## 2022-01-01 RX ADMIN — GUAIFENESIN 100 MG: 200 SOLUTION ORAL at 00:33

## 2022-01-01 RX ADMIN — GUAIFENESIN 1200 MG: 600 TABLET, EXTENDED RELEASE ORAL at 08:11

## 2022-01-01 RX ADMIN — APIXABAN 5 MG: 5 TABLET, FILM COATED ORAL at 22:00

## 2022-01-01 RX ADMIN — Medication 500 MG: at 09:56

## 2022-01-01 RX ADMIN — POTASSIUM CHLORIDE 20 MEQ: 750 TABLET, FILM COATED, EXTENDED RELEASE ORAL at 09:56

## 2022-01-01 RX ADMIN — Medication 1000 UNITS: at 09:57

## 2022-01-01 RX ADMIN — ONDANSETRON 4 MG: 2 INJECTION INTRAMUSCULAR; INTRAVENOUS at 14:53

## 2022-01-01 RX ADMIN — HYDROMORPHONE HYDROCHLORIDE 1 MG: 1 INJECTION, SOLUTION INTRAMUSCULAR; INTRAVENOUS; SUBCUTANEOUS at 12:27

## 2022-01-01 RX ADMIN — CHLORTHALIDONE 25 MG: 25 TABLET ORAL at 10:04

## 2022-01-01 RX ADMIN — METOPROLOL TARTRATE 25 MG: 25 TABLET, FILM COATED ORAL at 10:02

## 2022-01-01 RX ADMIN — MIDODRINE HYDROCHLORIDE 10 MG: 5 TABLET ORAL at 09:45

## 2022-01-01 RX ADMIN — MIDODRINE HYDROCHLORIDE 10 MG: 5 TABLET ORAL at 15:00

## 2022-01-01 RX ADMIN — Medication 500 MG: at 09:11

## 2022-01-01 RX ADMIN — PIPERACILLIN SODIUM AND TAZOBACTAM SODIUM 3.38 G: 3; .375 INJECTION, POWDER, LYOPHILIZED, FOR SOLUTION INTRAVENOUS at 03:02

## 2022-01-01 RX ADMIN — BENZONATATE 100 MG: 100 CAPSULE ORAL at 00:34

## 2022-01-01 RX ADMIN — SERTRALINE 25 MG: 25 TABLET, FILM COATED ORAL at 08:56

## 2022-01-01 RX ADMIN — WHITE PETROLATUM,ZINC OXIDE: 57; 17 PASTE TOPICAL at 00:55

## 2022-01-01 RX ADMIN — GUAIFENESIN 1200 MG: 600 TABLET, EXTENDED RELEASE ORAL at 10:25

## 2022-01-01 RX ADMIN — SODIUM CHLORIDE, PRESERVATIVE FREE 1 G: 5 INJECTION INTRAVENOUS at 01:39

## 2022-01-01 RX ADMIN — MAGNESIUM SULFATE HEPTAHYDRATE 2 G: 40 INJECTION, SOLUTION INTRAVENOUS at 13:00

## 2022-01-01 RX ADMIN — APIXABAN 5 MG: 5 TABLET, FILM COATED ORAL at 09:45

## 2022-01-01 RX ADMIN — PIPERACILLIN SODIUM AND TAZOBACTAM SODIUM 3.38 G: 3; .375 INJECTION, POWDER, LYOPHILIZED, FOR SOLUTION INTRAVENOUS at 02:22

## 2022-01-01 RX ADMIN — OXYCODONE 5 MG: 5 TABLET ORAL at 20:47

## 2022-01-01 RX ADMIN — POTASSIUM CHLORIDE 20 MEQ: 750 TABLET, FILM COATED, EXTENDED RELEASE ORAL at 10:31

## 2022-01-01 RX ADMIN — OXYCODONE 5 MG: 5 TABLET ORAL at 15:00

## 2022-01-01 RX ADMIN — ONDANSETRON 4 MG: 2 INJECTION INTRAMUSCULAR; INTRAVENOUS at 00:02

## 2022-01-01 RX ADMIN — SODIUM CHLORIDE, PRESERVATIVE FREE 40 MG: 5 INJECTION INTRAVENOUS at 22:26

## 2022-01-01 RX ADMIN — HYDROMORPHONE HYDROCHLORIDE 1 MG: 1 INJECTION, SOLUTION INTRAMUSCULAR; INTRAVENOUS; SUBCUTANEOUS at 13:02

## 2022-01-01 RX ADMIN — NYSTATIN: 100000 CREAM TOPICAL at 21:00

## 2022-01-01 RX ADMIN — Medication 5000 UNITS: at 11:05

## 2022-01-01 RX ADMIN — Medication 500 MG: at 09:23

## 2022-01-01 RX ADMIN — HYDROMORPHONE HYDROCHLORIDE 1 MG: 1 INJECTION, SOLUTION INTRAMUSCULAR; INTRAVENOUS; SUBCUTANEOUS at 04:23

## 2022-01-01 RX ADMIN — Medication 100 MG: at 09:05

## 2022-01-01 RX ADMIN — ONDANSETRON 4 MG: 2 INJECTION INTRAMUSCULAR; INTRAVENOUS at 10:06

## 2022-01-01 RX ADMIN — GUAIFENESIN 1200 MG: 600 TABLET, EXTENDED RELEASE ORAL at 09:11

## 2022-01-01 RX ADMIN — FUROSEMIDE 40 MG: 10 INJECTION, SOLUTION INTRAMUSCULAR; INTRAVENOUS at 09:21

## 2022-01-01 RX ADMIN — Medication 1000 UNITS: at 09:23

## 2022-01-01 RX ADMIN — MIDODRINE HYDROCHLORIDE 10 MG: 5 TABLET ORAL at 11:32

## 2022-01-01 RX ADMIN — OXYCODONE 5 MG: 5 TABLET ORAL at 07:16

## 2022-01-01 RX ADMIN — CHLORTHALIDONE 25 MG: 25 TABLET ORAL at 11:13

## 2022-01-01 RX ADMIN — LORAZEPAM 1 MG: 2 INJECTION INTRAMUSCULAR at 12:27

## 2022-01-01 RX ADMIN — HYDROMORPHONE HYDROCHLORIDE 1 MG: 1 INJECTION, SOLUTION INTRAMUSCULAR; INTRAVENOUS; SUBCUTANEOUS at 03:47

## 2022-01-01 RX ADMIN — MIDODRINE HYDROCHLORIDE 10 MG: 5 TABLET ORAL at 12:31

## 2022-01-01 RX ADMIN — MIDODRINE HYDROCHLORIDE 10 MG: 5 TABLET ORAL at 12:08

## 2022-01-01 RX ADMIN — MIDODRINE HYDROCHLORIDE 10 MG: 5 TABLET ORAL at 17:12

## 2022-01-01 RX ADMIN — ALBUMIN (HUMAN) 50 G: 0.25 INJECTION, SOLUTION INTRAVENOUS at 06:06

## 2022-01-01 RX ADMIN — GUAIFENESIN 600 MG: 600 TABLET, EXTENDED RELEASE ORAL at 20:09

## 2022-01-01 RX ADMIN — TRAZODONE HYDROCHLORIDE 50 MG: 50 TABLET ORAL at 21:02

## 2022-01-01 RX ADMIN — GUAIFENESIN 600 MG: 600 TABLET, EXTENDED RELEASE ORAL at 20:22

## 2022-01-01 RX ADMIN — POTASSIUM CHLORIDE 20 MEQ: 750 TABLET, FILM COATED, EXTENDED RELEASE ORAL at 09:40

## 2022-01-01 RX ADMIN — HYDROMORPHONE HYDROCHLORIDE 1 MG: 1 INJECTION, SOLUTION INTRAMUSCULAR; INTRAVENOUS; SUBCUTANEOUS at 05:57

## 2022-01-01 RX ADMIN — GUAIFENESIN 600 MG: 600 TABLET, EXTENDED RELEASE ORAL at 21:16

## 2022-01-01 RX ADMIN — WHITE PETROLATUM,ZINC OXIDE: 57; 17 PASTE TOPICAL at 09:27

## 2022-01-01 RX ADMIN — CHLORTHALIDONE 25 MG: 25 TABLET ORAL at 09:11

## 2022-01-01 RX ADMIN — PIPERACILLIN SODIUM AND TAZOBACTAM SODIUM 3.38 G: 3; .375 INJECTION, POWDER, LYOPHILIZED, FOR SOLUTION INTRAVENOUS at 09:05

## 2022-01-01 RX ADMIN — SODIUM CHLORIDE 8 MG/HR: 900 INJECTION INTRAVENOUS at 11:43

## 2022-01-01 RX ADMIN — NYSTATIN: 100000 CREAM TOPICAL at 21:14

## 2022-01-01 RX ADMIN — HYDROMORPHONE HYDROCHLORIDE 1 MG: 1 INJECTION, SOLUTION INTRAMUSCULAR; INTRAVENOUS; SUBCUTANEOUS at 15:27

## 2022-01-01 RX ADMIN — MIDODRINE HYDROCHLORIDE 10 MG: 5 TABLET ORAL at 11:49

## 2022-01-01 RX ADMIN — HYDROMORPHONE HYDROCHLORIDE 1 MG: 1 INJECTION, SOLUTION INTRAMUSCULAR; INTRAVENOUS; SUBCUTANEOUS at 14:58

## 2022-01-01 RX ADMIN — Medication 100 MG: at 09:26

## 2022-01-01 RX ADMIN — MIDODRINE HYDROCHLORIDE 10 MG: 5 TABLET ORAL at 12:04

## 2022-01-01 RX ADMIN — TRAZODONE HYDROCHLORIDE 50 MG: 50 TABLET ORAL at 21:39

## 2022-01-01 RX ADMIN — GUAIFENESIN 1200 MG: 600 TABLET, EXTENDED RELEASE ORAL at 09:04

## 2022-01-01 RX ADMIN — POTASSIUM CHLORIDE 20 MEQ: 750 TABLET, FILM COATED, EXTENDED RELEASE ORAL at 10:25

## 2022-01-01 RX ADMIN — HYDROMORPHONE HYDROCHLORIDE 1 MG: 1 INJECTION, SOLUTION INTRAMUSCULAR; INTRAVENOUS; SUBCUTANEOUS at 17:01

## 2022-01-01 RX ADMIN — SERTRALINE 25 MG: 25 TABLET, FILM COATED ORAL at 09:11

## 2022-01-01 RX ADMIN — IPRATROPIUM BROMIDE AND ALBUTEROL SULFATE 3 ML: .5; 2.5 SOLUTION RESPIRATORY (INHALATION) at 22:56

## 2022-01-01 RX ADMIN — ONDANSETRON 4 MG: 2 INJECTION INTRAMUSCULAR; INTRAVENOUS at 17:37

## 2022-01-01 RX ADMIN — FUROSEMIDE 40 MG: 40 TABLET ORAL at 11:04

## 2022-01-01 RX ADMIN — PROPOFOL 25 MG: 10 INJECTION, EMULSION INTRAVENOUS at 10:49

## 2022-01-01 RX ADMIN — HYDROMORPHONE HYDROCHLORIDE 1 MG: 1 INJECTION, SOLUTION INTRAMUSCULAR; INTRAVENOUS; SUBCUTANEOUS at 17:37

## 2022-01-01 RX ADMIN — PIPERACILLIN SODIUM AND TAZOBACTAM SODIUM 3.38 G: 3; .375 INJECTION, POWDER, LYOPHILIZED, FOR SOLUTION INTRAVENOUS at 02:18

## 2022-01-01 RX ADMIN — NYSTATIN: 100000 CREAM TOPICAL at 10:10

## 2022-01-01 RX ADMIN — HYDROMORPHONE HYDROCHLORIDE 1 MG: 1 INJECTION, SOLUTION INTRAMUSCULAR; INTRAVENOUS; SUBCUTANEOUS at 21:10

## 2022-01-01 RX ADMIN — SODIUM CHLORIDE, PRESERVATIVE FREE 40 MG: 5 INJECTION INTRAVENOUS at 09:21

## 2022-01-01 RX ADMIN — DILTIAZEM HYDROCHLORIDE 120 MG: 120 CAPSULE, COATED, EXTENDED RELEASE ORAL at 10:18

## 2022-01-01 RX ADMIN — SERTRALINE 25 MG: 25 TABLET, FILM COATED ORAL at 17:26

## 2022-01-01 RX ADMIN — ATORVASTATIN CALCIUM 40 MG: 40 TABLET, FILM COATED ORAL at 09:57

## 2022-01-01 RX ADMIN — FERROUS SULFATE TAB 325 MG (65 MG ELEMENTAL FE) 325 MG: 325 (65 FE) TAB at 09:40

## 2022-01-01 RX ADMIN — IPRATROPIUM BROMIDE AND ALBUTEROL SULFATE 3 ML: .5; 2.5 SOLUTION RESPIRATORY (INHALATION) at 09:42

## 2022-01-01 RX ADMIN — HYDROMORPHONE HYDROCHLORIDE 1 MG: 1 INJECTION, SOLUTION INTRAMUSCULAR; INTRAVENOUS; SUBCUTANEOUS at 06:24

## 2022-01-01 RX ADMIN — MIDODRINE HYDROCHLORIDE 10 MG: 5 TABLET ORAL at 10:01

## 2022-01-01 RX ADMIN — ATORVASTATIN CALCIUM 40 MG: 40 TABLET, FILM COATED ORAL at 00:34

## 2022-01-01 RX ADMIN — FUROSEMIDE 40 MG: 40 TABLET ORAL at 10:04

## 2022-01-01 RX ADMIN — ONDANSETRON 4 MG: 2 INJECTION INTRAMUSCULAR; INTRAVENOUS at 04:50

## 2022-01-01 NOTE — PROGRESS NOTES
Problem: Mobility Impaired (Adult and Pediatric)  Goal: *Acute Goals and Plan of Care (Insert Text)  Description: Pt will be I with LE HEP in 7 days. Pt will perform bed mobility with mod I in 7 days. Pt will perform transfers with mod I in 7 days. Pt will amb- 100 feet with LRAD safely with mod I in 7 days. Outcome: Progressing Towards Goal   PHYSICAL THERAPY TREATMENT  Patient: Hill Underwood (29 y.o. female)  Date: 1/1/2022  Diagnosis: Pancreatitis [K85.90] Pancreatitis  Procedure(s) (LRB):  ESOPHAGOGASTRODUODENOSCOPY (EGD) with Dobhoff Placement (N/A) 2 Days Post-Op  Precautions:    Chart, physical therapy assessment, plan of care and goals were reviewed. ASSESSMENT  Patient continues with skilled PT services and is progressing towards goals. Appears appropriate for Ax1 with spaced out tx's for PT/OT for maximum benefit/to begin increasing activity tolerance. Pt performed bed mobility with modified IND. Maintained EOB sittingg ~4min prior to standing secondary to mild c/o dizziness upon upright posture. Min A-CGA for sit<>stand from EOB with use of RW. Once in standing, able to maintain with CGA/SBA. Performed transfer to chair with CGA and RW. Once in chair pt with mild c/o fatigue. PTA then assisted pt with changing gown/linens as purewick malfunctioned. At end of tx pt requested to remain sitting upright in recliner, nsg notified. Current Level of Function Impacting Discharge (mobility/balance): Limited mobility. Fair balance. Other factors to consider for discharge: Limited activity tolerance. PLAN :  Patient continues to benefit from skilled intervention to address the above impairments. Continue treatment per established plan of care. to address goals. Recommendation for discharge: (in order for the patient to meet his/her long term goals)  Alok9 Cristiano Zhang.:   Patient stated I have no energy.  Narayan Jung    OBJECTIVE DATA SUMMARY:   Critical Behavior:  Neurologic State: Alert  Orientation Level: Oriented X4  Cognition: Appropriate decision making     Functional Mobility Training:  Bed Mobility:     Supine to Sit: Modified independent     Scooting: Modified independent        Transfers:  Sit to Stand: Minimum assistance  Stand to Sit: Contact guard assistance        Bed to Chair: Contact guard assistance                    Balance:  Sitting: Intact  Sitting - Static: Good (unsupported)  Standing: Intact  Standing - Static: Good  Standing - Dynamic : Fair  Ambulation/Gait Training:  Distance (ft): 5 Feet (ft)  Assistive Device: Gait belt;Walker, rolling  Ambulation - Level of Assistance: Contact guard assistance                       Speed/Nichole: Slow  Step Length: Right shortened;Left shortened    Pain Ratin/10 abdominal    Activity Tolerance:   Fair and requires rest breaks      After treatment patient left in no apparent distress:   Sitting in chair, Call bell within reach, and nsg notified    COMMUNICATION/COLLABORATION:   The patients plan of care was discussed with: Registered nurse.      Aida Schmitz   Time Calculation: 34 mins

## 2022-01-01 NOTE — CONSULTS
Full consult dictated    Possible pancreatic ca with liver metastases. Pancreatci mass FNA suspicious for malignant cells but not diagnostic. Will discuss with pathology. If pt general condition and infection better consider liver biopsy and chemotherapy as out pt. If pt general condition not better palliative care. Renal cell CA with lung nodules. S/p nephrectomy. Normocytic hypochromic anemia    Severe pancreatitis    Sepsis  Ascites:S/p paracentesis. Cytology negative for malignant cells. D/w pt in detail. Thank you for the consult.

## 2022-01-01 NOTE — PROGRESS NOTES
Problem: Self Care Deficits Care Plan (Adult)  Goal: *Acute Goals and Plan of Care (Insert Text)  Description: 1. Pt will be mod I sup <> sit in prep for EOB ADLs  2. Pt will be mod I grooming sitting EOB  3. Pt will be mod I LE dressing sitting EOB/long sit  4. Pt will be mod I sit <>  prep for toileting LRAD  5. Pt will be mod I toileting/toilet transfer/cloth mgmt LRAD  6. Pt will be IND following UE HEP in prep for self care tasks      Outcome: Progressing Towards Goal   OCCUPATIONAL THERAPY TREATMENT  Patient: Juan David Snyder (89 y.o. female)  Date: 1/1/2022  Diagnosis: Pancreatitis [K85.90] Pancreatitis  Procedure(s) (LRB):  ESOPHAGOGASTRODUODENOSCOPY (EGD) with Dobhoff Placement (N/A) 2 Days Post-Op  Precautions: FALL  Chart, occupational therapy assessment, plan of care, and goals were reviewed. ASSESSMENT  Patient continues with skilled OT services and is progressing towards goals. Patient received semi supine in bed upon CARTWRIGHT'S arrival, on  3L of oxygen via NC and agreeable to work with therapist with much encouragement. Pt declined EOB self care at this time 2/2 not feeling well. Pt required SBA /set-up for grooming (oral/facial care and washed hands). Pt reported not feeling well as IV was bad in R arm and nursing having difficulty putting in new one. Pt declined UE exercises at this time. Pt left resting in bed with call bell. Patient would benefit from continued skilled Occupational services while at [facility] in order to increase safety and independence with self care and functional tranfers/mobility. Recommend at discharge to SNF when medically appropriate.      Current Level of Function Impacting Discharge (ADLs): SBA/set-up for grooming    Other factors to consider for discharge: Time of onset, medical prognosis/diagnosis, severity of deficits, PLOF, home environment, and family support          PLAN :  Patient continues to benefit from skilled intervention to address the above impairments. Continue treatment per established plan of care. to address goals. Recommend with staff: bed mobility to decrease pressure sores    Recommend next OT session: EOB grooming task and HEP    Recommendation for discharge: (in order for the patient to meet his/her long term goals)  Joseph Valentine    This discharge recommendation:  Has been made in collaboration with the attending provider and/or case management    IF patient discharges home will need the following DME: TBD       SUBJECTIVE:   Patient stated i'm not feeling the best right now.     OBJECTIVE DATA SUMMARY:   Cognitive/Behavioral Status:  Neurologic State: Alert  Orientation Level: Oriented X4  Cognition: Follows commands; Appropriate for age attention/concentration             Functional Mobility and Transfers for ADLs:  Bed Mobility:  Supine to Sit: Modified independent  Scooting: Modified independent    Transfers:  Sit to Stand: Minimum assistance     Bed to Chair: Contact guard assistance    Balance:  Sitting: Intact  Sitting - Static: Good (unsupported)  Standing: Intact  Standing - Static: Good  Standing - Dynamic : Fair    ADL Intervention:       Grooming  Grooming Assistance: Stand-by assistance;Set-up  Position Performed: Long sitting on bed  Washing Face: Stand-by assistance;Set-up  Washing Hands: Stand-by assistance;Set-up  Brushing Teeth: Stand-by assistance;Set-up             Pain:  8/10 stomach and back    Activity Tolerance:   Fair  Please refer to the flowsheet for vital signs taken during this treatment. After treatment patient left in no apparent distress:   Supine in bed, Call bell within reach, and Side rails x 3    COMMUNICATION/COLLABORATION:   The patients plan of care was discussed with: Registered nurse.      GABBIE Ho  Time Calculation: 13 mins

## 2022-01-01 NOTE — CONSULTS
Consult    Subjective:     Olvin Jean Baptiste is a 78 y.o.  female who is being seen for possible pancreatic cancer. S/p ERCP and FNA of pancreatic head which showed rare cells suspicious for malignancy  . Pt admitted with severe abdominal pain and pancreatitis. Recent CT abdomen,pelvis showed There are increasing bilateral pleural effusions, increasing body wall edema, and increasing ascites. There is a biliary stent which is unchanged in its position traversing  throughout area of obstruction within the pancreatic head. There are multiple low-density lesions of the liver consistent with metastatic disease. Bilateral adrenal masses suspicious  for metastatic disease noted. pt having abdominal fullness and pain. Pt appetite low and mild nausea. Denies bleeding or increased SOB.     Past Medical History:   Diagnosis Date    A-fib [facility])     CHF (congestive heart failure) (HCC)     Clear cell renal cell carcinoma (HCC)     COPD (chronic obstructive pulmonary disease) (HCC)     Fibromyalgia     GERD (gastroesophageal reflux disease)     Lymphedema     Sjogren's syndrome ([facility] [address]     Thyroid nodule       Past Surgical History:   Procedure Laterality Date    COLONOSCOPY N/A 11/18/2020    COLONOSCOPY performed by Hans Etienne MD at [address] HX GI      EGD    HX HYSTERECTOMY      HX NEPHRECTOMY Right 2019    HX ORTHOPAEDIC      ankle     Family History   Problem Relation Age of Onset    Hypertension Mother     Stroke Mother     Stroke Father     Hypertension Father       Social History     Tobacco Use    Smoking status: Current Every Day Smoker     Packs/day: 0.25     Types: Cigarettes    Smokeless tobacco: Never Used   Substance Use Topics    Alcohol use: Not Currently       Current Facility-Administered Medications   Medication Dose Route Frequency Provider Last Rate Last Admin    [START ON 1/1/2022] midodrine (PROAMATINE) tablet 10 mg  10 mg Oral TID WITH MEALS Luann Robledo NP        0.9% sodium chloride infusion 250 mL  250 mL IntraVENous PRN Melodye Mon, PA-C        0.9% sodium chloride infusion  75 mL/hr IntraVENous CONTINUOUS [place], Asiya R., NP 75 mL/hr at 12/31/21 2143 75 mL/hr at 12/31/21 2143    sodium chloride (NS) flush 5-40 mL  5-40 mL IntraVENous PRN Bethanie Hillman., NP        apixaban (ELIQUIS) tablet 5 mg  5 mg Oral BID Melodye FARZANA Terrazas-C   5 mg at 12/31/21 2142    furosemide (LASIX) injection 40 mg  40 mg IntraVENous DAILY KacieodyFARZANA Shaw-C   40 mg at 12/31/21 0945    anidulafungin (ERAXIS) 100 mg in 0.9% sodium chloride 130 mL IVPB  100 mg IntraVENous Q24H Ana Rosa Rice MD 83 mL/hr at 12/31/21 1640 100 mg at 12/31/21 1640    zinc oxide-white petrolatum 17-57 % topical paste   Topical TID Shanti Starr PA-C   Given at 12/31/21 2142    cyclobenzaprine (FLEXERIL) tablet 10 mg  10 mg Oral TID PRN KARLOS ShahC   10 mg at 12/30/21 0055    HYDROmorphone (DILAUDID) injection 1 mg  1 mg IntraVENous Q4H PRN Nikos Hummel NP   1 mg at 12/31/21 1824    oxyCODONE IR (ROXICODONE) tablet 5 mg  5 mg Oral Q6H PRN Nikos Hummel NP   5 mg at 12/31/21 1308    nystatin (MYCOSTATIN) 100,000 unit/gram cream   Topical BID Ana Rosa Rice MD   Given at 12/31/21 2147    docusate sodium (COLACE) capsule 100 mg  100 mg Oral BID Tonya [place], NP   100 mg at 12/31/21 0945    polyethylene glycol (MIRALAX) packet 17 g  17 g Oral DAILY Tonya [place], NP   17 g at 12/23/21 0924    sorbitoL 70 % solution 30 mL  30 mL Oral DAILY PRN Tonya [place], NP        meropenem (MERREM) 1 g in 0.9% sodium chloride 20 mL IV syringe  1 g IntraVENous Q8H Ana Rosa Rice MD   1 g at 12/31/21 1640    bisacodyL (DULCOLAX) suppository 10 mg  10 mg Rectal DAILY PRN Nikos Hummel, CONRADO        hydrALAZINE (APRESOLINE) 20 mg/mL injection 20 mg  20 mg IntraVENous Q6H PRN Huyen Crane, PA        pantoprazole (PROTONIX) 40 mg in 0.9% sodium chloride 10 mL injection  40 mg IntraVENous DAILY FARZANA Tate   40 mg at 12/31/21 0900    guaiFENesin ER (MUCINEX) tablet 600 mg  600 mg Oral Q12H Huyen Crane PA   600 mg at 12/31/21 2142    albuterol-ipratropium (DUO-NEB) 2.5 MG-0.5 MG/3 ML  3 mL Nebulization Q6H PRN Huyen Crane PA        chlorthalidone (HYGROTON) tablet 25 mg  25 mg Oral DAILY FARZANA Tate   25 mg at 12/29/21 0926    albuterol (PROVENTIL HFA, VENTOLIN HFA, PROAIR HFA) inhaler 2 Puff  2 Puff Inhalation Q6H PRN Nolberto Farah, NP   2 Puff at 12/29/21 1404    ascorbic acid (vitamin C) (VITAMIN C) tablet 500 mg  500 mg Oral DAILY Zarefrojas Jan NEGIN, NP   500 mg at 12/31/21 0945    atorvastatin (LIPITOR) tablet 40 mg  40 mg Oral DAILY Zarefrojas Jan NEGIN, NP   40 mg at 12/31/21 0945    cholecalciferol (VITAMIN D3) (1000 Units /25 mcg) tablet 1,000 Units  1,000 Units Oral DAILY Zarefrojas Jan NEGIN, NP   1,000 Units at 12/31/21 0945    diazePAM (VALIUM) tablet 5 mg  5 mg Oral Q6H PRN Sofi Nolberto NEGIN, NP   5 mg at 12/28/21 1810    dilTIAZem ER (CARDIZEM CD) capsule 120 mg  120 mg Oral DAILY Zarefrojas Jan A, NP   120 mg at 12/31/21 0945    potassium chloride SR (KLOR-CON 10) tablet 20 mEq  20 mEq Oral DAILY Zarefrojas Jan NEGIN, NP   20 mEq at 12/31/21 0945    sertraline (ZOLOFT) tablet 25 mg  25 mg Oral DAILY Zarefrojas Jan NEGNI, NP   25 mg at 12/31/21 0945    traZODone (DESYREL) tablet 50 mg  50 mg Oral QHS Zarefrojas Nolberto NEGIN, NP   50 mg at 12/31/21 2142    ondansetron (ZOFRAN) injection 4 mg  4 mg IntraVENous Q6H PRN Sofi Nolberto NEGIN, NP   4 mg at 12/31/21 1650    acetaminophen (TYLENOL) tablet 650 mg  650 mg Oral Q4H PRN Nolberto Farah NP        prochlorperazine (COMPAZINE) with saline injection 10 mg  10 mg IntraVENous Q6H PRN Nolberto Farah NP   10 mg at 12/31/21 1824        Allergies   Allergen Reactions    Adhesive Tape-Silicones Atopic Dermatitis        Review of Systems:    Other than mentioned in HPI 12 point review of systems negative  Objective: Intake and Output:    No intake/output data recorded. 12/30 0701 - 12/31 1900  In: 1980.8 [P.O.:400; I.V.:1270.8]  Out: 1100 [Urine:1100]  Blood pressure (!) 106/90, pulse 93, temperature 97.8 °F (36.6 °C), resp. rate 18, height 5' 7\" (1.702 m), weight 97.4 kg (214 lb 11.7 oz), SpO2 97 %. Physical Exam:   General:  Alert, cooperative, no distress, appears stated age. Lungs:   Clear to auscultation bilaterally. Chest wall:  No tenderness or deformity. Heart:  Regular rate and rhythm, S1, S2 normal, no murmur, click, rub or gallop. Abdomen:   Soft, non-tender. Bowel sounds normal. No masses,  No organomegaly,ascites noted. Extremities: Extremities normal, atraumatic, no cyanosis . Edema of BLE and BUE noted. right arm PICC line noted. Pulses: 2+ and symmetric all extremities. Skin: Skin color, texture, turgor normal. No rashes or lesions   Neurologic: CNII-XII intact. No gross sensory or motor deficits       Images:   DUPLEX UPPER EXT VENOUS BILAT   Final Result      XR CHEST PORT   Final Result   Large effusions. Vascular congestion. CT ABD PELV W CONT   Final Result   1. There are increasing bilateral pleural effusions, increasing body wall   edema, and increasing ascites. These findings could be secondary to the third   spacing of fluids. The differential diagnosis for the ascites would include   pancreatic ascites with acute pancreatitis or metastatic disease to the   peritoneum. 2.  There is a biliary stent which is unchanged in its position traversing   throughout area of obstruction within the pancreatic head. 3.  There are multiple low-density lesions of the liver without short-term   interval changes consistent with metastatic disease. 4.  The right kidney is not identified and apparently the patient is status post   a previous right nephrectomy.    5.  There are bilateral adrenal masses suspect for metastatic disease without   short-term interval changes. CTA CHEST W OR W WO CONT   Final Result   No CT evidence for PE. Thoracic aorta atherosclerosis. CAD. Moderate to large bilateral pleural effusions with associated RML, RLL, and LLL   compressive atelectasis. Abdominal ascites. DUPLEX LOWER EXT VENOUS BILAT   Final Result      XR CHEST PORT   Final Result   FINDINGS: IMPRESSION: 2 frontal views of the chest. Right PICC distal tip SVC   region. Enlarging cardiomegaly. Increasing vascular congestion. Interval development of   hypoinflation, pleural effusions, lower lung airspace edema and/or pneumonia. No   pneumothorax. No free air under the diaphragm. CT ABD PELV W CONT   Final Result   New moderate volume dependent pleural effusions with associated   lower lobe lung compressive atelectasis. Increasing ascites, moderate approaching large-volume   (fluid could be sampled if there is any clinical concern for bile content). High suspicion hepatic metastases. Similar appearance for the pancreas; Silastic stent in place. No pseudocyst formation. Unchanged appearance bilateral adrenal glands. No bowel obstruction. US ABD LTD   Final Result   Small amount of free fluid. Finding suggesting hemangioma in the   liver. Gallbladder wall thickening, adenomyomatosis, sludge and gallstones. Cholecystitis possible. Finding in the region of the pancreatic head as above. Other findings as above. CTA ABDOMEN PELV W CONT   Final Result   1. Ill-defined hypodense mass in the pancreas. There are inflammatory changes   and fluid adjacent to the pancreas or duodenum and stomach most likely related   to biopsy or focal pancreatitis. No pseudocyst formation, active bleeding or   other acute findings. 2. Enlarged adrenal glands left greater than right with noncontrast densities   consistent with adrenal adenomas. 3. Right nephrectomy. 4. Other findings as described. XR CHEST PORT   Final Result   No acute findings. IR PARACENTESIS ABD W IMAGE    (Results Pending)            Data Review:   Recent Results (from the past 24 hour(s))   METABOLIC PANEL, COMPREHENSIVE    Collection Time: 12/31/21 12:04 PM   Result Value Ref Range    Sodium 132 (L) 136 - 145 mmol/L    Potassium 3.3 (L) 3.5 - 5.1 mmol/L    Chloride 89 (L) 97 - 108 mmol/L    CO2 40 (H) 21 - 32 mmol/L    Anion gap 3 (L) 5 - 15 mmol/L    Glucose 95 65 - 100 mg/dL    BUN 17 6 - 20 mg/dL    Creatinine 0.56 0.55 - 1.02 mg/dL    BUN/Creatinine ratio 30 (H) 12 - 20      GFR est AA >60 >60 ml/min/1.73m2    GFR est non-AA >60 >60 ml/min/1.73m2    Calcium 8.4 (L) 8.5 - 10.1 mg/dL    Bilirubin, total 0.7 0.2 - 1.0 mg/dL    AST (SGOT) 34 15 - 37 U/L    ALT (SGPT) 20 12 - 78 U/L    Alk. phosphatase 345 (H) 45 - 117 U/L    Protein, total 4.8 (L) 6.4 - 8.2 g/dL    Albumin 1.2 (L) 3.5 - 5.0 g/dL    Globulin 3.6 2.0 - 4.0 g/dL    A-G Ratio 0.3 (L) 1.1 - 2.2     CBC WITH AUTOMATED DIFF    Collection Time: 12/31/21 12:04 PM   Result Value Ref Range    WBC 7.9 3.6 - 11.0 K/uL    RBC 3.02 (L) 3.80 - 5.20 M/uL    HGB 9.1 (L) 11.5 - 16.0 g/dL    HCT 28.3 (L) 35.0 - 47.0 %    MCV 93.7 80.0 - 99.0 FL    MCH 30.1 26.0 - 34.0 PG    MCHC 32.2 30.0 - 36.5 g/dL    RDW 13.5 11.5 - 14.5 %    PLATELET 742 430 - 184 K/uL    MPV 10.6 8.9 - 12.9 FL    NRBC 0.0 0.0  WBC    ABSOLUTE NRBC 0.00 0.00 - 0.01 K/uL    NEUTROPHILS 87 (H) 32 - 75 %    LYMPHOCYTES 6 (L) 12 - 49 %    MONOCYTES 5 5 - 13 %    EOSINOPHILS 1 0 - 7 %    BASOPHILS 0 0 - 1 %    IMMATURE GRANULOCYTES 1 (H) 0 - 0.5 %    ABS. NEUTROPHILS 7.0 1.8 - 8.0 K/UL    ABS. LYMPHOCYTES 0.5 (L) 0.8 - 3.5 K/UL    ABS. MONOCYTES 0.4 0.0 - 1.0 K/UL    ABS. EOSINOPHILS 0.1 0.0 - 0.4 K/UL    ABS. BASOPHILS 0.0 0.0 - 0.1 K/UL    ABS. IMM.  GRANS. 0.0 0.00 - 0.04 K/UL    DF AUTOMATED     D DIMER    Collection Time: 12/31/21 12:04 PM   Result Value Ref Range    D DIMER 6.72 (H) <0.50 ug/ml(FEU) PROTHROMBIN TIME + INR    Collection Time: 12/31/21 12:04 PM   Result Value Ref Range    Prothrombin time 18.5 (H) 11.9 - 14.7 sec    INR 1.6 (H) 0.9 - 1.1     LIPASE    Collection Time: 12/31/21 12:04 PM   Result Value Ref Range    Lipase 2,813 (H) 73 - 452 U/L   METABOLIC PANEL, COMPREHENSIVE    Collection Time: 12/31/21 12:04 PM   Result Value Ref Range    Sodium 132 (L) 136 - 145 mmol/L    Potassium 3.4 (L) 3.5 - 5.1 mmol/L    Chloride 89 (L) 97 - 108 mmol/L    CO2 41 (HH) 21 - 32 mmol/L    Anion gap 2 (L) 5 - 15 mmol/L    Glucose 93 65 - 100 mg/dL    BUN 17 6 - 20 mg/dL    Creatinine 0.58 0.55 - 1.02 mg/dL    BUN/Creatinine ratio 29 (H) 12 - 20      GFR est AA >60 >60 ml/min/1.73m2    GFR est non-AA >60 >60 ml/min/1.73m2    Calcium 8.2 (L) 8.5 - 10.1 mg/dL    Bilirubin, total 0.8 0.2 - 1.0 mg/dL    AST (SGOT) 33 15 - 37 U/L    ALT (SGPT) 19 12 - 78 U/L    Alk. phosphatase 366 (H) 45 - 117 U/L    Protein, total 4.3 (L) 6.4 - 8.2 g/dL    Albumin 1.3 (L) 3.5 - 5.0 g/dL    Globulin 3.0 2.0 - 4.0 g/dL    A-G Ratio 0.4 (L) 1.1 - 2.2     C REACTIVE PROTEIN, QT    Collection Time: 12/31/21 12:04 PM   Result Value Ref Range    C-Reactive protein 11.10 (H) 0.00 - 0.60 mg/dL   PROCALCITONIN    Collection Time: 12/31/21 12:04 PM   Result Value Ref Range    Procalcitonin 0.32 (H) 0 ng/mL        Assessment:     Principal Problem:    Pancreatitis (12/9/2021)    Active Problems:    [facility] ([facility] (11/16/2020)      CHF (congestive heart failure) ([facility] (11/16/2020)      Hematemesis (12/12/2021)      History of peptic ulcer disease (12/12/2021)        Plan:     Consult    Subjective:     Rodríguez Gutierrez is a 78 y.o.   female who is being seen for         Past Medical History:   Diagnosis Date    A-fib [facility])     CHF (congestive heart failure) (HCC)     Clear cell renal cell carcinoma (HCC)     COPD (chronic obstructive pulmonary disease) (HCC)     Fibromyalgia     GERD (gastroesophageal reflux disease)     Lymphedema  Sjogren's syndrome ([facility] [address]     Thyroid nodule       Past Surgical History:   Procedure Laterality Date    COLONOSCOPY N/A 11/18/2020    COLONOSCOPY performed by Sheree Canales MD at [address] [facility] HX GI      EGD    HX HYSTERECTOMY      HX NEPHRECTOMY Right 2019    HX ORTHOPAEDIC      ankle     Family History   Problem Relation Age of Onset    Hypertension Mother     Stroke Mother     Stroke Father     Hypertension Father       Social History     Tobacco Use    Smoking status: Current Every Day Smoker     Packs/day: 0.25     Types: Cigarettes    Smokeless tobacco: Never Used   Substance Use Topics    Alcohol use: Not Currently       Current Facility-Administered Medications   Medication Dose Route Frequency Provider Last Rate Last Admin    [START ON 1/1/2022] midodrine (PROAMATINE) tablet 10 mg  10 mg Oral TID WITH MEALS Itzel Witt NP        0.9% sodium chloride infusion 250 mL  250 mL IntraVENous PRN Adiel Eric PA-C        0.9% sodium chloride infusion  75 mL/hr IntraVENous CONTINUOUS Asiya Hillman NP 75 mL/hr at 12/31/21 2143 75 mL/hr at 12/31/21 2143    sodium chloride (NS) flush 5-40 mL  5-40 mL IntraVENous PRN Scott Hillman NP        apixaban (ELIQUIS) tablet 5 mg  5 mg Oral BID Adiel Eric PA-C   5 mg at 12/31/21 2142    furosemide (LASIX) injection 40 mg  40 mg IntraVENous DAILY Adielglen Reyes PA-C   40 mg at 12/31/21 0945    anidulafungin (ERAXIS) 100 mg in 0.9% sodium chloride 130 mL IVPB  100 mg IntraVENous Q24H Milagros Herr MD 83 mL/hr at 12/31/21 1640 100 mg at 12/31/21 1640    zinc oxide-white petrolatum 17-57 % topical paste   Topical TID Rosy Briscoe PA-C   Given at 12/31/21 2142    cyclobenzaprine (FLEXERIL) tablet 10 mg  10 mg Oral TID PRN Adielglen Reyes PA-C   10 mg at 12/30/21 0055    HYDROmorphone (DILAUDID) injection 1 mg  1 mg IntraVENous Q4H PRN Ko Boyer NP   1 mg at 12/31/21 1824    oxyCODONE IR (ROXICODONE) tablet 5 mg  5 mg Oral Q6H PRN Delphine Delgado NP   5 mg at 12/31/21 1308    nystatin (MYCOSTATIN) 100,000 unit/gram cream   Topical BID Lang Rivas MD   Given at 12/31/21 2147    docusate sodium (COLACE) capsule 100 mg  100 mg Oral BID Javier Brian, NP   100 mg at 12/31/21 0945    polyethylene glycol (MIRALAX) packet 17 g  17 g Oral DAILY Javier [place], NP   17 g at 12/23/21 0924    sorbitoL 70 % solution 30 mL  30 mL Oral DAILY PRN Javier [place], NP        meropenem (MERREM) 1 g in 0.9% sodium chloride 20 mL IV syringe  1 g IntraVENous Q8H Lang Rivas MD   1 g at 12/31/21 1640    bisacodyL (DULCOLAX) suppository 10 mg  10 mg Rectal DAILY PRN Delphine Delgado NP        hydrALAZINE (APRESOLINE) 20 mg/mL injection 20 mg  20 mg IntraVENous Q6H PRN Huyen Crane PA        pantoprazole (PROTONIX) 40 mg in 0.9% sodium chloride 10 mL injection  40 mg IntraVENous DAILY Huyen Crane PA   40 mg at 12/31/21 0900    guaiFENesin ER (MUCINEX) tablet 600 mg  600 mg Oral Q12H Huyen Crane PA   600 mg at 12/31/21 2142    albuterol-ipratropium (DUO-NEB) 2.5 MG-0.5 MG/3 ML  3 mL Nebulization Q6H PRN Huyen Crane PA        chlorthalidone (HYGROTON) tablet 25 mg  25 mg Oral DAILY FARZANA Su   25 mg at 12/29/21 0926    albuterol (PROVENTIL HFA, VENTOLIN HFA, PROAIR HFA) inhaler 2 Puff  2 Puff Inhalation Q6H PRN Nolberto Farah, NP   2 Puff at 12/29/21 1404    ascorbic acid (vitamin C) (VITAMIN C) tablet 500 mg  500 mg Oral DAILY Zabel Nolberto NEGIN, NP   500 mg at 12/31/21 0945    atorvastatin (LIPITOR) tablet 40 mg  40 mg Oral DAILY Nolberto Farah NP   40 mg at 12/31/21 0945    cholecalciferol (VITAMIN D3) (1000 Units /25 mcg) tablet 1,000 Units  1,000 Units Oral DAILY Nolberto Farah NP   1,000 Units at 12/31/21 0945    diazePAM (VALIUM) tablet 5 mg  5 mg Oral Q6H PRN Nolberto Farah NP   5 mg at 12/28/21 1810    dilTIAZem ER (CARDIZEM CD) capsule 120 mg  120 mg Oral DAILY Zarefrojas Jan A, NP   120 mg at 12/31/21 0945    potassium chloride SR (KLOR-CON 10) tablet 20 mEq  20 mEq Oral DAILY Zarefoss Jan A, NP   20 mEq at 12/31/21 0945    sertraline (ZOLOFT) tablet 25 mg  25 mg Oral DAILY Zarefoss, Jan A, NP   25 mg at 12/31/21 0945    traZODone (DESYREL) tablet 50 mg  50 mg Oral QHS Zarefoss, Jan A, NP   50 mg at 12/31/21 2142    ondansetron (ZOFRAN) injection 4 mg  4 mg IntraVENous Q6H PRN Zadamarioss, Jan A, NP   4 mg at 12/31/21 1650    acetaminophen (TYLENOL) tablet 650 mg  650 mg Oral Q4H PRN Zabel, Nolberto A, NP        prochlorperazine (COMPAZINE) with saline injection 10 mg  10 mg IntraVENous Q6H PRN Zadamarioss Jan A, NP   10 mg at 12/31/21 1824        Allergies   Allergen Reactions    Adhesive Tape-Silicones Atopic Dermatitis        Review of Systems:  Other than mentioned in HPI 12 point review of systems negative    Objective: Intake and Output:    No intake/output data recorded. 12/30 0701 - 12/31 1900  In: 1980.8 [P.O.:400; I.V.:1270.8]  Out: 1100 [Urine:1100]  Blood pressure (!) 106/90, pulse 93, temperature 97.8 °F (36.6 °C), resp. rate 18, height 5' 7\" (1.702 m), weight 97.4 kg (214 lb 11.7 oz), SpO2 97 %. Physical Exam:   General:  Alert, cooperative, no distress, appears stated age. Lungs:   Clear to auscultation bilaterally. Chest wall:  No tenderness or deformity. Heart:  Regular rate and rhythm, S1, S2 normal, no murmur, click, rub or gallop. Abdomen:   Soft, non-tender. Bowel sounds normal. No masses,  No organomegaly. Extremities: Extremities normal, atraumatic, no cyanosis or edema. Pulses: 2+ and symmetric all extremities. Skin: Skin color, texture, turgor normal. No rashes or lesions   Neurologic: CNII-XII intact. No gross sensory or motor deficits       Images:   DUPLEX UPPER EXT VENOUS BILAT   Final Result      XR CHEST PORT   Final Result   Large effusions. Vascular congestion.       CT ABD PELV W CONT   Final Result   1. There are increasing bilateral pleural effusions, increasing body wall   edema, and increasing ascites. These findings could be secondary to the third   spacing of fluids. The differential diagnosis for the ascites would include   pancreatic ascites with acute pancreatitis or metastatic disease to the   peritoneum. 2.  There is a biliary stent which is unchanged in its position traversing   throughout area of obstruction within the pancreatic head. 3.  There are multiple low-density lesions of the liver without short-term   interval changes consistent with metastatic disease. 4.  The right kidney is not identified and apparently the patient is status post   a previous right nephrectomy. 5.  There are bilateral adrenal masses suspect for metastatic disease without   short-term interval changes. CTA CHEST W OR W WO CONT   Final Result   No CT evidence for PE. Thoracic aorta atherosclerosis. CAD. Moderate to large bilateral pleural effusions with associated RML, RLL, and LLL   compressive atelectasis. Abdominal ascites. DUPLEX LOWER EXT VENOUS BILAT   Final Result      XR CHEST PORT   Final Result   FINDINGS: IMPRESSION: 2 frontal views of the chest. Right PICC distal tip SVC   region. Enlarging cardiomegaly. Increasing vascular congestion. Interval development of   hypoinflation, pleural effusions, lower lung airspace edema and/or pneumonia. No   pneumothorax. No free air under the diaphragm. CT ABD PELV W CONT   Final Result   New moderate volume dependent pleural effusions with associated   lower lobe lung compressive atelectasis. Increasing ascites, moderate approaching large-volume   (fluid could be sampled if there is any clinical concern for bile content). High suspicion hepatic metastases. Similar appearance for the pancreas; Silastic stent in place. No pseudocyst formation.       Unchanged appearance bilateral adrenal glands. No bowel obstruction. US ABD LTD   Final Result   Small amount of free fluid. Finding suggesting hemangioma in the   liver. Gallbladder wall thickening, adenomyomatosis, sludge and gallstones. Cholecystitis possible. Finding in the region of the pancreatic head as above. Other findings as above. CTA ABDOMEN PELV W CONT   Final Result   1. Ill-defined hypodense mass in the pancreas. There are inflammatory changes   and fluid adjacent to the pancreas or duodenum and stomach most likely related   to biopsy or focal pancreatitis. No pseudocyst formation, active bleeding or   other acute findings. 2. Enlarged adrenal glands left greater than right with noncontrast densities   consistent with adrenal adenomas. 3. Right nephrectomy. 4. Other findings as described. XR CHEST PORT   Final Result   No acute findings. IR PARACENTESIS ABD W IMAGE    (Results Pending)            Data Review:   Recent Results (from the past 24 hour(s))   METABOLIC PANEL, COMPREHENSIVE    Collection Time: 12/31/21 12:04 PM   Result Value Ref Range    Sodium 132 (L) 136 - 145 mmol/L    Potassium 3.3 (L) 3.5 - 5.1 mmol/L    Chloride 89 (L) 97 - 108 mmol/L    CO2 40 (H) 21 - 32 mmol/L    Anion gap 3 (L) 5 - 15 mmol/L    Glucose 95 65 - 100 mg/dL    BUN 17 6 - 20 mg/dL    Creatinine 0.56 0.55 - 1.02 mg/dL    BUN/Creatinine ratio 30 (H) 12 - 20      GFR est AA >60 >60 ml/min/1.73m2    GFR est non-AA >60 >60 ml/min/1.73m2    Calcium 8.4 (L) 8.5 - 10.1 mg/dL    Bilirubin, total 0.7 0.2 - 1.0 mg/dL    AST (SGOT) 34 15 - 37 U/L    ALT (SGPT) 20 12 - 78 U/L    Alk.  phosphatase 345 (H) 45 - 117 U/L    Protein, total 4.8 (L) 6.4 - 8.2 g/dL    Albumin 1.2 (L) 3.5 - 5.0 g/dL    Globulin 3.6 2.0 - 4.0 g/dL    A-G Ratio 0.3 (L) 1.1 - 2.2     CBC WITH AUTOMATED DIFF    Collection Time: 12/31/21 12:04 PM   Result Value Ref Range    WBC 7.9 3.6 - 11.0 K/uL    RBC 3.02 (L) 3.80 - 5.20 M/uL HGB 9.1 (L) 11.5 - 16.0 g/dL    HCT 28.3 (L) 35.0 - 47.0 %    MCV 93.7 80.0 - 99.0 FL    MCH 30.1 26.0 - 34.0 PG    MCHC 32.2 30.0 - 36.5 g/dL    RDW 13.5 11.5 - 14.5 %    PLATELET 800 083 - 814 K/uL    MPV 10.6 8.9 - 12.9 FL    NRBC 0.0 0.0  WBC    ABSOLUTE NRBC 0.00 0.00 - 0.01 K/uL    NEUTROPHILS 87 (H) 32 - 75 %    LYMPHOCYTES 6 (L) 12 - 49 %    MONOCYTES 5 5 - 13 %    EOSINOPHILS 1 0 - 7 %    BASOPHILS 0 0 - 1 %    IMMATURE GRANULOCYTES 1 (H) 0 - 0.5 %    ABS. NEUTROPHILS 7.0 1.8 - 8.0 K/UL    ABS. LYMPHOCYTES 0.5 (L) 0.8 - 3.5 K/UL    ABS. MONOCYTES 0.4 0.0 - 1.0 K/UL    ABS. EOSINOPHILS 0.1 0.0 - 0.4 K/UL    ABS. BASOPHILS 0.0 0.0 - 0.1 K/UL    ABS. IMM. GRANS. 0.0 0.00 - 0.04 K/UL    DF AUTOMATED     D DIMER    Collection Time: 12/31/21 12:04 PM   Result Value Ref Range    D DIMER 6.72 (H) <0.50 ug/ml(FEU)   PROTHROMBIN TIME + INR    Collection Time: 12/31/21 12:04 PM   Result Value Ref Range    Prothrombin time 18.5 (H) 11.9 - 14.7 sec    INR 1.6 (H) 0.9 - 1.1     LIPASE    Collection Time: 12/31/21 12:04 PM   Result Value Ref Range    Lipase 2,813 (H) 73 - 571 U/L   METABOLIC PANEL, COMPREHENSIVE    Collection Time: 12/31/21 12:04 PM   Result Value Ref Range    Sodium 132 (L) 136 - 145 mmol/L    Potassium 3.4 (L) 3.5 - 5.1 mmol/L    Chloride 89 (L) 97 - 108 mmol/L    CO2 41 (HH) 21 - 32 mmol/L    Anion gap 2 (L) 5 - 15 mmol/L    Glucose 93 65 - 100 mg/dL    BUN 17 6 - 20 mg/dL    Creatinine 0.58 0.55 - 1.02 mg/dL    BUN/Creatinine ratio 29 (H) 12 - 20      GFR est AA >60 >60 ml/min/1.73m2    GFR est non-AA >60 >60 ml/min/1.73m2    Calcium 8.2 (L) 8.5 - 10.1 mg/dL    Bilirubin, total 0.8 0.2 - 1.0 mg/dL    AST (SGOT) 33 15 - 37 U/L    ALT (SGPT) 19 12 - 78 U/L    Alk.  phosphatase 366 (H) 45 - 117 U/L    Protein, total 4.3 (L) 6.4 - 8.2 g/dL    Albumin 1.3 (L) 3.5 - 5.0 g/dL    Globulin 3.0 2.0 - 4.0 g/dL    A-G Ratio 0.4 (L) 1.1 - 2.2     C REACTIVE PROTEIN, QT    Collection Time: 12/31/21 12:04 PM   Result Value Ref Range    C-Reactive protein 11.10 (H) 0.00 - 0.60 mg/dL   PROCALCITONIN    Collection Time: 12/31/21 12:04 PM   Result Value Ref Range    Procalcitonin 0.32 (H) 0 ng/mL        Assessment:       Assessment   Assessment/Plan:      Possible pancreatic ca with liver metastases. Pancreatci mass FNA suspicious for malignant cells but not diagnostic. Will discuss with pathology. Ca 19-9 very high more than 22,000. If pt general condition and infection better consider CT guided liver biopsy and chemotherapy as out pt. If pt general condition not better palliative care. Prognosis poor.   D/w pt in detail. Renal cell CA with lung nodules. S/p right nephrectomy.     Normocytic hypochromic anemia:hb coming down. R/o Gi bleeding. Check stool for occult blood. Transfuse pRBC if hb less than 7 or symptomatic. Check iron studies,b12,folate.     Severe pancreatitis     Sepsis  Ascites:S/p paracentesis. Cytology negative for malignant cells. Superficial vein thrombosis LUE:pt on eliquis.right arm more swollen with PICC line. If not better repeat venous doppler LUE. BL pleural effusions    Thank you for the consult.

## 2022-01-01 NOTE — PROGRESS NOTES
Problem: Falls - Risk of  Goal: *Absence of Falls  Description: Document [place] Fall Risk and appropriate interventions in the flowsheet.   Outcome: Progressing Towards Goal  Note: Fall Risk Interventions:  Mobility Interventions: Bed/chair exit alarm    Mentation Interventions: Bed/chair exit alarm    Medication Interventions: Bed/chair exit alarm    Elimination Interventions: Call light in reach    History of Falls Interventions: Bed/chair exit alarm

## 2022-01-01 NOTE — PROGRESS NOTES
Hospitalist Progress Note    Subjective:   Daily Progress Note: 1/1/2022 12:21 PM    Hospital Course:  70-year-old female with a history of atrial fibrillation, peptic ulcer disease, CHF, COPD, renal cell carcinoma stage IV status post nephrectomy, GERD, COPD, Sojourn syndrome and essential hypertension who was presented to the emergency department for admission on 12/9/2021 after undergoing a EUS on 12/06 by Dr. Anne Pérez with biopsy and subsequently developed severe pancreatitis. Patient's admission course has been complicated by ongoing pancreatitis and severe abdominal pain. CT scan of the abdomen pelvis revealed an ill-defined hypodense mass in the pancreas with fluid adjacent to the pancreas suggestive of focal pancreatitis. CTA chest showing no evidence of pulmonary embolism, but does note moderate to large bilateral pleural effusions in RML, RLL and LLL as well as ascites. GI and oncology consulted. Patient had an episode of hematemesis as well. Patient remains on IV Protonix. Abdominal ultrasound also showed no hemodynamic drop in the liver. Gallbladder wall has been thickened with sludge and stones although cholecystitis is less likely at this point. Although continue to monitor closely. Patient was started on Zosyn and subsequently changed to meropenem as the patient developed leukocytosis. Patient also has elevation in her INR although had been receiving Eliquis and therefore Eliquis was held. Patient started on p.o. diet although developed severe pain and have decreased her diet to clear liquid. General surgery following as well, Dr. Yimi Garay. Lipase continues to elevate. CT abd/pelvis with PO and IV contrast showing increasing bilateral pleural effusion and increasing ascites. Also notes multiple low-density lesions of liver consistent with metastatic disease and bilateral adrenal masses suspected for metastatic disease. IR consulted for liver biopsy. D-dimer elevated >14.  Duplex US lower extremities negative for DVT. Duplex upper extremities showing superficial thrombophlebitis in left median/antecubital veins. Restarted on home Eliquis. Paracentesis performed on 12/27 with 2300 mL clear serous fluid drained. Cytology results showing no growth. She was scheduled for liver biopsy with IR however IR does not feel patient would benefit from biopsy at this time. Patient taken down for EGD with Dobhoff Placement on 12/30/21 for nutritional support but the tube was pulled out overnight. Subjective: Follow-up examination patient at bedside. She is out of bed in bedside chair. She remains physically debilitated. Encouraging to eat, she may bring in her own food low-fat high-protein.     Current Facility-Administered Medications   Medication Dose Route Frequency    midodrine (PROAMATINE) tablet 10 mg  10 mg Oral TID WITH MEALS    0.9% sodium chloride infusion 250 mL  250 mL IntraVENous PRN    sodium chloride (NS) flush 5-40 mL  5-40 mL IntraVENous PRN    apixaban (ELIQUIS) tablet 5 mg  5 mg Oral BID    furosemide (LASIX) injection 40 mg  40 mg IntraVENous DAILY    anidulafungin (ERAXIS) 100 mg in 0.9% sodium chloride 130 mL IVPB  100 mg IntraVENous Q24H    zinc oxide-white petrolatum 17-57 % topical paste   Topical TID    cyclobenzaprine (FLEXERIL) tablet 10 mg  10 mg Oral TID PRN    HYDROmorphone (DILAUDID) injection 1 mg  1 mg IntraVENous Q4H PRN    oxyCODONE IR (ROXICODONE) tablet 5 mg  5 mg Oral Q6H PRN    nystatin (MYCOSTATIN) 100,000 unit/gram cream   Topical BID    docusate sodium (COLACE) capsule 100 mg  100 mg Oral BID    polyethylene glycol (MIRALAX) packet 17 g  17 g Oral DAILY    sorbitoL 70 % solution 30 mL  30 mL Oral DAILY PRN    meropenem (MERREM) 1 g in 0.9% sodium chloride 20 mL IV syringe  1 g IntraVENous Q8H    bisacodyL (DULCOLAX) suppository 10 mg  10 mg Rectal DAILY PRN    hydrALAZINE (APRESOLINE) 20 mg/mL injection 20 mg  20 mg IntraVENous Q6H PRN    pantoprazole (PROTONIX) 40 mg in 0.9% sodium chloride 10 mL injection  40 mg IntraVENous DAILY    guaiFENesin ER (MUCINEX) tablet 600 mg  600 mg Oral Q12H    albuterol-ipratropium (DUO-NEB) 2.5 MG-0.5 MG/3 ML  3 mL Nebulization Q6H PRN    chlorthalidone (HYGROTON) tablet 25 mg  25 mg Oral DAILY    albuterol (PROVENTIL HFA, VENTOLIN HFA, PROAIR HFA) inhaler 2 Puff  2 Puff Inhalation Q6H PRN    ascorbic acid (vitamin C) (VITAMIN C) tablet 500 mg  500 mg Oral DAILY    atorvastatin (LIPITOR) tablet 40 mg  40 mg Oral DAILY    cholecalciferol (VITAMIN D3) (1000 Units /25 mcg) tablet 1,000 Units  1,000 Units Oral DAILY    diazePAM (VALIUM) tablet 5 mg  5 mg Oral Q6H PRN    dilTIAZem ER (CARDIZEM CD) capsule 120 mg  120 mg Oral DAILY    potassium chloride SR (KLOR-CON 10) tablet 20 mEq  20 mEq Oral DAILY    sertraline (ZOLOFT) tablet 25 mg  25 mg Oral DAILY    traZODone (DESYREL) tablet 50 mg  50 mg Oral QHS    ondansetron (ZOFRAN) injection 4 mg  4 mg IntraVENous Q6H PRN    acetaminophen (TYLENOL) tablet 650 mg  650 mg Oral Q4H PRN    prochlorperazine (COMPAZINE) with saline injection 10 mg  10 mg IntraVENous Q6H PRN        Review of Systems  Constitutional: Positive for malaise/fatigue. Negative for fever. HENT: Negative. Respiratory: Positive for shortness of breath. Negative for cough. Cardiovascular: Negative for chest pain and leg swelling. Gastrointestinal: Positive for abdominal pain. Negative for nausea and vomiting.    Genitourinary: No frequency, No dysuria, No hematuria  Integument/breast: No skin lesion(s)   Neurological: No Confusion, No headaches, No dizziness      Objective:     Visit Vitals  BP (!) 92/59 (BP 1 Location: Right lower arm, BP Patient Position: At rest)   Pulse 94   Temp 98.9 °F (37.2 °C)   Resp 18   Ht 5' 7\" (1.702 m)   Wt 97.4 kg (214 lb 11.7 oz)   SpO2 98%   BMI 33.63 kg/m²    O2 Flow Rate (L/min): 3 l/min O2 Device: Nasal cannula    Temp (24hrs), Av.3 °F (36.8 °C), Min:97.8 °F (36.6 °C), Max:98.9 °F (37.2 °C)      No intake/output data recorded. 12/30 1901 - 01/01 0700  In: 1037.5 [P.O.:430; I.V.:297.5]  Out: 1900 [Urine:1900]    PHYSICAL EXAM:  Constitutional: No acute distress  Skin: Extremities and face reveal no rashes. Multiple areas of ecchymosis in upper extremities. HEENT: Dobhoff in left nare. Sclerae anicteric. Extra-occular muscles are intact. No oral ulcers. The neck is supple and no masses. Cardiovascular: Regular rate and rhythm. +S1/S2. No murmur or gallop. Respiratory:  Rhonchi noted bilateral without wheezes. GI: Firm with hypoactive bowel sounds and tenderness to palpation in all 4 quadrant. Rectal: Deferred   Musculoskeletal: Upper and lower extremity peripheral edema present, likely third spacing. Able to move all ext  Neurological:  Generalized weakness. Patient is alert and oriented x3. Cranial nerves II-XII grossly intact  Psychiatric: Mood appears appropriate       Data Review    Recent Results (from the past 24 hour(s))   METABOLIC PANEL, COMPREHENSIVE    Collection Time: 12/31/21 12:04 PM   Result Value Ref Range    Sodium 132 (L) 136 - 145 mmol/L    Potassium 3.3 (L) 3.5 - 5.1 mmol/L    Chloride 89 (L) 97 - 108 mmol/L    CO2 40 (H) 21 - 32 mmol/L    Anion gap 3 (L) 5 - 15 mmol/L    Glucose 95 65 - 100 mg/dL    BUN 17 6 - 20 mg/dL    Creatinine 0.56 0.55 - 1.02 mg/dL    BUN/Creatinine ratio 30 (H) 12 - 20      GFR est AA >60 >60 ml/min/1.73m2    GFR est non-AA >60 >60 ml/min/1.73m2    Calcium 8.4 (L) 8.5 - 10.1 mg/dL    Bilirubin, total 0.7 0.2 - 1.0 mg/dL    AST (SGOT) 34 15 - 37 U/L    ALT (SGPT) 20 12 - 78 U/L    Alk.  phosphatase 345 (H) 45 - 117 U/L    Protein, total 4.8 (L) 6.4 - 8.2 g/dL    Albumin 1.2 (L) 3.5 - 5.0 g/dL    Globulin 3.6 2.0 - 4.0 g/dL    A-G Ratio 0.3 (L) 1.1 - 2.2     CBC WITH AUTOMATED DIFF    Collection Time: 12/31/21 12:04 PM   Result Value Ref Range    WBC 7.9 3.6 - 11.0 K/uL    RBC 3.02 (L) 3.80 - 5.20 M/uL HGB 9.1 (L) 11.5 - 16.0 g/dL    HCT 28.3 (L) 35.0 - 47.0 %    MCV 93.7 80.0 - 99.0 FL    MCH 30.1 26.0 - 34.0 PG    MCHC 32.2 30.0 - 36.5 g/dL    RDW 13.5 11.5 - 14.5 %    PLATELET 840 789 - 538 K/uL    MPV 10.6 8.9 - 12.9 FL    NRBC 0.0 0.0  WBC    ABSOLUTE NRBC 0.00 0.00 - 0.01 K/uL    NEUTROPHILS 87 (H) 32 - 75 %    LYMPHOCYTES 6 (L) 12 - 49 %    MONOCYTES 5 5 - 13 %    EOSINOPHILS 1 0 - 7 %    BASOPHILS 0 0 - 1 %    IMMATURE GRANULOCYTES 1 (H) 0 - 0.5 %    ABS. NEUTROPHILS 7.0 1.8 - 8.0 K/UL    ABS. LYMPHOCYTES 0.5 (L) 0.8 - 3.5 K/UL    ABS. MONOCYTES 0.4 0.0 - 1.0 K/UL    ABS. EOSINOPHILS 0.1 0.0 - 0.4 K/UL    ABS. BASOPHILS 0.0 0.0 - 0.1 K/UL    ABS. IMM. GRANS. 0.0 0.00 - 0.04 K/UL    DF AUTOMATED     D DIMER    Collection Time: 12/31/21 12:04 PM   Result Value Ref Range    D DIMER 6.72 (H) <0.50 ug/ml(FEU)   PROTHROMBIN TIME + INR    Collection Time: 12/31/21 12:04 PM   Result Value Ref Range    Prothrombin time 18.5 (H) 11.9 - 14.7 sec    INR 1.6 (H) 0.9 - 1.1     LIPASE    Collection Time: 12/31/21 12:04 PM   Result Value Ref Range    Lipase 2,813 (H) 73 - 101 U/L   METABOLIC PANEL, COMPREHENSIVE    Collection Time: 12/31/21 12:04 PM   Result Value Ref Range    Sodium 132 (L) 136 - 145 mmol/L    Potassium 3.4 (L) 3.5 - 5.1 mmol/L    Chloride 89 (L) 97 - 108 mmol/L    CO2 41 (HH) 21 - 32 mmol/L    Anion gap 2 (L) 5 - 15 mmol/L    Glucose 93 65 - 100 mg/dL    BUN 17 6 - 20 mg/dL    Creatinine 0.58 0.55 - 1.02 mg/dL    BUN/Creatinine ratio 29 (H) 12 - 20      GFR est AA >60 >60 ml/min/1.73m2    GFR est non-AA >60 >60 ml/min/1.73m2    Calcium 8.2 (L) 8.5 - 10.1 mg/dL    Bilirubin, total 0.8 0.2 - 1.0 mg/dL    AST (SGOT) 33 15 - 37 U/L    ALT (SGPT) 19 12 - 78 U/L    Alk.  phosphatase 366 (H) 45 - 117 U/L    Protein, total 4.3 (L) 6.4 - 8.2 g/dL    Albumin 1.3 (L) 3.5 - 5.0 g/dL    Globulin 3.0 2.0 - 4.0 g/dL    A-G Ratio 0.4 (L) 1.1 - 2.2     C REACTIVE PROTEIN, QT    Collection Time: 12/31/21 12:04 PM   Result Value Ref Range    C-Reactive protein 11.10 (H) 0.00 - 0.60 mg/dL   PROCALCITONIN    Collection Time: 12/31/21 12:04 PM   Result Value Ref Range    Procalcitonin 0.32 (H) 0 ng/mL       DUPLEX UPPER EXT VENOUS BILAT   Final Result      XR CHEST PORT   Final Result   Large effusions. Vascular congestion. CT ABD PELV W CONT   Final Result   1. There are increasing bilateral pleural effusions, increasing body wall   edema, and increasing ascites. These findings could be secondary to the third   spacing of fluids. The differential diagnosis for the ascites would include   pancreatic ascites with acute pancreatitis or metastatic disease to the   peritoneum. 2.  There is a biliary stent which is unchanged in its position traversing   throughout area of obstruction within the pancreatic head. 3.  There are multiple low-density lesions of the liver without short-term   interval changes consistent with metastatic disease. 4.  The right kidney is not identified and apparently the patient is status post   a previous right nephrectomy. 5.  There are bilateral adrenal masses suspect for metastatic disease without   short-term interval changes. CTA CHEST W OR W WO CONT   Final Result   No CT evidence for PE. Thoracic aorta atherosclerosis. CAD. Moderate to large bilateral pleural effusions with associated RML, RLL, and LLL   compressive atelectasis. Abdominal ascites. DUPLEX LOWER EXT VENOUS BILAT   Final Result      XR CHEST PORT   Final Result   FINDINGS: IMPRESSION: 2 frontal views of the chest. Right PICC distal tip SVC   region. Enlarging cardiomegaly. Increasing vascular congestion. Interval development of   hypoinflation, pleural effusions, lower lung airspace edema and/or pneumonia. No   pneumothorax. No free air under the diaphragm.       CT ABD PELV W CONT   Final Result   New moderate volume dependent pleural effusions with associated   lower lobe lung compressive atelectasis. Increasing ascites, moderate approaching large-volume   (fluid could be sampled if there is any clinical concern for bile content). High suspicion hepatic metastases. Similar appearance for the pancreas; Silastic stent in place. No pseudocyst formation. Unchanged appearance bilateral adrenal glands. No bowel obstruction. US ABD LTD   Final Result   Small amount of free fluid. Finding suggesting hemangioma in the   liver. Gallbladder wall thickening, adenomyomatosis, sludge and gallstones. Cholecystitis possible. Finding in the region of the pancreatic head as above. Other findings as above. CTA ABDOMEN PELV W CONT   Final Result   1. Ill-defined hypodense mass in the pancreas. There are inflammatory changes   and fluid adjacent to the pancreas or duodenum and stomach most likely related   to biopsy or focal pancreatitis. No pseudocyst formation, active bleeding or   other acute findings. 2. Enlarged adrenal glands left greater than right with noncontrast densities   consistent with adrenal adenomas. 3. Right nephrectomy. 4. Other findings as described. XR CHEST PORT   Final Result   No acute findings. IR PARACENTESIS ABD W IMAGE    (Results Pending)       Principal Problem:    Pancreatitis (12/9/2021)    Active Problems:    A-fib ([address] (11/16/2020)      CHF (congestive heart failure) ([address] (11/16/2020)      Hematemesis (12/12/2021)      History of peptic ulcer disease (12/12/2021)        Assessment/Plan:     Acute pancreatitis  - Status post EUS by Dr. Atilano Bumpers 12/06  - Continue pain medication  - Lipase variable, will likely be chronically elevated   - CT abd/pelvis with PO and IV contrast showing increasing bilateral pleural effusion and increasing ascites. Also notes multiple low-density lesions of liver consistent with metastatic disease and bilateral adrenal masses suspected for metastatic disease.   - Continue IV merrem day #11 of 14   - Continue empiric Anidulafungin  - Will change IV fluids to 0.45% normal saline   - Went for paracentesis on 12/27 with 2300 mL clear serous fluid drained, cytology showing now growth. - She was scheduled for liver biopsy with IR however IR does not feel patient would benefit from biopsy at this time. - Dobhoff placed 12/30 for nutritional support but dislodged by patient accidentally  -Encourage eating low-fat high-protein diet     CHF  - Continue chlorthalidone 25 mg daily  - IV lasix 40 mg daily     Pancreatic mass  Liver masses  Adrenal masses  Multiple low-density lesions of the liver consistent with meta static disease and bilateral adrenal masses suspicious for metastatic disease  - CA 19-9 greater than 4000  --Oncology consulted-will need tissue biopsy before definitive recommendations can be made     Hypokalemia - replete as needed     COPD  - Pulmonary consultation  - Chronic respiratory failure with 2 L of oxygen via nasal cannula at home     Leukocytosis  - Started meropenem  - Cultures negative     Supratherapeutic anticoagulation  - Resolved. INR normal.  - Vitamin K given  - May be a side effect of using Eliquis recently and this may be an artificial INR. No obvious bleeding source  - Hemoglobin stable    8. Elevated d-dimer  - Likely multifactorial   - CTA chest negative for PE  - Duplex US lower extremities negative for DVT  - Duplex US upper extremities showing superficial thrombophlebitis in left median/antecubital veins  - Restarted on home Eliquis     9.   Hypotension  We will start midodrine    DVT Prophylaxis: restarted on home Eliquis  GI PPx: Protonix  Code Status: Full  POA:    Discharge Barriers:   · Will need to be reauthorized to return to SNF pending on Monday  · Loss Dobbhoff feeding tube consider PEG J versus TPN if she is unable to maintain nutritional status  · IV meropenem for 2 more days, transition to oral fluconazole 200 mg p.o. for 2 weeks for discharge per Infectious Disease. Care Plan discussed with: patient and nursing    Total time spent with patient: >35 minutes.

## 2022-01-01 NOTE — PROGRESS NOTES
Informed attending of critical lab CO2 of 43. No new orders received at this time. Passed along message to primary nurse Caryle Bayley, RN). Primary nurse will continue to monitor.

## 2022-01-01 NOTE — PROGRESS NOTES
Notified patient had line pulled when transferring with subsequent bleeding from site. Chest Xray done with note of PICC tip in Upper SVC which is change. [place] around insertion site removed with bleeding noted to have stopped dressing removed and 2 cm of external line flexed from insertion site. No further bleeding and dressing was completed using sterile gloves and kit, 4x4 Gauze, quick clot  gauze and antimicrobial disk. Needleless Max zero connectors changed on both lumens noting brisk blood return, flushing easily w/ 10 ml NS each. Patient tolerated procedure well without complaint of discomfort. Right arm elevated to reduce pitting edema. No s/sx of infection at insertion site noted as well. 01/01/22 1617   PICC Double Lumen 98/04/09 Right;Basilic   Placement Date/Time: 12/19/21 1620   Description (optional): PICC DL Bard Power Solo 5FR  Special Properties: CT injectable  Number of Attempts: 1  Inserted By: Villa Louie RN  Size: 5 FR  Location: Right;Basilic  Placement Verified per Policy: (... Central Line Being Utilized Yes   Criteria for Appropriate Use Long term IV/antibiotic administration   Site Assessment Clean, dry, & intact   Phlebitis Assessment 0   Infiltration Assessment 0   Date of Last Dressing Change 01/01/22   Dressing Status Clean, dry, & intact; New   Action Taken Catheter repositioned; Other (comment)  (pt had line pulled with transfer and now upper svc and kink at insertion site w/bleeding. Bleeding now stopped)   External Catheter Length (cm) 2 centimeters  (this is new. 2 cm sheila exit site)   Dressing Type Transparent;Stabilization/securement device; Disk with Chlorhexadine gluconate (CHG);Gauze;4 X 4  (quick clot gauze)   Hub Color/Line Status Purple;Flushed;Patent;Cap end changed   Positive Blood Return (Site #1) Yes   Hub Color/Line Status Red;Flushed;Patent;Cap end changed   Positive Blood Return (Site #2) Yes   Alcohol Cap Used Yes

## 2022-01-02 NOTE — PROGRESS NOTES
Problem: Falls - Risk of  Goal: *Absence of Falls  Description: Document [place] Fall Risk and appropriate interventions in the flowsheet.   Outcome: Progressing Towards Goal  Note: Fall Risk Interventions:  Mobility Interventions: Bed/chair exit alarm    Mentation Interventions: Bed/chair exit alarm    Medication Interventions: Bed/chair exit alarm    Elimination Interventions: Call light in reach    History of Falls Interventions: Bed/chair exit alarm

## 2022-01-02 NOTE — PROGRESS NOTES
Hospitalist Progress Note    Subjective:   Daily Progress Note: 1/2/2022 12:21 PM    Hospital Course:  74-year-old female with a history of atrial fibrillation, peptic ulcer disease, CHF, COPD, renal cell carcinoma stage IV status post nephrectomy, GERD, COPD, Sojourn syndrome and essential hypertension who was presented to the emergency department for admission on 12/9/2021 after undergoing a EUS on 12/06 by Dr. Cristofer Stover with biopsy and subsequently developed severe pancreatitis. Patient's admission course has been complicated by ongoing pancreatitis and severe abdominal pain. CT scan of the abdomen pelvis revealed an ill-defined hypodense mass in the pancreas with fluid adjacent to the pancreas suggestive of focal pancreatitis. CTA chest showing no evidence of pulmonary embolism, but does note moderate to large bilateral pleural effusions in RML, RLL and LLL as well as ascites. GI and oncology consulted. Patient had an episode of hematemesis as well. Patient remains on IV Protonix. Abdominal ultrasound also showed no hemodynamic drop in the liver. Gallbladder wall has been thickened with sludge and stones although cholecystitis is less likely at this point. Although continue to monitor closely. Patient was started on Zosyn and subsequently changed to meropenem as the patient developed leukocytosis. Patient also has elevation in her INR although had been receiving Eliquis and therefore Eliquis was held. Patient started on p.o. diet although developed severe pain and have decreased her diet to clear liquid. General surgery following as well, Dr. Shaina Asif. Lipase continues to elevate. CT abd/pelvis with PO and IV contrast showing increasing bilateral pleural effusion and increasing ascites. Also notes multiple low-density lesions of liver consistent with metastatic disease and bilateral adrenal masses suspected for metastatic disease. IR consulted for liver biopsy. D-dimer elevated >14.  Duplex US lower extremities negative for DVT. Duplex upper extremities showing superficial thrombophlebitis in left median/antecubital veins. Restarted on home Eliquis. Paracentesis performed on 12/27 with 2300 mL clear serous fluid drained. Cytology results showing no growth. She was scheduled for liver biopsy with IR however IR does not feel patient would benefit from biopsy at this time. Patient taken down for EGD with Dobhoff Placement on 12/30/21 for nutritional support but the tube was pulled out overnight. Requiring transfusion for drop in hemoglobin. We will also replete with IV and p.o. iron for iron deficiencies. Subjective: Follow-up examination patient at bedside. She continues to feel weak and debilitated. Continues to have abdominal discomfort and nausea with food. Lipase remains elevated. Discussed at length goals of care. She feels like all of the providers are disconnected. Assured her that we would consult and referred to one another concerning her care.   Current Facility-Administered Medications   Medication Dose Route Frequency    [START ON 1/3/2022] ferrous sulfate tablet 325 mg  1 Tablet Oral DAILY WITH BREAKFAST    iron sucrose (VENOFER) 100 mg in 0.9% sodium chloride 100 mL IVPB  100 mg IntraVENous Q24H    0.9% sodium chloride infusion 250 mL  250 mL IntraVENous PRN    midodrine (PROAMATINE) tablet 10 mg  10 mg Oral TID WITH MEALS    0.9% sodium chloride infusion 250 mL  250 mL IntraVENous PRN    sodium chloride (NS) flush 5-40 mL  5-40 mL IntraVENous PRN    apixaban (ELIQUIS) tablet 5 mg  5 mg Oral BID    furosemide (LASIX) injection 40 mg  40 mg IntraVENous DAILY    anidulafungin (ERAXIS) 100 mg in 0.9% sodium chloride 130 mL IVPB  100 mg IntraVENous Q24H    zinc oxide-white petrolatum 17-57 % topical paste   Topical TID    cyclobenzaprine (FLEXERIL) tablet 10 mg  10 mg Oral TID PRN    HYDROmorphone (DILAUDID) injection 1 mg  1 mg IntraVENous Q4H PRN    oxyCODONE IR (ROXICODONE) tablet 5 mg  5 mg Oral Q6H PRN    nystatin (MYCOSTATIN) 100,000 unit/gram cream   Topical BID    docusate sodium (COLACE) capsule 100 mg  100 mg Oral BID    polyethylene glycol (MIRALAX) packet 17 g  17 g Oral DAILY    sorbitoL 70 % solution 30 mL  30 mL Oral DAILY PRN    meropenem (MERREM) 1 g in 0.9% sodium chloride 20 mL IV syringe  1 g IntraVENous Q8H    bisacodyL (DULCOLAX) suppository 10 mg  10 mg Rectal DAILY PRN    hydrALAZINE (APRESOLINE) 20 mg/mL injection 20 mg  20 mg IntraVENous Q6H PRN    pantoprazole (PROTONIX) 40 mg in 0.9% sodium chloride 10 mL injection  40 mg IntraVENous DAILY    guaiFENesin ER (MUCINEX) tablet 600 mg  600 mg Oral Q12H    albuterol-ipratropium (DUO-NEB) 2.5 MG-0.5 MG/3 ML  3 mL Nebulization Q6H PRN    chlorthalidone (HYGROTON) tablet 25 mg  25 mg Oral DAILY    albuterol (PROVENTIL HFA, VENTOLIN HFA, PROAIR HFA) inhaler 2 Puff  2 Puff Inhalation Q6H PRN    ascorbic acid (vitamin C) (VITAMIN C) tablet 500 mg  500 mg Oral DAILY    atorvastatin (LIPITOR) tablet 40 mg  40 mg Oral DAILY    cholecalciferol (VITAMIN D3) (1000 Units /25 mcg) tablet 1,000 Units  1,000 Units Oral DAILY    diazePAM (VALIUM) tablet 5 mg  5 mg Oral Q6H PRN    dilTIAZem ER (CARDIZEM CD) capsule 120 mg  120 mg Oral DAILY    potassium chloride SR (KLOR-CON 10) tablet 20 mEq  20 mEq Oral DAILY    sertraline (ZOLOFT) tablet 25 mg  25 mg Oral DAILY    traZODone (DESYREL) tablet 50 mg  50 mg Oral QHS    ondansetron (ZOFRAN) injection 4 mg  4 mg IntraVENous Q6H PRN    acetaminophen (TYLENOL) tablet 650 mg  650 mg Oral Q4H PRN    prochlorperazine (COMPAZINE) with saline injection 10 mg  10 mg IntraVENous Q6H PRN        Review of Systems  Constitutional: Positive for malaise/fatigue. Negative for fever. HENT: Negative. Respiratory: Positive for shortness of breath. Negative for cough. Cardiovascular: Negative for chest pain and leg swelling.    Gastrointestinal: Positive for abdominal pain. Negative for nausea and vomiting. Genitourinary: No frequency, No dysuria, No hematuria  Integument/breast: No skin lesion(s)   Neurological: No Confusion, No headaches, No dizziness      Objective:     Visit Vitals  BP 90/61 (BP 1 Location: Right lower arm, BP Patient Position: At rest)   Pulse (!) 113   Temp 99.6 °F (37.6 °C)   Resp 20   Ht 5' 7\" (1.702 m)   Wt 97.4 kg (214 lb 11.7 oz)   SpO2 96%   BMI 33.63 kg/m²    O2 Flow Rate (L/min): 3 l/min O2 Device: Nasal cannula    Temp (24hrs), Av.9 °F (37.2 °C), Min:98.2 °F (36.8 °C), Max:99.6 °F (37.6 °C)      No intake/output data recorded.  1901 -  0700  In: 327.5 [P.O.:30; I.V.:297.5]  Out: 2125 [Urine:2125]    PHYSICAL EXAM:  Constitutional: No acute distress  Skin: Extremities and face reveal no rashes. Multiple areas of ecchymosis in upper extremities. HEENT: Dobhoff in left nare. Sclerae anicteric. Extra-occular muscles are intact. No oral ulcers. The neck is supple and no masses. Cardiovascular: Regular rate and rhythm. +S1/S2. No murmur or gallop. Respiratory:  Rhonchi noted bilateral without wheezes. GI: Firm with hypoactive bowel sounds and tenderness to palpation in all 4 quadrant. Rectal: Deferred   Musculoskeletal: Upper and lower extremity peripheral edema present, likely third spacing. Able to move all ext  Neurological:  Generalized weakness. Patient is alert and oriented x3.  Cranial nerves II-XII grossly intact  Psychiatric: Mood appears appropriate       Data Review    Recent Results (from the past 24 hour(s))   D DIMER    Collection Time: 22  5:15 PM   Result Value Ref Range    D DIMER 6.73 (H) <0.50 ug/ml(FEU)   PROTHROMBIN TIME + INR    Collection Time: 22  5:15 PM   Result Value Ref Range    Prothrombin time 19.3 (H) 11.9 - 14.7 sec    INR 1.7 (H) 0.9 - 1.1     LIPASE    Collection Time: 22  5:15 PM   Result Value Ref Range    Lipase 2,756 (H) 73 - 393 U/L   CBC WITH AUTOMATED DIFF Collection Time: 01/01/22  5:15 PM   Result Value Ref Range    WBC 8.1 3.6 - 11.0 K/uL    RBC 2.66 (L) 3.80 - 5.20 M/uL    HGB 8.0 (L) 11.5 - 16.0 g/dL    HCT 25.3 (L) 35.0 - 47.0 %    MCV 95.1 80.0 - 99.0 FL    MCH 30.1 26.0 - 34.0 PG    MCHC 31.6 30.0 - 36.5 g/dL    RDW 13.7 11.5 - 14.5 %    PLATELET 953 725 - 306 K/uL    MPV 11.2 8.9 - 12.9 FL    NRBC 0.0 0.0  WBC    ABSOLUTE NRBC 0.00 0.00 - 0.01 K/uL    NEUTROPHILS 82 (H) 32 - 75 %    LYMPHOCYTES 11 (L) 12 - 49 %    MONOCYTES 5 5 - 13 %    EOSINOPHILS 1 0 - 7 %    BASOPHILS 0 0 - 1 %    IMMATURE GRANULOCYTES 1 (H) 0 - 0.5 %    ABS. NEUTROPHILS 6.7 1.8 - 8.0 K/UL    ABS. LYMPHOCYTES 0.9 0.8 - 3.5 K/UL    ABS. MONOCYTES 0.4 0.0 - 1.0 K/UL    ABS. EOSINOPHILS 0.1 0.0 - 0.4 K/UL    ABS. BASOPHILS 0.0 0.0 - 0.1 K/UL    ABS. IMM.  GRANS. 0.0 0.00 - 0.04 K/UL    DF AUTOMATED     METABOLIC PANEL, BASIC    Collection Time: 01/01/22  5:15 PM   Result Value Ref Range    Sodium 136 136 - 145 mmol/L    Potassium 3.4 (L) 3.5 - 5.1 mmol/L    Chloride 90 (L) 97 - 108 mmol/L    CO2 43 (HH) 21 - 32 mmol/L    Anion gap 3 (L) 5 - 15 mmol/L    Glucose 106 (H) 65 - 100 mg/dL    BUN 22 (H) 6 - 20 mg/dL    Creatinine 0.70 0.55 - 1.02 mg/dL    BUN/Creatinine ratio 31 (H) 12 - 20      GFR est AA >60 >60 ml/min/1.73m2    GFR est non-AA >60 >60 ml/min/1.73m2    Calcium 8.4 (L) 8.5 - 10.1 mg/dL   GLUCOSE, POC    Collection Time: 01/01/22 10:54 PM   Result Value Ref Range    Glucose (POC) 117 65 - 117 mg/dL    Performed by NHUNG AHN    CBC WITH AUTOMATED DIFF    Collection Time: 01/02/22  4:00 AM   Result Value Ref Range    WBC 8.5 3.6 - 11.0 K/uL    RBC 2.37 (L) 3.80 - 5.20 M/uL    HGB 7.2 (L) 11.5 - 16.0 g/dL    HCT 22.4 (L) 35.0 - 47.0 %    MCV 94.5 80.0 - 99.0 FL    MCH 30.4 26.0 - 34.0 PG    MCHC 32.1 30.0 - 36.5 g/dL    RDW 13.9 11.5 - 14.5 %    PLATELET 135 786 - 393 K/uL    MPV 10.9 8.9 - 12.9 FL    NRBC 0.0 0.0  WBC    ABSOLUTE NRBC 0.00 0.00 - 0.01 K/uL NEUTROPHILS 83 (H) 32 - 75 %    LYMPHOCYTES 10 (L) 12 - 49 %    MONOCYTES 5 5 - 13 %    EOSINOPHILS 1 0 - 7 %    BASOPHILS 0 0 - 1 %    IMMATURE GRANULOCYTES 1 (H) 0 - 0.5 %    ABS. NEUTROPHILS 7.1 1.8 - 8.0 K/UL    ABS. LYMPHOCYTES 0.8 0.8 - 3.5 K/UL    ABS. MONOCYTES 0.4 0.0 - 1.0 K/UL    ABS. EOSINOPHILS 0.1 0.0 - 0.4 K/UL    ABS. BASOPHILS 0.0 0.0 - 0.1 K/UL    ABS. IMM. GRANS. 0.0 0.00 - 0.04 K/UL    DF AUTOMATED     METABOLIC PANEL, BASIC    Collection Time: 01/02/22  4:00 AM   Result Value Ref Range    Sodium 135 (L) 136 - 145 mmol/L    Potassium 3.7 3.5 - 5.1 mmol/L    Chloride 91 (L) 97 - 108 mmol/L    CO2 42 (HH) 21 - 32 mmol/L    Anion gap 2 (L) 5 - 15 mmol/L    Glucose 99 65 - 100 mg/dL    BUN 25 (H) 6 - 20 mg/dL    Creatinine 0.65 0.55 - 1.02 mg/dL    BUN/Creatinine ratio 38 (H) 12 - 20      GFR est AA >60 >60 ml/min/1.73m2    GFR est non-AA >60 >60 ml/min/1.73m2    Calcium 8.4 (L) 8.5 - 10.1 mg/dL   D DIMER    Collection Time: 01/02/22  9:00 AM   Result Value Ref Range    D DIMER 6.97 (H) <0.50 ug/ml(FEU)   PROTHROMBIN TIME + INR    Collection Time: 01/02/22  9:00 AM   Result Value Ref Range    Prothrombin time 20.7 (H) 11.9 - 14.7 sec    INR 1.8 (H) 0.9 - 1.1         XR CHEST PORT   Final Result   Findings/impression:      Stable positioning of right upper extremity PICC, tip projects over the upper   SVC. Cardiac silhouette is enlarged. No pulmonary edema. Bilateral pleural effusions. No evidence of pneumothorax. No acute osseous abnormality identified. DUPLEX UPPER EXT VENOUS BILAT   Final Result      XR CHEST PORT   Final Result   Large effusions. Vascular congestion. CT ABD PELV W CONT   Final Result   1. There are increasing bilateral pleural effusions, increasing body wall   edema, and increasing ascites. These findings could be secondary to the third   spacing of fluids.  The differential diagnosis for the ascites would include   pancreatic ascites with acute pancreatitis or metastatic disease to the   peritoneum. 2.  There is a biliary stent which is unchanged in its position traversing   throughout area of obstruction within the pancreatic head. 3.  There are multiple low-density lesions of the liver without short-term   interval changes consistent with metastatic disease. 4.  The right kidney is not identified and apparently the patient is status post   a previous right nephrectomy. 5.  There are bilateral adrenal masses suspect for metastatic disease without   short-term interval changes. CTA CHEST W OR W WO CONT   Final Result   No CT evidence for PE. Thoracic aorta atherosclerosis. CAD. Moderate to large bilateral pleural effusions with associated RML, RLL, and LLL   compressive atelectasis. Abdominal ascites. DUPLEX LOWER EXT VENOUS BILAT   Final Result      XR CHEST PORT   Final Result   FINDINGS: IMPRESSION: 2 frontal views of the chest. Right PICC distal tip SVC   region. Enlarging cardiomegaly. Increasing vascular congestion. Interval development of   hypoinflation, pleural effusions, lower lung airspace edema and/or pneumonia. No   pneumothorax. No free air under the diaphragm. CT ABD PELV W CONT   Final Result   New moderate volume dependent pleural effusions with associated   lower lobe lung compressive atelectasis. Increasing ascites, moderate approaching large-volume   (fluid could be sampled if there is any clinical concern for bile content). High suspicion hepatic metastases. Similar appearance for the pancreas; Silastic stent in place. No pseudocyst formation. Unchanged appearance bilateral adrenal glands. No bowel obstruction. US ABD LTD   Final Result   Small amount of free fluid. Finding suggesting hemangioma in the   liver. Gallbladder wall thickening, adenomyomatosis, sludge and gallstones. Cholecystitis possible.   Finding in the region of the pancreatic head as above. Other findings as above. CTA ABDOMEN PELV W CONT   Final Result   1. Ill-defined hypodense mass in the pancreas. There are inflammatory changes   and fluid adjacent to the pancreas or duodenum and stomach most likely related   to biopsy or focal pancreatitis. No pseudocyst formation, active bleeding or   other acute findings. 2. Enlarged adrenal glands left greater than right with noncontrast densities   consistent with adrenal adenomas. 3. Right nephrectomy. 4. Other findings as described. XR CHEST PORT   Final Result   No acute findings. IR PARACENTESIS ABD W IMAGE    (Results Pending)       Principal Problem:    Pancreatitis (12/9/2021)    Active Problems:    A-fib ([address] (11/16/2020)      CHF (congestive heart failure) ([address] (11/16/2020)      Hematemesis (12/12/2021)      History of peptic ulcer disease (12/12/2021)        Assessment/Plan:     Acute pancreatitis  - Status post EUS by Dr. Julita Mancera 12/06  - Continue pain medication  - Lipase variable, will likely be chronically elevated   - CT abd/pelvis with PO and IV contrast showing increasing bilateral pleural effusion and increasing ascites. Also notes multiple low-density lesions of liver consistent with metastatic disease and bilateral adrenal masses suspected for metastatic disease.  - Continue IV merrem day #11 of 14   - Continue empiric Anidulafungin  - Will change IV fluids to 0.45% normal saline   - Went for paracentesis on 12/27 with 2300 mL clear serous fluid drained, cytology showing now growth. - She was scheduled for liver biopsy with IR however IR does not feel patient would benefit from biopsy at this time.     - Dobhoff placed 12/30 for nutritional support but dislodged by patient accidentally  -Encourage eating low-fat high-protein diet-not tolerating well  GI following will confer with them concerning PEJ tube placement     CHF  - Continue chlorthalidone 25 mg daily  - IV lasix 40 mg daily     Pancreatic mass  Liver masses  Adrenal masses  Multiple low-density lesions of the liver consistent with meta static disease and bilateral adrenal masses suspicious for metastatic disease  - CA 19-9 greater than 4000  --Oncology consulted-will need tissue biopsy before definitive recommendations can be made     Hypokalemia - replete as needed     COPD  - Pulmonary consultation  - Chronic respiratory failure with 2 L of oxygen via nasal cannula at home     Leukocytosis  - Started meropenem  - Cultures negative     Supratherapeutic anticoagulation  - Resolved. INR normal.  - Vitamin K given  - May be a side effect of using Eliquis recently and this may be an artificial INR. No obvious bleeding source  - Hemoglobin stable    Elevated d-dimer  - Likely multifactorial   - CTA chest negative for PE  - Duplex US lower extremities negative for DVT  - Duplex US upper extremities showing superficial thrombophlebitis in left median/antecubital veins  - Restarted on home Eliquis     Hypotension  We will start midodrine    Anemia iron deficiency  We will transfuse 2 units PRBCs for hemoglobin decline to 7.2 today  We will start IV and p.o. iron replacement  Hemoccult test stool    DVT Prophylaxis: restarted on home Eliquis  GI PPx: Protonix  Code Status: Full  POA:    Discharge Barriers:   · Will need to be reauthorized to return to SNF pending on Monday  · Loss Dobbhoff feeding tube consider PEG J versus TPN if she is unable to maintain nutritional status  · IV meropenem for 1 more days, transition to oral fluconazole 200 mg p.o. for 2 weeks for discharge per Infectious Disease. Care Plan discussed with: patient and nursing    Total time spent with patient: >35 minutes.

## 2022-01-02 NOTE — PROGRESS NOTES
DC order noted. Chart reviewed  Pt is accepted for care at [facility] for rehab  Insurance changes has delayed that facilities ability to accept until after Monday [id number]. Authorization will be needed. Updates faxed via [employer] to [facility] for their use with continued care.     Delay entered

## 2022-01-02 NOTE — PROGRESS NOTES
Subjective   Subjective:      HPI :  Pt having abdominal pain. Pt feeling tired.        Objective     Current Facility-Administered Medications:     midodrine (PROAMATINE) tablet 10 mg, 10 mg, Oral, TID WITH MEALS, Nicole Palencia, CONRADO, 10 mg at 01/01/22 1611    0.9% sodium chloride infusion 250 mL, 250 mL, IntraVENous, PRN, Torsten Damon PA-C    sodium chloride (NS) flush 5-40 mL, 5-40 mL, IntraVENous, PRN, Andrew Hillman NP    apixaban (ELIQUIS) tablet 5 mg, 5 mg, Oral, BID, Torsten Damon PA-C, 5 mg at 01/01/22 2113    furosemide (LASIX) injection 40 mg, 40 mg, IntraVENous, DAILY, Torsten Damon PA-C, 40 mg at 01/01/22 0039    anidulafungin (ERAXIS) 100 mg in 0.9% sodium chloride 130 mL IVPB, 100 mg, IntraVENous, Q24H, Ale Carcamo MD, Last Rate: 83 mL/hr at 01/01/22 1629, 100 mg at 01/01/22 1629    zinc oxide-white petrolatum 17-57 % topical paste, , Topical, TID, Vu Brannon PA-C, Given at 01/01/22 2114    cyclobenzaprine (FLEXERIL) tablet 10 mg, 10 mg, Oral, TID PRN, Torsten Damon PA-C, 10 mg at 12/30/21 0055    HYDROmorphone (DILAUDID) injection 1 mg, 1 mg, IntraVENous, Q4H PRN, Rayshawn Patches F, NP, 1 mg at 01/01/22 1701    oxyCODONE IR (ROXICODONE) tablet 5 mg, 5 mg, Oral, Q6H PRN, Rayshawn Patches F, NP, 5 mg at 12/31/21 1308    nystatin (MYCOSTATIN) 100,000 unit/gram cream, , Topical, BID, Ale Carcamo MD, Given at 01/01/22 2114    docusate sodium (COLACE) capsule 100 mg, 100 mg, Oral, BID, Susan Crane NP, 100 mg at 01/01/22 2112    polyethylene glycol (MIRALAX) packet 17 g, 17 g, Oral, DAILY, Lisa King NP, 17 g at 12/23/21 0924    sorbitoL 70 % solution 30 mL, 30 mL, Oral, DAILY PRN, Susan Crane NP    meropenem (MERREM) 1 g in 0.9% sodium chloride 20 mL IV syringe, 1 g, IntraVENous, Q8H, Ale Carcamo MD, 1 g at 01/01/22 1611    bisacodyL (DULCOLAX) suppository 10 mg, 10 mg, Rectal, DAILY PRN, Jaylen Son NP  [employer] hydrALAZINE (APRESOLINE) 20 mg/mL injection 20 mg, 20 mg, IntraVENous, Q6H PRN, Huyen Crane PA    pantoprazole (PROTONIX) 40 mg in 0.9% sodium chloride 10 mL injection, 40 mg, IntraVENous, DAILY, Huyen Crane PA, 40 mg at 01/01/22 2237    guaiFENesin ER (MUCINEX) tablet 600 mg, 600 mg, Oral, Q12H, Huyen Crane PA, 600 mg at 01/01/22 2113    albuterol-ipratropium (DUO-NEB) 2.5 MG-0.5 MG/3 ML, 3 mL, Nebulization, Q6H PRN, Huyen Crane PA    chlorthalidone (HYGROTON) tablet 25 mg, 25 mg, Oral, DAILY, Huyen Craen PA, 25 mg at 01/01/22 0923    albuterol (PROVENTIL HFA, VENTOLIN HFA, PROAIR HFA) inhaler 2 Puff, 2 Puff, Inhalation, Q6H PRN, Zarefoss, Jan A, NP, 2 Puff at 12/29/21 1404    ascorbic acid (vitamin C) (VITAMIN C) tablet 500 mg, 500 mg, Oral, DAILY, Zarefoss, Jan A, NP, 500 mg at 01/01/22 8717    atorvastatin (LIPITOR) tablet 40 mg, 40 mg, Oral, DAILY, Zarefoss, Jan A, NP, 40 mg at 01/01/22 7938    cholecalciferol (VITAMIN D3) (1000 Units /25 mcg) tablet 1,000 Units, 1,000 Units, Oral, DAILY, Zarefoss, Jan A, NP, 1,000 Units at 01/01/22 7968    diazePAM (VALIUM) tablet 5 mg, 5 mg, Oral, Q6H PRN, Zarefoss, Jan A, NP, 5 mg at 12/28/21 1810    dilTIAZem ER (CARDIZEM CD) capsule 120 mg, 120 mg, Oral, DAILY, Zarefoss, Jan A, NP, 120 mg at 01/01/22 9361    potassium chloride SR (KLOR-CON 10) tablet 20 mEq, 20 mEq, Oral, DAILY, Nolberto Farah NP, 20 mEq at 01/01/22 3063    sertraline (ZOLOFT) tablet 25 mg, 25 mg, Oral, DAILY, Nolberto Farah NP, 25 mg at 01/01/22 8734    traZODone (DESYREL) tablet 50 mg, 50 mg, Oral, QHS, Nolberto Farah, CONRADO, 50 mg at 01/01/22 2112    ond[facility]etron [facility]) injection 4 mg, 4 mg, IntraVENous, Q6H PRN, Nolberto Farah NP, 4 mg at 01/01/22 1701    acetaminophen (TYLENOL) tablet 650 mg, 650 mg, Oral, Q4H PRN, Nolberto Farah NP    prochlorperazine (COMPAZINE) with saline injection 10 mg, 10 mg, IntraVENous, Q6H PRN, Nolberto Farah A, NP, 10 mg at 12/31/21 5094    Objective:     Visit Vitals  BP (!) 85/50 (BP 1 Location: Right lower arm, BP Patient Position: At rest)   Pulse 93   Temp 99 °F (37.2 °C)   Resp 18   Ht 5' 7\" (1.702 m)   Wt 97.4 kg (214 lb 11.7 oz)   SpO2 100%   BMI 33.63 kg/m²      Physical Exam   Pt alert and oriented  LUNGS:CTA  Heart:RRR  Abdomen:Ascites +,NT,BS +  EXT:Edema+  @Objective    Dominic@[employer]     Data Review:   Recent Results (from the past 24 hour(s))   D DIMER    Collection Time: 01/01/22  5:15 PM   Result Value Ref Range    D DIMER 6.73 (H) <0.50 ug/ml(FEU)   PROTHROMBIN TIME + INR    Collection Time: 01/01/22  5:15 PM   Result Value Ref Range    Prothrombin time 19.3 (H) 11.9 - 14.7 sec    INR 1.7 (H) 0.9 - 1.1     LIPASE    Collection Time: 01/01/22  5:15 PM   Result Value Ref Range    Lipase 2,756 (H) 73 - 393 U/L   CBC WITH AUTOMATED DIFF    Collection Time: 01/01/22  5:15 PM   Result Value Ref Range    WBC 8.1 3.6 - 11.0 K/uL    RBC 2.66 (L) 3.80 - 5.20 M/uL    HGB 8.0 (L) 11.5 - 16.0 g/dL    HCT 25.3 (L) 35.0 - 47.0 %    MCV 95.1 80.0 - 99.0 FL    MCH 30.1 26.0 - 34.0 PG    MCHC 31.6 30.0 - 36.5 g/dL    RDW 13.7 11.5 - 14.5 %    PLATELET 508 690 - 032 K/uL    MPV 11.2 8.9 - 12.9 FL    NRBC 0.0 0.0  WBC    ABSOLUTE NRBC 0.00 0.00 - 0.01 K/uL    NEUTROPHILS 82 (H) 32 - 75 %    LYMPHOCYTES 11 (L) 12 - 49 %    MONOCYTES 5 5 - 13 %    EOSINOPHILS 1 0 - 7 %    BASOPHILS 0 0 - 1 %    IMMATURE GRANULOCYTES 1 (H) 0 - 0.5 %    ABS. NEUTROPHILS 6.7 1.8 - 8.0 K/UL    ABS. LYMPHOCYTES 0.9 0.8 - 3.5 K/UL    ABS. MONOCYTES 0.4 0.0 - 1.0 K/UL    ABS. EOSINOPHILS 0.1 0.0 - 0.4 K/UL    ABS. BASOPHILS 0.0 0.0 - 0.1 K/UL    ABS. IMM.  GRANS. 0.0 0.00 - 0.04 K/UL    DF AUTOMATED     METABOLIC PANEL, BASIC    Collection Time: 01/01/22  5:15 PM   Result Value Ref Range    Sodium 136 136 - 145 mmol/L    Potassium 3.4 (L) 3.5 - 5.1 mmol/L    Chloride 90 (L) 97 - 108 mmol/L    CO2 43 (HH) 21 - 32 mmol/L    Anion gap 3 (L) 5 - 15 mmol/L    Glucose 106 (H) 65 - 100 mg/dL    BUN 22 (H) 6 - 20 mg/dL    Creatinine 0.70 0.55 - 1.02 mg/dL    BUN/Creatinine ratio 31 (H) 12 - 20      GFR est AA >60 >60 ml/min/1.73m2    GFR est non-AA >60 >60 ml/min/1.73m2    Calcium 8.4 (L) 8.5 - 10.1 mg/dL       Assessment   Assessment/Plan:     Possible pancreatic ca with liver metastases. Pancreatci mass FNA suspicious for malignant cells but not diagnostic. Will discuss with pathology. If pt general condition and infection better consider liver biopsy and chemotherapy as out pt. If pt general condition not better palliative care.     Renal cell CA with lung nodules. S/p nephrectomy.     Normocytic hypochromic anemia     Severe pancreatitis     Sepsis  Ascites:S/p paracentesis. Cytology negative for malignant cells. Superficial vein thrombosis LUE:pt on eliquis. BL pleural effusions  D/w pt in detail.

## 2022-01-03 NOTE — PROGRESS NOTES
Problem: Falls - Risk of  Goal: *Absence of Falls  Description: Document Christopher Vázquez Fall Risk and appropriate interventions in the flowsheet.   Outcome: Progressing Towards Goal  Note: Fall Risk Interventions:  Mobility Interventions: Bed/chair exit alarm    Mentation Interventions: Bed/chair exit alarm    Medication Interventions: Bed/chair exit alarm    Elimination Interventions: Call light in reach,Bed/chair exit alarm,Patient to call for help with toileting needs    History of Falls Interventions: Bed/chair exit alarm

## 2022-01-03 NOTE — PROGRESS NOTES
Subjective   Subjective:      HPI :Pt awake and alert. Reports abdominal pain. NO bleeding reported. C/o pain with eating and is only taking liquids. GI Planning J tube placement for nutrition?       Objective     Current Facility-Administered Medications:     ferrous sulfate tablet 325 mg, 1 Tablet, Oral, DAILY WITH BREAKFAST, Kesha Bahena NP, 325 mg at 01/03/22 0957    0.9% sodium chloride infusion 250 mL, 250 mL, IntraVENous, PRN, Kesha Bahena NP    iron sucrose (VENOFER) injection 100 mg, 100 mg, IntraVENous, DAILY, Kesha Bahena NP, 100 mg at 01/03/22 1036    midodrine (PROAMATINE) tablet 10 mg, 10 mg, Oral, TID WITH MEALS, Kesha Bahena NP, 10 mg at 01/03/22 1500    0.9% sodium chloride infusion 250 mL, 250 mL, IntraVENous, PRN, Vera Allan PA-C    sodium chloride (NS) flush 5-40 mL, 5-40 mL, IntraVENous, PRN, Rochelle Hillman NP    apixaban (ELIQUIS) tablet 5 mg, 5 mg, Oral, BID, Vera Allan PA-C, 5 mg at 01/03/22 0957    furosemide (LASIX) injection 40 mg, 40 mg, IntraVENous, DAILY, Vera Allan PA-C, 40 mg at 01/03/22 0956    anidulafungin (ERAXIS) 100 mg in 0.9% sodium chloride 130 mL IVPB, 100 mg, IntraVENous, Q24H, Vitor Aguirre MD, Last Rate: 83 mL/hr at 01/02/22 1614, 100 mg at 01/02/22 1614    zinc oxide-white petrolatum 17-57 % topical paste, , Topical, TID, Rip Brannon PA-C, Given at 01/03/22 0900    cyclobenzaprine (FLEXERIL) tablet 10 mg, 10 mg, Oral, TID PRN, Vera Allan PA-C, 10 mg at 12/30/21 0055    HYDROmorphone (DILAUDID) injection 1 mg, 1 mg, IntraVENous, Q4H PRN, Kiki ALMEIDA NP, 1 mg at 01/03/22 0449    oxyCODONE IR (ROXICODONE) tablet 5 mg, 5 mg, Oral, Q6H PRN, Kiki ALMEIDA NP, 5 mg at 01/03/22 1500    nystatin (MYCOSTATIN) 100,000 unit/gram cream, , Topical, BID, Vitor Aguirre MD, Given at 01/02/22 2205    docusate sodium (COLACE) capsule 100 mg, 100 mg, Oral, BID, Salinas Grimaldo NP, 100 mg at 01/03/22 0956    polyethylene glycol (MIRALAX) packet 17 g, 17 g, Oral, DAILY, [facility], NP, 17 g at 12/23/21 0924    sorbitoL 70 % solution 30 mL, 30 mL, Oral, DAILY PRN, [facility], NP    meropenem (MERREM) 1 g in 0.9% sodium chloride 20 mL IV syringe, 1 g, IntraVENous, Q8H, King Gavin MD, 1 g at 01/03/22 0621    bisacodyL (DULCOLAX) suppository 10 mg, 10 mg, Rectal, DAILY PRN, [place], NP    hydrALAZINE (APRESOLINE) 20 mg/mL injection 20 mg, 20 mg, IntraVENous, Q6H PRN, Huyen Crane PA    pantoprazole (PROTONIX) 40 mg in 0.9% sodium chloride 10 mL injection, 40 mg, IntraVENous, DAILY, Huyen Crane PA, 40 mg at 01/03/22 1036    guaiFENesin ER (MUCINEX) tablet 600 mg, 600 mg, Oral, Q12H, Huyen Crane PA, 600 mg at 01/03/22 0956    albuterol-ipratropium (DUO-NEB) 2.5 MG-0.5 MG/3 ML, 3 mL, Nebulization, Q6H PRN, Huyen Crane PA    chlorthalidone (HYGROTON) tablet 25 mg, 25 mg, Oral, DAILY, Huyen Crane PA, 25 mg at 01/01/22 0923    albuterol (PROVENTIL HFA, VENTOLIN HFA, PROAIR HFA) inhaler 2 Puff, 2 Puff, Inhalation, Q6H PRN, Sofi Jan A, NP, 2 Puff at 12/29/21 1404    ascorbic acid (vitamin C) (VITAMIN C) tablet 500 mg, 500 mg, Oral, DAILY, Zarefoss, Jan A, NP, 500 mg at 01/03/22 2725    atorvastatin (LIPITOR) tablet 40 mg, 40 mg, Oral, DAILY, Zarefoss, Jan A, NP, 40 mg at 01/03/22 0957    cholecalciferol (VITAMIN D3) (1000 Units /25 mcg) tablet 1,000 Units, 1,000 Units, Oral, DAILY, Nolberto Farah, NP, 1,000 Units at 01/03/22 0957    diazePAM (VALIUM) tablet 5 mg, 5 mg, Oral, Q6H PRN, Nolberto Farah, NP, 5 mg at 12/28/21 1810    dilTIAZem ER (CARDIZEM CD) capsule 120 mg, 120 mg, Oral, DAILY, Sofi Nolberto NEGIN, NP, 120 mg at 01/03/22 0957    potassium chloride SR (KLOR-CON 10) tablet 20 mEq, 20 mEq, Oral, DAILY, Zabel Nolberto NEGIN, NP, 20 mEq at 01/03/22 0956    sertraline (ZOLOFT) tablet 25 mg, 25 mg, Oral, DAILY, Jason Peace A, NP, 25 mg at 01/03/22 0957    traZODone (DESYREL) tablet 50 mg, 50 mg, Oral, QHS, Zarefoss, Jan A, NP, 50 mg at 01/02/22 2200    ondansetron [facility]) injection 4 mg, 4 mg, IntraVENous, Q6H PRN, Zarefoss, Jan A, NP, 4 mg at 01/03/22 0439    acetaminophen (TYLENOL) tablet 650 mg, 650 mg, Oral, Q4H PRN, Zarefoss, Jan A, NP, 650 mg at 01/02/22 0024    prochlorperazine (COMPAZINE) with saline injection 10 mg, 10 mg, IntraVENous, Q6H PRN, Zarefoss, Jan A, NP, 10 mg at 12/31/21 1824    Objective:     Visit Vitals  /66 (BP 1 Location: Left upper arm)   Pulse 86   Temp 98.9 °F (37.2 °C)   Resp 18   Ht 5' 7\" (1.702 m)   Wt 97.4 kg (214 lb 11.7 oz)   SpO2 99%   BMI 33.63 kg/m²      Physical Exam   Pt alert and oriented  LUNGS:CTA  Heart:RRR  Abdomen:Ascites +,NT,BS +  EXT:Edema of legs and BUE.right arm swollen more with PICC line than left  @Objective    [email or web address]     Data Review:   Recent Results (from the past 24 hour(s))   TYPE & SCREEN    Collection Time: 01/02/22  4:31 PM   Result Value Ref Range    Crossmatch Expiration 01/05/2022,2359     ABO/Rh(D) B Positive     Antibody screen Negative     Unit number [id number]     Blood component type [facility]     Unit division 00     Status of unit [address] to transfuse     Crossmatch result Compatible     Unit number [id number]     Blood component type [facility]     Unit division 00     Status of unit [address] to transfuse     Crossmatch result Compatible    OCCULT BLOOD, STOOL    Collection Time: 01/02/22  9:15 PM   Result Value Ref Range    Occult Blood,day 1 Positive (A) Negative      Day 1 date: 2     D DIMER    Collection Time: 01/03/22 10:10 AM   Result Value Ref Range    D DIMER 5.74 (H) <0.50 ug/ml(FEU)   PROTHROMBIN TIME + INR    Collection Time: 01/03/22 10:10 AM   Result Value Ref Range    Prothrombin time 18.1 (H) 11.9 - 14.7 sec    INR 1.6 (H) 0.9 - 1.1     CBC WITH AUTOMATED DIFF Collection Time: 01/03/22 10:10 AM   Result Value Ref Range    WBC 7.1 3.6 - 11.0 K/uL    RBC 2.95 (L) 3.80 - 5.20 M/uL    HGB 8.8 (L) 11.5 - 16.0 g/dL    HCT 27.0 (L) 35.0 - 47.0 %    MCV 91.5 80.0 - 99.0 FL    MCH 29.8 26.0 - 34.0 PG    MCHC 32.6 30.0 - 36.5 g/dL    RDW 15.3 (H) 11.5 - 14.5 %    PLATELET 786 105 - 657 K/uL    MPV 10.9 8.9 - 12.9 FL    NRBC 0.0 0.0  WBC    ABSOLUTE NRBC 0.00 0.00 - 0.01 K/uL    NEUTROPHILS 76 (H) 32 - 75 %    LYMPHOCYTES 14 12 - 49 %    MONOCYTES 7 5 - 13 %    EOSINOPHILS 2 0 - 7 %    BASOPHILS 0 0 - 1 %    IMMATURE GRANULOCYTES 1 (H) 0 - 0.5 %    ABS. NEUTROPHILS 5.4 1.8 - 8.0 K/UL    ABS. LYMPHOCYTES 1.0 0.8 - 3.5 K/UL    ABS. MONOCYTES 0.5 0.0 - 1.0 K/UL    ABS. EOSINOPHILS 0.1 0.0 - 0.4 K/UL    ABS. BASOPHILS 0.0 0.0 - 0.1 K/UL    ABS. IMM. GRANS. 0.1 (H) 0.00 - 0.04 K/UL    DF AUTOMATED     METABOLIC PANEL, BASIC    Collection Time: 01/03/22 10:10 AM   Result Value Ref Range    Sodium 137 136 - 145 mmol/L    Potassium 3.5 3.5 - 5.1 mmol/L    Chloride 93 (L) 97 - 108 mmol/L    CO2 42 (HH) 21 - 32 mmol/L    Anion gap 2 (L) 5 - 15 mmol/L    Glucose 102 (H) 65 - 100 mg/dL    BUN 29 (H) 6 - 20 mg/dL    Creatinine 0.57 0.55 - 1.02 mg/dL    BUN/Creatinine ratio 51 (H) 12 - 20      GFR est AA >60 >60 ml/min/1.73m2    GFR est non-AA >60 >60 ml/min/1.73m2    Calcium 8.3 (L) 8.5 - 10.1 mg/dL       Assessment   Assessment/Plan:     1) Possible pancreatic ca with liver metastases. Pancreatci mass FNA suspicious for malignant cells but not diagnostic. Will discuss with pathology. Ca 19-9 very high more than 22,000.    2) If pt general condition and infection better consider CT guided liver biopsy and chemotherapy as out pt. If pt general condition not better palliative care. Prognosis poor.   D/w pt in detail. 3) Renal cell CA with lung nodules. S/p right nephrectomy.    4) Normocytic hypochromic anemia:hb coming down. Received prbc transfusion last night. Hb is 8.2 today.    R/o Gi bleeding. Check stool for occult blood. Transfuse pRBC if hb less than 7 or symptomatic on iv iron.      Severe pancreatitis: lipase high and she c/o abdominal pain.        Ascites:S/p paracentesis. Cytology negative for malignant cells. Superficial vein thrombosis LUE:pt on eliquis. Agree with lowering dose to 2.5mg po bid due to her anemia and concerns for bleeding. BL pleural effusions  Coagulopathy:mild. Pt d dimer also elevated. R/o DIC. No active bleeding clinically.

## 2022-01-03 NOTE — PROGRESS NOTES
CM received a message via [employer] (formerly known as [employer]) from [facility] re: patient needing auth if she is to be discharged. [facility] informed by writer that patient is being consulted for a PEGJ and may not dc today. CM will continue to follow patient progress and update clinicals via [facility].      Lisa Witt

## 2022-01-03 NOTE — PROGRESS NOTES
Progress Note    Patient: Yadiel Montgomery MRN: [id number]  SSN: [id number]    YOB: 1945  Age: 78 y.o. Sex: female      Admit Date: 12/9/2021    LOS: 25 days     Subjective:   Patient followed for severe pancreatitis on Meropenem because of worsening leukocytosis. Also on Anidulafungin for suspected Candida intraabdominal infection. Ascitic fluid is negative. Suspected pancreatic cancer but no firm diagnosis yet. She remains afebrile. Patient sitting up in bedside chair, still with LUQ pain but no vomiting. Apparently scheduled for PEJ tube tomorrow. WBC remains normal.   Objective:     Vitals:    01/02/22 2231 01/03/22 0045 01/03/22 0130 01/03/22 0435   BP: (!) 122/59 118/63 (!) 110/55 115/62   Pulse: 81 82 85 86   Resp: 18 18 18 18   Temp: 98.2 °F (36.8 °C) 98.6 °F (37 °C) 97.8 °F (36.6 °C) 97.8 °F (36.6 °C)   SpO2:    100%   Weight:       Height:            Intake and Output:  Current Shift: No intake/output data recorded. Last three shifts: 01/01 1901 - 01/03 0700  In: 975 [P.O.:200; I.V.:130]  Out: 1225 [Urine:1225]    Physical Exam:     Vitals and nursing note reviewed. Constitutional:       General: She is not in acute distress. Appearance: She is obese. She is ill-appearing. HENT: Dobhoff tube OUT  Abdominal:  Mild tenderness LUQ  Genitourinary:  Vaginal area not examined     Comments: External urinary device  Musculoskeletal:      Right lower leg: No edema. Left lower leg: No edema. Comments: PICC line right upper arm   Skin: multiple ecchymoses on the upper extremities, edema left forearm     Findings: No rash. Comments: ?Stage 2 sacral wound   Neurological:      General: No focal deficit present. Mental Status: She is alert and oriented to person, place, and time. Psychiatric:         Mood and Affect: Mood normal.         Behavior: Behavior normal.         Thought Content:  Thought content normal.         Judgment: Judgment normal.     Lab/Data Review:     WBC 8,500  Urinalysis with WBC 10-20, Bacteria negative  Lipase 2,756 <2,813 <1,499 <2,511 <2,802 <3,000 <2,484 <2,684 < 1,397 <1,719    Procal 0.32 <0.34 <0.35 <0.34 < 0.32 <0.21 <0.19 <0.25 < 0.14  CRP 11.0 <8.98 <11.10 < 12.10 <23.40 <18.30 <14.20 <12.70 <15.00 < 23.80    Serum fungitell <31 Negative    Ascitic fluid   with 41% PMNs    Blood cultures (12/20) No growth FINAL  Urine culture (12/20) No growth FINAL  Ascitic fluid culture(12/27) No growth FINAL    Assessment:     Principal Problem:    Pancreatitis (12/9/2021)    Active Problems:    A-fib ([facility] [address] (11/16/2020)      CHF (congestive heart failure) ([facility] [address] (11/16/2020)      Hematemesis (12/12/2021)      History of peptic ulcer disease (12/12/2021)    1. Severe pancreatitis with markedly elevated lipase, resolving  2. Pancreatic mass, possible neoplasm  3. Biliary stent  4. Sepsis with worsening leukocytosis with elevated procal and CRP, resolving, Day #15 IV Meropenem, Day #9 IV Anidulafungin (empiric)  5. TPN (just started)  6. Moderate pyuria, negative bacteria, urine culture negative  7. Possible candida superinfection with vaginitis, treated  8. Ascites, status post paracentesis    Comment:    WBC and procal decreasing but CRP increased and Lipase increased, however, these may be related to neoplasm/inflammation. Plan:   1. Discontinue Meropenem   2. Continue Anidulafungin for 5 more days  3.  In am, repeat CRP and procal     Signed By: Adelaida Rod MD     January 3, 2022

## 2022-01-03 NOTE — PROGRESS NOTES
Nutrition Note    Plans for PEG-J placement d/t poor tolerance of PO 2/2 pancreatitis.      Initiate TF via PEG-J of Jevity 1.5 at 20mL/hr     Increase by 20mL q4hr to goal rate 60mL/hr, continuous  Flush with 60mL free water q4hr      Pt to consume 480mL (16 oz) water by mouth daily to meet fluid needs  Goal feeds provide 2160kcal (100%), 92g pro (94%), 1573mL fluid (81%)    Document TF rate, water flushes, and GRVs in EMR         Electronically signed by Simeon Mcadams on 1/3/2022 at 11:17 AM    Contact:

## 2022-01-03 NOTE — PROGRESS NOTES
Hospitalist Progress Note    Subjective:   Daily Progress Note: 1/3/2022 12:21 PM    Hospital Course:  51-year-old female with a history of atrial fibrillation, peptic ulcer disease, CHF, COPD, renal cell carcinoma stage IV status post nephrectomy, GERD, COPD, Sojourn syndrome and essential hypertension who was presented to the emergency department for admission on 12/9/2021 after undergoing a EUS on 12/06 by Dr. Delmy Braden with biopsy and subsequently developed severe pancreatitis. Patient's admission course has been complicated by ongoing pancreatitis and severe abdominal pain. CT scan of the abdomen pelvis revealed an ill-defined hypodense mass in the pancreas with fluid adjacent to the pancreas suggestive of focal pancreatitis. CTA chest showing no evidence of pulmonary embolism, but does note moderate to large bilateral pleural effusions in RML, RLL and LLL as well as ascites. GI and oncology consulted. Patient had an episode of hematemesis as well. Patient remains on IV Protonix. Abdominal ultrasound also showed no hemodynamic drop in the liver. Gallbladder wall has been thickened with sludge and stones although cholecystitis is less likely at this point. Although continue to monitor closely. Patient was started on Zosyn and subsequently changed to meropenem as the patient developed leukocytosis. Patient also has elevation in her INR although had been receiving Eliquis and therefore Eliquis was held. Patient started on p.o. diet although developed severe pain and have decreased her diet to clear liquid. General surgery following as well, Dr. Tete Ponce. Lipase continues to elevate. CT abd/pelvis with PO and IV contrast showing increasing bilateral pleural effusion and increasing ascites. Also notes multiple low-density lesions of liver consistent with metastatic disease and bilateral adrenal masses suspected for metastatic disease. IR consulted for liver biopsy. D-dimer elevated >14.  Duplex US lower extremities negative for DVT. Duplex upper extremities showing superficial thrombophlebitis in left median/antecubital veins. Restarted on home Eliquis. Paracentesis performed on 12/27 with 2300 mL clear serous fluid drained. Cytology results showing no growth. She was scheduled for liver biopsy with IR however IR does not feel patient would benefit from biopsy at this time. Patient taken down for EGD with Dobhoff Placement on 12/30/21 for nutritional support but the tube was pulled out overnight. Requiring transfusion for drop in hemoglobin. FOBT positive. We will also replete with IV and p.o. iron for iron deficiencies. Repeat upper extremity Doppler study negative. Will reduce Eliquis dose to 2.5. Subjective: Follow-up examination patient at bedside. She continues to feel weak and debilitated. Continues to have abdominal discomfort and nausea with food. Lipase remains elevated. Discussed at length goals of care. Discussed feeding tube to meet her nutrition and hydration needs.     Current Facility-Administered Medications   Medication Dose Route Frequency    ferrous sulfate tablet 325 mg  1 Tablet Oral DAILY WITH BREAKFAST    0.9% sodium chloride infusion 250 mL  250 mL IntraVENous PRN    iron sucrose (VENOFER) injection 100 mg  100 mg IntraVENous DAILY    midodrine (PROAMATINE) tablet 10 mg  10 mg Oral TID WITH MEALS    0.9% sodium chloride infusion 250 mL  250 mL IntraVENous PRN    sodium chloride (NS) flush 5-40 mL  5-40 mL IntraVENous PRN    apixaban (ELIQUIS) tablet 5 mg  5 mg Oral BID    furosemide (LASIX) injection 40 mg  40 mg IntraVENous DAILY    anidulafungin (ERAXIS) 100 mg in 0.9% sodium chloride 130 mL IVPB  100 mg IntraVENous Q24H    zinc oxide-white petrolatum 17-57 % topical paste   Topical TID    cyclobenzaprine (FLEXERIL) tablet 10 mg  10 mg Oral TID PRN    HYDROmorphone (DILAUDID) injection 1 mg  1 mg IntraVENous Q4H PRN    oxyCODONE IR (ROXICODONE) tablet 5 mg 5 mg Oral Q6H PRN    nystatin (MYCOSTATIN) 100,000 unit/gram cream   Topical BID    docusate sodium (COLACE) capsule 100 mg  100 mg Oral BID    polyethylene glycol (MIRALAX) packet 17 g  17 g Oral DAILY    sorbitoL 70 % solution 30 mL  30 mL Oral DAILY PRN    meropenem (MERREM) 1 g in 0.9% sodium chloride 20 mL IV syringe  1 g IntraVENous Q8H    bisacodyL (DULCOLAX) suppository 10 mg  10 mg Rectal DAILY PRN    hydrALAZINE (APRESOLINE) 20 mg/mL injection 20 mg  20 mg IntraVENous Q6H PRN    pantoprazole (PROTONIX) 40 mg in 0.9% sodium chloride 10 mL injection  40 mg IntraVENous DAILY    guaiFENesin ER (MUCINEX) tablet 600 mg  600 mg Oral Q12H    albuterol-ipratropium (DUO-NEB) 2.5 MG-0.5 MG/3 ML  3 mL Nebulization Q6H PRN    chlorthalidone (HYGROTON) tablet 25 mg  25 mg Oral DAILY    albuterol (PROVENTIL HFA, VENTOLIN HFA, PROAIR HFA) inhaler 2 Puff  2 Puff Inhalation Q6H PRN    ascorbic acid (vitamin C) (VITAMIN C) tablet 500 mg  500 mg Oral DAILY    atorvastatin (LIPITOR) tablet 40 mg  40 mg Oral DAILY    cholecalciferol (VITAMIN D3) (1000 Units /25 mcg) tablet 1,000 Units  1,000 Units Oral DAILY    diazePAM (VALIUM) tablet 5 mg  5 mg Oral Q6H PRN    dilTIAZem ER (CARDIZEM CD) capsule 120 mg  120 mg Oral DAILY    potassium chloride SR (KLOR-CON 10) tablet 20 mEq  20 mEq Oral DAILY    sertraline (ZOLOFT) tablet 25 mg  25 mg Oral DAILY    traZODone (DESYREL) tablet 50 mg  50 mg Oral QHS    ondansetron (ZOFRAN) injection 4 mg  4 mg IntraVENous Q6H PRN    acetaminophen (TYLENOL) tablet 650 mg  650 mg Oral Q4H PRN    prochlorperazine (COMPAZINE) with saline injection 10 mg  10 mg IntraVENous Q6H PRN        Review of Systems  Constitutional: Positive for malaise/fatigue. Negative for fever. HENT: Negative. Respiratory: Positive for shortness of breath. Negative for cough. Cardiovascular: Negative for chest pain and leg swelling. Gastrointestinal: Positive for abdominal pain.  Negative for nausea and vomiting. Genitourinary: No frequency, No dysuria, No hematuria  Integument/breast: No skin lesion(s)   Neurological: No Confusion, No headaches, No dizziness      Objective:     Visit Vitals  /66 (BP 1 Location: Left upper arm)   Pulse 86   Temp 98.9 °F (37.2 °C)   Resp 18   Ht 5' 7\" (1.702 m)   Wt 97.4 kg (214 lb 11.7 oz)   SpO2 99%   BMI 33.63 kg/m²    O2 Flow Rate (L/min): 3 l/min O2 Device: Nasal cannula    Temp (24hrs), Av.2 °F (36.8 °C), Min:97.8 °F (36.6 °C), Max:98.9 °F (37.2 °C)      701 -  190  In: 300 [P.O.:300]  Out: 1000 [Urine:1000]  1901 -  0700  In: 975 [P.O.:200; I.V.:130]  Out: 1225 [Urine:1225]    PHYSICAL EXAM:  Constitutional: No acute distress  Skin: Extremities and face reveal no rashes. Multiple areas of ecchymosis in upper extremities. HEENT: Dobhoff in left nare. Sclerae anicteric. Extra-occular muscles are intact. No oral ulcers. The neck is supple and no masses. Cardiovascular: Regular rate and rhythm. +S1/S2. No murmur or gallop. Respiratory:  Rhonchi noted bilateral without wheezes. GI: Firm with hypoactive bowel sounds and tenderness to palpation in all 4 quadrant. Rectal: Deferred   Musculoskeletal: Upper and lower extremity peripheral edema present, likely third spacing. Able to move all ext  Neurological:  Generalized weakness. Patient is alert and oriented x3.  Cranial nerves II-XII grossly intact  Psychiatric: Mood appears appropriate       Data Review    Recent Results (from the past 24 hour(s))   TYPE & SCREEN    Collection Time: 22  4:31 PM   Result Value Ref Range    Crossmatch Expiration 2022,2359     ABO/Rh(D) B Positive     Antibody screen Negative     Unit number [id number]     Blood component type RC LR     Unit division 00     Status of unit Issued,final     TRANSFUSION STATUS Ok to transfuse     Crossmatch result Compatible     Unit number [id number]     Blood component type RC LR     Unit division 00     Status of unit [address] to transfuse     Crossmatch result Compatible    OCCULT BLOOD, STOOL    Collection Time: 01/02/22  9:15 PM   Result Value Ref Range    Occult Blood,day 1 Positive (A) Negative      Day 1 date: 2     D DIMER    Collection Time: 01/03/22 10:10 AM   Result Value Ref Range    D DIMER 5.74 (H) <0.50 ug/ml(FEU)   PROTHROMBIN TIME + INR    Collection Time: 01/03/22 10:10 AM   Result Value Ref Range    Prothrombin time 18.1 (H) 11.9 - 14.7 sec    INR 1.6 (H) 0.9 - 1.1     CBC WITH AUTOMATED DIFF    Collection Time: 01/03/22 10:10 AM   Result Value Ref Range    WBC 7.1 3.6 - 11.0 K/uL    RBC 2.95 (L) 3.80 - 5.20 M/uL    HGB 8.8 (L) 11.5 - 16.0 g/dL    HCT 27.0 (L) 35.0 - 47.0 %    MCV 91.5 80.0 - 99.0 FL    MCH 29.8 26.0 - 34.0 PG    MCHC 32.6 30.0 - 36.5 g/dL    RDW 15.3 (H) 11.5 - 14.5 %    PLATELET 578 227 - 302 K/uL    MPV 10.9 8.9 - 12.9 FL    NRBC 0.0 0.0  WBC    ABSOLUTE NRBC 0.00 0.00 - 0.01 K/uL    NEUTROPHILS 76 (H) 32 - 75 %    LYMPHOCYTES 14 12 - 49 %    MONOCYTES 7 5 - 13 %    EOSINOPHILS 2 0 - 7 %    BASOPHILS 0 0 - 1 %    IMMATURE GRANULOCYTES 1 (H) 0 - 0.5 %    ABS. NEUTROPHILS 5.4 1.8 - 8.0 K/UL    ABS. LYMPHOCYTES 1.0 0.8 - 3.5 K/UL    ABS. MONOCYTES 0.5 0.0 - 1.0 K/UL    ABS. EOSINOPHILS 0.1 0.0 - 0.4 K/UL    ABS. BASOPHILS 0.0 0.0 - 0.1 K/UL    ABS. IMM.  GRANS. 0.1 (H) 0.00 - 0.04 K/UL    DF AUTOMATED     METABOLIC PANEL, BASIC    Collection Time: 01/03/22 10:10 AM   Result Value Ref Range    Sodium 137 136 - 145 mmol/L    Potassium 3.5 3.5 - 5.1 mmol/L    Chloride 93 (L) 97 - 108 mmol/L    CO2 42 (HH) 21 - 32 mmol/L    Anion gap 2 (L) 5 - 15 mmol/L    Glucose 102 (H) 65 - 100 mg/dL    BUN 29 (H) 6 - 20 mg/dL    Creatinine 0.57 0.55 - 1.02 mg/dL    BUN/Creatinine ratio 51 (H) 12 - 20      GFR est AA >60 >60 ml/min/1.73m2    GFR est non-AA >60 >60 ml/min/1.73m2    Calcium 8.3 (L) 8.5 - 10.1 mg/dL       XR CHEST PORT   Final Result Findings/impression:      Stable positioning of right upper extremity PICC, tip projects over the upper   SVC. Cardiac silhouette is enlarged. No pulmonary edema. Bilateral pleural effusions. No evidence of pneumothorax. No acute osseous abnormality identified. DUPLEX UPPER EXT VENOUS BILAT   Final Result      XR CHEST PORT   Final Result   Large effusions. Vascular congestion. CT ABD PELV W CONT   Final Result   1. There are increasing bilateral pleural effusions, increasing body wall   edema, and increasing ascites. These findings could be secondary to the third   spacing of fluids. The differential diagnosis for the ascites would include   pancreatic ascites with acute pancreatitis or metastatic disease to the   peritoneum. 2.  There is a biliary stent which is unchanged in its position traversing   throughout area of obstruction within the pancreatic head. 3.  There are multiple low-density lesions of the liver without short-term   interval changes consistent with metastatic disease. 4.  The right kidney is not identified and apparently the patient is status post   a previous right nephrectomy. 5.  There are bilateral adrenal masses suspect for metastatic disease without   short-term interval changes. CTA CHEST W OR W WO CONT   Final Result   No CT evidence for PE. Thoracic aorta atherosclerosis. CAD. Moderate to large bilateral pleural effusions with associated RML, RLL, and LLL   compressive atelectasis. Abdominal ascites. DUPLEX LOWER EXT VENOUS BILAT   Final Result      XR CHEST PORT   Final Result   FINDINGS: IMPRESSION: 2 frontal views of the chest. Right PICC distal tip SVC   region. Enlarging cardiomegaly. Increasing vascular congestion. Interval development of   hypoinflation, pleural effusions, lower lung airspace edema and/or pneumonia. No   pneumothorax. No free air under the diaphragm.       CT ABD PELV W CONT Final Result   New moderate volume dependent pleural effusions with associated   lower lobe lung compressive atelectasis. Increasing ascites, moderate approaching large-volume   (fluid could be sampled if there is any clinical concern for bile content). High suspicion hepatic metastases. Similar appearance for the pancreas; Silastic stent in place. No pseudocyst formation. Unchanged appearance bilateral adrenal glands. No bowel obstruction. US ABD LTD   Final Result   Small amount of free fluid. Finding suggesting hemangioma in the   liver. Gallbladder wall thickening, adenomyomatosis, sludge and gallstones. Cholecystitis possible. Finding in the region of the pancreatic head as above. Other findings as above. CTA ABDOMEN PELV W CONT   Final Result   1. Ill-defined hypodense mass in the pancreas. There are inflammatory changes   and fluid adjacent to the pancreas or duodenum and stomach most likely related   to biopsy or focal pancreatitis. No pseudocyst formation, active bleeding or   other acute findings. 2. Enlarged adrenal glands left greater than right with noncontrast densities   consistent with adrenal adenomas. 3. Right nephrectomy. 4. Other findings as described. XR CHEST PORT   Final Result   No acute findings. IR PARACENTESIS ABD W IMAGE    (Results Pending)       Principal Problem:    Pancreatitis (12/9/2021)    Active Problems:    A-fib ([address] (11/16/2020)      CHF (congestive heart failure) ([address] (11/16/2020)      Hematemesis (12/12/2021)      History of peptic ulcer disease (12/12/2021)        Assessment/Plan:     Acute pancreatitis  - Status post EUS by Dr. Fred Saravia 12/06  - Continue pain medication  - Lipase variable, will likely be chronically elevated   - CT abd/pelvis with PO and IV contrast showing increasing bilateral pleural effusion and increasing ascites.  Also notes multiple low-density lesions of liver consistent with metastatic disease and bilateral adrenal masses suspected for metastatic disease.  - Continue IV merrem day # 14 of 14   - Continue empiric Anidulafungin  - paracentesis on 12/27 with 2300 mL clear serous fluid drained, cytology showing now growth. - She was scheduled for liver biopsy with IR however IR does not feel patient would benefit from biopsy at this time. - Dobhoff placed 12/30 for nutritional support but dislodged by patient accidentally  -Unable to tolerate p.o. diet. GI following will confer with them concerning PEJ tube placement     CHF  - Continue chlorthalidone 25 mg daily  - IV lasix 40 mg daily     Pancreatic mass  Liver masses  Adrenal masses  History of renal cell carcinoma with lung nodules status post right nephrectomy  Multiple low-density lesions of the liver consistent with meta static disease and bilateral adrenal masses suspicious for metastatic disease  - CA 19-9 greater than 4000  --Oncology consulted-will need tissue biopsy before definitive recommendations can be made     Hypokalemia-stable  - replete as needed     COPD-stable  - Pulmonary consultation  - Chronic respiratory failure with 2 L of oxygen via nasal cannula at home     Leukocytosis-resolved  - Started meropenem  - Cultures negative     A. fib  Supratherapeutic anticoagulation  Continued coagulopathy  -On diltiazem. - Vitamin K given  - May be a side effect of using Eliquis recently and this may be an artificial INR.     -Her hemoglobin has been slowly declining requiring repletion and she is FOBT positive    Elevated d-dimer  - Likely multifactorial   - CTA chest negative for PE  - Duplex US lower extremities negative for DVT  - Duplex US upper extremities showing superficial thrombophlebitis in left median/antecubital veins  Repeat upper extremity Doppler study negative  -Eliquis reduced to 2.5 twice daily    Hypotension  We will start midodrine    Anemia iron deficiency  Blood loss anemia  We will transfuse 2 units PRBCs for hemoglobin decline to 7.2 today  We will start IV and p.o. iron replacement  Hemoccult test stool positive  Heme-onc and GI following    DVT Prophylaxis: restarted on home Eliquis  GI PPx: Protonix  Code Status: Full  POA:    Discharge Barriers:   · Will need to be reauthorized to return to SNF pending on Monday  · PEG J placement tomorrow IV meropenem for 1 more days, transition to oral fluconazole 200 mg p.o. for 2 weeks for discharge per Infectious Disease. Care Plan discussed with: patient and nursing and IDR rounds    Total time spent with patient: >35 minutes.

## 2022-01-03 NOTE — PROGRESS NOTES
Progress Note    Patient: Jo Ann Duran MRN: [id number]  SSN: [id number]    YOB: 1945  Age: 78 y.o. Sex: female      Admit Date: 12/9/2021    LOS: 25 days     Subjective:   GI in consultation for Pancreatitis.     Seen patient in room sleeping, easily arousable. Appears pale. Generalized weakness. Still with left sided abdominal pain. Lipase on 1/1/22 2,756. Post pyloric feeding tube was removed by patient on 12/31 per nurse note. Schedule for PEJ tube placement tomorrow. Bilateral upper arm swelling appears better. D-dimer still elevated but lower, 5.74. Duplex scan showed no evidence of DVT in the right upper extremity, superficial thrombophlebitis noted within the left median/antecubital veins. She is on Eliquis 5mg BID.     12/30 EGD with Dobhoff placement for feeding  12/27/21 Paracentesis - 2300ml ascites fluid removed. 12/23/21 CT abdomen  1.  There are increasing bilateral pleural effusions, increasing body wall  edema, and increasing ascites. These findings could be secondary to the third spacing of fluids. The differential diagnosis for the ascites would include pancreatic ascites with acute pancreatitis or metastatic disease to the peritoneum.    2. [place]vin [place] is a biliary stent which is unchanged in its position traversing throughout area of obstruction within the pancreatic head. 3. [place]vin [place] are multiple low-density lesions of the liver without short-term  interval changes consistent with metastatic disease. 4.  The right kidney is not identified and apparently the patient is status post a previous right nephrectomy. 5.  There are bilateral adrenal masses suspect for metastatic disease without short-term interval changes. 12/6/2021 EUS showing 2.7 X3 cm lesion in the area of head of pancreas with dilation of the Pancreatic duct abutting the portal vein but not involving the SMA or AMV ;  Tumor T2 by endosonographic criteria ; one small lymph node in the area of head of pancreas.   11/22/2021 PET Scan    1.  FDG avid lesion in the pancreatic head consistent with malignancy. 2.  Subcentimeter focus of FDG uptake in the liver, indeterminate. 3.  FDG uptake associated with density expanding the right facet at C5-C6, possibly due to an ectatic artery.         Objective:     Vitals:    01/03/22 0130 01/03/22 0435 01/03/22 0800 01/03/22 1048   BP: (!) 110/55 115/62     Pulse: 85 86 86    Resp: 18 18     Temp: 97.8 °F (36.6 °C) 97.8 °F (36.6 °C)     SpO2:  100%     Weight:       Height:    5' 7\" (1.702 m)        Intake and Output:  Current Shift: No intake/output data recorded. Last three shifts: 01/01 1901 - 01/03 0700  In: 975 [P.O.:200; I.V.:130]  Out: 1225 [Urine:1225]    Physical Exam:   Skin:  Extremities and face reveal no rashes. No cahpin erythema. Pale looking. HEENT: Sclerae anicteric. Extra-occular muscles are intact. No abnormal pigmentation of the lips. The neck is supple. Cardiovascular: Regular rate and rhythm. Respiratory:  Comfortable breathing with no accessory muscle use. GI:  Abdomen distended, soft, and tender. Rectal:  Deferred  Musculoskeletal: Extremities have good range of motion. Neurological:  Gross memory appears intact. Patient is alert and oriented. Psychiatric:  Mood appears appropriate with judgement intact.   Lymphatic:  No visible adenopathy      Lab/Data Review:  Recent Results (from the past 24 hour(s))   TYPE & SCREEN    Collection Time: 01/02/22  4:31 PM   Result Value Ref Range    Crossmatch Expiration 01/05/2022,2359     ABO/Rh(D) B Positive     Antibody screen Negative     Unit number [id number]     Blood component type [facility]     Unit division 00     Status of unit [address] to transfuse     Crossmatch result Compatible     Unit number [id number]     Blood component type [facility]     Unit division 00     Status of unit [address] to transfuse     Crossmatch result Compatible OCCULT BLOOD, STOOL    Collection Time: 01/02/22  9:15 PM   Result Value Ref Range    Occult Blood,day 1 Positive (A) Negative      Day 1 date: 2     D DIMER    Collection Time: 01/03/22 10:10 AM   Result Value Ref Range    D DIMER 5.74 (H) <0.50 ug/ml(FEU)   PROTHROMBIN TIME + INR    Collection Time: 01/03/22 10:10 AM   Result Value Ref Range    Prothrombin time 18.1 (H) 11.9 - 14.7 sec    INR 1.6 (H) 0.9 - 1.1     CBC WITH AUTOMATED DIFF    Collection Time: 01/03/22 10:10 AM   Result Value Ref Range    WBC 7.1 3.6 - 11.0 K/uL    RBC 2.95 (L) 3.80 - 5.20 M/uL    HGB 8.8 (L) 11.5 - 16.0 g/dL    HCT 27.0 (L) 35.0 - 47.0 %    MCV 91.5 80.0 - 99.0 FL    MCH 29.8 26.0 - 34.0 PG    MCHC 32.6 30.0 - 36.5 g/dL    RDW 15.3 (H) 11.5 - 14.5 %    PLATELET 801 153 - 258 K/uL    MPV 10.9 8.9 - 12.9 FL    NRBC 0.0 0.0  WBC    ABSOLUTE NRBC 0.00 0.00 - 0.01 K/uL    NEUTROPHILS 76 (H) 32 - 75 %    LYMPHOCYTES 14 12 - 49 %    MONOCYTES 7 5 - 13 %    EOSINOPHILS 2 0 - 7 %    BASOPHILS 0 0 - 1 %    IMMATURE GRANULOCYTES 1 (H) 0 - 0.5 %    ABS. NEUTROPHILS 5.4 1.8 - 8.0 K/UL    ABS. LYMPHOCYTES 1.0 0.8 - 3.5 K/UL    ABS. MONOCYTES 0.5 0.0 - 1.0 K/UL    ABS. EOSINOPHILS 0.1 0.0 - 0.4 K/UL    ABS. BASOPHILS 0.0 0.0 - 0.1 K/UL    ABS. IMM.  GRANS. 0.1 (H) 0.00 - 0.04 K/UL    DF AUTOMATED     METABOLIC PANEL, BASIC    Collection Time: 01/03/22 10:10 AM   Result Value Ref Range    Sodium 137 136 - 145 mmol/L    Potassium 3.5 3.5 - 5.1 mmol/L    Chloride 93 (L) 97 - 108 mmol/L    CO2 42 (HH) 21 - 32 mmol/L    Anion gap 2 (L) 5 - 15 mmol/L    Glucose 102 (H) 65 - 100 mg/dL    BUN 29 (H) 6 - 20 mg/dL    Creatinine 0.57 0.55 - 1.02 mg/dL    BUN/Creatinine ratio 51 (H) 12 - 20      GFR est AA >60 >60 ml/min/1.73m2    GFR est non-AA >60 >60 ml/min/1.73m2    Calcium 8.3 (L) 8.5 - 10.1 mg/dL              XR CHEST PORT   Final Result   Findings/impression:      Stable positioning of right upper extremity PICC, tip projects over the upper SVC.      Cardiac silhouette is enlarged. No pulmonary edema. Bilateral pleural effusions. No evidence of pneumothorax. No acute osseous abnormality identified. DUPLEX UPPER EXT VENOUS BILAT   Final Result      XR CHEST PORT   Final Result   Large effusions. Vascular congestion. CT ABD PELV W CONT   Final Result   1. There are increasing bilateral pleural effusions, increasing body wall   edema, and increasing ascites. These findings could be secondary to the third   spacing of fluids. The differential diagnosis for the ascites would include   pancreatic ascites with acute pancreatitis or metastatic disease to the   peritoneum. 2.  There is a biliary stent which is unchanged in its position traversing   throughout area of obstruction within the pancreatic head. 3.  There are multiple low-density lesions of the liver without short-term   interval changes consistent with metastatic disease. 4.  The right kidney is not identified and apparently the patient is status post   a previous right nephrectomy. 5.  There are bilateral adrenal masses suspect for metastatic disease without   short-term interval changes. CTA CHEST W OR W WO CONT   Final Result   No CT evidence for PE. Thoracic aorta atherosclerosis. CAD. Moderate to large bilateral pleural effusions with associated RML, RLL, and LLL   compressive atelectasis. Abdominal ascites. DUPLEX LOWER EXT VENOUS BILAT   Final Result      XR CHEST PORT   Final Result   FINDINGS: IMPRESSION: 2 frontal views of the chest. Right PICC distal tip SVC   region. Enlarging cardiomegaly. Increasing vascular congestion. Interval development of   hypoinflation, pleural effusions, lower lung airspace edema and/or pneumonia. No   pneumothorax. No free air under the diaphragm.       CT ABD PELV W CONT   Final Result   New moderate volume dependent pleural effusions with associated   lower lobe lung compressive atelectasis. Increasing ascites, moderate approaching large-volume   (fluid could be sampled if there is any clinical concern for bile content). High suspicion hepatic metastases. Similar appearance for the pancreas; Silastic stent in place. No pseudocyst formation. Unchanged appearance bilateral adrenal glands. No bowel obstruction. US ABD LTD   Final Result   Small amount of free fluid. Finding suggesting hemangioma in the   liver. Gallbladder wall thickening, adenomyomatosis, sludge and gallstones. Cholecystitis possible. Finding in the region of the pancreatic head as above. Other findings as above. CTA ABDOMEN PELV W CONT   Final Result   1. Ill-defined hypodense mass in the pancreas. There are inflammatory changes   and fluid adjacent to the pancreas or duodenum and stomach most likely related   to biopsy or focal pancreatitis. No pseudocyst formation, active bleeding or   other acute findings. 2. Enlarged adrenal glands left greater than right with noncontrast densities   consistent with adrenal adenomas. 3. Right nephrectomy. 4. Other findings as described. XR CHEST PORT   Final Result   No acute findings. IR PARACENTESIS ABD W IMAGE    (Results Pending)       Assessment:     Principal Problem:    Pancreatitis (12/9/2021)    Active Problems:    A-fib ([address] (11/16/2020)      CHF (congestive heart failure) ([address] (11/16/2020)      Hematemesis (12/12/2021)      History of peptic ulcer disease (12/12/2021)        Plan:   1. Pancreatitis      -12/30 s/p post pyloric tube placement. Patient removed tube on 12/31.         -schedule for PEJ tube placement tomorrow. NPO post midnight.  Please get consent.      -Lipase 2,756      -repeat Lipase in the am       -Dilaudid 1 mg every  4 hours as needed for pain      -Oxycodone 5 mg every 6 hours for breakthrough pain      - S/P paracentesis 12/27/21 with removal of 2300 ml clear serous  Fluid removed           -cytology pending  2. CHF      -Continue Chlorthalidone  3. COPD       -Continue home medications       -Albuterol as needed   4. Pancreatic Mass      - > 4000      -S/p fine needle aspiration suspicious for neoplasia but not diagnostic      -When stable Oncology plan for out patient followup with advanced oncology surgeon Marcel Suh or [facility] for resection considerations       -CT concern for liver metastais/lesions        - IR for liver biopsy on hold at this time  5. Hematemesis (resolved)      -Monitor H&H      -Transfuse as needed      -hgB 8.8 12/29/21      -Continue Protonix 40 mg daily  6. Afib      -Rate is controlled      -eliquis  BID/ HgB stable at 8.8 this am       -no further hematemesis reported       Patient discussed with Dr Oralia Leblanc and agrees to above impression and plan. Thank you for allowing me to participate in this patients care    Signed By: Cindy Mcdaniels.  CONRADO Hillman     January 3, 2022

## 2022-01-03 NOTE — PROGRESS NOTES
Subjective   Subjective:      HPI :Pt sleepy. Denies bleeding.      Objective     Current Facility-Administered Medications:     [START ON 1/3/2022] ferrous sulfate tablet 325 mg, 1 Tablet, Oral, DAILY WITH BREAKFAST, Estephania Skelton, NP    0.9% sodium chloride infusion 250 mL, 250 mL, IntraVENous, PRN, Estephania Flaming, NP    iron sucrose (VENOFER) injection 100 mg, 100 mg, IntraVENous, DAILY, Estephania Flaming, NP, 100 mg at 01/02/22 1613    midodrine (PROAMATINE) tablet 10 mg, 10 mg, Oral, TID WITH MEALS, Estephania Skelton, NP, 10 mg at 01/02/22 1757    0.9% sodium chloride infusion 250 mL, 250 mL, IntraVENous, PRN, Rafi Auguste PA-C    sodium chloride (NS) flush 5-40 mL, 5-40 mL, IntraVENous, PRN, [place], Delio Oats., NP    apixaban (ELIQUIS) tablet 5 mg, 5 mg, Oral, BID, Rafi Auguste PA-C, 5 mg at 01/02/22 0945    furosemide (LASIX) injection 40 mg, 40 mg, IntraVENous, DAILY, Rafi Auguste PA-C, 40 mg at 01/02/22 0946    anidulafungin (ERAXIS) 100 mg in 0.9% sodium chloride 130 mL IVPB, 100 mg, IntraVENous, Q24H, Jenifer Chaudhari MD, Last Rate: 83 mL/hr at 01/02/22 1614, 100 mg at 01/02/22 1614    zinc oxide-white petrolatum 17-57 % topical paste, , Topical, TID, Nick Brannon PA-C, Given at 01/02/22 1600    cyclobenzaprine (FLEXERIL) tablet 10 mg, 10 mg, Oral, TID PRN, KARLOS KoehlerC, 10 mg at 12/30/21 0055    HYDROmorphone (DILAUDID) injection 1 mg, 1 mg, IntraVENous, Q4H PRN, Lorretta Pump F, NP, 1 mg at 01/01/22 1701    oxyCODONE IR (ROXICODONE) tablet 5 mg, 5 mg, Oral, Q6H PRN, Lorretta Pump F, NP, 5 mg at 01/02/22 0945    nystatin (MYCOSTATIN) 100,000 unit/gram cream, , Topical, BID, Jenifer Chaudhari MD, Given at 01/01/22 2114    docusate sodium (COLACE) capsule 100 mg, 100 mg, Oral, BID, Vannessa Chapman NP, 100 mg at 01/01/22 2112    polyethylene glycol (MIRALAX) packet 17 g, 17 g, Oral, DAILY, Vannessa Chapman NP, 17 g at 12/23/21 6085   sorbitoL 70 % solution 30 mL, 30 mL, Oral, DAILY PRN, Salinas Grimaldo, NP    meropenem (MERREM) 1 g in 0.9% sodium chloride 20 mL IV syringe, 1 g, IntraVENous, Q8H, Vitor Aguirre MD, 1 g at 01/02/22 1614    bisacodyL (DULCOLAX) suppository 10 mg, 10 mg, Rectal, DAILY PRN, Ainsley Paget, CONRADO    hydrALAZINE (APRESOLINE) 20 mg/mL injection 20 mg, 20 mg, IntraVENous, Q6H PRN, Huyen Crane PA    pantoprazole (PROTONIX) 40 mg in 0.9% sodium chloride 10 mL injection, 40 mg, IntraVENous, DAILY, Huyen Crane PA, 40 mg at 01/01/22 3149    guaiFENesin ER (MUCINEX) tablet 600 mg, 600 mg, Oral, Q12H, Huyen Crane PA, 600 mg at 01/01/22 2113    albuterol-ipratropium (DUO-NEB) 2.5 MG-0.5 MG/3 ML, 3 mL, Nebulization, Q6H PRN, Huyen Crane PA    chlorthalidone (HYGROTON) tablet 25 mg, 25 mg, Oral, DAILY, Huyen Crane PA, 25 mg at 01/01/22 0923    albuterol (PROVENTIL HFA, VENTOLIN HFA, PROAIR HFA) inhaler 2 Puff, 2 Puff, Inhalation, Q6H PRN, Nolberto Farah, CONRADO, 2 Puff at 12/29/21 1404    ascorbic acid (vitamin C) (VITAMIN C) tablet 500 mg, 500 mg, Oral, DAILY, Zabel Nolberto NEGIN, NP, 500 mg at 01/01/22 8683    atorvastatin (LIPITOR) tablet 40 mg, 40 mg, Oral, DAILY, Zabel Nolberto NEGIN, NP, 40 mg at 01/01/22 5207    cholecalciferol (VITAMIN D3) (1000 Units /25 mcg) tablet 1,000 Units, 1,000 Units, Oral, DAILY, Zabel Nolberto NEGIN, NP, 1,000 Units at 01/01/22 8715    diazePAM (VALIUM) tablet 5 mg, 5 mg, Oral, Q6H PRN, Zarefrojas, Jan A, NP, 5 mg at 12/28/21 1810    dilTIAZem ER (CARDIZEM CD) capsule 120 mg, 120 mg, Oral, DAILY, Zarefoss, Jan A, NP, 120 mg at 01/02/22 0945    potassium chloride SR (KLOR-CON 10) tablet 20 mEq, 20 mEq, Oral, DAILY, Zarefrojas Jan A, NP, 20 mEq at 01/01/22 4375    sertraline (ZOLOFT) tablet 25 mg, 25 mg, Oral, DAILY, Zarefrojas, Jan A, NP, 25 mg at 01/02/22 0945    traZODone (DESYREL) tablet 50 mg, 50 mg, Oral, QHS, Zarefrojas Jan NEGIN, NP, 50 mg at 01/01/22 2112    ondansetron (ZOFRAN) injection 4 mg, 4 mg, IntraVENous, Q6H PRN, Sofi Nolberto A, NP, 4 mg at 01/02/22 0945    acetaminophen (TYLENOL) tablet 650 mg, 650 mg, Oral, Q4H PRN, Sofi Nolberto NEGIN, NP, 650 mg at 01/02/22 0024    prochlorperazine (COMPAZINE) with saline injection 10 mg, 10 mg, IntraVENous, Q6H PRN, Sofi Nolberto NEGIN, NP, 10 mg at 12/31/21 1824    Objective:     Visit Vitals  /62 (BP 1 Location: Right lower arm, BP Patient Position: At rest)   Pulse 82   Temp 97.9 °F (36.6 °C)   Resp 18   Ht 5' 7\" (1.702 m)   Wt 97.4 kg (214 lb 11.7 oz)   SpO2 100%   BMI 33.63 kg/m²      Physical Exam   Pt alert and oriented  LUNGS:CTA  Heart:RRR  Abdomen:Ascites +,NT,BS +  EXT:Edema of legs and BUE.right arm swollen more with PICC line than left  @Objective    [email or web address]     Data Review:   Recent Results (from the past 24 hour(s))   GLUCOSE, POC    Collection Time: 01/01/22 10:54 PM   Result Value Ref Range    Glucose (POC) 117 65 - 117 mg/dL    Performed by NHUNG AHN    CBC WITH AUTOMATED DIFF    Collection Time: 01/02/22  4:00 AM   Result Value Ref Range    WBC 8.5 3.6 - 11.0 K/uL    RBC 2.37 (L) 3.80 - 5.20 M/uL    HGB 7.2 (L) 11.5 - 16.0 g/dL    HCT 22.4 (L) 35.0 - 47.0 %    MCV 94.5 80.0 - 99.0 FL    MCH 30.4 26.0 - 34.0 PG    MCHC 32.1 30.0 - 36.5 g/dL    RDW 13.9 11.5 - 14.5 %    PLATELET 594 777 - 113 K/uL    MPV 10.9 8.9 - 12.9 FL    NRBC 0.0 0.0  WBC    ABSOLUTE NRBC 0.00 0.00 - 0.01 K/uL    NEUTROPHILS 83 (H) 32 - 75 %    LYMPHOCYTES 10 (L) 12 - 49 %    MONOCYTES 5 5 - 13 %    EOSINOPHILS 1 0 - 7 %    BASOPHILS 0 0 - 1 %    IMMATURE GRANULOCYTES 1 (H) 0 - 0.5 %    ABS. NEUTROPHILS 7.1 1.8 - 8.0 K/UL    ABS. LYMPHOCYTES 0.8 0.8 - 3.5 K/UL    ABS. MONOCYTES 0.4 0.0 - 1.0 K/UL    ABS. EOSINOPHILS 0.1 0.0 - 0.4 K/UL    ABS. BASOPHILS 0.0 0.0 - 0.1 K/UL    ABS. IMM.  GRANS. 0.0 0.00 - 0.04 K/UL    DF AUTOMATED     METABOLIC PANEL, BASIC    Collection Time: 01/02/22  4:00 AM   Result Value Ref Range    Sodium 135 (L) 136 - 145 mmol/L    Potassium 3.7 3.5 - 5.1 mmol/L    Chloride 91 (L) 97 - 108 mmol/L    CO2 42 (HH) 21 - 32 mmol/L    Anion gap 2 (L) 5 - 15 mmol/L    Glucose 99 65 - 100 mg/dL    BUN 25 (H) 6 - 20 mg/dL    Creatinine 0.65 0.55 - 1.02 mg/dL    BUN/Creatinine ratio 38 (H) 12 - 20      GFR est AA >60 >60 ml/min/1.73m2    GFR est non-AA >60 >60 ml/min/1.73m2    Calcium 8.4 (L) 8.5 - 10.1 mg/dL   D DIMER    Collection Time: 01/02/22  9:00 AM   Result Value Ref Range    D DIMER 6.97 (H) <0.50 ug/ml(FEU)   PROTHROMBIN TIME + INR    Collection Time: 01/02/22  9:00 AM   Result Value Ref Range    Prothrombin time 20.7 (H) 11.9 - 14.7 sec    INR 1.8 (H) 0.9 - 1.1     TYPE & SCREEN    Collection Time: 01/02/22  4:31 PM   Result Value Ref Range    Crossmatch Expiration 01/05/2022,2359     ABO/Rh(D) B Positive     Antibody screen Negative     Unit number [id number]     Blood component type [facility]     Unit division 00     Status of unit Allocated     TRANSFUSION STATUS Ok to transfuse     Crossmatch result Compatible     Unit number [id number]     Blood component type [facility]     Unit division 00     Status of unit Allocated     TRANSFUSION STATUS Ok to transfuse     Crossmatch result Compatible        Assessment   Assessment/Plan:     Possible pancreatic ca with liver metastases. Pancreatci mass FNA suspicious for malignant cells but not diagnostic. Will discuss with pathology. Ca 19-9 very high more than 22,000. If pt general condition and infection better consider CT guided liver biopsy and chemotherapy as out pt. If pt general condition not better palliative care. Prognosis poor.   D/w pt in detail. Renal cell CA with lung nodules. S/p right nephrectomy.     Normocytic hypochromic anemia:hb coming down. R/o Gi bleeding. Check stool for occult blood. Transfuse pRBC if hb less than 7 or symptomatic. Check iron studies,b12,folate.     Severe pancreatitis     Sepsis  Ascites:S/p paracentesis. Cytology negative for malignant cells. Superficial vein thrombosis LUE:pt on eliquis.right arm more swollen with PICC line. If not better repeat venous doppler LUE. BL pleural effusions  Coagulopathy:mild. Pt d dimer also elevated. R/o DIC. No active bleeding clinically.

## 2022-01-03 NOTE — PROGRESS NOTES
Nutrition Assessment     Type and Reason for Visit: Reassess (goal)    Nutrition Recommendations/Plan:   Continue w/ regular low fat diet, encourage to order off of the alternative menu for better meal acceptance  Ensure Max x3/day  Enusre pudding x2/day   Obtain abw to better assess current nutrition status    Monitor and record wts, po & supplement intake     Nutrition Assessment:  PMHx: COPD, CHF, obesity, anemia. Admit with abd pain, n/v, diarrhea and poor intake. Pt reported eating well pta however unable to elaborate further. Underwent EUS 12/6 showing a lesion in the head of the pancreas. US ABD sludge-filled gallbladder, biliary obstruction. Lipase continues to rise- no new pancreatic findings on CT abd; note increasing ascites. NPO/ CL x 9d. Received PPN 12/18, 12/19 at low rate- providing <25% est nutrient needs. Previously on PO diet (Regular, Full Lq, Clear Lq) with continued abd pain. Upgraded to Regular diet w/ ensure x5day (12/31). Only one Po intake recorded in EHR <25% (12/31). RD to modifty current Ons. Malnutrition Assessment:  Malnutrition Status: Mild malnutrition     Estimated Daily Nutrient Needs:  Energy (kcal):  2050kcal (20kcal/kg)  Protein (g):  105g (1g/kg)       Fluid (ml/day):  2050ml    Nutrition Related Findings:  Pt obese. appears well nourished. NFPE not performed. No recent BM documented x5days. no edema. Intermittent nausea- no recent emesis. labs: Bun (29), pr (4.8), alb (1.2), lipase (2756). Meds: vitamin c, lipitor, vitamin D, colace      Current Nutrition Therapies:  ADULT DIET Regular; Low Fat (Less than or Equal to 50 gm/day)  ADULT ORAL NUTRITION SUPPLEMENT Breakfast, Lunch, PM Snack, Dinner, HS Snack, AM Snack;  Low Calorie/High Protein    Anthropometric Measures:  · Height:  5' 7\" (170.2 cm)  · Current Body Wt:  101.6 kg (224 lb)  · BMI: 35.1    Nutrition Diagnosis:   · Inadequate oral intake related to inadequate protein-energy intake,altered GI function as evidenced by intake 0-25%      Nutrition Intervention:  Food and/or Nutrient Delivery: Continue current diet,Modify oral nutrition supplement  Nutrition Education and Counseling: Education not indicated  Coordination of Nutrition Care: Continue to monitor while inpatient    Goals:  Meet >50% EENs in 5-7 days, Lytes wnl, Maintain skin integrity       Nutrition Monitoring and Evaluation:   Behavioral-Environmental Outcomes: None identified  Food/Nutrient Intake Outcomes: Enteral nutrition intake/tolerance  Physical Signs/Symptoms Outcomes: Weight,Biochemical data    Discharge Planning:     Too soon to determine     Electronically signed by Brandon Levy on 1/3/2022 at 11:04 AM    Contact Number: [phone]

## 2022-01-04 NOTE — ANESTHESIA POSTPROCEDURE EVALUATION
Procedure(s):  ESOPHAGOGASTRODUODENOSCOPY (EGD). total IV anesthesia, MAC, general - backup    Anesthesia Post Evaluation      Multimodal analgesia: multimodal analgesia not used between 6 hours prior to anesthesia start to PACU discharge  Patient location during evaluation: bedside  Patient participation: complete - patient participated  Level of consciousness: awake  Pain score: 0  Airway patency: patent  Anesthetic complications: no  Cardiovascular status: acceptable and hemodynamically stable  Respiratory status: acceptable  Hydration status: acceptable  Post anesthesia nausea and vomiting:  none      INITIAL Post-op Vital signs: No vitals data found for the desired time range.

## 2022-01-04 NOTE — ANESTHESIA PREPROCEDURE EVALUATION
Relevant Problems   RESPIRATORY SYSTEM   (+) COPD exacerbation (HCC)      CARDIOVASCULAR   (+) A-fib (HCC)   (+) CHF (congestive heart failure) (HCC)      ENDOCRINE   (+) Obesity      HEMATOLOGY   (+) Anemia       Anesthetic History   No history of anesthetic complications            Review of Systems / Medical History  Patient summary reviewed, nursing notes reviewed and pertinent labs reviewed    Pulmonary    COPD      Smoker         Neuro/Psych             Comments: RIGHT FINGER NUMBNESS  FIBROMYALGIA Cardiovascular            Dysrhythmias : atrial fibrillation  Hyperlipidemia    Exercise tolerance: <4 METS  Comments: Echo:  · LV: Calculated LVEF is 74%. Normal cavity size, systolic function (ejection fraction normal) and diastolic function. Moderate concentric hypertrophy. Wall motion: normal.  · LA: Mildly dilated left atrium. · RA: Mildly dilated right atrium. · MV: Moderate mitral annular calcification. Mild mitral valve regurgitation is present. · TV: Mild tricuspid valve regurgitation is present. · PA: Pulmonary arterial systolic pressure is 62 mmHg. · IVC: Severely elevated central venous pressure (15+ mmHg); IVC diameter is larger than 21 mm and collapses less than 50% with respiration. · Pericardium: Small pericardial effusion.        GI/Hepatic/Renal     GERD    Renal disease (Last Creatinine 1.25): CRI      Comments: Pancreatic Mass Endo/Other      Hypothyroidism  Morbid obesity and cancer     Other Findings   Comments: Medical History  CHF (congestive heart failure) (HCC)  A-fib (HCC)  COPD (chronic obstructive pulmonary disease) (HCC)  Fibromyalgia  Sjogren's syndrome (HCC)  Lymphedema  Thyroid nodule  Clear cell renal cell carcinoma (HCC)  GERD (gastroesophageal reflux disease)         Past Medical History:   Diagnosis Date    A-fib ([facility]     CHF (congestive heart failure) (HCC)     Clear cell renal cell carcinoma (HCC)     COPD (chronic obstructive pulmonary disease) ([facility] [facility]     Fibromyalgia     GERD (gastroesophageal reflux disease)     Lymphedema     Sjogren's syndrome (HCC)     Thyroid nodule        Past Surgical History:   Procedure Laterality Date    COLONOSCOPY N/A 11/18/2020    COLONOSCOPY performed by Sanket Oliver MD at [address] [facility] HX GI      EGD    HX HYSTERECTOMY      HX NEPHRECTOMY Right 2019    HX ORTHOPAEDIC      ankle       Current Outpatient Medications   Medication Instructions    albuterol (PROVENTIL HFA, VENTOLIN HFA, PROAIR HFA) 90 mcg/actuation inhaler 2 Puffs, Inhalation, EVERY 6 HOURS AS NEEDED    ascorbic acid (vitamin C) (VITAMIN C) 500 mg, Oral, DAILY    atorvastatin (LIPITOR) 40 mg, Oral, DAILY    budesonide-formoteroL (Symbicort) 80-4.5 mcg/actuation HFAA 2 Puffs, Inhalation    bumetanide (BUMEX) 1 mg, Oral, DAILY    chlorthalidone (HYGROTON) 25 mg, Oral, DAILY    cholecalciferol (VITAMIN D3) 1,000 Units, Oral, DAILY    diazePAM (VALIUM) 5 mg, Oral, EVERY 6 HOURS AS NEEDED    dilTIAZem ER (DILACOR XR) 120 mg, Oral, DAILY    Eliquis 5 mg, Oral, 2 TIMES DAILY    famotidine (PEPCID) 40 mg, DAILY    pantoprazole (PROTONIX) 40 mg, Oral, DAILY    potassium chloride SR (KLOR-CON 10) 10 mEq tablet 20 mEq, Oral, DAILY    Se-Tan Plus 162-115. 2-1 mg cap 1 Capsule, Oral, 2 TIMES DAILY    sertraline (ZOLOFT) 25 mg, Oral, DAILY    traZODone (DESYREL) 50 mg tablet TAKE ONE TABLET BY MOUTH AT BEDTIME       No current facility-administered medications for this visit. No current outpatient medications on file.      Facility-Administered Medications Ordered in Other Visits   Medication Dose Route Frequency    apixaban (ELIQUIS) tablet 2.5 mg  2.5 mg Oral BID    ferrous sulfate tablet 325 mg  1 Tablet Oral DAILY WITH BREAKFAST    0.9% sodium chloride infusion 250 mL  250 mL IntraVENous PRN    midodrine (PROAMATINE) tablet 10 mg  10 mg Oral TID WITH MEALS    0.9% sodium chloride infusion 250 mL  250 mL IntraVENous PRN    sodium chloride (NS) flush 5-40 mL  5-40 mL IntraVENous PRN    furosemide (LASIX) injection 40 mg  40 mg IntraVENous DAILY    anidulafungin (ERAXIS) 100 mg in 0.9% sodium chloride 130 mL IVPB  100 mg IntraVENous Q24H    zinc oxide-white petrolatum 17-57 % topical paste   Topical TID    cyclobenzaprine (FLEXERIL) tablet 10 mg  10 mg Oral TID PRN    HYDROmorphone (DILAUDID) injection 1 mg  1 mg IntraVENous Q4H PRN    oxyCODONE IR (ROXICODONE) tablet 5 mg  5 mg Oral Q6H PRN    nystatin (MYCOSTATIN) 100,000 unit/gram cream   Topical BID    docusate sodium (COLACE) capsule 100 mg  100 mg Oral BID    polyethylene glycol (MIRALAX) packet 17 g  17 g Oral DAILY    sorbitoL 70 % solution 30 mL  30 mL Oral DAILY PRN    bisacodyL (DULCOLAX) suppository 10 mg  10 mg Rectal DAILY PRN    hydrALAZINE (APRESOLINE) 20 mg/mL injection 20 mg  20 mg IntraVENous Q6H PRN    pantoprazole (PROTONIX) 40 mg in 0.9% sodium chloride 10 mL injection  40 mg IntraVENous DAILY    guaiFENesin ER (MUCINEX) tablet 600 mg  600 mg Oral Q12H    albuterol-ipratropium (DUO-NEB) 2.5 MG-0.5 MG/3 ML  3 mL Nebulization Q6H PRN    chlorthalidone (HYGROTON) tablet 25 mg  25 mg Oral DAILY    albuterol (PROVENTIL HFA, VENTOLIN HFA, PROAIR HFA) inhaler 2 Puff  2 Puff Inhalation Q6H PRN    ascorbic acid (vitamin C) (VITAMIN C) tablet 500 mg  500 mg Oral DAILY    atorvastatin (LIPITOR) tablet 40 mg  40 mg Oral DAILY    cholecalciferol (VITAMIN D3) (1000 Units /25 mcg) tablet 1,000 Units  1,000 Units Oral DAILY    diazePAM (VALIUM) tablet 5 mg  5 mg Oral Q6H PRN    dilTIAZem ER (CARDIZEM CD) capsule 120 mg  120 mg Oral DAILY    potassium chloride SR (KLOR-CON 10) tablet 20 mEq  20 mEq Oral DAILY    sertraline (ZOLOFT) tablet 25 mg  25 mg Oral DAILY    traZODone (DESYREL) tablet 50 mg  50 mg Oral QHS    ondansetron (ZOFRAN) injection 4 mg  4 mg IntraVENous Q6H PRN    acetaminophen (TYLENOL) tablet 650 mg  650 mg Oral Q4H PRN    prochlorperazine (COMPAZINE) with saline injection 10 mg  10 mg IntraVENous Q6H PRN       No data found.     Lab Results   Component Value Date/Time    WBC 7.1 01/03/2022 10:10 AM    HGB 8.8 (L) 01/03/2022 10:10 AM    HCT 27.0 (L) 01/03/2022 10:10 AM    PLATELET 200 91/97/3835 10:10 AM    MCV 91.5 01/03/2022 10:10 AM     Lab Results   Component Value Date/Time    Sodium 137 01/03/2022 10:10 AM    Potassium 3.5 01/03/2022 10:10 AM    Chloride 93 (L) 01/03/2022 10:10 AM    CO2 42 (HH) 01/03/2022 10:10 AM    Anion gap 2 (L) 01/03/2022 10:10 AM    Glucose 102 (H) 01/03/2022 10:10 AM    BUN 29 (H) 01/03/2022 10:10 AM    Creatinine 0.57 01/03/2022 10:10 AM    BUN/Creatinine ratio 51 (H) 01/03/2022 10:10 AM    GFR est AA >60 01/03/2022 10:10 AM    GFR est non-AA >60 01/03/2022 10:10 AM    Calcium 8.3 (L) 01/03/2022 10:10 AM     No results found for: APTT, PTP, INR, INREXT, INREXT  Lab Results   Component Value Date/Time    Glucose 102 (H) 01/03/2022 10:10 AM    Glucose (POC) 106 01/03/2022 03:55 PM     Physical Exam    Airway  Mallampati: III  TM Distance: 4 - 6 cm         Cardiovascular      Rate: normal         Dental    Dentition: Poor dentition     Pulmonary      Decreased breath sounds           Abdominal  GI exam deferred       Other Findings            Anesthetic Plan    ASA: 4  Anesthesia type: total IV anesthesia, MAC and general - backup          Induction: Intravenous  Anesthetic plan and risks discussed with: Patient

## 2022-01-04 NOTE — ADT AUTH CERT NOTES
Pancreatitis - Care Day 24 (1/1/2022) by Pily Bravo       Review Entered Review Status   1/4/2022 09:42 Completed      Criteria Review      Care Day: 24 Care Date: 1/1/2022 Level of Care: Telemetry    Guideline Day 3    Clinical Status    (X) * Hemodynamic stability    1/4/2022 09:42:18 EST by Pily Bravo      BP 92/59, P 94    ( ) * No evidence of infection requiring inpatient care    ( ) * Amylase level normal or improved    1/4/2022 09:42:18 EST by Pily Bravo      LIPASE 2756    ( ) * Pain absent or managed    1/4/2022 09:42:18 EST by Pily Bravo      GI: Firm with hypoactive bowel sounds and tenderness to palpation in all 4 quadrant    ( ) * Diet tolerated    1/4/2022 09:42:18 EST by Pily Bravo      She remains physically debilitated.   Encouraging to eat    (X) * Renal function at baseline or acceptable for next level of care    1/4/2022 09:42:18 EST by Pily Bravo      BUN  22, CREAT 0.70, GFRNAA >60    ( ) * Electrolyte abnormalities absent or acceptable for next level of care    1/4/2022 09:42:18 EST by Pily Bravo      K 3.4, CA 8.4    ( ) * Discharge plans and education understood    Activity    ( ) * Ambulatory or acceptable for next level of care    Routes    (X) * Oral hydration    ( ) * Oral medications or regimen acceptable for next level of care    1/4/2022 09:42:18 EST by Pily Bravo      SEE REVIEW    (X) * Oral diet or acceptable for next level of care    1/4/2022 09:42:18 EST by Pily Bravo      Regular; Low Fat (Less than or Equal to 50 gm/day)    Interventions    (X) Laboratory tests    1/4/2022 09:42:18 EST by Pily Bravo      RBC 2.66, HGB 8.0, HCT 25.3, NEUTS 82, PT 19.3, INR 1.7, D DIMER 6.73, K 3.4, CL 90, C02 43, ANION GAP 3, GLUC 106, BUN 22, CA 8.4    Medications    ( ) Oral analgesics    1/4/2022 09:42:18 EST by Pily Bravo      DILAUDID 1MG IV Q 4 HR PORN X 2 (5X/24 HR),    * Milestone   Additional Notes   Hospitalist Progress Note      She is out of bed in bedside chair. She remains physically debilitated. Encouraging to eat, she may bring in her own food low-fat high-protein. EXAM:  Constitutional: No acute distress   Skin: Extremities and face reveal no rashes. Multiple areas of ecchymosis in upper extremities. HEENT: Dobhoff in left nare. Sclerae anicteric. Extra-occular muscles are intact. No oral ulcers. The neck is supple and no masses. Cardiovascular: Regular rate and rhythm. +S1/S2. No murmur or gallop. Respiratory:  Rhonchi noted bilateral without wheezes. GI: Firm with hypoactive bowel sounds and tenderness to palpation in all 4 quadrant. Rectal: Deferred    Musculoskeletal: Upper and lower extremity peripheral edema present, likely third spacing. Able to move all ext   Neurological:  Generalized weakness. Patient is alert and oriented x3. Cranial nerves II-XII grossly intact   Psychiatric: Mood appears appropriate      T 98.9, BP 92/59, P 94, R 18, 02 SAT 98% 3L NC      Assessment/Plan:       Acute pancreatitis   - Status post EUS by Dr. Zeeshan Guardado 12/06   - Continue pain medication   - Lipase variable, will likely be chronically elevated    - CT abd/pelvis with PO and IV contrast showing increasing bilateral pleural effusion and increasing ascites. Also notes multiple low-density lesions of liver consistent with metastatic disease and bilateral adrenal masses suspected for metastatic disease.   - Continue IV merrem day #11 of 14    - Continue empiric Anidulafungin   - Will change IV fluids to 0.45% normal saline    - Went for paracentesis on 12/27 with 2300 mL clear serous fluid drained, cytology showing now growth.    - She was scheduled for liver biopsy with IR however IR does not feel patient would benefit from biopsy at this time.     - Dobhoff placed 12/30 for nutritional support but dislodged by patient accidentally   -Encourage eating low-fat high-protein diet       CHF   - Continue chlorthalidone 25 mg daily   - IV lasix 40 mg daily       Pancreatic mass   Liver masses   Adrenal masses   Multiple low-density lesions of the liver consistent with meta static disease and bilateral adrenal masses suspicious for metastatic disease   - CA 19-9 greater than 4000   --Oncology consulted-will need tissue biopsy before definitive recommendations can be made       Hypokalemia - replete as needed       COPD   - Pulmonary consultation   - Chronic respiratory failure with 2 L of oxygen via nasal cannula at home       Leukocytosis   - Started meropenem   - Cultures negative       Supratherapeutic anticoagulation   - Resolved. INR normal.   - Vitamin K given   - May be a side effect of using Eliquis recently and this may be an artificial INR.  No obvious bleeding source   - Hemoglobin stable       8. Elevated d-dimer   - Likely multifactorial    - CTA chest negative for PE   - Duplex US lower extremities negative for DVT   - Duplex US upper extremities showing superficial thrombophlebitis in left median/antecubital veins   - Restarted on home Eliquis        9.  Hypotension   We will start midodrine       DVT Prophylaxis: restarted on home Eliquis   GI PPx: Protonix   Code Status: Full   POA:       Discharge Barriers:    \" Will need to be reauthorized to return to SNF pending on Monday   \" Loss Dobbhoff feeding tube consider PEG J versus TPN if she is unable to maintain nutritional status   \" IV meropenem for 2 more days, transition to oral fluconazole 200 mg p.o. for 2 weeks for discharge per Infectious Disease. HEM/ONC NOTE:   Pt having abdominal pain. Pt feeling tired. Assessment/Plan:       Possible pancreatic ca with liver metastases. Pancreatci mass FNA suspicious for malignant cells but not diagnostic. Will discuss with pathology. If pt general condition and infection better consider liver biopsy and chemotherapy as out pt. If pt general condition not better palliative care.       Renal cell CA with lung nodules. S/p nephrectomy.     Normocytic hypochromic anemia       Severe pancreatitis       Sepsis   Ascites:S/p paracentesis. Cytology negative for malignant cells. Superficial vein thrombosis LUE:pt on eliquis. BL pleural effusions   D/w pt in detail.             MEROPENEM 1G IV Q 8 HR, PROTONIX 40MG IV QD, ERAXIS 100MG IV Q 24 HR, VIT C 500MG PO QD, LIPITOR 40MG PO QD, HYGROTON 25MG PO QD, VIT D3 1000U PO QD,  CARDIZEM CD 120MG PO QD, COLACE 100MG PO BID, LASIX 40MG IV QD, PROAMITINE 10MG PO TIS, MYCOSTATIN TP BID, ZOFRAN 4 ,G IV Q 6 HR PRN X 2 (3X/24),  KCL  20 MEQ PO QD, ZOLOFT 25MG PO QD,  DESYREL 50MG PO Q HS,  ELIQUIS 5MG PO BID,            Pancreatitis - Care Day 21 (12/29/2021) by Maurie Simmonds       Review Entered Review Status   12/29/2021 12:16 Completed      Criteria Review      Care Day: 21 Care Date: 12/29/2021 Level of Care: Telemetry    Guideline Day 3    Clinical Status    (X) * Hemodynamic stability    12/29/2021 12:16:38 EST by Maurie Simmonds      T 98.4, /78., P 81, R 16, 02 SAT 98% 3L NC    ( ) * No evidence of infection requiring inpatient care    ( ) * Amylase level normal or improved    12/29/2021 12:16:38 EST by Maurie Simmonds      12/28/21 LIPASE 2,802    ( ) * Pain absent or managed    12/29/2021 12:16:38 EST by Maurie Simmonds      GI: Firm with hypoactive bowel sounds and tenderness to palpation in all 4 quadrant    ( ) * Diet tolerated    (X) * Renal function at baseline or acceptable for next level of care    12/29/2021 12:16:38 EST by Maurie Simmonds      BUN 14, CREAT 0.62,  GFRNAA >60    ( ) * Electrolyte abnormalities absent or acceptable for next level of care    12/29/2021 12:16:38 EST by Maurie Simmonds      , K 3.4,    ( ) * Discharge plans and education understood    Activity    ( ) * Ambulatory or acceptable for next level of care    12/29/2021 12:16:38 EST by Maurie Simmonds      SEE CM NOTE    Routes    ( ) * Oral hydration    12/29/2021 12:16:38 EST by Maurie Simmonds IV NS 50ML/HR,    ( ) * Oral medications or regimen acceptable for next level of care    12/29/2021 12:16:38 EST by Maria L Goodwin      SEE REVIEW    (X) * Oral diet or acceptable for next level of care    12/29/2021 12:16:38 EST by Maria L Goodwin      Easy to Chew; Low Fat/Low Chol/High Fiber/MELYSSA    Interventions    (X) Laboratory tests    12/29/2021 12:16:39 EST by Maria L Goodwin      RBC 3.49, HGB 10.5, HCT 33.4, NEUTS 88, PT 18.9, INR 1.6, D DIMER 8.67, , K 3.4, CL 91, C02 41, ANION GAP 2, GLUC 115, ALK PHOS 381    Medications    ( ) Oral analgesics    12/29/2021 12:16:39 EST by Maria L Goodwin      DILAUDID 1MG IV Q 4 HR PRN X 2 (6X/24 HR), ROXICODONE 5MG PO Q 6 HR PRN X 1 (5X/24 HR)    * Milestone   Additional Notes   Hospitalist Progress Note      She is resting comfortably. No new complaints today.   Went for paracentesis on 12/27 with 2300 mL clear serous fluid drained. Cytology results pending. She was scheduled for liver biopsy with IR however IR does not feel patient would benefit from biopsy at this time.        EXAM:  Constitutional: No acute distress   Skin: Extremities and face reveal no rashes. Multiple areas of ecchymosis in upper extremities. HEENT: Sclerae anicteric. Extra-occular muscles are intact. No oral ulcers. The neck is supple and no masses. Cardiovascular: Regular rate and rhythm. +S1/S2. No murmur or gallop. Respiratory:  Rhonchi noted bilateral without wheezes. GI: Firm with hypoactive bowel sounds and tenderness to palpation in all 4 quadrant. Rectal: Deferred    Musculoskeletal: Upper and lower extremity peripheral edema present. Able to move all ext   Neurological:  Patient is alert and oriented x3.  Cranial nerves II-XII grossly intact   Psychiatric: Mood appears appropriate         Assessment/Plan:       1.  Acute pancreatitis   Status post EUS by Dr. Cristela Milligan 12/06   Continue pain medication   Lipase variable, will likely be chronically elevated due to pancreatic mass CT abd/pelvis with PO and IV contrast showing increasing bilateral pleural effusion and increasing ascites. Also notes multiple low-density lesions of liver consistent with metastatic disease and bilateral adrenal masses suspected for metastatic disease. Continue IV merrem day #10 of 14    Continue empiric Anidulafungin day #4   Will change IV fluids to 0.45% normal saline    May benefit from NG tube for nutrition   Went for paracentesis on 12/27 with 2300 mL clear serous fluid drained, Cytology results pending. She was scheduled for liver biopsy with IR however IR does not feel patient would benefit from biopsy at this time.         2.  CHF   Continue chlorthalidone 25 mg daily   Will add IV lasix 40 mg daily       3.  Pancreatic mass   CA 19-9 greater than 4000       4.  Hypokalemia - replete as needed       5. COPD   Pulmonary consultation   Chronic respiratory failure with 2 L of oxygen via nasal cannula at home       6.  Leukocytosis   Started meropenem   Cultures negative       7.  Supratherapeutic anticoagulation   Resolved. INR normal.   Vitamin K given   May be a side effect of using Eliquis recently and this may be an artificial INR.  No obvious bleeding source   Hemoglobin stable       8. Elevated d-dimer   - Likely multifactorial    - CTA chest negative for PE   - Duplex US lower extremities negative for DVT   - Duplex US upper extremities showing superficial thrombophlebitis in left median/antecubital veins   - Restarted on home Eliquis        DVT Prophylaxis: restarted on home Eliquis   GI PPx: Protonix   Code Status: Full   POA:       Discharge Barriers: Pending ID clearance. On IV antibiotics. Cytology results pending from paracentesis.          CM NOTE: CM met with patient to encourage her to participate in PT/OT.  Patient will require insurance auth prior to d/c to [facility] (patient has been accepted with [facility]).  CM explained insurance process and why participating in therapy is important.  Patient reported that she would participate but that she was in a lot of pain.  Nurse present, explained therapy would assist in healing, patient verbalized understanding. Therapy dept notified of need for notes, CM to send notes in order to start insurance auth this date. PROTONIX 40MG IV QD, MEROPENEM 1G IV Q 8 HR, ERAXIS 100MG IV Q 24 HR,  LASIX 40MG IV QD,  ZOFRAN 4 MG IV Q 6 HR PRN X 1 (3X/24 HR), MUCINEX 600MG PO Q 12 HR, ELIQUIS 5MG PO BID, VIT C 500MG PO QD, LIPITOR 40MG PO QD, HYGROTON 25MG PO QD, VIT D3 1000U PO QD, CARDIZEM CD 120MG PO QD, MYCOSTATIN  TO BID,  KCL 20MEQ PO QD,  ZOLOFT 25MG PO QD,  DESYREL  50MG PO Q  HS,            Pancreatitis - Care Day 18 (12/26/2021) by Sailaja Lee       Review Entered Review Status   12/28/2021 09:52 Completed      Criteria Review      Care Day: 18 Care Date: 12/26/2021 Level of Care: Telemetry    Guideline Day 2    Level Of Care    (X) ICU or floor    Clinical Status    (X) * Hemodynamic stability    12/28/2021 09:52:40 EST by Sailaja Lee      /62, P 85    (X) * Pain absent or reduced    12/28/2021 09:52:40 EST by Sailaja Lee      Pain improved today    Routes    ( ) Possible oral hydration    12/28/2021 09:52:40 EST by Sailaja Lee      IV NS  100ML. /HR CHANGED TO 50ML/HR    ( ) Oral diet as tolerated    12/28/2021 09:52:40 EST by Sailaja Lee      Clear Liquid;  Low Fat (Less than or Equal to 50 gm/day)  NPO AFTER MN    Interventions    (X) * NG tube absent    (X) Laboratory tests    12/28/2021 09:52:40 EST by Sailaja Lee      D DIMER 14.24, , CL 96, C02 33, TP 6.1, ALB 1.5, GLOB 4.6, ALK PHOS 353, PROCALCITONIN 0.34, NTproBNP 1513, C RACTV PROT 23.40    12/25 LIPASE 2484    Medications    (X) Parenteral analgesics    12/28/2021 09:52:40 EST by Sailaja Lee      DILAUDID 1MG IV Q 4 HR PRN X 5 (8X/24 HR)    * Milestone   Additional Notes   Hospitalist Progress Note      Patient seen and examined sitting on bedside commode. Pain improved today. She does have upper and lower extremity edema. EXAM:  Constitutional: No acute distress   Skin: Extremities and face reveal no rashes. Multiple areas of ecchymosis in upper extremities. HEENT: Sclerae anicteric. Extra-occular muscles are intact. No oral ulcers. The neck is supple and no masses. Cardiovascular: Regular rate and rhythm. +S1/S2. No murmur or gallop. Respiratory:  Rhonchi noted bilateral without wheezes. GI: Firm with hypoactive bowel sounds and tenderness to palpation in all 4 quadrant. Rectal: Deferred    Musculoskeletal: Upper and lower extremity peripheral edema present. Able to move all ext   Neurological:  Patient is alert and oriented x3. Cranial nerves II-XII grossly intact   Psychiatric: Mood appears appropriate           T 98.1, R 20, 02 SAT 97% 2L NC      12/23/21 CT ABD PELVIS:  1.  There are increasing bilateral pleural effusions, increasing body wall   edema, and increasing ascites. These findings could be secondary to the third spacing of fluids. The differential diagnosis for the ascites would include pancreatic ascites with acute pancreatitis or metastatic disease to the peritoneum. 2. [place] Pod is a biliary stent which is unchanged in its position traversing   throughout area of obstruction within the pancreatic head. 3. [place] Pod are multiple low-density lesions of the liver without short-term interval changes consistent with metastatic disease. 4.  The right kidney is not identified and apparently the patient is status post a previous right nephrectomy. 5.  There are bilateral adrenal masses suspect for metastatic disease without short-term interval changes. 12/26/21 CXR: Large effusions. Vascular congestion.          Assessment/Plan:       1.  Acute pancreatitis   Status post EUS by Dr. Rene Cease 12 6   Continue pain medication   Lipase trending up   Consider TPN if diet fails   CT abd/pelvis with PO and IV contrast showing increasing bilateral pleural effusion and increasing ascites. Also notes multiple low-density lesions of liver consistent with metastatic disease and bilateral adrenal masses suspected for metastatic disease. IR consulted for liver biopsy    Continue IV merrem day #7 of 14    Will change IV fluids to 0.45% normal saline        2.  CHF   Continue chlorthalidone 25 mg daily   Will add IV lasix 40 mg daily       3.  Pancreatic mass   CA 19-9 greater than 4000       4.  Hypokalemia - replete as needed       5. COPD   Pulmonary consultation   Chronic respiratory failure with 2 L of oxygen via nasal cannula at home       6.  Leukocytosis   Started meropenem   Cultures negative       7.  Supratherapeutic anticoagulation   INR normal    Vitamin K given   May be a side effect of using Eliquis recently and this may be an artificial INR.  No obvious bleeding source   Hemoglobin stable           DVT Prophylaxis: SCDs   GI PPx: Protonix         Discharge Barriers: IR and GI not available for paracentesis or to evaluate patient for liver Bx. Pending downward trend of lipase levels         Pulmonary and Critical Care progress note      On 2 L nasal cannula oxygen.  Gradual improvement in respiratory status      EXAM:  Lung: Reduced air entry bilaterally with prolonged exhalation but no wheezing.  Crackles in both lung bases      Assessment:       1.  Acute respiratory failure with hypoxia   2.  Bilateral pleural effusions   3.  COPD   4.  Sepsis   5.  Acute appendicitis   6.  Acute on chronic congestive heart failure   7.  Ascites   8.  Pancreatic mass   9.  Leukocytosis   10.  Morbid obesity          Currently on 2 L nasal cannula oxygen   Continue oxygen supplementation as needed to keep saturation above 92%       Patient has bilateral pleural effusions with associated atelectasis   This is due to volume overload and ascites from the pancreatitis and pancreatic mass   Continue with diuresis   Likely to benefit from paracentesis if felt to be appropriate by GI   Liver biopsy by IR pending   Intake and output charting   Check electrolytes and replace as needed       Possible sources of fever include pancreatitis and pyuria   Continue antibiotics   Culture sent   Appreciate ID input   Further changes in antibiotics based on clinical response and culture results       Lipase trending down   TPN if diet fails       DVT and GI prophylaxis          ID NOTE:      Patient followed for severe pancreatitis with worsening leukocytosis on Zosyn.  She was switched to Meropenem because of pancreatitis.  She remains afebrile. WBC increased today along with procal and CRP.  Repeat CT Abdomen showed increasing ascites.   Patient resting comfortably with no new complaints except for bruising and swellling in her left forearm. Assessment:   1. Severe pancreatitis with markedly elevated lipase, resolving   2. Pancreatic mass, possible neoplasm   3. Biliary stent   4. Sepsis with worsening leukocytosis with elevated procal and CRP, resolving, Day #7 IV Meropenem   5. TPN (just started)   6. Moderate pyuria, negative bacteria, urine culture negative   7. Possible candida superinfection with vaginitis       Comment:    WBC, CRP and procal all continue to increase, but she remains afebrile.  Concerned for possible Candida superinfection though serum fungitell is negative.        Plan:   1. Continue IV Meropenem for 7 more days   2. Start Anidulafungin empirically   3. Possible IR guided paracentesis tomorrow   4. In am, repeat CBC, procal and CRP   5.  Follow-up blood cultures             ERAXIS 200MG IV X1, MEROPENEM 1G IV Q 8 HR,  PROTONIX 40MG IV QD, LASIX 40MG IV QD, MUCINEX 600MG PO Q 12 HR, VIT C 500MG PO QD, LIPITOR 40MG PO QD, HYGROTON 25MG PO QD, VIT D3 1000U PO QD, CARDIZEM CD 120MG PO QD, COLACE 100MG PO BID, MYCOSTATIN TP BID, ROXICODONE 5MG PO Q 6 HR PRN X1,  KLOR  CON 20MEQ PO QD, ZOLOFT 25MG PO QD,  DESYREL M50MG PO Q HS,            Pancreatitis - Care Day 15 (12/23/2021) by Mary Shields RN       Review Entered Review Status   12/23/2021 09:09 Completed      Criteria Review      Care Day: 15 Care Date: 12/23/2021 Level of Care: Telemetry    Guideline Day 2    Level Of Care    (X) ICU or floor    12/23/2021 09:09:56 EST by Jessy Potts      12/23 remote tele    Clinical Status    ( ) * Hemodynamic stability    12/23/2021 09:09:56 EST by Jessy Potts      98 91 124/60 18 98% 3l nc    ( ) * Pain absent or reduced    12/23/2021 09:09:56 EST by Jessy Potts      pts pain 7,0,7,6/10   pts goal 0/10    Activity    ( ) Activity as tolerated    12/23/2021 09:09:56 EST by Bayron Vega PT OT rec snf    Routes    ( ) Possible oral hydration    12/23/2021 09:09:56 EST by Maria Teresa Gandhi @ 100    ( ) Possible oral medications    12/23/2021 09:09:56 EST by Jessy Potts      iv merrem 1 gm q8h  iv protonix 40mg daily    (X) Oral diet as tolerated    12/23/2021 09:09:56 EST by Jessy Potts      full liq diet    Interventions    (X) * NG tube absent    (X) Laboratory tests    12/23/2021 09:09:56 EST by Jessy Potts      gluc 82    Medications    ( ) Parenteral analgesics    12/23/2021 09:09:56 EST by Jessy Potts      po roxicodone ir 5 mg q4h prn given x 2 BTP    * Milestone   Additional Notes   12/23/21 LOC IP REMOTE TELE       Per pulmonary    Lung: Reduced air entry bilaterally with prolonged exhalation but no wheezing.  Crackles in both lung bases   Extremities:  2+ lower extremity edema   Patient admitted to the hospital   We will be watching her closely    Currently on 3 L nasal cannula oxygen   Continue oxygen supplementation as needed to keep saturation above 92%    Patient has bilateral pleural effusions with associated atelectasis   This is due to volume overload and ascites from the pancreatitis and pancreatic mass   Continue with diuresis   Likely to benefit from paracentesis if felt to be appropriate by GI   Intake and output charting   Check electrolytes and replace as needed    Possible sources of fever include pancreatitis and pyuria   Continue antibiotics   Culture sent   Appreciate ID input   Further changes in antibiotics based on clinical response and culture results    Lipase trending down   TPN if diet fails

## 2022-01-04 NOTE — PROGRESS NOTES
Attempted to see pt at 5:11 this PM, but pt declining Tx, c/o being too fatigue having been out of room to attempt tube feeding placement. She asked for PT Tx to be postponed to tomorrow. Will continue to see pt and attempt at a later date.

## 2022-01-04 NOTE — PROGRESS NOTES
Problem: Falls - Risk of  Goal: *Absence of Falls  Description: Document Cherylle Cockayne Fall Risk and appropriate interventions in the flowsheet. Outcome: Progressing Towards Goal  Note: Fall Risk Interventions:  Mobility Interventions: Bed/chair exit alarm    Mentation Interventions: Bed/chair exit alarm    Medication Interventions: Bed/chair exit alarm,Patient to call before getting OOB    Elimination Interventions: Call light in reach    History of Falls Interventions: Bed/chair exit alarm         Problem: Patient Education: Go to Patient Education Activity  Goal: Patient/Family Education  Outcome: Progressing Towards Goal     Problem: Pain  Goal: *Control of Pain  Outcome: Progressing Towards Goal     Problem: Patient Education: Go to Patient Education Activity  Goal: Patient/Family Education  Outcome: Progressing Towards Goal     Problem: Nausea/Vomiting (Adult)  Goal: *Absence of nausea/vomiting  Outcome: Progressing Towards Goal     Problem: Patient Education: Go to Patient Education Activity  Goal: Patient/Family Education  Outcome: Progressing Towards Goal     Problem: Pressure Injury - Risk of  Goal: *Prevention of pressure injury  Description: Document Asim Scale and appropriate interventions in the flowsheet. Outcome: Progressing Towards Goal  Note: Pressure Injury Interventions:  Sensory Interventions: Minimize linen layers    Moisture Interventions: Minimize layers    Activity Interventions: Increase time out of bed    Mobility Interventions: Turn and reposition approx.  every two hours(pillow and wedges)    Nutrition Interventions: Offer support with meals,snacks and hydration    Friction and Shear Interventions: Minimize layers                Problem: Patient Education: Go to Patient Education Activity  Goal: Patient/Family Education  Outcome: Progressing Towards Goal     Problem: Patient Education: Go to Patient Education Activity  Goal: Patient/Family Education  Outcome: Progressing Towards Goal Problem: Patient Education: Go to Patient Education Activity  Goal: Patient/Family Education  Outcome: Progressing Towards Goal

## 2022-01-04 NOTE — PROGRESS NOTES
Progress Note    Patient: Sarbjit Rebollar MRN: [id number]  SSN: [id number]    YOB: 1945  Age: 78 y.o. Sex: female      Admit Date: 12/9/2021    LOS: 26 days     Subjective:   GI in consultation for Pancreatitis.     Seen patient in room sleepy, arousable. She had an EGD today with PEJ placement, but unable to place endoscopic GJ tube due to tumor in duodenum and food in the stomach and patient on Eliquis. Patient is frustrated because she is anticipating to start the tube feeding soon. Surgical consult for surgical double bypass or surgical Jtube placement. Generalized weakness. Abdominal pain.   -Lipase still elevated but lower than previous, 1,632.     12/30 EGD with Dobhoff placement for feeding  12/27/21 Paracentesis - 2300ml ascites fluid removed. 12/23/21 CT abdomen  1.  There are increasing bilateral pleural effusions, increasing body wall  edema, and increasing ascites. These findings could be secondary to the third spacing of fluids. The differential diagnosis for the ascites would include pancreatic ascites with acute pancreatitis or metastatic disease to the peritoneum.    2. Ann Pleas is a biliary stent which is unchanged in its position traversing throughout area of obstruction within the pancreatic head. 3. Ann Pleas are multiple low-density lesions of the liver without short-term  interval changes consistent with metastatic disease. 4.  The right kidney is not identified and apparently the patient is status post a previous right nephrectomy. 5.  There are bilateral adrenal masses suspect for metastatic disease without short-term interval changes. 12/6/2021 EUS showing 2.7 X3 cm lesion in the area of head of pancreas with dilation of the Pancreatic duct abutting the portal vein but not involving the SMA or AMV ;  Tumor T2 by endosonographic criteria ; one small lymph node in the area of head of pancreas.   11/22/2021 PET Scan    1.  FDG avid lesion in the pancreatic head consistent with malignancy. 2.  Subcentimeter focus of FDG uptake in the liver, indeterminate. 3.  FDG uptake associated with density expanding the right facet at C5-C6, possibly due to an ectatic artery. Objective:     Vitals:    01/04/22 1110 01/04/22 1115 01/04/22 1128 01/04/22 1200   BP: 110/69 (!) 124/97 105/74    Pulse: 90 95 87 93   Resp: 14 16 18    Temp:   97.5 °F (36.4 °C)    SpO2: 93% 94% 99%    Weight:       Height:            Intake and Output:  Current Shift: 01/04 0701 - 01/04 1900  In: 100 [I.V.:100]  Out: -   Last three shifts: 01/02 1901 - 01/04 0700  In: 6454 [P.O.:500; I.V.:130]  Out: 1900 [Urine:1900]    Physical Exam:   Skin:  Extremities and face reveal no rashes. No chapin erythema. HEENT: Sclerae anicteric. Extra-occular muscles are intact. No abnormal pigmentation of the lips. The neck is supple. Cardiovascular: Regular rate and rhythm. Respiratory:  Comfortable breathing with no accessory muscle use. GI:  Abdomen nondistended, soft, and nontender. No enlargement of the liver or spleen. No masses palpable. Rectal:  Deferred  Musculoskeletal: Extremities have good range of motion. Neurological:  Gross memory appears intact. Patient is alert and oriented. Psychiatric:  Mood appears appropriate with judgement intact.   Lymphatic:  No visible adenopathy      Lab/Data Review:  Recent Results (from the past 24 hour(s))   GLUCOSE, POC    Collection Time: 01/03/22  3:55 PM   Result Value Ref Range    Glucose (POC) 106 65 - 117 mg/dL    Performed by Ana Cabral    CBC WITH AUTOMATED DIFF    Collection Time: 01/04/22  9:40 AM   Result Value Ref Range    WBC 6.2 3.6 - 11.0 K/uL    RBC 2.52 (L) 3.80 - 5.20 M/uL    HGB 7.4 (L) 11.5 - 16.0 g/dL    HCT 23.5 (L) 35.0 - 47.0 %    MCV 93.3 80.0 - 99.0 FL    MCH 29.4 26.0 - 34.0 PG    MCHC 31.5 30.0 - 36.5 g/dL    RDW 15.4 (H) 11.5 - 14.5 %    PLATELET 758 048 - 256 K/uL    MPV 11.3 8.9 - 12.9 FL    NRBC 0.0 0.0  WBC    ABSOLUTE NRBC 0.00 0.00 - 0.01 K/uL    NEUTROPHILS 75 32 - 75 %    LYMPHOCYTES 15 12 - 49 %    MONOCYTES 6 5 - 13 %    EOSINOPHILS 2 0 - 7 %    BASOPHILS 1 0 - 1 %    IMMATURE GRANULOCYTES 1 (H) 0 - 0.5 %    ABS. NEUTROPHILS 4.8 1.8 - 8.0 K/UL    ABS. LYMPHOCYTES 0.9 0.8 - 3.5 K/UL    ABS. MONOCYTES 0.3 0.0 - 1.0 K/UL    ABS. EOSINOPHILS 0.1 0.0 - 0.4 K/UL    ABS. BASOPHILS 0.0 0.0 - 0.1 K/UL    ABS. IMM. GRANS. 0.0 0.00 - 0.04 K/UL    DF AUTOMATED     METABOLIC PANEL, BASIC    Collection Time: 01/04/22  9:40 AM   Result Value Ref Range    Sodium 136 136 - 145 mmol/L    Potassium 3.7 3.5 - 5.1 mmol/L    Chloride 93 (L) 97 - 108 mmol/L    CO2 41 (HH) 21 - 32 mmol/L    Anion gap 2 (L) 5 - 15 mmol/L    Glucose 83 65 - 100 mg/dL    BUN 30 (H) 6 - 20 mg/dL    Creatinine 0.68 0.55 - 1.02 mg/dL    BUN/Creatinine ratio 44 (H) 12 - 20      GFR est AA >60 >60 ml/min/1.73m2    GFR est non-AA >60 >60 ml/min/1.73m2    Calcium 8.3 (L) 8.5 - 10.1 mg/dL   C REACTIVE PROTEIN, QT    Collection Time: 01/04/22  9:40 AM   Result Value Ref Range    C-Reactive protein 8.54 (H) 0.00 - 0.60 mg/dL   PROCALCITONIN    Collection Time: 01/04/22  9:40 AM   Result Value Ref Range    Procalcitonin 0.48 (H) 0 ng/mL   LIPASE    Collection Time: 01/04/22  9:40 AM   Result Value Ref Range    Lipase 1,632 (H) 73 - 393 U/L   PROTHROMBIN TIME + INR    Collection Time: 01/04/22 10:50 AM   Result Value Ref Range    Prothrombin time 16.2 (H) 11.9 - 14.7 sec    INR 1.3 (H) 0.9 - 1.1     D DIMER    Collection Time: 01/04/22 10:50 AM   Result Value Ref Range    D DIMER 4.81 (H) <0.50 ug/ml(FEU)              XR CHEST PORT   Final Result   Findings/impression:      Stable positioning of right upper extremity PICC, tip projects over the upper   SVC. Cardiac silhouette is enlarged. No pulmonary edema. Bilateral pleural effusions. No evidence of pneumothorax. No acute osseous abnormality identified.             DUPLEX UPPER EXT VENOUS BILAT   Final Result      XR CHEST PORT Final Result   Large effusions. Vascular congestion. CT ABD PELV W CONT   Final Result   1. There are increasing bilateral pleural effusions, increasing body wall   edema, and increasing ascites. These findings could be secondary to the third   spacing of fluids. The differential diagnosis for the ascites would include   pancreatic ascites with acute pancreatitis or metastatic disease to the   peritoneum. 2.  There is a biliary stent which is unchanged in its position traversing   throughout area of obstruction within the pancreatic head. 3.  There are multiple low-density lesions of the liver without short-term   interval changes consistent with metastatic disease. 4.  The right kidney is not identified and apparently the patient is status post   a previous right nephrectomy. 5.  There are bilateral adrenal masses suspect for metastatic disease without   short-term interval changes. CTA CHEST W OR W WO CONT   Final Result   No CT evidence for PE. Thoracic aorta atherosclerosis. CAD. Moderate to large bilateral pleural effusions with associated RML, RLL, and LLL   compressive atelectasis. Abdominal ascites. DUPLEX LOWER EXT VENOUS BILAT   Final Result      XR CHEST PORT   Final Result   FINDINGS: IMPRESSION: 2 frontal views of the chest. Right PICC distal tip SVC   region. Enlarging cardiomegaly. Increasing vascular congestion. Interval development of   hypoinflation, pleural effusions, lower lung airspace edema and/or pneumonia. No   pneumothorax. No free air under the diaphragm. CT ABD PELV W CONT   Final Result   New moderate volume dependent pleural effusions with associated   lower lobe lung compressive atelectasis. Increasing ascites, moderate approaching large-volume   (fluid could be sampled if there is any clinical concern for bile content). High suspicion hepatic metastases.    Similar appearance for the pancreas; Silastic stent in place. No pseudocyst formation. Unchanged appearance bilateral adrenal glands. No bowel obstruction. US ABD LTD   Final Result   Small amount of free fluid. Finding suggesting hemangioma in the   liver. Gallbladder wall thickening, adenomyomatosis, sludge and gallstones. Cholecystitis possible. Finding in the region of the pancreatic head as above. Other findings as above. CTA ABDOMEN PELV W CONT   Final Result   1. Ill-defined hypodense mass in the pancreas. There are inflammatory changes   and fluid adjacent to the pancreas or duodenum and stomach most likely related   to biopsy or focal pancreatitis. No pseudocyst formation, active bleeding or   other acute findings. 2. Enlarged adrenal glands left greater than right with noncontrast densities   consistent with adrenal adenomas. 3. Right nephrectomy. 4. Other findings as described. XR CHEST PORT   Final Result   No acute findings. IR PARACENTESIS ABD W IMAGE    (Results Pending)       Assessment:     Principal Problem:    Pancreatitis (12/9/2021)    Active Problems:    A-fib ([address] (11/16/2020)      CHF (congestive heart failure) ([address] (11/16/2020)      Hematemesis (12/12/2021)      History of peptic ulcer disease (12/12/2021)        Plan:   1. Pancreatitis      -12/30 s/p post pyloric tube placement. Patient removed tube on 12/31.         -Unable to place GJ tube due to  tumor in duodenum and food in the stomach and patient on Eliquis. Placed surgical consult.       -Lipase 1,632        -repeat Lipase in the am       -Dilaudid 1 mg every  4 hours as needed for pain      -Oxycodone 5 mg every 6 hours for breakthrough pain      - S/P paracentesis 12/27/21 with removal of 2300 ml clear serous  Fluid removed      -cytology pending  2. CHF      -Continue Chlorthalidone  3. COPD       -Continue home medications       -Albuterol as needed   4.  Pancreatic Mass      - > 4000      -S/p fine needle aspiration suspicious for neoplasia but not diagnostic      -When stable Oncology plan for out patient followup with advanced oncology surgeon  At  or [facility] for resection considerations       -CT concern for liver metastais/lesions       -IR for liver biopsy on hold at this time  5. Hematemesis (resolved)      -Monitor H&H      -Transfuse as needed      -Hgb 7.4 12/29/21      -Continue Protonix 40 mg daily  6. Afib      -Rate is controlled      -Eliquis  BID/ HgB stable at 7.4 this am      -no further hematemesis reported    Patient discussed with Dr Terry Owens and agrees to above impression and plan. Thank you for allowing me to participate in this patients care    Signed By: Loretto Lombard.  CONRADO Hillman     January 4, 2022

## 2022-01-04 NOTE — PROGRESS NOTES
Progress Note    Patient: Mukesh Byrne MRN: [id number]  SSN: [id number]    YOB: 1945  Age: 78 y.o. Sex: female      Admit Date: 12/9/2021    LOS: 26 days     Subjective:   Patient followed for severe pancreatitis on Meropenem because of worsening leukocytosis. WBC now normal and Meropenem was discontinued. Remains on Anidulafungin for suspected Candida intraabdominal infection. Suspected pancreatic cancer but no firm diagnosis yet. Unsuccessful attempt at placing PEJ tube earlier today. Patient is asleep, ?sedated at this time but appears to be resting comfortably. Objective:     Vitals:    01/04/22 1115 01/04/22 1128 01/04/22 1200 01/04/22 1554   BP: (!) 124/97 105/74  (!) 98/59   Pulse: 95 87 93 71   Resp: 16 18  14   Temp:  97.5 °F (36.4 °C)  98.7 °F (37.1 °C)   SpO2: 94% 99%  97%   Weight:       Height:            Intake and Output:  Current Shift: 01/04 0701 - 01/04 1900  In: 100 [I.V.:100]  Out: -   Last three shifts: 01/02 1901 - 01/04 0700  In: 4278 [P.O.:500; I.V.:130]  Out: 1900 [Urine:1900]    Physical Exam:     Vitals and nursing note reviewed. Constitutional:       General: She is not in acute distress. Appearance: She is obese. She is ill-appearing. HENT: unremarkable  Abdominal:  Not examined today  Genitourinary:  Vaginal area not examined     Comments: External urinary device  Musculoskeletal:      Right lower leg: No edema. Left lower leg: No edema. Comments: PICC line right upper arm   Skin: multiple ecchymoses on the upper extremities, edema left forearm     Findings: No rash.       Comments: ?Stage 2 sacral wound   Neurological: asleep   Psychiatric:   asleep    Lab/Data Review:     WBC 6,200  Urinalysis with WBC 10-20, Bacteria negative  Lipase 1,632 <2,756 <2,813 <1,499 <2,511 <2,802 <3,000 <2,484 <2,684 < 1,397 <1,719    Procal 0.48 <0.32 <0.34 <0.35 <0.34 < 0.32 <0.21 <0.19 <0.25 < 0.14  CRP 8.54 <11.0 <8.98 <11.10 < 12.10 <23.40 <18.30 <14.20 <12.70 <15.00 < 23.80    Serum fungitell <31 Negative    Ascitic fluid   with 41% PMNs    Blood cultures (12/20) No growth FINAL  Urine culture (12/20) No growth FINAL  Ascitic fluid culture(12/27) No growth FINAL    Assessment:     Principal Problem:    Pancreatitis (12/9/2021)    Active Problems:    A-fib ([facility] [address] (11/16/2020)      CHF (congestive heart failure) ([facility] [address] (11/16/2020)      Hematemesis (12/12/2021)      History of peptic ulcer disease (12/12/2021)    1. Severe pancreatitis with markedly elevated lipase, resolving  2. Pancreatic mass, possible neoplasm  3. Biliary stent  4. Sepsis with worsening leukocytosis with elevated procal and CRP, resolving, status post 14 days of Meropenem, Day #10 IV Anidulafungin (empiric)  5. TPN (just started)  6. Moderate pyuria, negative bacteria, urine culture negative  7. Possible candida superinfection with vaginitis, treated  8. Ascites, status post paracentesis    Comment:    WBC normal with decreasing procal and CRP. Lipase also decreased today. Plan:   1. Continue Anidulafungin for 4 more days  2.  In am,   repeat CRP and procal     Signed By: Rosy Yepez MD     January 4, 2022

## 2022-01-04 NOTE — PROGRESS NOTES
Hospitalist Progress Note    Subjective:   Daily Progress Note: 1/4/2022 12:21 PM    Hospital Course:  68-year-old female with a history of atrial fibrillation, peptic ulcer disease, CHF, COPD, renal cell carcinoma stage IV status post nephrectomy, GERD, COPD, Sojourn syndrome and essential hypertension who was presented to the emergency department for admission on 12/9/2021 after undergoing a EUS on 12/06 by Dr. Cristofer Stover with biopsy and subsequently developed severe pancreatitis. Patient's admission course has been complicated by ongoing pancreatitis and severe abdominal pain. CT scan of the abdomen pelvis revealed an ill-defined hypodense mass in the pancreas with fluid adjacent to the pancreas suggestive of focal pancreatitis. CTA chest showing no evidence of pulmonary embolism, but does note moderate to large bilateral pleural effusions in RML, RLL and LLL as well as ascites. GI and oncology consulted. Patient had an episode of hematemesis as well. Patient remains on IV Protonix. Abdominal ultrasound also showed no hemodynamic drop in the liver. Gallbladder wall has been thickened with sludge and stones although cholecystitis is less likely at this point. Although continue to monitor closely. Patient was started on Zosyn and subsequently changed to meropenem as the patient developed leukocytosis. Patient also has elevation in her INR although had been receiving Eliquis and therefore Eliquis was held. Patient started on p.o. diet although developed severe pain and have decreased her diet to clear liquid. General surgery following as well, Dr. Shaina Asif. Lipase continues to elevate. CT abd/pelvis with PO and IV contrast showing increasing bilateral pleural effusion and increasing ascites. Also notes multiple low-density lesions of liver consistent with metastatic disease and bilateral adrenal masses suspected for metastatic disease. IR consulted for liver biopsy. D-dimer elevated >14.  Duplex US lower extremities negative for DVT. Duplex upper extremities showing superficial thrombophlebitis in left median/antecubital veins. Restarted on home Eliquis. Paracentesis performed on 12/27 with 2300 mL clear serous fluid drained. Cytology results showing no growth. She was scheduled for liver biopsy with IR however IR does not feel patient would benefit from biopsy at this time. Patient taken down for EGD with Dobhoff Placement on 12/30/21 for nutritional support but the tube was pulled out overnight. Requiring transfusion for drop in hemoglobin. FOBT positive. We will also replete with IV and p.o. iron for iron deficiencies. Repeat upper extremity Doppler study negative. Will reduce Eliquis dose to 2.5. Subjective: Follow-up examination patient at bedside. She continues to feel weak and debilitated. Continues to have abdominal discomfort and nausea with food. Lipase remains elevated. Discussed at length goals of care. Discussed feeding tube to meet her nutrition and hydration needs, scheduled for PEG J placement today.     Current Facility-Administered Medications   Medication Dose Route Frequency    apixaban (ELIQUIS) tablet 2.5 mg  2.5 mg Oral BID    ferrous sulfate tablet 325 mg  1 Tablet Oral DAILY WITH BREAKFAST    0.9% sodium chloride infusion 250 mL  250 mL IntraVENous PRN    midodrine (PROAMATINE) tablet 10 mg  10 mg Oral TID WITH MEALS    0.9% sodium chloride infusion 250 mL  250 mL IntraVENous PRN    sodium chloride (NS) flush 5-40 mL  5-40 mL IntraVENous PRN    furosemide (LASIX) injection 40 mg  40 mg IntraVENous DAILY    anidulafungin (ERAXIS) 100 mg in 0.9% sodium chloride 130 mL IVPB  100 mg IntraVENous Q24H    zinc oxide-white petrolatum 17-57 % topical paste   Topical TID    cyclobenzaprine (FLEXERIL) tablet 10 mg  10 mg Oral TID PRN    HYDROmorphone (DILAUDID) injection 1 mg  1 mg IntraVENous Q4H PRN    oxyCODONE IR (ROXICODONE) tablet 5 mg  5 mg Oral Q6H PRN    nystatin (MYCOSTATIN) 100,000 unit/gram cream   Topical BID    docusate sodium (COLACE) capsule 100 mg  100 mg Oral BID    polyethylene glycol (MIRALAX) packet 17 g  17 g Oral DAILY    sorbitoL 70 % solution 30 mL  30 mL Oral DAILY PRN    bisacodyL (DULCOLAX) suppository 10 mg  10 mg Rectal DAILY PRN    hydrALAZINE (APRESOLINE) 20 mg/mL injection 20 mg  20 mg IntraVENous Q6H PRN    pantoprazole (PROTONIX) 40 mg in 0.9% sodium chloride 10 mL injection  40 mg IntraVENous DAILY    guaiFENesin ER (MUCINEX) tablet 600 mg  600 mg Oral Q12H    albuterol-ipratropium (DUO-NEB) 2.5 MG-0.5 MG/3 ML  3 mL Nebulization Q6H PRN    chlorthalidone (HYGROTON) tablet 25 mg  25 mg Oral DAILY    albuterol (PROVENTIL HFA, VENTOLIN HFA, PROAIR HFA) inhaler 2 Puff  2 Puff Inhalation Q6H PRN    ascorbic acid (vitamin C) (VITAMIN C) tablet 500 mg  500 mg Oral DAILY    atorvastatin (LIPITOR) tablet 40 mg  40 mg Oral DAILY    cholecalciferol (VITAMIN D3) (1000 Units /25 mcg) tablet 1,000 Units  1,000 Units Oral DAILY    diazePAM (VALIUM) tablet 5 mg  5 mg Oral Q6H PRN    dilTIAZem ER (CARDIZEM CD) capsule 120 mg  120 mg Oral DAILY    potassium chloride SR (KLOR-CON 10) tablet 20 mEq  20 mEq Oral DAILY    sertraline (ZOLOFT) tablet 25 mg  25 mg Oral DAILY    traZODone (DESYREL) tablet 50 mg  50 mg Oral QHS    ondansetron (ZOFRAN) injection 4 mg  4 mg IntraVENous Q6H PRN    acetaminophen (TYLENOL) tablet 650 mg  650 mg Oral Q4H PRN    prochlorperazine (COMPAZINE) with saline injection 10 mg  10 mg IntraVENous Q6H PRN        Review of Systems  Constitutional: Positive for malaise/fatigue. Negative for fever. HENT: Negative. Respiratory: Positive for shortness of breath. Negative for cough. Cardiovascular: Negative for chest pain and leg swelling. Gastrointestinal: Positive for abdominal pain. Negative for nausea and vomiting.    Genitourinary: No frequency, No dysuria, No hematuria  Integument/breast: No skin lesion(s)   Neurological: No Confusion, No headaches, No dizziness      Objective:     Visit Vitals  /74 (BP 1 Location: Right lower arm)   Pulse 93   Temp 97.5 °F (36.4 °C)   Resp 18   Ht 5' 7\" (1.702 m)   Wt 97.4 kg (214 lb 11.7 oz)   SpO2 99%   BMI 33.63 kg/m²    O2 Flow Rate (L/min): 3 l/min O2 Device: Nasal cannula    Temp (24hrs), Av.2 °F (36.8 °C), Min:97.5 °F (36.4 °C), Max:98.5 °F (36.9 °C)      701 -  190  In: 100 [I.V.:100]  Out: -   1901 -  0700  In: 5119 [P.O.:500; I.V.:130]  Out: 1900 [Urine:1900]    PHYSICAL EXAM:  Constitutional: No acute distress  Skin: Extremities and face reveal no rashes. Multiple areas of ecchymosis in upper extremities. HEENT: Dobhoff in left nare. Sclerae anicteric. Extra-occular muscles are intact. No oral ulcers. The neck is supple and no masses. Cardiovascular: Regular rate and rhythm. +S1/S2. No murmur or gallop. Respiratory:  Rhonchi noted bilateral without wheezes. GI: Firm with hypoactive bowel sounds and tenderness to palpation in all 4 quadrant. Rectal: Deferred   Musculoskeletal: Upper and lower extremity peripheral edema present, likely third spacing. Able to move all ext  Neurological:  Generalized weakness. Patient is alert and oriented x3.  Cranial nerves II-XII grossly intact  Psychiatric: Mood appears appropriate       Data Review    Recent Results (from the past 24 hour(s))   GLUCOSE, POC    Collection Time: 22  3:55 PM   Result Value Ref Range    Glucose (POC) 106 65 - 117 mg/dL    Performed by Arlene Arriaga    CBC WITH AUTOMATED DIFF    Collection Time: 22  9:40 AM   Result Value Ref Range    WBC 6.2 3.6 - 11.0 K/uL    RBC 2.52 (L) 3.80 - 5.20 M/uL    HGB 7.4 (L) 11.5 - 16.0 g/dL    HCT 23.5 (L) 35.0 - 47.0 %    MCV 93.3 80.0 - 99.0 FL    MCH 29.4 26.0 - 34.0 PG    MCHC 31.5 30.0 - 36.5 g/dL    RDW 15.4 (H) 11.5 - 14.5 %    PLATELET 993 140 - 451 K/uL    MPV 11.3 8.9 - 12.9 FL    NRBC 0.0 0.0  WBC ABSOLUTE NRBC 0.00 0.00 - 0.01 K/uL    NEUTROPHILS 75 32 - 75 %    LYMPHOCYTES 15 12 - 49 %    MONOCYTES 6 5 - 13 %    EOSINOPHILS 2 0 - 7 %    BASOPHILS 1 0 - 1 %    IMMATURE GRANULOCYTES 1 (H) 0 - 0.5 %    ABS. NEUTROPHILS 4.8 1.8 - 8.0 K/UL    ABS. LYMPHOCYTES 0.9 0.8 - 3.5 K/UL    ABS. MONOCYTES 0.3 0.0 - 1.0 K/UL    ABS. EOSINOPHILS 0.1 0.0 - 0.4 K/UL    ABS. BASOPHILS 0.0 0.0 - 0.1 K/UL    ABS. IMM. GRANS. 0.0 0.00 - 0.04 K/UL    DF AUTOMATED     METABOLIC PANEL, BASIC    Collection Time: 01/04/22  9:40 AM   Result Value Ref Range    Sodium 136 136 - 145 mmol/L    Potassium 3.7 3.5 - 5.1 mmol/L    Chloride 93 (L) 97 - 108 mmol/L    CO2 41 (HH) 21 - 32 mmol/L    Anion gap 2 (L) 5 - 15 mmol/L    Glucose 83 65 - 100 mg/dL    BUN 30 (H) 6 - 20 mg/dL    Creatinine 0.68 0.55 - 1.02 mg/dL    BUN/Creatinine ratio 44 (H) 12 - 20      GFR est AA >60 >60 ml/min/1.73m2    GFR est non-AA >60 >60 ml/min/1.73m2    Calcium 8.3 (L) 8.5 - 10.1 mg/dL   C REACTIVE PROTEIN, QT    Collection Time: 01/04/22  9:40 AM   Result Value Ref Range    C-Reactive protein 8.54 (H) 0.00 - 0.60 mg/dL   PROCALCITONIN    Collection Time: 01/04/22  9:40 AM   Result Value Ref Range    Procalcitonin 0.48 (H) 0 ng/mL   LIPASE    Collection Time: 01/04/22  9:40 AM   Result Value Ref Range    Lipase 1,632 (H) 73 - 393 U/L   PROTHROMBIN TIME + INR    Collection Time: 01/04/22 10:50 AM   Result Value Ref Range    Prothrombin time 16.2 (H) 11.9 - 14.7 sec    INR 1.3 (H) 0.9 - 1.1     D DIMER    Collection Time: 01/04/22 10:50 AM   Result Value Ref Range    D DIMER 4.81 (H) <0.50 ug/ml(FEU)       XR CHEST PORT   Final Result   Findings/impression:      Stable positioning of right upper extremity PICC, tip projects over the upper   SVC. Cardiac silhouette is enlarged. No pulmonary edema. Bilateral pleural effusions. No evidence of pneumothorax. No acute osseous abnormality identified.             DUPLEX UPPER EXT VENOUS BILAT   Final Result XR CHEST PORT   Final Result   Large effusions. Vascular congestion. CT ABD PELV W CONT   Final Result   1. There are increasing bilateral pleural effusions, increasing body wall   edema, and increasing ascites. These findings could be secondary to the third   spacing of fluids. The differential diagnosis for the ascites would include   pancreatic ascites with acute pancreatitis or metastatic disease to the   peritoneum. 2.  There is a biliary stent which is unchanged in its position traversing   throughout area of obstruction within the pancreatic head. 3.  There are multiple low-density lesions of the liver without short-term   interval changes consistent with metastatic disease. 4.  The right kidney is not identified and apparently the patient is status post   a previous right nephrectomy. 5.  There are bilateral adrenal masses suspect for metastatic disease without   short-term interval changes. CTA CHEST W OR W WO CONT   Final Result   No CT evidence for PE. Thoracic aorta atherosclerosis. CAD. Moderate to large bilateral pleural effusions with associated RML, RLL, and LLL   compressive atelectasis. Abdominal ascites. DUPLEX LOWER EXT VENOUS BILAT   Final Result      XR CHEST PORT   Final Result   FINDINGS: IMPRESSION: 2 frontal views of the chest. Right PICC distal tip SVC   region. Enlarging cardiomegaly. Increasing vascular congestion. Interval development of   hypoinflation, pleural effusions, lower lung airspace edema and/or pneumonia. No   pneumothorax. No free air under the diaphragm. CT ABD PELV W CONT   Final Result   New moderate volume dependent pleural effusions with associated   lower lobe lung compressive atelectasis. Increasing ascites, moderate approaching large-volume   (fluid could be sampled if there is any clinical concern for bile content). High suspicion hepatic metastases.    Similar appearance for the pancreas; Silastic stent in place. No pseudocyst formation. Unchanged appearance bilateral adrenal glands. No bowel obstruction. US ABD LTD   Final Result   Small amount of free fluid. Finding suggesting hemangioma in the   liver. Gallbladder wall thickening, adenomyomatosis, sludge and gallstones. Cholecystitis possible. Finding in the region of the pancreatic head as above. Other findings as above. CTA ABDOMEN PELV W CONT   Final Result   1. Ill-defined hypodense mass in the pancreas. There are inflammatory changes   and fluid adjacent to the pancreas or duodenum and stomach most likely related   to biopsy or focal pancreatitis. No pseudocyst formation, active bleeding or   other acute findings. 2. Enlarged adrenal glands left greater than right with noncontrast densities   consistent with adrenal adenomas. 3. Right nephrectomy. 4. Other findings as described. XR CHEST PORT   Final Result   No acute findings. IR PARACENTESIS ABD W IMAGE    (Results Pending)       Principal Problem:    Pancreatitis (12/9/2021)    Active Problems:    A-fib ([address] (11/16/2020)      CHF (congestive heart failure) ([address] (11/16/2020)      Hematemesis (12/12/2021)      History of peptic ulcer disease (12/12/2021)        Assessment/Plan:     Acute pancreatitis  - Status post EUS by Dr. Oksana Ocampo 12/06  - Continue pain medication  - Lipase variable, will likely be chronically elevated   - CT abd/pelvis with PO and IV contrast showing increasing bilateral pleural effusion and increasing ascites. Also notes multiple low-density lesions of liver consistent with metastatic disease and bilateral adrenal masses suspected for metastatic disease.  - Continue IV merrem day # 14 of 14   - Continue empiric Anidulafungin  - paracentesis on 12/27 with 2300 mL clear serous fluid drained, cytology showing now growth.   - She was scheduled for liver biopsy with IR however IR does not feel patient would benefit from biopsy at this time. - Dobhoff placed 12/30 for nutritional support but dislodged by patient accidentally  -Unable to tolerate p.o. diet. GI following will confer with them concerning PEJ tube placement     CHF  - Continue chlorthalidone 25 mg daily  - IV lasix 40 mg daily     Pancreatic mass  Liver masses  Adrenal masses  History of renal cell carcinoma with lung nodules status post right nephrectomy  Multiple low-density lesions of the liver consistent with meta static disease and bilateral adrenal masses suspicious for metastatic disease  - CA 19-9 greater than 4000  --Oncology consulted-will need tissue biopsy before definitive recommendations can be made     Hypokalemia-stable  - replete as needed     COPD-stable  - Pulmonary consultation  - Chronic respiratory failure with 2 L of oxygen via nasal cannula at home     Leukocytosis-resolved  - Started meropenem  - Cultures negative     A. fib  Supratherapeutic anticoagulation  Continued coagulopathy  Acute anemia  -On diltiazem. - Vitamin K given  - May be a side effect of using Eliquis recently and this may be an artificial INR.     -Her hemoglobin has been slowly declining requiring repletion and she is FOBT positive  -Required transfusion 2 units PRBCs      Elevated d-dimer  - Likely multifactorial   - CTA chest negative for PE  - Duplex US lower extremities negative for DVT  - Duplex US upper extremities showing superficial thrombophlebitis in left median/antecubital veins  -Repeat upper extremity Doppler study negative  -Eliquis reduced to 2.5 twice daily      Hypotension  We will start midodrine    Anemia iron deficiency  Blood loss anemia  -Transfuse 2 units PRBCs, today's hemoglobin 7.4  -We will start IV and p.o. iron replacement  -Hemoccult test stool positive  -Heme-onc and GI following  -We will transfuse for hemoglobin <7.0    DVT Prophylaxis: restarted on home Eliquis  GI PPx: Protonix  Code Status: Full  POA:    Discharge Barriers:   · Will need to be reauthorized to return to SNF pending on Monday  · PEG J placement today IV meropenem for 1 more days, transition to oral fluconazole 200 mg p.o. for 2 weeks for discharge per Infectious Disease. Care Plan discussed with: patient and nursing and IDR rounds    Total time spent with patient: >35 minutes.

## 2022-01-04 NOTE — PROGRESS NOTES
Problem: Falls - Risk of  Goal: *Absence of Falls  Description: Document Tisha Fine Fall Risk and appropriate interventions in the flowsheet. Outcome: Progressing Towards Goal  Note: Fall Risk Interventions:  Mobility Interventions: Bed/chair exit alarm,Communicate number of staff needed for ambulation/transfer,Patient to call before getting OOB,Utilize walker, cane, or other assistive device    Mentation Interventions: Bed/chair exit alarm    Medication Interventions: Bed/chair exit alarm,Patient to call before getting OOB,Teach patient to arise slowly    Elimination Interventions: Bed/chair exit alarm,Call light in reach,Patient to call for help with toileting needs,Toileting schedule/hourly rounds,Toilet paper/wipes in reach    History of Falls Interventions: Bed/chair exit alarm,Door open when patient unattended,Room close to nurse's station         Problem: Patient Education: Go to Patient Education Activity  Goal: Patient/Family Education  Outcome: Progressing Towards Goal     Problem: Pain  Goal: *Control of Pain  Outcome: Progressing Towards Goal     Problem: Patient Education: Go to Patient Education Activity  Goal: Patient/Family Education  Outcome: Progressing Towards Goal     Problem: Nausea/Vomiting (Adult)  Goal: *Absence of nausea/vomiting  Outcome: Progressing Towards Goal     Problem: Patient Education: Go to Patient Education Activity  Goal: Patient/Family Education  Outcome: Progressing Towards Goal     Problem: Pressure Injury - Risk of  Goal: *Prevention of pressure injury  Description: Document Asim Scale and appropriate interventions in the flowsheet. Outcome: Progressing Towards Goal  Note: Pressure Injury Interventions:  Sensory Interventions: Float heels,Keep linens dry and wrinkle-free,Maintain/enhance activity level,Minimize linen layers,Turn and reposition approx.  every two hours (pillows and wedges if needed)    Moisture Interventions: Absorbent underpads,Internal/External urinary devices,Minimize layers,Moisture barrier    Activity Interventions: Increase time out of bed,PT/OT evaluation    Mobility Interventions: Float heels,HOB 30 degrees or less,PT/OT evaluation,Turn and reposition approx.  every two hours(pillow and wedges)    Nutrition Interventions: Document food/fluid/supplement intake,Discuss nutritional consult with provider,Offer support with meals,snacks and hydration    Friction and Shear Interventions: HOB 30 degrees or less,Minimize layers                Problem: Patient Education: Go to Patient Education Activity  Goal: Patient/Family Education  Outcome: Progressing Towards Goal     Problem: Patient Education: Go to Patient Education Activity  Goal: Patient/Family Education  Outcome: Progressing Towards Goal     Problem: Patient Education: Go to Patient Education Activity  Goal: Patient/Family Education  Outcome: Progressing Towards Goal

## 2022-01-05 PROBLEM — K86.89 PANCREATIC MASS: Status: ACTIVE | Noted: 2022-01-01

## 2022-01-05 NOTE — PROGRESS NOTES
Physician Progress Note      Claudia Flores  [facility] #:                  [id number]  :                       1945  ADMIT DATE:       2021 10:37 AM  DISCH DATE:  RESPONDING  PROVIDER #:        Soumya Weller NP          QUERY TEXT:    Pt admitted with pancreatitis and has CHF documented. If possible, please document in progress notes and discharge summary further specificity regarding the type and acuity of CHF:    The medical record reflects the following:  Risk Factors: 77 yo female with afib, coagulopathy, liver and adrenal masses  Clinical Indicators: BNP 1513;    ECHO:Calculated LVEF is 74%. Normal cavity size, systolic function (ejection fraction normal) and diastolic function. Moderate concentric hypertrophy. Wall motion: normal  Treatment: IV Lasix, Chlorthalidone    Thank you,  Lindsey Stringer RN, CCDS, CPC  Options provided:  -- Acute on Chronic Systolic CHF/HFrEF  -- Acute on Chronic Diastolic CHF/HFpEF  -- Acute on Chronic Systolic and Diastolic CHF  -- Acute Systolic CHF/HFrEF  -- Acute Diastolic CHF/HFpEF  -- Acute Systolic and Diastolic CHF  -- Chronic Systolic CHF/HFrEF  -- Chronic Diastolic CHF/HFpEF  -- Chronic Systolic and Diastolic CHF  -- Other - I will add my own diagnosis  -- Disagree - Not applicable / Not valid  -- Disagree - Clinically unable to determine / Unknown  -- Refer to Clinical Documentation Reviewer    PROVIDER RESPONSE TEXT:    This patient has chronic diastolic CHF/HFpEF.     Query created by: Netta Valladares on 2022 8:07 AM      Electronically signed by:  Soumya Weller NP 2022 4:40 PM [facility]    Gastroenterology Consultation    Identification: Shelli Wheeler, 65 year old, , female   Consulting Provider: Letty De Dios DO  Reason for Consultation: N/V/D    Subjective     Source of History: Patient and EMR. Reliable.    Chief Complaint: N/V/D    History of Present Illness: Shelli Wheeler is a 65 year old female with a past medical history of HTN, CAD status post ISMAEL x2 1/2020, paroxysmal A. fib on Eliquis, DM, lupus with renal failure status post renal transplant, HFpEF, prolonged QT who presented to the emergency department 3/4 with N/V/D for 2 days.  All emesis and diarrhea reported as nonbloody nonbilious.  Patient has had multiple admissions in the past several months for similar which were attributed to the patient's leflunomide.  The medication has since been discontinued, though patient has known tacrolimus.  She reports decreased p.o. intake secondary to nausea, and denies fevers, chills, chest pain, abdominal pain, or any urinary symptoms.    On admission to the ER, patient was moderately hypertensive but was otherwise stable vital signs.  Labs notable for normal CBC, elevated NT proBNP at 3904, which is near baseline.  Negative CRP and sed rate.  Chemistry with glucose 160 but otherwise largely within normal limits.  BUN/creatinine 9/0.47.  LFTs within normal limits.  Of note, EKG showing .    Patient was given Ativan 0.5 mg times once, 2 g of magnesium sulfate and 500 cc bolus of normal saline, was admitted for further observation.    Fecal leukocytes, GI pathogen panel, stool ova and parasites, CMV pending.    GI History:     She was admitted to the hospital from 1/29-2/1, 2/9-2/10 and 2/18-2/20 with similar symptoms, ultimately discharged after resolution of emesis and diarrhea with instructions to discontinue leflunomide. Last cscope and EGD were ten years ago at the time of her renal transplant, and were normal per patient though we do not have  access to these records.     History of previous cholecystectomy, as well as fibroid removal and kidney transplant surgery.      A 10-point ROS was reviewed and is negative except as in HPI.    Past Medical History:   Diagnosis Date   • A-fib (CMS/HCC)    • Diabetes mellitus (CMS/HCC)    • Essential (primary) hypertension    • Lupus (systemic lupus erythematosus) (CMS/HCC)    • Myocardial infarction (CMS/HCC)         Past Surgical History:   Procedure Laterality Date   • Cardiac catherization     • Coronary stent placement  2015   • Kidney transplant         Family History   Problem Relation Age of Onset   • Myocardial Infarction Mother    • Myocardial Infarction Father        Social History     Tobacco Use   • Smoking status: Never Smoker   • Smokeless tobacco: Never Used   Substance Use Topics   • Alcohol use: Never     Frequency: Never   • Drug use: Never       ALLERGIES:  Contrast media and Shrimp   (food)    Owxoh-vs-Vjmpktj Medications:  Medications Prior to Admission   Medication Sig Dispense Refill   • carvedilol (COREG) 6.25 MG tablet Take 1 tablet by mouth every 12 hours. 60 tablet 0   • NIFEdipine XL (PROCARDIA XL) 30 MG 24 hr tablet Take 1 tablet by mouth daily. Do not start before February 21, 2021. 60 tablet 0   • aluminum-magnesium hydroxide-simethicone (MAALOX) 200-200-20 MG/5ML Suspension Take 30 mLs by mouth every 4 hours as needed.  0   • Omeprazole 20 MG Tablet Delayed Release Dispersible Take 20 mg by mouth as needed. Indications: Indigestion     • apixaBAN (ELIQUIS) 5 MG Tab Take 1 tablet by mouth every 12 hours. 60 tablet 0   • TACROlimus (PROGRAF) 0.5 MG capsule Take 1 capsule by mouth every 12 hours. 60 capsule 0   • atorvastatin (LIPITOR) 80 MG tablet Take 80 mg by mouth at bedtime.     • clopidogrel (PLAVIX) 75 MG tablet Take 75 mg by mouth daily.     • ergocalciferol (DRISDOL) 1.25 mg (50,000 units) capsule Take 1.25 mg by mouth every 30 days. Take 1 capsule by mouth every 30 days (on the  1st of the month).     • gabapentin (NEURONTIN) 300 MG capsule Take 300 mg by mouth 3 times daily.     • hydroxychloroquine (PLAQUENIL) 200 MG tablet Take 200 mg by mouth 2 times daily.      • insulin regular 70-30 (HumuLIN 70/30 KwikPen) (70-30) 100 UNIT/ML pen-injector Inject into the skin 3 times daily (before meals). Inject 25 units with breakfast, 10 units with lunch, and 20 units with dinner     • mycophenolate (CELLCEPT) 500 MG tablet Take 500 mg by mouth every 12 hours.     • metformin (GLUCOPHAGE) 1000 MG tablet Take 1,000 mg by mouth 2 times daily (with meals).     • predniSONE (DELTASONE) 5 MG tablet Take 7.5 mg by mouth daily.     • CALCIUM CARBONATE-VITAMIN D PO Take 1 tablet by mouth 2 times daily.             Objective     Physical Examination:  Vitals signs and nursing note: Reviewed.  General: No acute distress.  HENT: Oropharynx is clear without erythema or exudates.  Eyes: Pupils are equal, round, and reactive to light. No scleral icterus.  Chest: No gynecomastia.  Cardiovascular: Normal rate and regular rhythm. Normal pulses. Normal heart sounds.   Pulmonary: Normal effort. Normal breath sounds.  Abdomen: Flat. Hyperactive bowel sounds. Soft and non-tender. No guarding or rigidity. No palpable masses or organomegaly.  Rectal: Normal sphincter tone. No palpable masses.  Musculoskeletal: No lower extremity edema.  Lymphadenopathy: No cervical adenopathy.  Skin: Warm and dry.  Neurological: Alert and oriented to person, place, and time. No focal deficit present.    All laboratory studies, imaging, active medications and orders were personally reviewed.  Please see the chart for details or below for specific positive findings.    Assessment and Plan     Summary: Shelli Wheeler is a 65 year old female with a history of renal transplant and resulting chronic immunosuppression, CAD with NSTEMI and PCI 2/2021, who presents for third admission this month for intractable N/V/D and resulting poor PO intake.      Problem List:    # Vomiting, resolved   # Diarrhea, recurrent, improving   # Chronic immunosuppression on mycophenolate, tacrolimus, prednisone, hydroxychlorquine   # CAD with recent PCI 1/2021  # SLE  # H/o renal transplant 2010     Recommendations:    - Agree with fecal leukocytes, pathogen panel, ova and parasites, CMV   - OK to advance diet as tolerated from GI standpoint   - Would benefit from colonoscopy (and concomitant EGD) as she is due for screen cscope anyway   - Hold eliquis starting tomorrow AM   - Presumably unsafe to hold plavix given recent ISMAEL placement   - Thus, can take biopsies in endoscopy suite but cannot excise any larger tissues until patient is able to safely hold AP agent   - Scopes tentatively planned for Monday. Patient can complete these inpatient or, if she improves and wants to be discharged to home, as an outpatient on Monday as well.   - Discussed extensively with patient, who is agreeable with plan.      Discussed with attending physician. Please wait for attending notes for final recommendations.     Kolton Kim MD  Internal Medicine PGY-2

## 2022-01-05 NOTE — PROGRESS NOTES
Hospitalist Progress Note    Subjective:   Daily Progress Note: 1/5/2022 12:21 PM    Hospital Course: The patient is a 68-year-old woman with a PMH significant for atrial fibrillation, PUD, CHF, COPD on home O2 at 2 L, renal cell carcinoma status post nephrectomy, hypertension and morbid obesity. Patient was found to have obstructive jaundice and biliary stricture in early November of this year.  She underwent an ERCP with stent placement with brushings of the bile duct where there was noted to be a stricture.  These brushings were negative.  Patient then underwent a PET CT which demonstrated uptake in the head of the pancreas concerning for pancreatic mass. She subsequently underwent endoscopic ultrasound with ultrasound and FNA biopsy on 12/6/2021.  The procedure demonstrated abutment of the tumor with the portal vein without involvement of the SMA or SMV.  The total tumor was measured to be 2.7 cm by 3 cm.  This biopsy resulted as atypical cells with suspicion for malignancy. She admitted to the hospital on 2/9/2021 and severe pancreatitis after undergoing an EUS for pancreatic biopsy on 12/6/2021 and pancreatic mass. Her symptoms were abdominal pain, nausea and vomiting. CT scan revealing peripancreatic inflammation consistent with likely post biopsy pancreatitis and ascites. Her initial labs revealing elevated lipase, supratherapeutic INR and elevated D-dimer coagulopathy. CTA was positive for bilateral pleural effusions, negative for PE. Paracentesis with culture of ascitic fluid, blood cultures, urine cultures negative for growth. Patient had been started on IV meropenem for leukocytosis and anidulafungin for suspected intra-abdominal infection. CT with and without contrast revealing liver lesions consistent with metastasis, mass in the pancreatic head with biliary stent in place, bilateral pleural effusions, ascites and bilateral adrenal masses.   IR declined liver biopsy due to coagulopathy and felt she would not benefit. Doppler studies of upper and lower extremities negative for DVT, positive for superficial thrombophlebitis of left medial antecubital.  Pancreatic cancer is presumed but no firm diagnosis due to lack of tissue sample. Pancreatitis remaining persistent despite antibiotics. Attempted Dobbhoff and PEG J placement but failed due to reddened area presumed possible tumor infiltration in the duodenum. Due to poor performance and prognosis she is not a candidate for palliative XRT or chemo. The patient has been progressively worsening with debilitation, intermittently tolerating diet not stable to be discharged home with outpatient surgical oncology follow-up. She would benefit from transfer to tertiary care center where surgical oncology services are available for evaluation of pancreatic head mass and possible surgical intervention. Subjective: Follow-up examination patient at bedside. Unable to place PEG J yesterday due to tumor infiltration in duodenum. This morning the patient is upset complaining that she is being starved she did not get her breakfast.  She is complaining about the quality of the food. She is also complaining about not knowing what is going on \"why do not they just take my pancreas out\". Discussed with patient possible transfer to another facility for second opinion and oncology surgery for further evaluation and management of pancreatic head mass. Will attempt to transfer to [facility]. They currently are on diversion we will try daily.     Current Facility-Administered Medications   Medication Dose Route Frequency    influenza vaccine 2021-22 (6 mos+)(PF) (FLUARIX/FLULAVAL/FLUZONE QUAD) injection 0.5 mL  1 Each IntraMUSCular PRIOR TO DISCHARGE    [Held by provider] apixaban (ELIQUIS) tablet 2.5 mg  2.5 mg Oral BID    ferrous sulfate tablet 325 mg  1 Tablet Oral DAILY WITH BREAKFAST    0.9% sodium chloride infusion 250 mL  250 mL IntraVENous PRN  midodrine (PROAMATINE) tablet 10 mg  10 mg Oral TID WITH MEALS    0.9% sodium chloride infusion 250 mL  250 mL IntraVENous PRN    sodium chloride (NS) flush 5-40 mL  5-40 mL IntraVENous PRN    furosemide (LASIX) injection 40 mg  40 mg IntraVENous DAILY    anidulafungin (ERAXIS) 100 mg in 0.9% sodium chloride 130 mL IVPB  100 mg IntraVENous Q24H    zinc oxide-white petrolatum 17-57 % topical paste   Topical TID    cyclobenzaprine (FLEXERIL) tablet 10 mg  10 mg Oral TID PRN    HYDROmorphone (DILAUDID) injection 1 mg  1 mg IntraVENous Q4H PRN    oxyCODONE IR (ROXICODONE) tablet 5 mg  5 mg Oral Q6H PRN    nystatin (MYCOSTATIN) 100,000 unit/gram cream   Topical BID    docusate sodium (COLACE) capsule 100 mg  100 mg Oral BID    polyethylene glycol (MIRALAX) packet 17 g  17 g Oral DAILY    sorbitoL 70 % solution 30 mL  30 mL Oral DAILY PRN    bisacodyL (DULCOLAX) suppository 10 mg  10 mg Rectal DAILY PRN    hydrALAZINE (APRESOLINE) 20 mg/mL injection 20 mg  20 mg IntraVENous Q6H PRN    pantoprazole (PROTONIX) 40 mg in 0.9% sodium chloride 10 mL injection  40 mg IntraVENous DAILY    guaiFENesin ER (MUCINEX) tablet 600 mg  600 mg Oral Q12H    albuterol-ipratropium (DUO-NEB) 2.5 MG-0.5 MG/3 ML  3 mL Nebulization Q6H PRN    chlorthalidone (HYGROTON) tablet 25 mg  25 mg Oral DAILY    albuterol (PROVENTIL HFA, VENTOLIN HFA, PROAIR HFA) inhaler 2 Puff  2 Puff Inhalation Q6H PRN    ascorbic acid (vitamin C) (VITAMIN C) tablet 500 mg  500 mg Oral DAILY    atorvastatin (LIPITOR) tablet 40 mg  40 mg Oral DAILY    cholecalciferol (VITAMIN D3) (1000 Units /25 mcg) tablet 1,000 Units  1,000 Units Oral DAILY    diazePAM (VALIUM) tablet 5 mg  5 mg Oral Q6H PRN    dilTIAZem ER (CARDIZEM CD) capsule 120 mg  120 mg Oral DAILY    potassium chloride SR (KLOR-CON 10) tablet 20 mEq  20 mEq Oral DAILY    sertraline (ZOLOFT) tablet 25 mg  25 mg Oral DAILY    traZODone (DESYREL) tablet 50 mg  50 mg Oral QHS    ondansetron (ZOFRAN) injection 4 mg  4 mg IntraVENous Q6H PRN    acetaminophen (TYLENOL) tablet 650 mg  650 mg Oral Q4H PRN    prochlorperazine (COMPAZINE) with saline injection 10 mg  10 mg IntraVENous Q6H PRN        Review of Systems  Constitutional: Positive for malaise/fatigue. Negative for fever. HENT: Negative. Respiratory: Positive for shortness of breath. Negative for cough. Cardiovascular: Negative for chest pain and leg swelling. Gastrointestinal: Positive for abdominal pain. Negative for nausea and vomiting. Genitourinary: No frequency, No dysuria, No hematuria  Integument/breast: No skin lesion(s)   Neurological: No Confusion, No headaches, No dizziness      Objective:     Visit Vitals  BP (!) 90/53 (BP 1 Location: Right arm)   Pulse 100   Temp 97.6 °F (36.4 °C)   Resp 16   Ht 5' 7\" (1.702 m)   Wt 97.4 kg (214 lb 11.7 oz)   SpO2 98%   BMI 33.63 kg/m²    O2 Flow Rate (L/min): 2 l/min O2 Device: Nasal cannula    Temp (24hrs), Av.2 °F (36.8 °C), Min:97.6 °F (36.4 °C), Max:98.8 °F (37.1 °C)      701 -  1900  In: -   Out: 450 [Urine:450]  1901 -  0700  In: 680 [P.O.:450; I.V.:230]  Out: 1450 [Urine:1450]    PHYSICAL EXAM:  Constitutional: No acute distress  Skin: Extremities and face reveal no rashes. Multiple areas of ecchymosis in upper extremities. HEENT: Dobhoff in left nare. Sclerae anicteric. Extra-occular muscles are intact. No oral ulcers. The neck is supple and no masses. Cardiovascular: Regular rate and rhythm. +S1/S2. No murmur or gallop. Respiratory:  Rhonchi noted bilateral without wheezes. GI: Firm with hypoactive bowel sounds and tenderness to palpation in all 4 quadrant. Rectal: Deferred   Musculoskeletal: Upper and lower extremity peripheral edema present, likely third spacing. Able to move all ext  Neurological:  Generalized weakness. Patient is alert and oriented x3.  Cranial nerves II-XII grossly intact  Psychiatric: Mood appears appropriate Data Review    No results found for this or any previous visit (from the past 24 hour(s)). XR CHEST PORT   Final Result   Findings/impression:      Stable positioning of right upper extremity PICC, tip projects over the upper   SVC. Cardiac silhouette is enlarged. No pulmonary edema. Bilateral pleural effusions. No evidence of pneumothorax. No acute osseous abnormality identified. DUPLEX UPPER EXT VENOUS BILAT   Final Result      XR CHEST PORT   Final Result   Large effusions. Vascular congestion. CT ABD PELV W CONT   Final Result   1. There are increasing bilateral pleural effusions, increasing body wall   edema, and increasing ascites. These findings could be secondary to the third   spacing of fluids. The differential diagnosis for the ascites would include   pancreatic ascites with acute pancreatitis or metastatic disease to the   peritoneum. 2.  There is a biliary stent which is unchanged in its position traversing   throughout area of obstruction within the pancreatic head. 3.  There are multiple low-density lesions of the liver without short-term   interval changes consistent with metastatic disease. 4.  The right kidney is not identified and apparently the patient is status post   a previous right nephrectomy. 5.  There are bilateral adrenal masses suspect for metastatic disease without   short-term interval changes. CTA CHEST W OR W WO CONT   Final Result   No CT evidence for PE. Thoracic aorta atherosclerosis. CAD. Moderate to large bilateral pleural effusions with associated RML, RLL, and LLL   compressive atelectasis. Abdominal ascites. DUPLEX LOWER EXT VENOUS BILAT   Final Result      XR CHEST PORT   Final Result   FINDINGS: IMPRESSION: 2 frontal views of the chest. Right PICC distal tip SVC   region. Enlarging cardiomegaly. Increasing vascular congestion.  Interval development of   hypoinflation, pleural effusions, lower lung airspace edema and/or pneumonia. No   pneumothorax. No free air under the diaphragm. CT ABD PELV W CONT   Final Result   New moderate volume dependent pleural effusions with associated   lower lobe lung compressive atelectasis. Increasing ascites, moderate approaching large-volume   (fluid could be sampled if there is any clinical concern for bile content). High suspicion hepatic metastases. Similar appearance for the pancreas; Silastic stent in place. No pseudocyst formation. Unchanged appearance bilateral adrenal glands. No bowel obstruction. US ABD LTD   Final Result   Small amount of free fluid. Finding suggesting hemangioma in the   liver. Gallbladder wall thickening, adenomyomatosis, sludge and gallstones. Cholecystitis possible. Finding in the region of the pancreatic head as above. Other findings as above. CTA ABDOMEN PELV W CONT   Final Result   1. Ill-defined hypodense mass in the pancreas. There are inflammatory changes   and fluid adjacent to the pancreas or duodenum and stomach most likely related   to biopsy or focal pancreatitis. No pseudocyst formation, active bleeding or   other acute findings. 2. Enlarged adrenal glands left greater than right with noncontrast densities   consistent with adrenal adenomas. 3. Right nephrectomy. 4. Other findings as described. XR CHEST PORT   Final Result   No acute findings.       IR PARACENTESIS ABD W IMAGE    (Results Pending)       Principal Problem:    Pancreatitis (12/9/2021)    Active Problems:    A-fib ([address] (11/16/2020)      CHF (congestive heart failure) ([address] (11/16/2020)      Hematemesis (12/12/2021)      History of peptic ulcer disease (12/12/2021)        Assessment/Plan:     Acute pancreatitis  - Status post EUS by Dr. Brian Lim 12/06  - Continue pain medication  - Lipase variable, will likely be chronically elevated   - CT abd/pelvis with PO and IV contrast showing increasing bilateral pleural effusion and increasing ascites. Also notes multiple low-density lesions of liver consistent with metastatic disease and bilateral adrenal masses suspected for metastatic disease.  - completed 14 days of IV merrem   - Continue empiric Anidulafungin 4 more days  - paracentesis on 12/27 with 2300 mL clear serous fluid drained, cytology showing now growth. - She was scheduled for liver biopsy with IR however IR does not feel patient would benefit from biopsy at this time.   -Unsuccessful attempt to place PEG J due to tumor infiltration and duodenum     CHF  - Continue chlorthalidone 25 mg daily  - IV lasix 40 mg daily     Pancreatic mass  Liver masses  Adrenal masses  History of renal cell carcinoma with lung nodules status post right nephrectomy  Multiple low-density lesions of the liver consistent with meta static disease and bilateral adrenal masses suspicious for metastatic disease  - CA 19-9 greater than 4000  --Oncology consulted-will need tissue biopsy before definitive recommendations can be made. Unable to offer palliative chemo or radiation due to poor performance. Prognosis very poor, hospice recommended     Hypokalemia-stable  - replete as needed     COPD-stable  - Pulmonary consultation  - Chronic respiratory failure with 2 L of oxygen via nasal cannula at home     Leukocytosis-resolved  - Cultures negative     A. fib  Supratherapeutic anticoagulation  coagulopathy  Acute anemia  -On diltiazem.   -Her hemoglobin has been slowly declining requiring repletion and she is FOBT positive  -Required transfusion 4 units PRBCs      Elevated d-dimer  - Likely multifactorial   - CTA chest negative for PE  - Duplex US lower extremities negative for DVT  - Duplex US upper extremities showing superficial thrombophlebitis in left median/antecubital veins  -Repeat upper extremity Doppler study negative  -Discontinued Eliquis due to anemia      Hypotension  We will start midodrine    Anemia iron deficiency  Blood loss anemia  -Transfuse 2 units PRBCs for Hgb 7.2, today hemoglobin 6.6  -Transfuse additional 2 units PRBCs  -See if both IV and p.o. iron replacement  -FOBT positive  -Heme-onc and GI following  -We will transfuse for hemoglobin <7.0,   -Eliquis discontinued    DVT Prophylaxis: Discontinued Eliquis, SCDs  GI PPx: Protonix  Code Status: Full  POA:    Discharge Barriers:   · [facility] at [facility]  · Unsuccessful PEG J placement, restart oral diet. · Attempt transfer to [facility] for oncology surgery services-[facility] on diversion  Care Plan discussed with: patient and nursing and IDR rounds  Discussed case with general surgery who suggested transfer the patient to [facility]. Total time spent with patient: >35 minutes.

## 2022-01-05 NOTE — PROGRESS NOTES
Progress Note    Patient: Mukesh Byrne MRN: [id number]  SSN: [id number]    YOB: 1945  Age: 78 y.o. Sex: female      Admit Date: 12/9/2021    LOS: 27 days     Subjective:   Patient followed for severe pancreatitis on Meropenem because of worsening leukocytosis. WBC now normal and Meropenem was discontinued. Remains on Anidulafungin for suspected Candida intraabdominal infection. Suspected pancreatic cancer but no firm diagnosis yet. Unsuccessful attempt at placing PEJ tube due to tumor infiltration. Patient is currently contemplating a second opinion regarding treatment from [facility]. She apparently is on medical leave a job there as  and knows several of the Surgical Oncologists. She states that she is not interested in hospice. Objective:     Vitals:    01/05/22 0242 01/05/22 0400 01/05/22 0804 01/05/22 1018   BP: 97/60   (!) 90/53   Pulse: 98 (!) 55  100   Resp: 16   16   Temp: 97.8 °F (36.6 °C)   97.6 °F (36.4 °C)   SpO2: 100%  99% 98%   Weight:       Height:            Intake and Output:  Current Shift: 01/05 0701 - 01/05 1900  In: -   Out: 450 [Urine:450]  Last three shifts: 01/03 1901 - 01/05 0700  In: 680 [P.O.:450; I.V.:230]  Out: 1450 [Urine:1450]    Physical Exam:     Vitals and nursing note reviewed. Constitutional:       General: She is not in acute distress. Appearance: She is obese. She is ill-appearing. HENT: unremarkable  Abdominal:  Mild to moderate pain, no rebound  Genitourinary:  Vaginal area not examined     Comments: External urinary device  Musculoskeletal:      Right lower leg: No edema. Left lower leg: No edema. Comments: PICC line right upper arm   Skin: multiple ecchymoses on the upper extremities, edema left forearm     Findings: No rash.       Comments: ?Stage 2 sacral wound   Neurological: nonfocal, no confusion   Psychiatric:  normal behavior, judgement normal    Lab/Data Review:     WBC 6,200  Urinalysis with WBC 10-20, Bacteria negative  Lipase 1,632 <2,756 <2,813 <1,499 <2,511 <2,802 <3,000 <2,484 <2,684 < 1,397 <1,719    Procal 0.48 <0.32 <0.34 <0.35 <0.34 < 0.32 <0.21 <0.19 <0.25 < 0.14  CRP 8.54 <11.0 <8.98 <11.10 < 12.10 <23.40 <18.30 <14.20 <12.70 <15.00 < 23.80    Serum fungitell <31 Negative    Ascitic fluid   with 41% PMNs    Blood cultures (12/20) No growth FINAL  Urine culture (12/20) No growth FINAL  Ascitic fluid culture(12/27) No growth FINAL    Assessment:     Principal Problem:    Pancreatitis (12/9/2021)    Active Problems:    A-fib ([facility] [address] (11/16/2020)      CHF (congestive heart failure) ([facility] [address] (11/16/2020)      Hematemesis (12/12/2021)      History of peptic ulcer disease (12/12/2021)    1. Severe pancreatitis with markedly elevated lipase, resolving  2. Pancreatic mass, possible neoplasm  3. Biliary stent  4. Sepsis with worsening leukocytosis with elevated procal and CRP, resolving, status post 14 days of Meropenem, Day #11 IV Anidulafungin (empiric)  5. TPN (just started)  6. Moderate pyuria, negative bacteria, urine culture negative  7. Possible candida superinfection with vaginitis, treated  8. Ascites, status post paracentesis    Comment:    WBC normal with decreasing procal and CRP. Lipase also decreased today. Plan:   1. Continue Anidulafungin for 3 more days  2.  In am,  repeat CRP and procal     Signed By: Florence Willis MD     January 5, 2022

## 2022-01-05 NOTE — PROGRESS NOTES
Progress Note    Patient: Clare Corral MRN: [id number]  SSN: [id number]    YOB: 1945  Age: 78 y.o. Sex: female      Admit Date: 12/9/2021    LOS: 27 days     Subjective:   GI in consultation for Pancreatitis    Patient resting at this time. She continues to complain of abdominal discomfort. Lipase remains elevated at 1,472. Surgery input appreciated. Agree with recommendation to transfer patient to a tertiary care center where surgical Oncology services are available in order to better coordinate her care. Patient had a Dobhoff catheter placed edoscopically but patient inadvertently removed the tube. She underwent EGD for GJ tube placement but tube was unable to be placed due to concern for tumor in duodenum and inflammation. She remains weak . 12/30 EGD with Dobhoff placement for feeding  12/27/21 Paracentesis - 2300ml ascites fluid removed. 12/23/21 CT abdomen  1. There are increasing bilateral pleural effusions, increasing body wall  edema, and increasing ascites. These findings could be secondary to the third spacing of fluids. The differential diagnosis for the ascites would include pancreatic ascites with acute pancreatitis or metastatic disease to the peritoneum. 2.  There is a biliary stent which is unchanged in its position traversing throughout area of obstruction within the pancreatic head. 3.  There are multiple low-density lesions of the liver without short-term  interval changes consistent with metastatic disease. 4.  The right kidney is not identified and apparently the patient is status post a previous right nephrectomy. 5.  There are bilateral adrenal masses suspect for metastatic disease without short-term interval changes. 12/6/2021 EUS showing 2.7 X3 cm lesion in the area of head of pancreas with dilation of the Pancreatic duct abutting the portal vein but not involving the SMA or AMV ;  Tumor T2 by endosonographic criteria ; one small lymph node in the area of head of pancreas. 11/22/2021 PET Scan    1. FDG avid lesion in the pancreatic head consistent with malignancy. 2.  Subcentimeter focus of FDG uptake in the liver, indeterminate. 3.  FDG uptake associated with density expanding the right facet at C5-C6, possibly due to an ectatic artery. Objective:     Vitals:    01/05/22 0400 01/05/22 0804 01/05/22 1018 01/05/22 1528   BP:   (!) 90/53 (!) 94/56   Pulse: (!) 55  100 (!) 109   Resp:   16 16   Temp:   97.6 °F (36.4 °C) 99.2 °F (37.3 °C)   SpO2:  99% 98% 98%   Weight:       Height:            Intake and Output:  Current Shift: 01/05 0701 - 01/05 1900  In: -   Out: 450 [Urine:450]  Last three shifts: 01/03 1901 - 01/05 0700  In: 680 [P.O.:450; I.V.:230]  Out: 1450 [Urine:1450]    Physical Exam:   Skin:  Extremities and face reveal no rashes. No chapin erythema. HEENT: Sclerae anicteric. Extra-occular muscles are intact. No abnormal pigmentation of the lips. The neck is supple. Cardiovascular: Regular rate and rhythm. Respiratory:  Comfortable breathing with no accessory muscle use. GI:  Abdomen nondistended, soft, and nontender. No enlargement of the liver or spleen. No masses palpable. Rectal:  Deferred  Musculoskeletal: generalized weakness  Neurological:  Gross memory appears intact. Patient is alert and oriented. Psychiatric:  Mood appears appropriate with judgement intact.   Lymphatic:  No visible adenopathy      Lab/Data Review:  Recent Results (from the past 24 hour(s))   CBC WITH AUTOMATED DIFF    Collection Time: 01/05/22  4:00 AM   Result Value Ref Range    WBC 5.4 3.6 - 11.0 K/uL    RBC 2.24 (L) 3.80 - 5.20 M/uL    HGB 6.6 (L) 11.5 - 16.0 g/dL    HCT 21.0 (L) 35.0 - 47.0 %    MCV 93.8 80.0 - 99.0 FL    MCH 29.5 26.0 - 34.0 PG    MCHC 31.4 30.0 - 36.5 g/dL    RDW 15.1 (H) 11.5 - 14.5 %    PLATELET 886 880 - 730 K/uL    MPV 10.9 8.9 - 12.9 FL    NRBC 0.0 0.0  WBC    ABSOLUTE NRBC 0.00 0.00 - 0.01 K/uL    NEUTROPHILS 78 (H) 32 - 75 % LYMPHOCYTES 16 12 - 49 %    MONOCYTES 5 5 - 13 %    EOSINOPHILS 0 0 - 7 %    BASOPHILS 0 0 - 1 %    IMMATURE GRANULOCYTES 1 (H) 0 - 0.5 %    ABS. NEUTROPHILS 4.2 1.8 - 8.0 K/UL    ABS. LYMPHOCYTES 0.9 0.8 - 3.5 K/UL    ABS. MONOCYTES 0.3 0.0 - 1.0 K/UL    ABS. EOSINOPHILS 0.0 0.0 - 0.4 K/UL    ABS. BASOPHILS 0.0 0.0 - 0.1 K/UL    ABS. IMM. GRANS. 0.0 0.00 - 0.04 K/UL    DF AUTOMATED     METABOLIC PANEL, BASIC    Collection Time: 01/05/22  4:00 AM   Result Value Ref Range    Sodium 136 136 - 145 mmol/L    Potassium 3.8 3.5 - 5.1 mmol/L    Chloride 94 (L) 97 - 108 mmol/L    CO2 40 (H) 21 - 32 mmol/L    Anion gap 2 (L) 5 - 15 mmol/L    Glucose 101 (H) 65 - 100 mg/dL    BUN 35 (H) 6 - 20 mg/dL    Creatinine 0.74 0.55 - 1.02 mg/dL    BUN/Creatinine ratio 47 (H) 12 - 20      GFR est AA >60 >60 ml/min/1.73m2    GFR est non-AA >60 >60 ml/min/1.73m2    Calcium 8.5 8.5 - 10.1 mg/dL   LIPASE    Collection Time: 01/05/22  4:00 AM   Result Value Ref Range    Lipase 1,472 (H) 73 - 393 U/L   C REACTIVE PROTEIN, QT    Collection Time: 01/05/22  4:00 AM   Result Value Ref Range    C-Reactive protein 7.38 (H) 0.00 - 0.60 mg/dL   PROCALCITONIN    Collection Time: 01/05/22  4:00 AM   Result Value Ref Range    Procalcitonin 0.48 (H) 0 ng/mL              XR CHEST PORT   Final Result   Findings/impression:      Stable positioning of right upper extremity PICC, tip projects over the upper   SVC. Cardiac silhouette is enlarged. No pulmonary edema. Bilateral pleural effusions. No evidence of pneumothorax. No acute osseous abnormality identified. DUPLEX UPPER EXT VENOUS BILAT   Final Result      XR CHEST PORT   Final Result   Large effusions. Vascular congestion. CT ABD PELV W CONT   Final Result   1. There are increasing bilateral pleural effusions, increasing body wall   edema, and increasing ascites. These findings could be secondary to the third   spacing of fluids.  The differential diagnosis for the ascites would include   pancreatic ascites with acute pancreatitis or metastatic disease to the   peritoneum. 2.  There is a biliary stent which is unchanged in its position traversing   throughout area of obstruction within the pancreatic head. 3.  There are multiple low-density lesions of the liver without short-term   interval changes consistent with metastatic disease. 4.  The right kidney is not identified and apparently the patient is status post   a previous right nephrectomy. 5.  There are bilateral adrenal masses suspect for metastatic disease without   short-term interval changes. CTA CHEST W OR W WO CONT   Final Result   No CT evidence for PE. Thoracic aorta atherosclerosis. CAD. Moderate to large bilateral pleural effusions with associated RML, RLL, and LLL   compressive atelectasis. Abdominal ascites. DUPLEX LOWER EXT VENOUS BILAT   Final Result      XR CHEST PORT   Final Result   FINDINGS: IMPRESSION: 2 frontal views of the chest. Right PICC distal tip SVC   region. Enlarging cardiomegaly. Increasing vascular congestion. Interval development of   hypoinflation, pleural effusions, lower lung airspace edema and/or pneumonia. No   pneumothorax. No free air under the diaphragm. CT ABD PELV W CONT   Final Result   New moderate volume dependent pleural effusions with associated   lower lobe lung compressive atelectasis. Increasing ascites, moderate approaching large-volume   (fluid could be sampled if there is any clinical concern for bile content). High suspicion hepatic metastases. Similar appearance for the pancreas; Silastic stent in place. No pseudocyst formation. Unchanged appearance bilateral adrenal glands. No bowel obstruction. US ABD LTD   Final Result   Small amount of free fluid. Finding suggesting hemangioma in the   liver.   Gallbladder wall thickening, adenomyomatosis, sludge and gallstones. Cholecystitis possible. Finding in the region of the pancreatic head as above. Other findings as above. CTA ABDOMEN PELV W CONT   Final Result   1. Ill-defined hypodense mass in the pancreas. There are inflammatory changes   and fluid adjacent to the pancreas or duodenum and stomach most likely related   to biopsy or focal pancreatitis. No pseudocyst formation, active bleeding or   other acute findings. 2. Enlarged adrenal glands left greater than right with noncontrast densities   consistent with adrenal adenomas. 3. Right nephrectomy. 4. Other findings as described. XR CHEST PORT   Final Result   No acute findings. IR PARACENTESIS ABD W IMAGE    (Results Pending)       Assessment:     Principal Problem:    Pancreatitis (12/9/2021)    Active Problems:    A-fib ([facility] [address] (11/16/2020)      CHF (congestive heart failure) ([facility] [address] (11/16/2020)      Hematemesis (12/12/2021)      History of peptic ulcer disease (12/12/2021)      Pancreatic mass (1/5/2022)        Plan:   1. Pancreatitis      -12/30 s/p post pyloric tube placement. Patient removed tube on 12/31.         -Unable to place GJ tube due to  tumor in duodenum and food in the stomach and patient on Eliquis. Placed surgical consult.       -Lipase 1,632  this is equilant to 130 in old units       -repeat Lipase in the am       -Dilaudid 1 mg every  4 hours as needed for pain      -Oxycodone 5 mg every 6 hours for breakthrough pain      - S/P paracentesis 12/27/21 with removal of 2300 ml clear serous  Fluid removed      -cytology pending  2. CHF      -Continue Chlorthalidone  3. COPD       -Continue home medications       -Albuterol as needed   4.  Pancreatic Mass      - > 4000      -S/p fine needle aspiration suspicious for neoplasia but not diagnostic      -When stable Oncology plan for out patient followup with advanced oncology surgeon  At [address] [place] [address] or [facility] [place] for resection considerations       -CT concern for liver metastais/lesions -IR for liver biopsy on hold at this time  5. Hematemesis (resolved)      -Monitor H&H      -Transfuse as needed      -Hgb 6.6  This am       - 2 Units PRBC's pending       -Continue Protonix 40 mg daily  6. Afib      -Rate is controlled      -Eliquis  BID      -no further hematemesis reported    Had another detailed discussion with the patient   Clinical diagnosis remains Pancreatic cancer with bile duct obstruction mass in the head of the pancreas elevated ca 19-9. Patient told me she had two soft boiled eggs and tolerated it   She has expressed the desire to me that she wants to see a surgeon for resectability of the tumor which seems unlikely. Oncology wants pathologic specimen to conclusively say carcinoma if they are to consider Chemotherpy. EUS biospy has already been done once with results suspicious for malignancy. Perhaps a mini lap with direct biopsy of the pancreas and a Jejunal feeding tube may provide the final answer to oncology and help with feeding. She has pulled out the Dobbhoff tube that was placed However her eating of some food since yesterday had been encouraging. Thank you for allowing me to participate in this patients care. Plan discussed with Dr. Fred Saravia and he approves.     Signed By: Bailee Betancourt NP     January 5, 2022

## 2022-01-05 NOTE — PROGRESS NOTES
Subjective   Subjective:      HPI :Pt awake and alert. Had GI procedure today for G-J tube placement , but on endoscopy, Dr. Misha Wharton saw the tumor infiltrating the duodenum and felt it is not safe to do the procedure. Patient is saddened by this news. Her friend accompanies her today. She wants to know her diagnosis so she can decide on treatments. Reports abdominal pain. NO bleeding reported.         Objective     Current Facility-Administered Medications:     apixaban (ELIQUIS) tablet 2.5 mg, 2.5 mg, Oral, BID, Therese Rios NP    ferrous sulfate tablet 325 mg, 1 Tablet, Oral, DAILY WITH BREAKFAST, Therese Rios NP, 325 mg at 01/04/22 0940    0.9% sodium chloride infusion 250 mL, 250 mL, IntraVENous, PRN, Therese Rios NP    midodrine (PROAMATINE) tablet 10 mg, 10 mg, Oral, TID WITH MEALS, Therese Rios NP, 10 mg at 01/04/22 1621    0.9% sodium chloride infusion 250 mL, 250 mL, IntraVENous, PRN, Preet Gunter PA-C    sodium chloride (NS) flush 5-40 mL, 5-40 mL, IntraVENous, PRN, Asiya Hillman NP    furosemide (LASIX) injection 40 mg, 40 mg, IntraVENous, DAILY, Preet Gunter PA-C, 40 mg at 01/04/22 0939    anidulafungin (ERAXIS) 100 mg in 0.9% sodium chloride 130 mL IVPB, 100 mg, IntraVENous, Q24H, Nia Jean Baptiste MD, Last Rate: 83 mL/hr at 01/04/22 1715, 100 mg at 01/04/22 1715    zinc oxide-white petrolatum 17-57 % topical paste, , Topical, TID, Jennifer Brannon PA-C, Given at 01/04/22 1621    cyclobenzaprine (FLEXERIL) tablet 10 mg, 10 mg, Oral, TID PRN, Preet Gunter PA-C, 10 mg at 12/30/21 0055    HYDROmorphone (DILAUDID) injection 1 mg, 1 mg, IntraVENous, Q4H PRN, Noris ALMEIDA NP, 1 mg at 01/04/22 1946    oxyCODONE IR (ROXICODONE) tablet 5 mg, 5 mg, Oral, Q6H PRN, Noris Finley F, NP, 5 mg at 01/03/22 2115    nystatin (MYCOSTATIN) 100,000 unit/gram cream, , Topical, BID, Nia Jean Baptiste MD, Given at 01/04/22 1993    docusate sodium (COLACE) capsule 100 mg, 100 mg, Oral, BID, Marisol Butts, NP, 100 mg at 01/03/22 0956    polyethylene glycol (MIRALAX) packet 17 g, 17 g, Oral, DAILY, Marisol Butts, NP, 17 g at 12/23/21 0924    sorbitoL 70 % solution 30 mL, 30 mL, Oral, DAILY PRN, Marisol Butts, NP    bisacodyL (DULCOLAX) suppository 10 mg, 10 mg, Rectal, DAILY PRN, Verneice Pain, NP    hydrALAZINE (APRESOLINE) 20 mg/mL injection 20 mg, 20 mg, IntraVENous, Q6H PRN, Huyen Crane PA    pantoprazole (PROTONIX) 40 mg in 0.9% sodium chloride 10 mL injection, 40 mg, IntraVENous, DAILY, Huyen Crane PA, 40 mg at 01/04/22 7851    guaiFENesin ER (MUCINEX) tablet 600 mg, 600 mg, Oral, Q12H, Huyen Crane PA, 600 mg at 01/04/22 0941    albuterol-ipratropium (DUO-NEB) 2.5 MG-0.5 MG/3 ML, 3 mL, Nebulization, Q6H PRN, Huyen Crane PA    chlorthalidone (HYGROTON) tablet 25 mg, 25 mg, Oral, DAILY, Huyen Crane PA, 25 mg at 01/04/22 0940    albuterol (PROVENTIL HFA, VENTOLIN HFA, PROAIR HFA) inhaler 2 Puff, 2 Puff, Inhalation, Q6H PRN, Sofi Jan A, NP, 2 Puff at 12/29/21 1404    ascorbic acid (vitamin C) (VITAMIN C) tablet 500 mg, 500 mg, Oral, DAILY, Zarefoss Jan A, NP, 500 mg at 01/04/22 0940    atorvastatin (LIPITOR) tablet 40 mg, 40 mg, Oral, DAILY, Zarefoss, Jan A, NP, 40 mg at 01/04/22 0940    cholecalciferol (VITAMIN D3) (1000 Units /25 mcg) tablet 1,000 Units, 1,000 Units, Oral, DAILY, Nolberto Farah NP, 1,000 Units at 01/04/22 0941    diazePAM (VALIUM) tablet 5 mg, 5 mg, Oral, Q6H PRN, Nolberto Farah NP, 5 mg at 12/28/21 1810    dilTIAZem ER (CARDIZEM CD) capsule 120 mg, 120 mg, Oral, DAILY, Nolberto Farah NP, 120 mg at 01/04/22 0941    potassium chloride SR (KLOR-CON 10) tablet 20 mEq, 20 mEq, Oral, DAILY, Nolberto Farah NP, 20 mEq at 01/04/22 0940    sertraline (ZOLOFT) tablet 25 mg, 25 mg, Oral, DAILY, Nolberto Farah NP, 25 mg at 01/04/22 0940    traZODone ([facility]) tablet 50 mg, 50 mg, Oral, QHS, Zarefoss, Nolberto A, NP, 50 mg at 01/03/22 2116    ondansetron [facility]) injection 4 mg, 4 mg, IntraVENous, Q6H PRN, Zarefoss, Nolberto A, NP, 4 mg at 01/04/22 1946    acetaminophen (TYLENOL) tablet 650 mg, 650 mg, Oral, Q4H PRN, Zarefoss, Nolberto A, NP, 650 mg at 01/02/22 0024    prochlorperazine (COMPAZINE) with saline injection 10 mg, 10 mg, IntraVENous, Q6H PRN, Zarefoss, Nolberto A, NP, 10 mg at 12/31/21 1824    Objective:     Visit Vitals  BP (!) 98/59 (BP 1 Location: Right lower arm)   Pulse 85   Temp 98.7 °F (37.1 °C)   Resp 14   Ht 5' 7\" (1.702 m)   Wt 97.4 kg (214 lb 11.7 oz)   SpO2 97%   BMI 33.63 kg/m²      Physical Exam   Pt alert and oriented  LUNGS:CTA  Heart:RRR  Abdomen:Ascites +,NT,BS +  EXT:Edema of legs and BUE.right arm swollen more with PICC line than left  @Objective    [email or web address]     Data Review:   Recent Results (from the past 24 hour(s))   CBC WITH AUTOMATED DIFF    Collection Time: 01/04/22  9:40 AM   Result Value Ref Range    WBC 6.2 3.6 - 11.0 K/uL    RBC 2.52 (L) 3.80 - 5.20 M/uL    HGB 7.4 (L) 11.5 - 16.0 g/dL    HCT 23.5 (L) 35.0 - 47.0 %    MCV 93.3 80.0 - 99.0 FL    MCH 29.4 26.0 - 34.0 PG    MCHC 31.5 30.0 - 36.5 g/dL    RDW 15.4 (H) 11.5 - 14.5 %    PLATELET 175 622 - 964 K/uL    MPV 11.3 8.9 - 12.9 FL    NRBC 0.0 0.0  WBC    ABSOLUTE NRBC 0.00 0.00 - 0.01 K/uL    NEUTROPHILS 75 32 - 75 %    LYMPHOCYTES 15 12 - 49 %    MONOCYTES 6 5 - 13 %    EOSINOPHILS 2 0 - 7 %    BASOPHILS 1 0 - 1 %    IMMATURE GRANULOCYTES 1 (H) 0 - 0.5 %    ABS. NEUTROPHILS 4.8 1.8 - 8.0 K/UL    ABS. LYMPHOCYTES 0.9 0.8 - 3.5 K/UL    ABS. MONOCYTES 0.3 0.0 - 1.0 K/UL    ABS. EOSINOPHILS 0.1 0.0 - 0.4 K/UL    ABS. BASOPHILS 0.0 0.0 - 0.1 K/UL    ABS. IMM.  GRANS. 0.0 0.00 - 0.04 K/UL    DF AUTOMATED     METABOLIC PANEL, BASIC    Collection Time: 01/04/22  9:40 AM   Result Value Ref Range    Sodium 136 136 - 145 mmol/L    Potassium 3.7 3.5 - 5.1 mmol/L    Chloride 93 (L) 97 - 108 mmol/L CO2 41 (HH) 21 - 32 mmol/L    Anion gap 2 (L) 5 - 15 mmol/L    Glucose 83 65 - 100 mg/dL    BUN 30 (H) 6 - 20 mg/dL    Creatinine 0.68 0.55 - 1.02 mg/dL    BUN/Creatinine ratio 44 (H) 12 - 20      GFR est AA >60 >60 ml/min/1.73m2    GFR est non-AA >60 >60 ml/min/1.73m2    Calcium 8.3 (L) 8.5 - 10.1 mg/dL   C REACTIVE PROTEIN, QT    Collection Time: 01/04/22  9:40 AM   Result Value Ref Range    C-Reactive protein 8.54 (H) 0.00 - 0.60 mg/dL   PROCALCITONIN    Collection Time: 01/04/22  9:40 AM   Result Value Ref Range    Procalcitonin 0.48 (H) 0 ng/mL   LIPASE    Collection Time: 01/04/22  9:40 AM   Result Value Ref Range    Lipase 1,632 (H) 73 - 393 U/L   PROTHROMBIN TIME + INR    Collection Time: 01/04/22 10:50 AM   Result Value Ref Range    Prothrombin time 16.2 (H) 11.9 - 14.7 sec    INR 1.3 (H) 0.9 - 1.1     D DIMER    Collection Time: 01/04/22 10:50 AM   Result Value Ref Range    D DIMER 4.81 (H) <0.50 ug/ml(FEU)       Assessment   Assessment/Plan:     1) Possible pancreatic ca with liver metastases. Pancreatci mass FNA suspicious for malignant cells but not diagnostic. -.Ca 19-9 very high more than 22,000. -Repeat endoscopy , showed tumor infiltrating into the Duodenum now. Biopsy however was not done due to concerns for bleeding as she was on Eliquis. 2) If pt general condition and infection better consider CT guided liver biopsy and chemotherapy as out pt. If pt general condition not better palliative care. Prognosis poor.   D/w pt in detail. Her friend had questions too regarding treatments and prognosis and all discussed. 3) Renal cell CA with lung nodules. S/p right nephrectomy.    4) Normocytic hypochromic anemia:hb coming down. Received prbc transfusion last night. Hb is 7.4 today. R/o Gi bleeding. Check stool for occult blood. Transfuse pRBC if hb less than 7 or symptomatic on iv iron.      Pancreatitis: ? Post procedure,       Ascites:S/p paracentesis. Cytology negative for malignant cells. Superficial vein thrombosis LUE:pt on eliquis. Agree with lowering dose to 2.5mg po bid due to her anemia and concerns for bleeding. IF hb drops again , need to d/c the anticoagulation.      BL pleural effusions

## 2022-01-05 NOTE — PROGRESS NOTES
Attempted to see pt at 3:45 this PM, but she declined Tx, stating she was going on hospice care and did not want to have PT Tx. Will continue to attempt at a later time/date.

## 2022-01-05 NOTE — PROGRESS NOTES
Progress Note    Patient: Avery Washburn MRN: [id number]  SSN: [id number]    YOB: 1945  Age: 78 y.o. Sex: female      Admit Date: 12/9/2021    LOS: 27 days     Subjective:     Avery Washburn is a 78 y.o. female who is being seen for evaluation of a pancreatic mass. Patient has a significant past medical history for renal cell carcinoma, atrial fibrillation currently on Eliquis, COPD on home oxygen, Sjogren's syndrome, fibromyalgia, GERD, CHF. Patient underwent right nephrectomy in 2019 for renal cell carcinoma and has been followed at [facility] since that time. Patient was found to have obstructive jaundice and biliary stricture in early November of this year. She underwent an ERCP with stent placement with brushings of the bile duct where there was noted to be a stricture. These brushings were negative. Patient then underwent a PET CT which demonstrated uptake in the head of the pancreas concerning for pancreatic mass. She subsequently underwent endoscopic ultrasound with ultrasound and FNA biopsy on 12/6/2021. The procedure demonstrated abutment of the tumor with the portal vein without involvement of the SMA or SMV. The total tumor was measured to be 2.7 cm by 3 cm. This biopsy resulted as atypical cells with suspicion for malignancy. The day after the procedure patient developed epigastric abdominal pain which was severe and radiating to her back. She also complained of nausea and vomiting. CT of the abdomen was performed on admission which demonstrated peripancreatic inflammation consistent with likely post biopsy pancreatitis. Patient was admitted for medical management and evaluation for this pancreatic mass and pancreatitis.     Since admission, patient has had persistently elevated lipase levels, progressively worsening debility, development of ascites s/p paracentesis, and repeat imaging of the abdomen demonstrating mass in the pancreatic head with biliary stent in place, no acute inflammation/necrotizing infection of the pancreas, bilateral pleural effusions, ascites, multiple liver lesions, and bilateral adrenal masses. She tolerates a diet intermittently, symptomatically has improved but lipase continues to remain elevated. IR consulted for biopsy of liver lesions, but this was not performed. GI placed Dobhoff catheter endoscopically but patient removed it. She then underwent upper endoscopy for GJ tube placement, but there was some inflammation in the duodenum concerning for tumor involvement and tube was not placed. Patient has not improved substantially enough in order for her to be discharged home with outpatient surgical oncology follow up. Objective:     Vitals:    01/05/22 0242 01/05/22 0400 01/05/22 0804 01/05/22 1018   BP: 97/60   (!) 90/53   Pulse: 98 (!) 55  100   Resp: 16   16   Temp: 97.8 °F (36.6 °C)   97.6 °F (36.4 °C)   SpO2: 100%  99% 98%   Weight:       Height:            Intake and Output:  Current Shift: 01/05 0701 - 01/05 1900  In: -   Out: 450 [Urine:450]  Last three shifts: 01/03 1901 - 01/05 0700  In: 680 [P.O.:450; I.V.:230]  Out: 1450 [Urine:1450]    Physical Exam:   General: alert, oriented, in no acute distress  Neck: supple, no masses, no JVD  Cardiovascular: regular rate and rhythm  Pulmonary: unlabored breathing, equal chest rise bilaterally, on supplemental O2  Abdomen: soft, mildly distended, TTP epigastrum, lower abdomen non tender  Extremities: no swelling, pulses equal and palpable in all extremities    Lab/Data Review:  No results found for this or any previous visit (from the past 24 hour(s)).        Assessment:     Principal Problem:    Pancreatitis (12/9/2021)    Active Problems:    A-fib ([address] (11/16/2020)      CHF (congestive heart failure) ([address] (11/16/2020)      Hematemesis (12/12/2021)      History of peptic ulcer disease (12/12/2021)      Pancreatic mass (1/5/2022)      Pancreatic mass causing biliary obstruction s/p ERCP with stent placement, EUS with FNA, biopsy demonstrating suspicion for malignancy. Plan:     Diet: as tolerated  DVT/GI prophylaxis  Ambulate  Pain and nausea control  Dispo: I discussed extensively with the patient that our goal was to help her improve clinically from her pancreatitis and be well enough to be discharged in order to follow up with a surgical oncologist for evaluation of this pancreatic head mass and possibility of surgical intervention. She has now been hospitalized for almost 1 month and she has not progressed. In fact, she has become more and more debilitated. I discussed with her that she would benefit from transfer to a tertiary care center where surgical oncology services are available in order to better coordinate her care while she is still recovering from her acute illness. My concern is that she will continue to have progression of her disease before she recovers enough to be seen as an outpatient. The patient understands this and is agreeable. I discussed this case with Russell Frost NP.     Signed By: Manuel Oshea DO     January 5, 2022

## 2022-01-05 NOTE — PROGRESS NOTES
Subjective   Subjective:      HPI :Pt awake and alert. Reports feeling weak and tired. Today she is able to eat some food but reports abdominal pain.     .    Objective     Current Facility-Administered Medications:     influenza vaccine 2021-22 (6 mos+)(PF) (FLUARIX/FLULAVAL/FLUZONE QUAD) injection 0.5 mL, 1 Each, IntraMUSCular, PRIOR TO DISCHARGE, Luann Robledo NP    [Held by provider] apixaban Messibailey Fuentes) tablet 2.5 mg, 2.5 mg, Oral, BID, Luann Robledo NP    ferrous sulfate tablet 325 mg, 1 Tablet, Oral, DAILY WITH BREAKFAST, Luann Robledo NP, 325 mg at 01/05/22 1032    0.9% sodium chloride infusion 250 mL, 250 mL, IntraVENous, PRN, Luann Robledo NP    midodrine (PROAMATINE) tablet 10 mg, 10 mg, Oral, TID WITH MEALS, Luann Robledo NP, 10 mg at 01/05/22 1204    0.9% sodium chloride infusion 250 mL, 250 mL, IntraVENous, PRN, Vimal Morales PA-C    sodium chloride (NS) flush 5-40 mL, 5-40 mL, IntraVENous, PRN, Asiya Hillman NP    furosemide (LASIX) injection 40 mg, 40 mg, IntraVENous, DAILY, Vimal Morales PA-C, 40 mg at 01/04/22 0939    anidulafungin (ERAXIS) 100 mg in 0.9% sodium chloride 130 mL IVPB, 100 mg, IntraVENous, Q24H, Estefania Galvan MD, Last Rate: 83 mL/hr at 01/04/22 1715, 100 mg at 01/04/22 1715    zinc oxide-white petrolatum 17-57 % topical paste, , Topical, TID, Faith Brannon PA-C, Given at 01/05/22 1033    cyclobenzaprine (FLEXERIL) tablet 10 mg, 10 mg, Oral, TID PRN, Vimal Morales PA-C, 10 mg at 12/30/21 0055    HYDROmorphone (DILAUDID) injection 1 mg, 1 mg, IntraVENous, Q4H PRN, Rosetta ALMEIDA NP, 1 mg at 01/05/22 1204    oxyCODONE IR (ROXICODONE) tablet 5 mg, 5 mg, Oral, Q6H PRN, Rosetta ALMEIDA, NP, 5 mg at 01/03/22 2115    nystatin (MYCOSTATIN) 100,000 unit/gram cream, , Topical, BID, Estefania Galvan MD, Given at 01/05/22 0055    docusate sodium (COLACE) capsule 100 mg, 100 mg, Oral, BID, Oma Gomez, CONRADO, 100 mg at 01/03/22 0956    polyethylene glycol (MIRALAX) packet 17 g, 17 g, Oral, DAILY, Kellie Weston NP, 17 g at 12/23/21 0924    sorbitoL 70 % solution 30 mL, 30 mL, Oral, DAILY PRN, Kellie Weston NP    bisacodyL (DULCOLAX) suppository 10 mg, 10 mg, Rectal, DAILY PRN, Goble Fothergill, NP    hydrALAZINE (APRESOLINE) 20 mg/mL injection 20 mg, 20 mg, IntraVENous, Q6H PRN, Huyen Crane PA    pantoprazole (PROTONIX) 40 mg in 0.9% sodium chloride 10 mL injection, 40 mg, IntraVENous, DAILY, Huyen Crane PA, 40 mg at 01/05/22 1203    guaiFENesin ER (MUCINEX) tablet 600 mg, 600 mg, Oral, Q12H, Huyen Crane PA, 600 mg at 01/05/22 0033    albuterol-ipratropium (DUO-NEB) 2.5 MG-0.5 MG/3 ML, 3 mL, Nebulization, Q6H PRN, Huyen Crane PA    chlorthalidone (HYGROTON) tablet 25 mg, 25 mg, Oral, DAILY, Huyen Crane PA, 25 mg at 01/04/22 0940    albuterol (PROVENTIL HFA, VENTOLIN HFA, PROAIR HFA) inhaler 2 Puff, 2 Puff, Inhalation, Q6H PRN, Nolberto Farah NP, 2 Puff at 12/29/21 1404    ascorbic acid (vitamin C) (VITAMIN C) tablet 500 mg, 500 mg, Oral, DAILY, Zabel Nolberto NEGIN, NP, 500 mg at 01/04/22 0940    atorvastatin (LIPITOR) tablet 40 mg, 40 mg, Oral, DAILY, Zabel Nolberto NEGIN, NP, 40 mg at 01/04/22 0940    cholecalciferol (VITAMIN D3) (1000 Units /25 mcg) tablet 1,000 Units, 1,000 Units, Oral, DAILY, Sofi Nolberto NEGIN, NP, 1,000 Units at 01/04/22 0941    diazePAM (VALIUM) tablet 5 mg, 5 mg, Oral, Q6H PRN, Zarefoss, Jan A, NP, 5 mg at 12/28/21 1810    dilTIAZem ER (CARDIZEM CD) capsule 120 mg, 120 mg, Oral, DAILY, Zarefoss, Jan A, NP, 120 mg at 01/05/22 1032    potassium chloride SR (KLOR-CON 10) tablet 20 mEq, 20 mEq, Oral, DAILY, Zarefoss Jan A, NP, 20 mEq at 01/05/22 1031    sertraline (ZOLOFT) tablet 25 mg, 25 mg, Oral, DAILY, Zarefoss, Jan A, NP, 25 mg at 01/05/22 1031    traZODone (DESYREL) tablet 50 mg, 50 mg, Oral, QHS, Zarefoss Jan A, NP, 50 mg at 01/05/22 0033   ondansetron (ZOFRAN) injection 4 mg, 4 mg, IntraVENous, Q6H PRN, Sofi Nolberto A, NP, 4 mg at 01/05/22 1204    acetaminophen (TYLENOL) tablet 650 mg, 650 mg, Oral, Q4H PRN, Zabel Nolberto NEGIN, NP, 650 mg at 01/02/22 0024    prochlorperazine (COMPAZINE) with saline injection 10 mg, 10 mg, IntraVENous, Q6H PRN, Sofi Nolberto NEGIN, NP, 10 mg at 12/31/21 1824    Objective:     Visit Vitals  BP (!) 94/56 (BP 1 Location: Right lower arm)   Pulse (!) 109   Temp 99.2 °F (37.3 °C)   Resp 16   Ht 5' 7\" (1.702 m)   Wt 97.4 kg (214 lb 11.7 oz)   SpO2 98%   BMI 33.63 kg/m²      Physical Exam   Pt alert and oriented  LUNGS:CTA  Heart:RRR  Abdomen:Ascites +,NT,BS +  EXT:Edema of legs and BUE.right arm swollen more with PICC line than left  @Objective    [email or web address]     Data Review:   Recent Results (from the past 24 hour(s))   CBC WITH AUTOMATED DIFF    Collection Time: 01/05/22  4:00 AM   Result Value Ref Range    WBC 5.4 3.6 - 11.0 K/uL    RBC 2.24 (L) 3.80 - 5.20 M/uL    HGB 6.6 (L) 11.5 - 16.0 g/dL    HCT 21.0 (L) 35.0 - 47.0 %    MCV 93.8 80.0 - 99.0 FL    MCH 29.5 26.0 - 34.0 PG    MCHC 31.4 30.0 - 36.5 g/dL    RDW 15.1 (H) 11.5 - 14.5 %    PLATELET 066 307 - 080 K/uL    MPV 10.9 8.9 - 12.9 FL    NRBC 0.0 0.0  WBC    ABSOLUTE NRBC 0.00 0.00 - 0.01 K/uL    NEUTROPHILS 78 (H) 32 - 75 %    LYMPHOCYTES 16 12 - 49 %    MONOCYTES 5 5 - 13 %    EOSINOPHILS 0 0 - 7 %    BASOPHILS 0 0 - 1 %    IMMATURE GRANULOCYTES 1 (H) 0 - 0.5 %    ABS. NEUTROPHILS 4.2 1.8 - 8.0 K/UL    ABS. LYMPHOCYTES 0.9 0.8 - 3.5 K/UL    ABS. MONOCYTES 0.3 0.0 - 1.0 K/UL    ABS. EOSINOPHILS 0.0 0.0 - 0.4 K/UL    ABS. BASOPHILS 0.0 0.0 - 0.1 K/UL    ABS. IMM. GRANS. 0.0 0.00 - 0.04 K/UL    DF AUTOMATED         Assessment   Assessment/Plan:     1) Possible pancreatic ca with liver metastases. Pancreatci mass FNA suspicious for malignant cells but not diagnostic. -.Ca 19-9 very high more than 22,000.   -Repeat endoscopy , showed tumor infiltrating into the Duodenum now. Biopsy however was not done due to concerns for bleeding as she was on Eliquis. However clinical picture is highly consistent with metastatic pancreatic cancer. Discussed this with patient in detail. Her performance status currently is not good to recommend any chemotherapy, I recommended hospice care and comfort measures at home. We discussed overall prognosis associated with metastatic pancreatic cancer with and without treatments.  -At this time she does not want to pursue any hospice.  -She feels she want to eat better and get stronger.  -She also want to get a second opinion from surgical oncologist about resectability of his part of her pancreatic cancer. Explained to her that the disease has metastasized to the liver and surgery will not be beneficial but she has requested transfer to [facility] for surgical oncology evaluation. She might benefit from a J-tube placement for nourishment as she desires to pursue treatments    2) If pt general condition and infection better consider CT guided liver biopsy, prior to any chemotherapy treatment options. 3) Renal cell CA with lung nodules. S/p right nephrectomy.    4) Normocytic hypochromic anemia:hb coming down. Received prbc transfusion last night. Hb is 6,6  today. Repeat CBC in the morning if hemoglobin persistently low she will need another unit of packed red blood cells  R/o Gi bleeding.     Pancreatitis: ? Post procedure,       Ascites:S/p paracentesis. Cytology negative for malignant cells. Superficial vein thrombosis LEONIDE:pt on eliquis. Agree with lowering dose to 2.5mg po bid due to her anemia and concerns for bleeding. IF hb drops again , need to d/c the anticoagulation.      BL pleural effusions

## 2022-01-05 NOTE — PROGRESS NOTES
Problem: Falls - Risk of  Goal: *Absence of Falls  Description: Document Alberto Blight Fall Risk and appropriate interventions in the flowsheet. Outcome: Progressing Towards Goal  Note: Fall Risk Interventions:  Mobility Interventions: Bed/chair exit alarm,OT consult for ADLs,Patient to call before getting OOB,PT Consult for mobility concerns,PT Consult for assist device competence,Strengthening exercises (ROM-active/passive),Utilize walker, cane, or other assistive device,Utilize gait belt for transfers/ambulation    Mentation Interventions: Bed/chair exit alarm,Increase mobility    Medication Interventions: Bed/chair exit alarm,Patient to call before getting OOB,Teach patient to arise slowly,Utilize gait belt for transfers/ambulation    Elimination Interventions: Bed/chair exit alarm,Call light in reach,Patient to call for help with toileting needs    History of Falls Interventions: Bed/chair exit alarm,Utilize gait belt for transfer/ambulation         Problem: Patient Education: Go to Patient Education Activity  Goal: Patient/Family Education  Outcome: Progressing Towards Goal     Problem: Pain  Goal: *Control of Pain  Outcome: Progressing Towards Goal     Problem: Patient Education: Go to Patient Education Activity  Goal: Patient/Family Education  Outcome: Progressing Towards Goal     Problem: Nausea/Vomiting (Adult)  Goal: *Absence of nausea/vomiting  Outcome: Progressing Towards Goal     Problem: Patient Education: Go to Patient Education Activity  Goal: Patient/Family Education  Outcome: Progressing Towards Goal     Problem: Pressure Injury - Risk of  Goal: *Prevention of pressure injury  Description: Document Asim Scale and appropriate interventions in the flowsheet. Outcome: Progressing Towards Goal  Note: Pressure Injury Interventions:  Sensory Interventions: Float heels,Keep linens dry and wrinkle-free,Maintain/enhance activity level,Minimize linen layers,Turn and reposition approx.  every two hours (pillows and wedges if needed)    Moisture Interventions: Absorbent underpads,Internal/External urinary devices,Minimize layers,Moisture barrier    Activity Interventions: Increase time out of bed,PT/OT evaluation    Mobility Interventions: Float heels,HOB 30 degrees or less,PT/OT evaluation,Turn and reposition approx.  every two hours(pillow and wedges)    Nutrition Interventions: Document food/fluid/supplement intake,Discuss nutritional consult with provider,Offer support with meals,snacks and hydration    Friction and Shear Interventions: HOB 30 degrees or less,Minimize layers                Problem: Patient Education: Go to Patient Education Activity  Goal: Patient/Family Education  Outcome: Progressing Towards Goal     Problem: Patient Education: Go to Patient Education Activity  Goal: Patient/Family Education  Outcome: Progressing Towards Goal     Problem: Patient Education: Go to Patient Education Activity  Goal: Patient/Family Education  Outcome: Progressing Towards Goal

## 2022-01-06 NOTE — PROGRESS NOTES
Hospitalist Progress Note    Subjective:   Daily Progress Note: 1/6/2022 12:21 PM    Hospital Course: The patient is a 79-year-old woman with a PMH significant for atrial fibrillation, PUD, CHF, COPD on home O2 at 2 L, renal cell carcinoma status post nephrectomy, hypertension and morbid obesity. Patient was found to have obstructive jaundice and biliary stricture in early November of this year.  She underwent an ERCP with stent placement with brushings of the bile duct where there was noted to be a stricture.  These brushings were negative.  Patient then underwent a PET CT which demonstrated uptake in the head of the pancreas concerning for pancreatic mass. She subsequently underwent endoscopic ultrasound with ultrasound and FNA biopsy on 12/6/2021.  The procedure demonstrated abutment of the tumor with the portal vein without involvement of the SMA or SMV.  The total tumor was measured to be 2.7 cm by 3 cm.  This biopsy resulted as atypical cells with suspicion for malignancy. She admitted to the hospital on 2/9/2021 and severe pancreatitis after undergoing an EUS for pancreatic biopsy on 12/6/2021 and pancreatic mass. Her symptoms were abdominal pain, nausea and vomiting. CT scan revealing peripancreatic inflammation consistent with likely post biopsy pancreatitis and ascites. Her initial labs revealing elevated lipase, supratherapeutic INR and elevated D-dimer coagulopathy. CTA was positive for bilateral pleural effusions, negative for PE. Paracentesis with culture of ascitic fluid, blood cultures, urine cultures negative for growth. Patient had been started on IV meropenem for leukocytosis and anidulafungin for suspected intra-abdominal infection. CT with and without contrast revealing liver lesions consistent with metastasis, mass in the pancreatic head with biliary stent in place, bilateral pleural effusions, ascites and bilateral adrenal masses.   IR declined liver biopsy due to coagulopathy and felt she would not benefit. Doppler studies of upper and lower extremities negative for DVT, positive for superficial thrombophlebitis of left medial antecubital.  Pancreatic cancer is presumed but no firm diagnosis due to lack of tissue sample. Pancreatitis remaining persistent despite antibiotics. Attempted Dobbhoff and PEG J placement but failed due to reddened area presumed possible tumor infiltration in the duodenum. Due to poor performance and prognosis she is not a candidate for palliative XRT or chemo. The patient has been progressively worsening with debilitation, intermittently tolerating diet not stable to be discharged home with outpatient surgical oncology follow-up. She would benefit from transfer to tertiary care center where surgical oncology services are available for evaluation of pancreatic head mass and possible surgical intervention. Subjective:  Discussed at length goals of care. She is currently not interested or wants to talk about hospice. Discussed with patient possible transfer to another facility for second opinion and oncology surgery for further evaluation and management of pancreatic head mass. Will attempt to transfer to [facility]. They currently are on diversion we will try daily. Will broaden our search to other [place] facilities that have oncology surgery service.     Current Facility-Administered Medications   Medication Dose Route Frequency    influenza vaccine 2021-22 (6 mos+)(PF) (FLUARIX/FLULAVAL/FLUZONE QUAD) injection 0.5 mL  1 Each IntraMUSCular PRIOR TO DISCHARGE    0.9% sodium chloride infusion 250 mL  250 mL IntraVENous PRN    [Held by provider] apixaban (ELIQUIS) tablet 2.5 mg  2.5 mg Oral BID    ferrous sulfate tablet 325 mg  1 Tablet Oral DAILY WITH BREAKFAST    0.9% sodium chloride infusion 250 mL  250 mL IntraVENous PRN    midodrine (PROAMATINE) tablet 10 mg  10 mg Oral TID WITH MEALS    0.9% sodium chloride infusion 250 mL  250 mL IntraVENous PRN    sodium chloride (NS) flush 5-40 mL  5-40 mL IntraVENous PRN    furosemide (LASIX) injection 40 mg  40 mg IntraVENous DAILY    anidulafungin (ERAXIS) 100 mg in 0.9% sodium chloride 130 mL IVPB  100 mg IntraVENous Q24H    zinc oxide-white petrolatum 17-57 % topical paste   Topical TID    cyclobenzaprine (FLEXERIL) tablet 10 mg  10 mg Oral TID PRN    HYDROmorphone (DILAUDID) injection 1 mg  1 mg IntraVENous Q4H PRN    oxyCODONE IR (ROXICODONE) tablet 5 mg  5 mg Oral Q6H PRN    nystatin (MYCOSTATIN) 100,000 unit/gram cream   Topical BID    docusate sodium (COLACE) capsule 100 mg  100 mg Oral BID    polyethylene glycol (MIRALAX) packet 17 g  17 g Oral DAILY    sorbitoL 70 % solution 30 mL  30 mL Oral DAILY PRN    bisacodyL (DULCOLAX) suppository 10 mg  10 mg Rectal DAILY PRN    hydrALAZINE (APRESOLINE) 20 mg/mL injection 20 mg  20 mg IntraVENous Q6H PRN    pantoprazole (PROTONIX) 40 mg in 0.9% sodium chloride 10 mL injection  40 mg IntraVENous DAILY    guaiFENesin ER (MUCINEX) tablet 600 mg  600 mg Oral Q12H    albuterol-ipratropium (DUO-NEB) 2.5 MG-0.5 MG/3 ML  3 mL Nebulization Q6H PRN    chlorthalidone (HYGROTON) tablet 25 mg  25 mg Oral DAILY    albuterol (PROVENTIL HFA, VENTOLIN HFA, PROAIR HFA) inhaler 2 Puff  2 Puff Inhalation Q6H PRN    ascorbic acid (vitamin C) (VITAMIN C) tablet 500 mg  500 mg Oral DAILY    atorvastatin (LIPITOR) tablet 40 mg  40 mg Oral DAILY    cholecalciferol (VITAMIN D3) (1000 Units /25 mcg) tablet 1,000 Units  1,000 Units Oral DAILY    diazePAM (VALIUM) tablet 5 mg  5 mg Oral Q6H PRN    dilTIAZem ER (CARDIZEM CD) capsule 120 mg  120 mg Oral DAILY    potassium chloride SR (KLOR-CON 10) tablet 20 mEq  20 mEq Oral DAILY    sertraline (ZOLOFT) tablet 25 mg  25 mg Oral DAILY    traZODone (DESYREL) tablet 50 mg  50 mg Oral QHS    ondansetron (ZOFRAN) injection 4 mg  4 mg IntraVENous Q6H PRN    acetaminophen (TYLENOL) tablet 650 mg  650 mg Oral Q4H PRN    prochlorperazine (COMPAZINE) with saline injection 10 mg  10 mg IntraVENous Q6H PRN        Review of Systems  Constitutional: Positive for malaise/fatigue. Negative for fever. HENT: Negative. Respiratory: Positive for shortness of breath. Negative for cough. Cardiovascular: Negative for chest pain and leg swelling. Gastrointestinal: Positive for abdominal pain. Negative for nausea and vomiting. Genitourinary: No frequency, No dysuria, No hematuria  Integument/breast: No skin lesion(s)   Neurological: No Confusion, No headaches, No dizziness      Objective:     Visit Vitals  /60 (BP 1 Location: Right lower arm)   Pulse 61   Temp 98.3 °F (36.8 °C)   Resp 16   Ht 5' 7\" (1.702 m)   Wt 97.4 kg (214 lb 11.7 oz)   SpO2 98%   BMI 33.63 kg/m²    O2 Flow Rate (L/min): 3 l/min O2 Device: Nasal cannula    Temp (24hrs), Av.8 °F (37.1 °C), Min:98.2 °F (36.8 °C), Max:99.3 °F (37.4 °C)      No intake/output data recorded.  1901 -  0700  In: 1514.7 [P.O.:350; I.V.:130]  Out: 950 [Urine:950]    PHYSICAL EXAM:  Constitutional: No acute distress  Skin: Extremities and face reveal no rashes. Multiple areas of ecchymosis in upper extremities. HEENT: Dobhoff in left nare. Sclerae anicteric. Extra-occular muscles are intact. No oral ulcers. The neck is supple and no masses. Cardiovascular: Regular rate and rhythm. +S1/S2. No murmur or gallop. Respiratory:  Rhonchi noted bilateral without wheezes. GI: Firm with hypoactive bowel sounds and tenderness to palpation in all 4 quadrant. Rectal: Deferred   Musculoskeletal: Upper and lower extremity peripheral edema present, likely third spacing. Able to move all ext  Neurological:  Generalized weakness. Patient is alert and oriented x3.  Cranial nerves II-XII grossly intact  Psychiatric: Mood appears appropriate       Data Review    Recent Results (from the past 24 hour(s))   LIPASE    Collection Time: 22  4:00 AM   Result Value Ref Range Lipase 2,144 (H) 73 - 393 U/L       XR CHEST PORT   Final Result   Findings/impression:      Stable positioning of right upper extremity PICC, tip projects over the upper   SVC. Cardiac silhouette is enlarged. No pulmonary edema. Bilateral pleural effusions. No evidence of pneumothorax. No acute osseous abnormality identified. DUPLEX UPPER EXT VENOUS BILAT   Final Result      XR CHEST PORT   Final Result   Large effusions. Vascular congestion. CT ABD PELV W CONT   Final Result   1. There are increasing bilateral pleural effusions, increasing body wall   edema, and increasing ascites. These findings could be secondary to the third   spacing of fluids. The differential diagnosis for the ascites would include   pancreatic ascites with acute pancreatitis or metastatic disease to the   peritoneum. 2.  There is a biliary stent which is unchanged in its position traversing   throughout area of obstruction within the pancreatic head. 3.  There are multiple low-density lesions of the liver without short-term   interval changes consistent with metastatic disease. 4.  The right kidney is not identified and apparently the patient is status post   a previous right nephrectomy. 5.  There are bilateral adrenal masses suspect for metastatic disease without   short-term interval changes. CTA CHEST W OR W WO CONT   Final Result   No CT evidence for PE. Thoracic aorta atherosclerosis. CAD. Moderate to large bilateral pleural effusions with associated RML, RLL, and LLL   compressive atelectasis. Abdominal ascites. DUPLEX LOWER EXT VENOUS BILAT   Final Result      XR CHEST PORT   Final Result   FINDINGS: IMPRESSION: 2 frontal views of the chest. Right PICC distal tip SVC   region. Enlarging cardiomegaly. Increasing vascular congestion. Interval development of   hypoinflation, pleural effusions, lower lung airspace edema and/or pneumonia.  No pneumothorax. No free air under the diaphragm. CT ABD PELV W CONT   Final Result   New moderate volume dependent pleural effusions with associated   lower lobe lung compressive atelectasis. Increasing ascites, moderate approaching large-volume   (fluid could be sampled if there is any clinical concern for bile content). High suspicion hepatic metastases. Similar appearance for the pancreas; Silastic stent in place. No pseudocyst formation. Unchanged appearance bilateral adrenal glands. No bowel obstruction. US ABD LTD   Final Result   Small amount of free fluid. Finding suggesting hemangioma in the   liver. Gallbladder wall thickening, adenomyomatosis, sludge and gallstones. Cholecystitis possible. Finding in the region of the pancreatic head as above. Other findings as above. CTA ABDOMEN PELV W CONT   Final Result   1. Ill-defined hypodense mass in the pancreas. There are inflammatory changes   and fluid adjacent to the pancreas or duodenum and stomach most likely related   to biopsy or focal pancreatitis. No pseudocyst formation, active bleeding or   other acute findings. 2. Enlarged adrenal glands left greater than right with noncontrast densities   consistent with adrenal adenomas. 3. Right nephrectomy. 4. Other findings as described. XR CHEST PORT   Final Result   No acute findings.       IR PARACENTESIS ABD W IMAGE    (Results Pending)       Principal Problem:    Pancreatitis (12/9/2021)    Active Problems:    A-fib ([address] (11/16/2020)      CHF (congestive heart failure) ([address] (11/16/2020)      Hematemesis (12/12/2021)      History of peptic ulcer disease (12/12/2021)      Pancreatic mass (1/5/2022)        Assessment/Plan:     Acute pancreatitis  - Status post EUS by Dr. Edmond  12/06  - Continue pain medication  - Lipase variable, will likely be chronically elevated   - CT abd/pelvis with PO and IV contrast showing increasing bilateral pleural effusion and increasing ascites. Also notes multiple low-density lesions of liver consistent with metastatic disease and bilateral adrenal masses suspected for metastatic disease.  - completed 14 days of IV merrem   - Continue empiric Anidulafungin 4 more days  - paracentesis on 12/27 with 2300 mL clear serous fluid drained, cytology showing now growth. - She was scheduled for liver biopsy with IR however IR does not feel patient would benefit from biopsy at this time.   -Unsuccessful attempt to place PEG J due to tumor infiltration and duodenum     CHF  - Continue chlorthalidone 25 mg daily  - IV lasix 40 mg daily     Pancreatic mass  Liver masses  Adrenal masses  History of renal cell carcinoma with lung nodules status post right nephrectomy  Multiple low-density lesions of the liver consistent with meta static disease and bilateral adrenal masses suspicious for metastatic disease  - CA 19-9 greater than 4000  --Oncology consulted-will need tissue biopsy before definitive recommendations can be made. Unable to offer palliative chemo or radiation due to poor performance. Prognosis very poor, hospice recommended     Hypokalemia-stable  - replete as needed     COPD-stable  - Pulmonary consultation  - Chronic respiratory failure with 2 L of oxygen via nasal cannula at home     Leukocytosis-resolved  - Cultures negative     A. fib  Supratherapeutic anticoagulation  coagulopathy  Acute anemia  -On diltiazem.   -Her hemoglobin has been slowly declining requiring repletion and she is FOBT positive  -Required transfusion 4 units PRBCs      Elevated d-dimer  - Likely multifactorial   - CTA chest negative for PE  - Duplex US lower extremities negative for DVT  - Duplex US upper extremities showing superficial thrombophlebitis in left median/antecubital veins  -Repeat upper extremity Doppler study negative  -Discontinued Eliquis due to anemia      Hypotension  We will start midodrine    Anemia iron deficiency  Blood loss anemia  -Transfuse 2 units PRBCs for Hgb 7.2, yesterday hemoglobin 6.6  -Transfuse additional 2 units PRBCs labs pending for today  -See if both IV and p.o. iron replacement  -FOBT positive  -Heme-onc and GI following  -We will transfuse for hemoglobin <7.0,   -Eliquis discontinued    DVT Prophylaxis: Discontinued Eliquis, SCDs  GI PPx: Protonix  Code Status: Full  POA:    Discharge Barriers:   · [facility]  · Unsuccessful PEG J placement, restart oral diet. · Attempt transfer to [facility] for oncology surgery services-[facility] yesterday we will broaden search today  Care Plan discussed with: patient and nursing and IDR rounds  Discussed case with general surgery who suggested transfer the patient to tertiary care center that has oncology surgery    Total time spent with patient: >35 minutes.

## 2022-01-06 NOTE — PROGRESS NOTES
Problem: Falls - Risk of  Goal: *Absence of Falls  Description: Document Alberto Blight Fall Risk and appropriate interventions in the flowsheet. Outcome: Progressing Towards Goal  Note: Fall Risk Interventions:  Mobility Interventions: Bed/chair exit alarm,OT consult for ADLs,PT Consult for mobility concerns,PT Consult for assist device competence    Mentation Interventions: Bed/chair exit alarm,More frequent rounding,Increase mobility    Medication Interventions: Bed/chair exit alarm,Patient to call before getting OOB    Elimination Interventions: Bed/chair exit alarm,Call light in reach    History of Falls Interventions: Bed/chair exit alarm,Room close to nurse's station,Door open when patient unattended         Problem: Patient Education: Go to Patient Education Activity  Goal: Patient/Family Education  Outcome: Progressing Towards Goal     Problem: Pain  Goal: *Control of Pain  Outcome: Progressing Towards Goal     Problem: Patient Education: Go to Patient Education Activity  Goal: Patient/Family Education  Outcome: Progressing Towards Goal     Problem: Nausea/Vomiting (Adult)  Goal: *Absence of nausea/vomiting  Outcome: Progressing Towards Goal     Problem: Patient Education: Go to Patient Education Activity  Goal: Patient/Family Education  Outcome: Progressing Towards Goal     Problem: Pressure Injury - Risk of  Goal: *Prevention of pressure injury  Description: Document Asim Scale and appropriate interventions in the flowsheet.   Outcome: Progressing Towards Goal  Note: Pressure Injury Interventions:  Sensory Interventions: Assess changes in LOC,Float heels,Minimize linen layers    Moisture Interventions: Absorbent underpads,Minimize layers    Activity Interventions: Increase time out of bed,PT/OT evaluation    Mobility Interventions: HOB 30 degrees or less,Float heels,PT/OT evaluation    Nutrition Interventions: Document food/fluid/supplement intake,Offer support with meals,snacks and hydration    Friction and Shear Interventions: Apply protective barrier, creams and emollients,HOB 30 degrees or less,Lift sheet,Minimize layers                Problem: Patient Education: Go to Patient Education Activity  Goal: Patient/Family Education  Outcome: Progressing Towards Goal     Problem: Patient Education: Go to Patient Education Activity  Goal: Patient/Family Education  Outcome: Progressing Towards Goal     Problem: Patient Education: Go to Patient Education Activity  Goal: Patient/Family Education  Outcome: Progressing Towards Goal

## 2022-01-06 NOTE — PROGRESS NOTES
Progress Note    Patient: Sarbjit Rebollar MRN: [id number]  SSN: [id number]    YOB: 1945  Age: 78 y.o. Sex: female      Admit Date: 12/9/2021    LOS: 28 days     Subjective:   GI in consultation for Pancreatitis    01/06/22: Patient resting comfortably at this time. She denies abdominal pain. Lipase remains elevated but pain improving. She is currently not interested in Hospice, she states she is not ready to give up the \"fight. \" She is in agreement for transfer to another facility for surgical oncology consult for further evaluation and management of pancreatic head mass. Primary working on transfer to [facility]. No available beds at this time. Primary is  Broadening the search to other [facility] facilities that have oncology surgery service. Patient had a Dobhoff catheter placed edoscopically but patient inadvertently removed the tube. She underwent EGD for GJ tube placement but tube was unable to be placed due to concern for tumor in duodenum and inflammation. She is on a regular diet and states her appetite is improved. 12/30 EGD with Dobhoff placement for feeding  12/27/21 Paracentesis - 2300ml ascites fluid removed. 12/23/21 CT abdomen  1. There are increasing bilateral pleural effusions, increasing body wall  edema, and increasing ascites. These findings could be secondary to the third spacing of fluids. The differential diagnosis for the ascites would include pancreatic ascites with acute pancreatitis or metastatic disease to the peritoneum. 2.  There is a biliary stent which is unchanged in its position traversing throughout area of obstruction within the pancreatic head. 3.  There are multiple low-density lesions of the liver without short-term  interval changes consistent with metastatic disease. 4.  The right kidney is not identified and apparently the patient is status post a previous right nephrectomy.   5.  There are bilateral adrenal masses suspect for metastatic disease without short-term interval changes. 12/6/2021 EUS showing 2.7 X3 cm lesion in the area of head of pancreas with dilation of the Pancreatic duct abutting the portal vein but not involving the SMA or AMV ; Tumor T2 by endosonographic criteria ; one small lymph node in the area of head of pancreas. 11/22/2021 PET Scan    1. FDG avid lesion in the pancreatic head consistent with malignancy. 2.  Subcentimeter focus of FDG uptake in the liver, indeterminate. 3.  FDG uptake associated with density expanding the right facet at C5-C6, possibly due to an ectatic artery. Objective:     Vitals:    01/06/22 0001 01/06/22 0244 01/06/22 0751 01/06/22 1409   BP: 102/64 99/62 103/60 101/64   Pulse: (!) 106 67 61 71   Resp: 18 16 16 16   Temp: 98.3 °F (36.8 °C) 98.2 °F (36.8 °C) 98.3 °F (36.8 °C) 97.7 °F (36.5 °C)   SpO2: 98% 98% 98% 99%   Weight:       Height:            Intake and Output:  Current Shift: No intake/output data recorded. Last three shifts: 01/04 1901 - 01/06 0700  In: 1514.7 [P.O.:350; I.V.:130]  Out: 950 [Urine:950]    Physical Exam:   Skin:  Extremities and face reveal no rashes. No chapin erythema. HEENT: Sclerae anicteric. Extra-occular muscles are intact. No abnormal pigmentation of the lips. The neck is supple. Cardiovascular: Regular rate and rhythm. Respiratory:  Comfortable breathing with no accessory muscle use. GI:  Abdomen nondistended, soft, and nontender. Rectal:  Deferred  Musculoskeletal: Extremities have good range of motion. Neurological:  Gross memory appears intact. Patient is alert and oriented. Psychiatric:  Mood appears appropriate with judgement intact.   Lymphatic:  No visible adenopathy      Lab/Data Review:  Recent Results (from the past 24 hour(s))   LIPASE    Collection Time: 01/06/22  4:00 AM   Result Value Ref Range    Lipase 2,144 (H) 73 - 393 U/L              XR CHEST PORT   Final Result   Findings/impression:      Stable positioning of right upper extremity PICC, tip projects over the upper   SVC. Cardiac silhouette is enlarged. No pulmonary edema. Bilateral pleural effusions. No evidence of pneumothorax. No acute osseous abnormality identified. DUPLEX UPPER EXT VENOUS BILAT   Final Result      XR CHEST PORT   Final Result   Large effusions. Vascular congestion. CT ABD PELV W CONT   Final Result   1. There are increasing bilateral pleural effusions, increasing body wall   edema, and increasing ascites. These findings could be secondary to the third   spacing of fluids. The differential diagnosis for the ascites would include   pancreatic ascites with acute pancreatitis or metastatic disease to the   peritoneum. 2.  There is a biliary stent which is unchanged in its position traversing   throughout area of obstruction within the pancreatic head. 3.  There are multiple low-density lesions of the liver without short-term   interval changes consistent with metastatic disease. 4.  The right kidney is not identified and apparently the patient is status post   a previous right nephrectomy. 5.  There are bilateral adrenal masses suspect for metastatic disease without   short-term interval changes. CTA CHEST W OR W WO CONT   Final Result   No CT evidence for PE. Thoracic aorta atherosclerosis. CAD. Moderate to large bilateral pleural effusions with associated RML, RLL, and LLL   compressive atelectasis. Abdominal ascites. DUPLEX LOWER EXT VENOUS BILAT   Final Result      XR CHEST PORT   Final Result   FINDINGS: IMPRESSION: 2 frontal views of the chest. Right PICC distal tip SVC   region. Enlarging cardiomegaly. Increasing vascular congestion. Interval development of   hypoinflation, pleural effusions, lower lung airspace edema and/or pneumonia. No   pneumothorax. No free air under the diaphragm.       CT ABD PELV W CONT   Final Result   New moderate volume dependent pleural effusions with associated   lower lobe lung compressive atelectasis. Increasing ascites, moderate approaching large-volume   (fluid could be sampled if there is any clinical concern for bile content). High suspicion hepatic metastases. Similar appearance for the pancreas; Silastic stent in place. No pseudocyst formation. Unchanged appearance bilateral adrenal glands. No bowel obstruction. US ABD LTD   Final Result   Small amount of free fluid. Finding suggesting hemangioma in the   liver. Gallbladder wall thickening, adenomyomatosis, sludge and gallstones. Cholecystitis possible. Finding in the region of the pancreatic head as above. Other findings as above. CTA ABDOMEN PELV W CONT   Final Result   1. Ill-defined hypodense mass in the pancreas. There are inflammatory changes   and fluid adjacent to the pancreas or duodenum and stomach most likely related   to biopsy or focal pancreatitis. No pseudocyst formation, active bleeding or   other acute findings. 2. Enlarged adrenal glands left greater than right with noncontrast densities   consistent with adrenal adenomas. 3. Right nephrectomy. 4. Other findings as described. XR CHEST PORT   Final Result   No acute findings. IR PARACENTESIS ABD W IMAGE    (Results Pending)       Assessment:     Principal Problem:    Pancreatitis (12/9/2021)    Active Problems:    A-fib ([address] (11/16/2020)      CHF (congestive heart failure) ([address] (11/16/2020)      Hematemesis (12/12/2021)      History of peptic ulcer disease (12/12/2021)      Pancreatic mass (1/5/2022)        Plan:   1. Pancreatitis      -12/30 s/p post pyloric tube placement.  Patient removed tube on 12/31.         -Unable to place GJ tube due to  tumor in duodenum and food in the stomach and patient on Eliquis        -Surgical input appreciated      -Lipase 2,144 this am       -repeat Lipase in the am       -Dilaudid 1 mg every  4 hours as needed for pain      -Oxycodone 5 mg every 6 hours for breakthrough pain      - S/P paracentesis 12/27/21 with removal of 2300 ml clear serous  Fluid removed      -cytology Slight acute inflammation. -currently on Regular diet  2. CHF      -Continue Chlorthalidone  3. COPD       -Continue home medications       -Albuterol as needed   4. Pancreatic Mass      - > 4000      -S/p fine needle aspiration suspicious for neoplasia but not diagnostic      -When stable Oncology plan for out patient followup with advanced oncology surgeon  At [address] [place] [address] or [facility] for resection considerations       -CT concern for liver metastais/lesions       -IR for liver biopsy on hold at this time  5. Hematemesis (resolved)      -Monitor H&H      -Transfuse as needed      -Hgb 6.6  1/5/22       -No new labs at this time       - 2 Units PRBC's pending       -Continue Protonix 40 mg daily  6. Afib      -Rate is controlled      -Eliquis  on hold d/t low hgB      -no further hematemesis reported     Had another detailed discussion with the patient   Clinical diagnosis remains Pancreatic cancer with bile duct obstruction mass in the head of the pancreas elevated ca 19-9. Patient told me she had two soft boiled eggs and tolerated it   She has expressed the desire to me that she wants to see a surgeon for resectability of the tumor which seems unlikely. Oncology wants pathologic specimen to conclusively say carcinoma if they are to consider Chemotherpy. EUS biospy has already been done once with results suspicious for malignancy. Perhaps a mini lap with direct biopsy of the pancreas and a Jejunal feeding tube may provide the final answer to oncology and help with feeding. She has pulled out the Dobbhoff tube that was placed However her eating of some food since yesterday had been encouraging. PLAN for tumor board this wednessday      Thank you for allowing me to participate in this patients care.    Plan discussed with Dr. Nafisa Snowden and he approves.     Signed By: Rina Vega NP     January 6, 2022

## 2022-01-06 NOTE — PROGRESS NOTES
Progress Note    Patient: Anjel Prieto MRN: [id number]  SSN: [id number]    YOB: 1945  Age: 78 y.o. Sex: female      Admit Date: 12/9/2021    LOS: 28 days     Subjective:   Patient followed for severe pancreatitis on Meropenem because of worsening leukocytosis. WBC now normal and Meropenem was discontinued. Remains on Anidulafungin for suspected Candida intraabdominal infection. Suspected pancreatic cancer but no firm diagnosis yet. Unsuccessful attempt at placing PEJ tube due to tumor infiltration. Patient is asleep at this time but appears to be resting comfortably. Objective:     Vitals:    01/06/22 0001 01/06/22 0244 01/06/22 0751 01/06/22 1409   BP: 102/64 99/62 103/60 101/64   Pulse: (!) 106 67 61 71   Resp: 18 16 16 16   Temp: 98.3 °F (36.8 °C) 98.2 °F (36.8 °C) 98.3 °F (36.8 °C) 97.7 °F (36.5 °C)   SpO2: 98% 98% 98% 99%   Weight:       Height:            Intake and Output:  Current Shift: No intake/output data recorded. Last three shifts: 01/04 1901 - 01/06 0700  In: 1514.7 [P.O.:350; I.V.:130]  Out: 950 [Urine:950]    Physical Exam:     Vitals and nursing note reviewed. Patient not re-examined today  Constitutional:       General: She is not in acute distress. Appearance: She is obese. She is ill-appearing. HENT: unremarkable  Abdominal:  Mild to moderate pain, no rebound  Genitourinary:  Vaginal area not examined     Comments: External urinary device  Musculoskeletal:      Right lower leg: No edema. Left lower leg: No edema. Comments: PICC line right upper arm   Skin: multiple ecchymoses on the upper extremities, edema left forearm     Findings: No rash.       Comments: ?Stage 2 sacral wound   Neurological: nonfocal, no confusion   Psychiatric:  normal behavior, judgement normal    Lab/Data Review:     WBC 5,700  Urinalysis with WBC 10-20, Bacteria negative  Lipase 2,144 <1,632 <2,756 <2,813 <1,499 <2,511 <2,802 <3,000 <2,484 <2,684 < 1,397 <1,719    Procal 0.54 < 0.48 <0.32 <0.34 <0.35 <0.34 < 0.32 <0.21 <0.19 <0.25 < 0.14  CRP 7.38 < 8.54 <11.0 <8.98 <11.10 < 12.10 <23.40 <18.30 <14.20 <12.70 <15.00 < 23.80    Serum fungitell <31 Negative    Ascitic fluid   with 41% PMNs    Blood cultures (12/20) No growth FINAL  Urine culture (12/20) No growth FINAL  Ascitic fluid culture(12/27) No growth FINAL    Assessment:     Principal Problem:    Pancreatitis (12/9/2021)    Active Problems:    A-fib ([facility] (11/16/2020)      CHF (congestive heart failure) ([facility] (11/16/2020)      Hematemesis (12/12/2021)      History of peptic ulcer disease (12/12/2021)      Pancreatic mass (1/5/2022)    1. Severe pancreatitis with markedly elevated lipase, resolving  2. Pancreatic mass, possible neoplasm  3. Biliary stent  4. Sepsis with worsening leukocytosis with elevated procal and CRP, resolving, status post 14 days of Meropenem, Day #12 IV Anidulafungin (empiric)  5. TPN (just started)  6. Moderate pyuria, negative bacteria, urine culture negative  7. Possible candida superinfection with vaginitis, treated  8. Ascites, status post paracentesis    Comment:    WBC normal with decreasing procal and CRP. Lipase increased today. Plan:   1. Continue Anidulafungin for 2 more days  2.  In am,  repeat CRP and procal     Signed By: Zuhair Juarez MD     January 6, 2022

## 2022-01-06 NOTE — PROGRESS NOTES
CM reviewed clinical record. Patient has been accepted by [facility] [place]. Patient has been requesting to transfer to another facility for a second opinion. CM awaiting the outcome. CM will cont to monitor patients progress and discharge dispo.     Himanshu Maunabo

## 2022-01-07 NOTE — PROGRESS NOTES
Problem: Falls - Risk of  Goal: *Absence of Falls  Description: Document Dennie Oak Fall Risk and appropriate interventions in the flowsheet.   Outcome: Progressing Towards Goal  Note: Fall Risk Interventions:  Mobility Interventions: Bed/chair exit alarm,OT consult for ADLs,Patient to call before getting OOB,PT Consult for mobility concerns    Mentation Interventions: Bed/chair exit alarm,Adequate sleep, hydration, pain control    Medication Interventions: Bed/chair exit alarm,Patient to call before getting OOB,Teach patient to arise slowly    Elimination Interventions: Bed/chair exit alarm,Call light in reach    History of Falls Interventions: Bed/chair exit alarm         Problem: Pain  Goal: *Control of Pain  Outcome: Progressing Towards Goal

## 2022-01-07 NOTE — PROGRESS NOTES
Subjective   Subjective:      HPI :Pt awake and alert. Reports feeling weak and tired. Today she states she does not like food given here. She is taking liquid diet. Hb is 6.6gm/dl . eliquis is held now.      .    Objective     Current Facility-Administered Medications:     0.9% sodium chloride infusion 250 mL, 250 mL, IntraVENous, PRN, Belgica Hong MD    influenza vaccine 2021-22 (6 mos+)(PF) (FLUARIX/FLULAVAL/FLUZONE QUAD) injection 0.5 mL, 1 Each, IntraMUSCular, PRIOR TO DISCHARGE, Soco Shoemaker NP    0.9% sodium chloride infusion 250 mL, 250 mL, IntraVENous, PRN, Soco Shoemaker NP  [facility]  [Held by provider] [facility]) tablet 2.5 mg, 2.5 mg, Oral, BID, Soco Shoemaker NP    ferrous sulfate tablet 325 mg, 1 Tablet, Oral, DAILY WITH BREAKFAST, Soco Shoemaker NP, 325 mg at 01/05/22 1032    0.9% sodium chloride infusion 250 mL, 250 mL, IntraVENous, PRN, Soco Shoemaker NP    midodrine (PROAMATINE) tablet 10 mg, 10 mg, Oral, TID WITH MEALS, Soco Shoemaker NP, 10 mg at 01/06/22 1200    0.9% sodium chloride infusion 250 mL, 250 mL, IntraVENous, PRN, Adonay Champagne PA-C    sodium chloride (NS) flush 5-40 mL, 5-40 mL, IntraVENous, PRN, Asiya Hillman NP, 10 mL at 01/06/22 2022    furosemide (LASIX) injection 40 mg, 40 mg, IntraVENous, DAILY, Adonay Champagne PA-C, 40 mg at 01/06/22 1006    anidulafungin (ERAXIS) 100 mg in 0.9% sodium chloride 130 mL IVPB, 100 mg, IntraVENous, Q24H, Gloria Shelby MD, Last Rate: 83 mL/hr at 01/06/22 1749, 100 mg at 01/06/22 1749    zinc oxide-white petrolatum 17-57 % topical paste, , Topical, TID, Ty Brannon PA-C, Given at 01/06/22 2100    cyclobenzaprine (FLEXERIL) tablet 10 mg, 10 mg, Oral, TID PRN, Adonay Champagne PA-C, 10 mg at 12/30/21 0055    HYDROmorphone (DILAUDID) injection 1 mg, 1 mg, IntraVENous, Q4H PRN, Isaak ALMEIDA NP, 1 mg at 01/06/22 2022    oxyCODONE IR (ROXICODONE) tablet 5 mg, 5 mg, Oral, Q6H PRN, Ainsley Paget, NP, 5 mg at 01/03/22 2115    nystatin (MYCOSTATIN) 100,000 unit/gram cream, , Topical, BID, Vitor Aguirre MD, Given at 01/06/22 2023    docusate sodium (COLACE) capsule 100 mg, 100 mg, Oral, BID, Salinas Grimaldo NP, 100 mg at 01/06/22 2022    polyethylene glycol (MIRALAX) packet 17 g, 17 g, Oral, DAILY, Salinas Grimaldo, NP, 17 g at 01/06/22 1007    sorbitoL 70 % solution 30 mL, 30 mL, Oral, DAILY PRN, Salinas Grimaldo NP    bisacodyL (DULCOLAX) suppository 10 mg, 10 mg, Rectal, DAILY PRN, Ainsley Paget, NP    hydrALAZINE (APRESOLINE) 20 mg/mL injection 20 mg, 20 mg, IntraVENous, Q6H PRN, Huyen Crane PA    pantoprazole (PROTONIX) 40 mg in 0.9% sodium chloride 10 mL injection, 40 mg, IntraVENous, DAILY, Huyen Crane PA, 40 mg at 01/06/22 1218    guaiFENesin ER (MUCINEX) tablet 600 mg, 600 mg, Oral, Q12H, Huyen Crane PA, 600 mg at 01/06/22 2022    albuterol-ipratropium (DUO-NEB) 2.5 MG-0.5 MG/3 ML, 3 mL, Nebulization, Q6H PRN, Huyen Crane PA, 3 mL at 01/06/22 2119    chlorthalidone (HYGROTON) tablet 25 mg, 25 mg, Oral, DAILY, Huyen Crane PA, 25 mg at 01/06/22 1006    albuterol (PROVENTIL HFA, VENTOLIN HFA, PROAIR HFA) inhaler 2 Puff, 2 Puff, Inhalation, Q6H PRN, Nolberto Farah, NP, 2 Puff at 12/29/21 1404    ascorbic acid (vitamin C) (VITAMIN C) tablet 500 mg, 500 mg, Oral, DAILY, Nolberto Farah, NP, 500 mg at 01/04/22 0940    atorvastatin (LIPITOR) tablet 40 mg, 40 mg, Oral, DAILY, Zarefoss, Jan A, NP, 40 mg at 01/04/22 0940    cholecalciferol (VITAMIN D3) (1000 Units /25 mcg) tablet 1,000 Units, 1,000 Units, Oral, DAILY, Zarefoss Jan A, NP, 1,000 Units at 01/06/22 1006    diazePAM (VALIUM) tablet 5 mg, 5 mg, Oral, Q6H PRN, Zarefoss Jan A, NP, 5 mg at 12/28/21 1810    dilTIAZem ER (CARDIZEM CD) capsule 120 mg, 120 mg, Oral, DAILY, Zarefoss Jan A, NP, 120 mg at 01/06/22 1006    potassium chloride SR (KLOR-CON 10) tablet 20 mEq, 20 mEq, Oral, DAILY, Zarefoss, Jan A, NP, 20 mEq at 01/06/22 1006    sertraline (ZOLOFT) tablet 25 mg, 25 mg, Oral, DAILY, Zarefoss, Jan A, NP, 25 mg at 01/06/22 1006    traZODone (DESYREL) tablet 50 mg, 50 mg, Oral, QHS, Zarefoss, Jan A, NP, 50 mg at 01/06/22 2022    ondansetron (ZOFRAN) injection 4 mg, 4 mg, IntraVENous, Q6H PRN, Zarefoss, Jan A, NP, 4 mg at 01/06/22 1006    acetaminophen (TYLENOL) tablet 650 mg, 650 mg, Oral, Q4H PRN, Zarefoss, Jan A, NP, 650 mg at 01/02/22 0024    prochlorperazine (COMPAZINE) with saline injection 10 mg, 10 mg, IntraVENous, Q6H PRN, Zarefoss, Jan A, NP, 10 mg at 12/31/21 1824    Objective:     Visit Vitals  /60 (BP 1 Location: Left upper arm)   Pulse (!) 101   Temp 98.8 °F (37.1 °C)   Resp 18   Ht 5' 7\" (1.702 m)   Wt 97.4 kg (214 lb 11.7 oz)   SpO2 99%   BMI 33.63 kg/m²      Physical Exam   Pt alert and oriented  LUNGS:CTA  Heart:RRR  Abdomen:Ascites +,NT,BS +  EXT:Edema of legs and BUE.right arm swollen more with PICC line than left  @Objective    [email or web address]     Data Review:   Recent Results (from the past 24 hour(s))   PROCALCITONIN    Collection Time: 01/06/22  4:00 AM   Result Value Ref Range    Procalcitonin 0.54 (H) 0 ng/mL   LIPASE    Collection Time: 01/06/22  4:00 AM   Result Value Ref Range    Lipase 2,144 (H) 73 - 393 U/L   CBC WITH AUTOMATED DIFF    Collection Time: 01/06/22 12:30 PM   Result Value Ref Range    WBC 5.7 3.6 - 11.0 K/uL    RBC 2.71 (L) 3.80 - 5.20 M/uL    HGB 8.1 (L) 11.5 - 16.0 g/dL    HCT 25.4 (L) 35.0 - 47.0 %    MCV 93.7 80.0 - 99.0 FL    MCH 29.9 26.0 - 34.0 PG    MCHC 31.9 30.0 - 36.5 g/dL    RDW 15.2 (H) 11.5 - 14.5 %    PLATELET 781 883 - 342 K/uL    MPV 11.2 8.9 - 12.9 FL    NRBC 0.0 0.0  WBC    ABSOLUTE NRBC 0.00 0.00 - 0.01 K/uL    NEUTROPHILS 71 32 - 75 %    LYMPHOCYTES 19 12 - 49 %    MONOCYTES 7 5 - 13 %    EOSINOPHILS 1 0 - 7 %    BASOPHILS 1 0 - 1 %    IMMATURE GRANULOCYTES 1 (H) 0 - 0.5 %    ABS.  NEUTROPHILS 4.1 1.8 - 8.0 K/UL    ABS. LYMPHOCYTES 1.1 0.8 - 3.5 K/UL    ABS. MONOCYTES 0.4 0.0 - 1.0 K/UL    ABS. EOSINOPHILS 0.1 0.0 - 0.4 K/UL    ABS. BASOPHILS 0.0 0.0 - 0.1 K/UL    ABS. IMM. GRANS. 0.0 0.00 - 0.04 K/UL    DF AUTOMATED     METABOLIC PANEL, BASIC    Collection Time: 01/06/22 12:30 PM   Result Value Ref Range    Sodium 136 136 - 145 mmol/L    Potassium 3.4 (L) 3.5 - 5.1 mmol/L    Chloride 95 (L) 97 - 108 mmol/L    CO2 38 (H) 21 - 32 mmol/L    Anion gap 3 (L) 5 - 15 mmol/L    Glucose 108 (H) 65 - 100 mg/dL    BUN 31 (H) 6 - 20 mg/dL    Creatinine 0.76 0.55 - 1.02 mg/dL    BUN/Creatinine ratio 41 (H) 12 - 20      GFR est AA >60 >60 ml/min/1.73m2    GFR est non-AA >60 >60 ml/min/1.73m2    Calcium 8.3 (L) 8.5 - 10.1 mg/dL       Assessment   Assessment/Plan:     1) Possible pancreatic ca with liver metastases. Pancreatci mass FNA suspicious for malignant cells but not diagnostic. -.Ca 19-9 very high more than 22,000. -Repeat endoscopy , showed tumor infiltrating into the Duodenum now. Biopsy however was not done due to concerns for bleeding as she was on Eliquis. However clinical picture is highly consistent with metastatic pancreatic cancer. Discussed this with patient in detail. Her performance status currently is not good to recommend any chemotherapy, I recommended hospice care and comfort measures at home. We discussed overall prognosis associated with metastatic pancreatic cancer with and without treatments.  -At this time she does not want to pursue any hospice.  -She feels she want to eat better and get stronger.  -She also want to get a second opinion from surgical oncologist about resectability of his part of her pancreatic cancer. Explained to her that the disease has metastasized to the liver and surgery will not be beneficial but she has requested transfer to [facility] for surgical oncology evaluation.     She might benefit from a J-tube placement for nourishment as she desires to pursue treatments    2) If pt general condition and infection better consider CT guided liver biopsy, prior to any chemotherapy treatment options. 3) Renal cell CA with lung nodules. S/p right nephrectomy.    4) Normocytic hypochromic anemia:hb coming down. Received prbc transfusion last night. Hb is 6,6  today. Tansfuse 1 unit of packed red blood cells  R/o Gi bleeding. eliquis held.      Pancreatitis: ? Post procedure,       Ascites:S/p paracentesis. Cytology negative for malignant cells. Superficial vein thrombosis LUE:pt on eliquis. Agree with lowering dose to 2.5mg po bid due to her anemia and concerns for bleeding. IF hb drops again , need to d/c the anticoagulation.      BL pleural effusions

## 2022-01-07 NOTE — PROGRESS NOTES
PT tx attempted at 1115am however per nsg pt currently in afib with RVR, advised to hold at this time. Will continue to follow patient and attempt PT at a later time. Thank you.

## 2022-01-07 NOTE — PROGRESS NOTES
Subjective   Subjective:      HPI :Pt awake and alert. Reports feeling weak and tired. Her repeat hemoglobin came up to 7.2 today she did not receive the transfusion. Denies any allie bleeding. Today she is complaining that she is bringing up thick phlegm sometimes had trouble bringing it up. She is not short of breath. She is overall deconditioned.    .    Objective     Current Facility-Administered Medications:     dilTIAZem (CARDIZEM) injection 5 mg, 5 mg, IntraVENous, ONCE, Dane Hernández NP    0.9% sodium chloride infusion 250 mL, 250 mL, IntraVENous, PRN, Farooq Hong MD    influenza vaccine 2021-22 (6 mos+)(PF) (FLUARIX/FLULAVAL/FLUZONE QUAD) injection 0.5 mL, 1 Each, IntraMUSCular, PRIOR TO DISCHARGE, Dane Bjornstad, NP    0.9% sodium chloride infusion 250 mL, 250 mL, IntraVENous, PRN, Dane Hernández NP  [facility]  [Held by provider] apixaban Kaitlynn Jean) tablet 2.5 mg, 2.5 mg, Oral, BID, Dane Hernández NP    ferrous sulfate tablet 325 mg, 1 Tablet, Oral, DAILY WITH BREAKFAST, Dane Hernández NP, 325 mg at 01/05/22 1032    0.9% sodium chloride infusion 250 mL, 250 mL, IntraVENous, PRN, Dane Hernández NP    midodrine (PROAMATINE) tablet 10 mg, 10 mg, Oral, TID WITH MEALS, Dane Hernández NP, 10 mg at 01/06/22 1200    0.9% sodium chloride infusion 250 mL, 250 mL, IntraVENous, PRN, Isaac Irizarry PA-C    sodium chloride (NS) flush 5-40 mL, 5-40 mL, IntraVENous, PRN, Asiya Hillman NP, 10 mL at 01/06/22 2022    furosemide (LASIX) injection 40 mg, 40 mg, IntraVENous, DAILY, Isaac Irizarry PA-C, 40 mg at 01/06/22 1006    anidulafungin (ERAXIS) 100 mg in 0.9% sodium chloride 130 mL IVPB, 100 mg, IntraVENous, Q24H, Gennaro Chen MD, Last Rate: 83 mL/hr at 01/06/22 1749, 100 mg at 01/06/22 1749    zinc oxide-white petrolatum 17-57 % topical paste, , Topical, TID, Parker Brannon PA-C, Given at 01/07/22 0900    cyclobenzaprine (FLEXERIL) tablet 10 mg, 10 mg, Oral, TID PRN, Dori Lopez PA-C, 10 mg at 12/30/21 0055    HYDROmorphone (DILAUDID) injection 1 mg, 1 mg, IntraVENous, Q4H PRN, Julianna Tran NP, 1 mg at 01/07/22 0207    oxyCODONE IR (ROXICODONE) tablet 5 mg, 5 mg, Oral, Q6H PRN, Julianna Tran NP, 5 mg at 01/03/22 2115    nystatin (MYCOSTATIN) 100,000 unit/gram cream, , Topical, BID, Gumaro Schwartz MD, Given at 01/06/22 2023    docusate sodium (COLACE) capsule 100 mg, 100 mg, Oral, BID, Bairon Ocampo NP, 100 mg at 01/06/22 2022    polyethylene glycol (MIRALAX) packet 17 g, 17 g, Oral, DAILY, Bairon Ocampo NP, 17 g at 01/06/22 1007    sorbitoL 70 % solution 30 mL, 30 mL, Oral, DAILY PRN, Bairon Ocampo NP    bisacodyL (DULCOLAX) suppository 10 mg, 10 mg, Rectal, DAILY PRN, Julianna Tran NP    hydrALAZINE (APRESOLINE) 20 mg/mL injection 20 mg, 20 mg, IntraVENous, Q6H PRN, Huyen Crane PA    pantoprazole (PROTONIX) 40 mg in 0.9% sodium chloride 10 mL injection, 40 mg, IntraVENous, DAILY, Huyen Crane PA, 40 mg at 01/06/22 1218    guaiFENesin ER (MUCINEX) tablet 600 mg, 600 mg, Oral, Q12H, Huyen Crane PA, 600 mg at 01/06/22 2022    albuterol-ipratropium (DUO-NEB) 2.5 MG-0.5 MG/3 ML, 3 mL, Nebulization, Q6H PRN, Huyen Crane PA, 3 mL at 01/07/22 0942    chlorthalidone (HYGROTON) tablet 25 mg, 25 mg, Oral, DAILY, Huyen Crane PA, 25 mg at 01/06/22 1006    albuterol (PROVENTIL HFA, VENTOLIN HFA, PROAIR HFA) inhaler 2 Puff, 2 Puff, Inhalation, Q6H PRN, Nolberto Farah NP, 2 Puff at 12/29/21 1404    ascorbic acid (vitamin C) (VITAMIN C) tablet 500 mg, 500 mg, Oral, DAILY, Nolberto Farah, CONRADO, 500 mg at 01/04/22 0940    atorvastatin (LIPITOR) tablet 40 mg, 40 mg, Oral, DAILY, Nolberto Farah, CONRADO, 40 mg at 01/04/22 0940    cholecalciferol (VITAMIN D3) (1000 Units /25 mcg) tablet 1,000 Units, 1,000 Units, Oral, DAILY, Nolberto Farah NP, 1,000 Units at 01/06/22 1006    diazePAM (VALIUM) tablet 5 mg, 5 mg, Oral, Q6H PRN, Zarefoss, Nolberto A, NP, 5 mg at 01/07/22 1155    dilTIAZem ER (CARDIZEM CD) capsule 120 mg, 120 mg, Oral, DAILY, Zarefoss, Jan A, NP, 120 mg at 01/07/22 1157    potassium chloride SR (KLOR-CON 10) tablet 20 mEq, 20 mEq, Oral, DAILY, Zarefoss, Jan A, NP, 20 mEq at 01/06/22 1006    sertraline (ZOLOFT) tablet 25 mg, 25 mg, Oral, DAILY, Zarefoss, Nolberto A, NP, 25 mg at 01/06/22 1006    traZODone (DESYREL) tablet 50 mg, 50 mg, Oral, QHS, Zarefoss, Jan A, NP, 50 mg at 01/06/22 2022    ondansetron (ZOFRAN) injection 4 mg, 4 mg, IntraVENous, Q6H PRN, Zarefoss, Jan A, NP, 4 mg at 01/06/22 1006    acetaminophen (TYLENOL) tablet 650 mg, 650 mg, Oral, Q4H PRN, Zarefoss, Jan A, NP, 650 mg at 01/02/22 0024    prochlorperazine (COMPAZINE) with saline injection 10 mg, 10 mg, IntraVENous, Q6H PRN, Zarefoss, Jan A, NP, 10 mg at 12/31/21 1824    Objective:     Visit Vitals  /61 (BP 1 Location: Left lower arm)   Pulse 78   Temp 98.3 °F (36.8 °C)   Resp 19   Ht 5' 7\" (1.702 m)   Wt 97.4 kg (214 lb 11.7 oz)   SpO2 95%   BMI 33.63 kg/m²      Physical Exam   Pt alert and oriented  LUNGS:CTA  Heart:RRR  Abdomen:Ascites +,NT,BS +  EXT: Edema of the legs is improving.              Data Review:   Recent Results (from the past 24 hour(s))   D DIMER    Collection Time: 01/07/22  6:20 AM   Result Value Ref Range    D DIMER 8.47 (H) <0.50 ug/ml(FEU)   PROTHROMBIN TIME + INR    Collection Time: 01/07/22  6:20 AM   Result Value Ref Range    Prothrombin time 14.6 11.9 - 14.7 sec    INR 1.2 (H) 0.9 - 1.1     CBC WITH AUTOMATED DIFF    Collection Time: 01/07/22  6:20 AM   Result Value Ref Range    WBC 5.6 3.6 - 11.0 K/uL    RBC 2.46 (L) 3.80 - 5.20 M/uL    HGB 7.2 (L) 11.5 - 16.0 g/dL    HCT 23.5 (L) 35.0 - 47.0 %    MCV 95.5 80.0 - 99.0 FL    MCH 29.3 26.0 - 34.0 PG    MCHC 30.6 30.0 - 36.5 g/dL    RDW 15.2 (H) 11.5 - 14.5 %    PLATELET 961 973 - 349 K/uL    MPV 11.5 8.9 - 12.9 FL    NRBC 0.0 0.0 PER 100 WBC    ABSOLUTE NRBC 0.00 0.00 - 0.01 K/uL    NEUTROPHILS 75 32 - 75 %    LYMPHOCYTES 16 12 - 49 %    MONOCYTES 6 5 - 13 %    EOSINOPHILS 1 0 - 7 %    BASOPHILS 1 0 - 1 %    IMMATURE GRANULOCYTES 1 (H) 0 - 0.5 %    ABS. NEUTROPHILS 4.2 1.8 - 8.0 K/UL    ABS. LYMPHOCYTES 0.9 0.8 - 3.5 K/UL    ABS. MONOCYTES 0.3 0.0 - 1.0 K/UL    ABS. EOSINOPHILS 0.1 0.0 - 0.4 K/UL    ABS. BASOPHILS 0.0 0.0 - 0.1 K/UL    ABS. IMM. GRANS. 0.1 (H) 0.00 - 0.04 K/UL    DF AUTOMATED     METABOLIC PANEL, BASIC    Collection Time: 01/07/22  6:20 AM   Result Value Ref Range    Sodium 137 136 - 145 mmol/L    Potassium 3.9 3.5 - 5.1 mmol/L    Chloride 98 97 - 108 mmol/L    CO2 37 (H) 21 - 32 mmol/L    Anion gap 2 (L) 5 - 15 mmol/L    Glucose 116 (H) 65 - 100 mg/dL    BUN 32 (H) 6 - 20 mg/dL    Creatinine 0.76 0.55 - 1.02 mg/dL    BUN/Creatinine ratio 42 (H) 12 - 20      GFR est AA >60 >60 ml/min/1.73m2    GFR est non-AA >60 >60 ml/min/1.73m2    Calcium 8.3 (L) 8.5 - 10.1 mg/dL   LIPASE    Collection Time: 01/07/22  6:20 AM   Result Value Ref Range    Lipase 2,350 (H) 73 - 393 U/L   C REACTIVE PROTEIN, QT    Collection Time: 01/07/22  6:20 AM   Result Value Ref Range    C-Reactive protein 5.07 (H) 0.00 - 0.60 mg/dL   PROCALCITONIN    Collection Time: 01/07/22  6:20 AM   Result Value Ref Range    Procalcitonin 3.87 (H) 0 ng/mL       Assessment   Assessment/Plan:     1) Possible pancreatic ca with liver metastases. Pancreatci mass FNA suspicious for malignant cells but not diagnostic. -.Ca 19-9 very high more than 22,000. -Repeat endoscopy , showed tumor infiltrating into the Duodenum now. Biopsy however was not done due to concerns for bleeding as she was on Eliquis. However clinical picture is highly consistent with metastatic pancreatic cancer. Discussed this with patient in detail. Her performance status currently is not good to recommend any chemotherapy, I recommended hospice care and comfort measures at home.   We discussed overall prognosis associated with metastatic pancreatic cancer with and without treatments.  -At this time she does not want to pursue any hospice.  -She feels she want to eat better and get stronger.  -She also want to get a second opinion from surgical oncologist about resectability of his part of her pancreatic cancer. Explained to her that the disease has metastasized to the liver and surgery will not be beneficial but she has requested transfer to [facility] for surgical oncology evaluation. She might benefit from a J-tube placement for nourishment as she desires to pursue treatments    2) If pt general condition and infection better consider CT guided liver biopsy, prior to any chemotherapy treatment options. 3) Renal cell CA with lung nodules. S/p right nephrectomy.    4) Normocytic hypochromic anemia:hb coming down. Received prbc transfusion last night. Hb is 6,6  today. Tansfuse 1 unit of packed red blood cells  R/o Gi bleeding. eliquis held.      Pancreatitis: ? Post procedure,       Ascites:S/p paracentesis. Cytology negative for malignant cells. Superficial vein thrombosis LUE: Eliquis held now due to recurrent anemia. BL pleural effusions. We will sign off. Please call for any questions. Patient can follow-up with  on discharge in 1 week.

## 2022-01-07 NOTE — PROGRESS NOTES
Hospitalist Progress Note    Subjective:   Daily Progress Note: 1/7/2022 12:21 PM    Hospital Course: The patient is a 63-year-old woman with a PMH significant for atrial fibrillation, PUD, CHF, COPD on home O2 at 2 L, renal cell carcinoma status post nephrectomy, hypertension and morbid obesity. Patient was found to have obstructive jaundice and biliary stricture in early November of this year.  She underwent an ERCP with stent placement with brushings of the bile duct where there was noted to be a stricture.  These brushings were negative.  Patient then underwent a PET CT which demonstrated uptake in the head of the pancreas concerning for pancreatic mass. She subsequently underwent endoscopic ultrasound with ultrasound and FNA biopsy on 12/6/2021.  The procedure demonstrated abutment of the tumor with the portal vein without involvement of the SMA or SMV.  The total tumor was measured to be 2.7 cm by 3 cm.  This biopsy resulted as atypical cells with suspicion for malignancy. She admitted to the hospital on 2/9/2021 and severe pancreatitis after undergoing an EUS for pancreatic biopsy on 12/6/2021 and pancreatic mass. Her symptoms were abdominal pain, nausea and vomiting. CT scan revealing peripancreatic inflammation consistent with likely post biopsy pancreatitis and ascites. Her initial labs revealing elevated lipase, supratherapeutic INR and elevated D-dimer coagulopathy. CTA was positive for bilateral pleural effusions, negative for PE. Paracentesis with culture of ascitic fluid, blood cultures, urine cultures negative for growth. Patient had been started on IV meropenem for leukocytosis and anidulafungin for suspected intra-abdominal infection. CT with and without contrast revealing liver lesions consistent with metastasis, mass in the pancreatic head with biliary stent in place, bilateral pleural effusions, ascites and bilateral adrenal masses.   IR declined liver biopsy due to coagulopathy and felt she would not benefit. Doppler studies of upper and lower extremities negative for DVT, positive for superficial thrombophlebitis of left medial antecubital.  Pancreatic cancer is presumed but no firm diagnosis due to lack of tissue sample. Pancreatitis remaining persistent despite antibiotics. Attempted Dobbhoff and PEG J placement but failed due to reddened area presumed possible tumor infiltration in the duodenum. Due to poor performance and prognosis she is not a candidate for palliative XRT or chemo. The patient has been progressively worsening with debilitation, intermittently tolerating diet not stable to be discharged home with outpatient surgical oncology follow-up. She would benefit from transfer to tertiary care center where surgical oncology services are available for evaluation of pancreatic head mass and possible surgical intervention. 1/7 A. fib with RVR rate 1 10-1 40. Will consult cardiology and discuss anticoagulation being discontinued due to bleeding and liver failure. Subjective: Follow-up examination of patient at the bedside. Updated her on transfer status. All facilities including [facility] and [place] [facility] of been on diversion not excepting transfers or wait listing. Episode of A. fib with RVR and associated anxiety with shortness of breath. She had refused her morning medications. Cardiology consulted.     Current Facility-Administered Medications   Medication Dose Route Frequency    dilTIAZem (CARDIZEM) injection 5 mg  5 mg IntraVENous ONCE    [START ON 1/8/2022] furosemide (LASIX) tablet 40 mg  40 mg Oral DAILY    0.9% sodium chloride infusion 250 mL  250 mL IntraVENous PRN    influenza vaccine 2021-22 (6 mos+)(PF) (FLUARIX/FLULAVAL/FLUZONE QUAD) injection 0.5 mL  1 Each IntraMUSCular PRIOR TO DISCHARGE    0.9% sodium chloride infusion 250 mL  250 mL IntraVENous PRN    [Held by provider] apixaban (ELIQUIS) tablet 2.5 mg  2.5 mg Oral BID    ferrous sulfate tablet 325 mg  1 Tablet Oral DAILY WITH BREAKFAST    0.9% sodium chloride infusion 250 mL  250 mL IntraVENous PRN    midodrine (PROAMATINE) tablet 10 mg  10 mg Oral TID WITH MEALS    0.9% sodium chloride infusion 250 mL  250 mL IntraVENous PRN    sodium chloride (NS) flush 5-40 mL  5-40 mL IntraVENous PRN    anidulafungin (ERAXIS) 100 mg in 0.9% sodium chloride 130 mL IVPB  100 mg IntraVENous Q24H    zinc oxide-white petrolatum 17-57 % topical paste   Topical TID    cyclobenzaprine (FLEXERIL) tablet 10 mg  10 mg Oral TID PRN    HYDROmorphone (DILAUDID) injection 1 mg  1 mg IntraVENous Q4H PRN    oxyCODONE IR (ROXICODONE) tablet 5 mg  5 mg Oral Q6H PRN    nystatin (MYCOSTATIN) 100,000 unit/gram cream   Topical BID    docusate sodium (COLACE) capsule 100 mg  100 mg Oral BID    polyethylene glycol (MIRALAX) packet 17 g  17 g Oral DAILY    sorbitoL 70 % solution 30 mL  30 mL Oral DAILY PRN    bisacodyL (DULCOLAX) suppository 10 mg  10 mg Rectal DAILY PRN    hydrALAZINE (APRESOLINE) 20 mg/mL injection 20 mg  20 mg IntraVENous Q6H PRN    pantoprazole (PROTONIX) 40 mg in 0.9% sodium chloride 10 mL injection  40 mg IntraVENous DAILY    guaiFENesin ER (MUCINEX) tablet 600 mg  600 mg Oral Q12H    albuterol-ipratropium (DUO-NEB) 2.5 MG-0.5 MG/3 ML  3 mL Nebulization Q6H PRN    chlorthalidone (HYGROTON) tablet 25 mg  25 mg Oral DAILY    albuterol (PROVENTIL HFA, VENTOLIN HFA, PROAIR HFA) inhaler 2 Puff  2 Puff Inhalation Q6H PRN    ascorbic acid (vitamin C) (VITAMIN C) tablet 500 mg  500 mg Oral DAILY    atorvastatin (LIPITOR) tablet 40 mg  40 mg Oral DAILY    cholecalciferol (VITAMIN D3) (1000 Units /25 mcg) tablet 1,000 Units  1,000 Units Oral DAILY    diazePAM (VALIUM) tablet 5 mg  5 mg Oral Q6H PRN    dilTIAZem ER (CARDIZEM CD) capsule 120 mg  120 mg Oral DAILY    potassium chloride SR (KLOR-CON 10) tablet 20 mEq  20 mEq Oral DAILY    sertraline (ZOLOFT) tablet 25 mg  25 mg Oral DAILY    traZODone (DESYREL) tablet 50 mg  50 mg Oral QHS    ondansetron (ZOFRAN) injection 4 mg  4 mg IntraVENous Q6H PRN    acetaminophen (TYLENOL) tablet 650 mg  650 mg Oral Q4H PRN    prochlorperazine (COMPAZINE) with saline injection 10 mg  10 mg IntraVENous Q6H PRN        Review of Systems  Constitutional: Positive for malaise/fatigue. Negative for fever. HENT: Negative. Respiratory: Positive for shortness of breath. Negative for cough. Cardiovascular: Negative for chest pain and leg swelling. Gastrointestinal: Positive for abdominal pain. Negative for nausea and vomiting. Genitourinary: No frequency, No dysuria, No hematuria  Integument/breast: No skin lesion(s)   Neurological: No Confusion, No headaches, No dizziness      Objective:     Visit Vitals  /68 (BP 1 Location: Left lower arm)   Pulse (!) 117   Temp 98.7 °F (37.1 °C)   Resp 17   Ht 5' 7\" (1.702 m)   Wt 97.4 kg (214 lb 11.7 oz)   SpO2 98%   BMI 33.63 kg/m²    O2 Flow Rate (L/min): 3 l/min O2 Device: Nasal cannula    Temp (24hrs), Av.6 °F (37 °C), Min:98.3 °F (36.8 °C), Max:98.8 °F (37.1 °C)      No intake/output data recorded.  1901 -  0700  In: 1204.7 [P.O.:350; I.V.:130]  Out: 1300 [Urine:1300]    PHYSICAL EXAM:  Constitutional: No acute distress  Skin: Extremities and face reveal no rashes. Multiple areas of ecchymosis in upper extremities. HEENT: Dobhoff in left nare. Sclerae anicteric. Extra-occular muscles are intact. No oral ulcers. The neck is supple and no masses. Cardiovascular: Regular rate and rhythm. +S1/S2. No murmur or gallop. Respiratory:  Rhonchi noted bilateral without wheezes. GI: Firm with hypoactive bowel sounds and tenderness to palpation in all 4 quadrant. Rectal: Deferred   Musculoskeletal: Upper and lower extremity peripheral edema present, likely third spacing. Able to move all ext  Neurological:  Generalized weakness. Patient is alert and oriented x3.  Cranial nerves II-XII grossly intact  Psychiatric: Mood appears appropriate       Data Review    Recent Results (from the past 24 hour(s))   D DIMER    Collection Time: 01/07/22  6:20 AM   Result Value Ref Range    D DIMER 8.47 (H) <0.50 ug/ml(FEU)   PROTHROMBIN TIME + INR    Collection Time: 01/07/22  6:20 AM   Result Value Ref Range    Prothrombin time 14.6 11.9 - 14.7 sec    INR 1.2 (H) 0.9 - 1.1     CBC WITH AUTOMATED DIFF    Collection Time: 01/07/22  6:20 AM   Result Value Ref Range    WBC 5.6 3.6 - 11.0 K/uL    RBC 2.46 (L) 3.80 - 5.20 M/uL    HGB 7.2 (L) 11.5 - 16.0 g/dL    HCT 23.5 (L) 35.0 - 47.0 %    MCV 95.5 80.0 - 99.0 FL    MCH 29.3 26.0 - 34.0 PG    MCHC 30.6 30.0 - 36.5 g/dL    RDW 15.2 (H) 11.5 - 14.5 %    PLATELET 146 035 - 480 K/uL    MPV 11.5 8.9 - 12.9 FL    NRBC 0.0 0.0  WBC    ABSOLUTE NRBC 0.00 0.00 - 0.01 K/uL    NEUTROPHILS 75 32 - 75 %    LYMPHOCYTES 16 12 - 49 %    MONOCYTES 6 5 - 13 %    EOSINOPHILS 1 0 - 7 %    BASOPHILS 1 0 - 1 %    IMMATURE GRANULOCYTES 1 (H) 0 - 0.5 %    ABS. NEUTROPHILS 4.2 1.8 - 8.0 K/UL    ABS. LYMPHOCYTES 0.9 0.8 - 3.5 K/UL    ABS. MONOCYTES 0.3 0.0 - 1.0 K/UL    ABS. EOSINOPHILS 0.1 0.0 - 0.4 K/UL    ABS. BASOPHILS 0.0 0.0 - 0.1 K/UL    ABS. IMM.  GRANS. 0.1 (H) 0.00 - 0.04 K/UL    DF AUTOMATED     METABOLIC PANEL, BASIC    Collection Time: 01/07/22  6:20 AM   Result Value Ref Range    Sodium 137 136 - 145 mmol/L    Potassium 3.9 3.5 - 5.1 mmol/L    Chloride 98 97 - 108 mmol/L    CO2 37 (H) 21 - 32 mmol/L    Anion gap 2 (L) 5 - 15 mmol/L    Glucose 116 (H) 65 - 100 mg/dL    BUN 32 (H) 6 - 20 mg/dL    Creatinine 0.76 0.55 - 1.02 mg/dL    BUN/Creatinine ratio 42 (H) 12 - 20      GFR est AA >60 >60 ml/min/1.73m2    GFR est non-AA >60 >60 ml/min/1.73m2    Calcium 8.3 (L) 8.5 - 10.1 mg/dL   LIPASE    Collection Time: 01/07/22  6:20 AM   Result Value Ref Range    Lipase 2,350 (H) 73 - 393 U/L   C REACTIVE PROTEIN, QT    Collection Time: 01/07/22  6:20 AM   Result Value Ref Range    C-Reactive protein 5.07 (H) 0.00 - 0.60 mg/dL   PROCALCITONIN    Collection Time: 01/07/22  6:20 AM   Result Value Ref Range    Procalcitonin 3.87 (H) 0 ng/mL   EKG, 12 LEAD, INITIAL    Collection Time: 01/07/22  9:29 AM   Result Value Ref Range    Ventricular Rate 120 BPM    Atrial Rate 133 BPM    QRS Duration 84 ms    Q-T Interval 330 ms    QTC Calculation (Bezet) 466 ms    Calculated R Axis 36 degrees    Calculated T Axis 38 degrees    Diagnosis       Atrial fibrillation with rapid ventricular response  Low voltage QRS  Abnormal ECG  When compared with ECG of 13-DEC-2021 15:03,  No significant change was found  Confirmed by Rachel Ohs ([id number]) on 1/7/2022 2:58:20 PM         XR CHEST PORT   Final Result      XR CHEST PORT   Final Result   Findings/impression:      Stable positioning of right upper extremity PICC, tip projects over the upper   SVC. Cardiac silhouette is enlarged. No pulmonary edema. Bilateral pleural effusions. No evidence of pneumothorax. No acute osseous abnormality identified. DUPLEX UPPER EXT VENOUS BILAT   Final Result      XR CHEST PORT   Final Result   Large effusions. Vascular congestion. CT ABD PELV W CONT   Final Result   1. There are increasing bilateral pleural effusions, increasing body wall   edema, and increasing ascites. These findings could be secondary to the third   spacing of fluids. The differential diagnosis for the ascites would include   pancreatic ascites with acute pancreatitis or metastatic disease to the   peritoneum. 2.  There is a biliary stent which is unchanged in its position traversing   throughout area of obstruction within the pancreatic head. 3.  There are multiple low-density lesions of the liver without short-term   interval changes consistent with metastatic disease. 4.  The right kidney is not identified and apparently the patient is status post   a previous right nephrectomy.    5.  There are bilateral adrenal masses suspect for metastatic disease without   short-term interval changes. CTA CHEST W OR W WO CONT   Final Result   No CT evidence for PE. Thoracic aorta atherosclerosis. CAD. Moderate to large bilateral pleural effusions with associated RML, RLL, and LLL   compressive atelectasis. Abdominal ascites. DUPLEX LOWER EXT VENOUS BILAT   Final Result      XR CHEST PORT   Final Result   FINDINGS: IMPRESSION: 2 frontal views of the chest. Right PICC distal tip SVC   region. Enlarging cardiomegaly. Increasing vascular congestion. Interval development of   hypoinflation, pleural effusions, lower lung airspace edema and/or pneumonia. No   pneumothorax. No free air under the diaphragm. CT ABD PELV W CONT   Final Result   New moderate volume dependent pleural effusions with associated   lower lobe lung compressive atelectasis. Increasing ascites, moderate approaching large-volume   (fluid could be sampled if there is any clinical concern for bile content). High suspicion hepatic metastases. Similar appearance for the pancreas; Silastic stent in place. No pseudocyst formation. Unchanged appearance bilateral adrenal glands. No bowel obstruction. US ABD LTD   Final Result   Small amount of free fluid. Finding suggesting hemangioma in the   liver. Gallbladder wall thickening, adenomyomatosis, sludge and gallstones. Cholecystitis possible. Finding in the region of the pancreatic head as above. Other findings as above. CTA ABDOMEN PELV W CONT   Final Result   1. Ill-defined hypodense mass in the pancreas. There are inflammatory changes   and fluid adjacent to the pancreas or duodenum and stomach most likely related   to biopsy or focal pancreatitis. No pseudocyst formation, active bleeding or   other acute findings. 2. Enlarged adrenal glands left greater than right with noncontrast densities   consistent with adrenal adenomas. 3. Right nephrectomy.    4. Other findings as described. XR CHEST PORT   Final Result   No acute findings. IR PARACENTESIS ABD W IMAGE    (Results Pending)       Principal Problem:    Pancreatitis (12/9/2021)    Active Problems:    A-fib ([facility] [address] (11/16/2020)      CHF (congestive heart failure) ([facility] [address] (11/16/2020)      Hematemesis (12/12/2021)      History of peptic ulcer disease (12/12/2021)      Pancreatic mass (1/5/2022)        Assessment/Plan:     Acute pancreatitis  - Status post EUS by Dr. Magalys Jennings 12/06  - Continue pain medication  - Lipase variable, will likely be chronically elevated   - CT abd/pelvis with PO and IV contrast showing increasing bilateral pleural effusion and increasing ascites. Also notes multiple low-density lesions of liver consistent with metastatic disease and bilateral adrenal masses suspected for metastatic disease.  - completed 14 days of IV merrem   - Continue empiric Anidulafungin 4 more days  - paracentesis on 12/27 with 2300 mL clear serous fluid drained, cytology showing now growth. - She was scheduled for liver biopsy with IR however IR does not feel patient would benefit from biopsy at this time.   -Unsuccessful attempt to place PEG J due to tumor infiltration and duodenum     CHF  - Continue chlorthalidone 25 mg daily  - IV lasix 40 mg daily     Pancreatic mass  Liver masses  Adrenal masses  History of renal cell carcinoma with lung nodules status post right nephrectomy  Multiple low-density lesions of the liver consistent with meta static disease and bilateral adrenal masses suspicious for metastatic disease  - CA 19-9 greater than 4000  --Oncology consulted-will need tissue biopsy before definitive recommendations can be made. Unable to offer palliative chemo or radiation due to poor performance.   Prognosis very poor, hospice recommended     Hypokalemia-stable  - replete as needed     COPD-stable  - Pulmonary consultation  - Chronic respiratory failure with 2 L of oxygen via nasal cannula at home     Leukocytosis-resolved  - Cultures negative     A. fib  Supratherapeutic anticoagulation  coagulopathy  Acute anemia  -On diltiazem. -Her hemoglobin has been slowly declining requiring repletion and she is FOBT positive  -Required transfusion 4 units PRBCs  -Episode of A. fib with RVR heart rate 110-150  -Eliquis has been discontinued due to bleeding and liver failure  Cardiology consulted      Elevated d-dimer  - Likely multifactorial   - CTA chest negative for PE  - Duplex US lower extremities negative for DVT  - Duplex US upper extremities showing superficial thrombophlebitis in left median/antecubital veins  -Repeat upper extremity Doppler study negative  -Discontinued Eliquis due to anemia      Hypotension  We will start midodrine    Anemia iron deficiency  Blood loss anemia  -Hemoglobin trend 6.6->8.1->7.2 today  -Has received total of 4 units PRBCs  -See if both IV and p.o. iron replacement  -FOBT positive  -Heme-onc and GI following  -We will transfuse for hemoglobin <7.0,   -Eliquis discontinued    DVT Prophylaxis: Discontinued Eliquis, SCDs  GI PPx: Protonix  Code Status: Full  POA:    Discharge Barriers:   · [facility] at [facility]  · Attempt transfer to other facility that has oncology surgery all facilities currently on diversion and not wait listing    Care Plan discussed with: patient and nursing and IDR rounds  Discussed case with general surgery who suggested transfer the patient to tertiary care center that has oncology surgery    Total time spent with patient: >35 minutes.

## 2022-01-07 NOTE — PROGRESS NOTES
Progress Note    Patient: Demetrice Juares MRN: [id number]  SSN: [id number]    YOB: 1945  Age: 78 y.o. Sex: female      Admit Date: 12/9/2021    LOS: 29 days     Subjective:   GI in consultation for Pancreatitis    01/07/22:  Patient is resting comfortably at this time. No signs of distress noted. Her hgB is 7.2 this morning. She reports she did not receive blood transfusion. Her Eliquis is on hold. She denies dark stool. She is in agreement for transfer to another facility for surgical oncology consult for further evaluation and management of pancreatic head mass. Primary working on transfer to [facility]. No available beds at this time. Primary is  Broadening the search to other [facility] that have oncology surgery service. Patient had a Dobhoff catheter placed edoscopically but patient inadvertently removed the tube. She underwent EGD for GJ tube placement but tube was unable to be placed due to concern for tumor in duodenum and inflammation. 12/30 EGD with Dobhoff placement for feeding  12/27/21 Paracentesis - 2300ml ascites fluid removed. 12/23/21 CT abdomen  1.  There are increasing bilateral pleural effusions, increasing body wall  edema, and increasing ascites. These findings could be secondary to the third spacing of fluids. The differential diagnosis for the ascites would include pancreatic ascites with acute pancreatitis or metastatic disease to the peritoneum.    2. [place] Johnson is a biliary stent which is unchanged in its position traversing throughout area of obstruction within the pancreatic head. 3. [place] Johnson are multiple low-density lesions of the liver without short-term  interval changes consistent with metastatic disease. 4.  The right kidney is not identified and apparently the patient is status post a previous right nephrectomy. 5.  There are bilateral adrenal masses suspect for metastatic disease without short-term interval changes.   12/6/2021 EUS showing 2.7 X3 cm lesion in the area of head of pancreas with dilation of the Pancreatic duct abutting the portal vein but not involving the SMA or AMV ; Tumor T2 by endosonographic criteria ; one small lymph node in the area of head of pancreas.   11/22/2021 PET Scan    1.  FDG avid lesion in the pancreatic head consistent with malignancy. 2.  Subcentimeter focus of FDG uptake in the liver, indeterminate. 3.  FDG uptake associated with density expanding the right facet at C5-C6, possibly due to an ectatic artery.         Objective:     Vitals:    01/07/22 0942 01/07/22 1117 01/07/22 1351 01/07/22 1433   BP:  112/61 119/63 119/68   Pulse:  78 (!) 111 (!) 117   Resp:  19 18 17   Temp:  98.3 °F (36.8 °C) 98.6 °F (37 °C) 98.7 °F (37.1 °C)   SpO2: 98% 95% 99% 98%   Weight:       Height:            Intake and Output:  Current Shift: No intake/output data recorded. Last three shifts: 01/05 1901 - 01/07 0700  In: 1204.7 [P.O.:350; I.V.:130]  Out: 1300 [Urine:1300]    Physical Exam:   Skin:  Extremities and face reveal no rashes. No chapin erythema. HEENT: Sclerae anicteric. Extra-occular muscles are intact. No abnormal pigmentation of the lips. The neck is supple. Cardiovascular: Regular rate and rhythm. Respiratory:  Comfortable breathing with no accessory muscle use. GI:  Abdomen nondistended, soft, and nontender. No enlargement of the liver or spleen. No masses palpable. Rectal:  Deferred  Musculoskeletal: Extremities have good range of motion. Neurological:  Gross memory appears intact. Patient is alert and oriented. Psychiatric:  Mood appears appropriate with judgement intact.   Lymphatic:  No visible adenopathy      Lab/Data Review:  Recent Results (from the past 24 hour(s))   D DIMER    Collection Time: 01/07/22  6:20 AM   Result Value Ref Range    D DIMER 8.47 (H) <0.50 ug/ml(FEU)   PROTHROMBIN TIME + INR    Collection Time: 01/07/22  6:20 AM   Result Value Ref Range    Prothrombin time 14.6 11.9 - 14.7 sec    INR 1.2 (H) 0.9 - 1.1     CBC WITH AUTOMATED DIFF    Collection Time: 01/07/22  6:20 AM   Result Value Ref Range    WBC 5.6 3.6 - 11.0 K/uL    RBC 2.46 (L) 3.80 - 5.20 M/uL    HGB 7.2 (L) 11.5 - 16.0 g/dL    HCT 23.5 (L) 35.0 - 47.0 %    MCV 95.5 80.0 - 99.0 FL    MCH 29.3 26.0 - 34.0 PG    MCHC 30.6 30.0 - 36.5 g/dL    RDW 15.2 (H) 11.5 - 14.5 %    PLATELET 490 475 - 910 K/uL    MPV 11.5 8.9 - 12.9 FL    NRBC 0.0 0.0  WBC    ABSOLUTE NRBC 0.00 0.00 - 0.01 K/uL    NEUTROPHILS 75 32 - 75 %    LYMPHOCYTES 16 12 - 49 %    MONOCYTES 6 5 - 13 %    EOSINOPHILS 1 0 - 7 %    BASOPHILS 1 0 - 1 %    IMMATURE GRANULOCYTES 1 (H) 0 - 0.5 %    ABS. NEUTROPHILS 4.2 1.8 - 8.0 K/UL    ABS. LYMPHOCYTES 0.9 0.8 - 3.5 K/UL    ABS. MONOCYTES 0.3 0.0 - 1.0 K/UL    ABS. EOSINOPHILS 0.1 0.0 - 0.4 K/UL    ABS. BASOPHILS 0.0 0.0 - 0.1 K/UL    ABS. IMM.  GRANS. 0.1 (H) 0.00 - 0.04 K/UL    DF AUTOMATED     METABOLIC PANEL, BASIC    Collection Time: 01/07/22  6:20 AM   Result Value Ref Range    Sodium 137 136 - 145 mmol/L    Potassium 3.9 3.5 - 5.1 mmol/L    Chloride 98 97 - 108 mmol/L    CO2 37 (H) 21 - 32 mmol/L    Anion gap 2 (L) 5 - 15 mmol/L    Glucose 116 (H) 65 - 100 mg/dL    BUN 32 (H) 6 - 20 mg/dL    Creatinine 0.76 0.55 - 1.02 mg/dL    BUN/Creatinine ratio 42 (H) 12 - 20      GFR est AA >60 >60 ml/min/1.73m2    GFR est non-AA >60 >60 ml/min/1.73m2    Calcium 8.3 (L) 8.5 - 10.1 mg/dL   LIPASE    Collection Time: 01/07/22  6:20 AM   Result Value Ref Range    Lipase 2,350 (H) 73 - 393 U/L   C REACTIVE PROTEIN, QT    Collection Time: 01/07/22  6:20 AM   Result Value Ref Range    C-Reactive protein 5.07 (H) 0.00 - 0.60 mg/dL   PROCALCITONIN    Collection Time: 01/07/22  6:20 AM   Result Value Ref Range    Procalcitonin 3.87 (H) 0 ng/mL   EKG, 12 LEAD, INITIAL    Collection Time: 01/07/22  9:29 AM   Result Value Ref Range    Ventricular Rate 120 BPM    Atrial Rate 133 BPM    QRS Duration 84 ms    Q-T Interval 330 ms    QTC Calculation (Bezet) 466 ms    Calculated R Axis 36 degrees    Calculated T Axis 38 degrees    Diagnosis       Atrial fibrillation with rapid ventricular response  Low voltage QRS  Abnormal ECG  When compared with ECG of 13-DEC-2021 15:03,  No significant change was found  Confirmed by Bob Light ([id number]) on 1/7/2022 2:58:20 PM                XR CHEST PORT   Final Result      XR CHEST PORT   Final Result   Findings/impression:      Stable positioning of right upper extremity PICC, tip projects over the upper   SVC. Cardiac silhouette is enlarged. No pulmonary edema. Bilateral pleural effusions. No evidence of pneumothorax. No acute osseous abnormality identified. DUPLEX UPPER EXT VENOUS BILAT   Final Result      XR CHEST PORT   Final Result   Large effusions. Vascular congestion. CT ABD PELV W CONT   Final Result   1. There are increasing bilateral pleural effusions, increasing body wall   edema, and increasing ascites. These findings could be secondary to the third   spacing of fluids. The differential diagnosis for the ascites would include   pancreatic ascites with acute pancreatitis or metastatic disease to the   peritoneum. 2.  There is a biliary stent which is unchanged in its position traversing   throughout area of obstruction within the pancreatic head. 3.  There are multiple low-density lesions of the liver without short-term   interval changes consistent with metastatic disease. 4.  The right kidney is not identified and apparently the patient is status post   a previous right nephrectomy. 5.  There are bilateral adrenal masses suspect for metastatic disease without   short-term interval changes. CTA CHEST W OR W WO CONT   Final Result   No CT evidence for PE. Thoracic aorta atherosclerosis. CAD. Moderate to large bilateral pleural effusions with associated RML, RLL, and LLL   compressive atelectasis. Abdominal ascites.             DUPLEX LOWER EXT VENOUS BILAT   Final Result      XR CHEST PORT   Final Result   FINDINGS: IMPRESSION: 2 frontal views of the chest. Right PICC distal tip SVC   region. Enlarging cardiomegaly. Increasing vascular congestion. Interval development of   hypoinflation, pleural effusions, lower lung airspace edema and/or pneumonia. No   pneumothorax. No free air under the diaphragm. CT ABD PELV W CONT   Final Result   New moderate volume dependent pleural effusions with associated   lower lobe lung compressive atelectasis. Increasing ascites, moderate approaching large-volume   (fluid could be sampled if there is any clinical concern for bile content). High suspicion hepatic metastases. Similar appearance for the pancreas; Silastic stent in place. No pseudocyst formation. Unchanged appearance bilateral adrenal glands. No bowel obstruction. US ABD LTD   Final Result   Small amount of free fluid. Finding suggesting hemangioma in the   liver. Gallbladder wall thickening, adenomyomatosis, sludge and gallstones. Cholecystitis possible. Finding in the region of the pancreatic head as above. Other findings as above. CTA ABDOMEN PELV W CONT   Final Result   1. Ill-defined hypodense mass in the pancreas. There are inflammatory changes   and fluid adjacent to the pancreas or duodenum and stomach most likely related   to biopsy or focal pancreatitis. No pseudocyst formation, active bleeding or   other acute findings. 2. Enlarged adrenal glands left greater than right with noncontrast densities   consistent with adrenal adenomas. 3. Right nephrectomy. 4. Other findings as described. XR CHEST PORT   Final Result   No acute findings.       IR PARACENTESIS ABD W IMAGE    (Results Pending)       Assessment:     Principal Problem:    Pancreatitis (12/9/2021)    Active Problems:    A-fib ([address] (11/16/2020)      CHF (congestive heart failure) ([address] (11/16/2020) Hematemesis (12/12/2021)      History of peptic ulcer disease (12/12/2021)      Pancreatic mass (1/5/2022)        Plan:   1. Pancreatitis      -12/30 s/p post pyloric tube placement. Patient removed tube on 12/31.         -Unable to place GJ tube due to  tumor in duodenum and food in the stomach and patient on Eliquis      -Lipase 2,350 this am       -repeat Lipase in the am       -Dilaudid 1 mg every  4 hours as needed for pain      -Oxycodone 5 mg every 6 hours for breakthrough pain      - S/P paracentesis 12/27/21 with removal of 2300 ml clear serous  Fluid removed      -cytology Slight acute inflammation. -currently on Regular diet  2. CHF      -Continue Chlorthalidone  3. COPD       -Continue home medications       -Albuterol as needed   4. Pancreatic Mass      - > 4000      -S/p fine needle aspiration suspicious for neoplasia but not diagnostic      -When stable Oncology plan for out patient followup with advanced oncology surgeon  At  or [facility] for resection considerations       -CT concern for liver metastais/lesions       -IR for liver biopsy on hold at this time  5. Hematemesis (resolved)  6. Afib      -Rate is controlled      -Eliquis  on hold d/t low hgB      -no further hematemesis reported     \"Had another detailed discussion with the patient   Clinical diagnosis remains Pancreatic cancer with bile duct obstruction mass in the head of the pancreas elevated ca 19-9. Patient told me she had two soft boiled eggs and tolerated it   She has expressed the desire to me that she wants to see a surgeon for resectability of the tumor which seems unlikely. Oncology wants pathologic specimen to conclusively say carcinoma if they are to consider Chemotherpy. EUS biospy has already been done once with results suspicious for malignancy. Perhaps a mini lap with direct biopsy of the pancreas and a Jejunal feeding tube may provide the final answer to oncology and help with feeding.  She has pulled out the Dobbhoff tube that was placed However her eating of some food since yesterday had been encouraging. \"    Thank you for allowing me to participate in this patients care. Plan discussed with Dr. Atilano Bumpers and he approves.     Signed By: Ashlyn Goel NP     January 7, 2022

## 2022-01-07 NOTE — PROGRESS NOTES
Problem: Falls - Risk of  Goal: *Absence of Falls  Description: Document [place] Fall Risk and appropriate interventions in the flowsheet.   Outcome: Progressing Towards Goal  Note: Fall Risk Interventions:  Mobility Interventions: Bed/chair exit alarm    Mentation Interventions: Adequate sleep, hydration, pain control    Medication Interventions: Patient to call before getting OOB    Elimination Interventions: Call light in reach    History of Falls Interventions: Bed/chair exit alarm

## 2022-01-07 NOTE — PROGRESS NOTES
Progress Note    Patient: Sharon Pringle MRN: [id number]  SSN: [id number]    YOB: 1945  Age: 78 y.o. Sex: female      Admit Date: 12/9/2021    LOS: 29 days     Subjective:   Patient followed for severe pancreatitis on Meropenem because of worsening leukocytosis. WBC now normal and Meropenem was discontinued. Remains on Anidulafungin for suspected Candida intraabdominal infection. Suspected pancreatic cancer but no firm diagnosis yet. Unsuccessful attempt at placing PEJ tube due to tumor infiltration. Patient sitting up eating lunch. No abdominal pain at this time. She is concerned about decreased urine output. Objective:     Vitals:    01/07/22 0206 01/07/22 0751 01/07/22 0942 01/07/22 1117   BP: (!) 100/55 109/60  112/61   Pulse: (!) 112 89  78   Resp: 16 17  19   Temp: 98.5 °F (36.9 °C) 98.6 °F (37 °C)  98.3 °F (36.8 °C)   SpO2: 99% 100% 98% 95%   Weight:       Height:            Intake and Output:  Current Shift: No intake/output data recorded. Last three shifts: 01/05 1901 - 01/07 0700  In: 1204.7 [P.O.:350; I.V.:130]  Out: 1300 [Urine:1300]    Physical Exam:     Vitals and nursing note reviewed. Constitutional:       General: She is not in acute distress. Appearance: She is obese. She is ill-appearing. HENT: unremarkable  Abdominal:  Nontender  Genitourinary:  Vaginal area not examined     Comments: External urinary device  Musculoskeletal:      Right lower leg: No edema. Left lower leg: No edema. Comments: PICC line right upper arm   Skin: multiple ecchymoses on the upper extremities, edema left forearm     Findings: No rash.       Comments: ?Stage 2 sacral wound   Neurological: nonfocal, no confusion   Psychiatric:  normal behavior, judgement normal    Lab/Data Review:     WBC 5,600  Urinalysis with WBC 10-20, Bacteria negative  Lipase 2,350 <2,144 99 <2,511 <2,802 <3,000 <2,484 <2,684 < 1,397 <1,719    Procal  3.87 < 0.54 < 0.48 <0.32 <0.34 <0.35 <0.34 < 0.32 <0.21 <0.19 <0.25 < 0.14  CRP 5.07 <7.38 < 8.54 <11.0 <8.98 <11.10 < 12.10 <23.40 <18.30 <14.20 <12.70 <15.00 < 23.80    Serum fungitell <31 Negative    Ascitic fluid   with 41% PMNs    Blood cultures (12/20) No growth FINAL  Urine culture (12/20) No growth FINAL  Ascitic fluid culture(12/27) No growth FINAL    Assessment:     Principal Problem:    Pancreatitis (12/9/2021)    Active Problems:    A-fib ([facility] [address] [facility] (11/16/2020)      CHF (congestive heart failure) ([facility] [facility] (11/16/2020)      Hematemesis (12/12/2021)      History of peptic ulcer disease (12/12/2021)      Pancreatic mass (1/5/2022)    1. Severe pancreatitis with markedly elevated lipase, resolving  2. Pancreatic mass, possible neoplasm  3. Biliary stent  4. Sepsis with worsening leukocytosis with elevated procal and CRP, resolving, status post 14 days of Meropenem, Day #13 IV Anidulafungin (empiric)  5. TPN (just started)  6. Moderate pyuria, negative bacteria, urine culture negative  7. Possible candida superinfection with vaginitis, treated  8. Ascites, status post paracentesis    Comment:    WBC normal with decreasing CRP but procal increased. Lipase still increasing. Plan:   1. Continue Anidulafungin for 1 more day  2.  In am,  repeat CRP and procal     Signed By: Florence Willis MD     January 7, 2022

## 2022-01-08 NOTE — ROUTINE PROCESS
Patient's EKG revealed afib RVR earlier in the morning. Patient appeared anxious and reported difficulty breathing. After getting patient back into bed the patient expressed relief. Telemetry reported patient's rhythm was Afib range 110-130. This nurse spoke with Olive Gold NP who ordered 5 mg cardizem IV once, then directed this nurse to administer the patient's PO cardizem and valium. After administering these medications the patient appeared calm and was able to rest with eyes closed. After checking with telemetry again, the patient remained in afib. No signs of distress.

## 2022-01-08 NOTE — PROGRESS NOTES
Problem: Falls - Risk of  Goal: *Absence of Falls  Description: Document [place] Fail Fall Risk and appropriate interventions in the flowsheet.   Outcome: Progressing Towards Goal  Note: Fall Risk Interventions:  Mobility Interventions: Bed/chair exit alarm,OT consult for ADLs,Patient to call before getting OOB,PT Consult for mobility concerns    Mentation Interventions: Bed/chair exit alarm,Adequate sleep, hydration, pain control    Medication Interventions: Bed/chair exit alarm,Patient to call before getting OOB,Teach patient to arise slowly    Elimination Interventions: Bed/chair exit alarm,Call light in reach    History of Falls Interventions: Bed/chair exit alarm         Problem: Pain  Goal: *Control of Pain  Outcome: Progressing Towards Goal     Problem: Nausea/Vomiting (Adult)  Goal: *Absence of nausea/vomiting  Outcome: Progressing Towards Goal

## 2022-01-08 NOTE — PROGRESS NOTES
Progress Note    Patient: Nakita Schultz MRN: [id number]  SSN: [id number]    YOB: 1945  Age: 78 y.o. Sex: female      Admit Date: 12/9/2021    LOS: 30 days     Subjective:   GI in consultation for Pancreatitis    Seen in room, sleepy, arousable. Ongoing blood transfusion. She stated having diarrhea yesterday with dark stools that became red towards the end of the day. Hgb today dropped to 6.2. She is scheduled to get 2 units of PRBC. Abdominal pain minimal. Nausea. Not eating a lot. Patient had a Dobhoff catheter placed endoscopically but patient inadvertently removed the tube. She underwent EGD for GJ tube placement but tube was unable to be placed due to concern for tumor in duodenum and inflammation. Plan is to transfer her to another facility for surgical oncology consult of pancreatic cancer. Primary is working on transfer. Lipase 2,244.      12/30 EGD with Dobhoff placement for feeding - removed by patient. 12/27/21 Paracentesis - 2300ml ascites fluid removed. 12/23/21 CT abdomen  1.  There are increasing bilateral pleural effusions, increasing body wall  edema, and increasing ascites. These findings could be secondary to the third spacing of fluids. The differential diagnosis for the ascites would include pancreatic ascites with acute pancreatitis or metastatic disease to the peritoneum.    2. Cherri Loll is a biliary stent which is unchanged in its position traversing throughout area of obstruction within the pancreatic head. 3. Cherri Loll are multiple low-density lesions of the liver without short-term  interval changes consistent with metastatic disease. 4.  The right kidney is not identified and apparently the patient is status post a previous right nephrectomy. 5.  There are bilateral adrenal masses suspect for metastatic disease without short-term interval changes.   12/6/2021 EUS showing 2.7 X3 cm lesion in the area of head of pancreas with dilation of the Pancreatic duct abutting the portal vein but not involving the SMA or AMV ; Tumor T2 by endosonographic criteria ; one small lymph node in the area of head of pancreas.   11/22/2021 PET Scan    1.  FDG avid lesion in the pancreatic head consistent with malignancy. 2.  Subcentimeter focus of FDG uptake in the liver, indeterminate. 3.  FDG uptake associated with density expanding the right facet at C5-C6, possibly due to an ectatic artery.     Objective:     Vitals:    01/08/22 0934 01/08/22 0952 01/08/22 1047 01/08/22 1050   BP:  (!) 92/42 (!) 96/42    Pulse:  (!) 102 90    Resp:  18 18    Temp:  98 °F (36.7 °C) 98 °F (36.7 °C)    SpO2: 97% 99% 100%    Weight:    94.1 kg (207 lb 7.3 oz)   Height:            Intake and Output:  Current Shift: 01/08 0701 - 01/08 1900  In: 383.3   Out: -   Last three shifts: 01/06 1901 - 01/08 0700  In: 600 [P.O.:600]  Out: 1000 [Urine:1000]    Physical Exam:   Skin:  Extremities and face reveal no rashes. No chapin erythema. HEENT: Sclerae anicteric. Extra-occular muscles are intact. No abnormal pigmentation of the lips. The neck is supple. Cardiovascular: Regular rate and rhythm. Respiratory:  Comfortable breathing with no accessory muscle use. GI:  Abdomen nondistended, soft, and nontender. No enlargement of the liver or spleen. No masses palpable. Rectal:  Deferred  Musculoskeletal: Extremities have good range of motion. Neurological:  Gross memory appears intact. Patient is alert and oriented. Psychiatric:  Mood appears appropriate with judgement intact.   Lymphatic:  No visible adenopathy      Lab/Data Review:  Recent Results (from the past 24 hour(s))   D DIMER    Collection Time: 01/08/22  4:08 AM   Result Value Ref Range    D DIMER 10.39 (H) <0.50 ug/ml(FEU)   PROTHROMBIN TIME + INR    Collection Time: 01/08/22  4:08 AM   Result Value Ref Range    Prothrombin time 15.1 (H) 11.9 - 14.7 sec    INR 1.2 (H) 0.9 - 1.1     CBC WITH AUTOMATED DIFF    Collection Time: 01/08/22  4:08 AM   Result Value Ref Range    WBC 6.1 3.6 - 11.0 K/uL    RBC 2.09 (L) 3.80 - 5.20 M/uL    HGB 6.2 (L) 11.5 - 16.0 g/dL    HCT 20.3 (L) 35.0 - 47.0 %    MCV 97.1 80.0 - 99.0 FL    MCH 29.7 26.0 - 34.0 PG    MCHC 30.5 30.0 - 36.5 g/dL    RDW 15.9 (H) 11.5 - 14.5 %    PLATELET 704 531 - 750 K/uL    MPV 11.1 8.9 - 12.9 FL    NRBC 0.0 0.0  WBC    ABSOLUTE NRBC 0.00 0.00 - 0.01 K/uL    NEUTROPHILS 73 32 - 75 %    LYMPHOCYTES 21 12 - 49 %    MONOCYTES 5 5 - 13 %    EOSINOPHILS 0 0 - 7 %    BASOPHILS 0 0 - 1 %    IMMATURE GRANULOCYTES 1 (H) 0 - 0.5 %    ABS. NEUTROPHILS 4.4 1.8 - 8.0 K/UL    ABS. LYMPHOCYTES 1.3 0.8 - 3.5 K/UL    ABS. MONOCYTES 0.3 0.0 - 1.0 K/UL    ABS. EOSINOPHILS 0.0 0.0 - 0.4 K/UL    ABS. BASOPHILS 0.0 0.0 - 0.1 K/UL    ABS. IMM.  GRANS. 0.1 (H) 0.00 - 0.04 K/UL    DF AUTOMATED     METABOLIC PANEL, BASIC    Collection Time: 01/08/22  4:08 AM   Result Value Ref Range    Sodium 138 136 - 145 mmol/L    Potassium 3.8 3.5 - 5.1 mmol/L    Chloride 98 97 - 108 mmol/L    CO2 36 (H) 21 - 32 mmol/L    Anion gap 4 (L) 5 - 15 mmol/L    Glucose 112 (H) 65 - 100 mg/dL    BUN 37 (H) 6 - 20 mg/dL    Creatinine 0.82 0.55 - 1.02 mg/dL    BUN/Creatinine ratio 45 (H) 12 - 20      GFR est AA >60 >60 ml/min/1.73m2    GFR est non-AA >60 >60 ml/min/1.73m2    Calcium 7.9 (L) 8.5 - 10.1 mg/dL   C REACTIVE PROTEIN, QT    Collection Time: 01/08/22  4:08 AM   Result Value Ref Range    C-Reactive protein 4.83 (H) 0.00 - 0.60 mg/dL   PROCALCITONIN    Collection Time: 01/08/22  4:08 AM   Result Value Ref Range    Procalcitonin 0.59 (H) 0 ng/mL   LIPASE    Collection Time: 01/08/22  4:08 AM   Result Value Ref Range    Lipase 2,244 (H) 73 - 393 U/L   TYPE & SCREEN    Collection Time: 01/08/22  5:47 AM   Result Value Ref Range    Crossmatch Expiration 01/11/2022,2359     ABO/Rh(D) B Positive     Antibody screen Negative     Unit number [id number]     Blood component type RC LR     Unit division 00     Status of unit Issued     TRANSFUSION STATUS Ok to transfuse     Crossmatch result Compatible     Unit number [id number]     Blood component type RC LR     Unit division 00     Status of unit [address] to transfuse     Crossmatch result Compatible               XR CHEST PORT   Final Result      XR CHEST PORT   Final Result   Findings/impression:      Stable positioning of right upper extremity PICC, tip projects over the upper   SVC. Cardiac silhouette is enlarged. No pulmonary edema. Bilateral pleural effusions. No evidence of pneumothorax. No acute osseous abnormality identified. DUPLEX UPPER EXT VENOUS BILAT   Final Result      XR CHEST PORT   Final Result   Large effusions. Vascular congestion. CT ABD PELV W CONT   Final Result   1. There are increasing bilateral pleural effusions, increasing body wall   edema, and increasing ascites. These findings could be secondary to the third   spacing of fluids. The differential diagnosis for the ascites would include   pancreatic ascites with acute pancreatitis or metastatic disease to the   peritoneum. 2.  There is a biliary stent which is unchanged in its position traversing   throughout area of obstruction within the pancreatic head. 3.  There are multiple low-density lesions of the liver without short-term   interval changes consistent with metastatic disease. 4.  The right kidney is not identified and apparently the patient is status post   a previous right nephrectomy. 5.  There are bilateral adrenal masses suspect for metastatic disease without   short-term interval changes. CTA CHEST W OR W WO CONT   Final Result   No CT evidence for PE. Thoracic aorta atherosclerosis. CAD. Moderate to large bilateral pleural effusions with associated RML, RLL, and LLL   compressive atelectasis. Abdominal ascites.             DUPLEX LOWER EXT VENOUS BILAT   Final Result      XR CHEST PORT   Final Result   FINDINGS: IMPRESSION: 2 frontal views of the chest. Right PICC distal tip SVC   region. Enlarging cardiomegaly. Increasing vascular congestion. Interval development of   hypoinflation, pleural effusions, lower lung airspace edema and/or pneumonia. No   pneumothorax. No free air under the diaphragm. CT ABD PELV W CONT   Final Result   New moderate volume dependent pleural effusions with associated   lower lobe lung compressive atelectasis. Increasing ascites, moderate approaching large-volume   (fluid could be sampled if there is any clinical concern for bile content). High suspicion hepatic metastases. Similar appearance for the pancreas; Silastic stent in place. No pseudocyst formation. Unchanged appearance bilateral adrenal glands. No bowel obstruction. US ABD LTD   Final Result   Small amount of free fluid. Finding suggesting hemangioma in the   liver. Gallbladder wall thickening, adenomyomatosis, sludge and gallstones. Cholecystitis possible. Finding in the region of the pancreatic head as above. Other findings as above. CTA ABDOMEN PELV W CONT   Final Result   1. Ill-defined hypodense mass in the pancreas. There are inflammatory changes   and fluid adjacent to the pancreas or duodenum and stomach most likely related   to biopsy or focal pancreatitis. No pseudocyst formation, active bleeding or   other acute findings. 2. Enlarged adrenal glands left greater than right with noncontrast densities   consistent with adrenal adenomas. 3. Right nephrectomy. 4. Other findings as described. XR CHEST PORT   Final Result   No acute findings.       IR PARACENTESIS ABD W IMAGE    (Results Pending)       Assessment:     Principal Problem:    Pancreatitis (12/9/2021)    Active Problems:    A-fib ([address] (11/16/2020)      CHF (congestive heart failure) ([address] (11/16/2020)      Hematemesis (12/12/2021)      History of peptic ulcer disease (12/12/2021) Pancreatic mass (1/5/2022)        Plan:   1. Pancreatitis      -12/30 s/p post pyloric tube placement. Patient removed tube on 12/31.         -Unable to place GJ tube due to  tumor in duodenum and food in the stomach and patient on Eliquis      -Lipase 2,244 this am       -repeat Lipase in the am       -Dilaudid 1 mg every  4 hours as needed for pain      -Oxycodone 5 mg every 6 hours for breakthrough pain      - S/P paracentesis 12/27/21 with removal of 2300 ml clear serous  Fluid removed      -cytology Slight acute inflammation.        -currently on Regular diet  2. CHF      -Continue Chlorthalidone  3. COPD       -Continue home medications       -Albuterol as needed   4. Pancreatic Mass      - 22,358      -S/p fine needle aspiration suspicious for neoplasia but not diagnostic      -When stable Oncology plan for out patient followup with advanced oncology surgeon Isaac Black or [facility] [place] for resection considerations       -CT concern for liver metastais/lesions       -IR for liver biopsy on hold at this time  5. Hematemesis (resolved)  6. Afib      -Rate is controlled      -Eliquis  on hold d/t low hgB      -no further hematemesis reported     \"Had another detailed discussion with the patient   Clinical diagnosis remains Pancreatic cancer with bile duct obstruction mass in the head of the pancreas elevated ca 19-9. Patient told me she had two soft boiled eggs and tolerated it   She has expressed the desire to me that she wants to see a surgeon for resectability of the tumor which seems unlikely. Oncology wants pathologic specimen to conclusively say carcinoma if they are to consider Chemotherpy. EUS biospy has already been done once with results suspicious for malignancy. Perhaps a mini lap with direct biopsy of the pancreas and a Jejunal feeding tube may provide the final answer to oncology and help with feeding.  She has pulled out the Dobbhoff tube that was placed However her eating of some food since yesterday had been encouraging. \"    Patient discussed with Dr Nafisa Snowden and agrees to above impression and plan. Thank you for allowing me to participate in this patients care    Signed By: Lauren Hillman NP     January 8, 2022

## 2022-01-08 NOTE — PROGRESS NOTES
Progress Note    Patient: Yadiel Montgomery MRN: [id number]  SSN: [id number]    YOB: 1945  Age: 78 y.o. Sex: female      Admit Date: 12/9/2021    LOS: 30 days     Subjective:   Patient followed for severe pancreatitis on Meropenem because of worsening leukocytosis. WBC now normal and Meropenem was discontinued. Remains on Anidulafungin for suspected Candida intraabdominal infection. Suspected pancreatic cancer but no firm diagnosis yet. Unsuccessful attempt at placing PEJ tube due to tumor infiltration. Patient sitting up eating lunch. No abdominal pain at this time. She is concerned about decreased urine output. Objective:     Vitals:    01/08/22 0952 01/08/22 1047 01/08/22 1050 01/08/22 1150   BP: (!) 92/42 (!) 96/42  (!) 108/53   Pulse: (!) 102 90     Resp: 18 18     Temp: 98 °F (36.7 °C) 98 °F (36.7 °C)     SpO2: 99% 100%     Weight:   207 lb 7.3 oz (94.1 kg)    Height:            Intake and Output:  Current Shift: 01/08 0701 - 01/08 1900  In: 383.3   Out: -   Last three shifts: 01/06 1901 - 01/08 0700  In: 600 [P.O.:600]  Out: 1000 [Urine:1000]    Physical Exam:     Vitals and nursing note reviewed. Constitutional:       General: She is not in acute distress. Appearance: She is obese. She is ill-appearing. HENT: unremarkable  Abdominal:  Nontender  Genitourinary:  Vaginal area not examined     Comments: External urinary device  Musculoskeletal:      Right lower leg: No edema. Left lower leg: No edema. Comments: PICC line right upper arm   Skin: multiple ecchymoses on the upper extremities, edema left forearm     Findings: No rash.       Comments: ?Stage 2 sacral wound   Neurological: nonfocal, no confusion   Psychiatric:  normal behavior, judgement normal    Lab/Data Review:     WBC 6,100  Urinalysis with WBC 10-20, Bacteria negative  Lipase 2,244 <2,350 <2,144 99 <2,511 <2,802 <3,000 <2,484 <2,684 < 1,397 <1,719    Procal  0.59 <3.87 < 0.54 < 0.48 <0.32 <0.34 <0.35 <0.34 < 0.32 <0.21 <0.19 <0.25 < 0.14  CRP 4.83 <5.07 <7.38 < 8.54 <11.0 <8.98 <11.10 < 12.10 <23.40 <18.30 <14.20 <12.70 <15.00 < 23.80    Serum fungitell <31 Negative    Ascitic fluid   with 41% PMNs    Blood cultures (12/20) No growth FINAL  Urine culture (12/20) No growth FINAL  Ascitic fluid culture(12/27) No growth FINAL    Assessment:     Principal Problem:    Pancreatitis (12/9/2021)    Active Problems:    A-fib ([facility] (11/16/2020)      CHF (congestive heart failure) ([facility] (11/16/2020)      Hematemesis (12/12/2021)      History of peptic ulcer disease (12/12/2021)      Pancreatic mass (1/5/2022)    1. Severe pancreatitis with markedly elevated lipase, resolving  2. Pancreatic mass, possible neoplasm  3. Biliary stent  4. Sepsis with worsening leukocytosis with elevated procal and CRP, resolving, status post 14 days of Meropenem, Day #14 IV Anidulafungin (empiric)  5. TPN (just started)  6. Moderate pyuria, negative bacteria, urine culture negative  7. Possible candida superinfection with vaginitis, treated  8. Ascites, status post paracentesis    Comment:    WBC normal with decreasing CRP and procal.  Lipase also decreased. Plan:   1. Discontinue Anidulafungin after today  2.  On Monday, repeat CRP and procal     Signed By: Judy Canales MD     January 8, 2022

## 2022-01-08 NOTE — PROGRESS NOTES
Patient has an episode of  large [place] bleed. BP 90/51, . Hb 6.2. MD Tan notified and ordered 2 units PRBC stat and 500 bolus NS.

## 2022-01-08 NOTE — PROGRESS NOTES
PT attempt. Pt with episode of bleeding. Noted last documented Hgb 6.2. Inappropriate for PT tx at this time. Will follow up at a later date when appropriate for mobility.

## 2022-01-08 NOTE — PROGRESS NOTES
Hospitalist Progress Note    Subjective:   Daily Progress Note: 1/8/2022 12:21 PM    Hospital Course: The patient is a 73-year-old woman with a PMH significant for atrial fibrillation, PUD, CHF, COPD on home O2 at 2 L, renal cell carcinoma status post nephrectomy, hypertension and morbid obesity. Patient was found to have obstructive jaundice and biliary stricture in early November of this year.  She underwent an ERCP with stent placement with brushings of the bile duct where there was noted to be a stricture.  These brushings were negative.  Patient then underwent a PET CT which demonstrated uptake in the head of the pancreas concerning for pancreatic mass. She subsequently underwent endoscopic ultrasound with ultrasound and FNA biopsy on 12/6/2021.  The procedure demonstrated abutment of the tumor with the portal vein without involvement of the SMA or SMV.  The total tumor was measured to be 2.7 cm by 3 cm.  This biopsy resulted as atypical cells with suspicion for malignancy. She admitted to the hospital on 2/9/2021 and severe pancreatitis after undergoing an EUS for pancreatic biopsy on 12/6/2021 and pancreatic mass. Her symptoms were abdominal pain, nausea and vomiting. CT scan revealing peripancreatic inflammation consistent with likely post biopsy pancreatitis and ascites. Her initial labs revealing elevated lipase, supratherapeutic INR and elevated D-dimer coagulopathy. CTA was positive for bilateral pleural effusions, negative for PE. Paracentesis with culture of ascitic fluid, blood cultures, urine cultures negative for growth. Patient had been started on IV meropenem for leukocytosis and anidulafungin for suspected intra-abdominal infection. CT with and without contrast revealing liver lesions consistent with metastasis, mass in the pancreatic head with biliary stent in place, bilateral pleural effusions, ascites and bilateral adrenal masses.   IR declined liver biopsy due to coagulopathy and felt she would not benefit. Doppler studies of upper and lower extremities negative for DVT, positive for superficial thrombophlebitis of left medial antecubital.  Pancreatic cancer is presumed but no firm diagnosis due to lack of tissue sample. Pancreatitis remaining persistent despite antibiotics. Attempted Dobbhoff and PEG J placement but failed due to reddened area presumed possible tumor infiltration in the duodenum. Due to poor performance and prognosis she is not a candidate for palliative XRT or chemo. The patient has been progressively worsening with debilitation, intermittently tolerating diet not stable to be discharged home with outpatient surgical oncology follow-up. She would benefit from transfer to tertiary care center where surgical oncology services are available for evaluation of pancreatic head mass and possible surgical intervention. 1/7 A. fib with RVR rate 1 10-1 40. Will consult cardiology and discuss anticoagulation being discontinued due to bleeding and liver failure. Subjective: Follow-up examination of patient at the bedside. Patient feels weak and tired. She has been refusing some of her morning medications. Hemoglobin today is 6.2, she is receiving 2 units of PRBCs.     Current Facility-Administered Medications   Medication Dose Route Frequency    0.9% sodium chloride infusion 250 mL  250 mL IntraVENous PRN    furosemide (LASIX) tablet 40 mg  40 mg Oral DAILY    influenza vaccine 2021-22 (6 mos+)(PF) (FLUARIX/FLULAVAL/FLUZONE QUAD) injection 0.5 mL  1 Each IntraMUSCular PRIOR TO DISCHARGE    [Held by provider] apixaban (ELIQUIS) tablet 2.5 mg  2.5 mg Oral BID    ferrous sulfate tablet 325 mg  1 Tablet Oral DAILY WITH BREAKFAST    midodrine (PROAMATINE) tablet 10 mg  10 mg Oral TID WITH MEALS    sodium chloride (NS) flush 5-40 mL  5-40 mL IntraVENous PRN    anidulafungin (ERAXIS) 100 mg in 0.9% sodium chloride 130 mL IVPB  100 mg IntraVENous Q24H    zinc oxide-white petrolatum 17-57 % topical paste   Topical TID    cyclobenzaprine (FLEXERIL) tablet 10 mg  10 mg Oral TID PRN    HYDROmorphone (DILAUDID) injection 1 mg  1 mg IntraVENous Q4H PRN    oxyCODONE IR (ROXICODONE) tablet 5 mg  5 mg Oral Q6H PRN    nystatin (MYCOSTATIN) 100,000 unit/gram cream   Topical BID    docusate sodium (COLACE) capsule 100 mg  100 mg Oral BID    polyethylene glycol (MIRALAX) packet 17 g  17 g Oral DAILY    sorbitoL 70 % solution 30 mL  30 mL Oral DAILY PRN    bisacodyL (DULCOLAX) suppository 10 mg  10 mg Rectal DAILY PRN    hydrALAZINE (APRESOLINE) 20 mg/mL injection 20 mg  20 mg IntraVENous Q6H PRN    pantoprazole (PROTONIX) 40 mg in 0.9% sodium chloride 10 mL injection  40 mg IntraVENous DAILY    guaiFENesin ER (MUCINEX) tablet 600 mg  600 mg Oral Q12H    albuterol-ipratropium (DUO-NEB) 2.5 MG-0.5 MG/3 ML  3 mL Nebulization Q6H PRN    chlorthalidone (HYGROTON) tablet 25 mg  25 mg Oral DAILY    albuterol (PROVENTIL HFA, VENTOLIN HFA, PROAIR HFA) inhaler 2 Puff  2 Puff Inhalation Q6H PRN    ascorbic acid (vitamin C) (VITAMIN C) tablet 500 mg  500 mg Oral DAILY    atorvastatin (LIPITOR) tablet 40 mg  40 mg Oral DAILY    cholecalciferol (VITAMIN D3) (1000 Units /25 mcg) tablet 1,000 Units  1,000 Units Oral DAILY    diazePAM (VALIUM) tablet 5 mg  5 mg Oral Q6H PRN    dilTIAZem ER (CARDIZEM CD) capsule 120 mg  120 mg Oral DAILY    potassium chloride SR (KLOR-CON 10) tablet 20 mEq  20 mEq Oral DAILY    sertraline (ZOLOFT) tablet 25 mg  25 mg Oral DAILY    traZODone (DESYREL) tablet 50 mg  50 mg Oral QHS    ondansetron (ZOFRAN) injection 4 mg  4 mg IntraVENous Q6H PRN    acetaminophen (TYLENOL) tablet 650 mg  650 mg Oral Q4H PRN    prochlorperazine (COMPAZINE) with saline injection 10 mg  10 mg IntraVENous Q6H PRN        Review of Systems  Constitutional: Positive for malaise/fatigue. Negative for fever. HENT: Negative.     Respiratory: Positive for shortness of breath. Negative for cough. Cardiovascular: Negative for chest pain and leg swelling. Gastrointestinal: Positive for abdominal pain. Negative for nausea and vomiting. Genitourinary: No frequency, No dysuria, No hematuria  Integument/breast: No skin lesion(s)   Neurological: No Confusion, No headaches, No dizziness      Objective:     Visit Vitals  BP (!) 114/50 (BP 1 Location: Left arm)   Pulse 69   Temp 98.1 °F (36.7 °C)   Resp 18   Ht 5' 7\" (1.702 m)   Wt 94.1 kg (207 lb 7.3 oz)   SpO2 100%   BMI 32.49 kg/m²    O2 Flow Rate (L/min): 4 l/min O2 Device: Nasal cannula    Temp (24hrs), Av.3 °F (36.8 °C), Min:97.3 °F (36.3 °C), Max:98.7 °F (37.1 °C)      701 -  190  In: 779.1   Out: -   1901 -  0700  In: 600 [P.O.:600]  Out: 1000 [Urine:1000]    PHYSICAL EXAM:  Constitutional: No acute distress  Skin: Extremities and face reveal no rashes. Multiple areas of ecchymosis in upper extremities. HEENT: Dobhoff in left nare. Sclerae anicteric. Extra-occular muscles are intact. No oral ulcers. The neck is supple and no masses. Cardiovascular: Regular rate and rhythm. +S1/S2. No murmur or gallop. Respiratory:  Rhonchi noted bilateral without wheezes. GI: Firm with hypoactive bowel sounds and tenderness to palpation in all 4 quadrant. Rectal: Deferred   Musculoskeletal: Upper and lower extremity peripheral edema present, likely third spacing. Able to move all ext  Neurological:  Generalized weakness. Patient is alert and oriented x3.  Cranial nerves II-XII grossly intact  Psychiatric: Mood appears appropriate       Data Review    Recent Results (from the past 24 hour(s))   D DIMER    Collection Time: 22  4:08 AM   Result Value Ref Range    D DIMER 10.39 (H) <0.50 ug/ml(FEU)   PROTHROMBIN TIME + INR    Collection Time: 22  4:08 AM   Result Value Ref Range    Prothrombin time 15.1 (H) 11.9 - 14.7 sec    INR 1.2 (H) 0.9 - 1.1     CBC WITH AUTOMATED DIFF    Collection Time: 01/08/22  4:08 AM   Result Value Ref Range    WBC 6.1 3.6 - 11.0 K/uL    RBC 2.09 (L) 3.80 - 5.20 M/uL    HGB 6.2 (L) 11.5 - 16.0 g/dL    HCT 20.3 (L) 35.0 - 47.0 %    MCV 97.1 80.0 - 99.0 FL    MCH 29.7 26.0 - 34.0 PG    MCHC 30.5 30.0 - 36.5 g/dL    RDW 15.9 (H) 11.5 - 14.5 %    PLATELET 314 439 - 467 K/uL    MPV 11.1 8.9 - 12.9 FL    NRBC 0.0 0.0  WBC    ABSOLUTE NRBC 0.00 0.00 - 0.01 K/uL    NEUTROPHILS 73 32 - 75 %    LYMPHOCYTES 21 12 - 49 %    MONOCYTES 5 5 - 13 %    EOSINOPHILS 0 0 - 7 %    BASOPHILS 0 0 - 1 %    IMMATURE GRANULOCYTES 1 (H) 0 - 0.5 %    ABS. NEUTROPHILS 4.4 1.8 - 8.0 K/UL    ABS. LYMPHOCYTES 1.3 0.8 - 3.5 K/UL    ABS. MONOCYTES 0.3 0.0 - 1.0 K/UL    ABS. EOSINOPHILS 0.0 0.0 - 0.4 K/UL    ABS. BASOPHILS 0.0 0.0 - 0.1 K/UL    ABS. IMM.  GRANS. 0.1 (H) 0.00 - 0.04 K/UL    DF AUTOMATED     METABOLIC PANEL, BASIC    Collection Time: 01/08/22  4:08 AM   Result Value Ref Range    Sodium 138 136 - 145 mmol/L    Potassium 3.8 3.5 - 5.1 mmol/L    Chloride 98 97 - 108 mmol/L    CO2 36 (H) 21 - 32 mmol/L    Anion gap 4 (L) 5 - 15 mmol/L    Glucose 112 (H) 65 - 100 mg/dL    BUN 37 (H) 6 - 20 mg/dL    Creatinine 0.82 0.55 - 1.02 mg/dL    BUN/Creatinine ratio 45 (H) 12 - 20      GFR est AA >60 >60 ml/min/1.73m2    GFR est non-AA >60 >60 ml/min/1.73m2    Calcium 7.9 (L) 8.5 - 10.1 mg/dL   C REACTIVE PROTEIN, QT    Collection Time: 01/08/22  4:08 AM   Result Value Ref Range    C-Reactive protein 4.83 (H) 0.00 - 0.60 mg/dL   PROCALCITONIN    Collection Time: 01/08/22  4:08 AM   Result Value Ref Range    Procalcitonin 0.59 (H) 0 ng/mL   LIPASE    Collection Time: 01/08/22  4:08 AM   Result Value Ref Range    Lipase 2,244 (H) 73 - 393 U/L   TYPE & SCREEN    Collection Time: 01/08/22  5:47 AM   Result Value Ref Range    Crossmatch Expiration 01/11/2022,2359     ABO/Rh(D) B Positive     Antibody screen Negative     Unit number [id number]     Blood component type RC LR     Unit division 00     Status of unit [address] to transfuse     Crossmatch result Compatible     Unit number [id number]     Blood component type RC LR     Unit division 00     Status of unit [address] to transfuse     Crossmatch result Compatible        XR CHEST PORT   Final Result      XR CHEST PORT   Final Result   Findings/impression:      Stable positioning of right upper extremity PICC, tip projects over the upper   SVC. Cardiac silhouette is enlarged. No pulmonary edema. Bilateral pleural effusions. No evidence of pneumothorax. No acute osseous abnormality identified. DUPLEX UPPER EXT VENOUS BILAT   Final Result      XR CHEST PORT   Final Result   Large effusions. Vascular congestion. CT ABD PELV W CONT   Final Result   1. There are increasing bilateral pleural effusions, increasing body wall   edema, and increasing ascites. These findings could be secondary to the third   spacing of fluids. The differential diagnosis for the ascites would include   pancreatic ascites with acute pancreatitis or metastatic disease to the   peritoneum. 2.  There is a biliary stent which is unchanged in its position traversing   throughout area of obstruction within the pancreatic head. 3.  There are multiple low-density lesions of the liver without short-term   interval changes consistent with metastatic disease. 4.  The right kidney is not identified and apparently the patient is status post   a previous right nephrectomy. 5.  There are bilateral adrenal masses suspect for metastatic disease without   short-term interval changes. CTA CHEST W OR W WO CONT   Final Result   No CT evidence for PE. Thoracic aorta atherosclerosis. CAD. Moderate to large bilateral pleural effusions with associated RML, RLL, and LLL   compressive atelectasis. Abdominal ascites.             DUPLEX LOWER EXT VENOUS BILAT   Final Result      XR CHEST PORT   Final Result FINDINGS: IMPRESSION: 2 frontal views of the chest. Right PICC distal tip SVC   region. Enlarging cardiomegaly. Increasing vascular congestion. Interval development of   hypoinflation, pleural effusions, lower lung airspace edema and/or pneumonia. No   pneumothorax. No free air under the diaphragm. CT ABD PELV W CONT   Final Result   New moderate volume dependent pleural effusions with associated   lower lobe lung compressive atelectasis. Increasing ascites, moderate approaching large-volume   (fluid could be sampled if there is any clinical concern for bile content). High suspicion hepatic metastases. Similar appearance for the pancreas; Silastic stent in place. No pseudocyst formation. Unchanged appearance bilateral adrenal glands. No bowel obstruction. US ABD LTD   Final Result   Small amount of free fluid. Finding suggesting hemangioma in the   liver. Gallbladder wall thickening, adenomyomatosis, sludge and gallstones. Cholecystitis possible. Finding in the region of the pancreatic head as above. Other findings as above. CTA ABDOMEN PELV W CONT   Final Result   1. Ill-defined hypodense mass in the pancreas. There are inflammatory changes   and fluid adjacent to the pancreas or duodenum and stomach most likely related   to biopsy or focal pancreatitis. No pseudocyst formation, active bleeding or   other acute findings. 2. Enlarged adrenal glands left greater than right with noncontrast densities   consistent with adrenal adenomas. 3. Right nephrectomy. 4. Other findings as described. XR CHEST PORT   Final Result   No acute findings.       IR PARACENTESIS ABD W IMAGE    (Results Pending)       Principal Problem:    Pancreatitis (12/9/2021)    Active Problems:    A-fib ([address] (11/16/2020)      CHF (congestive heart failure) ([address] (11/16/2020)      Hematemesis (12/12/2021)      History of peptic ulcer disease (12/12/2021) Pancreatic mass (1/5/2022)        Assessment/Plan:     Acute pancreatitis  - Status post EUS by Dr. Oralia Leblanc 12/06  - Continue pain medication  - Lipase variable, will likely be chronically elevated   - CT abd/pelvis with PO and IV contrast showing increasing bilateral pleural effusion and increasing ascites. Also notes multiple low-density lesions of liver consistent with metastatic disease and bilateral adrenal masses suspected for metastatic disease.  - completed 14 days of IV merrem   - Continue empiric Anidulafungin 4 more days  - paracentesis on 12/27 with 2300 mL clear serous fluid drained, cytology showing now growth. - She was scheduled for liver biopsy with IR however IR does not feel patient would benefit from biopsy at this time.   -Unsuccessful attempt to place PEG J due to tumor infiltration and duodenum     CHF  - Continue chlorthalidone 25 mg daily  - IV lasix 40 mg daily     Pancreatic mass  Liver masses  Adrenal masses  History of renal cell carcinoma with lung nodules status post right nephrectomy  Multiple low-density lesions of the liver consistent with meta static disease and bilateral adrenal masses suspicious for metastatic disease  - CA 19-9 greater than 4000  --Oncology consulted-will need tissue biopsy before definitive recommendations can be made. Unable to offer palliative chemo or radiation due to poor performance. Prognosis very poor, hospice recommended     Hypokalemia-stable  - replete as needed     COPD-stable  - Pulmonary consultation  - Chronic respiratory failure with 2 L of oxygen via nasal cannula at home     Leukocytosis-resolved  - Cultures negative     A. fib  Supratherapeutic anticoagulation  coagulopathy  Acute anemia  -On diltiazem.   -Her hemoglobin has been slowly declining requiring repletion and she is FOBT positive  -Required transfusion hemoglobin today 6.2 receiving 2 units  Total of 6 units PRBCs  -Episode of A. fib with RVR heart rate 110-150  -Eliquis has been discontinued due to bleeding and liver failure  Cardiology consulted      Elevated d-dimer  - Likely multifactorial   - CTA chest negative for PE  - Duplex US lower extremities negative for DVT  - Duplex US upper extremities showing superficial thrombophlebitis in left median/antecubital veins  -Repeat upper extremity Doppler study negative  -Discontinued Eliquis due to anemia      Hypotension  We will start midodrine    Anemia iron deficiency  Blood loss anemia  -Hemoglobin trend 6.6->8.1->7.2->6.2 today  -Has received total of 6 units PRBCs  -See if both IV and p.o. iron replacement  -FOBT positive  -Heme-onc and GI following  -We will transfuse for hemoglobin <7.0,   -Eliquis discontinued    DVT Prophylaxis: Discontinued Eliquis, SCDs  GI PPx: Protonix  Code Status: Full  POA:    Discharge Barriers:   · Accept[facility] at [facility]  · Attempt transfer to other facility that has oncology surgery all facilities currently on diversion and not wait listing    Care Plan discussed with: patient and nursing and IDR rounds  Discussed case with general surgery who suggested transfer the patient to tertiary care center that has oncology surgery    Total time spent with patient: >35 minutes.

## 2022-01-09 NOTE — CONSULTS
1/9/2022, 3:31 PM          [facility]  Phone: [phone]        Cardiology Consultation     1/9/2022, 3:31 PM        Sharon Pringle  78 y.o. female  1945        Admit Date:  12/6/2021     Primary Cardiologist:  Dr. Ildefonso Cochran  Reason for consult: A. fib with RVR  Requesting provider: Jonnie Baron NP     Impression/Plan:       1. 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 I will add metoprolol to patient's current regimen of diltiazem for rate control. Patient's Eliquis is on hold due to patient's severe anemia. I will check patient's TSH level to rule out any coexistent hyperthyroidism. I will also repeat patient echocardiogram for follow-up on patient's LV function and valvular heart disease. 2. COPD  3. Severe pulmonary hypertension.        Subjective      This is a 81-year-old  female with a history of pancreatic cancer with mets to the liver, atrial fibrillation, congestive heart failure, renal cell carcinoma, COPD, moderate to severe pulmonary hypertension and Sjogren's syndrome who presented to emergency room with complaints of abdominal pain, nausea and vomiting with markedly elevated lipase. Patient chest x-ray and CT scan while in hospital has been consistent with moderate to large bilateral pleural effusion with associated right middle lobe, right lower lobe and left lower lobe atelectasis and abdominal ascites. Patient today in the hospital on telemetry bed has been noted for atrial fibrillation with rapid ventricular response with heart rate up to 130 bpm.  Cardiology consultation has been requested for management of patient's atrial fibrillation. Patient on questioning denies chest pain or dyspnea.   She has feeling of palpitations/heart racing.     Past medical, Family & Social History   The following medical history was reviewed     Past Medical History:        Past Medical History:   Diagnosis Date    A-fib ([facility]      CHF (congestive heart failure) (HCC)      Clear cell renal cell carcinoma (HCC)      COPD (chronic obstructive pulmonary disease) (HCC)      Fibromyalgia      GERD (gastroesophageal reflux disease)      Lymphedema      Sjogren's syndrome ([facility]      Thyroid nodule              Past Surgical History:   Procedure Laterality Date    COLONOSCOPY N/A 11/18/2020     COLONOSCOPY performed by Huong Scherer MD at [address] [place] [address] HX GI         EGD    HX HYSTERECTOMY        HX NEPHRECTOMY Right 2019    HX ORTHOPAEDIC         ankle         Social History:  Social History            Socioeconomic History    Marital status:        Spouse name: Not on file    Number of children: Not on file    Years of education: Not on file    Highest education level: Not on file   Occupational History    Not on file   Tobacco Use    Smoking status: Current Every Day Smoker       Packs/day: 0.25       Types: Cigarettes    Smokeless tobacco: Never Used   Vaping Use    Vaping Use: Never used   Substance and Sexual Activity    Alcohol use: Not Currently    Drug use: Never    Sexual activity: Not Currently   Other Topics Concern    Not on file   Social History Narrative    Not on file      Social Determinants of Health          Financial Resource Strain:     Difficulty of Paying Living Expenses: Not on file   Food Insecurity:     Worried About Running Out of Food in the Last Year: Not on file    Cherie of Food in the Last Year: Not on file   Transportation Needs:     Lack of Transportation (Medical): Not on file    Lack of Transportation (Non-Medical):  Not on file   Physical Activity:     Days of Exercise per Week: Not on file    Minutes of Exercise per Session: Not on file   Stress:     Feeling of Stress : Not on file   Social Connections:     Frequency of Communication with Friends and Family: Not on file  Frequency of Social Gatherings with Friends and Family: Not on file    Attends Yazdanism Services: Not on file    Active Member of Clubs or Organizations: Not on file    Attends Club or Organization Meetings: Not on file    Marital Status: Not on file   Intimate Partner Violence:     Fear of Current or Ex-Partner: Not on file    Emotionally Abused: Not on file    Physically Abused: Not on file    Sexually Abused: Not on file   Housing Stability:     Unable to Pay for Housing in the Last Year: Not on file    Number of [place] in the Last Year: Not on file    Unstable Housing in the Last Year: Not on file         Family History:         Family History   Problem Relation Age of Onset    Hypertension Mother      Stroke Mother      Stroke Father      Hypertension Father           Medications & Allergies      Allergies: Allergies   Allergen Reactions    Adhesive Tape-Silicones Atopic Dermatitis         Home Medications:          Prior to Admission medications    Medication Sig Start Date End Date Taking? Authorizing Provider   albuterol (PROVENTIL HFA, VENTOLIN HFA, PROAIR HFA) 90 mcg/actuation inhaler Take 2 Puffs by inhalation every six (6) hours as needed for Wheezing.     Yes Provider, Historical   pantoprazole (PROTONIX) 40 mg tablet Take 40 mg by mouth daily.     Yes Provider, Historical   sertraline (Zoloft) 25 mg tablet Take 25 mg by mouth daily.     Yes Provider, Historical   Se-Tan Plus 162-115. 2-1 mg cap Take 1 Capsule by mouth two (2) times a day.  6/27/21   Yes Provider, Historical   traZODone (DESYREL) 50 mg tablet TAKE ONE TABLET BY MOUTH AT BEDTIME 7/31/21   Yes Provider, Historical   potassium chloride SR (KLOR-CON 10) 10 mEq tablet Take 20 mEq by mouth daily.     Yes Provider, Historical   bumetanide (BUMEX) 1 mg tablet Take 1 mg by mouth daily.     Yes Provider, Historical   dilTIAZem ER (DILACOR XR) 120 mg capsule Take 120 mg by mouth daily.     Yes Provider, Historical atorvastatin (LIPITOR) 40 mg tablet Take 40 mg by mouth daily.     Yes Provider, Historical   diazePAM (VALIUM) 5 mg tablet Take 5 mg by mouth every six (6) hours as needed for Anxiety.     Yes Provider, Historical   cholecalciferol (Vitamin D3) (1000 Units /25 mcg) tablet Take 1,000 Units by mouth daily.     Yes Provider, Historical   chlorthalidone (HYGROTON) 25 mg tablet Take 25 mg by mouth daily. 21     Provider, Historical   Eliquis 5 mg tablet Take 5 mg by mouth two (2) times a day. 21     Provider, Historical   budesonide-formoteroL (Symbicort) 80-4.5 mcg/actuation HFAA Take 2 Puffs by inhalation.       Provider, Historical   famotidine (PEPCID) 40 mg tablet Take 40 mg by mouth daily. Patient not taking: Reported on 2021       Provider, Historical   ascorbic acid, vitamin C, (Vitamin C) 500 mg tablet Take 500 mg by mouth daily.       Provider, Historical            REVIEW OF SYSTEMS     Review of Systems   Constitutional: Negative for chills, fever and weight loss. HENT: Negative for congestion and sore throat. Eyes: Negative for blurred vision and double vision. Respiratory: Negative for cough, shortness of breath and wheezing. Cardiovascular: Positive for palpitations. Negative for claudication and PND. Gastrointestinal: Positive for abdominal pain, nausea and vomiting. Negative for blood in stool. Genitourinary: Negative for dysuria and hematuria. Musculoskeletal: Negative for joint pain and myalgias. Skin: Negative for itching and rash. Neurological: Negative for dizziness, sensory change, focal weakness, loss of consciousness and headaches. Endo/Heme/Allergies: Negative for polydipsia. Does not bruise/bleed easily.    Psychiatric/Behavioral: Negative for depression and hallucinations.            Objective         Vitals, I/O's, Weight      24 hour vital signs  BP  Min: 97/49  Max: 145/76  Temp  Av.1 °F (36.7 °C)  Min: 98 °F (36.7 °C)  Max: 98.2 °F (36.8 °C)  Capillary Refill could not be evaluated. This SmartLink does not work with rows of the type: Custom List  Resp  Av.4  Min: 18  Max: 20  SpO2  Av.8 %  Min: 98 %  Max: 079 %  Systolic (05RQK), TBZ:302 , Min:97 , IAA:744   Diastolic (35QLF), HHY:99, Min:49, Max:80     Body mass index is 29.48 kg/m². No intake or output data in the 24 hours ending 22 153  No data found.           Admit weight: Weight: 84.4 kg (186 lb)        Exam         Constitutional Generally well without symptoms, No weight loss, no fever, chills or nausea or vominting and Well developed, well nourished in no apparent distress   HEENT normal atraumatic, no neck masses, normal thyroid   Cardiovascular Irregularly irregular, normal S1-S2. No murmur rubs or gallop. Jugulovenous distention absent. Carotid bruits absent. Pulmonary  diffuse scattered rhonchi throughout   Abnominal Soft, nondistended, diffuse tenderness bowel sounds present   Genitourinary Deferred   Muskuloskeletal No obvious joint deformities.   No clubbing   Neuro Alert and oriented   Psychiatric Demonstrates good judgement and insight into his or her medical condition   Extremities warm, well perfused   Skin Warm and dry            Labs            Lab Results   Component Value Date/Time     WBC 6.4 2022 05:38 AM     HGB 7.2 (L) 2022 05:38 AM     HCT 22.3 (L) 2022 05:38 AM     PLATELET 530  05:38 AM     MCV 95.7 2022 05:38 AM            Lab Results   Component Value Date/Time     Sodium 139 2022 05:38 AM     Potassium 3.8 2022 05:38 AM     Chloride 104 2022 05:38 AM     CO2 32 2022 05:38 AM     Anion gap 3 (L) 2022 05:38 AM     Glucose 87 2022 05:38 AM     BUN 30 (H) 2022 05:38 AM     Creatinine 0.66 2022 05:38 AM     BUN/Creatinine ratio 45 (H) 2022 05:38 AM     GFR est AA >60 2022 05:38 AM     GFR est non-AA >60 2022 05:38 AM     Calcium 7.5 (L) 2022 05:38 AM      Last Lipid:  No results found for: CHOL, CHOLX, CHLST, CHOLV, HDL, HDLP, LDL, LDLC, DLDLP, TGLX, TRIGL, TRIGP, CHHD, CHHDX        Lab Results   Component Value Date/Time     Troponin-I, Qt. <0.05 07/04/2021 01:25 PM            Lab Results   Component Value Date/Time     TSH 1.01 11/14/2020 07:00 AM            Imaging & Acillary Studies      EKG:    Atrial fibrillation with rapid ventricular response   Low voltage QRS   Abnormal QRS-T angle, consider primary T wave abnormality   Abnormal ECG        No orders to display         Echo:   11/13/20     ECHO ADULT COMPLETE 11/17/2020 11/17/2020     Interpretation Summary  · LV: Calculated LVEF is 74%. Normal cavity size, systolic function (ejection fraction normal) and diastolic function. Moderate concentric hypertrophy. Wall motion: normal.  · LA: Mildly dilated left atrium. · RA: Mildly dilated right atrium. · MV: Moderate mitral annular calcification. Mild mitral valve regurgitation is present. · TV: Mild tricuspid valve regurgitation is present. · PA: Pulmonary arterial systolic pressure is 62 mmHg. · IVC: Severely elevated central venous pressure (15+ mmHg); IVC diameter is larger than 21 mm and collapses less than 50% with respiration. · Pericardium: Small pericardial effusion.     Signed by: Meggan Shelley MD on 11/17/2020  5:26 PM           Total time spent:  60 minutes     Dr. Favio Barajas.  Jeremy Spence MD.PhD. [facility] [place]

## 2022-01-09 NOTE — PROGRESS NOTES
DC order noted. Chart reviewed. Current recommendations are [facility] care when a bed is available or hospice. Pt is receiving ongoing medical treatment and is not appropriate for DC home with outpt follow up. Annika Nash NP paged for modification of current DC to SNF order.

## 2022-01-09 NOTE — PROGRESS NOTES
Problem: Falls - Risk of  Goal: *Absence of Falls  Description: Document Sri Martinez Fall Risk and appropriate interventions in the flowsheet.   Outcome: Progressing Towards Goal  Note: Fall Risk Interventions:  Mobility Interventions: Bed/chair exit alarm,OT consult for ADLs,Patient to call before getting OOB,PT Consult for mobility concerns    Mentation Interventions: Bed/chair exit alarm,Adequate sleep, hydration, pain control    Medication Interventions: Bed/chair exit alarm,Patient to call before getting OOB,Teach patient to arise slowly    Elimination Interventions: Bed/chair exit alarm,Call light in reach    History of Falls Interventions: Bed/chair exit alarm         Problem: Pain  Goal: *Control of Pain  Outcome: Progressing Towards Goal     Problem: Nausea/Vomiting (Adult)  Goal: *Absence of nausea/vomiting  Outcome: Progressing Towards Goal

## 2022-01-09 NOTE — PROGRESS NOTES
Hospitalist Progress Note    Subjective:   Daily Progress Note: 1/9/2022 12:21 PM    Hospital Course: The patient is a 22-year-old woman with a PMH significant for atrial fibrillation, PUD, CHF, COPD on home O2 at 2 L, renal cell carcinoma status post nephrectomy, hypertension and morbid obesity. Patient was found to have obstructive jaundice and biliary stricture in early November of this year.  She underwent an ERCP with stent placement with brushings of the bile duct where there was noted to be a stricture.  These brushings were negative.  Patient then underwent a PET CT which demonstrated uptake in the head of the pancreas concerning for pancreatic mass. She subsequently underwent endoscopic ultrasound with ultrasound and FNA biopsy on 12/6/2021.  The procedure demonstrated abutment of the tumor with the portal vein without involvement of the SMA or SMV.  The total tumor was measured to be 2.7 cm by 3 cm.  This biopsy resulted as atypical cells with suspicion for malignancy. She admitted to the hospital on 2/9/2021 and severe pancreatitis after undergoing an EUS for pancreatic biopsy on 12/6/2021 and pancreatic mass. Her symptoms were abdominal pain, nausea and vomiting. CT scan revealing peripancreatic inflammation consistent with likely post biopsy pancreatitis and ascites. Her initial labs revealing elevated lipase, supratherapeutic INR and elevated D-dimer coagulopathy. CTA was positive for bilateral pleural effusions, negative for PE. Paracentesis with culture of ascitic fluid, blood cultures, urine cultures negative for growth. Patient had been started on IV meropenem for leukocytosis and anidulafungin for suspected intra-abdominal infection. CT with and without contrast revealing liver lesions consistent with metastasis, mass in the pancreatic head with biliary stent in place, bilateral pleural effusions, ascites and bilateral adrenal masses.   IR declined liver biopsy due to coagulopathy and felt she would not benefit. Doppler studies of upper and lower extremities negative for DVT, positive for superficial thrombophlebitis of left medial antecubital.  Pancreatic cancer is presumed but no firm diagnosis due to lack of tissue sample. Pancreatitis remaining persistent despite antibiotics. Attempted Dobbhoff and PEG J placement but failed due to reddened area presumed possible tumor infiltration in the duodenum. Due to poor performance and prognosis she is not a candidate for palliative XRT or chemo. The patient has been progressively worsening with debilitation, intermittently tolerating diet not stable to be discharged home with outpatient surgical oncology follow-up. She would benefit from transfer to tertiary care center where surgical oncology services are available for evaluation of pancreatic head mass and possible surgical intervention. 1/7 A. fib with RVR rate 110-140. Will consult cardiology and discuss anticoagulation being discontinued due to bleeding and liver failure. Subjective: Follow-up examination of patient at the bedside. Patient feels weak and tired. She is depressed. Her performance status has declined daily. She has required multiple transfusions for lower GI bleeding.       Current Facility-Administered Medications   Medication Dose Route Frequency    calcium carbonate (TUMS) chewable tablet 200 mg [elemental]  200 mg Oral TID WITH MEALS    pantoprazole (PROTONIX) 40 mg in 0.9% sodium chloride 10 mL injection  40 mg IntraVENous Q12H    loperamide (IMODIUM) capsule 2 mg  2 mg Oral Q4H PRN    metoprolol (LOPRESSOR) injection 5 mg  5 mg IntraVENous Q4H PRN    0.9% sodium chloride infusion 250 mL  250 mL IntraVENous PRN    [START ON 1/10/2022] cholecalciferol (VITAMIN D3) (5000 Units/125 mcg) tablet 5,000 Units  5,000 Units Oral Q7D    sertraline (ZOLOFT) tablet 25 mg  25 mg Oral QPM    atorvastatin (LIPITOR) tablet 40 mg  40 mg Oral QHS    furosemide (LASIX) tablet 40 mg  40 mg Oral DAILY    influenza vaccine 2021-22 (6 mos+)(PF) (FLUARIX/FLULAVAL/FLUZONE QUAD) injection 0.5 mL  1 Each IntraMUSCular PRIOR TO DISCHARGE    [Held by provider] apixaban (ELIQUIS) tablet 2.5 mg  2.5 mg Oral BID    ferrous sulfate tablet 325 mg  1 Tablet Oral DAILY WITH BREAKFAST    midodrine (PROAMATINE) tablet 10 mg  10 mg Oral TID WITH MEALS    sodium chloride (NS) flush 5-40 mL  5-40 mL IntraVENous PRN    zinc oxide-white petrolatum 17-57 % topical paste   Topical TID    cyclobenzaprine (FLEXERIL) tablet 10 mg  10 mg Oral TID PRN    HYDROmorphone (DILAUDID) injection 1 mg  1 mg IntraVENous Q4H PRN    oxyCODONE IR (ROXICODONE) tablet 5 mg  5 mg Oral Q6H PRN    docusate sodium (COLACE) capsule 100 mg  100 mg Oral BID    polyethylene glycol (MIRALAX) packet 17 g  17 g Oral DAILY    sorbitoL 70 % solution 30 mL  30 mL Oral DAILY PRN    bisacodyL (DULCOLAX) suppository 10 mg  10 mg Rectal DAILY PRN    hydrALAZINE (APRESOLINE) 20 mg/mL injection 20 mg  20 mg IntraVENous Q6H PRN    guaiFENesin ER (MUCINEX) tablet 600 mg  600 mg Oral Q12H    albuterol-ipratropium (DUO-NEB) 2.5 MG-0.5 MG/3 ML  3 mL Nebulization Q6H PRN    chlorthalidone (HYGROTON) tablet 25 mg  25 mg Oral DAILY    albuterol (PROVENTIL HFA, VENTOLIN HFA, PROAIR HFA) inhaler 2 Puff  2 Puff Inhalation Q6H PRN    ascorbic acid (vitamin C) (VITAMIN C) tablet 500 mg  500 mg Oral DAILY    diazePAM (VALIUM) tablet 5 mg  5 mg Oral Q6H PRN    dilTIAZem ER (CARDIZEM CD) capsule 120 mg  120 mg Oral DAILY    potassium chloride SR (KLOR-CON 10) tablet 20 mEq  20 mEq Oral DAILY    traZODone (DESYREL) tablet 50 mg  50 mg Oral QHS    ondansetron (ZOFRAN) injection 4 mg  4 mg IntraVENous Q6H PRN    acetaminophen (TYLENOL) tablet 650 mg  650 mg Oral Q4H PRN    prochlorperazine (COMPAZINE) with saline injection 10 mg  10 mg IntraVENous Q6H PRN        Review of Systems  Constitutional: Positive for malaise/fatigue.  Negative for fever. HENT: Negative. Respiratory: Positive for shortness of breath. Negative for cough. Cardiovascular: Negative for chest pain and leg swelling. Gastrointestinal: Positive for abdominal pain. Negative for nausea and vomiting. Genitourinary: No frequency, No dysuria, No hematuria  Integument/breast: No skin lesion(s)   Neurological: No Confusion, No headaches, No dizziness      Objective:     Visit Vitals  BP (!) 145/76   Pulse (!) 125   Temp 98 °F (36.7 °C)   Resp 18   Ht 5' 7\" (1.702 m)   Wt 94.1 kg (207 lb 7.3 oz)   SpO2 100%   BMI 32.49 kg/m²    O2 Flow Rate (L/min): 4 l/min O2 Device: Nasal cannula    Temp (24hrs), Av °F (36.7 °C), Min:97.8 °F (36.6 °C), Max:98.2 °F (36.8 °C)      No intake/output data recorded.  1901 -  0700  In: 1109.1 [P.O.:330]  Out: 400 [Urine:400]    PHYSICAL EXAM:  Constitutional: No acute distress  Skin: Extremities and face reveal no rashes. Multiple areas of ecchymosis in upper extremities. HEENT: Dobhoff in left nare. Sclerae anicteric. Extra-occular muscles are intact. No oral ulcers. The neck is supple and no masses. Cardiovascular: Regular rate and rhythm. +S1/S2. No murmur or gallop. Respiratory:  Rhonchi noted bilateral without wheezes. GI: Firm with hypoactive bowel sounds and tenderness to palpation in all 4 quadrant. Rectal: Deferred   Musculoskeletal: Upper and lower extremity peripheral edema present, likely third spacing. Able to move all ext  Neurological:  Generalized weakness. Patient is alert and oriented x3.  Cranial nerves II-XII grossly intact  Psychiatric: Mood appears appropriate       Data Review    Recent Results (from the past 24 hour(s))   CBC WITH AUTOMATED DIFF    Collection Time: 22  3:40 PM   Result Value Ref Range    WBC 5.7 3.6 - 11.0 K/uL    RBC 2.05 (L) 3.80 - 5.20 M/uL    HGB 6.2 (L) 11.5 - 16.0 g/dL    HCT 19.5 (L) 35.0 - 47.0 %    MCV 95.1 80.0 - 99.0 FL    MCH 30.2 26.0 - 34.0 PG    MCHC 31.8 30.0 - 36.5 g/dL    RDW 15.7 (H) 11.5 - 14.5 %    PLATELET 965 (L) 790 - 400 K/uL    MPV 11.1 8.9 - 12.9 FL    NRBC 0.0 0.0  WBC    ABSOLUTE NRBC 0.00 0.00 - 0.01 K/uL    NEUTROPHILS 69 32 - 75 %    LYMPHOCYTES 20 12 - 49 %    MONOCYTES 7 5 - 13 %    EOSINOPHILS 3 0 - 7 %    BASOPHILS 0 0 - 1 %    IMMATURE GRANULOCYTES 1 (H) 0 - 0.5 %    ABS. NEUTROPHILS 3.9 1.8 - 8.0 K/UL    ABS. LYMPHOCYTES 1.1 0.8 - 3.5 K/UL    ABS. MONOCYTES 0.4 0.0 - 1.0 K/UL    ABS. EOSINOPHILS 0.2 0.0 - 0.4 K/UL    ABS. BASOPHILS 0.0 0.0 - 0.1 K/UL    ABS. IMM. GRANS. 0.1 (H) 0.00 - 0.04 K/UL    DF AUTOMATED     CBC WITH AUTOMATED DIFF    Collection Time: 01/08/22  5:44 PM   Result Value Ref Range    WBC 7.0 3.6 - 11.0 K/uL    RBC 2.34 (L) 3.80 - 5.20 M/uL    HGB 7.1 (L) 11.5 - 16.0 g/dL    HCT 22.3 (L) 35.0 - 47.0 %    MCV 95.3 80.0 - 99.0 FL    MCH 30.3 26.0 - 34.0 PG    MCHC 31.8 30.0 - 36.5 g/dL    RDW 15.7 (H) 11.5 - 14.5 %    PLATELET 824 803 - 141 K/uL    MPV 11.1 8.9 - 12.9 FL    NRBC 0.0 0.0  WBC    ABSOLUTE NRBC 0.00 0.00 - 0.01 K/uL    NEUTROPHILS 71 32 - 75 %    LYMPHOCYTES 19 12 - 49 %    MONOCYTES 6 5 - 13 %    EOSINOPHILS 3 0 - 7 %    BASOPHILS 0 0 - 1 %    IMMATURE GRANULOCYTES 1 (H) 0 - 0.5 %    ABS. NEUTROPHILS 5.0 1.8 - 8.0 K/UL    ABS. LYMPHOCYTES 1.3 0.8 - 3.5 K/UL    ABS. MONOCYTES 0.4 0.0 - 1.0 K/UL    ABS. EOSINOPHILS 0.2 0.0 - 0.4 K/UL    ABS. BASOPHILS 0.0 0.0 - 0.1 K/UL    ABS. IMM.  GRANS. 0.1 (H) 0.00 - 0.04 K/UL    DF AUTOMATED     METABOLIC PANEL, BASIC    Collection Time: 01/09/22  5:38 AM   Result Value Ref Range    Sodium 139 136 - 145 mmol/L    Potassium 3.8 3.5 - 5.1 mmol/L    Chloride 104 97 - 108 mmol/L    CO2 32 21 - 32 mmol/L    Anion gap 3 (L) 5 - 15 mmol/L    Glucose 87 65 - 100 mg/dL    BUN 30 (H) 6 - 20 mg/dL    Creatinine 0.66 0.55 - 1.02 mg/dL    BUN/Creatinine ratio 45 (H) 12 - 20      GFR est AA >60 >60 ml/min/1.73m2    GFR est non-AA >60 >60 ml/min/1.73m2    Calcium 7.5 (L) 8.5 - 10.1 mg/dL   CBC WITH AUTOMATED DIFF    Collection Time: 01/09/22  5:38 AM   Result Value Ref Range    WBC 6.4 3.6 - 11.0 K/uL    RBC 2.33 (L) 3.80 - 5.20 M/uL    HGB 7.2 (L) 11.5 - 16.0 g/dL    HCT 22.3 (L) 35.0 - 47.0 %    MCV 95.7 80.0 - 99.0 FL    MCH 30.9 26.0 - 34.0 PG    MCHC 32.3 30.0 - 36.5 g/dL    RDW 15.9 (H) 11.5 - 14.5 %    PLATELET 542 887 - 246 K/uL    MPV 11.5 8.9 - 12.9 FL    NRBC 0.0 0.0  WBC    ABSOLUTE NRBC 0.00 0.00 - 0.01 K/uL    NEUTROPHILS 76 (H) 32 - 75 %    LYMPHOCYTES 14 12 - 49 %    MONOCYTES 6 5 - 13 %    EOSINOPHILS 2 0 - 7 %    BASOPHILS 1 0 - 1 %    IMMATURE GRANULOCYTES 1 (H) 0 - 0.5 %    ABS. NEUTROPHILS 4.9 1.8 - 8.0 K/UL    ABS. LYMPHOCYTES 0.9 0.8 - 3.5 K/UL    ABS. MONOCYTES 0.4 0.0 - 1.0 K/UL    ABS. EOSINOPHILS 0.1 0.0 - 0.4 K/UL    ABS. BASOPHILS 0.0 0.0 - 0.1 K/UL    ABS. IMM. GRANS. 0.1 (H) 0.00 - 0.04 K/UL    DF AUTOMATED         XR CHEST PORT   Final Result      XR CHEST PORT   Final Result   Findings/impression:      Stable positioning of right upper extremity PICC, tip projects over the upper   SVC. Cardiac silhouette is enlarged. No pulmonary edema. Bilateral pleural effusions. No evidence of pneumothorax. No acute osseous abnormality identified. DUPLEX UPPER EXT VENOUS BILAT   Final Result      XR CHEST PORT   Final Result   Large effusions. Vascular congestion. CT ABD PELV W CONT   Final Result   1. There are increasing bilateral pleural effusions, increasing body wall   edema, and increasing ascites. These findings could be secondary to the third   spacing of fluids. The differential diagnosis for the ascites would include   pancreatic ascites with acute pancreatitis or metastatic disease to the   peritoneum. 2.  There is a biliary stent which is unchanged in its position traversing   throughout area of obstruction within the pancreatic head.    3.  There are multiple low-density lesions of the liver without short-term   interval changes consistent with metastatic disease. 4.  The right kidney is not identified and apparently the patient is status post   a previous right nephrectomy. 5.  There are bilateral adrenal masses suspect for metastatic disease without   short-term interval changes. CTA CHEST W OR W WO CONT   Final Result   No CT evidence for PE. Thoracic aorta atherosclerosis. CAD. Moderate to large bilateral pleural effusions with associated RML, RLL, and LLL   compressive atelectasis. Abdominal ascites. DUPLEX LOWER EXT VENOUS BILAT   Final Result      XR CHEST PORT   Final Result   FINDINGS: IMPRESSION: 2 frontal views of the chest. Right PICC distal tip SVC   region. Enlarging cardiomegaly. Increasing vascular congestion. Interval development of   hypoinflation, pleural effusions, lower lung airspace edema and/or pneumonia. No   pneumothorax. No free air under the diaphragm. CT ABD PELV W CONT   Final Result   New moderate volume dependent pleural effusions with associated   lower lobe lung compressive atelectasis. Increasing ascites, moderate approaching large-volume   (fluid could be sampled if there is any clinical concern for bile content). High suspicion hepatic metastases. Similar appearance for the pancreas; Silastic stent in place. No pseudocyst formation. Unchanged appearance bilateral adrenal glands. No bowel obstruction. US ABD LTD   Final Result   Small amount of free fluid. Finding suggesting hemangioma in the   liver. Gallbladder wall thickening, adenomyomatosis, sludge and gallstones. Cholecystitis possible. Finding in the region of the pancreatic head as above. Other findings as above. CTA ABDOMEN PELV W CONT   Final Result   1. Ill-defined hypodense mass in the pancreas.  There are inflammatory changes   and fluid adjacent to the pancreas or duodenum and stomach most likely related   to biopsy or focal pancreatitis. No pseudocyst formation, active bleeding or   other acute findings. 2. Enlarged adrenal glands left greater than right with noncontrast densities   consistent with adrenal adenomas. 3. Right nephrectomy. 4. Other findings as described. XR CHEST PORT   Final Result   No acute findings. IR PARACENTESIS ABD W IMAGE    (Results Pending)       Principal Problem:    Pancreatitis (12/9/2021)    Active Problems:    A-fib ([address] (11/16/2020)      CHF (congestive heart failure) ([facility] [address] (11/16/2020)      Hematemesis (12/12/2021)      History of peptic ulcer disease (12/12/2021)      Pancreatic mass (1/5/2022)        Assessment/Plan:     Acute pancreatitis  - Status post EUS by Dr. Cristela Milligan 12/06  - Continue pain medication  - Lipase variable, will likely be chronically elevated   - CT abd/pelvis with PO and IV contrast showing increasing bilateral pleural effusion and increasing ascites. Also notes multiple low-density lesions of liver consistent with metastatic disease and bilateral adrenal masses suspected for metastatic disease.  - completed 14 days of IV merrem   - Continue empiric Anidulafungin 4 more days  - paracentesis on 12/27 with 2300 mL clear serous fluid drained, cytology showing now growth. - She was scheduled for liver biopsy with IR however IR does not feel patient would benefit from biopsy at this time.   -Unsuccessful attempt to place PEG J due to tumor infiltration and duodenum     CHF  - Continue chlorthalidone 25 mg daily  - IV lasix 40 mg daily     Pancreatic mass  Liver masses  Adrenal masses  History of renal cell carcinoma with lung nodules status post right nephrectomy  Multiple low-density lesions of the liver consistent with meta static disease and bilateral adrenal masses suspicious for metastatic disease  - CA 19-9 greater than 4000  --Oncology consulted-will need tissue biopsy before definitive recommendations can be made.   Unable to offer palliative chemo or radiation due to poor performance. Prognosis very poor, hospice recommended     Hypokalemia-stable  - replete as needed     COPD-stable  - Pulmonary consultation  - Chronic respiratory failure with 2 L of oxygen via nasal cannula at home     Leukocytosis-resolved  - Cultures negative     A. fib  Supratherapeutic anticoagulation  coagulopathy  Acute anemia  -On diltiazem. -Her hemoglobin has been slowly declining requiring repletion and she is FOBT positive  -Required transfusion hemoglobin today 6.2 receiving 2 units  Total of 6 units PRBCs  -Episode of A. fib with RVR heart rate 110-150  -Eliquis has been discontinued due to bleeding and liver failure  Cardiology consulted      Elevated d-dimer  - Likely multifactorial   - CTA chest negative for PE  - Duplex US lower extremities negative for DVT  - Duplex US upper extremities showing superficial thrombophlebitis in left median/antecubital veins  -Repeat upper extremity Doppler study negative  -Discontinued Eliquis due to anemia      Hypotension  We will start midodrine    Anemia iron deficiency  Blood loss anemia  -Hemoglobin trend 6.6->8.1->7.2->6.2->7.2 today   -Has received total of 6 units PRBCs  -Has received both IV and p.o. iron replacement  -FOBT positive  -Heme-onc and GI following  -We will transfuse for hemoglobin <7.0,   -Eliquis discontinued    DVT Prophylaxis: Discontinued Eliquis, SCDs  GI PPx: Protonix  Code Status: Full  POA:    Discharge Barriers:   · Accept[facility] at [facility]  · Multiple attempts transfer to other facility that has oncology surgery all facilities currently on diversion and not wait listing  · Has declined discussions concerning hospice despite poor prognosis and poor performance    Care Plan discussed with: patient and nursing and IDR rounds. Discussed case with general surgery who suggested transfer the patient to tertiary [facility] center that has oncology surgery for possible surgical intervention.   Likely she is too unstable. Total time spent with patient: >35 minutes.

## 2022-01-09 NOTE — CONSULTS
1/9/2022, 3:31 PM        Delisa Ojeda at [facility]  Phone: [phone]      Cardiology Consultation    1/9/2022, 3:31 PM      Ida Miller  78 y.o. female  1945      Admit Date:  12/6/2021    Primary Cardiologist:  Dr. Joy Kirk  Reason for consult: A. fib with RVR  Requesting provider: Mohini Brooke NP    Impression/Plan:      1. 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 I will add metoprolol to patient's current regimen of diltiazem for rate control. Patient's Eliquis is on hold due to patient's severe anemia. I will check patient's TSH level to rule out any coexistent hyperthyroidism. I will also repeat patient echocardiogram for follow-up on patient's LV function and valvular heart disease. 2. COPD  3. Severe pulmonary hypertension. Subjective     This is a 30-year-old  female with a history of pancreatic cancer with mets to the liver, atrial fibrillation, congestive heart failure, renal cell carcinoma, COPD, moderate to severe pulmonary hypertension and Sjogren's syndrome who presented to emergency room with complaints of abdominal pain, nausea and vomiting with markedly elevated lipase. Patient chest x-ray and CT scan while in hospital has been consistent with moderate to large bilateral pleural effusion with associated right middle lobe, right lower lobe and left lower lobe atelectasis and abdominal ascites. Patient today in the hospital on telemetry bed has been noted for atrial fibrillation with rapid ventricular response with heart rate up to 130 bpm.  Cardiology consultation has been requested for management of patient's atrial fibrillation. Patient on questioning denies chest pain or dyspnea. She has feeling of palpitations/heart racing.     Past medical, Family & Social History   The following medical history was reviewed    Past Medical History:   Past Medical History:   Diagnosis Date    A[facility]fib ([facility]     CHF (congestive heart failure) ([facility]     Clear cell renal cell carcinoma (HCC)     COPD (chronic obstructive pulmonary disease) (HCC)     Fibromyalgia     GERD (gastroesophageal reflux disease)     Lymphedema     Sjogren's syndrome ([facility]     Thyroid nodule       Past Surgical History:   Procedure Laterality Date    COLONOSCOPY N/A 11/18/2020    COLONOSCOPY performed by Victor M Nair MD at [address] [place] [address] HX GI      EGD    HX HYSTERECTOMY      HX NEPHRECTOMY Right 2019    HX ORTHOPAEDIC      ankle       Social History:  Social History     Socioeconomic History    Marital status:      Spouse name: Not on file    Number of children: Not on file    Years of education: Not on file    Highest education level: Not on file   Occupational History    Not on file   Tobacco Use    Smoking status: Current Every Day Smoker     Packs/day: 0.25     Types: Cigarettes    Smokeless tobacco: Never Used   Vaping Use    Vaping Use: Never used   Substance and Sexual Activity    Alcohol use: Not Currently    Drug use: Never    Sexual activity: Not Currently   Other Topics Concern    Not on file   Social History Narrative    Not on file     Social Determinants of Health     Financial Resource Strain:     Difficulty of Paying Living Expenses: Not on file   Food Insecurity:     Worried About Running Out of Food in the Last Year: Not on file    Cherie of Food in the Last Year: Not on file   Transportation Needs:     Lack of Transportation (Medical): Not on file    Lack of Transportation (Non-Medical):  Not on file   Physical Activity:     Days of Exercise per Week: Not on file    Minutes of Exercise per Session: Not on file   Stress:     Feeling of Stress : Not on file   Social Connections:     Frequency of Communication with Friends and Family: Not on file    Frequency of Social Gatherings with Friends and Family: Not on file    Attends Anabaptist Services: Not on file    Active Member of Clubs or Organizations: Not on file    Attends Club or Organization Meetings: Not on file    Marital Status: Not on file   Intimate Partner Violence:     Fear of Current or Ex-Partner: Not on file    Emotionally Abused: Not on file    Physically Abused: Not on file    Sexually Abused: Not on file   Housing Stability:     Unable to Pay for Housing in the Last Year: Not on file    Number of [place] in the Last Year: Not on file    Unstable Housing in the Last Year: Not on file       Family History:   Family History   Problem Relation Age of Onset    Hypertension Mother     Stroke Mother     Stroke Father     Hypertension Father        Medications & Allergies     Allergies: Allergies   Allergen Reactions    Adhesive Tape-Silicones Atopic Dermatitis       Home Medications:  Prior to Admission medications    Medication Sig Start Date End Date Taking? Authorizing Provider   albuterol (PROVENTIL HFA, VENTOLIN HFA, PROAIR HFA) 90 mcg/actuation inhaler Take 2 Puffs by inhalation every six (6) hours as needed for Wheezing. Yes Provider, Historical   pantoprazole (PROTONIX) 40 mg tablet Take 40 mg by mouth daily. Yes Provider, Historical   sertraline (Zoloft) 25 mg tablet Take 25 mg by mouth daily. Yes Provider, Historical   Se-Tan Plus 162-115. 2-1 mg cap Take 1 Capsule by mouth two (2) times a day. 6/27/21  Yes Provider, Historical   traZODone (DESYREL) 50 mg tablet TAKE ONE TABLET BY MOUTH AT BEDTIME 7/31/21  Yes Provider, Historical   potassium chloride SR (KLOR-CON 10) 10 mEq tablet Take 20 mEq by mouth daily. Yes Provider, Historical   bumetanide (BUMEX) 1 mg tablet Take 1 mg by mouth daily. Yes Provider, Historical   dilTIAZem ER (DILACOR XR) 120 mg capsule Take 120 mg by mouth daily. Yes Provider, Historical   atorvastatin (LIPITOR) 40 mg tablet Take 40 mg by mouth daily.    Yes Provider, Historical diazePAM (VALIUM) 5 mg tablet Take 5 mg by mouth every six (6) hours as needed for Anxiety. Yes Provider, Historical   cholecalciferol (Vitamin D3) (1000 Units /25 mcg) tablet Take 1,000 Units by mouth daily. Yes Provider, Historical   chlorthalidone (HYGROTON) 25 mg tablet Take 25 mg by mouth daily. 21   Provider, Historical   Eliquis 5 mg tablet Take 5 mg by mouth two (2) times a day. 21   Provider, Historical   budesonide-formoteroL (Symbicort) 80-4.5 mcg/actuation HFAA Take 2 Puffs by inhalation. Provider, Historical   famotidine (PEPCID) 40 mg tablet Take 40 mg by mouth daily. Patient not taking: Reported on 2021    Provider, Historical   ascorbic acid, vitamin C, (Vitamin C) 500 mg tablet Take 500 mg by mouth daily. Provider, Historical         REVIEW OF SYSTEMS    Review of Systems   Constitutional: Negative for chills, fever and weight loss. HENT: Negative for congestion and sore throat. Eyes: Negative for blurred vision and double vision. Respiratory: Negative for cough, shortness of breath and wheezing. Cardiovascular: Positive for palpitations. Negative for claudication and PND. Gastrointestinal: Positive for abdominal pain, nausea and vomiting. Negative for blood in stool. Genitourinary: Negative for dysuria and hematuria. Musculoskeletal: Negative for joint pain and myalgias. Skin: Negative for itching and rash. Neurological: Negative for dizziness, sensory change, focal weakness, loss of consciousness and headaches. Endo/Heme/Allergies: Negative for polydipsia. Does not bruise/bleed easily. Psychiatric/Behavioral: Negative for depression and hallucinations. Objective       Vitals, I/O's, Weight     24 hour vital signs  BP  Min: 97/49  Max: 145/76  Temp  Av.1 °F (36.7 °C)  Min: 98 °F (36.7 °C)  Max: 98.2 °F (36.8 °C)  Capillary Refill could not be evaluated.  This SmartLink does not work with rows of the type: Custom List  Resp  Av.4 Min: 18  Max: 20  SpO2  Av.8 %  Min: 98 %  Max: 950 %  Systolic (23JTW), FGP:884 , Min:97 , DY   Diastolic (37EUD), PIM:73, Min:49, Max:80    Body mass index is 29.48 kg/m². No intake or output data in the 24 hours ending 22 1531  No data found. Admit weight: Weight: 84.4 kg (186 lb)      Exam       Constitutional Generally well without symptoms, No weight loss, no fever, chills or nausea or vominting and Well developed, well nourished in no apparent distress   HEENT normal atraumatic, no neck masses, normal thyroid   Cardiovascular Irregularly irregular, normal S1-S2. No murmur rubs or gallop. Jugulovenous distention absent. Carotid bruits absent. Pulmonary  diffuse scattered rhonchi throughout   Abnominal Soft, nondistended, diffuse tenderness bowel sounds present   Genitourinary Deferred   Muskuloskeletal No obvious joint deformities.   No clubbing   Neuro Alert and oriented   Psychiatric Demonstrates good judgement and insight into his or her medical condition   Extremities warm, well perfused   Skin Warm and dry         Labs     Lab Results   Component Value Date/Time    WBC 6.4 2022 05:38 AM    HGB 7.2 (L) 2022 05:38 AM    HCT 22.3 (L) 2022 05:38 AM    PLATELET 291  05:38 AM    MCV 95.7 2022 05:38 AM     Lab Results   Component Value Date/Time    Sodium 139 2022 05:38 AM    Potassium 3.8 2022 05:38 AM    Chloride 104 2022 05:38 AM    CO2 32 2022 05:38 AM    Anion gap 3 (L) 2022 05:38 AM    Glucose 87 2022 05:38 AM    BUN 30 (H) 2022 05:38 AM    Creatinine 0.66 2022 05:38 AM    BUN/Creatinine ratio 45 (H) 2022 05:38 AM    GFR est AA >60 2022 05:38 AM    GFR est non-AA >60 2022 05:38 AM    Calcium 7.5 (L) 2022 05:38 AM      Last Lipid:  No results found for: CHOL, CHOLX, CHLST, CHOLV, HDL, HDLP, LDL, LDLC, DLDLP, TGLX, TRIGL, TRIGP, CHHD, CHHDX  Lab Results   Component Value Date/Time    Troponin-I, Qt. <0.05 07/04/2021 01:25 PM      Lab Results   Component Value Date/Time    TSH 1.01 11/14/2020 07:00 AM          Imaging & Acillary Studies     EKG:    Atrial fibrillation with rapid ventricular response   Low voltage QRS   Abnormal QRS-T angle, consider primary T wave abnormality   Abnormal ECG      No orders to display       Echo:   11/13/20    ECHO ADULT COMPLETE 11/17/2020 11/17/2020    Interpretation Summary  · LV: Calculated LVEF is 74%. Normal cavity size, systolic function (ejection fraction normal) and diastolic function. Moderate concentric hypertrophy. Wall motion: normal.  · LA: Mildly dilated left atrium. · RA: Mildly dilated right atrium. · MV: Moderate mitral annular calcification. Mild mitral valve regurgitation is present. · TV: Mild tricuspid valve regurgitation is present. · PA: Pulmonary arterial systolic pressure is 62 mmHg. · IVC: Severely elevated central venous pressure (15+ mmHg); IVC diameter is larger than 21 mm and collapses less than 50% with respiration. · Pericardium: Small pericardial effusion. Signed by: Jenna Salguero MD on 11/17/2020  5:26 PM        Total time spent:  60 minutes    Dr. Bentley Salas.  Derrick Rios MD.PhD. [facility]

## 2022-01-09 NOTE — PROGRESS NOTES
Progress Note    Patient: Rip Demarco MRN: [id number]  SSN: [id number]    YOB: 1945  Age: 78 y.o. Sex: female      Admit Date: 12/9/2021    LOS: 31 days     Subjective:   GI in consultation for Pancreatitis. Patient is alert and oriented, she is weak and reports she is not \"doing well\" She did have dark liquid stools yesterday her hgB dropped to 6.2 and she received 2 units of PRBC's. Her hgB this morning is  7.2. She has received a total of 6 units of PRBC's. She reports diarrhea x 2 today. Her eliquis is on hold. She does complain of acid reflux with food. Increased Protonix to twice daily and she requested TUMS. She reports she does have a history of peptic ulcers. She reports she is still nauseated but has not had any further emesis. Encouraged drinking the Ensure clear, she reports the regular Ensure is too sweet for her liking. Dobhoff catheter placed endoscopically but patient inadvertently removed the tube. She underwent EGD for GJ tube placement but tube was unable to be placed due to concern for tumor in duodenum and inflammation. Plan is to transfer her to another facility for surgical oncology consult for pancreatic cancer. Primary is working on transfer. She does have right arm edema. Her last lipase was 2,244 no new results at this time. Will add her to Tumor Board on Wednesday morning. Patient reports she is not ready to \"give up\"    12/30 EGD with Dobhoff placement for feeding - removed by patient. 12/27/21 Paracentesis - 2300ml ascites fluid removed. 12/23/21 CT abdomen  1.  There are increasing bilateral pleural effusions, increasing body wall  edema, and increasing ascites. These findings could be secondary to the third spacing of fluids.  The differential diagnosis for the ascites would include pancreatic ascites with acute pancreatitis or metastatic disease to the peritoneum.    2. Candelario Mariscal is a biliary stent which is unchanged in its position traversing throughout area of obstruction within the pancreatic head. 3. Cathye Gauss are multiple low-density lesions of the liver without short-term  interval changes consistent with metastatic disease. 4.  The right kidney is not identified and apparently the patient is status post a previous right nephrectomy. 5.  There are bilateral adrenal masses suspect for metastatic disease without short-term interval changes. 12/6/2021 EUS showing 2.7 X3 cm lesion in the area of head of pancreas with dilation of the Pancreatic duct abutting the portal vein but not involving the SMA or AMV ; Tumor T2 by endosonographic criteria ; one small lymph node in the area of head of pancreas.   11/22/2021 PET Scan    1.  FDG avid lesion in the pancreatic head consistent with malignancy. 2.  Subcentimeter focus of FDG uptake in the liver, indeterminate. 3.  FDG uptake associated with density expanding the right facet at C5-C6, possibly due to an ectatic artery. Objective:     Vitals:    01/09/22 0151 01/09/22 0304 01/09/22 0803 01/09/22 1040   BP: (!) 97/49 (!) 100/54 135/80    Pulse: 96 95 (!) 120    Resp: 20  18    Temp: 98 °F (36.7 °C)  98 °F (36.7 °C)    SpO2: 99%  99% 99%   Weight:       Height:            Intake and Output:  Current Shift: No intake/output data recorded. Last three shifts: 01/07 1901 - 01/09 0700  In: 1109.1 [P.O.:330]  Out: 400 [Urine:400]    Physical Exam:   Skin:  Extremities and face reveal no rashes. No chapin erythema. HEENT: Sclerae anicteric. Extra-occular muscles are intact. No abnormal pigmentation of the lips. The neck is supple. Cardiovascular: Regular rate and rhythm. Respiratory:  Comfortable breathing with no accessory muscle use. GI:  Abdomen nondistended, soft, and nontender. Rectal:  Deferred  Musculoskeletal: Generalized weakness  Neurological:  Gross memory appears intact. Patient is alert and oriented. Psychiatric:  Mood appears appropriate with judgement intact.   Lymphatic:  No visible adenopathy      Lab/Data Review:  Recent Results (from the past 24 hour(s))   CBC WITH AUTOMATED DIFF    Collection Time: 01/08/22  3:40 PM   Result Value Ref Range    WBC 5.7 3.6 - 11.0 K/uL    RBC 2.05 (L) 3.80 - 5.20 M/uL    HGB 6.2 (L) 11.5 - 16.0 g/dL    HCT 19.5 (L) 35.0 - 47.0 %    MCV 95.1 80.0 - 99.0 FL    MCH 30.2 26.0 - 34.0 PG    MCHC 31.8 30.0 - 36.5 g/dL    RDW 15.7 (H) 11.5 - 14.5 %    PLATELET 938 (L) 608 - 400 K/uL    MPV 11.1 8.9 - 12.9 FL    NRBC 0.0 0.0  WBC    ABSOLUTE NRBC 0.00 0.00 - 0.01 K/uL    NEUTROPHILS 69 32 - 75 %    LYMPHOCYTES 20 12 - 49 %    MONOCYTES 7 5 - 13 %    EOSINOPHILS 3 0 - 7 %    BASOPHILS 0 0 - 1 %    IMMATURE GRANULOCYTES 1 (H) 0 - 0.5 %    ABS. NEUTROPHILS 3.9 1.8 - 8.0 K/UL    ABS. LYMPHOCYTES 1.1 0.8 - 3.5 K/UL    ABS. MONOCYTES 0.4 0.0 - 1.0 K/UL    ABS. EOSINOPHILS 0.2 0.0 - 0.4 K/UL    ABS. BASOPHILS 0.0 0.0 - 0.1 K/UL    ABS. IMM. GRANS. 0.1 (H) 0.00 - 0.04 K/UL    DF AUTOMATED     CBC WITH AUTOMATED DIFF    Collection Time: 01/08/22  5:44 PM   Result Value Ref Range    WBC 7.0 3.6 - 11.0 K/uL    RBC 2.34 (L) 3.80 - 5.20 M/uL    HGB 7.1 (L) 11.5 - 16.0 g/dL    HCT 22.3 (L) 35.0 - 47.0 %    MCV 95.3 80.0 - 99.0 FL    MCH 30.3 26.0 - 34.0 PG    MCHC 31.8 30.0 - 36.5 g/dL    RDW 15.7 (H) 11.5 - 14.5 %    PLATELET 485 654 - 845 K/uL    MPV 11.1 8.9 - 12.9 FL    NRBC 0.0 0.0  WBC    ABSOLUTE NRBC 0.00 0.00 - 0.01 K/uL    NEUTROPHILS 71 32 - 75 %    LYMPHOCYTES 19 12 - 49 %    MONOCYTES 6 5 - 13 %    EOSINOPHILS 3 0 - 7 %    BASOPHILS 0 0 - 1 %    IMMATURE GRANULOCYTES 1 (H) 0 - 0.5 %    ABS. NEUTROPHILS 5.0 1.8 - 8.0 K/UL    ABS. LYMPHOCYTES 1.3 0.8 - 3.5 K/UL    ABS. MONOCYTES 0.4 0.0 - 1.0 K/UL    ABS. EOSINOPHILS 0.2 0.0 - 0.4 K/UL    ABS. BASOPHILS 0.0 0.0 - 0.1 K/UL    ABS. IMM.  GRANS. 0.1 (H) 0.00 - 0.04 K/UL    DF AUTOMATED     METABOLIC PANEL, BASIC    Collection Time: 01/09/22  5:38 AM   Result Value Ref Range    Sodium 139 136 - 145 mmol/L Potassium 3.8 3.5 - 5.1 mmol/L    Chloride 104 97 - 108 mmol/L    CO2 32 21 - 32 mmol/L    Anion gap 3 (L) 5 - 15 mmol/L    Glucose 87 65 - 100 mg/dL    BUN 30 (H) 6 - 20 mg/dL    Creatinine 0.66 0.55 - 1.02 mg/dL    BUN/Creatinine ratio 45 (H) 12 - 20      GFR est AA >60 >60 ml/min/1.73m2    GFR est non-AA >60 >60 ml/min/1.73m2    Calcium 7.5 (L) 8.5 - 10.1 mg/dL   CBC WITH AUTOMATED DIFF    Collection Time: 01/09/22  5:38 AM   Result Value Ref Range    WBC 6.4 3.6 - 11.0 K/uL    RBC 2.33 (L) 3.80 - 5.20 M/uL    HGB 7.2 (L) 11.5 - 16.0 g/dL    HCT 22.3 (L) 35.0 - 47.0 %    MCV 95.7 80.0 - 99.0 FL    MCH 30.9 26.0 - 34.0 PG    MCHC 32.3 30.0 - 36.5 g/dL    RDW 15.9 (H) 11.5 - 14.5 %    PLATELET 408 559 - 676 K/uL    MPV 11.5 8.9 - 12.9 FL    NRBC 0.0 0.0  WBC    ABSOLUTE NRBC 0.00 0.00 - 0.01 K/uL    NEUTROPHILS 76 (H) 32 - 75 %    LYMPHOCYTES 14 12 - 49 %    MONOCYTES 6 5 - 13 %    EOSINOPHILS 2 0 - 7 %    BASOPHILS 1 0 - 1 %    IMMATURE GRANULOCYTES 1 (H) 0 - 0.5 %    ABS. NEUTROPHILS 4.9 1.8 - 8.0 K/UL    ABS. LYMPHOCYTES 0.9 0.8 - 3.5 K/UL    ABS. MONOCYTES 0.4 0.0 - 1.0 K/UL    ABS. EOSINOPHILS 0.1 0.0 - 0.4 K/UL    ABS. BASOPHILS 0.0 0.0 - 0.1 K/UL    ABS. IMM. GRANS. 0.1 (H) 0.00 - 0.04 K/UL    DF AUTOMATED                XR CHEST PORT   Final Result      XR CHEST PORT   Final Result   Findings/impression:      Stable positioning of right upper extremity PICC, tip projects over the upper   SVC. Cardiac silhouette is enlarged. No pulmonary edema. Bilateral pleural effusions. No evidence of pneumothorax. No acute osseous abnormality identified. DUPLEX UPPER EXT VENOUS BILAT   Final Result      XR CHEST PORT   Final Result   Large effusions. Vascular congestion. CT ABD PELV W CONT   Final Result   1. There are increasing bilateral pleural effusions, increasing body wall   edema, and increasing ascites. These findings could be secondary to the third   spacing of fluids.  The differential diagnosis for the ascites would include   pancreatic ascites with acute pancreatitis or metastatic disease to the   peritoneum. 2.  There is a biliary stent which is unchanged in its position traversing   throughout area of obstruction within the pancreatic head. 3.  There are multiple low-density lesions of the liver without short-term   interval changes consistent with metastatic disease. 4.  The right kidney is not identified and apparently the patient is status post   a previous right nephrectomy. 5.  There are bilateral adrenal masses suspect for metastatic disease without   short-term interval changes. CTA CHEST W OR W WO CONT   Final Result   No CT evidence for PE. Thoracic aorta atherosclerosis. CAD. Moderate to large bilateral pleural effusions with associated RML, RLL, and LLL   compressive atelectasis. Abdominal ascites. DUPLEX LOWER EXT VENOUS BILAT   Final Result      XR CHEST PORT   Final Result   FINDINGS: IMPRESSION: 2 frontal views of the chest. Right PICC distal tip SVC   region. Enlarging cardiomegaly. Increasing vascular congestion. Interval development of   hypoinflation, pleural effusions, lower lung airspace edema and/or pneumonia. No   pneumothorax. No free air under the diaphragm. CT ABD PELV W CONT   Final Result   New moderate volume dependent pleural effusions with associated   lower lobe lung compressive atelectasis. Increasing ascites, moderate approaching large-volume   (fluid could be sampled if there is any clinical concern for bile content). High suspicion hepatic metastases. Similar appearance for the pancreas; Silastic stent in place. No pseudocyst formation. Unchanged appearance bilateral adrenal glands. No bowel obstruction. US ABD LTD   Final Result   Small amount of free fluid. Finding suggesting hemangioma in the   liver.   Gallbladder wall thickening, adenomyomatosis, sludge and gallstones. Cholecystitis possible. Finding in the region of the pancreatic head as above. Other findings as above. CTA ABDOMEN PELV W CONT   Final Result   1. Ill-defined hypodense mass in the pancreas. There are inflammatory changes   and fluid adjacent to the pancreas or duodenum and stomach most likely related   to biopsy or focal pancreatitis. No pseudocyst formation, active bleeding or   other acute findings. 2. Enlarged adrenal glands left greater than right with noncontrast densities   consistent with adrenal adenomas. 3. Right nephrectomy. 4. Other findings as described. XR CHEST PORT   Final Result   No acute findings. IR PARACENTESIS ABD W IMAGE    (Results Pending)       Assessment:     Principal Problem:    Pancreatitis (12/9/2021)    Active Problems:    A-fib ([address] (11/16/2020)      CHF (congestive heart failure) [facility] (11/16/2020)      Hematemesis (12/12/2021)      History of peptic ulcer disease (12/12/2021)      Pancreatic mass (1/5/2022)        Plan:   1. Pancreatitis      -12/30 s/p post pyloric tube placement. Patient removed tube on 12/31.         -Unable to place GJ tube due to  tumor in duodenum and food in the stomach and patient on Eliquis      -Lipase 2,244 this am       -repeat Lipase in the am       -Dilaudid 1 mg every  4 hours as needed for pain      -Oxycodone 5 mg every 6 hours for breakthrough pain      - S/P paracentesis 12/27/21 with removal of 2300 ml clear serous  Fluid removed      -cytology Slight acute inflammation.        -currently on Regular diet       -Tumor Board on Wednesday morning  2. CHF      -Continue Chlorthalidone  3. COPD       -Continue home medications       -Albuterol as needed   4.  Pancreatic Mass      - 22,358      -S/p fine needle aspiration suspicious for neoplasia but not diagnostic      -When stable Oncology plan for out patient followup with advanced oncology surgeon  At  or [facility] for resection considerations       -CT concern for liver metastais/lesions       -IR for liver biopsy on hold at this time  5. Hematemesis (resolved)  6. Afib with RVR     Eliquis on hold 2/2 to low HgB     Current heart rate 120     Continue diltiazem     Cardiology has been consulted  7. Diarrhea     Imodium as needed     \"Had another detailed discussion with the patient   Clinical diagnosis remains Pancreatic cancer with bile duct obstruction mass in the head of the pancreas elevated ca 19-9. Patient told me she had two soft boiled eggs and tolerated it   She has expressed the desire to me that she wants to see a surgeon for resectability of the tumor which seems unlikely. Oncology wants pathologic specimen to conclusively say carcinoma if they are to consider Chemotherpy. EUS biospy has already been done once with results suspicious for malignancy. Perhaps a mini lap with direct biopsy of the pancreas and a Jejunal feeding tube may provide the final answer to oncology and help with feeding. She has pulled out the Dobbhoff tube that was placed However her eating of some food since yesterday had been encouraging. \"    Thank you for allowing me to participate in this patients care. Plan discussed with Dr. Niraj Sharp and he approves.     Signed By: Jairo Varela NP     January 9, 2022

## 2022-01-10 NOTE — PROGRESS NOTES
Progress Note      1/10/2022 12:19 PM  NAME: Charlene Lovett   MRN:  [id number]   Admit Diagnosis: Pancreatitis [K85.90]      Impression/Plan:       1. 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 Heart rate still ranging from 110-150 ppm despite metoprolol and diltiazem for rate control. BP low, will add digoxin to optimize rate control.  Patient's Eliquis is on hold due to patient's severe anemia. Patient's echocardiogram today shows well-preserved LV systolic function with moderate pulm hypertension. 2. COPD  3. Moderate pulmonary hypertension. []       High complexity decision making was performed in this patient at high risk for decompensation with multiple organ involvement. Subjective:     Charlene Lovett denies chest pain, dyspnea. Discussed with RN events overnight. Review of Systems:    ROS     Objective:      Physical Exam:    Last 24hrs VS reviewed since prior progress note. Most recent are:    Visit Vitals  BP (!) 87/51   Pulse (!) 126   Temp 98.5 °F (36.9 °C)   Resp 18   Ht 5' 7\" (1.702 m)   Wt 94.1 kg (207 lb 7.3 oz)   SpO2 99%   BMI 32.49 kg/m²       Intake/Output Summary (Last 24 hours) at 1/10/2022 1219  Last data filed at 1/10/2022 0327  Gross per 24 hour   Intake 200 ml   Output 450 ml   Net -250 ml        Physical Exam:  Constitutional: Well developed well-nourished patient who appeared in no acute distress  HEENT: Normocephalic and atraumatic. Extraocular movement intact. Pupil reactive to light bilaterally  Neck: Supple without thyromegaly  Lungs: Scattered rhonchi diffusely  Heart: Tachycardic, irregularly irregular, normal S1-S2.  Jugular venous distention absent. Carotid bruits absent. PMI in fifth intercostal space on left midclavicular line.   Extremities  Trace edema bilaterally  Abdomen: Soft nontender nondistended hypoactive bowel sounds  Skin: Dry and warm    [] Post-cath site without hematoma, bruit, tenderness, or thrill. Distal pulses intact. PMH/SH reviewed - no change compared to H&P    Data Review    Telemetry: normal sinus rhythm     EKG:   []  No new EKG for review    XR CHEST PORT   Final Result      XR CHEST PORT   Final Result   Findings/impression:      Stable positioning of right upper extremity PICC, tip projects over the upper   SVC. Cardiac silhouette is enlarged. No pulmonary edema. Bilateral pleural effusions. No evidence of pneumothorax. No acute osseous abnormality identified. DUPLEX UPPER EXT VENOUS BILAT   Final Result      XR CHEST PORT   Final Result   Large effusions. Vascular congestion. CT ABD PELV W CONT   Final Result   1. There are increasing bilateral pleural effusions, increasing body wall   edema, and increasing ascites. These findings could be secondary to the third   spacing of fluids. The differential diagnosis for the ascites would include   pancreatic ascites with acute pancreatitis or metastatic disease to the   peritoneum. 2.  There is a biliary stent which is unchanged in its position traversing   throughout area of obstruction within the pancreatic head. 3.  There are multiple low-density lesions of the liver without short-term   interval changes consistent with metastatic disease. 4.  The right kidney is not identified and apparently the patient is status post   a previous right nephrectomy. 5.  There are bilateral adrenal masses suspect for metastatic disease without   short-term interval changes. CTA CHEST W OR W WO CONT   Final Result   No CT evidence for PE. Thoracic aorta atherosclerosis. CAD. Moderate to large bilateral pleural effusions with associated RML, RLL, and LLL   compressive atelectasis. Abdominal ascites.             DUPLEX LOWER EXT VENOUS BILAT   Final Result      XR CHEST PORT   Final Result   FINDINGS: IMPRESSION: 2 frontal views of the chest. Right PICC distal tip SVC   region. Enlarging cardiomegaly. Increasing vascular congestion. Interval development of   hypoinflation, pleural effusions, lower lung airspace edema and/or pneumonia. No   pneumothorax. No free air under the diaphragm. CT ABD PELV W CONT   Final Result   New moderate volume dependent pleural effusions with associated   lower lobe lung compressive atelectasis. Increasing ascites, moderate approaching large-volume   (fluid could be sampled if there is any clinical concern for bile content). High suspicion hepatic metastases. Similar appearance for the pancreas; Silastic stent in place. No pseudocyst formation. Unchanged appearance bilateral adrenal glands. No bowel obstruction. US ABD LTD   Final Result   Small amount of free fluid. Finding suggesting hemangioma in the   liver. Gallbladder wall thickening, adenomyomatosis, sludge and gallstones. Cholecystitis possible. Finding in the region of the pancreatic head as above. Other findings as above. CTA ABDOMEN PELV W CONT   Final Result   1. Ill-defined hypodense mass in the pancreas. There are inflammatory changes   and fluid adjacent to the pancreas or duodenum and stomach most likely related   to biopsy or focal pancreatitis. No pseudocyst formation, active bleeding or   other acute findings. 2. Enlarged adrenal glands left greater than right with noncontrast densities   consistent with adrenal adenomas. 3. Right nephrectomy. 4. Other findings as described. XR CHEST PORT   Final Result   No acute findings.       IR PARACENTESIS ABD W IMAGE    (Results Pending)        Recent Results (from the past 24 hour(s))   ECHO ADULT COMPLETE    Collection Time: 01/10/22  9:10 AM   Result Value Ref Range    AV Peak Velocity 1.6 m/s    AV Peak Gradient 10 mmHg    Aortic Root 3.5 cm    IVSd 1.3 (A) 0.6 - 0.9 cm    LVIDd 4.1 3.9 - 5.3 cm    LVIDs 2.7 cm    LVOT Peak Velocity 1.4 m/s    LVOT Peak Gradient 8 mmHg    LVPWd 1.4 (A) 0.6 - 0.9 cm    LA Diameter 4.3 cm    MR Peak Velocity 4.5 m/s    MR Peak Gradient 81 mmHg    NV Max Velocity 1.5 m/s    Pulmonary Artery EDP 9 mmHg    PV Max Velocity 1.4 m/s    PV Peak Gradient 8 mmHg    Est. RA Pressure 3 mmHg    RVIDd 2.6 cm    TR Max Velocity 3.22 m/s    TR Peak Gradient 41 mmHg    Fractional Shortening 2D 34 28 - 44 %    LVIDd Index 2.00 cm/m2    LVIDs Index 1.32 cm/m2    LV RWT Ratio 0.68     LV Mass 2D 204.9 (A) 67 - 162 g    LV Mass 2D Index 99.9 (A) 43 - 95 g/m2    LA Size Index 2.10 cm/m2    LA/AO Root Ratio 1.23     Ao Root Index 1.71 cm/m2    AV Velocity Ratio 0.88     RVSP 44 mmHg    EF BP 62 55 - 100 %            ECHO ADULT COMPLETE 01/10/2022 1/10/2022    Interpretation Summary    Left Ventricle: Left ventricle size is normal. Mildly increased wall thickness. Normal wall motion. Normal left ventricular systolic function with a visually estimated EF of 60 - 65%. Normal diastolic function.   Aortic Valve: Valve structure is normal. Mildly thickened cusps.   Mitral Valve: Mild mitral annular calcification. Mild transvalvular regurgitation.   Right Atrium: Right atrium is mildly dilated.   moderate PHT, PASP 44 mmmHg    Signed by: Eligio Richards MD on 1/10/2022  9:37 AM      Patient's  laboratory data and echocardiogram were individually reviewed by me. Lab Data:    Recent Labs     01/09/22  0538 01/08/22  1744   WBC 6.4 7.0   HGB 7.2* 7.1*   HCT 22.3* 22.3*    168     Recent Labs     01/08/22  0408   INR 1.2*   PTP 15.1*      Recent Labs     01/09/22  0538 01/08/22  0408    138   K 3.8 3.8    98   CO2 32 36*   BUN 30* 37*   CREA 0.66 0.82   GLU 87 112*   CA 7.5* 7.9*     No results for input(s): CPK, CKNDX, TROIQ in the last 72 hours.     No lab exists for component: CPKMB  No results found for: CHOL, CHOLX, CHLST, CHOLV, HDL, HDLP, LDL, LDLC, DLDLP, TGLX, TRIGL, TRIGP, CHHD, CHHDX    Recent Labs     01/08/22  0408   LPSE 2244*     No results for input(s): PH, PCO2, PO2 in the last 72 hours.     Medications Personally Reviewed:    Current Facility-Administered Medications   Medication Dose Route Frequency    calcium carbonate (TUMS) chewable tablet 200 mg [elemental]  200 mg Oral TID WITH MEALS    pantoprazole (PROTONIX) 40 mg in 0.9% sodium chloride 10 mL injection  40 mg IntraVENous Q12H    loperamide (IMODIUM) capsule 2 mg  2 mg Oral Q4H PRN    metoprolol tartrate (LOPRESSOR) tablet 25 mg  25 mg Oral Q12H    0.9% sodium chloride infusion 250 mL  250 mL IntraVENous PRN    cholecalciferol (VITAMIN D3) (5000 Units/125 mcg) tablet 5,000 Units  5,000 Units Oral Q7D    sertraline (ZOLOFT) tablet 25 mg  25 mg Oral QPM    atorvastatin (LIPITOR) tablet 40 mg  40 mg Oral QHS    furosemide (LASIX) tablet 40 mg  40 mg Oral DAILY    influenza vaccine 2021-22 (6 mos+)(PF) (FLUARIX/FLULAVAL/FLUZONE QUAD) injection 0.5 mL  1 Each IntraMUSCular PRIOR TO DISCHARGE    [Held by provider] apixaban (ELIQUIS) tablet 2.5 mg  2.5 mg Oral BID    ferrous sulfate tablet 325 mg  1 Tablet Oral DAILY WITH BREAKFAST    midodrine (PROAMATINE) tablet 10 mg  10 mg Oral TID WITH MEALS    sodium chloride (NS) flush 5-40 mL  5-40 mL IntraVENous PRN    zinc oxide-white petrolatum 17-57 % topical paste   Topical TID    cyclobenzaprine (FLEXERIL) tablet 10 mg  10 mg Oral TID PRN    HYDROmorphone (DILAUDID) injection 1 mg  1 mg IntraVENous Q4H PRN    oxyCODONE IR (ROXICODONE) tablet 5 mg  5 mg Oral Q6H PRN    docusate sodium (COLACE) capsule 100 mg  100 mg Oral BID    polyethylene glycol (MIRALAX) packet 17 g  17 g Oral DAILY    sorbitoL 70 % solution 30 mL  30 mL Oral DAILY PRN    bisacodyL (DULCOLAX) suppository 10 mg  10 mg Rectal DAILY PRN    hydrALAZINE (APRESOLINE) 20 mg/mL injection 20 mg  20 mg IntraVENous Q6H PRN    guaiFENesin ER (MUCINEX) tablet 600 mg  600 mg Oral Q12H    albuterol-ipratropium (DUO-NEB) 2.5 MG-0.5 MG/3 ML  3 mL Nebulization Q6H PRN    chlorthalidone (HYGROTON) tablet 25 mg  25 mg Oral DAILY    albuterol (PROVENTIL HFA, VENTOLIN HFA, PROAIR HFA) inhaler 2 Puff  2 Puff Inhalation Q6H PRN    ascorbic acid (vitamin C) (VITAMIN C) tablet 500 mg  500 mg Oral DAILY    diazePAM (VALIUM) tablet 5 mg  5 mg Oral Q6H PRN    dilTIAZem ER (CARDIZEM CD) capsule 120 mg  120 mg Oral DAILY    potassium chloride SR (KLOR-CON 10) tablet 20 mEq  20 mEq Oral DAILY    traZODone (DESYREL) tablet 50 mg  50 mg Oral QHS    ondansetron (ZOFRAN) injection 4 mg  4 mg IntraVENous Q6H PRN    acetaminophen (TYLENOL) tablet 650 mg  650 mg Oral Q4H PRN    prochlorperazine (COMPAZINE) with saline injection 10 mg  10 mg IntraVENous Q6H PRN         Prior to Admission medications    Medication Sig Start Date End Date Taking? Authorizing Provider   albuterol (PROVENTIL HFA, VENTOLIN HFA, PROAIR HFA) 90 mcg/actuation inhaler Take 2 Puffs by inhalation every six (6) hours as needed for Wheezing. Yes Provider, Historical   pantoprazole (PROTONIX) 40 mg tablet Take 40 mg by mouth daily. Yes Provider, Historical   sertraline (Zoloft) 25 mg tablet Take 25 mg by mouth daily. Yes Provider, Historical   Eliquis 5 mg tablet Take 5 mg by mouth two (2) times a day. 7/8/21  Yes Provider, Historical   traZODone (DESYREL) 50 mg tablet TAKE ONE TABLET BY MOUTH AT BEDTIME 7/31/21  Yes Provider, Historical   budesonide-formoteroL (Symbicort) 80-4.5 mcg/actuation HFAA Take 2 Puffs by inhalation. Yes Provider, Historical   potassium chloride SR (KLOR-CON 10) 10 mEq tablet Take 20 mEq by mouth daily. Yes Provider, Historical   bumetanide (BUMEX) 1 mg tablet Take 1 mg by mouth daily. Yes Provider, Historical   dilTIAZem ER (DILACOR XR) 120 mg capsule Take 120 mg by mouth daily. Yes Provider, Historical   atorvastatin (LIPITOR) 40 mg tablet Take 40 mg by mouth daily.    Yes Provider, Historical   ascorbic acid, vitamin C, (Vitamin C) 500 mg tablet Take 500 mg by mouth daily. Yes Provider, Historical   cholecalciferol (Vitamin D3) (1000 Units /25 mcg) tablet Take 1,000 Units by mouth daily. Yes Provider, Historical   chlorthalidone (HYGROTON) 25 mg tablet Take 25 mg by mouth daily. 6/2/21   Provider, Historical   Se-Tan Plus 162-115. 2-1 mg cap Take 1 Capsule by mouth two (2) times a day. 6/27/21   Provider, Historical   diazePAM (VALIUM) 5 mg tablet Take 5 mg by mouth every six (6) hours as needed for Anxiety. Provider, Historical   famotidine (PEPCID) 40 mg tablet Take 40 mg by mouth daily.   Patient not taking: Reported on 12/6/2021    Provider, Ariana Arboleda MD

## 2022-01-10 NOTE — PROGRESS NOTES
Hospitalist Progress Note    Subjective:   Daily Progress Note: 1/10/2022 12:21 PM    Hospital Course: The patient is a 72-year-old woman with a PMH significant for atrial fibrillation, PUD, CHF, COPD on home O2 at 2 L, renal cell carcinoma status post nephrectomy, hypertension and morbid obesity. Patient was found to have obstructive jaundice and biliary stricture in early November of this year.  She underwent an ERCP with stent placement with brushings of the bile duct where there was noted to be a stricture.  These brushings were negative.  Patient then underwent a PET CT which demonstrated uptake in the head of the pancreas concerning for pancreatic mass. She subsequently underwent endoscopic ultrasound with ultrasound and FNA biopsy on 12/6/2021.  The procedure demonstrated abutment of the tumor with the portal vein without involvement of the SMA or SMV.  The total tumor was measured to be 2.7 cm by 3 cm.  This biopsy resulted as atypical cells with suspicion for malignancy. She admitted to the hospital on 2/9/2021 and severe pancreatitis after undergoing an EUS for pancreatic biopsy on 12/6/2021 and pancreatic mass. Her symptoms were abdominal pain, nausea and vomiting. CT scan revealing peripancreatic inflammation consistent with likely post biopsy pancreatitis and ascites. Her initial labs revealing elevated lipase, supratherapeutic INR and elevated D-dimer coagulopathy. CTA was positive for bilateral pleural effusions, negative for PE. Paracentesis with culture of ascitic fluid, blood cultures, urine cultures negative for growth. Patient had been started on IV meropenem for leukocytosis and anidulafungin for suspected intra-abdominal infection. CT with and without contrast revealing liver lesions consistent with metastasis, mass in the pancreatic head with biliary stent in place, bilateral pleural effusions, ascites and bilateral adrenal masses.   IR declined liver biopsy due to coagulopathy and felt she would not benefit. Doppler studies of upper and lower extremities negative for DVT, positive for superficial thrombophlebitis of left medial antecubital.  Pancreatic cancer is presumed but no firm diagnosis due to lack of tissue sample. Pancreatitis remaining persistent despite antibiotics. Attempted Dobbhoff and PEG J placement but failed due to reddened area presumed possible tumor infiltration in the duodenum. Due to poor performance and prognosis she is not a candidate for palliative XRT or chemo. The patient has been progressively worsening with debilitation, intermittently tolerating diet not stable to be discharged home with outpatient surgical oncology follow-up. She would benefit from transfer to tertiary care center where surgical oncology services are available for evaluation of pancreatic head mass and possible surgical intervention. 1/7 A. fib with RVR rate 110-140. Will consult cardiology and discuss anticoagulation being discontinued due to bleeding and liver failure. Subjective: Follow-up examination of patient at the bedside. Patient feels weak and tired. She is depressed. Her performance status has declined daily. She has required multiple transfusions for lower GI bleeding.   No acute distress noted on examination  No overnight events reported      Current Facility-Administered Medications   Medication Dose Route Frequency    digoxin (LANOXIN) injection 250 mcg  250 mcg IntraVENous NOW    calcium carbonate (TUMS) chewable tablet 200 mg [elemental]  200 mg Oral TID WITH MEALS    pantoprazole (PROTONIX) 40 mg in 0.9% sodium chloride 10 mL injection  40 mg IntraVENous Q12H    loperamide (IMODIUM) capsule 2 mg  2 mg Oral Q4H PRN    metoprolol tartrate (LOPRESSOR) tablet 25 mg  25 mg Oral Q12H    0.9% sodium chloride infusion 250 mL  250 mL IntraVENous PRN    cholecalciferol (VITAMIN D3) (5000 Units/125 mcg) tablet 5,000 Units  5,000 Units Oral Q7D    sertraline (ZOLOFT) tablet 25 mg  25 mg Oral QPM    atorvastatin (LIPITOR) tablet 40 mg  40 mg Oral QHS    furosemide (LASIX) tablet 40 mg  40 mg Oral DAILY    influenza vaccine 2021-22 (6 mos+)(PF) (FLUARIX/FLULAVAL/FLUZONE QUAD) injection 0.5 mL  1 Each IntraMUSCular PRIOR TO DISCHARGE    [Held by provider] apixaban (ELIQUIS) tablet 2.5 mg  2.5 mg Oral BID    ferrous sulfate tablet 325 mg  1 Tablet Oral DAILY WITH BREAKFAST    midodrine (PROAMATINE) tablet 10 mg  10 mg Oral TID WITH MEALS    sodium chloride (NS) flush 5-40 mL  5-40 mL IntraVENous PRN    zinc oxide-white petrolatum 17-57 % topical paste   Topical TID    cyclobenzaprine (FLEXERIL) tablet 10 mg  10 mg Oral TID PRN    HYDROmorphone (DILAUDID) injection 1 mg  1 mg IntraVENous Q4H PRN    oxyCODONE IR (ROXICODONE) tablet 5 mg  5 mg Oral Q6H PRN    docusate sodium (COLACE) capsule 100 mg  100 mg Oral BID    polyethylene glycol (MIRALAX) packet 17 g  17 g Oral DAILY    sorbitoL 70 % solution 30 mL  30 mL Oral DAILY PRN    bisacodyL (DULCOLAX) suppository 10 mg  10 mg Rectal DAILY PRN    hydrALAZINE (APRESOLINE) 20 mg/mL injection 20 mg  20 mg IntraVENous Q6H PRN    guaiFENesin ER (MUCINEX) tablet 600 mg  600 mg Oral Q12H    albuterol-ipratropium (DUO-NEB) 2.5 MG-0.5 MG/3 ML  3 mL Nebulization Q6H PRN    chlorthalidone (HYGROTON) tablet 25 mg  25 mg Oral DAILY    albuterol (PROVENTIL HFA, VENTOLIN HFA, PROAIR HFA) inhaler 2 Puff  2 Puff Inhalation Q6H PRN    ascorbic acid (vitamin C) (VITAMIN C) tablet 500 mg  500 mg Oral DAILY    diazePAM (VALIUM) tablet 5 mg  5 mg Oral Q6H PRN    dilTIAZem ER (CARDIZEM CD) capsule 120 mg  120 mg Oral DAILY    potassium chloride SR (KLOR-CON 10) tablet 20 mEq  20 mEq Oral DAILY    traZODone (DESYREL) tablet 50 mg  50 mg Oral QHS    ondansetron (ZOFRAN) injection 4 mg  4 mg IntraVENous Q6H PRN    acetaminophen (TYLENOL) tablet 650 mg  650 mg Oral Q4H PRN    prochlorperazine (COMPAZINE) with saline injection 10 mg  10 mg IntraVENous Q6H PRN        Review of Systems  Constitutional: Positive for malaise/fatigue. Negative for fever. HENT: Negative. Respiratory: Positive for shortness of breath. Negative for cough. Cardiovascular: Negative for chest pain and leg swelling. Gastrointestinal: Positive for abdominal pain. Negative for nausea and vomiting. Genitourinary: No frequency, No dysuria, No hematuria  Integument/breast: No skin lesion(s)   Neurological: No Confusion, No headaches, No dizziness      Objective:     Visit Vitals  BP (!) 90/55   Pulse (!) 126   Temp 98.5 °F (36.9 °C)   Resp 18   Ht 5' 7\" (1.702 m)   Wt 94.1 kg (207 lb 7.3 oz)   SpO2 99%   BMI 32.49 kg/m²    O2 Flow Rate (L/min): 4 l/min O2 Device: Nasal cannula    Temp (24hrs), Av.9 °F (36.6 °C), Min:97.4 °F (36.3 °C), Max:98.5 °F (36.9 °C)      No intake/output data recorded.  1901 - 01/10 0700  In: 380 [P.O.:380]  Out: 850 [Urine:850]    PHYSICAL EXAM:  Constitutional: No acute distress  Skin: Extremities and face reveal no rashes. Multiple areas of ecchymosis in upper extremities. JAUNDICE  HEENT: Sclerae anicteric. Extra-occular muscles are intact. No oral ulcers. The neck is supple and no masses. Cardiovascular: Regular rate and rhythm. +S1/S2. No murmur or gallop. Respiratory:  Rhonchi noted bilateral without wheezes. GI: Firm with hypoactive bowel sounds and tenderness to palpation in all 4 quadrant. Rectal: Deferred   Musculoskeletal: Upper and lower extremity peripheral edema present, likely third spacing. Able to move all ext  Neurological:  Generalized weakness. Patient is alert and oriented x3.  Cranial nerves II-XII grossly intact  Psychiatric: Mood appears appropriate       Data Review    Recent Results (from the past 24 hour(s))   ECHO ADULT COMPLETE    Collection Time: 01/10/22  9:10 AM   Result Value Ref Range    AV Peak Velocity 1.6 m/s    AV Peak Gradient 10 mmHg    Aortic Root 3.5 cm    IVSd 1.3 (A) 0.6 - 0.9 cm    LVIDd 4.1 3.9 - 5.3 cm    LVIDs 2.7 cm    LVOT Peak Velocity 1.4 m/s    LVOT Peak Gradient 8 mmHg    LVPWd 1.4 (A) 0.6 - 0.9 cm    LA Diameter 4.3 cm    MR Peak Velocity 4.5 m/s    MR Peak Gradient 81 mmHg    FL Max Velocity 1.5 m/s    Pulmonary Artery EDP 9 mmHg    PV Max Velocity 1.4 m/s    PV Peak Gradient 8 mmHg    Est. RA Pressure 3 mmHg    RVIDd 2.6 cm    TR Max Velocity 3.22 m/s    TR Peak Gradient 41 mmHg    Fractional Shortening 2D 34 28 - 44 %    LVIDd Index 2.00 cm/m2    LVIDs Index 1.32 cm/m2    LV RWT Ratio 0.68     LV Mass 2D 204.9 (A) 67 - 162 g    LV Mass 2D Index 99.9 (A) 43 - 95 g/m2    LA Size Index 2.10 cm/m2    LA/AO Root Ratio 1.23     Ao Root Index 1.71 cm/m2    AV Velocity Ratio 0.88     RVSP 44 mmHg    EF BP 62 55 - 100 %       XR CHEST PORT   Final Result      XR CHEST PORT   Final Result   Findings/impression:      Stable positioning of right upper extremity PICC, tip projects over the upper   SVC. Cardiac silhouette is enlarged. No pulmonary edema. Bilateral pleural effusions. No evidence of pneumothorax. No acute osseous abnormality identified. DUPLEX UPPER EXT VENOUS BILAT   Final Result      XR CHEST PORT   Final Result   Large effusions. Vascular congestion. CT ABD PELV W CONT   Final Result   1. There are increasing bilateral pleural effusions, increasing body wall   edema, and increasing ascites. These findings could be secondary to the third   spacing of fluids. The differential diagnosis for the ascites would include   pancreatic ascites with acute pancreatitis or metastatic disease to the   peritoneum. 2.  There is a biliary stent which is unchanged in its position traversing   throughout area of obstruction within the pancreatic head. 3.  There are multiple low-density lesions of the liver without short-term   interval changes consistent with metastatic disease.    4.  The right kidney is not identified and apparently the patient is status post   a previous right nephrectomy. 5.  There are bilateral adrenal masses suspect for metastatic disease without   short-term interval changes. CTA CHEST W OR W WO CONT   Final Result   No CT evidence for PE. Thoracic aorta atherosclerosis. CAD. Moderate to large bilateral pleural effusions with associated RML, RLL, and LLL   compressive atelectasis. Abdominal ascites. DUPLEX LOWER EXT VENOUS BILAT   Final Result      XR CHEST PORT   Final Result   FINDINGS: IMPRESSION: 2 frontal views of the chest. Right PICC distal tip SVC   region. Enlarging cardiomegaly. Increasing vascular congestion. Interval development of   hypoinflation, pleural effusions, lower lung airspace edema and/or pneumonia. No   pneumothorax. No free air under the diaphragm. CT ABD PELV W CONT   Final Result   New moderate volume dependent pleural effusions with associated   lower lobe lung compressive atelectasis. Increasing ascites, moderate approaching large-volume   (fluid could be sampled if there is any clinical concern for bile content). High suspicion hepatic metastases. Similar appearance for the pancreas; Silastic stent in place. No pseudocyst formation. Unchanged appearance bilateral adrenal glands. No bowel obstruction. US ABD LTD   Final Result   Small amount of free fluid. Finding suggesting hemangioma in the   liver. Gallbladder wall thickening, adenomyomatosis, sludge and gallstones. Cholecystitis possible. Finding in the region of the pancreatic head as above. Other findings as above. CTA ABDOMEN PELV W CONT   Final Result   1. Ill-defined hypodense mass in the pancreas. There are inflammatory changes   and fluid adjacent to the pancreas or duodenum and stomach most likely related   to biopsy or focal pancreatitis. No pseudocyst formation, active bleeding or   other acute findings.    2. Enlarged adrenal glands left greater than right with noncontrast densities   consistent with adrenal adenomas. 3. Right nephrectomy. 4. Other findings as described. XR CHEST PORT   Final Result   No acute findings. IR PARACENTESIS ABD W IMAGE    (Results Pending)       Principal Problem:    Pancreatitis (12/9/2021)    Active Problems:    A-fib ([address] (11/16/2020)      CHF (congestive heart failure) ([facility] [address] (11/16/2020)      Hematemesis (12/12/2021)      History of peptic ulcer disease (12/12/2021)      Pancreatic mass (1/5/2022)        Assessment/Plan:     Acute pancreatitis  - Status post EUS by Dr. Aditi Henriquez 12/06  - Continue pain medication  - Lipase variable, will likely be chronically elevated   - CT abd/pelvis with PO and IV contrast showing increasing bilateral pleural effusion and increasing ascites. Also notes multiple low-density lesions of liver consistent with metastatic disease and bilateral adrenal masses suspected for metastatic disease.  - completed 14 days of IV merrem   - Continue empiric Anidulafungin 4 more days  - paracentesis on 12/27 with 2300 mL clear serous fluid drained, cytology showing now growth. - She was scheduled for liver biopsy with IR however IR does not feel patient would benefit from biopsy at this time.   -Unsuccessful attempt to place PEG J due to tumor infiltration and duodenum     CHF  - Continue chlorthalidone 25 mg daily  - IV lasix 40 mg daily     Pancreatic mass  Liver masses  Adrenal masses  History of renal cell carcinoma with lung nodules status post right nephrectomy  Multiple low-density lesions of the liver consistent with meta static disease and bilateral adrenal masses suspicious for metastatic disease  - CA 19-9 greater than 4000  --Oncology consulted-will need tissue biopsy before definitive recommendations can be made. Unable to offer palliative chemo or radiation due to poor performance.   Prognosis very poor, hospice recommended     Hypokalemia-stable  - replete as needed     COPD-stable  - Pulmonary consultation  - Chronic respiratory failure with 2 L of oxygen via nasal cannula at home     Leukocytosis-resolved  - Cultures negative     A. Fib  Supratherapeutic anticoagulation  Coagulopathy  Acute anemia  -On diltiazem. -Her hemoglobin has been slowly declining requiring repletion and she is FOBT positive  -Required transfusion hemoglobin today 6.2 receiving 2 units  Total of 6 units PRBCs  -Episode of A. fib with RVR heart rate 110-150  -Eliquis has been discontinued due to bleeding and liver failure  -Cardiology following      Elevated d-dimer  - Likely multifactorial   - CTA chest negative for PE  - Duplex US lower extremities negative for DVT  - Duplex US upper extremities showing superficial thrombophlebitis in left median/antecubital veins  -Repeat upper extremity Doppler study negative  -Discontinued Eliquis due to anemia      Hypotension  -Continue midodrine 10mg tid    Anemia iron deficiency  Blood loss anemia  -Hemoglobin trend 6.6->8.1->7.2->6.2->7.2 today   -Has received total of 6 units PRBCs  -Has received both IV and p.o. iron replacement  -FOBT positive  -Heme-onc and GI following  -We will transfuse for hemoglobin <7.0,   -Eliquis discontinued    DVT Prophylaxis: Discontinued Eliquis, SCDs  GI PPx: Protonix  Code Status: Full  POA:    Discharge Barriers:   · Accept[facility] at [facility]  · Multiple attempts transfer to other facility that has oncology surgery all facilities currently on diversion and not wait listing  · Has declined discussions concerning hospice despite poor prognosis and poor performance    Care Plan discussed with: patient and nursing and IDR rounds. Discussed case with general surgery who suggested transfer the patient to tertiary care center that has oncology surgery for possible surgical intervention. Likely she is too unstable. Total time spent with patient: >35 minutes.

## 2022-01-10 NOTE — PROGRESS NOTES
Progress Note    Patient: Rafi Kennedy MRN: [id number]  SSN: [id number]    YOB: 1945  Age: 78 y.o. Sex: female      Admit Date: 12/9/2021    LOS: 32 days     Subjective:   Patient followed for severe pancreatitis on Meropenem because of worsening leukocytosis. WBC now normal and Meropenem was discontinued. She has also completed course of Anidulafungin for suspected Candida intraabdominal infection. Suspected pancreatic cancer but no firm diagnosis yet. TTE today showed normal EF. She has been in chronic atrial fibrillation. Patient asleep at this time. Objective:     Vitals:    01/10/22 1121 01/10/22 1126 01/10/22 1316 01/10/22 1541   BP:  (!) 87/51 (!) 90/55 (!) 99/50   Pulse:    94   Resp:    18   Temp:    98 °F (36.7 °C)   SpO2:    99%   Weight:       Height: 5' 7\" (1.702 m)           Intake and Output:  Current Shift: No intake/output data recorded. Last three shifts: 01/08 1901 - 01/10 0700  In: 380 [P.O.:380]  Out: 850 [Urine:850]    Physical Exam:     Vitals and nursing note reviewed. Constitutional:       General: She is not in acute distress. Appearance: She is obese. She is ill-appearing. HENT: unremarkable  Abdominal:  Nontender  Genitourinary:  Vaginal area not examined     Comments: External urinary device  Musculoskeletal:      Right lower leg: No edema. Left lower leg: No edema. Comments: PICC line right upper arm   Skin: multiple ecchymoses on the upper extremities, edema left forearm     Findings: No rash.       Comments: ?Stage 2 sacral wound   Neurological: asleep    Lab/Data Review:     WBC 6,400  Urinalysis with WBC 10-20, Bacteria negative  Lipase 2,244 <2,350 <2,144 99 <2,511 0 <2,484 <2,684 < 1,397 <1,719    Procal  0.59 <3.87 <0.34 <0.35 <0.34 < 0.32 <0.21 <0.19 <0.25 < 0.14  CRP 4.83 <5.07 <7.3840 <18.30 <14.20 <12.70 <15.00 < 23.80    Serum fungitell <31 Negative    Ascitic fluid   with 41% PMNs    Blood cultures (12/20) No growth FINAL  Urine culture (12/20) No growth FINAL  Ascitic fluid culture(12/27) No growth FINAL    Assessment:     Principal Problem:    Pancreatitis (12/9/2021)    Active Problems:    A-fib ([address] (11/16/2020)      CHF (congestive heart failure) ([address] (11/16/2020)      Hematemesis (12/12/2021)      History of peptic ulcer disease (12/12/2021)      Pancreatic mass (1/5/2022)    1. Severe pancreatitis with markedly elevated lipase, resolving  2. Pancreatic mass, possible neoplasm  3. Biliary stent  4. Sepsis with worsening leukocytosis with elevated procal and CRP, resolving, status post 14 days of Meropenem, Day #14 IV Anidulafungin (empiric)  5. TPN (just started)  6. Moderate pyuria, negative bacteria, urine culture negative  7. Possible candida superinfection with vaginitis, treated  8. Ascites, status post paracentesis    Comment:    WBC normal with decreasing CRP and procal.  Lipase also decreased. Plan:   1. Discontinue Anidulafungin (done)  2.  In am, repeat CRP and procal     Signed By: Carlene Hampton MD     January 10, 2022

## 2022-01-10 NOTE — PROGRESS NOTES
Progress Note    Patient: Clare Corral MRN: [id number]  SSN: [id number]    YOB: 1945  Age: 78 y.o. Sex: female      Admit Date: 12/9/2021    LOS: 32 days     Subjective:   GI in consultation for Pancreatitis    Ms. Kamlesh Bedolla seen resting at this time. No signs of distress noted. Per nursing staff she did have a dark liquid stool this am. Eliquis on hold at this time. Her hgB was 7.2 yesterday. No new labs at this time. She underwent EGD for GJ tube placement but tube was unable to be placed due to concern for tumor in duodenum and inflammation. Plan is to transfer her to another facility for surgical oncology consult for pancreatic cancer. Primary is working on transfer. She does have right arm edema. Her last lipase was 2,244 no new results at this time. She is followed by Cardiology for Afib with RVR. She is on cardizem, and  Metoprolol for rate control. Echo cardiogram today shows preserved LV systolic function with moderate pulmonary hypertension. EF of 60-65%. 12/30 EGD with Dobhoff placement for feeding - removed by patient.   12/27/21 Paracentesis - 2300ml ascites fluid removed. 12/23/21 CT abdomen  1.  There are increasing bilateral pleural effusions, increasing body wall  edema, and increasing ascites. These findings could be secondary to the third spacing of fluids. The differential diagnosis for the ascites would include pancreatic ascites with acute pancreatitis or metastatic disease to the peritoneum.    2. Windell Samantha is a biliary stent which is unchanged in its position traversing throughout area of obstruction within the pancreatic head. 3. Windell Samantha are multiple low-density lesions of the liver without short-term  interval changes consistent with metastatic disease. 4.  The right kidney is not identified and apparently the patient is status post a previous right nephrectomy.   5.  There are bilateral adrenal masses suspect for metastatic disease without short-term interval changes. 12/6/2021 EUS showing 2.7 X3 cm lesion in the area of head of pancreas with dilation of the Pancreatic duct abutting the portal vein but not involving the SMA or AMV ; Tumor T2 by endosonographic criteria ; one small lymph node in the area of head of pancreas.   11/22/2021 PET Scan    1.  FDG avid lesion in the pancreatic head consistent with malignancy. 2.  Subcentimeter focus of FDG uptake in the liver, indeterminate. 3.  FDG uptake associated with density expanding the right facet at C5-C6, possibly due to an ectatic artery. History of Present Illness: Mikle Libman is a 78 y.o. female who is seen in consultation for pancreatitis. Patient has a past medical history of  Paroxysmal Atrial Fibrillation, CHF, COPD, renal cell carcinoma Stage IV s/p nephrectomy, GERD sjoren's syndrome, hypertension, and depression. Patient under went EUS on 12/6/2021 showing 2.7 X3 cm lesion in the area of head of pancreas with dilation of the Pancreatic duct abutting the portal vein but not involving the SMA or AMV ; Tumor T2 by endosonographic criteria ; one small lymph node in the area of head of pancreas. Pathology shows rare cells present suspicious for malignancy. She had an ERCP on 11/4 2021 ERCP; with sphincterotomy/papillotomy ERCP; with placement of endoscopic stent into biliary or pancreatic duct, including pre and postdilation and guide wire passage, when performed, including sphincterotomy, when performed, each stent. . Patient had a PET scan on 11/22/21 which shows a lesion in the pancreatic head consistent with malignancy. CTA of abdomen shows ill defined hypodense mass in the pancreas. There are inflammatory changes. Patient states she experienced nausea, vomiting and diarrhea since Monday. Her abdominal pain has progressively gotten worse. She reports a poor appetite. She does complain of increased \"burping\". Total bilirubin 1.4, AsT 36, ALT 31, Alk Phos. 217, Lipase > 3,000.  Plan nothing by mouth except ice chips and sips of water with medication. IV hydration. Pain management      Objective:     Vitals:    01/10/22 1121 01/10/22 1126 01/10/22 1316 01/10/22 1541   BP:  (!) 87/51 (!) 90/55 (!) 99/50   Pulse:    94   Resp:    18   Temp:    98 °F (36.7 °C)   SpO2:    99%   Weight:       Height: 5' 7\" (1.702 m)           Intake and Output:  Current Shift: No intake/output data recorded. Last three shifts: 01/08 1901 - 01/10 0700  In: 380 [P.O.:380]  Out: 850 [Urine:850]    Physical Exam:   Skin:  Extremities and face reveal no rashes. No chapin erythema. HEENT: Sclerae anicteric. Extra-occular muscles are intact. No abnormal pigmentation of the lips. The neck is supple. Cardiovascular: Regular rate and rhythm. Respiratory:  Comfortable breathing with no accessory muscle use. GI:  Abdomen nondistended, soft, and nontender. No enlargement of the liver or spleen. No masses palpable. Rectal:  Deferred  Musculoskeletal: Generalized weakness  Neurological:  Gross memory appears intact. Patient is alert and oriented. Psychiatric:  Mood appears appropriate with judgement intact.   Lymphatic:  No visible adenopathy      Lab/Data Review:  Recent Results (from the past 24 hour(s))   ECHO ADULT COMPLETE    Collection Time: 01/10/22  9:10 AM   Result Value Ref Range    AV Peak Velocity 1.6 m/s    AV Peak Gradient 10 mmHg    Aortic Root 3.5 cm    IVSd 1.3 (A) 0.6 - 0.9 cm    LVIDd 4.1 3.9 - 5.3 cm    LVIDs 2.7 cm    LVOT Peak Velocity 1.4 m/s    LVOT Peak Gradient 8 mmHg    LVPWd 1.4 (A) 0.6 - 0.9 cm    LA Diameter 4.3 cm    MR Peak Velocity 4.5 m/s    MR Peak Gradient 81 mmHg    KY Max Velocity 1.5 m/s    Pulmonary Artery EDP 9 mmHg    PV Max Velocity 1.4 m/s    PV Peak Gradient 8 mmHg    Est. RA Pressure 3 mmHg    RVIDd 2.6 cm    TR Max Velocity 3.22 m/s    TR Peak Gradient 41 mmHg    Fractional Shortening 2D 34 28 - 44 %    LVIDd Index 2.00 cm/m2    LVIDs Index 1.32 cm/m2    LV RWT Ratio 0.68     LV Mass 2D 204.9 (A) 67 - 162 g    LV Mass 2D Index 99.9 (A) 43 - 95 g/m2    LA Size Index 2.10 cm/m2    LA/AO Root Ratio 1.23     Ao Root Index 1.71 cm/m2    AV Velocity Ratio 0.88     RVSP 44 mmHg    EF BP 62 55 - 100 %              XR CHEST PORT   Final Result      XR CHEST PORT   Final Result   Findings/impression:      Stable positioning of right upper extremity PICC, tip projects over the upper   SVC. Cardiac silhouette is enlarged. No pulmonary edema. Bilateral pleural effusions. No evidence of pneumothorax. No acute osseous abnormality identified. DUPLEX UPPER EXT VENOUS BILAT   Final Result      XR CHEST PORT   Final Result   Large effusions. Vascular congestion. CT ABD PELV W CONT   Final Result   1. There are increasing bilateral pleural effusions, increasing body wall   edema, and increasing ascites. These findings could be secondary to the third   spacing of fluids. The differential diagnosis for the ascites would include   pancreatic ascites with acute pancreatitis or metastatic disease to the   peritoneum. 2.  There is a biliary stent which is unchanged in its position traversing   throughout area of obstruction within the pancreatic head. 3.  There are multiple low-density lesions of the liver without short-term   interval changes consistent with metastatic disease. 4.  The right kidney is not identified and apparently the patient is status post   a previous right nephrectomy. 5.  There are bilateral adrenal masses suspect for metastatic disease without   short-term interval changes. CTA CHEST W OR W WO CONT   Final Result   No CT evidence for PE. Thoracic aorta atherosclerosis. CAD. Moderate to large bilateral pleural effusions with associated RML, RLL, and LLL   compressive atelectasis. Abdominal ascites.             DUPLEX LOWER EXT VENOUS BILAT   Final Result      XR CHEST PORT   Final Result   FINDINGS: IMPRESSION: 2 frontal views of the chest. Right PICC distal tip SVC   region. Enlarging cardiomegaly. Increasing vascular congestion. Interval development of   hypoinflation, pleural effusions, lower lung airspace edema and/or pneumonia. No   pneumothorax. No free air under the diaphragm. CT ABD PELV W CONT   Final Result   New moderate volume dependent pleural effusions with associated   lower lobe lung compressive atelectasis. Increasing ascites, moderate approaching large-volume   (fluid could be sampled if there is any clinical concern for bile content). High suspicion hepatic metastases. Similar appearance for the pancreas; Silastic stent in place. No pseudocyst formation. Unchanged appearance bilateral adrenal glands. No bowel obstruction. US ABD LTD   Final Result   Small amount of free fluid. Finding suggesting hemangioma in the   liver. Gallbladder wall thickening, adenomyomatosis, sludge and gallstones. Cholecystitis possible. Finding in the region of the pancreatic head as above. Other findings as above. CTA ABDOMEN PELV W CONT   Final Result   1. Ill-defined hypodense mass in the pancreas. There are inflammatory changes   and fluid adjacent to the pancreas or duodenum and stomach most likely related   to biopsy or focal pancreatitis. No pseudocyst formation, active bleeding or   other acute findings. 2. Enlarged adrenal glands left greater than right with noncontrast densities   consistent with adrenal adenomas. 3. Right nephrectomy. 4. Other findings as described. XR CHEST PORT   Final Result   No acute findings. IR PARACENTESIS ABD W IMAGE    (Results Pending)       Assessment:     Principal Problem:    Pancreatitis (12/9/2021)    Active Problems:    A-fib ([address] (11/16/2020)      CHF (congestive heart failure) ([address] (11/16/2020)      Hematemesis (12/12/2021)      History of peptic ulcer disease (12/12/2021)      Pancreatic mass (1/5/2022)        Plan:   1. Pancreatitis      -12/30 s/p post pyloric tube placement. Patient removed tube on 12/31.         -Unable to place GJ tube due to  tumor in duodenum and food in the stomach and patient on Eliquis        -Possible surgical input      -Lipase 2,244 on 01/09/22, No new labs at this time.      -repeat Lipase in the am       -Dilaudid 1 mg every  4 hours as needed for pain      -Oxycodone 5 mg every 6 hours for breakthrough pain      - S/P paracentesis 12/27/21 with removal of 2300 ml clear serous  Fluid removed      -cytology Slight acute inflammation.        -currently on Regular diet       -Tumor Board on Wednesday morning  2. CHF      -Continue Chlorthalidone  3. COPD       -Continue home medications       -Albuterol as needed   4. Pancreatic Mass      - 22,358      -S/p fine needle aspiration suspicious for neoplasia but not diagnostic      -When stable Oncology plan for out patient followup with advanced oncology surgeon [place]ore [place] [facility] [place]       -CT concern for liver metastais/lesions       -IR for liver biopsy on hold at this time  5. Hematemesis (resolved)  6. Afib with RVR     Eliquis on hold 2/2 to low HgB     Current heart rate 120     Continue diltiazem     Cardiology has been consulted  7. Diarrhea     Imodium as needed    Had another detailed discussion with the patient   Clinical diagnosis remains Pancreatic cancer with bile duct obstruction mass in the head of the pancreas elevated ca 19-9. Patient told me she had two soft boiled eggs and tolerated it   She has expressed the desire to me that she wants to see a surgeon for resectability of the tumor which seems unlikely. Oncology wants pathologic specimen to conclusively say carcinoma if they are to consider Chemotherpy.   EUS biospy has already been done once with results suspicious for malignancy. Perhaps a mini lap with direct biopsy of the pancreas and a Jejunal feeding tube may provide the final answer to oncology and help with feeding. She has pulled out the Dobbhoff tube that was placed However her eating of some food since yesterday had been encouraging. \"    Thank you for allowing me to participate in this patients care. Plan discussed with Dr. Anju Joyce and he approves.     Signed By: Rina Vega NP     January 10, 2022

## 2022-01-10 NOTE — PROGRESS NOTES
Problem: Falls - Risk of  Goal: *Absence of Falls  Description: Document Dennie Oak Fall Risk and appropriate interventions in the flowsheet.   Outcome: Progressing Towards Goal  Note: Fall Risk Interventions:  Mobility Interventions: Bed/chair exit alarm    Mentation Interventions: Bed/chair exit alarm    Medication Interventions: Bed/chair exit alarm    Elimination Interventions: Bed/chair exit alarm,Call light in reach    History of Falls Interventions: Bed/chair exit alarm         Problem: Pain  Goal: *Control of Pain  Outcome: Progressing Towards Goal

## 2022-01-10 NOTE — PROGRESS NOTES
Comprehensive Nutrition Assessment    Type and Reason for Visit: Reassess (goals)      Nutrition Recommendations/Plan:  Diet as ordered  D/c Ensure HP d/t dislike  Continue Ensure Clear, increase to TID. Monitor weights, intake, labs and skin changes      Nutrition Assessment:  Admit with abd pain, n/v, diarrhea and poor intake. Pt reported eating well pta however unable to elaborate further. Underwent EUS 12/6 showing a lesion in the head of the pancreas. US ABD sludge-filled gallbladder, biliary obstruction. Lipase continues to rise- no new pancreatic findings on CT abd; note increasing ascites. NPO/ CL x 9d. Received PPN 12/18, 12/19 at low rate- providing <25% est nutrient needs. Previously on PO diet (Regular, Full Lq, Clear Lq) with continued abd pain. Upgraded to Regular diet w/ ensure x5day (12/31). Remains on a Regular diet with supplement in place. Decrease intake per review with minimal supplement intake per EMR. She reports the regular Ensure is too sweet, encouraged to drink Ensure Clear. Modify supplement order. WBC now normal and Meropenem was discontinued. Elevated lipase resolving. labs: WBC 6.4, H/H 7.2/22.3, , K 3.8Meds: Protonix, Statin, Vitamin c, lipitor, vitamin D, colace, ferrous sulfate, lasix. Malnutrition Assessment:  Malnutrition Status:  Mild malnutrition    Context:  Acute illness     Findings of the 6 clinical characteristics of malnutrition:   Energy Intake:  7 - 50% or less of est energy requirements for 5 or more days  Weight Loss:  1 - 1% to 2% over 1 week     Body Fat Loss:  No significant body fat loss,     Muscle Mass Loss:  No significant muscle mass loss,    Fluid Accumulation:  No significant fluid accumulation,     Strength:  Not performed         Estimated Daily Nutrient Needs:  Energy (kcal): 1800 (25kcal/kg/ABW); Weight Used for Energy Requirements: Current  Protein (g): 84-91g (1.2-1.3g/kg);  Weight Used for Protein Requirements: Adjusted  Fluid (ml/day): 2000 ml (20ml/kg); Method Used for Fluid Requirements: ml/kg      Nutrition Related Findings:  Pt obese. appears well nourished. NFPE not performed. Edema L forearm. BM 1/10 med/formed/maroon. Wounds:    Stage II (R buttock)         Current Nutrition Therapies:  ADULT ORAL NUTRITION SUPPLEMENT Breakfast, Lunch, Dinner; Low Calorie/High Protein  ADULT DIET Regular  ADULT ORAL NUTRITION SUPPLEMENT Breakfast, Lunch, Dinner; Other Supplement; Ensure Clear    Anthropometric Measures:  · Height:  5' 7\" (170.2 cm)  · Current Body Wt:  94.1 kg (207 lb 7.3 oz)   · Admission Body Wt:  214 lb    · Usual Body Wt:  117.6 kg (259 lb 4.2 oz) (>1 year ago)     · Ideal Body Wt:  135 lbs:  153.7 %   · BMI Category:  Obese class 1 (BMI 30.0-34. 9)       Nutrition Diagnosis:   · Inadequate oral intake related to inadequate protein-energy intake as evidenced by intake 26-50%,wounds      Nutrition Interventions:   Food and/or Nutrient Delivery: Continue current diet,Modify oral nutrition supplement  Nutrition Education and Counseling: No recommendations at this time  Coordination of Nutrition Care: Continue to monitor while inpatient    Goals:  Meet >50% EEN's with meals and supplement in 3-5 days, improve skin integrity       Nutrition Monitoring and Evaluation:   Behavioral-Environmental Outcomes: None identified  Food/Nutrient Intake Outcomes: Food and nutrient intake,Supplement intake  Physical Signs/Symptoms Outcomes: Weight,Skin,Biochemical data    Discharge Planning:    No discharge needs at this time     Electronically signed by Zoila Nash RD on 1/10/2022 at 11:22 AM    Contact: [phone]

## 2022-01-11 NOTE — NURSE NAVIGATOR
Visited patient; had discussion about patient's condition and current situation. Patient wants second opinion from oncology. Advised that due to increasing COVID numbers, transfers to other facilities are not being done in a timely manner. Encouraged her to accept the SNF placement and we can facilitate appointments as outpatient. Patient agreeable and verbalized understanding. Provided emotional support as patient expressed high level of frustration. Relayed converesation to CM and GI NP. Will continue to follow as outpatient.

## 2022-01-11 NOTE — PROGRESS NOTES
Hospitalist Progress Note    Subjective:   Daily Progress Note: 1/11/2022 2:42 PM    Hospital Course: The patient is a 73-year-old woman with a PMH significant for atrial fibrillation, PUD, CHF, COPD on home O2 at 2 L, renal cell carcinoma status post nephrectomy, hypertension and morbid obesity. Patient was found to have obstructive jaundice and biliary stricture in early November of this year.  She underwent an ERCP with stent placement with brushings of the bile duct where there was noted to be a stricture.  These brushings were negative.  Patient then underwent a PET CT which demonstrated uptake in the head of the pancreas concerning for pancreatic mass. She subsequently underwent endoscopic ultrasound with ultrasound and FNA biopsy on 12/6/2021.  The procedure demonstrated abutment of the tumor with the portal vein without involvement of the SMA or SMV.  The total tumor was measured to be 2.7 cm by 3 cm.  This biopsy resulted as atypical cells with suspicion for malignancy. She admitted to the hospital on 2/9/2021 and severe pancreatitis after undergoing an EUS for pancreatic biopsy on 12/6/2021 and pancreatic mass. Her symptoms were abdominal pain, nausea and vomiting. CT scan revealing peripancreatic inflammation consistent with likely post biopsy pancreatitis and ascites. Her initial labs revealing elevated lipase, supratherapeutic INR and elevated D-dimer coagulopathy. CTA was positive for bilateral pleural effusions, negative for PE. Paracentesis with culture of ascitic fluid, blood cultures, urine cultures negative for growth. Patient had been started on IV meropenem for leukocytosis and anidulafungin for suspected intra-abdominal infection. CT with and without contrast revealing liver lesions consistent with metastasis, mass in the pancreatic head with biliary stent in place, bilateral pleural effusions, ascites and bilateral adrenal masses.   IR declined liver biopsy due to coagulopathy and felt she would not benefit. Doppler studies of upper and lower extremities negative for DVT, positive for superficial thrombophlebitis of left medial antecubital.  Pancreatic cancer is presumed but no firm diagnosis due to lack of tissue sample. Pancreatitis remaining persistent despite antibiotics. Attempted Dobbhoff and PEG J placement but failed due to reddened area presumed possible tumor infiltration in the duodenum. Due to poor performance and prognosis she is not a candidate for palliative XRT or chemo. The patient has been progressively worsening with debilitation, intermittently tolerating diet not stable to be discharged home with outpatient surgical oncology follow-up. She would benefit from transfer to tertiary care center where surgical oncology services are available for evaluation of pancreatic head mass and possible surgical intervention. 1/7 A. fib with RVR rate 110-140. cardiology  Consulted and Anticoagulation discontinued due to bleeding and liver failure. Subjective:  Patient is upset about not getting TUMS and iron supplements, however, on review of her orders, patient is on these medications.     Current Facility-Administered Medications   Medication Dose Route Frequency    0.9% sodium chloride infusion 250 mL  250 mL IntraVENous PRN    polyethylene glycol (MIRALAX) packet 17 g  17 g Oral DAILY PRN    0.9% sodium chloride infusion 250 mL  250 mL IntraVENous PRN    calcium carbonate (TUMS) chewable tablet 200 mg [elemental]  200 mg Oral TID WITH MEALS    pantoprazole (PROTONIX) 40 mg in 0.9% sodium chloride 10 mL injection  40 mg IntraVENous Q12H    loperamide (IMODIUM) capsule 2 mg  2 mg Oral Q4H PRN    metoprolol tartrate (LOPRESSOR) tablet 25 mg  25 mg Oral Q12H    0.9% sodium chloride infusion 250 mL  250 mL IntraVENous PRN    cholecalciferol (VITAMIN D3) (5000 Units/125 mcg) tablet 5,000 Units  5,000 Units Oral Q7D    sertraline (ZOLOFT) tablet 25 mg  25 mg Oral QPM    atorvastatin (LIPITOR) tablet 40 mg  40 mg Oral QHS    furosemide (LASIX) tablet 40 mg  40 mg Oral DAILY    influenza vaccine 2021-22 (6 mos+)(PF) (FLUARIX/FLULAVAL/FLUZONE QUAD) injection 0.5 mL  1 Each IntraMUSCular PRIOR TO DISCHARGE    [Held by provider] apixaban (ELIQUIS) tablet 2.5 mg  2.5 mg Oral BID    ferrous sulfate tablet 325 mg  1 Tablet Oral DAILY WITH BREAKFAST    midodrine (PROAMATINE) tablet 10 mg  10 mg Oral TID WITH MEALS    sodium chloride (NS) flush 5-40 mL  5-40 mL IntraVENous PRN    zinc oxide-white petrolatum 17-57 % topical paste   Topical TID    cyclobenzaprine (FLEXERIL) tablet 10 mg  10 mg Oral TID PRN    HYDROmorphone (DILAUDID) injection 1 mg  1 mg IntraVENous Q4H PRN    oxyCODONE IR (ROXICODONE) tablet 5 mg  5 mg Oral Q6H PRN    docusate sodium (COLACE) capsule 100 mg  100 mg Oral BID    sorbitoL 70 % solution 30 mL  30 mL Oral DAILY PRN    bisacodyL (DULCOLAX) suppository 10 mg  10 mg Rectal DAILY PRN    hydrALAZINE (APRESOLINE) 20 mg/mL injection 20 mg  20 mg IntraVENous Q6H PRN    guaiFENesin ER (MUCINEX) tablet 600 mg  600 mg Oral Q12H    albuterol-ipratropium (DUO-NEB) 2.5 MG-0.5 MG/3 ML  3 mL Nebulization Q6H PRN    chlorthalidone (HYGROTON) tablet 25 mg  25 mg Oral DAILY    albuterol (PROVENTIL HFA, VENTOLIN HFA, PROAIR HFA) inhaler 2 Puff  2 Puff Inhalation Q6H PRN    ascorbic acid (vitamin C) (VITAMIN C) tablet 500 mg  500 mg Oral DAILY    diazePAM (VALIUM) tablet 5 mg  5 mg Oral Q6H PRN    dilTIAZem ER (CARDIZEM CD) capsule 120 mg  120 mg Oral DAILY    potassium chloride SR (KLOR-CON 10) tablet 20 mEq  20 mEq Oral DAILY    traZODone (DESYREL) tablet 50 mg  50 mg Oral QHS    ondansetron (ZOFRAN) injection 4 mg  4 mg IntraVENous Q6H PRN    acetaminophen (TYLENOL) tablet 650 mg  650 mg Oral Q4H PRN    prochlorperazine (COMPAZINE) with saline injection 10 mg  10 mg IntraVENous Q6H PRN        Review of Systems  Constitutional: No fevers, No chills, No sweats, No fatigue, No Weakness  Eyes: No redness  Ears, nose, mouth, throat, and face: No nasal congestion, No sore throat, No voice change  Respiratory: No Shortness of Breath, No cough, No wheezing  Cardiovascular: No chest pain, No palpitations, No extremity edema  Gastrointestinal: No nausea, No vomiting, No diarrhea, No abdominal pain  Genitourinary: No frequency, No dysuria, No hematuria  Integument/breast: No skin lesion(s)   Neurological: No Confusion, No headaches, No dizziness      Objective:     Visit Vitals  /60   Pulse 97   Temp 99.6 °F (37.6 °C)   Resp 18   Ht 5' 7\" (1.702 m)   Wt 94.1 kg (207 lb 7.3 oz)   SpO2 100%   BMI 32.49 kg/m²    O2 Flow Rate (L/min): 4 l/min O2 Device: Nasal cannula    Temp (24hrs), Av.7 °F (37.1 °C), Min:98 °F (36.7 °C), Max:99.6 °F (37.6 °C)      No intake/output data recorded.  1901 -  0700  In: 807.3 [P.O.:400]  Out: 650 [Urine:650]    PHYSICAL EXAM:  Constitutional: No acute distress  Skin: Extremities and face reveal no rashes. HEENT: Sclerae anicteric. Extra-occular muscles are intact. No oral ulcers. The neck is supple and no masses. Cardiovascular: Regular rate and rhythm. Respiratory:  Clear breath sounds bilaterally with no wheezes, rales, or rhonchi. GI: Abdomen nondistended, soft, and nontender. Normal active bowel sounds. Musculoskeletal: No pitting edema of the lower legs. Able to move all ext  Neurological:  Patient is alert and oriented.  Cranial nerves II-XII grossly intact  Psychiatric: Mood appears appropriate       Data Review    Recent Results (from the past 24 hour(s))   PROTHROMBIN TIME + INR    Collection Time: 01/10/22  9:30 PM   Result Value Ref Range    Prothrombin time 17.2 (H) 11.9 - 14.7 sec    INR 1.4 (H) 0.9 - 1.1     D DIMER    Collection Time: 01/10/22  9:30 PM   Result Value Ref Range    D DIMER 7.61 (H) <0.50 ug/ml(FEU)   METABOLIC PANEL, BASIC    Collection Time: 01/10/22  9:30 PM   Result Value Ref Range Sodium 141 136 - 145 mmol/L    Potassium 3.6 3.5 - 5.1 mmol/L    Chloride 108 97 - 108 mmol/L    CO2 30 21 - 32 mmol/L    Anion gap 3 (L) 5 - 15 mmol/L    Glucose 90 65 - 100 mg/dL    BUN 27 (H) 6 - 20 mg/dL    Creatinine 0.78 0.55 - 1.02 mg/dL    BUN/Creatinine ratio 35 (H) 12 - 20      GFR est AA >60 >60 ml/min/1.73m2    GFR est non-AA >60 >60 ml/min/1.73m2    Calcium 7.8 (L) 8.5 - 10.1 mg/dL   CBC WITH AUTOMATED DIFF    Collection Time: 01/10/22  9:30 PM   Result Value Ref Range    WBC 8.2 3.6 - 11.0 K/uL    RBC 2.00 (L) 3.80 - 5.20 M/uL    HGB 6.1 (L) 11.5 - 16.0 g/dL    HCT 19.9 (L) 35.0 - 47.0 %    MCV 99.5 (H) 80.0 - 99.0 FL    MCH 30.5 26.0 - 34.0 PG    MCHC 30.7 30.0 - 36.5 g/dL    RDW 17.0 (H) 11.5 - 14.5 %    PLATELET 499 122 - 554 K/uL    MPV 10.7 8.9 - 12.9 FL    NRBC 0.0 0.0  WBC    ABSOLUTE NRBC 0.00 0.00 - 0.01 K/uL    NEUTROPHILS 76 (H) 32 - 75 %    LYMPHOCYTES 17 12 - 49 %    MONOCYTES 5 5 - 13 %    EOSINOPHILS 1 0 - 7 %    BASOPHILS 0 0 - 1 %    IMMATURE GRANULOCYTES 1 (H) 0 - 0.5 %    ABS. NEUTROPHILS 6.3 1.8 - 8.0 K/UL    ABS. LYMPHOCYTES 1.4 0.8 - 3.5 K/UL    ABS. MONOCYTES 0.4 0.0 - 1.0 K/UL    ABS. EOSINOPHILS 0.1 0.0 - 0.4 K/UL    ABS. BASOPHILS 0.0 0.0 - 0.1 K/UL    ABS. IMM.  GRANS. 0.1 (H) 0.00 - 0.04 K/UL    DF AUTOMATED     C REACTIVE PROTEIN, QT    Collection Time: 01/10/22  9:30 PM   Result Value Ref Range    C-Reactive protein 10.00 (H) 0.00 - 0.60 mg/dL   PROCALCITONIN    Collection Time: 01/10/22  9:30 PM   Result Value Ref Range    Procalcitonin 0.65 (H) 0 ng/mL   LIPASE    Collection Time: 01/10/22  9:30 PM   Result Value Ref Range    Lipase 1,334 (H) 73 - 277 U/L   METABOLIC PANEL, BASIC    Collection Time: 01/11/22  4:00 AM   Result Value Ref Range    Sodium 143 136 - 145 mmol/L    Potassium 3.6 3.5 - 5.1 mmol/L    Chloride 108 97 - 108 mmol/L    CO2 31 21 - 32 mmol/L    Anion gap 4 (L) 5 - 15 mmol/L    Glucose 99 65 - 100 mg/dL    BUN 28 (H) 6 - 20 mg/dL    Creatinine 0. 95 0.55 - 1.02 mg/dL    BUN/Creatinine ratio 29 (H) 12 - 20      GFR est AA >60 >60 ml/min/1.73m2    GFR est non-AA 58 (L) >60 ml/min/1.73m2    Calcium 7.8 (L) 8.5 - 10.1 mg/dL   CBC WITH AUTOMATED DIFF    Collection Time: 01/11/22  4:00 AM   Result Value Ref Range    WBC 10.1 3.6 - 11.0 K/uL    RBC 2.24 (L) 3.80 - 5.20 M/uL    HGB 6.9 (L) 11.5 - 16.0 g/dL    HCT 21.8 (L) 35.0 - 47.0 %    MCV 97.3 80.0 - 99.0 FL    MCH 30.8 26.0 - 34.0 PG    MCHC 31.7 30.0 - 36.5 g/dL    RDW 16.8 (H) 11.5 - 14.5 %    PLATELET 435 979 - 729 K/uL    MPV 10.8 8.9 - 12.9 FL    NRBC 0.0 0.0  WBC    ABSOLUTE NRBC 0.00 0.00 - 0.01 K/uL    NEUTROPHILS 87 (H) 32 - 75 %    LYMPHOCYTES 8 (L) 12 - 49 %    MONOCYTES 4 (L) 5 - 13 %    EOSINOPHILS 0 0 - 7 %    BASOPHILS 0 0 - 1 %    IMMATURE GRANULOCYTES 1 (H) 0 - 0.5 %    ABS. NEUTROPHILS 8.7 (H) 1.8 - 8.0 K/UL    ABS. LYMPHOCYTES 0.9 0.8 - 3.5 K/UL    ABS. MONOCYTES 0.4 0.0 - 1.0 K/UL    ABS. EOSINOPHILS 0.0 0.0 - 0.4 K/UL    ABS. BASOPHILS 0.0 0.0 - 0.1 K/UL    ABS. IMM.  GRANS. 0.1 (H) 0.00 - 0.04 K/UL    DF AUTOMATED     LIPASE    Collection Time: 01/11/22  4:00 AM   Result Value Ref Range    Lipase 902 (H) 73 - 393 U/L   D DIMER    Collection Time: 01/11/22  7:45 AM   Result Value Ref Range    D DIMER 3.86 (H) <0.50 ug/ml(FEU)   PROTHROMBIN TIME + INR    Collection Time: 01/11/22  7:45 AM   Result Value Ref Range    Prothrombin time 17.1 (H) 11.9 - 14.7 sec    INR 1.4 (H) 0.9 - 1.1     C REACTIVE PROTEIN, QT    Collection Time: 01/11/22  7:45 AM   Result Value Ref Range    C-Reactive protein 12.40 (H) 0.00 - 0.60 mg/dL   PROCALCITONIN    Collection Time: 01/11/22  7:45 AM   Result Value Ref Range    Procalcitonin 0.91 (H) 0 ng/mL   TYPE & SCREEN    Collection Time: 01/11/22 10:57 AM   Result Value Ref Range    Crossmatch Expiration 01/14/2022,2359     ABO/Rh(D) B Positive     Antibody screen Negative     Unit number [id number]     Blood component type [facility]     Unit division 00 Status of unit Sonny to transfuse     Crossmatch result Compatible            Assessment / Plan:  Acute pancreatitis  - Status post EUS by Dr. Fred Saravia 12/06  - Continue pain medication  - Lipase variable, will likely be chronically elevated   - CT abd/pelvis with PO and IV contrast showing increasing bilateral pleural effusion and increasing ascites. Also notes multiple low-density lesions of liver consistent with metastatic disease and bilateral adrenal masses suspected for metastatic disease.  - completed 14 days of IV merrem   - Continue empiric Anidulafungin   - paracentesis on 12/27 with 2300 mL clear serous fluid drained, cytology showing now growth. - She was scheduled for liver biopsy with IR however IR does not feel patient would benefit from biopsy at this time.   -Unsuccessful attempt to place PEG J due to tumor infiltration and duodenum     CHF  - Continue chlorthalidone 25 mg daily  - IV lasix 40 mg daily     Pancreatic mass  Liver masses  Adrenal masses  History of renal cell carcinoma with lung nodules status post right nephrectomy  Multiple low-density lesions of the liver consistent with meta static disease and bilateral adrenal masses suspicious for metastatic disease  - CA 19-9 greater than 4000  --Oncology consulted-will need tissue biopsy before definitive recommendations can be made. Unable to offer palliative chemo or radiation due to poor performance. Prognosis very poor, hospice recommended     Hypokalemia-stable  - replete as needed     COPD-stable  - Pulmonary consultation  - Chronic respiratory failure with 2 L of oxygen via nasal cannula at home     Leukocytosis-resolved  - Cultures negative     A. Fib  Supratherapeutic anticoagulation    Coagulopathy    Acute anemia  -On diltiazem.   -Her hemoglobin has been slowly declining requiring repletion and she is FOBT positive  -Required transfusion hemoglobin today 6.2 receiving 2 units  Total of 6 units PRBCs  -Episode of A. fib with RVR heart rate 110-150  -Eliquis has been discontinued due to bleeding and liver failure  -Cardiology following        Elevated d-dimer  - Likely multifactorial   - CTA chest negative for PE  - Duplex US lower extremities negative for DVT  - Duplex US upper extremities showing superficial thrombophlebitis in left median/antecubital veins  -Repeat upper extremity Doppler study negative  -Discontinued Eliquis due to anemia        Hypotension  -Continue midodrine 10mg tid     Anemia iron deficiency  Blood loss anemia  -Hemoglobin trend 6.6->8.1->7.2->6.2->7.2 --6.8 today  -Has received total of 6 units PRBCs  -Has received both IV and p.o. iron replacement  -FOBT positive  -Heme-onc and GI following  -We will transfuse 1unit of PRBC today for hemoglobin <7.0,   -Eliquis discontinued       Discharge Barriers:   · Accept[facility] at [facility]  · Multiple attempts transfer to other facility that has oncology surgery all facilities currently on diversion and not wait listing  · Has declined discussions concerning hospice despite poor prognosis and poor performance        30.0 - 39.9 Obese / Body mass index is 32.49 kg/m². Code status: Full  Prophylaxis : SCDS- eliquis discontinued due to bleeding  Recommended Disposition: OC        time spent 35 minutes involving direct patient care as well as reviewing patient's labs and coordination of care with nursing staff     Care Plan discussed with: Patient/Family/RN/Case Management        Total time spent with patient: 35 minutes.

## 2022-01-11 NOTE — PROGRESS NOTES
Patient had her son bring in some vitamins and tums from her own supply. I contacted Rashad Collado and she said it was okay for her to have.

## 2022-01-11 NOTE — PROGRESS NOTES
CM reached out to [facility] via [facility] to see if room is still available. Woody Fry from [facility] requested more clinicals be uploaded. Writer uploaded clinicals via [address]. Denis Milton communicated with [facility] that PT/OT will work with patient tomorrow and I will update their recommendations via [address] [place].      Birgit Dominguez

## 2022-01-11 NOTE — PROGRESS NOTES
Progress Note    Patient: Avery Washburn MRN: [id number]  SSN: [id number]    YOB: 1945  Age: 78 y.o. Sex: female      Admit Date: 12/9/2021    LOS: 33 days     Subjective:   GI in consultation for Pancreatitis    Patient seen in room. Remains generally weak and pale looking. She is frustrated. Oncology nurse navigator meet with patient. She was advised that due to increasing COVID cases, transfer to other facilities are not being done in timely manner. Per note, she agreed to be transferred to a SNF. They can facilitate outpatient appointment from there. Tender abdomen on the left side. She does have right arm edema. Followed by Cardiology for Afib with RVR. Eliquis is on hold due to GI bleed. She is in Metoprolol and Cardizem. She had a large burgundy stool today per nurse.     -Lipase today is 902. Hgb 6.9. 1 unit PRBC for transfused. D-dimer decreasing, 3.86  - - 22,358.     12/30 EGD with Dobhoff placement for feeding - removed by patient. 12/27/21 Paracentesis - 2300ml ascites fluid removed. 12/23/21 CT abdomen  1. There are increasing bilateral pleural effusions, increasing body wall  edema, and increasing ascites. These findings could be secondary to the third spacing of fluids. The differential diagnosis for the ascites would include pancreatic ascites with acute pancreatitis or metastatic disease to the peritoneum. 2.  There is a biliary stent which is unchanged in its position traversing throughout area of obstruction within the pancreatic head. 3.  There are multiple low-density lesions of the liver without short-term  interval changes consistent with metastatic disease. 4.  The right kidney is not identified and apparently the patient is status post a previous right nephrectomy. 5.  There are bilateral adrenal masses suspect for metastatic disease without short-term interval changes.   12/6/2021 EUS showing 2.7 X3 cm lesion in the area of head of pancreas with dilation of the Pancreatic duct abutting the portal vein but not involving the SMA or AMV ; Tumor T2 by endosonographic criteria ; one small lymph node in the area of head of pancreas. 11/22/2021 PET Scan    1. FDG avid lesion in the pancreatic head consistent with malignancy. 2.  Subcentimeter focus of FDG uptake in the liver, indeterminate. 3.  FDG uptake associated with density expanding the right facet at C5-C6, possibly due to an ectatic artery. History of Present Illness: Katlyn Pinto is a 78 y.o. female who is seen in consultation for pancreatitis. Patient has a past medical history of  Paroxysmal Atrial Fibrillation, CHF, COPD, renal cell carcinoma Stage IV s/p nephrectomy, GERD sjoren's syndrome, hypertension, and depression. Patient under went EUS on 12/6/2021 showing 2.7 X3 cm lesion in the area of head of pancreas with dilation of the Pancreatic duct abutting the portal vein but not involving the SMA or AMV ; Tumor T2 by endosonographic criteria ; one small lymph node in the area of head of pancreas. Pathology shows rare cells present suspicious for malignancy. She had an ERCP on 11/4 2021 ERCP; with sphincterotomy/papillotomy ERCP; with placement of endoscopic stent into biliary or pancreatic duct, including pre and postdilation and guide wire passage, when performed, including sphincterotomy, when performed, each stent. . Patient had a PET scan on 11/22/21 which shows a lesion in the pancreatic head consistent with malignancy. CTA of abdomen shows ill defined hypodense mass in the pancreas. There are inflammatory changes. Patient states she experienced nausea, vomiting and diarrhea since Monday. Her abdominal pain has progressively gotten worse. She reports a poor appetite. She does complain of increased \"burping\". Total bilirubin 1.4, AsT 36, ALT 31, Alk Phos. 217, Lipase > 3,000. Plan nothing by mouth except ice chips and sips of water with medication. IV hydration.  Pain management Objective:     Vitals:    01/11/22 1208 01/11/22 1318 01/11/22 1443 01/11/22 1508   BP: 114/60  (!) 105/53 (!) 116/55   Pulse: 97  91 91   Resp:    18   Temp: 99.2 °F (37.3 °C) 99.6 °F (37.6 °C)  98.6 °F (37 °C)   SpO2: 100%   97%   Weight:       Height:            Intake and Output:  Current Shift: No intake/output data recorded. Last three shifts: 01/09 1901 - 01/11 0700  In: 807.3 [P.O.:400]  Out: 650 [Urine:650]    Physical Exam:   Skin:  Extremities and face reveal no rashes. No chapin erythema. HEENT: Sclerae anicteric. Extra-occular muscles are intact. No abnormal pigmentation of the lips. The neck is supple. Cardiovascular: Regular rate and rhythm. Respiratory:  Comfortable breathing with no accessory muscle use. GI:  Abdomen nondistended, soft, and nontender. No enlargement of the liver or spleen. No masses palpable. Rectal:  Deferred  Musculoskeletal: Extremities have good range of motion. Neurological:  Gross memory appears intact. Patient is alert and oriented. Psychiatric:  Mood appears appropriate with judgement intact.   Lymphatic:  No visible adenopathy      Lab/Data Review:  Recent Results (from the past 24 hour(s))   PROTHROMBIN TIME + INR    Collection Time: 01/10/22  9:30 PM   Result Value Ref Range    Prothrombin time 17.2 (H) 11.9 - 14.7 sec    INR 1.4 (H) 0.9 - 1.1     D DIMER    Collection Time: 01/10/22  9:30 PM   Result Value Ref Range    D DIMER 7.61 (H) <0.50 ug/ml(FEU)   METABOLIC PANEL, BASIC    Collection Time: 01/10/22  9:30 PM   Result Value Ref Range    Sodium 141 136 - 145 mmol/L    Potassium 3.6 3.5 - 5.1 mmol/L    Chloride 108 97 - 108 mmol/L    CO2 30 21 - 32 mmol/L    Anion gap 3 (L) 5 - 15 mmol/L    Glucose 90 65 - 100 mg/dL    BUN 27 (H) 6 - 20 mg/dL    Creatinine 0.78 0.55 - 1.02 mg/dL    BUN/Creatinine ratio 35 (H) 12 - 20      GFR est AA >60 >60 ml/min/1.73m2    GFR est non-AA >60 >60 ml/min/1.73m2    Calcium 7.8 (L) 8.5 - 10.1 mg/dL   CBC WITH AUTOMATED DIFF Collection Time: 01/10/22  9:30 PM   Result Value Ref Range    WBC 8.2 3.6 - 11.0 K/uL    RBC 2.00 (L) 3.80 - 5.20 M/uL    HGB 6.1 (L) 11.5 - 16.0 g/dL    HCT 19.9 (L) 35.0 - 47.0 %    MCV 99.5 (H) 80.0 - 99.0 FL    MCH 30.5 26.0 - 34.0 PG    MCHC 30.7 30.0 - 36.5 g/dL    RDW 17.0 (H) 11.5 - 14.5 %    PLATELET 903 458 - 721 K/uL    MPV 10.7 8.9 - 12.9 FL    NRBC 0.0 0.0  WBC    ABSOLUTE NRBC 0.00 0.00 - 0.01 K/uL    NEUTROPHILS 76 (H) 32 - 75 %    LYMPHOCYTES 17 12 - 49 %    MONOCYTES 5 5 - 13 %    EOSINOPHILS 1 0 - 7 %    BASOPHILS 0 0 - 1 %    IMMATURE GRANULOCYTES 1 (H) 0 - 0.5 %    ABS. NEUTROPHILS 6.3 1.8 - 8.0 K/UL    ABS. LYMPHOCYTES 1.4 0.8 - 3.5 K/UL    ABS. MONOCYTES 0.4 0.0 - 1.0 K/UL    ABS. EOSINOPHILS 0.1 0.0 - 0.4 K/UL    ABS. BASOPHILS 0.0 0.0 - 0.1 K/UL    ABS. IMM.  GRANS. 0.1 (H) 0.00 - 0.04 K/UL    DF AUTOMATED     C REACTIVE PROTEIN, QT    Collection Time: 01/10/22  9:30 PM   Result Value Ref Range    C-Reactive protein 10.00 (H) 0.00 - 0.60 mg/dL   PROCALCITONIN    Collection Time: 01/10/22  9:30 PM   Result Value Ref Range    Procalcitonin 0.65 (H) 0 ng/mL   LIPASE    Collection Time: 01/10/22  9:30 PM   Result Value Ref Range    Lipase 1,334 (H) 73 - 817 U/L   METABOLIC PANEL, BASIC    Collection Time: 01/11/22  4:00 AM   Result Value Ref Range    Sodium 143 136 - 145 mmol/L    Potassium 3.6 3.5 - 5.1 mmol/L    Chloride 108 97 - 108 mmol/L    CO2 31 21 - 32 mmol/L    Anion gap 4 (L) 5 - 15 mmol/L    Glucose 99 65 - 100 mg/dL    BUN 28 (H) 6 - 20 mg/dL    Creatinine 0.95 0.55 - 1.02 mg/dL    BUN/Creatinine ratio 29 (H) 12 - 20      GFR est AA >60 >60 ml/min/1.73m2    GFR est non-AA 58 (L) >60 ml/min/1.73m2    Calcium 7.8 (L) 8.5 - 10.1 mg/dL   CBC WITH AUTOMATED DIFF    Collection Time: 01/11/22  4:00 AM   Result Value Ref Range    WBC 10.1 3.6 - 11.0 K/uL    RBC 2.24 (L) 3.80 - 5.20 M/uL    HGB 6.9 (L) 11.5 - 16.0 g/dL    HCT 21.8 (L) 35.0 - 47.0 %    MCV 97.3 80.0 - 99.0 FL    MCH 30.8 26.0 - 34.0 PG    MCHC 31.7 30.0 - 36.5 g/dL    RDW 16.8 (H) 11.5 - 14.5 %    PLATELET 754 992 - 491 K/uL    MPV 10.8 8.9 - 12.9 FL    NRBC 0.0 0.0  WBC    ABSOLUTE NRBC 0.00 0.00 - 0.01 K/uL    NEUTROPHILS 87 (H) 32 - 75 %    LYMPHOCYTES 8 (L) 12 - 49 %    MONOCYTES 4 (L) 5 - 13 %    EOSINOPHILS 0 0 - 7 %    BASOPHILS 0 0 - 1 %    IMMATURE GRANULOCYTES 1 (H) 0 - 0.5 %    ABS. NEUTROPHILS 8.7 (H) 1.8 - 8.0 K/UL    ABS. LYMPHOCYTES 0.9 0.8 - 3.5 K/UL    ABS. MONOCYTES 0.4 0.0 - 1.0 K/UL    ABS. EOSINOPHILS 0.0 0.0 - 0.4 K/UL    ABS. BASOPHILS 0.0 0.0 - 0.1 K/UL    ABS. IMM. GRANS. 0.1 (H) 0.00 - 0.04 K/UL    DF AUTOMATED     LIPASE    Collection Time: 01/11/22  4:00 AM   Result Value Ref Range    Lipase 902 (H) 73 - 393 U/L   D DIMER    Collection Time: 01/11/22  7:45 AM   Result Value Ref Range    D DIMER 3.86 (H) <0.50 ug/ml(FEU)   PROTHROMBIN TIME + INR    Collection Time: 01/11/22  7:45 AM   Result Value Ref Range    Prothrombin time 17.1 (H) 11.9 - 14.7 sec    INR 1.4 (H) 0.9 - 1.1     C REACTIVE PROTEIN, QT    Collection Time: 01/11/22  7:45 AM   Result Value Ref Range    C-Reactive protein 12.40 (H) 0.00 - 0.60 mg/dL   PROCALCITONIN    Collection Time: 01/11/22  7:45 AM   Result Value Ref Range    Procalcitonin 0.91 (H) 0 ng/mL   TYPE & SCREEN    Collection Time: 01/11/22 10:57 AM   Result Value Ref Range    Crossmatch Expiration 01/14/2022,2359     ABO/Rh(D) B Positive     Antibody screen Negative     Unit number [id number]     Blood component type RC LR     Unit division 00     Status of unit [place] to transfuse     Crossmatch result Compatible               XR CHEST PORT   Final Result      XR CHEST PORT   Final Result   Findings/impression:      Stable positioning of right upper extremity PICC, tip projects over the upper   SVC. Cardiac silhouette is enlarged. No pulmonary edema. Bilateral pleural effusions. No evidence of pneumothorax.       No acute osseous abnormality identified. DUPLEX UPPER EXT VENOUS BILAT   Final Result      XR CHEST PORT   Final Result   Large effusions. Vascular congestion. CT ABD PELV W CONT   Final Result   1. There are increasing bilateral pleural effusions, increasing body wall   edema, and increasing ascites. These findings could be secondary to the third   spacing of fluids. The differential diagnosis for the ascites would include   pancreatic ascites with acute pancreatitis or metastatic disease to the   peritoneum. 2.  There is a biliary stent which is unchanged in its position traversing   throughout area of obstruction within the pancreatic head. 3.  There are multiple low-density lesions of the liver without short-term   interval changes consistent with metastatic disease. 4.  The right kidney is not identified and apparently the patient is status post   a previous right nephrectomy. 5.  There are bilateral adrenal masses suspect for metastatic disease without   short-term interval changes. CTA CHEST W OR W WO CONT   Final Result   No CT evidence for PE. Thoracic aorta atherosclerosis. CAD. Moderate to large bilateral pleural effusions with associated RML, RLL, and LLL   compressive atelectasis. Abdominal ascites. DUPLEX LOWER EXT VENOUS BILAT   Final Result      XR CHEST PORT   Final Result   FINDINGS: IMPRESSION: 2 frontal views of the chest. Right PICC distal tip SVC   region. Enlarging cardiomegaly. Increasing vascular congestion. Interval development of   hypoinflation, pleural effusions, lower lung airspace edema and/or pneumonia. No   pneumothorax. No free air under the diaphragm. CT ABD PELV W CONT   Final Result   New moderate volume dependent pleural effusions with associated   lower lobe lung compressive atelectasis.       Increasing ascites, moderate approaching large-volume   (fluid could be sampled if there is any clinical concern for bile content). High suspicion hepatic metastases. Similar appearance for the pancreas; Silastic stent in place. No pseudocyst formation. Unchanged appearance bilateral adrenal glands. No bowel obstruction. US ABD LTD   Final Result   Small amount of free fluid. Finding suggesting hemangioma in the   liver. Gallbladder wall thickening, adenomyomatosis, sludge and gallstones. Cholecystitis possible. Finding in the region of the pancreatic head as above. Other findings as above. CTA ABDOMEN PELV W CONT   Final Result   1. Ill-defined hypodense mass in the pancreas. There are inflammatory changes   and fluid adjacent to the pancreas or duodenum and stomach most likely related   to biopsy or focal pancreatitis. No pseudocyst formation, active bleeding or   other acute findings. 2. Enlarged adrenal glands left greater than right with noncontrast densities   consistent with adrenal adenomas. 3. Right nephrectomy. 4. Other findings as described. XR CHEST PORT   Final Result   No acute findings. IR PARACENTESIS ABD W IMAGE    (Results Pending)       Assessment:     Principal Problem:    Pancreatitis (12/9/2021)    Active Problems:    A-fib ([address] (11/16/2020)      CHF (congestive heart failure) ([address] (11/16/2020)      Hematemesis (12/12/2021)      History of peptic ulcer disease (12/12/2021)      Pancreatic mass (1/5/2022)        Plan:   1. Pancreatitis      -12/30 s/p post pyloric tube placement. Patient removed tube on 12/31.         -Unable to place GJ tube due to  tumor in duodenum and food in the stomach and patient on Eliquis      -Lipase 902. Repeat Lipase in the am       -Pain med as needed. - S/P paracentesis 12/27/21 with removal of 2300 ml clear serous  Fluid removed      -cytology Slight acute inflammation. -currently on Regular diet      -patient in agreement for SNF transfer, then outpatient follow up from there.    2.CHF      -Continue Chlorthalidone  3. COPD       -Continue home medications       -Albuterol as needed   4. Pancreatic Mass      - 22,358      -S/p fine needle aspiration suspicious for neoplasia but not diagnostic      -When stable Oncology plan for out patient followup with advanced oncology surgeon  At [facility] or [facility] for resection considerations       -CT concern for liver metastais/lesions       -IR for liver biopsy on hold at this time  5. Hematemesis (resolved)  6. Afib with RVR      -Eliquis on hold 2/2 to low HgB      -Current heart rate 120      -Continue diltiazem      -Cardiology has been consulted  7. Diarrhea      - Imodium as needed  8. Anemia/GI bleed     -large burgundy soft stool today. Bleeding most likely from tumor infiltrating into the bile wall.      -Hgb 6.9. 1 unit transfused, transfuse 1 more unit for total of 2 units. Had another detailed discussion with the patient   Clinical diagnosis remains Pancreatic cancer with bile duct obstruction mass in the head of the pancreas elevated ca 19-9. Patient to be presented in the tumor board on 1/12/22  She has expressed the desire to me that she wants to see a surgeon for resectability of the tumor which seems unlikely. Oncology wants pathologic specimen to conclusively say carcinoma if they are to consider Chemotherpy. EUS biospy has already been done once with results suspicious for malignancy. Perhaps a mini lap with direct biopsy of the pancreas and a Jejunal feeding tube may provide the final answer to oncology and help with feeding. She has pulled out the Dobbhoff tube that was placed However her eating of some food since yesterday had been encouraging. \"     Thank you for allowing me to participate in this patients care    Signed By: Jose Jung.  CONRADO Hillman     January 11, 2022

## 2022-01-11 NOTE — PROGRESS NOTES
Problem: Falls - Risk of  Goal: *Absence of Falls  Description: Document [place] Fall Risk and appropriate interventions in the flowsheet.   Outcome: Progressing Towards Goal  Note: Fall Risk Interventions:  Mobility Interventions: Patient to call before getting OOB,Bed/chair exit alarm    Mentation Interventions: Bed/chair exit alarm,More frequent rounding    Medication Interventions: Bed/chair exit alarm,Patient to call before getting OOB,Teach patient to arise slowly    Elimination Interventions: Call light in reach,Patient to call for help with toileting needs    History of Falls Interventions: Bed/chair exit alarm,Door open when patient unattended

## 2022-01-11 NOTE — PROGRESS NOTES
Progress Note    Patient: Katlyn Pinto MRN: [id number]  SSN: [id number]    YOB: 1945  Age: 78 y.o. Sex: female      Admit Date: 12/9/2021    LOS: 33 days     Subjective:   Patient followed for severe pancreatitis on Meropenem because of worsening leukocytosis. WBC now normal but CRP is increasing. Lipase has decreased. Suspected pancreatic cancer but no firm diagnosis yet. Patient again asleep. Objective:     Vitals:    01/11/22 1550 01/11/22 1625 01/11/22 1640 01/11/22 1655   BP: (!) 94/52 (!) 95/52 (!) 97/47 (!) 98/55   Pulse: 95 92 91 95   Resp: 18 18 18 18   Temp: 99.5 °F (37.5 °C) 99.6 °F (37.6 °C) 99.6 °F (37.6 °C) 99.7 °F (37.6 °C)   SpO2: 100% 96% 98% 100%   Weight:       Height:            Intake and Output:  Current Shift: No intake/output data recorded. Last three shifts: 01/09 1901 - 01/11 0700  In: 807.3 [P.O.:400]  Out: 650 [Urine:650]    Physical Exam:     Vitals and nursing note reviewed. Constitutional:       General: She is not in acute distress. Appearance: She is obese. She is ill-appearing. HENT: unremarkable  Abdominal:  Nontender  Genitourinary:  Vaginal area not examined     Comments: External urinary device  Musculoskeletal:      Right lower leg: No edema. Left lower leg: No edema. Comments: PICC line right upper arm   Skin: multiple ecchymoses on the upper extremities, edema left forearm     Findings: No rash.       Comments: ?Stage 2 sacral wound   Neurological: asleep    Lab/Data Review:     WBC 10,100  Urinalysis with WBC 10-20, Bacteria negative  Lipase 2,244 <2,350 <2,144 99 <2,511 0 <2,484 <2,684 < 1,397 <1,719    Procal  0.91 <0.59 <3.87 <0.34 <0.35 <0.34 < 0.32 <0.21 <0.19 <0.25 < 0.14  CRP 12.40 <10.00 <4.83 <5.07 <7.38 <18.30 <14.20 <12.70 <15.00 < 23.80    Serum fungitell <31 Negative    Ascitic fluid   with 41% PMNs    Blood cultures (12/20) No growth FINAL  Urine culture (12/20) No growth FINAL  Ascitic fluid culture(12/27) No growth FINAL    Assessment:     Principal Problem:    Pancreatitis (12/9/2021)    Active Problems:    A-fib ([facility] [address] (11/16/2020)      CHF (congestive heart failure) ([facility] [address] (11/16/2020)      Hematemesis (12/12/2021)      History of peptic ulcer disease (12/12/2021)      Pancreatic mass (1/5/2022)    1. Severe pancreatitis with markedly elevated lipase, resolving  2. Pancreatic mass, possible neoplasm  3. Biliary stent  4. Sepsis with worsening leukocytosis with elevated procal and CRP, resolving, status post 14 days of Meropenem, Anidulafungin (empiric)     5. TPN (just started)  6. Moderate pyuria, negative bacteria, urine culture negative  7. Possible candida superinfection with vaginitis, treated  8. Ascites, status post paracentesis    Comment:    WBC normal but CRP has significantly increased. Low grade temperature but no indication for restarting antibiotics. Lipase significantly decreased. Plan:   1. No further recommendations at this point  2.  Will continue to follow while inpatient     Signed By: Srinivas Miller MD     January 11, 2022

## 2022-01-11 NOTE — PROGRESS NOTES
OT tx attempted however per chart review, pt with HGB of 6.9 thus will hold OT tx at this time. Will continue to follow pt and attempt OT tx at a later time when medically appropriate. Thank you.

## 2022-01-12 NOTE — PROGRESS NOTES
Progress Note    Patient: Rip Demarco MRN: [id number]  SSN: [id number]    YOB: 1945  Age: 78 y.o. Sex: female      Admit Date: 12/9/2021    LOS: 34 days     Subjective:   Patient followed for severe pancreatitis on Meropenem because of worsening leukocytosis. WBC now normal but CRP is increasing. Lipase has decreased. Suspected pancreatic cancer but no firm diagnosis yet. Patient resting comfortably with no abdoinal pain at this time. States that she is tentatively going to Rehab. Objective:     Vitals:    01/11/22 1959 01/12/22 0211 01/12/22 0804 01/12/22 0818   BP: (!) 108/46 (!) 101/50 104/60    Pulse: 92 78 91    Resp: 18 18 18    Temp: 98.1 °F (36.7 °C) 98.1 °F (36.7 °C) 98.4 °F (36.9 °C)    SpO2: 92% 97% 100% 98%   Weight:       Height:            Intake and Output:  Current Shift: No intake/output data recorded. Last three shifts: 01/10 1901 - 01/12 0700  In: 807.3 [P.O.:400]  Out: 800 [Urine:800]    Physical Exam:     Vitals and nursing note reviewed. Constitutional:       General: She is not in acute distress. Appearance: She is obese. She is ill-appearing. HENT: unremarkable  Abdominal:  Nontender  Genitourinary:  Vaginal area not examined     Comments: External urinary device  Musculoskeletal:      Right lower leg: No edema. Left lower leg: No edema. Comments: PICC line right upper arm   Skin: multiple ecchymoses on the upper extremities, edema left forearm     Findings: No rash.       Comments: ?Stage 2 sacral wound   Neurological: asleep    Lab/Data Review:     WBC 10,100  Urinalysis with WBC 10-20, Bacteria negative  Lipase 1,441 <2,244 <2,350 <2,144 99 <2,511 0 <2,484 <2,684 < 1,397 <1,719    Procal  0.91 <0.59 <3.87 <0.34 <0.35 <0.34 < 0.32 <0.21 <0.19 <0.25 < 0.14  CRP 23.20 <12.40 <10.00 <4.83 <5.07 <7.38 <18.30 <14.20 <12.70 <15.00 < 23.80    Serum fungitell <31 Negative    Ascitic fluid   with 41% PMNs    Blood cultures (12/20) No growth FINAL  Urine culture (12/20) No growth FINAL  Ascitic fluid culture(12/27) No growth FINAL    Assessment:     Principal Problem:    Pancreatitis (12/9/2021)    Active Problems:    A-fib ([facility] [address] (11/16/2020)      CHF (congestive heart failure) ([facility] [address] (11/16/2020)      Hematemesis (12/12/2021)      History of peptic ulcer disease (12/12/2021)      Pancreatic mass (1/5/2022)    1. Severe pancreatitis with markedly elevated lipase, resolving  2. Pancreatic mass, possible neoplasm  3. Biliary stent  4. Sepsis with worsening leukocytosis with elevated procal and CRP, resolving, status post 14 days of Meropenem, Anidulafungin (empiric), CRP increasing   5. TPN (just started)  6. Moderate pyuria, negative bacteria, urine culture negative  7. Possible candida superinfection with vaginitis, treated  8. Ascites, status post paracentesis    Comment:    WBC remains normal but CRP continues to increase. She remains afebrile but has low grade temperatures. Lipase significantly increased again. Plan:   1. No further recommendations at this point  2. If she becomes febrile, would re-interrogate for nosocomial infection   3.  Otherwise, cleared for discharge from ID standpoint    Signed By: Ashtyn Shaw MD     January 12, 2022

## 2022-01-12 NOTE — PROGRESS NOTES
Problem: Mobility Impaired (Adult and Pediatric)  Goal: *Acute Goals and Plan of Care (Insert Text)  Description: Pt will be I with LE HEP in 7 days. Pt will perform bed mobility with mod I in 7 days. Pt will perform transfers with mod I in 7 days. Pt will amb- 50eet with LRAD safely with mod I in 7 days. Outcome: Progressing Towards Goal   PHYSICAL THERAPY REASSESSMENT  Patient: Demetrice Juares (89 y.o. female)  Date: 1/12/2022  Diagnosis: Pancreatitis [K85.90] Pancreatitis  Procedure(s) (LRB):  ESOPHAGOGASTRODUODENOSCOPY (EGD) (N/A) 8 Days Post-Op  Precautions:    Chart, physical therapy assessment, plan of care and goals were reviewed. ASSESSMENT  Pt is a 75 y/o F with PMH of atrial fibrillation, CHF, clear cell renal cell carcinoma, COPD, fibromyalgia, GERD, lymphedema, sjogren's syndrome, and thyroid nodule presenting to [facility] with c/o abdominal pain, admitted 12/9/21  and being treated for acute pancreatitis, atrial fibrillation with RVR, and dehydration. Pt is on 3L O2 via NC at night when at home.      Pt received semi-supine in bed upon arrival, AXO x4, agreeable to working with PT at this time. OT also present for increased pt/clinician safety with EOB/OOB activity. Per initial PT evaluation, pt lives with son in apartment witht [address] and no HR. Pt reports apartment is on the first floor of an old [facility] style home. PTA pt was IND with ADLs and Mod I using an old rollator for mobility at [facility] DME/AD previously used by pt was intended for son so she does not have her own. Other DME owned includes: 2 SPCs, RW. Pt last seen on 1/1/22 with 4 visit attempts since that time but unable to see for various medical reasons. Pt seen today for PT reassessment, though stating she is not feeling well. Pt stating she is having more trouble breathing today with O2 sats at 92% on 4 Lo2.       Pt presents today with decreased LE strength, impaired balance, difficulty with bed mobility requiring min-mod A, and unable to progress to transfers or ambulation due to pt c/o trouble breathing and dropping O2 sats down to 86% on 4 Lo2. Pt required mod A x 2 for sup to sit transfer, having to attempt twice, as on first attempt, she became very SOB when sliding her legs to the side of the bed and trying to pull herself up. Pt instructed on second attempt to roll to her side first and OT and I assisted with sitting her upright. Once sitting EOB, pt sat for 3 min, but said she was feeling SOB and even with cues for pursed lip breathing technique, pt still feeling SOB and requesting to lay back down. O2 sats dropped to 86% at this point. Pt assisted back to bed and continued with pursed lip breathing technique for another 2 min. O2 sats still not going up, so O2 raised to 5 L and after about 5 min, O2 sats cecil to 90%. O2 lowered back to 4L with pt with drop of O2 sats of 88%, but pt continuing with pursed lip breathing, and overall stated she was feeling better. Reviewed LE exercises to be performed throughout the day and pt agreeable. Pt would benefit from continued skilled PT services to improve LE strength, balance, overall functional mobility, progressing to gait to ease the burden on her caregivers. Recommend d/c to SNF. Other factors to consider for discharge: impaired mobility, level of assist         PLAN :  Patient continues to benefit from skilled intervention to address the above impairments. Continue treatment per established plan of care. to address goals. Recommendation for discharge: (in order for the patient to meet his/her long term goals)  Joseph Valentine    This discharge recommendation:  Has been made in collaboration with the attending provider and/or case management    IF patient discharges home will need the following DME: none       SUBJECTIVE:   Patient stated Tennille Stoll think I'm having a panic attack.     OBJECTIVE DATA SUMMARY:   Critical Behavior:  Neurologic State: Alert  Orientation Level: Oriented X4  Cognition: Follows commands     Functional Mobility Training:  Bed Mobility:  Rolling: Minimum assistance  Supine to Sit: Moderate assistance;Assist x2  Sit to Supine: Minimum assistance;Assist x2  Scooting: Moderate assistance;Assist x2        Transfers:                                   Balance:  Sitting: Impaired  Sitting - Static: Fair (occasional)  Sitting - Dynamic: Fair (occasional)  Ambulation/Gait Training:                                                          Therapeutic Exercises:   Reviewed ankle pumps, heel slides, and hip abd/add to be performed when pt was feeling less SOB. Pain Ratin/10    Activity Tolerance:   Fair, desaturates with exertion and requires oxygen, requires frequent rest breaks, and observed SOB with activity  Please refer to the flowsheet for vital signs taken during this treatment. After treatment patient left in no apparent distress:   Supine in bed and Call bell within reach    COMMUNICATION/COLLABORATION:   The patients plan of care was discussed with: Occupational therapist.  PT/OT sessions occurred together for increased safety of pt and clinician.       Moisés Fisher   Time Calculation: 23 mins

## 2022-01-12 NOTE — PROGRESS NOTES
Problem: Falls - Risk of  Goal: *Absence of Falls  Description: Document Kailey Calderon Fall Risk and appropriate interventions in the flowsheet.   Outcome: Progressing Towards Goal  Note: Fall Risk Interventions:  Mobility Interventions: Patient to call before getting OOB,Bed/chair exit alarm    Mentation Interventions: Bed/chair exit alarm,Door open when patient unattended    Medication Interventions: Patient to call before getting OOB,Bed/chair exit alarm    Elimination Interventions: Call light in reach,Patient to call for help with toileting needs,Bed/chair exit alarm    History of Falls Interventions: Bed/chair exit alarm,Door open when patient unattended

## 2022-01-12 NOTE — PROGRESS NOTES
Problem: Falls - Risk of  Goal: *Absence of Falls  Description: Document Mei Litter Fall Risk and appropriate interventions in the flowsheet. Outcome: Progressing Towards Goal  Note: Fall Risk Interventions:  Mobility Interventions: Patient to call before getting OOB,Bed/chair exit alarm    Mentation Interventions: Bed/chair exit alarm,Door open when patient unattended    Medication Interventions: Patient to call before getting OOB,Bed/chair exit alarm    Elimination Interventions: Call light in reach,Patient to call for help with toileting needs,Bed/chair exit alarm    History of Falls Interventions: Bed/chair exit alarm,Door open when patient unattended         Problem: Patient Education: Go to Patient Education Activity  Goal: Patient/Family Education  Outcome: Progressing Towards Goal     Problem: Pain  Goal: *Control of Pain  Outcome: Progressing Towards Goal     Problem: Patient Education: Go to Patient Education Activity  Goal: Patient/Family Education  Outcome: Progressing Towards Goal     Problem: Nausea/Vomiting (Adult)  Goal: *Absence of nausea/vomiting  Outcome: Progressing Towards Goal     Problem: Patient Education: Go to Patient Education Activity  Goal: Patient/Family Education  Outcome: Progressing Towards Goal     Problem: Pressure Injury - Risk of  Goal: *Prevention of pressure injury  Description: Document Asim Scale and appropriate interventions in the flowsheet. Outcome: Progressing Towards Goal  Note: Pressure Injury Interventions:  Sensory Interventions: Keep linens dry and wrinkle-free,Minimize linen layers,Turn and reposition approx.  every two hours (pillows and wedges if needed),Pressure redistribution bed/mattress (bed type)    Moisture Interventions: Absorbent underpads,Apply protective barrier, creams and emollients,Internal/External urinary devices,Minimize layers,Moisture barrier    Activity Interventions: Pressure redistribution bed/mattress(bed type),PT/OT evaluation,Increase time out of bed    Mobility Interventions: Float heels,HOB 30 degrees or less,PT/OT evaluation,Turn and reposition approx.  every two hours(pillow and wedges)    Nutrition Interventions: Document food/fluid/supplement intake,Discuss nutritional consult with provider,Offer support with meals,snacks and hydration    Friction and Shear Interventions: Apply protective barrier, creams and emollients,HOB 30 degrees or less,Minimize layers                Problem: Patient Education: Go to Patient Education Activity  Goal: Patient/Family Education  Outcome: Progressing Towards Goal     Problem: Patient Education: Go to Patient Education Activity  Goal: Patient/Family Education  Outcome: Progressing Towards Goal     Problem: Patient Education: Go to Patient Education Activity  Goal: Patient/Family Education  Outcome: Progressing Towards Goal

## 2022-01-12 NOTE — PROGRESS NOTES
Progress Note    Patient: Anne Macias MRN: [id number]  SSN: [id number]    YOB: 1945  Age: 78 y.o. Sex: female      Admit Date: 12/9/2021    LOS: 34 days     Subjective:   GI in consultation for Pancreatitis     Patient seen in room sleeping. Discussed with nurse. Patient had another dark stool today. She is refusing to eat. Plan is to transfer her to SNF. She had a thoracentesis done today, 120ml fluid drained. IR not able to perform liver biopsy.    -Lipase today increased to 1,441. Hgb 6. 9. after 2 units of PRBC yesterday. Calcium low 5.4. - - 22,358.      12/30 EGD with Dobhoff placement for feeding - removed by patient.   12/27/21 Paracentesis - 2300ml ascites fluid removed. 12/23/21 CT abdomen  1.  There are increasing bilateral pleural effusions, increasing body wall  edema, and increasing ascites. These findings could be secondary to the third spacing of fluids. The differential diagnosis for the ascites would include pancreatic ascites with acute pancreatitis or metastatic disease to the peritoneum.    2. Lamona Zaira is a biliary stent which is unchanged in its position traversing throughout area of obstruction within the pancreatic head. 3. Lamona Zaira are multiple low-density lesions of the liver without short-term  interval changes consistent with metastatic disease. 4.  The right kidney is not identified and apparently the patient is status post a previous right nephrectomy. 5.  There are bilateral adrenal masses suspect for metastatic disease without short-term interval changes. 12/6/2021 EUS showing 2.7 X3 cm lesion in the area of head of pancreas with dilation of the Pancreatic duct abutting the portal vein but not involving the SMA or AMV ; Tumor T2 by endosonographic criteria ; one small lymph node in the area of head of pancreas.   11/22/2021 PET Scan    1.  FDG avid lesion in the pancreatic head consistent with malignancy.   2.  Subcentimeter focus of FDG uptake in the liver, indeterminate. 3.  FDG uptake associated with density expanding the right facet at C5-C6, possibly due to an ectatic artery.     History of Present Illness: Nevin Cole is a 78 y. o. female who is seen in consultation for pancreatitis. Jered Owens has a past medical history of  Paroxysmal Atrial Fibrillation, CHF, COPD, renal cell carcinoma Stage IV s/p nephrectomy, GERD sjoren's syndrome, hypertension, and depression. Patient under went EUS on 12/6/2021 showing 2.7 X3 cm lesion in the area of head of pancreas with dilation of the Pancreatic duct abutting the portal vein but not involving the SMA or AMV ; Tumor T2 by endosonographic criteria ; one small lymph node in the area of head of pancreas. Pathology shows rare cells present suspicious for malignancy.   She had an ERCP on 11/4 2021 ERCP; with sphincterotomy/papillotomy ERCP; with placement of endoscopic stent into biliary or pancreatic duct, including pre and postdilation and guide wire passage, when performed, including sphincterotomy, when performed, each stent. . Patient had a PET scan on 11/22/21 which shows a lesion in the pancreatic head consistent with malignancy. CTA of abdomen shows ill defined hypodense mass in the pancreas. There are inflammatory changes. Patient states she experienced nausea, vomiting and diarrhea since Monday. Her abdominal pain has progressively gotten worse. She reports a poor appetite. She does complain of increased \"burping\". Total bilirubin 1.4, AsT 36, ALT 31, Alk Phos. 217, Lipase > 3,000. Plan nothing by mouth except ice chips and sips of water with medication. IV hydration.  Pain management      Objective:     Vitals:    01/11/22 1959 01/12/22 0211 01/12/22 0804 01/12/22 0818   BP: (!) 108/46 (!) 101/50 104/60    Pulse: 92 78 91    Resp: 18 18 18    Temp: 98.1 °F (36.7 °C) 98.1 °F (36.7 °C) 98.4 °F (36.9 °C)    SpO2: 92% 97% 100% 98%   Weight:       Height:            Intake and Output:  Current Shift: No intake/output data recorded. Last three shifts: 01/10 1901 - 01/12 0700  In: 807.3 [P.O.:400]  Out: 800 [Urine:800]    Physical Exam:   Skin:  Extremities and face reveal no rashes. No chapin erythema. HEENT: Sclerae anicteric. Extra-occular muscles are intact. No abnormal pigmentation of the lips. The neck is supple. Cardiovascular: Regular rate and rhythm. Respiratory:  Comfortable breathing with no accessory muscle use. GI:  Abdomen nondistended, soft, and mildly tender. No enlargement of the liver or spleen. No masses palpable. Rectal:  Deferred  Musculoskeletal: Generalized weakness  Neurological:  Gross memory appears intact. Patient is alert and oriented. Psychiatric:  Mood appears appropriate with judgement intact.   Lymphatic:  No visible adenopathy      Lab/Data Review:  Recent Results (from the past 24 hour(s))   D DIMER    Collection Time: 01/12/22  9:24 AM   Result Value Ref Range    D DIMER 8.40 (H) <0.50 ug/ml(FEU)   PROTHROMBIN TIME + INR    Collection Time: 01/12/22  9:24 AM   Result Value Ref Range    Prothrombin time 20.4 (H) 11.9 - 14.7 sec    INR 1.8 (H) 0.9 - 1.1     METABOLIC PANEL, BASIC    Collection Time: 01/12/22  9:24 AM   Result Value Ref Range    Sodium 140 136 - 145 mmol/L    Potassium 4.2 3.5 - 5.1 mmol/L    Chloride 102 97 - 108 mmol/L    CO2 31 21 - 32 mmol/L    Anion gap 7 5 - 15 mmol/L    Glucose 92 65 - 100 mg/dL    BUN 25 (H) 6 - 20 mg/dL    Creatinine 0.97 0.55 - 1.02 mg/dL    BUN/Creatinine ratio 26 (H) 12 - 20      GFR est AA >60 >60 ml/min/1.73m2    GFR est non-AA 56 (L) >60 ml/min/1.73m2    Calcium 5.4 (LL) 8.5 - 10.1 mg/dL   CBC WITH AUTOMATED DIFF    Collection Time: 01/12/22  9:24 AM   Result Value Ref Range    WBC 8.5 3.6 - 11.0 K/uL    RBC 2.79 (L) 3.80 - 5.20 M/uL    HGB 8.5 (L) 11.5 - 16.0 g/dL    HCT 26.2 (L) 35.0 - 47.0 %    MCV 93.9 80.0 - 99.0 FL    MCH 30.5 26.0 - 34.0 PG    MCHC 32.4 30.0 - 36.5 g/dL    RDW 16.4 (H) 11.5 - 14.5 %    PLATELET 654 842 - 400 K/uL    MPV 11.0 8.9 - 12.9 FL    NRBC 0.0 0.0  WBC    ABSOLUTE NRBC 0.00 0.00 - 0.01 K/uL    NEUTROPHILS 89 (H) 32 - 75 %    LYMPHOCYTES 7 (L) 12 - 49 %    MONOCYTES 3 (L) 5 - 13 %    EOSINOPHILS 0 0 - 7 %    BASOPHILS 0 0 - 1 %    IMMATURE GRANULOCYTES 1 (H) 0 - 0.5 %    ABS. NEUTROPHILS 7.5 1.8 - 8.0 K/UL    ABS. LYMPHOCYTES 0.6 (L) 0.8 - 3.5 K/UL    ABS. MONOCYTES 0.3 0.0 - 1.0 K/UL    ABS. EOSINOPHILS 0.0 0.0 - 0.4 K/UL    ABS. BASOPHILS 0.0 0.0 - 0.1 K/UL    ABS. IMM. GRANS. 0.1 (H) 0.00 - 0.04 K/UL    DF AUTOMATED     C REACTIVE PROTEIN, QT    Collection Time: 01/12/22  9:24 AM   Result Value Ref Range    C-Reactive protein 23.20 (H) 0.00 - 0.60 mg/dL   LIPASE    Collection Time: 01/12/22  9:24 AM   Result Value Ref Range    Lipase 1,441 (H) 73 - 393 U/L   ALBUMIN    Collection Time: 01/12/22  9:24 AM   Result Value Ref Range    Albumin 1.1 (L) 3.5 - 5.0 g/dL              IR THORACENTESIS NDL PUNC ASP W IMAGE   Final Result   1. Previously described small hepatic metastases are sonographically occult,   therefore targeted liver biopsy could not be performed. 2.  Small left pleural effusion. 3.  Successful left thoracentesis. Fluid samples submitted for cytologic   analysis. IR ULTRASOUND ABDOMEN LTD   Final Result   1. Previously described small hepatic metastases are sonographically occult,   therefore targeted liver biopsy could not be performed. 2.  Small left pleural effusion. 3.  Successful left thoracentesis. Fluid samples submitted for cytologic   analysis. XR CHEST PORT   Final Result      XR CHEST PORT   Final Result   Findings/impression:      Stable positioning of right upper extremity PICC, tip projects over the upper   SVC. Cardiac silhouette is enlarged. No pulmonary edema. Bilateral pleural effusions. No evidence of pneumothorax. No acute osseous abnormality identified.             DUPLEX UPPER EXT VENOUS BILAT   Final Result      XR CHEST PORT Final Result   Large effusions. Vascular congestion. CT ABD PELV W CONT   Final Result   1. There are increasing bilateral pleural effusions, increasing body wall   edema, and increasing ascites. These findings could be secondary to the third   spacing of fluids. The differential diagnosis for the ascites would include   pancreatic ascites with acute pancreatitis or metastatic disease to the   peritoneum. 2.  There is a biliary stent which is unchanged in its position traversing   throughout area of obstruction within the pancreatic head. 3.  There are multiple low-density lesions of the liver without short-term   interval changes consistent with metastatic disease. 4.  The right kidney is not identified and apparently the patient is status post   a previous right nephrectomy. 5.  There are bilateral adrenal masses suspect for metastatic disease without   short-term interval changes. CTA CHEST W OR W WO CONT   Final Result   No CT evidence for PE. Thoracic aorta atherosclerosis. CAD. Moderate to large bilateral pleural effusions with associated RML, RLL, and LLL   compressive atelectasis. Abdominal ascites. DUPLEX LOWER EXT VENOUS BILAT   Final Result      XR CHEST PORT   Final Result   FINDINGS: IMPRESSION: 2 frontal views of the chest. Right PICC distal tip SVC   region. Enlarging cardiomegaly. Increasing vascular congestion. Interval development of   hypoinflation, pleural effusions, lower lung airspace edema and/or pneumonia. No   pneumothorax. No free air under the diaphragm. CT ABD PELV W CONT   Final Result   New moderate volume dependent pleural effusions with associated   lower lobe lung compressive atelectasis. Increasing ascites, moderate approaching large-volume   (fluid could be sampled if there is any clinical concern for bile content). High suspicion hepatic metastases.    Similar appearance for the pancreas; Silastic stent in place. No pseudocyst formation. Unchanged appearance bilateral adrenal glands. No bowel obstruction. US ABD LTD   Final Result   Small amount of free fluid. Finding suggesting hemangioma in the   liver. Gallbladder wall thickening, adenomyomatosis, sludge and gallstones. Cholecystitis possible. Finding in the region of the pancreatic head as above. Other findings as above. CTA ABDOMEN PELV W CONT   Final Result   1. Ill-defined hypodense mass in the pancreas. There are inflammatory changes   and fluid adjacent to the pancreas or duodenum and stomach most likely related   to biopsy or focal pancreatitis. No pseudocyst formation, active bleeding or   other acute findings. 2. Enlarged adrenal glands left greater than right with noncontrast densities   consistent with adrenal adenomas. 3. Right nephrectomy. 4. Other findings as described. XR CHEST PORT   Final Result   No acute findings. IR PARACENTESIS ABD W IMAGE    (Results Pending)       Assessment:     Principal Problem:    Pancreatitis (12/9/2021)    Active Problems:    A-fib ([address] (11/16/2020)      CHF (congestive heart failure) ([address] (11/16/2020)      Hematemesis (12/12/2021)      History of peptic ulcer disease (12/12/2021)      Pancreatic mass (1/5/2022)        Plan:   1. Pancreatitis - RESOLVED.     -12/30 s/p post pyloric tube placement. Patient removed tube on 12/31.         -Unable to place GJ tube due to  tumor in duodenum and food in the stomach and patient on Eliquis      -Lipase 902. Repeat Lipase in the am       -Pain med as needed.      - S/P paracentesis 12/27/21 with removal of 2300 ml clear serous  Fluid removed      -cytology Slight acute inflammation.       -currently on Regular diet      -patient in agreement for SNF transfer, then outpatient follow up from there. 2.CHF      -Continue Chlorthalidone  3. COPD       -Continue home medications       -Albuterol as needed   4. Pancreatic Mass      - 22,358      -S/p fine needle aspiration suspicious for neoplasia but not diagnostic      -When stable Oncology plan for out patient followup with advanced oncology surgeon Geneva Fisher or [facility] for resection considerations       -CT concern for liver metastais/lesions       -IR for liver biopsy on hold at this time  5. Hematemesis (resolved)  6. Afib with RVR      -Eliquis on hold 2/2 to low HgB      -Current heart rate 120      -Continue diltiazem      -Cardiology has been consulted  7. Diarrhea      - Imodium as needed  8. Anemia/GI bleed     -large burgundy soft stool today. Bleeding most likely from tumor.     -Bleeding scan     -Hgb 6.9. 1 unit transfused, transfuse 1 more unit for total of 2 units.      Had another detailed discussion with the patient   Clinical diagnosis remains Pancreatic cancer with bile duct obstruction mass in the head of the pancreas elevated ca 19-9. Patient to be presented in the tumor board on 1/12/22  She has expressed the desire to me that she wants to see a surgeon for resectability of the tumor which seems unlikely. Oncology wants pathologic specimen to conclusively say carcinoma if they are to consider Chemotherpy. EUS biospy has already been done once with results suspicious for malignancy. Perhaps a mini lap with direct biopsy of the pancreas and a Jejunal feeding tube may provide the final answer to oncology and help with feeding. She has pulled out the Dobbhoff tube that was placed However her eating of some food since yesterday had been encouraging. \"     Patient discussed with Dr Taryn Palacio and agrees to above impression and plan. Thank you for allowing me to participate in this patients care    Signed By: Arvind Ram.  CONRADO Hillman     January 12, 2022

## 2022-01-12 NOTE — PROGRESS NOTES
OCCUPATIONAL THERAPY RE-ASSESSMENT  Patient: Charlene Lovett (50 y.o. female)  Date: 1/12/2022  Diagnosis: Pancreatitis [K85.90] Pancreatitis  Procedure(s) (LRB):  ESOPHAGOGASTRODUODENOSCOPY (EGD) (N/A) 8 Days Post-Op  Precautions:    Chart, occupational therapy assessment, plan of care, and goals were reviewed. ASSESSMENT  Pt is a 79 y/o F with PMH of atrial fibrillation, CHF, clear cell renal cell carcinoma, COPD, fibromyalgia, GERD, lymphedema, sjogren's syndrome, and thyroid nodule presenting to [facility] with c/o abdominal pain, admitted 12/9/21  and being treated for acute pancreatitis, atrial fibrillation with RVR, and dehydration. Pt is on 3L O2 via NC at night when at home. Pt received semi-supine in bed upon arrival, AXO x4, agreeable to working with OT at this time. PT also present for increased pt/clinician safety with EOB/OOB activity. Pt was initially evaluated and placed on OT caseload on 12/16/21, has been seen for 2 skilled OT tx sessions since that time, now due for RA 2' to LOS. Per initial OT evaluation, pt lives with son in apartment witht 13 JUAN and no HR. Pt reports apartment is on the first floor of an old [facility] style home. PTA pt was IND with ADLs and Mod I using an old rollator for mobility at [facility]. All DME/AD previously used by pt was intended for son so she does not have her own. Pt currently takes sponge baths as she is not able to get in and out of her tub. Other DME owned includes: 2 SPCs, RW. Based on current observations, pt continues to present with deficits in generalized strength/AROM, bed mobility, static/dynamic sitting balance, functional activity tolerance, and pain impacting overall performance of ADLs and functional transfers/mobility. Pt currently requires setup assit with increased time/rest breaks donning socks in long sitting crossing LE over opposite knee.  Pt min A rolling, min Ax2 supine>sit to EOB, mod A x2 scooting to EOB with fair sitting balance noted; pt tolerating appox 1 min in sitting today before requesting to lie back down 2' to SOB/fatigue, min A x2 sit>supine. Pt educated on B UE HEP to complete while in bed with good understanding verbalized at end of session. Overall, pt tolerates session fair today with SPO2 92% at rest, dropping to 86% with activity and increased time/PLB to recover. PT/OT bumped O2 sats up to 5L for pt to recover at 90% and brought back to 4L with pt maintaining 88%. Pt would benefit from continued skilled OT services to address noted deficits and maximize IND/safety with ADLs and functional transfers/mobility. OT goals and POC reviewed and continue to remain appropriate at this time. Continue to progress. OT recommending SNF at discharge once medically appropriate. Other factors to consider for discharge: family support, DME, time since onset, severity of deficits          PLAN :  Recommendations and Planned Interventions: self care training, functional mobility training, therapeutic exercise, balance training, therapeutic activities, endurance activities, neuromuscular re-education, patient education, and family training/education    Recommend with staff: Encourage IND with B UE HEP and bed level ADL grooming tasks as tolerated    Recommend next OT session: STS transfer in prep for toileting routine    Frequency/Duration: Patient will be followed by occupational therapy:  3-5x/week to address goals.     Recommendation for discharge: (in order for the patient to meet his/her long term goals)  Joseph Valentine    This discharge recommendation:  Has been made in collaboration with the attending provider and/or case management       SUBJECTIVE:   Patient stated I need to lie back down after seated EOB for approx 1 minute    OBJECTIVE DATA SUMMARY:     Cognitive/Behavioral Status:  Neurologic State: Alert  Orientation Level: Oriented X4  Cognition: Follows commands    Edema: noted edema R UE; elevated R UE on pillow at end of session    Hearing: Auditory  Auditory Impairment: Hard of hearing, bilateral    Range of Motion:  AROM: Generally decreased, functional                         Strength:  Strength: Generally decreased, functional                Coordination:     Fine Motor Skills-Upper: Left Intact; Right Intact    Gross Motor Skills-Upper: Left Intact; Right Intact    Tone & Sensation:  Tone: Normal  Sensation: Intact                        Functional Mobility and Transfers for ADLs:  Bed Mobility:  Rolling: Minimum assistance  Supine to Sit: Moderate assistance;Assist x2  Sit to Supine: Minimum assistance;Assist x2  Scooting: Moderate assistance;Assist x2      Balance:  Sitting: Impaired  Sitting - Static: Fair (occasional)  Sitting - Dynamic: Fair (occasional)      ADL Intervention and task modifications:  Feeding  Drink to Mouth: Independent    Lower Body Dressing Assistance  Socks: Set-up; Stand-by assistance (additional time)  Leg Crossed Method Used: Yes  Position Performed: Long sitting on bed    Therapeutic Exercises:   Pt educated on UE HEP to complete throughout the day to improve ROM and strength with good understanding verbalized and demonstrated. Functional Measure:    [address] [place] [id number] AM-PACTM \"6 Clicks\"                                                       Daily Activity Inpatient Short Form  How much help from another person does the patient currently need. .. Total; A Lot A Little None   1. Putting on and taking off regular lower body clothing? []  1 []  2 [x]  3 []  4   2. Bathing (including washing, rinsing, drying)? []  1 []  2 [x]  3 []  4   3. Toileting, which includes using toilet, bedpan or urinal? [] 1 [x]  2 []  3 []  4   4. Putting on and taking off regular upper body clothing? []  1 [x]  2 []  3 []  4   5. Taking care of personal grooming such as brushing teeth? []  1 []  2 [x]  3 []  4   6. Eating meals?  []  1 []  2 [x]  3 []  4   © 2007, Trustees of [facility] [address] [address], under license to [employer]. All rights reserved     Score: 16/24     Interpretation of Tool:  Represents clinically-significant functional categories (i.e. Activities of daily living). Percentage of Impairment CH    0%   CI    1-19% CJ    20-39% CK    40-59% CL    60-79% CM    80-99% CN     100%   [facility]  Score 6-24 24 23 20-22 15-19 10-14 7-9 6        Pain:  Pt c/o pain near pancreas 6/10    Activity Tolerance:   Fair, desaturates with exertion and requires oxygen, requires frequent rest breaks, and observed SOB with activity  Please refer to the flowsheet for vital signs taken during this treatment. After treatment patient left in no apparent distress:   Supine in bed, Call bell within reach, Bed / chair alarm activated, and Side rails x 3    COMMUNICATION/COLLABORATION:   The patients plan of care was discussed with: Physical therapist and Registered nurse. OT/PT sessions occurred together for increased patient and clinician safety as pt with decreased activity tolerance and requires A of 2 for mobility at this time. Yimi Marte  Time Calculation: 28 mins   Problem: Self Care Deficits Care Plan (Adult)  Goal: *Acute Goals and Plan of Care (Insert Text)  Description: 1. Pt will be mod I sup <> sit in prep for EOB ADLs  2. Pt will be mod I grooming sitting EOB  3. Pt will be mod I LE dressing sitting EOB/long sit  4. Pt will be mod I sit <>  prep for toileting LRAD  5. Pt will be mod I toileting/toilet transfer/cloth mgmt LRAD  6.   Pt will be IND following UE HEP in prep for self care tasks      Outcome: Progressing Towards Goal

## 2022-01-12 NOTE — PROGRESS NOTES
Hospitalist Progress Note    Subjective:   Daily Progress Note: 1/12/2022 2:42 PM    Hospital Course: The patient is a 74-year-old woman with a PMH significant for atrial fibrillation, PUD, CHF, COPD on home O2 at 2 L, renal cell carcinoma status post nephrectomy, hypertension and morbid obesity. Patient was found to have obstructive jaundice and biliary stricture in early November of this year.  She underwent an ERCP with stent placement with brushings of the bile duct where there was noted to be a stricture.  These brushings were negative.  Patient then underwent a PET CT which demonstrated uptake in the head of the pancreas concerning for pancreatic mass. She subsequently underwent endoscopic ultrasound with ultrasound and FNA biopsy on 12/6/2021.  The procedure demonstrated abutment of the tumor with the portal vein without involvement of the SMA or SMV.  The total tumor was measured to be 2.7 cm by 3 cm.  This biopsy resulted as atypical cells with suspicion for malignancy. She admitted to the hospital on 12/9/2021 and severe pancreatitis after undergoing an EUS for pancreatic biopsy on 12/6/2021 and pancreatic mass. Her symptoms were abdominal pain, nausea and vomiting. CT scan revealing peripancreatic inflammation consistent with likely post biopsy pancreatitis and ascites. Her initial labs revealing elevated lipase, supratherapeutic INR and elevated D-dimer coagulopathy. CTA was positive for bilateral pleural effusions, negative for PE. Paracentesis with culture of ascitic fluid, blood cultures, urine cultures negative for growth. Patient had been started on IV meropenem for leukocytosis and anidulafungin for suspected intra-abdominal infection. CT with and without contrast revealing liver lesions consistent with metastasis, mass in the pancreatic head with biliary stent in place, bilateral pleural effusions, ascites and bilateral adrenal masses.   IR declined liver biopsy due to coagulopathy and felt she would not benefit. Doppler studies of upper and lower extremities negative for DVT, positive for superficial thrombophlebitis of left medial antecubital.  Pancreatic cancer is presumed but no firm diagnosis due to lack of tissue sample. Pancreatitis remaining persistent despite antibiotics. Attempted Dobbhoff and PEG J placement but failed due to reddened area presumed possible tumor infiltration in the duodenum. Due to poor performance and prognosis she is not a candidate for palliative XRT or chemo. The patient has been progressively worsening with debilitation, intermittently tolerating diet not stable to be discharged home with outpatient surgical oncology follow-up. She would benefit from transfer to tertiary care center where surgical oncology services are available for evaluation of pancreatic head mass and possible surgical intervention. 1/7 A. fib with RVR rate 110-140. cardiology  Consulted and Anticoagulation discontinued due to bleeding and liver failure. Subjective: Patient says she is feeling a little better. States poor appetite due to the hospital food. Requesting different iron and calcium meds. Will do her home medications.  Discussed with RN and will send to pharmacy to dispense    Current Facility-Administered Medications   Medication Dose Route Frequency    0.9% sodium chloride infusion 250 mL  250 mL IntraVENous PRN    0.9% sodium chloride infusion 250 mL  250 mL IntraVENous PRN    polyethylene glycol (MIRALAX) packet 17 g  17 g Oral DAILY PRN    0.9% sodium chloride infusion 250 mL  250 mL IntraVENous PRN    calcium carbonate (TUMS) chewable tablet 200 mg [elemental]  200 mg Oral TID WITH MEALS    pantoprazole (PROTONIX) 40 mg in 0.9% sodium chloride 10 mL injection  40 mg IntraVENous Q12H    loperamide (IMODIUM) capsule 2 mg  2 mg Oral Q4H PRN    metoprolol tartrate (LOPRESSOR) tablet 25 mg  25 mg Oral Q12H    0.9% sodium chloride infusion 250 mL  250 mL IntraVENous PRN    cholecalciferol (VITAMIN D3) (5000 Units/125 mcg) tablet 5,000 Units  5,000 Units Oral Q7D    sertraline (ZOLOFT) tablet 25 mg  25 mg Oral QPM    atorvastatin (LIPITOR) tablet 40 mg  40 mg Oral QHS    furosemide (LASIX) tablet 40 mg  40 mg Oral DAILY    influenza vaccine 2021-22 (6 mos+)(PF) (FLUARIX/FLULAVAL/FLUZONE QUAD) injection 0.5 mL  1 Each IntraMUSCular PRIOR TO DISCHARGE    [Held by provider] apixaban (ELIQUIS) tablet 2.5 mg  2.5 mg Oral BID    ferrous sulfate tablet 325 mg  1 Tablet Oral DAILY WITH BREAKFAST    midodrine (PROAMATINE) tablet 10 mg  10 mg Oral TID WITH MEALS    sodium chloride (NS) flush 5-40 mL  5-40 mL IntraVENous PRN    zinc oxide-white petrolatum 17-57 % topical paste   Topical TID    cyclobenzaprine (FLEXERIL) tablet 10 mg  10 mg Oral TID PRN    HYDROmorphone (DILAUDID) injection 1 mg  1 mg IntraVENous Q4H PRN    oxyCODONE IR (ROXICODONE) tablet 5 mg  5 mg Oral Q6H PRN    docusate sodium (COLACE) capsule 100 mg  100 mg Oral BID    sorbitoL 70 % solution 30 mL  30 mL Oral DAILY PRN    bisacodyL (DULCOLAX) suppository 10 mg  10 mg Rectal DAILY PRN    hydrALAZINE (APRESOLINE) 20 mg/mL injection 20 mg  20 mg IntraVENous Q6H PRN    guaiFENesin ER (MUCINEX) tablet 600 mg  600 mg Oral Q12H    albuterol-ipratropium (DUO-NEB) 2.5 MG-0.5 MG/3 ML  3 mL Nebulization Q6H PRN    chlorthalidone (HYGROTON) tablet 25 mg  25 mg Oral DAILY    albuterol (PROVENTIL HFA, VENTOLIN HFA, PROAIR HFA) inhaler 2 Puff  2 Puff Inhalation Q6H PRN    ascorbic acid (vitamin C) (VITAMIN C) tablet 500 mg  500 mg Oral DAILY    diazePAM (VALIUM) tablet 5 mg  5 mg Oral Q6H PRN    dilTIAZem ER (CARDIZEM CD) capsule 120 mg  120 mg Oral DAILY    potassium chloride SR (KLOR-CON 10) tablet 20 mEq  20 mEq Oral DAILY    traZODone (DESYREL) tablet 50 mg  50 mg Oral QHS    ondansetron (ZOFRAN) injection 4 mg  4 mg IntraVENous Q6H PRN    acetaminophen (TYLENOL) tablet 650 mg  650 mg Oral Q4H PRN    prochlorperazine (COMPAZINE) with saline injection 10 mg  10 mg IntraVENous Q6H PRN        Review of Systems  Constitutional: No fevers, No chills, No sweats, +++fatigue, +++ Weakness  Eyes: No redness  Ears, nose, mouth, throat, and face: No nasal congestion, No sore throat, No voice change  Respiratory: No Shortness of Breath, No cough, No wheezing  Cardiovascular: No chest pain, No palpitations, No extremity edema  Gastrointestinal: No nausea, No vomiting, No diarrhea, No abdominal pain  Genitourinary: No frequency, No dysuria, No hematuria  Integument/breast: No skin lesion(s)   Neurological: No Confusion, No headaches, No dizziness      Objective:     Visit Vitals  /60 (BP 1 Location: Left lower arm)   Pulse 91   Temp 98.4 °F (36.9 °C)   Resp 18   Ht 5' 7\" (1.702 m)   Wt 94.1 kg (207 lb 7.3 oz)   SpO2 98%   BMI 32.49 kg/m²    O2 Flow Rate (L/min): 3 l/min O2 Device: Nasal cannula    Temp (24hrs), Av °F (37.2 °C), Min:98.1 °F (36.7 °C), Max:99.7 °F (37.6 °C)      No intake/output data recorded. 01/10 1901 -  0700  In: 807.3 [P.O.:400]  Out: 800 [Urine:800]    PHYSICAL EXAM:  Constitutional: No acute distress, chronically ill appearing  Skin: Extremities and face reveal no rashes. HEENT: Sclerae anicteric. Extra-occular muscles are intact. No oral ulcers. The neck is supple and no masses. Cardiovascular: Regular rate and rhythm. Respiratory:  Clear breath sounds bilaterally with no wheezes, rales, or rhonchi. GI: Abdomen nondistended, soft, and nontender. Normal active bowel sounds. Musculoskeletal: No pitting edema of the lower legs. Able to move all ext  Neurological:  Patient is alert and oriented.  Cranial nerves II-XII grossly intact  Psychiatric: Mood appears appropriate       Data Review    Recent Results (from the past 24 hour(s))   TYPE & SCREEN    Collection Time: 22 10:57 AM   Result Value Ref Range    Crossmatch Expiration 2022,2359     ABO/Rh(D) B Positive     Antibody screen Negative     Unit number [id number]     Blood component type  LR     Unit division 00     Status of unit [address] to transfuse     Crossmatch result Compatible     Unit number [id number]     Blood component type  LR     Unit division 00     Status of unit [address] to transfuse     Crossmatch result Compatible            Assessment / Plan:       1. Acute pancreatitis  - Status post EUS by Dr. Naty Santos 12/06  - Continue pain medication  - Lipase variable, will likely be chronically elevated   - CT abd/pelvis with PO and IV contrast showing increasing bilateral pleural effusion and increasing ascites. Also notes multiple low-density lesions of liver consistent with metastatic disease and bilateral adrenal masses suspected for metastatic disease.  - completed 14 days of IV merrem   - Continue empiric Anidulafungin 4 more days  - paracentesis on 12/27 with 2300 mL clear serous fluid drained, cytology showing now growth. - She was scheduled for liver biopsy with IR however IR does not feel patient would benefit from biopsy at this time.   -Unsuccessful attempt to place PEG J due to tumor infiltration and duodenum     2. CHF  - Continue chlorthalidone 25 mg daily  - On PO lasix 40mg daily.      3. Pancreatic mass/Liver Masses/Adrenal Masses/History of renal cell carcinoma with lung nodules s/p right nephrectomy  Multiple low-density lesions of the liver consistent with meta static disease and bilateral adrenal masses suspicious for metastatic disease  - CA 19-9 greater than 4000  --Oncology consulted-will need tissue biopsy before definitive recommendations can be made. Unable to offer palliative chemo or radiation due to poor performance. Prognosis very poor, hospice recommended     4. Hypokalemia-stable  - replete as needed     5.  COPD-stable  - Pulmonary consultation  - Chronic respiratory failure with 2 L of oxygen via nasal cannula at home     6. Leukocytosis  - Cultures negative  - Completed course of IV meropenem and Andulafungin 14 days  - ID consulted     7. A. fib  -On diltiazem and metoprolol.  - HR controlled. -2D echo showed normal systolic function and diastolic function with an EF of 60 to 65%  -No anticoagulation due to anemia    8. Acute on Chronic Anemia   -Her hemoglobin has been slowly declining requiring repletion and she is FOBT positive  -Required transfusion hemoglobin today 6.2 receiving 2 units  Total of 6 units PRBCs  -Eliquis has been discontinued due to bleeding and liver failure  Cardiology consulted        9. Elevated d-dimer  - Likely multifactorial   - CTA chest negative for PE  - Duplex US lower extremities negative for DVT  - Duplex US upper extremities showing superficial thrombophlebitis in left median/antecubital veins  -Repeat upper extremity Doppler study negative  -Discontinued Eliquis due to anemia        10. Hypotension  On midodrine        DVT Prophylaxis: Discontinued Eliquis, SCDs  GI PPx: Protonix  Code Status: Full       Care Plan discussed with: Patient/Family/RN/Case Management      Total time spent with patient: 36 minutes.

## 2022-01-13 NOTE — HOSPICE
[facility] Those in [facility]  [phone]     Patient Name: Avery Washburn  YOB: 1945  Age: 78 [facility]. [facility]  Hospice consult received, reviewing chart. Will follow up with Unit Nurse and Care Manager to discuss plan of care, patient status and discharge disposition     Chart reviewed. Per chart review patient is choosing between [facility] or [facility] . Patient appropriate for hospice services at home. Attempted to call patient to arrange for hospice info session however mailbox was full and could not leave VM. Will follow up as able     Thank you for the opportunity to be of service to this patient.

## 2022-01-13 NOTE — PROGRESS NOTES
Problem: Falls - Risk of  Goal: *Absence of Falls  Description: Document Clearence Skates Fall Risk and appropriate interventions in the flowsheet.   Outcome: Progressing Towards Goal  Note: Fall Risk Interventions:  Mobility Interventions: Bed/chair exit alarm,Patient to call before getting OOB    Mentation Interventions: Bed/chair exit alarm,More frequent rounding    Medication Interventions: Bed/chair exit alarm,Patient to call before getting OOB    Elimination Interventions: Call light in reach,Bed/chair exit alarm,Patient to call for help with toileting needs    History of Falls Interventions: Bed/chair exit alarm,Door open when patient unattended

## 2022-01-13 NOTE — HOSPICE
[facility]  [phone]     Patient Name: Alison Hassan  YOB: 1945  Age: 78 y.o. [facility] RN Note:  Met with patient at bedside with Karo Chavez, RN present. Provided hospice information to include philosophy, goals of care, services provided, etc.  Patient under impression hospice would provide block time and is resistant to hiring paid caregivers. She is adamant that she does not want son to provide personal care. Will have hospice social worker meet with patient in a.m. to provide possible resources. Patient does not have financial means for LTC. She is also interested in speaking with representative from [facility]. Please feel free to call with questions, concerns or if additional information is needed.     Denese Boeck, RN  Clinical Nurse Liaison  [phone]

## 2022-01-13 NOTE — PROGRESS NOTES
CM met with patient at bedside to discuss choices for Hospice agencies. Patient chose  hospice and [facility] requested. Choice letter signed and placed on the record. Referrals sent to both of choices. Awaiting a response.      [facility]

## 2022-01-13 NOTE — PROGRESS NOTES
Progress Note    Patient: Charlene Lovett MRN: [id number]  SSN: [id number]    YOB: 1945  Age: 78 y.o. Sex: female      Admit Date: 12/9/2021    LOS: 35 days     Subjective:   Patient followed for severe pancreatitis on Meropenem because of worsening leukocytosis. WBC now normal but CRP is increasing. Lipase has decreased. Suspected pancreatic cancer but no firm diagnosis yet. Patient resting comfortably with no abdoinal pain at this time. States that she is tentatively going to Rehab. Objective:     Vitals:    01/12/22 1938 01/12/22 2000 01/13/22 0146 01/13/22 0720   BP: (!) 95/47  (!) 101/43 131/73   Pulse: (!) 119  (!) 115 (!) 118   Resp: 16  18 18   Temp: 98 °F (36.7 °C)  97.9 °F (36.6 °C) 97.6 °F (36.4 °C)   SpO2: (!) 88% 92% 92% 96%   Weight:       Height:            Intake and Output:  Current Shift: No intake/output data recorded. Last three shifts: 01/11 1901 - 01/13 0700  In: 0 [P.O.:650]  Out: 800 [Urine:800]    Physical Exam:     Vitals and nursing note reviewed. Constitutional:       General: She is not in acute distress. Appearance: She is obese. She is ill-appearing. HENT: unremarkable except Nasal cannula 4 L/min  Abdominal:  Nontender  Genitourinary:  Vaginal area not examined     Comments: External urinary device  Musculoskeletal:      Right lower leg: No edema. Left lower leg: No edema. Comments: PICC line right upper arm   Skin: multiple ecchymoses on the upper extremities, edema left forearm     Findings: No rash.       Comments: ?Stage 2 sacral wound   Neurological: asleep    Lab/Data Review:     WBC 16,650  Urinalysis with WBC 10-20, Bacteria negative  Lipase 1,441 <2,244 <2,350 <2,144 99 <2,511 0 <2,484 <2,684 < 1,397 <1,719    Procal  0.91 <0.59 <3.87 <0.34 <0.35 <0.34 < 0.32 <0.21 <0.19 <0.25 < 0.14  CRP 23.20 <12.40 <10.00 <4.83 <5.07 <7.38 <18.30 <14.20 <12.70 <15.00 < 23.80    Serum fungitell <31 Negative    Ascitic fluid   with 41% PMNs    Blood cultures (12/20) No growth FINAL  Urine culture (12/20) No growth FINAL  Ascitic fluid culture(12/27) No growth FINAL    Assessment:     Principal Problem:    Pancreatitis (12/9/2021)    Active Problems:    A-fib ([facility] (11/16/2020)      CHF (congestive heart failure) ([facility] (11/16/2020)      Hematemesis (12/12/2021)      History of peptic ulcer disease (12/12/2021)      Pancreatic mass (1/5/2022)    1. Severe pancreatitis with markedly elevated lipase, resolving  2. Pancreatic mass, possible neoplasm  3. Biliary stent  4. Sepsis with worsening leukocytosis with elevated procal and CRP, resolving, status post 14 days of Meropenem, Anidulafungin (empiric), CRP increasing   5. TPN (just started)  6. Moderate pyuria, negative bacteria, urine culture negative  7. Possible candida superinfection with vaginitis, treated  8. Ascites, status post paracentesis    Comment:    WBC now increased along with CRP. CXR yesterday had improved. Plan:   1. Urinalysis with reflex culture  2. Blood cultures  3.  In am, repeat CBC, CRP, procal     Signed By: Brenda Sales MD     January 13, 2022

## 2022-01-13 NOTE — PROGRESS NOTES
Progress Note    Patient: Nikki Ley MRN: [id number]  SSN: [id number]    YOB: 1945  Age: 78 y.o. Sex: female      Admit Date: 12/9/2021    LOS: 35 days     Subjective:   GI in consultation for Pancreatitis     Patient seen in room awake and alert. Appears jaundice. Appears breathing comfortably on 4 lit O2 nasal cannula. She had a thoracentesis yesterday with 120ml fluid drained. IR not able to perform liver biopsy. She had another dark stool today. Abd pain is better.    -Lipase 1,441. Hgb low 7.4 today. Received  2 units of PRBC,. Bleeding scan negative for GI bleed. Calcium low 7.4. - - 22,358.      12/30 EGD with Dobhoff placement for feeding - removed by patient.   12/27/21 Paracentesis - 2300ml ascites fluid removed. 12/23/21 CT abdomen  1.  There are increasing bilateral pleural effusions, increasing body wall  edema, and increasing ascites. These findings could be secondary to the third spacing of fluids. The differential diagnosis for the ascites would include pancreatic ascites with acute pancreatitis or metastatic disease to the peritoneum.    2. Gregery Mar is a biliary stent which is unchanged in its position traversing throughout area of obstruction within the pancreatic head. 3. Gregery Mar are multiple low-density lesions of the liver without short-term  interval changes consistent with metastatic disease. 4.  The right kidney is not identified and apparently the patient is status post a previous right nephrectomy. 5.  There are bilateral adrenal masses suspect for metastatic disease without short-term interval changes. 12/6/2021 EUS showing 2.7 X3 cm lesion in the area of head of pancreas with dilation of the Pancreatic duct abutting the portal vein but not involving the SMA or AMV ;  Tumor T2 by endosonographic criteria ; one small lymph node in the area of head of pancreas.   11/22/2021 PET Scan    1.  FDG avid lesion in the pancreatic head consistent with malignancy. 2.  Subcentimeter focus of FDG uptake in the liver, indeterminate. 3.  FDG uptake associated with density expanding the right facet at C5-C6, possibly due to an ectatic artery.     History of Present Illness: Nevin Cole is a 78 y. o. female who is seen in consultation for pancreatitis. Flower Arboleda has a past medical history of  Paroxysmal Atrial Fibrillation, CHF, COPD, renal cell carcinoma Stage IV s/p nephrectomy, GERD sjoren's syndrome, hypertension, and depression. Patient under went EUS on 12/6/2021 showing 2.7 X3 cm lesion in the area of head of pancreas with dilation of the Pancreatic duct abutting the portal vein but not involving the SMA or AMV ; Tumor T2 by endosonographic criteria ; one small lymph node in the area of head of pancreas. Pathology shows rare cells present suspicious for malignancy.   She had an ERCP on 11/4 2021 ERCP; with sphincterotomy/papillotomy ERCP; with placement of endoscopic stent into biliary or pancreatic duct, including pre and postdilation and guide wire passage, when performed, including sphincterotomy, when performed, each stent. . Patient had a PET scan on 11/22/21 which shows a lesion in the pancreatic head consistent with malignancy. CTA of abdomen shows ill defined hypodense mass in the pancreas. There are inflammatory changes. Patient states she experienced nausea, vomiting and diarrhea since Monday. Her abdominal pain has progressively gotten worse. She reports a poor appetite. She does complain of increased \"burping\". Total bilirubin 1.4, AsT 36, ALT 31, Alk Phos. 217, Lipase > 3,000. Plan nothing by mouth except ice chips and sips of water with medication. IV hydration.  Pain management      Objective:     Vitals:    01/12/22 1938 01/12/22 2000 01/13/22 0146 01/13/22 0720   BP: (!) 95/47  (!) 101/43 131/73   Pulse: (!) 119  (!) 115 (!) 118   Resp: 16  18 18   Temp: 98 °F (36.7 °C)  97.9 °F (36.6 °C) 97.6 °F (36.4 °C)   SpO2: (!) 88% 92% 92% 96%   Weight: Height:            Intake and Output:  Current Shift: 01/13 0701 - 01/13 1900  In: 1150 [P.O.:150; I.V.:1000]  Out: -   Last three shifts: 01/11 1901 - 01/13 0700  In: 650 [P.O.:650]  Out: 800 [Urine:800]    Physical Exam:   Skin:  Extremities and face reveal no rashes. No chapin erythema. HEENT: Sclerae anicteric. Extra-occular muscles are intact. No abnormal pigmentation of the lips. The neck is supple. Cardiovascular: Regular rate and rhythm. Respiratory:  Comfortable breathing with no accessory muscle use. GI:  Abdomen nondistended, soft, and tender. No enlargement of the liver or spleen. No masses palpable. Rectal:  Deferred  Musculoskeletal: Generalized weakness. Neurological:  Gross memory appears intact. Patient is alert and oriented. Psychiatric:  Mood appears appropriate with judgement intact.   Lymphatic:  No visible adenopathy      Lab/Data Review:  Recent Results (from the past 24 hour(s))   D DIMER    Collection Time: 01/13/22  5:12 AM   Result Value Ref Range    D DIMER 5.94 (H) <0.50 ug/ml(FEU)   PROTHROMBIN TIME + INR    Collection Time: 01/13/22  5:12 AM   Result Value Ref Range    Prothrombin time 24.4 (H) 11.9 - 14.7 sec    INR 2.3 (H) 0.9 - 1.1     METABOLIC PANEL, BASIC    Collection Time: 01/13/22  5:12 AM   Result Value Ref Range    Sodium 138 136 - 145 mmol/L    Potassium 3.0 (L) 3.5 - 5.1 mmol/L    Chloride 104 97 - 108 mmol/L    CO2 26 21 - 32 mmol/L    Anion gap 8 5 - 15 mmol/L    Glucose 102 (H) 65 - 100 mg/dL    BUN 27 (H) 6 - 20 mg/dL    Creatinine 1.35 (H) 0.55 - 1.02 mg/dL    BUN/Creatinine ratio 20 12 - 20      GFR est AA 46 (L) >60 ml/min/1.73m2    GFR est non-AA 38 (L) >60 ml/min/1.73m2    Calcium 7.4 (L) 8.5 - 10.1 mg/dL   CBC WITH AUTOMATED DIFF    Collection Time: 01/13/22  5:12 AM   Result Value Ref Range    WBC 16.6 (H) 3.6 - 11.0 K/uL    RBC 2.35 (L) 3.80 - 5.20 M/uL    HGB 7.4 (L) 11.5 - 16.0 g/dL    HCT 22.5 (L) 35.0 - 47.0 %    MCV 95.7 80.0 - 99.0 FL    MCH 31.5 26.0 - 34.0 PG    MCHC 32.9 30.0 - 36.5 g/dL    RDW 17.0 (H) 11.5 - 14.5 %    PLATELET 159 (L) 346 - 400 K/uL    MPV 11.6 8.9 - 12.9 FL    NRBC 0.0 0.0  WBC    ABSOLUTE NRBC 0.00 0.00 - 0.01 K/uL    NEUTROPHILS 88 (H) 32 - 75 %    BAND NEUTROPHILS 7 (H) 0 - 6 %    LYMPHOCYTES 1 (L) 12 - 49 %    MONOCYTES 1 (L) 5 - 13 %    EOSINOPHILS 0 0 - 7 %    BASOPHILS 0 0 - 1 %    METAMYELOCYTES 1 (H) 0 %    MYELOCYTES 1 (H) 0 %    OTHER CELL 1 %    IMMATURE GRANULOCYTES 0 %    ABS. NEUTROPHILS 15.8 (H) 1.8 - 8.0 K/UL    ABS. LYMPHOCYTES 0.2 (L) 0.8 - 3.5 K/UL    ABS. MONOCYTES 0.2 0.0 - 1.0 K/UL    ABS. EOSINOPHILS 0.0 0.0 - 0.4 K/UL    ABS. BASOPHILS 0.0 0.0 - 0.1 K/UL    ABS. IMM. GRANS. 0.0 K/UL    DF Manual      PLATELET COMMENTS Large Platelets      RBC COMMENTS Polychromasia  1+        RBC COMMENTS Stomatocytes  1+        RBC COMMENTS Microcytosis  1+        RBC COMMENTS Teardrop cells  1+       EKG, 12 LEAD, INITIAL    Collection Time: 01/13/22 10:30 AM   Result Value Ref Range    Ventricular Rate 96 BPM    Atrial Rate 41 BPM    QRS Duration 96 ms    Q-T Interval 366 ms    QTC Calculation (Bezet) 462 ms    Calculated R Axis 62 degrees    Calculated T Axis -25 degrees    Diagnosis       Atrial fibrillation  Low voltage QRS  Abnormal QRS-T angle, consider primary T wave abnormality  Abnormal ECG  When compared with ECG of 09-JAN-2022 11:59,  No significant change was found  Confirmed by Zeinab FULLER, [facility] ([id number]) on 1/13/2022 11:02:30 AM                NM ACUTE GI BLEED SCAN   Final Result   Negative GI bleed scan. XR CHEST PORT   Final Result   Mild improvement in the right lower lobe      IR THORACENTESIS NDL PUNC ASP W IMAGE   Final Result   1. Previously described small hepatic metastases are sonographically occult,   therefore targeted liver biopsy could not be performed. 2.  Small left pleural effusion. 3.  Successful left thoracentesis. Fluid samples submitted for cytologic   analysis.       IR ULTRASOUND ABDOMEN LTD   Final Result   1. Previously described small hepatic metastases are sonographically occult,   therefore targeted liver biopsy could not be performed. 2.  Small left pleural effusion. 3.  Successful left thoracentesis. Fluid samples submitted for cytologic   analysis. XR CHEST PORT   Final Result      XR CHEST PORT   Final Result   Findings/impression:      Stable positioning of right upper extremity PICC, tip projects over the upper   SVC. Cardiac silhouette is enlarged. No pulmonary edema. Bilateral pleural effusions. No evidence of pneumothorax. No acute osseous abnormality identified. DUPLEX UPPER EXT VENOUS BILAT   Final Result      XR CHEST PORT   Final Result   Large effusions. Vascular congestion. CT ABD PELV W CONT   Final Result   1. There are increasing bilateral pleural effusions, increasing body wall   edema, and increasing ascites. These findings could be secondary to the third   spacing of fluids. The differential diagnosis for the ascites would include   pancreatic ascites with acute pancreatitis or metastatic disease to the   peritoneum. 2.  There is a biliary stent which is unchanged in its position traversing   throughout area of obstruction within the pancreatic head. 3.  There are multiple low-density lesions of the liver without short-term   interval changes consistent with metastatic disease. 4.  The right kidney is not identified and apparently the patient is status post   a previous right nephrectomy. 5.  There are bilateral adrenal masses suspect for metastatic disease without   short-term interval changes. CTA CHEST W OR W WO CONT   Final Result   No CT evidence for PE. Thoracic aorta atherosclerosis. CAD. Moderate to large bilateral pleural effusions with associated RML, RLL, and LLL   compressive atelectasis. Abdominal ascites.             DUPLEX LOWER EXT VENOUS BILAT   Final Result XR CHEST PORT   Final Result   FINDINGS: IMPRESSION: 2 frontal views of the chest. Right PICC distal tip SVC   region. Enlarging cardiomegaly. Increasing vascular congestion. Interval development of   hypoinflation, pleural effusions, lower lung airspace edema and/or pneumonia. No   pneumothorax. No free air under the diaphragm. CT ABD PELV W CONT   Final Result   New moderate volume dependent pleural effusions with associated   lower lobe lung compressive atelectasis. Increasing ascites, moderate approaching large-volume   (fluid could be sampled if there is any clinical concern for bile content). High suspicion hepatic metastases. Similar appearance for the pancreas; Silastic stent in place. No pseudocyst formation. Unchanged appearance bilateral adrenal glands. No bowel obstruction. US ABD LTD   Final Result   Small amount of free fluid. Finding suggesting hemangioma in the   liver. Gallbladder wall thickening, adenomyomatosis, sludge and gallstones. Cholecystitis possible. Finding in the region of the pancreatic head as above. Other findings as above. CTA ABDOMEN PELV W CONT   Final Result   1. Ill-defined hypodense mass in the pancreas. There are inflammatory changes   and fluid adjacent to the pancreas or duodenum and stomach most likely related   to biopsy or focal pancreatitis. No pseudocyst formation, active bleeding or   other acute findings. 2. Enlarged adrenal glands left greater than right with noncontrast densities   consistent with adrenal adenomas. 3. Right nephrectomy. 4. Other findings as described. XR CHEST PORT   Final Result   No acute findings.       IR PARACENTESIS ABD W IMAGE    (Results Pending)       Assessment:     Principal Problem:    Pancreatitis (12/9/2021)    Active Problems:    A-fib ([address] (11/16/2020)      CHF (congestive heart failure) ([address] (11/16/2020)      Hematemesis (12/12/2021)      History of peptic ulcer disease (12/12/2021)      Pancreatic mass (1/5/2022)        Plan:   1. Pancreatitis - RESOLVED.     -12/30 s/p post pyloric tube placement. Patient removed tube on 12/31.         -Unable to place GJ tube due to  tumor in duodenum and food in the stomach and patient on Eliquis      -Lipase 902. Repeat Lipase in the am       -Pain med as needed.      - S/P paracentesis 12/27/21 with removal of 2300 ml clear serous  Fluid removed      -cytology Slight acute inflammation.       -currently on Regular diet      -patient in agreement for SNF transfer, then outpatient follow up from there.   2. CHF      -Continue Chlorthalidone  3. COPD       -Continue home medications       -Albuterol as needed   4. Pancreatic Mass      - 22,358      -S/p fine needle aspiration suspicious for neoplasia but not diagnostic      -When stable Oncology plan for out patient followup with advanced oncology surgeon Geneva Fisher or [facility] for resection considerations       -CT concern for liver metastais/lesions       -IR for liver biopsy on hold at this time  5. Hematemesis (resolved)  6. Afib with RVR      -Eliquis on hold 2/2 to low HgB      -Current heart rate 120      -Continue diltiazem      -Cardiology has been consulted  7. Diarrhea      - Imodium as needed  8. Anemia/GI bleed     -Reports of rectal bleeding, dark stools. Bleeding most likely from tumor invasion. If she continues to bleed, consult IR for possible embolization procedure.      -Bleeding scan is negative.      -Hgb 7.4. Monitor and transfuse as needed.      1/5 - Had another detailed discussion with the patient   Clinical diagnosis remains Pancreatic cancer with bile duct obstruction mass in the head of the pancreas elevated ca 19-9. Patient to be presented in the tumor board on 1/12/22  She has expressed the desire to me that she wants to see a surgeon for resectability of the tumor which seems unlikely.   Oncology wants pathologic specimen to conclusively say carcinoma if they are to consider Chemotherpy. EUS biospy has already been done once with results suspicious for malignancy. Perhaps a mini lap with direct biopsy of the pancreas and a Jejunal feeding tube may provide the final answer to oncology and help with feeding. She has pulled out the Dobbhoff tube that was placed However her eating of some food since yesterday had been encouraging. \"       Patient discussed with Dr Aditi Henriquez and agrees to above impression and plan. Thank you for allowing me to participate in this patients care    Signed By: Joshua Hillman NP     January 13, 2022

## 2022-01-13 NOTE — NURSE NAVIGATOR
Attended patient visit with Dr. Mykel Ramirez; primary nurse also in attendance. Patient verbalized understanding of diagnosis and prognosis. Patient agreeable to talking with hospice and prefers to go home rather than facility. Offered to speak with patient's son but she declined, stating she willl speak with him herself. Will continue to follow.

## 2022-01-13 NOTE — PROGRESS NOTES
Called with respect to goals of care discussion  Patient was known to me as I met her at time of her admission for pancreatitis from her ERCP which proved to be nondiagnostic for suspected pancreatic cancer    Since that time she has failed several other attempts to obtain tissue diagnosis  Her preformance status has substantially declined such that she not able to care for herself  Imaging has shown a blossoming of her at time of admission subclinical metastatic diseaes to more obvious hepatic involvement leaving her as a presumed stage IV metastatic pancreatic cancer  She has had substantial decline in her oral intake; failed PEG placement and now has an area of bleeding for which she has required extensive transfusions    This service was recontacted with respect to goals of care and longitudinal planning. Roughly 40min or more was spent directly with her and more time reviewing her case with her other team members    Points covered included but were not limited to  1. Pancreatic cancer is a life limiting malignancy that even with treatment has a poor overall survival that impairs quality of life    2. Treatment of metastatic disease involves IV systemic chemotherapy which is substantially toxic and requires a preformance status of ECOG 2 or better. This means she has to be able to be up, active, functional and caring for self at least 50% of the day. Currently she is bed bound and as such not a candidate for systemic therapy    3. With metastatic disease she is not a surgical candidate as surgery would not add to her life expectancy and stands a real chance of substantial complications    4. With a life limiting malignancy which focus on goals of care is highly appropriate as this will help her take charge of the next steps of her medical care. At this time;focus on quality of life and symptom based mitigation has consistently shown to improve length of life.   As such hospice is her best next step for long term care. Rehab has minimal chance of improvement in her functional status given her overall debilitation and comorbidities like bleeding.       Case management consulted for hospice    Feel free to call with questions

## 2022-01-13 NOTE — PROGRESS NOTES
[facility] rep, Madelaine Alvarado, came to speak with patient about services.      Can be reached at :[phone]    Card placed on chart

## 2022-01-13 NOTE — PROGRESS NOTES
Hospitalist Progress Note    Subjective:   Daily Progress Note: 1/13/2022 2:42 PM    Hospital Course: The patient is a 72-year-old woman with a PMH significant for atrial fibrillation, PUD, CHF, COPD on home O2 at 2 L, renal cell carcinoma status post nephrectomy, hypertension and morbid obesity. Patient was found to have obstructive jaundice and biliary stricture in early November of this year.  She underwent an ERCP with stent placement with brushings of the bile duct where there was noted to be a stricture.  These brushings were negative.  Patient then underwent a PET CT which demonstrated uptake in the head of the pancreas concerning for pancreatic mass. She subsequently underwent endoscopic ultrasound with ultrasound and FNA biopsy on 12/6/2021.  The procedure demonstrated abutment of the tumor with the portal vein without involvement of the SMA or SMV.  The total tumor was measured to be 2.7 cm by 3 cm.  This biopsy resulted as atypical cells with suspicion for malignancy. She admitted to the hospital on 12/9/2021 and severe pancreatitis after undergoing an EUS for pancreatic biopsy on 12/6/2021 and pancreatic mass. Her symptoms were abdominal pain, nausea and vomiting. CT scan revealing peripancreatic inflammation consistent with likely post biopsy pancreatitis and ascites. Her initial labs revealing elevated lipase, supratherapeutic INR and elevated D-dimer coagulopathy. CTA was positive for bilateral pleural effusions, negative for PE. Paracentesis with culture of ascitic fluid, blood cultures, urine cultures negative for growth. Patient had been started on IV meropenem for leukocytosis and anidulafungin for suspected intra-abdominal infection. CT with and without contrast revealing liver lesions consistent with metastasis, mass in the pancreatic head with biliary stent in place, bilateral pleural effusions, ascites and bilateral adrenal masses.   IR declined liver biopsy due to coagulopathy and felt she would not benefit. Doppler studies of upper and lower extremities negative for DVT, positive for superficial thrombophlebitis of left medial antecubital.  Pancreatic cancer is presumed but no firm diagnosis due to lack of tissue sample. Pancreatitis remaining persistent despite antibiotics. Attempted Dobbhoff and PEG J placement but failed due to reddened area presumed possible tumor infiltration in the duodenum. Due to poor performance and prognosis she is not a candidate for palliative XRT or chemo. The patient has been progressively worsening with debilitation, intermittently tolerating diet not stable to be discharged home with outpatient surgical oncology follow-up. She would benefit from transfer to tertiary care center where surgical oncology services are available for evaluation of pancreatic head mass and possible surgical intervention. 1/7 A. fib with RVR rate 110-140. cardiology  Consulted and Anticoagulation discontinued due to bleeding and liver failure. Subjective: Patient is very upset this morning as she was told by 2 people yesterday that her son did not come however she states that he did come with breakfast and felt like they were calling her a liar. She does admit to shortness of breath with a cough.     Current Facility-Administered Medications   Medication Dose Route Frequency    SE-Tan Plus capsule  1 Capsule Oral BID    calcium carbonate-vitamin D3 600 mg-20 mcg (800 unit) chew 2 Tablet  2 Tablet Oral DAILY    lactated Ringers infusion  75 mL/hr IntraVENous CONTINUOUS    0.9% sodium chloride infusion 250 mL  250 mL IntraVENous PRN    0.9% sodium chloride infusion 250 mL  250 mL IntraVENous PRN    polyethylene glycol (MIRALAX) packet 17 g  17 g Oral DAILY PRN    0.9% sodium chloride infusion 250 mL  250 mL IntraVENous PRN    pantoprazole (PROTONIX) 40 mg in 0.9% sodium chloride 10 mL injection  40 mg IntraVENous Q12H    loperamide (IMODIUM) capsule 2 mg  2 mg Oral Q4H PRN    metoprolol tartrate (LOPRESSOR) tablet 25 mg  25 mg Oral Q12H    0.9% sodium chloride infusion 250 mL  250 mL IntraVENous PRN    cholecalciferol (VITAMIN D3) (5000 Units/125 mcg) tablet 5,000 Units  5,000 Units Oral Q7D    sertraline (ZOLOFT) tablet 25 mg  25 mg Oral QPM    atorvastatin (LIPITOR) tablet 40 mg  40 mg Oral QHS    furosemide (LASIX) tablet 40 mg  40 mg Oral DAILY    influenza vaccine 2021-22 (6 mos+)(PF) (FLUARIX/FLULAVAL/FLUZONE QUAD) injection 0.5 mL  1 Each IntraMUSCular PRIOR TO DISCHARGE    [Held by provider] apixaban (ELIQUIS) tablet 2.5 mg  2.5 mg Oral BID    midodrine (PROAMATINE) tablet 10 mg  10 mg Oral TID WITH MEALS    sodium chloride (NS) flush 5-40 mL  5-40 mL IntraVENous PRN    zinc oxide-white petrolatum 17-57 % topical paste   Topical TID    cyclobenzaprine (FLEXERIL) tablet 10 mg  10 mg Oral TID PRN    HYDROmorphone (DILAUDID) injection 1 mg  1 mg IntraVENous Q4H PRN    oxyCODONE IR (ROXICODONE) tablet 5 mg  5 mg Oral Q6H PRN    docusate sodium (COLACE) capsule 100 mg  100 mg Oral BID    sorbitoL 70 % solution 30 mL  30 mL Oral DAILY PRN    bisacodyL (DULCOLAX) suppository 10 mg  10 mg Rectal DAILY PRN    hydrALAZINE (APRESOLINE) 20 mg/mL injection 20 mg  20 mg IntraVENous Q6H PRN    guaiFENesin ER (MUCINEX) tablet 600 mg  600 mg Oral Q12H    albuterol-ipratropium (DUO-NEB) 2.5 MG-0.5 MG/3 ML  3 mL Nebulization Q6H PRN    chlorthalidone (HYGROTON) tablet 25 mg  25 mg Oral DAILY    albuterol (PROVENTIL HFA, VENTOLIN HFA, PROAIR HFA) inhaler 2 Puff  2 Puff Inhalation Q6H PRN    ascorbic acid (vitamin C) (VITAMIN C) tablet 500 mg  500 mg Oral DAILY    diazePAM (VALIUM) tablet 5 mg  5 mg Oral Q6H PRN    dilTIAZem ER (CARDIZEM CD) capsule 120 mg  120 mg Oral DAILY    potassium chloride SR (KLOR-CON 10) tablet 20 mEq  20 mEq Oral DAILY    traZODone (DESYREL) tablet 50 mg  50 mg Oral QHS    ondansetron (ZOFRAN) injection 4 mg  4 mg IntraVENous Q6H PRN    acetaminophen (TYLENOL) tablet 650 mg  650 mg Oral Q4H PRN    prochlorperazine (COMPAZINE) with saline injection 10 mg  10 mg IntraVENous Q6H PRN        Review of Systems  Constitutional: No fevers, No chills, No sweats, +++fatigue, +++ Weakness  Eyes: No redness  Ears, nose, mouth, throat, and face: No nasal congestion, No sore throat, No voice change  Respiratory: No Shortness of Breath, No cough, No wheezing  Cardiovascular: No chest pain, No palpitations, No extremity edema  Gastrointestinal: No nausea, No vomiting, No diarrhea, No abdominal pain  Genitourinary: No frequency, No dysuria, No hematuria  Integument/breast: No skin lesion(s)   Neurological: No Confusion, No headaches, No dizziness      Objective:     Visit Vitals  /73   Pulse (!) 118   Temp 97.6 °F (36.4 °C)   Resp 18   Ht 5' 7\" (1.702 m)   Wt 94.1 kg (207 lb 7.3 oz)   SpO2 96%   BMI 32.49 kg/m²    O2 Flow Rate (L/min): 4 l/min O2 Device: Nasal cannula    Temp (24hrs), Av.1 °F (36.7 °C), Min:97.6 °F (36.4 °C), Max:98.7 °F (37.1 °C)      No intake/output data recorded.  1901 -  0700  In: 650 [P.O.:650]  Out: 800 [Urine:800]    PHYSICAL EXAM:  Constitutional: No acute distress, chronically ill appearing  Skin: Extremities and face reveal no rashes. HEENT: Sclerae anicteric. Extra-occular muscles are intact. No oral ulcers. The neck is supple and no masses. Cardiovascular: irregular  Respiratory:  Clear breath sounds bilaterally with no wheezes, rales, or rhonchi. GI: Abdomen nondistended, soft, and nontender. Normal active bowel sounds. Musculoskeletal: No pitting edema of the lower legs. Able to move all ext  Neurological:  Patient is alert and oriented.  Cranial nerves II-XII grossly intact  Psychiatric: Mood appears appropriate       Data Review    Recent Results (from the past 24 hour(s))   D DIMER    Collection Time: 22  5:12 AM   Result Value Ref Range    D DIMER 5.94 (H) <0.50 ug/ml(FEU)   PROTHROMBIN TIME + INR    Collection Time: 01/13/22  5:12 AM   Result Value Ref Range    Prothrombin time 24.4 (H) 11.9 - 14.7 sec    INR 2.3 (H) 0.9 - 1.1     METABOLIC PANEL, BASIC    Collection Time: 01/13/22  5:12 AM   Result Value Ref Range    Sodium 138 136 - 145 mmol/L    Potassium 3.0 (L) 3.5 - 5.1 mmol/L    Chloride 104 97 - 108 mmol/L    CO2 26 21 - 32 mmol/L    Anion gap 8 5 - 15 mmol/L    Glucose 102 (H) 65 - 100 mg/dL    BUN 27 (H) 6 - 20 mg/dL    Creatinine 1.35 (H) 0.55 - 1.02 mg/dL    BUN/Creatinine ratio 20 12 - 20      GFR est AA 46 (L) >60 ml/min/1.73m2    GFR est non-AA 38 (L) >60 ml/min/1.73m2    Calcium 7.4 (L) 8.5 - 10.1 mg/dL   CBC WITH AUTOMATED DIFF    Collection Time: 01/13/22  5:12 AM   Result Value Ref Range    WBC 16.6 (H) 3.6 - 11.0 K/uL    RBC 2.35 (L) 3.80 - 5.20 M/uL    HGB 7.4 (L) 11.5 - 16.0 g/dL    HCT 22.5 (L) 35.0 - 47.0 %    MCV 95.7 80.0 - 99.0 FL    MCH 31.5 26.0 - 34.0 PG    MCHC 32.9 30.0 - 36.5 g/dL    RDW 17.0 (H) 11.5 - 14.5 %    PLATELET 102 (L) 906 - 400 K/uL    MPV 11.6 8.9 - 12.9 FL    NRBC 0.0 0.0  WBC    ABSOLUTE NRBC 0.00 0.00 - 0.01 K/uL    NEUTROPHILS 88 (H) 32 - 75 %    BAND NEUTROPHILS 7 (H) 0 - 6 %    LYMPHOCYTES 1 (L) 12 - 49 %    MONOCYTES 1 (L) 5 - 13 %    EOSINOPHILS 0 0 - 7 %    BASOPHILS 0 0 - 1 %    METAMYELOCYTES 1 (H) 0 %    MYELOCYTES 1 (H) 0 %    OTHER CELL 1 %    IMMATURE GRANULOCYTES 0 %    ABS. NEUTROPHILS 15.8 (H) 1.8 - 8.0 K/UL    ABS. LYMPHOCYTES 0.2 (L) 0.8 - 3.5 K/UL    ABS. MONOCYTES 0.2 0.0 - 1.0 K/UL    ABS. EOSINOPHILS 0.0 0.0 - 0.4 K/UL    ABS. BASOPHILS 0.0 0.0 - 0.1 K/UL    ABS. IMM. GRANS. 0.0 K/UL    DF Manual      PLATELET COMMENTS Large Platelets      RBC COMMENTS Polychromasia  1+        RBC COMMENTS Stomatocytes  1+        RBC COMMENTS Microcytosis  1+        RBC COMMENTS Teardrop cells  1+               Assessment / Plan:       1.  Acute pancreatitis  - Status post EUS by Dr. Zeeshan Guardado 12/06  - Continue pain medication  - Lipase variable, will likely be chronically elevated   - CT abd/pelvis with PO and IV contrast showing increasing bilateral pleural effusion and increasing ascites. Also notes multiple low-density lesions of liver consistent with metastatic disease and bilateral adrenal masses suspected for metastatic disease.  - completed 14 days of IV merrem   - Continue empiric Anidulafungin 4 more days  - paracentesis on 12/27 with 2300 mL clear serous fluid drained, cytology showing now growth. - She was scheduled for liver biopsy with IR however IR does not feel patient would benefit from biopsy at this time.   -Unsuccessful attempt to place PEG J due to tumor infiltration and duodenum     2. CHF  - Continue chlorthalidone 25 mg daily  - On PO lasix 40mg daily.      3. Pancreatic mass/Liver Masses/Adrenal Masses/History of renal cell carcinoma with lung nodules s/p right nephrectomy  Multiple low-density lesions of the liver consistent with meta static disease and bilateral adrenal masses suspicious for metastatic disease  - CA 19-9 greater than 4000  --Oncology consulted-will need tissue biopsy before definitive recommendations can be made. Unable to offer palliative chemo or radiation due to poor performance. Prognosis very poor, hospice recommended     4. Hypokalemia-stable  - replete as needed     5. COPD-stable  - Pulmonary consultation  - Chronic respiratory failure with 2 L of oxygen via nasal cannula at home  - Increase mucinex to 1200mg BID  - She did become hypoxic. Lungs sound clear. Will consider chest xray if no improvement.     6. Leukocytosis  - Cultures negative  - Completed course of IV meropenem and Andulafungin 14 days  - ID consulted     7. A. fib  -On diltiazem and metoprolol.  - HR elevated. This is before BP meds. Will get a EKG. If still in a fib with RVR will consider diltiazem drip.    -2D echo showed normal systolic function and diastolic function with an EF of 60 to 65%  -No anticoagulation due to anemia    8. Acute on Chronic Anemia   -Her hemoglobin has been slowly declining requiring repletion and she is FOBT positive  -Required transfusion hemoglobin today 7.4. Total of 6 units PRBCs  -Eliquis has been discontinued due to bleeding and liver failure  Cardiology consulted  - NM bleeding scan pending        9. Elevated d-dimer  - Likely multifactorial   - CTA chest negative for PE  - Duplex US lower extremities negative for DVT  - Duplex US upper extremities showing superficial thrombophlebitis in left median/antecubital veins  -Repeat upper extremity Doppler study negative  -Discontinued Eliquis due to anemia        10. Hypotension  On midodrine        DVT Prophylaxis: Discontinued Eliquis, SCDs  GI PPx: Protonix  Code Status: Full       Care Plan discussed with: Patient/Family/RN/Case Management      Total time spent with patient: 34 minutes.

## 2022-01-13 NOTE — PROGRESS NOTES
01/13/22 1001   RT Protocol   History of Pulmonary Disease 1   Respiratory Pattern 0   Breath Sounds 0   Cough 0   Indication for Bronchodilator Therapy On home bronchodilators   Bronchodilator Assessment Score 1

## 2022-01-13 NOTE — PROGRESS NOTES
Dr. Zuleima Sarmiento informed of the patient's latest VS and MEWS of 4. Ordered via phone LR @75ml/hr x 12hrs and STAT XR. Will continue to monitor. RT informed of the patient's O2 saturation, was told to increase O2 at 6lpm. Present O2 saturation is ranging from 89-90%. Mentioned the patient's current situation to [facility].

## 2022-01-13 NOTE — PROGRESS NOTES
Problem: Patient Education: Go to Patient Education Activity  Goal: Patient/Family Education  Outcome: Progressing Towards Goal     Problem: Pain  Goal: *Control of Pain  Outcome: Progressing Towards Goal     Problem: Patient Education: Go to Patient Education Activity  Goal: Patient/Family Education  Outcome: Progressing Towards Goal     Problem: Nausea/Vomiting (Adult)  Goal: *Absence of nausea/vomiting  Outcome: Progressing Towards Goal     Problem: Patient Education: Go to Patient Education Activity  Goal: Patient/Family Education  Outcome: Progressing Towards Goal     Problem: Pressure Injury - Risk of  Goal: *Prevention of pressure injury  Description: Document Asim Scale and appropriate interventions in the flowsheet. Outcome: Progressing Towards Goal  Note: Pressure Injury Interventions:  Sensory Interventions: Float heels,Keep linens dry and wrinkle-free,Maintain/enhance activity level,Minimize linen layers,Turn and reposition approx. every two hours (pillows and wedges if needed)    Moisture Interventions: Absorbent underpads,Apply protective barrier, creams and emollients,Internal/External urinary devices,Minimize layers,Moisture barrier    Activity Interventions: PT/OT evaluation,Increase time out of bed,Pressure redistribution bed/mattress(bed type)    Mobility Interventions: Float heels,HOB 30 degrees or less,PT/OT evaluation,Turn and reposition approx.  every two hours(pillow and wedges),Pressure redistribution bed/mattress (bed type)    Nutrition Interventions: Document food/fluid/supplement intake,Discuss nutritional consult with provider,Offer support with meals,snacks and hydration    Friction and Shear Interventions: Apply protective barrier, creams and emollients,HOB 30 degrees or less,Minimize layers                Problem: Patient Education: Go to Patient Education Activity  Goal: Patient/Family Education  Outcome: Progressing Towards Goal     Problem: Patient Education: Go to Patient Education Activity  Goal: Patient/Family Education  Outcome: Progressing Towards Goal     Problem: Patient Education: Go to Patient Education Activity  Goal: Patient/Family Education  Outcome: Progressing Towards Goal

## 2022-01-14 NOTE — PROGRESS NOTES
Progress Note    Patient: Alison Hassan MRN: [id number]  SSN: [id number]    YOB: 1945  Age: 78 y.o. Sex: female      Admit Date: 12/9/2021    LOS: 36 days     Subjective:   GI in consultation for Pancreatitis     Patient seen in [facility] and alert. Appears jaundice. Patient had a detailed discussion with oncology regarding her prognosis. She wanted to know how long her \"life duration\" would be. Hospice was discussed by oncology and she is in agreement. CM discussed hospice agency options.      -Lipase 1,441. Hgb 8.7. Reported dark stools,  bleeding scan negative for GI bleed. Calcium slowly improving,  7.8 today. - - 22,358.   -Total bilirubin 5.3, ALT 73, , alkphos 398.     12/30 EGD with Dobhoff placement for feeding - removed by patient.   12/27/21 Paracentesis - 2300ml ascites fluid removed. 12/23/21 CT abdomen  1.  There are increasing bilateral pleural effusions, increasing body wall  edema, and increasing ascites. These findings could be secondary to the third spacing of fluids. The differential diagnosis for the ascites would include pancreatic ascites with acute pancreatitis or metastatic disease to the peritoneum.    2. Pennelope Free is a biliary stent which is unchanged in its position traversing throughout area of obstruction within the pancreatic head. 3. Pennelope Free are multiple low-density lesions of the liver without short-term  interval changes consistent with metastatic disease. 4.  The right kidney is not identified and apparently the patient is status post a previous right nephrectomy. 5.  There are bilateral adrenal masses suspect for metastatic disease without short-term interval changes. 12/6/2021 EUS showing 2.7 X3 cm lesion in the area of head of pancreas with dilation of the Pancreatic duct abutting the portal vein but not involving the SMA or AMV ;  Tumor T2 by endosonographic criteria ; one small lymph node in the area of head of pancreas.   11/22/2021 PET Scan    1.  FDG avid lesion in the pancreatic head consistent with malignancy. 2.  Subcentimeter focus of FDG uptake in the liver, indeterminate. 3.  FDG uptake associated with density expanding the right facet at C5-C6, possibly due to an ectatic artery.     History of Present Illness: Nevin Cole is a 78 y. o. female who is seen in consultation for pancreatitis. Melida Allen has a past medical history of  Paroxysmal Atrial Fibrillation, CHF, COPD, renal cell carcinoma Stage IV s/p nephrectomy, GERD sjoren's syndrome, hypertension, and depression. Patient under went EUS on 12/6/2021 showing 2.7 X3 cm lesion in the area of head of pancreas with dilation of the Pancreatic duct abutting the portal vein but not involving the SMA or AMV ; Tumor T2 by endosonographic criteria ; one small lymph node in the area of head of pancreas. Pathology shows rare cells present suspicious for malignancy.   She had an ERCP on 11/4 2021 ERCP; with sphincterotomy/papillotomy ERCP; with placement of endoscopic stent into biliary or pancreatic duct, including pre and postdilation and guide wire passage, when performed, including sphincterotomy, when performed, each stent. . Patient had a PET scan on 11/22/21 which shows a lesion in the pancreatic head consistent with malignancy. CTA of abdomen shows ill defined hypodense mass in the pancreas. There are inflammatory changes. Patient states she experienced nausea, vomiting and diarrhea since Monday. Her abdominal pain has progressively gotten worse. She reports a poor appetite. She does complain of increased \"burping\". Total bilirubin 1.4, AsT 36, ALT 31, Alk Phos. 217, Lipase > 3,000. Plan nothing by mouth except ice chips and sips of water with medication. IV hydration.  Pain management         Objective:     Vitals:    01/13/22 1930 01/14/22 0515 01/14/22 0849 01/14/22 1138   BP: (!) 150/74 (!) 109/51 (!) 88/50    Pulse: 91 87 (!) 105    Resp: 18 18 18    Temp: 97.9 °F (36.6 °C) 97.9 °F (36.6 °C) 98.1 °F (36.7 °C)    SpO2: 97% 96% 96% 97%   Weight:       Height:            Intake and Output:  Current Shift: No intake/output data recorded. Last three shifts: 01/12 1901 - 01/14 0700  In: 2490 [P.O.:490; I.V.:2000]  Out: 1100 [Urine:1100]    Physical Exam:   Skin:  Extremities and face reveal no rashes. No chapin erythema. HEENT: Sclerae icteric. Extra-occular muscles are intact. No abnormal pigmentation of the lips. The neck is supple. Cardiovascular: Regular rate and rhythm. Respiratory:  Comfortable breathing with no accessory muscle use. GI:  Abdomen nondistended, soft, and tender. No enlargement of the liver or spleen. No masses palpable. Rectal:  Deferred  Musculoskeletal: Extremities have good range of motion. Neurological:  Gross memory appears intact. Patient is alert and oriented. Psychiatric:  Mood appears appropriate with judgement intact.   Lymphatic:  No visible adenopathy      Lab/Data Review:  Recent Results (from the past 24 hour(s))   D DIMER    Collection Time: 01/14/22  4:45 AM   Result Value Ref Range    D DIMER 6.95 (H) <0.50 ug/ml(FEU)   PROTHROMBIN TIME + INR    Collection Time: 01/14/22  4:45 AM   Result Value Ref Range    Prothrombin time 20.2 (H) 11.9 - 14.7 sec    INR 1.8 (H) 0.9 - 1.1     METABOLIC PANEL, BASIC    Collection Time: 01/14/22  4:45 AM   Result Value Ref Range    Sodium 138 136 - 145 mmol/L    Potassium 2.4 (LL) 3.5 - 5.1 mmol/L    Chloride 101 97 - 108 mmol/L    CO2 33 (H) 21 - 32 mmol/L    Anion gap 4 (L) 5 - 15 mmol/L    Glucose 88 65 - 100 mg/dL    BUN 31 (H) 6 - 20 mg/dL    Creatinine 1.19 (H) 0.55 - 1.02 mg/dL    BUN/Creatinine ratio 26 (H) 12 - 20      GFR est AA 54 (L) >60 ml/min/1.73m2    GFR est non-AA 44 (L) >60 ml/min/1.73m2    Calcium 7.8 (L) 8.5 - 10.1 mg/dL   CBC WITH AUTOMATED DIFF    Collection Time: 01/14/22  4:45 AM   Result Value Ref Range    WBC 11.6 (H) 3.6 - 11.0 K/uL    RBC 2.81 (L) 3.80 - 5.20 M/uL    HGB 8.7 (L) 11.5 - 16.0 g/dL    HCT 26.2 (L) 35.0 - 47.0 %    MCV 93.2 80.0 - 99.0 FL    MCH 31.0 26.0 - 34.0 PG    MCHC 33.2 30.0 - 36.5 g/dL    RDW 16.2 (H) 11.5 - 14.5 %    PLATELET 87 (L) 432 - 400 K/uL    MPV 11.7 8.9 - 12.9 FL    NRBC 0.0 0.0  WBC    ABSOLUTE NRBC 0.00 0.00 - 0.01 K/uL    NEUTROPHILS 88 (H) 32 - 75 %    LYMPHOCYTES 7 (L) 12 - 49 %    MONOCYTES 4 (L) 5 - 13 %    EOSINOPHILS 0 0 - 7 %    BASOPHILS 0 0 - 1 %    IMMATURE GRANULOCYTES 1 (H) 0 - 0.5 %    ABS. NEUTROPHILS 10.2 (H) 1.8 - 8.0 K/UL    ABS. LYMPHOCYTES 0.8 0.8 - 3.5 K/UL    ABS. MONOCYTES 0.4 0.0 - 1.0 K/UL    ABS. EOSINOPHILS 0.0 0.0 - 0.4 K/UL    ABS. BASOPHILS 0.0 0.0 - 0.1 K/UL    ABS. IMM. GRANS. 0.1 (H) 0.00 - 0.04 K/UL    DF AUTOMATED     C REACTIVE PROTEIN, QT    Collection Time: 01/14/22  4:45 AM   Result Value Ref Range    C-Reactive protein 21.30 (H) 0.00 - 0.60 mg/dL   PROCALCITONIN    Collection Time: 01/14/22  4:45 AM   Result Value Ref Range    Procalcitonin 15.42 (H) 0 ng/mL   HEPATIC FUNCTION PANEL    Collection Time: 01/14/22  4:45 AM   Result Value Ref Range    Protein, total 4.1 (L) 6.4 - 8.2 g/dL    Albumin 1.0 (L) 3.5 - 5.0 g/dL    Globulin 3.1 2.0 - 4.0 g/dL    A-G Ratio 0.3 (L) 1.1 - 2.2      Bilirubin, total 5.3 (H) 0.2 - 1.0 mg/dL    Bilirubin, direct 4.6 (H) 0.0 - 0.2 mg/dL    Alk. phosphatase 398 (H) 45 - 117 U/L    AST (SGOT) 106 (H) 15 - 37 U/L    ALT (SGPT) 73 12 - 78 U/L   AMMONIA    Collection Time: 01/14/22  4:45 AM   Result Value Ref Range    Ammonia <10 <32 umol/L   MAGNESIUM    Collection Time: 01/14/22  8:30 AM   Result Value Ref Range    Magnesium 1.7 1.6 - 2.4 mg/dL              NM ACUTE GI BLEED SCAN   Final Result   Negative GI bleed scan. XR CHEST PORT   Final Result   Mild improvement in the right lower lobe      IR THORACENTESIS NDL PUNC ASP W IMAGE   Final Result   1. Previously described small hepatic metastases are sonographically occult,   therefore targeted liver biopsy could not be performed.    2. Small left pleural effusion. 3.  Successful left thoracentesis. Fluid samples submitted for cytologic   analysis. IR ULTRASOUND ABDOMEN LTD   Final Result   1. Previously described small hepatic metastases are sonographically occult,   therefore targeted liver biopsy could not be performed. 2.  Small left pleural effusion. 3.  Successful left thoracentesis. Fluid samples submitted for cytologic   analysis. XR CHEST PORT   Final Result      XR CHEST PORT   Final Result   Findings/impression:      Stable positioning of right upper extremity PICC, tip projects over the upper   SVC. Cardiac silhouette is enlarged. No pulmonary edema. Bilateral pleural effusions. No evidence of pneumothorax. No acute osseous abnormality identified. DUPLEX UPPER EXT VENOUS BILAT   Final Result      XR CHEST PORT   Final Result   Large effusions. Vascular congestion. CT ABD PELV W CONT   Final Result   1. There are increasing bilateral pleural effusions, increasing body wall   edema, and increasing ascites. These findings could be secondary to the third   spacing of fluids. The differential diagnosis for the ascites would include   pancreatic ascites with acute pancreatitis or metastatic disease to the   peritoneum. 2.  There is a biliary stent which is unchanged in its position traversing   throughout area of obstruction within the pancreatic head. 3.  There are multiple low-density lesions of the liver without short-term   interval changes consistent with metastatic disease. 4.  The right kidney is not identified and apparently the patient is status post   a previous right nephrectomy. 5.  There are bilateral adrenal masses suspect for metastatic disease without   short-term interval changes. CTA CHEST W OR W WO CONT   Final Result   No CT evidence for PE. Thoracic aorta atherosclerosis. CAD.        Moderate to large bilateral pleural effusions with associated RML, RLL, and LLL   compressive atelectasis. Abdominal ascites. DUPLEX LOWER EXT VENOUS BILAT   Final Result      XR CHEST PORT   Final Result   FINDINGS: IMPRESSION: 2 frontal views of the chest. Right PICC distal tip SVC   region. Enlarging cardiomegaly. Increasing vascular congestion. Interval development of   hypoinflation, pleural effusions, lower lung airspace edema and/or pneumonia. No   pneumothorax. No free air under the diaphragm. CT ABD PELV W CONT   Final Result   New moderate volume dependent pleural effusions with associated   lower lobe lung compressive atelectasis. Increasing ascites, moderate approaching large-volume   (fluid could be sampled if there is any clinical concern for bile content). High suspicion hepatic metastases. Similar appearance for the pancreas; Silastic stent in place. No pseudocyst formation. Unchanged appearance bilateral adrenal glands. No bowel obstruction. US ABD LTD   Final Result   Small amount of free fluid. Finding suggesting hemangioma in the   liver. Gallbladder wall thickening, adenomyomatosis, sludge and gallstones. Cholecystitis possible. Finding in the region of the pancreatic head as above. Other findings as above. CTA ABDOMEN PELV W CONT   Final Result   1. Ill-defined hypodense mass in the pancreas. There are inflammatory changes   and fluid adjacent to the pancreas or duodenum and stomach most likely related   to biopsy or focal pancreatitis. No pseudocyst formation, active bleeding or   other acute findings. 2. Enlarged adrenal glands left greater than right with noncontrast densities   consistent with adrenal adenomas. 3. Right nephrectomy. 4. Other findings as described. XR CHEST PORT   Final Result   No acute findings.       IR PARACENTESIS ABD W IMAGE    (Results Pending)       Assessment:     Principal Problem:    Pancreatitis (12/9/2021)    Active Problems: A-fib ([facility] [address] (11/16/2020)      CHF (congestive heart failure) ([facility] [address] (11/16/2020)      Hematemesis (12/12/2021)      History of peptic ulcer disease (12/12/2021)      Pancreatic mass (1/5/2022)        Plan:   1. Pancreatitis - RESOLVED.     -12/30 s/p post pyloric tube placement. Patient removed tube on 12/31.         -Unable to place GJ tube due to  tumor in duodenum and food in the stomach and patient on Eliquis      -Lipase 902. Repeat Lipase in the am       -Pain med as needed.      - S/P paracentesis 12/27/21 with removal of 2300 ml clear serous  Fluid removed      -cytology Slight acute inflammation.       -currently on Regular diet      -patient in agreement for SNF transfer, then outpatient follow up from there.   2. CHF      -Continue Chlorthalidone  3. COPD       -Continue home medications       -Albuterol as needed   4. Pancreatic Mass      - 22,358      -S/p fine needle aspiration suspicious for neoplasia but not diagnostic      -When stable Oncology plan for out patient followup with advanced oncology surgeon Esthela Crarizales or [facility] for resection considerations       -CT concern for liver metastais/lesions       -IR for liver biopsy on hold at this time  5. Hematemesis (resolved)  6. Afib with RVR      -Eliquis on hold 2/2 to low HgB      -Current heart rate 120      -Continue diltiazem      -Cardiology has been consulted  7. Diarrhea      - Imodium as needed  8. Anemia/GI bleed     -Reports of rectal bleeding, dark stools. Bleeding most likely from tumor invasion. If she continues to bleed, consult IR for possible embolization procedure.      -Bleeding scan is negative.      -Hgb 8.7. Monitor and transfuse as needed.      1/5 - Had another detailed discussion with the patient   Clinical diagnosis remains Pancreatic cancer with bile duct obstruction mass in the head of the pancreas elevated ca 19-9.   Patient to be presented in the tumor board on 1/12/22  She has expressed the desire to me that she wants to see a surgeon for resectability of the tumor which seems unlikely. Oncology wants pathologic specimen to conclusively say carcinoma if they are to consider Chemotherpy. EUS biospy has already been done once with results suspicious for malignancy. Perhaps a mini lap with direct biopsy of the pancreas and a Jejunal feeding tube may provide the final answer to oncology and help with feeding. She has pulled out the Dobbhoff tube that was placed However her eating of some food since yesterday had been encouraging. \"     Patient discussed with Dr Jones Henry and agrees to above impression and plan. Thank you for allowing me to participate in this patients care    Signed By: Bethanie Oakley.  CONRADO Hillman     January 14, 2022

## 2022-01-14 NOTE — PROGRESS NOTES
Progress Note    Patient: Mukesh Byrne MRN: [id number]  SSN: [id number]    YOB: 1945  Age: 78 y.o. Sex: female      Admit Date: 12/9/2021    LOS: 36 days     Subjective:   Patient followed for severe pancreatitis on Meropenem because of worsening leukocytosis. WBC now normal but CRP is increasing. Lipase has decreased. Suspected pancreatic cancer but no firm diagnosis yet. Patient asleep at this time but appears to be resting comfortably. She is apparently considering whether to go on hospice after discussion with Oncology. Objective:     Vitals:    01/14/22 0515 01/14/22 0849 01/14/22 1138 01/14/22 1605   BP: (!) 109/51 (!) 88/50  110/61   Pulse: 87 (!) 105  (!) 114   Resp: 18 18  18   Temp: 97.9 °F (36.6 °C) 98.1 °F (36.7 °C)  98.9 °F (37.2 °C)   SpO2: 96% 96% 97% 93%   Weight:       Height:            Intake and Output:  Current Shift: No intake/output data recorded. Last three shifts: 01/12 1901 - 01/14 0700  In: 2490 [P.O.:490; I.V.:2000]  Out: 1100 [Urine:1100]    Physical Exam:     Vitals and nursing note reviewed. Constitutional:       General: She is not in acute distress. Appearance: She is obese. She is ill-appearing. HENT: unremarkable except Nasal cannula 3 L/min  Abdominal:  Nontender  Genitourinary:  Vaginal area not examined     Comments: External urinary device  Musculoskeletal:      Right lower leg: No edema. Left lower leg: No edema. Comments: PICC line right upper arm   Skin: multiple ecchymoses on the upper extremities, edema left forearm     Findings: No rash.       Comments: ?Stage 2 sacral wound   Neurological: asleep    Lab/Data Review:     WBC 11,600  Urinalysis with WBC 10-20, Bacteria negative  Lipase 1,441 <2,244 <2,350 <2,144 99 <2,511 0 <2,484 <2,684 < 1,397 <1,719    Procal  15.42 < 0.91 <0.59 <3.87 <0.34 <0.35 <0.34 < 0.32 <0.21 <0.19 <0.25 < 0.14  CRP 21.30 <23.20 <12.40 <10.00 <4.83 <5.07 <7.38 <18.30 <14.20 <12.70 <15.00 < 23.80    Serum fungitell <31 Negative    Ascitic fluid   with 41% PMNs    Blood cultures (12/20) No growth FINAL  Urine culture (12/20) No growth FINAL  Ascitic fluid culture(12/27) No growth FINAL    Assessment:     Principal Problem:    Pancreatitis (12/9/2021)    Active Problems:    A-fib ([address] (11/16/2020)      CHF (congestive heart failure) ([facility] [address] (11/16/2020)      Hematemesis (12/12/2021)      History of peptic ulcer disease (12/12/2021)      Pancreatic mass (1/5/2022)    1. Severe pancreatitis with markedly elevated lipase, resolving  2. Pancreatic mass, possible neoplasm  3. Biliary stent  4. Sepsis with worsening leukocytosis with elevated procal and CRP, resolving, status post 14 days of Meropenem, Anidulafungin (empiric), CRP increasing   5. TPN (just started)  6. Moderate pyuria, negative bacteria, urine culture negative  7. Possible candida superinfection with vaginitis, treated  8. Ascites, status post paracentesis    Comment:    WBC now decreasing but CRP and procal increasing. No clear source of infection. CRP could be related to her neoplastic process but would not expect elevated procalcitonin. Since she is considering hospice, would probably not be very aggressive diagnostically unless she becomes febrile. Plan:   1. If she becomes febrile, would send blood and urine cultures, then start Vancomycin and Zosyn empirically  2.  Waiting possible decision on hospice     Signed By: Ona Gosselin, MD     January 14, 2022

## 2022-01-14 NOTE — PROGRESS NOTES
Patient has been accepted by [facility]. Writer placed a call to [facility] and spoke with intake. Their  will reach out to the patient to make sure she has spoken with her son and they have agreed to accept hospice. Patient seen by [facility] today.  Hospice has declined. [facility] need hospice order.      angelo Carlton

## 2022-01-14 NOTE — PROGRESS NOTES
Hospitalist Progress Note    Subjective:   Daily Progress Note: 1/14/2022 2:42 PM    Hospital Course: The patient is a 68-year-old woman with a PMH significant for atrial fibrillation, PUD, CHF, COPD on home O2 at 2 L, renal cell carcinoma status post nephrectomy, hypertension and morbid obesity. Patient was found to have obstructive jaundice and biliary stricture in early November of this year.  She underwent an ERCP with stent placement with brushings of the bile duct where there was noted to be a stricture.  These brushings were negative.  Patient then underwent a PET CT which demonstrated uptake in the head of the pancreas concerning for pancreatic mass. She subsequently underwent endoscopic ultrasound with ultrasound and FNA biopsy on 12/6/2021.  The procedure demonstrated abutment of the tumor with the portal vein without involvement of the SMA or SMV.  The total tumor was measured to be 2.7 cm by 3 cm.  This biopsy resulted as atypical cells with suspicion for malignancy. She admitted to the hospital on 12/9/2021 and severe pancreatitis after undergoing an EUS for pancreatic biopsy on 12/6/2021 and pancreatic mass. Her symptoms were abdominal pain, nausea and vomiting. CT scan revealing peripancreatic inflammation consistent with likely post biopsy pancreatitis and ascites. Her initial labs revealing elevated lipase, supratherapeutic INR and elevated D-dimer coagulopathy. CTA was positive for bilateral pleural effusions, negative for PE. Paracentesis with culture of ascitic fluid, blood cultures, urine cultures negative for growth. Patient had been started on IV meropenem for leukocytosis and anidulafungin for suspected intra-abdominal infection. CT with and without contrast revealing liver lesions consistent with metastasis, mass in the pancreatic head with biliary stent in place, bilateral pleural effusions, ascites and bilateral adrenal masses.   IR declined liver biopsy due to coagulopathy and felt she would not benefit. Doppler studies of upper and lower extremities negative for DVT, positive for superficial thrombophlebitis of left medial antecubital.  Pancreatic cancer is presumed but no firm diagnosis due to lack of tissue sample. Pancreatitis remaining persistent despite antibiotics. Attempted Dobbhoff and PEG J placement but failed due to reddened area presumed possible tumor infiltration in the duodenum. Due to poor performance and prognosis she is not a candidate for palliative XRT or chemo. The patient has been progressively worsening with debilitation, intermittently tolerating diet not stable to be discharged home with outpatient surgical oncology follow-up. She would benefit from transfer to tertiary care center where surgical oncology services are available for evaluation of pancreatic head mass and possible surgical intervention. 1/7 A. fib with RVR rate 110-140. cardiology  Consulted and Anticoagulation discontinued due to bleeding and liver failure. Subjective: Yesterday oncology nurse navigator and oncology spoke with the patient at bedside about goals of care and overall poor prognosis despite lack of tissue sample. Patient was agreeable to speaking with hospice.     Current Facility-Administered Medications   Medication Dose Route Frequency    magnesium sulfate 2 g/50 ml IVPB (premix or compounded)  2 g IntraVENous ONCE    guaiFENesin ER (MUCINEX) tablet 1,200 mg  1,200 mg Oral Q12H    albuterol-ipratropium (DUO-NEB) 2.5 MG-0.5 MG/3 ML  3 mL Nebulization Q6H PRN    SE-Tan Plus capsule  1 Capsule Oral BID    calcium carbonate-vitamin D3 600 mg-20 mcg (800 unit) chew 2 Tablet  2 Tablet Oral DAILY    lactated Ringers infusion  75 mL/hr IntraVENous CONTINUOUS    0.9% sodium chloride infusion 250 mL  250 mL IntraVENous PRN    0.9% sodium chloride infusion 250 mL  250 mL IntraVENous PRN    polyethylene glycol (MIRALAX) packet 17 g  17 g Oral DAILY PRN    0.9% sodium chloride infusion 250 mL  250 mL IntraVENous PRN    pantoprazole (PROTONIX) 40 mg in 0.9% sodium chloride 10 mL injection  40 mg IntraVENous Q12H    loperamide (IMODIUM) capsule 2 mg  2 mg Oral Q4H PRN    [Held by provider] metoprolol tartrate (LOPRESSOR) tablet 25 mg  25 mg Oral Q12H    0.9% sodium chloride infusion 250 mL  250 mL IntraVENous PRN    cholecalciferol (VITAMIN D3) (5000 Units/125 mcg) tablet 5,000 Units  5,000 Units Oral Q7D    sertraline (ZOLOFT) tablet 25 mg  25 mg Oral QPM    atorvastatin (LIPITOR) tablet 40 mg  40 mg Oral QHS    [Held by provider] furosemide (LASIX) tablet 40 mg  40 mg Oral DAILY    influenza vaccine 2021-22 (6 mos+)(PF) (FLUARIX/FLULAVAL/FLUZONE QUAD) injection 0.5 mL  1 Each IntraMUSCular PRIOR TO DISCHARGE    [Held by provider] apixaban (ELIQUIS) tablet 2.5 mg  2.5 mg Oral BID    midodrine (PROAMATINE) tablet 10 mg  10 mg Oral TID WITH MEALS    sodium chloride (NS) flush 5-40 mL  5-40 mL IntraVENous PRN    zinc oxide-white petrolatum 17-57 % topical paste   Topical TID    cyclobenzaprine (FLEXERIL) tablet 10 mg  10 mg Oral TID PRN    HYDROmorphone (DILAUDID) injection 1 mg  1 mg IntraVENous Q4H PRN    oxyCODONE IR (ROXICODONE) tablet 5 mg  5 mg Oral Q6H PRN    docusate sodium (COLACE) capsule 100 mg  100 mg Oral BID    sorbitoL 70 % solution 30 mL  30 mL Oral DAILY PRN    bisacodyL (DULCOLAX) suppository 10 mg  10 mg Rectal DAILY PRN    hydrALAZINE (APRESOLINE) 20 mg/mL injection 20 mg  20 mg IntraVENous Q6H PRN    [Held by provider] chlorthalidone (HYGROTON) tablet 25 mg  25 mg Oral DAILY    albuterol (PROVENTIL HFA, VENTOLIN HFA, PROAIR HFA) inhaler 2 Puff  2 Puff Inhalation Q6H PRN    ascorbic acid (vitamin C) (VITAMIN C) tablet 500 mg  500 mg Oral DAILY    diazePAM (VALIUM) tablet 5 mg  5 mg Oral Q6H PRN    dilTIAZem ER (CARDIZEM CD) capsule 120 mg  120 mg Oral DAILY    potassium chloride SR (KLOR-CON 10) tablet 20 mEq  20 mEq Oral DAILY    traZODone (DESYREL) tablet 50 mg  50 mg Oral QHS    ondansetron (ZOFRAN) injection 4 mg  4 mg IntraVENous Q6H PRN    acetaminophen (TYLENOL) tablet 650 mg  650 mg Oral Q4H PRN    prochlorperazine (COMPAZINE) with saline injection 10 mg  10 mg IntraVENous Q6H PRN        Review of Systems  Constitutional: No fevers, No chills, No sweats, +++fatigue, +++ Weakness  Eyes: No redness  Ears, nose, mouth, throat, and face: No nasal congestion, No sore throat, No voice change  Respiratory: No Shortness of Breath, No cough, No wheezing  Cardiovascular: No chest pain, No palpitations, No extremity edema  Gastrointestinal: No nausea, No vomiting, No diarrhea, No abdominal pain  Genitourinary: No frequency, No dysuria, No hematuria  Integument/breast: No skin lesion(s)   Neurological: No Confusion, No headaches, No dizziness      Objective:     Visit Vitals  BP (!) 88/50 (BP 1 Location: Left arm)   Pulse (!) 105   Temp 98.1 °F (36.7 °C)   Resp 18   Ht 5' 7\" (1.702 m)   Wt 94.1 kg (207 lb 7.3 oz)   SpO2 96%   BMI 32.49 kg/m²    O2 Flow Rate (L/min): 4 l/min O2 Device: Nasal cannula    Temp (24hrs), Av.9 °F (36.6 °C), Min:97.7 °F (36.5 °C), Max:98.1 °F (36.7 °C)      No intake/output data recorded.  1901 -  0700  In: 2490 [P.O.:490; I.V.:2000]  Out: 1100 [Urine:1100]    PHYSICAL EXAM:  Constitutional: No acute distress, chronically ill appearing  Skin: Extremities and face reveal no rashes. HEENT: Sclerae anicteric. Extra-occular muscles are intact. Cardiovascular: irregular  Respiratory:  Clear breath sounds bilaterally with no wheezes, rales, or rhonchi. GI: Abdomen nondistended, soft, and nontender. Normal active bowel sounds. Musculoskeletal: No pitting edema of the lower legs. Able to move all ext  Neurological:  Patient is alert and oriented.  Cranial nerves II-XII grossly intact  Psychiatric: Mood appears appropriate       Data Review    Recent Results (from the past 24 hour(s))   EKG, 12 LEAD, INITIAL    Collection Time: 01/13/22 10:30 AM   Result Value Ref Range    Ventricular Rate 96 BPM    Atrial Rate 41 BPM    QRS Duration 96 ms    Q-T Interval 366 ms    QTC Calculation (Bezet) 462 ms    Calculated R Axis 62 degrees    Calculated T Axis -25 degrees    Diagnosis       Atrial fibrillation  Low voltage QRS  Abnormal QRS-T angle, consider primary T wave abnormality  Abnormal ECG  When compared with ECG of 09-JAN-2022 11:59,  No significant change was found  Confirmed by Mckenzie Willard MD, [facility] ([id number]) on 1/13/2022 11:02:30 AM     D DIMER    Collection Time: 01/14/22  4:45 AM   Result Value Ref Range    D DIMER 6.95 (H) <0.50 ug/ml(FEU)   PROTHROMBIN TIME + INR    Collection Time: 01/14/22  4:45 AM   Result Value Ref Range    Prothrombin time 20.2 (H) 11.9 - 14.7 sec    INR 1.8 (H) 0.9 - 1.1     METABOLIC PANEL, BASIC    Collection Time: 01/14/22  4:45 AM   Result Value Ref Range    Sodium 138 136 - 145 mmol/L    Potassium 2.4 (LL) 3.5 - 5.1 mmol/L    Chloride 101 97 - 108 mmol/L    CO2 33 (H) 21 - 32 mmol/L    Anion gap 4 (L) 5 - 15 mmol/L    Glucose 88 65 - 100 mg/dL    BUN 31 (H) 6 - 20 mg/dL    Creatinine 1.19 (H) 0.55 - 1.02 mg/dL    BUN/Creatinine ratio 26 (H) 12 - 20      GFR est AA 54 (L) >60 ml/min/1.73m2    GFR est non-AA 44 (L) >60 ml/min/1.73m2    Calcium 7.8 (L) 8.5 - 10.1 mg/dL   CBC WITH AUTOMATED DIFF    Collection Time: 01/14/22  4:45 AM   Result Value Ref Range    WBC 11.6 (H) 3.6 - 11.0 K/uL    RBC 2.81 (L) 3.80 - 5.20 M/uL    HGB 8.7 (L) 11.5 - 16.0 g/dL    HCT 26.2 (L) 35.0 - 47.0 %    MCV 93.2 80.0 - 99.0 FL    MCH 31.0 26.0 - 34.0 PG    MCHC 33.2 30.0 - 36.5 g/dL    RDW 16.2 (H) 11.5 - 14.5 %    PLATELET 87 (L) 902 - 400 K/uL    MPV 11.7 8.9 - 12.9 FL    NRBC 0.0 0.0  WBC    ABSOLUTE NRBC 0.00 0.00 - 0.01 K/uL    NEUTROPHILS 88 (H) 32 - 75 %    LYMPHOCYTES 7 (L) 12 - 49 %    MONOCYTES 4 (L) 5 - 13 %    EOSINOPHILS 0 0 - 7 %    BASOPHILS 0 0 - 1 %    IMMATURE GRANULOCYTES 1 (H) 0 - 0.5 %    ABS. NEUTROPHILS 10.2 (H) 1.8 - 8.0 K/UL    ABS. LYMPHOCYTES 0.8 0.8 - 3.5 K/UL    ABS. MONOCYTES 0.4 0.0 - 1.0 K/UL    ABS. EOSINOPHILS 0.0 0.0 - 0.4 K/UL    ABS. BASOPHILS 0.0 0.0 - 0.1 K/UL    ABS. IMM. GRANS. 0.1 (H) 0.00 - 0.04 K/UL    DF AUTOMATED     C REACTIVE PROTEIN, QT    Collection Time: 01/14/22  4:45 AM   Result Value Ref Range    C-Reactive protein 21.30 (H) 0.00 - 0.60 mg/dL   HEPATIC FUNCTION PANEL    Collection Time: 01/14/22  4:45 AM   Result Value Ref Range    Protein, total 4.1 (L) 6.4 - 8.2 g/dL    Albumin 1.0 (L) 3.5 - 5.0 g/dL    Globulin 3.1 2.0 - 4.0 g/dL    A-G Ratio 0.3 (L) 1.1 - 2.2      Bilirubin, total 5.3 (H) 0.2 - 1.0 mg/dL    Bilirubin, direct 4.6 (H) 0.0 - 0.2 mg/dL    Alk. phosphatase 398 (H) 45 - 117 U/L    AST (SGOT) 106 (H) 15 - 37 U/L    ALT (SGPT) 73 12 - 78 U/L   AMMONIA    Collection Time: 01/14/22  4:45 AM   Result Value Ref Range    Ammonia <10 <32 umol/L           Assessment / Plan:       1. Acute pancreatitis  - Status post EUS by Dr. Oralia Leblanc 12/06  - Continue pain medication  - Lipase variable, will likely be chronically elevated   - CT abd/pelvis with PO and IV contrast showing increasing bilateral pleural effusion and increasing ascites. Also notes multiple low-density lesions of liver consistent with metastatic disease and bilateral adrenal masses suspected for metastatic disease.  - completed 14 days of IV merrem   - Continue empiric Anidulafungin 4 more days  - paracentesis on 12/27 with 2300 mL clear serous fluid drained, cytology showing now growth. - She was scheduled for liver biopsy with IR however IR does not feel patient would benefit from biopsy at this time.   -Unsuccessful attempt to place PEG J due to tumor infiltration and duodenum     2. CHF  - Continue chlorthalidone 25 mg daily  - On PO lasix 40mg daily. - holding BP meds due to hypotension today     3.  Pancreatic mass/Liver Masses/Adrenal Masses/History of renal cell carcinoma with lung nodules s/p right nephrectomy  Multiple low-density lesions of the liver consistent with meta static disease and bilateral adrenal masses suspicious for metastatic disease  - CA 19-9 greater than 4000  --Oncology consulted-will need tissue biopsy before definitive recommendations can be made. Unable to offer palliative chemo or radiation due to poor performance. Prognosis very poor, hospice recommended     4. Hypokalemia-stable  - replete as needed     5. COPD-stable  - Pulmonary consultation  - Chronic respiratory failure with 2 L of oxygen via nasal cannula at home  - Increase mucinex to 1200mg BID  - She did become hypoxic. Lungs sound clear. Will consider chest xray if no improvement.     6. Leukocytosis  - Cultures negative  - Completed course of IV meropenem and Andulafungin 14 days  - ID consulted  - repeat UA pending     7. A. fib  -On diltiazem and metoprolol.  - HR elevated. This is before BP meds. Will get a EKG. If still in a fib with RVR will consider diltiazem drip. -2D echo showed normal systolic function and diastolic function with an EF of 60 to 65%  -No anticoagulation due to anemia    8. Acute on Chronic Anemia   -Her hemoglobin has been slowly declining requiring repletion and she is FOBT positive  -Required transfusion hemoglobin today 8.7  Total of 6 units PRBCs  -Eliquis has been discontinued due to bleeding and liver failure  Cardiology consulted  - NM bleeding scan negative        9. Elevated d-dimer  - Likely multifactorial   - CTA chest negative for PE  - Duplex US lower extremities negative for DVT  - Duplex US upper extremities showing superficial thrombophlebitis in left median/antecubital veins  -Repeat upper extremity Doppler study negative  -Discontinued Eliquis due to anemia        10. Hypotension  On midodrine       Dispo: Barriers include stabilization of hemoglobin and improvement in potassium.   Potentially home with home hospice if patient is agreeable     DVT Prophylaxis: Discontinued Eliquis, SCDs  GI PPx: Protonix  Code Status: Full       Care Plan discussed with: Patient/Family/RN/Case Management      Total time spent with patient: 36 minutes.

## 2022-01-14 NOTE — PROGRESS NOTES
Per chart and spoke with CM, pt considering hospice. Will hold therapy today and follow up once pt has made a final decision on hospice.

## 2022-01-14 NOTE — PROGRESS NOTES
Spiritual Care Assessment/Progress Note  [facility]      NAME: Sarbjit Rebollar      MRN: [id number]  AGE: 78 y.o. SEX: female  Anglican Affiliation: Yazidi   Language: English     1/14/2022     Total Time (in minutes): 29     Spiritual Assessment begun in [facility] through conversation with:         [x]Patient        [] Family    [] Friend(s)        Reason for Consult:  Follow-up, routine     Spiritual beliefs: (Please include comment if needed)     [x] Identifies with a maribell tradition:  Yenny Espinal        [] Supported by a maribell community:            [] Claims no spiritual orientation:           [] Seeking spiritual identity:                [] Adheres to an individual form of spirituality:           [] Not able to assess:                           Identified resources for coping:      [x] Prayer                               [] Music                  [] Guided Imagery     [x] Family/friends                 [] Pet visits     [] Devotional reading                         [] Unknown     [] Other:                                              Interventions offered during this visit: (See comments for more details)    Patient Interventions: Affirmation of emotions/emotional suffering,Affirmation of maribell,Bridging,Catharsis/review of pertinent events in supportive environment,Coping skills reviewed/reinforced,Life review/legacy,Normalization of emotional/spiritual concerns,Prayer (actual),Prayer (assurance of),End of life issues discussed,Anglican beliefs/image of God discussed           Plan of Care:     [] Support spiritual and/or cultural needs    [] Support AMD and/or advance care planning process      [] Support grieving process   [] Coordinate Rites and/or Rituals    [] Coordination with community clergy   [] No spiritual needs identified at this time   [] Detailed Plan of Care below (See Comments)  [] Make referral to Music Therapy  [] Make referral to Pet Therapy     [] Make referral to Addiction services  [] Make referral to [facility]  [] Make referral to Spiritual Care Partner  [] No future visits requested        [x] Contact Spiritual Care for further referrals     Comments:  [place] for follow up care. Patient was alone during the visit. Patient shared about her medical condition, future outlook and her hope for miracle to extend her life. Shared about losses of her loved ones which she seemed to continually grieve. Expressed her affection towards her son and grandson for whom she wants to care for. Provided chaplaincy education, listened with empathy and reflection while normalizing her emotions and affirming her familial bond and maribell in God. Offered and provided prayer which she seemed to appreciate. Advised of  availability. Contact chaplains for further referrals. Chaplain Anjelica Stuart M.Div.    can be reached by calling the  at [facility]  [phone]

## 2022-01-14 NOTE — PROGRESS NOTES
CM met with patient at [place] to discuss hospice agency options. Patient informed both [facility] and [facility] per clinical record. Patient stated after her meeting today, she will make a decision. CM will f/u with patient.      Garrett Kramer

## 2022-01-14 NOTE — HOSPICE
[facility] Help to Those in Need  [phone]    Patient Name: Sharon Pringle  YOB: 1945  Age: 78 y.o. [facility] LCSW Note:  Hospice consult noted. Chart reviewed. Plan of care discussed with care manager. This LCSW and Jose Begin in to meet with pt at [place] to discuss hospice services and next steps. [employer] team discussed options for moving forward with her care home with support of paid caregivers vs facility placement. Pt stated she does not want her son to provide her personal hygiene care. Pt stated she \"does not have money\" to hire paid caregivers or for placement. Discussed what home hospice services would look like and the day to day care pt would need. Pt stated she had met with another hospice and they provide a greater level of day [facility] day [facility]. [facility] updated CM Tata Machuca, who reports [facility] has accepted pt.for home hospice. [facility] will no longer follow. Thank you for the opportunity to be of service to Ms. Juan Alberto Estrella.       Milana Godwin [facility], [address] [place] [address]  [phone]

## 2022-01-15 NOTE — PROGRESS NOTES
Problem: Pressure Injury - Risk of  Goal: *Prevention of pressure injury  Description: Document Asim Scale and appropriate interventions in the flowsheet.   Outcome: Progressing Towards Goal  Note: Pressure Injury Interventions:  Sensory Interventions: Keep linens dry and wrinkle-free    Moisture Interventions: Apply protective barrier, creams and emollients    Activity Interventions: Increase time out of bed    Mobility Interventions: HOB 30 degrees or less    Nutrition Interventions: Offer support with meals,snacks and hydration    Friction and Shear Interventions: HOB 30 degrees or less

## 2022-01-15 NOTE — PROGRESS NOTES
Hospitalist Progress Note    Subjective:   Daily Progress Note: 1/15/2022 2:42 PM    Hospital Course: The patient is a 73-year-old woman with a PMH significant for atrial fibrillation, PUD, CHF, COPD on home O2 at 2 L, renal cell carcinoma status post nephrectomy, hypertension and morbid obesity. Patient was found to have obstructive jaundice and biliary stricture in early November of this year.  She underwent an ERCP with stent placement with brushings of the bile duct where there was noted to be a stricture.  These brushings were negative.  Patient then underwent a PET CT which demonstrated uptake in the head of the pancreas concerning for pancreatic mass. She subsequently underwent endoscopic ultrasound with ultrasound and FNA biopsy on 12/6/2021.  The procedure demonstrated abutment of the tumor with the portal vein without involvement of the SMA or SMV.  The total tumor was measured to be 2.7 cm by 3 cm.  This biopsy resulted as atypical cells with suspicion for malignancy. She admitted to the hospital on 12/9/2021 and severe pancreatitis after undergoing an EUS for pancreatic biopsy on 12/6/2021 and pancreatic mass. Her symptoms were abdominal pain, nausea and vomiting. CT scan revealing peripancreatic inflammation consistent with likely post biopsy pancreatitis and ascites. Her initial labs revealing elevated lipase, supratherapeutic INR and elevated D-dimer coagulopathy. CTA was positive for bilateral pleural effusions, negative for PE. Paracentesis with culture of ascitic fluid, blood cultures, urine cultures negative for growth. Patient had been started on IV meropenem for leukocytosis and anidulafungin for suspected intra-abdominal infection. CT with and without contrast revealing liver lesions consistent with metastasis, mass in the pancreatic head with biliary stent in place, bilateral pleural effusions, ascites and bilateral adrenal masses.   IR declined liver biopsy due to coagulopathy and felt she would not benefit. Doppler studies of upper and lower extremities negative for DVT, positive for superficial thrombophlebitis of left medial antecubital.  Pancreatic cancer is presumed but no firm diagnosis due to lack of tissue sample. Pancreatitis remaining persistent despite antibiotics. Attempted Dobbhoff and PEG J placement but failed due to reddened area presumed possible tumor infiltration in the duodenum. Due to poor performance and prognosis she is not a candidate for palliative XRT or chemo. The patient has been progressively worsening with debilitation, intermittently tolerating diet not stable to be discharged home with outpatient surgical oncology follow-up. She would benefit from transfer to tertiary care center where surgical oncology services are available for evaluation of pancreatic head mass and possible surgical intervention. 1/7 A. fib with RVR rate 110-140. cardiology  Consulted and Anticoagulation discontinued due to bleeding and liver failure. Subjective: on 1/13/2022 oncology nurse navigator and oncology spoke with the patient at bedside about goals of care and overall poor prognosis despite lack of tissue sample. Patient was agreeable to speaking with hospice. However, she is confused because many ppl are coming in and talking about the different agencies and what they can offer.  [facility] to confirm what they can offer    Current Facility-Administered Medications   Medication Dose Route Frequency    guaiFENesin ER (MUCINEX) tablet 1,200 mg  1,200 mg Oral Q12H    albuterol-ipratropium (DUO-NEB) 2.5 MG-0.5 MG/3 ML  3 mL Nebulization Q6H PRN    SE-Tan Plus capsule  1 Capsule Oral BID    calcium carbonate-vitamin D3 600 mg-20 mcg (800 unit) chew 2 Tablet  2 Tablet Oral DAILY    lactated Ringers infusion  75 mL/hr IntraVENous CONTINUOUS    0.9% sodium chloride infusion 250 mL  250 mL IntraVENous PRN    0.9% sodium chloride infusion 250 mL  250 mL IntraVENous PRN    polyethylene glycol (MIRALAX) packet 17 g  17 g Oral DAILY PRN    0.9% sodium chloride infusion 250 mL  250 mL IntraVENous PRN    pantoprazole (PROTONIX) 40 mg in 0.9% sodium chloride 10 mL injection  40 mg IntraVENous Q12H    loperamide (IMODIUM) capsule 2 mg  2 mg Oral Q4H PRN    [Held by provider] metoprolol tartrate (LOPRESSOR) tablet 25 mg  25 mg Oral Q12H    0.9% sodium chloride infusion 250 mL  250 mL IntraVENous PRN    cholecalciferol (VITAMIN D3) (5000 Units/125 mcg) tablet 5,000 Units  5,000 Units Oral Q7D    sertraline (ZOLOFT) tablet 25 mg  25 mg Oral QPM    atorvastatin (LIPITOR) tablet 40 mg  40 mg Oral QHS    [Held by provider] furosemide (LASIX) tablet 40 mg  40 mg Oral DAILY    influenza vaccine 2021-22 (6 mos+)(PF) (FLUARIX/FLULAVAL/FLUZONE QUAD) injection 0.5 mL  1 Each IntraMUSCular PRIOR TO DISCHARGE    [Held by provider] apixaban (ELIQUIS) tablet 2.5 mg  2.5 mg Oral BID    midodrine (PROAMATINE) tablet 10 mg  10 mg Oral TID WITH MEALS    sodium chloride (NS) flush 5-40 mL  5-40 mL IntraVENous PRN    zinc oxide-white petrolatum 17-57 % topical paste   Topical TID    cyclobenzaprine (FLEXERIL) tablet 10 mg  10 mg Oral TID PRN    HYDROmorphone (DILAUDID) injection 1 mg  1 mg IntraVENous Q4H PRN    oxyCODONE IR (ROXICODONE) tablet 5 mg  5 mg Oral Q6H PRN    docusate sodium (COLACE) capsule 100 mg  100 mg Oral BID    sorbitoL 70 % solution 30 mL  30 mL Oral DAILY PRN    bisacodyL (DULCOLAX) suppository 10 mg  10 mg Rectal DAILY PRN    hydrALAZINE (APRESOLINE) 20 mg/mL injection 20 mg  20 mg IntraVENous Q6H PRN    [Held by provider] chlorthalidone (HYGROTON) tablet 25 mg  25 mg Oral DAILY    albuterol (PROVENTIL HFA, VENTOLIN HFA, PROAIR HFA) inhaler 2 Puff  2 Puff Inhalation Q6H PRN    ascorbic acid (vitamin C) (VITAMIN C) tablet 500 mg  500 mg Oral DAILY    diazePAM (VALIUM) tablet 5 mg  5 mg Oral Q6H PRN    dilTIAZem ER (CARDIZEM CD) capsule 120 mg  120 mg Oral DAILY    potassium chloride SR (KLOR-CON 10) tablet 20 mEq  20 mEq Oral DAILY    traZODone (DESYREL) tablet 50 mg  50 mg Oral QHS    ondansetron (ZOFRAN) injection 4 mg  4 mg IntraVENous Q6H PRN    acetaminophen (TYLENOL) tablet 650 mg  650 mg Oral Q4H PRN    prochlorperazine (COMPAZINE) with saline injection 10 mg  10 mg IntraVENous Q6H PRN        Review of Systems  Constitutional: No fevers, No chills, No sweats, +++fatigue, +++ Weakness  Eyes: No redness  Ears, nose, mouth, throat, and face: No nasal congestion, No sore throat, No voice change  Respiratory: No Shortness of Breath, No cough, No wheezing  Cardiovascular: No chest pain, No palpitations, No extremity edema  Gastrointestinal: No nausea, No vomiting, No diarrhea, No abdominal pain  Genitourinary: No frequency, No dysuria, No hematuria  Integument/breast: No skin lesion(s)   Neurological: No Confusion, No headaches, No dizziness      Objective:     Visit Vitals  /78   Pulse 90   Temp 97.8 °F (36.6 °C)   Resp 18   Ht 5' 7\" (1.702 m)   Wt 94.1 kg (207 lb 7.3 oz)   SpO2 98%   BMI 32.49 kg/m²    O2 Flow Rate (L/min): 3 l/min O2 Device: Nasal cannula (HOME O2 3 LPM)    Temp (24hrs), Av.2 °F (36.8 °C), Min:97.8 °F (36.6 °C), Max:98.9 °F (37.2 °C)      No intake/output data recorded.  1901 - 01/15 0700  In: 1240 [P.O.:240; I.V.:1000]  Out: 1100 [Urine:1100]    PHYSICAL EXAM:  Constitutional: No acute distress, chronically ill appearing  Skin: Extremities and face reveal no rashes. HEENT: Sclerae anicteric. Extra-occular muscles are intact. Cardiovascular: irregular  Respiratory:  nonlabored  GI: Abdomen nondistended, soft, and nontender. Normal active bowel sounds. Musculoskeletal: No pitting edema of the lower legs. Able to move all ext  Neurological:  Patient is alert and oriented.  Cranial nerves II-XII grossly intact  Psychiatric: Mood appears appropriate       Data Review    No results found for this or any previous visit (from the past 24 hour(s)). Assessment / Plan:       1. Acute pancreatitis  - Status post EUS by Dr. Amanda Correa 12/06  - Continue pain medication  - Lipase variable, will likely be chronically elevated   - CT abd/pelvis with PO and IV contrast showing increasing bilateral pleural effusion and increasing ascites. Also notes multiple low-density lesions of liver consistent with metastatic disease and bilateral adrenal masses suspected for metastatic disease.  - completed 14 days of IV merrem   - Continue empiric Anidulafungin 4 more days  - paracentesis on 12/27 with 2300 mL clear serous fluid drained, cytology showing now growth. - She was scheduled for liver biopsy with IR however IR does not feel patient would benefit from biopsy at this time.   -Unsuccessful attempt to place PEG J due to tumor infiltration and duodenum     2. CHF  - Continue chlorthalidone 25 mg daily  - Holding PO lasix 40mg daily. - holding BP meds due to hypotension today     3. Pancreatic mass/Liver Masses/Adrenal Masses/History of renal cell carcinoma with lung nodules s/p right nephrectomy  Multiple low-density lesions of the liver consistent with meta static disease and bilateral adrenal masses suspicious for metastatic disease  - CA 19-9 greater than 4000  --Oncology consulted-will need tissue biopsy before definitive recommendations can be made. Unable to offer palliative chemo or radiation due to poor performance. Prognosis very poor, hospice recommended     4. Hypokalemia-stable  - replete as needed  - given Mag sulfate     5. COPD-stable  - Pulmonary consultation  - Chronic respiratory failure with 2 L of oxygen via nasal cannula at home  - Increase mucinex to 1200mg BID  - She did become hypoxic. Lungs sound clear.  Will consider chest xray if no improvement.     6. Leukocytosis  - Cultures negative  - Completed course of IV meropenem and Andulafungin 14 days  - ID consulted  - repeat UA pending     7. A. fib  -On diltiazem and metoprolol.  - HR elevated. This is before BP meds. Will get a EKG. If still in a fib with RVR will consider diltiazem drip. -2D echo showed normal systolic function and diastolic function with an EF of 60 to 65%  -No anticoagulation due to anemia    8. Acute on Chronic Anemia   -Her hemoglobin has been slowly declining requiring repletion and she is FOBT positive  -Required transfusion hemoglobin today 8.7  Total of 6 units PRBCs  -Eliquis has been discontinued due to bleeding and liver failure  Cardiology consulted  - NM bleeding scan negative        9. Elevated d-dimer  - Likely multifactorial   - CTA chest negative for PE  - Duplex US lower extremities negative for DVT  - Duplex US upper extremities showing superficial thrombophlebitis in left median/antecubital veins  -Repeat upper extremity Doppler study negative  -Discontinued Eliquis due to anemia        10. Hypotension  On midodrine       Dispo: Barriers include stabilization of hemoglobin and improvement in potassium. Potentially home with home hospice if patient is agreeable     DVT Prophylaxis: Discontinued Eliquis, SCDs  GI PPx: Protonix  Code Status: Full       Care Plan discussed with: Patient/Family/RN/Case Management      Total time spent with patient: 34 minutes.

## 2022-01-15 NOTE — PROGRESS NOTES
Progress Note    Patient: Katlyn Pinto MRN: [id number]  SSN: [id number]    YOB: 1945  Age: 78 y.o. Sex: female      Admit Date: 12/9/2021    LOS: 37 days     Subjective:   GI in consultation for  Pancreatitis    Patient seen awake and alert. She does appear jaundice. On 1/13/22 oncology nurse navigator and oncology spoke with Ms. Juan Manuel Vieira concerning goals of care and overall poor prognosis. She reports she did speak with [employer] and would like to speak to them again to answer a few questions that she has. She states her abdominal pain is improved. Her appetite is poor. HgB low this am at 8.1 but stable. No reports of dark stool at this time. 12/30 EGD with Dobhoff placement for feeding - removed by patient.   12/27/21 Paracentesis - 2300ml ascites fluid removed. 12/23/21 CT abdomen  1.  There are increasing bilateral pleural effusions, increasing body wall  edema, and increasing ascites. These findings could be secondary to the third spacing of fluids. The differential diagnosis for the ascites would include pancreatic ascites with acute pancreatitis or metastatic disease to the peritoneum.    2. Gisele Clement is a biliary stent which is unchanged in its position traversing throughout area of obstruction within the pancreatic head. 3. Gisele Clement are multiple low-density lesions of the liver without short-term  interval changes consistent with metastatic disease. 4.  The right kidney is not identified and apparently the patient is status post a previous right nephrectomy. 5.  There are bilateral adrenal masses suspect for metastatic disease without short-term interval changes. 12/6/2021 EUS showing 2.7 X3 cm lesion in the area of head of pancreas with dilation of the Pancreatic duct abutting the portal vein but not involving the SMA or AMV ;  Tumor T2 by endosonographic criteria ; one small lymph node in the area of head of pancreas.   11/22/2021 PET Scan    1.  FDG avid lesion in the pancreatic head consistent with malignancy. 2.  Subcentimeter focus of FDG uptake in the liver, indeterminate. 3.  FDG uptake associated with density expanding the right facet at C5-C6, possibly due to an ectatic artery.     History of Present Illness: Nevin Cole is a 78 y. o. female who is seen in consultation for pancreatitis. Melvin Krishnamurthy has a past medical history of  Paroxysmal Atrial Fibrillation, CHF, COPD, renal cell carcinoma Stage IV s/p nephrectomy, GERD sjoren's syndrome, hypertension, and depression. Patient under went EUS on 12/6/2021 showing 2.7 X3 cm lesion in the area of head of pancreas with dilation of the Pancreatic duct abutting the portal vein but not involving the SMA or AMV ; Tumor T2 by endosonographic criteria ; one small lymph node in the area of head of pancreas. Pathology shows rare cells present suspicious for malignancy.   She had an ERCP on 11/4 2021 ERCP; with sphincterotomy/papillotomy ERCP; with placement of endoscopic stent into biliary or pancreatic duct, including pre and postdilation and guide wire passage, when performed, including sphincterotomy, when performed, each stent. . Patient had a PET scan on 11/22/21 which shows a lesion in the pancreatic head consistent with malignancy. CTA of abdomen shows ill defined hypodense mass in the pancreas. There are inflammatory changes. Patient states she experienced nausea, vomiting and diarrhea since Monday. Her abdominal pain has progressively gotten worse. She reports a poor appetite. She does complain of increased \"burping\". Total bilirubin 1.4, AsT 36, ALT 31, Alk Phos. 217, Lipase > 3,000. Plan nothing by mouth except ice chips and sips of water with medication. IV hydration.  Pain management         Objective:     Vitals:    01/14/22 2043 01/14/22 2316 01/15/22 0700 01/15/22 0933   BP: 118/60 131/60 119/78    Pulse: 90 (!) 105 90    Resp: 18 18 18    Temp: 98 °F (36.7 °C) 98 °F (36.7 °C) 97.8 °F (36.6 °C)    SpO2: 95% 96% 98% 92% Weight:       Height:            Intake and Output:  Current Shift: 01/15 0701 - 01/15 1900  In: 300   Out: -   Last three shifts: 01/13 1901 - 01/15 0700  In: 1240 [P.O.:240; I.V.:1000]  Out: 1100 [Urine:1100]    Physical Exam:   Skin:  Extremities and face reveal no rashes. No chapin erythema. jaundice  HEENT: Sclerae + icteric. Extra-occular muscles are intact. No abnormal pigmentation of the lips. The neck is supple. Cardiovascular: Regular rate and rhythm. Respiratory:  Comfortable breathing with no accessory muscle use. GI:  Abdomen nondistended, soft, and nontender. No enlargement of the liver or spleen. No masses palpable. Rectal:  Deferred  Musculoskeletal: Extremities have good range of motion. Neurological:  Gross memory appears intact. Patient is alert and oriented. Psychiatric:  Mood appears appropriate with judgement intact.   Lymphatic:  No visible adenopathy      Lab/Data Review:  Recent Results (from the past 24 hour(s))   PROTHROMBIN TIME + INR    Collection Time: 01/15/22  8:07 AM   Result Value Ref Range    Prothrombin time 21.5 (H) 11.9 - 14.7 sec    INR 1.9 (H) 0.9 - 1.1     METABOLIC PANEL, BASIC    Collection Time: 01/15/22  8:07 AM   Result Value Ref Range    Sodium 139 136 - 145 mmol/L    Potassium 2.7 (LL) 3.5 - 5.1 mmol/L    Chloride 101 97 - 108 mmol/L    CO2 32 21 - 32 mmol/L    Anion gap 6 5 - 15 mmol/L    Glucose 88 65 - 100 mg/dL    BUN 29 (H) 6 - 20 mg/dL    Creatinine 1.03 (H) 0.55 - 1.02 mg/dL    BUN/Creatinine ratio 28 (H) 12 - 20      GFR est AA >60 >60 ml/min/1.73m2    GFR est non-AA 52 (L) >60 ml/min/1.73m2    Calcium 7.7 (L) 8.5 - 10.1 mg/dL   CBC WITH AUTOMATED DIFF    Collection Time: 01/15/22  8:07 AM   Result Value Ref Range    WBC 9.0 3.6 - 11.0 K/uL    RBC 2.68 (L) 3.80 - 5.20 M/uL    HGB 8.1 (L) 11.5 - 16.0 g/dL    HCT 25.3 (L) 35.0 - 47.0 %    MCV 94.4 80.0 - 99.0 FL    MCH 30.2 26.0 - 34.0 PG    MCHC 32.0 30.0 - 36.5 g/dL    RDW 16.1 (H) 11.5 - 14.5 % PLATELET 51 (L) 118 - 400 K/uL    MPV 13.3 (H) 8.9 - 12.9 FL    NRBC 0.2 (H) 0.0  WBC    ABSOLUTE NRBC 0.02 (H) 0.00 - 0.01 K/uL    NEUTROPHILS PENDING %    LYMPHOCYTES PENDING %    MONOCYTES PENDING %    EOSINOPHILS PENDING %    BASOPHILS PENDING %    IMMATURE GRANULOCYTES PENDING %    ABS. NEUTROPHILS PENDING K/UL    ABS. LYMPHOCYTES PENDING K/UL    ABS. MONOCYTES PENDING K/UL    ABS. EOSINOPHILS PENDING K/UL    ABS. BASOPHILS PENDING K/UL    ABS. IMM. GRANS. PENDING K/UL    DF PENDING               NM ACUTE GI BLEED SCAN   Final Result   Negative GI bleed scan. XR CHEST PORT   Final Result   Mild improvement in the right lower lobe      IR THORACENTESIS NDL PUNC ASP W IMAGE   Final Result   1. Previously described small hepatic metastases are sonographically occult,   therefore targeted liver biopsy could not be performed. 2.  Small left pleural effusion. 3.  Successful left thoracentesis. Fluid samples submitted for cytologic   analysis. IR ULTRASOUND ABDOMEN LTD   Final Result   1. Previously described small hepatic metastases are sonographically occult,   therefore targeted liver biopsy could not be performed. 2.  Small left pleural effusion. 3.  Successful left thoracentesis. Fluid samples submitted for cytologic   analysis. XR CHEST PORT   Final Result      XR CHEST PORT   Final Result   Findings/impression:      Stable positioning of right upper extremity PICC, tip projects over the upper   SVC. Cardiac silhouette is enlarged. No pulmonary edema. Bilateral pleural effusions. No evidence of pneumothorax. No acute osseous abnormality identified. DUPLEX UPPER EXT VENOUS BILAT   Final Result      XR CHEST PORT   Final Result   Large effusions. Vascular congestion. CT ABD PELV W CONT   Final Result   1. There are increasing bilateral pleural effusions, increasing body wall   edema, and increasing ascites.  These findings could be secondary to the third   spacing of fluids. The differential diagnosis for the ascites would include   pancreatic ascites with acute pancreatitis or metastatic disease to the   peritoneum. 2.  There is a biliary stent which is unchanged in its position traversing   throughout area of obstruction within the pancreatic head. 3.  There are multiple low-density lesions of the liver without short-term   interval changes consistent with metastatic disease. 4.  The right kidney is not identified and apparently the patient is status post   a previous right nephrectomy. 5.  There are bilateral adrenal masses suspect for metastatic disease without   short-term interval changes. CTA CHEST W OR W WO CONT   Final Result   No CT evidence for PE. Thoracic aorta atherosclerosis. CAD. Moderate to large bilateral pleural effusions with associated RML, RLL, and LLL   compressive atelectasis. Abdominal ascites. DUPLEX LOWER EXT VENOUS BILAT   Final Result      XR CHEST PORT   Final Result   FINDINGS: IMPRESSION: 2 frontal views of the chest. Right PICC distal tip SVC   region. Enlarging cardiomegaly. Increasing vascular congestion. Interval development of   hypoinflation, pleural effusions, lower lung airspace edema and/or pneumonia. No   pneumothorax. No free air under the diaphragm. CT ABD PELV W CONT   Final Result   New moderate volume dependent pleural effusions with associated   lower lobe lung compressive atelectasis. Increasing ascites, moderate approaching large-volume   (fluid could be sampled if there is any clinical concern for bile content). High suspicion hepatic metastases. Similar appearance for the pancreas; Silastic stent in place. No pseudocyst formation. Unchanged appearance bilateral adrenal glands. No bowel obstruction. US ABD LTD   Final Result   Small amount of free fluid. Finding suggesting hemangioma in the   liver. Gallbladder wall thickening, adenomyomatosis, sludge and gallstones. Cholecystitis possible. Finding in the region of the pancreatic head as above. Other findings as above. CTA ABDOMEN PELV W CONT   Final Result   1. Ill-defined hypodense mass in the pancreas. There are inflammatory changes   and fluid adjacent to the pancreas or duodenum and stomach most likely related   to biopsy or focal pancreatitis. No pseudocyst formation, active bleeding or   other acute findings. 2. Enlarged adrenal glands left greater than right with noncontrast densities   consistent with adrenal adenomas. 3. Right nephrectomy. 4. Other findings as described. XR CHEST PORT   Final Result   No acute findings. IR PARACENTESIS ABD W IMAGE    (Results Pending)       Assessment:     Principal Problem:    Pancreatitis (12/9/2021)    Active Problems:    A-fib ([address] (11/16/2020)      CHF (congestive heart failure) ([facility] [address] (11/16/2020)      Hematemesis (12/12/2021)      History of peptic ulcer disease (12/12/2021)      Pancreatic mass (1/5/2022)        Plan:   1. Pancreatitis - RESOLVED.     -12/30 s/p post pyloric tube placement. Patient removed tube on 12/31.         -Unable to place GJ tube due to  tumor in duodenum and food in the stomach and patient on Eliquis      -Lipase 902. Repeat Lipase in the am       -Pain med as needed.      - S/P paracentesis 12/27/21 with removal of 2300 ml clear serous  Fluid removed      -cytology Slight acute inflammation.       -currently on Regular diet      -patient in agreement for SNF transfer, then outpatient follow up from there.   2. CHF       -Continue Chlorthalidone  3. COPD       -Continue home medications       -Albuterol as needed   4.  Pancreatic Mass      - 22,358      -S/p fine needle aspiration suspicious for neoplasia but not diagnostic      -When stable Oncology plan for out patient followup with advanced oncology surgeon  At  or [facility] for resection considerations       -CT concern for liver metastais/lesions       -IR for liver biopsy on hold at this time        -Poor Prognosis         -Hospice consult  5. Hematemesis (resolved)  6. Afib with RVR      -Eliquis on hold 2/2 to low HgB      -Current heart rate 120      -Continue diltiazem      -Cardiology has been consulted  7. Diarrhea (Improved)      - Imodium as needed  8. Anemia/GI bleed     -Reports of rectal bleeding, dark stools. Bleeding most likely from tumor invasion. If she continues to bleed, consult IR for possible embolization procedure.      -Bleeding scan is negative.      -Hgb 8.7. Monitor and transfuse as needed.   9.-Hypokalemia     Replete as needed     CMP in the am        Thank you for allowing me to participate in this patients care   Plan discussed with Dr. Oksana Ocampo and he approves.     Signed By: Gabbie Jacobsen NP     January 15, 2022

## 2022-01-16 NOTE — PROGRESS NOTES
Problem: Falls - Risk of  Goal: *Absence of Falls  Description: Document Duke Villarreal Fall Risk and appropriate interventions in the flowsheet.   Outcome: Progressing Towards Goal  Note: Fall Risk Interventions:  Mobility Interventions: Bed/chair exit alarm,OT consult for ADLs,Patient to call before getting OOB,PT Consult for mobility concerns    Mentation Interventions: Bed/chair exit alarm    Medication Interventions: Bed/chair exit alarm,Patient to call before getting OOB,Teach patient to arise slowly    Elimination Interventions: Bed/chair exit alarm,Call light in reach    History of Falls Interventions: Bed/chair exit alarm         Problem: Pain  Goal: *Control of Pain  Outcome: Progressing Towards Goal

## 2022-01-16 NOTE — PROGRESS NOTES
Per chart review, patient and CM actively pursuing Hospice. Will hold therapy at this time until patient makes final decision. Will continue to follow as appropriate. Thank you.

## 2022-01-16 NOTE — PROGRESS NOTES
Hospitalist Progress Note    Subjective:   Daily Progress Note: 1/16/2022 2:42 PM    Hospital Course: The patient is a 72-year-old woman with a PMH significant for atrial fibrillation, PUD, CHF, COPD on home O2 at 2 L, renal cell carcinoma status post nephrectomy, hypertension and morbid obesity. Patient was found to have obstructive jaundice and biliary stricture in early November of this year.  She underwent an ERCP with stent placement with brushings of the bile duct where there was noted to be a stricture.  These brushings were negative.  Patient then underwent a PET CT which demonstrated uptake in the head of the pancreas concerning for pancreatic mass. She subsequently underwent endoscopic ultrasound with ultrasound and FNA biopsy on 12/6/2021.  The procedure demonstrated abutment of the tumor with the portal vein without involvement of the SMA or SMV.  The total tumor was measured to be 2.7 cm by 3 cm.  This biopsy resulted as atypical cells with suspicion for malignancy. She admitted to the hospital on 12/9/2021 and severe pancreatitis after undergoing an EUS for pancreatic biopsy on 12/6/2021 and pancreatic mass. Her symptoms were abdominal pain, nausea and vomiting. CT scan revealing peripancreatic inflammation consistent with likely post biopsy pancreatitis and ascites. Her initial labs revealing elevated lipase, supratherapeutic INR and elevated D-dimer coagulopathy. CTA was positive for bilateral pleural effusions, negative for PE. Paracentesis with culture of ascitic fluid, blood cultures, urine cultures negative for growth. Patient had been started on IV meropenem for leukocytosis and anidulafungin for suspected intra-abdominal infection. CT with and without contrast revealing liver lesions consistent with metastasis, mass in the pancreatic head with biliary stent in place, bilateral pleural effusions, ascites and bilateral adrenal masses.   IR declined liver biopsy due to coagulopathy and felt she would not benefit. Doppler studies of upper and lower extremities negative for DVT, positive for superficial thrombophlebitis of left medial antecubital.  Pancreatic cancer is presumed but no firm diagnosis due to lack of tissue sample. Pancreatitis remaining persistent despite antibiotics. Attempted Dobbhoff and PEG J placement but failed due to reddened area presumed possible tumor infiltration in the duodenum. Due to poor performance and prognosis she is not a candidate for palliative XRT or chemo. The patient has been progressively worsening with debilitation, intermittently tolerating diet not stable to be discharged home with outpatient surgical oncology follow-up. She would benefit from transfer to tertiary care center where surgical oncology services are available for evaluation of pancreatic head mass and possible surgical intervention. 1/7 A. fib with RVR rate 110-140. cardiology  Consulted and Anticoagulation discontinued due to bleeding and liver failure. Subjective: Patient says that she is a little more short of breath today. Denies any midsternal chest pain.     Current Facility-Administered Medications   Medication Dose Route Frequency    guaiFENesin ER (MUCINEX) tablet 1,200 mg  1,200 mg Oral Q12H    albuterol-ipratropium (DUO-NEB) 2.5 MG-0.5 MG/3 ML  3 mL Nebulization Q6H PRN    SE-Tan Plus capsule  1 Capsule Oral BID    calcium carbonate-vitamin D3 600 mg-20 mcg (800 unit) chew 2 Tablet  2 Tablet Oral DAILY    lactated Ringers infusion  75 mL/hr IntraVENous CONTINUOUS    0.9% sodium chloride infusion 250 mL  250 mL IntraVENous PRN    0.9% sodium chloride infusion 250 mL  250 mL IntraVENous PRN    polyethylene glycol (MIRALAX) packet 17 g  17 g Oral DAILY PRN    0.9% sodium chloride infusion 250 mL  250 mL IntraVENous PRN    pantoprazole (PROTONIX) 40 mg in 0.9% sodium chloride 10 mL injection  40 mg IntraVENous Q12H    loperamide (IMODIUM) capsule 2 mg  2 mg Oral Q4H PRN    [Held by provider] metoprolol tartrate (LOPRESSOR) tablet 25 mg  25 mg Oral Q12H    0.9% sodium chloride infusion 250 mL  250 mL IntraVENous PRN    cholecalciferol (VITAMIN D3) (5000 Units/125 mcg) tablet 5,000 Units  5,000 Units Oral Q7D    sertraline (ZOLOFT) tablet 25 mg  25 mg Oral QPM    atorvastatin (LIPITOR) tablet 40 mg  40 mg Oral QHS    [Held by provider] furosemide (LASIX) tablet 40 mg  40 mg Oral DAILY    influenza vaccine 2021-22 (6 mos+)(PF) (FLUARIX/FLULAVAL/FLUZONE QUAD) injection 0.5 mL  1 Each IntraMUSCular PRIOR TO DISCHARGE    [Held by provider] apixaban (ELIQUIS) tablet 2.5 mg  2.5 mg Oral BID    midodrine (PROAMATINE) tablet 10 mg  10 mg Oral TID WITH MEALS    sodium chloride (NS) flush 5-40 mL  5-40 mL IntraVENous PRN    zinc oxide-white petrolatum 17-57 % topical paste   Topical TID    cyclobenzaprine (FLEXERIL) tablet 10 mg  10 mg Oral TID PRN    HYDROmorphone (DILAUDID) injection 1 mg  1 mg IntraVENous Q4H PRN    oxyCODONE IR (ROXICODONE) tablet 5 mg  5 mg Oral Q6H PRN    docusate sodium (COLACE) capsule 100 mg  100 mg Oral BID    sorbitoL 70 % solution 30 mL  30 mL Oral DAILY PRN    bisacodyL (DULCOLAX) suppository 10 mg  10 mg Rectal DAILY PRN    hydrALAZINE (APRESOLINE) 20 mg/mL injection 20 mg  20 mg IntraVENous Q6H PRN    [Held by provider] chlorthalidone (HYGROTON) tablet 25 mg  25 mg Oral DAILY    albuterol (PROVENTIL HFA, VENTOLIN HFA, PROAIR HFA) inhaler 2 Puff  2 Puff Inhalation Q6H PRN    ascorbic acid (vitamin C) (VITAMIN C) tablet 500 mg  500 mg Oral DAILY    diazePAM (VALIUM) tablet 5 mg  5 mg Oral Q6H PRN    dilTIAZem ER (CARDIZEM CD) capsule 120 mg  120 mg Oral DAILY    potassium chloride SR (KLOR-CON 10) tablet 20 mEq  20 mEq Oral DAILY    traZODone (DESYREL) tablet 50 mg  50 mg Oral QHS    ondansetron (ZOFRAN) injection 4 mg  4 mg IntraVENous Q6H PRN    acetaminophen (TYLENOL) tablet 650 mg  650 mg Oral Q4H PRN    prochlorperazine (COMPAZINE) with saline injection 10 mg  10 mg IntraVENous Q6H PRN        Review of Systems  Constitutional: No fevers, No chills, No sweats, +++fatigue, +++ Weakness  Eyes: No redness  Ears, nose, mouth, throat, and face: No nasal congestion, No sore throat, No voice change  Respiratory: No Shortness of Breath, No cough, No wheezing  Cardiovascular: No chest pain, No palpitations, No extremity edema  Gastrointestinal: No nausea, No vomiting, No diarrhea, No abdominal pain  Genitourinary: No frequency, No dysuria, No hematuria  Integument/breast: No skin lesion(s)   Neurological: No Confusion, No headaches, No dizziness      Objective:     Visit Vitals  BP (!) 101/56 (BP 1 Location: Left upper arm)   Pulse (!) 101   Temp 98.5 °F (36.9 °C)   Resp 16   Ht 5' 7\" (1.702 m)   Wt 94.1 kg (207 lb 7.3 oz)   SpO2 95%   BMI 32.49 kg/m²    O2 Flow Rate (L/min): 3 l/min O2 Device: Nasal cannula    Temp (24hrs), Av.8 °F (37.1 °C), Min:98.4 °F (36.9 °C), Max:99.2 °F (37.3 °C)      No intake/output data recorded.  1901 -  0700  In: 1045 [I.V.:745]  Out: 200 [Urine:200]    PHYSICAL EXAM:  Constitutional: No acute distress, chronically ill appearing  Skin: Extremities and face reveal no rashes. HEENT: Sclerae anicteric. Extra-occular muscles are intact. Cardiovascular: irregular  Respiratory:  nonlabored  GI: Abdomen nondistended, soft, and nontender. Normal active bowel sounds. Musculoskeletal: No pitting edema of the lower legs. Able to move all ext  Neurological:  Patient is alert and oriented.  Cranial nerves II-XII grossly intact  Psychiatric: flat affect, depressed mood    Data Review    Recent Results (from the past 24 hour(s))   PROTHROMBIN TIME + INR    Collection Time: 22  7:39 AM   Result Value Ref Range    Prothrombin time 25.0 (H) 11.9 - 14.7 sec    INR 2.3 (H) 0.9 - 1.1     METABOLIC PANEL, BASIC    Collection Time: 22  7:39 AM   Result Value Ref Range    Sodium 137 136 - 145 mmol/L Potassium 3.5 3.5 - 5.1 mmol/L    Chloride 100 97 - 108 mmol/L    CO2 31 21 - 32 mmol/L    Anion gap 6 5 - 15 mmol/L    Glucose 85 65 - 100 mg/dL    BUN 28 (H) 6 - 20 mg/dL    Creatinine 1.04 (H) 0.55 - 1.02 mg/dL    BUN/Creatinine ratio 27 (H) 12 - 20      GFR est AA >60 >60 ml/min/1.73m2    GFR est non-AA 52 (L) >60 ml/min/1.73m2    Calcium 7.7 (L) 8.5 - 10.1 mg/dL           Assessment / Plan:       1. Acute pancreatitis  - Status post EUS by Dr. Arelis Gamboa 12/06  - Continue pain medication  - Lipase variable, will likely be chronically elevated   - CT abd/pelvis with PO and IV contrast showing increasing bilateral pleural effusion and increasing ascites. Also notes multiple low-density lesions of liver consistent with metastatic disease and bilateral adrenal masses suspected for metastatic disease.  - completed 14 days of IV merrem   - Continue empiric Anidulafungin 4 more days  - paracentesis on 12/27 with 2300 mL clear serous fluid drained, cytology showing now growth. - She was scheduled for liver biopsy with IR however IR does not feel patient would benefit from biopsy at this time.   -Unsuccessful attempt to place PEG J due to tumor infiltration and duodenum  - Patient is more jaundice this AM. Will get a hepatic panel in the AM      2. CHF  - Continue chlorthalidone 25 mg daily  - Holding PO lasix 40mg daily. - holding BP meds due to hypotension today     3. Pancreatic mass/Liver Masses/Adrenal Masses/History of renal cell carcinoma with lung nodules s/p right nephrectomy  Multiple low-density lesions of the liver consistent with meta static disease and bilateral adrenal masses suspicious for metastatic disease  - CA 19-9 greater than 4000  --Oncology consulted-will need tissue biopsy before definitive recommendations can be made. Unable to offer palliative chemo or radiation due to poor performance.   Prognosis very poor, hospice recommended   -Pathology of thoracentesis negative for malignant cells but it showed acute on chronic inflammatory cells    4. Hypokalemia-stable  - replete as needed  - given Mag sulfate - recheck in the AM   -  Potassium WNL on 1/16/2022     5. COPD-stable  - Pulmonary consultation  - Chronic respiratory failure with 2 L of oxygen via nasal cannula at home  - Increase mucinex to 1200mg BID  - She did become hypoxic. Lungs sound clear. Will consider chest xray if no improvement.     6. Leukocytosis  - Cultures negative  - Completed course of IV meropenem and Andulafungin 14 days  - ID consulted  - repeat UA pending  - Blood cultures on 1/13/2022 positive for E. coli. Will place patient back on IV Zosyn     7. A. fib  -On diltiazem and metoprolol.  - HR elevated. This is before BP meds. Will get a EKG. If still in a fib with RVR will consider diltiazem drip. -2D echo showed normal systolic function and diastolic function with an EF of 60 to 65%  -No anticoagulation due to anemia    8. Acute on Chronic Anemia   -Her hemoglobin has been slowly declining requiring repletion and she is FOBT positive  -Required transfusion hemoglobin pending for today  Total of 6 units PRBCs  -Eliquis has been discontinued due to bleeding and liver failure  Cardiology consulted  - NM bleeding scan negative        9. Elevated d-dimer  - Likely multifactorial   - CTA chest negative for PE  - Duplex US lower extremities negative for DVT  - Duplex US upper extremities showing superficial thrombophlebitis in left median/antecubital veins  -Repeat upper extremity Doppler study negative  -Discontinued Eliquis due to anemia        10. Hypotension  On midodrine       Dispo: Barriers include stabilization of hemoglobin and blood cultures pending. Potentially home with home hospice if patient is agreeable    I am concern that patient is becoming septic again. Started back on IV Zosyn. Per patient she states someone told her that she had 3 weeks to live and another 3 months. Discussed with her that is a time frame.  Her best choice is hospice. She is getting confused about what hospice company is and what they offer. Per biggest issue is having her family clean her, which is is adamant about not wanting. I have placed a re-consult in for hospice to come do an information meeting with her.       DVT Prophylaxis: Discontinued Eliquis, SCDs  GI PPx: Protonix  Code Status: Full       Care Plan discussed with: Patient/Family/RN/Case Management      Total time spent with patient: 36  minutes.

## 2022-01-16 NOTE — PROGRESS NOTES
Progress Note    Patient: Anjel Prieto MRN: [id number]  SSN: [id number]    YOB: 1945  Age: 78 y.o. Sex: female      Admit Date: 12/9/2021    LOS: 38 days     Subjective:   GI in consultation for  Pancreatitis     Patient seen in room, asleep. On nasal cannula 3L. Overall poor prognosis due to clinical diagnosis of pancreatic cancer.  is 22,358. CM is working on Hospice placement. Patient still yet to decide on which hospice agency to choose.   -Labs today: WBC 12.2, Hgb 8.0, platelet consistently dropping, 42 today, INR 2.3, improved potassium - 3.5.    1/13 - Bleeding scan negative  1/12 Thoracentesis - 120ml fluid drained. 12/30 EGD with Dobhoff placement for feeding - removed by patient.   12/27/21 Paracentesis - 2300ml ascites fluid removed. 12/23/21 CT abdomen - pleural effusion, ascites, liver lesions, bilateral adrenal masses, pancreatic head lesions. 12/6/2021 EUS showing 2.7 X3 cm lesion in the area of head of pancreas with dilation of the Pancreatic duct abutting the portal vein but not involving the SMA or AMV ; Tumor T2 by endosonographic criteria ; one small lymph node in the area of head of pancreas.   11/22/2021 PET Scan    1.  FDG avid lesion in the pancreatic head consistent with malignancy. 2.  Subcentimeter focus of FDG uptake in the liver, indeterminate. 3.  FDG uptake associated with density expanding the right facet at C5-C6, possibly due to an ectatic artery.     History of Present Illness: Nevin Cole is a 78 y. o. female who is seen in consultation for pancreatitis. Arsen Aquino has a past medical history of  Paroxysmal Atrial Fibrillation, CHF, COPD, renal cell carcinoma Stage IV s/p nephrectomy, GERD sjoren's syndrome, hypertension, and depression. Patient under went EUS on 12/6/2021 showing 2.7 X3 cm lesion in the area of head of pancreas with dilation of the Pancreatic duct abutting the portal vein but not involving the SMA or AMV ;  Tumor T2 by endosonographic criteria ; one small lymph node in the area of head of pancreas. Pathology shows rare cells present suspicious for malignancy.   She had an ERCP on 11/4 2021 ERCP; with sphincterotomy/papillotomy ERCP; with placement of endoscopic stent into biliary or pancreatic duct, including pre and postdilation and guide wire passage, when performed, including sphincterotomy, when performed, each stent. . Patient had a PET scan on 11/22/21 which shows a lesion in the pancreatic head consistent with malignancy. CTA of abdomen shows ill defined hypodense mass in the pancreas. There are inflammatory changes. Patient states she experienced nausea, vomiting and diarrhea since Monday. Her abdominal pain has progressively gotten worse. She reports a poor appetite. She does complain of increased \"burping\". Total bilirubin 1.4, AsT 36, ALT 31, Alk Phos. 217, Lipase > 3,000. Plan nothing by mouth except ice chips and sips of water with medication. IV hydration. Pain management.      Objective:     Vitals:    01/15/22 1936 01/16/22 0317 01/16/22 0701 01/16/22 0929   BP: 123/63 (!) 97/59 (!) 101/56    Pulse: (!) 119 (!) 107 (!) 101    Resp: 18 18 16    Temp: 98.9 °F (37.2 °C) 99.2 °F (37.3 °C) 98.5 °F (36.9 °C)    SpO2: 97% 97% 95% 95%   Weight:       Height:            Intake and Output:  Current Shift: No intake/output data recorded. Last three shifts: 01/14 1901 - 01/16 0700  In: 1045 [I.V.:745]  Out: 200 [Urine:200]    Physical Exam:   Skin:  Extremities and face reveal no rashes. No chapin erythema. Jaundice, pale. HEENT: Sclerae icteric. Extra-occular muscles are intact. No abnormal pigmentation of the lips. The neck is supple. Cardiovascular: Regular rate and rhythm. Respiratory:  Comfortable breathing with no accessory muscle use. GI:  Abdomen nondistended, soft, and nontender. No enlargement of the liver or spleen. No masses palpable.   Rectal:  Deferred  Musculoskeletal: Extremities have good range of motion. Swollen upper extremities  Neurological:  Gross memory appears intact. Patient is alert and oriented. Psychiatric:  Mood appears appropriate with judgement intact. Lymphatic:  No visible adenopathy      Lab/Data Review:  Recent Results (from the past 24 hour(s))   PROTHROMBIN TIME + INR    Collection Time: 01/16/22  7:39 AM   Result Value Ref Range    Prothrombin time 25.0 (H) 11.9 - 14.7 sec    INR 2.3 (H) 0.9 - 1.1     METABOLIC PANEL, BASIC    Collection Time: 01/16/22  7:39 AM   Result Value Ref Range    Sodium 137 136 - 145 mmol/L    Potassium 3.5 3.5 - 5.1 mmol/L    Chloride 100 97 - 108 mmol/L    CO2 31 21 - 32 mmol/L    Anion gap 6 5 - 15 mmol/L    Glucose 85 65 - 100 mg/dL    BUN 28 (H) 6 - 20 mg/dL    Creatinine 1.04 (H) 0.55 - 1.02 mg/dL    BUN/Creatinine ratio 27 (H) 12 - 20      GFR est AA >60 >60 ml/min/1.73m2    GFR est non-AA 52 (L) >60 ml/min/1.73m2    Calcium 7.7 (L) 8.5 - 10.1 mg/dL   CBC WITH AUTOMATED DIFF    Collection Time: 01/16/22  7:39 AM   Result Value Ref Range    WBC 12.2 (H) 3.6 - 11.0 K/uL    RBC 2.67 (L) 3.80 - 5.20 M/uL    HGB 8.0 (L) 11.5 - 16.0 g/dL    HCT 25.0 (L) 35.0 - 47.0 %    MCV 93.6 80.0 - 99.0 FL    MCH 30.0 26.0 - 34.0 PG    MCHC 32.0 30.0 - 36.5 g/dL    RDW 16.4 (H) 11.5 - 14.5 %    PLATELET 42 (LL) 876 - 400 K/uL    MPV 14.0 (H) 8.9 - 12.9 FL    NRBC 0.0 0.0  WBC    ABSOLUTE NRBC 0.00 0.00 - 0.01 K/uL    NEUTROPHILS 93 (H) 32 - 75 %    LYMPHOCYTES 4 (L) 12 - 49 %    MONOCYTES 2 (L) 5 - 13 %    EOSINOPHILS 0 0 - 7 %    BASOPHILS 0 0 - 1 %    IMMATURE GRANULOCYTES 1 (H) 0 - 0.5 %    ABS. NEUTROPHILS 11.4 (H) 1.8 - 8.0 K/UL    ABS. LYMPHOCYTES 0.5 (L) 0.8 - 3.5 K/UL    ABS. MONOCYTES 0.3 0.0 - 1.0 K/UL    ABS. EOSINOPHILS 0.0 0.0 - 0.4 K/UL    ABS. BASOPHILS 0.0 0.0 - 0.1 K/UL    ABS. IMM. GRANS. 0.1 (H) 0.00 - 0.04 K/UL    DF AUTOMATED                NM ACUTE GI BLEED SCAN   Final Result   Negative GI bleed scan.       XR CHEST PORT   Final Result   Mild improvement in the right lower lobe      IR THORACENTESIS NDL PUNC ASP W IMAGE   Final Result   1. Previously described small hepatic metastases are sonographically occult,   therefore targeted liver biopsy could not be performed. 2.  Small left pleural effusion. 3.  Successful left thoracentesis. Fluid samples submitted for cytologic   analysis. IR ULTRASOUND ABDOMEN LTD   Final Result   1. Previously described small hepatic metastases are sonographically occult,   therefore targeted liver biopsy could not be performed. 2.  Small left pleural effusion. 3.  Successful left thoracentesis. Fluid samples submitted for cytologic   analysis. XR CHEST PORT   Final Result      XR CHEST PORT   Final Result   Findings/impression:      Stable positioning of right upper extremity PICC, tip projects over the upper   SVC. Cardiac silhouette is enlarged. No pulmonary edema. Bilateral pleural effusions. No evidence of pneumothorax. No acute osseous abnormality identified. DUPLEX UPPER EXT VENOUS BILAT   Final Result      XR CHEST PORT   Final Result   Large effusions. Vascular congestion. CT ABD PELV W CONT   Final Result   1. There are increasing bilateral pleural effusions, increasing body wall   edema, and increasing ascites. These findings could be secondary to the third   spacing of fluids. The differential diagnosis for the ascites would include   pancreatic ascites with acute pancreatitis or metastatic disease to the   peritoneum. 2.  There is a biliary stent which is unchanged in its position traversing   throughout area of obstruction within the pancreatic head. 3.  There are multiple low-density lesions of the liver without short-term   interval changes consistent with metastatic disease. 4.  The right kidney is not identified and apparently the patient is status post   a previous right nephrectomy.    5.  There are bilateral adrenal masses suspect for metastatic disease without   short-term interval changes. CTA CHEST W OR W WO CONT   Final Result   No CT evidence for PE. Thoracic aorta atherosclerosis. CAD. Moderate to large bilateral pleural effusions with associated RML, RLL, and LLL   compressive atelectasis. Abdominal ascites. DUPLEX LOWER EXT VENOUS BILAT   Final Result      XR CHEST PORT   Final Result   FINDINGS: IMPRESSION: 2 frontal views of the chest. Right PICC distal tip SVC   region. Enlarging cardiomegaly. Increasing vascular congestion. Interval development of   hypoinflation, pleural effusions, lower lung airspace edema and/or pneumonia. No   pneumothorax. No free air under the diaphragm. CT ABD PELV W CONT   Final Result   New moderate volume dependent pleural effusions with associated   lower lobe lung compressive atelectasis. Increasing ascites, moderate approaching large-volume   (fluid could be sampled if there is any clinical concern for bile content). High suspicion hepatic metastases. Similar appearance for the pancreas; Silastic stent in place. No pseudocyst formation. Unchanged appearance bilateral adrenal glands. No bowel obstruction. US ABD LTD   Final Result   Small amount of free fluid. Finding suggesting hemangioma in the   liver. Gallbladder wall thickening, adenomyomatosis, sludge and gallstones. Cholecystitis possible. Finding in the region of the pancreatic head as above. Other findings as above. CTA ABDOMEN PELV W CONT   Final Result   1. Ill-defined hypodense mass in the pancreas. There are inflammatory changes   and fluid adjacent to the pancreas or duodenum and stomach most likely related   to biopsy or focal pancreatitis. No pseudocyst formation, active bleeding or   other acute findings. 2. Enlarged adrenal glands left greater than right with noncontrast densities   consistent with adrenal adenomas. 3. Right nephrectomy. 4. Other findings as described. XR CHEST PORT   Final Result   No acute findings. IR PARACENTESIS ABD W IMAGE    (Results Pending)       Assessment:     Principal Problem:    Pancreatitis (12/9/2021)    Active Problems:    A-fib ([address] (11/16/2020)      CHF (congestive heart failure) ([facility] [address] (11/16/2020)      Hematemesis (12/12/2021)      History of peptic ulcer disease (12/12/2021)      Pancreatic mass (1/5/2022)        Plan:   1. Pancreatitis - RESOLVED.     -12/30 s/p post pyloric tube placement. Patient removed tube on 12/31.         -Unable to place GJ tube due to  tumor in duodenum and food in the stomach and patient on Eliquis      -Lipase 902. Repeat Lipase in the am       -Pain med as needed.      - S/P paracentesis 12/27/21 with removal of 2300 ml clear serous  Fluid removed      -cytology Slight acute inflammation.       -currently on Regular diet      -patient in agreement for SNF transfer, then outpatient follow up from there.   2. CHF       -lasix on hold, low BP  3. COPD       -Continue home medications       -Albuterol as needed   4. Pancreatic Mass      - 22,358      -S/p fine needle aspiration suspicious for neoplasia but not diagnostic      -CT concern for liver metastais/lesions      -IR unable to do liver biopsy at this time      -Poor Prognosis      -CM working on Hospice. Patient is in agreement. She has not decided on which hospice agency. 5. Hematemesis (resolved)  6. Afib with RVR      -Eliquis on hold 2/2 to low Hgb      -Continue diltiazem      -Cardiology has been consulted  7. Diarrhea (Improved)      - Imodium as needed  8. Anemia/GI bleed     -Reports of rectal bleeding, dark stools. Bleeding most likely from tumor invasion. If she continues to bleed, consult IR for possible embolization procedure.      -Bleeding scan is negative.      -Hgb 8.0.  Monitor and transfuse as needed.   9.-Hypokalemia     Replete as needed     CMP in the am    Patient discussed with  Spann and agrees to above impression and plan. Thank you for allowing me to participate in this patients care    Signed By: Fareed Whitten.  CONRADO Hillman     January 16, 2022

## 2022-01-16 NOTE — PROGRESS NOTES
Chart reviewed. [facility] has accepted pending pt/family affirmation. [facility] will contact pt for assessment and agreement for care.   Updates sent

## 2022-01-17 NOTE — PROGRESS NOTES
CM  Placed a call to [facility] to inquire about personal care benefits. Writer spoke with Carson Latham at [facility]. Patient does not have PCA benefits. CM met with patient at bedside. Patient stated she does not wish to have her son and grandson help with bathing her and she has noone else to assist. . Writer also reached out to [facility] to see if they have any resources.     Gurpreet Forman

## 2022-01-17 NOTE — PROGRESS NOTES
2196  CM reviewed the clinical record and reached out to [facility] via [employer] to see if Torrie Peña has been able to contact patient. Awaiting a response. 1045  Call placed to [facility], spoke with Corrine Pandey who stated Alexus Duran, the , is on his way to speak with patient. Clarke Carlos with Fabio Dixon, with [facility] who stated patient is good to go home with hospice, however they do not provide 24hr personal care and patient does not allow her son to clean her. 1430  CM met with patient at the bedside who stated she has not spoken to here son re: hospice and her provider has told her she may be septic and her O2 sats are decreasing and that she needs to rest. Dylon [place], patient's provider informed. [phone]  email sent to the business for Medicaid screening of a PCA.     Leyla Nieto

## 2022-01-17 NOTE — PROGRESS NOTES
Progress Note    Patient: Mukesh Byrne MRN: [id number]  SSN: [id number]    YOB: 1945  Age: 78 y.o. Sex: female      Admit Date: 12/9/2021    LOS: 39 days     Subjective:   Patient followed for severe pancreatitis on Meropenem because of worsening leukocytosis and increasing procal and CRP. Blood cultures yesterday grew E. Coli. Patient is now on Zosyn. Lipase has decreased. Suspected pancreatic cancer but no firm diagnosis yet. No decision on hospice yet. Patient complaining of burning pain in her mouth and throat. Objective:     Vitals:    01/17/22 0624 01/17/22 0721 01/17/22 1046 01/17/22 1252   BP: (!) 101/57 (!) 87/51 117/64    Pulse:   (!) 120    Resp:  20 18    Temp:  98.6 °F (37 °C) 97.9 °F (36.6 °C)    SpO2:  92% 96% 95%   Weight:       Height:            Intake and Output:  Current Shift: No intake/output data recorded. Last three shifts: No intake/output data recorded. Physical Exam:     Vitals and nursing note reviewed. Constitutional:       General: She is not in acute distress. Appearance: She is obese. She is ill-appearing. HENT: white lacy exudate on buccal mucosa   Abdominal:  Nontender  Genitourinary:  Vaginal area not examined     Comments: External urinary device  Musculoskeletal:      Right lower leg: No edema. Left lower leg: No edema. Comments: PICC line right upper arm   Skin: multiple ecchymoses on the upper extremities, edema left forearm     Findings: No rash.       Comments: ?Stage 2 sacral wound   Neurological: asleep    Lab/Data Review:     WBC 10,300  Urinalysis with WBC 10-20, Bacteria negative  Lipase 703 <1,441 <2,244 <2,350 <2,144 99 <2,511 0 <2,484 <2,684 < 1,397 <1,719    Procal  15.42 < 0.91 <0.59 <3.87 <0.34 <0.35 <0.34 < 0.32 <0.21 <0.19 <0.25 < 0.14  CRP 21.30 <23.20 <12.40 <10.00 <4.83 <5.07 <7.38 <18.30 <14.20 <12.70 <15.00 < 23.80    Serum fungitell <31 Negative    Ascitic fluid   with 41% PMNs    Blood cultures (12/20) No growth FINAL  Blood cultures (1/13) E. Coli  Urine culture (12/20) No growth FINAL  Ascitic fluid culture(12/27) No growth FINAL    Assessment:     Principal Problem:    Pancreatitis (12/9/2021)    Active Problems:    A-fib ([facility] [address] (11/16/2020)      CHF (congestive heart failure) ([facility] [address] (11/16/2020)      Hematemesis (12/12/2021)      History of peptic ulcer disease (12/12/2021)      Pancreatic mass (1/5/2022)    1. Severe pancreatitis with markedly elevated lipase, resolving  2. Pancreatic mass, possible neoplasm  3. Biliary stent  4. Sepsis with worsening leukocytosis with elevated procal and CRP, resolving, status post 14 days of Meropenem, Anidulafungin (empiric), CRP increasing   5. TPN (just started)  6. Moderate pyuria, negative bacteria, urine culture negative  7. Possible candida superinfection with vaginitis, treated  8. Ascites, status post paracentesis  9. Gram negative bacteremia with E. Coli, source unclear, Day #2 IV Zosyn. 10. Oral candidiasis    Comment:    Patient has E. Coli bacteremia of unclear source. Zosyn is reasonable since a decision on hospice has not been made. CRP and procal now decreasing. Plan:   1. Continue IV Zosyn pending susceptibility results  2. Start IV Fluconazole  3. In am, repeat CRP and procal  4.  Waiting possible decision on hospice     Signed By: Yared Lema MD     January 17, 2022

## 2022-01-17 NOTE — PROGRESS NOTES
Hospitalist Progress Note    Subjective:   Daily Progress Note: 1/17/2022 6:36 PM    Patient is agreeable to going home with home hospice. Patient saying that she has increased difficulty breathing and uses a nebulizer at home.     Current Facility-Administered Medications   Medication Dose Route Frequency    [START ON 1/18/2022] calcium-vitamin D 600 mg-5 mcg (200 unit) per tablet 2 Tablet  2 Tablet Oral DAILY    fluconazole (DIFLUCAN) 400mg/200 mL IVPB (premix)  400 mg IntraVENous ONCE    [START ON 1/18/2022] fluconazole (DIFLUCAN) 200mg/100 mL IVPB (premix)  200 mg IntraVENous Q24H    piperacillin-tazobactam (ZOSYN) 3.375 g in 0.9% sodium chloride (MBP/ADV) 100 mL MBP  3.375 g IntraVENous Q8H    guaiFENesin ER (MUCINEX) tablet 1,200 mg  1,200 mg Oral Q12H    albuterol-ipratropium (DUO-NEB) 2.5 MG-0.5 MG/3 ML  3 mL Nebulization Q6H PRN    SE-Tan Plus capsule  1 Capsule Oral BID    lactated Ringers infusion  75 mL/hr IntraVENous CONTINUOUS    0.9% sodium chloride infusion 250 mL  250 mL IntraVENous PRN    0.9% sodium chloride infusion 250 mL  250 mL IntraVENous PRN    polyethylene glycol (MIRALAX) packet 17 g  17 g Oral DAILY PRN    0.9% sodium chloride infusion 250 mL  250 mL IntraVENous PRN    pantoprazole (PROTONIX) 40 mg in 0.9% sodium chloride 10 mL injection  40 mg IntraVENous Q12H    loperamide (IMODIUM) capsule 2 mg  2 mg Oral Q4H PRN    [Held by provider] metoprolol tartrate (LOPRESSOR) tablet 25 mg  25 mg Oral Q12H    0.9% sodium chloride infusion 250 mL  250 mL IntraVENous PRN    cholecalciferol (VITAMIN D3) (5000 Units/125 mcg) tablet 5,000 Units  5,000 Units Oral Q7D    sertraline (ZOLOFT) tablet 25 mg  25 mg Oral QPM    atorvastatin (LIPITOR) tablet 40 mg  40 mg Oral QHS    [Held by provider] furosemide (LASIX) tablet 40 mg  40 mg Oral DAILY    influenza vaccine 2021-22 (6 mos+)(PF) (FLUARIX/FLULAVAL/FLUZONE QUAD) injection 0.5 mL  1 Each IntraMUSCular PRIOR TO DISCHARGE    [Held by provider] apixaban (ELIQUIS) tablet 2.5 mg  2.5 mg Oral BID    midodrine (PROAMATINE) tablet 10 mg  10 mg Oral TID WITH MEALS    sodium chloride (NS) flush 5-40 mL  5-40 mL IntraVENous PRN    zinc oxide-white petrolatum 17-57 % topical paste   Topical TID    cyclobenzaprine (FLEXERIL) tablet 10 mg  10 mg Oral TID PRN    HYDROmorphone (DILAUDID) injection 1 mg  1 mg IntraVENous Q4H PRN    oxyCODONE IR (ROXICODONE) tablet 5 mg  5 mg Oral Q6H PRN    docusate sodium (COLACE) capsule 100 mg  100 mg Oral BID    sorbitoL 70 % solution 30 mL  30 mL Oral DAILY PRN    bisacodyL (DULCOLAX) suppository 10 mg  10 mg Rectal DAILY PRN    hydrALAZINE (APRESOLINE) 20 mg/mL injection 20 mg  20 mg IntraVENous Q6H PRN    [Held by provider] chlorthalidone (HYGROTON) tablet 25 mg  25 mg Oral DAILY    albuterol (PROVENTIL HFA, VENTOLIN HFA, PROAIR HFA) inhaler 2 Puff  2 Puff Inhalation Q6H PRN    ascorbic acid (vitamin C) (VITAMIN C) tablet 500 mg  500 mg Oral DAILY    diazePAM (VALIUM) tablet 5 mg  5 mg Oral Q6H PRN    dilTIAZem ER (CARDIZEM CD) capsule 120 mg  120 mg Oral DAILY    potassium chloride SR (KLOR-CON 10) tablet 20 mEq  20 mEq Oral DAILY    traZODone (DESYREL) tablet 50 mg  50 mg Oral QHS    ondansetron (ZOFRAN) injection 4 mg  4 mg IntraVENous Q6H PRN    acetaminophen (TYLENOL) tablet 650 mg  650 mg Oral Q4H PRN    prochlorperazine (COMPAZINE) with saline injection 10 mg  10 mg IntraVENous Q6H PRN        Review of Systems  Review of Systems   Constitutional: Negative. Respiratory: Positive for cough and shortness of breath. Cardiovascular: Negative. Gastrointestinal: Positive for abdominal pain. Negative for nausea and vomiting. Skin: Positive for rash. All other systems reviewed and are negative.            Objective:     Visit Vitals  BP (!) 123/58 (BP 1 Location: Left lower arm)   Pulse (!) 105   Temp 97.5 °F (36.4 °C)   Resp 18   Ht 5' 7\" (1.702 m)   Wt 94.1 kg (207 lb 7.3 oz) SpO2 96%   BMI 32.49 kg/m²    O2 Flow Rate (L/min): 3 l/min O2 Device: Nasal cannula (pt has home o2)    Temp (24hrs), Av.2 °F (36.8 °C), Min:97.5 °F (36.4 °C), Max:98.6 °F (37 °C)       0701 -  1900  In: -   Out: 900 [Urine:900]  No intake/output data recorded. Recent Results (from the past 24 hour(s))   PROTHROMBIN TIME + INR    Collection Time: 22  2:46 AM   Result Value Ref Range    Prothrombin time 25.4 (H) 11.9 - 14.7 sec    INR 2.4 (H) 0.9 - 1.1     PROCALCITONIN    Collection Time: 22  2:46 AM   Result Value Ref Range    Procalcitonin 3.82 (H) 0 ng/mL   METABOLIC PANEL, BASIC    Collection Time: 22  2:46 AM   Result Value Ref Range    Sodium 136 136 - 145 mmol/L    Potassium 3.2 (L) 3.5 - 5.1 mmol/L    Chloride 102 97 - 108 mmol/L    CO2 29 21 - 32 mmol/L    Anion gap 5 5 - 15 mmol/L    Glucose 100 65 - 100 mg/dL    BUN 31 (H) 6 - 20 mg/dL    Creatinine 1.09 (H) 0.55 - 1.02 mg/dL    BUN/Creatinine ratio 28 (H) 12 - 20      GFR est AA 59 (L) >60 ml/min/1.73m2    GFR est non-AA 49 (L) >60 ml/min/1.73m2    Calcium 7.8 (L) 8.5 - 10.1 mg/dL   CBC WITH AUTOMATED DIFF    Collection Time: 22  2:46 AM   Result Value Ref Range    WBC 10.3 3.6 - 11.0 K/uL    RBC 2.74 (L) 3.80 - 5.20 M/uL    HGB 8.2 (L) 11.5 - 16.0 g/dL    HCT 25.4 (L) 35.0 - 47.0 %    MCV 92.7 80.0 - 99.0 FL    MCH 29.9 26.0 - 34.0 PG    MCHC 32.3 30.0 - 36.5 g/dL    RDW 16.3 (H) 11.5 - 14.5 %    PLATELET 32 (LL) 890 - 400 K/uL    MPV 13.5 (H) 8.9 - 12.9 FL    NRBC 0.0 0.0  WBC    ABSOLUTE NRBC 0.00 0.00 - 0.01 K/uL    NEUTROPHILS 89 (H) 32 - 75 %    LYMPHOCYTES 4 (L) 12 - 49 %    MONOCYTES 4 (L) 5 - 13 %    EOSINOPHILS 0 0 - 7 %    BASOPHILS 0 0 - 1 %    IMMATURE GRANULOCYTES 3 (H) 0 - 0.5 %    ABS. NEUTROPHILS 9.2 (H) 1.8 - 8.0 K/UL    ABS. LYMPHOCYTES 0.4 (L) 0.8 - 3.5 K/UL    ABS. MONOCYTES 0.4 0.0 - 1.0 K/UL    ABS. EOSINOPHILS 0.0 0.0 - 0.4 K/UL    ABS. BASOPHILS 0.0 0.0 - 0.1 K/UL    ABS. IMM. GRANS. 0.3 (H) 0.00 - 0.04 K/UL    DF AUTOMATED     MAGNESIUM    Collection Time: 01/17/22  2:46 AM   Result Value Ref Range    Magnesium 2.0 1.6 - 2.4 mg/dL   C REACTIVE PROTEIN, QT    Collection Time: 01/17/22  2:46 AM   Result Value Ref Range    C-Reactive protein 14.50 (H) 0.00 - 0.60 mg/dL   HEPATIC FUNCTION PANEL    Collection Time: 01/17/22  2:46 AM   Result Value Ref Range    Protein, total 4.0 (L) 6.4 - 8.2 g/dL    Albumin 0.9 (L) 3.5 - 5.0 g/dL    Globulin 3.1 2.0 - 4.0 g/dL    A-G Ratio 0.3 (L) 1.1 - 2.2      Bilirubin, total 8.0 (H) 0.2 - 1.0 mg/dL    Bilirubin, direct 6.9 (H) 0.0 - 0.2 mg/dL    Alk. phosphatase 586 (H) 45 - 117 U/L    AST (SGOT) 69 (H) 15 - 37 U/L    ALT (SGPT) 73 12 - 78 U/L   LIPASE    Collection Time: 01/17/22  1:58 PM   Result Value Ref Range    Lipase 703 (H) 73 - 393 U/L        XR CHEST PORT   Final Result   Findings/impression:      Left perihilar and right lower lobe patchy airspace disease present, similar to   prior study. Small bilateral effusions are present. Right PICC line projects   over SVC. Slightly enlarged cardiomediastinal silhouette noted similar to prior   study. No pneumothorax. No overt edema. No significant interval change. NM ACUTE GI BLEED SCAN   Final Result   Negative GI bleed scan. XR CHEST PORT   Final Result   Mild improvement in the right lower lobe      IR THORACENTESIS NDL PUNC ASP W IMAGE   Final Result   1. Previously described small hepatic metastases are sonographically occult,   therefore targeted liver biopsy could not be performed. 2.  Small left pleural effusion. 3.  Successful left thoracentesis. Fluid samples submitted for cytologic   analysis. IR ULTRASOUND ABDOMEN LTD   Final Result   1. Previously described small hepatic metastases are sonographically occult,   therefore targeted liver biopsy could not be performed. 2.  Small left pleural effusion. 3.  Successful left thoracentesis.  Fluid samples submitted for cytologic   analysis. XR CHEST PORT   Final Result      XR CHEST PORT   Final Result   Findings/impression:      Stable positioning of right upper extremity PICC, tip projects over the upper   SVC. Cardiac silhouette is enlarged. No pulmonary edema. Bilateral pleural effusions. No evidence of pneumothorax. No acute osseous abnormality identified. DUPLEX UPPER EXT VENOUS BILAT   Final Result      XR CHEST PORT   Final Result   Large effusions. Vascular congestion. CT ABD PELV W CONT   Final Result   1. There are increasing bilateral pleural effusions, increasing body wall   edema, and increasing ascites. These findings could be secondary to the third   spacing of fluids. The differential diagnosis for the ascites would include   pancreatic ascites with acute pancreatitis or metastatic disease to the   peritoneum. 2.  There is a biliary stent which is unchanged in its position traversing   throughout area of obstruction within the pancreatic head. 3.  There are multiple low-density lesions of the liver without short-term   interval changes consistent with metastatic disease. 4.  The right kidney is not identified and apparently the patient is status post   a previous right nephrectomy. 5.  There are bilateral adrenal masses suspect for metastatic disease without   short-term interval changes. CTA CHEST W OR W WO CONT   Final Result   No CT evidence for PE. Thoracic aorta atherosclerosis. CAD. Moderate to large bilateral pleural effusions with associated RML, RLL, and LLL   compressive atelectasis. Abdominal ascites. DUPLEX LOWER EXT VENOUS BILAT   Final Result      XR CHEST PORT   Final Result   FINDINGS: IMPRESSION: 2 frontal views of the chest. Right PICC distal tip SVC   region. Enlarging cardiomegaly. Increasing vascular congestion.  Interval development of   hypoinflation, pleural effusions, lower lung airspace edema and/or pneumonia. No   pneumothorax. No free air under the diaphragm. CT ABD PELV W CONT   Final Result   New moderate volume dependent pleural effusions with associated   lower lobe lung compressive atelectasis. Increasing ascites, moderate approaching large-volume   (fluid could be sampled if there is any clinical concern for bile content). High suspicion hepatic metastases. Similar appearance for the pancreas; Silastic stent in place. No pseudocyst formation. Unchanged appearance bilateral adrenal glands. No bowel obstruction. US ABD LTD   Final Result   Small amount of free fluid. Finding suggesting hemangioma in the   liver. Gallbladder wall thickening, adenomyomatosis, sludge and gallstones. Cholecystitis possible. Finding in the region of the pancreatic head as above. Other findings as above. CTA ABDOMEN PELV W CONT   Final Result   1. Ill-defined hypodense mass in the pancreas. There are inflammatory changes   and fluid adjacent to the pancreas or duodenum and stomach most likely related   to biopsy or focal pancreatitis. No pseudocyst formation, active bleeding or   other acute findings. 2. Enlarged adrenal glands left greater than right with noncontrast densities   consistent with adrenal adenomas. 3. Right nephrectomy. 4. Other findings as described. XR CHEST PORT   Final Result   No acute findings. IR PARACENTESIS ABD W IMAGE    (Results Pending)        PHYSICAL EXAM:    Physical Exam  Vitals and nursing note reviewed. Constitutional:       Appearance: She is obese. She is ill-appearing. Eyes:      General: Scleral icterus present. Cardiovascular:      Rate and Rhythm: Normal rate and regular rhythm. Pulmonary:      Effort: No respiratory distress. Breath sounds: No wheezing. Abdominal:      General: Bowel sounds are normal. There is no distension. Tenderness: There is no abdominal tenderness. Musculoskeletal:      Right lower leg: Edema present. Left lower leg: Edema present. Skin:     Capillary Refill: Capillary refill takes less than 2 seconds. Coloration: Skin is jaundiced. Neurological:      Mental Status: She is alert and oriented to person, place, and time. Psychiatric:      Comments: Flat affect          Data Review    Recent Results (from the past 24 hour(s))   PROTHROMBIN TIME + INR    Collection Time: 01/17/22  2:46 AM   Result Value Ref Range    Prothrombin time 25.4 (H) 11.9 - 14.7 sec    INR 2.4 (H) 0.9 - 1.1     PROCALCITONIN    Collection Time: 01/17/22  2:46 AM   Result Value Ref Range    Procalcitonin 3.82 (H) 0 ng/mL   METABOLIC PANEL, BASIC    Collection Time: 01/17/22  2:46 AM   Result Value Ref Range    Sodium 136 136 - 145 mmol/L    Potassium 3.2 (L) 3.5 - 5.1 mmol/L    Chloride 102 97 - 108 mmol/L    CO2 29 21 - 32 mmol/L    Anion gap 5 5 - 15 mmol/L    Glucose 100 65 - 100 mg/dL    BUN 31 (H) 6 - 20 mg/dL    Creatinine 1.09 (H) 0.55 - 1.02 mg/dL    BUN/Creatinine ratio 28 (H) 12 - 20      GFR est AA 59 (L) >60 ml/min/1.73m2    GFR est non-AA 49 (L) >60 ml/min/1.73m2    Calcium 7.8 (L) 8.5 - 10.1 mg/dL   CBC WITH AUTOMATED DIFF    Collection Time: 01/17/22  2:46 AM   Result Value Ref Range    WBC 10.3 3.6 - 11.0 K/uL    RBC 2.74 (L) 3.80 - 5.20 M/uL    HGB 8.2 (L) 11.5 - 16.0 g/dL    HCT 25.4 (L) 35.0 - 47.0 %    MCV 92.7 80.0 - 99.0 FL    MCH 29.9 26.0 - 34.0 PG    MCHC 32.3 30.0 - 36.5 g/dL    RDW 16.3 (H) 11.5 - 14.5 %    PLATELET 32 (LL) 325 - 400 K/uL    MPV 13.5 (H) 8.9 - 12.9 FL    NRBC 0.0 0.0  WBC    ABSOLUTE NRBC 0.00 0.00 - 0.01 K/uL    NEUTROPHILS 89 (H) 32 - 75 %    LYMPHOCYTES 4 (L) 12 - 49 %    MONOCYTES 4 (L) 5 - 13 %    EOSINOPHILS 0 0 - 7 %    BASOPHILS 0 0 - 1 %    IMMATURE GRANULOCYTES 3 (H) 0 - 0.5 %    ABS. NEUTROPHILS 9.2 (H) 1.8 - 8.0 K/UL    ABS. LYMPHOCYTES 0.4 (L) 0.8 - 3.5 K/UL    ABS. MONOCYTES 0.4 0.0 - 1.0 K/UL    ABS. EOSINOPHILS 0.0 0.0 - 0.4 K/UL    ABS. BASOPHILS 0.0 0.0 - 0.1 K/UL    ABS. IMM. GRANS. 0.3 (H) 0.00 - 0.04 K/UL    DF AUTOMATED     MAGNESIUM    Collection Time: 01/17/22  2:46 AM   Result Value Ref Range    Magnesium 2.0 1.6 - 2.4 mg/dL   C REACTIVE PROTEIN, QT    Collection Time: 01/17/22  2:46 AM   Result Value Ref Range    C-Reactive protein 14.50 (H) 0.00 - 0.60 mg/dL   HEPATIC FUNCTION PANEL    Collection Time: 01/17/22  2:46 AM   Result Value Ref Range    Protein, total 4.0 (L) 6.4 - 8.2 g/dL    Albumin 0.9 (L) 3.5 - 5.0 g/dL    Globulin 3.1 2.0 - 4.0 g/dL    A-G Ratio 0.3 (L) 1.1 - 2.2      Bilirubin, total 8.0 (H) 0.2 - 1.0 mg/dL    Bilirubin, direct 6.9 (H) 0.0 - 0.2 mg/dL    Alk. phosphatase 586 (H) 45 - 117 U/L    AST (SGOT) 69 (H) 15 - 37 U/L    ALT (SGPT) 73 12 - 78 U/L   LIPASE    Collection Time: 01/17/22  1:58 PM   Result Value Ref Range    Lipase 703 (H) 73 - 393 U/L        Assessment/Plan:     Principal Problem:    Pancreatitis (12/9/2021)    Active Problems:    A-fib ([address] (11/16/2020)      CHF (congestive heart failure) ([facility] [address] (11/16/2020)      Hematemesis (12/12/2021)      History of peptic ulcer disease (12/12/2021)      Pancreatic mass (1/5/2022)        Hospital Course:    Maryanne Salamanca is a 68-year-old woman with a PMH significant for atrial fibrillation, PUD, CHF, COPD on home O2 at 2 L, renal cell carcinoma status post nephrectomy, hypertension and morbid obesity. Patient was found to have obstructive jaundice and biliary stricture in early November of this year.  She underwent an ERCP with stent placement with brushings of the bile duct where there was noted to be a stricture.  These brushings were negative.  Patient then underwent a PET CT which demonstrated uptake in the head of the pancreas concerning for pancreatic mass.  She subsequently underwent endoscopic ultrasound with ultrasound and FNA biopsy on 12/6/2021.  The procedure demonstrated abutment of the tumor with the portal vein without involvement of the SMA or SMV.  The total tumor was measured to be 2.7 cm by 3 cm.  This biopsy resulted as atypical cells with suspicion for malignancy. She admitted to the hospital on 12/9/2021 and severe pancreatitis after undergoing an EUS for pancreatic biopsy on 12/6/2021 and pancreatic mass.  Her symptoms were abdominal pain, nausea and vomiting.  CT scan revealing peripancreatic inflammation consistent with likely post biopsy pancreatitis and ascites.  Her initial labs revealing elevated lipase, supratherapeutic INR and elevated D-dimer coagulopathy. Hayde Loge was positive for bilateral pleural effusions, negative for PE.  Paracentesis with culture of ascitic fluid, blood cultures, urine cultures negative for growth.  Patient had been started on IV meropenem for leukocytosis and anidulafungin for suspected intra-abdominal infection.  CT with and without contrast revealing liver lesions consistent with metastasis, mass in the pancreatic head with biliary stent in place, bilateral pleural effusions, ascites and bilateral adrenal masses. Armin Andino declined liver biopsy due to coagulopathy and felt she would not benefit.  Doppler studies of upper and lower extremities negative for DVT, positive for superficial thrombophlebitis of left medial antecubital.  Pancreatic cancer is presumed but no firm diagnosis due to lack of tissue sample.  Pancreatitis remaining persistent despite antibiotics.   Attempted Dobbhoff and PEG J placement but failed due to reddened area presumed possible tumor infiltration in the duodenum.  Due to poor performance and prognosis she is not a candidate for palliative XRT or chemo.  The patient has been progressively worsening with debilitation, intermittently tolerating diet not stable to be discharged home with outpatient surgical oncology follow-up. Mami Llanes would benefit from transfer to tertiary care center where surgical oncology services are available for evaluation of pancreatic head mass and possible surgical intervention.    1/7 A. fib with RVR rate 110-140.  cardiology  Consulted and Anticoagulation discontinued due to bleeding and liver failure. Patient is agreeable to hospice. Patient will be transition from IV Zosyn to Levaquin if permitted by hospice tomorrow. 1. Acute pancreatitis  - Status post EUS by Dr. Na Medrano 12/06  - Continue pain medication  - Lipase variable, will likely be chronically elevated   - CT abd/pelvis with PO and IV contrast showing increasing bilateral pleural effusion and increasing ascites. Also notes multiple low-density lesions of liver consistent with metastatic disease and bilateral adrenal masses suspected for metastatic disease.  - completed 14 days of IV merrem   - Continue empiric Anidulafungin 4 more days  - paracentesis on 12/27 with 2300 mL clear serous fluid drained, cytology showing now growth. - She was scheduled for liver biopsy with IR however IR does not feel patient would benefit from biopsy at this time.   -Unsuccessful attempt to place PEG J due to tumor infiltration and duodenum     2. CHF  - Continue chlorthalidone 25 mg daily  - Holding PO lasix 40mg daily. - holding BP meds due to hypotension today     3. Pancreatic mass/Liver Masses/Adrenal Masses/History of renal cell carcinoma with lung nodules s/p right nephrectomy  Multiple low-density lesions of the liver consistent with meta static disease and bilateral adrenal masses suspicious for metastatic disease  - CA 19-9 greater than 4000  --Oncology consulted-will need tissue biopsy before definitive recommendations can be made.  Unable to offer palliative chemo or radiation due to poor performance.  Prognosis very poor, hospice recommended   -Pathology of thoracentesis negative for malignant cells but it showed acute on chronic inflammatory cells     4. Hypokalemia-stable  - replete as needed  - given Mag sulfate - recheck in the AM   -  Potassium WNL on 1/16/2022     5.  COPD-stable  - Pulmonary consultation  - Chronic respiratory failure with 2 L of oxygen via nasal cannula at home  - Increase mucinex to 1200mg BID  - She did become hypoxic. Lungs sound clear. Will consider chest xray if no improvement.     6. Leukocytosis  - Cultures negative  - Completed course of IV meropenem and Andulafungin 14 days  - ID consulted  - repeat UA pending  - Blood cultures on 1/13/2022 positive for E. Coli continue on IV Zosyn     7. A. fib  -On diltiazem and metoprolol.  - HR elevated. This is before BP meds. Will get a EKG. If still in a fib with RVR will consider diltiazem drip. -2D echo showed normal systolic function and diastolic function with an EF of 60 to 65%  -No anticoagulation due to anemia     8. Acute on Chronic Anemia   -Her hemoglobin has been slowly declining requiring repletion and she is FOBT positive  -Required transfusion hemoglobin pending for today  Total of 6 units PRBCs  -Eliquis has been discontinued due to bleeding and liver failure  Cardiology consulted  - NM bleeding scan negative     9. Elevated d-dimer  - Likely multifactorial   - CTA chest negative for PE  - Duplex US lower extremities negative for DVT  - Duplex US upper extremities showing superficial thrombophlebitis in left median/antecubital veins  -Repeat upper extremity Doppler study negative  -Discontinued Eliquis due to anemia     10. Hypotension  On midodrine    DVT Prophylaxis: discontinued eliquis, on SCDs  GI Prophylaxis: protonix  Discharge and disposition barriers: tomorrow home with home hospice    Care Plan discussed with: Patient/Family, Nurse and     Total time spent with patient: 35 minutes.

## 2022-01-18 NOTE — PROGRESS NOTES
Comprehensive Nutrition Assessment    Type and Reason for Visit: Reassess (goals)    Nutrition Recommendations/Plan:   Diet as ordered, food for comfort  D/c Ensures, pt not drinking and dislikes     Monitor weights, intake, labs and skin changes    Nutrition Assessment:  Admit with abd pain, n/v/d, and poor intake. (12/6) EUS showing a lesion in pancreatic head, +cancer. US abd finding billiary obstruction. Likely mets to liver, current carmela for home with hospice. NPO/ CL x 9d, received PPN 12/18- 19, provided <25% EENs, abd pain with full/clear liq diet. (12/31) Upgraded to Regular diet w/ ensure x5day (12/31). Remains on a Regular diet with supplement in place, RD observed 0% L tray today and many bottles of Ensure around room. Pt with no appetite and no strength to eat, hesitant about being fed by others, stated eating causes her pain. RD discussed PO for comfort only vs more aggressive TPN, pt unsure and wants to discuss with MD. Will d/c ONSLabs: H/H 8.1/25.3, BUN 30, CREAT 1.07, LFT elevated, TBili 7.5, Alb 0.8. Meds: Vit C, Calcium +Vit D, Vit D3, docusate, dilaudid, zofran, PPI, Zosyn, KCl, zoloft. Malnutrition Assessment:  Malnutrition Status: Moderate malnutrition    Context:  Acute illness     Findings of the 6 clinical characteristics of malnutrition:   Energy Intake:  7 - 50% or less of est energy requirements for 5 or more days  Weight Loss:  1 - 1% to 2% over 1 week     Body Fat Loss:  No significant body fat loss,     Muscle Mass Loss:  No significant muscle mass loss,    Fluid Accumulation:  No significant fluid accumulation,        Estimated Daily Nutrient Needs:  Energy (kcal): 1800 (25kcal/kg/ABW); Weight Used for Energy Requirements: Current  Protein (g): 84-91g (1.2-1.3g/kg); Weight Used for Protein Requirements: Adjusted  Fluid (ml/day): 2000 ml (20ml/kg); Method Used for Fluid Requirements: ml/kg      Nutrition Related Findings:  Pt obese, appears well nourished.  BM 1/18. +Ascites , (12/27) paracentesis with 2.3L removed. +2 generalized, +3 BL UE, +1 BL LE edema. Wounds:    Stage II (R buttock)       Current Nutrition Therapies:  ADULT DIET Regular    Anthropometric Measures:  · Height:  5' 7\" (170.2 cm)  · Current Body Wt:  94.1 kg (207 lb 7.3 oz)   · Admission Body Wt:  214 lb    · Usual Body Wt:  112 kg (247 lb) (pt prev stated 260#)     · Ideal Body Wt:  135 lbs:  153.7 %   · BMI Category:  Obese class 1 (BMI 30.0-34. 9)     Wt Readings from Last 5 Encounters:   01/08/22 94.1 kg (207 lb 7.3 oz)   12/06/21 84.4 kg (186 lb)   11/02/21 86.6 kg (191 lb)   08/03/21 95.7 kg (211 lb)   07/04/21 95.3 kg (210 lb)   No wt loss per EMR, pt endorses wt loss from # (16%, unknown time frame)    Nutrition Diagnosis:   · Inadequate protein-energy intake related to catabolic illness (cancer causing lack of appetite, pain with PO, and lethargy) as evidenced by intake 0-25%      Nutrition Interventions:   Food and/or Nutrient Delivery: Continue current diet,Discontinue oral nutrition supplement  Nutrition Education and Counseling: No recommendations at this time  Coordination of Nutrition Care: Continue to monitor while inpatient,Interdisciplinary rounds (hospice)    Goals:  Meet >50% EEN's with meals and supplement in 3-5 days, improve skin integrity       Nutrition Monitoring and Evaluation:   Behavioral-Environmental Outcomes: None identified  Food/Nutrient Intake Outcomes: Food and nutrient intake,Supplement intake  Physical Signs/Symptoms Outcomes: Biochemical data,GI status,Weight,Skin,Meal time behavior    Discharge Planning:     Too soon to determine     Electronically signed by Aman Montoya on 1/18/2022 at 9:33 AM    Contact: Ext [phone], or via [employer]

## 2022-01-18 NOTE — PROGRESS NOTES
Problem: Falls - Risk of  Goal: *Absence of Falls  Description: Document Marga Whitfield Fall Risk and appropriate interventions in the flowsheet.   Outcome: Progressing Towards Goal  Note: Fall Risk Interventions:  Mobility Interventions: Bed/chair exit alarm    Mentation Interventions: Bed/chair exit alarm    Medication Interventions: Bed/chair exit alarm    Elimination Interventions: Bed/chair exit alarm    History of Falls Interventions: Bed/chair exit alarm         Problem: Pain  Goal: *Control of Pain  Outcome: Progressing Towards Goal     Problem: Nausea/Vomiting (Adult)  Goal: *Absence of nausea/vomiting  Outcome: Progressing Towards Goal

## 2022-01-18 NOTE — PROGRESS NOTES
Problem: Falls - Risk of  Goal: *Absence of Falls  Description: Document Duke Villarreal Fall Risk and appropriate interventions in the flowsheet.   Outcome: Progressing Towards Goal  Note: Fall Risk Interventions:  Mobility Interventions: Bed/chair exit alarm    Mentation Interventions: Bed/chair exit alarm    Medication Interventions: Bed/chair exit alarm    Elimination Interventions: Bed/chair exit alarm    History of Falls Interventions: Bed/chair exit alarm         Problem: Pain  Goal: *Control of Pain  Outcome: Progressing Towards Goal     Problem: Nausea/Vomiting (Adult)  Goal: *Absence of nausea/vomiting  Outcome: Progressing Towards Goal

## 2022-01-18 NOTE — PROGRESS NOTES
Progress Note    Patient: Olvin Brandonsins MRN: [id number]  SSN: [id number]    YOB: 1945  Age: 78 y.o. Sex: female      Admit Date: 12/9/2021    LOS: 40 days     Subjective:   GI in consultation for  Pancreatitis- resolved.     Patient seen in room, awake. Visitor at bedside. On nasal cannula 3L. Reported SOB. 1/17 CXR shows Left perihilar and right lower lobe patchy airspace disease present, similar to prior study. Small bilateral effusions are present. Overall poor prognosis due to clinical diagnosis of pancreatic cancer.  is 22,358. CM is working on Hospice placement.   -Labs: WBC 10.3, Hgb 8.1, platelet consistently dropping, 32 today, INR 2.3, potassium - 3.5. Total bilirubin 7.5, ALT 74, AST 89, alk phos 702. Lipase 703.     1/13 - Bleeding scan negative  1/12 Thoracentesis - 120ml fluid drained. 12/30 EGD with Dobhoff placement for feeding - removed by patient.   12/27/21 Paracentesis - 2300ml ascites fluid removed. 12/23/21 CT abdomen - pleural effusion, ascites, liver lesions, bilateral adrenal masses, pancreatic head lesions. 12/6/2021 EUS showing 2.7 X3 cm lesion in the area of head of pancreas with dilation of the Pancreatic duct abutting the portal vein but not involving the SMA or AMV ; Tumor T2 by endosonographic criteria ; one small lymph node in the area of head of pancreas.   11/22/2021 PET Scan    1.  FDG avid lesion in the pancreatic head consistent with malignancy. 2.  Subcentimeter focus of FDG uptake in the liver, indeterminate. 3.  FDG uptake associated with density expanding the right facet at C5-C6, possibly due to an ectatic artery.     History of Present Illness: Nevin Cole is a 78 y. o. female who is seen in consultation for pancreatitis. Farhana Rios has a past medical history of  Paroxysmal Atrial Fibrillation, CHF, COPD, renal cell carcinoma Stage IV s/p nephrectomy, GERD sjoren's syndrome, hypertension, and depression.  Patient under went EUS on 12/6/2021 showing 2.7 X3 cm lesion in the area of head of pancreas with dilation of the Pancreatic duct abutting the portal vein but not involving the SMA or AMV ; Tumor T2 by endosonographic criteria ; one small lymph node in the area of head of pancreas. Pathology shows rare cells present suspicious for malignancy.   She had an ERCP on 11/4 2021 ERCP; with sphincterotomy/papillotomy ERCP; with placement of endoscopic stent into biliary or pancreatic duct, including pre and postdilation and guide wire passage, when performed, including sphincterotomy, when performed, each stent. . Patient had a PET scan on 11/22/21 which shows a lesion in the pancreatic head consistent with malignancy. CTA of abdomen shows ill defined hypodense mass in the pancreas. There are inflammatory changes. Patient states she experienced nausea, vomiting and diarrhea since Monday. Her abdominal pain has progressively gotten worse. She reports a poor appetite. She does complain of increased \"burping\". Total bilirubin 1.4, AsT 36, ALT 31, Alk Phos. 217, Lipase > 3,000. Plan nothing by mouth except ice chips and sips of water with medication. IV hydration. Pain management.      Objective:     Vitals:    01/17/22 2138 01/18/22 0218 01/18/22 0731 01/18/22 1135   BP: (!) 131/54 (!) 97/56 (!) 104/39    Pulse: 95 89 (!) 129    Resp: 18 18 17    Temp: 97.9 °F (36.6 °C) 97.5 °F (36.4 °C) 97.4 °F (36.3 °C)    SpO2: 97% 96% 94% 94%   Weight:       Height:            Intake and Output:  Current Shift: No intake/output data recorded. Last three shifts: 01/16 1901 - 01/18 0700  In: 800 [P.O.:100; I.V.:700]  Out: 1100 [Urine:1100]    Physical Exam:   Skin:  Extremities and face reveal no rashes. No chapin erythema. Pale looking. HEENT: Sclerae anicteric. Extra-occular muscles are intact. No abnormal pigmentation of the lips. The neck is supple. Cardiovascular: Regular rate and rhythm. Respiratory:  Comfortable breathing with no accessory muscle use.    GI: Abdomen distended, soft, and mildly tender. No enlargement of the liver or spleen. No masses palpable. Rectal:  Deferred  Musculoskeletal: Generalized weakness. Neurological:  Gross memory appears intact. Patient is alert and oriented. Psychiatric:  Mood appears appropriate with judgement intact. Lymphatic:  No visible adenopathy      Lab/Data Review:  Recent Results (from the past 24 hour(s))   LIPASE    Collection Time: 01/17/22  1:58 PM   Result Value Ref Range    Lipase 703 (H) 73 - 393 U/L              XR CHEST PORT   Final Result   Findings/impression:      Left perihilar and right lower lobe patchy airspace disease present, similar to   prior study. Small bilateral effusions are present. Right PICC line projects   over SVC. Slightly enlarged cardiomediastinal silhouette noted similar to prior   study. No pneumothorax. No overt edema. No significant interval change. NM ACUTE GI BLEED SCAN   Final Result   Negative GI bleed scan. XR CHEST PORT   Final Result   Mild improvement in the right lower lobe      IR THORACENTESIS NDL PUNC ASP W IMAGE   Final Result   1. Previously described small hepatic metastases are sonographically occult,   therefore targeted liver biopsy could not be performed. 2.  Small left pleural effusion. 3.  Successful left thoracentesis. Fluid samples submitted for cytologic   analysis. IR ULTRASOUND ABDOMEN LTD   Final Result   1. Previously described small hepatic metastases are sonographically occult,   therefore targeted liver biopsy could not be performed. 2.  Small left pleural effusion. 3.  Successful left thoracentesis. Fluid samples submitted for cytologic   analysis. XR CHEST PORT   Final Result      XR CHEST PORT   Final Result   Findings/impression:      Stable positioning of right upper extremity PICC, tip projects over the upper   SVC. Cardiac silhouette is enlarged. No pulmonary edema. Bilateral pleural effusions.  No evidence of pneumothorax. No acute osseous abnormality identified. DUPLEX UPPER EXT VENOUS BILAT   Final Result      XR CHEST PORT   Final Result   Large effusions. Vascular congestion. CT ABD PELV W CONT   Final Result   1. There are increasing bilateral pleural effusions, increasing body wall   edema, and increasing ascites. These findings could be secondary to the third   spacing of fluids. The differential diagnosis for the ascites would include   pancreatic ascites with acute pancreatitis or metastatic disease to the   peritoneum. 2.  There is a biliary stent which is unchanged in its position traversing   throughout area of obstruction within the pancreatic head. 3.  There are multiple low-density lesions of the liver without short-term   interval changes consistent with metastatic disease. 4.  The right kidney is not identified and apparently the patient is status post   a previous right nephrectomy. 5.  There are bilateral adrenal masses suspect for metastatic disease without   short-term interval changes. CTA CHEST W OR W WO CONT   Final Result   No CT evidence for PE. Thoracic aorta atherosclerosis. CAD. Moderate to large bilateral pleural effusions with associated RML, RLL, and LLL   compressive atelectasis. Abdominal ascites. DUPLEX LOWER EXT VENOUS BILAT   Final Result      XR CHEST PORT   Final Result   FINDINGS: IMPRESSION: 2 frontal views of the chest. Right PICC distal tip SVC   region. Enlarging cardiomegaly. Increasing vascular congestion. Interval development of   hypoinflation, pleural effusions, lower lung airspace edema and/or pneumonia. No   pneumothorax. No free air under the diaphragm. CT ABD PELV W CONT   Final Result   New moderate volume dependent pleural effusions with associated   lower lobe lung compressive atelectasis.       Increasing ascites, moderate approaching large-volume   (fluid could be sampled if there is any clinical concern for bile content). High suspicion hepatic metastases. Similar appearance for the pancreas; Silastic stent in place. No pseudocyst formation. Unchanged appearance bilateral adrenal glands. No bowel obstruction. US ABD LTD   Final Result   Small amount of free fluid. Finding suggesting hemangioma in the   liver. Gallbladder wall thickening, adenomyomatosis, sludge and gallstones. Cholecystitis possible. Finding in the region of the pancreatic head as above. Other findings as above. CTA ABDOMEN PELV W CONT   Final Result   1. Ill-defined hypodense mass in the pancreas. There are inflammatory changes   and fluid adjacent to the pancreas or duodenum and stomach most likely related   to biopsy or focal pancreatitis. No pseudocyst formation, active bleeding or   other acute findings. 2. Enlarged adrenal glands left greater than right with noncontrast densities   consistent with adrenal adenomas. 3. Right nephrectomy. 4. Other findings as described. XR CHEST PORT   Final Result   No acute findings. IR PARACENTESIS ABD W IMAGE    (Results Pending)       Assessment:     Principal Problem:    Pancreatitis (12/9/2021)    Active Problems:    A-fib ([address] (11/16/2020)      CHF (congestive heart failure) ([address] (11/16/2020)      Hematemesis (12/12/2021)      History of peptic ulcer disease (12/12/2021)      Pancreatic mass (1/5/2022)        Plan:   1. Pancreatitis - RESOLVED.     -12/30 s/p post pyloric tube placement. Patient removed tube on 12/31.         -Unable to place GJ tube due to  tumor in duodenum and food in the stomach and patient on Eliquis      -Lipase 773.  Repeat Lipase in the am       -Pain med as needed.      - S/P paracentesis 12/27/21 with removal of 2300 ml clear serous  Fluid removed      -cytology Slight acute inflammation.       -currently on Regular diet      -patient in agreement for SNF transfer, then outpatient follow up from there.   2. CHF       -lasix on hold, low BP  3. COPD       -Continue home medications       -Albuterol as needed        -chest xray today  4. Pancreatic Mass       - 22,358      -S/p fine needle aspiration suspicious for neoplasia but not diagnostic      -CT concern for liver metastais/lesions      -IR unable to do liver biopsy at this time      -Poor Prognosis      - working [facility]. Patient is in agreement. She has not decided on which hospice agency.   5. Hematemesis (resolved)  6. Afib with RVR      -Eliquis on hold 2/2 to low Hgb      -Continue diltiazem      -Cardiology has been consulted  7. Diarrhea (Improved)      - Imodium as needed  8. Anemia/GI bleed     -Reports of rectal bleeding, dark stools. Bleeding most likely from tumor invasion. If she continues to bleed, consult IR for possible embolization procedure.      -Bleeding scan is negative.      -Hgb 8.1. Monitor and transfuse as needed.   9.-Hypokalemia     Replete as needed     CMP in the am    Patient discussed with Dr Taryn Palacio and agrees to above impression and plan. Thank you for allowing me to participate in this patients care    Signed By: Arvind Ram.  CONRADO Hillman     January 18, 2022

## 2022-01-18 NOTE — PROGRESS NOTES
Progress Note    Patient: Juan David Snyder MRN: [id number]  SSN: [id number]    YOB: 1945  Age: 78 y.o. Sex: female      Admit Date: 12/9/2021    LOS: 40 days     Subjective:   Patient followed for severe pancreatitis on Meropenem because of worsening leukocytosis and increasing procal and CRP. Blood cultures yesterday grew E. Coli. Patient is now on Zosyn. Patient is asleep at this time but appears to be resting comfortably. No decision on hospice yet. Objective:     Vitals:    01/18/22 0218 01/18/22 0731 01/18/22 1135 01/18/22 1539   BP: (!) 97/56 (!) 104/39  112/70   Pulse: 89 (!) 129  (!) 104   Resp: 18 17 18   Temp: 97.5 °F (36.4 °C) 97.4 °F (36.3 °C)  97.4 °F (36.3 °C)   SpO2: 96% 94% 94% 100%   Weight:       Height:            Intake and Output:  Current Shift: No intake/output data recorded. Last three shifts: 01/16 1901 - 01/18 0700  In: 800 [P.O.:100; I.V.:700]  Out: 1100 [Urine:1100]    Physical Exam:     Vitals and nursing note reviewed. Constitutional:       General: She is not in acute distress. Appearance: She is obese. She is ill-appearing. HENT: white lacy exudate on buccal mucosa   Abdominal:  Nontender  Genitourinary:  Vaginal area not examined     Comments: External urinary device  Musculoskeletal:      Right lower leg: No edema. Left lower leg: No edema. Comments: PICC line right upper arm   Skin: multiple ecchymoses on the upper extremities, edema left forearm     Findings: No rash.       Comments: ?Stage 2 sacral wound   Neurological: asleep    Lab/Data Review:     WBC 10,400  PLT 61,000    Urinalysis with WBC 10-20, Bacteria negative  Lipase 703 <1,441 <2,244 <2,350 <2,144 99 <2,511 0 <2,484 <2,684 < 1,397 <1,719    Procal  1.75 <3.82 <15.42 < 0.91 <0.59 <3.87 <0.34 <0.35 <0.34 < 0.32 <0.21 <0.19 <0.25 < 0.14  CRP 11.10 <21.30 <23.20 <12.40 <10.00 <4.83 <5.07 <7.38 <18.30 <14.20 <12.70 <15.00 < 23.80    Serum fungitell <31 Negative    Ascitic fluid  with 41% PMNs    Blood cultures (12/20) No growth FINAL  Blood cultures (1/13) E. Coli  Urine culture (12/20) No growth FINAL  Ascitic fluid culture(12/27) No growth FINAL    Assessment:     Principal Problem:    Pancreatitis (12/9/2021)    Active Problems:    A-fib ([facility] (11/16/2020)      CHF (congestive heart failure) ([facility] [facility] (11/16/2020)      Hematemesis (12/12/2021)      History of peptic ulcer disease (12/12/2021)      Pancreatic mass (1/5/2022)    1. Severe pancreatitis with markedly elevated lipase, resolving  2. Pancreatic mass, possible neoplasm  3. Biliary stent  4. Sepsis with worsening leukocytosis with elevated procal and CRP, resolving, status post 14 days of Meropenem, Anidulafungin (empiric), CRP increasing   5. TPN (just started)  6. Moderate pyuria, negative bacteria, urine culture negative  7. Possible candida superinfection with vaginitis, treated  8. Ascites, status post paracentesis  9. Gram negative bacteremia with E. Coli, source unclear, Day #3 IV Zosyn. 10. Oral candidiasis, Day #2 IV Fluconazole    Comment:   WBC normal with decreasing  CRP and procal.     Plan:   1. Continue IV Zosyn pending susceptibility results  2. Continue IV Fluconazole  3. In am, repeat CRP and procal  4.  Waiting possible decision on hospice     Signed By: Scarlett Kelly MD     January 18, 2022

## 2022-01-18 NOTE — PROGRESS NOTES
Hospitalist Progress Note    Subjective:   Daily Progress Note: 1/18/2022 6:36 PM    Patient is now in process of applying to medicaid for long-term care with hospice. Patient son to bring in patient last bank statement. Patient is still coughing up blood but is not requiring oxygen demand.     Current Facility-Administered Medications   Medication Dose Route Frequency    calcium-vitamin D 600 mg-5 mcg (200 unit) per tablet 2 Tablet  2 Tablet Oral DAILY    fluconazole (DIFLUCAN) 200mg/100 mL IVPB (premix)  200 mg IntraVENous Q24H    piperacillin-tazobactam (ZOSYN) 3.375 g in 0.9% sodium chloride (MBP/ADV) 100 mL MBP  3.375 g IntraVENous Q8H    guaiFENesin ER (MUCINEX) tablet 1,200 mg  1,200 mg Oral Q12H    albuterol-ipratropium (DUO-NEB) 2.5 MG-0.5 MG/3 ML  3 mL Nebulization Q6H PRN    SE-Tan Plus capsule  1 Capsule Oral BID    lactated Ringers infusion  75 mL/hr IntraVENous CONTINUOUS    0.9% sodium chloride infusion 250 mL  250 mL IntraVENous PRN    0.9% sodium chloride infusion 250 mL  250 mL IntraVENous PRN    polyethylene glycol (MIRALAX) packet 17 g  17 g Oral DAILY PRN    0.9% sodium chloride infusion 250 mL  250 mL IntraVENous PRN    pantoprazole (PROTONIX) 40 mg in 0.9% sodium chloride 10 mL injection  40 mg IntraVENous Q12H    loperamide (IMODIUM) capsule 2 mg  2 mg Oral Q4H PRN    [Held by provider] metoprolol tartrate (LOPRESSOR) tablet 25 mg  25 mg Oral Q12H    0.9% sodium chloride infusion 250 mL  250 mL IntraVENous PRN    cholecalciferol (VITAMIN D3) (5000 Units/125 mcg) tablet 5,000 Units  5,000 Units Oral Q7D    sertraline (ZOLOFT) tablet 25 mg  25 mg Oral QPM    atorvastatin (LIPITOR) tablet 40 mg  40 mg Oral QHS    [Held by provider] furosemide (LASIX) tablet 40 mg  40 mg Oral DAILY    influenza vaccine 2021-22 (6 mos+)(PF) (FLUARIX/FLULAVAL/FLUZONE QUAD) injection 0.5 mL  1 Each IntraMUSCular PRIOR TO DISCHARGE    [Held by provider] apixaban (ELIQUIS) tablet 2.5 mg  2.5 mg Oral BID    midodrine (PROAMATINE) tablet 10 mg  10 mg Oral TID WITH MEALS    sodium chloride (NS) flush 5-40 mL  5-40 mL IntraVENous PRN    zinc oxide-white petrolatum 17-57 % topical paste   Topical TID    cyclobenzaprine (FLEXERIL) tablet 10 mg  10 mg Oral TID PRN    HYDROmorphone (DILAUDID) injection 1 mg  1 mg IntraVENous Q4H PRN    oxyCODONE IR (ROXICODONE) tablet 5 mg  5 mg Oral Q6H PRN    docusate sodium (COLACE) capsule 100 mg  100 mg Oral BID    sorbitoL 70 % solution 30 mL  30 mL Oral DAILY PRN    bisacodyL (DULCOLAX) suppository 10 mg  10 mg Rectal DAILY PRN    hydrALAZINE (APRESOLINE) 20 mg/mL injection 20 mg  20 mg IntraVENous Q6H PRN    [Held by provider] chlorthalidone (HYGROTON) tablet 25 mg  25 mg Oral DAILY    albuterol (PROVENTIL HFA, VENTOLIN HFA, PROAIR HFA) inhaler 2 Puff  2 Puff Inhalation Q6H PRN    ascorbic acid (vitamin C) (VITAMIN C) tablet 500 mg  500 mg Oral DAILY    diazePAM (VALIUM) tablet 5 mg  5 mg Oral Q6H PRN    dilTIAZem ER (CARDIZEM CD) capsule 120 mg  120 mg Oral DAILY    potassium chloride SR (KLOR-CON 10) tablet 20 mEq  20 mEq Oral DAILY    traZODone (DESYREL) tablet 50 mg  50 mg Oral QHS    ondansetron (ZOFRAN) injection 4 mg  4 mg IntraVENous Q6H PRN    acetaminophen (TYLENOL) tablet 650 mg  650 mg Oral Q4H PRN    prochlorperazine (COMPAZINE) with saline injection 10 mg  10 mg IntraVENous Q6H PRN        Review of Systems  Review of Systems   Constitutional: Negative. Respiratory: Positive for cough, hemoptysis and shortness of breath. Cardiovascular: Negative. Gastrointestinal: Positive for abdominal pain. Negative for nausea and vomiting. All other systems reviewed and are negative.            Objective:     Visit Vitals  /70 (BP 1 Location: Left lower arm)   Pulse (!) 104   Temp 97.4 °F (36.3 °C)   Resp 18   Ht 5' 7\" (1.702 m)   Wt 94.1 kg (207 lb 7.3 oz)   SpO2 100%   BMI 32.49 kg/m²    O2 Flow Rate (L/min): 3 l/min O2 Device: Nasal cannula    Temp (24hrs), Av.5 °F (36.4 °C), Min:97.4 °F (36.3 °C), Max:97.9 °F (36.6 °C)      No intake/output data recorded.  1901 -  0700  In: 800 [P.O.:100; I.V.:700]  Out: 1100 [Urine:1100]    Recent Results (from the past 24 hour(s))   PROTHROMBIN TIME + INR    Collection Time: 22 12:25 PM   Result Value Ref Range    Prothrombin time 29.1 (H) 11.9 - 14.7 sec    INR 2.8 (H) 0.9 - 1.1     METABOLIC PANEL, COMPREHENSIVE    Collection Time: 22 12:25 PM   Result Value Ref Range    Sodium 139 136 - 145 mmol/L    Potassium 3.5 3.5 - 5.1 mmol/L    Chloride 103 97 - 108 mmol/L    CO2 31 21 - 32 mmol/L    Anion gap 5 5 - 15 mmol/L    Glucose 71 65 - 100 mg/dL    BUN 30 (H) 6 - 20 mg/dL    Creatinine 1.07 (H) 0.55 - 1.02 mg/dL    BUN/Creatinine ratio 28 (H) 12 - 20      GFR est AA >60 >60 ml/min/1.73m2    GFR est non-AA 50 (L) >60 ml/min/1.73m2    Calcium 7.7 (L) 8.5 - 10.1 mg/dL    Bilirubin, total 7.5 (H) 0.2 - 1.0 mg/dL    AST (SGOT) 89 (H) 15 - 37 U/L    ALT (SGPT) 74 12 - 78 U/L    Alk. phosphatase 702 (H) 45 - 117 U/L    Protein, total 4.1 (L) 6.4 - 8.2 g/dL    Albumin 0.8 (L) 3.5 - 5.0 g/dL    Globulin 3.3 2.0 - 4.0 g/dL    A-G Ratio 0.2 (L) 1.1 - 2.2     CBC WITH AUTOMATED DIFF    Collection Time: 22 12:25 PM   Result Value Ref Range    WBC 10.4 3.6 - 11.0 K/uL    RBC 2.71 (L) 3.80 - 5.20 M/uL    HGB 8.1 (L) 11.5 - 16.0 g/dL    HCT 25.3 (L) 35.0 - 47.0 %    MCV 93.4 80.0 - 99.0 FL    MCH 29.9 26.0 - 34.0 PG    MCHC 32.0 30.0 - 36.5 g/dL    RDW 16.7 (H) 11.5 - 14.5 %    PLATELET 61 (L) 176 - 400 K/uL    NRBC 0.0 0.0  WBC    ABSOLUTE NRBC 0.00 0.00 - 0.01 K/uL    NEUTROPHILS 84 (H) 32 - 75 %    LYMPHOCYTES 9 (L) 12 - 49 %    MONOCYTES 5 5 - 13 %    EOSINOPHILS 0 0 - 7 %    BASOPHILS 0 0 - 1 %    IMMATURE GRANULOCYTES 2 (H) 0 - 0.5 %    ABS. NEUTROPHILS 8.8 (H) 1.8 - 8.0 K/UL    ABS. LYMPHOCYTES 0.9 0.8 - 3.5 K/UL    ABS. MONOCYTES 0.5 0.0 - 1.0 K/UL    ABS.  EOSINOPHILS 0.0 0.0 - 0.4 K/UL    ABS. BASOPHILS 0.0 0.0 - 0.1 K/UL    ABS. IMM. GRANS. 0.2 (H) 0.00 - 0.04 K/UL    DF AUTOMATED     C REACTIVE PROTEIN, QT    Collection Time: 01/18/22 12:25 PM   Result Value Ref Range    C-Reactive protein 11.10 (H) 0.00 - 0.60 mg/dL   PROCALCITONIN    Collection Time: 01/18/22 12:25 PM   Result Value Ref Range    Procalcitonin 1.75 (H) 0 ng/mL        XR CHEST PORT   Final Result   Findings/impression:      Left perihilar and right lower lobe patchy airspace disease present, similar to   prior study. Small bilateral effusions are present. Right PICC line projects   over SVC. Slightly enlarged cardiomediastinal silhouette noted similar to prior   study. No pneumothorax. No overt edema. No significant interval change. NM ACUTE GI BLEED SCAN   Final Result   Negative GI bleed scan. XR CHEST PORT   Final Result   Mild improvement in the right lower lobe      IR THORACENTESIS NDL PUNC ASP W IMAGE   Final Result   1. Previously described small hepatic metastases are sonographically occult,   therefore targeted liver biopsy could not be performed. 2.  Small left pleural effusion. 3.  Successful left thoracentesis. Fluid samples submitted for cytologic   analysis. IR ULTRASOUND ABDOMEN LTD   Final Result   1. Previously described small hepatic metastases are sonographically occult,   therefore targeted liver biopsy could not be performed. 2.  Small left pleural effusion. 3.  Successful left thoracentesis. Fluid samples submitted for cytologic   analysis. XR CHEST PORT   Final Result      XR CHEST PORT   Final Result   Findings/impression:      Stable positioning of right upper extremity PICC, tip projects over the upper   SVC. Cardiac silhouette is enlarged. No pulmonary edema. Bilateral pleural effusions. No evidence of pneumothorax. No acute osseous abnormality identified.             DUPLEX UPPER EXT VENOUS BILAT   Final Result      XR CHEST PORT   Final Result Large effusions. Vascular congestion. CT ABD PELV W CONT   Final Result   1. There are increasing bilateral pleural effusions, increasing body wall   edema, and increasing ascites. These findings could be secondary to the third   spacing of fluids. The differential diagnosis for the ascites would include   pancreatic ascites with acute pancreatitis or metastatic disease to the   peritoneum. 2.  There is a biliary stent which is unchanged in its position traversing   throughout area of obstruction within the pancreatic head. 3.  There are multiple low-density lesions of the liver without short-term   interval changes consistent with metastatic disease. 4.  The right kidney is not identified and apparently the patient is status post   a previous right nephrectomy. 5.  There are bilateral adrenal masses suspect for metastatic disease without   short-term interval changes. CTA CHEST W OR W WO CONT   Final Result   No CT evidence for PE. Thoracic aorta atherosclerosis. CAD. Moderate to large bilateral pleural effusions with associated RML, RLL, and LLL   compressive atelectasis. Abdominal ascites. DUPLEX LOWER EXT VENOUS BILAT   Final Result      XR CHEST PORT   Final Result   FINDINGS: IMPRESSION: 2 frontal views of the chest. Right PICC distal tip SVC   region. Enlarging cardiomegaly. Increasing vascular congestion. Interval development of   hypoinflation, pleural effusions, lower lung airspace edema and/or pneumonia. No   pneumothorax. No free air under the diaphragm. CT ABD PELV W CONT   Final Result   New moderate volume dependent pleural effusions with associated   lower lobe lung compressive atelectasis. Increasing ascites, moderate approaching large-volume   (fluid could be sampled if there is any clinical concern for bile content). High suspicion hepatic metastases. Similar appearance for the pancreas; Silastic stent in place. No pseudocyst formation. Unchanged appearance bilateral adrenal glands. No bowel obstruction. US ABD LTD   Final Result   Small amount of free fluid. Finding suggesting hemangioma in the   liver. Gallbladder wall thickening, adenomyomatosis, sludge and gallstones. Cholecystitis possible. Finding in the region of the pancreatic head as above. Other findings as above. CTA ABDOMEN PELV W CONT   Final Result   1. Ill-defined hypodense mass in the pancreas. There are inflammatory changes   and fluid adjacent to the pancreas or duodenum and stomach most likely related   to biopsy or focal pancreatitis. No pseudocyst formation, active bleeding or   other acute findings. 2. Enlarged adrenal glands left greater than right with noncontrast densities   consistent with adrenal adenomas. 3. Right nephrectomy. 4. Other findings as described. XR CHEST PORT   Final Result   No acute findings. IR PARACENTESIS ABD W IMAGE    (Results Pending)        PHYSICAL EXAM:    Physical Exam  Vitals and nursing note reviewed. Constitutional:       Appearance: She is obese. She is ill-appearing. Eyes:      General: Scleral icterus present. Cardiovascular:      Rate and Rhythm: Normal rate and regular rhythm. Pulmonary:      Effort: No respiratory distress. Breath sounds: No wheezing. Comments: On nasal canula  Abdominal:      General: Bowel sounds are normal. There is no distension. Tenderness: There is no abdominal tenderness. Musculoskeletal:      Right lower leg: Edema present. Left lower leg: Edema present. Skin:     Capillary Refill: Capillary refill takes less than 2 seconds. Coloration: Skin is jaundiced. Neurological:      Mental Status: She is alert and oriented to person, place, and time.    Psychiatric:      Comments: Flat affect          Data Review    Recent Results (from the past 24 hour(s))   PROTHROMBIN TIME + INR    Collection Time: 01/18/22 12:25 PM   Result Value Ref Range    Prothrombin time 29.1 (H) 11.9 - 14.7 sec    INR 2.8 (H) 0.9 - 1.1     METABOLIC PANEL, COMPREHENSIVE    Collection Time: 01/18/22 12:25 PM   Result Value Ref Range    Sodium 139 136 - 145 mmol/L    Potassium 3.5 3.5 - 5.1 mmol/L    Chloride 103 97 - 108 mmol/L    CO2 31 21 - 32 mmol/L    Anion gap 5 5 - 15 mmol/L    Glucose 71 65 - 100 mg/dL    BUN 30 (H) 6 - 20 mg/dL    Creatinine 1.07 (H) 0.55 - 1.02 mg/dL    BUN/Creatinine ratio 28 (H) 12 - 20      GFR est AA >60 >60 ml/min/1.73m2    GFR est non-AA 50 (L) >60 ml/min/1.73m2    Calcium 7.7 (L) 8.5 - 10.1 mg/dL    Bilirubin, total 7.5 (H) 0.2 - 1.0 mg/dL    AST (SGOT) 89 (H) 15 - 37 U/L    ALT (SGPT) 74 12 - 78 U/L    Alk. phosphatase 702 (H) 45 - 117 U/L    Protein, total 4.1 (L) 6.4 - 8.2 g/dL    Albumin 0.8 (L) 3.5 - 5.0 g/dL    Globulin 3.3 2.0 - 4.0 g/dL    A-G Ratio 0.2 (L) 1.1 - 2.2     CBC WITH AUTOMATED DIFF    Collection Time: 01/18/22 12:25 PM   Result Value Ref Range    WBC 10.4 3.6 - 11.0 K/uL    RBC 2.71 (L) 3.80 - 5.20 M/uL    HGB 8.1 (L) 11.5 - 16.0 g/dL    HCT 25.3 (L) 35.0 - 47.0 %    MCV 93.4 80.0 - 99.0 FL    MCH 29.9 26.0 - 34.0 PG    MCHC 32.0 30.0 - 36.5 g/dL    RDW 16.7 (H) 11.5 - 14.5 %    PLATELET 61 (L) 378 - 400 K/uL    NRBC 0.0 0.0  WBC    ABSOLUTE NRBC 0.00 0.00 - 0.01 K/uL    NEUTROPHILS 84 (H) 32 - 75 %    LYMPHOCYTES 9 (L) 12 - 49 %    MONOCYTES 5 5 - 13 %    EOSINOPHILS 0 0 - 7 %    BASOPHILS 0 0 - 1 %    IMMATURE GRANULOCYTES 2 (H) 0 - 0.5 %    ABS. NEUTROPHILS 8.8 (H) 1.8 - 8.0 K/UL    ABS. LYMPHOCYTES 0.9 0.8 - 3.5 K/UL    ABS. MONOCYTES 0.5 0.0 - 1.0 K/UL    ABS. EOSINOPHILS 0.0 0.0 - 0.4 K/UL    ABS. BASOPHILS 0.0 0.0 - 0.1 K/UL    ABS. IMM.  GRANS. 0.2 (H) 0.00 - 0.04 K/UL    DF AUTOMATED     C REACTIVE PROTEIN, QT    Collection Time: 01/18/22 12:25 PM   Result Value Ref Range    C-Reactive protein 11.10 (H) 0.00 - 0.60 mg/dL   PROCALCITONIN    Collection Time: 01/18/22 12:25 PM   Result Value Ref Range    Procalcitonin 1.75 (H) 0 ng/mL        Assessment/Plan:     Principal Problem:    Pancreatitis (12/9/2021)    Active Problems:    A-fib ([address] (11/16/2020)      CHF (congestive heart failure) ([facility] [address] (11/16/2020)      Hematemesis (12/12/2021)      History of peptic ulcer disease (12/12/2021)      Pancreatic mass (1/5/2022)        Hospital Course:    Morro Spore a 81-year-old woman with a PMH significant for atrial fibrillation, PUD, CHF, COPD on home O2 at 2 L, renal cell carcinoma status post nephrectomy, hypertension and morbid obesity. Patient was found to have obstructive jaundice and biliary stricture in early November of this year.  She underwent an ERCP with stent placement with brushings of the bile duct where there was noted to be a stricture.  These brushings were negative.  Patient then underwent a PET CT which demonstrated uptake in the head of the pancreas concerning for pancreatic mass. She subsequently underwent endoscopic ultrasound with ultrasound and FNA biopsy on 12/6/2021.  The procedure demonstrated abutment of the tumor with the portal vein without involvement of the SMA or SMV.  The total tumor was measured to be 2.7 cm by 3 cm.  This biopsy resulted as atypical cells with suspicion for malignancy. She admitted to the hospital on 12/9/2021 and severe pancreatitis after undergoing an EUS for pancreatic biopsy on 12/6/2021 and pancreatic mass.  Her symptoms were abdominal pain, nausea and vomiting.  CT scan revealing peripancreatic inflammation consistent with likely post biopsy pancreatitis and ascites.  Her initial labs revealing elevated lipase, supratherapeutic INR and elevated D-dimer coagulopathy.  Charlott Yolanda was positive for bilateral pleural effusions, negative for PE.  Paracentesis with culture of ascitic fluid, blood cultures, urine cultures negative for growth.  Patient had been started on IV meropenem for leukocytosis and anidulafungin for suspected intra-abdominal infection.  CT with and without contrast revealing liver lesions consistent with metastasis, mass in the pancreatic head with biliary stent in place, bilateral pleural effusions, ascites and bilateral adrenal masses. Vadim Rosales declined liver biopsy due to coagulopathy and felt she would not benefit.  Doppler studies of upper and lower extremities negative for DVT, positive for superficial thrombophlebitis of left medial antecubital.  Pancreatic cancer is presumed but no firm diagnosis due to lack of tissue sample.  Pancreatitis remaining persistent despite antibiotics.  Attempted Dobbhoff and PEG J placement but failed due to reddened area presumed possible tumor infiltration in the duodenum.  Due to poor performance and prognosis she is not a candidate for palliative XRT or chemo.  The patient has been progressively worsening with debilitation, intermittently tolerating diet not stable to be discharged home with outpatient surgical oncology follow-up. Farzaneh Romero would benefit from transfer to tertiary care center where surgical oncology services are available for evaluation of pancreatic head mass and possible surgical intervention. 1/7 A. fib with RVR rate 110-140. Cardiology consulted and anticoagulation discontinued due to bleeding and liver failure. Patient is agreeable to hospice. Patient will be transitioned from IV Zosyn and fluconazole to levaquin once accepted into long-term care with hospice. 1. Acute pancreatitis  - Status post EUS by Dr. Na Medrano 12/6  - Continue pain medication  - Lipase variable, will likely be chronically elevated   - CT abd/pelvis with PO and IV contrast showing increasing bilateral pleural effusion and increasing ascites.  Also notes multiple low-density lesions of liver consistent with metastatic disease and bilateral adrenal masses suspected for metastatic disease.  - completed 14 days of IV merrem and anidulafungin   - paracentesis on 12/27 with 2300 mL clear serous fluid drained, cytology showed no growth  - Liver bx canceled as IR does not feel patient would benefit from bx at this time  -Unsuccessful attempt to place PEG J due to tumor infiltration and duodenum     2. CHF  - Holding chlorthalidone, lasix, and metoprolol due to persistent hypotension      3. Pancreatic mass/liver masses/Adrenal Masses/History of renal cell carcinoma with lung nodules s/p right nephrectomy  Multiple low-density lesions of the liver consistent with meta static disease and bilateral adrenal masses suspicious for metastatic disease  - CA 19-9 greater than 4000  - Oncology consulted - needs tissue biopsy before definitive recommendations can be made. Unable to offer palliative chemo or radiation due to poor performance. Prognosis very poor, hospice recommended   -Pathology of thoracentesis negative for malignant cells but it showed acute on chronic inflammatory cells     4. Hypokalemia-stable  - replete as needed  - given Mag sulfate - recheck in the AM   -  Potassium WNL on 1/18/2022     5. COPD - stable  - On nasal canula  - Chronic respiratory failure with 2 L of oxygen via nasal cannula at home  - Continue mucinex 1200mg BID  - CXR 1/17 no acute change     6. Leukocytosis  - Cultures negative  - Completed course of IV meropenem and andulafungin 14 days  - ID following  - Blood cultures on 1/13/2022 positive for E. Coli continue on IV Zosyn     7. A. fib  - On diltiazem   Held metoprolol HR normal now  -Echo EF of 60 to 65%  -No anticoagulation due to anemia     8.  Acute on Chronic Anemia   -hemoglobin has been slowly declining requiring repletion and she is FOBT positive  -Required transfusion hemoglobin 8.1  Total of 6 units PRBCs  -Eliquis has been discontinued due to bleeding and liver failure  - NM bleeding scan negative     9. Elevated d-dimer  - Likely multifactorial   - CTA chest negative for PE  - Duplex US lower extremities negative for DVT  - Duplex US upper extremities showing superficial thrombophlebitis in left median/antecubital veins  -Repeat upper extremity Doppler study negative  -Discontinued Eliquis due to anemia     10. Hypotension  On midodrine    DVT Prophylaxis: discontinued eliquis, on SCDs  GI Prophylaxis: protonix  Discharge and disposition barriers: tomorrow home with [address] discussed with: Patient/Family, Nurse and     Total time spent with patient: 35 minutes.

## 2022-01-18 NOTE — PROGRESS NOTES
1044  Patient is being screened for medicaid by the business office for LTC and/or PCA. Writer also reached out to [facility] and [facility] to see if they can accept patient with medicaid pending. Awaiting a response. 1100  Received a message from Romayne Pinch in the Public Benefits office stating she attempted to contact patient for medicaid screening, however the patient dismissed her saying she is asleep and requested a return call at 1pm.    1340  After requesting a return call for the 2nd time from PB office r/t being Lanre Abreu was finally able to speak with patient on the phone. She will update writer once screening is complete. [address] [facility] [address] will contact patient's son for clarification re: patient's financial information. 1  Per Ann Zuniga, son will bring a copy of patient's 30day bank statement to writer tomorrow for financial screening. [address] with [facility] will update HUBER tomorrow re: accepting patient. Writer will discuss with son/patient tomorrow.      angelo Carlton

## 2022-01-18 NOTE — PROGRESS NOTES
Problem: Falls - Risk of  Goal: *Absence of Falls  Description: Document Rayne Grimes Fall Risk and appropriate interventions in the flowsheet.   Outcome: Progressing Towards Goal  Note: Fall Risk Interventions:  Mobility Interventions: Bed/chair exit alarm    Mentation Interventions: Bed/chair exit alarm    Medication Interventions: Bed/chair exit alarm    Elimination Interventions: Bed/chair exit alarm    History of Falls Interventions: Bed/chair exit alarm

## 2022-01-19 NOTE — PROGRESS NOTES
Uvaldo Bang placed a call to son Anu Brannon @ [phone] to inquire about the bank statements. Anu Brannon says he will bring the bank statement for the patient as soon as he can. Received a message via [employer] that Jimmy Myers at [facility] will accept her as long as medicaid is pending. [facility] has started auth    1400  Patient's grandson at bedside with SSI monthly benefit letter and her updated insurance card. Copies made of both and placed on her chart. Monthly statement faxed to Alysa Underwood @ [phone] to attached to the medicaid screening. Currently awaiting 30 day bank statement from son. Judah reached out to patient's son, and he will help him to pull up the information online and email it via writer via [employer]. Writer will forward to Ander Cheung once received. [facility] can accept patient once the medicaid is pending, they will need the T # . COVID  Vaccine proof uploaded via [address]. Patient will need a negative COVID test within 24hr of discharge per [facility]. Provider FARZANA Kearns informed via perfect-serve. Denise Vasquez RN

## 2022-01-19 NOTE — PROGRESS NOTES
Progress Note    Patient: Nakita Schultz MRN: [id number]  SSN: [id number]    YOB: 1945  Age: 78 y.o. Sex: female      Admit Date: 12/9/2021    LOS: 41 days     Subjective:   GI in consultation for  Pancreatitis- resolved.     Patient seen in room, awake. Family at bedside. Patient on nasal cannula 3L. Overall poor prognosis due to clinical diagnosis of pancreatic cancer.  is 22,358. CM is working on hospice and LTC placement.     -Labs: WBC 10.4, Hgb 8.1, platelet 61 today, potassium - 3.5. Total bilirubin 7.5, ALT 74, AST 89, alk phos 702. Lipase 703.     1/13 - Bleeding scan negative  1/12 Thoracentesis - 120ml fluid drained. 12/30 EGD with Dobhoff placement for feeding - removed by patient.   12/27/21 Paracentesis - 2300ml ascites fluid removed. 12/23/21 CT abdomen - pleural effusion, ascites, liver lesions, bilateral adrenal masses, pancreatic head lesions. 12/6/2021 EUS showing 2.7 X3 cm lesion in the area of head of pancreas with dilation of the Pancreatic duct abutting the portal vein but not involving the SMA or AMV ; Tumor T2 by endosonographic criteria ; one small lymph node in the area of head of pancreas.   11/22/2021 PET Scan    1.  FDG avid lesion in the pancreatic head consistent with malignancy. 2.  Subcentimeter focus of FDG uptake in the liver, indeterminate. 3.  FDG uptake associated with density expanding the right facet at C5-C6, possibly due to an ectatic artery.     History of Present Illness: Nevin Cole is a 78 y. o. female who is seen in consultation for pancreatitis. John Cordova has a past medical history of  Paroxysmal Atrial Fibrillation, CHF, COPD, renal cell carcinoma Stage IV s/p nephrectomy, GERD sjoren's syndrome, hypertension, and depression. Patient under went EUS on 12/6/2021 showing 2.7 X3 cm lesion in the area of head of pancreas with dilation of the Pancreatic duct abutting the portal vein but not involving the SMA or AMV ;  Tumor T2 by endosonographic criteria ; one small lymph node in the area of head of pancreas. Pathology shows rare cells present suspicious for malignancy.   She had an ERCP on 11/4 2021 ERCP; with sphincterotomy/papillotomy ERCP; with placement of endoscopic stent into biliary or pancreatic duct, including pre and postdilation and guide wire passage, when performed, including sphincterotomy, when performed, each stent. . Patient had a PET scan on 11/22/21 which shows a lesion in the pancreatic head consistent with malignancy. CTA of abdomen shows ill defined hypodense mass in the pancreas. There are inflammatory changes. Patient states she experienced nausea, vomiting and diarrhea since Monday. Her abdominal pain has progressively gotten worse. She reports a poor appetite. She does complain of increased \"burping\". Total bilirubin 1.4, AsT 36, ALT 31, Alk Phos. 217, Lipase > 3,000. Plan nothing by mouth except ice chips and sips of water with medication. IV hydration. Pain management.      Objective:     Vitals:    01/19/22 0230 01/19/22 0747 01/19/22 1155 01/19/22 1246   BP: 110/63 (!) 118/54 131/73    Pulse: 91 (!) 104     Resp: 16 16     Temp: 97.1 °F (36.2 °C) 97.8 °F (36.6 °C)     SpO2: 99% 98%  98%   Weight:       Height:            Intake and Output:  Current Shift: No intake/output data recorded. Last three shifts: 01/17 1901 - 01/19 0700  In: 8780 [P.O.:305; I.V.:3090]  Out: 725 [Urine:725]    Physical Exam:   Skin:  Extremities and face reveal no rashes. No chapin erythema. HEENT: Sclerae anicteric. Extra-occular muscles are intact. No abnormal pigmentation of the lips. The neck is supple. Cardiovascular: Regular rate and rhythm. Respiratory:  Comfortable breathing with no accessory muscle use. GI:  Abdomen distended, soft, and tender. No enlargement of the liver or spleen. No masses palpable. Rectal:  Deferred  Musculoskeletal: Generalized weakness. Swollen upper extremities.   Neurological:  Gross memory appears intact. Patient is alert and oriented. Psychiatric:  Mood appears appropriate with judgement intact. Lymphatic:  No visible adenopathy      Lab/Data Review:  No results found for this or any previous visit (from the past 24 hour(s)). XR CHEST PORT   Final Result   Findings/impression:      Left perihilar and right lower lobe patchy airspace disease present, similar to   prior study. Small bilateral effusions are present. Right PICC line projects   over SVC. Slightly enlarged cardiomediastinal silhouette noted similar to prior   study. No pneumothorax. No overt edema. No significant interval change. NM ACUTE GI BLEED SCAN   Final Result   Negative GI bleed scan. XR CHEST PORT   Final Result   Mild improvement in the right lower lobe      IR THORACENTESIS NDL PUNC ASP W IMAGE   Final Result   1. Previously described small hepatic metastases are sonographically occult,   therefore targeted liver biopsy could not be performed. 2.  Small left pleural effusion. 3.  Successful left thoracentesis. Fluid samples submitted for cytologic   analysis. IR ULTRASOUND ABDOMEN LTD   Final Result   1. Previously described small hepatic metastases are sonographically occult,   therefore targeted liver biopsy could not be performed. 2.  Small left pleural effusion. 3.  Successful left thoracentesis. Fluid samples submitted for cytologic   analysis. XR CHEST PORT   Final Result      XR CHEST PORT   Final Result   Findings/impression:      Stable positioning of right upper extremity PICC, tip projects over the upper   SVC. Cardiac silhouette is enlarged. No pulmonary edema. Bilateral pleural effusions. No evidence of pneumothorax. No acute osseous abnormality identified. DUPLEX UPPER EXT VENOUS BILAT   Final Result      XR CHEST PORT   Final Result   Large effusions. Vascular congestion. CT ABD PELV W CONT   Final Result   1.   There are increasing bilateral pleural effusions, increasing body wall   edema, and increasing ascites. These findings could be secondary to the third   spacing of fluids. The differential diagnosis for the ascites would include   pancreatic ascites with acute pancreatitis or metastatic disease to the   peritoneum. 2.  There is a biliary stent which is unchanged in its position traversing   throughout area of obstruction within the pancreatic head. 3.  There are multiple low-density lesions of the liver without short-term   interval changes consistent with metastatic disease. 4.  The right kidney is not identified and apparently the patient is status post   a previous right nephrectomy. 5.  There are bilateral adrenal masses suspect for metastatic disease without   short-term interval changes. CTA CHEST W OR W WO CONT   Final Result   No CT evidence for PE. Thoracic aorta atherosclerosis. CAD. Moderate to large bilateral pleural effusions with associated RML, RLL, and LLL   compressive atelectasis. Abdominal ascites. DUPLEX LOWER EXT VENOUS BILAT   Final Result      XR CHEST PORT   Final Result   FINDINGS: IMPRESSION: 2 frontal views of the chest. Right PICC distal tip SVC   region. Enlarging cardiomegaly. Increasing vascular congestion. Interval development of   hypoinflation, pleural effusions, lower lung airspace edema and/or pneumonia. No   pneumothorax. No free air under the diaphragm. CT ABD PELV W CONT   Final Result   New moderate volume dependent pleural effusions with associated   lower lobe lung compressive atelectasis. Increasing ascites, moderate approaching large-volume   (fluid could be sampled if there is any clinical concern for bile content). High suspicion hepatic metastases. Similar appearance for the pancreas; Silastic stent in place. No pseudocyst formation. Unchanged appearance bilateral adrenal glands. No bowel obstruction. US ABD LTD   Final Result   Small amount of free fluid. Finding suggesting hemangioma in the   liver. Gallbladder wall thickening, adenomyomatosis, sludge and gallstones. Cholecystitis possible. Finding in the region of the pancreatic head as above. Other findings as above. CTA ABDOMEN PELV W CONT   Final Result   1. Ill-defined hypodense mass in the pancreas. There are inflammatory changes   and fluid adjacent to the pancreas or duodenum and stomach most likely related   to biopsy or focal pancreatitis. No pseudocyst formation, active bleeding or   other acute findings. 2. Enlarged adrenal glands left greater than right with noncontrast densities   consistent with adrenal adenomas. 3. Right nephrectomy. 4. Other findings as described. XR CHEST PORT   Final Result   No acute findings. IR PARACENTESIS ABD W IMAGE    (Results Pending)       Assessment:     Principal Problem:    Pancreatitis (12/9/2021)    Active Problems:    A-fib ([address] (11/16/2020)      CHF (congestive heart failure) ([facility] [address] (11/16/2020)      Hematemesis (12/12/2021)      History of peptic ulcer disease (12/12/2021)      Pancreatic mass (1/5/2022)        Plan:   1. Pancreatitis - RESOLVED.     -12/30 s/p post pyloric tube placement. Patient removed tube on 12/31.         -Unable to place GJ tube due to  tumor in duodenum and food in the stomach and patient on Eliquis      -Lipase 703. Repeat Lipase in the am       -Pain med as needed.      - S/P paracentesis 12/27/21 with removal of 2300 ml clear serous  Fluid removed      -cytology Slight acute inflammation.       -currently on Regular diet      -patient in agreement for SNF transfer, then outpatient follow up from there.   2. CHF       -lasix on hold, low BP  3. COPD       -Continue home medications       -Albuterol as needed        -chest xray today  4.  Pancreatic Mass       - 22,358      -S/p fine needle aspiration suspicious for neoplasia but not diagnostic      -CT concern for liver metastais/lesions      -IR unable to do liver biopsy at this time      -Poor Prognosis      -CM working [facility]. Patient is in agreement. She has not decided on which hospice agency.   5. Hematemesis (resolved)  6. Afib with RVR      -Eliquis on hold 2/2 to low Hgb      -Continue diltiazem      -Cardiology has been consulted  7. Diarrhea (Improved)      - Imodium as needed  8. Anemia/GI bleed     -Reports of rectal bleeding, dark stools. Bleeding most likely from tumor invasion. If she continues to bleed, consult IR for possible embolization procedure.      -Bleeding scan is negative.      -Hgb 8.1. Monitor and transfuse as needed.   9.-Hypokalemia     Replete as needed     CMP in the am      Patient discussed with Dr Preeti Sagastume and agrees to above impression and plan. Thank you for allowing me to participate in this patients care    Signed By: Radha Hutson.  CONRADO Hillman     January 19, 2022

## 2022-01-19 NOTE — PROGRESS NOTES
Hospitalist Progress Note    Subjective:   Daily Progress Note: 1/19/2022 6:36 PM    Patient is having a sore throat and congested cough. Patient is also having abdominal pain. Does not seem to radiate anywhere when palpated is not very tender. Patient's lungs are clear. Patient is also concerned about the scheduling of her antidepressant which normally is in the morning and receives at night here per patient.     Current Facility-Administered Medications   Medication Dose Route Frequency    benzocaine-menthoL (CEPACOL) lozenge 1 Lozenge  1 Lozenge Mucous Membrane PRN    calcium-vitamin D 600 mg-5 mcg (200 unit) per tablet 2 Tablet  2 Tablet Oral DAILY    fluconazole (DIFLUCAN) 200mg/100 mL IVPB (premix)  200 mg IntraVENous Q24H    piperacillin-tazobactam (ZOSYN) 3.375 g in 0.9% sodium chloride (MBP/ADV) 100 mL MBP  3.375 g IntraVENous Q8H    guaiFENesin ER (MUCINEX) tablet 1,200 mg  1,200 mg Oral Q12H    albuterol-ipratropium (DUO-NEB) 2.5 MG-0.5 MG/3 ML  3 mL Nebulization Q6H PRN    SE-Tan Plus capsule  1 Capsule Oral BID    lactated Ringers infusion  75 mL/hr IntraVENous CONTINUOUS    0.9% sodium chloride infusion 250 mL  250 mL IntraVENous PRN    0.9% sodium chloride infusion 250 mL  250 mL IntraVENous PRN    polyethylene glycol (MIRALAX) packet 17 g  17 g Oral DAILY PRN    0.9% sodium chloride infusion 250 mL  250 mL IntraVENous PRN    pantoprazole (PROTONIX) 40 mg in 0.9% sodium chloride 10 mL injection  40 mg IntraVENous Q12H    loperamide (IMODIUM) capsule 2 mg  2 mg Oral Q4H PRN    [Held by provider] metoprolol tartrate (LOPRESSOR) tablet 25 mg  25 mg Oral Q12H    0.9% sodium chloride infusion 250 mL  250 mL IntraVENous PRN    cholecalciferol (VITAMIN D3) (5000 Units/125 mcg) tablet 5,000 Units  5,000 Units Oral Q7D    sertraline (ZOLOFT) tablet 25 mg  25 mg Oral QPM    atorvastatin (LIPITOR) tablet 40 mg  40 mg Oral QHS    [Held by provider] furosemide (LASIX) tablet 40 mg  40 mg Oral DAILY    influenza vaccine 2021-22 (6 mos+)(PF) (FLUARIX/FLULAVAL/FLUZONE QUAD) injection 0.5 mL  1 Each IntraMUSCular PRIOR TO DISCHARGE    [Held by provider] apixaban (ELIQUIS) tablet 2.5 mg  2.5 mg Oral BID    midodrine (PROAMATINE) tablet 10 mg  10 mg Oral TID WITH MEALS    sodium chloride (NS) flush 5-40 mL  5-40 mL IntraVENous PRN    zinc oxide-white petrolatum 17-57 % topical paste   Topical TID    cyclobenzaprine (FLEXERIL) tablet 10 mg  10 mg Oral TID PRN    HYDROmorphone (DILAUDID) injection 1 mg  1 mg IntraVENous Q4H PRN    oxyCODONE IR (ROXICODONE) tablet 5 mg  5 mg Oral Q6H PRN    docusate sodium (COLACE) capsule 100 mg  100 mg Oral BID    sorbitoL 70 % solution 30 mL  30 mL Oral DAILY PRN    bisacodyL (DULCOLAX) suppository 10 mg  10 mg Rectal DAILY PRN    hydrALAZINE (APRESOLINE) 20 mg/mL injection 20 mg  20 mg IntraVENous Q6H PRN    [Held by provider] chlorthalidone (HYGROTON) tablet 25 mg  25 mg Oral DAILY    albuterol (PROVENTIL HFA, VENTOLIN HFA, PROAIR HFA) inhaler 2 Puff  2 Puff Inhalation Q6H PRN    ascorbic acid (vitamin C) (VITAMIN C) tablet 500 mg  500 mg Oral DAILY    diazePAM (VALIUM) tablet 5 mg  5 mg Oral Q6H PRN    dilTIAZem ER (CARDIZEM CD) capsule 120 mg  120 mg Oral DAILY    potassium chloride SR (KLOR-CON 10) tablet 20 mEq  20 mEq Oral DAILY    traZODone (DESYREL) tablet 50 mg  50 mg Oral QHS    ondansetron (ZOFRAN) injection 4 mg  4 mg IntraVENous Q6H PRN    acetaminophen (TYLENOL) tablet 650 mg  650 mg Oral Q4H PRN    prochlorperazine (COMPAZINE) with saline injection 10 mg  10 mg IntraVENous Q6H PRN        Review of Systems  Review of Systems   Constitutional: Negative. HENT: Positive for sore throat. Respiratory: Positive for cough, hemoptysis and shortness of breath. Cardiovascular: Negative. Gastrointestinal: Positive for abdominal pain. Negative for nausea and vomiting. All other systems reviewed and are negative.            Objective: Visit Vitals  /60 (BP 1 Location: Left lower arm)   Pulse 94   Temp 98.1 °F (36.7 °C)   Resp 16   Ht 5' 7\" (1.702 m)   Wt 94.1 kg (207 lb 7.3 oz)   SpO2 97%   BMI 32.49 kg/m²    O2 Flow Rate (L/min): 2 l/min O2 Device: Nasal cannula    Temp (24hrs), Av.6 °F (36.4 °C), Min:97.1 °F (36.2 °C), Max:98.1 °F (36.7 °C)      No intake/output data recorded.  1901 -  0700  In: 4842 [P.O.:305; I.V.:3090]  Out: 725 [Urine:725]    Recent Results (from the past 24 hour(s))   CBC WITH AUTOMATED DIFF    Collection Time: 22  3:43 PM   Result Value Ref Range    WBC 11.9 (H) 3.6 - 11.0 K/uL    RBC 2.75 (L) 3.80 - 5.20 M/uL    HGB 8.2 (L) 11.5 - 16.0 g/dL    HCT 25.9 (L) 35.0 - 47.0 %    MCV 94.2 80.0 - 99.0 FL    MCH 29.8 26.0 - 34.0 PG    MCHC 31.7 30.0 - 36.5 g/dL    RDW 16.6 (H) 11.5 - 14.5 %    PLATELET 93 (L) 415 - 400 K/uL    MPV 12.7 8.9 - 12.9 FL    NRBC 0.0 0.0  WBC    ABSOLUTE NRBC 0.00 0.00 - 0.01 K/uL    NEUTROPHILS 88 (H) 32 - 75 %    LYMPHOCYTES 8 (L) 12 - 49 %    MONOCYTES 3 (L) 5 - 13 %    EOSINOPHILS 0 0 - 7 %    BASOPHILS 0 0 - 1 %    IMMATURE GRANULOCYTES 1 (H) 0 - 0.5 %    ABS. NEUTROPHILS 10.4 (H) 1.8 - 8.0 K/UL    ABS. LYMPHOCYTES 1.0 0.8 - 3.5 K/UL    ABS. MONOCYTES 0.4 0.0 - 1.0 K/UL    ABS. EOSINOPHILS 0.0 0.0 - 0.4 K/UL    ABS. BASOPHILS 0.0 0.0 - 0.1 K/UL    ABS. IMM. GRANS. 0.2 (H) 0.00 - 0.04 K/UL    DF AUTOMATED          XR CHEST PORT   Final Result   Findings/impression:      Left perihilar and right lower lobe patchy airspace disease present, similar to   prior study. Small bilateral effusions are present. Right PICC line projects   over SVC. Slightly enlarged cardiomediastinal silhouette noted similar to prior   study. No pneumothorax. No overt edema. No significant interval change. NM ACUTE GI BLEED SCAN   Final Result   Negative GI bleed scan.       XR CHEST PORT   Final Result   Mild improvement in the right lower lobe      IR THORACENTESIS NDL PUNC ASP W IMAGE   Final Result   1. Previously described small hepatic metastases are sonographically occult,   therefore targeted liver biopsy could not be performed. 2.  Small left pleural effusion. 3.  Successful left thoracentesis. Fluid samples submitted for cytologic   analysis. IR ULTRASOUND ABDOMEN LTD   Final Result   1. Previously described small hepatic metastases are sonographically occult,   therefore targeted liver biopsy could not be performed. 2.  Small left pleural effusion. 3.  Successful left thoracentesis. Fluid samples submitted for cytologic   analysis. XR CHEST PORT   Final Result      XR CHEST PORT   Final Result   Findings/impression:      Stable positioning of right upper extremity PICC, tip projects over the upper   SVC. Cardiac silhouette is enlarged. No pulmonary edema. Bilateral pleural effusions. No evidence of pneumothorax. No acute osseous abnormality identified. DUPLEX UPPER EXT VENOUS BILAT   Final Result      XR CHEST PORT   Final Result   Large effusions. Vascular congestion. CT ABD PELV W CONT   Final Result   1. There are increasing bilateral pleural effusions, increasing body wall   edema, and increasing ascites. These findings could be secondary to the third   spacing of fluids. The differential diagnosis for the ascites would include   pancreatic ascites with acute pancreatitis or metastatic disease to the   peritoneum. 2.  There is a biliary stent which is unchanged in its position traversing   throughout area of obstruction within the pancreatic head. 3.  There are multiple low-density lesions of the liver without short-term   interval changes consistent with metastatic disease. 4.  The right kidney is not identified and apparently the patient is status post   a previous right nephrectomy. 5.  There are bilateral adrenal masses suspect for metastatic disease without   short-term interval changes.             CTA CHEST W OR W WO CONT   Final Result   No CT evidence for PE. Thoracic aorta atherosclerosis. CAD. Moderate to large bilateral pleural effusions with associated RML, RLL, and LLL   compressive atelectasis. Abdominal ascites. DUPLEX LOWER EXT VENOUS BILAT   Final Result      XR CHEST PORT   Final Result   FINDINGS: IMPRESSION: 2 frontal views of the chest. Right PICC distal tip SVC   region. Enlarging cardiomegaly. Increasing vascular congestion. Interval development of   hypoinflation, pleural effusions, lower lung airspace edema and/or pneumonia. No   pneumothorax. No free air under the diaphragm. CT ABD PELV W CONT   Final Result   New moderate volume dependent pleural effusions with associated   lower lobe lung compressive atelectasis. Increasing ascites, moderate approaching large-volume   (fluid could be sampled if there is any clinical concern for bile content). High suspicion hepatic metastases. Similar appearance for the pancreas; Silastic stent in place. No pseudocyst formation. Unchanged appearance bilateral adrenal glands. No bowel obstruction. US ABD LTD   Final Result   Small amount of free fluid. Finding suggesting hemangioma in the   liver. Gallbladder wall thickening, adenomyomatosis, sludge and gallstones. Cholecystitis possible. Finding in the region of the pancreatic head as above. Other findings as above. CTA ABDOMEN PELV W CONT   Final Result   1. Ill-defined hypodense mass in the pancreas. There are inflammatory changes   and fluid adjacent to the pancreas or duodenum and stomach most likely related   to biopsy or focal pancreatitis. No pseudocyst formation, active bleeding or   other acute findings. 2. Enlarged adrenal glands left greater than right with noncontrast densities   consistent with adrenal adenomas. 3. Right nephrectomy. 4. Other findings as described.       XR CHEST PORT   Final Result   No acute findings. IR PARACENTESIS ABD W IMAGE    (Results Pending)        PHYSICAL EXAM:    Physical Exam  Vitals and nursing note reviewed. Constitutional:       Appearance: She is obese. She is ill-appearing. HENT:      Head: Normocephalic and atraumatic. Eyes:      General: Scleral icterus present. Cardiovascular:      Rate and Rhythm: Normal rate and regular rhythm. Pulmonary:      Effort: No respiratory distress. Breath sounds: No wheezing. Comments: On nasal canula  Abdominal:      General: Bowel sounds are normal. There is no distension. Tenderness: There is no abdominal tenderness. Musculoskeletal:      Right lower leg: Edema present. Left lower leg: Edema present. Skin:     Capillary Refill: Capillary refill takes less than 2 seconds. Coloration: Skin is jaundiced. Neurological:      Mental Status: She is alert and oriented to person, place, and time. Psychiatric:      Comments: Flat affect          Data Review    Recent Results (from the past 24 hour(s))   CBC WITH AUTOMATED DIFF    Collection Time: 01/19/22  3:43 PM   Result Value Ref Range    WBC 11.9 (H) 3.6 - 11.0 K/uL    RBC 2.75 (L) 3.80 - 5.20 M/uL    HGB 8.2 (L) 11.5 - 16.0 g/dL    HCT 25.9 (L) 35.0 - 47.0 %    MCV 94.2 80.0 - 99.0 FL    MCH 29.8 26.0 - 34.0 PG    MCHC 31.7 30.0 - 36.5 g/dL    RDW 16.6 (H) 11.5 - 14.5 %    PLATELET 93 (L) 729 - 400 K/uL    MPV 12.7 8.9 - 12.9 FL    NRBC 0.0 0.0  WBC    ABSOLUTE NRBC 0.00 0.00 - 0.01 K/uL    NEUTROPHILS 88 (H) 32 - 75 %    LYMPHOCYTES 8 (L) 12 - 49 %    MONOCYTES 3 (L) 5 - 13 %    EOSINOPHILS 0 0 - 7 %    BASOPHILS 0 0 - 1 %    IMMATURE GRANULOCYTES 1 (H) 0 - 0.5 %    ABS. NEUTROPHILS 10.4 (H) 1.8 - 8.0 K/UL    ABS. LYMPHOCYTES 1.0 0.8 - 3.5 K/UL    ABS. MONOCYTES 0.4 0.0 - 1.0 K/UL    ABS. EOSINOPHILS 0.0 0.0 - 0.4 K/UL    ABS. BASOPHILS 0.0 0.0 - 0.1 K/UL    ABS. IMM.  GRANS. 0.2 (H) 0.00 - 0.04 K/UL    DF AUTOMATED          Assessment/Plan: Principal Problem:    Pancreatitis (12/9/2021)    Active Problems:    A-fib ([address] (11/16/2020)      CHF (congestive heart failure) ([facility] [address] (11/16/2020)      Hematemesis (12/12/2021)      History of peptic ulcer disease (12/12/2021)      Pancreatic mass (1/5/2022)        Hospital Course:    Davis Blanchard a 24-year-old woman with a PMH significant for atrial fibrillation, PUD, CHF, COPD on home O2 at 2 L, renal cell carcinoma status post nephrectomy, hypertension and morbid obesity. Patient was found to have obstructive jaundice and biliary stricture in early November of this year.  She underwent an ERCP with stent placement with brushings of the bile duct where there was noted to be a stricture.  These brushings were negative.  Patient then underwent a PET CT which demonstrated uptake in the head of the pancreas concerning for pancreatic mass. She subsequently underwent endoscopic ultrasound with ultrasound and FNA biopsy on 12/6/2021.  The procedure demonstrated abutment of the tumor with the portal vein without involvement of the SMA or SMV.  The total tumor was measured to be 2.7 cm by 3 cm.  This biopsy resulted as atypical cells with suspicion for malignancy. She admitted to the hospital on 12/9/2021 and severe pancreatitis after undergoing an EUS for pancreatic biopsy on 12/6/2021 and pancreatic mass.  Her symptoms were abdominal pain, nausea and vomiting.  CT scan revealing peripancreatic inflammation consistent with likely post biopsy pancreatitis and ascites.  Her initial labs revealing elevated lipase, supratherapeutic INR and elevated D-dimer coagulopathy.  Wilhelmenia Martini was positive for bilateral pleural effusions, negative for PE.  Paracentesis with culture of ascitic fluid, blood cultures, urine cultures negative for growth.  Patient had been started on IV meropenem for leukocytosis and anidulafungin for suspected intra-abdominal infection.  CT with and without contrast revealing liver lesions consistent with metastasis, mass in the pancreatic head with biliary stent in place, bilateral pleural effusions, ascites and bilateral adrenal masses.  IR declined liver biopsy due to coagulopathy and felt she would not benefit.  Doppler studies of upper and lower extremities negative for DVT, positive for superficial thrombophlebitis of left medial antecubital.  Pancreatic cancer is presumed but no firm diagnosis due to lack of tissue sample.  Pancreatitis remaining persistent despite antibiotics.  Attempted Dobbhoff and PEG J placement but failed due to reddened area presumed possible tumor infiltration in the duodenum.  Due to poor performance and prognosis she is not a candidate for palliative XRT or chemo.  The patient has been progressively worsening with debilitation, intermittently tolerating diet not stable to be discharged home with outpatient surgical oncology follow-up. [facility] would benefit from transfer to tertiary care center where surgical oncology services are available for evaluation of pancreatic head mass and possible surgical intervention. 1/7 A. fib with RVR rate 110-140. Cardiology consulted and anticoagulation discontinued due to bleeding and liver failure. Patient is agreeable to hospice. Transition IV fluconazole to fluconazole p.o. Patient will be transitioned from IV Zosyn to levaquin once accepted into long-term care with hospice. 1. Acute pancreatitis  - Status post EUS by Dr. Roland Horner 12/6  - Continue pain medication  - Lipase variable, will likely be chronically elevated   - CT abd/pelvis with PO and IV contrast showing increasing bilateral pleural effusion and increasing ascites.  Also notes multiple low-density lesions of liver consistent with metastatic disease and bilateral adrenal masses suspected for metastatic disease.  - completed 14 days of IV merrem and anidulafungin   - paracentesis on 12/27 with 2300 mL clear serous fluid drained, cytology showed no growth  - Liver bx canceled as IR does not feel patient would benefit from bx at this time  -Unsuccessful attempt to place PEG J due to tumor infiltration and duodenum     2. CHF  - Holding chlorthalidone, lasix, and metoprolol due to persistent hypotension      3. Pancreatic mass/liver masses/Adrenal Masses/History of renal cell carcinoma with lung nodules s/p right nephrectomy  Multiple low-density lesions of the liver consistent with meta static disease and bilateral adrenal masses suspicious for metastatic disease  - CA 19-9 greater than 4000  - Oncology consulted - needs tissue biopsy before definitive recommendations can be made. Unable to offer palliative chemo or radiation due to poor performance. Prognosis very poor, hospice recommended   -Pathology of thoracentesis negative for malignant cells but it showed acute on chronic inflammatory cells     4. Hypokalemia-stable  - replete as needed  - given Mag sulfate - recheck in the AM   -  Potassium WNL on 1/18/2022     5. COPD - stable  - On nasal canula  - Chronic respiratory failure with 2 L of oxygen via nasal cannula at home  - Continue mucinex 1200mg BID  - CXR 1/17 no acute change     6. Leukocytosis  - Cultures negative  - Completed course of IV meropenem and andulafungin 14 days  - ID following  - Blood cultures on 1/13/2022 positive for E. Coli continue on IV Zosyn     7. A. fib  - On diltiazem   Held metoprolol HR normal now  -Echo EF of 60 to 65%  -No anticoagulation due to anemia     8.  Acute on Chronic Anemia   -hemoglobin has been slowly declining requiring repletion and she is FOBT positive  -Required transfusion hemoglobin 8.1  Total of 6 units PRBCs  -Eliquis has been discontinued due to bleeding and liver failure  - NM bleeding scan negative     9. Elevated d-dimer  - Likely multifactorial   - CTA chest negative for PE  - Duplex US lower extremities negative for DVT  - Duplex US upper extremities showing superficial thrombophlebitis in left median/antecubital veins  -Repeat upper extremity Doppler study negative  -Discontinued Eliquis due to anemia     10. Hypotension  On midodrine    11. Oral candidiasis  On 2/7 IV fluconazole will transition to p.o. DVT Prophylaxis: discontinued eliquis, on SCDs  GI Prophylaxis: protonix  Discharge and disposition barriers: tomorrow discharge to LTC with hospice    Care Plan discussed with: Patient/Family, Nurse and     Total time spent with patient: 35 minutes.

## 2022-01-19 NOTE — PROGRESS NOTES
Progress Note    Patient: Karen Beth MRN: [id number]  SSN: [id number]    YOB: 1945  Age: 78 y.o. Sex: female      Admit Date: 12/9/2021    LOS: 41 days     Subjective:   Patient followed for severe pancreatitis and she completed course of Meropenem but now on Zosyn because of E. Coli bacteremia. WBC normal with decreasing procal and CRP. Started on Fluconazole for thrush. Still pending disposition. Complaining today of generalized weakness. Grandson visiting. Objective:     Vitals:    01/18/22 1539 01/18/22 2020 01/19/22 0230 01/19/22 0747   BP: 112/70 122/60 110/63 (!) 118/54   Pulse: (!) 104 (!) 106 91 (!) 104   Resp: 18 16 16 16   Temp: 97.4 °F (36.3 °C) 97.4 °F (36.3 °C) 97.1 °F (36.2 °C) 97.8 °F (36.6 °C)   SpO2: 100% 96% 99% 98%   Weight:       Height:            Intake and Output:  Current Shift: No intake/output data recorded. Last three shifts: 01/17 1901 - 01/19 0700  In: 9940 [P.O.:305; I.V.:3090]  Out: 725 [Urine:725]    Physical Exam:     Vitals and nursing note reviewed. Constitutional:       General: She is not in acute distress. Appearance: She is obese. She is ill-appearing. HENT: white lacy exudate on buccal mucosa RESOLVED  Abdominal:  Nontender  Genitourinary:  Vaginal area not examined     Comments: External urinary device  Musculoskeletal:      Right lower leg: No edema. Left lower leg: No edema. Comments: PICC line right upper arm   Skin: multiple ecchymoses on the upper extremities, edema left forearm     Findings: No rash.       Comments: ?Stage 2 sacral wound   Neurological: asleep    Lab/Data Review:     WBC 10,400  PLT 61,000    Urinalysis with WBC 10-20, Bacteria negative  Lipase 703 <1,441 <2,244 <2,350 <2,144 99 <2,511 0 <2,484 <2,684 < 1,397 <1,719    Procal  1.75 <3.82 <15.42   CRP 11.10 <21.30  < 23.80    Serum fungitell <31 Negative    Ascitic fluid   with 41% PMNs    Blood cultures (12/20) No growth FINAL  Blood cultures (1/13) E. Coli  Urine culture (12/20) No growth FINAL  Ascitic fluid culture(12/27) No growth FINAL    Assessment:     Principal Problem:    Pancreatitis (12/9/2021)    Active Problems:    A-fib ([facility] [address] (11/16/2020)      CHF (congestive heart failure) ([facility] [address] (11/16/2020)      Hematemesis (12/12/2021)      History of peptic ulcer disease (12/12/2021)      Pancreatic mass (1/5/2022)    1. Severe pancreatitis with markedly elevated lipase, resolving  2. Pancreatic mass, possible neoplasm  3. Biliary stent  4. Sepsis with worsening leukocytosis with elevated procal and CRP, resolving, status post 14 days of Meropenem, Anidulafungin (empiric), CRP increasing   5. TPN (just started)  6. Moderate pyuria, negative bacteria, urine culture negative  7. Possible candida superinfection with vaginitis, treated  8. Ascites, status post paracentesis  9. Gram negative bacteremia with E. Coli, source unclear, Day #4/14 IV Zosyn. 10. Oral candidiasis, Day #2/7 IV Fluconazole, resolved    Comment:   WBC normal with decreasing  CRP and procal.     Plan:   1. Continue IV Zosyn pending susceptibility results  2. Continue IV Fluconazole but transition to oral reasonable  3. In am, repeat CRP and procal  4. Waiting for disposition to SNF  5.  Cleared for discharge from ID standpoint     Signed By: Rosy Yepez MD     January 19, 2022

## 2022-01-19 NOTE — PROGRESS NOTES
Problem: Falls - Risk of  Goal: *Absence of Falls  Description: Document Marilee Marking Fall Risk and appropriate interventions in the flowsheet.   Outcome: Progressing Towards Goal  Note: Fall Risk Interventions:  Mobility Interventions: Bed/chair exit alarm    Mentation Interventions: Bed/chair exit alarm    Medication Interventions: Bed/chair exit alarm    Elimination Interventions: Bed/chair exit alarm    History of Falls Interventions: Bed/chair exit alarm

## 2022-01-19 NOTE — PROGRESS NOTES
Problem: Falls - Risk of  Goal: *Absence of Falls  Description: Document Israel Granados Fall Risk and appropriate interventions in the flowsheet.   Outcome: Progressing Towards Goal  Note: Fall Risk Interventions:  Mobility Interventions: Bed/chair exit alarm    Mentation Interventions: Bed/chair exit alarm    Medication Interventions: Bed/chair exit alarm    Elimination Interventions: Bed/chair exit alarm    History of Falls Interventions: Bed/chair exit alarm         Problem: Pain  Goal: *Control of Pain  Outcome: Progressing Towards Goal     Problem: Nausea/Vomiting (Adult)  Goal: *Absence of nausea/vomiting  Outcome: Progressing Towards Goal

## 2022-01-19 NOTE — PROGRESS NOTES
Per chart and spoke with CM, patient is going hospice and LTC. Will d/c from PT caseload at this time. Please reorder if needs arise.

## 2022-01-20 NOTE — PROGRESS NOTES
Hospitalist Progress Note    Subjective:   Daily Progress Note: 1/20/2022 6:36 PM    Patient is sleepy but arousable. Patient is just waiting to be discharged to [facility] with [facility].     Current Facility-Administered Medications   Medication Dose Route Frequency    benzocaine-menthoL (CEPACOL) lozenge 1 Lozenge  1 Lozenge Mucous Membrane PRN    benzonatate (TESSALON) capsule 100 mg  100 mg Oral TID PRN    guaiFENesin (ROBITUSSIN) 100 mg/5 mL oral liquid 100 mg  100 mg Oral Q4H PRN    sertraline (ZOLOFT) tablet 25 mg  25 mg Oral QAM    fluconazole (DIFLUCAN) tablet 200 mg  200 mg Oral DAILY    calcium-vitamin D 600 mg-5 mcg (200 unit) per tablet 2 Tablet  2 Tablet Oral DAILY    piperacillin-tazobactam (ZOSYN) 3.375 g in 0.9% sodium chloride (MBP/ADV) 100 mL MBP  3.375 g IntraVENous Q8H    guaiFENesin ER (MUCINEX) tablet 1,200 mg  1,200 mg Oral Q12H    albuterol-ipratropium (DUO-NEB) 2.5 MG-0.5 MG/3 ML  3 mL Nebulization Q6H PRN    SE-Tan Plus capsule  1 Capsule Oral BID    lactated Ringers infusion  75 mL/hr IntraVENous CONTINUOUS    0.9% sodium chloride infusion 250 mL  250 mL IntraVENous PRN    0.9% sodium chloride infusion 250 mL  250 mL IntraVENous PRN    polyethylene glycol (MIRALAX) packet 17 g  17 g Oral DAILY PRN    0.9% sodium chloride infusion 250 mL  250 mL IntraVENous PRN    pantoprazole (PROTONIX) 40 mg in 0.9% sodium chloride 10 mL injection  40 mg IntraVENous Q12H    loperamide (IMODIUM) capsule 2 mg  2 mg Oral Q4H PRN    [Held by provider] metoprolol tartrate (LOPRESSOR) tablet 25 mg  25 mg Oral Q12H    0.9% sodium chloride infusion 250 mL  250 mL IntraVENous PRN    cholecalciferol (VITAMIN D3) (5000 Units/125 mcg) tablet 5,000 Units  5,000 Units Oral Q7D    atorvastatin (LIPITOR) tablet 40 mg  40 mg Oral QHS    [Held by provider] furosemide (LASIX) tablet 40 mg  40 mg Oral DAILY    influenza vaccine 2021-22 (6 mos+)(PF) (FLUARIX/FLULAVAL/FLUZONE QUAD) injection 0.5 mL 1 Each IntraMUSCular PRIOR TO DISCHARGE    [Held by provider] apixaban (ELIQUIS) tablet 2.5 mg  2.5 mg Oral BID    midodrine (PROAMATINE) tablet 10 mg  10 mg Oral TID WITH MEALS    sodium chloride (NS) flush 5-40 mL  5-40 mL IntraVENous PRN    zinc oxide-white petrolatum 17-57 % topical paste   Topical TID    cyclobenzaprine (FLEXERIL) tablet 10 mg  10 mg Oral TID PRN    HYDROmorphone (DILAUDID) injection 1 mg  1 mg IntraVENous Q4H PRN    oxyCODONE IR (ROXICODONE) tablet 5 mg  5 mg Oral Q6H PRN    docusate sodium (COLACE) capsule 100 mg  100 mg Oral BID    sorbitoL 70 % solution 30 mL  30 mL Oral DAILY PRN    bisacodyL (DULCOLAX) suppository 10 mg  10 mg Rectal DAILY PRN    hydrALAZINE (APRESOLINE) 20 mg/mL injection 20 mg  20 mg IntraVENous Q6H PRN    [Held by provider] chlorthalidone (HYGROTON) tablet 25 mg  25 mg Oral DAILY    albuterol (PROVENTIL HFA, VENTOLIN HFA, PROAIR HFA) inhaler 2 Puff  2 Puff Inhalation Q6H PRN    ascorbic acid (vitamin C) (VITAMIN C) tablet 500 mg  500 mg Oral DAILY    diazePAM (VALIUM) tablet 5 mg  5 mg Oral Q6H PRN    dilTIAZem ER (CARDIZEM CD) capsule 120 mg  120 mg Oral DAILY    potassium chloride SR (KLOR-CON 10) tablet 20 mEq  20 mEq Oral DAILY    traZODone (DESYREL) tablet 50 mg  50 mg Oral QHS    ondansetron (ZOFRAN) injection 4 mg  4 mg IntraVENous Q6H PRN    acetaminophen (TYLENOL) tablet 650 mg  650 mg Oral Q4H PRN    prochlorperazine (COMPAZINE) with saline injection 10 mg  10 mg IntraVENous Q6H PRN        Review of Systems  Review of Systems   Constitutional: Negative. Respiratory: Positive for cough, hemoptysis and shortness of breath. Cardiovascular: Negative. Gastrointestinal: Positive for abdominal pain. Negative for nausea and vomiting. All other systems reviewed and are negative.            Objective:     Visit Vitals  /64   Pulse 83   Temp 97.6 °F (36.4 °C)   Resp 18   Ht 5' 7\" (1.702 m)   Wt 94.1 kg (207 lb 7.3 oz)   SpO2 98% BMI 32.49 kg/m²    O2 Flow Rate (L/min): 3 l/min O2 Device: Nasal cannula    Temp (24hrs), Av °F (36.7 °C), Min:97.6 °F (36.4 °C), Max:98.2 °F (36.8 °C)      No intake/output data recorded.  1901 -  0700  In: 2193.8 [P.O.:305; I.V.:1888.8]  Out: 625 [Urine:625]    Recent Results (from the past 24 hour(s))   COVID-19 RAPID TEST    Collection Time: 22  6:19 PM   Result Value Ref Range    Specimen source Nasopharyngeal      COVID-19 rapid test Not Detected Not Detected     GLUCOSE, POC    Collection Time: 22  7:24 PM   Result Value Ref Range    Glucose (POC) 199 (H) 65 - 117 mg/dL    Performed by Werner Babinski    PROTHROMBIN TIME + INR    Collection Time: 22  6:38 AM   Result Value Ref Range    Prothrombin time 37.0 (H) 11.9 - 14.7 sec    INR 3.8 (H) 0.9 - 1.1     C REACTIVE PROTEIN, QT    Collection Time: 22  6:38 AM   Result Value Ref Range    C-Reactive protein 5.74 (H) 0.00 - 0.60 mg/dL   PROCALCITONIN    Collection Time: 22  6:38 AM   Result Value Ref Range    Procalcitonin 0.98 (H) 0 ng/mL   METABOLIC PANEL, COMPREHENSIVE    Collection Time: 22  6:38 AM   Result Value Ref Range    Sodium 138 136 - 145 mmol/L    Potassium 3.7 3.5 - 5.1 mmol/L    Chloride 107 97 - 108 mmol/L    CO2 26 21 - 32 mmol/L    Anion gap 5 5 - 15 mmol/L    Glucose 56 (L) 65 - 100 mg/dL    BUN 19 6 - 20 mg/dL    Creatinine 0.74 0.55 - 1.02 mg/dL    BUN/Creatinine ratio 26 (H) 12 - 20      GFR est AA >60 >60 ml/min/1.73m2    GFR est non-AA >60 >60 ml/min/1.73m2    Calcium 7.7 (L) 8.5 - 10.1 mg/dL    Bilirubin, total 6.8 (H) 0.2 - 1.0 mg/dL    AST (SGOT) 82 (H) 15 - 37 U/L    ALT (SGPT) 80 (H) 12 - 78 U/L    Alk.  phosphatase 698 (H) 45 - 117 U/L    Protein, total 3.8 (L) 6.4 - 8.2 g/dL    Albumin 0.7 (L) 3.5 - 5.0 g/dL    Globulin 3.1 2.0 - 4.0 g/dL    A-G Ratio 0.2 (L) 1.1 - 2.2     CBC WITH AUTOMATED DIFF    Collection Time: 22  6:38 AM   Result Value Ref Range    WBC 12.5 (H) 3.6 - 11.0 K/uL    RBC 2.52 (L) 3.80 - 5.20 M/uL    HGB 7.5 (L) 11.5 - 16.0 g/dL    HCT 24.2 (L) 35.0 - 47.0 %    MCV 96.0 80.0 - 99.0 FL    MCH 29.8 26.0 - 34.0 PG    MCHC 31.0 30.0 - 36.5 g/dL    RDW 17.0 (H) 11.5 - 14.5 %    PLATELET 88 (L) 815 - 400 K/uL    MPV 12.0 8.9 - 12.9 FL    NRBC 0.0 0.0  WBC    ABSOLUTE NRBC 0.00 0.00 - 0.01 K/uL    NEUTROPHILS 85 (H) 32 - 75 %    LYMPHOCYTES 9 (L) 12 - 49 %    MONOCYTES 3 (L) 5 - 13 %    EOSINOPHILS 1 0 - 7 %    BASOPHILS 0 0 - 1 %    IMMATURE GRANULOCYTES 2 (H) 0 - 0.5 %    ABS. NEUTROPHILS 10.8 (H) 1.8 - 8.0 K/UL    ABS. LYMPHOCYTES 1.1 0.8 - 3.5 K/UL    ABS. MONOCYTES 0.3 0.0 - 1.0 K/UL    ABS. EOSINOPHILS 0.1 0.0 - 0.4 K/UL    ABS. BASOPHILS 0.0 0.0 - 0.1 K/UL    ABS. IMM. GRANS. 0.2 (H) 0.00 - 0.04 K/UL    DF AUTOMATED          XR CHEST PORT   Final Result   Findings/impression:      Left perihilar and right lower lobe patchy airspace disease present, similar to   prior study. Small bilateral effusions are present. Right PICC line projects   over SVC. Slightly enlarged cardiomediastinal silhouette noted similar to prior   study. No pneumothorax. No overt edema. No significant interval change. NM ACUTE GI BLEED SCAN   Final Result   Negative GI bleed scan. XR CHEST PORT   Final Result   Mild improvement in the right lower lobe      IR THORACENTESIS NDL PUNC ASP W IMAGE   Final Result   1. Previously described small hepatic metastases are sonographically occult,   therefore targeted liver biopsy could not be performed. 2.  Small left pleural effusion. 3.  Successful left thoracentesis. Fluid samples submitted for cytologic   analysis. IR ULTRASOUND ABDOMEN LTD   Final Result   1. Previously described small hepatic metastases are sonographically occult,   therefore targeted liver biopsy could not be performed. 2.  Small left pleural effusion. 3.  Successful left thoracentesis. Fluid samples submitted for cytologic   analysis.       XR CHEST PORT   Final Result      XR CHEST PORT   Final Result   Findings/impression:      Stable positioning of right upper extremity PICC, tip projects over the upper   SVC. Cardiac silhouette is enlarged. No pulmonary edema. Bilateral pleural effusions. No evidence of pneumothorax. No acute osseous abnormality identified. DUPLEX UPPER EXT VENOUS BILAT   Final Result      XR CHEST PORT   Final Result   Large effusions. Vascular congestion. CT ABD PELV W CONT   Final Result   1. There are increasing bilateral pleural effusions, increasing body wall   edema, and increasing ascites. These findings could be secondary to the third   spacing of fluids. The differential diagnosis for the ascites would include   pancreatic ascites with acute pancreatitis or metastatic disease to the   peritoneum. 2.  There is a biliary stent which is unchanged in its position traversing   throughout area of obstruction within the pancreatic head. 3.  There are multiple low-density lesions of the liver without short-term   interval changes consistent with metastatic disease. 4.  The right kidney is not identified and apparently the patient is status post   a previous right nephrectomy. 5.  There are bilateral adrenal masses suspect for metastatic disease without   short-term interval changes. CTA CHEST W OR W WO CONT   Final Result   No CT evidence for PE. Thoracic aorta atherosclerosis. CAD. Moderate to large bilateral pleural effusions with associated RML, RLL, and LLL   compressive atelectasis. Abdominal ascites. DUPLEX LOWER EXT VENOUS BILAT   Final Result      XR CHEST PORT   Final Result   FINDINGS: IMPRESSION: 2 frontal views of the chest. Right PICC distal tip SVC   region. Enlarging cardiomegaly. Increasing vascular congestion. Interval development of   hypoinflation, pleural effusions, lower lung airspace edema and/or pneumonia. No   pneumothorax.       No free air under the diaphragm. CT ABD PELV W CONT   Final Result   New moderate volume dependent pleural effusions with associated   lower lobe lung compressive atelectasis. Increasing ascites, moderate approaching large-volume   (fluid could be sampled if there is any clinical concern for bile content). High suspicion hepatic metastases. Similar appearance for the pancreas; Silastic stent in place. No pseudocyst formation. Unchanged appearance bilateral adrenal glands. No bowel obstruction. US ABD LTD   Final Result   Small amount of free fluid. Finding suggesting hemangioma in the   liver. Gallbladder wall thickening, adenomyomatosis, sludge and gallstones. Cholecystitis possible. Finding in the region of the pancreatic head as above. Other findings as above. CTA ABDOMEN PELV W CONT   Final Result   1. Ill-defined hypodense mass in the pancreas. There are inflammatory changes   and fluid adjacent to the pancreas or duodenum and stomach most likely related   to biopsy or focal pancreatitis. No pseudocyst formation, active bleeding or   other acute findings. 2. Enlarged adrenal glands left greater than right with noncontrast densities   consistent with adrenal adenomas. 3. Right nephrectomy. 4. Other findings as described. XR CHEST PORT   Final Result   No acute findings. IR PARACENTESIS ABD W IMAGE    (Results Pending)        PHYSICAL EXAM:    Physical Exam  Vitals and nursing note reviewed. Constitutional:       Appearance: She is obese. She is ill-appearing. HENT:      Head: Normocephalic and atraumatic. Eyes:      General: Scleral icterus present. Cardiovascular:      Rate and Rhythm: Normal rate and regular rhythm. Pulmonary:      Effort: No respiratory distress. Breath sounds: No wheezing. Comments: On nasal canula  Abdominal:      General: Bowel sounds are normal. There is no distension. Tenderness:  There is no abdominal tenderness. Musculoskeletal:      Right lower leg: Edema present. Left lower leg: Edema present. Skin:     Capillary Refill: Capillary refill takes less than 2 seconds. Coloration: Skin is jaundiced. Neurological:      Mental Status: She is alert and oriented to person, place, and time. Psychiatric:      Comments: Flat affect          Data Review    Recent Results (from the past 24 hour(s))   COVID-19 RAPID TEST    Collection Time: 01/19/22  6:19 PM   Result Value Ref Range    Specimen source Nasopharyngeal      COVID-19 rapid test Not Detected Not Detected     GLUCOSE, POC    Collection Time: 01/19/22  7:24 PM   Result Value Ref Range    Glucose (POC) 199 (H) 65 - 117 mg/dL    Performed by Brita Mcardle    PROTHROMBIN TIME + INR    Collection Time: 01/20/22  6:38 AM   Result Value Ref Range    Prothrombin time 37.0 (H) 11.9 - 14.7 sec    INR 3.8 (H) 0.9 - 1.1     C REACTIVE PROTEIN, QT    Collection Time: 01/20/22  6:38 AM   Result Value Ref Range    C-Reactive protein 5.74 (H) 0.00 - 0.60 mg/dL   PROCALCITONIN    Collection Time: 01/20/22  6:38 AM   Result Value Ref Range    Procalcitonin 0.98 (H) 0 ng/mL   METABOLIC PANEL, COMPREHENSIVE    Collection Time: 01/20/22  6:38 AM   Result Value Ref Range    Sodium 138 136 - 145 mmol/L    Potassium 3.7 3.5 - 5.1 mmol/L    Chloride 107 97 - 108 mmol/L    CO2 26 21 - 32 mmol/L    Anion gap 5 5 - 15 mmol/L    Glucose 56 (L) 65 - 100 mg/dL    BUN 19 6 - 20 mg/dL    Creatinine 0.74 0.55 - 1.02 mg/dL    BUN/Creatinine ratio 26 (H) 12 - 20      GFR est AA >60 >60 ml/min/1.73m2    GFR est non-AA >60 >60 ml/min/1.73m2    Calcium 7.7 (L) 8.5 - 10.1 mg/dL    Bilirubin, total 6.8 (H) 0.2 - 1.0 mg/dL    AST (SGOT) 82 (H) 15 - 37 U/L    ALT (SGPT) 80 (H) 12 - 78 U/L    Alk.  phosphatase 698 (H) 45 - 117 U/L    Protein, total 3.8 (L) 6.4 - 8.2 g/dL    Albumin 0.7 (L) 3.5 - 5.0 g/dL    Globulin 3.1 2.0 - 4.0 g/dL    A-G Ratio 0.2 (L) 1.1 - 2.2     CBC WITH AUTOMATED DIFF    Collection Time: 01/20/22  6:38 AM   Result Value Ref Range    WBC 12.5 (H) 3.6 - 11.0 K/uL    RBC 2.52 (L) 3.80 - 5.20 M/uL    HGB 7.5 (L) 11.5 - 16.0 g/dL    HCT 24.2 (L) 35.0 - 47.0 %    MCV 96.0 80.0 - 99.0 FL    MCH 29.8 26.0 - 34.0 PG    MCHC 31.0 30.0 - 36.5 g/dL    RDW 17.0 (H) 11.5 - 14.5 %    PLATELET 88 (L) 481 - 400 K/uL    MPV 12.0 8.9 - 12.9 FL    NRBC 0.0 0.0  WBC    ABSOLUTE NRBC 0.00 0.00 - 0.01 K/uL    NEUTROPHILS 85 (H) 32 - 75 %    LYMPHOCYTES 9 (L) 12 - 49 %    MONOCYTES 3 (L) 5 - 13 %    EOSINOPHILS 1 0 - 7 %    BASOPHILS 0 0 - 1 %    IMMATURE GRANULOCYTES 2 (H) 0 - 0.5 %    ABS. NEUTROPHILS 10.8 (H) 1.8 - 8.0 K/UL    ABS. LYMPHOCYTES 1.1 0.8 - 3.5 K/UL    ABS. MONOCYTES 0.3 0.0 - 1.0 K/UL    ABS. EOSINOPHILS 0.1 0.0 - 0.4 K/UL    ABS. BASOPHILS 0.0 0.0 - 0.1 K/UL    ABS. IMM. GRANS. 0.2 (H) 0.00 - 0.04 K/UL    DF AUTOMATED          Assessment/Plan:     Principal Problem:    Pancreatitis (12/9/2021)    Active Problems:    A-fib ([facility] [address] (11/16/2020)      CHF (congestive heart failure) ([facility] [address] (11/16/2020)      Hematemesis (12/12/2021)      History of peptic ulcer disease (12/12/2021)      Pancreatic mass (1/5/2022)        Hospital Course:    Naomi James a 81-year-old woman with a PMH significant for atrial fibrillation, PUD, CHF, COPD on home O2 at 2 L, renal cell carcinoma status post nephrectomy, hypertension and morbid obesity. Patient was found to have obstructive jaundice and biliary stricture in early November of this year.  She underwent an ERCP with stent placement with brushings of the bile duct where there was noted to be a stricture.  These brushings were negative.  Patient then underwent a PET CT which demonstrated uptake in the head of the pancreas concerning for pancreatic mass.  She subsequently underwent endoscopic ultrasound with ultrasound and FNA biopsy on 12/6/2021.  The procedure demonstrated abutment of the tumor with the portal vein without involvement of the SMA or SMV.  The total tumor was measured to be 2.7 cm by 3 cm.  This biopsy resulted as atypical cells with suspicion for malignancy. She admitted to the hospital on 12/9/2021 and severe pancreatitis after undergoing an EUS for pancreatic biopsy on 12/6/2021 and pancreatic mass.  Her symptoms were abdominal pain, nausea and vomiting.  CT scan revealing peripancreatic inflammation consistent with likely post biopsy pancreatitis and ascites.  Her initial labs revealing elevated lipase, supratherapeutic INR and elevated D-dimer coagulopathy. Azzie [place] was positive for bilateral pleural effusions, negative for PE.  Paracentesis with culture of ascitic fluid, blood cultures, urine cultures negative for growth.  Patient had been started on IV meropenem for leukocytosis and anidulafungin for suspected intra-abdominal infection.  CT with and without contrast revealing liver lesions consistent with metastasis, mass in the pancreatic head with biliary stent in place, bilateral pleural effusions, ascites and bilateral adrenal masses. Juan Jose Bashir declined liver biopsy due to coagulopathy and felt she would not benefit.  Doppler studies of upper and lower extremities negative for DVT, positive for superficial thrombophlebitis of left medial antecubital.  Pancreatic cancer is presumed but no firm diagnosis due to lack of tissue sample.  Pancreatitis remaining persistent despite antibiotics.   Attempted Dobbhoff and PEG J placement but failed due to reddened area presumed possible tumor infiltration in the duodenum.  Due to poor performance and prognosis she is not a candidate for palliative XRT or chemo.  The patient has been progressively worsening with debilitation, intermittently tolerating diet not stable to be discharged home with outpatient surgical oncology follow-up. Brittney Ge would benefit from transfer to tertiary care center where surgical oncology services are available for evaluation of pancreatic head mass and possible surgical intervention. 1/7 A. fib with RVR rate 110-140. Cardiology consulted and anticoagulation discontinued due to bleeding and liver failure. Patient is agreeable to hospice. Transition IV fluconazole to fluconazole p.o. Patient will be transitioned from IV Zosyn to levaquin once accepted into long-term care with hospice. 1. Acute pancreatitis  - Status post EUS by Dr. Lepe Cough 12/6  - Continue pain medication  - Lipase variable, will likely be chronically elevated   - CT abd/pelvis with PO and IV contrast showing increasing bilateral pleural effusion and increasing ascites. Also notes multiple low-density lesions of liver consistent with metastatic disease and bilateral adrenal masses suspected for metastatic disease.  - completed 14 days of IV merrem and anidulafungin   - paracentesis on 12/27 with 2300 mL clear serous fluid drained, cytology showed no growth  - Liver bx canceled as IR does not feel patient would benefit from bx at this time  -Unsuccessful attempt to place PEG J due to tumor infiltration and duodenum     2. CHF  - Holding chlorthalidone, lasix, and metoprolol due to persistent hypotension      3. Pancreatic mass/liver masses/Adrenal Masses/History of renal cell carcinoma with lung nodules s/p right nephrectomy  Multiple low-density lesions of the liver consistent with meta static disease and bilateral adrenal masses suspicious for metastatic disease  - CA 19-9 greater than 4000  - Oncology consulted - needs tissue biopsy before definitive recommendations can be made. Unable to offer palliative chemo or radiation due to poor performance. Prognosis very poor, hospice recommended   -Pathology of thoracentesis negative for malignant cells but it showed acute on chronic inflammatory cells     4. Hypokalemia-stable  - replete as needed  - given Mag sulfate - recheck in the AM   -  Potassium WNL on 1/18/2022     5.  COPD - stable  - On nasal canula  - Chronic respiratory failure with 2 L of oxygen via nasal cannula at home  - Continue mucinex 1200mg BID  - CXR 1/17 no acute change     6. Leukocytosis  - Cultures negative  - Completed course of IV meropenem and andulafungin 14 days  - ID following  - Blood cultures on 1/13/2022 positive for E. Coli continue on IV Zosyn     7. A. fib  - On diltiazem   Held metoprolol HR normal now  -Echo EF of 60 to 65%  -No anticoagulation due to anemia     8. Acute on Chronic Anemia   -hemoglobin has been slowly declining requiring repletion and she is FOBT positive  -Required transfusion hemoglobin 8.1  Total of 6 units PRBCs  -Eliquis has been discontinued due to bleeding and liver failure  - NM bleeding scan negative     9. Elevated d-dimer  - Likely multifactorial   - CTA chest negative for PE  - Duplex US lower extremities negative for DVT  - Duplex US upper extremities showing superficial thrombophlebitis in left median/antecubital veins  -Repeat upper extremity Doppler study negative  -Discontinued Eliquis due to anemia     10. Hypotension  On midodrine    11. Oral candidiasis  On 2/7 IV fluconazole will transition to p.o. DVT Prophylaxis: discontinued eliquis, on SCDs  GI Prophylaxis: protonix  Discharge and disposition barriers: medically clear to hospice   tomorrow discharge to LTC with hospice    Care Plan discussed with: Patient/Family, Nurse and     Total time spent with patient: 35 minutes.

## 2022-01-20 NOTE — PROGRESS NOTES
Progress Note    Patient: Jose Martin Hazel MRN: [id number]  SSN: [id number]    YOB: 1945  Age: 78 y.o. Sex: female      Admit Date: 12/9/2021    LOS: 42 days     Subjective:   Patient followed for severe pancreatitis and she completed course of Meropenem but now on Zosyn because of E. Coli bacteremia. WBC normal with decreasing procal and CRP. Started on Fluconazole for thrush. Still pending disposition to ? Rehab ? Hospice. Patient asleep at this time. Objective:     Vitals:    01/19/22 1921 01/20/22 0157 01/20/22 0736 01/20/22 0840   BP: 130/72 134/76 117/64    Pulse: 80 82 83    Resp: 16 16 18    Temp: 98 °F (36.7 °C) 98.2 °F (36.8 °C) 97.6 °F (36.4 °C)    SpO2: 98% 97% 97% 98%   Weight:       Height:            Intake and Output:  Current Shift: No intake/output data recorded. Last three shifts: 01/18 1901 - 01/20 0700  In: 2193.8 [P.O.:305; I.V.:1888.8]  Out: 625 [Urine:625]    Physical Exam:     Vitals and nursing note reviewed. Constitutional:       General: She is not in acute distress. Appearance: She is obese. She is ill-appearing. HENT: white lacy exudate on buccal mucosa RESOLVED  Abdominal:  Nontender  Genitourinary:  Vaginal area not examined     Comments: External urinary device  Musculoskeletal:      Right lower leg: No edema. Left lower leg: No edema. Comments: PICC line right upper arm   Skin: multiple ecchymoses on the upper extremities, edema left forearm     Findings: No rash. Comments: ?Stage 2 sacral wound   Neurological: asleep    Lab/Data Review:     WBC 12,500  PLT 80,000    Urinalysis with WBC 10-20, Bacteria negative  Lipase 698 <703 <1,441 <2,244 <2,350 <2,144 99 <2,511 0 <2,484 <2,684 < 1,397 <1,719    Procal  1.22 <1.75 <3.82 <15.42   CRP 5.74 < 11.10 <21.30  < 23.80    Serum fungitell <31 Negative    Ascitic fluid   with 41% PMNs    Blood cultures (12/20) No growth FINAL  Blood cultures (1/13) E.  Coli  Urine culture (12/20) No growth FINAL  Ascitic fluid culture(12/27) No growth FINAL    Assessment:     Principal Problem:    Pancreatitis (12/9/2021)    Active Problems:    A-fib ([address] (11/16/2020)      CHF (congestive heart failure) ([facility] [address] (11/16/2020)      Hematemesis (12/12/2021)      History of peptic ulcer disease (12/12/2021)      Pancreatic mass (1/5/2022)    1. Severe pancreatitis with markedly elevated lipase, resolving  2. Pancreatic mass, possible neoplasm  3. Biliary stent  4. Sepsis with worsening leukocytosis with elevated procal and CRP, resolving, status post 14 days of Meropenem, Anidulafungin (empiric), CRP increasing   5. TPN (just started)  6. Moderate pyuria, negative bacteria, urine culture negatived  7. Possible candida superinfection with vaginitis, treated  8. Ascites, status post paracentesis  9. Gram negative bacteremia with E. Coli, source unclear, Day #5/14 IV Zosyn. 10. Oral candidiasis, Day #3/7 IV Fluconazole, resolved    Comment:   WBC now increasing but CRP and procal decreasing. Unclear why WBC increasing, not on steroids. Plan:   1. Continue IV Zosyn  2. Continue IV Fluconazole but transition to oral reasonable  3. In am, repeat CBC, procal and CRP  4. Waiting for disposition to SNF  5.  Cleared for discharge from ID standpoint     Signed By: Srinivas Miller MD     January 20, 2022

## 2022-01-20 NOTE — PROGRESS NOTES
Voice message left for financial screening/public benefits personnel. Requesting a return phone call. Patient is pending a T# for Medicaid. Accepted to [place] [id number]; and accepted for hospice care via [facility].           KARINE Felix

## 2022-01-20 NOTE — PROGRESS NOTES
Progress Note    Patient: Catarina Robledo MRN: [id number]  SSN: [id number]    YOB: 1945  Age: 78 y.o. Sex: female      Admit Date: 12/9/2021    LOS: 42 days     Subjective:   GI in consultation for  Pancreatitis- resolved.     Patient seen in room, sleepy, arousable. Fatigued. Patient on nasal cannula 3L. Bilateral arms more swollen. Ovrall poor prognosis due to clinical diagnosis of pancreatic cancer.  is 22,358. Per CM, patient is to [facility]. Hospice via [facility].      -Labs: WBC 12.5, Hgb 7.5, platelet 88 today, potassium - 3.7. Total bilirubin 6.8, ALT 80, AST 82, alk phos 698. Lipase 1,027     1/13 - Bleeding scan negative  1/12 Thoracentesis - 120ml fluid drained. 12/30 EGD with Dobhoff placement for feeding - removed by patient.   12/27/21 Paracentesis - 2300ml ascites fluid removed. 12/23/21 CT abdomen - pleural effusion, ascites, liver lesions, bilateral adrenal masses, pancreatic head lesions. 12/6/2021 EUS showing 2.7 X3 cm lesion in the area of head of pancreas with dilation of the Pancreatic duct abutting the portal vein but not involving the SMA or AMV ; Tumor T2 by endosonographic criteria ; one small lymph node in the area of head of pancreas.   11/22/2021 PET Scan    1.  FDG avid lesion in the pancreatic head consistent with malignancy. 2.  Subcentimeter focus of FDG uptake in the liver, indeterminate. 3.  FDG uptake associated with density expanding the right facet at C5-C6, possibly due to an ectatic artery.     History of Present Illness: Nevin Cole is a 78 y. o. female who is seen in consultation for pancreatitis. Charleenjess Gina has a past medical history of  Paroxysmal Atrial Fibrillation, CHF, COPD, renal cell carcinoma Stage IV s/p nephrectomy, GERD sjoren's syndrome, hypertension, and depression.  Patient under went EUS on 12/6/2021 showing 2.7 X3 cm lesion in the area of head of pancreas with dilation of the Pancreatic duct abutting the portal vein but not involving the SMA or AMV ; Tumor T2 by endosonographic criteria ; one small lymph node in the area of head of pancreas. Pathology shows rare cells present suspicious for malignancy.   She had an ERCP on 11/4 2021 ERCP; with sphincterotomy/papillotomy ERCP; with placement of endoscopic stent into biliary or pancreatic duct, including pre and postdilation and guide wire passage, when performed, including sphincterotomy, when performed, each stent. . Patient had a PET scan on 11/22/21 which shows a lesion in the pancreatic head consistent with malignancy. CTA of abdomen shows ill defined hypodense mass in the pancreas. There are inflammatory changes. Patient states she experienced nausea, vomiting and diarrhea since Monday. Her abdominal pain has progressively gotten worse. She reports a poor appetite. She does complain of increased \"burping\". Total bilirubin 1.4, AsT 36, ALT 31, Alk Phos. 217, Lipase > 3,000. Plan nothing by mouth except ice chips and sips of water with medication. IV hydration. Pain management.          Objective:     Vitals:    01/19/22 1921 01/20/22 0157 01/20/22 0736 01/20/22 0840   BP: 130/72 134/76 117/64    Pulse: 80 82 83    Resp: 16 16 18    Temp: 98 °F (36.7 °C) 98.2 °F (36.8 °C) 97.6 °F (36.4 °C)    SpO2: 98% 97% 97% 98%   Weight:       Height:            Intake and Output:  Current Shift: No intake/output data recorded. Last three shifts: 01/18 1901 - 01/20 0700  In: 2193.8 [P.O.:305; I.V.:1888.8]  Out: 625 [Urine:625]    Physical Exam:   Skin:  Extremities and face reveal no rashes. No chapin erythema. HEENT: Sclerae anicteric. Extra-occular muscles are intact. No abnormal pigmentation of the lips. The neck is supple. Cardiovascular: Regular rate and rhythm. Respiratory:  Comfortable breathing with no accessory muscle use. GI:  Abdomen nondistended, soft, and nontender. No enlargement of the liver or spleen. No masses palpable.   Rectal: Deferred  Musculoskeletal: generalized weakness. Swollen upper extremities. Neurological:  Gross memory appears intact. Patient is alert and oriented. Psychiatric:  Mood appears appropriate with judgement intact. Lymphatic:  No visible adenopathy      Lab/Data Review:  Recent Results (from the past 24 hour(s))   PROTHROMBIN TIME + INR    Collection Time: 01/19/22  3:43 PM   Result Value Ref Range    Prothrombin time 32.0 (H) 11.9 - 14.7 sec    INR 3.1 (H) 0.9 - 1.1     METABOLIC PANEL, COMPREHENSIVE    Collection Time: 01/19/22  3:43 PM   Result Value Ref Range    Sodium 136 136 - 145 mmol/L    Potassium 3.7 3.5 - 5.1 mmol/L    Chloride 103 97 - 108 mmol/L    CO2 30 21 - 32 mmol/L    Anion gap 3 (L) 5 - 15 mmol/L    Glucose 71 65 - 100 mg/dL    BUN 24 (H) 6 - 20 mg/dL    Creatinine 0.94 0.55 - 1.02 mg/dL    BUN/Creatinine ratio 26 (H) 12 - 20      GFR est AA >60 >60 ml/min/1.73m2    GFR est non-AA 58 (L) >60 ml/min/1.73m2    Calcium 8.0 (L) 8.5 - 10.1 mg/dL    Bilirubin, total 7.5 (H) 0.2 - 1.0 mg/dL    AST (SGOT) 97 (H) 15 - 37 U/L    ALT (SGPT) 89 (H) 12 - 78 U/L    Alk. phosphatase 705 (H) 45 - 117 U/L    Protein, total 4.2 (L) 6.4 - 8.2 g/dL    Albumin 0.9 (L) 3.5 - 5.0 g/dL    Globulin 3.3 2.0 - 4.0 g/dL    A-G Ratio 0.3 (L) 1.1 - 2.2     CBC WITH AUTOMATED DIFF    Collection Time: 01/19/22  3:43 PM   Result Value Ref Range    WBC 11.9 (H) 3.6 - 11.0 K/uL    RBC 2.75 (L) 3.80 - 5.20 M/uL    HGB 8.2 (L) 11.5 - 16.0 g/dL    HCT 25.9 (L) 35.0 - 47.0 %    MCV 94.2 80.0 - 99.0 FL    MCH 29.8 26.0 - 34.0 PG    MCHC 31.7 30.0 - 36.5 g/dL    RDW 16.6 (H) 11.5 - 14.5 %    PLATELET 93 (L) 409 - 400 K/uL    MPV 12.7 8.9 - 12.9 FL    NRBC 0.0 0.0  WBC    ABSOLUTE NRBC 0.00 0.00 - 0.01 K/uL    NEUTROPHILS 88 (H) 32 - 75 %    LYMPHOCYTES 8 (L) 12 - 49 %    MONOCYTES 3 (L) 5 - 13 %    EOSINOPHILS 0 0 - 7 %    BASOPHILS 0 0 - 1 %    IMMATURE GRANULOCYTES 1 (H) 0 - 0.5 %    ABS.  NEUTROPHILS 10.4 (H) 1.8 - 8.0 K/UL ABS. LYMPHOCYTES 1.0 0.8 - 3.5 K/UL    ABS. MONOCYTES 0.4 0.0 - 1.0 K/UL    ABS. EOSINOPHILS 0.0 0.0 - 0.4 K/UL    ABS. BASOPHILS 0.0 0.0 - 0.1 K/UL    ABS. IMM. GRANS. 0.2 (H) 0.00 - 0.04 K/UL    DF AUTOMATED     C REACTIVE PROTEIN, QT    Collection Time: 01/19/22  3:43 PM   Result Value Ref Range    C-Reactive protein 7.42 (H) 0.00 - 0.60 mg/dL   PROCALCITONIN    Collection Time: 01/19/22  3:43 PM   Result Value Ref Range    Procalcitonin 1.22 (H) 0 ng/mL   LIPASE    Collection Time: 01/19/22  3:43 PM   Result Value Ref Range    Lipase 1,027 (H) 73 - 393 U/L   COVID-19 RAPID TEST    Collection Time: 01/19/22  6:19 PM   Result Value Ref Range    Specimen source Nasopharyngeal      COVID-19 rapid test Not Detected Not Detected     GLUCOSE, POC    Collection Time: 01/19/22  7:24 PM   Result Value Ref Range    Glucose (POC) 199 (H) 65 - 117 mg/dL    Performed by Debbie Saavedra    PROTHROMBIN TIME + INR    Collection Time: 01/20/22  6:38 AM   Result Value Ref Range    Prothrombin time 37.0 (H) 11.9 - 14.7 sec    INR 3.8 (H) 0.9 - 1.1     C REACTIVE PROTEIN, QT    Collection Time: 01/20/22  6:38 AM   Result Value Ref Range    C-Reactive protein 5.74 (H) 0.00 - 0.60 mg/dL   PROCALCITONIN    Collection Time: 01/20/22  6:38 AM   Result Value Ref Range    Procalcitonin 0.98 (H) 0 ng/mL   METABOLIC PANEL, COMPREHENSIVE    Collection Time: 01/20/22  6:38 AM   Result Value Ref Range    Sodium 138 136 - 145 mmol/L    Potassium 3.7 3.5 - 5.1 mmol/L    Chloride 107 97 - 108 mmol/L    CO2 26 21 - 32 mmol/L    Anion gap 5 5 - 15 mmol/L    Glucose 56 (L) 65 - 100 mg/dL    BUN 19 6 - 20 mg/dL    Creatinine 0.74 0.55 - 1.02 mg/dL    BUN/Creatinine ratio 26 (H) 12 - 20      GFR est AA >60 >60 ml/min/1.73m2    GFR est non-AA >60 >60 ml/min/1.73m2    Calcium 7.7 (L) 8.5 - 10.1 mg/dL    Bilirubin, total 6.8 (H) 0.2 - 1.0 mg/dL    AST (SGOT) 82 (H) 15 - 37 U/L    ALT (SGPT) 80 (H) 12 - 78 U/L    Alk.  phosphatase 698 (H) 45 - 117 U/L Protein, total 3.8 (L) 6.4 - 8.2 g/dL    Albumin 0.7 (L) 3.5 - 5.0 g/dL    Globulin 3.1 2.0 - 4.0 g/dL    A-G Ratio 0.2 (L) 1.1 - 2.2     CBC WITH AUTOMATED DIFF    Collection Time: 01/20/22  6:38 AM   Result Value Ref Range    WBC 12.5 (H) 3.6 - 11.0 K/uL    RBC 2.52 (L) 3.80 - 5.20 M/uL    HGB 7.5 (L) 11.5 - 16.0 g/dL    HCT 24.2 (L) 35.0 - 47.0 %    MCV 96.0 80.0 - 99.0 FL    MCH 29.8 26.0 - 34.0 PG    MCHC 31.0 30.0 - 36.5 g/dL    RDW 17.0 (H) 11.5 - 14.5 %    PLATELET 88 (L) 910 - 400 K/uL    MPV 12.0 8.9 - 12.9 FL    NRBC 0.0 0.0  WBC    ABSOLUTE NRBC 0.00 0.00 - 0.01 K/uL    NEUTROPHILS 85 (H) 32 - 75 %    LYMPHOCYTES 9 (L) 12 - 49 %    MONOCYTES 3 (L) 5 - 13 %    EOSINOPHILS 1 0 - 7 %    BASOPHILS 0 0 - 1 %    IMMATURE GRANULOCYTES 2 (H) 0 - 0.5 %    ABS. NEUTROPHILS 10.8 (H) 1.8 - 8.0 K/UL    ABS. LYMPHOCYTES 1.1 0.8 - 3.5 K/UL    ABS. MONOCYTES 0.3 0.0 - 1.0 K/UL    ABS. EOSINOPHILS 0.1 0.0 - 0.4 K/UL    ABS. BASOPHILS 0.0 0.0 - 0.1 K/UL    ABS. IMM. GRANS. 0.2 (H) 0.00 - 0.04 K/UL    DF AUTOMATED                XR CHEST PORT   Final Result   Findings/impression:      Left perihilar and right lower lobe patchy airspace disease present, similar to   prior study. Small bilateral effusions are present. Right PICC line projects   over SVC. Slightly enlarged cardiomediastinal silhouette noted similar to prior   study. No pneumothorax. No overt edema. No significant interval change. NM ACUTE GI BLEED SCAN   Final Result   Negative GI bleed scan. XR CHEST PORT   Final Result   Mild improvement in the right lower lobe      IR THORACENTESIS NDL PUNC ASP W IMAGE   Final Result   1. Previously described small hepatic metastases are sonographically occult,   therefore targeted liver biopsy could not be performed. 2.  Small left pleural effusion. 3.  Successful left thoracentesis. Fluid samples submitted for cytologic   analysis. IR ULTRASOUND ABDOMEN LTD   Final Result   1.   Previously described small hepatic metastases are sonographically occult,   therefore targeted liver biopsy could not be performed. 2.  Small left pleural effusion. 3.  Successful left thoracentesis. Fluid samples submitted for cytologic   analysis. XR CHEST PORT   Final Result      XR CHEST PORT   Final Result   Findings/impression:      Stable positioning of right upper extremity PICC, tip projects over the upper   SVC. Cardiac silhouette is enlarged. No pulmonary edema. Bilateral pleural effusions. No evidence of pneumothorax. No acute osseous abnormality identified. DUPLEX UPPER EXT VENOUS BILAT   Final Result      XR CHEST PORT   Final Result   Large effusions. Vascular congestion. CT ABD PELV W CONT   Final Result   1. There are increasing bilateral pleural effusions, increasing body wall   edema, and increasing ascites. These findings could be secondary to the third   spacing of fluids. The differential diagnosis for the ascites would include   pancreatic ascites with acute pancreatitis or metastatic disease to the   peritoneum. 2.  There is a biliary stent which is unchanged in its position traversing   throughout area of obstruction within the pancreatic head. 3.  There are multiple low-density lesions of the liver without short-term   interval changes consistent with metastatic disease. 4.  The right kidney is not identified and apparently the patient is status post   a previous right nephrectomy. 5.  There are bilateral adrenal masses suspect for metastatic disease without   short-term interval changes. CTA CHEST W OR W WO CONT   Final Result   No CT evidence for PE. Thoracic aorta atherosclerosis. CAD. Moderate to large bilateral pleural effusions with associated RML, RLL, and LLL   compressive atelectasis. Abdominal ascites.             DUPLEX LOWER EXT VENOUS BILAT   Final Result      XR CHEST PORT   Final Result   FINDINGS: IMPRESSION: 2 frontal views of the chest. Right PICC distal tip SVC   region. Enlarging cardiomegaly. Increasing vascular congestion. Interval development of   hypoinflation, pleural effusions, lower lung airspace edema and/or pneumonia. No   pneumothorax. No free air under the diaphragm. CT ABD PELV W CONT   Final Result   New moderate volume dependent pleural effusions with associated   lower lobe lung compressive atelectasis. Increasing ascites, moderate approaching large-volume   (fluid could be sampled if there is any clinical concern for bile content). High suspicion hepatic metastases. Similar appearance for the pancreas; Silastic stent in place. No pseudocyst formation. Unchanged appearance bilateral adrenal glands. No bowel obstruction. US ABD LTD   Final Result   Small amount of free fluid. Finding suggesting hemangioma in the   liver. Gallbladder wall thickening, adenomyomatosis, sludge and gallstones. Cholecystitis possible. Finding in the region of the pancreatic head as above. Other findings as above. CTA ABDOMEN PELV W CONT   Final Result   1. Ill-defined hypodense mass in the pancreas. There are inflammatory changes   and fluid adjacent to the pancreas or duodenum and stomach most likely related   to biopsy or focal pancreatitis. No pseudocyst formation, active bleeding or   other acute findings. 2. Enlarged adrenal glands left greater than right with noncontrast densities   consistent with adrenal adenomas. 3. Right nephrectomy. 4. Other findings as described. XR CHEST PORT   Final Result   No acute findings.       IR PARACENTESIS ABD W IMAGE    (Results Pending)       Assessment:     Principal Problem:    Pancreatitis (12/9/2021)    Active Problems:    A-fib ([address] (11/16/2020)      CHF (congestive heart failure) ([address] (11/16/2020)      Hematemesis (12/12/2021)      History of peptic ulcer disease (12/12/2021)      Pancreatic mass (1/5/2022)        Plan:   1. Pancreatitis - RESOLVED.     -12/30 s/p post pyloric tube placement. Patient removed tube on 12/31.         -Unable to place GJ tube due to  tumor in duodenum and food in the stomach and patient on Eliquis      -Lipase 1,027.       -Pain med as needed.      - S/P paracentesis 12/27/21 with removal of 2300 ml clear serous  Fluid removed      -cytology Slight acute inflammation.       -currently on Regular diet      -patient in agreement for SNF transfer, then outpatient follow up from there.   2. CHF       -lasix on hold, low BP  3. COPD       -Continue home medications       -Albuterol as needed   4. Pancreatic Mass       - 22,358      -S/p fine needle aspiration suspicious for neoplasia but not diagnostic      -CT concern for liver metastais/lesions      -IR unable to do liver biopsy at this time      -Poor Prognosis      -patient has accepted to [facility] rehab and [facility]  5. Hematemesis (resolved)  6. Afib with RVR      -Eliquis on hold 2/2 to low Hgb      -Continue diltiazem      -Cardiology has been consulted  7. Diarrhea (Improved)      - Imodium as needed  8. Anemia/GI bleed     -Reports of rectal bleeding, dark stools. Bleeding most likely from tumor invasion. If she continues to bleed, consult IR for possible embolization procedure.      -Bleeding scan is negative.      -Hgb 7.5. Monitor and transfuse as needed.   9.-Hypokalemia     Replete as needed     CMP in the am    Patient discussed with Dr Geneva Yee and agrees to above impression and plan. Thank you for allowing me to participate in this patients care    Signed By: Fei Villanueva.  CONRADO Hillman     January 20, 2022

## 2022-01-20 NOTE — PROGRESS NOTES
Problem: Falls - Risk of  Goal: *Absence of Falls  Description: Document Mei Litter Fall Risk and appropriate interventions in the flowsheet. Outcome: Progressing Towards Goal  Note: Fall Risk Interventions:  Mobility Interventions: Bed/chair exit alarm,Communicate number of staff needed for ambulation/transfer,Patient to call before getting OOB,Utilize walker, cane, or other assistive device    Mentation Interventions: Adequate sleep, hydration, pain control    Medication Interventions: Bed/chair exit alarm,Teach patient to arise slowly,Patient to call before getting OOB    Elimination Interventions: Bed/chair exit alarm,Call light in reach,Patient to call for help with toileting needs,Toilet paper/wipes in reach,Toileting schedule/hourly rounds    History of Falls Interventions: Bed/chair exit alarm,Door open when patient unattended         Problem: Pressure Injury - Risk of  Goal: *Prevention of pressure injury  Description: Document Asim Scale and appropriate interventions in the flowsheet. Outcome: Progressing Towards Goal  Note: Pressure Injury Interventions:  Sensory Interventions: Assess need for specialty bed    Moisture Interventions: Absorbent underpads,Apply protective barrier, creams and emollients,Check for incontinence Q2 hours and as needed,Internal/External urinary devices,Minimize layers    Activity Interventions: Increase time out of bed    Mobility Interventions: HOB 30 degrees or less,Float heels,Turn and reposition approx.  every two hours(pillow and wedges)    Nutrition Interventions: Document food/fluid/supplement intake,Offer support with meals,snacks and hydration    Friction and Shear Interventions: Apply protective barrier, creams and emollients,HOB 30 degrees or less,Minimize layers,Lift team/patient mobility team,Lift sheet

## 2022-01-20 NOTE — PROGRESS NOTES
Per chart review, pt is going hospice and LTC. Will d/c pt from OT caseload at this time. Please reorder if needs arise. Thank you.

## 2022-01-21 NOTE — ADT AUTH CERT NOTES
Pancreatitis - Care Day 44 (1/21/2022) by Clemon Mohs, RN       Review Entered Review Status   1/21/2022 16:04 Completed      Criteria Review      Care Day: 44 Care Date: 1/21/2022 Level of Care: Telemetry    Guideline Day 3    Level Of Care    ( ) Floor to discharge    1/21/2022 16:04:27 EST by Clemon Mohs      floor    Clinical Status    (X) * Hemodynamic stability    1/21/2022 16:04:27 EST by Clemon Mohs      105, 107/58,    ( ) * No evidence of infection requiring inpatient care    1/21/2022 16:04:27 EST by Clemon Mohs      zosyn 3.375g IV q8,    ( ) * Amylase level normal or improved    (X) * Pain absent or managed    1/21/2022 16:04:27 EST by Clemon Mohs      dilaudid 1mg IV q4 prn given x1    (X) * Diet tolerated    (X) * Renal function at baseline or acceptable for next level of care    1/21/2022 16:04:27 EST by Clemon Mohs      BUN 21/ Creat 0.78,    ( ) * Electrolyte abnormalities absent or acceptable for next level of care    1/21/2022 16:04:27 EST by Mindi Lanes glucose 54,    ( ) * Discharge plans and education understood    Activity    (X) * Ambulatory or acceptable for next level of care    1/21/2022 16:04:27 EST by Clemon Mohs      up ad merle    Routes    (X) * Oral hydration    (X) * Oral medications or regimen acceptable for next level of care    1/21/2022 16:04:27 EST by Clemon Mohs      diflucan 200mg po qd,    (X) * Oral diet or acceptable for next level of care    1/21/2022 16:04:27 EST by Clemon Mohs      reg diet    Interventions    (X) Laboratory tests    1/21/2022 16:04:27 EST by Clemon Mohs      wbc 14.5, h/h 7.8/24.6, INR 3.4, PT 34,  alk phos 787, ast 80/alt 87,    * Milestone   Additional Notes   1/21      VS: 97.9, 105, 107/58, 18, 100% 3l NC      Tx: Full code, I&O, vitals per routine      Plan:   1. Acute pancreatitis   - Status post EUS by Dr. Lepe Cough 12/6   - Continue pain medication   - Lipase variable, will likely be chronically elevated    - CT abd/pelvis with PO and IV contrast showing increasing bilateral pleural effusion and increasing ascites. Also notes multiple low-density lesions of liver consistent with metastatic disease and bilateral adrenal masses suspected for metastatic disease.   - completed 14 days of IV merrem and anidulafungin    - paracentesis on 12/27 with 2300 mL clear serous fluid drained, cytology showed no growth   - Liver bx canceled as IR does not feel patient would benefit from bx at this time   -Unsuccessful attempt to place PEG J due to tumor infiltration and duodenum       2. CHF   - Holding chlorthalidone, lasix, and metoprolol due to persistent hypotension        3. Pancreatic mass/liver masses/Adrenal Masses/History of renal cell carcinoma with lung nodules s/p right nephrectomy   Multiple low-density lesions of the liver consistent with meta static disease and bilateral adrenal masses suspicious for metastatic disease   - CA 19-9 greater than 4000   - Oncology consulted - needs tissue biopsy before definitive recommendations can be made. Unable to offer palliative chemo or radiation due to poor performance. Prognosis very poor, hospice recommended    -Pathology of thoracentesis negative for malignant cells but it showed acute on chronic inflammatory cells       4. Hypokalemia-stable   - replete as needed   - given Mag sulfate - recheck in the AM    -  Potassium WNL on 1/18/2022       5. COPD - stable   - On nasal canula   - Chronic respiratory failure with 2 L of oxygen via nasal cannula at home   - Continue mucinex 1200mg BID   - CXR 1/17 no acute change       6. Leukocytosis   - Cultures negative   - Completed course of IV meropenem and andulafungin 14 days   - ID following   - Blood cultures on 1/13/2022 positive for E. Coli continue on IV Zosyn       7. A. fib   - On diltiazem    Held metoprolol HR normal now   -Echo EF of 60 to 65%   -No anticoagulation due to anemia       8.  Acute on Chronic Anemia    -hemoglobin has been slowly declining requiring repletion and she is FOBT positive   -Required transfusion hemoglobin 8.1   Total of 6 units PRBCs   -Eliquis has been discontinued due to bleeding and liver failure   - NM bleeding scan negative       9. Elevated d-dimer   - Likely multifactorial    - CTA chest negative for PE   - Duplex US lower extremities negative for DVT   - Duplex US upper extremities showing superficial thrombophlebitis in left median/antecubital veins   -Repeat upper extremity Doppler study negative   -Discontinued Eliquis due to anemia       10. Hypotension   On midodrine       11. Oral candidiasis   On 2/7 IV fluconazole will transition to p.o. Exam:   Constitutional:        Appearance: She is obese. She is ill-appearing. HENT:       Head: Normocephalic and atraumatic. Eyes:       General: Scleral icterus present. Cardiovascular:       Rate and Rhythm: Normal rate and regular rhythm. Pulmonary:       Effort: No respiratory distress.       Breath sounds: No wheezing.       Comments: On nasal canula   Abdominal:       General: Bowel sounds are normal. There is no distension.       Tenderness: There is no abdominal tenderness. Musculoskeletal:       Right lower leg: Edema present.       Left lower leg: Edema present. Skin:      Capillary Refill: Capillary refill takes less than 2 seconds.       Coloration: Skin is jaundiced. Neurological:       Mental Status: She is alert and oriented to person, place, and time. Psychiatric:       Comments: Flat affect       GI Cons:  1. Pancreatitis - RESOLVED.     -12/30 s/p post pyloric tube placement.  Patient removed tube on 12/31.          -Unable to place GJ tube due to  tumor in duodenum and food in the stomach and patient on Eliquis       -Lipase 1,027.        -Pain med as needed.       - S/P paracentesis 12/27/21 with removal of 2300 ml clear serous  Fluid removed       -cytology Slight acute inflammation.        -currently on Regular diet       -patient in agreement for SNF transfer, then outpatient follow up from there.    2. CHF        -lasix on hold, low BP   3. COPD        -Continue home medications        -Albuterol as needed    4. Pancreatic Mass        - 22,358       -S/p fine needle aspiration suspicious for neoplasia but not diagnostic       -CT concern for liver metastais/lesions       -IR unable to do liver biopsy at this time       -Poor Prognosis       -patient has accepted to [facility] rehab and [facility]   5. Hematemesis (resolved)   6. Afib with RVR       -Eliquis on hold 2/2 to low Hgb       -Continue diltiazem       -Cardiology has been consulted   7. Diarrhea (Improved)       - Imodium as needed   8. Anemia/GI bleed      -Reports of rectal bleeding, dark stools. Bleeding most likely from tumor invasion. If she continues to bleed, consult IR for possible embolization procedure.       -Bleeding scan is negative.       -Hgb 7.8.  Monitor and transfuse as needed.    9.-Hypokalemia      Replete as needed      CMP in the am        Pancreatitis - Care Day 43 (1/20/2022) by Glo Alexander RN       Review Entered Review Status   1/21/2022 13:08 Completed      Criteria Review      Care Day: 43 Care Date: 1/20/2022 Level of Care: Telemetry    Guideline Day 3    Level Of Care    ( ) Floor to discharge    1/21/2022 13:08:04 EST by Glo Alexander      floor    Clinical Status    (X) * Hemodynamic stability    1/21/2022 13:08:04 EST by Glo Alexander      87, 95/65,    ( ) * No evidence of infection requiring inpatient care    ( ) * Amylase level normal or improved    (X) * Pain absent or managed    1/21/2022 13:08:04 EST by Glo Alexander      dilaudid 1mg IV q4 prn given x2    (X) * Diet tolerated    (X) * Renal function at baseline or acceptable for next level of care    (X) * Electrolyte abnormalities absent or acceptable for next level of care    ( ) * Discharge plans and education understood    Activity    (X) * Ambulatory or acceptable for next level of care    1/21/2022 13:08:04 EST by Adia Mariscal up ad merle    Routes    ( ) * Oral hydration    1/21/2022 13:08:04 EST by Adia Mariscal LR @ 75ml/hr IV    (X) * Oral medications or regimen acceptable for next level of care    1/21/2022 13:08:04 EST by Leisa Ashford      diflucan 200mg po qd,    (X) * Oral diet or acceptable for next level of care    1/21/2022 13:08:04 EST by Leisa Ashford      reg diet    Interventions    (X) Laboratory tests    1/21/2022 13:08:04 EST by Leisa Ashford      wbc 12.5, h/h 7.5/24.2, BUN 19/ Creat 0.74, crp 5.74 , K 3.7, alk phos 698, AST 82/ALT 80    * Milestone   Additional Notes   1/20      VS: 97.6, 87, 95/65, 18, 98% 3L NC      Meds: zosyn 3.375g IV q8, protonix 40mg IV bid,       Tx: Full code, I&O,       Plan:   1. Acute pancreatitis   - Status post EUS by Dr. Terry Owens 12/6   - Continue pain medication   - Lipase variable, will likely be chronically elevated    - CT abd/pelvis with PO and IV contrast showing increasing bilateral pleural effusion and increasing ascites. Also notes multiple low-density lesions of liver consistent with metastatic disease and bilateral adrenal masses suspected for metastatic disease.   - completed 14 days of IV merrem and anidulafungin    - paracentesis on 12/27 with 2300 mL clear serous fluid drained, cytology showed no growth   - Liver bx canceled as IR does not feel patient would benefit from bx at this time   -Unsuccessful attempt to place PEG J due to tumor infiltration and duodenum       2.  CHF   - Holding chlorthalidone, lasix, and metoprolol due to persistent hypotension        3. Pancreatic mass/liver masses/Adrenal Masses/History of renal cell carcinoma with lung nodules s/p right nephrectomy   Multiple low-density lesions of the liver consistent with meta static disease and bilateral adrenal masses suspicious for metastatic disease   - CA 19-9 greater than 4000   - Oncology consulted - needs tissue biopsy before definitive recommendations can be made. Unable to offer palliative chemo or radiation due to poor performance. Prognosis very poor, hospice recommended    -Pathology of thoracentesis negative for malignant cells but it showed acute on chronic inflammatory cells       4. Hypokalemia-stable   - replete as needed   - given Mag sulfate - recheck in the AM    -  Potassium WNL on 1/18/2022       5. COPD - stable   - On nasal canula   - Chronic respiratory failure with 2 L of oxygen via nasal cannula at home   - Continue mucinex 1200mg BID   - CXR 1/17 no acute change       6. Leukocytosis   - Cultures negative   - Completed course of IV meropenem and andulafungin 14 days   - ID following   - Blood cultures on 1/13/2022 positive for E. Coli continue on IV Zosyn       7. A. fib   - On diltiazem    Held metoprolol HR normal now   -Echo EF of 60 to 65%   -No anticoagulation due to anemia       8. Acute on Chronic Anemia    -hemoglobin has been slowly declining requiring repletion and she is FOBT positive   -Required transfusion hemoglobin 8.1   Total of 6 units PRBCs   -Eliquis has been discontinued due to bleeding and liver failure   - NM bleeding scan negative       9. Elevated d-dimer   - Likely multifactorial    - CTA chest negative for PE   - Duplex US lower extremities negative for DVT   - Duplex US upper extremities showing superficial thrombophlebitis in left median/antecubital veins   -Repeat upper extremity Doppler study negative   -Discontinued Eliquis due to anemia       10. Hypotension   On midodrine       11. Oral candidiasis   On 2/7 IV fluconazole will transition to p.o. Exam:   Constitutional:        Appearance: She is obese. She is ill-appearing. HENT:       Head: Normocephalic and atraumatic. Eyes:       General: Scleral icterus present. Cardiovascular:       Rate and Rhythm: Normal rate and regular rhythm.     Pulmonary:       Effort: No respiratory distress.       Breath sounds: No wheezing.       Comments: On nasal canula   Abdominal:       General: Bowel sounds are normal. There is no distension.       Tenderness: There is no abdominal tenderness. Musculoskeletal:       Right lower leg: Edema present.       Left lower leg: Edema present. Skin:      Capillary Refill: Capillary refill takes less than 2 seconds.       Coloration: Skin is jaundiced. Neurological:       Mental Status: She is alert and oriented to person, place, and time. Psychiatric:       Comments: Flat affect       GI Cons:  1. Pancreatitis - RESOLVED.     -12/30 s/p post pyloric tube placement. Patient removed tube on 12/31.          -Unable to place GJ tube due to  tumor in duodenum and food in the stomach and patient on Eliquis       -Lipase 1,027.        -Pain med as needed.       - S/P paracentesis 12/27/21 with removal of 2300 ml clear serous  Fluid removed       -cytology Slight acute inflammation.        -currently on Regular diet       -patient in agreement for SNF transfer, then outpatient follow up from there.    2. CHF        -lasix on hold, low BP   3. COPD        -Continue home medications        -Albuterol as needed    4. Pancreatic Mass        - 22,358       -S/p fine needle aspiration suspicious for neoplasia but not diagnostic       -CT concern for liver metastais/lesions       -IR unable to do liver biopsy at this time       -Poor Prognosis       -patient has accepted to [facility] rehab and [facility]   5. Hematemesis (resolved)   6. Afib with RVR       -Eliquis on hold 2/2 to low Hgb       -Continue diltiazem       -Cardiology has been consulted   7. Diarrhea (Improved)       - Imodium as needed   8. Anemia/GI bleed      -Reports of rectal bleeding, dark stools. Bleeding most likely from tumor invasion. If she continues to bleed, consult IR for possible embolization procedure.       -Bleeding scan is negative.       -Hgb 7.5.  Monitor and transfuse as needed.    9.-Hypokalemia    Replete as needed      CMP in the am      ID Cons:   1. Continue IV Zosyn   2. Continue IV Fluconazole but transition to oral reasonable   3. In am, repeat CBC, procal and CRP        Pancreatitis - Care Day 42 (1/19/2022) by Noni Neal       Review Entered Review Status   1/20/2022 14:02 Completed      Criteria Review      Care Day: 42 Care Date: 1/19/2022 Level of Care: Telemetry    Guideline Day 3    Clinical Status    (X) * Hemodynamic stability    1/20/2022 14:02:41 EST by Noni Neal      98.1, 94, 112/60, 16, 98% O2 3LPM    ( ) * No evidence of infection requiring inpatient care    (X) * Amylase level normal or improved    (X) * Pain absent or managed    (X) * Diet tolerated    ( ) * Renal function at baseline or acceptable for next level of care    ( ) * Electrolyte abnormalities absent or acceptable for next level of care    ( ) * Discharge plans and education understood    Activity    ( ) * Ambulatory or acceptable for next level of care    Routes    ( ) * Oral hydration    ( ) * Oral medications or regimen acceptable for next level of care    1/20/2022 14:02:41 EST by Noni Neal      DILAUDID 1MG IV X1, ZOFRAN 4MG IV X1, ZOSYN 3.375G IV Q8HR    (X) * Oral diet or acceptable for next level of care    Interventions    (X) Laboratory tests    * Milestone   Additional Notes   1-19      HOSPITALIST-       1. Acute pancreatitis   - Status post EUS by Dr. Julita Mancera 12/6   - Continue pain medication   - Lipase variable, will likely be chronically elevated    - CT abd/pelvis with PO and IV contrast showing increasing bilateral pleural effusion and increasing ascites.  Also notes multiple low-density lesions of liver consistent with metastatic disease and bilateral adrenal masses suspected for metastatic disease.   - completed 14 days of IV merrem and anidulafungin    - paracentesis on 12/27 with 2300 mL clear serous fluid drained, cytology showed no growth   - Liver bx canceled as IR does not feel patient would benefit from bx at this time   -Unsuccessful attempt to place PEG J due to tumor infiltration and duodenum       2. CHF   - Holding chlorthalidone, lasix, and metoprolol due to persistent hypotension        3. Pancreatic mass/liver masses/Adrenal Masses/History of renal cell carcinoma with lung nodules s/p right nephrectomy   Multiple low-density lesions of the liver consistent with meta static disease and bilateral adrenal masses suspicious for metastatic disease   - CA 19-9 greater than 4000   - Oncology consulted - needs tissue biopsy before definitive recommendations can be made. Unable to offer palliative chemo or radiation due to poor performance. Prognosis very poor, hospice recommended    -Pathology of thoracentesis negative for malignant cells but it showed acute on chronic inflammatory cells       4. Hypokalemia-stable   - replete as needed   - given Mag sulfate - recheck in the AM    -  Potassium WNL on 1/18/2022       5. COPD - stable   - On nasal canula   - Chronic respiratory failure with 2 L of oxygen via nasal cannula at home   - Continue mucinex 1200mg BID   - CXR 1/17 no acute change       6. Leukocytosis   - Cultures negative   - Completed course of IV meropenem and andulafungin 14 days   - ID following   - Blood cultures on 1/13/2022 positive for E. Coli continue on IV Zosyn       7. A. fib   - On diltiazem    Held metoprolol HR normal now   -Echo EF of 60 to 65%   -No anticoagulation due to anemia       8.  Acute on Chronic Anemia    -hemoglobin has been slowly declining requiring repletion and she is FOBT positive   -Required transfusion hemoglobin 8.1   Total of 6 units PRBCs   -Eliquis has been discontinued due to bleeding and liver failure   - NM bleeding scan negative       9. Elevated d-dimer   - Likely multifactorial    - CTA chest negative for PE   - Duplex US lower extremities negative for DVT   - Duplex US upper extremities showing superficial thrombophlebitis in left median/antecubital veins   -Repeat upper extremity Doppler study negative   -Discontinued Eliquis due to anemia       10. Hypotension   On midodrine                  1/19/2022 15:43   Sodium: 136   Potassium: 3.7   Chloride: 103   CO2: 30   Anion gap: 3 (L)   Glucose: 71   BUN: 24 (H)   Creatinine: 0.94   BUN/Creatinine ratio: 26 (H)   Calcium: 8.0 (L)   GFR est non-AA: 58 (L)   GFR est AA: >60   Bilirubin, total: 7.5 (H)   Protein, total: 4.2 (L)   Albumin: 0.9 (L)   Globulin: 3.3   A-G Ratio: 0.3 (L)   ALT: 89 (H)   AST: 97 (H)   Alk. phosphatase: 705 (H)   Lipase: 1,027 (H)   Procalcitonin: 1.22 (H)   C-Reactive protein: 7.42 (H)                 Pancreatitis - Care Day 41 (1/18/2022) by Naty Garcia       Review Entered Review Status   1/19/2022 08:57 Completed      Criteria Review      Care Day: 41 Care Date: 1/18/2022 Level of Care: Telemetry    Guideline Day 3    Clinical Status    ( ) * Hemodynamic stability    1/19/2022 08:57:31 EST by Naty [facility]      BP /39-70, P , MAP down to 61  T 97.4, R 18, 02 SAT 94% 3L NC    ( ) * No evidence of infection requiring inpatient care    (X) * Amylase level normal or improved    (X) * Pain absent or managed    ( ) * Diet tolerated    1/19/2022 08:57:31 EST by Naty Garcia      Nutrition note:. .. intake 0-25%. Macopin Crape Kathy Crape Pt with no appetite and no strength to eat, hesitant about being fed by others, stated eating causes her pain.  RD discussed PO for comfort only vs more aggressive TPN, pt unsure and wants to discuss with MD.    ( ) * Renal function at baseline or acceptable for next level of care    1/19/2022 08:57:31 EST by Naty Garcia      BUN 30, CREAT 1.07, GFRNAA 50    ( ) * Electrolyte abnormalities absent or acceptable for next level of care    1/19/2022 08:57:31 EST by Naty Garcia      CA 7.7,    ( ) * Discharge plans and education understood    Activity    ( ) * Ambulatory or acceptable for next level of care    Routes    ( ) * Oral hydration 1/19/2022 08:57:31 EST by Jewell Goins      LR 75ML/HR    ( ) * Oral medications or regimen acceptable for next level of care    1/19/2022 08:57:31 EST by Jewell Goins      PROTONIX 40MG IV Q 12 HR, ZOSYN 3.375G IV Q 8 HR, VIT C 500MG PO QD, CALC-VITD 2 TABS PO QD, CARDIZEM CD 120MG PO QD, DIFLUCAN 200MG IV Q 24 HR, MUCINEX 1200MG PO Q 12 HR, PROAMITINE 10MG PO TID, KCL 20MEQ PO QD, ZOLOFT 25MG PO Q POONAM,    (X) * Oral diet or acceptable for next level of care    1/19/2022 08:57:31 EST by Jewell Goins      REGULAR DIET    Interventions    (X) Laboratory tests    1/19/2022 08:57:31 EST by Jewell Goins      RBC 2.71, HGB 8.1, HCT 25.3, PLT 61, NEUTS 84, PT 29.1, INR 2.8,   TP 4.1, AST 89, ALK PHSO 702, PROCALCITONIN 1.75, C REACTV PROT 11.10    Medications    ( ) Oral analgesics    1/19/2022 08:57:31 EST by Jewell Goins      DILAUDID 1MG IV Q 4 HR PRN X1 (3X/24 HR),    * Milestone   Additional Notes   Hospitalist Progress Note         Patient is now in process of applying to medicaid for long-term care with hospice. Patient son to bring in patient last bank statement. Patient is still coughing up blood but is not requiring oxygen demand. EXAM:  Constitutional:        Appearance: She is obese. She is ill-appearing. Eyes:       General: Scleral icterus present. Cardiovascular:       Rate and Rhythm: Normal rate and regular rhythm. Pulmonary:       Effort: No respiratory distress.       Breath sounds: No wheezing.       Comments: On nasal canula   Abdominal:       General: Bowel sounds are normal. There is no distension.       Tenderness: There is no abdominal tenderness. Musculoskeletal:       Right lower leg: Edema present.       Left lower leg: Edema present. Skin:      Capillary Refill: Capillary refill takes less than 2 seconds.       Coloration: Skin is jaundiced. Neurological:       Mental Status: She is alert and oriented to person, place, and time.     Psychiatric:       Comments: Flat affect          Assessment/Plan:       ... Due to poor performance and prognosis she is not a candidate for palliative XRT or chemo.  The patient has been progressively worsening with debilitation, intermittently tolerating diet not stable to be discharged home with outpatient surgical oncology follow-up. Iwona Vickers would benefit from transfer to tertiary care center where surgical oncology services are available for evaluation of pancreatic head mass and possible surgical intervention. 1/7 A. fib with RVR rate 110-140. Cardiology consulted and anticoagulation discontinued due to bleeding and liver failure. Patient is agreeable to hospice. Patient will be transitioned from IV Zosyn and fluconazole to levaquin once accepted into long-term care with hospice.       1. Acute pancreatitis   - Status post EUS by Dr. Roland Horner 12/6   - Continue pain medication   - Lipase variable, will likely be chronically elevated    - CT abd/pelvis with PO and IV contrast showing increasing bilateral pleural effusion and increasing ascites. Also notes multiple low-density lesions of liver consistent with metastatic disease and bilateral adrenal masses suspected for metastatic disease.   - completed 14 days of IV merrem and anidulafungin    - paracentesis on 12/27 with 2300 mL clear serous fluid drained, cytology showed no growth   - Liver bx canceled as IR does not feel patient would benefit from bx at this time   -Unsuccessful attempt to place PEG J due to tumor infiltration and duodenum       2.  CHF   - Holding chlorthalidone, lasix, and metoprolol due to persistent hypotension        3. Pancreatic mass/liver masses/Adrenal Masses/History of renal cell carcinoma with lung nodules s/p right nephrectomy   Multiple low-density lesions of the liver consistent with meta static disease and bilateral adrenal masses suspicious for metastatic disease   - CA 19-9 greater than 4000   - Oncology consulted - needs tissue biopsy before definitive recommendations can be made. Unable to offer palliative chemo or radiation due to poor performance. Prognosis very poor, hospice recommended    -Pathology of thoracentesis negative for malignant cells but it showed acute on chronic inflammatory cells       4. Hypokalemia-stable   - replete as needed   - given Mag sulfate - recheck in the AM    -  Potassium WNL on 1/18/2022       5. COPD - stable   - On nasal canula   - Chronic respiratory failure with 2 L of oxygen via nasal cannula at home   - Continue mucinex 1200mg BID   - CXR 1/17 no acute change       6. Leukocytosis   - Cultures negative   - Completed course of IV meropenem and andulafungin 14 days   - ID following   - Blood cultures on 1/13/2022 positive for E. Coli continue on IV Zosyn       7. A. fib   - On diltiazem    Held metoprolol HR normal now   -Echo EF of 60 to 65%   -No anticoagulation due to anemia       8. Acute on Chronic Anemia    -hemoglobin has been slowly declining requiring repletion and she is FOBT positive   -Required transfusion hemoglobin 8.1   Total of 6 units PRBCs   -Eliquis has been discontinued due to bleeding and liver failure   - NM bleeding scan negative       9. Elevated d-dimer   - Likely multifactorial    - CTA chest negative for PE   - Duplex US lower extremities negative for DVT   - Duplex US upper extremities showing superficial thrombophlebitis in left median/antecubital veins   -Repeat upper extremity Doppler study negative   -Discontinued Eliquis due to anemia       10. Hypotension   On midodrine       DVT Prophylaxis: discontinued eliquis, on SCDs   GI Prophylaxis: protonix   Discharge and disposition barriers: tomorrow home with LTC hospice             CM NOTE:  5308   Patient is being screened for medicaid by the business office for LTC and/or PCA. Writer also reached out to [facility] and [facility] to see if they can accept patient with medicaid pending.  Awaiting a response.       1100   Received a message from Genaro Wasserman in the [employer] office stating she attempted to contact patient for medicaid screening, however the patient dismissed her saying she is asleep and requested a return call at 1pm.       1340   After requesting a return call for the 2nd time from [facility] office r/t being SOB, Josephine was finally able to speak with patient on the phone. She will update writer once screening is complete.        1415   Rocio Mota will contact patient's son for clarification re: patient's financial information.     1440   Per Rocio Mota, son will bring a copy of patient's 30day bank statement to writer tomorrow for financial screening.        [facility] with [facility] will update HUBER tomorrow re: accepting patient.  Writer will discuss with son/patient tomorrow.                Pancreatitis - Care Day 40 (1/17/2022) by Karen Gaytan       Review Entered Review Status   1/18/2022 09:07 Completed      Criteria Review      Care Day: 40 Care Date: 1/17/2022 Level of Care: Telemetry    Guideline Day 3    Clinical Status    ( ) * Hemodynamic stability    1/18/2022 09:07:17 EST by Karen Gaytan      T 98.6, BP /51-64, P , MAP down to 63,   R 18, 02 SAT 96% 3L NC    ( ) * No evidence of infection requiring inpatient care    (X) * Amylase level normal or improved    1/18/2022 09:07:17 EST by Karen Gaytan      LIPASE 703    (X) * Pain absent or managed    ( ) * Diet tolerated    ( ) * Renal function at baseline or acceptable for next level of care    1/18/2022 09:07:17 EST by Karen Gaytan      BUN 31, CREAT 1.09, GFRNAA 49    ( ) * Electrolyte abnormalities absent or acceptable for next level of care    1/18/2022 09:07:17 EST by Karen Gaytan      K 3.2, CA 7.8    ( ) * Discharge plans and education understood    Activity    ( ) * Ambulatory or acceptable for next level of care    1/18/2022 09:07:17 EST by Karen Gaytan      BEDREST    Routes    ( ) * Oral hydration    1/18/2022 09:07:17 EST by Karen Gaytan      IV LR 75ML/HR    ( ) * Oral medications or regimen acceptable for next level of care    1/18/2022 09:07:17 EST by Pily Bravo      SEE REVIEW    (X) * Oral diet or acceptable for next level of care    Interventions    (X) Laboratory tests    1/18/2022 09:07:17 EST by Pily Bravo      RBC 2.74, HGB 8.2, HCT 25.4, PLT 32, NEUTS 89, PT 25.4, INR 2.4, K 3.2, BUN 31, CREAT 1.09, GFRNAA 49, T BILI 8.0, TP 4.0, AST 69, ALK  PHOS 586, PROCALCITONIN 3.82, C REACTV PROT 14.50    Medications    ( ) Oral analgesics    1/18/2022 09:07:17 EST by Pily Bravo      DILAUDID 1MG IV Q 4 HR PRN X 2 (3X/24 HR),    * Milestone   Additional Notes   Hospitalist Progress Note      is agreeable to going home with home hospice.  Patient saying that she has increased difficulty breathing and uses a nebulizer at home. EXAM:  Constitutional:        Appearance: She is obese. She is ill-appearing. Eyes:       General: Scleral icterus present. Cardiovascular:       Rate and Rhythm: Normal rate and regular rhythm. Pulmonary:       Effort: No respiratory distress.       Breath sounds: No wheezing. Abdominal:       General: Bowel sounds are normal. There is no distension.       Tenderness: There is no abdominal tenderness. Musculoskeletal:       Right lower leg: Edema present.       Left lower leg: Edema present. Skin:      Capillary Refill: Capillary refill takes less than 2 seconds.       Coloration: Skin is jaundiced. Neurological:       Mental Status: She is alert and oriented to person, place, and time. Psychiatric:       Comments: Flat affect          CXR: Left perihilar and right lower lobe patchy airspace disease present, similar to prior study. Small bilateral effusions are present. Right PICC line projects over SVC. Slightly enlarged cardiomediastinal silhouette noted similar to prior study. No pneumothorax. No overt edema. No significant interval change.      , Assessment/Plan:      1.  Acute pancreatitis   - Status post EUS by Dr. Ricky Kelley 12/06   - Continue pain medication   - Lipase variable, will likely be chronically elevated    - CT abd/pelvis with PO and IV contrast showing increasing bilateral pleural effusion and increasing ascites. Also notes multiple low-density lesions of liver consistent with metastatic disease and bilateral adrenal masses suspected for metastatic disease.   - completed 14 days of IV merrem    - Continue empiric Anidulafungin 4 more days   - paracentesis on 12/27 with 2300 mL clear serous fluid drained, cytology showing now growth. - She was scheduled for liver biopsy with IR however IR does not feel patient would benefit from biopsy at this time.    -Unsuccessful attempt to place PEG J due to tumor infiltration and duodenum       2. CHF   - Continue chlorthalidone 25 mg daily   - Holding PO lasix 40mg daily. - holding BP meds due to hypotension today       3. Pancreatic mass/Liver Masses/Adrenal Masses/History of renal cell carcinoma with lung nodules s/p right nephrectomy   Multiple low-density lesions of the liver consistent with meta static disease and bilateral adrenal masses suspicious for metastatic disease   - CA 19-9 greater than 4000   --Oncology consulted-will need tissue biopsy before definitive recommendations can be made.  Unable to offer palliative chemo or radiation due to poor performance.  Prognosis very poor, hospice recommended    -Pathology of thoracentesis negative for malignant cells but it showed acute on chronic inflammatory cells       4. Hypokalemia-stable   - replete as needed   - given Mag sulfate - recheck in the AM    -  Potassium WNL on 1/16/2022       5. COPD-stable   - Pulmonary consultation   - Chronic respiratory failure with 2 L of oxygen via nasal cannula at home   - Increase mucinex to 1200mg BID   - She did become hypoxic. Lungs sound clear.  Will consider chest xray if no improvement.       6. Leukocytosis   - Cultures negative   - Completed course of IV meropenem and Andulafungin 14 days   - ID consulted   - repeat UA pending   - Blood cultures on 1/13/2022 positive for E. Coli continue on IV Zosyn       7. A. fib   -On diltiazem and metoprolol.   - HR elevated. This is before BP meds. Will get a EKG. If still in a fib with RVR will consider diltiazem drip. -2D echo showed normal systolic function and diastolic function with an EF of 60 to 65%   -No anticoagulation due to anemia       8. Acute on Chronic Anemia    -Her hemoglobin has been slowly declining requiring repletion and she is FOBT positive   -Required transfusion hemoglobin pending for today   Total of 6 units PRBCs   -Eliquis has been discontinued due to bleeding and liver failure   Cardiology consulted   - NM bleeding scan negative       9. Elevated d-dimer   - Likely multifactorial    - CTA chest negative for PE   - Duplex US lower extremities negative for DVT   - Duplex US upper extremities showing superficial thrombophlebitis in left median/antecubital veins   -Repeat upper extremity Doppler study negative   -Discontinued Eliquis due to anemia       10. Hypotension   On midodrine       DVT Prophylaxis: discontinued eliquis, on SCDs   GI Prophylaxis: protonix   Discharge and disposition barriers: tomorrow home with home hospice         CM NOTE:  4134   CM reviewed the clinical record and reached out to [facility] via [facility] to see if [facility]rera [facility] has been able to contact patient.  Awaiting a response.        1045   Call placed to [facility], spoke with Rut Arreaga who stated Madeline Virgen, the , is on his way to speak with patient.        1145   Spoke with Kelby Kline, with [facility] who stated patient is good to go home with hospice, however they do not provide 24hr personal care and patient does not allow her son to clean her.        1430   CM met with patient at the bedside who stated she has not spoken to her son re: hospice and her provider has told her she may be septic and her O2 sats are decreasing and that she needs to rest. Guerline Chen, patient's provider informed.            [phone]   email sent to the business for Medicaid screening of a PCA.             DIFLUCAN 400MG IV X1, ZOSYN 3.375G IV Q 8 HR, ZOFRAN 4 MG IV Q 6 HR PRN X1, PROTONIX 40MG IV Q 1 2HR, VIT C 500MG PO QD, VIT D3 5000U PO QD, VIT C 500MG PO QD, CARDIZEM CD 120MG PO QD, MUCINEX 1200MG PO Q 12 HR, PROAMITINE 10MG PO TID, KCL 20MEQ PO QD, SE-TAN PLUS  CAP PO BID,  ZOLOFT 25MG PO Q POONAM,            Pancreatitis - Care Day 39 (1/16/2022) by Maria L Goodwin       Review Entered Review Status   1/17/2022 10:17 Completed      Criteria Review      Care Day: 39 Care Date: 1/16/2022 Level of Care: Telemetry    Guideline Day 3    Clinical Status    ( ) * Hemodynamic stability    1/17/2022 10:17:54 EST by Maria L Goodwin      T 98.5, BP 94/59, P 101-110, R 20, 02 SAT 95% 3L NC    ( ) * No evidence of infection requiring inpatient care    (X) * Amylase level normal or improved    1/17/2022 10:17:54 EST by Maria L Goodwin      LIPASE 773    (X) * Pain absent or managed    ( ) * Diet tolerated    ( ) * Renal function at baseline or acceptable for next level of care    1/17/2022 10:17:54 EST by Maria L Goodwin      BUN 28, CREAT 1.04, GFRNAA 52    ( ) * Electrolyte abnormalities absent or acceptable for next level of care    1/17/2022 10:17:54 EST by Maria L Goodwin      CA 7.7    ( ) * Discharge plans and education understood    Activity    ( ) * Ambulatory or acceptable for next level of care    Routes    (X) * Oral hydration    ( ) * Oral medications or regimen acceptable for next level of care    (X) * Oral diet or acceptable for next level of care    Interventions    (X) Laboratory tests    1/17/2022 10:17:54 EST by Maria L Goodwin      WBC 12.2, RBC 2.67, HGB 8.0, HCT 25.0, PLT 42, NEUTS 93, PT 25.0, INR 2.3, LIPASE 773    * Milestone   Additional Notes   Hospitalist Progress Note      Patient says that she is a little more short of breath today.  Denies any midsternal chest pain. EXAM:  Constitutional: No acute distress, chronically ill appearing   Skin: Extremities and face reveal no rashes. HEENT: Sclerae anicteric. Extra-occular muscles are intact. Cardiovascular: irregular   Respiratory:  nonlabored   GI: Abdomen nondistended, soft, and nontender. Normal active bowel sounds. Musculoskeletal: No pitting edema of the lower legs. Able to move all ext   Neurological:  Patient is alert and oriented. Cranial nerves II-XII grossly intact   Psychiatric: flat affect, depressed mood          Assessment / Plan:           1. Acute pancreatitis-Patient is more jaundice this AM. Will get a hepatic panel in the AM.   2. CHF-- Continue chlorthalidone 25 mg daily.- Holding PO lasix 40mg daily. - holding BP meds due to hypotension today.  3. Pancreatic mass/Liver Masses/Adrenal Masses/History of renal cell carcinoma with lung nodules s/p right nephrectomy   Multiple low-density lesions of the liver consistent with meta static disease and bilateral adrenal masses suspicious for metastatic disease   - CA 19-9 greater than 4000   --Oncology consulted-will need tissue biopsy before definitive recommendations can be made.  Unable to offer palliative chemo or radiation due to poor performance.  Prognosis very poor, hospice recommended    -Pathology of thoracentesis negative for malignant cells but it showed acute on chronic inflammatory cells       4. Hypokalemia-stable   - replete as needed   - given Mag sulfate - recheck in the AM    -  Potassium WNL on 1/16/2022       5. COPD-stable   - Pulmonary consultation   - Chronic respiratory failure with 2 L of oxygen via nasal cannula at home   - Increase mucinex to 1200mg BID   - She did become hypoxic. Lungs sound clear.  Will consider chest xray if no improvement.       6. Leukocytosis   - Cultures negative   - Completed course of IV meropenem and Andulafungin 14 days   - ID consulted   - repeat UA pending   - Blood cultures on 1/13/2022 positive for E. coli.  Will place patient back on IV Zosyn       7. A. fib   -On diltiazem and metoprolol.   - HR elevated. This is before BP meds. Will get a EKG. If still in a fib with RVR will consider diltiazem drip. -2D echo showed normal systolic function and diastolic function with an EF of 60 to 65%   -No anticoagulation due to anemia       8. Acute on Chronic Anemia    -Her hemoglobin has been slowly declining requiring repletion and she is FOBT positive   -Required transfusion hemoglobin pending for today   Total of 6 units PRBCs   -Eliquis has been discontinued due to bleeding and liver failure   Cardiology consulted   - NM bleeding scan negative           9. Elevated d-dimer   - Likely multifactorial    - CTA chest negative for PE   - Duplex US lower extremities negative for DVT   - Duplex US upper extremities showing superficial thrombophlebitis in left median/antecubital veins   -Repeat upper extremity Doppler study negative   -Discontinued Eliquis due to anemia          10. Hypotension   On midodrine           Dispo: Barriers include stabilization of hemoglobin and blood cultures pending.  Potentially home with home hospice if patient is agreeable       I am concern that patient is becoming septic again. Started back on IV Zosyn. Per patient she states someone told her that she had 3 weeks to live and another 3 months. Discussed with her that is a time frame. Her best choice is hospice. She is getting confused about what hospice company is and what they offer. Per biggest issue is having her family clean her, which is is adamant about not wanting. I have placed a re-consult in for hospice to come do an information meeting with her.           CM NOTE: [facility], [facility] has accepted pending pt/family affirmation. [facility] will contact pt for assessment and agreement for care.  Updates sent         DUO NEBS Q 6 HR PRN X1, VIT C 500MG PO QD, LIPITOR 40MG PO Q HS, CARDIZEM  MG PO QD, MUCINEX 1200MG PO Q 12 HR, DILAUDID 1MG IV Q 4 HR PRN X1 (3X/24 HR), PROAMITINE 10MG PO TID, ZOFRAN 4 MG IV Q 6 HR PRN X1, PROTONIX 40MG IV Q 12 HR, ZOSYN 3.375G IV Q 8N HR, KLOR CON 10 20 MEQ PO QD, SE-TAN PLUS 1 CAP PO BID, ZOLOFT 25MG PO Q POONAM, DESYREL 50MG PO Q HS,            Pancreatitis - Care Day 38 (1/15/2022) by Cici Beck       Review Entered Review Status   1/17/2022 10:03 Completed      Criteria Review      Care Day: 38 Care Date: 1/15/2022 Level of Care: Telemetry    Guideline Day 3    Clinical Status    (X) * Hemodynamic stability    1/17/2022 10:03:15 EST by Cici Beck      T 98.4, BP 99/60, P 83    ( ) * No evidence of infection requiring inpatient care    1/17/2022 10:03:15 EST by Cici Beck      SEE REVIEW    ( ) * Amylase level normal or improved    1/17/2022 10:03:15 EST by Cici Beck      LAST LIPASE LEVEL ON 1/12=1,441    (X) * Pain absent or managed    1/17/2022 10:03:15 EST by Cici Beck      She states her abdominal pain is improved. ( ) * Diet tolerated    1/17/2022 10:03:15 EST by Cici Beck      Her appetite is poor.     ( ) * Renal function at baseline or acceptable for next level of care    1/17/2022 10:03:15 EST by Cici Beck      BUN 29, CREAT 1.03, GFRNAA 52    ( ) * Electrolyte abnormalities absent or acceptable for next level of care    1/17/2022 10:03:15 EST by Cici Beck      K 2.7, CA 7.7    ( ) * Discharge plans and education understood    Activity    ( ) * Ambulatory or acceptable for next level of care    Routes    (X) * Oral hydration    ( ) * Oral medications or regimen acceptable for next level of care    (X) * Oral diet or acceptable for next level of care    Interventions    (X) Laboratory tests    1/17/2022 10:03:15 EST by Cici Beck      RBC 2.68, HGB 8.1, HCT 25.3, PLT 51, PT 21.5, INR 1.9,    * Milestone   Additional Notes   1/14/22 ID NOTE:      Comment:    WBC now decreasing but CRP and procal increasing.   No clear source of infection. CRP could be related to her neoplastic process but would not expect elevated procalcitonin.  Since she is considering hospice, would probably not be very aggressive diagnostically unless she becomes febrile.       Plan:   1. If she becomes febrile, would send blood and urine cultures, then start Vancomycin and Zosyn empirically   2. Waiting possible decision on hospice        R 16, 02 SAT 92% 3L NC         GI PROGRESS NOTE 1/15/22:      . .. She reports she did speak with [facility] and would like to speak to them again to answer a few questions that she has. She states her abdominal pain is improved. Her appetite is poor.  HgB low this am at 8.1 but stable. No reports of dark stool at this time. EXAM:  Skin:  Extremities and face reveal no rashes. No chapin erythema. jaundice   HEENT: Sclerae + icteric. Extra-occular muscles are intact. No abnormal pigmentation of the lips. The neck is supple. Cardiovascular: Regular rate and rhythm. Respiratory:  Comfortable breathing with no accessory muscle use. GI:  Abdomen nondistended, soft, and nontender. No enlargement of the liver or spleen. No masses palpable. Rectal:  Deferred   Musculoskeletal: Extremities have good range of motion. Neurological:  Gross memory appears intact.  Patient is alert and oriented. Psychiatric:  Mood appears appropriate with judgement intact. Lymphatic:  No visible adenopathy      Plan:   1. Pancreatitis - RESOLVED.     -12/30 s/p post pyloric tube placement. Patient removed tube on 12/31.          -Unable to place GJ tube due to  tumor in duodenum and food in the stomach and patient on Eliquis       -Lipase 902. Repeat Lipase in the am        -Pain med as needed.       - S/P paracentesis 12/27/21 with removal of 2300 ml clear serous  Fluid removed       -cytology Slight acute inflammation.        -currently on Regular diet       -patient in agreement for SNF transfer, then outpatient follow up from there. 2.CHF        -Continue Chlorthalidone   3. COPD        -Continue home medications        -Albuterol as needed    4. Pancreatic Mass       - 22,358       -S/p fine needle aspiration suspicious for neoplasia but not diagnostic       -When stable Oncology plan for out patient followup with advanced oncology surgeon Irene Lazcano or [facility] for resection considerations        -CT concern for liver metastais/lesions        -IR for liver biopsy on hold at this time         -Poor Prognosis          -Hospice consult   5. Hematemesis (resolved)   6. Afib with RVR       -Eliquis on hold 2/2 to low HgB       -Current heart rate 120       -Continue diltiazem       -Cardiology has been consulted   7. Diarrhea (Improved)       - Imodium as needed   8. Anemia/GI bleed      -Reports of rectal bleeding, dark stools. Bleeding most likely from tumor invasion. If she continues to bleed, consult IR for possible embolization procedure.       -Bleeding scan is negative.       -Hgb 8.7. Monitor and transfuse as needed. 9.-Hypokalemia      Replete as needed      CMP in the am         Hospitalist Progress Note 1/15/22:      Assessment / Plan:           1. Acute pancreatitis   - Status post EUS by Dr. Atilano Bumpers 12/06   - Continue pain medication   - Lipase variable, will likely be chronically elevated    - CT abd/pelvis with PO and IV contrast showing increasing bilateral pleural effusion and increasing ascites. Also notes multiple low-density lesions of liver consistent with metastatic disease and bilateral adrenal masses suspected for metastatic disease.   - completed 14 days of IV merrem    - Continue empiric Anidulafungin 4 more days   - paracentesis on 12/27 with 2300 mL clear serous fluid drained, cytology showing now growth. - She was scheduled for liver biopsy with IR however IR does not feel patient would benefit from biopsy at this time.    -Unsuccessful attempt to place PEG J due to tumor infiltration and duodenum       2.  CHF   - Continue chlorthalidone 25 mg daily   - Holding PO lasix 40mg daily. - holding BP meds due to hypotension today       3. Pancreatic mass/Liver Masses/Adrenal Masses/History of renal cell carcinoma with lung nodules s/p right nephrectomy   Multiple low-density lesions of the liver consistent with meta static disease and bilateral adrenal masses suspicious for metastatic disease   - CA 19-9 greater than 4000   --Oncology consulted-will need tissue biopsy before definitive recommendations can be made.  Unable to offer palliative chemo or radiation due to poor performance.  Prognosis very poor, hospice recommended       4. Hypokalemia-stable   - replete as needed   - given Mag sulfate       5. COPD-stable   - Pulmonary consultation   - Chronic respiratory failure with 2 L of oxygen via nasal cannula at home   - Increase mucinex to 1200mg BID   - She did become hypoxic. Lungs sound clear. Will consider chest xray if no improvement.       6. Leukocytosis   - Cultures negative   - Completed course of IV meropenem and Andulafungin 14 days   - ID consulted   - repeat UA pending       7. A. fib   -On diltiazem and metoprolol.   - HR elevated. This is before BP meds. Will get a EKG. If still in a fib with RVR will consider diltiazem drip. -2D echo showed normal systolic function and diastolic function with an EF of 60 to 65%   -No anticoagulation due to anemia       8. Acute on Chronic Anemia    -Her hemoglobin has been slowly declining requiring repletion and she is FOBT positive   -Required transfusion hemoglobin today 8.7   Total of 6 units PRBCs   -Eliquis has been discontinued due to bleeding and liver failure   Cardiology consulted   - NM bleeding scan negative           9.  Elevated d-dimer   - Likely multifactorial    - CTA chest negative for PE   - Duplex US lower extremities negative for DVT   - Duplex US upper extremities showing superficial thrombophlebitis in left median/antecubital veins   -Repeat upper extremity Doppler study negative   -Discontinued Eliquis due to anemia           10.  Hypotension   On midodrine        Dispo: Barriers include stabilization of hemoglobin and improvement in potassium.  Potentially home with home hospice if patient is agreeable         DUO NEBS Q 6 HR PRN X1,  VIT C 500MG PO QD, CALC CARBONATE-VIT D3 800U PO 2 TABS QD, CARDIZEM CD 120MG PO QD, COLACE  100MG PO BID, MUCINEX 1200MG PO Q 12 HR, DILAUDID 1MG IV Q 4 HR PRN X 2,  IV LR 75ML/HR, PROAMITINE 10MG PO TID, PROTONIX 40MG IV Q 12 HR, KCL 20 MEQ PONOW &   QD, SE-TAN PLUS CAP PO BID, ZOLOFT 25MG PO Q POONAM,  DESYREL 50MG PO Q HS,                  Pancreatitis - Care Day 37 (1/14/2022) by Chio Polk       Review Entered Review Status   1/14/2022 12:15 Completed      Criteria Review      Care Day: 37 Care Date: 1/14/2022 Level of Care: Telemetry    Guideline Day 3    Clinical Status    ( ) * Hemodynamic stability    1/14/2022 12:15:27 EST by Chio Polk      T 98.1, BP 88/50, P 105, MAP down to 63, R 18, 02 SAT 97% 4L NC    ( ) * No evidence of infection requiring inpatient care    ( ) * Amylase level normal or improved    1/14/2022 12:15:27 EST by Chio Polk      NO RECENT LAB VALUE RECORDED    ( ) * Pain absent or managed    (X) * Diet tolerated    (X) * Renal function at baseline or acceptable for next level of care    1/14/2022 12:15:27 EST by Chio Polk      BUN 31, CREAT 1.19, GFRAA 54    ( ) * Electrolyte abnormalities absent or acceptable for next level of care    1/14/2022 12:15:27 EST by Chio Polk      K 2.4, CA 7.8    ( ) * Discharge plans and education understood    Activity    ( ) * Ambulatory or acceptable for next level of care    Routes    (X) * Oral hydration    ( ) * Oral medications or regimen acceptable for next level of care    1/14/2022 12:15:27 EST by Chio Polk      MAG SULFATE 2G IV X1, KCL 10MEQ IV Q 1 HR X3 & 40MEQ PO X1,  DUO NEBS Q  HR PRN X1, SAME SCHED MEDS    (X) * Oral diet or acceptable for next level of care    Interventions    (X) Laboratory tests    1/14/2022 12:15:27 EST by Francoise Roy      WBC 11.6, RBC 2.81, HGB 8.7, HCT 26.2, PLT 87, NEUTS 88, PT 20.2, INR 1.8, D DIMER 6.95, C02 33, ANION GAP 4, T BILI 5.3, DIR BILI 4.6, SLB 1.0, , , PROCALCITONIN 15.42, C REACTV PROT 21.30    * Milestone   Additional Notes   Hospitalist Progress Note      Yesterday oncology nurse navigator and oncology spoke with the patient at bedside about goals of care and overall poor prognosis despite lack of tissue sample.  Patient was agreeable to speaking with hospice. EXAM:  Constitutional: No acute distress, chronically ill appearing   Skin: Extremities and face reveal no rashes. HEENT: Sclerae anicteric. Extra-occular muscles are intact. Cardiovascular: irregular   Respiratory:  Clear breath sounds bilaterally with no wheezes, rales, or rhonchi. GI: Abdomen nondistended, soft, and nontender. Normal active bowel sounds. Musculoskeletal: No pitting edema of the lower legs. Able to move all ext   Neurological:  Patient is alert and oriented. Cranial nerves II-XII grossly intact   Psychiatric: Mood appears appropriate      Assessment / Plan:           1. Acute pancreatitis   - Status post EUS by Dr. Brian Lim 12/06   - Continue pain medication   - Lipase variable, will likely be chronically elevated    - CT abd/pelvis with PO and IV contrast showing increasing bilateral pleural effusion and increasing ascites. Also notes multiple low-density lesions of liver consistent with metastatic disease and bilateral adrenal masses suspected for metastatic disease.   - completed 14 days of IV merrem    - Continue empiric Anidulafungin 4 more days   - paracentesis on 12/27 with 2300 mL clear serous fluid drained, cytology showing now growth.    - She was scheduled for liver biopsy with IR however IR does not feel patient would benefit from biopsy at this time.    -Unsuccessful attempt to place PEG J due to tumor infiltration and duodenum       2. CHF   - Continue chlorthalidone 25 mg daily   - On PO lasix 40mg daily. - holding BP meds due to hypotension today       3. Pancreatic mass/Liver Masses/Adrenal Masses/History of renal cell carcinoma with lung nodules s/p right nephrectomy   Multiple low-density lesions of the liver consistent with meta static disease and bilateral adrenal masses suspicious for metastatic disease   - CA 19-9 greater than 4000   --Oncology consulted-will need tissue biopsy before definitive recommendations can be made.  Unable to offer palliative chemo or radiation due to poor performance.  Prognosis very poor, hospice recommended       4. Hypokalemia-stable   - replete as needed       5. COPD-stable   - Pulmonary consultation   - Chronic respiratory failure with 2 L of oxygen via nasal cannula at home   - Increase mucinex to 1200mg BID   - She did become hypoxic. Lungs sound clear. Will consider chest xray if no improvement.       6. Leukocytosis   - Cultures negative   - Completed course of IV meropenem and Andulafungin 14 days   - ID consulted   - repeat UA pending       7. A. fib   -On diltiazem and metoprolol.   - HR elevated. This is before BP meds. Will get a EKG. If still in a fib with RVR will consider diltiazem drip. -2D echo showed normal systolic function and diastolic function with an EF of 60 to 65%   -No anticoagulation due to anemia       8. Acute on Chronic Anemia    -Her hemoglobin has been slowly declining requiring repletion and she is FOBT positive   -Required transfusion hemoglobin today 8.7   Total of 6 units PRBCs   -Eliquis has been discontinued due to bleeding and liver failure   Cardiology consulted   - NM bleeding scan negative           9.  Elevated d-dimer   - Likely multifactorial    - CTA chest negative for PE   - Duplex US lower extremities negative for DVT   - Duplex US upper extremities showing superficial thrombophlebitis in left median/antecubital veins   -Repeat upper extremity Doppler study negative   -Discontinued Eliquis due to anemia           10. Hypotension   On midodrine           Dispo: Barriers include stabilization of hemoglobin and improvement in potassium.  Potentially home with home hospice if patient is agreeable       DVT Prophylaxis: Discontinued Eliquis, SCDs   GI PPx: Protonix         CM NOTE:  CM met with patient at [place] to discuss hospice agency options.  Patient informed both  hospice and [facility] are interested per clinical record. Javier Combs stated after her meeting today, she will make a decision.  CM will f/u with patient.           Pancreatitis - Care Day 36 (1/13/2022) by Vicki Nicole       Review Entered Review Status   1/14/2022 12:04 Completed      Criteria Review      Care Day: 36 Care Date: 1/13/2022 Level of Care: Telemetry    Guideline Day 3    Clinical Status    ( ) * Hemodynamic stability    1/14/2022 11:58:06 EST by Vicki Nicole      -150/43-74, P , MAP down to 62    02 sat 96% 4L NC    R 18    ( ) * No evidence of infection requiring inpatient care    1/14/2022 11:58:06 EST by Vicki Nicole      BLOOD CULTURE: Gram Negative Rods growing in both bottles drawn SITE = L HAND    ( ) * Amylase level normal or improved    1/14/2022 11:58:06 EST by Vicki Nicole      1/12/22 LIPASE 1,441    ( ) * Pain absent or managed    (X) * Diet tolerated    (X) * Renal function at baseline or acceptable for next level of care    1/14/2022 11:58:06 EST by Vicki Nicole      BUN 27, CREAT 1.35, GFRNAA 38    ( ) * Electrolyte abnormalities absent or acceptable for next level of care    1/14/2022 11:58:06 EST by Vicki Nicole      K 3.0, CA 7.4    ( ) * Discharge plans and education understood    Activity    ( ) * Ambulatory or acceptable for next level of care    Routes    (X) * Oral hydration    ( ) * Oral medications or regimen acceptable for next level of care    1/14/2022 11:58:06 EST by Dipika Schroeder      SEE REVIEW    (X) * Oral diet or acceptable for next level of care    1/14/2022 11:58:06 EST by Dipika Schroeder      REGULAR    Interventions    (X) Laboratory tests    1/14/2022 11:58:06 EST by Dipika Schroeder      WBC 16.6, RBC 2.35, HGB 7.4, HCT 22.5, , NEUTS 88, BAND NEUTS 7, .4, INR 2.3, D DIMER 5.94, K 3.0, GLUC 102,  CA 7.4,    Medications    ( ) Oral analgesics    1/14/2022 11:58:06 EST by Dipika Schroeder      DILAUDID 1MG IV Q 4 HR PRN X1    * Milestone   Additional Notes   Hospitalist Progress Note      Patient is very upset this morning as she was told by 2 people yesterday that her son did not come however she states that he did come with breakfast and felt like they were calling her a liar.  She does admit to shortness of breath with a cough. EXAM:  Constitutional: No acute distress, chronically ill appearing   Skin: Extremities and face reveal no rashes. HEENT: Sclerae anicteric. Extra-occular muscles are intact. No oral ulcers. The neck is supple and no masses. Cardiovascular: irregular   Respiratory:  Clear breath sounds bilaterally with no wheezes, rales, or rhonchi. GI: Abdomen nondistended, soft, and nontender. Normal active bowel sounds. Musculoskeletal: No pitting edema of the lower legs. Able to move all ext   Neurological:  Patient is alert and oriented. Cranial nerves II-XII grossly intact   Psychiatric: Mood appears appropriate        GI BLEED SCAN: NEG      EKG: Atrial fibrillation Low voltage QRS Abnormal QRS-T angle, consider primary T wave abnormality      Assessment / Plan:           1. Acute pancreatitis   - Status post EUS by Dr. Opal Phillip 12/06   - Continue pain medication   - Lipase variable, will likely be chronically elevated    - CT abd/pelvis with PO and IV contrast showing increasing bilateral pleural effusion and increasing ascites.  Also notes multiple low-density lesions of liver consistent with metastatic disease and bilateral adrenal masses suspected for metastatic disease.   - completed 14 days of IV merrem    - Continue empiric Anidulafungin 4 more days   - paracentesis on 12/27 with 2300 mL clear serous fluid drained, cytology showing now growth. - She was scheduled for liver biopsy with IR however IR does not feel patient would benefit from biopsy at this time.    -Unsuccessful attempt to place PEG J due to tumor infiltration and duodenum       2. CHF   - Continue chlorthalidone 25 mg daily   - On PO lasix 40mg daily.        3. Pancreatic mass/Liver Masses/Adrenal Masses/History of renal cell carcinoma with lung nodules s/p right nephrectomy   Multiple low-density lesions of the liver consistent with meta static disease and bilateral adrenal masses suspicious for metastatic disease   - CA 19-9 greater than 4000   --Oncology consulted-will need tissue biopsy before definitive recommendations can be made.  Unable to offer palliative chemo or radiation due to poor performance.  Prognosis very poor, hospice recommended       4. Hypokalemia-stable   - replete as needed       5. COPD-stable   - Pulmonary consultation   - Chronic respiratory failure with 2 L of oxygen via nasal cannula at home   - Increase mucinex to 1200mg BID   - She did become hypoxic. Lungs sound clear. Will consider chest xray if no improvement.       6. Leukocytosis   - Cultures negative   - Completed course of IV meropenem and Andulafungin 14 days   - ID consulted       7. A. fib   -On diltiazem and metoprolol.   - HR elevated. This is before BP meds. Will get a EKG. If still in a fib with RVR will consider diltiazem drip. -2D echo showed normal systolic function and diastolic function with an EF of 60 to 65%   -No anticoagulation due to anemia       8. Acute on Chronic Anemia    -Her hemoglobin has been slowly declining requiring repletion and she is FOBT positive   -Required transfusion hemoglobin today 7.4.     Total of 6 units PRBCs   -Eliquis has been discontinued due to bleeding and liver failure   Cardiology consulted   - NM bleeding scan pending           9. Elevated d-dimer   - Likely multifactorial    - CTA chest negative for PE   - Duplex US lower extremities negative for DVT   - Duplex US upper extremities showing superficial thrombophlebitis in left median/antecubital veins   -Repeat upper extremity Doppler study negative   -Discontinued Eliquis due to anemia           10. Hypotension   On midodrine           DVT Prophylaxis: Discontinued Eliquis, SCDs   GI PPx: Protonix         ID NOTE:      Patient followed for severe pancreatitis on Meropenem because of worsening leukocytosis.  WBC now normal but CRP is increasing.  Lipase has decreased.  Suspected pancreatic cancer but no firm diagnosis yet.  Patient resting comfortably with no abdoinal pain at this time. States that she is tentatively going to Rehab. Assessment:       1. Severe pancreatitis with markedly elevated lipase, resolving   2. Pancreatic mass, possible neoplasm   3. Biliary stent   4. Sepsis with worsening leukocytosis with elevated procal and CRP, resolving, status post 14 days of Meropenem, Anidulafungin (empiric), CRP increasing    5. TPN (just started)   6. Moderate pyuria, negative bacteria, urine culture negative   7. Possible candida superinfection with vaginitis, treated   8. Ascites, status post paracentesis       Comment:    WBC now increased along with CRP.  CXR yesterday had improved.       Plan:   1. Urinalysis with reflex culture   2. Blood cultures   3. In am, repeat CBC, CRP, procal          CM NOTE:  CM met with patient at bedside to discuss choices for Hospice agencies. Patient chose  hospice and [facility] requested. Choice letter signed and placed on the record. Referrals sent to both of choices. Awaiting a response.          PROTONIX 40MG IV Q 12 HR, VIT C 500MG PO QD, LIPITOR 40MG PO Q HS, VIT D3 2 TAB PO QD, HYGROTON 25MG PO QD, CARDIZEM CD 120MG PO QD, MUCINEX 1200MG PO Q 12 HR, LOPRESSOR 25MG PO Q 1 2HR, PROAMITINE 10MG PO TID, KCL 20MEQ PO QD, SE-TAN PLUS CAP PO BID, ZOLOFT  25MG PO Q POONAM,  DESYREL 50MG PO Q HS,            Pancreatitis - Care Day 35 (1/12/2022) by Elie Saravia       Review Entered Review Status   1/12/2022 12:29 Completed      Criteria Review      Care Day: 35 Care Date: 1/12/2022 Level of Care: Telemetry    Guideline Day 3    Clinical Status    (X) * Hemodynamic stability    1/12/2022 12:29:23 EST by Elie Saravia      T 98.4, /60, P 91, R 18, 02 SAT 98% 4L NC    ( ) * No evidence of infection requiring inpatient care    ( ) * Amylase level normal or improved    1/12/2022 12:29:23 EST by Elie Saravia      LIPASE 1441    (X) * Pain absent or managed    ( ) * Diet tolerated    1/12/2022 12:29:23 EST by Elie Saravia      States poor appetite due to the hospital food. (X) * Renal function at baseline or acceptable for next level of care    (X) * Electrolyte abnormalities absent or acceptable for next level of care    ( ) * Discharge plans and education understood    Activity    ( ) * Ambulatory or acceptable for next level of care    1/12/2022 12:29:23 EST by Elie Saravia      NOT DOC'D    Routes    (X) * Oral hydration    (X) * Oral medications or regimen acceptable for next level of care    1/12/2022 12:29:23 EST by Elie Saravia      SEE REVIEW    (X) * Oral diet or acceptable for next level of care    1/12/2022 12:29:23 EST by Elie Saravia      REGULAR    Interventions    (X) Laboratory tests    1/12/2022 12:29:23 EST by Elie Saravia      RBC 2.79, HBG 8.5, HCT 26.2, NEUTS 89, PT 20.4, INR 1.8, D DIMER 8.40, BUN 25, CA 5.4, GFRNAA 56, C REACTV PROT 23.20    Medications    (X) Oral analgesics    1/12/2022 12:29:23 EST by Elie Saravia      ROXICODONE 5MG PO Q 6 HR PRN X1    * Milestone   Additional Notes   Hospitalist Progress Note      says she is feeling a little better.  States poor appetite due to the hospital food. Requesting different iron and calcium meds. Will do her home medications. Discussed with RN and will send to pharmacy to dispense      EXAM:  Constitutional: No acute distress, chronically ill appearing   Skin: Extremities and face reveal no rashes. HEENT: Sclerae anicteric. Extra-occular muscles are intact. No oral ulcers. The neck is supple and no masses. Cardiovascular: Regular rate and rhythm. Respiratory:  Clear breath sounds bilaterally with no wheezes, rales, or rhonchi. GI: Abdomen nondistended, soft, and nontender. Normal active bowel sounds. Musculoskeletal: No pitting edema of the lower legs. Able to move all ext   Neurological:  Patient is alert and oriented. Cranial nerves II-XII grossly intact   Psychiatric: Mood appears appropriate         Assessment / Plan:           1. Acute pancreatitis   - Status post EUS by Dr. Atilano Bumpers 12/06   - Continue pain medication   - Lipase variable, will likely be chronically elevated    - CT abd/pelvis with PO and IV contrast showing increasing bilateral pleural effusion and increasing ascites. Also notes multiple low-density lesions of liver consistent with metastatic disease and bilateral adrenal masses suspected for metastatic disease.   - completed 14 days of IV merrem    - Continue empiric Anidulafungin 4 more days   - paracentesis on 12/27 with 2300 mL clear serous fluid drained, cytology showing now growth. - She was scheduled for liver biopsy with IR however IR does not feel patient would benefit from biopsy at this time.    -Unsuccessful attempt to place PEG J due to tumor infiltration and duodenum       2. CHF   - Continue chlorthalidone 25 mg daily   - On PO lasix 40mg daily.        3.  Pancreatic mass/Liver Masses/Adrenal Masses/History of renal cell carcinoma with lung nodules s/p right nephrectomy   Multiple low-density lesions of the liver consistent with meta static disease and bilateral adrenal masses suspicious for metastatic disease - CA 19-9 greater than 4000   --Oncology consulted-will need tissue biopsy before definitive recommendations can be made.  Unable to offer palliative chemo or radiation due to poor performance.  Prognosis very poor, hospice recommended       4. Hypokalemia-stable   - replete as needed       5. COPD-stable   - Pulmonary consultation   - Chronic respiratory failure with 2 L of oxygen via nasal cannula at home       6. Leukocytosis   - Cultures negative   - Completed course of IV meropenem and Andulafungin 14 days   - ID consulted       7. A. fib   -On diltiazem and metoprolol.   - HR controlled. -2D echo showed normal systolic function and diastolic function with an EF of 60 to 65%   -No anticoagulation due to anemia       8. Acute on Chronic Anemia    -Her hemoglobin has been slowly declining requiring repletion and she is FOBT positive   -Required transfusion hemoglobin today 6.2 receiving 2 units   Total of 6 units PRBCs   -Eliquis has been discontinued due to bleeding and liver failure   Cardiology consulted           9. Elevated d-dimer   - Likely multifactorial    - CTA chest negative for PE   - Duplex US lower extremities negative for DVT   - Duplex US upper extremities showing superficial thrombophlebitis in left median/antecubital veins   -Repeat upper extremity Doppler study negative   -Discontinued Eliquis due to anemia           10.  Hypotension   On midodrine           DVT Prophylaxis: Discontinued Eliquis, SCDs   GI PPx: Protonix   Code Status: Full             IR CONSULT- eval for possible liver biopsy, pleural fluid for cytology, PROTONIX 40MG IV Q 12 HR, ZOFRAN 4 MG IV Q 6 HR PRN X1 (3X/24 HR), VIT C 500MG PO  QD, LIPITOR 40MG PO Q HS, HYGROTON 25MG PO QD, CARDIZEM CD 120MG PO QD, LASIX 40MG PO QD, LOPRESSOR 25MG PO Q 12 HR, PROAMITINE 10MG PO TID, KCL 20MEQ PO QD, ZOLOFT 25MG PO Q POONAM, DESYREL 50MG PO Q HS           Pancreatitis - Care Day 34 (1/11/2022) by Cici Beck       Review Entered Review Status   1/11/2022 15:20 Completed      Criteria Review      Care Day: 34 Care Date: 1/11/2022 Level of Care: Telemetry    Guideline Day 3    Clinical Status    ( ) * Hemodynamic stability    1/11/2022 15:20:14 EST by Mikhail Del Real      T 99.6, BP /50-60, MAP down to 63    r 18, 02  SAT 95% RA    ( ) * No evidence of infection requiring inpatient care    ( ) * Amylase level normal or improved    1/11/2022 15:20:14 EST by Mikhail Del Real      LIPASE 902    ( ) * Pain absent or managed    (X) * Diet tolerated    (X) * Renal function at baseline or acceptable for next level of care    ( ) * Electrolyte abnormalities absent or acceptable for next level of care    1/11/2022 15:20:14 EST by Mikhail Del Real      CA 7.8,    ( ) * Discharge plans and education understood    Activity    ( ) * Ambulatory or acceptable for next level of care    Routes    (X) * Oral hydration    ( ) * Oral medications or regimen acceptable for next level of care    1/11/2022 15:20:14 EST by Mikhail Del Real      SEE REVIEW    (X) * Oral diet or acceptable for next level of care    1/11/2022 15:20:14 EST by Mikhail Del Real      REGULAR    Interventions    (X) Laboratory tests    1/11/2022 15:20:14 EST by Mikhail Del Real      RBC 2.24, HG 6.9, HCT 21.8, NEUTS 87, PT 17.1, INR 1.4, D DIMER 3.86, ANION GAP 4, BUN 28, CA 7.8, GFRNAA 58, C REACTV PROT 12.40    Medications    ( ) Oral analgesics    1/11/2022 15:20:14 EST by Mikhail Del Real      DILAUDID 1MG IV Q 4 HR PRN X 1 (5X/24 HR),    * Milestone   Additional Notes   Hospitalist Progress Note      Patient is upset about not getting TUMS and iron supplements, however, on review of her orders, patient is on these medications      EXAM:  Constitutional: No acute distress   Skin: Extremities and face reveal no rashes. HEENT: Sclerae anicteric. Extra-occular muscles are intact. No oral ulcers. The neck is supple and no masses. Cardiovascular: Regular rate and rhythm.     Respiratory:  Clear breath sounds bilaterally with no wheezes, rales, or rhonchi. GI: Abdomen nondistended, soft, and nontender. Normal active bowel sounds. Musculoskeletal: No pitting edema of the lower legs. Able to move all ext   Neurological:  Patient is alert and oriented. Cranial nerves II-XII grossly intact   Psychiatric: Mood appears appropriate         Assessment / Plan:   Acute pancreatitis   - Status post EUS by Dr. Geneva Yee 12/06   - Continue pain medication   - Lipase variable, will likely be chronically elevated    - CT abd/pelvis with PO and IV contrast showing increasing bilateral pleural effusion and increasing ascites. Also notes multiple low-density lesions of liver consistent with metastatic disease and bilateral adrenal masses suspected for metastatic disease.   - completed 14 days of IV merrem    - Continue empiric Anidulafungin    - paracentesis on 12/27 with 2300 mL clear serous fluid drained, cytology showing now growth.    - She was scheduled for liver biopsy with IR however IR does not feel patient would benefit from biopsy at this time.    -Unsuccessful attempt to place PEG J due to tumor infiltration and duodenum       CHF   - Continue chlorthalidone 25 mg daily   - IV lasix 40 mg daily       Pancreatic mass   Liver masses   Adrenal masses   History of renal cell carcinoma with lung nodules status post right nephrectomy   Multiple low-density lesions of the liver consistent with meta static disease and bilateral adrenal masses suspicious for metastatic disease   - CA 19-9 greater than 4000   --Oncology consulted-will need tissue biopsy before definitive recommendations can be made.  Unable to offer palliative chemo or radiation due to poor performance.  Prognosis very poor, hospice recommended       Hypokalemia-stable   - replete as needed       COPD-stable   - Pulmonary consultation   - Chronic respiratory failure with 2 L of oxygen via nasal cannula at home       Leukocytosis-resolved   - Cultures negative       A. Fib   Supratherapeutic anticoagulation       Coagulopathy       Acute anemia   -On diltiazem. -Her hemoglobin has been slowly declining requiring repletion and she is FOBT positive   -Required transfusion hemoglobin today 6.2 receiving 2 units   Total of 6 units PRBCs   -Episode of A. fib with RVR heart rate 110-150   -Eliquis has been discontinued due to bleeding and liver failure   -Cardiology following           Elevated d-dimer   - Likely multifactorial    - CTA chest negative for PE   - Duplex US lower extremities negative for DVT   - Duplex US upper extremities showing superficial thrombophlebitis in left median/antecubital veins   -Repeat upper extremity Doppler study negative   -Discontinued Eliquis due to anemia           Hypotension   -Continue midodrine 10mg tid       Anemia iron deficiency   Blood loss anemia   -Hemoglobin trend 6.6->8.1->7.2->6.2->7.2 --6.8 today   -Has received total of 6 units PRBCs   -Has received both IV and p.o. iron replacement   -FOBT positive   -Heme-onc and GI following   -We will transfuse 1unit of PRBC today for hemoglobin <7.0,    -Eliquis discontinued           Discharge Barriers:    \" [facility] at [facility]   \" Multiple attempts transfer to other facility that has oncology surgery all facilities currently on diversion and not wait listing   \" Has declined discussions concerning hospice despite poor prognosis and poor performance           CM NOTE: CM reached out to [facility] via [facility] to see if room is still available. Antonette Lawler from [facility] requested more clinicals be uploaded. Writer uploaded clinicals via [phone].          TRANSFUSE 1U PRBC, ZOFRAN 4 MG IV Q 6 HR PRN  X1 (4X/24 HR),  PROTONIX 40MG IV Q 12 HR, TYLENOL 650MG PO Q 4 HR PRN X1, LIPITOR 40MG PO Q HS, HYGROTON 25MG PO QD, CARDIZEM CD 120MG PO QD, FERROUS SULFATE 325MG PO QD, LASIX 40MG PO QD, MUCINEX 600MG PO Q 12 HR, LOPRESSOR 25MG PO Q 12 HR, PROAMITINE 10MG PO TID, KCL 20 MEQ PO QD,  ZOLFT 25MG PO Q POONAM,  DESYREL 50MG PO Q HS,

## 2022-01-21 NOTE — PROGRESS NOTES
Progress Note    Patient: Rafi Kennedy MRN: [id number]  SSN: [id number]    YOB: 1945  Age: 78 y.o. Sex: female      Admit Date: 12/9/2021    LOS: 43 days     Subjective:   Patient followed for severe pancreatitis and she completed course of Meropenem but now on Zosyn because of E. Coli bacteremia. WBC increasing again with decreasing procal and CRP. On Fluconazole for thrush. Still pending disposition but it appears that she did not qualify for Medicaid. Patient asleep at this time but appears to be resting comfortably. Objective:     Vitals:    01/20/22 1953 01/21/22 0055 01/21/22 0352 01/21/22 0827   BP: 95/65  100/67 (!) 99/55   Pulse: 87 90 89 (!) 105   Resp: 18  18    Temp: 97.6 °F (36.4 °C)  97.8 °F (36.6 °C) 97.9 °F (36.6 °C)   SpO2: 98%  98% 94%   Weight:       Height:            Intake and Output:  Current Shift: 01/21 0701 - 01/21 1900  In: 100 [I.V.:100]  Out: -   Last three shifts: 01/19 1901 - 01/21 0700  In: 2300 [P.O.:300; I.V.:2000]  Out: 1200 [Urine:1200]    Physical Exam:     Vitals and nursing note reviewed. Constitutional:       General: She is not in acute distress. Appearance: She is obese. She is ill-appearing. HENT: white lacy exudate on buccal mucosa RESOLVED  Abdominal:  Nontender  Genitourinary:  Vaginal area not examined     Comments: External urinary device  Musculoskeletal:      Right lower leg: No edema. Left lower leg: No edema. Comments: PICC line right upper arm   Skin: multiple ecchymoses on the upper extremities, edema left forearm     Findings: No rash.       Comments: ?Stage 2 sacral wound   Neurological: asleep    Lab/Data Review:     WBC 14,500  ,000    Urinalysis with WBC 10-20, Bacteria negative  Lipase 1,027 <698 <703 <1,441 <2,244 <2,350 <2,144 99 <2,511 0 <2,484 <2,684 < 1,397 <1,719    Procal 0.98 < 1.22 <1.75 <3.82 <15.42   CRP 5.74 < 11.10 <21.30  < 23.80    Serum fungitell <31 Negative    Ascitic fluid   with 41% PMNs    Blood cultures (12/20) No growth FINAL  Blood cultures (1/13) E. Coli  Urine culture (12/20) No growth FINAL  Ascitic fluid culture(12/27) No growth FINAL    Assessment:     Principal Problem:    Pancreatitis (12/9/2021)    Active Problems:    A-fib ([facility] [address] (11/16/2020)      CHF (congestive heart failure) ([facility] [address] (11/16/2020)      Hematemesis (12/12/2021)      History of peptic ulcer disease (12/12/2021)      Pancreatic mass (1/5/2022)    1. Severe pancreatitis with markedly elevated lipase, resolving  2. Pancreatic mass, possible neoplasm  3. Biliary stent  4. Sepsis with worsening leukocytosis with elevated procal and CRP, resolving, status post 14 days of Meropenem, Anidulafungin (empiric), CRP increasing   5. TPN (just started)  6. Moderate pyuria, negative bacteria, urine culture negatived  7. Possible candida superinfection with vaginitis, treated  8. Ascites, status post paracentesis  9. Gram negative bacteremia with E. Coli, source unclear, Day #5/14 IV Zosyn. 10. Oral candidiasis, Day #4/7 IV Fluconazole, resolved    Comment:   WBC now increasing but CRP and procal decreasing. Unclear why WBC increasing, not on steroids. Plan:   1. Continue IV Zosyn  2. Continue IV Fluconazole but transition to oral reasonable  3. In am, repeat CBC, procal and CRP  4. Waiting for disposition to SNF  5.  Cleared for discharge from ID standpoint     Signed By: Rosy Yepez MD     January 21, 2022

## 2022-01-21 NOTE — PROGRESS NOTES
1030    CM received a call from Romayne Pinch in [place] office stating after reviewing patient's bank statements, patient has too much money in the bank for her to qualify for medicaid. Writer spoke with patient who stated those funds may be for her rent that is due for November, December and January. Patient asked writer to f/u with her son to get an accurate copy of a current bank statement because the one that was sent in was effective 12/23/21. Call placed to Son Dylan Blood @ [phone]. No answer and vm was full. Writer will f/u later today. 1250  Call placed to son Hafsa Bautista, no answer and vm is full. 56  Writer was at patient bedside and son called on patient cell phone. Writer spoke with son and requested a copy of the balance for both checking and saving accounts for patient as of today. Son says he is out at the store and will bring the bank info as soon as he can. [address] bought bank balance of checking and savings to writer. Writer forwarded information to the [facility] staff who stated they need the new statement to justify the balances being lowered and why. Hafsa Bautista informed and will bring in the new statement when it is available online or email to Mel Pope. [facility] staff informed.      Mario Rg RN

## 2022-01-21 NOTE — PROGRESS NOTES
Progress Note    Patient: Jose Martin Hazel MRN: [id number]  SSN: [id number]    YOB: 1945  Age: 78 y.o. Sex: female      Admit Date: 12/9/2021    LOS: 43 days     Subjective:   GI in consultation for  Pancreatitis- resolved.     Patient seen in room, awake, fatigued. Patient on nasal cannula 3L. Bilateral arms more swollen. Overall poor prognosis due to clinical diagnosis of pancreatic cancer.  is 22,358. Per CM, patient was accepted to [facility] [place] [facility]      -Labs: WBC 14.5, Hgb 7.8, platelet 106, potassium - 3.7. Total bilirubin 7.4, ALT 87, AST 80, alk phos 787. Lipase 1,027     1/13 - Bleeding scan negative  1/12 Thoracentesis - 120ml fluid drained. 12/30 EGD with Dobhoff placement for feeding - removed by patient.   12/27/21 Paracentesis - 2300ml ascites fluid removed. 12/23/21 CT abdomen - pleural effusion, ascites, liver lesions, bilateral adrenal masses, pancreatic head lesions. 12/6/2021 EUS showing 2.7 X3 cm lesion in the area of head of pancreas with dilation of the Pancreatic duct abutting the portal vein but not involving the SMA or AMV ; Tumor T2 by endosonographic criteria ; one small lymph node in the area of head of pancreas.   11/22/2021 PET Scan    1.  FDG avid lesion in the pancreatic head consistent with malignancy. 2.  Subcentimeter focus of FDG uptake in the liver, indeterminate. 3.  FDG uptake associated with density expanding the right facet at C5-C6, possibly due to an ectatic artery.     History of Present Illness: Nevin Cole is a 78 y. o. female who is seen in consultation for pancreatitis. Lindy Katie has a past medical history of  Paroxysmal Atrial Fibrillation, CHF, COPD, renal cell carcinoma Stage IV s/p nephrectomy, GERD sjoren's syndrome, hypertension, and depression.  Patient under went EUS on 12/6/2021 showing 2.7 X3 cm lesion in the area of head of pancreas with dilation of the Pancreatic duct abutting the portal vein but not involving the SMA or AMV ; Tumor T2 by endosonographic criteria ; one small lymph node in the area of head of pancreas. Pathology shows rare cells present suspicious for malignancy.   She had an ERCP on 11/4 2021 ERCP; with sphincterotomy/papillotomy ERCP; with placement of endoscopic stent into biliary or pancreatic duct, including pre and postdilation and guide wire passage, when performed, including sphincterotomy, when performed, each stent. . Patient had a PET scan on 11/22/21 which shows a lesion in the pancreatic head consistent with malignancy. CTA of abdomen shows ill defined hypodense mass in the pancreas. There are inflammatory changes. Patient states she experienced nausea, vomiting and diarrhea since Monday. Her abdominal pain has progressively gotten worse. She reports a poor appetite. She does complain of increased \"burping\". Total bilirubin 1.4, AsT 36, ALT 31, Alk Phos. 217, Lipase > 3,000. Plan nothing by mouth except ice chips and sips of water with medication. IV hydration. Pain management.     Objective:     Vitals:    01/21/22 0055 01/21/22 0352 01/21/22 0827 01/21/22 1046   BP:  100/67 (!) 99/55    Pulse: 90 89 (!) 105    Resp:  18     Temp:  97.8 °F (36.6 °C) 97.9 °F (36.6 °C)    SpO2:  98% 94% 94%   Weight:       Height:            Intake and Output:  Current Shift: 01/21 0701 - 01/21 1900  In: 100 [I.V.:100]  Out: -   Last three shifts: 01/19 1901 - 01/21 0700  In: 2300 [P.O.:300; I.V.:2000]  Out: 1200 [Urine:1200]    Physical Exam:   Skin:  Extremities and face reveal no rashes. No chapin erythema. HEENT: Sclerae anicteric. Extra-occular muscles are intact. No abnormal pigmentation of the lips. The neck is supple. Cardiovascular: Regular rate and rhythm. Respiratory:  Comfortable breathing with no accessory muscle use. GI:  Abdomen nondistended, soft, and nontender. No enlargement of the liver or spleen. No masses palpable.   Rectal:  Deferred  Musculoskeletal: Generalized weakness. Swollen garcia upper extremities. Neurological:  Gross memory appears intact. Patient is alert and oriented. Psychiatric:  Mood appears appropriate with judgement intact. Lymphatic:  No visible adenopathy      Lab/Data Review:  Recent Results (from the past 24 hour(s))   PROTHROMBIN TIME + INR    Collection Time: 01/21/22  3:52 AM   Result Value Ref Range    Prothrombin time 34.0 (H) 11.9 - 14.7 sec    INR 3.4 (H) 0.9 - 1.1     METABOLIC PANEL, COMPREHENSIVE    Collection Time: 01/21/22  3:52 AM   Result Value Ref Range    Sodium 138 136 - 145 mmol/L    Potassium 3.7 3.5 - 5.1 mmol/L    Chloride 108 97 - 108 mmol/L    CO2 28 21 - 32 mmol/L    Anion gap 2 (L) 5 - 15 mmol/L    Glucose 54 (L) 65 - 100 mg/dL    BUN 21 (H) 6 - 20 mg/dL    Creatinine 0.78 0.55 - 1.02 mg/dL    BUN/Creatinine ratio 27 (H) 12 - 20      GFR est AA >60 >60 ml/min/1.73m2    GFR est non-AA >60 >60 ml/min/1.73m2    Calcium 7.6 (L) 8.5 - 10.1 mg/dL    Bilirubin, total 7.4 (H) 0.2 - 1.0 mg/dL    AST (SGOT) 80 (H) 15 - 37 U/L    ALT (SGPT) 87 (H) 12 - 78 U/L    Alk. phosphatase 787 (H) 45 - 117 U/L    Protein, total 4.2 (L) 6.4 - 8.2 g/dL    Albumin 0.8 (L) 3.5 - 5.0 g/dL    Globulin 3.4 2.0 - 4.0 g/dL    A-G Ratio 0.2 (L) 1.1 - 2.2     CBC WITH AUTOMATED DIFF    Collection Time: 01/21/22  3:52 AM   Result Value Ref Range    WBC 14.5 (H) 3.6 - 11.0 K/uL    RBC 2.53 (L) 3.80 - 5.20 M/uL    HGB 7.8 (L) 11.5 - 16.0 g/dL    HCT 24.6 (L) 35.0 - 47.0 %    MCV 97.2 80.0 - 99.0 FL    MCH 30.8 26.0 - 34.0 PG    MCHC 31.7 30.0 - 36.5 g/dL    RDW 16.9 (H) 11.5 - 14.5 %    PLATELET 581 (L) 245 - 400 K/uL    MPV 12.7 8.9 - 12.9 FL    NRBC 0.0 0.0  WBC    ABSOLUTE NRBC 0.00 0.00 - 0.01 K/uL    NEUTROPHILS 87 (H) 32 - 75 %    LYMPHOCYTES 8 (L) 12 - 49 %    MONOCYTES 3 (L) 5 - 13 %    EOSINOPHILS 0 0 - 7 %    BASOPHILS 0 0 - 1 %    IMMATURE GRANULOCYTES 2 (H) 0 - 0.5 %    ABS. NEUTROPHILS 12.7 (H) 1.8 - 8.0 K/UL    ABS.  LYMPHOCYTES 1.1 0.8 - 3.5 K/UL    ABS. MONOCYTES 0.4 0.0 - 1.0 K/UL    ABS. EOSINOPHILS 0.1 0.0 - 0.4 K/UL    ABS. BASOPHILS 0.0 0.0 - 0.1 K/UL    ABS. IMM. GRANS. 0.2 (H) 0.00 - 0.04 K/UL    DF AUTOMATED                XR CHEST PORT   Final Result   Findings/impression:      Left perihilar and right lower lobe patchy airspace disease present, similar to   prior study. Small bilateral effusions are present. Right PICC line projects   over SVC. Slightly enlarged cardiomediastinal silhouette noted similar to prior   study. No pneumothorax. No overt edema. No significant interval change. NM ACUTE GI BLEED SCAN   Final Result   Negative GI bleed scan. XR CHEST PORT   Final Result   Mild improvement in the right lower lobe      IR THORACENTESIS NDL PUNC ASP W IMAGE   Final Result   1. Previously described small hepatic metastases are sonographically occult,   therefore targeted liver biopsy could not be performed. 2.  Small left pleural effusion. 3.  Successful left thoracentesis. Fluid samples submitted for cytologic   analysis. IR ULTRASOUND ABDOMEN LTD   Final Result   1. Previously described small hepatic metastases are sonographically occult,   therefore targeted liver biopsy could not be performed. 2.  Small left pleural effusion. 3.  Successful left thoracentesis. Fluid samples submitted for cytologic   analysis. XR CHEST PORT   Final Result      XR CHEST PORT   Final Result   Findings/impression:      Stable positioning of right upper extremity PICC, tip projects over the upper   SVC. Cardiac silhouette is enlarged. No pulmonary edema. Bilateral pleural effusions. No evidence of pneumothorax. No acute osseous abnormality identified. DUPLEX UPPER EXT VENOUS BILAT   Final Result      XR CHEST PORT   Final Result   Large effusions. Vascular congestion. CT ABD PELV W CONT   Final Result   1.   There are increasing bilateral pleural effusions, increasing body wall   edema, and increasing ascites. These findings could be secondary to the third   spacing of fluids. The differential diagnosis for the ascites would include   pancreatic ascites with acute pancreatitis or metastatic disease to the   peritoneum. 2.  There is a biliary stent which is unchanged in its position traversing   throughout area of obstruction within the pancreatic head. 3.  There are multiple low-density lesions of the liver without short-term   interval changes consistent with metastatic disease. 4.  The right kidney is not identified and apparently the patient is status post   a previous right nephrectomy. 5.  There are bilateral adrenal masses suspect for metastatic disease without   short-term interval changes. CTA CHEST W OR W WO CONT   Final Result   No CT evidence for PE. Thoracic aorta atherosclerosis. CAD. Moderate to large bilateral pleural effusions with associated RML, RLL, and LLL   compressive atelectasis. Abdominal ascites. DUPLEX LOWER EXT VENOUS BILAT   Final Result      XR CHEST PORT   Final Result   FINDINGS: IMPRESSION: 2 frontal views of the chest. Right PICC distal tip SVC   region. Enlarging cardiomegaly. Increasing vascular congestion. Interval development of   hypoinflation, pleural effusions, lower lung airspace edema and/or pneumonia. No   pneumothorax. No free air under the diaphragm. CT ABD PELV W CONT   Final Result   New moderate volume dependent pleural effusions with associated   lower lobe lung compressive atelectasis. Increasing ascites, moderate approaching large-volume   (fluid could be sampled if there is any clinical concern for bile content). High suspicion hepatic metastases. Similar appearance for the pancreas; Silastic stent in place. No pseudocyst formation. Unchanged appearance bilateral adrenal glands. No bowel obstruction.                   US ABD LTD   Final Result   Small amount of free fluid.  Finding suggesting hemangioma in the   liver. Gallbladder wall thickening, adenomyomatosis, sludge and gallstones. Cholecystitis possible. Finding in the region of the pancreatic head as above. Other findings as above. CTA ABDOMEN PELV W CONT   Final Result   1. Ill-defined hypodense mass in the pancreas. There are inflammatory changes   and fluid adjacent to the pancreas or duodenum and stomach most likely related   to biopsy or focal pancreatitis. No pseudocyst formation, active bleeding or   other acute findings. 2. Enlarged adrenal glands left greater than right with noncontrast densities   consistent with adrenal adenomas. 3. Right nephrectomy. 4. Other findings as described. XR CHEST PORT   Final Result   No acute findings. IR PARACENTESIS ABD W IMAGE    (Results Pending)       Assessment:     Principal Problem:    Pancreatitis (12/9/2021)    Active Problems:    A-fib ([address] (11/16/2020)      CHF (congestive heart failure) ([facility] [address] (11/16/2020)      Hematemesis (12/12/2021)      History of peptic ulcer disease (12/12/2021)      Pancreatic mass (1/5/2022)        Plan:   1. Pancreatitis - RESOLVED.     -12/30 s/p post pyloric tube placement. Patient removed tube on 12/31.         -Unable to place GJ tube due to  tumor in duodenum and food in the stomach and patient on Eliquis      -Lipase 1,027.       -Pain med as needed.      - S/P paracentesis 12/27/21 with removal of 2300 ml clear serous  Fluid removed      -cytology Slight acute inflammation.       -currently on Regular diet      -patient in agreement for SNF transfer, then outpatient follow up from there.   2. CHF       -lasix on hold, low BP  3. COPD       -Continue home medications       -Albuterol as needed   4.  Pancreatic Mass       - 22,358      -S/p fine needle aspiration suspicious for neoplasia but not diagnostic      -CT concern for liver metastais/lesions      -IR unable to do liver biopsy at this time      -Poor Prognosis      -patient has accepted to [facility] rehab and [facility]  5. Hematemesis (resolved)  6. Afib with RVR      -Eliquis on hold 2/2 to low Hgb      -Continue diltiazem      -Cardiology has been consulted  7. Diarrhea (Improved)      - Imodium as needed  8. Anemia/GI bleed     -Reports of rectal bleeding, dark stools. Bleeding most likely from tumor invasion. If she continues to bleed, consult IR for possible embolization procedure.      -Bleeding scan is negative.      -Hgb 7.8. Monitor and transfuse as needed.   9.-Hypokalemia     Replete as needed     CMP in the am    CM is working on patient's LTC and hospice placement. GI will now sign off. Please re-consult as needed. Patient discussed with Dr Magalys Jennings and agrees to above impression and plan. Thank you for allowing me to participate in this patients care    Signed By: Marco Springer.  CONRADO Hillman     January 21, 2022

## 2022-01-21 NOTE — PROGRESS NOTES
Hospitalist Progress Note    Subjective:   Daily Progress Note: 1/21/2022 6:36 PM    Patient is sleepy but arousable. Patient is just waiting to be discharged to [facility] with [facility].     Current Facility-Administered Medications   Medication Dose Route Frequency    benzocaine-menthoL (CEPACOL) lozenge 1 Lozenge  1 Lozenge Mucous Membrane PRN    benzonatate (TESSALON) capsule 100 mg  100 mg Oral TID PRN    guaiFENesin (ROBITUSSIN) 100 mg/5 mL oral liquid 100 mg  100 mg Oral Q4H PRN    sertraline (ZOLOFT) tablet 25 mg  25 mg Oral QAM    fluconazole (DIFLUCAN) tablet 200 mg  200 mg Oral DAILY    calcium-vitamin D 600 mg-5 mcg (200 unit) per tablet 2 Tablet  2 Tablet Oral DAILY    piperacillin-tazobactam (ZOSYN) 3.375 g in 0.9% sodium chloride (MBP/ADV) 100 mL MBP  3.375 g IntraVENous Q8H    guaiFENesin ER (MUCINEX) tablet 1,200 mg  1,200 mg Oral Q12H    albuterol-ipratropium (DUO-NEB) 2.5 MG-0.5 MG/3 ML  3 mL Nebulization Q6H PRN    SE-Tan Plus capsule  1 Capsule Oral BID    lactated Ringers infusion  75 mL/hr IntraVENous CONTINUOUS    0.9% sodium chloride infusion 250 mL  250 mL IntraVENous PRN    0.9% sodium chloride infusion 250 mL  250 mL IntraVENous PRN    polyethylene glycol (MIRALAX) packet 17 g  17 g Oral DAILY PRN    0.9% sodium chloride infusion 250 mL  250 mL IntraVENous PRN    pantoprazole (PROTONIX) 40 mg in 0.9% sodium chloride 10 mL injection  40 mg IntraVENous Q12H    loperamide (IMODIUM) capsule 2 mg  2 mg Oral Q4H PRN    [Held by provider] metoprolol tartrate (LOPRESSOR) tablet 25 mg  25 mg Oral Q12H    0.9% sodium chloride infusion 250 mL  250 mL IntraVENous PRN    cholecalciferol (VITAMIN D3) (5000 Units/125 mcg) tablet 5,000 Units  5,000 Units Oral Q7D    atorvastatin (LIPITOR) tablet 40 mg  40 mg Oral QHS    [Held by provider] furosemide (LASIX) tablet 40 mg  40 mg Oral DAILY    influenza vaccine 2021-22 (6 mos+)(PF) (FLUARIX/FLULAVAL/FLUZONE QUAD) injection 0.5 mL 1 Each IntraMUSCular PRIOR TO DISCHARGE    [Held by provider] apixaban (ELIQUIS) tablet 2.5 mg  2.5 mg Oral BID    midodrine (PROAMATINE) tablet 10 mg  10 mg Oral TID WITH MEALS    sodium chloride (NS) flush 5-40 mL  5-40 mL IntraVENous PRN    zinc oxide-white petrolatum 17-57 % topical paste   Topical TID    cyclobenzaprine (FLEXERIL) tablet 10 mg  10 mg Oral TID PRN    HYDROmorphone (DILAUDID) injection 1 mg  1 mg IntraVENous Q4H PRN    oxyCODONE IR (ROXICODONE) tablet 5 mg  5 mg Oral Q6H PRN    docusate sodium (COLACE) capsule 100 mg  100 mg Oral BID    sorbitoL 70 % solution 30 mL  30 mL Oral DAILY PRN    bisacodyL (DULCOLAX) suppository 10 mg  10 mg Rectal DAILY PRN    hydrALAZINE (APRESOLINE) 20 mg/mL injection 20 mg  20 mg IntraVENous Q6H PRN    [Held by provider] chlorthalidone (HYGROTON) tablet 25 mg  25 mg Oral DAILY    albuterol (PROVENTIL HFA, VENTOLIN HFA, PROAIR HFA) inhaler 2 Puff  2 Puff Inhalation Q6H PRN    ascorbic acid (vitamin C) (VITAMIN C) tablet 500 mg  500 mg Oral DAILY    diazePAM (VALIUM) tablet 5 mg  5 mg Oral Q6H PRN    dilTIAZem ER (CARDIZEM CD) capsule 120 mg  120 mg Oral DAILY    potassium chloride SR (KLOR-CON 10) tablet 20 mEq  20 mEq Oral DAILY    traZODone (DESYREL) tablet 50 mg  50 mg Oral QHS    ondansetron (ZOFRAN) injection 4 mg  4 mg IntraVENous Q6H PRN    acetaminophen (TYLENOL) tablet 650 mg  650 mg Oral Q4H PRN    prochlorperazine (COMPAZINE) with saline injection 10 mg  10 mg IntraVENous Q6H PRN        Review of Systems  Review of Systems   Constitutional: Negative. Respiratory: Positive for cough, hemoptysis and shortness of breath. Cardiovascular: Negative. Gastrointestinal: Positive for abdominal pain. Negative for nausea and vomiting. All other systems reviewed and are negative.            Objective:     Visit Vitals  BP (!) 99/55   Pulse (!) 105   Temp 97.9 °F (36.6 °C)   Resp 18   Ht 5' 7\" (1.702 m)   Wt 94.1 kg (207 lb 7.3 oz)   SpO2 94%   BMI 32.49 kg/m²    O2 Flow Rate (L/min): 3 l/min O2 Device: Nasal cannula    Temp (24hrs), Av.8 °F (36.6 °C), Min:97.6 °F (36.4 °C), Max:97.9 °F (36.6 °C)      701 - 1900  In: 100 [I.V.:100]  Out: -   1901 -  0700  In: 6505 [P.O.:300; I.V.:2000]  Out: 1200 [Urine:1200]    Recent Results (from the past 24 hour(s))   PROTHROMBIN TIME + INR    Collection Time: 22  3:52 AM   Result Value Ref Range    Prothrombin time 34.0 (H) 11.9 - 14.7 sec    INR 3.4 (H) 0.9 - 1.1     METABOLIC PANEL, COMPREHENSIVE    Collection Time: 22  3:52 AM   Result Value Ref Range    Sodium 138 136 - 145 mmol/L    Potassium 3.7 3.5 - 5.1 mmol/L    Chloride 108 97 - 108 mmol/L    CO2 28 21 - 32 mmol/L    Anion gap 2 (L) 5 - 15 mmol/L    Glucose 54 (L) 65 - 100 mg/dL    BUN 21 (H) 6 - 20 mg/dL    Creatinine 0.78 0.55 - 1.02 mg/dL    BUN/Creatinine ratio 27 (H) 12 - 20      GFR est AA >60 >60 ml/min/1.73m2    GFR est non-AA >60 >60 ml/min/1.73m2    Calcium 7.6 (L) 8.5 - 10.1 mg/dL    Bilirubin, total 7.4 (H) 0.2 - 1.0 mg/dL    AST (SGOT) 80 (H) 15 - 37 U/L    ALT (SGPT) 87 (H) 12 - 78 U/L    Alk. phosphatase 787 (H) 45 - 117 U/L    Protein, total 4.2 (L) 6.4 - 8.2 g/dL    Albumin 0.8 (L) 3.5 - 5.0 g/dL    Globulin 3.4 2.0 - 4.0 g/dL    A-G Ratio 0.2 (L) 1.1 - 2.2     CBC WITH AUTOMATED DIFF    Collection Time: 22  3:52 AM   Result Value Ref Range    WBC 14.5 (H) 3.6 - 11.0 K/uL    RBC 2.53 (L) 3.80 - 5.20 M/uL    HGB 7.8 (L) 11.5 - 16.0 g/dL    HCT 24.6 (L) 35.0 - 47.0 %    MCV 97.2 80.0 - 99.0 FL    MCH 30.8 26.0 - 34.0 PG    MCHC 31.7 30.0 - 36.5 g/dL    RDW 16.9 (H) 11.5 - 14.5 %    PLATELET 915 (L) 719 - 400 K/uL    MPV 12.7 8.9 - 12.9 FL    NRBC 0.0 0.0  WBC    ABSOLUTE NRBC 0.00 0.00 - 0.01 K/uL    NEUTROPHILS 87 (H) 32 - 75 %    LYMPHOCYTES 8 (L) 12 - 49 %    MONOCYTES 3 (L) 5 - 13 %    EOSINOPHILS 0 0 - 7 %    BASOPHILS 0 0 - 1 %    IMMATURE GRANULOCYTES 2 (H) 0 - 0.5 %    ABS. NEUTROPHILS 12.7 (H) 1.8 - 8.0 K/UL    ABS. LYMPHOCYTES 1.1 0.8 - 3.5 K/UL    ABS. MONOCYTES 0.4 0.0 - 1.0 K/UL    ABS. EOSINOPHILS 0.1 0.0 - 0.4 K/UL    ABS. BASOPHILS 0.0 0.0 - 0.1 K/UL    ABS. IMM. GRANS. 0.2 (H) 0.00 - 0.04 K/UL    DF AUTOMATED          XR CHEST PORT   Final Result   Findings/impression:      Left perihilar and right lower lobe patchy airspace disease present, similar to   prior study. Small bilateral effusions are present. Right PICC line projects   over SVC. Slightly enlarged cardiomediastinal silhouette noted similar to prior   study. No pneumothorax. No overt edema. No significant interval change. NM ACUTE GI BLEED SCAN   Final Result   Negative GI bleed scan. XR CHEST PORT   Final Result   Mild improvement in the right lower lobe      IR THORACENTESIS NDL PUNC ASP W IMAGE   Final Result   1. Previously described small hepatic metastases are sonographically occult,   therefore targeted liver biopsy could not be performed. 2.  Small left pleural effusion. 3.  Successful left thoracentesis. Fluid samples submitted for cytologic   analysis. IR ULTRASOUND ABDOMEN LTD   Final Result   1. Previously described small hepatic metastases are sonographically occult,   therefore targeted liver biopsy could not be performed. 2.  Small left pleural effusion. 3.  Successful left thoracentesis. Fluid samples submitted for cytologic   analysis. XR CHEST PORT   Final Result      XR CHEST PORT   Final Result   Findings/impression:      Stable positioning of right upper extremity PICC, tip projects over the upper   SVC. Cardiac silhouette is enlarged. No pulmonary edema. Bilateral pleural effusions. No evidence of pneumothorax. No acute osseous abnormality identified. DUPLEX UPPER EXT VENOUS BILAT   Final Result      XR CHEST PORT   Final Result   Large effusions. Vascular congestion. CT ABD PELV W CONT   Final Result   1.   There are increasing bilateral pleural effusions, increasing body wall   edema, and increasing ascites. These findings could be secondary to the third   spacing of fluids. The differential diagnosis for the ascites would include   pancreatic ascites with acute pancreatitis or metastatic disease to the   peritoneum. 2.  There is a biliary stent which is unchanged in its position traversing   throughout area of obstruction within the pancreatic head. 3.  There are multiple low-density lesions of the liver without short-term   interval changes consistent with metastatic disease. 4.  The right kidney is not identified and apparently the patient is status post   a previous right nephrectomy. 5.  There are bilateral adrenal masses suspect for metastatic disease without   short-term interval changes. CTA CHEST W OR W WO CONT   Final Result   No CT evidence for PE. Thoracic aorta atherosclerosis. CAD. Moderate to large bilateral pleural effusions with associated RML, RLL, and LLL   compressive atelectasis. Abdominal ascites. DUPLEX LOWER EXT VENOUS BILAT   Final Result      XR CHEST PORT   Final Result   FINDINGS: IMPRESSION: 2 frontal views of the chest. Right PICC distal tip SVC   region. Enlarging cardiomegaly. Increasing vascular congestion. Interval development of   hypoinflation, pleural effusions, lower lung airspace edema and/or pneumonia. No   pneumothorax. No free air under the diaphragm. CT ABD PELV W CONT   Final Result   New moderate volume dependent pleural effusions with associated   lower lobe lung compressive atelectasis. Increasing ascites, moderate approaching large-volume   (fluid could be sampled if there is any clinical concern for bile content). High suspicion hepatic metastases. Similar appearance for the pancreas; Silastic stent in place. No pseudocyst formation. Unchanged appearance bilateral adrenal glands. No bowel obstruction. US ABD LTD   Final Result   Small amount of free fluid. Finding suggesting hemangioma in the   liver. Gallbladder wall thickening, adenomyomatosis, sludge and gallstones. Cholecystitis possible. Finding in the region of the pancreatic head as above. Other findings as above. CTA ABDOMEN PELV W CONT   Final Result   1. Ill-defined hypodense mass in the pancreas. There are inflammatory changes   and fluid adjacent to the pancreas or duodenum and stomach most likely related   to biopsy or focal pancreatitis. No pseudocyst formation, active bleeding or   other acute findings. 2. Enlarged adrenal glands left greater than right with noncontrast densities   consistent with adrenal adenomas. 3. Right nephrectomy. 4. Other findings as described. XR CHEST PORT   Final Result   No acute findings. IR PARACENTESIS ABD W IMAGE    (Results Pending)        PHYSICAL EXAM:    Physical Exam  Vitals and nursing note reviewed. Constitutional:       Appearance: She is obese. She is ill-appearing. HENT:      Head: Normocephalic and atraumatic. Eyes:      General: Scleral icterus present. Cardiovascular:      Rate and Rhythm: Normal rate and regular rhythm. Pulmonary:      Effort: No respiratory distress. Breath sounds: No wheezing. Comments: On nasal canula  Abdominal:      General: Bowel sounds are normal. There is no distension. Tenderness: There is no abdominal tenderness. Musculoskeletal:      Right lower leg: Edema present. Left lower leg: Edema present. Skin:     Capillary Refill: Capillary refill takes less than 2 seconds. Coloration: Skin is jaundiced. Neurological:      Mental Status: She is alert and oriented to person, place, and time.    Psychiatric:      Comments: Flat affect          Data Review    Recent Results (from the past 24 hour(s))   PROTHROMBIN TIME + INR    Collection Time: 01/21/22  3:52 AM   Result Value Ref Range    Prothrombin time 34.0 (H) 11.9 - 14.7 sec    INR 3.4 (H) 0.9 - 1.1     METABOLIC PANEL, COMPREHENSIVE    Collection Time: 01/21/22  3:52 AM   Result Value Ref Range    Sodium 138 136 - 145 mmol/L    Potassium 3.7 3.5 - 5.1 mmol/L    Chloride 108 97 - 108 mmol/L    CO2 28 21 - 32 mmol/L    Anion gap 2 (L) 5 - 15 mmol/L    Glucose 54 (L) 65 - 100 mg/dL    BUN 21 (H) 6 - 20 mg/dL    Creatinine 0.78 0.55 - 1.02 mg/dL    BUN/Creatinine ratio 27 (H) 12 - 20      GFR est AA >60 >60 ml/min/1.73m2    GFR est non-AA >60 >60 ml/min/1.73m2    Calcium 7.6 (L) 8.5 - 10.1 mg/dL    Bilirubin, total 7.4 (H) 0.2 - 1.0 mg/dL    AST (SGOT) 80 (H) 15 - 37 U/L    ALT (SGPT) 87 (H) 12 - 78 U/L    Alk. phosphatase 787 (H) 45 - 117 U/L    Protein, total 4.2 (L) 6.4 - 8.2 g/dL    Albumin 0.8 (L) 3.5 - 5.0 g/dL    Globulin 3.4 2.0 - 4.0 g/dL    A-G Ratio 0.2 (L) 1.1 - 2.2     CBC WITH AUTOMATED DIFF    Collection Time: 01/21/22  3:52 AM   Result Value Ref Range    WBC 14.5 (H) 3.6 - 11.0 K/uL    RBC 2.53 (L) 3.80 - 5.20 M/uL    HGB 7.8 (L) 11.5 - 16.0 g/dL    HCT 24.6 (L) 35.0 - 47.0 %    MCV 97.2 80.0 - 99.0 FL    MCH 30.8 26.0 - 34.0 PG    MCHC 31.7 30.0 - 36.5 g/dL    RDW 16.9 (H) 11.5 - 14.5 %    PLATELET 173 (L) 363 - 400 K/uL    MPV 12.7 8.9 - 12.9 FL    NRBC 0.0 0.0  WBC    ABSOLUTE NRBC 0.00 0.00 - 0.01 K/uL    NEUTROPHILS 87 (H) 32 - 75 %    LYMPHOCYTES 8 (L) 12 - 49 %    MONOCYTES 3 (L) 5 - 13 %    EOSINOPHILS 0 0 - 7 %    BASOPHILS 0 0 - 1 %    IMMATURE GRANULOCYTES 2 (H) 0 - 0.5 %    ABS. NEUTROPHILS 12.7 (H) 1.8 - 8.0 K/UL    ABS. LYMPHOCYTES 1.1 0.8 - 3.5 K/UL    ABS. MONOCYTES 0.4 0.0 - 1.0 K/UL    ABS. EOSINOPHILS 0.1 0.0 - 0.4 K/UL    ABS. BASOPHILS 0.0 0.0 - 0.1 K/UL    ABS. IMM.  GRANS. 0.2 (H) 0.00 - 0.04 K/UL    DF AUTOMATED          Assessment/Plan:     Principal Problem:    Pancreatitis (12/9/2021)    Active Problems:    A-fib ([facility] [address] (11/16/2020)      CHF (congestive heart failure) ([facility] [address] (11/16/2020)      Hematemesis (12/12/2021)      History of peptic ulcer disease (12/12/2021)      Pancreatic mass (1/5/2022)        Hospital Course:    Aparna Evans a 65-year-old woman with a PMH significant for atrial fibrillation, PUD, CHF, COPD on home O2 at 2 L, renal cell carcinoma status post nephrectomy, hypertension and morbid obesity. Patient was found to have obstructive jaundice and biliary stricture in early November of this year.  She underwent an ERCP with stent placement with brushings of the bile duct where there was noted to be a stricture.  These brushings were negative.  Patient then underwent a PET CT which demonstrated uptake in the head of the pancreas concerning for pancreatic mass. She subsequently underwent endoscopic ultrasound with ultrasound and FNA biopsy on 12/6/2021.  The procedure demonstrated abutment of the tumor with the portal vein without involvement of the SMA or SMV.  The total tumor was measured to be 2.7 cm by 3 cm.  This biopsy resulted as atypical cells with suspicion for malignancy. She admitted to the hospital on 12/9/2021 and severe pancreatitis after undergoing an EUS for pancreatic biopsy on 12/6/2021 and pancreatic mass.  Her symptoms were abdominal pain, nausea and vomiting.  CT scan revealing peripancreatic inflammation consistent with likely post biopsy pancreatitis and ascites.  Her initial labs revealing elevated lipase, supratherapeutic INR and elevated D-dimer coagulopathy.  Rosan Me was positive for bilateral pleural effusions, negative for PE.  Paracentesis with culture of ascitic fluid, blood cultures, urine cultures negative for growth.  Patient had been started on IV meropenem for leukocytosis and anidulafungin for suspected intra-abdominal infection.  CT with and without contrast revealing liver lesions consistent with metastasis, mass in the pancreatic head with biliary stent in place, bilateral pleural effusions, ascites and bilateral adrenal masses. Josefina Leblanc declined liver biopsy due to coagulopathy and felt she would not benefit.  Doppler studies of upper and lower extremities negative for DVT, positive for superficial thrombophlebitis of left medial antecubital.  Pancreatic cancer is presumed but no firm diagnosis due to lack of tissue sample.  Pancreatitis remaining persistent despite antibiotics.  Attempted Dobbhoff and PEG J placement but failed due to reddened area presumed possible tumor infiltration in the duodenum.  Due to poor performance and prognosis she is not a candidate for palliative XRT or chemo.  The patient has been progressively worsening with debilitation, intermittently tolerating diet not stable to be discharged home with outpatient surgical oncology follow-up. Myriam Hawley would benefit from transfer to tertiary care center where surgical oncology services are available for evaluation of pancreatic head mass and possible surgical intervention. 1/7 A. fib with RVR rate 110-140. Cardiology consulted and anticoagulation discontinued due to bleeding and liver failure. Patient is agreeable to hospice and has been medically clear to hospice since 1/17/2022. Transition IV fluconazole to fluconazole p.o. Patient will be transitioned from IV Zosyn to levaquin once accepted into long-term care with hospice. 1. Acute pancreatitis  - Status post EUS by Dr. Taryn Palacio 12/6  - Continue pain medication  - Lipase variable, will likely be chronically elevated   - CT abd/pelvis with PO and IV contrast showing increasing bilateral pleural effusion and increasing ascites.  Also notes multiple low-density lesions of liver consistent with metastatic disease and bilateral adrenal masses suspected for metastatic disease.  - completed 14 days of IV merrem and anidulafungin   - paracentesis on 12/27 with 2300 mL clear serous fluid drained, cytology showed no growth  - Liver bx canceled as IR does not feel patient would benefit from bx at this time  -Unsuccessful attempt to place PEG J due to tumor infiltration and duodenum     2. CHF  - Holding chlorthalidone, lasix, and metoprolol due to persistent hypotension      3. Pancreatic mass/liver masses/Adrenal Masses/History of renal cell carcinoma with lung nodules s/p right nephrectomy  Multiple low-density lesions of the liver consistent with meta static disease and bilateral adrenal masses suspicious for metastatic disease  - CA 19-9 greater than 4000  - Oncology consulted - needs tissue biopsy before definitive recommendations can be made. Unable to offer palliative chemo or radiation due to poor performance. Prognosis very poor, hospice recommended   -Pathology of thoracentesis negative for malignant cells but it showed acute on chronic inflammatory cells     4. Hypokalemia-stable  - replete as needed  - given Mag sulfate - recheck in the AM   -  Potassium WNL on 1/18/2022     5. COPD - stable  - On nasal canula  - Chronic respiratory failure with 2 L of oxygen via nasal cannula at home  - Continue mucinex 1200mg BID  - CXR 1/17 no acute change     6. Leukocytosis  - Cultures negative  - Completed course of IV meropenem and andulafungin 14 days  - ID following  - Blood cultures on 1/13/2022 positive for E. Coli continue on IV Zosyn     7. A. fib  - On diltiazem   Held metoprolol HR normal now  -Echo EF of 60 to 65%  -No anticoagulation due to anemia     8. Acute on Chronic Anemia   -hemoglobin has been slowly declining requiring repletion and she is FOBT positive  -Required transfusion hemoglobin 8.1  Total of 6 units PRBCs  -Eliquis has been discontinued due to bleeding and liver failure  - NM bleeding scan negative     9. Elevated d-dimer  - Likely multifactorial   - CTA chest negative for PE  - Duplex US lower extremities negative for DVT  - Duplex US upper extremities showing superficial thrombophlebitis in left median/antecubital veins  -Repeat upper extremity Doppler study negative  -Discontinued Eliquis due to anemia     10. Hypotension  On midodrine    11.  Oral candidiasis  On 2/7 IV fluconazole will transition to p.o. DVT Prophylaxis: discontinued eliquis, on SCDs  GI Prophylaxis: protonix  Discharge and disposition barriers: medically clear to hospice 1/17/22  tomorrow discharge to LTC with hospice    Care Plan discussed with: Patient/Family, Nurse and     Total time spent with patient: 35 minutes.

## 2022-01-21 NOTE — PROGRESS NOTES
Problem: Falls - Risk of  Goal: *Absence of Falls  Description: Document Ruperto Robledo Fall Risk and appropriate interventions in the flowsheet. Outcome: Progressing Towards Goal  Note: Fall Risk Interventions:  Mobility Interventions: Bed/chair exit alarm,OT consult for ADLs,PT Consult for mobility concerns,PT Consult for assist device competence,Strengthening exercises (ROM-active/passive),Utilize walker, cane, or other assistive device    Mentation Interventions: Bed/chair exit alarm,Toileting rounds,Reorient patient,More frequent rounding,Increase mobility    Medication Interventions: Bed/chair exit alarm,Patient to call before getting OOB    Elimination Interventions: Bed/chair exit alarm,Call light in reach    History of Falls Interventions: Bed/chair exit alarm,Consult care management for discharge planning         Problem: Pain  Goal: *Control of Pain  Outcome: Progressing Towards Goal     Problem: Nausea/Vomiting (Adult)  Goal: *Absence of nausea/vomiting  Outcome: Progressing Towards Goal     Problem: Pressure Injury - Risk of  Goal: *Prevention of pressure injury  Description: Document Asim Scale and appropriate interventions in the flowsheet. Outcome: Progressing Towards Goal  Note: Pressure Injury Interventions:  Sensory Interventions: Turn and reposition approx. every two hours (pillows and wedges if needed),Sit a 90-degree angle/use footstool if needed,Minimize linen layers    Moisture Interventions: Moisture barrier,Minimize layers,Maintain skin hydration (lotion/cream),Check for incontinence Q2 hours and as needed,Apply protective barrier, creams and emollients    Activity Interventions: PT/OT evaluation,Increase time out of bed    Mobility Interventions: Turn and reposition approx.  every two hours(pillow and wedges),PT/OT evaluation,HOB 30 degrees or less    Nutrition Interventions: Offer support with meals,snacks and hydration,Document food/fluid/supplement intake,Discuss nutritional consult with provider    Friction and Shear Interventions: Sit at 90-degree angle,Minimize layers,HOB 30 degrees or less                Problem: Nutrition Deficit  Goal: *Optimize nutritional status  Outcome: Progressing Towards Goal

## 2022-01-22 NOTE — ROUTINE PROCESS
Bedside shift report given to Connor Etienne RN  (oncoming nurse) by Desiree Polanco RN (off going nurse). Report to include SBAR, plan of care, recent results, discharge planning, and patient's questions and concerns.

## 2022-01-22 NOTE — PROGRESS NOTES
Hospitalist Progress Note    Subjective:   Daily Progress Note: 1/22/2022 6:36 PM    Patient is sleeping with family member at bedside. Appears comfortable.     Current Facility-Administered Medications   Medication Dose Route Frequency    benzocaine-menthoL (CEPACOL) lozenge 1 Lozenge  1 Lozenge Mucous Membrane PRN    benzonatate (TESSALON) capsule 100 mg  100 mg Oral TID PRN    guaiFENesin (ROBITUSSIN) 100 mg/5 mL oral liquid 100 mg  100 mg Oral Q4H PRN    sertraline (ZOLOFT) tablet 25 mg  25 mg Oral QAM    fluconazole (DIFLUCAN) tablet 200 mg  200 mg Oral DAILY    calcium-vitamin D 600 mg-5 mcg (200 unit) per tablet 2 Tablet  2 Tablet Oral DAILY    piperacillin-tazobactam (ZOSYN) 3.375 g in 0.9% sodium chloride (MBP/ADV) 100 mL MBP  3.375 g IntraVENous Q8H    guaiFENesin ER (MUCINEX) tablet 1,200 mg  1,200 mg Oral Q12H    albuterol-ipratropium (DUO-NEB) 2.5 MG-0.5 MG/3 ML  3 mL Nebulization Q6H PRN    SE-Tan Plus capsule  1 Capsule Oral BID    lactated Ringers infusion  75 mL/hr IntraVENous CONTINUOUS    0.9% sodium chloride infusion 250 mL  250 mL IntraVENous PRN    0.9% sodium chloride infusion 250 mL  250 mL IntraVENous PRN    polyethylene glycol (MIRALAX) packet 17 g  17 g Oral DAILY PRN    0.9% sodium chloride infusion 250 mL  250 mL IntraVENous PRN    pantoprazole (PROTONIX) 40 mg in 0.9% sodium chloride 10 mL injection  40 mg IntraVENous Q12H    loperamide (IMODIUM) capsule 2 mg  2 mg Oral Q4H PRN    [Held by provider] metoprolol tartrate (LOPRESSOR) tablet 25 mg  25 mg Oral Q12H    0.9% sodium chloride infusion 250 mL  250 mL IntraVENous PRN    cholecalciferol (VITAMIN D3) (5000 Units/125 mcg) tablet 5,000 Units  5,000 Units Oral Q7D    atorvastatin (LIPITOR) tablet 40 mg  40 mg Oral QHS    [Held by provider] furosemide (LASIX) tablet 40 mg  40 mg Oral DAILY    influenza vaccine 2021-22 (6 mos+)(PF) (FLUARIX/FLULAVAL/FLUZONE QUAD) injection 0.5 mL  1 Each IntraMUSCular PRIOR TO DISCHARGE    [Held by provider] apixaban (ELIQUIS) tablet 2.5 mg  2.5 mg Oral BID    midodrine (PROAMATINE) tablet 10 mg  10 mg Oral TID WITH MEALS    sodium chloride (NS) flush 5-40 mL  5-40 mL IntraVENous PRN    zinc oxide-white petrolatum 17-57 % topical paste   Topical TID    cyclobenzaprine (FLEXERIL) tablet 10 mg  10 mg Oral TID PRN    HYDROmorphone (DILAUDID) injection 1 mg  1 mg IntraVENous Q4H PRN    oxyCODONE IR (ROXICODONE) tablet 5 mg  5 mg Oral Q6H PRN    docusate sodium (COLACE) capsule 100 mg  100 mg Oral BID    sorbitoL 70 % solution 30 mL  30 mL Oral DAILY PRN    bisacodyL (DULCOLAX) suppository 10 mg  10 mg Rectal DAILY PRN    hydrALAZINE (APRESOLINE) 20 mg/mL injection 20 mg  20 mg IntraVENous Q6H PRN    [Held by provider] chlorthalidone (HYGROTON) tablet 25 mg  25 mg Oral DAILY    albuterol (PROVENTIL HFA, VENTOLIN HFA, PROAIR HFA) inhaler 2 Puff  2 Puff Inhalation Q6H PRN    ascorbic acid (vitamin C) (VITAMIN C) tablet 500 mg  500 mg Oral DAILY    diazePAM (VALIUM) tablet 5 mg  5 mg Oral Q6H PRN    dilTIAZem ER (CARDIZEM CD) capsule 120 mg  120 mg Oral DAILY    potassium chloride SR (KLOR-CON 10) tablet 20 mEq  20 mEq Oral DAILY    traZODone (DESYREL) tablet 50 mg  50 mg Oral QHS    ondansetron (ZOFRAN) injection 4 mg  4 mg IntraVENous Q6H PRN    acetaminophen (TYLENOL) tablet 650 mg  650 mg Oral Q4H PRN    prochlorperazine (COMPAZINE) with saline injection 10 mg  10 mg IntraVENous Q6H PRN        Review of Systems  Review of Systems   Constitutional: Negative. Respiratory: Positive for cough, hemoptysis and shortness of breath. Cardiovascular: Negative. Gastrointestinal: Positive for abdominal pain. Negative for nausea and vomiting. All other systems reviewed and are negative.            Objective:     Visit Vitals  /72 (BP 1 Location: Right lower arm)   Pulse (!) 102   Temp 97.5 °F (36.4 °C)   Resp 18   Ht 5' 7\" (1.702 m)   Wt 102.9 kg (226 lb 13.7 oz) SpO2 97%   BMI 35.53 kg/m²    O2 Flow Rate (L/min): 2 l/min O2 Device: Nasal cannula    Temp (24hrs), Av.8 °F (36.6 °C), Min:97.5 °F (36.4 °C), Max:98 °F (36.7 °C)      No intake/output data recorded.  1901 -  0700  In: 2500 [P.O.:200; I.V.:2300]  Out: 500 [Urine:500]    Recent Results (from the past 24 hour(s))   PROTHROMBIN TIME + INR    Collection Time: 22  5:56 AM   Result Value Ref Range    Prothrombin time 44.2 (H) 11.9 - 14.7 sec    INR 4.7 (HH) 0.9 - 1.1     METABOLIC PANEL, COMPREHENSIVE    Collection Time: 22  5:56 AM   Result Value Ref Range    Sodium 138 136 - 145 mmol/L    Potassium 4.1 3.5 - 5.1 mmol/L    Chloride 106 97 - 108 mmol/L    CO2 28 21 - 32 mmol/L    Anion gap 4 (L) 5 - 15 mmol/L    Glucose 81 65 - 100 mg/dL    BUN 24 (H) 6 - 20 mg/dL    Creatinine 0.94 0.55 - 1.02 mg/dL    BUN/Creatinine ratio 26 (H) 12 - 20      GFR est AA >60 >60 ml/min/1.73m2    GFR est non-AA 58 (L) >60 ml/min/1.73m2    Calcium 8.4 (L) 8.5 - 10.1 mg/dL    Bilirubin, total 7.5 (H) 0.2 - 1.0 mg/dL    AST (SGOT) 73 (H) 15 - 37 U/L    ALT (SGPT) 88 (H) 12 - 78 U/L    Alk. phosphatase 882 (H) 45 - 117 U/L    Protein, total 4.4 (L) 6.4 - 8.2 g/dL    Albumin 0.8 (L) 3.5 - 5.0 g/dL    Globulin 3.6 2.0 - 4.0 g/dL    A-G Ratio 0.2 (L) 1.1 - 2.2     CBC WITH AUTOMATED DIFF    Collection Time: 22  5:56 AM   Result Value Ref Range    WBC 16.4 (H) 3.6 - 11.0 K/uL    RBC 2.51 (L) 3.80 - 5.20 M/uL    HGB 7.4 (L) 11.5 - 16.0 g/dL    HCT 24.1 (L) 35.0 - 47.0 %    MCV 96.0 80.0 - 99.0 FL    MCH 29.5 26.0 - 34.0 PG    MCHC 30.7 30.0 - 36.5 g/dL    RDW 17.2 (H) 11.5 - 14.5 %    PLATELET 447 (L) 667 - 400 K/uL    MPV 12.3 8.9 - 12.9 FL    NRBC 0.0 0.0  WBC    ABSOLUTE NRBC 0.00 0.00 - 0.01 K/uL    NEUTROPHILS 90 (H) 32 - 75 %    LYMPHOCYTES 6 (L) 12 - 49 %    MONOCYTES 2 (L) 5 - 13 %    EOSINOPHILS 0 0 - 7 %    BASOPHILS 0 0 - 1 %    IMMATURE GRANULOCYTES 2 (H) 0 - 0.5 %    ABS.  NEUTROPHILS 14.8 (H) 1.8 - 8.0 K/UL    ABS. LYMPHOCYTES 1.0 0.8 - 3.5 K/UL    ABS. MONOCYTES 0.3 0.0 - 1.0 K/UL    ABS. EOSINOPHILS 0.0 0.0 - 0.4 K/UL    ABS. BASOPHILS 0.0 0.0 - 0.1 K/UL    ABS. IMM. GRANS. 0.2 (H) 0.00 - 0.04 K/UL    DF AUTOMATED     C REACTIVE PROTEIN, QT    Collection Time: 01/22/22  5:56 AM   Result Value Ref Range    C-Reactive protein 7.48 (H) 0.00 - 0.60 mg/dL   PROCALCITONIN    Collection Time: 01/22/22  5:56 AM   Result Value Ref Range    Procalcitonin 1.08 (H) 0 ng/mL        XR CHEST PORT   Final Result   Findings/impression:      Left perihilar and right lower lobe patchy airspace disease present, similar to   prior study. Small bilateral effusions are present. Right PICC line projects   over SVC. Slightly enlarged cardiomediastinal silhouette noted similar to prior   study. No pneumothorax. No overt edema. No significant interval change. NM ACUTE GI BLEED SCAN   Final Result   Negative GI bleed scan. XR CHEST PORT   Final Result   Mild improvement in the right lower lobe      IR THORACENTESIS NDL PUNC ASP W IMAGE   Final Result   1. Previously described small hepatic metastases are sonographically occult,   therefore targeted liver biopsy could not be performed. 2.  Small left pleural effusion. 3.  Successful left thoracentesis. Fluid samples submitted for cytologic   analysis. IR ULTRASOUND ABDOMEN LTD   Final Result   1. Previously described small hepatic metastases are sonographically occult,   therefore targeted liver biopsy could not be performed. 2.  Small left pleural effusion. 3.  Successful left thoracentesis. Fluid samples submitted for cytologic   analysis. XR CHEST PORT   Final Result      XR CHEST PORT   Final Result   Findings/impression:      Stable positioning of right upper extremity PICC, tip projects over the upper   SVC. Cardiac silhouette is enlarged. No pulmonary edema. Bilateral pleural effusions. No evidence of pneumothorax.       No acute osseous abnormality identified. DUPLEX UPPER EXT VENOUS BILAT   Final Result      XR CHEST PORT   Final Result   Large effusions. Vascular congestion. CT ABD PELV W CONT   Final Result   1. There are increasing bilateral pleural effusions, increasing body wall   edema, and increasing ascites. These findings could be secondary to the third   spacing of fluids. The differential diagnosis for the ascites would include   pancreatic ascites with acute pancreatitis or metastatic disease to the   peritoneum. 2.  There is a biliary stent which is unchanged in its position traversing   throughout area of obstruction within the pancreatic head. 3.  There are multiple low-density lesions of the liver without short-term   interval changes consistent with metastatic disease. 4.  The right kidney is not identified and apparently the patient is status post   a previous right nephrectomy. 5.  There are bilateral adrenal masses suspect for metastatic disease without   short-term interval changes. CTA CHEST W OR W WO CONT   Final Result   No CT evidence for PE. Thoracic aorta atherosclerosis. CAD. Moderate to large bilateral pleural effusions with associated RML, RLL, and LLL   compressive atelectasis. Abdominal ascites. DUPLEX LOWER EXT VENOUS BILAT   Final Result      XR CHEST PORT   Final Result   FINDINGS: IMPRESSION: 2 frontal views of the chest. Right PICC distal tip SVC   region. Enlarging cardiomegaly. Increasing vascular congestion. Interval development of   hypoinflation, pleural effusions, lower lung airspace edema and/or pneumonia. No   pneumothorax. No free air under the diaphragm. CT ABD PELV W CONT   Final Result   New moderate volume dependent pleural effusions with associated   lower lobe lung compressive atelectasis.       Increasing ascites, moderate approaching large-volume   (fluid could be sampled if there is any clinical concern for bile content). High suspicion hepatic metastases. Similar appearance for the pancreas; Silastic stent in place. No pseudocyst formation. Unchanged appearance bilateral adrenal glands. No bowel obstruction. US ABD LTD   Final Result   Small amount of free fluid. Finding suggesting hemangioma in the   liver. Gallbladder wall thickening, adenomyomatosis, sludge and gallstones. Cholecystitis possible. Finding in the region of the pancreatic head as above. Other findings as above. CTA ABDOMEN PELV W CONT   Final Result   1. Ill-defined hypodense mass in the pancreas. There are inflammatory changes   and fluid adjacent to the pancreas or duodenum and stomach most likely related   to biopsy or focal pancreatitis. No pseudocyst formation, active bleeding or   other acute findings. 2. Enlarged adrenal glands left greater than right with noncontrast densities   consistent with adrenal adenomas. 3. Right nephrectomy. 4. Other findings as described. XR CHEST PORT   Final Result   No acute findings. IR PARACENTESIS ABD W IMAGE    (Results Pending)        PHYSICAL EXAM:    Physical Exam  Vitals and nursing note reviewed. Constitutional:       Appearance: She is obese. She is ill-appearing. HENT:      Head: Normocephalic and atraumatic. Eyes:      General: Scleral icterus present. Cardiovascular:      Rate and Rhythm: Normal rate and regular rhythm. Comments: PICC line right upper arm  Pulmonary:      Effort: No respiratory distress. Breath sounds: No wheezing. Comments: On nasal canula  Abdominal:      General: Bowel sounds are normal. There is no distension. Tenderness: There is no abdominal tenderness. Musculoskeletal:      Right lower leg: Edema present. Left lower leg: Edema present. Skin:     Capillary Refill: Capillary refill takes less than 2 seconds. Coloration: Skin is jaundiced.    Neurological:      Mental Status: She is alert and oriented to person, place, and time. Psychiatric:      Comments: Flat affect          Data Review    Recent Results (from the past 24 hour(s))   PROTHROMBIN TIME + INR    Collection Time: 01/22/22  5:56 AM   Result Value Ref Range    Prothrombin time 44.2 (H) 11.9 - 14.7 sec    INR 4.7 (HH) 0.9 - 1.1     METABOLIC PANEL, COMPREHENSIVE    Collection Time: 01/22/22  5:56 AM   Result Value Ref Range    Sodium 138 136 - 145 mmol/L    Potassium 4.1 3.5 - 5.1 mmol/L    Chloride 106 97 - 108 mmol/L    CO2 28 21 - 32 mmol/L    Anion gap 4 (L) 5 - 15 mmol/L    Glucose 81 65 - 100 mg/dL    BUN 24 (H) 6 - 20 mg/dL    Creatinine 0.94 0.55 - 1.02 mg/dL    BUN/Creatinine ratio 26 (H) 12 - 20      GFR est AA >60 >60 ml/min/1.73m2    GFR est non-AA 58 (L) >60 ml/min/1.73m2    Calcium 8.4 (L) 8.5 - 10.1 mg/dL    Bilirubin, total 7.5 (H) 0.2 - 1.0 mg/dL    AST (SGOT) 73 (H) 15 - 37 U/L    ALT (SGPT) 88 (H) 12 - 78 U/L    Alk. phosphatase 882 (H) 45 - 117 U/L    Protein, total 4.4 (L) 6.4 - 8.2 g/dL    Albumin 0.8 (L) 3.5 - 5.0 g/dL    Globulin 3.6 2.0 - 4.0 g/dL    A-G Ratio 0.2 (L) 1.1 - 2.2     CBC WITH AUTOMATED DIFF    Collection Time: 01/22/22  5:56 AM   Result Value Ref Range    WBC 16.4 (H) 3.6 - 11.0 K/uL    RBC 2.51 (L) 3.80 - 5.20 M/uL    HGB 7.4 (L) 11.5 - 16.0 g/dL    HCT 24.1 (L) 35.0 - 47.0 %    MCV 96.0 80.0 - 99.0 FL    MCH 29.5 26.0 - 34.0 PG    MCHC 30.7 30.0 - 36.5 g/dL    RDW 17.2 (H) 11.5 - 14.5 %    PLATELET 079 (L) 743 - 400 K/uL    MPV 12.3 8.9 - 12.9 FL    NRBC 0.0 0.0  WBC    ABSOLUTE NRBC 0.00 0.00 - 0.01 K/uL    NEUTROPHILS 90 (H) 32 - 75 %    LYMPHOCYTES 6 (L) 12 - 49 %    MONOCYTES 2 (L) 5 - 13 %    EOSINOPHILS 0 0 - 7 %    BASOPHILS 0 0 - 1 %    IMMATURE GRANULOCYTES 2 (H) 0 - 0.5 %    ABS. NEUTROPHILS 14.8 (H) 1.8 - 8.0 K/UL    ABS. LYMPHOCYTES 1.0 0.8 - 3.5 K/UL    ABS. MONOCYTES 0.3 0.0 - 1.0 K/UL    ABS. EOSINOPHILS 0.0 0.0 - 0.4 K/UL    ABS.  BASOPHILS 0.0 0.0 - 0.1 K/UL    ABS. IMM. GRANS. 0.2 (H) 0.00 - 0.04 K/UL    DF AUTOMATED     C REACTIVE PROTEIN, QT    Collection Time: 01/22/22  5:56 AM   Result Value Ref Range    C-Reactive protein 7.48 (H) 0.00 - 0.60 mg/dL   PROCALCITONIN    Collection Time: 01/22/22  5:56 AM   Result Value Ref Range    Procalcitonin 1.08 (H) 0 ng/mL        Assessment/Plan:     Principal Problem:    Pancreatitis (12/9/2021)    Active Problems:    A-fib ([facility] [address] (11/16/2020)      CHF (congestive heart failure) ([facility] [address] (11/16/2020)      Hematemesis (12/12/2021)      History of peptic ulcer disease (12/12/2021)      Pancreatic mass (1/5/2022)        Hospital Course:    Fang Fuller a 72-year-old woman with a PMH significant for atrial fibrillation, PUD, CHF, COPD on home O2 at 2 L, renal cell carcinoma status post nephrectomy, hypertension and morbid obesity. Patient was found to have obstructive jaundice and biliary stricture in early November of this year.  She underwent an ERCP with stent placement with brushings of the bile duct where there was noted to be a stricture.  These brushings were negative.  Patient then underwent a PET CT which demonstrated uptake in the head of the pancreas concerning for pancreatic mass. She subsequently underwent endoscopic ultrasound with ultrasound and FNA biopsy on 12/6/2021.  The procedure demonstrated abutment of the tumor with the portal vein without involvement of the SMA or SMV.  The total tumor was measured to be 2.7 cm by 3 cm.  This biopsy resulted as atypical cells with suspicion for malignancy. She admitted to the hospital on 12/9/2021 and severe pancreatitis after undergoing an EUS for pancreatic biopsy on 12/6/2021 and pancreatic mass.  Her symptoms were abdominal pain, nausea and vomiting. CT scan revealing peripancreatic inflammation consistent with likely post biopsy pancreatitis and ascites.  Her initial labs revealing elevated lipase, supratherapeutic INR and elevated D-dimer coagulopathy.  Bree Pabon was positive for bilateral pleural effusions, negative for PE.  12/27 paracentesis with culture of ascitic fluid which had no growth, blood cultures, urine cultures negative for growth.  Patient had been started on IV meropenem for leukocytosis and anidulafungin for suspected intra-abdominal infection. CT with and without contrast revealing liver lesions consistent with metastasis, mass in the pancreatic head with biliary stent in place, bilateral pleural effusions, ascites and bilateral adrenal masses. IR declined liver biopsy due to coagulopathy and felt she would not benefit. Doppler studies of upper and lower extremities negative for DVT, positive for superficial thrombophlebitis of left medial antecubital. Pancreatic cancer is presumed but no firm diagnosis due to lack of tissue sample. Pancreatitis remaining persistent despite antibiotics. Attempted Dobbhoff and PEG J placement but failed due to reddened area presumed possible tumor infiltration in the duodenum. Due to poor performance and prognosis she is not a candidate for palliative XRT or chemo. The patient has been progressively worsening with debilitation, intermittently tolerating diet not stable to be discharged home with outpatient surgical oncology follow-up. She would benefit from transfer to tertiary care center where surgical oncology services are available for evaluation of pancreatic head mass and possible surgical intervention. 1/7 A. fib with RVR rate 110-140. Cardiology consulted and anticoagulation discontinued due to bleeding and liver failure. Patient is agreeable to hospice and has been medically clear to hospice on 1/18/2022. Transitioned IV fluconazole to fluconazole p.o. Patient transitioned from IV Zosyn to levaquin for 8 total doses as plan is to go into long-term care with hospice.     Acute pancreatitis  Status post EUS by Dr. Pete Davies 12/6, s/p paracentesis on 12/27 with 2300 mL clear serous fluid drained, cytology showed no growth  Lipase variable, will likely be chronically elevated   Continue pain medication  Liver bx canceled as IR does not feel patient would benefit from bx at this time and unsuccessful attempt to place PEG J due to tumor infiltration and duodenum     Congestive heart failure without exacerbation  ECHO LVEF 60-65%  Holding chlorthalidone, lasix, and metoprolol due to persistent hypotension      Pancreatic massliver masses/adrenal masses  History of renal cell carcinoma with lung nodules s/p right nephrectomy  Multiple low-density lesions of the liver consistent with metastatic disease and bilateral adrenal masses suspicious for metastatic disease  - presumed pancreatic cancer - unable to offer palliative chemo or radiation due to poor prognosis very poor  CA 19-9 greater than 4000   -Pathology of thoracentesis negative for malignant cells but it showed acute on chronic inflammatory cells  Oncology following     COPD without exacerbation - stable  Chronic respiratory failure with 2 L of oxygen  On nasal canula  Continue mucinex 1200mg BID  CXR 1/17 no acute change     Bacteremia  S/p completed courses of IV meropenem and andulafungin 14 days  1/13 blood: + E.  Coli   Will transition to levaquin for 8 more days and discontinue on IV Zosyn     Atrial fibrillation  Continue on diltiazem   Held metoprolol HR normal now  ECHO EF of 60 to 65%  No anticoagulation due to anemia     Acute on Chronic Anemia   s/p 6 total units of pRBCs, FOBT +   Eliquis has been discontinued due to bleeding and liver failure  NM bleeding scan negative     Elevated d-dimer  Likely multifactorial   CTA chest negative for PE  Duplex US lower extremities negative for DVT  Duplex US upper extremities showing superficial thrombophlebitis in left median/antecubital veins  Repeat upper extremity Doppler study negative  Discontinued Eliquis due to anemia     Hypotension  Continue on midodrine    Oral candidiasis  Continue on fluconazole for 2 more days    DVT Prophylaxis: on SCDs  GI Prophylaxis: protonix  Discharge and disposition barriers: medically clear to hospice 1/18/22  tomorrow discharge to LTC with hospice    Care Plan discussed with: Patient/Family, Nurse and     Total time spent with patient: 35 minutes.

## 2022-01-22 NOTE — PROGRESS NOTES
Progress Note    Patient: Anne Macias MRN: [id number]  SSN: [id number]    YOB: 1945  Age: 78 y.o. Sex: female      Admit Date: 12/9/2021    LOS: 44 days     Subjective:   Patient followed for severe pancreatitis and she completed course of Meropenem but now on Zosyn because of E. Coli bacteremia. WBC increasing again with increasing procal and CRP. On Fluconazole for thrush. Patient asleep at this time but appears to be resting comfortably. Family member at bedside. Objective:     Vitals:    01/21/22 1551 01/21/22 2236 01/22/22 0709 01/22/22 0849   BP: (!) 107/58 (!) 70/54 119/74 138/72   Pulse: (!) 114 (!) 103 100 (!) 102   Resp:   18 18   Temp: 97.9 °F (36.6 °C) 98 °F (36.7 °C)  97.5 °F (36.4 °C)   SpO2: 100% 90% 100% 98%   Weight:   226 lb 13.7 oz (102.9 kg)    Height:            Intake and Output:  Current Shift: No intake/output data recorded. Last three shifts: 01/20 1901 - 01/22 0700  In: 1400 [P.O.:200; I.V.:1200]  Out: 500 [Urine:500]    Physical Exam:     Vitals and nursing note reviewed. Patient not examined today  Constitutional:       General: She is not in acute distress. Appearance: She is obese. She is ill-appearing. HENT: white lacy exudate on buccal mucosa RESOLVED  Abdominal:  Nontender  Genitourinary:  Vaginal area not examined     Comments: External urinary device  Musculoskeletal:      Right lower leg: No edema. Left lower leg: No edema. Comments: PICC line right upper arm   Skin: multiple ecchymoses on the upper extremities, edema left forearm     Findings: No rash.       Comments: ?Stage 2 sacral wound   Neurological: asleep    Lab/Data Review:     WBC 16,400  ,000    Urinalysis with WBC 10-20, Bacteria negative  Lipase 1,027 <698 <703 <1,441 <2,244 <2,350 <2,144 99 <2,511 0 <2,484 <2,684 < 1,397 <1,719    Procal 1.08 <0.98 < 1.22 <1.75 <3.82 <15.42   CRP 7.48 <5.74 < 11.10 <21.30  < 23.80    Serum fungitell <31 Negative    Ascitic fluid   with 41% PMNs    Blood cultures (12/20) No growth FINAL  Blood cultures (1/13) E. Coli  Urine culture (12/20) No growth FINAL  Ascitic fluid culture(12/27) No growth FINAL    Assessment:     Principal Problem:    Pancreatitis (12/9/2021)    Active Problems:    A-fib ([facility] [address] (11/16/2020)      CHF (congestive heart failure) ([facility] [address] (11/16/2020)      Hematemesis (12/12/2021)      History of peptic ulcer disease (12/12/2021)      Pancreatic mass (1/5/2022)    1. Severe pancreatitis with markedly elevated lipase, resolving  2. Pancreatic mass, possible neoplasm  3. Biliary stent  4. Sepsis with worsening leukocytosis with elevated procal and CRP, resolving, status post 14 days of Meropenem, Anidulafungin (empiric), CRP increasing   5. TPN (just started)  6. Moderate pyuria, negative bacteria, urine culture negatived  7. Possible candida superinfection with vaginitis, treated  8. Ascites, status post paracentesis  9. Gram negative bacteremia with E. Coli, source unclear, Day #6/14 IV Zosyn. 10. Oral candidiasis, Day #5/7 IV Fluconazole, resolved    Comment:   She remains afebrile but WBC now increasing along with procal and CRP. Since plans are for her to go out on hospice, it may not be appropriate to start another aggressive diagnostic work-up for infection. Plan:   1. Will transition to oral Levaquin (because of bacteremia) for 8 more days and continue oral Fluconazole for 2 more days  2. Waiting for disposition to SNF/hospice  3.  Will continue to follow for now    Signed By: Carlene Hampton MD     January 22, 2022

## 2022-01-23 NOTE — PROGRESS NOTES
Pt complained of chest . Pt stated that the pain radiates from her heart to the center of the chest. Attending provider notified. EKG order placed.

## 2022-01-23 NOTE — PROGRESS NOTES
Pt agreed to take medication. Pt took a few of her scheduled meds and then refused to take the rest. Pt stated that she is tired. Pt appears to be drowsy. Medications held .

## 2022-01-23 NOTE — PROGRESS NOTES
Hospitalist Progress Note    Subjective:   Daily Progress Note: 1/23/2022 6:36 PM    Patient saying she \"feels like she is dying\". Patient says that her left leg is hurting her. Patient wants blood if hemoglobin <7 but wants repeat to be taken. Patient does not want to take beta blocker. Patient amenable to nitrobid ointment.  Patient has a history of some noncompliance with scheduled medications in recent past.    Current Facility-Administered Medications   Medication Dose Route Frequency    levoFLOXacin (LEVAQUIN) tablet 750 mg  750 mg Oral Q24H    flucytosine (ANCOBON) capsule 500 mg  500 mg Oral Q6H    labetaloL (NORMODYNE;TRANDATE) 20 mg/4 mL (5 mg/mL) injection 10 mg  10 mg IntraVENous Q4H PRN    metoprolol tartrate (LOPRESSOR) tablet 12.5 mg  12.5 mg Oral DAILY    benzocaine-menthoL (CEPACOL) lozenge 1 Lozenge  1 Lozenge Mucous Membrane PRN    benzonatate (TESSALON) capsule 100 mg  100 mg Oral TID PRN    guaiFENesin (ROBITUSSIN) 100 mg/5 mL oral liquid 100 mg  100 mg Oral Q4H PRN    sertraline (ZOLOFT) tablet 25 mg  25 mg Oral QAM    calcium-vitamin D 600 mg-5 mcg (200 unit) per tablet 2 Tablet  2 Tablet Oral DAILY    guaiFENesin ER (MUCINEX) tablet 1,200 mg  1,200 mg Oral Q12H    albuterol-ipratropium (DUO-NEB) 2.5 MG-0.5 MG/3 ML  3 mL Nebulization Q6H PRN    SE-Tan Plus capsule  1 Capsule Oral BID    lactated Ringers infusion  75 mL/hr IntraVENous CONTINUOUS    0.9% sodium chloride infusion 250 mL  250 mL IntraVENous PRN    0.9% sodium chloride infusion 250 mL  250 mL IntraVENous PRN    polyethylene glycol (MIRALAX) packet 17 g  17 g Oral DAILY PRN    0.9% sodium chloride infusion 250 mL  250 mL IntraVENous PRN    pantoprazole (PROTONIX) 40 mg in 0.9% sodium chloride 10 mL injection  40 mg IntraVENous Q12H    loperamide (IMODIUM) capsule 2 mg  2 mg Oral Q4H PRN    0.9% sodium chloride infusion 250 mL  250 mL IntraVENous PRN    cholecalciferol (VITAMIN D3) (5000 Units/125 mcg) tablet 5,000 Units  5,000 Units Oral Q7D    atorvastatin (LIPITOR) tablet 40 mg  40 mg Oral QHS    [Held by provider] furosemide (LASIX) tablet 40 mg  40 mg Oral DAILY    influenza vaccine 2021-22 (6 mos+)(PF) (FLUARIX/FLULAVAL/FLUZONE QUAD) injection 0.5 mL  1 Each IntraMUSCular PRIOR TO DISCHARGE    [Held by provider] apixaban (ELIQUIS) tablet 2.5 mg  2.5 mg Oral BID    midodrine (PROAMATINE) tablet 10 mg  10 mg Oral TID WITH MEALS    sodium chloride (NS) flush 5-40 mL  5-40 mL IntraVENous PRN    zinc oxide-white petrolatum 17-57 % topical paste   Topical TID    cyclobenzaprine (FLEXERIL) tablet 10 mg  10 mg Oral TID PRN    HYDROmorphone (DILAUDID) injection 1 mg  1 mg IntraVENous Q4H PRN    oxyCODONE IR (ROXICODONE) tablet 5 mg  5 mg Oral Q6H PRN    docusate sodium (COLACE) capsule 100 mg  100 mg Oral BID    sorbitoL 70 % solution 30 mL  30 mL Oral DAILY PRN    bisacodyL (DULCOLAX) suppository 10 mg  10 mg Rectal DAILY PRN    hydrALAZINE (APRESOLINE) 20 mg/mL injection 20 mg  20 mg IntraVENous Q6H PRN    [Held by provider] chlorthalidone (HYGROTON) tablet 25 mg  25 mg Oral DAILY    albuterol (PROVENTIL HFA, VENTOLIN HFA, PROAIR HFA) inhaler 2 Puff  2 Puff Inhalation Q6H PRN    ascorbic acid (vitamin C) (VITAMIN C) tablet 500 mg  500 mg Oral DAILY    diazePAM (VALIUM) tablet 5 mg  5 mg Oral Q6H PRN    dilTIAZem ER (CARDIZEM CD) capsule 120 mg  120 mg Oral DAILY    potassium chloride SR (KLOR-CON 10) tablet 20 mEq  20 mEq Oral DAILY    traZODone (DESYREL) tablet 50 mg  50 mg Oral QHS    ondansetron (ZOFRAN) injection 4 mg  4 mg IntraVENous Q6H PRN    acetaminophen (TYLENOL) tablet 650 mg  650 mg Oral Q4H PRN    prochlorperazine (COMPAZINE) with saline injection 10 mg  10 mg IntraVENous Q6H PRN        Review of Systems  Review of Systems   Constitutional: Negative. Respiratory: Positive for cough, hemoptysis and shortness of breath. Cardiovascular: Negative.     Gastrointestinal: Positive for abdominal pain. Negative for nausea and vomiting. Musculoskeletal: Positive for joint pain and myalgias. All other systems reviewed and are negative. Objective:     Visit Vitals  /63 (BP 1 Location: Left lower arm)   Pulse 97   Temp 97.5 °F (36.4 °C)   Resp 18   Ht 5' 7\" (1.702 m)   Wt 102.9 kg (226 lb 13.7 oz)   SpO2 97%   BMI 35.53 kg/m²    O2 Flow Rate (L/min): 2 l/min O2 Device: Nasal cannula    Temp (24hrs), Av.5 °F (36.4 °C), Min:97.4 °F (36.3 °C), Max:97.6 °F (36.4 °C)      No intake/output data recorded.  1901 -  0700  In: 2810 [P.O.:710; I.V.:2100]  Out: 400 [Urine:400]    Recent Results (from the past 24 hour(s))   PROTHROMBIN TIME + INR    Collection Time: 22  8:01 AM   Result Value Ref Range    Prothrombin time 68.9 (H) 11.9 - 14.7 sec    INR 8.4 (HH) 0.9 - 1.1     METABOLIC PANEL, COMPREHENSIVE    Collection Time: 22  8:01 AM   Result Value Ref Range    Sodium 142 136 - 145 mmol/L    Potassium 4.4 3.5 - 5.1 mmol/L    Chloride 109 (H) 97 - 108 mmol/L    CO2 27 21 - 32 mmol/L    Anion gap 6 5 - 15 mmol/L    Glucose 87 65 - 100 mg/dL    BUN 30 (H) 6 - 20 mg/dL    Creatinine 1.08 (H) 0.55 - 1.02 mg/dL    BUN/Creatinine ratio 28 (H) 12 - 20      GFR est AA >60 >60 ml/min/1.73m2    GFR est non-AA 50 (L) >60 ml/min/1.73m2    Calcium 7.7 (L) 8.5 - 10.1 mg/dL    Bilirubin, total 4.5 (H) 0.2 - 1.0 mg/dL    AST (SGOT) 67 (H) 15 - 37 U/L    ALT (SGPT) 76 12 - 78 U/L    Alk.  phosphatase 719 (H) 45 - 117 U/L    Protein, total 4.2 (L) 6.4 - 8.2 g/dL    Albumin 0.8 (L) 3.5 - 5.0 g/dL    Globulin 3.4 2.0 - 4.0 g/dL    A-G Ratio 0.2 (L) 1.1 - 2.2     CBC WITH AUTOMATED DIFF    Collection Time: 22  8:01 AM   Result Value Ref Range    WBC 17.9 (H) 3.6 - 11.0 K/uL    RBC 2.13 (L) 3.80 - 5.20 M/uL    HGB 6.2 (L) 11.5 - 16.0 g/dL    HCT 20.7 (L) 35.0 - 47.0 %    MCV 97.2 80.0 - 99.0 FL    MCH 29.1 26.0 - 34.0 PG    MCHC 30.0 30.0 - 36.5 g/dL    RDW 18.3 (H) 11.5 - 14.5 % PLATELET 540 871 - 082 K/uL    MPV 11.6 8.9 - 12.9 FL    NRBC 0.1 (H) 0.0  WBC    ABSOLUTE NRBC 0.02 (H) 0.00 - 0.01 K/uL    NEUTROPHILS 88 (H) 32 - 75 %    LYMPHOCYTES 8 (L) 12 - 49 %    MONOCYTES 4 (L) 5 - 13 %    EOSINOPHILS 0 0 - 7 %    BASOPHILS 0 0 - 1 %    IMMATURE GRANULOCYTES 0 %    ABS. NEUTROPHILS 15.8 (H) 1.8 - 8.0 K/UL    ABS. LYMPHOCYTES 1.4 0.8 - 3.5 K/UL    ABS. MONOCYTES 0.7 0.0 - 1.0 K/UL    ABS. EOSINOPHILS 0.0 0.0 - 0.4 K/UL    ABS. BASOPHILS 0.0 0.0 - 0.1 K/UL    ABS. IMM. GRANS. 0.0 K/UL    DF Manual      RBC COMMENTS Macrocytosis  1+            XR CHEST PORT   Final Result   Findings/impression:      Left perihilar and right lower lobe patchy airspace disease present, similar to   prior study. Small bilateral effusions are present. Right PICC line projects   over SVC. Slightly enlarged cardiomediastinal silhouette noted similar to prior   study. No pneumothorax. No overt edema. No significant interval change. NM ACUTE GI BLEED SCAN   Final Result   Negative GI bleed scan. XR CHEST PORT   Final Result   Mild improvement in the right lower lobe      IR THORACENTESIS NDL PUNC ASP W IMAGE   Final Result   1. Previously described small hepatic metastases are sonographically occult,   therefore targeted liver biopsy could not be performed. 2.  Small left pleural effusion. 3.  Successful left thoracentesis. Fluid samples submitted for cytologic   analysis. IR ULTRASOUND ABDOMEN LTD   Final Result   1. Previously described small hepatic metastases are sonographically occult,   therefore targeted liver biopsy could not be performed. 2.  Small left pleural effusion. 3.  Successful left thoracentesis. Fluid samples submitted for cytologic   analysis. XR CHEST PORT   Final Result      XR CHEST PORT   Final Result   Findings/impression:      Stable positioning of right upper extremity PICC, tip projects over the upper   SVC. Cardiac silhouette is enlarged.  No pulmonary edema. Bilateral pleural effusions. No evidence of pneumothorax. No acute osseous abnormality identified. DUPLEX UPPER EXT VENOUS BILAT   Final Result      XR CHEST PORT   Final Result   Large effusions. Vascular congestion. CT ABD PELV W CONT   Final Result   1. There are increasing bilateral pleural effusions, increasing body wall   edema, and increasing ascites. These findings could be secondary to the third   spacing of fluids. The differential diagnosis for the ascites would include   pancreatic ascites with acute pancreatitis or metastatic disease to the   peritoneum. 2.  There is a biliary stent which is unchanged in its position traversing   throughout area of obstruction within the pancreatic head. 3.  There are multiple low-density lesions of the liver without short-term   interval changes consistent with metastatic disease. 4.  The right kidney is not identified and apparently the patient is status post   a previous right nephrectomy. 5.  There are bilateral adrenal masses suspect for metastatic disease without   short-term interval changes. CTA CHEST W OR W WO CONT   Final Result   No CT evidence for PE. Thoracic aorta atherosclerosis. CAD. Moderate to large bilateral pleural effusions with associated RML, RLL, and LLL   compressive atelectasis. Abdominal ascites. DUPLEX LOWER EXT VENOUS BILAT   Final Result      XR CHEST PORT   Final Result   FINDINGS: IMPRESSION: 2 frontal views of the chest. Right PICC distal tip SVC   region. Enlarging cardiomegaly. Increasing vascular congestion. Interval development of   hypoinflation, pleural effusions, lower lung airspace edema and/or pneumonia. No   pneumothorax. No free air under the diaphragm. CT ABD PELV W CONT   Final Result   New moderate volume dependent pleural effusions with associated   lower lobe lung compressive atelectasis.       Increasing ascites, moderate approaching large-volume   (fluid could be sampled if there is any clinical concern for bile content). High suspicion hepatic metastases. Similar appearance for the pancreas; Silastic stent in place. No pseudocyst formation. Unchanged appearance bilateral adrenal glands. No bowel obstruction. US ABD LTD   Final Result   Small amount of free fluid. Finding suggesting hemangioma in the   liver. Gallbladder wall thickening, adenomyomatosis, sludge and gallstones. Cholecystitis possible. Finding in the region of the pancreatic head as above. Other findings as above. CTA ABDOMEN PELV W CONT   Final Result   1. Ill-defined hypodense mass in the pancreas. There are inflammatory changes   and fluid adjacent to the pancreas or duodenum and stomach most likely related   to biopsy or focal pancreatitis. No pseudocyst formation, active bleeding or   other acute findings. 2. Enlarged adrenal glands left greater than right with noncontrast densities   consistent with adrenal adenomas. 3. Right nephrectomy. 4. Other findings as described. XR CHEST PORT   Final Result   No acute findings. IR PARACENTESIS ABD W IMAGE    (Results Pending)        PHYSICAL EXAM:    Physical Exam  Vitals and nursing note reviewed. Constitutional:       Appearance: She is obese. She is ill-appearing. HENT:      Head: Normocephalic and atraumatic. Eyes:      General: Scleral icterus present. Cardiovascular:      Rate and Rhythm: Normal rate. Rhythm irregular. Comments: PICC line right upper arm  Pulmonary:      Effort: No respiratory distress. Breath sounds: No wheezing. Comments: On nasal canula  Abdominal:      General: Bowel sounds are normal. There is no distension. Tenderness: There is abdominal tenderness. Musculoskeletal:         General: Swelling present. Right lower leg: Edema present. Left lower leg: Edema present.       Comments: Bilateral upper extremity swelling   Skin:     Capillary Refill: Capillary refill takes less than 2 seconds. Coloration: Skin is jaundiced. Neurological:      Mental Status: She is alert and oriented to person, place, and time. Psychiatric:      Comments: Flat affect          Data Review    Recent Results (from the past 24 hour(s))   PROTHROMBIN TIME + INR    Collection Time: 01/23/22  8:01 AM   Result Value Ref Range    Prothrombin time 68.9 (H) 11.9 - 14.7 sec    INR 8.4 (HH) 0.9 - 1.1     METABOLIC PANEL, COMPREHENSIVE    Collection Time: 01/23/22  8:01 AM   Result Value Ref Range    Sodium 142 136 - 145 mmol/L    Potassium 4.4 3.5 - 5.1 mmol/L    Chloride 109 (H) 97 - 108 mmol/L    CO2 27 21 - 32 mmol/L    Anion gap 6 5 - 15 mmol/L    Glucose 87 65 - 100 mg/dL    BUN 30 (H) 6 - 20 mg/dL    Creatinine 1.08 (H) 0.55 - 1.02 mg/dL    BUN/Creatinine ratio 28 (H) 12 - 20      GFR est AA >60 >60 ml/min/1.73m2    GFR est non-AA 50 (L) >60 ml/min/1.73m2    Calcium 7.7 (L) 8.5 - 10.1 mg/dL    Bilirubin, total 4.5 (H) 0.2 - 1.0 mg/dL    AST (SGOT) 67 (H) 15 - 37 U/L    ALT (SGPT) 76 12 - 78 U/L    Alk. phosphatase 719 (H) 45 - 117 U/L    Protein, total 4.2 (L) 6.4 - 8.2 g/dL    Albumin 0.8 (L) 3.5 - 5.0 g/dL    Globulin 3.4 2.0 - 4.0 g/dL    A-G Ratio 0.2 (L) 1.1 - 2.2     CBC WITH AUTOMATED DIFF    Collection Time: 01/23/22  8:01 AM   Result Value Ref Range    WBC 17.9 (H) 3.6 - 11.0 K/uL    RBC 2.13 (L) 3.80 - 5.20 M/uL    HGB 6.2 (L) 11.5 - 16.0 g/dL    HCT 20.7 (L) 35.0 - 47.0 %    MCV 97.2 80.0 - 99.0 FL    MCH 29.1 26.0 - 34.0 PG    MCHC 30.0 30.0 - 36.5 g/dL    RDW 18.3 (H) 11.5 - 14.5 %    PLATELET 764 633 - 302 K/uL    MPV 11.6 8.9 - 12.9 FL    NRBC 0.1 (H) 0.0  WBC    ABSOLUTE NRBC 0.02 (H) 0.00 - 0.01 K/uL    NEUTROPHILS 88 (H) 32 - 75 %    LYMPHOCYTES 8 (L) 12 - 49 %    MONOCYTES 4 (L) 5 - 13 %    EOSINOPHILS 0 0 - 7 %    BASOPHILS 0 0 - 1 %    IMMATURE GRANULOCYTES 0 %    ABS.  NEUTROPHILS 15.8 (H) 1.8 - 8.0 K/UL    ABS. LYMPHOCYTES 1.4 0.8 - 3.5 K/UL    ABS. MONOCYTES 0.7 0.0 - 1.0 K/UL    ABS. EOSINOPHILS 0.0 0.0 - 0.4 K/UL    ABS. BASOPHILS 0.0 0.0 - 0.1 K/UL    ABS. IMM. GRANS. 0.0 K/UL    DF Manual      RBC COMMENTS Macrocytosis  1+            Assessment/Plan:     Principal Problem:    Pancreatitis (12/9/2021)    Active Problems:    A-fib ([facility] [address] (11/16/2020)      CHF (congestive heart failure) ([facility] [address] (11/16/2020)      Hematemesis (12/12/2021)      History of peptic ulcer disease (12/12/2021)      Pancreatic mass (1/5/2022)        Hospital Course:    Harleen Barrios a 68-year-old woman with a PMH significant for atrial fibrillation, PUD, CHF, COPD on home O2 at 2 L, renal cell carcinoma status post nephrectomy, hypertension and morbid obesity. Patient was found to have obstructive jaundice and biliary stricture in early November of this year.  She underwent an ERCP with stent placement with brushings of the bile duct where there was noted to be a stricture.  These brushings were negative.  Patient then underwent a PET CT which demonstrated uptake in the head of the pancreas concerning for pancreatic mass. She subsequently underwent endoscopic ultrasound with ultrasound and FNA biopsy on 12/6/2021.  The procedure demonstrated abutment of the tumor with the portal vein without involvement of the SMA or SMV.  The total tumor was measured to be 2.7 cm by 3 cm.  This biopsy resulted as atypical cells with suspicion for malignancy. She admitted to the hospital on 12/9/2021 and severe pancreatitis after undergoing an EUS for pancreatic biopsy on 12/6/2021 and pancreatic mass.  Her symptoms were abdominal pain, nausea and vomiting. CT scan revealing peripancreatic inflammation consistent with likely post biopsy pancreatitis and ascites.  Her initial labs revealing elevated lipase, supratherapeutic INR and elevated D-dimer coagulopathy.   CTA was positive for bilateral pleural effusions, negative for PE.  12/27 paracentesis with culture of ascitic fluid which had no growth, blood cultures, urine cultures negative for growth.  Patient had been started on IV meropenem for leukocytosis and anidulafungin for suspected intra-abdominal infection. CT with and without contrast revealing liver lesions consistent with metastasis, mass in the pancreatic head with biliary stent in place, bilateral pleural effusions, ascites and bilateral adrenal masses. IR declined liver biopsy due to coagulopathy and felt she would not benefit. Doppler studies of upper and lower extremities negative for DVT, positive for superficial thrombophlebitis of left medial antecubital. Pancreatic cancer is presumed but no firm diagnosis due to lack of tissue sample. Pancreatitis remaining persistent despite antibiotics. Attempted Dobbhoff and PEG J placement but failed due to reddened area presumed possible tumor infiltration in the duodenum. Due to poor performance and prognosis she is not a candidate for palliative XRT or chemo. The patient has been progressively worsening with debilitation, intermittently tolerating diet not stable to be discharged home with outpatient surgical oncology follow-up. She would benefit from transfer to tertiary care center where surgical oncology services are available for evaluation of pancreatic head mass and possible surgical intervention. 1/7 A. fib with RVR rate 110-140. Cardiology consulted and anticoagulation discontinued due to bleeding and liver failure. Patient is agreeable to hospice and has been medically clear to hospice on 1/18/2022. Transitioned IV fluconazole to fluconazole p.o. Patient transitioned from IV Zosyn to levaquin for 8 total doses as plan is to go into long-term care with hospice. Complained of chest pain, an EKG was obtained and patient is in atrial fibrillation with RVR, BP is 112/63, patient denied multiple scheduled medications and is denying BB, will accept nitrobid ointment.  Patient's hemoglobin is 6.1, will obtain repeat and if below 7 will transfuse 1 unit as this is what patient wants given her hospice status. Acute pancreatitis  Status post EUS by Dr. Magalys Jennings 12/6, s/p paracentesis on 12/27 with 2300 mL clear serous fluid drained, cytology showed no growth  Lipase variable, will likely be chronically elevated   Continue pain medication  Liver bx canceled as IR does not feel patient would benefit from bx at this time and unsuccessful attempt to place PEG J due to tumor infiltration and duodenum     Pancreatic massliver masses/adrenal masses  History of renal cell carcinoma with lung nodules s/p right nephrectomy  Multiple low-density lesions of the liver consistent with metastatic disease and bilateral adrenal masses suspicious for metastatic disease  - presumed pancreatic cancer - unable to offer palliative chemo or radiation due to poor prognosis very poor  CA 19-9 greater than 4000   -Pathology of thoracentesis negative for malignant cells but it showed acute on chronic inflammatory cells  Oncology following     COPD without exacerbation - stable  Chronic respiratory failure with 2 L of oxygen  On nasal canula  Continue mucinex 1200mg BID  CXR 1/17 no acute change     Bacteremia  S/p completed courses of IV meropenem and andulafungin 14 days  1/13 blood: + E.  Coli   Will transition to levaquin for 8 more days and discontinue on IV Zosyn     Paroxysmal atrial fibrillation  EKG with afib and RVR, patient denied BB   Continue on diltiazem   No anticoagulation due to anemia    Congestive heart failure without exacerbation  ECHO LVEF 60-65% with normal diastolic dysfunction  Holding chlorthalidone, metoprolol, and lasix due to persistent hypotension      Acute on chronic anemia   s/p 6 total units of pRBCs, FOBT +   Hemoglobin 6.1 will repeat H/H and if < 7 will transfuse 1 unit of pRBCs (patient approved)  Eliquis has been discontinued due to bleeding and liver failure  NM bleeding scan negative     Elevated d-dimer  Likely multifactorial   CTA chest negative for PE  Duplex US lower extremities negative for DVT  Duplex US upper extremities showing superficial thrombophlebitis in left median/antecubital veins  Repeat upper extremity Doppler study negative  Discontinued eliquis due to anemia     Hypotension  Continue on midodrine    Oral candidiasis  Continue on fluconazole for 2 more days    DVT Prophylaxis: on SCDs  GI Prophylaxis: protonix  Discharge and disposition barriers: medically clear to hospice 1/18/22  tomorrow discharge to LTC with hospice    Care Plan discussed with: Patient/Family, Nurse and     Total time spent with patient: 35 minutes.

## 2022-01-23 NOTE — ROUTINE PROCESS
Bedside shift report given Melany Still  (oncoming nurse) by Peter Mancera RN (off going nurse).   Report to include SBAR, plan of care, recent results, discharge planning, and patient's questions and concerns

## 2022-01-23 NOTE — PROGRESS NOTES
Progress Note    Patient: Ida Miller MRN: [id number]  SSN: [id number]    YOB: 1945  Age: 78 y.o. Sex: female      Admit Date: 12/9/2021    LOS: 45 days     Subjective:   Patient followed for severe pancreatitis and she completed course of Meropenem but now on Zosyn because of E. Coli bacteremia. WBC increasing again with increasing procal and CRP. On Fluconazole for thrush. Patient somnolent again today. Staff report that she is sometimes averse to oral medications. Objective:     Vitals:    01/22/22 2006 01/23/22 0544 01/23/22 0729 01/23/22 0914   BP: (!) 130/57 116/74 (!) 117/56    Pulse: 86  97    Resp: 18 (!) 84 18    Temp: 97.6 °F (36.4 °C)  97.4 °F (36.3 °C)    SpO2: 99% 95% 91% 95%   Weight:       Height:            Intake and Output:  Current Shift: No intake/output data recorded. Last three shifts: 01/21 1901 - 01/23 0700  In: 2810 [P.O.:710; I.V.:2100]  Out: 400 [Urine:400]    Physical Exam:     Vitals and nursing note reviewed. Patient not examined today  Constitutional:       General: She is not in acute distress. Appearance: She is obese. She is ill-appearing. HENT: white lacy exudate on buccal mucosa RESOLVED  Abdominal:  Nontender  Genitourinary:  Vaginal area not examined     Comments: External urinary device  Musculoskeletal:      Right lower leg: No edema. Left lower leg: No edema. Comments: PICC line right upper arm   Skin: multiple ecchymoses on the upper extremities, edema left forearm     Findings: No rash.       Comments: ?Stage 2 sacral wound   Neurological: asleep    Lab/Data Review:     WBC 17,900  ,000    Urinalysis with WBC 10-20, Bacteria negative  Lipase 719 <1,027 <698 <2,144 99 <2,511 0 <2,484 <2,684 < 1,397 <1,719    Procal 1.08 <0.98 < 1.22 <1.75 <3.82 <15.42   CRP 7.48 <5.74 < 11.10 <21.30  < 23.80    Serum fungitell <31 Negative    Ascitic fluid   with 41% PMNs    Blood cultures (12/20) No growth FINAL  Blood cultures (1/13) E. Coli  Urine culture (12/20) No growth FINAL  Ascitic fluid culture(12/27) No growth FINAL    Assessment:     Principal Problem:    Pancreatitis (12/9/2021)    Active Problems:    A-fib ([facility] [address] (11/16/2020)      CHF (congestive heart failure) ([facility] [address] (11/16/2020)      Hematemesis (12/12/2021)      History of peptic ulcer disease (12/12/2021)      Pancreatic mass (1/5/2022)    1. Severe pancreatitis with markedly elevated lipase, resolving  2. Pancreatic mass, possible neoplasm  3. Biliary stent  4. Sepsis with worsening leukocytosis with elevated procal and CRP, resolving, status post 14 days of Meropenem, Anidulafungin (empiric), CRP increasing   5. TPN (just started)  6. Moderate pyuria, negative bacteria, urine culture negatived  7. Possible candida superinfection with vaginitis, treated  8. Ascites, status post paracentesis  9. Gram negative bacteremia with E. Coli, source unclear, Day #6/14 IV Zosyn. 10. Oral candidiasis, Day #5/7 IV Fluconazole, resolved  11. Probable UTI with bacteria and or Candida, despite  Zosyn and Fluconazole, with marked pyuria, bacteria and yeasts    Comment:   WBC has been increasing along with procal and CRP, and may be due to UTI. She has been on Fluconazole and Zosyn. Plan:   1. Discontinue Zosyn and Fluconazole  2. Start Levaquin and Flucytosine (assess compliance while still inpatient)  3. Follow-up urine culture  4.  Waiting for disposition to SNF/hospice    Signed By: Ashtyn Shaw MD     January 23, 2022

## 2022-01-23 NOTE — PROGRESS NOTES
Bedside shift change report given to MEDARDO Canseco (oncoming nurse) by Kervin Kidd (offgoing nurse). Report included the following information SBAR.

## 2022-01-24 PROBLEM — C79.9 METASTATIC CARCINOMA (HCC): Status: ACTIVE | Noted: 2022-01-01

## 2022-01-24 NOTE — PROGRESS NOTES
Son Armin Riedel called back after speaking with his aunt. The decision has been made to make patient comfort measures only and DNR. He states that the plan was for hospice and he wishes to continue to plan and make her DNR. Second witness Mrs. Jl Jean.

## 2022-01-24 NOTE — HOSPICE
[facility]  [phone]     Patient Name: Charlene Lovett  YOB: 1945  Age: 78 y.o. [address] [facility] RN Note:  Hospice consult received, reviewing chart. Will follow up with Unit Nurse and Care Manager to discuss plan of care, patient status and discharge disposition within the hour. Met with son and patients sister. They have agreed to hospice and she will be GIP here. She is having uncontrolled pain, GI bleed, Edema. She has been receiving IV dilaudid and it helps alittle bit. Family wants her to be comfortable. Patient is having confusion and unable to answer questions. Consents were completed with her son. Thank you for the opportunity to be of service to this patient.     [id number]  [facility] Liaison  [phone] c  [phone] o

## 2022-01-24 NOTE — PROGRESS NOTES
Visited patient in [address] [place] [address] for staff request for comfort care patient. Patient was alone during the visit. Patient seemed to indicate not wanting to be visited at this time. Provided supportive presence while exploring Ms. Cole's needs. Normalized her decision, assured of prayer and advised of  availability. Provided silent prayer per patient's listed maribell tradition. Consulted with staff regarding patient's status and family relations. Contact chaplains for further referrals. Chaplain Rock Margaux M.Div.    can be reached by calling the  at [facility]  [phone]

## 2022-01-24 NOTE — ADT AUTH CERT NOTES
ADDITIONAL CLINICAL FOR REVIEW DAYS 01/13-01/21/22   [address] 68 [address]     Pancreatitis - Care Day 44 (1/21/2022) by Johanna Davis RN       Review Entered Review Status   1/21/2022 16:04 Completed      Criteria Review      Care Day: 44 Care Date: 1/21/2022 Level of Care: Telemetry    Guideline Day 3    Level Of Care    ( ) Floor to discharge    1/21/2022 16:04:27 EST by Johanna Davis      floor    Clinical Status    (X) * Hemodynamic stability    1/21/2022 16:04:27 EST by Johanna Davis      105, 107/58,    ( ) * No evidence of infection requiring inpatient care    1/21/2022 16:04:27 EST by Johanna Davis      zosyn 3.375g IV q8,    ( ) * Amylase level normal or improved    (X) * Pain absent or managed    1/21/2022 16:04:27 EST by Johanna Davis      dilaudid 1mg IV q4 prn given x1    (X) * Diet tolerated    (X) * Renal function at baseline or acceptable for next level of care    1/21/2022 16:04:27 EST by Johanna Davis      BUN 21/ Creat 0.78,    ( ) * Electrolyte abnormalities absent or acceptable for next level of care    1/21/2022 16:04:27 EST by Adalgisa Winston glucose 54,    ( ) * Discharge plans and education understood    Activity    (X) * Ambulatory or acceptable for next level of care    1/21/2022 16:04:27 EST by Johanna Davis      up ad merle    Routes    (X) * Oral hydration    (X) * Oral medications or regimen acceptable for next level of care    1/21/2022 16:04:27 EST by Johanna Davis      diflucan 200mg po qd,    (X) * Oral diet or acceptable for next level of care    1/21/2022 16:04:27 EST by Johanna Davis      reg diet    Interventions    (X) Laboratory tests    1/21/2022 16:04:27 EST by Johanna Davis      wbc 14.5, h/h 7.8/24.6, INR 3.4, PT 34,  alk phos 787, ast 80/alt 87,    * Milestone   Additional Notes   1/21      VS: 97.9, 105, 107/58, 18, 100% 3l NC      Tx: Full code, I&O, vitals per routine      Plan:   1. Acute pancreatitis   - Status post EUS by Dr. Jones Henry 12/6   - Continue pain medication   - Lipase variable, will likely be chronically elevated    - CT abd/pelvis with PO and IV contrast showing increasing bilateral pleural effusion and increasing ascites. Also notes multiple low-density lesions of liver consistent with metastatic disease and bilateral adrenal masses suspected for metastatic disease.   - completed 14 days of IV merrem and anidulafungin    - paracentesis on 12/27 with 2300 mL clear serous fluid drained, cytology showed no growth   - Liver bx canceled as IR does not feel patient would benefit from bx at this time   -Unsuccessful attempt to place PEG J due to tumor infiltration and duodenum       2. CHF   - Holding chlorthalidone, lasix, and metoprolol due to persistent hypotension        3. Pancreatic mass/liver masses/Adrenal Masses/History of renal cell carcinoma with lung nodules s/p right nephrectomy   Multiple low-density lesions of the liver consistent with meta static disease and bilateral adrenal masses suspicious for metastatic disease   - CA 19-9 greater than 4000   - Oncology consulted - needs tissue biopsy before definitive recommendations can be made. Unable to offer palliative chemo or radiation due to poor performance. Prognosis very poor, hospice recommended    -Pathology of thoracentesis negative for malignant cells but it showed acute on chronic inflammatory cells       4. Hypokalemia-stable   - replete as needed   - given Mag sulfate - recheck in the AM    -  Potassium WNL on 1/18/2022       5. COPD - stable   - On nasal canula   - Chronic respiratory failure with 2 L of oxygen via nasal cannula at home   - Continue mucinex 1200mg BID   - CXR 1/17 no acute change       6. Leukocytosis   - Cultures negative   - Completed course of IV meropenem and andulafungin 14 days   - ID following   - Blood cultures on 1/13/2022 positive for E.  Coli continue on IV Zosyn       7. A. fib   - On diltiazem    Held metoprolol HR normal now   -Echo EF of 60 to 65%   -No anticoagulation due to anemia       8. Acute on Chronic Anemia    -hemoglobin has been slowly declining requiring repletion and she is FOBT positive   -Required transfusion hemoglobin 8.1   Total of 6 units PRBCs   -Eliquis has been discontinued due to bleeding and liver failure   - NM bleeding scan negative       9. Elevated d-dimer   - Likely multifactorial    - CTA chest negative for PE   - Duplex US lower extremities negative for DVT   - Duplex US upper extremities showing superficial thrombophlebitis in left median/antecubital veins   -Repeat upper extremity Doppler study negative   -Discontinued Eliquis due to anemia       10. Hypotension   On midodrine       11. Oral candidiasis   On 2/7 IV fluconazole will transition to p.o. Exam:   Constitutional:        Appearance: She is obese. She is ill-appearing. HENT:       Head: Normocephalic and atraumatic. Eyes:       General: Scleral icterus present. Cardiovascular:       Rate and Rhythm: Normal rate and regular rhythm. Pulmonary:       Effort: No respiratory distress.       Breath sounds: No wheezing.       Comments: On nasal canula   Abdominal:       General: Bowel sounds are normal. There is no distension.       Tenderness: There is no abdominal tenderness. Musculoskeletal:       Right lower leg: Edema present.       Left lower leg: Edema present. Skin:      Capillary Refill: Capillary refill takes less than 2 seconds.       Coloration: Skin is jaundiced. Neurological:       Mental Status: She is alert and oriented to person, place, and time. Psychiatric:       Comments: Flat affect       GI Cons:  1. Pancreatitis - RESOLVED.     -12/30 s/p post pyloric tube placement.  Patient removed tube on 12/31.          -Unable to place GJ tube due to  tumor in duodenum and food in the stomach and patient on Eliquis       -Lipase 1,027.        -Pain med as needed.       - S/P paracentesis 12/27/21 with removal of 2300 ml clear serous  Fluid removed       -cytology Slight acute inflammation.        -currently on Regular diet       -patient in agreement for SNF transfer, then outpatient follow up from there.    2. CHF        -lasix on hold, low BP   3. COPD        -Continue home medications        -Albuterol as needed    4. Pancreatic Mass        - 22,358       -S/p fine needle aspiration suspicious for neoplasia but not diagnostic       -CT concern for liver metastais/lesions       -IR unable to do liver biopsy at this time       -Poor Prognosis       -patient has accepted to [facility] rehab and [facility]   5. Hematemesis (resolved)   6. Afib with RVR       -Eliquis on hold 2/2 to low Hgb       -Continue diltiazem       -Cardiology has been consulted   7. Diarrhea (Improved)       - Imodium as needed   8. Anemia/GI bleed      -Reports of rectal bleeding, dark stools. Bleeding most likely from tumor invasion. If she continues to bleed, consult IR for possible embolization procedure.       -Bleeding scan is negative.       -Hgb 7.8.  Monitor and transfuse as needed.    9.-Hypokalemia      Replete as needed      CMP in the am        Pancreatitis - Care Day 43 (1/20/2022) by Dya Cabral RN       Review Entered Review Status   1/21/2022 13:08 Completed      Criteria Review      Care Day: 43 Care Date: 1/20/2022 Level of Care: Telemetry    Guideline Day 3    Level Of Care    ( ) Floor to discharge    1/21/2022 13:08:04 EST by Day Cabral      floor    Clinical Status    (X) * Hemodynamic stability    1/21/2022 13:08:04 EST by Day Cabral      87, 95/65,    ( ) * No evidence of infection requiring inpatient care    ( ) * Amylase level normal or improved    (X) * Pain absent or managed    1/21/2022 13:08:04 EST by Day Cabral      dilaudid 1mg IV q4 prn given x2    (X) * Diet tolerated    (X) * Renal function at baseline or acceptable for next level of care    (X) * Electrolyte abnormalities absent or acceptable for next level of care    ( ) * Discharge plans and education understood    Activity    (X) * Ambulatory or acceptable for next level of care    1/21/2022 13:08:04 EST by Tana Felipe up ad merle    Routes    ( ) * Oral hydration    1/21/2022 13:08:04 EST by Gee Rush      LR @ 75ml/hr IV    (X) * Oral medications or regimen acceptable for next level of care    1/21/2022 13:08:04 EST by Gee Rush      diflucan 200mg po qd,    (X) * Oral diet or acceptable for next level of care    1/21/2022 13:08:04 EST by Gee Rush      reg diet    Interventions    (X) Laboratory tests    1/21/2022 13:08:04 EST by Gee Rush      wbc 12.5, h/h 7.5/24.2, BUN 19/ Creat 0.74, crp 5.74 , K 3.7, alk phos 698, AST 82/ALT 80    * Milestone   Additional Notes   1/20      VS: 97.6, 87, 95/65, 18, 98% 3L NC      Meds: zosyn 3.375g IV q8, protonix 40mg IV bid,       Tx: Full code, I&O,       Plan:   1. Acute pancreatitis   - Status post EUS by Dr. Opal Phillip 12/6   - Continue pain medication   - Lipase variable, will likely be chronically elevated    - CT abd/pelvis with PO and IV contrast showing increasing bilateral pleural effusion and increasing ascites. Also notes multiple low-density lesions of liver consistent with metastatic disease and bilateral adrenal masses suspected for metastatic disease.   - completed 14 days of IV merrem and anidulafungin    - paracentesis on 12/27 with 2300 mL clear serous fluid drained, cytology showed no growth   - Liver bx canceled as IR does not feel patient would benefit from bx at this time   -Unsuccessful attempt to place PEG J due to tumor infiltration and duodenum       2.  CHF   - Holding chlorthalidone, lasix, and metoprolol due to persistent hypotension        3. Pancreatic mass/liver masses/Adrenal Masses/History of renal cell carcinoma with lung nodules s/p right nephrectomy   Multiple low-density lesions of the liver consistent with meta static disease and bilateral adrenal masses suspicious for metastatic disease   - CA 19-9 greater than 4000   - Oncology consulted - needs tissue biopsy before definitive recommendations can be made. Unable to offer palliative chemo or radiation due to poor performance. Prognosis very poor, hospice recommended    -Pathology of thoracentesis negative for malignant cells but it showed acute on chronic inflammatory cells       4. Hypokalemia-stable   - replete as needed   - given Mag sulfate - recheck in the AM    -  Potassium WNL on 1/18/2022       5. COPD - stable   - On nasal canula   - Chronic respiratory failure with 2 L of oxygen via nasal cannula at home   - Continue mucinex 1200mg BID   - CXR 1/17 no acute change       6. Leukocytosis   - Cultures negative   - Completed course of IV meropenem and andulafungin 14 days   - ID following   - Blood cultures on 1/13/2022 positive for E. Coli continue on IV Zosyn       7. A. fib   - On diltiazem    Held metoprolol HR normal now   -Echo EF of 60 to 65%   -No anticoagulation due to anemia       8. Acute on Chronic Anemia    -hemoglobin has been slowly declining requiring repletion and she is FOBT positive   -Required transfusion hemoglobin 8.1   Total of 6 units PRBCs   -Eliquis has been discontinued due to bleeding and liver failure   - NM bleeding scan negative       9. Elevated d-dimer   - Likely multifactorial    - CTA chest negative for PE   - Duplex US lower extremities negative for DVT   - Duplex US upper extremities showing superficial thrombophlebitis in left median/antecubital veins   -Repeat upper extremity Doppler study negative   -Discontinued Eliquis due to anemia       10. Hypotension   On midodrine       11. Oral candidiasis   On 2/7 IV fluconazole will transition to p.o. Exam:   Constitutional:        Appearance: She is obese. She is ill-appearing. HENT:       Head: Normocephalic and atraumatic. Eyes:       General: Scleral icterus present.     Cardiovascular:       Rate and Rhythm: Normal rate and regular rhythm. Pulmonary:       Effort: No respiratory distress.       Breath sounds: No wheezing.       Comments: On nasal canula   Abdominal:       General: Bowel sounds are normal. There is no distension.       Tenderness: There is no abdominal tenderness. Musculoskeletal:       Right lower leg: Edema present.       Left lower leg: Edema present. Skin:      Capillary Refill: Capillary refill takes less than 2 seconds.       Coloration: Skin is jaundiced. Neurological:       Mental Status: She is alert and oriented to person, place, and time. Psychiatric:       Comments: Flat affect       GI Cons:  1. Pancreatitis - RESOLVED.     -12/30 s/p post pyloric tube placement. Patient removed tube on 12/31.          -Unable to place GJ tube due to  tumor in duodenum and food in the stomach and patient on Eliquis       -Lipase 1,027.        -Pain med as needed.       - S/P paracentesis 12/27/21 with removal of 2300 ml clear serous  Fluid removed       -cytology Slight acute inflammation.        -currently on Regular diet       -patient in agreement for SNF transfer, then outpatient follow up from there.    2. CHF        -lasix on hold, low BP   3. COPD        -Continue home medications        -Albuterol as needed    4. Pancreatic Mass        - 22,358       -S/p fine needle aspiration suspicious for neoplasia but not diagnostic       -CT concern for liver metastais/lesions       -IR unable to do liver biopsy at this time       -Poor Prognosis       -patient has accepted to [facility]   5. Hematemesis (resolved)   6. Afib with RVR       -Eliquis on hold 2/2 to low Hgb       -Continue diltiazem       -Cardiology has been consulted   7. Diarrhea (Improved)       - Imodium as needed   8. Anemia/GI bleed      -Reports of rectal bleeding, dark stools. Bleeding most likely from tumor invasion.  If she continues to bleed, consult IR for possible embolization procedure.       -Bleeding scan is negative.       -Hgb 7.5. Monitor and transfuse as needed.    9.-Hypokalemia      Replete as needed      CMP in the am      ID Cons:   1. Continue IV Zosyn   2. Continue IV Fluconazole but transition to oral reasonable   3. In am, repeat CBC, procal and CRP        Pancreatitis - Care Day 42 (1/19/2022) by Mariah Hawkins       Review Entered Review Status   1/20/2022 14:02 Completed      Criteria Review      Care Day: 42 Care Date: 1/19/2022 Level of Care: Telemetry    Guideline Day 3    Clinical Status    (X) * Hemodynamic stability    1/20/2022 14:02:41 EST by Mariah Hawkins      98.1, 94, 112/60, 16, 98% O2 3LPM    ( ) * No evidence of infection requiring inpatient care    (X) * Amylase level normal or improved    (X) * Pain absent or managed    (X) * Diet tolerated    ( ) * Renal function at baseline or acceptable for next level of care    ( ) * Electrolyte abnormalities absent or acceptable for next level of care    ( ) * Discharge plans and education understood    Activity    ( ) * Ambulatory or acceptable for next level of care    Routes    ( ) * Oral hydration    ( ) * Oral medications or regimen acceptable for next level of care    1/20/2022 14:02:41 EST by Mariah Hawkins      DILAUDID 1MG IV X1, ZOFRAN 4MG IV X1, ZOSYN 3.375G IV Q8HR    (X) * Oral diet or acceptable for next level of care    Interventions    (X) Laboratory tests    * Milestone   Additional Notes   1-19      HOSPITALIST-       1. Acute pancreatitis   - Status post EUS by Dr. Amanda Correa 12/6   - Continue pain medication   - Lipase variable, will likely be chronically elevated    - CT abd/pelvis with PO and IV contrast showing increasing bilateral pleural effusion and increasing ascites.  Also notes multiple low-density lesions of liver consistent with metastatic disease and bilateral adrenal masses suspected for metastatic disease.   - completed 14 days of IV merrem and anidulafungin    - paracentesis on 12/27 with 2300 mL clear serous fluid drained, cytology showed no growth   - Liver bx canceled as IR does not feel patient would benefit from bx at this time   -Unsuccessful attempt to place PEG J due to tumor infiltration and duodenum       2. CHF   - Holding chlorthalidone, lasix, and metoprolol due to persistent hypotension        3. Pancreatic mass/liver masses/Adrenal Masses/History of renal cell carcinoma with lung nodules s/p right nephrectomy   Multiple low-density lesions of the liver consistent with meta static disease and bilateral adrenal masses suspicious for metastatic disease   - CA 19-9 greater than 4000   - Oncology consulted - needs tissue biopsy before definitive recommendations can be made. Unable to offer palliative chemo or radiation due to poor performance. Prognosis very poor, hospice recommended    -Pathology of thoracentesis negative for malignant cells but it showed acute on chronic inflammatory cells       4. Hypokalemia-stable   - replete as needed   - given Mag sulfate - recheck in the AM    -  Potassium WNL on 1/18/2022       5. COPD - stable   - On nasal canula   - Chronic respiratory failure with 2 L of oxygen via nasal cannula at home   - Continue mucinex 1200mg BID   - CXR 1/17 no acute change       6. Leukocytosis   - Cultures negative   - Completed course of IV meropenem and andulafungin 14 days   - ID following   - Blood cultures on 1/13/2022 positive for E. Coli continue on IV Zosyn       7. A. fib   - On diltiazem    Held metoprolol HR normal now   -Echo EF of 60 to 65%   -No anticoagulation due to anemia       8.  Acute on Chronic Anemia    -hemoglobin has been slowly declining requiring repletion and she is FOBT positive   -Required transfusion hemoglobin 8.1   Total of 6 units PRBCs   -Eliquis has been discontinued due to bleeding and liver failure   - NM bleeding scan negative       9. Elevated d-dimer   - Likely multifactorial    - CTA chest negative for PE   - Duplex US lower extremities negative for DVT   - Duplex US upper extremities showing superficial thrombophlebitis in left median/antecubital veins   -Repeat upper extremity Doppler study negative   -Discontinued Eliquis due to anemia       10. Hypotension   On midodrine                  1/19/2022 15:43   Sodium: 136   Potassium: 3.7   Chloride: 103   CO2: 30   Anion gap: 3 (L)   Glucose: 71   BUN: 24 (H)   Creatinine: 0.94   BUN/Creatinine ratio: 26 (H)   Calcium: 8.0 (L)   GFR est non-AA: 58 (L)   GFR est AA: >60   Bilirubin, total: 7.5 (H)   Protein, total: 4.2 (L)   Albumin: 0.9 (L)   Globulin: 3.3   A-G Ratio: 0.3 (L)   ALT: 89 (H)   AST: 97 (H)   Alk. phosphatase: 705 (H)   Lipase: 1,027 (H)   Procalcitonin: 1.22 (H)   C-Reactive protein: 7.42 (H)                 Pancreatitis - Care Day 41 (1/18/2022) by Amilcar Vera       Review Entered Review Status   1/19/2022 08:57 Completed      Criteria Review      Care Day: 41 Care Date: 1/18/2022 Level of Care: Telemetry    Guideline Day 3    Clinical Status    ( ) * Hemodynamic stability    1/19/2022 08:57:31 EST by Amilcar Vera      BP /39-70, P , MAP down to 61  T 97.4, R 18, 02 SAT 94% 3L NC    ( ) * No evidence of infection requiring inpatient care    (X) * Amylase level normal or improved    (X) * Pain absent or managed    ( ) * Diet tolerated    1/19/2022 08:57:31 EST by Amilcar Vera      Nutrition note:. .. intake 0-25%. Joshua Wray Pt with no appetite and no strength to eat, hesitant about being fed by others, stated eating causes her pain.  RD discussed PO for comfort only vs more aggressive TPN, pt unsure and wants to discuss with MD.    ( ) * Renal function at baseline or acceptable for next level of care    1/19/2022 08:57:31 EST by Amilcar Vera      BUN 30, CREAT 1.07, GFRNAA 50    ( ) * Electrolyte abnormalities absent or acceptable for next level of care    1/19/2022 08:57:31 EST by Amilcar Vera      CA 7.7,    ( ) * Discharge plans and education understood    Activity    ( ) * Ambulatory or acceptable for next level of care    Routes    ( ) * Oral hydration    1/19/2022 08:57:31 EST by Pily Bravo      LR 75ML/HR    ( ) * Oral medications or regimen acceptable for next level of care    1/19/2022 08:57:31 EST by Pily Bravo      PROTONIX 40MG IV Q 12 HR, ZOSYN 3.375G IV Q 8 HR, VIT C 500MG PO QD, CALC-VITD 2 TABS PO QD, CARDIZEM CD 120MG PO QD, DIFLUCAN 200MG IV Q 24 HR, MUCINEX 1200MG PO Q 12 HR, PROAMITINE 10MG PO TID, KCL 20MEQ PO QD, ZOLOFT 25MG PO Q POONAM,    (X) * Oral diet or acceptable for next level of care    1/19/2022 08:57:31 EST by Pily Bravo      REGULAR DIET    Interventions    (X) Laboratory tests    1/19/2022 08:57:31 EST by Pily Bravo      RBC 2.71, HGB 8.1, HCT 25.3, PLT 61, NEUTS 84, PT 29.1, INR 2.8,   TP 4.1, AST 89, ALK PHSO 702, PROCALCITONIN 1.75, C REACTV PROT 11.10    Medications    ( ) Oral analgesics    1/19/2022 08:57:31 EST by Pily Bravo      DILAUDID 1MG IV Q 4 HR PRN X1 (3X/24 HR),    * Milestone   Additional Notes   Hospitalist Progress Note         Patient is now in process of applying to medicaid for long-term care with hospice. Patient son to bring in patient last bank statement. Patient is still coughing up blood but is not requiring oxygen demand. EXAM:  Constitutional:        Appearance: She is obese. She is ill-appearing. Eyes:       General: Scleral icterus present. Cardiovascular:       Rate and Rhythm: Normal rate and regular rhythm. Pulmonary:       Effort: No respiratory distress.       Breath sounds: No wheezing.       Comments: On nasal canula   Abdominal:       General: Bowel sounds are normal. There is no distension.       Tenderness: There is no abdominal tenderness. Musculoskeletal:       Right lower leg: Edema present.       Left lower leg: Edema present. Skin:      Capillary Refill: Capillary refill takes less than 2 seconds.       Coloration: Skin is jaundiced.     Neurological:       Mental Status: She is alert and oriented to person, place, and time. Psychiatric:       Comments: Flat affect          Assessment/Plan:       ... Due to poor performance and prognosis she is not a candidate for palliative XRT or chemo.  The patient has been progressively worsening with debilitation, intermittently tolerating diet not stable to be discharged home with outpatient surgical oncology follow-up. Esau Billy would benefit from transfer to tertiary care center where surgical oncology services are available for evaluation of pancreatic head mass and possible surgical intervention. 1/7 A. fib with RVR rate 110-140. Cardiology consulted and anticoagulation discontinued due to bleeding and liver failure. Patient is agreeable to hospice. Patient will be transitioned from IV Zosyn and fluconazole to levaquin once accepted into long-term care with hospice.       1. Acute pancreatitis   - Status post EUS by Dr. Aditi Henriquez 12/6   - Continue pain medication   - Lipase variable, will likely be chronically elevated    - CT abd/pelvis with PO and IV contrast showing increasing bilateral pleural effusion and increasing ascites. Also notes multiple low-density lesions of liver consistent with metastatic disease and bilateral adrenal masses suspected for metastatic disease.   - completed 14 days of IV merrem and anidulafungin    - paracentesis on 12/27 with 2300 mL clear serous fluid drained, cytology showed no growth   - Liver bx canceled as IR does not feel patient would benefit from bx at this time   -Unsuccessful attempt to place PEG J due to tumor infiltration and duodenum       2.  CHF   - Holding chlorthalidone, lasix, and metoprolol due to persistent hypotension        3. Pancreatic mass/liver masses/Adrenal Masses/History of renal cell carcinoma with lung nodules s/p right nephrectomy   Multiple low-density lesions of the liver consistent with meta static disease and bilateral adrenal masses suspicious for metastatic disease   - CA 19-9 greater than 4000   - Oncology consulted - needs tissue biopsy before definitive recommendations can be made. Unable to offer palliative chemo or radiation due to poor performance. Prognosis very poor, hospice recommended    -Pathology of thoracentesis negative for malignant cells but it showed acute on chronic inflammatory cells       4. Hypokalemia-stable   - replete as needed   - given Mag sulfate - recheck in the AM    -  Potassium WNL on 1/18/2022       5. COPD - stable   - On nasal canula   - Chronic respiratory failure with 2 L of oxygen via nasal cannula at home   - Continue mucinex 1200mg BID   - CXR 1/17 no acute change       6. Leukocytosis   - Cultures negative   - Completed course of IV meropenem and andulafungin 14 days   - ID following   - Blood cultures on 1/13/2022 positive for E. Coli continue on IV Zosyn       7. A. fib   - On diltiazem    Held metoprolol HR normal now   -Echo EF of 60 to 65%   -No anticoagulation due to anemia       8. Acute on Chronic Anemia    -hemoglobin has been slowly declining requiring repletion and she is FOBT positive   -Required transfusion hemoglobin 8.1   Total of 6 units PRBCs   -Eliquis has been discontinued due to bleeding and liver failure   - NM bleeding scan negative       9. Elevated d-dimer   - Likely multifactorial    - CTA chest negative for PE   - Duplex US lower extremities negative for DVT   - Duplex US upper extremities showing superficial thrombophlebitis in left median/antecubital veins   -Repeat upper extremity Doppler study negative   -Discontinued Eliquis due to anemia       10. Hypotension   On midodrine       DVT Prophylaxis: discontinued eliquis, on SCDs   GI Prophylaxis: protonix   Discharge and disposition barriers: tomorrow home with LTC hospice             CM NOTE:  2904   Patient is being screened for medicaid by the business office for LTC and/or PCA. Writer also reached out to [facility] and [facility] to see if they can accept patient with medicaid pending. Awaiting a response.       1100   Received a message from Robin Lopez in the Public Benefits office stating she attempted to contact patient for medicaid screening, however the patient dismissed her saying she is asleep and requested a return call at 1pm.       1340   After requesting a return call for the 2nd time from PB office r/t being SOB, Josephine was finally able to speak with patient on the phone. She will update writer once screening is complete.        1415   Alpa Casey will contact patient's son for clarification re: patient's financial information.     1440   Per Alpa Casey, son will bring a copy of patient's 30day bank statement to writer tomorrow for financial screening.        [facility] with Thaddeus Levi will update HUBER tomorrow re: accepting patient.  Writer will discuss with son/patient tomorrow.                Pancreatitis - Care Day 40 (1/17/2022) by Alysha Ross       Review Entered Review Status   1/18/2022 09:07 Completed      Criteria Review      Care Day: 40 Care Date: 1/17/2022 Level of Care: Telemetry    Guideline Day 3    Clinical Status    ( ) * Hemodynamic stability    1/18/2022 09:07:17 EST by Alysha Ross      T 98.6, BP /51-64, P , MAP down to 63,   R 18, 02 SAT 96% 3L NC    ( ) * No evidence of infection requiring inpatient care    (X) * Amylase level normal or improved    1/18/2022 09:07:17 EST by Alysha Ross      LIPASE 703    (X) * Pain absent or managed    ( ) * Diet tolerated    ( ) * Renal function at baseline or acceptable for next level of care    1/18/2022 09:07:17 EST by Alysha Ross      BUN 31, CREAT 1.09, GFRNAA 49    ( ) * Electrolyte abnormalities absent or acceptable for next level of care    1/18/2022 09:07:17 EST by Alysha Ross      K 3.2, CA 7.8    ( ) * Discharge plans and education understood    Activity    ( ) * Ambulatory or acceptable for next level of care    1/18/2022 09:07:17 EST by Alysha Ross      BEDREST    Routes    ( ) * Oral hydration 1/18/2022 09:07:17 EST by Daniel Thomas      IV LR 75ML/HR    ( ) * Oral medications or regimen acceptable for next level of care    1/18/2022 09:07:17 EST by Daniel Thomas      SEE REVIEW    (X) * Oral diet or acceptable for next level of care    Interventions    (X) Laboratory tests    1/18/2022 09:07:17 EST by Daniel Thomas      RBC 2.74, HGB 8.2, HCT 25.4, PLT 32, NEUTS 89, PT 25.4, INR 2.4, K 3.2, BUN 31, CREAT 1.09, GFRNAA 49, T BILI 8.0, TP 4.0, AST 69, ALK  PHOS 586, PROCALCITONIN 3.82, C REACTV PROT 14.50    Medications    ( ) Oral analgesics    1/18/2022 09:07:17 EST by Daniel Thomas      DILAUDID 1MG IV Q 4 HR PRN X 2 (3X/24 HR),    * Milestone   Additional Notes   Hospitalist Progress Note      is agreeable to going home with home hospice.  Patient saying that she has increased difficulty breathing and uses a nebulizer at home. EXAM:  Constitutional:        Appearance: She is obese. She is ill-appearing. Eyes:       General: Scleral icterus present. Cardiovascular:       Rate and Rhythm: Normal rate and regular rhythm. Pulmonary:       Effort: No respiratory distress.       Breath sounds: No wheezing. Abdominal:       General: Bowel sounds are normal. There is no distension.       Tenderness: There is no abdominal tenderness. Musculoskeletal:       Right lower leg: Edema present.       Left lower leg: Edema present. Skin:      Capillary Refill: Capillary refill takes less than 2 seconds.       Coloration: Skin is jaundiced. Neurological:       Mental Status: She is alert and oriented to person, place, and time. Psychiatric:       Comments: Flat affect          CXR: Left perihilar and right lower lobe patchy airspace disease present, similar to prior study. Small bilateral effusions are present. Right PICC line projects over SVC. Slightly enlarged cardiomediastinal silhouette noted similar to prior study. No pneumothorax. No overt edema. No significant interval change. , Assessment/Plan:      1. Acute pancreatitis   - Status post EUS by Dr. Zeeshan Guardado 12/06   - Continue pain medication   - Lipase variable, will likely be chronically elevated    - CT abd/pelvis with PO and IV contrast showing increasing bilateral pleural effusion and increasing ascites. Also notes multiple low-density lesions of liver consistent with metastatic disease and bilateral adrenal masses suspected for metastatic disease.   - completed 14 days of IV merrem    - Continue empiric Anidulafungin 4 more days   - paracentesis on 12/27 with 2300 mL clear serous fluid drained, cytology showing now growth. - She was scheduled for liver biopsy with IR however IR does not feel patient would benefit from biopsy at this time.    -Unsuccessful attempt to place PEG J due to tumor infiltration and duodenum       2. CHF   - Continue chlorthalidone 25 mg daily   - Holding PO lasix 40mg daily. - holding BP meds due to hypotension today       3. Pancreatic mass/Liver Masses/Adrenal Masses/History of renal cell carcinoma with lung nodules s/p right nephrectomy   Multiple low-density lesions of the liver consistent with meta static disease and bilateral adrenal masses suspicious for metastatic disease   - CA 19-9 greater than 4000   --Oncology consulted-will need tissue biopsy before definitive recommendations can be made.  Unable to offer palliative chemo or radiation due to poor performance.  Prognosis very poor, hospice recommended    -Pathology of thoracentesis negative for malignant cells but it showed acute on chronic inflammatory cells       4. Hypokalemia-stable   - replete as needed   - given Mag sulfate - recheck in the AM    -  Potassium WNL on 1/16/2022       5. COPD-stable   - Pulmonary consultation   - Chronic respiratory failure with 2 L of oxygen via nasal cannula at home   - Increase mucinex to 1200mg BID   - She did become hypoxic. Lungs sound clear. Will consider chest xray if no improvement.       6. Leukocytosis   - Cultures negative   - Completed course of IV meropenem and Andulafungin 14 days   - ID consulted   - repeat UA pending   - Blood cultures on 1/13/2022 positive for E. Coli continue on IV Zosyn       7. A. fib   -On diltiazem and metoprolol.   - HR elevated. This is before BP meds. Will get a EKG. If still in a fib with RVR will consider diltiazem drip. -2D echo showed normal systolic function and diastolic function with an EF of 60 to 65%   -No anticoagulation due to anemia       8. Acute on Chronic Anemia    -Her hemoglobin has been slowly declining requiring repletion and she is FOBT positive   -Required transfusion hemoglobin pending for today   Total of 6 units PRBCs   -Eliquis has been discontinued due to bleeding and liver failure   Cardiology consulted   - NM bleeding scan negative       9. Elevated d-dimer   - Likely multifactorial    - CTA chest negative for PE   - Duplex US lower extremities negative for DVT   - Duplex US upper extremities showing superficial thrombophlebitis in left median/antecubital veins   -Repeat upper extremity Doppler study negative   -Discontinued Eliquis due to anemia       10. Hypotension   On midodrine       DVT Prophylaxis: discontinued eliquis, on SCDs   GI Prophylaxis: protonix   Discharge and disposition barriers: tomorrow home with home hospice         CM NOTE:  8026   CM reviewed the clinical record and reached out to [facility] via [employer] to see if Raoul Zavala has been able to contact patient.  Awaiting a response.        1045   Call placed to [facility], spoke with Charmayne Barefoot who stated Kenya Solomon, the , is on his way to speak with patient.        1145   Spoke with Sydni Justice, with [facility] who stated patient is good to go home with hospice, however they do not provide 24hr personal care and patient does not allow her son to clean her.        1430   CM met with patient at the bedside who stated she has not spoken to her son re: hospice and her provider has told her she may be septic and her O2 sats are decreasing and that she needs to rest. Austen Ayala, patient's provider informed.            [phone]   email sent to the business for Medicaid screening of a PCA.             DIFLUCAN 400MG IV X1, ZOSYN 3.375G IV Q 8 HR, ZOFRAN 4 MG IV Q 6 HR PRN X1, PROTONIX 40MG IV Q 1 2HR, VIT C 500MG PO QD, VIT D3 5000U PO QD, VIT C 500MG PO QD, CARDIZEM CD 120MG PO QD, MUCINEX 1200MG PO Q 12 HR, PROAMITINE 10MG PO TID, KCL 20MEQ PO QD, SE-TAN PLUS  CAP PO BID,  ZOLOFT 25MG PO Q POONAM,            Pancreatitis - Care Day 39 (1/16/2022) by Logan Goodman       Review Entered Review Status   1/17/2022 10:17 Completed      Criteria Review      Care Day: 39 Care Date: 1/16/2022 Level of Care: Telemetry    Guideline Day 3    Clinical Status    ( ) * Hemodynamic stability    1/17/2022 10:17:54 EST by Logan Goodman      T 98.5, BP 94/59, P 101-110, R 20, 02 SAT 95% 3L NC    ( ) * No evidence of infection requiring inpatient care    (X) * Amylase level normal or improved    1/17/2022 10:17:54 EST by Logan Goodman      LIPASE 773    (X) * Pain absent or managed    ( ) * Diet tolerated    ( ) * Renal function at baseline or acceptable for next level of care    1/17/2022 10:17:54 EST by Logan Goodman      BUN 28, CREAT 1.04, GFRNAA 52    ( ) * Electrolyte abnormalities absent or acceptable for next level of care    1/17/2022 10:17:54 EST by Logan Goodman      CA 7.7    ( ) * Discharge plans and education understood    Activity    ( ) * Ambulatory or acceptable for next level of care    Routes    (X) * Oral hydration    ( ) * Oral medications or regimen acceptable for next level of care    (X) * Oral diet or acceptable for next level of care    Interventions    (X) Laboratory tests    1/17/2022 10:17:54 EST by Logan Goodman      WBC 12.2, RBC 2.67, HGB 8.0, HCT 25.0, PLT 42, NEUTS 93, PT 25.0, INR 2.3, LIPASE 773    * Milestone   Additional Notes   Hospitalist Progress Note      Patient says that she is a little more short of breath today.  Denies any midsternal chest pain. EXAM:  Constitutional: No acute distress, chronically ill appearing   Skin: Extremities and face reveal no rashes. HEENT: Sclerae anicteric. Extra-occular muscles are intact. Cardiovascular: irregular   Respiratory:  nonlabored   GI: Abdomen nondistended, soft, and nontender. Normal active bowel sounds. Musculoskeletal: No pitting edema of the lower legs. Able to move all ext   Neurological:  Patient is alert and oriented. Cranial nerves II-XII grossly intact   Psychiatric: flat affect, depressed mood          Assessment / Plan:           1. Acute pancreatitis-Patient is more jaundice this AM. Will get a hepatic panel in the AM.   2. CHF-- Continue chlorthalidone 25 mg daily.- Holding PO lasix 40mg daily. - holding BP meds due to hypotension today.  3. Pancreatic mass/Liver Masses/Adrenal Masses/History of renal cell carcinoma with lung nodules s/p right nephrectomy   Multiple low-density lesions of the liver consistent with meta static disease and bilateral adrenal masses suspicious for metastatic disease   - CA 19-9 greater than 4000   --Oncology consulted-will need tissue biopsy before definitive recommendations can be made.  Unable to offer palliative chemo or radiation due to poor performance.  Prognosis very poor, hospice recommended    -Pathology of thoracentesis negative for malignant cells but it showed acute on chronic inflammatory cells       4. Hypokalemia-stable   - replete as needed   - given Mag sulfate - recheck in the AM    -  Potassium WNL on 1/16/2022       5. COPD-stable   - Pulmonary consultation   - Chronic respiratory failure with 2 L of oxygen via nasal cannula at home   - Increase mucinex to 1200mg BID   - She did become hypoxic. Lungs sound clear.  Will consider chest xray if no improvement.       6. Leukocytosis   - Cultures negative   - Completed course of IV meropenem and Andulafungin 14 days   - ID consulted   - repeat UA pending   - Blood cultures on 1/13/2022 positive for E. coli.  Will place patient back on IV Zosyn       7. A. fib   -On diltiazem and metoprolol.   - HR elevated. This is before BP meds. Will get a EKG. If still in a fib with RVR will consider diltiazem drip. -2D echo showed normal systolic function and diastolic function with an EF of 60 to 65%   -No anticoagulation due to anemia       8. Acute on Chronic Anemia    -Her hemoglobin has been slowly declining requiring repletion and she is FOBT positive   -Required transfusion hemoglobin pending for today   Total of 6 units PRBCs   -Eliquis has been discontinued due to bleeding and liver failure   Cardiology consulted   - NM bleeding scan negative           9. Elevated d-dimer   - Likely multifactorial    - CTA chest negative for PE   - Duplex US lower extremities negative for DVT   - Duplex US upper extremities showing superficial thrombophlebitis in left median/antecubital veins   -Repeat upper extremity Doppler study negative   -Discontinued Eliquis due to anemia          10. Hypotension   On midodrine           Dispo: Barriers include stabilization of hemoglobin and blood cultures pending.  Potentially home with home hospice if patient is agreeable       I am concern that patient is becoming septic again. Started back on IV Zosyn. Per patient she states someone told her that she had 3 weeks to live and another 3 months. Discussed with her that is a time frame. Her best choice is hospice. She is getting confused about what hospice company is and what they offer. Per biggest issue is having her family clean her, which is is adamant about not wanting. I have placed a re-consult in for hospice to come do an information meeting with her.           SANDRA NOTE: [facility] has accepted pending pt/family affirmation. [facility] will contact pt for assessment and agreement for care.  Updates sent         DUO NEBS Q 6 HR PRN X1, VIT C 500MG PO QD, LIPITOR 40MG PO Q HS, CARDIZEM  MG PO QD, MUCINEX 1200MG PO Q 12 HR, DILAUDID 1MG IV Q 4 HR PRN X1 (3X/24 HR), PROAMITINE 10MG PO TID, ZOFRAN 4 MG IV Q 6 HR PRN X1, PROTONIX 40MG IV Q 12 HR, ZOSYN 3.375G IV Q 8N HR, KLOR CON 10 20 MEQ PO QD, SE-TAN PLUS 1 CAP PO BID, ZOLOFT 25MG PO Q POONAM, DESYREL 50MG PO Q HS,            Pancreatitis - Care Day 38 (1/15/2022) by Angeles Cavazos       Review Entered Review Status   1/17/2022 10:03 Completed      Criteria Review      Care Day: 38 Care Date: 1/15/2022 Level of Care: Telemetry    Guideline Day 3    Clinical Status    (X) * Hemodynamic stability    1/17/2022 10:03:15 EST by Angeles Cavazos      T 98.4, BP 99/60, P 83    ( ) * No evidence of infection requiring inpatient care    1/17/2022 10:03:15 EST by Angeles Cavazos      SEE REVIEW    ( ) * Amylase level normal or improved    1/17/2022 10:03:15 EST by Angeles Cavazos      LAST LIPASE LEVEL ON 1/12=1,441    (X) * Pain absent or managed    1/17/2022 10:03:15 EST by Angeles Cavazos      She states her abdominal pain is improved. ( ) * Diet tolerated    1/17/2022 10:03:15 EST by Angeles Cavazos      Her appetite is poor.     ( ) * Renal function at baseline or acceptable for next level of care    1/17/2022 10:03:15 EST by Angeles Cavazos      BUN 29, CREAT 1.03, GFRNAA 52    ( ) * Electrolyte abnormalities absent or acceptable for next level of care    1/17/2022 10:03:15 EST by Angeles Cavazos      K 2.7, CA 7.7    ( ) * Discharge plans and education understood    Activity    ( ) * Ambulatory or acceptable for next level of care    Routes    (X) * Oral hydration    ( ) * Oral medications or regimen acceptable for next level of care    (X) * Oral diet or acceptable for next level of care    Interventions    (X) Laboratory tests    1/17/2022 10:03:15 EST by Angeles Cavazos      RBC 2.68, HGB 8.1, HCT 25.3, PLT 51, PT 21.5, INR 1.9,    * Milestone   Additional Notes   1/14/22 ID NOTE:      Comment:    WBC now decreasing but CRP and procal increasing.   No clear source of infection. CRP could be related to her neoplastic process but would not expect elevated procalcitonin.  Since she is considering hospice, would probably not be very aggressive diagnostically unless she becomes febrile.       Plan:   1. If she becomes febrile, would send blood and urine cultures, then start Vancomycin and Zosyn empirically   2. Waiting possible decision on hospice        R 16, 02 SAT 92% 3L NC         GI PROGRESS NOTE 1/15/22:      . .. She reports she did speak with [facility] and would like to speak to them again to answer a few questions that she has. She states her abdominal pain is improved. Her appetite is poor.  HgB low this am at 8.1 but stable. No reports of dark stool at this time. EXAM:  Skin:  Extremities and face reveal no rashes. No chapin erythema. jaundice   HEENT: Sclerae + icteric. Extra-occular muscles are intact. No abnormal pigmentation of the lips. The neck is supple. Cardiovascular: Regular rate and rhythm. Respiratory:  Comfortable breathing with no accessory muscle use. GI:  Abdomen nondistended, soft, and nontender. No enlargement of the liver or spleen. No masses palpable. Rectal:  Deferred   Musculoskeletal: Extremities have good range of motion. Neurological:  Gross memory appears intact.  Patient is alert and oriented. Psychiatric:  Mood appears appropriate with judgement intact. Lymphatic:  No visible adenopathy      Plan:   1. Pancreatitis - RESOLVED.     -12/30 s/p post pyloric tube placement. Patient removed tube on 12/31.          -Unable to place GJ tube due to  tumor in duodenum and food in the stomach and patient on Eliquis       -Lipase 902.  Repeat Lipase in the am        -Pain med as needed.       - S/P paracentesis 12/27/21 with removal of 2300 ml clear serous  Fluid removed       -cytology Slight acute inflammation.        -currently on Regular diet       -patient in agreement for SNF transfer, then outpatient follow up from there. 2.CHF        -Continue Chlorthalidone   3. COPD        -Continue home medications        -Albuterol as needed    4. Pancreatic Mass       - 22,358       -S/p fine needle aspiration suspicious for neoplasia but not diagnostic       -When stable Oncology plan for out patient followup with advanced oncology surgeon Esthela Carrizales or [facility] for resection considerations        -CT concern for liver metastais/lesions        -IR for liver biopsy on hold at this time         -Poor Prognosis          -Hospice consult   5. Hematemesis (resolved)   6. Afib with RVR       -Eliquis on hold 2/2 to low HgB       -Current heart rate 120       -Continue diltiazem       -Cardiology has been consulted   7. Diarrhea (Improved)       - Imodium as needed   8. Anemia/GI bleed      -Reports of rectal bleeding, dark stools. Bleeding most likely from tumor invasion. If she continues to bleed, consult IR for possible embolization procedure.       -Bleeding scan is negative.       -Hgb 8.7. Monitor and transfuse as needed. 9.-Hypokalemia      Replete as needed      CMP in the am         Hospitalist Progress Note 1/15/22:      Assessment / Plan:           1. Acute pancreatitis   - Status post EUS by Dr. Na Medrano 12/06   - Continue pain medication   - Lipase variable, will likely be chronically elevated    - CT abd/pelvis with PO and IV contrast showing increasing bilateral pleural effusion and increasing ascites. Also notes multiple low-density lesions of liver consistent with metastatic disease and bilateral adrenal masses suspected for metastatic disease.   - completed 14 days of IV merrem    - Continue empiric Anidulafungin 4 more days   - paracentesis on 12/27 with 2300 mL clear serous fluid drained, cytology showing now growth.    - She was scheduled for liver biopsy with IR however IR does not feel patient would benefit from biopsy at this time.    -Unsuccessful attempt to place PEG J due to tumor infiltration and duodenum       2. CHF   - Continue chlorthalidone 25 mg daily   - Holding PO lasix 40mg daily. - holding BP meds due to hypotension today       3. Pancreatic mass/Liver Masses/Adrenal Masses/History of renal cell carcinoma with lung nodules s/p right nephrectomy   Multiple low-density lesions of the liver consistent with meta static disease and bilateral adrenal masses suspicious for metastatic disease   - CA 19-9 greater than 4000   --Oncology consulted-will need tissue biopsy before definitive recommendations can be made.  Unable to offer palliative chemo or radiation due to poor performance.  Prognosis very poor, hospice recommended       4. Hypokalemia-stable   - replete as needed   - given Mag sulfate       5. COPD-stable   - Pulmonary consultation   - Chronic respiratory failure with 2 L of oxygen via nasal cannula at home   - Increase mucinex to 1200mg BID   - She did become hypoxic. Lungs sound clear. Will consider chest xray if no improvement.       6. Leukocytosis   - Cultures negative   - Completed course of IV meropenem and Andulafungin 14 days   - ID consulted   - repeat UA pending       7. A. fib   -On diltiazem and metoprolol.   - HR elevated. This is before BP meds. Will get a EKG. If still in a fib with RVR will consider diltiazem drip. -2D echo showed normal systolic function and diastolic function with an EF of 60 to 65%   -No anticoagulation due to anemia       8. Acute on Chronic Anemia    -Her hemoglobin has been slowly declining requiring repletion and she is FOBT positive   -Required transfusion hemoglobin today 8.7   Total of 6 units PRBCs   -Eliquis has been discontinued due to bleeding and liver failure   Cardiology consulted   - NM bleeding scan negative           9.  Elevated d-dimer   - Likely multifactorial    - CTA chest negative for PE   - Duplex US lower extremities negative for DVT   - Duplex US upper extremities showing superficial thrombophlebitis in left median/antecubital veins   -Repeat upper extremity Doppler study negative   -Discontinued Eliquis due to anemia           10.  Hypotension   On midodrine        Dispo: Barriers include stabilization of hemoglobin and improvement in potassium.  Potentially home with home hospice if patient is agreeable         DUO NEBS Q 6 HR PRN X1,  VIT C 500MG PO QD, CALC CARBONATE-VIT D3 800U PO 2 TABS QD, CARDIZEM CD 120MG PO QD, COLACE  100MG PO BID, MUCINEX 1200MG PO Q 12 HR, DILAUDID 1MG IV Q 4 HR PRN X 2,  IV LR 75ML/HR, PROAMITINE 10MG PO TID, PROTONIX 40MG IV Q 12 HR, KCL 20 MEQ PONOW &   QD, SE-TAN PLUS CAP PO BID, ZOLOFT 25MG PO Q POONAM,  DESYREL 50MG PO Q HS,                  Pancreatitis - Care Day 37 (1/14/2022) by [employer]       Review Entered Review Status   1/14/2022 12:15 Completed      Criteria Review      Care Day: 37 Care Date: 1/14/2022 Level of Care: Telemetry    Guideline Day 3    Clinical Status    ( ) * Hemodynamic stability    1/14/2022 12:15:27 EST by Volanda [place]      T 98.1, BP 88/50, P 105, MAP down to 63, R 18, 02 SAT 97% 4L NC    ( ) * No evidence of infection requiring inpatient care    ( ) * Amylase level normal or improved    1/14/2022 12:15:27 EST by Volanda Martins Ferry      NO RECENT LAB VALUE RECORDED    ( ) * Pain absent or managed    (X) * Diet tolerated    (X) * Renal function at baseline or acceptable for next level of care    1/14/2022 12:15:27 EST by Volanda Martins Ferry      BUN 31, CREAT 1.19, GFRAA 54    ( ) * Electrolyte abnormalities absent or acceptable for next level of care    1/14/2022 12:15:27 EST by Volanda Martins Ferry      K 2.4, CA 7.8    ( ) * Discharge plans and education understood    Activity    ( ) * Ambulatory or acceptable for next level of care    Routes    (X) * Oral hydration    ( ) * Oral medications or regimen acceptable for next level of care    1/14/2022 12:15:27 EST by Volanda Martins Ferry      MAG SULFATE 2G IV X1, KCL 10MEQ IV Q 1 HR X3 & 40MEQ PO X1,  DUO NEBS Q  HR PRN X1, SAME SCHED MEDS    (X) * Oral diet or acceptable for next level of care    Interventions    (X) Laboratory tests    1/14/2022 12:15:27 EST by Jeison Frey      WBC 11.6, RBC 2.81, HGB 8.7, HCT 26.2, PLT 87, NEUTS 88, PT 20.2, INR 1.8, D DIMER 6.95, C02 33, ANION GAP 4, T BILI 5.3, DIR BILI 4.6, SLB 1.0, , , PROCALCITONIN 15.42, C REACTV PROT 21.30    * Milestone   Additional Notes   Hospitalist Progress Note      Yesterday oncology nurse navigator and oncology spoke with the patient at bedside about goals of care and overall poor prognosis despite lack of tissue sample.  Patient was agreeable to speaking with hospice. EXAM:  Constitutional: No acute distress, chronically ill appearing   Skin: Extremities and face reveal no rashes. HEENT: Sclerae anicteric. Extra-occular muscles are intact. Cardiovascular: irregular   Respiratory:  Clear breath sounds bilaterally with no wheezes, rales, or rhonchi. GI: Abdomen nondistended, soft, and nontender. Normal active bowel sounds. Musculoskeletal: No pitting edema of the lower legs. Able to move all ext   Neurological:  Patient is alert and oriented. Cranial nerves II-XII grossly intact   Psychiatric: Mood appears appropriate      Assessment / Plan:           1. Acute pancreatitis   - Status post EUS by Dr. Terry Owens 12/06   - Continue pain medication   - Lipase variable, will likely be chronically elevated    - CT abd/pelvis with PO and IV contrast showing increasing bilateral pleural effusion and increasing ascites. Also notes multiple low-density lesions of liver consistent with metastatic disease and bilateral adrenal masses suspected for metastatic disease.   - completed 14 days of IV merrem    - Continue empiric Anidulafungin 4 more days   - paracentesis on 12/27 with 2300 mL clear serous fluid drained, cytology showing now growth.    - She was scheduled for liver biopsy with IR however IR does not feel patient would benefit from biopsy at this time.    -Unsuccessful attempt to place PEG J due to tumor infiltration and duodenum       2. CHF   - Continue chlorthalidone 25 mg daily   - On PO lasix 40mg daily. - holding BP meds due to hypotension today       3. Pancreatic mass/Liver Masses/Adrenal Masses/History of renal cell carcinoma with lung nodules s/p right nephrectomy   Multiple low-density lesions of the liver consistent with meta static disease and bilateral adrenal masses suspicious for metastatic disease   - CA 19-9 greater than 4000   --Oncology consulted-will need tissue biopsy before definitive recommendations can be made.  Unable to offer palliative chemo or radiation due to poor performance.  Prognosis very poor, hospice recommended       4. Hypokalemia-stable   - replete as needed       5. COPD-stable   - Pulmonary consultation   - Chronic respiratory failure with 2 L of oxygen via nasal cannula at home   - Increase mucinex to 1200mg BID   - She did become hypoxic. Lungs sound clear. Will consider chest xray if no improvement.       6. Leukocytosis   - Cultures negative   - Completed course of IV meropenem and Andulafungin 14 days   - ID consulted   - repeat UA pending       7. A. fib   -On diltiazem and metoprolol.   - HR elevated. This is before BP meds. Will get a EKG. If still in a fib with RVR will consider diltiazem drip. -2D echo showed normal systolic function and diastolic function with an EF of 60 to 65%   -No anticoagulation due to anemia       8. Acute on Chronic Anemia    -Her hemoglobin has been slowly declining requiring repletion and she is FOBT positive   -Required transfusion hemoglobin today 8.7   Total of 6 units PRBCs   -Eliquis has been discontinued due to bleeding and liver failure   Cardiology consulted   - NM bleeding scan negative           9.  Elevated d-dimer   - Likely multifactorial    - CTA chest negative for PE   - Duplex US lower extremities negative for DVT   - Duplex US upper extremities showing superficial thrombophlebitis in left median/antecubital veins   -Repeat upper extremity Doppler study negative   -Discontinued Eliquis due to anemia           10. Hypotension   On midodrine           Dispo: Barriers include stabilization of hemoglobin and improvement in potassium.  Potentially home with home hospice if patient is agreeable       DVT Prophylaxis: Discontinued Eliquis, SCDs   GI PPx: Protonix         CM NOTE:  CM met with patient at [facility] to discuss hospice agency options.  Patient informed both  hospice and [facility] are interested per clinical record. John Cordova stated after her meeting today, she will make a decision.  CM will f/u with patient.           Pancreatitis - Care Day 36 (1/13/2022) by Marcelino Marques       Review Entered Review Status   1/14/2022 12:04 Completed      Criteria Review      Care Day: 36 Care Date: 1/13/2022 Level of Care: Telemetry    Guideline Day 3    Clinical Status    ( ) * Hemodynamic stability    1/14/2022 11:58:06 EST by Marcelino Marques      -150/43-74, P , MAP down to 62    02 sat 96% 4L NC    R 18    ( ) * No evidence of infection requiring inpatient care    1/14/2022 11:58:06 EST by Marcelino Marques      BLOOD CULTURE: Gram Negative Rods growing in both bottles drawn SITE = L HAND    ( ) * Amylase level normal or improved    1/14/2022 11:58:06 EST by Marcelino Marques      1/12/22 LIPASE 1,441    ( ) * Pain absent or managed    (X) * Diet tolerated    (X) * Renal function at baseline or acceptable for next level of care    1/14/2022 11:58:06 EST by Marcelino Marques      BUN 27, CREAT 1.35, GFRNAA 38    ( ) * Electrolyte abnormalities absent or acceptable for next level of care    1/14/2022 11:58:06 EST by Marcelino Marques      K 3.0, CA 7.4    ( ) * Discharge plans and education understood    Activity    ( ) * Ambulatory or acceptable for next level of care    Routes    (X) * Oral hydration    ( ) * Oral medications or regimen acceptable for next level of care    1/14/2022 11:58:06 EST by Palma Moore      SEE REVIEW    (X) * Oral diet or acceptable for next level of care    1/14/2022 11:58:06 EST by Palma Moore      REGULAR    Interventions    (X) Laboratory tests    1/14/2022 11:58:06 EST by Palma Moore      WBC 16.6, RBC 2.35, HGB 7.4, HCT 22.5, , NEUTS 88, BAND NEUTS 7, .4, INR 2.3, D DIMER 5.94, K 3.0, GLUC 102,  CA 7.4,    Medications    ( ) Oral analgesics    1/14/2022 11:58:06 EST by Palma Moore      DILAUDID 1MG IV Q 4 HR PRN X1    * Milestone   Additional Notes   Hospitalist Progress Note      Patient is very upset this morning as she was told by 2 people yesterday that her son did not come however she states that he did come with breakfast and felt like they were calling her a liar.  She does admit to shortness of breath with a cough. EXAM:  Constitutional: No acute distress, chronically ill appearing   Skin: Extremities and face reveal no rashes. HEENT: Sclerae anicteric. Extra-occular muscles are intact. No oral ulcers. The neck is supple and no masses. Cardiovascular: irregular   Respiratory:  Clear breath sounds bilaterally with no wheezes, rales, or rhonchi. GI: Abdomen nondistended, soft, and nontender. Normal active bowel sounds. Musculoskeletal: No pitting edema of the lower legs. Able to move all ext   Neurological:  Patient is alert and oriented. Cranial nerves II-XII grossly intact   Psychiatric: Mood appears appropriate        GI BLEED SCAN: NEG      EKG: Atrial fibrillation Low voltage QRS Abnormal QRS-T angle, consider primary T wave abnormality      Assessment / Plan:           1. Acute pancreatitis   - Status post EUS by Dr. Peet Davies 12/06   - Continue pain medication   - Lipase variable, will likely be chronically elevated    - CT abd/pelvis with PO and IV contrast showing increasing bilateral pleural effusion and increasing ascites.  Also notes multiple low-density lesions of liver consistent with metastatic disease and bilateral adrenal masses suspected for metastatic disease.   - completed 14 days of IV merrem    - Continue empiric Anidulafungin 4 more days   - paracentesis on 12/27 with 2300 mL clear serous fluid drained, cytology showing now growth. - She was scheduled for liver biopsy with IR however IR does not feel patient would benefit from biopsy at this time.    -Unsuccessful attempt to place PEG J due to tumor infiltration and duodenum       2. CHF   - Continue chlorthalidone 25 mg daily   - On PO lasix 40mg daily.        3. Pancreatic mass/Liver Masses/Adrenal Masses/History of renal cell carcinoma with lung nodules s/p right nephrectomy   Multiple low-density lesions of the liver consistent with meta static disease and bilateral adrenal masses suspicious for metastatic disease   - CA 19-9 greater than 4000   --Oncology consulted-will need tissue biopsy before definitive recommendations can be made.  Unable to offer palliative chemo or radiation due to poor performance.  Prognosis very poor, hospice recommended       4. Hypokalemia-stable   - replete as needed       5. COPD-stable   - Pulmonary consultation   - Chronic respiratory failure with 2 L of oxygen via nasal cannula at home   - Increase mucinex to 1200mg BID   - She did become hypoxic. Lungs sound clear. Will consider chest xray if no improvement.       6. Leukocytosis   - Cultures negative   - Completed course of IV meropenem and Andulafungin 14 days   - ID consulted       7. A. fib   -On diltiazem and metoprolol.   - HR elevated. This is before BP meds. Will get a EKG. If still in a fib with RVR will consider diltiazem drip. -2D echo showed normal systolic function and diastolic function with an EF of 60 to 65%   -No anticoagulation due to anemia       8. Acute on Chronic Anemia    -Her hemoglobin has been slowly declining requiring repletion and she is FOBT positive   -Required transfusion hemoglobin today 7.4. Total of 6 units PRBCs   -Eliquis has been discontinued due to bleeding and liver failure   Cardiology consulted   - NM bleeding scan pending           9. Elevated d-dimer   - Likely multifactorial    - CTA chest negative for PE   - Duplex US lower extremities negative for DVT   - Duplex US upper extremities showing superficial thrombophlebitis in left median/antecubital veins   -Repeat upper extremity Doppler study negative   -Discontinued Eliquis due to anemia           10. Hypotension   On midodrine           DVT Prophylaxis: Discontinued Eliquis, SCDs   GI PPx: Protonix         ID NOTE:      Patient followed for severe pancreatitis on Meropenem because of worsening leukocytosis.  WBC now normal but CRP is increasing.  Lipase has decreased.  Suspected pancreatic cancer but no firm diagnosis yet.  Patient resting comfortably with no abdoinal pain at this time. States that she is tentatively going to Rehab. Assessment:       1. Severe pancreatitis with markedly elevated lipase, resolving   2. Pancreatic mass, possible neoplasm   3. Biliary stent   4. Sepsis with worsening leukocytosis with elevated procal and CRP, resolving, status post 14 days of Meropenem, Anidulafungin (empiric), CRP increasing    5. TPN (just started)   6. Moderate pyuria, negative bacteria, urine culture negative   7. Possible candida superinfection with vaginitis, treated   8. Ascites, status post paracentesis       Comment:    WBC now increased along with CRP.  CXR yesterday had improved.       Plan:   1. Urinalysis with reflex culture   2. Blood cultures   3. In am, repeat CBC, CRP, procal          CM NOTE:  CM met with patient at bedside to discuss choices for Hospice agencies. Patient chose  hospice and [facility], [facility] requested. Choice letter signed and placed on the record. Referrals sent to both of choices. Awaiting a response.          PROTONIX 40MG IV Q 12 HR, VIT C 500MG PO QD, LIPITOR 40MG PO Q HS, VIT D3 2 TAB PO QD, HYGROTON 25MG PO QD, CARDIZEM CD 120MG PO QD, MUCINEX 1200MG PO Q 12 HR, LOPRESSOR 25MG PO Q 1 2HR, PROAMITINE 10MG PO TID, KCL 20MEQ PO QD, SE-TAN PLUS CAP PO BID, ZOLOFT  25MG PO Q POONAM,  DESYREL 50MG PO Q HS,

## 2022-01-24 NOTE — HOSPICE
[facility]  [phone]    Inpatient Nursing Admission   Patient Name: Katlyn Pinto  YOB: 1945  Age: 78 y.o. Date of Hospice Admission: (Not on file)  Hospice Attending Elected by Patient: Bernadette Zuniga MD  Primary Care Physician: Roya Barraza MD  Admitting RN: Man Enamorado RN  : Germain Us     Level of Care (GIP/Routine/Respite): GIP  Facility of Care: [facility]  Patient Room: 228/01     HOSPICE SUMMARY   ER Visits/ Hospitalizations in past year: 1  Hospice Diagnosis: Pancreatitis [K85.90]  Metastatic carcinoma ([address] [C79.9]  Onset Date of Hospice Diagnosis: 30 days  Summary of Disease Progression Leading to Hospice Diagnosis: Per Hospitalist notes; 79-year-old woman with a PMH significant for atrial fibrillation, PUD, CHF, COPD on home O2 at 2 L, renal cell carcinoma status post nephrectomy, hypertension and morbid obesity. Patient was found to have obstructive jaundice and biliary stricture in early November of this year.  She underwent an ERCP with stent placement with brushings of the bile duct where there was noted to be a stricture.  These brushings were negative.  Patient then underwent a PET CT which demonstrated uptake in the head of the pancreas concerning for pancreatic mass. She subsequently underwent endoscopic ultrasound with ultrasound and FNA biopsy on 12/6/2021.  The procedure demonstrated abutment of the tumor with the portal vein without involvement of the SMA or SMV.  The total tumor was measured to be 2.7 cm by 3 cm.  This biopsy resulted as atypical cells with suspicion for malignancy. She admitted to the hospital on 12/9/2021 and severe pancreatitis after undergoing an EUS for pancreatic biopsy on 12/6/2021 and pancreatic mass.  Her symptoms were abdominal pain, nausea and vomiting.  CT scan revealing peripancreatic inflammation consistent with likely post biopsy pancreatitis and ascites.  Her initial labs revealing elevated lipase, supratherapeutic INR and elevated D-dimer coagulopathy. Clinton Mulligan was positive for bilateral pleural effusions, negative for PE.  12/27 paracentesis with culture of ascitic fluid which had no growth, blood cultures, urine cultures negative for growth.  Patient had been started on IV meropenem for leukocytosis and anidulafungin for suspected intra-abdominal infection. CT with and without contrast revealing liver lesions consistent with metastasis, mass in the pancreatic head with biliary stent in place, bilateral pleural effusions, ascites and bilateral adrenal masses. IR declined liver biopsy due to coagulopathy and felt she would not benefit. Doppler studies of upper and lower extremities negative for DVT, positive for superficial thrombophlebitis of left medial antecubital. Pancreatic cancer is presumed but no firm diagnosis due to lack of tissue sample. Pancreatitis remaining persistent despite antibiotics. Attempted Dobbhoff and PEG J placement but failed due to reddened area presumed possible tumor infiltration in the duodenum. Due to poor performance and prognosis she is not a candidate for palliative XRT or chemo. The patient has been progressively worsening with debilitation, intermittently tolerating diet not stable to be discharged home with outpatient surgical oncology follow-up. She would benefit from transfer to tertiary care center where surgical oncology services are available for evaluation of pancreatic head mass and possible surgical intervention. 1/7 A. fib with RVR rate 110-140. Cardiology consulted and anticoagulation discontinued due to bleeding and liver failure. Patient is agreeable to hospice and has been medically clear to hospice on 1/18/2022. Transitioned IV fluconazole to fluconazole p.o. Patient transitioned from IV Zosyn to levaquin for 8 total doses as plan is to go into long-term care with hospice.  Complained of chest pain, an EKG was obtained and patient is in atrial fibrillation with RVR, BP is 112/63, patient denied multiple scheduled medications and is denying BB, will accept nitrobid ointment. Patient's hemoglobin is 5.6, transfused 2u prbc. \"    Patient now having confusion, abdominal pain uncontrolled, jaundice, ascites and generalized edema. She is now needing IV medications to manage symptoms. Her sister and son are at bedside and just want her to have some comfort.          Co-Morbidities:   Patient Active Problem List   Diagnosis Code    Respiratory failure with hypoxia ([facility] [address] J96.91    COPD exacerbation ([facility] [address] J44.1    Anemia D64.9    Obesity E66.9    A-fib ([facility] [address] I48.91    CH[facility]congestive [facility] failure) [facility] I50.9    Respiratory failure, unspecified with hypoxia ([facility] J96.91    Choledocholithiasis K80.50    Pancreatitis K85.90    Hematemesis K92.0    History of peptic ulcer disease Z87.11    Pancreatic mass K86.89    Metastatic carcinoma (HCC) C79.9     Diagnoses RELATED to the terminal prognosis: Pancreatic mass, liver mass  Other Diagnoses:     Rationale for a prognosis of life expectancy of 6 months or less if the disease follows its normal course (Disease Specific History):     Rodríguez Gutierrez is a 78 y. o. who was admitted to [facility]. The patient's principle diagnosis of Pancreatis has resulted in pain, nausea, agitation. Functionally, the patient's Palliative Performance Scale has declined over a period of days and is estimated at 20. Objective information that support this patients limited prognosis includes: Multiple masses in abdomin, causing pain, ascites and jaundice. The patient/family chose comfort measures with the support of Hospice.     Patient meets for GIP LOC as evidenced by pain, sob, agitation    Prognosis estimated based on 01/24/22 clinical assessment is:   [] Few to Many Hours  [x] Hours to Days   [] Few to Many Days   [] Days to Weeks   [] Few to Many Weeks   [] Weeks to Months   [] Few to Many Months    ASSESSMENT Patient self-reports:  []  Yes    [x] No    SYMPTOMS: pain, agitation, sob    SIGNS/PHYSICAL FINDINGS: grimacing, figidity, labored breathing    KARNOFSKY: 20    FAST for all dementia:      Learning Assessment:  Patient  Is patient willing/able to learn? no  What is the highest level of education completed? Learning preference (written material, demonstration, visual)? Learning barriers (ESOL, [place], poor vision)? Caregiver  Is caregiver willing to learn care for patient? yes  What is the highest level of education completed? Learning preference (written material, demonstration, visual)? Learning barriers (ESOL, [place], poor vision)? CLINICAL INFORMATION     Wt Readings from Last 3 Encounters:   01/24/22 104 kg (229 lb 4.5 oz)   01/24/22 104.3 kg (229 lb 15 oz)   12/06/21 84.4 kg (186 lb)     Ht Readings from Last 3 Encounters:   01/24/22 5' 7\" (1.702 m)   01/10/22 5' 7\" (1.702 m)   12/06/21 5' 6.6\" (1.692 m)     Body mass index is 35.91 kg/m². Visit Vitals  Ht 5' 7\" (1.702 m)   Wt 104 kg (229 lb 4.5 oz)   BMI 35.91 kg/m²       LAB VALUES  No results found for this visit on 01/24/22 (from the past 12 hour(s)). No results found for this visit on 01/24/22 (from the past 6 hour(s)). Lab Results   Component Value Date/Time    Protein, total 4.2 (L) 01/23/2022 08:01 AM    Albumin 0.8 (L) 01/23/2022 08:01 AM       Currently this patient has:  [x] Supplemental O2 [] Peripheral IV  [x] PICC    [] PORT   [x] Masters Catheter [] NG Tube   [] PEG Tube [] Ostomy    [] AICD: Has ICD been deactivated? [] Yes [] No:______    PLAN     1. Admit Gip level of care at [facility]  2, Schedule IV dilaudid and lorazepam q3hr for symptom management. 3. Order prn mediations for break thru symptom management. 4. Support family for patient needs and changes  5.  Education with staff on symptom management    Hospice Team Frequency Orders:  Skilled Lupe Daughters /or NP, MD -   Daily x 7 days /every other day x 7 days  with 5 PRN visits for symptom control. MSW - 1 visit for initial assessment/evaluation for family support and need for volunteer services. Robert Reveles - 1 visit for initial assessment/evaluation for spiritual support. ADVANCE CARE PLANNING (Complete in ACP Flow Sheet)   Code Status: DNR  Durable DNR: []  Yes  [x]  No  Code Status Discussed/Confirmed: Son and sister  Preference for Other Life Sustaining Treatment Discussed/Confirmed: Son and sister  Hospitalization Preference:  Patient would like to receive end of life care in the hospital.  Advance Care Planning 2021   Confirm Advance Directive None   Patient Would Like to Complete Advance Directive -        Service: [] Yes  [x]  No      [] Unknown  Appropriate for Pinning Ceremony:  [] Yes     [] No  Jain: Congregational   Home:     DISCHARGE PLANNING     1. Discharge Plan: to a facility should she stablize  2. Patient/Family teaching: symptom management  3. Response to patient/family teaching: verbalizes understanding    SOCIAL/EMOTIONAL/SPIRITUAL NEEDS     Spiritual Issues Identified:  support    Psych/ Social/ Emotional Issues Identified: SW support    Caregiver Support:  [] Provided information on End of Life Care   [] Material Provided: Gone From My Sight or Nestora Settler   Dr. Rod Mckay contacted, discharge to hospice order received  Dr. Madelaine Zhao contacted, agrees to serve as attending provider for hospice and provided verbal certification of terminal illness with life expectancy of 6 months or less. Orders for hospice admission, medications and plan of treatment received. Medication reconciliation completed. MEDS: See medication list below  DME: Per hospital  Supplies: Per hospital  IDT communication to include MD, , SW, CH and support team    ALLERGIES AND MEDICATIONS     Allergies:    Allergies   Allergen Reactions    Adhesive Tape-Silicones Atopic Dermatitis     Current Facility-Administered Medications   Medication Dose Route Frequency    ondansetron (ZOFRAN) injection 4 mg  4 mg IntraVENous Q4H PRN    LORazepam (ATIVAN) injection 1 mg  1 mg IntraVENous Q15MIN PRN    ketorolac (TORADOL) injection 30 mg  30 mg IntraVENous Q8H PRN    glycopyrrolate (ROBINUL) injection 0.2 mg  0.2 mg IntraVENous Q4H PRN    bisacodyL (DULCOLAX) suppository 10 mg  10 mg Rectal DAILY PRN    HYDROmorphone (DILAUDID) injection 1 mg  1 mg IntraVENous Q15MIN PRN    HYDROmorphone (DILAUDID) injection 1 mg  1 mg IntraVENous Q3H    LORazepam (ATIVAN) injection 1 mg  1 mg IntraVENous Q3H     Current Outpatient Medications   Medication Sig    guaiFENesin ER (MUCINEX) 1,200 mg Ta12 ER tablet Take 1 Tablet by mouth every twelve (12) hours.  midodrine (PROAMATINE) 10 mg tablet Take 1 Tablet by mouth three (3) times daily (with meals) for 30 days.  zinc oxide-white petrolatum 17-57 % topical paste Apply  to affected area three (3) times daily. For buttocks and sacrum/coccyx: Apply Remedy Z-Guard Protectant Paste TID and prn for soiling. If soiled, remove top soiled layer only and reapply. Use Remedy Phytoplex Barrier Cream wipes for gentle cleansing. To completely remove, use Remedy Foaming Cleanser or soap and water. Indications: skin irritation, Skin protectant r/t incontinence  Indications: skin irritation, Skin protectant r/t incontinence    albuterol (PROVENTIL HFA, VENTOLIN HFA, PROAIR HFA) 90 mcg/actuation inhaler Take 2 Puffs by inhalation every six (6) hours as needed for Wheezing.  pantoprazole (PROTONIX) 40 mg tablet Take 40 mg by mouth daily.  sertraline (Zoloft) 25 mg tablet Take 25 mg by mouth daily.  Se-Tan Plus 162-115. 2-1 mg cap Take 1 Capsule by mouth two (2) times a day.  traZODone (DESYREL) 50 mg tablet TAKE ONE TABLET BY MOUTH AT BEDTIME    budesonide-formoteroL (Symbicort) 80-4.5 mcg/actuation HFAA Take 2 Puffs by inhalation.  potassium chloride SR (KLOR-CON 10) 10 mEq tablet Take 20 mEq by mouth daily.     bumetanide (BUMEX) 1 mg tablet Take 1 mg by mouth daily.  dilTIAZem ER (DILACOR XR) 120 mg capsule Take 120 mg by mouth daily.  atorvastatin (LIPITOR) 40 mg tablet Take 40 mg by mouth daily.  diazePAM (VALIUM) 5 mg tablet Take 5 mg by mouth every six (6) hours as needed for Anxiety.  famotidine (PEPCID) 40 mg tablet Take 40 mg by mouth daily. (Patient not taking: Reported on 12/6/2021)    ascorbic acid, vitamin C, (Vitamin C) 500 mg tablet Take 500 mg by mouth daily.  cholecalciferol (Vitamin D3) (1000 Units /25 mcg) tablet Take 1,000 Units by mouth daily.      Facility-Administered Medications Ordered in Other Encounters   Medication Dose Route Frequency    0.9% sodium chloride infusion 250 mL  250 mL IntraVENous PRN    0.9% sodium chloride infusion 250 mL  250 mL IntraVENous PRN    pantoprazole (PROTONIX) 40 mg in 0.9% sodium chloride (MBP/ADV) 50 mL MBP  8 mg/hr IntraVENous CONTINUOUS    HYDROmorphone (DILAUDID) injection 1 mg  1 mg IntraVENous Q3H    levoFLOXacin (LEVAQUIN) tablet 750 mg  750 mg Oral Q24H    flucytosine (ANCOBON) capsule 500 mg  500 mg Oral Q6H    labetaloL (NORMODYNE;TRANDATE) 20 mg/4 mL (5 mg/mL) injection 10 mg  10 mg IntraVENous Q4H PRN    [Held by provider] metoprolol tartrate (LOPRESSOR) tablet 12.5 mg  12.5 mg Oral DAILY    nitroglycerin (NITROBID) 2 % ointment 1 Inch  1 Inch Topical BID    0.9% sodium chloride infusion 250 mL  250 mL IntraVENous PRN    benzocaine-menthoL (CEPACOL) lozenge 1 Lozenge  1 Lozenge Mucous Membrane PRN    benzonatate (TESSALON) capsule 100 mg  100 mg Oral TID PRN    guaiFENesin (ROBITUSSIN) 100 mg/5 mL oral liquid 100 mg  100 mg Oral Q4H PRN    sertraline (ZOLOFT) tablet 25 mg  25 mg Oral QAM    guaiFENesin ER (MUCINEX) tablet 1,200 mg  1,200 mg Oral Q12H    albuterol-ipratropium (DUO-NEB) 2.5 MG-0.5 MG/3 ML  3 mL Nebulization Q6H PRN    SE-Tan Plus capsule  1 Capsule Oral BID    lactated Ringers infusion  75 mL/hr IntraVENous CONTINUOUS    0.9% sodium chloride infusion 250 mL  250 mL IntraVENous PRN    0.9% sodium chloride infusion 250 mL  250 mL IntraVENous PRN    polyethylene glycol (MIRALAX) packet 17 g  17 g Oral DAILY PRN    0.9% sodium chloride infusion 250 mL  250 mL IntraVENous PRN    loperamide (IMODIUM) capsule 2 mg  2 mg Oral Q4H PRN    0.9% sodium chloride infusion 250 mL  250 mL IntraVENous PRN    [Held by provider] furosemide (LASIX) tablet 40 mg  40 mg Oral DAILY    influenza vaccine 2021-22 (6 mos+)(PF) (FLUARIX/FLULAVAL/FLUZONE QUAD) injection 0.5 mL  1 Each IntraMUSCular PRIOR TO DISCHARGE    [Held by provider] apixaban (ELIQUIS) tablet 2.5 mg  2.5 mg Oral BID    midodrine (PROAMATINE) tablet 10 mg  10 mg Oral TID WITH MEALS    sodium chloride (NS) flush 5-40 mL  5-40 mL IntraVENous PRN    zinc oxide-white petrolatum 17-57 % topical paste   Topical TID    cyclobenzaprine (FLEXERIL) tablet 10 mg  10 mg Oral TID PRN    oxyCODONE IR (ROXICODONE) tablet 5 mg  5 mg Oral Q6H PRN    sorbitoL 70 % solution 30 mL  30 mL Oral DAILY PRN    bisacodyL (DULCOLAX) suppository 10 mg  10 mg Rectal DAILY PRN    hydrALAZINE (APRESOLINE) 20 mg/mL injection 20 mg  20 mg IntraVENous Q6H PRN    [Held by provider] chlorthalidone (HYGROTON) tablet 25 mg  25 mg Oral DAILY    albuterol (PROVENTIL HFA, VENTOLIN HFA, PROAIR HFA) inhaler 2 Puff  2 Puff Inhalation Q6H PRN    diazePAM (VALIUM) tablet 5 mg  5 mg Oral Q6H PRN    dilTIAZem ER (CARDIZEM CD) capsule 120 mg  120 mg Oral DAILY    potassium chloride SR (KLOR-CON 10) tablet 20 mEq  20 mEq Oral DAILY    traZODone (DESYREL) tablet 50 mg  50 mg Oral QHS    ondansetron (ZOFRAN) injection 4 mg  4 mg IntraVENous Q6H PRN    acetaminophen (TYLENOL) tablet 650 mg  650 mg Oral Q4H PRN    prochlorperazine (COMPAZINE) with saline injection 10 mg  10 mg IntraVENous Q6H PRN

## 2022-01-24 NOTE — PROGRESS NOTES
Informed Dr. Severiano Howell about the patient's current condition. MEWS of 4, ongoing blood transfusion of 1 unit to 2 units ordered. Dr. Severiano Howell will inform Nursing Supervisor of the plan and will follow up on me shortly. Meanwhile, patient put on close monitoring. As per Dr. Severiano Howell, may give blood for 1 hour. Adjusted the rate and will hang another unit immediately to run for 1 hour. Will continue to monitor patient. 2:30 AM 2nd of blood started. Patient's vital signs relayed to Dr. Severiano Howell via [employer]. 4:15 AM - 1st dose of albumin started. BP-76/51 HR-80 RR-24 T-96.1 - Dr Severiano Howell informed. Urine output since 7pm til now is 250ml. - Dr. Severiano Howell informed.

## 2022-01-24 NOTE — PROGRESS NOTES
CM reviewed clinical record and verified via telephone with Chaz Bardales NP, sons agreement with patient being comfort measures. Referral sent to [facility] to tamar for GIP. Message sent to [place] RN for [facility]. Awaiting a response. Message sent to [facility] and [facility] via [address] re patient will transition to GIP.      Joy Westbrook

## 2022-01-24 NOTE — PROGRESS NOTES
Hospitalist Progress Note    Subjective:   Daily Progress Note: 1/24/2022 6:36 PM    Patient examined alert and oriented x 2 lying in bed. Nurse reports overnight patient's bp became very low, given midodrine. She was transfused 2L prbc for hgb 5.6. Extremity swelling +4 noted in all extremities.     Current Facility-Administered Medications   Medication Dose Route Frequency    0.9% sodium chloride infusion 250 mL  250 mL IntraVENous PRN    levoFLOXacin (LEVAQUIN) tablet 750 mg  750 mg Oral Q24H    flucytosine (ANCOBON) capsule 500 mg  500 mg Oral Q6H    labetaloL (NORMODYNE;TRANDATE) 20 mg/4 mL (5 mg/mL) injection 10 mg  10 mg IntraVENous Q4H PRN    [Held by provider] metoprolol tartrate (LOPRESSOR) tablet 12.5 mg  12.5 mg Oral DAILY    nitroglycerin (NITROBID) 2 % ointment 1 Inch  1 Inch Topical BID    0.9% sodium chloride infusion 250 mL  250 mL IntraVENous PRN    benzocaine-menthoL (CEPACOL) lozenge 1 Lozenge  1 Lozenge Mucous Membrane PRN    benzonatate (TESSALON) capsule 100 mg  100 mg Oral TID PRN    guaiFENesin (ROBITUSSIN) 100 mg/5 mL oral liquid 100 mg  100 mg Oral Q4H PRN    sertraline (ZOLOFT) tablet 25 mg  25 mg Oral QAM    calcium-vitamin D 600 mg-5 mcg (200 unit) per tablet 2 Tablet  2 Tablet Oral DAILY    guaiFENesin ER (MUCINEX) tablet 1,200 mg  1,200 mg Oral Q12H    albuterol-ipratropium (DUO-NEB) 2.5 MG-0.5 MG/3 ML  3 mL Nebulization Q6H PRN    SE-Tan Plus capsule  1 Capsule Oral BID    lactated Ringers infusion  75 mL/hr IntraVENous CONTINUOUS    0.9% sodium chloride infusion 250 mL  250 mL IntraVENous PRN    0.9% sodium chloride infusion 250 mL  250 mL IntraVENous PRN    polyethylene glycol (MIRALAX) packet 17 g  17 g Oral DAILY PRN    0.9% sodium chloride infusion 250 mL  250 mL IntraVENous PRN    pantoprazole (PROTONIX) 40 mg in 0.9% sodium chloride 10 mL injection  40 mg IntraVENous Q12H    loperamide (IMODIUM) capsule 2 mg  2 mg Oral Q4H PRN    0.9% sodium chloride infusion 250 mL  250 mL IntraVENous PRN    cholecalciferol (VITAMIN D3) (5000 Units/125 mcg) tablet 5,000 Units  5,000 Units Oral Q7D    atorvastatin (LIPITOR) tablet 40 mg  40 mg Oral QHS    [Held by provider] furosemide (LASIX) tablet 40 mg  40 mg Oral DAILY    influenza vaccine 2021-22 (6 mos+)(PF) (FLUARIX/FLULAVAL/FLUZONE QUAD) injection 0.5 mL  1 Each IntraMUSCular PRIOR TO DISCHARGE    [Held by provider] apixaban (ELIQUIS) tablet 2.5 mg  2.5 mg Oral BID    midodrine (PROAMATINE) tablet 10 mg  10 mg Oral TID WITH MEALS    sodium chloride (NS) flush 5-40 mL  5-40 mL IntraVENous PRN    zinc oxide-white petrolatum 17-57 % topical paste   Topical TID    cyclobenzaprine (FLEXERIL) tablet 10 mg  10 mg Oral TID PRN    HYDROmorphone (DILAUDID) injection 1 mg  1 mg IntraVENous Q4H PRN    oxyCODONE IR (ROXICODONE) tablet 5 mg  5 mg Oral Q6H PRN    docusate sodium (COLACE) capsule 100 mg  100 mg Oral BID    sorbitoL 70 % solution 30 mL  30 mL Oral DAILY PRN    bisacodyL (DULCOLAX) suppository 10 mg  10 mg Rectal DAILY PRN    hydrALAZINE (APRESOLINE) 20 mg/mL injection 20 mg  20 mg IntraVENous Q6H PRN    [Held by provider] chlorthalidone (HYGROTON) tablet 25 mg  25 mg Oral DAILY    albuterol (PROVENTIL HFA, VENTOLIN HFA, PROAIR HFA) inhaler 2 Puff  2 Puff Inhalation Q6H PRN    ascorbic acid (vitamin C) (VITAMIN C) tablet 500 mg  500 mg Oral DAILY    diazePAM (VALIUM) tablet 5 mg  5 mg Oral Q6H PRN    dilTIAZem ER (CARDIZEM CD) capsule 120 mg  120 mg Oral DAILY    potassium chloride SR (KLOR-CON 10) tablet 20 mEq  20 mEq Oral DAILY    traZODone (DESYREL) tablet 50 mg  50 mg Oral QHS    ondansetron (ZOFRAN) injection 4 mg  4 mg IntraVENous Q6H PRN    acetaminophen (TYLENOL) tablet 650 mg  650 mg Oral Q4H PRN    prochlorperazine (COMPAZINE) with saline injection 10 mg  10 mg IntraVENous Q6H PRN        Review of Systems  Review of Systems   Constitutional: Negative.     Respiratory: Positive for cough, hemoptysis and shortness of breath. Cardiovascular: Negative. Gastrointestinal: Positive for abdominal pain. Negative for nausea and vomiting. Musculoskeletal: Positive for joint pain and myalgias. All other systems reviewed and are negative. Objective:     Visit Vitals  BP (!) 92/53 (BP 1 Location: Left lower arm)   Pulse 74   Temp (!) 96.7 °F (35.9 °C)   Resp 20   Ht 5' 7\" (1.702 m)   Wt 102.9 kg (226 lb 13.7 oz)   SpO2 93%   BMI 35.53 kg/m²    O2 Flow Rate (L/min): 4 l/min O2 Device: Nasal cannula    Temp (24hrs), Av.5 °F (35.8 °C), Min:95 °F (35 °C), Max:97.8 °F (36.6 °C)      No intake/output data recorded.  1901 -  0700  In: 3030 [P.O.:490; I.V.:1875]  Out: 400 [Urine:400]    Recent Results (from the past 24 hour(s))   HGB & HCT    Collection Time: 22  4:39 PM   Result Value Ref Range    HGB 5.6 (LL) 11.5 - 16.0 g/dL    HCT 18.5 (L) 35.0 - 47.0 %   TYPE & SCREEN    Collection Time: 22  5:45 PM   Result Value Ref Range    Crossmatch Expiration 2022,2359     ABO/Rh(D) B Positive     Antibody screen Negative     Unit number [id number]     Blood component type [facility]     Unit division 00     Status of unit [address] to transfuse     Crossmatch result Compatible     Unit number [id number]     Blood component type  LR     Unit division 00     Status of unit [address] to transfuse     Crossmatch result Compatible         XR CHEST PORT   Final Result   Findings/impression:      Left perihilar and right lower lobe patchy airspace disease present, similar to   prior study. Small bilateral effusions are present. Right PICC line projects   over SVC. Slightly enlarged cardiomediastinal silhouette noted similar to prior   study. No pneumothorax. No overt edema. No significant interval change. NM ACUTE GI BLEED SCAN   Final Result   Negative GI bleed scan.       XR CHEST PORT   Final Result   Mild improvement in the right lower lobe      IR THORACENTESIS NDL PUNC ASP W IMAGE   Final Result   1. Previously described small hepatic metastases are sonographically occult,   therefore targeted liver biopsy could not be performed. 2.  Small left pleural effusion. 3.  Successful left thoracentesis. Fluid samples submitted for cytologic   analysis. IR ULTRASOUND ABDOMEN LTD   Final Result   1. Previously described small hepatic metastases are sonographically occult,   therefore targeted liver biopsy could not be performed. 2.  Small left pleural effusion. 3.  Successful left thoracentesis. Fluid samples submitted for cytologic   analysis. XR CHEST PORT   Final Result      XR CHEST PORT   Final Result   Findings/impression:      Stable positioning of right upper extremity PICC, tip projects over the upper   SVC. Cardiac silhouette is enlarged. No pulmonary edema. Bilateral pleural effusions. No evidence of pneumothorax. No acute osseous abnormality identified. DUPLEX UPPER EXT VENOUS BILAT   Final Result      XR CHEST PORT   Final Result   Large effusions. Vascular congestion. CT ABD PELV W CONT   Final Result   1. There are increasing bilateral pleural effusions, increasing body wall   edema, and increasing ascites. These findings could be secondary to the third   spacing of fluids. The differential diagnosis for the ascites would include   pancreatic ascites with acute pancreatitis or metastatic disease to the   peritoneum. 2.  There is a biliary stent which is unchanged in its position traversing   throughout area of obstruction within the pancreatic head. 3.  There are multiple low-density lesions of the liver without short-term   interval changes consistent with metastatic disease. 4.  The right kidney is not identified and apparently the patient is status post   a previous right nephrectomy.    5.  There are bilateral adrenal masses suspect for metastatic disease without short-term interval changes. CTA CHEST W OR W WO CONT   Final Result   No CT evidence for PE. Thoracic aorta atherosclerosis. CAD. Moderate to large bilateral pleural effusions with associated RML, RLL, and LLL   compressive atelectasis. Abdominal ascites. DUPLEX LOWER EXT VENOUS BILAT   Final Result      XR CHEST PORT   Final Result   FINDINGS: IMPRESSION: 2 frontal views of the chest. Right PICC distal tip SVC   region. Enlarging cardiomegaly. Increasing vascular congestion. Interval development of   hypoinflation, pleural effusions, lower lung airspace edema and/or pneumonia. No   pneumothorax. No free air under the diaphragm. CT ABD PELV W CONT   Final Result   New moderate volume dependent pleural effusions with associated   lower lobe lung compressive atelectasis. Increasing ascites, moderate approaching large-volume   (fluid could be sampled if there is any clinical concern for bile content). High suspicion hepatic metastases. Similar appearance for the pancreas; Silastic stent in place. No pseudocyst formation. Unchanged appearance bilateral adrenal glands. No bowel obstruction. US ABD LTD   Final Result   Small amount of free fluid. Finding suggesting hemangioma in the   liver. Gallbladder wall thickening, adenomyomatosis, sludge and gallstones. Cholecystitis possible. Finding in the region of the pancreatic head as above. Other findings as above. CTA ABDOMEN PELV W CONT   Final Result   1. Ill-defined hypodense mass in the pancreas. There are inflammatory changes   and fluid adjacent to the pancreas or duodenum and stomach most likely related   to biopsy or focal pancreatitis. No pseudocyst formation, active bleeding or   other acute findings. 2. Enlarged adrenal glands left greater than right with noncontrast densities   consistent with adrenal adenomas. 3. Right nephrectomy.    4. Other findings as described. XR CHEST PORT   Final Result   No acute findings. IR PARACENTESIS ABD W IMAGE    (Results Pending)        PHYSICAL EXAM:    Physical Exam  Vitals and nursing note reviewed. Constitutional:       Appearance: She is obese. She is ill-appearing. HENT:      Head: Normocephalic and atraumatic. Eyes:      General: Scleral icterus present. Cardiovascular:      Rate and Rhythm: Normal rate. Rhythm irregular. Comments: PICC line right upper arm  Pulmonary:      Effort: No respiratory distress. Breath sounds: No wheezing. Comments: 4L nasal canula  Abdominal:      General: Bowel sounds are normal. There is no distension. Tenderness: There is abdominal tenderness. Musculoskeletal:         General: Swelling present. Right lower leg: Edema present. Left lower leg: Edema present. Comments: Bilateral upper extremity and lower swelling, upper extremities severely edematous   Skin:     Capillary Refill: Capillary refill takes less than 2 seconds. Coloration: Skin is jaundiced. Neurological:      Mental Status: She is alert. She is disoriented.    Psychiatric:      Comments: Flat affect          Data Review    Recent Results (from the past 24 hour(s))   HGB & HCT    Collection Time: 01/23/22  4:39 PM   Result Value Ref Range    HGB 5.6 (LL) 11.5 - 16.0 g/dL    HCT 18.5 (L) 35.0 - 47.0 %   TYPE & SCREEN    Collection Time: 01/23/22  5:45 PM   Result Value Ref Range    Crossmatch Expiration 01/26/2022,2359     ABO/Rh(D) B Positive     Antibody screen Negative     Unit number [id number]     Blood component type  LR     Unit division 00     Status of unit [address] to transfuse     Crossmatch result Compatible     Unit number [id number]     Blood component type [facility]     Unit division 00     Status of unit [address] to transfuse     Crossmatch result Compatible         Assessment/Plan:     Principal Problem:    Pancreatitis (12/9/2021)    Active Problems:    A-fib ([address] (11/16/2020)      CHF (congestive heart failure) ([facility] [address] (11/16/2020)      Hematemesis (12/12/2021)      History of peptic ulcer disease (12/12/2021)      Pancreatic mass (1/5/2022)        Hospital Course:    Luz Storey a 62-year-old woman with a PMH significant for atrial fibrillation, PUD, CHF, COPD on home O2 at 2 L, renal cell carcinoma status post nephrectomy, hypertension and morbid obesity. Patient was found to have obstructive jaundice and biliary stricture in early November of this year.  She underwent an ERCP with stent placement with brushings of the bile duct where there was noted to be a stricture.  These brushings were negative.  Patient then underwent a PET CT which demonstrated uptake in the head of the pancreas concerning for pancreatic mass. She subsequently underwent endoscopic ultrasound with ultrasound and FNA biopsy on 12/6/2021.  The procedure demonstrated abutment of the tumor with the portal vein without involvement of the SMA or SMV.  The total tumor was measured to be 2.7 cm by 3 cm.  This biopsy resulted as atypical cells with suspicion for malignancy. She admitted to the hospital on 12/9/2021 and severe pancreatitis after undergoing an EUS for pancreatic biopsy on 12/6/2021 and pancreatic mass.  Her symptoms were abdominal pain, nausea and vomiting. CT scan revealing peripancreatic inflammation consistent with likely post biopsy pancreatitis and ascites.  Her initial labs revealing elevated lipase, supratherapeutic INR and elevated D-dimer coagulopathy. Bernice Dienes was positive for bilateral pleural effusions, negative for PE.  12/27 paracentesis with culture of ascitic fluid which had no growth, blood cultures, urine cultures negative for growth.  Patient had been started on IV meropenem for leukocytosis and anidulafungin for suspected intra-abdominal infection.  CT with and without contrast revealing liver lesions consistent with metastasis, mass in the pancreatic head with biliary stent in place, bilateral pleural effusions, ascites and bilateral adrenal masses. IR declined liver biopsy due to coagulopathy and felt she would not benefit. Doppler studies of upper and lower extremities negative for DVT, positive for superficial thrombophlebitis of left medial antecubital. Pancreatic cancer is presumed but no firm diagnosis due to lack of tissue sample. Pancreatitis remaining persistent despite antibiotics. Attempted Dobbhoff and PEG J placement but failed due to reddened area presumed possible tumor infiltration in the duodenum. Due to poor performance and prognosis she is not a candidate for palliative XRT or chemo. The patient has been progressively worsening with debilitation, intermittently tolerating diet not stable to be discharged home with outpatient surgical oncology follow-up. She would benefit from transfer to tertiary care center where surgical oncology services are available for evaluation of pancreatic head mass and possible surgical intervention. 1/7 A. fib with RVR rate 110-140. Cardiology consulted and anticoagulation discontinued due to bleeding and liver failure. Patient is agreeable to hospice and has been medically clear to hospice on 1/18/2022. Transitioned IV fluconazole to fluconazole p.o. Patient transitioned from IV Zosyn to levaquin for 8 total doses as plan is to go into long-term care with hospice. Complained of chest pain, an EKG was obtained and patient is in atrial fibrillation with RVR, BP is 112/63, patient denied multiple scheduled medications and is denying BB, will accept nitrobid ointment. Patient's hemoglobin is 5.6, transfused 2u prbc.        Acute pancreatitis  Status post EUS by Dr. Yi Kim 12/6, s/p paracentesis on 12/27 with 2300 mL clear serous fluid drained, cytology showed no growth  Lipase variable, will likely be chronically elevated   Continue pain medication  Liver bx canceled as IR does not feel patient would benefit from bx at this time and unsuccessful attempt to place PEG J due to tumor infiltration and duodenum     Pancreatic massliver masses/adrenal masses  History of renal cell carcinoma with lung nodules s/p right nephrectomy  Multiple low-density lesions of the liver consistent with metastatic disease and bilateral adrenal masses suspicious for metastatic disease  - presumed pancreatic cancer - unable to offer palliative chemo or radiation due to poor prognosis very poor  CA 19-9 greater than 4000   -Pathology of thoracentesis negative for malignant cells but it showed acute on chronic inflammatory cells  Oncology following     COPD without exacerbation - stable  Chronic respiratory failure with 2 L of oxygen  On nasal canula  Continue mucinex 1200mg BID  CXR 1/17 no acute change     Bacteremia  S/p completed courses of IV meropenem and andulafungin 14 days  1/13 blood: + E. Coli   Will transition to levaquin for 8 more days and discontinue on IV Zosyn     Paroxysmal atrial fibrillation  EKG with afib and RVR, patient denied BB   Continue on diltiazem   No anticoagulation due to anemia    Congestive heart failure without exacerbation  ECHO LVEF 60-65% with normal diastolic dysfunction  Holding chlorthalidone, metoprolol, and lasix due to persistent hypotension      Acute on chronic anemia   s/p 6 total units of pRBCs, FOBT +   Hemoglobin  5.6, transfused 2u prbc.    Eliquis has been discontinued due to bleeding and liver failure  NM bleeding scan negative     Elevated d-dimer  Likely multifactorial   CTA chest negative for PE  Duplex US lower extremities negative for DVT  Duplex US upper extremities showing superficial thrombophlebitis in left median/antecubital veins  Repeat upper extremity Doppler study negative  Discontinued eliquis due to anemia     Hypotension  Continue on midodrine    Oral candidiasis  Discontinue Zosyn and Fluconazole  Continue Levaquin and Flucytosine DVT Prophylaxis: on SCDs  GI Prophylaxis: protonix  Discharge and disposition barriers: medically cleared to hospice 1/18/22 pending acceptance/auth    Above treatment plan discussed with nurse and patient's son in detail. Jose Joaquin notified of overnight events. Code status discussed, recommendation for DNR reviewed with son. At this time he would like time to think about and will call back with an update. Care Plan discussed with: Patient/Family, Nurse and     Total time spent with patient: 35 minutes.

## 2022-01-24 NOTE — PROGRESS NOTES
Bedside shift change report given to MEDARDO Davies (oncoming nurse) by Lucía Muir (offgoing nurse). Report included the following information SBAR.

## 2022-01-24 NOTE — PROGRESS NOTES
Problem: Falls - Risk of  Goal: *Absence of Falls  Description: Document Vernia Colder Fall Risk and appropriate interventions in the flowsheet. Outcome: Progressing Towards Goal  Note: Fall Risk Interventions:  Mobility Interventions: Bed/chair exit alarm,Patient to call before getting OOB    Mentation Interventions: Bed/chair exit alarm,Door open when patient unattended,Eyeglasses and hearing aids,Reorient patient,Room close to nurse's station    Medication Interventions: Bed/chair exit alarm,Patient to call before getting OOB,Teach patient to arise slowly    Elimination Interventions: Bed/chair exit alarm,Call light in reach,Patient to call for help with toileting needs    History of Falls Interventions: Bed/chair exit alarm,Door open when patient unattended,Room close to nurse's station         Problem: Patient Education: Go to Patient Education Activity  Goal: Patient/Family Education  Outcome: Progressing Towards Goal     Problem: Pain  Goal: *Control of Pain  Outcome: Progressing Towards Goal     Problem: Patient Education: Go to Patient Education Activity  Goal: Patient/Family Education  Outcome: Progressing Towards Goal     Problem: Nausea/Vomiting (Adult)  Goal: *Absence of nausea/vomiting  Outcome: Progressing Towards Goal     Problem: Patient Education: Go to Patient Education Activity  Goal: Patient/Family Education  Outcome: Progressing Towards Goal     Problem: Pressure Injury - Risk of  Goal: *Prevention of pressure injury  Description: Document Asim Scale and appropriate interventions in the flowsheet.   Outcome: Progressing Towards Goal  Note: Pressure Injury Interventions:  Sensory Interventions: Float heels,Keep linens dry and wrinkle-free,Maintain/enhance activity level,Minimize linen layers    Moisture Interventions: Absorbent underpads,Internal/External urinary devices,Minimize layers    Activity Interventions: Pressure redistribution bed/mattress(bed type)    Mobility Interventions: HOB 30 degrees or less,Pressure redistribution bed/mattress (bed type)    Nutrition Interventions: Document food/fluid/supplement intake    Friction and Shear Interventions: Apply protective barrier, creams and emollients,Minimize layers                Problem: Patient Education: Go to Patient Education Activity  Goal: Patient/Family Education  Outcome: Progressing Towards Goal     Problem: Patient Education: Go to Patient Education Activity  Goal: Patient/Family Education  Outcome: Progressing Towards Goal     Problem: Patient Education: Go to Patient Education Activity  Goal: Patient/Family Education  Outcome: Progressing Towards Goal     Problem: Nutrition Deficit  Goal: *Optimize nutritional status  Outcome: Progressing Towards Goal

## 2022-01-24 NOTE — PROGRESS NOTES
Problem: Hospice Orientation  Goal: Demonstrate understanding of hospice philosophy, plan of care, and home hospice program  Description: The patient/family/caregiver will demonstrate understanding of hospice philosophy, plan of care and the home hospice program as evidenced by participation in meeting the patient's psychosocial, spiritual, medical, and physical needs inclusive of medical supplies/equipment focusing on symptoms. Outcome: Progressing Towards Goal     Problem: Potential for Alteration in Skin Integrity  Goal: Monitor skin for areas of alteration in skin integrity  Description: Patient/family/caregiver will demonstrate ability to care for patient's skin, monitor for areas of breakdown, and demonstrate methods to prevent breakdown during hospice care. Outcome: Progressing Towards Goal     Problem: Pain  Goal: Assess satisfaction of level of comfort and symptom control  Outcome: Progressing Towards Goal  Goal: *Control of acute pain  Outcome: Progressing Towards Goal     Problem: Anxiety/Agitation  Goal: Verbalize or staff assess the ability to manage anxiety  Description: The patient/family/caregiver will verbalize and demonstrate ability to manage the patient's anxiety throughout hospice care. Outcome: Progressing Towards Goal     Problem: Breathing Pattern - Ineffective  Goal: *Use of effective breathing techniques  Outcome: Progressing Towards Goal     Problem: End of Life Process  Goal: Demonstrate understanding of end of life processes  Description: Patient/caregiver will understand end of life processes.   Outcome: Progressing Towards Goal     Problem: Pain  Goal: *Control of Pain  Outcome: Progressing Towards Goal     Problem: Nausea/Vomiting (Adult)  Goal: *Absence of nausea/vomiting  Outcome: Progressing Towards Goal     Problem: Dyspnea Due to End of Life  Goal: Demonstrate understanding of and ability to manage respiratory symptoms at end of life  Outcome: Progressing Towards Goal Problem: Communication Deficit  Goal: Effectively communicate symptoms, needs, and concerns  Outcome: Progressing Towards Goal     Problem: Imminent Death  Goal: Collaborate with patient/family/caregiver/interdisciplinary team to minimize and manage end of life symptoms  Outcome: Progressing Towards Goal

## 2022-01-24 NOTE — H&P
[address] [facility] Help to Those in Need  [phone]    Patient Name: Neisha Lipscomb  YOB: 1945    Date of Provider Hospice Visit: 01/24/22    Level of Care:   [x] General Inpatient (GIP)    [] Routine   [] Respite    Current Location of Care:  [] [facility] [] [facility] [x] [facility] [] [facility] [] Hospice [facility], patient referred from:  [] [facility] [] [facility] [] [address] [] [facility] [] Home [] Other:     Date of Original Hospice Admission: 1/24/2022    Hospice Medical Director at time of admission: Dr Varinder Dang Diagnosis: pancreatitis with metastatic cancer  Diagnoses RELATED to the terminal prognosis:     CHF, COPD, Afib     HOSPICE SUMMARY      Neisha Lipscomb is a 78 y. o. who was admitted to [facility]. The patient's principle diagnosis of Pancreatis has resulted in pain, nausea, agitation. Functionally, the patient's Palliative Performance Scale has declined over a period of days and is estimated at 20. Objective information that support this patients limited prognosis includes: Multiple masses in abdomin, causing pain, ascites and jaundice. Functionally, the patient's Karnofsky and/or Palliative Performance Scale has declined over a period of days to weeks and is estimated at 20%. The patient is dependent for all ADLs. Objective information that support this patients limited prognosis includes:     Findings:  1. Ultrasound examination of the liver demonstrates the hepatic parenchyma to  be diffusely hypoattenuating without focal lesion identified. 2.  Ultrasound examination of the left chest wall demonstrates a small left  pleural effusion with adjacent atelectatic lung. A permanent image was stored. US 1/12/2022   IMPRESSION  1. Previously described small hepatic metastases are sonographically occult,  therefore targeted liver biopsy could not be performed. 2.  Small left pleural effusion. 3.  Successful left thoracentesis.  Fluid samples submitted for cytologic  analysis. The patient/family chose comfort measures with the support of Hospice. HOSPICE DIAGNOSES   Active Symptoms:  1. Shortness of breath  2. Anxiety  3. Agitation  4. Airway secretions  5. Weakness and fatigue  6. Abdominal pain  7. Generalized edema     PLAN   1. Admit pt to GIP level of care. PT has overwhelming symptoms which cannot be managed at home. Patient requires frequent assessment and IV medications. Pt is high risk for sudden decline. 2. Pt medications to include: scheduled IV hydromorphone 1mg every 3 hrs and lorazepam 1mg every 3 hrs, q 15 min as needed for breakthrough  3. Discussion with family, caregiver, Hospice team has occurred    4.  and SW to support family needs  5. Disposition:  pt is not safe for transport  6. Hospice Plan of care was reviewed in detail and agree with current plan of care    Prognosis estimated based on 01/24/22 clinical assessment is:   [x] Hours to Days    [] Days to Weeks    [] Other:    Communicated plan of care with: Hospice Case Manager; Hospice IDT; Care Team     GOALS OF CARE     Patient/Medical POA stated Goal of Care: comfort care    [] I have reviewed and/or updated ACP information in the Advance Care Planning Navigator. This information is available in the [address] link in the patient's chart header.     Pt does not have ADLW document    X Primary Decision [facility] ([address]):   Primary Decision Maker: Barbara Riley - Son - [phone]    Resuscitation Status: DNR  If DNR is there a Durable DNR on file? : [] Yes [x] No (If no, complete Durable DNR)    HISTORY     History obtained from: chart, Hospice Team, nursing staff    CHIEF COMPLAINT: pt is    The patient is:   [x] Verbal  [] Nonverbal  [] Unresponsive    HPI/SUBJECTIVE: Per Hospitalist notes; 62-year-old woman with a PMH significant for atrial fibrillation, PUD, CHF, COPD on home O2 at 2 L, renal cell carcinoma status post nephrectomy, hypertension and morbid obesity. Patient was found to have obstructive jaundice and biliary stricture in early November of this year.  She underwent an ERCP with stent placement with brushings of the bile duct where there was noted to be a stricture.  These brushings were negative.  Patient then underwent a PET CT which demonstrated uptake in the head of the pancreas concerning for pancreatic mass. She subsequently underwent endoscopic ultrasound with ultrasound and FNA biopsy on 12/6/2021.  The procedure demonstrated abutment of the tumor with the portal vein without involvement of the SMA or SMV.  The total tumor was measured to be 2.7 cm by 3 cm.  This biopsy resulted as atypical cells with suspicion for malignancy. She admitted to the hospital on 12/9/2021 and severe pancreatitis after undergoing an EUS for pancreatic biopsy on 12/6/2021 and pancreatic mass.  Her symptoms were abdominal pain, nausea and vomiting. CT scan revealing peripancreatic inflammation consistent with likely post biopsy pancreatitis and ascites.  Her initial labs revealing elevated lipase, supratherapeutic INR and elevated D-dimer coagulopathy. Yvonne Herd was positive for bilateral pleural effusions, negative for PE.  12/27 paracentesis with culture of ascitic fluid which had no growth, blood cultures, urine cultures negative for growth.  Patient had been started on IV meropenem for leukocytosis and anidulafungin for suspected intra-abdominal infection. CT with and without contrast revealing liver lesions consistent with metastasis, mass in the pancreatic head with biliary stent in place, bilateral pleural effusions, ascites and bilateral adrenal masses. IR declined liver biopsy due to coagulopathy and felt she would not benefit. Doppler studies of upper and lower extremities negative for DVT, positive for superficial thrombophlebitis of left medial antecubital. Pancreatic cancer is presumed but no firm diagnosis due to lack of tissue sample.  Pancreatitis remaining persistent despite antibiotics. Attempted Dobbhoff and PEG J placement but failed due to reddened area presumed possible tumor infiltration in the duodenum. Due to poor performance and prognosis she is not a candidate for palliative XRT or chemo. The patient has been progressively worsening with debilitation, intermittently tolerating diet not stable to be discharged home with outpatient surgical oncology follow-up. She would benefit from transfer to tertiary care center where surgical oncology services are available for evaluation of pancreatic head mass and possible surgical intervention. 1/7 A. fib with RVR rate 110-140. Cardiology consulted and anticoagulation discontinued due to bleeding and liver failure. Patient is agreeable to hospice and has been medically clear to hospice on 1/18/2022. Transitioned IV fluconazole to fluconazole p.o. Patient transitioned from IV Zosyn to levaquin for 8 total doses as plan is to go into long-term care with hospice. Complained of chest pain, an EKG was obtained and patient is in atrial fibrillation with RVR, BP is 112/63, patient denied multiple scheduled medications and is denying BB, will accept nitrobid ointment. Patient's hemoglobin is 5.6, transfused 2u prbc. \"  Pt with confusion, abdominal pain uncontrolled, jaundice, ascites and generalized edema. She is now needing IV medications to manage symptoms.  Her sister and son are at bedside and just want her to have some comfort.        REVIEW OF SYSTEMS     The following systems were: [] reviewed  [x] unable to be reviewed    Positive ROS include:  Constitutional: fatigue, weakness, in pain, short of breath  Ears/nose/mouth/throat: increased airway secretions  Respiratory:shortness of breath, wheezing  Gastrointestinal:poor appetite, nausea, vomiting, abdominal pain, constipation, diarrhea  Musculoskeletal:pain, deformities, swelling legs  Neurologic:confusion, hallucinations, weakness  Psychiatric:anxiety, feeling depressed, poor sleep  Endocrine:     Adult Non-Verbal Pain Assessment Score: in the past 24 hrs:     Face  [] 0   No particular expression or smile  [x] 1   Occasional grimace, tearing, frowning, wrinkled forehead  [] 2   Frequent grimace, tearing, frowning, wrinkled forehead    Activity (movement)  [] 0   Lying quietly, normal position  [x] 1   Seeking attention through movement or slow, cautious movement  [] 2   Restless, excessive activity and/or withdrawal reflexes    Guarding  [] 0   Lying quietly, no positioning of hands over areas of body  [x] 1   Splinting areas of the body, tense  [] 2   Rigid, stiff    Physiology (vital signs)  [x] 0   Stable vital signs  [] 1   Change in any of the following: SBP > 20mm Hg; HR > 20/minute  [] 2   Change in any of the following: SBP > 30mm Hg; HR > 25/minute    Respiratory  [x] 0   Baseline RR/SpO2, compliant with ventilator  [] 1   RR > 10 above baseline, or 5% drop SpO2, mild asynchrony with ventilator  [] 2   RR > 20 above baseline, or 10% drop SpO2, asynchrony with ventilator     FUNCTIONAL ASSESSMENT     Palliative Performance Scale (PPS): 20%       PSYCHOSOCIAL/SPIRITUAL ASSESSMENT     Active Problems:    Pancreatitis (12/9/2021)      Metastatic carcinoma ([facility] (1/24/2022)      Past Medical History:   Diagnosis Date    A-fib ([facility]     CHF (congestive heart failure) (HCC)     Clear cell renal cell carcinoma (HCC)     COPD (chronic obstructive pulmonary disease) (HCC)     Fibromyalgia     GERD (gastroesophageal reflux disease)     Lymphedema     Sjogren's syndrome ([facility]     Thyroid nodule       Past Surgical History:   Procedure Laterality Date    COLONOSCOPY N/A 11/18/2020    COLONOSCOPY performed by Eryn Fowler MD at [address] [facility] HX GI      EGD    HX HYSTERECTOMY      HX NEPHRECTOMY Right 2019    HX ORTHOPAEDIC      ankle    IR THORACENTESIS NDL PUNC ASP W IMAGE  1/12/2022      Social History     Tobacco Use    Smoking status: Current Every Day Smoker     Packs/day: 0.25     Types: Cigarettes    Smokeless tobacco: Never Used   Substance Use Topics    Alcohol use: Not Currently     Family History   Problem Relation Age of Onset    Hypertension Mother     Stroke Mother     Stroke Father     Hypertension Father       Allergies   Allergen Reactions    Adhesive Tape-Silicones Atopic Dermatitis      Current Facility-Administered Medications   Medication Dose Route Frequency    ondansetron (ZOFRAN) injection 4 mg  4 mg IntraVENous Q4H PRN    LORazepam (ATIVAN) injection 1 mg  1 mg IntraVENous Q15MIN PRN    ketorolac (TORADOL) injection 30 mg  30 mg IntraVENous Q8H PRN    glycopyrrolate (ROBINUL) injection 0.2 mg  0.2 mg IntraVENous Q4H PRN    bisacodyL (DULCOLAX) suppository 10 mg  10 mg Rectal DAILY PRN    HYDROmorphone (DILAUDID) injection 1 mg  1 mg IntraVENous Q15MIN PRN    HYDROmorphone (DILAUDID) injection 1 mg  1 mg IntraVENous Q3H    LORazepam (ATIVAN) injection 1 mg  1 mg IntraVENous Q3H        PHYSICAL EXAM     Wt Readings from Last 3 Encounters:   01/24/22 104 kg (229 lb 4.5 oz)   01/24/22 104.3 kg (229 lb 15 oz)   12/06/21 84.4 kg (186 lb)       Visit Vitals  Ht 5' 7\" (1.702 m)   Wt 104 kg (229 lb 4.5 oz)   BMI 35.91 kg/m²       Supplemental O2  [x] Yes  [] NO       Currently this patient has:  [x] Peripheral IV [] PICC  [] PORT [] ICD    [x] Masters Catheter [] NG Tube   [] PEG Tube    [] Rectal Tube [] Drain  [] Other:     Constitutional:  Pt is minimally responsive, in pain, responds, skin is very pale  Eyes: pupils equal, anicteric  ENMT: ++ airway secretions  Cardiovascular: regular rhythm, distal pulses intact  Respiratory: breathing labored, symmetric  Gastrointestinal: soft non-tender, +bowel sounds  Musculoskeletal: edema which is generalized, no tenderness to palpation  Skin: warm, dry, pale  Neurologic: follows some commands, pt Is not moving all extremities  Psychiatric: NA      Pertinent Lab and or Imaging Tests:  Lab Results Component Value Date/Time    Sodium 142 01/23/2022 08:01 AM    Potassium 4.4 01/23/2022 08:01 AM    Chloride 109 (H) 01/23/2022 08:01 AM    CO2 27 01/23/2022 08:01 AM    Anion gap 6 01/23/2022 08:01 AM    Glucose 87 01/23/2022 08:01 AM    BUN 30 (H) 01/23/2022 08:01 AM    Creatinine 1.08 (H) 01/23/2022 08:01 AM    BUN/Creatinine ratio 28 (H) 01/23/2022 08:01 AM    GFR est AA >60 01/23/2022 08:01 AM    GFR est non-AA 50 (L) 01/23/2022 08:01 AM    Calcium 7.7 (L) 01/23/2022 08:01 AM     Lab Results   Component Value Date/Time    Protein, total 4.2 (L) 01/23/2022 08:01 AM    Albumin 0.8 (L) 01/23/2022 08:01 AM            Floresita Gross NP

## 2022-01-24 NOTE — PROGRESS NOTES
Case discussed with Franklyn Figueroa. He has been informed that patient's BP continues to remain low, her temperature is low, she is actively experiencing GI bleed, and increased confusion. Comfort measures and DNR recommended. At this time he would like to call her sister. I have informed him that she will transfer to ICU for further treatment until he can make a definitive decision.

## 2022-01-24 NOTE — PROGRESS NOTES
Progress Note    Patient: Dilia Garcia MRN: [id number]  SSN: [id number]    YOB: 1945  Age: 78 y.o. Sex: female      Admit Date: 12/9/2021    LOS: 46 days     Subjective:   Patient followed for severe pancreatitis and she completed course of Meropenem but now on oral Levaquin for of E. Coli bacteremia. Started on Flucytosine because of persistent yeasts in her urine of Fluconazole. Itt WBC increasing again with increasing procal and CRP. On Fluconazole for thrush. Patient somnolent again today. Staff report that she is sometimes averse to oral medications. Objective:     Vitals:    01/24/22 0751 01/24/22 0845 01/24/22 0855 01/24/22 1434   BP: (!) 92/53   (!) 136/54   Pulse: 74   65   Resp: 20 22   Temp: (!) 96.7 °F (35.9 °C)   (!) 96.5 °F (35.8 °C)   SpO2: 93% 93%  95%   Weight:   229 lb 15 oz (104.3 kg)    Height:            Intake and Output:  Current Shift: No intake/output data recorded. Last three shifts: 01/22 1901 - 01/24 0700  In: 3030 [P.O.:490; I.V.:1875]  Out: 400 [Urine:400]    Physical Exam:     Vitals and nursing note reviewed. Patient not examined today  Constitutional:       General: She is not in acute distress. Appearance: She is obese. She is ill-appearing. HENT: white lacy exudate on buccal mucosa RESOLVED  Abdominal:  Nontender  Genitourinary:  Vaginal area not examined     Comments: External urinary device  Musculoskeletal:      Right lower leg: No edema. Left lower leg: No edema. Comments: PICC line right upper arm   Skin: multiple ecchymoses on the upper extremities, edema left forearm     Findings: No rash.       Comments: ?Stage 2 sacral wound   Neurological: asleep    Lab/Data Review:     WBC 18,100  ,000    Urinalysis with WBC 10-20, Bacteria negative  Lipase 719 <1,027 <698 <2,144 99 <2,511 0 <2,484 <2,684 < 1,397 <1,719    Procal 1.08 <0.98 < 1.22 <1.75 <3.82 <15.42   CRP 5.62 <7.48 <5.74 < 11.10 <21.30  < 23.80    Serum fungitell <31 Negative    Ascitic fluid   with 41% PMNs    Blood cultures (12/20) No growth FINAL  Blood cultures (1/13) E. Coli  Urine culture (12/20) No growth FINAL  Urine culture  (1/23) Pending  Ascitic fluid culture(12/27) No growth FINAL    Assessment:     Principal Problem:    Pancreatitis (12/9/2021)    Active Problems:    A-fib ([facility] (11/16/2020)      CHF (congestive heart failure) ([facility] (11/16/2020)      Hematemesis (12/12/2021)      History of peptic ulcer disease (12/12/2021)      Pancreatic mass (1/5/2022)    1. Severe pancreatitis with markedly elevated lipase, resolving  2. Pancreatic mass, possible neoplasm  3. Biliary stent  4. Sepsis with worsening leukocytosis with elevated procal and CRP, resolving, status post 14 days of Meropenem, Anidulafungin (empiric), CRP increasing   5. TPN (just started)  6. Moderate pyuria, negative bacteria, urine culture negatived  7. Possible candida superinfection with vaginitis, treated  8. Ascites, status post paracentesis  9. Gram negative bacteremia with E. Coli, source unclear, Day #7/14 antibiotics, now oral Levaquin. 10. Oral candidiasis, Day #5/7 IV Fluconazole, resolved  11. Suspected Candida UTI despite Fluconazole, with marked pyuria, bacteria and yeasts, Day #2 Flucytosine. 12. HOSPICE    Comment:   WBC has been increasing along with procal and CRP, and may be due to UTI. She has been on Fluconazole and Zosyn. Plan:   1. If alllowed on hospice, would continue Levaquin and Flucytosine for 7 days  2. Will follow-up pending urine culture  3.  Cleared for discharge from ID standpoint    Signed By: Glen Martins MD     January 24, 2022

## 2022-01-24 NOTE — PROGRESS NOTES
I was informed that the patient was bleeding and BP in the 80s/50s.  2 units PRBCs ordered.   Of note, patient listed as a full code but plan to discharge home with hospice tomorrow.  -2 units PRBCs to be given each unit over 1 hour  -50g IV albumin one time dose  -continue to monitor, hesistant to transfer to ICU with plan to be discharged on hospice tomorrow

## 2022-01-25 NOTE — PROGRESS NOTES
[facility] Help to Those in Need  [phone]    Patient Name: Socrates Irby  YOB: 1945    Date of Provider Hospice Visit: 01/25/22    Level of Care:   [x] General Inpatient (GIP)    [] Routine   [] Respite    Current Location of Care:  [] [facility] [] [facility] [x] [facility] [] [address] [place] [] [facility], patient referred from:  [] [facility] [] [facility] [] [facility] [] [facility] [address] [place] [] Home [] Other:     Date of Original Hospice Admission: 1/25/2022    Hospice Medical Director at time of admission: Dr Bridgette Mac Diagnosis: pancreatitis with metastatic cancer  Diagnoses RELATED to the terminal prognosis:     CHF, COPD, Afib     HOSPICE SUMMARY      Socrates Irby is a 78 y. o. who was admitted to [facility]. The patient's principle diagnosis of Pancreatis has resulted in pain, nausea, agitation. Functionally, the patient's Palliative Performance Scale has declined over a period of days and is estimated at 20. Objective information that support this patients limited prognosis includes: Multiple masses in abdomin, causing pain, ascites and jaundice. Functionally, the patient's Karnofsky and/or Palliative Performance Scale has declined over a period of days to weeks and is estimated at 20%. The patient is dependent for all ADLs. Objective information that support this patients limited prognosis includes:     Findings:  1. Ultrasound examination of the liver demonstrates the hepatic parenchyma to  be diffusely hypoattenuating without focal lesion identified. 2.  Ultrasound examination of the left chest wall demonstrates a small left  pleural effusion with adjacent atelectatic lung. A permanent image was stored. US 1/12/2022   IMPRESSION  1. Previously described small hepatic metastases are sonographically occult,  therefore targeted liver biopsy could not be performed. 2.  Small left pleural effusion. 3.  Successful left thoracentesis.  Fluid samples submitted for cytologic  analysis. The patient/family chose comfort measures with the support of Hospice. HOSPICE DIAGNOSES   Active Symptoms:  1. Shortness of breath  2. Anxiety/restlessness/Agitation  3. Nonverbal pain  4. Airway secretions  5. Weakness and fatigue  6. Jaundice  7. Generalized edema  8. Hospice care     PLAN   1. Continue GIP level of care. Pt has overwhelming symptoms which cannot be managed at home. Patient requires frequent assessment and IV medications. Pt is high risk for sudden decline. 2. Continue scheduled IV hydromorphone 1mg every 3 hrs and every 15mts as needed  3. Continue lorazepam 1mg scheduled every 3 hrs & every 15 min as needed   4. Other comfort meds as needed  5. Spoke with family at bedside; sister and a very close friend were present; reviewed pt's current medical condition, symptoms, prognosis/time line and plan of care. Offered supportive listening. 6.  and SW to support family needs  7. Disposition:  Likely will pass here  8. Hospice Plan of care was reviewed in detail and agree with current plan of care    Prognosis estimated based on 01/25/22 clinical assessment is:   [x] Hours to Days    [] Days to Weeks    [] Other:    Communicated plan of care with: Hospice Case Manager; Hospice IDT; Care Team     GOALS OF CARE     Patient/Medical POA stated Goal of Care: comfort care    [] I have reviewed and/or updated ACP information in the Advance Care Planning Navigator. This information is available in the [facility] [address] link in the patient's chart header.     Pt does not have ADLW document    X Primary Decision [facility] ([address]):   Primary Decision Maker: Eusebio Carrizales - Son - [phone]    Resuscitation Status: DNR  If DNR is there a Durable DNR on file? : [] Yes [x] No (If no, complete Durable DNR)    HISTORY     History obtained from: chart, Hospice liaison, family     CHIEF COMPLAINT: N/A   The patient is:   [] Verbal  [] Nonverbal  [x] Unresponsive    HPI/SUBJECTIVE: Per Hospitalist notes; 81-year-old woman with a PMH significant for atrial fibrillation, PUD, CHF, COPD on home O2 at 2 L, renal cell carcinoma status post nephrectomy, hypertension and morbid obesity. Patient was found to have obstructive jaundice and biliary stricture in early November of this year.  She underwent an ERCP with stent placement with brushings of the bile duct where there was noted to be a stricture.  These brushings were negative.  Patient then underwent a PET CT which demonstrated uptake in the head of the pancreas concerning for pancreatic mass. She subsequently underwent endoscopic ultrasound with ultrasound and FNA biopsy on 12/6/2021.  The procedure demonstrated abutment of the tumor with the portal vein without involvement of the SMA or SMV.  The total tumor was measured to be 2.7 cm by 3 cm.  This biopsy resulted as atypical cells with suspicion for malignancy. She admitted to the hospital on 12/9/2021 and severe pancreatitis after undergoing an EUS for pancreatic biopsy on 12/6/2021 and pancreatic mass.  Her symptoms were abdominal pain, nausea and vomiting. CT scan revealing peripancreatic inflammation consistent with likely post biopsy pancreatitis and ascites.  Her initial labs revealing elevated lipase, supratherapeutic INR and elevated D-dimer coagulopathy. Charolotte Lively was positive for bilateral pleural effusions, negative for PE.  12/27 paracentesis with culture of ascitic fluid which had no growth, blood cultures, urine cultures negative for growth.  Patient had been started on IV meropenem for leukocytosis and anidulafungin for suspected intra-abdominal infection. CT with and without contrast revealing liver lesions consistent with metastasis, mass in the pancreatic head with biliary stent in place, bilateral pleural effusions, ascites and bilateral adrenal masses. IR declined liver biopsy due to coagulopathy and felt she would not benefit.  Doppler studies of upper and lower extremities negative for DVT, positive for superficial thrombophlebitis of left medial antecubital. Pancreatic cancer is presumed but no firm diagnosis due to lack of tissue sample. Pancreatitis remaining persistent despite antibiotics. Attempted Dobbhoff and PEG J placement but failed due to reddened area presumed possible tumor infiltration in the duodenum. Due to poor performance and prognosis she is not a candidate for palliative XRT or chemo. The patient has been progressively worsening with debilitation, intermittently tolerating diet not stable to be discharged home with outpatient surgical oncology follow-up. She would benefit from transfer to tertiary care center where surgical oncology services are available for evaluation of pancreatic head mass and possible surgical intervention. 1/7 A. fib with RVR rate 110-140. Cardiology consulted and anticoagulation discontinued due to bleeding and liver failure. Patient is agreeable to hospice and has been medically clear to hospice on 1/18/2022. Transitioned IV fluconazole to fluconazole p.o. Patient transitioned from IV Zosyn to levaquin for 8 total doses as plan is to go into long-term care with hospice. Complained of chest pain, an EKG was obtained and patient is in atrial fibrillation with RVR, BP is 112/63, patient denied multiple scheduled medications and is denying BB, will accept nitrobid ointment. Patient's hemoglobin is 5.6, transfused 2u prbc. \"  Pt with confusion, abdominal pain uncontrolled, jaundice, ascites and generalized edema. She is now needing IV medications to manage symptoms. Her sister and son are at bedside and just want her to have some comfort. 1/25/22: pt is seen lethargic, unresponsive, deeply jaundiced, having labored breathing with periods of apnea; long pauses.  Edematous all over        REVIEW OF SYSTEMS     The following systems were: [] reviewed  [x] unable to be reviewed    Positive ROS include:  Constitutional: fatigue, weakness, in pain, short of breath  Ears/nose/mouth/throat: increased airway secretions  Respiratory:shortness of breath, wheezing  Gastrointestinal:poor appetite, nausea, vomiting, abdominal pain, constipation, diarrhea  Musculoskeletal:pain, deformities, swelling legs  Neurologic:confusion, hallucinations, weakness  Psychiatric:anxiety, feeling depressed, poor sleep  Endocrine:     Adult Non-Verbal Pain Assessment Score: 6    Face  [] 0   No particular expression or smile  [x] 1   Occasional grimace, tearing, frowning, wrinkled forehead  [] 2   Frequent grimace, tearing, frowning, wrinkled forehead    Activity (movement)  [] 0   Lying quietly, normal position  [x] 1   Seeking attention through movement or slow, cautious movement  [] 2   Restless, excessive activity and/or withdrawal reflexes    Guarding  [] 0   Lying quietly, no positioning of hands over areas of body  [x] 1   Splinting areas of the body, tense  [] 2   Rigid, stiff    Physiology (vital signs)  [] 0   Stable vital signs  [] 1   Change in any of the following: SBP > 20mm Hg; HR > 20/minute  [x] 2   Change in any of the following: SBP > 30mm Hg; HR > 25/minute    Respiratory  [] 0   Baseline RR/SpO2, compliant with ventilator  [x] 1   RR > 10 above baseline, or 5% drop SpO2, mild asynchrony with ventilator  [] 2   RR > 20 above baseline, or 10% drop SpO2, asynchrony with ventilator     FUNCTIONAL ASSESSMENT     Palliative Performance Scale (PPS): 10%       PSYCHOSOCIAL/SPIRITUAL ASSESSMENT     Active Problems:    Pancreatitis (12/9/2021)      Metastatic carcinoma ([facility] (1/24/2022)      Past Medical History:   Diagnosis Date    A-fib ([facility]     CHF (congestive heart failure) (HCC)     Clear cell renal cell carcinoma (HCC)     COPD (chronic obstructive pulmonary disease) (HCC)     Fibromyalgia     GERD (gastroesophageal reflux disease)     Lymphedema     [facility]'s syndrome [facility]     Thyroid nodule       Past Surgical History: Procedure Laterality Date    COLONOSCOPY N/A 11/18/2020    COLONOSCOPY performed by Azul Wilkins MD at [facility] HX GI      EGD    HX HYSTERECTOMY      HX NEPHRECTOMY Right 2019    HX ORTHOPAEDIC      ankle    IR THORACENTESIS NDL PUNC ASP W IMAGE  1/12/2022      Social History     Tobacco Use    Smoking status: Current Every Day Smoker     Packs/day: 0.25     Types: Cigarettes    Smokeless tobacco: Never Used   Substance Use Topics    Alcohol use: Not Currently     Family History   Problem Relation Age of Onset    Hypertension Mother     Stroke Mother     Stroke Father     Hypertension Father       Allergies   Allergen Reactions    Adhesive Tape-Silicones Atopic Dermatitis      Current Facility-Administered Medications   Medication Dose Route Frequency    HYDROmorphone (DILAUDID) syringe 1 mg  1 mg IntraVENous Q3H    ondansetron (ZOFRAN) injection 4 mg  4 mg IntraVENous Q4H PRN    LORazepam (ATIVAN) injection 1 mg  1 mg IntraVENous Q15MIN PRN    ketorolac (TORADOL) injection 30 mg  30 mg IntraVENous Q8H PRN    glycopyrrolate (ROBINUL) injection 0.2 mg  0.2 mg IntraVENous Q4H PRN    bisacodyL (DULCOLAX) suppository 10 mg  10 mg Rectal DAILY PRN    HYDROmorphone (DILAUDID) injection 1 mg  1 mg IntraVENous Q15MIN PRN    LORazepam (ATIVAN) injection 1 mg  1 mg IntraVENous Q3H        PHYSICAL EXAM     Wt Readings from Last 3 Encounters:   01/24/22 104 kg (229 lb 4.5 oz)   01/24/22 104.3 kg (229 lb 15 oz)   12/06/21 84.4 kg (186 lb)       Visit Vitals  /65 (BP 1 Location: Left lower arm, BP Patient Position: At rest)   Pulse (!) 139   Temp 96.9 °F (36.1 °C)   Resp (!) 6   Ht 5' 7\" (1.702 m)   Wt 104 kg (229 lb 4.5 oz)   SpO2 94%   BMI 35.91 kg/m²       Supplemental O2  [x] Yes  [] NO       Currently this patient has:  [x] Peripheral IV [] PICC  [] PORT [] ICD    [x] Masters Catheter [] NG Tube   [] PEG Tube    [] Rectal Tube [] Drain  [] Other:     Constitutional:  Lethargic, unresponsive, appears to have labored breathing with long pauses  Eyes: closed  ENMT: some airway secretions  Cardiovascular: tachycardic, diffuse peripheral edema.    Respiratory: breathing is labored with long pauses  Gastrointestinal: distended, +bowel sounds, decreased urine output hardly 50cc and is deep dark yellow  Musculoskeletal: edema which is generalized  Skin: warm, dry, yellow, deep jaundice  Neurologic: unresponsive  Psychiatric: NA      Pertinent Lab and or Imaging Tests:  Lab Results   Component Value Date/Time    Sodium 142 01/23/2022 08:01 AM    Potassium 4.4 01/23/2022 08:01 AM    Chloride 109 (H) 01/23/2022 08:01 AM    CO2 27 01/23/2022 08:01 AM    Anion gap 6 01/23/2022 08:01 AM    Glucose 87 01/23/2022 08:01 AM    BUN 30 (H) 01/23/2022 08:01 AM    Creatinine 1.08 (H) 01/23/2022 08:01 AM    BUN/Creatinine ratio 28 (H) 01/23/2022 08:01 AM    GFR est AA >60 01/23/2022 08:01 AM    GFR est non-AA 50 (L) 01/23/2022 08:01 AM    Calcium 7.7 (L) 01/23/2022 08:01 AM     Lab Results   Component Value Date/Time    Protein, total 4.2 (L) 01/23/2022 08:01 AM    Albumin 0.8 (L) 01/23/2022 08:01 AM            Dat Gibbs MD

## 2022-01-25 NOTE — HOSPICE
Initial spiritual care visit with the patient and her sister. The patient was lying in bed and did not respond. Her sister was sitting at her bedside. The sister completed the spiritual assessment. She  Mira Mary the patient is [place] but did not attend [employer]. The sister's  visited and had a conversation with her. The sister is at peace and hoping the patient's spiritual at peace. The  and sister discussed end-of-life concerns and spirituality. She shared stories about when they were children. She also shared the family has experienced many losses in the past few years. No spiritual anxiety noted. The fanily is small but supporting one another well. The sister asked for assurance of prayer which the  extended. She would like the  to continue to visit for spiritual support and assurance of prayer.

## 2022-01-25 NOTE — PROGRESS NOTES
Jaime  [facility]  [phone]    Social Work Admission Note  Patient Name: Nakita Schultz  YOB: 1945  Age: 78 y.o. Date of Visit: 22  Facility of Care: [facility]  Patient Room: 228/     Hospice Attending: Baldo Slade MD  Hospice Diagnosis: Pancreatitis [K85.90]  Metastatic carcinoma ([address] [C79.9]    Level of Care:    [x]  GIP    []  Respite   []  Routine    NARRATIVE   Pt is a 47-year-old female admitted to inpatient hospice on 2022 with a hospice diagnosis of Pancreatitis and Metastatic Carcinoma. Pt was admitted to hospital on 2021 for abdominal pain, nausea, vomiting, and poor PO intake. Prior to hospitalization, pt lived with her son Devon Jarrell. Son Devon Jarrell and pt's sister at bedside. Family appears to be sad but coping appropriately. Pt currently asleep peacefully. Family reported pt was talking and interactive yesterday. Pt had another son who  approximately 2 years ago suddenly. The  son has a 55-year-old son. Per family, pt's grandchild will be visiting later. Family reported they are close and the family consists of pt, Devon Jarrell, pt's sister, and pt's grandson. [facility] provided education on visitation policy and bereavement program.  [facility] provided emotional support and supportive counseling in processing pt's prognosis. Pt is DNR code status. [facility]. [facility] will continue to provide support. ADVANCE CARE PLANNING    Code Status: DNR  Durable DNR: _ Yes  _ No  Advance Care Planning 2021   Confirm Advance Directive None   Patient Would Like to Complete Advance Directive -       Relationship Status:  []  Single     []        []      []  Domestic Partner     [x]  /  []  Common Law  []    []  Unknown    If in a relationship, name of partner/spouse:  Duration of relationship:    Evangelical: Voodoo     Home: [place]   Resources Provided: Emotional support    Social Work Initial Assessment     Gender:  female    Race/Ethnicity: (sheila all that apply)  []  [place] or [place] Native  []    []  Black or [place] American  []   or   []   or [place]  [x]  White  []  Unknown      Service:    []  Yes   [x]  No       []  Unknown  Appropriate for Pinning Ceremony:   []  Yes      [x]  No  Is patient using VA benefits?    []  Yes      []  No     Primary Language: English  []   Needed  []   utilized during visit    Ability to express thoughts/needs/feelings  []  Expressed thoughts/feelings/needs without difficulty  []  Requires extra time and cuing  []  Speech limited single words  []  Uses only gestures (eye, blinking eye or head movement/pointing)  [x]  Unable to express thoughts/feelings/needs (speech unintelligible or inappropriate)  []  Unresponsive  Notes:      Mental Status:  []  Alert-oriented to:     []  Person     []  Place     []  Time  []  Comatose-responds to:    []   Verbal stimuli    []  Tactile stimuli    []  Painful stimuli  []  Forgetful  []  Disoriented/Confused  [x]  Lethargic  []  Agitated  []  Other (specify):    Notes:      Patients description of Illness/Current Health Status:    [x]  Patient unable to discuss  []  Patient unwilling to discuss  []  (Specify)        Knowledge/Understanding of Disease Process  Patient:    []  Demonstrates knowledge/understanding of disease process   []  Demonstrates knowledge/understanding of treatment plan   []  Demonstrates knowledge/understanding of prognosis   []  Demonstrates acceptance of prognosis   []  Demonstrates knowledge/understanding of resuscitation status   [x]  Other (specify) pt asleep and did not participate in visit  Caregiver:   [x]  Demonstrates knowledge/understanding of disease process   [x]  Demonstrates knowledge/understanding of treatment plan   [x]  Demonstrates knowledge/understanding of prognosis   [x] Demonstrates acceptance of prognosis   [x]  Demonstrates knowledge/understanding of resuscitation status   []  Other (specify)  Notes:      Patients living arrangement/care setting:  Use the PRIOR COLUMN when the PATIENTS current health status necessitated a change in his/her primary residence. Prior Current Response              [x]             []    Patients own home/residence              []             []    Home of family member/friend              []             []    Boarding home              []             []    Assisted living facility/MCC center              []             []    Hospital/Acute care facility              []             []    Skilled nursing facility              []             []    Long term care facility/Nursing home              []             [x]    Hospice in Patient      Primary Caregiver:  []  No Primary Caregiver  Name of Primary Caregiver: Yaa Nguyen  Relationship or Primary Caregiver:    []  Spouse/Significant other       [x]  Natural Child        []  Step child       []  Sibling   []  Parent   []  Friend/Neighbor   []  Community/Religion Volunteer   []  Paid help   []  Other (specify):___________  Notes:       Family members/Significant others:  Name:  Relationship:  Phone Number:  Actively involved in care? []  Yes  []  No    Name:  Relationship:  Phone Number:  Actively involved in care? []  Yes  []  No    Name:  Relationship:  Phone Number:  Actively involved in care?   []  Yes  []  No    Social support systems: (select ONE best description)  []  Excellent social support system which includes three or more family members or friends  [x]  Good social support system which includes two or less members or friends  []  [address] support which includes one family member or friend  []  Poor social support; no family members or friends; basically ALONE  Notes:      Emotional Status: (sheila all that apply)    Patient Caregiver Response                 [] [x]    Mood/Affect stable and appropriate                   []                []    Angry                 []                []    Anxious                 []                []    Apprehensive                 []                []    Avoidant                 []                []    Clinging                 []                []    Depressed                 []                []    Distraught                 []                []    Elated                 []                []    Euphoric                 []                []    Fearful                 []                []    Flat Affect                 []                []    Helpless                 []                []    Hostile                 []                []    Impulsive                 []                []    Irritable                 []                []    Labile                 []                []    Manic                 []                []    Restlessness                 []                [x]    Sad                 []                []    Suspicious                 []                []    Tearful                 []                []    Withdrawn     Notes:     Coping Skills (strengths/weakness):    Patient: Coping Skills (strength/weakness): good familial support   Family/caregiver (strength/weakness): good familial support, sudden decline, recent loss     [place] of care (sheila all that apply):     []  No burden evident   []  Family must administer medications   []  Illness causing financial strain   [x]  Family/Support feels overwhelmed   []  Family/Support sleep disturbed with patients care   []  Patients care causes extra physical stress  of death  []  Illness causes changes in family lifestyle  []  Illness impacting family/support employment  []  Family experiencing increased time demands  []  Patients behavior endangers family  []  Denial of patients illness  []  Concern over outcome of illness/fear  []  Patients behavior embarrassing to family Notes:      Risk Factors: (sheila all that apply):    [x]  No burden evident   []  Alcohol abuse  []  Financial resources inadequate to meet basic needs (food/house/etc)  []  Financial resources inadequate to meet health care needs (supplies/equipment/medications)  []  Food/nutrition resources inadequate  []  Home environment unsafe/inadequate for home care  []  Homicidal risk  []  Lives alone or without concerned relatives  []  Multiple medications/complex schedule  []  Physical limitations increase likelihood of falls  []  Plan of care/treatments complicated  []  Substance use/abuse  []  Suicidal risk  []  Visual impairment threatens safety/ability to perform self-care  []  Other (specify):     Abuse/Neglect (actual/potential risks):  [x]  No signs of abuse/neglect  []  History of abuse/neglect                 []  Physical          []  Sexual  []  History of domestic violence  []  Lacks adequate physical care  []  Lacks emotional nurturing/support  []  Lacks appropriate stimulation/cognitive experiences  []  Left alone inappropriately  []  Lacks necessary supervision  []  Inadequate or delayed medical care  []  Unsafe environment (i.e guns/drug use/history of violence in the home/etc.)  []  Bruising or other physical signs of injury present  []  Other (specify):  Notes:   []  Refer to child/adult protective services      Current Sources of Stress (in Addition to Current Illness):   [x]  None reported  []  Bills/Debt    []  Career/Job change    []   (short term)    []   (long term)    []  Death of a child (recent)    []  Death of a parent (recent)   []  Death of a spouse (recent)   []  Employment status changed   []  Family discord    []  Financial loss/Inadequate inther (specify):come  []  Job loss  []  Legal issues unresolved  []  Lifestyle change  []  Marital discord  []  Marriage within the last year  []  Paperwork (insurance/legal/etc) overwhelming  []  Separation/Divorce  []  Other (specify):  Notes:      Current Community Resources Being Utilized             Interventions/Plan of Care     Assess social and emotional factors related to coping with end of life issues  Community resource planning/referral   Relocation to different care setting if/when symptoms stabilize      Discharge Planning     D/c to facility with hospice support. Will need MSW assistance to find placement.     MSW Assessment Completed by: Albert Engel LMSW  01/25/22    Time In: 11:00 am      Time Out: 11:20 am

## 2022-01-25 NOTE — HOSPICE
Jaime  [facility]  [phone]    [facility] Daily Nursing Note   Patient Name: Socrates Irby  YOB: 1945  Age: 78 y.o. Date of Visit: 01/26/22  Facility of Care: [address] [place] [address]  Patient Room: 228/01     Hospice Attending: Shamika Spear MD  Hospice Diagnosis: Pancreatitis [K85.90]  Metastatic carcinoma ([facility] [C79.9]    Level of Care: GIP    Current [facility] Symptoms    1. Dyspnea  2. Nonverbal pain  3. Secretions  4. Jaundice  5. Edema  6. Unresponsive        ASSESSMENT & PLAN     1. Continue GIP level of care. Pt has overwhelming symptoms which cannot be managed at home. Patient requires frequent assessment and IV medications. Pt is high risk for sudden decline. 2. Continue scheduled IV hydromorphone 1mg every 3 hrs and every 15 minutes as needed  3. Continue lorazepam 1mg scheduled every 3 hrs & every 15 min as needed   4. Other comfort meds as needed for fever, secretions    Spiritual Interventions: Hospital and hospice chaplains offering spiritual support to family at bedside    Psych/ Social/ Emotional Interventions:  offering supportive counseling to family    Care Coordination Needs: Ongoing frequent communication with family, hospital and hospice teams    Care plan and New Orders discussed / approved with TONEY Parekh MD.    Description History and Chart Review     Narrative History of last 24 hours that demonstrates care cannot be provided in another setting:  Patient requires frequent assessment and IV medications. Pt is high risk for sudden decline. What has been done to control the patient's symptoms in the last 24 hours? Patient requires IV Dilaudid and Lorazepam every 3 hours    Does the patient currently require IV medications? yes  Does the patient currently require scheduled medications? yes  Does the patient currently require a PCA?  no    List number of doses of PRN medications in last 24 hours:  Medication 1:  Number of doses:    Medication 2:   Number of doses: Medication 3:   Number of doses:    Supporting documentation for GIP need for pain control:  [x] Frequent evaluation by a doctor, nurse practitioner, nurse   [x] Frequent medication adjustment    [x] IVs that cannot be administered at home   [] Aggressive pain management   [] Complicated technical delivery of medications              Supporting documentation for GIP need for symptom control:  [x]  Sudden decline necessitating intensive nursing intervention  []  Uncontrolled / intractable nausea or vomiting   []  Pathological fractures  []  Advanced open wounds requiring frequent skilled care  [] Unmanageable respiratory distress  [] New or worsening delirium   [] Delirium with behavior issues: Is 24 hour caregiver present due to safety concerns with agitation? (yes/no)  [x] Imminent death - with skilled nursing needs documented above    DISCHARGE PLANNING     1. Discharge Plan: Discharge to home should condition stabilize  2. Patient/Family teaching: Symptom management/EOL process  3. Response to patient/family teaching: Sister verbalized understanding    ASSESSMENT    KARNOFSKY: 10    Prognosis estimated based on 01/25/22 clinical assessment is:   [] Few to Many Hours  [x] Hours to Days   [] Few to Many Days   [] Days to Weeks   [] Few to Many Weeks   [] Weeks to Months   [] Few to Many Months    Quality Measure: Patient self-reports:  [] Yes    [] No    ESAS:   Time of Assessment: 0800  Pain (1-10): 2  Fatigue (1-10):   Shortness of breath (1-10): 6  Nausea (1-10): Appetite (1-10):    Anxiety: (1-10):   Depression: (1-10):   Well-being: (1-10):   Constipation: _ Yes  _ No  LAST BM: 1/24    CLINICAL INFORMATION   Patient Vitals for the past 12 hrs:   Temp Pulse Resp BP SpO2   01/25/22 0843 -- -- -- -- 96 %   01/25/22 0725 (!) 96.6 °F (35.9 °C) 95 10 (!) 138/100 97 %       Currently this patient has:  [x] Supplemental O2   [] IV    [x] PICC      [] PORT   [] NG Tube    [] PEG Tube   [] Ostomy     [x] Masters draining turbid urine  [] Other:     SIGNS/PHYSICAL FINDINGS     Skin (including wound):  [] Warm, dry, supple, intact and color normal for race  [x] Warm   [] Dry   [] Cool     [] Clammy       [] Diaphoretic    Turgor   [] Normal   [] Decreased  Color:   [] Pink   [] Pale   [] Cyanotic   [] Erythema   [x] Jaundice   [] Normal for Race  []  Wounds:    Neuro:  [] Lethargy  [] Restlessness / agitation  [] Confusion / delirium  [] Hallucinations  [] Responds to maximal stimulation  [x] Unresponsive  [] Seizures     Cardiac:  [x] Dyspnea on Exertion  [] JVD  [] Murmur  [] Palpitations  [x] Hypotension  [] Hypertension  [x] Tachycardia  [] Bradycardia  [] Irregular HR  [] Pulses Decreased  [] Pulses Absent  [x] Edema:      Generalized 3 + pitting   [] Mottling:          Respiratory:  Breath sounds:    [] Diminished   [] Wheeze   [x] Rhonchi   [] Rales   [] Even and unlabored  [x] Labored  [] Cough   [] Non Productive   [] Productive    [] Description:           [] Deep suctioned   [x] O2 at 4 LPM  [] High flow oxygen greater than 10 LPM  [] Bi-Pap    GI  [] Abdomen (describe)   [] Ascites  [] Nausea  [] Vomiting  [x] Incontinent of bowels  [x] Bowel sounds hypoactive  [] Diarrhea  [] Constipation (see above including last bowel movement)  [] Checked for impaction  [x] Last BM 1/24    Nutrition  Diet: NPO  Appetite:    [] Good   [] Fair   [] Poor   [] Tube Feeding       [] Voiding  [] Incontinent   [x] Masters    Musculoskeletal  [] Balance/[place] Unsteady   [] Weak   Strength:    [] Normal    [] Limited    [] Decreasing   Activities:    [] Up as tolerated   [x] Bedridden    [] Specify:    SAFETY  [] 24 hr. Caregiver   [x] Side rails ? [x] Hospital bed   [] Reviewed Falls & Safety       ALLERGIES AND MEDICATIONS     Allergies:    Allergies   Allergen Reactions    Adhesive Tape-Silicones Atopic Dermatitis       Current Facility-Administered Medications   Medication Dose Route Frequency    ondansetron (ZOFRAN) injection 4 mg  4 mg IntraVENous Q4H PRN    LORazepam (ATIVAN) injection 1 mg  1 mg IntraVENous Q15MIN PRN    ketorolac (TORADOL) injection 30 mg  30 mg IntraVENous Q8H PRN    glycopyrrolate (ROBINUL) injection 0.2 mg  0.2 mg IntraVENous Q4H PRN    bisacodyL (DULCOLAX) suppository 10 mg  10 mg Rectal DAILY PRN    HYDROmorphone (DILAUDID) injection 1 mg  1 mg IntraVENous Q15MIN PRN    HYDROmorphone (DILAUDID) injection 1 mg  1 mg IntraVENous Q3H    LORazepam (ATIVAN) injection 1 mg  1 mg IntraVENous Q3H          Visit Time In: 0805  Visit Time Out: 0830

## 2022-01-25 NOTE — HOSPICE
Jaime  [facility]  [phone]    [facility] Daily Nursing Note   Patient Name: Alayna Geller  YOB: 1945  Age: 78 y.o. Date of Visit: 01/25/22  Facility of Care: [facility]  Patient Room: 228/01     Hospice Attending: Claudean Harp, MD  Hospice Diagnosis: Pancreatitis [K85.90]  Metastatic carcinoma ([address] [C79.9]    Level of Care: [facility]    Current GIP Symptoms    1. Pain  2. Labored breathing  3. Edema   4. Ascites        ASSESSMENT & PLAN     1. Admit to [facility] level of care at [facility]  2. Scheduled IV dilaudid 1mg q3hr  3. Scheduled IV lorazepam 1mg Q3hr  4. PRN IV medications of dilaudid and lorazepam for break thru symptoms  5. Support and education  for family at bedside. Spiritual Interventions:  visits from the hospital staff    Psych/ Social/ Emotional Interventions: Social work visits today    Care Coordination Needs: Education and support for facility staff    Care plan and New Orders discussed / approved with Dr. Lennette Crigler. Description History and Chart Review   Narrative History of last 24 hours that demonstrates care cannot be provided in another setting:  Patient is having increased pain and labored breathing with edema. She had been geting prn dilaudid and it was subsiding in 2.5 to 3 hours with increased pain symptoms. She had accepted hospice and wass planning to go home, but pain increased and she needs frequent assessment along with IV medication management. What has been done to control the patient's symptoms in the last 24 hours? Dilaudid scheduled q3hr 1mg IV    Does the patient currently require IV medications? yes  Does the patient currently require scheduled medications?  yes  Does the patient currently require a PC? no    List number of doses of PRN medications in last 24 hours:  Medication 1:  Number of doses:    Medication 2:   Number of doses:    Medication 3:   Number of doses:    Supporting documentation for GIP need for pain control:  [x] Frequent evaluation by a doctor, nurse practitioner, nurse   [] Frequent medication adjustment    [x] IVs that cannot be administered at home   [x] Aggressive pain management   [] Complicated technical delivery of medications              Supporting documentation for GIP need for symptom control:  [x]  Sudden decline necessitating intensive nursing intervention  []  Uncontrolled / intractable nausea or vomiting   []  Pathological fractures  []  Advanced open wounds requiring frequent skilled care  [] Unmanageable respiratory distress  [] New or worsening delirium   [] Delirium with behavior issues: Is 24 hour caregiver present due to safety concerns with agitation? (yes/no)  [] Imminent death - with skilled nursing needs documented above    DISCHARGE PLANNING     1. Discharge Plan: Should she stabilize, to a nursing facility  2. Patient/Family teaching: End of life care  3. Response to patient/family teaching: Rodolfo Gooden understanding of symptom management    ASSESSMENT    KARNOFSKY: 10    Prognosis estimated based on 01/25/22 clinical assessment is:   [] Few to Many Hours  [x] Hours to Days   [] Few to Many Days   [] Days to Weeks   [] Few to Many Weeks   [] Weeks to Months   [] Few to Many Months    Quality Measure: Patient self-reports:  [] Yes    [x] No    ESAS:   Time of Assessment: 1030  Pain (1-10):4  Fatigue (1-10): 3  Shortness of breath (1-10):4  Nausea (1-10): 0  Appetite (1-10):    Anxiety: (1-10):   Depression: (1-10):   Well-being: (1-10):   Constipation: _ Yes  _ No  LAST BM:     CLINICAL INFORMATION   Patient Vitals for the past 12 hrs:   Temp Pulse Resp BP SpO2   01/25/22 0843 -- -- -- -- 96 %   01/25/22 0725 (!) 96.6 °F (35.9 °C) 95 10 (!) 138/100 97 %       Currently this patient has:  [x] Supplemental O2   [] IV    [x] PICC      [] PORT   [] NG Tube    [] PEG Tube   [] Ostomy     [x] Masters draining amber_______ urine  [] Other:     SIGNS/PHYSICAL FINDINGS     Skin (including wound):  [] Warm, dry, supple, intact and color normal for race  [x] Warm   [] Dry   [] Cool     [] Clammy       [] Diaphoretic    Turgor   [] Normal   [] Decreased  Color:   [] Pink   [] Pale   [] Cyanotic   [] Erythema   [x] Jaundice   [] Normal for Race  []  Wounds:    Neuro:  [x] Lethargy  [] Restlessness / agitation  [x] Confusion / delirium  [] Hallucinations  [] Responds to maximal stimulation  [] Unresponsive  [] Seizures     Cardiac:  [] Dyspnea on Exertion  [] JVD  [] Murmur  [] Palpitations  [] Hypotension  [] Hypertension  [] Tachycardia  [] Bradycardia  [] Irregular HR  [] Pulses Decreased  [] Pulses Absent  [x] Edema:      Bilateral arms, dippling edema, bilateral legs +2   [] Mottling:      (Location)    Respiratory:  Breath sounds:    [] Diminished   [] Wheeze   [] Rhonchi   [] Rales   [] Even and unlabored  [x] Labored:            [] Cough   [] Non Productive   [] Productive    [] Description:           [] Deep suctioned   [x] O2 at 4___ LPM  [] High flow oxygen greater than 10 LPM  [] Bi-Pap    GI  [] Abdomen (describe)   [x] Ascites  [] Nausea  [] Vomiting  [x] Incontinent of bowels  [] Bowel sounds (yes/no)  [] Diarrhea  [] Constipation (see above including last bowel movement)  [] Checked for impaction  [] Last BM     Nutrition  Diet:_npo_________  Appetite:   [] Good   [] Fair   [] Poor   [] Tube Feeding       [] Voiding  [] Incontinent   [x] Masters    Musculoskeletal  [] Balance/[place] Unsteady   [] Weak   Strength:    [] Normal    [] Limited    [] Decreasing   Activities:    [] Up as tolerated   [x] Bedridden    [] Specify:    SAFETY  [x] 24 hr. Caregiver   [x] Side rails ? [x] Hospital bed   [x] Reviewed Falls & Safety       ALLERGIES AND MEDICATIONS     Allergies:    Allergies   Allergen Reactions    Adhesive Tape-Silicones Atopic Dermatitis       Current Facility-Administered Medications   Medication Dose Route Frequency    ondansetron (ZOFRAN) injection 4 mg  4 mg IntraVENous Q4H PRN  LORazepam (ATIVAN) injection 1 mg  1 mg IntraVENous Q15MIN PRN    ketorolac (TORADOL) injection 30 mg  30 mg IntraVENous Q8H PRN    glycopyrrolate (ROBINUL) injection 0.2 mg  0.2 mg IntraVENous Q4H PRN    bisacodyL (DULCOLAX) suppository 10 mg  10 mg Rectal DAILY PRN    HYDROmorphone (DILAUDID) injection 1 mg  1 mg IntraVENous Q15MIN PRN    HYDROmorphone (DILAUDID) injection 1 mg  1 mg IntraVENous Q3H    LORazepam (ATIVAN) injection 1 mg  1 mg IntraVENous Q3H          Visit Time In: 1030  Visit Time Out: 0859

## 2022-01-26 NOTE — PROGRESS NOTES
Problem: Hospice Orientation  Goal: Demonstrate understanding of hospice philosophy, plan of care, and home hospice program  Description: The patient/family/caregiver will demonstrate understanding of hospice philosophy, plan of care and the home hospice program as evidenced by participation in meeting the patient's psychosocial, spiritual, medical, and physical needs inclusive of medical supplies/equipment focusing on symptoms. Outcome: Progressing Towards Goal     Problem: Pain  Goal: Assess satisfaction of level of comfort and symptom control  Outcome: Progressing Towards Goal  Goal: *Control of acute pain  Outcome: Progressing Towards Goal     Problem: Potential for Alteration in Skin Integrity  Goal: Monitor skin for areas of alteration in skin integrity  Description: Patient/family/caregiver will demonstrate ability to care for patient's skin, monitor for areas of breakdown, and demonstrate methods to prevent breakdown during hospice care. Outcome: Progressing Towards Goal     Problem: Anxiety/Agitation  Goal: Verbalize or staff assess the ability to manage anxiety  Description: The patient/family/caregiver will verbalize and demonstrate ability to manage the patient's anxiety throughout hospice care. Outcome: Progressing Towards Goal     Problem: Breathing Pattern - Ineffective  Goal: *Use of effective breathing techniques  Outcome: Progressing Towards Goal     Problem: End of Life Process  Goal: Demonstrate understanding of end of life processes  Description: Patient/caregiver will understand end of life processes.   Outcome: Progressing Towards Goal     Problem: Pain  Goal: *Control of Pain  Outcome: Progressing Towards Goal     Problem: Nausea/Vomiting (Adult)  Goal: *Absence of nausea/vomiting  Outcome: Progressing Towards Goal     Problem: Dyspnea Due to End of Life  Goal: Demonstrate understanding of and ability to manage respiratory symptoms at end of life  Outcome: Progressing Towards Goal Problem: Communication Deficit  Goal: Effectively communicate symptoms, needs, and concerns  Outcome: Progressing Towards Goal     Problem: Imminent Death  Goal: Collaborate with patient/family/caregiver/interdisciplinary team to minimize and manage end of life symptoms  Outcome: Progressing Towards Goal     Problem: Pressure Injury - Risk of  Goal: *Prevention of pressure injury  Description: Document Asim Scale and appropriate interventions in the flowsheet. Outcome: Progressing Towards Goal  Note: Pressure Injury Interventions:  Sensory Interventions: Minimize linen layers,Maintain/enhance activity level,Monitor skin under medical devices,Keep linens dry and wrinkle-free    Moisture Interventions: Absorbent underpads,Minimize layers,Moisture barrier    Activity Interventions: Pressure redistribution bed/mattress(bed type)    Mobility Interventions: HOB 30 degrees or less,Pressure redistribution bed/mattress (bed type)    Nutrition Interventions: Document food/fluid/supplement intake    Friction and Shear Interventions: HOB 30 degrees or less,Minimize layers                Problem: Falls - Risk of  Goal: *Absence of Falls  Description: Document Robi Fall Risk and appropriate interventions in the flowsheet. Outcome: Progressing Towards Goal  Note: Fall Risk Interventions:  Mobility Interventions: Bed/chair exit alarm    Mentation Interventions: Bed/chair exit alarm    Medication Interventions: Bed/chair exit alarm    Elimination Interventions: Call light in reach    History of Falls Interventions: Bed/chair exit alarm         Problem: Emotional Support Needs  Goal: Patient/family is receiving emotional support  Description: Pt's son Don Leggett and family will receive emotional support and supportive counseling in processing pt's prognosis within five days.   Outcome: Progressing Towards Goal     Problem: Spiritual Evaluation  Goal: Identify beliefs/practices that support hospice experience  Description: Patient/family identify their beliefs/practices that impair Hospice experience. Patient/family identify their beliefs/practices that support Hospice experience. Patient coping identified. Spiritual distress identified and decreased with visit.   Outcome: Progressing Towards Goal

## 2022-01-27 LAB
ABO + RH BLD: NORMAL
BLD PROD TYP BPU: NORMAL
BLOOD GROUP ANTIBODIES SERPL: NEGATIVE
BPU ID: NORMAL
CROSSMATCH RESULT,%XM: NORMAL
SPECIMEN EXP DATE BLD: NORMAL
STATUS OF UNIT,%ST: NORMAL
TRANSFUSION STATUS PATIENT QL: NORMAL
UNIT DIVISION, %UDIV: 0

## 2022-01-27 NOTE — DISCHARGE SUMMARY
Discharge Summary    [facility]  [facility]  [phone]      Date of Admission: 1/24/2022  Date of Discharge: 1/26/2022    Rafi Kennedy is a 78y.o. year old who was admitted to [facility] at [facility] with a Hospice diagnosis of Pancreatitis [K85.90]; Metastatic carcinoma ([facility] [C79.9]. Pt was admitted for [facility] level care. Per HPI  Active Symptoms:  1. Shortness of breath  2. Anxiety/restlessness/Agitation  3. Nonverbal pain  4. Airway secretions  5. Weakness and fatigue  6. Jaundice  7. Generalized edema  8. Hospice care      PLAN   1. Continue GIP level of care. Pt has overwhelming symptoms which cannot be managed at home. Patient requires frequent assessment and IV medications. Pt is high risk for sudden decline. 2. Continue scheduled IV hydromorphone 1mg every 3 hrs and every 15mts as needed  3. Continue lorazepam 1mg scheduled every 3 hrs & every 15 min as needed   4. Other comfort meds as needed  5. Spoke with family at bedside; sister and a very close friend were present; reviewed pt's current medical condition, symptoms, prognosis/time line and plan of care. Offered supportive listening.      6.  and SW to support family needs  7. Disposition:  Likely will pass here  8. Hospice Plan of care was reviewed in detail and agree with current plan of care        The patient's care was focused on comfort and the patient passed away on 1/26/2022.

## 2022-01-27 NOTE — HOSPICE
[address] Bereavement/Condolence Call: This LMSW called pt's son Kenton Russell to offer condolences and support. No answer. LMSW left voicemail with contact information and offered [facility] information for ongoing support.        Raghu Nicole, [facility]    [phone]

## 2022-01-27 NOTE — DEATH NOTE
RN entered room to assess patient after family member stated vitals machine was searching for pulse. RN checked for pulse and heartbeat, noted there was none. RN brought in another RN to verify findings. Patient  at 7:47pm on 22. Patient's son notified of time of death. Son verified no patient had no belongings at hospital. Vanessauth notified, Providers nurse notified,  called. Patient cleaned up and taken down to [facility]

## 2022-01-27 NOTE — HOSPICE
[facility]  [phone]    Discharge/Death Nursing Note   Patient Name: Alison Hassan  YOB: 1945  Age: 78 y.o. Date of Death: 2022  Admitted Date: 2022  Time of Death: 7:47 pm    Facility of Care: [facility]  Level of Care: [facility]  Patient Room: [facility]/     Hospice Attending: TONEY Garcia MD  Hospice Diagnosis: Pancreatitis [K85.90]  Metastatic carcinoma ([address] [C79.9]    Death Pronouncement   Pronouncement of death completed by: Jon Moore RN    Agency staff was not present at the time of death    At the time of death the patient was documented as \"no pulse and heartbeat\"    The pt  within [facility]    The following were notified of the patient's death: Family at bedside    Medications were disposed of per facility protocol     Discharge Summary   Discharge Reason: Death    Summary of Care Provided:    [x] Post mortem care provided by bedside nurse  [x] Notification of  home by nursing supervisor   Referrals/Community resources provided:   [] Goals completed  [] Durable Medical Equipment vendor notified     Disciplines involved: [x] RN [x] SW [x]  [] GOMEZ [] Vol [] PT [] OT [] ST [] [facility]    [] IDT communication/notification    Attending Physician, Dr. Sanjay Vanessa MD, notified of death

## 2022-01-27 NOTE — PROGRESS NOTES
was called at time of death by the time  arrived family had left,.  offered a moment pf presence for the patient and follow up with the nurses. Advised nurse to contact [facility] for any further referrals.     [address], [facility]    Please [facility]  in order to get in touch with  for any Spiritual Care Needs   [phone]   OR   Reach out to us on [facility]

## 2022-01-28 NOTE — DISCHARGE SUMMARY
Sarbjit ROCA, FNP-C    DISCHARGE SUMMARY      Patient ID:    Oanh Childs  [id number]  38 y.o.  1945    Admit date: 12/9/2021    Discharge date : 1/28/2022    Chronic Diagnoses:    Problem List as of 1/24/2022 Date Reviewed: 1/4/2022          Codes Class Noted - Resolved    Metastatic carcinoma ([facility] ICD-10-CM: C79.9  ICD-9-CM: 199.1  1/24/2022 - Present        Pancreatic mass ICD-10-CM: K86.89  ICD-9-CM: 577.8  1/5/2022 - Present        Hematemesis ICD-10-CM: K92.0  ICD-9-CM: 578.0  12/12/2021 - Present        History of peptic ulcer disease ICD-10-CM: Z87.11  ICD-9-CM: V12.71  12/12/2021 - Present        Choledocholithiasis ICD-10-CM: K80.50  ICD-9-CM: 574.50  11/2/2021 - Present        Respiratory failure, unspecified with hypoxia ([facility] ICD-10-CM: J96.91  ICD-9-CM: 518.81  11/17/2020 - Present        COPD exacerbation ([facility] ICD-10-CM: J44.1  ICD-9-CM: 491.21  11/16/2020 - Present        Anemia ICD-10-CM: D64.9  ICD-9-CM: 285.9  11/16/2020 - Present        Obesity ICD-10-CM: E66.9  ICD-9-CM: 278.00  11/16/2020 - Present        A-fib ([facility] ICD-10-CM: I48.91  ICD-9-CM: 427.31  11/16/2020 - Present        CHF (congestive heart failure) ([facility] ICD-10-CM: I50.9  ICD-9-CM: 428.0  11/16/2020 - Present        Respiratory failure with hypoxia ([facility] ICD-10-CM: J96.91  ICD-9-CM: 518.81  11/13/2020 - Present        RESOLVED: Screening for colon cancer ICD-10-CM: Z12.11  ICD-9-CM: V76.51  11/16/2020 - 12/16/2020        * (Principal) Pancreatitis ICD-10-CM: K85.90  ICD-9-CM: 935.2  12/9/2021 - Present          22    Final Diagnoses:   Pancreatitis [K85.90]    Reason for Hospitalization:  Oanh Childs is a 78 y.o. female who came to the hospital with complaints of abdominal pain, associated nausea/vomiting, poor PO intake. Pt reported having a EUS on 12/6 as outpatient by Dr. Brian Lim, biopsy was taken, rare cells present suspicious for malignancy. Presents with elevated lipase at 3,000.    CT abdomen/pelvis showing ill defined hypodense mass in the pancreas with fluid adjacent to the pancreas suggestive of focal pancreatitis. Adrenal adenomas are seen. No pseudocyst formation or active bleeding seen. PMH includes atrial fibrillation, CHF, COPD, renal cell carcinoma stage IV, s/p nephrectomy,  GERD, COPD, sjoren's syndrome, hypertension, and depression. Pt will be admitted for further evaluation and treatment. Hospital Course:     Regan Fernandez a 81-year-old woman with a PMH significant for atrial fibrillation, PUD, CHF, COPD on home O2 at 2 L, renal cell carcinoma status post nephrectomy, hypertension and morbid obesity. Patient was found to have obstructive jaundice and biliary stricture in early November of this year.  She underwent an ERCP with stent placement with brushings of the bile duct where there was noted to be a stricture.  These brushings were negative.  Patient then underwent a PET CT which demonstrated uptake in the head of the pancreas concerning for pancreatic mass. She subsequently underwent endoscopic ultrasound with ultrasound and FNA biopsy on 12/6/2021.  The procedure demonstrated abutment of the tumor with the portal vein without involvement of the SMA or SMV.  The total tumor was measured to be 2.7 cm by 3 cm.  This biopsy resulted as atypical cells with suspicion for malignancy. She admitted to the hospital on 12/9/2021 and severe pancreatitis after undergoing an EUS for pancreatic biopsy on 12/6/2021 and pancreatic mass.  Her symptoms were abdominal pain, nausea and vomiting. CT scan revealing peripancreatic inflammation consistent with likely post biopsy pancreatitis and ascites.  Her initial labs revealing elevated lipase, supratherapeutic INR and elevated D-dimer coagulopathy.   CTA was positive for bilateral pleural effusions, negative for PE.  12/27 paracentesis with culture of ascitic fluid which had no growth, blood cultures, urine cultures negative for growth.  Patient had been started on IV meropenem for leukocytosis and anidulafungin for suspected intra-abdominal infection. CT with and without contrast revealing liver lesions consistent with metastasis, mass in the pancreatic head with biliary stent in place, bilateral pleural effusions, ascites and bilateral adrenal masses. IR declined liver biopsy due to coagulopathy and felt she would not benefit. Doppler studies of upper and lower extremities negative for DVT, positive for superficial thrombophlebitis of left medial antecubital. Pancreatic cancer is presumed but no firm diagnosis due to lack of tissue sample. Pancreatitis remaining persistent despite antibiotics. Attempted Dobbhoff and PEG J placement but failed due to reddened area presumed possible tumor infiltration in the duodenum. Due to poor performance and prognosis she is not a candidate for palliative XRT or chemo. The patient has been progressively worsening with debilitation, intermittently tolerating diet not stable to be discharged home with outpatient surgical oncology follow-up. She would benefit from transfer to tertiary care center where surgical oncology services are available for evaluation of pancreatic head mass and possible surgical intervention. 1/7 A. fib with RVR rate 110-140. Cardiology consulted and anticoagulation discontinued due to bleeding and liver failure. Patient is agreeable to hospice and has been medically clear to hospice on 1/18/2022. Transitioned IV fluconazole to fluconazole p.o. Patient transitioned from IV Zosyn to levaquin for 8 total doses as plan is to go into long-term care with hospice. Complained of chest pain, an EKG was obtained and patient is in atrial fibrillation with RVR, BP is 112/63, patient denied multiple scheduled medications and is denying BB, will accept nitrobid ointment. Patient's hemoglobin is 6.1, will obtain repeat and if below 7 will transfuse 1 unit as this is what patient wants given her hospice status. Discharge Medications:   Discharge Medication List as of 1/24/2022  3:08 PM      START taking these medications    Details   guaiFENesin ER (MUCINEX) 1,200 mg Ta12 ER tablet Take 1 Tablet by mouth every twelve (12) hours. , Normal, Disp-30 Tablet, R-0      midodrine (PROAMATINE) 10 mg tablet Take 1 Tablet by mouth three (3) times daily (with meals) for 30 days. , Normal, Disp-90 Tablet, R-0      oxyCODONE IR (ROXICODONE) 5 mg immediate release tablet Take 1 Tablet by mouth every six (6) hours as needed for Pain for up to 3 days. Max Daily Amount: 20 mg., Normal, Disp-12 Tablet, R-0      zinc oxide-white petrolatum 17-57 % topical paste Apply  to affected area three (3) times daily. For buttocks and sacrum/coccyx: Apply Remedy Z-Guard Protectant Paste TID and prn for soiling. If soiled, remove top soiled layer only and reapply. Use Remedy Phytoplex Barrier Cream wipes for gentle clean sing. To completely remove, use Remedy Foaming Cleanser or soap and water. Indications: skin irritation, Skin protectant r/t incontinence  Indications: skin irritation, Skin protectant r/t incontinence, Normal, Disp-57 g, R-0         CONTINUE these medications which have NOT CHANGED    Details   albuterol (PROVENTIL HFA, VENTOLIN HFA, PROAIR HFA) 90 mcg/actuation inhaler Take 2 Puffs by inhalation every six (6) hours as needed for Wheezing., Historical Med      pantoprazole (PROTONIX) 40 mg tablet Take 40 mg by mouth daily. , Historical Med      sertraline (Zoloft) 25 mg tablet Take 25 mg by mouth daily. , Historical Med      traZODone (DESYREL) 50 mg tablet TAKE ONE TABLET BY MOUTH AT BEDTIME, Historical Med      budesonide-formoteroL (Symbicort) 80-4.5 mcg/actuation HFAA Take 2 Puffs by inhalation. , Historical Med      potassium chloride SR (KLOR-CON 10) 10 mEq tablet Take 20 mEq by mouth daily. , Historical Med      bumetanide (BUMEX) 1 mg tablet Take 1 mg by mouth daily. , Historical Med      dilTIAZem ER (DILACOR XR) 120 mg capsule Take 120 mg by mouth daily. , Historical Med      atorvastatin (LIPITOR) 40 mg tablet Take 40 mg by mouth daily. , Historical Med      ascorbic acid, vitamin C, (Vitamin C) 500 mg tablet Take 500 mg by mouth daily. , Historical Med      cholecalciferol (Vitamin D3) (1000 Units /25 mcg) tablet Take 1,000 Units by mouth daily. , Historical Med      Se-Tan Plus 162-115. 2-1 mg cap Take 1 Capsule by mouth two (2) times a day., Historical Med, YULISA      diazePAM (VALIUM) 5 mg tablet Take 5 mg by mouth every six (6) hours as needed for Anxiety. , Historical Med      famotidine (PEPCID) 40 mg tablet Take 40 mg by mouth daily. , Historical Med         STOP taking these medications       chlorthalidone (HYGROTON) 25 mg tablet Comments:   Reason for Stopping:         Eliquis 5 mg tablet Comments:   Reason for Stopping: Follow up Care:    1. Dejuan Jimenes MD in 1-2 weeks. Please call to set up an appointment shortly after discharge. Diet:  Resume previous diet    Disposition:  INPATIENT HOSPICE    Advanced Directive:   FULL    DNR x     Discharge Exam:  Physical Exam  Vitals and nursing note reviewed. Constitutional:       Appearance: She is ill-appearing. Cardiovascular:      Rate and Rhythm: Normal rate. Pulmonary:      Comments: 3L NC  Musculoskeletal:         General: Swelling present. Right lower leg: Edema present. Left lower leg: Edema present. Skin:     General: Skin is warm. Capillary Refill: Capillary refill takes 2 to 3 seconds. Neurological:      Mental Status: She is alert. She is disoriented. Motor: Weakness present. Gait: Gait abnormal.           CONSULTATIONS: GI and General Surgery    Significant Diagnostic Studies:   1/24/2022: HCT 19.5 % (L; Ref range: 35.0 - 47.0 %); HGB 6.2 g/dL (L; Ref range: 11.5 - 16.0 g/dL)  No results for input(s): WBC, HGB, HCT, PLT, HGBEXT, HCTEXT, PLTEXT in the last 72 hours.   No results for input(s): NA, K, CL, CO2, BUN, CREA, GLU, CA, MG, PHOS, URICA in the last 72 hours. No results for input(s): ALT, AP, TBIL, TBILI, TP, ALB, GLOB, GGT, AML, LPSE in the last 72 hours. No lab exists for component: SGOT, GPT, AMYP, HLPSE  No results for input(s): INR, PTP, APTT, INREXT in the last 72 hours. No results for input(s): FE, TIBC, PSAT, FERR in the last 72 hours. No results for input(s): PH, PCO2, PO2 in the last 72 hours. No results for input(s): CPK, CKMB in the last 72 hours.     No lab exists for component: TROPONINI  Lab Results   Component Value Date/Time    Glucose (POC) 199 (H) 01/19/2022 07:24 PM    Glucose (POC) 106 01/03/2022 03:55 PM    Glucose (POC) 117 01/01/2022 10:54 PM    Glucose (POC) 94 12/24/2021 04:53 PM    Glucose (POC) 82 12/24/2021 11:26 AM       TIME SPENT: > 35 MINUTES    Signed:  Kiara Patricio NP  1/28/2022  3:00 PM

## (undated) DEVICE — DEVICE INFL 60ML 12ATM CONVENIENT LOK REL HNDL HI PRSS FLX

## (undated) DEVICE — ECHOTIP ULTRA: Brand: ECHOTIP

## (undated) DEVICE — SPHINCTEROTOME: Brand: HYDRATOME RX 44

## (undated) DEVICE — SINGLE USE CYTOLOGY BRUSH V: Brand: SINGLE USE CYTOLOGY BRUSH V

## (undated) DEVICE — THE ENDO CARRY-ON PROCEDURE KIT CONTAINS ALL OF THE SUPPLIES AND INFECTION PREVENTION PRODUCTS NEEDED FOR ENDOSCOPIC PROCEDURES: Brand: ENDO CARRY-ON PROCEDURE KIT

## (undated) DEVICE — LINE SAMP LNG AD ORAL NSL PT O2 TBNG SMRT CAPNOLN H +

## (undated) DEVICE — ENDO KIT 1: Brand: MEDLINE INDUSTRIES, INC.

## (undated) DEVICE — CANNULA NSL O2 AD 7 FT END-TIDAL CARBON DIOX VENTFLO

## (undated) DEVICE — MOUTHPIECE ENDOSCP 20X27MM --

## (undated) DEVICE — REM POLYHESIVE ADULT PATIENT RETURN ELECTRODE: Brand: VALLEYLAB

## (undated) DEVICE — BALLOON DILATATION CATHETER: Brand: HURRICANE™ RX

## (undated) DEVICE — MASK ANES INF SZ 2 PREM TAIL VLV INFL PRT UNSCENTED SGL PT